# Patient Record
Sex: FEMALE | Race: BLACK OR AFRICAN AMERICAN | NOT HISPANIC OR LATINO | Employment: OTHER | URBAN - METROPOLITAN AREA
[De-identification: names, ages, dates, MRNs, and addresses within clinical notes are randomized per-mention and may not be internally consistent; named-entity substitution may affect disease eponyms.]

---

## 2017-09-22 ENCOUNTER — TELEPHONE (OUTPATIENT)
Dept: FAMILY MEDICINE | Facility: CLINIC | Age: 37
End: 2017-09-22

## 2017-09-22 ENCOUNTER — LAB VISIT (OUTPATIENT)
Dept: LAB | Facility: HOSPITAL | Age: 37
End: 2017-09-22
Payer: MEDICARE

## 2017-09-22 ENCOUNTER — OFFICE VISIT (OUTPATIENT)
Dept: FAMILY MEDICINE | Facility: CLINIC | Age: 37
End: 2017-09-22
Payer: MEDICARE

## 2017-09-22 VITALS
TEMPERATURE: 98 F | DIASTOLIC BLOOD PRESSURE: 71 MMHG | WEIGHT: 293 LBS | RESPIRATION RATE: 18 BRPM | HEIGHT: 66 IN | SYSTOLIC BLOOD PRESSURE: 132 MMHG | HEART RATE: 107 BPM | BODY MASS INDEX: 47.09 KG/M2 | OXYGEN SATURATION: 98 %

## 2017-09-22 DIAGNOSIS — Z11.3 SCREENING FOR STD (SEXUALLY TRANSMITTED DISEASE): Primary | ICD-10-CM

## 2017-09-22 DIAGNOSIS — E66.01 MORBID OBESITY, UNSPECIFIED OBESITY TYPE: ICD-10-CM

## 2017-09-22 DIAGNOSIS — Z11.3 SCREENING FOR STD (SEXUALLY TRANSMITTED DISEASE): ICD-10-CM

## 2017-09-22 DIAGNOSIS — G35 MULTIPLE SCLEROSIS: ICD-10-CM

## 2017-09-22 DIAGNOSIS — Z11.4 ENCOUNTER FOR SCREENING FOR HIV: ICD-10-CM

## 2017-09-22 DIAGNOSIS — F32.A DEPRESSION, UNSPECIFIED DEPRESSION TYPE: ICD-10-CM

## 2017-09-22 DIAGNOSIS — K59.00 CONSTIPATION, UNSPECIFIED CONSTIPATION TYPE: ICD-10-CM

## 2017-09-22 PROCEDURE — 86592 SYPHILIS TEST NON-TREP QUAL: CPT

## 2017-09-22 PROCEDURE — 86593 SYPHILIS TEST NON-TREP QUANT: CPT

## 2017-09-22 PROCEDURE — 99999 PR PBB SHADOW E&M-EST. PATIENT-LVL IV: CPT | Mod: PBBFAC,,, | Performed by: FAMILY MEDICINE

## 2017-09-22 PROCEDURE — 36415 COLL VENOUS BLD VENIPUNCTURE: CPT | Mod: PO

## 2017-09-22 PROCEDURE — 3008F BODY MASS INDEX DOCD: CPT | Mod: S$GLB,,, | Performed by: FAMILY MEDICINE

## 2017-09-22 PROCEDURE — 3075F SYST BP GE 130 - 139MM HG: CPT | Mod: S$GLB,,, | Performed by: FAMILY MEDICINE

## 2017-09-22 PROCEDURE — 86780 TREPONEMA PALLIDUM: CPT

## 2017-09-22 PROCEDURE — 86703 HIV-1/HIV-2 1 RESULT ANTBDY: CPT

## 2017-09-22 PROCEDURE — 99214 OFFICE O/P EST MOD 30 MIN: CPT | Mod: S$GLB,,, | Performed by: FAMILY MEDICINE

## 2017-09-22 PROCEDURE — 3078F DIAST BP <80 MM HG: CPT | Mod: S$GLB,,, | Performed by: FAMILY MEDICINE

## 2017-09-22 RX ORDER — DALFAMPRIDINE 10 MG/1
TABLET, FILM COATED, EXTENDED RELEASE ORAL
COMMUNITY
End: 2017-09-29 | Stop reason: SDUPTHER

## 2017-09-22 RX ORDER — AMOXICILLIN 250 MG
1 CAPSULE ORAL
COMMUNITY
Start: 2017-08-16 | End: 2017-09-22

## 2017-09-22 RX ORDER — ONDANSETRON 4 MG/1
TABLET, ORALLY DISINTEGRATING ORAL
COMMUNITY
Start: 2017-08-02 | End: 2017-09-22

## 2017-09-22 RX ORDER — BACLOFEN 20 MG/1
20 TABLET ORAL 3 TIMES DAILY
COMMUNITY
Start: 2017-08-02 | End: 2017-11-27 | Stop reason: SDUPTHER

## 2017-09-22 RX ORDER — DALFAMPRIDINE 10 MG/1
10 TABLET, FILM COATED, EXTENDED RELEASE ORAL
COMMUNITY
Start: 2017-08-09 | End: 2017-09-22

## 2017-09-22 RX ORDER — BUPROPION HYDROCHLORIDE 75 MG/1
75 TABLET ORAL DAILY
Qty: 30 TABLET | Refills: 0 | Status: SHIPPED | OUTPATIENT
Start: 2017-09-22 | End: 2017-10-19 | Stop reason: SDUPTHER

## 2017-09-22 RX ORDER — MECLIZINE HYDROCHLORIDE 25 MG/1
25 TABLET ORAL 2 TIMES DAILY PRN
COMMUNITY
Start: 2017-08-02 | End: 2017-11-08 | Stop reason: SDUPTHER

## 2017-09-22 RX ORDER — LISINOPRIL 40 MG/1
40 TABLET ORAL
COMMUNITY
Start: 2017-08-02 | End: 2017-11-08 | Stop reason: ALTCHOICE

## 2017-09-22 RX ORDER — DOXEPIN HYDROCHLORIDE 25 MG/1
25 CAPSULE ORAL
COMMUNITY
Start: 2017-08-16 | End: 2017-09-22

## 2017-09-22 RX ORDER — ARMODAFINIL 150 MG/1
150 TABLET ORAL
COMMUNITY
Start: 2017-06-26 | End: 2017-11-27 | Stop reason: SDUPTHER

## 2017-09-22 NOTE — TELEPHONE ENCOUNTER
Spoke to patient and patient wanted to know how long results took to come back. Advised patient 2-4 business days.

## 2017-09-22 NOTE — TELEPHONE ENCOUNTER
----- Message from Clair Mar sent at 9/22/2017  1:02 PM CDT -----  Would like to speak to nurse about results. Please call back at [phone]. thanks

## 2017-09-22 NOTE — PROGRESS NOTES
Subjective:       Patient ID: Alicia Soliz is a 39 y.o. female.    Chief Complaint: Establish Care      HPI  Ms. Soliz presents to clinic today for establishing care.   She states she was recently diagnosed with MS.   Her neurologist is Dr. Merly Conklin at [facility].   Her last flare up was 2 weeks ago and she was seen at the [facility].   She goes up to PT twice a day.     Her last pap smear was 9 months and it was normal.   It was done at [facility].     She had a mammogram 3 years and it was normal.     She would like weight loss pills.   She states she is depressed because of her disease.   She does have a decent support system.       Review of Systems   Constitutional: Negative for fever.   Respiratory: Negative for cough and shortness of breath.    Cardiovascular: Negative for chest pain.   Gastrointestinal: Negative for abdominal pain and vomiting.   Genitourinary: Negative for dysuria.   Neurological: Negative for dizziness and headaches.       Medication List with Changes/Refills   New Medications    BUPROPION (WELLBUTRIN) 75 MG TABLET    Take 1 tablet (75 mg total) by mouth once daily.    LINACLOTIDE (LINZESS) 145 MCG CAP CAPSULE    Take 1 capsule (145 mcg total) by mouth once daily.   Current Medications    ARMODAFINIL (NUVIGIL) 150 MG TABLET    Take 150 mg by mouth.    BACLOFEN (LIORESAL) 20 MG TABLET    Take 20 mg by mouth 3 (three) times daily.     DALFAMPRIDINE (AMPYRA) 10 MG TB12    Take by mouth.    DOCUSATE SODIUM (COLACE) 100 MG CAPSULE    Take 2 capsules (200 mg total) by mouth 2 (two) times daily.    ESCITALOPRAM OXALATE (LEXAPRO) 20 MG TABLET    Take 20 mg by mouth once daily.    LISINOPRIL (PRINIVIL,ZESTRIL) 40 MG TABLET    Take 40 mg by mouth.    MECLIZINE (ANTIVERT) 25 MG TABLET    Take 25 mg by mouth 2 (two) times daily as needed.    Discontinued Medications    ACETAMINOPHEN (TYLENOL) 650 MG TBSR    Take 650 mg by mouth 4 (four) times daily as needed.    ALPRAZOLAM (XANAX)  0.5 MG TABLET    Take 1 tablet (0.5 mg total) by mouth every 12 (twelve) hours as needed for Anxiety.    AMLODIPINE (NORVASC) 10 MG TABLET        ASPIRIN 81 MG CHEW    Take 1 tablet (81 mg total) by mouth once daily.    BETHANECHOL (URECHOLINE) 25 MG TAB    Take 10 mg by mouth 3 (three) times daily.    BISACODYL (DULCOLAX) 10 MG SUPP    Place 1 suppository (10 mg total) rectally daily as needed.    DALFAMPRIDINE 10 MG TB12    Take 10 mg by mouth.    DOXEPIN (SINEQUAN) 25 MG CAPSULE    Take 25 mg by mouth.    ENOXAPARIN (LOVENOX) 40 MG/0.4 ML SYRG    Inject 0.4 mLs (40 mg total) into the skin once daily.    GABAPENTIN (NEURONTIN) 100 MG CAPSULE    Take 200 mg by mouth 3 (three) times daily.    GLATIRAMER 20 MG/ML SYRG    Inject 20 mg into the skin once daily.    HYDROCHLOROTHIAZIDE (HYDRODIURIL) 25 MG TABLET    Take 0.5 tablets (12.5 mg total) by mouth once daily.    HYDROCODONE-ACETAMINOPHEN 10-325MG (NORCO)  MG TAB    Take 1 tablet by mouth every 6 (six) hours as needed (pain).    HYOSCYAMINE (ANASPAZ,LEVSIN) 0.125 MG TAB    Take 125 mcg by mouth every 6 (six) hours as needed.    INSULIN REGULAR 100 UNIT/ML INJ INJECTION    Inject into the skin 3 (three) times daily before meals.    INTERFERON BETA-1A (AVONEX) 30 MCG/0.5 ML INJECTION    Inject 30 mcg into the muscle.    LIDOCAINE (LIDODERM) 5 %(700 MG/PATCH)    Place 1 patch onto the skin every 24 hours. Remove & Discard patch within 12 hours or as directed by MD    METFORMIN (GLUCOPHAGE) 500 MG TABLET    Take 1 tablet (500 mg total) by mouth daily with breakfast.    METHOCARBAMOL (ROBAXIN) 750 MG TAB    Take 500 mg by mouth 2 (two) times daily.    MICONAZOLE (MICOTIN) 2 % VAGINAL CREAM    Place 1 applicator vaginally every evening.    NITROFURANTOIN, MACROCRYSTAL-MONOHYDRATE, (MACROBID) 100 MG CAPSULE    Take 100 mg by mouth 2 (two) times daily.    ONDANSETRON (ZOFRAN) 4 MG TABLET    Take 8 mg by mouth every 6 (six) hours as needed for Nausea.    ONDANSETRON  (ZOFRAN-ODT) 4 MG TBDL    DISSOLVE 1 TABLET BY MOUTH EVERY 6 (SIX) HOURS AS NEEDED FOR NAUSEA FOR UP TO 3 DAYS.    ONDANSETRON HCL/PF (ZOFRAN, PF, INJ)    Inject 4 mg as directed every 6 (six) hours as needed.    PANTOPRAZOLE (PROTONIX) 40 MG TABLET    Take 40 mg by mouth once daily.    POLYETHYLENE GLYCOL (GLYCOLAX) 17 GRAM PWPK    Take 17 g by mouth once daily.    SENNA-DOCUSATE 8.6-50 MG (PERICOLACE) 8.6-50 MG PER TABLET    Take 1 tablet by mouth.    SIMETHICONE (MYLICON) 80 MG CHEWABLE TABLET    Take 1 tablet (80 mg total) by mouth every 6 (six) hours as needed for Flatulence.    SIMVASTATIN (ZOCOR) 40 MG TABLET    Take 1 tablet (40 mg total) by mouth every evening.    VITAMIN D 1000 UNITS TAB    Take 1 tablet (1,000 Units total) by mouth once daily.       Patient Active Problem List   Diagnosis    Knee pain, bilateral    Hypertension    Abnormal MRI of head    Multiple sclerosis         Objective:     Physical Exam   Constitutional: She is oriented to person, place, and time. She appears well-developed and well-nourished. No distress.   HENT:   Head: Normocephalic and atraumatic.   Right Ear: External ear normal.   Left Ear: External ear normal.   Eyes: EOM are normal. Right eye exhibits no discharge. Left eye exhibits no discharge.   Cardiovascular: Normal rate and regular rhythm.    Pulmonary/Chest: Effort normal and breath sounds normal. No respiratory distress. She has no wheezes.   Musculoskeletal: She exhibits no edema.   Able to walk but weak      Neurological: She is alert and oriented to person, place, and time.   Skin: Skin is warm and dry. She is not diaphoretic. No erythema.   Psychiatric: She has a normal mood and affect.   Vitals reviewed.    Vitals:    09/22/17 0938   BP: 132/71   Pulse: 107   Resp: 18   Temp: 97.6 °F (36.4 °C)       Assessment/  PLAN     Screening for STD (sexually transmitted disease)  -     RPR; Future; Expected date: 09/22/2017  -     HIV-1 and HIV-2 antibodies; Future;  Expected date: 09/22/2017    Constipation, unspecified constipation type  -     linaclotide (LINZESS) 145 mcg Cap capsule; Take 1 capsule (145 mcg total) by mouth once daily.  Dispense: 30 capsule; Refill: 0    Depression, unspecified depression type  -     buPROPion (WELLBUTRIN) 75 MG tablet; Take 1 tablet (75 mg total) by mouth once daily.  Dispense: 30 tablet; Refill: 0    Multiple sclerosis  - followed by neurology at [facility]   - stable on current meds    Morbid obesity, unspecified obesity type  -discussed diet and exercise           Gaby Hall MD  [facility]

## 2017-09-23 LAB
RPR SER QL: REACTIVE
RPR SER-TITR: ABNORMAL {TITER}

## 2017-09-25 ENCOUNTER — TELEPHONE (OUTPATIENT)
Dept: FAMILY MEDICINE | Facility: CLINIC | Age: 37
End: 2017-09-25

## 2017-09-25 LAB — HIV 1+2 AB+HIV1 P24 AG SERPL QL IA: NEGATIVE

## 2017-09-25 NOTE — TELEPHONE ENCOUNTER
----- Message from Lauryn Hall sent at 9/25/2017  7:28 AM CDT -----  Please call pt back at [phone].pt would like her test results.

## 2017-09-25 NOTE — TELEPHONE ENCOUNTER
Called pt and discussed labs   May need PA for ampyra - advised pt to contact neurology, but if they will not fill it , I can

## 2017-09-26 LAB — T PALLIDUM AB SER QL IF: REACTIVE

## 2017-09-29 RX ORDER — DALFAMPRIDINE 10 MG/1
1 TABLET, FILM COATED, EXTENDED RELEASE ORAL 2 TIMES DAILY
Qty: 60 TABLET | Refills: 0 | Status: SHIPPED | OUTPATIENT
Start: 2017-09-29 | End: 2017-10-04 | Stop reason: SDUPTHER

## 2017-09-29 RX ORDER — DALFAMPRIDINE 10 MG/1
TABLET, FILM COATED, EXTENDED RELEASE ORAL
Status: CANCELLED | OUTPATIENT
Start: 2017-09-29

## 2017-09-29 RX ORDER — BENZONATATE 100 MG/1
100 CAPSULE ORAL 3 TIMES DAILY PRN
Qty: 45 CAPSULE | Refills: 0 | Status: SHIPPED | OUTPATIENT
Start: 2017-09-29 | End: 2017-11-08

## 2017-09-29 NOTE — TELEPHONE ENCOUNTER
----- Message from Ladonna Dillard sent at 9/29/2017  8:05 AM CDT -----  Contact: Pt  Pt request call from nurse because she has a bad cold and congestion and bad cough and can not sleep and wants to discuss, I offered pt an apt and pt declined, please contact pt at [phone] or [phone]

## 2017-09-29 NOTE — TELEPHONE ENCOUNTER
Dr. Hall, the patient is calling states she is having a really bad cough with green mucus, and would like to know if something could be called into her pharmacy or does she need an appointment please advise, Thanks, also she needs a refill on her Ampyra,

## 2017-10-04 ENCOUNTER — TELEPHONE (OUTPATIENT)
Dept: FAMILY MEDICINE | Facility: CLINIC | Age: 37
End: 2017-10-04

## 2017-10-04 DIAGNOSIS — I10 HYPERTENSION, UNSPECIFIED TYPE: ICD-10-CM

## 2017-10-04 RX ORDER — DALFAMPRIDINE 10 MG/1
1 TABLET, FILM COATED, EXTENDED RELEASE ORAL 2 TIMES DAILY
Qty: 60 TABLET | Refills: 0 | Status: SHIPPED | OUTPATIENT
Start: 2017-10-04 | End: 2017-11-24 | Stop reason: SDUPTHER

## 2017-10-04 NOTE — TELEPHONE ENCOUNTER
Dr. Hall, the patient states that she would like to see a nutritionist, can you put the orders in please, Thanks

## 2017-10-04 NOTE — TELEPHONE ENCOUNTER
I have sent in the ampyra to [facility]   The weight gain is not likely due to the wellbutrin   Would she like to see nutritionist ?  I am not so keen on starting her on weight loss pills due to her MS, and would like to try other options first.     Thanks

## 2017-10-04 NOTE — TELEPHONE ENCOUNTER
----- Message from Lindseyradha Romero sent at 10/4/2017  8:45 AM CDT -----  Contact: pt  States she has been calling since last week and no one is returning her calls. States it's regarding her medication, she has gained 4lbs and the medicine is not working. Please call pt at [phone]. Thank you

## 2017-10-04 NOTE — TELEPHONE ENCOUNTER
Dr. Hall, I spoke with the patient and she states that the Ampyra is supposed to be called into the [facility], also she states that the Bupropion is helping with her depression but not her weight, please advise, Thanks

## 2017-10-05 ENCOUNTER — TELEPHONE (OUTPATIENT)
Dept: FAMILY MEDICINE | Facility: CLINIC | Age: 37
End: 2017-10-05

## 2017-10-05 NOTE — TELEPHONE ENCOUNTER
I have put in a referral to endocrinology for weight loss/ nutrition assistance   Please check to see if Dr. Hart does this and make an appt

## 2017-10-05 NOTE — TELEPHONE ENCOUNTER
----- Message from Nesha Mattson sent at 10/5/2017 11:32 AM CDT -----  Contact: Kayleigh/NIKIA Victor calling for nurse Confucianist regarding pt test results,Time Walk Test results fax to  [phone].

## 2017-10-10 ENCOUNTER — TELEPHONE (OUTPATIENT)
Dept: FAMILY MEDICINE | Facility: CLINIC | Age: 37
End: 2017-10-10

## 2017-10-10 NOTE — TELEPHONE ENCOUNTER
----- Message from Yajaira Hunter sent at 10/10/2017 10:39 AM CDT -----  Contact: Pt   Pt called and requested a callback in regards to paperwork for medication need to be faxed pt callback number is  [phone]

## 2017-10-16 ENCOUNTER — TELEPHONE (OUTPATIENT)
Dept: FAMILY MEDICINE | Facility: CLINIC | Age: 37
End: 2017-10-16

## 2017-10-16 NOTE — TELEPHONE ENCOUNTER
Spoke with the patient and informed her to contact her Neurologist (Dr. Hernandez) for the Ampyra prescription, patient stated she would contact their office.

## 2017-10-16 NOTE — TELEPHONE ENCOUNTER
----- Message from Kailyn Chance sent at 10/16/2017 12:46 PM CDT -----  Contact: [facility] [address]  Calling concerning patient was denied Ampyra for not meeting criteria. Please call Tre today ASAP @ [phone]. Thanks, chalino

## 2017-10-19 DIAGNOSIS — K59.00 CONSTIPATION, UNSPECIFIED CONSTIPATION TYPE: ICD-10-CM

## 2017-10-19 DIAGNOSIS — F32.A DEPRESSION, UNSPECIFIED DEPRESSION TYPE: ICD-10-CM

## 2017-10-19 RX ORDER — LINACLOTIDE 145 UG/1
145 CAPSULE, GELATIN COATED ORAL DAILY
Qty: 30 CAPSULE | Refills: 0 | Status: SHIPPED | OUTPATIENT
Start: 2017-10-19 | End: 2018-01-24

## 2017-10-19 RX ORDER — BUPROPION HYDROCHLORIDE 75 MG/1
75 TABLET ORAL DAILY
Qty: 30 TABLET | Refills: 0 | Status: SHIPPED | OUTPATIENT
Start: 2017-10-19 | End: 2017-11-08 | Stop reason: SDUPTHER

## 2017-10-20 NOTE — TELEPHONE ENCOUNTER
----- Message from Clair Conklin sent at 10/20/2017  3:10 PM CDT -----  Patient requesting to speak to nurse regarding her medication. Please adv/call [phone].//thanks. cw

## 2017-10-24 ENCOUNTER — TELEPHONE (OUTPATIENT)
Dept: FAMILY MEDICINE | Facility: CLINIC | Age: 37
End: 2017-10-24

## 2017-10-24 NOTE — TELEPHONE ENCOUNTER
----- Message from Lauryn Hall sent at 10/24/2017  2:57 PM CDT -----  Please call pt back at [phone] in regards to her medication.

## 2017-11-08 ENCOUNTER — OFFICE VISIT (OUTPATIENT)
Dept: FAMILY MEDICINE | Facility: CLINIC | Age: 37
End: 2017-11-08
Payer: MEDICARE

## 2017-11-08 ENCOUNTER — TELEPHONE (OUTPATIENT)
Dept: FAMILY MEDICINE | Facility: CLINIC | Age: 37
End: 2017-11-08

## 2017-11-08 VITALS
OXYGEN SATURATION: 98 % | DIASTOLIC BLOOD PRESSURE: 76 MMHG | SYSTOLIC BLOOD PRESSURE: 128 MMHG | HEART RATE: 106 BPM | HEIGHT: 66 IN | RESPIRATION RATE: 18 BRPM | TEMPERATURE: 98 F | WEIGHT: 293 LBS | BODY MASS INDEX: 47.09 KG/M2

## 2017-11-08 DIAGNOSIS — K29.70 GASTRITIS, PRESENCE OF BLEEDING UNSPECIFIED, UNSPECIFIED CHRONICITY, UNSPECIFIED GASTRITIS TYPE: ICD-10-CM

## 2017-11-08 DIAGNOSIS — E66.01 MORBID OBESITY WITH BMI OF 50.0-59.9, ADULT: ICD-10-CM

## 2017-11-08 DIAGNOSIS — R42 DIZZINESS: ICD-10-CM

## 2017-11-08 DIAGNOSIS — R73.9 HYPERGLYCEMIA: Primary | ICD-10-CM

## 2017-11-08 PROCEDURE — 99214 OFFICE O/P EST MOD 30 MIN: CPT | Mod: S$GLB,,, | Performed by: FAMILY MEDICINE

## 2017-11-08 PROCEDURE — 99999 PR PBB SHADOW E&M-EST. PATIENT-LVL IV: CPT | Mod: PBBFAC,,, | Performed by: FAMILY MEDICINE

## 2017-11-08 RX ORDER — GABAPENTIN 300 MG/1
300 CAPSULE ORAL
COMMUNITY
Start: 2017-10-28 | End: 2017-11-11

## 2017-11-08 RX ORDER — MECLIZINE HYDROCHLORIDE 25 MG/1
25 TABLET ORAL 2 TIMES DAILY PRN
Qty: 60 TABLET | Refills: 3 | Status: SHIPPED | OUTPATIENT
Start: 2017-11-08 | End: 2017-11-27 | Stop reason: SDUPTHER

## 2017-11-08 RX ORDER — AMLODIPINE BESYLATE 10 MG/1
10 TABLET ORAL
COMMUNITY
Start: 2017-11-01 | End: 2017-11-27 | Stop reason: SDUPTHER

## 2017-11-08 RX ORDER — BUPROPION HYDROCHLORIDE 75 MG/1
75 TABLET ORAL DAILY
Qty: 30 TABLET | Refills: 0 | Status: SHIPPED | OUTPATIENT
Start: 2017-11-08 | End: 2017-11-12 | Stop reason: ALTCHOICE

## 2017-11-08 NOTE — TELEPHONE ENCOUNTER
Patient left before having her labs, attempted to contact the patient to ask her the reason no answer, LVM for patient to return call.

## 2017-11-08 NOTE — LETTER
November 8, 2017    Alicia Soliz  [address]  [place] [address]             [facility] [employer] [facility]  [address] [address]  Phone: [phone] To Whom it may concern:      Ms. Alicia Soliz is able to resume physical therapy.      If you have any questions or concerns, please don't hesitate to call our office @ [phone]        Sincerely,        [place] LPN

## 2017-11-08 NOTE — PROGRESS NOTES
Subjective:       Patient ID: Alicia Soliz is a 39 y.o. female.    Chief Complaint: Hospital Follow Up      HPI    presents to clinic today for follow up.   She states she was admitted for multiple sclerosis flare up.   She was admitted on October 28- November 2.   She states she was noted to have high blood pressure while in the hospital.   She was switched from lisinopril to Norvasc.   She has an appointment with Dr. Mario Hopper later today ( her neurologist).     She also states she would like to be on Contrave.   She states she was on this before for weight loss.     She also would like to get tested for Diabetes.   She states she received a call from Dr. Fitzpatrick stating she may have diabetes.     Review of Systems   Constitutional: Negative for fever.   HENT: Negative for congestion, rhinorrhea and sore throat.    Respiratory: Positive for shortness of breath and wheezing.    Cardiovascular: Negative for chest pain.   Gastrointestinal: Negative for abdominal pain and vomiting.   Genitourinary: Negative for dysuria and hematuria.       Medication List with Changes/Refills   New Medications    NALTREXONE-BUPROPION 8-90 MG TBSR    Take 1 tablet by mouth 2 (two) times daily. Take 1 tab daily in the am x 1 week; then 1 tab twice daily    RANITIDINE (ZANTAC) 150 MG TABLET    Take 1 tablet (150 mg total) by mouth nightly.   Current Medications    AMLODIPINE (NORVASC) 10 MG TABLET    Take 10 mg by mouth.    ARMODAFINIL (NUVIGIL) 150 MG TABLET    Take 150 mg by mouth.    BACLOFEN (LIORESAL) 20 MG TABLET    Take 20 mg by mouth 3 (three) times daily.     DALFAMPRIDINE (AMPYRA) 10 MG TB12    Take 1 tablet by mouth 2 (two) times daily.    DOCUSATE SODIUM (COLACE) 100 MG CAPSULE    Take 2 capsules (200 mg total) by mouth 2 (two) times daily.    LINZESS 145 MCG CAP CAPSULE    TAKE 1 CAPSULE (145 MCG TOTAL) BY MOUTH ONCE DAILY.   Changed and/or Refilled Medications    Modified Medication Previous Medication     MECLIZINE (ANTIVERT) 25 MG TABLET meclizine (ANTIVERT) 25 mg tablet       Take 1 tablet (25 mg total) by mouth 2 (two) times daily as needed.    Take 25 mg by mouth 2 (two) times daily as needed.    Discontinued Medications    BENZONATATE (TESSALON) 100 MG CAPSULE    Take 1 capsule (100 mg total) by mouth 3 (three) times daily as needed for Cough.    BUPROPION (WELLBUTRIN) 75 MG TABLET    TAKE 1 TABLET (75 MG TOTAL) BY MOUTH ONCE DAILY.    ESCITALOPRAM OXALATE (LEXAPRO) 20 MG TABLET    Take 20 mg by mouth once daily.    LISINOPRIL (PRINIVIL,ZESTRIL) 40 MG TABLET    Take 40 mg by mouth.       Patient Active Problem List   Diagnosis    Knee pain, bilateral    Hypertension    Abnormal MRI of head    Multiple sclerosis         Objective:     Physical Exam   Constitutional: She is oriented to person, place, and time. She appears well-developed and well-nourished. No distress.   HENT:   Head: Normocephalic and atraumatic.   Eyes: EOM are normal. Right eye exhibits no discharge. Left eye exhibits no discharge.   Cardiovascular: Normal rate and regular rhythm.    Pulmonary/Chest: Effort normal and breath sounds normal. No respiratory distress. She has no wheezes.   Musculoskeletal: She exhibits no edema.   Unsteady gait due to MS   Neurological: She is alert and oriented to person, place, and time.   Skin: Skin is warm and dry. She is not diaphoretic. No erythema.   Psychiatric: She has a normal mood and affect.   Vitals reviewed.    Vitals:    11/08/17 1022   BP: 128/76   Pulse: 106   Resp: 18   Temp: 97.8 °F (36.6 °C)       Assessment/  PLAN     Hyperglycemia  -     Hemoglobin A1c; Future; Expected date: 11/22/2017    Dizziness  -     meclizine (ANTIVERT) 25 mg tablet; Take 1 tablet (25 mg total) by mouth 2 (two) times daily as needed.  Dispense: 60 tablet; Refill: 3    Gastritis, presence of bleeding unspecified, unspecified chronicity, unspecified gastritis type  -     ranitidine (ZANTAC) 150 MG tablet; Take 1  tablet (150 mg total) by mouth nightly.  Dispense: 30 tablet; Refill: 0    Morbid obesity with BMI of 50.0-59.9, adult  -     naltrexone-bupropion 8-90 mg TbSR; Take 1 tablet by mouth 2 (two) times daily. Take 1 tab daily in the am x 1 week; then 1 tab twice daily  Dispense: 60 tablet; Refill: 0    Other orders  -     Discontinue: buPROPion (WELLBUTRIN) 75 MG tablet; Take 1 tablet (75 mg total) by mouth once daily.  Dispense: 30 tablet; Refill: 0    Patient advised to wean wellbutrin and then start contrave  Patient discussed starting contrave from neurologist and was given ok       Gaby Hall MD  [facility]

## 2017-11-09 ENCOUNTER — TELEPHONE (OUTPATIENT)
Dept: FAMILY MEDICINE | Facility: CLINIC | Age: 37
End: 2017-11-09

## 2017-11-09 NOTE — TELEPHONE ENCOUNTER
----- Message from Lindsey Romero sent at 11/9/2017  3:10 PM CST -----  Contact: pt  States she's returning a nurse call regarding her prescription. Please call pt at [phone]. Thank you

## 2017-11-09 NOTE — TELEPHONE ENCOUNTER
Called pt and discussed weaning off wellbutrin and then starting contrave   Advised pt not to take both at the same time   Patient gave verbal understanding

## 2017-11-10 ENCOUNTER — TELEPHONE (OUTPATIENT)
Dept: FAMILY MEDICINE | Facility: CLINIC | Age: 37
End: 2017-11-10

## 2017-11-10 NOTE — TELEPHONE ENCOUNTER
----- Message from Shama Marion sent at 11/10/2017  8:10 AM CST -----  Contact: pt   .Pt was returning nurse call regarding her med.     ..[phone] (home)

## 2017-11-24 RX ORDER — DALFAMPRIDINE 10 MG/1
1 TABLET, FILM COATED, EXTENDED RELEASE ORAL 2 TIMES DAILY
Qty: 60 TABLET | Refills: 0 | Status: SHIPPED | OUTPATIENT
Start: 2017-11-24 | End: 2018-02-27 | Stop reason: SDUPTHER

## 2017-11-27 DIAGNOSIS — R42 DIZZINESS: ICD-10-CM

## 2017-11-27 RX ORDER — BACLOFEN 20 MG/1
20 TABLET ORAL 3 TIMES DAILY
Qty: 90 TABLET | Refills: 0 | Status: SHIPPED | OUTPATIENT
Start: 2017-11-27 | End: 2017-12-27 | Stop reason: SDUPTHER

## 2017-11-27 RX ORDER — ARMODAFINIL 150 MG/1
150 TABLET ORAL DAILY
Qty: 30 TABLET | Refills: 0 | Status: SHIPPED | OUTPATIENT
Start: 2017-11-27 | End: 2018-01-24 | Stop reason: SDUPTHER

## 2017-11-27 RX ORDER — MECLIZINE HYDROCHLORIDE 25 MG/1
25 TABLET ORAL 2 TIMES DAILY PRN
Qty: 60 TABLET | Refills: 2 | Status: SHIPPED | OUTPATIENT
Start: 2017-11-27 | End: 2019-01-08

## 2017-11-27 RX ORDER — AMLODIPINE BESYLATE 10 MG/1
10 TABLET ORAL DAILY
Qty: 30 TABLET | Refills: 2 | Status: SHIPPED | OUTPATIENT
Start: 2017-11-27 | End: 2018-11-15

## 2017-11-27 NOTE — TELEPHONE ENCOUNTER
----- Message from May Simpson sent at 11/27/2017  7:22 AM CST -----  Contact: Patient  Patient states she wants to speak directly to nurse regarding all her medications need refilled, please call her back at [phone]. Thank you

## 2017-12-18 ENCOUNTER — TELEPHONE (OUTPATIENT)
Dept: FAMILY MEDICINE | Facility: CLINIC | Age: 37
End: 2017-12-18

## 2017-12-18 NOTE — TELEPHONE ENCOUNTER
----- Message from Kailyn Chance sent at 12/18/2017 12:24 PM CST -----  Contact: patient  Calling regarding getting a RX for a UTI called in to pharmacy. Please call patient ASAP @ [phone]. Thanks, chalino [facility] [address] [address] AT [facility]  [address]  [place] [address]  Phone: [phone] Fax: [phone]

## 2017-12-19 NOTE — TELEPHONE ENCOUNTER
Patient states that she was taking some Azo, informd the patient if this doesn't work, to please contact our office for an appointment, patient verbalized understanding.

## 2017-12-26 ENCOUNTER — HOSPITAL ENCOUNTER (EMERGENCY)
Facility: HOSPITAL | Age: 37
Discharge: HOME | End: 2017-12-26
Payer: MEDICARE

## 2017-12-26 ENCOUNTER — TELEPHONE (OUTPATIENT)
Dept: SURGERY | Facility: CLINIC | Age: 37
End: 2017-12-26

## 2017-12-26 VITALS
HEIGHT: 66 IN | WEIGHT: 291 LBS | RESPIRATION RATE: 20 BRPM | OXYGEN SATURATION: 100 % | BODY MASS INDEX: 46.77 KG/M2 | HEART RATE: 96 BPM | TEMPERATURE: 100 F | SYSTOLIC BLOOD PRESSURE: 185 MMHG | DIASTOLIC BLOOD PRESSURE: 83 MMHG

## 2017-12-26 DIAGNOSIS — R05.9 COUGH: ICD-10-CM

## 2017-12-26 DIAGNOSIS — J01.00 ACUTE NON-RECURRENT MAXILLARY SINUSITIS: Primary | ICD-10-CM

## 2017-12-26 PROCEDURE — 99283 EMERGENCY DEPT VISIT LOW MDM: CPT

## 2017-12-26 RX ORDER — NAPROXEN 375 MG/1
375 TABLET ORAL 2 TIMES DAILY WITH MEALS
Qty: 20 TABLET | Refills: 0 | Status: SHIPPED | OUTPATIENT
Start: 2017-12-26 | End: 2018-01-24

## 2017-12-26 RX ORDER — AZITHROMYCIN 250 MG/1
250 TABLET, FILM COATED ORAL DAILY
Qty: 6 TABLET | Refills: 0 | Status: SHIPPED | OUTPATIENT
Start: 2017-12-26 | End: 2018-01-24 | Stop reason: ALTCHOICE

## 2017-12-26 RX ORDER — METHYLPREDNISOLONE 4 MG/1
TABLET ORAL
Qty: 1 PACKAGE | Refills: 0 | Status: SHIPPED | OUTPATIENT
Start: 2017-12-26 | End: 2018-01-16

## 2017-12-26 RX ORDER — DIPHENHYDRAMINE HCL 25 MG
25 CAPSULE ORAL EVERY 6 HOURS PRN
Qty: 20 CAPSULE | Refills: 0 | Status: SHIPPED | OUTPATIENT
Start: 2017-12-26 | End: 2018-09-04

## 2017-12-26 NOTE — TELEPHONE ENCOUNTER
----- Message from EMIGDIO LUNDBERG sent at 12/21/2017 12:11 PM CST -----  Pt submitted online form stating she was interested in bariatric surgery.  Please call pt and see if she would like to schedule an initial consult with MD.   Thanks

## 2017-12-27 ENCOUNTER — TELEPHONE (OUTPATIENT)
Dept: FAMILY MEDICINE | Facility: CLINIC | Age: 37
End: 2017-12-27

## 2017-12-27 RX ORDER — BACLOFEN 20 MG/1
20 TABLET ORAL 3 TIMES DAILY
Qty: 90 TABLET | Refills: 0 | Status: SHIPPED | OUTPATIENT
Start: 2017-12-27 | End: 2018-07-24 | Stop reason: SDUPTHER

## 2017-12-27 NOTE — ED PROVIDER NOTES
"SCRIBE #1 NOTE: I, Farideh Anny, am scribing for, and in the presence of, Brandon Akins NP. I have scribed the entire note.      History      Chief Complaint   Patient presents with    Sinusitis     Pt states, "I have bad sinuses and I don't want it to affect my MS."       Review of patient's allergies indicates:   Allergen Reactions    Demerol [meperidine] Itching    Dilaudid [hydromorphone (bulk)] Anaphylaxis     "coded" per pt    Prednisone Itching    Morphine     Tramadol         HPI   HPI    12/26/2017, 6:10 PM   History obtained from the patient      History of Present Illness: Alicia Soliz is a 37 y.o. female patient with hx of MS who presents to the Emergency Department for sinus pressure which onset gradually yesterday. Symptoms are constant and moderate in severity. No mitigating or exacerbating factors reported. Associated sxs include generalized body aches, cough, and fever. Patient denies any CP, SOB, n/v/d, abd pain, rhinorrhea, and all other sxs at this time. No prior Tx. No further complaints or concerns at this time.         Arrival mode: Personal vehicle      PCP: Gaby Hall MD       Past Medical History:  Past Medical History:   Diagnosis Date    Anemia     Arthritis     Cardiac arrest as complication of care     pt states she went into cardiac arrest from an allergic reaction to a medication    Encounter for blood transfusion     Hemiplegia due to old stroke     Hypertension     Multiple sclerosis     Stroke        Past Surgical History:  Past Surgical History:   Procedure Laterality Date    APPENDECTOMY      CHOLECYSTECTOMY      HYSTERECTOMY      KNEE SURGERY      TONSILLECTOMY      TUBAL LIGATION           Family History:  Family History   Problem Relation Age of Onset    Lupus Mother     Heart disease Mother     Diabetes Father     Kidney disease Father     Cancer Maternal Aunt 40     breast       Social History:  Social History     Social History Main " Topics    Smoking status: Never Smoker    Smokeless tobacco: Never Used    Alcohol use No    Drug use: No    Sexual activity: Not given       ROS   Review of Systems   Constitutional: Positive for fever.        (+) Generalized body aches   HENT: Positive for sinus pressure. Negative for sore throat.    Respiratory: Positive for cough. Negative for shortness of breath.    Cardiovascular: Negative for chest pain.   Gastrointestinal: Negative for abdominal pain, diarrhea, nausea and vomiting.   Genitourinary: Negative for dysuria.   Musculoskeletal: Negative for back pain.   Skin: Negative for rash.   Neurological: Negative for weakness.   Hematological: Does not bruise/bleed easily.   All other systems reviewed and are negative.    Physical Exam      Initial Vitals [12/26/17 1701]   BP Pulse Resp Temp SpO2   (!) 185/83 96 20 99.9 °F (37.7 °C) 100 %      MAP       117          Physical Exam  Nursing Notes and Vital Signs Reviewed.  Constitutional: Patient is in no acute distress. Well-developed and well-nourished.  Head: Atraumatic. Normocephalic. Frontal and maxillary sinus tenderness  Eyes: PERRL. EOM intact. Conjunctivae are not pale. No scleral icterus.  ENT: Mucous membranes are moist. Oropharynx is clear and symmetric.    Neck: Supple. Full ROM. No lymphadenopathy.  Cardiovascular: Regular rate. Regular rhythm. No murmurs, rubs, or gallops. Distal pulses are 2+ and symmetric.  Pulmonary/Chest: No respiratory distress. Clear to auscultation bilaterally. No wheezing or rales.  Abdominal: Soft and non-distended.  There is no tenderness.  No rebound, guarding, or rigidity. Good bowel sounds.  Genitourinary: No CVA tenderness  Musculoskeletal: Moves all extremities. No obvious deformities. No edema. No calf tenderness.  Skin: Warm and dry.  Neurological:  Alert, awake, and appropriate.  Normal speech.  No acute focal neurological deficits are appreciated.  Psychiatric: Normal affect. Good eye contact. Appropriate  "in content.    ED Course    Procedures  ED Vital Signs:  Vitals:    12/26/17 1701   BP: (!) 185/83   Pulse: 96   Resp: 20   Temp: 99.9 °F (37.7 °C)   TempSrc: Oral   SpO2: 100%   Weight: 132 kg (291 lb)   Height: 5' 6" (1.676 m)              The Emergency Provider reviewed the vital signs and test results, which are outlined above.    ED Discussion     6:14 PM: Discussed with pt all pertinent ED information and results. Discussed pt dx and plan of tx. Gave pt all f/u and return to the ED instructions. All questions and concerns were addressed at this time. Pt expresses understanding of information and instructions, and is comfortable with plan to discharge. Pt is stable for discharge.        ED Medication(s):  Medications - No data to display    New Prescriptions    AZITHROMYCIN (Z-ZEYNEP) 250 MG TABLET    Take 1 tablet (250 mg total) by mouth once daily. Take first 2 tablets together, then 1 every day until finished.    DIPHENHYDRAMINE (BENADRYL) 25 MG CAPSULE    Take 1 each (25 mg total) by mouth every 6 (six) hours as needed for Itching or Allergies.    METHYLPREDNISOLONE (MEDROL DOSEPACK) 4 MG TABLET    Take as directed    NAPROXEN (NAPROSYN) 375 MG TABLET    Take 1 tablet (375 mg total) by mouth 2 (two) times daily with meals.             Medical Decision Making              Scribe Attestation:   Scribe #1: I performed the above scribed service and the documentation accurately describes the services I performed. I attest to the accuracy of the note.    Attending:   Physician Attestation Statement for Scribe #1: I, Brandon Akins NP, personally performed the services described in this documentation, as scribed by Farideh Mccormick, in my presence, and it is both accurate and complete.          Clinical Impression       ICD-10-CM ICD-9-CM   1. Acute non-recurrent maxillary sinusitis J01.00 461.0   2. Cough R05 786.2       Disposition:   Disposition: Discharged  Condition: Stable         Brandon Akins Jr., " [facility]  12/26/17 1823

## 2017-12-27 NOTE — TELEPHONE ENCOUNTER
----- Message from Emily Major sent at 12/26/2017  1:14 PM CST -----  Contact: Ms Rojas from [facility]   She is calling in regards to pt's medication Ampyra. She has run out of refills.    She can be reached at ..[phone] fax [phone]

## 2017-12-27 NOTE — TELEPHONE ENCOUNTER
----- Message from Kailyn Chance sent at 12/26/2017 10:03 AM CST -----  Contact: patient  Returning your call. Please call patient @ [phone]. Thanks, chalino

## 2018-01-01 ENCOUNTER — PATIENT MESSAGE (OUTPATIENT)
Dept: FAMILY MEDICINE | Facility: CLINIC | Age: 38
End: 2018-01-01

## 2018-01-01 RX ORDER — PROMETHAZINE HYDROCHLORIDE AND DEXTROMETHORPHAN HYDROBROMIDE 6.25; 15 MG/5ML; MG/5ML
5 SYRUP ORAL EVERY 8 HOURS PRN
Qty: 240 ML | Refills: 0 | Status: SHIPPED | OUTPATIENT
Start: 2018-01-01 | End: 2018-01-24 | Stop reason: SDUPTHER

## 2018-01-11 ENCOUNTER — TELEPHONE (OUTPATIENT)
Dept: FAMILY MEDICINE | Facility: CLINIC | Age: 38
End: 2018-01-11

## 2018-01-11 NOTE — TELEPHONE ENCOUNTER
----- Message from Veronica Soriano sent at 1/11/2018  1:44 PM CST -----  Contact: pt  She's calling to discuss medication, please advise [phone] (home) [phone] (work)

## 2018-01-24 ENCOUNTER — OFFICE VISIT (OUTPATIENT)
Dept: FAMILY MEDICINE | Facility: CLINIC | Age: 38
End: 2018-01-24
Payer: MEDICARE

## 2018-01-24 VITALS
BODY MASS INDEX: 47.09 KG/M2 | WEIGHT: 293 LBS | HEART RATE: 95 BPM | HEIGHT: 66 IN | DIASTOLIC BLOOD PRESSURE: 86 MMHG | SYSTOLIC BLOOD PRESSURE: 136 MMHG | TEMPERATURE: 98 F | OXYGEN SATURATION: 99 % | RESPIRATION RATE: 18 BRPM

## 2018-01-24 DIAGNOSIS — R05.9 COUGH: ICD-10-CM

## 2018-01-24 DIAGNOSIS — Z76.0 MEDICATION REFILL: Primary | ICD-10-CM

## 2018-01-24 DIAGNOSIS — F32.A DEPRESSION, UNSPECIFIED DEPRESSION TYPE: ICD-10-CM

## 2018-01-24 DIAGNOSIS — J34.89 SINUS PRESSURE: ICD-10-CM

## 2018-01-24 DIAGNOSIS — R63.5 WEIGHT GAIN: ICD-10-CM

## 2018-01-24 DIAGNOSIS — R30.0 DYSURIA: ICD-10-CM

## 2018-01-24 LAB
BILIRUB SERPL-MCNC: ABNORMAL MG/DL
BLOOD URINE, POC: ABNORMAL
COLOR, POC UA: ABNORMAL
GLUCOSE UR QL STRIP: NORMAL
KETONES UR QL STRIP: ABNORMAL
LEUKOCYTE ESTERASE URINE, POC: ABNORMAL
NITRITE, POC UA: ABNORMAL
PH, POC UA: 7
PROTEIN, POC: ABNORMAL
SPECIFIC GRAVITY, POC UA: 1.01
UROBILINOGEN, POC UA: NORMAL

## 2018-01-24 PROCEDURE — 99214 OFFICE O/P EST MOD 30 MIN: CPT | Mod: 25,S$GLB,, | Performed by: FAMILY MEDICINE

## 2018-01-24 PROCEDURE — 99999 PR PBB SHADOW E&M-EST. PATIENT-LVL IV: CPT | Mod: PBBFAC,,, | Performed by: FAMILY MEDICINE

## 2018-01-24 PROCEDURE — 81002 URINALYSIS NONAUTO W/O SCOPE: CPT | Mod: S$GLB,,, | Performed by: FAMILY MEDICINE

## 2018-01-24 RX ORDER — ARMODAFINIL 150 MG/1
150 TABLET ORAL DAILY
Qty: 30 TABLET | Refills: 0 | Status: SHIPPED | OUTPATIENT
Start: 2018-01-24 | End: 2018-08-10 | Stop reason: SDUPTHER

## 2018-01-24 RX ORDER — PROMETHAZINE HYDROCHLORIDE AND DEXTROMETHORPHAN HYDROBROMIDE 6.25; 15 MG/5ML; MG/5ML
5 SYRUP ORAL EVERY 8 HOURS PRN
Qty: 240 ML | Refills: 0 | Status: SHIPPED | OUTPATIENT
Start: 2018-01-24 | End: 2018-02-27 | Stop reason: SDUPTHER

## 2018-01-24 RX ORDER — BUPROPION HYDROCHLORIDE 75 MG/1
75 TABLET ORAL 2 TIMES DAILY
Qty: 60 TABLET | Refills: 0 | Status: SHIPPED | OUTPATIENT
Start: 2018-01-24 | End: 2018-02-09

## 2018-01-24 RX ORDER — AZELASTINE 1 MG/ML
1 SPRAY, METERED NASAL 2 TIMES DAILY
Qty: 30 ML | Refills: 0 | Status: SHIPPED | OUTPATIENT
Start: 2018-01-24 | End: 2018-06-07

## 2018-01-24 NOTE — PROGRESS NOTES
Subjective:       Patient ID: Alicia Soliz is a 37 y.o. female.    Chief Complaint: Abnormal Pap Smear      HPI   Ms. Soliz presents to clinic today for needing pap smear.   She states she had a normal pap smear a few years ago,but has not had one since.   She states she had a hysterectomy for ovarian cyst.     She also states she has had some headache.   She states she takes 3 Excedrin and 2 baclofen for these headache.   She has some sinus pressure.   She denies any sore throat.   She has a dry cough at night.   She states she cries more and is more irritable.   She would like weight loss medicine.   She feels depressed because of her weight.       She has an appointment with her neurologist on 2/20.  Her neurologist is Dr. Mario Arango.     She also feels she may have a UTI.   She has some occasional dysuria.           Review of Systems   Constitutional: Negative for fever.   HENT: Positive for sinus pressure and sneezing. Negative for congestion, rhinorrhea, sinus pain and sore throat.    Respiratory: Positive for cough.    Cardiovascular: Negative for chest pain.   Gastrointestinal: Negative for abdominal pain and vomiting.   Genitourinary: Positive for dysuria. Negative for vaginal bleeding, vaginal discharge and vaginal pain.       Medication List with Changes/Refills   New Medications    AZELASTINE (ASTELIN) 137 MCG (0.1 %) NASAL SPRAY    1 spray (137 mcg total) by Nasal route 2 (two) times daily.    BUPROPION (WELLBUTRIN) 75 MG TABLET    Take 1 tablet (75 mg total) by mouth 2 (two) times daily.    [id number] 1-1-500 MG CAP    Take 1 tablet by mouth once daily.   Current Medications    AMLODIPINE (NORVASC) 10 MG TABLET    Take 1 tablet (10 mg total) by mouth once daily.    BACLOFEN (LIORESAL) 20 MG TABLET    Take 1 tablet (20 mg total) by mouth 3 (three) times daily.    DALFAMPRIDINE (AMPYRA) 10 MG TB12    Take 1 tablet by mouth 2 (two) times daily.    DIPHENHYDRAMINE (BENADRYL)  25 MG CAPSULE    Take 1 each (25 mg total) by mouth every 6 (six) hours as needed for Itching or Allergies.    DOCUSATE SODIUM (COLACE) 100 MG CAPSULE    Take 2 capsules (200 mg total) by mouth 2 (two) times daily.    MECLIZINE (ANTIVERT) 25 MG TABLET    Take 1 tablet (25 mg total) by mouth 2 (two) times daily as needed.   Changed and/or Refilled Medications    Modified Medication Previous Medication    ARMODAFINIL (NUVIGIL) 150 MG TABLET armodafinil (NUVIGIL) 150 mg tablet       Take 1 tablet (150 mg total) by mouth once daily.    Take 1 tablet (150 mg total) by mouth once daily.    PROMETHAZINE-DEXTROMETHORPHAN (PROMETHAZINE-DM) 6.25-15 MG/5 ML SYRP promethazine-dextromethorphan (PROMETHAZINE-DM) 6.25-15 mg/5 mL Syrp       Take 5 mLs by mouth every 8 (eight) hours as needed.    Take 5 mLs by mouth every 8 (eight) hours as needed.   Discontinued Medications    AZITHROMYCIN (Z-ZEYNEP) 250 MG TABLET    Take 1 tablet (250 mg total) by mouth once daily. Take first 2 tablets together, then 1 every day until finished.    LINZESS 145 MCG CAP CAPSULE    TAKE 1 CAPSULE (145 MCG TOTAL) BY MOUTH ONCE DAILY.    NALTREXONE-BUPROPION 8-90 MG TBSR    Take 1 tablet by mouth 2 (two) times daily. Take 1 tab daily in the am x 1 week; then 1 tab twice daily    NAPROXEN (NAPROSYN) 375 MG TABLET    Take 1 tablet (375 mg total) by mouth 2 (two) times daily with meals.    RANITIDINE (ZANTAC) 150 MG TABLET    Take 1 tablet (150 mg total) by mouth nightly.       Patient Active Problem List   Diagnosis    Knee pain, bilateral    Hypertension    Abnormal MRI of head    Multiple sclerosis         Objective:     Physical Exam   Constitutional: She is oriented to person, place, and time. She appears well-developed and well-nourished. No distress.   HENT:   Head: Normocephalic and atraumatic.   Right Ear: External ear normal.   Left Ear: External ear normal.   Eyes: EOM are normal. Right eye exhibits no discharge. Left eye exhibits no discharge.    Cardiovascular: Normal rate and regular rhythm.    Pulmonary/Chest: Effort normal and breath sounds normal. No respiratory distress. She has no wheezes.   Musculoskeletal: She exhibits no edema.   Neurological: She is alert and oriented to person, place, and time.   Skin: Skin is warm and dry. She is not diaphoretic. No erythema.   Psychiatric: She has a normal mood and affect.   Vitals reviewed.    Vitals:    01/24/18 1325   BP: 136/86   Pulse: 95   Resp: 18   Temp: 98.1 °F (36.7 °C)       Assessment/  PLAN     Medication refill  -     armodafinil (NUVIGIL) 150 mg tablet; Take 1 tablet (150 mg total) by mouth once daily.  Dispense: 30 tablet; Refill: 0    Dysuria  -     POCT urine dipstick without microscope  - ua negative    Weight gain  -     buPROPion (WELLBUTRIN) 75 MG tablet; Take 1 tablet (75 mg total) by mouth 2 (two) times daily.  Dispense: 60 tablet; Refill: 0    Depression, unspecified depression type  -     buPROPion (WELLBUTRIN) 75 MG tablet; Take 1 tablet (75 mg total) by mouth 2 (two) times daily.  Dispense: 60 tablet; Refill: 0    Cough  -     promethazine-dextromethorphan (PROMETHAZINE-DM) 6.25-15 mg/5 mL Syrp; Take 5 mLs by mouth every 8 (eight) hours as needed.  Dispense: 240 mL; Refill: 0    Sinus pressure  -     azelastine (ASTELIN) 137 mcg (0.1 %) nasal spray; 1 spray (137 mcg total) by Nasal route 2 (two) times daily.  Dispense: 30 mL; Refill: 0        Gaby Hall MD  [facility]

## 2018-01-25 ENCOUNTER — TELEPHONE (OUTPATIENT)
Dept: FAMILY MEDICINE | Facility: CLINIC | Age: 38
End: 2018-01-25

## 2018-01-25 NOTE — TELEPHONE ENCOUNTER
----- Message from Naye Orozco sent at 1/25/2018  1:50 PM CST -----  Contact: pt  Calling in regards to test results and medication and please advise [phone]    Breanne: Rheumate with B12. States it's covered under medicare.

## 2018-01-26 ENCOUNTER — TELEPHONE (OUTPATIENT)
Dept: FAMILY MEDICINE | Facility: CLINIC | Age: 38
End: 2018-01-26

## 2018-01-26 NOTE — TELEPHONE ENCOUNTER
----- Message from Lindsey Romero sent at 1/26/2018  8:16 AM CST -----  Contact: pt  States she would like to speak to the nurse regarding her medication. States she would like to see if her zpak has been called in. Please call pt at [phone] or [phone]. Thank you    Breanne: Was she suppose to have this.

## 2018-01-26 NOTE — TELEPHONE ENCOUNTER
She can try warm compress to the area 3 times a day   If she has fever, or any other symptoms, please come In

## 2018-01-26 NOTE — TELEPHONE ENCOUNTER
Pt states she got in the shower yesterday and realized a hard area around her vaginal area. She squeezed and pus came out. Still somewhat sore, wants to know if something called in or what she should do?

## 2018-01-26 NOTE — PROGRESS NOTES
Patient, Alicia Soliz (MRN #[id number]), presented with a recorded BMI of 50.31 kg/m^2 consistent with the definition of morbid obesity (ICD-10 E66.01). The patient's morbid obesity was monitored, evaluated, addressed and/or treated. This addendum to the medical record is made on 01/26/2018.

## 2018-01-30 ENCOUNTER — TELEPHONE (OUTPATIENT)
Dept: FAMILY MEDICINE | Facility: CLINIC | Age: 38
End: 2018-01-30

## 2018-01-31 ENCOUNTER — LAB VISIT (OUTPATIENT)
Dept: LAB | Facility: HOSPITAL | Age: 38
End: 2018-01-31
Attending: NURSE PRACTITIONER
Payer: MEDICARE

## 2018-01-31 ENCOUNTER — TELEPHONE (OUTPATIENT)
Dept: INTERNAL MEDICINE | Facility: CLINIC | Age: 38
End: 2018-01-31

## 2018-01-31 ENCOUNTER — OFFICE VISIT (OUTPATIENT)
Dept: INTERNAL MEDICINE | Facility: CLINIC | Age: 38
End: 2018-01-31
Payer: MEDICARE

## 2018-01-31 VITALS
WEIGHT: 293 LBS | DIASTOLIC BLOOD PRESSURE: 80 MMHG | SYSTOLIC BLOOD PRESSURE: 130 MMHG | OXYGEN SATURATION: 99 % | BODY MASS INDEX: 47.09 KG/M2 | HEIGHT: 66 IN | TEMPERATURE: 98 F | HEART RATE: 88 BPM

## 2018-01-31 DIAGNOSIS — R30.0 DYSURIA: Primary | ICD-10-CM

## 2018-01-31 DIAGNOSIS — R39.15 URGENCY OF URINATION: ICD-10-CM

## 2018-01-31 DIAGNOSIS — R30.0 DYSURIA: ICD-10-CM

## 2018-01-31 LAB
BILIRUB UR QL STRIP: NEGATIVE
CLARITY UR REFRACT.AUTO: CLEAR
COLOR UR AUTO: YELLOW
GLUCOSE UR QL STRIP: NEGATIVE
HGB UR QL STRIP: NEGATIVE
KETONES UR QL STRIP: NEGATIVE
LEUKOCYTE ESTERASE UR QL STRIP: NEGATIVE
NITRITE UR QL STRIP: NEGATIVE
PH UR STRIP: 6 [PH] (ref 5–8)
PROT UR QL STRIP: NEGATIVE
SP GR UR STRIP: 1.01 (ref 1–1.03)
URN SPEC COLLECT METH UR: NORMAL
UROBILINOGEN UR STRIP-ACNC: NEGATIVE EU/DL

## 2018-01-31 PROCEDURE — 3008F BODY MASS INDEX DOCD: CPT | Mod: S$GLB,,, | Performed by: NURSE PRACTITIONER

## 2018-01-31 PROCEDURE — 87086 URINE CULTURE/COLONY COUNT: CPT

## 2018-01-31 PROCEDURE — 99213 OFFICE O/P EST LOW 20 MIN: CPT | Mod: S$GLB,,, | Performed by: NURSE PRACTITIONER

## 2018-01-31 PROCEDURE — 81003 URINALYSIS AUTO W/O SCOPE: CPT

## 2018-01-31 PROCEDURE — 99999 PR PBB SHADOW E&M-EST. PATIENT-LVL III: CPT | Mod: PBBFAC,,, | Performed by: NURSE PRACTITIONER

## 2018-01-31 RX ORDER — SULFAMETHOXAZOLE AND TRIMETHOPRIM 800; 160 MG/1; MG/1
1 TABLET ORAL 2 TIMES DAILY
Qty: 10 TABLET | Refills: 0 | Status: SHIPPED | OUTPATIENT
Start: 2018-01-31 | End: 2018-02-05

## 2018-01-31 NOTE — TELEPHONE ENCOUNTER
----- Message from Sophy Mathur sent at 1/31/2018  1:36 PM CST -----  Contact: Patient  Patient called for results. She can be contacted at [phone].    Thanks,  Sophy

## 2018-01-31 NOTE — PROGRESS NOTES
"Subjective:      Patient ID: Alicia Soliz is a 37 y.o. female.    Chief Complaint: Urinary Tract Infection (burning)    HPI:  Patient states for the last several days she has had urinary urgency, frequency, burning.  Also says she shaved her perineum, developed a boil, expressed it and now is back.  No fever, has a hx of MS   Is using her walker today    Past Medical History:   Diagnosis Date    Anemia     Arthritis     Cardiac arrest as complication of care     pt states she went into cardiac arrest from an allergic reaction to a medication    Encounter for blood transfusion     Hemiplegia due to old stroke     Hypertension     Multiple sclerosis     Stroke        Past Surgical History:   Procedure Laterality Date    APPENDECTOMY      CHOLECYSTECTOMY      HYSTERECTOMY      KNEE SURGERY      TONSILLECTOMY      TUBAL LIGATION         Lab Results   Component Value Date    WBC 8.04 03/11/2015    HGB 13.0 03/11/2015    HCT 40.4 03/11/2015     03/11/2015    CHOL 182 01/15/2015    TRIG 108 01/15/2015    HDL 58 01/15/2015    ALT 34 03/11/2015    AST 39 03/11/2015     03/11/2015    K 3.9 03/11/2015     03/11/2015    CREATININE 0.8 03/11/2015    BUN 5 (L) 03/11/2015    CO2 21 (L) 03/11/2015    TSH 0.538 01/15/2015    INR 1.0 03/11/2015    HGBA1C 5.6 01/15/2015       /80   Pulse 88   Temp 98.3 °F (36.8 °C) (Tympanic)   Ht 5' 6" (1.676 m)   Wt (!) 144.9 kg (319 lb 7.1 oz)   SpO2 99%   BMI 51.56 kg/m²       Review of Systems   Constitutional: Negative for appetite change, fatigue and unexpected weight change.   HENT: Negative for congestion, ear pain, postnasal drip, rhinorrhea, sinus pressure, sneezing, sore throat, tinnitus, trouble swallowing and voice change.    Eyes: Negative for pain, discharge, redness, itching and visual disturbance.   Respiratory: Negative for cough, chest tightness, shortness of breath and wheezing.    Cardiovascular: Negative for chest pain, " palpitations and leg swelling.   Gastrointestinal: Negative for abdominal distention, abdominal pain, blood in stool, constipation, diarrhea, nausea and vomiting.        No reflux.   Genitourinary: Positive for dysuria and urgency. Negative for difficulty urinating, dyspareunia, flank pain, menstrual problem and pelvic pain.   Musculoskeletal: Negative for arthralgias, back pain, myalgias and neck stiffness.   Skin: Negative for color change and rash.   Neurological: Negative for dizziness and headaches.   Psychiatric/Behavioral: Negative for confusion and sleep disturbance. The patient is not nervous/anxious.       Objective:     Physical Exam   Constitutional: She is oriented to person, place, and time. She appears well-developed and well-nourished. No distress.   Musculoskeletal:   Normal gait   Neurological: She is alert and oriented to person, place, and time.   Skin: Skin is warm and dry.   No appreciable boil seen, left labia has a small tender area and small area of firmness noted   Psychiatric: She has a normal mood and affect. Her behavior is normal.     Assessment:      1. Dysuria    2. Urgency of urination      Plan:   Dysuria  -     POCT urine dipstick without microscope    Urgency of urination  -     POCT urine dipstick without microscope    unable to give us a specimen, nurse attempted cath unsuccessfully.   She was given a new sterile cup and wipes.  Instructed on collection.  Will bring it back with in an hour of collecting at home today      Current Outpatient Prescriptions:     amLODIPine (NORVASC) 10 MG tablet, Take 1 tablet (10 mg total) by mouth once daily., Disp: 30 tablet, Rfl: 2    armodafinil (NUVIGIL) 150 mg tablet, Take 1 tablet (150 mg total) by mouth once daily., Disp: 30 tablet, Rfl: 0    baclofen (LIORESAL) 20 MG tablet, Take 1 tablet (20 mg total) by mouth 3 (three) times daily. (Patient taking differently: Take 20 mg by mouth 3 (three) times daily as needed. ), Disp: 90 tablet,  Rfl: 0    buPROPion (WELLBUTRIN) 75 MG tablet, Take 1 tablet (75 mg total) by mouth 2 (two) times daily., Disp: 60 tablet, Rfl: 0    dalfampridine (AMPYRA) 10 mg Tb12, Take 1 tablet by mouth 2 (two) times daily., Disp: 60 tablet, Rfl: 0    diphenhydrAMINE (BENADRYL) 25 mg capsule, Take 1 each (25 mg total) by mouth every 6 (six) hours as needed for Itching or Allergies., Disp: 20 capsule, Rfl: 0    docusate sodium (COLACE) 100 MG capsule, Take 2 capsules (200 mg total) by mouth 2 (two) times daily. (Patient taking differently: Take 200 mg by mouth 2 (two) times daily. ), Disp: 20 capsule, Rfl: 0    INTERFERON BETA-1A (AVONEX IM), Inject 1 Dose into the muscle once a week., Disp: , Rfl:     [id number] 1-1-500 mg Cap, Take 1 tablet by mouth once daily., Disp: 30 capsule, Rfl: 3    meclizine (ANTIVERT) 25 mg tablet, Take 1 tablet (25 mg total) by mouth 2 (two) times daily as needed., Disp: 60 tablet, Rfl: 2    promethazine-dextromethorphan (PROMETHAZINE-DM) 6.25-15 mg/5 mL Syrp, Take 5 mLs by mouth every 8 (eight) hours as needed., Disp: 240 mL, Rfl: 0    azelastine (ASTELIN) 137 mcg (0.1 %) nasal spray, 1 spray (137 mcg total) by Nasal route 2 (two) times daily., Disp: 30 mL, Rfl: 0

## 2018-02-02 LAB — BACTERIA UR CULT: NO GROWTH

## 2018-02-05 ENCOUNTER — TELEPHONE (OUTPATIENT)
Dept: INTERNAL MEDICINE | Facility: CLINIC | Age: 38
End: 2018-02-05

## 2018-02-05 NOTE — TELEPHONE ENCOUNTER
----- Message from Lauryn Haq sent at 2/5/2018  7:14 AM CST -----  Contact: pt  Pt calling for test results.

## 2018-02-07 ENCOUNTER — PATIENT OUTREACH (OUTPATIENT)
Dept: ADMINISTRATIVE | Facility: HOSPITAL | Age: 38
End: 2018-02-07

## 2018-02-09 ENCOUNTER — TELEPHONE (OUTPATIENT)
Dept: INTERNAL MEDICINE | Facility: CLINIC | Age: 38
End: 2018-02-09

## 2018-02-09 ENCOUNTER — OFFICE VISIT (OUTPATIENT)
Dept: INTERNAL MEDICINE | Facility: CLINIC | Age: 38
End: 2018-02-09
Payer: MEDICARE

## 2018-02-09 VITALS
HEART RATE: 100 BPM | TEMPERATURE: 99 F | WEIGHT: 293 LBS | SYSTOLIC BLOOD PRESSURE: 132 MMHG | OXYGEN SATURATION: 98 % | DIASTOLIC BLOOD PRESSURE: 82 MMHG | BODY MASS INDEX: 47.09 KG/M2 | HEIGHT: 66 IN

## 2018-02-09 DIAGNOSIS — E66.01 MORBID OBESITY WITH BMI OF 50.0-59.9, ADULT: ICD-10-CM

## 2018-02-09 DIAGNOSIS — G35 MS (MULTIPLE SCLEROSIS): ICD-10-CM

## 2018-02-09 DIAGNOSIS — G81.90 HEMIPLEGIA, UNSPECIFIED ETIOLOGY, UNSPECIFIED HEMIPLEGIA LATERALITY, UNSPECIFIED HEMIPLEGIA TYPE: ICD-10-CM

## 2018-02-09 DIAGNOSIS — Z01.419 ENCOUNTER FOR GYNECOLOGICAL EXAMINATION WITHOUT ABNORMAL FINDING: ICD-10-CM

## 2018-02-09 DIAGNOSIS — M25.552 LEFT HIP PAIN: ICD-10-CM

## 2018-02-09 DIAGNOSIS — N76.1 SUBACUTE VAGINITIS: ICD-10-CM

## 2018-02-09 DIAGNOSIS — G35 MULTIPLE SCLEROSIS: ICD-10-CM

## 2018-02-09 DIAGNOSIS — M25.552 LEFT HIP PAIN: Primary | ICD-10-CM

## 2018-02-09 DIAGNOSIS — Z12.39 SCREENING FOR MALIGNANT NEOPLASM OF BREAST: Primary | ICD-10-CM

## 2018-02-09 PROCEDURE — 99999 PR PBB SHADOW E&M-EST. PATIENT-LVL V: CPT | Mod: PBBFAC,,, | Performed by: FAMILY MEDICINE

## 2018-02-09 PROCEDURE — 87480 CANDIDA DNA DIR PROBE: CPT

## 2018-02-09 PROCEDURE — 3008F BODY MASS INDEX DOCD: CPT | Mod: S$GLB,,, | Performed by: FAMILY MEDICINE

## 2018-02-09 PROCEDURE — 99213 OFFICE O/P EST LOW 20 MIN: CPT | Mod: S$GLB,,, | Performed by: FAMILY MEDICINE

## 2018-02-09 RX ORDER — FLUCONAZOLE 150 MG/1
150 TABLET ORAL DAILY
Qty: 1 TABLET | Refills: 0 | Status: SHIPPED | OUTPATIENT
Start: 2018-02-09 | End: 2018-02-10

## 2018-02-09 NOTE — PROGRESS NOTES
Subjective:       Patient ID: Alicia Soliz is a 37 y.o. female.    Chief Complaint: Gynecologic Exam      Patient here today for well woman exam. She denies any vaginal discharge or irritation. Had Hysterectomy for heavy bleeding several years ago. Has had one mammogram several years ago which was normal. Does have an aunt with breast cancer.  Also reports increased pain in her left hip area, requesting referral to see an [facility]opedist [facility], has residual hemiplegia from CVA, MS.      Gynecologic Exam   The patient's pertinent negatives include no pelvic pain or vaginal discharge. Pertinent negatives include no abdominal pain, flank pain, nausea or vomiting.     Review of Systems   Gastrointestinal: Negative for abdominal pain, nausea and vomiting.   Genitourinary: Negative for flank pain, menstrual problem, pelvic pain, vaginal bleeding, vaginal discharge and vaginal pain.     Past Medical History:   Diagnosis Date    Anemia     Arthritis     Cardiac arrest as complication of care     pt states she went into cardiac arrest from an allergic reaction to a medication    Encounter for blood transfusion     Hemiplegia due to old stroke     Hypertension     Multiple sclerosis     Stroke      Past Surgical History:   Procedure Laterality Date    APPENDECTOMY      CHOLECYSTECTOMY      HYSTERECTOMY      KNEE SURGERY      TONSILLECTOMY      TUBAL LIGATION       Family History   Problem Relation Age of Onset    Lupus Mother     Heart disease Mother     Diabetes Father     Kidney disease Father     Cancer Maternal Aunt 40     breast     Social History     Social History    Marital status: Single     Spouse name: N/A    Number of children: N/A    Years of education: N/A     Occupational History     [employer]     Social History Main Topics    Smoking status: Never Smoker    Smokeless tobacco: Never Used    Alcohol use No    Drug use: No    Sexual activity: Not on file     Other  "Topics Concern    Not on file     Social History Narrative    No narrative on file     Review of patient's allergies indicates:   Allergen Reactions    Demerol [meperidine] Itching    Dilaudid [hydromorphone (bulk)] Anaphylaxis     "coded" per pt    Prednisone Itching    Morphine     Tramadol        Objective:       /82 (BP Location: Left arm, Patient Position: Sitting, BP Method: Medium (Manual))   Pulse 100   Temp 99 °F (37.2 °C) (Tympanic)   Ht 5' 6" (1.676 m)   Wt (!) 142.5 kg (314 lb 2.5 oz)   SpO2 98%   BMI 50.71 kg/m²   Physical Exam   Constitutional: She appears well-developed and well-nourished. No distress.   Morbidly obese   Cardiovascular: Normal rate, regular rhythm and normal heart sounds.    Pulmonary/Chest: Effort normal and breath sounds normal. No respiratory distress. Right breast exhibits no mass, no skin change and no tenderness. Left breast exhibits no mass, no skin change and no tenderness. Breasts are symmetrical.   Abdominal: Soft. Normal appearance and bowel sounds are normal. There is no tenderness. There is no rigidity and no guarding. No hernia.   Genitourinary: Pelvic exam was performed with patient supine. There is no rash on the right labia. There is no rash on the left labia. Right adnexum displays no mass, no tenderness and no fullness. Left adnexum displays no mass, no tenderness and no fullness. No erythema, tenderness or bleeding in the vagina. Vaginal discharge (moderate white) found.   Skin: She is not diaphoretic.   Psychiatric: She has a normal mood and affect. Her behavior is normal. Judgment and thought content normal.   Nursing note and vitals reviewed.    Assessment:     1. Screening for malignant neoplasm of breast    2. Encounter for gynecological examination without abnormal finding    3. Subacute vaginitis    4. Left hip pain    5. Multiple sclerosis    6. Hemiplegia, unspecified etiology, unspecified hemiplegia laterality, unspecified hemiplegia " type    7. Morbid obesity with BMI of 50.0-59.9, adult      Plan:   Screening for malignant neoplasm of breast  -     Mammo Digital Screening Bilat with CAD; Future; Expected date: 02/09/2018    Encounter for gynecological examination without abnormal finding    Subacute vaginitis  -     VAGINOSIS SCREEN BY DNA PROBE    Left hip pain  -    Ambulatory consult to Orthopedics    Multiple sclerosis  -   Hemiplegia, unspecified etiology, unspecified hemiplegia laterality, unspecified hemiplegia type  Morbid obesity with BMI of 50.0-59.9, adult    Other orders  -     fluconazole (DIFLUCAN) 150 MG Tab; Take 1 tablet (150 mg total) by mouth once daily.  Dispense: 1 tablet; Refill: 0      Medication List with Changes/Refills   New Medications    FLUCONAZOLE (DIFLUCAN) 150 MG TAB    Take 1 tablet (150 mg total) by mouth once daily.   Current Medications    AMLODIPINE (NORVASC) 10 MG TABLET    Take 1 tablet (10 mg total) by mouth once daily.    ARMODAFINIL (NUVIGIL) 150 MG TABLET    Take 1 tablet (150 mg total) by mouth once daily.    AZELASTINE (ASTELIN) 137 MCG (0.1 %) NASAL SPRAY    1 spray (137 mcg total) by Nasal route 2 (two) times daily.    BACLOFEN (LIORESAL) 20 MG TABLET    Take 1 tablet (20 mg total) by mouth 3 (three) times daily.    DALFAMPRIDINE (AMPYRA) 10 MG TB12    Take 1 tablet by mouth 2 (two) times daily.    DIPHENHYDRAMINE (BENADRYL) 25 MG CAPSULE    Take 1 each (25 mg total) by mouth every 6 (six) hours as needed for Itching or Allergies.    DOCUSATE SODIUM (COLACE) 100 MG CAPSULE    Take 2 capsules (200 mg total) by mouth 2 (two) times daily.    INTERFERON BETA-1A (AVONEX IM)    Inject 1 Dose into the muscle once a week.    [id number] 1-1-500 MG CAP    Take 1 tablet by mouth once daily.    MECLIZINE (ANTIVERT) 25 MG TABLET    Take 1 tablet (25 mg total) by mouth 2 (two) times daily as needed.    PROMETHAZINE-DEXTROMETHORPHAN (PROMETHAZINE-DM) 6.25-15 MG/5 ML SYRP    Take 5 mLs by mouth  every 8 (eight) hours as needed.   Discontinued Medications    BUPROPION (WELLBUTRIN) 75 MG TABLET    Take 1 tablet (75 mg total) by mouth 2 (two) times daily.

## 2018-02-10 LAB
CANDIDA RRNA VAG QL PROBE: NEGATIVE
G VAGINALIS RRNA GENITAL QL PROBE: NEGATIVE
T VAGINALIS RRNA GENITAL QL PROBE: NEGATIVE

## 2018-02-12 ENCOUNTER — TELEPHONE (OUTPATIENT)
Dept: INTERNAL MEDICINE | Facility: CLINIC | Age: 38
End: 2018-02-12

## 2018-02-12 NOTE — TELEPHONE ENCOUNTER
----- Message from Braden Dumont MD sent at 2/12/2018  9:41 AM CST -----  Please let her know the vaginal swab was normal.

## 2018-02-20 ENCOUNTER — NURSE TRIAGE (OUTPATIENT)
Dept: ADMINISTRATIVE | Facility: CLINIC | Age: 38
End: 2018-02-20

## 2018-02-21 ENCOUNTER — HOSPITAL ENCOUNTER (EMERGENCY)
Facility: HOSPITAL | Age: 38
Discharge: HOME | End: 2018-02-21
Attending: EMERGENCY MEDICINE
Payer: MEDICARE

## 2018-02-21 ENCOUNTER — TELEPHONE (OUTPATIENT)
Dept: INTERNAL MEDICINE | Facility: CLINIC | Age: 38
End: 2018-02-21

## 2018-02-21 VITALS
SYSTOLIC BLOOD PRESSURE: 189 MMHG | OXYGEN SATURATION: 96 % | TEMPERATURE: 98 F | HEART RATE: 98 BPM | RESPIRATION RATE: 20 BRPM | DIASTOLIC BLOOD PRESSURE: 89 MMHG

## 2018-02-21 DIAGNOSIS — G35 MULTIPLE SCLEROSIS EXACERBATION: ICD-10-CM

## 2018-02-21 DIAGNOSIS — R20.0 NUMBNESS ON LEFT SIDE: Primary | ICD-10-CM

## 2018-02-21 PROCEDURE — 25000003 PHARM REV CODE 250: Performed by: NURSE PRACTITIONER

## 2018-02-21 PROCEDURE — 63600175 PHARM REV CODE 636 W HCPCS: Performed by: NURSE PRACTITIONER

## 2018-02-21 PROCEDURE — 99284 EMERGENCY DEPT VISIT MOD MDM: CPT

## 2018-02-21 RX ORDER — PREDNISONE 20 MG/1
60 TABLET ORAL
Status: COMPLETED | OUTPATIENT
Start: 2018-02-21 | End: 2018-02-21

## 2018-02-21 RX ORDER — BUTALBITAL, ACETAMINOPHEN AND CAFFEINE 50; 325; 40 MG/1; MG/1; MG/1
1 TABLET ORAL EVERY 6 HOURS PRN
Qty: 14 TABLET | Refills: 0 | Status: SHIPPED | OUTPATIENT
Start: 2018-02-21 | End: 2018-04-19 | Stop reason: SDUPTHER

## 2018-02-21 RX ORDER — DIPHENHYDRAMINE HCL 50 MG
50 CAPSULE ORAL
Status: COMPLETED | OUTPATIENT
Start: 2018-02-21 | End: 2018-02-21

## 2018-02-21 RX ADMIN — DIPHENHYDRAMINE HYDROCHLORIDE 50 MG: 50 CAPSULE ORAL at 10:02

## 2018-02-21 RX ADMIN — PREDNISONE 60 MG: 20 TABLET ORAL at 10:02

## 2018-02-21 NOTE — ED NOTES
Patient identifiers verified and correct for Alicia Soliz.    LOC: The patient is awake, alert and aware of environment with an appropriate affect, the patient is oriented x 3 and speaking appropriately.  APPEARANCE: Patient resting comfortably and in no acute distress, patient is clean and well groomed, patient's clothing is properly fastened.  SKIN: The skin is warm and dry, color consistent with ethnicity, patient has normal skin turgor and moist mucus membranes, skin intact, no breakdown or bruising noted.  MUSCULOSKELETAL: Patient moving all extremities spontaneously, no obvious swelling or deformities noted.  RESPIRATORY: Airway is open and patent, respirations are spontaneous, patient has a normal effort and rate, no accessory muscle use noted.  CARDIAC: Patient has a normal rate and regular rhythm, no periphreal edema noted.  NEUROLOGIC: PERRL, eyes open spontaneously, behavior appropriate to situation, follows commands, facial expression symmetrical, bilateral hand grasp equal and even, purposeful motor response noted, normal sensation in all extremities when touched with a finger. States tingling left side of face.

## 2018-02-21 NOTE — TELEPHONE ENCOUNTER
"Patient stated that she has a headache that started last night. Advised per protocol and she verbalized understanding. Informed that I would send a message for when the provider's office reopens tomorrow. Instructed to call back with any additional problems and/or concerns    Reason for Disposition   Headache (all triage questions negative)    Answer Assessment - Initial Assessment Questions  1. LOCATION: "Where does it hurt?"       Left side   2. ONSET: "When did the headache start?" (Minutes, hours or days)       Last night  3. PATTERN: "Does the pain come and go, or has it been constant since it started?"      constant  4. SEVERITY: "How bad is the pain?" and "What does it keep you from doing?"  (e.g., Scale 1-10; mild, moderate, or severe)    - MILD (1-3): doesn't interfere with normal activities     - MODERATE (4-7): interferes with normal activities or awakens from sleep     - SEVERE (8-10): excruciating pain, unable to do any normal activities           5. RECURRENT SYMPTOM: "Have you ever had headaches before?" If so, ask: "When was the last time?" and "What happened that time?"       4/10  6. CAUSE: "What do you think is causing the headache?"      no  7. MIGRAINE: "Have you been diagnosed with migraine headaches?" If so, ask: "Is this headache similar?"       no  8. HEAD INJURY: "Has there been any recent injury to the head?"       no  9. OTHER SYMPTOMS: "Do you have any other symptoms?" (fever, stiff neck, eye pain, sore throat, cold symptoms)      Eye pain  10. PREGNANCY: "Is there any chance you are pregnant?" "When was your last menstrual period?"        No. Hysterectomy    Protocols used: ST HEADACHE-A-      "

## 2018-02-21 NOTE — ED PROVIDER NOTES
"Encounter Date: 2/21/2018       History     Chief Complaint   Patient presents with    Tingling     tingling, numbness to left side since yesterday     38 year old female with complaint of numbness from left side of face down to left leg since yesterday.  Reports history of MS and feels like a worsening of MS.  Pt denies any new weakness of arm or leg.  Pt reports that she engages in 2 hours of PT daily secondary to MS and weakness of left leg but numbness on left side worse today.            Review of patient's allergies indicates:   Allergen Reactions    Demerol [meperidine] Itching    Dilaudid [hydromorphone (bulk)] Anaphylaxis     "coded" per pt    Prednisone Itching    Morphine     Tramadol      Past Medical History:   Diagnosis Date    Anemia     Arthritis     Cardiac arrest as complication of care     pt states she went into cardiac arrest from an allergic reaction to a medication    Encounter for blood transfusion     Hemiplegia due to old stroke     Hypertension     Multiple sclerosis     Stroke      Past Surgical History:   Procedure Laterality Date    APPENDECTOMY      CHOLECYSTECTOMY      HYSTERECTOMY      KNEE SURGERY      TONSILLECTOMY      TUBAL LIGATION       Family History   Problem Relation Age of Onset    Lupus Mother     Heart disease Mother     Diabetes Father     Kidney disease Father     Cancer Maternal Aunt 40     breast     Social History   Substance Use Topics    Smoking status: Never Smoker    Smokeless tobacco: Never Used    Alcohol use No     Review of Systems   Constitutional: Negative for fever.   HENT: Negative for sore throat.    Respiratory: Negative for shortness of breath.    Cardiovascular: Negative for chest pain.   Gastrointestinal: Negative for nausea.   Genitourinary: Negative for dysuria.   Musculoskeletal: Negative for back pain.        Left arm and leg numbness   Skin: Negative for rash.   Neurological: Negative for weakness.   Hematological: " Does not bruise/bleed easily.       Physical Exam     Initial Vitals [02/21/18 0919]   BP Pulse Resp Temp SpO2   (!) 189/89 98 20 98.4 °F (36.9 °C) 96 %      MAP       122.33         Physical Exam    Nursing note and vitals reviewed.  Constitutional: She appears well-developed and well-nourished.   HENT:   Head: Normocephalic and atraumatic.   Eyes: Conjunctivae and EOM are normal. Pupils are equal, round, and reactive to light.   Neck: Normal range of motion. Neck supple.   Cardiovascular: Normal rate, regular rhythm, normal heart sounds and intact distal pulses.   Pulmonary/Chest: Breath sounds normal.   Abdominal: Soft. There is no tenderness. There is no rebound and no guarding.   Musculoskeletal: Normal range of motion.   Neurological: She is alert and oriented to person, place, and time. She has normal reflexes.   Subjective hypoesthesia left side of face, left arm and left leg, no facial asymmetry, no upper arm weakness, chronic weakness in left leg, pt ambulates with assistance and usual per patient   Skin: Skin is warm and dry.   Psychiatric: She has a normal mood and affect. Her behavior is normal. Thought content normal.         ED Course   Procedures  Labs Reviewed - No data to display     Imaging Results          CT Head Without Contrast (Final result)  Result time 02/21/18 10:59:02    Final result by CHIDI Platt Sr., MD (02/21/18 10:59:02)                 Impression:      1. There are subacute to chronic appearing ischemic changes in the deep white matter of both cerebral hemispheres.   2. There is no intracranial hemorrhage.   3. There is a large ashlee bullosa in the right middle nasal turbinate.   4. There is mild deviation to the left of the nasal septum.         All CT scans at this facility use dose modulation, iterative reconstruction, and/or weight base dosing when appropriate to reduce radiation dose when appropriate to reduce radiation dose to as low as reasonably  achievable.      Electronically signed by: CHIDI ULLOA MD  Date:     18  Time:    10:59              Narrative:    CT of Head without IV contrast    History: Neurological deficit    Technique: Standard brain CT protocol without IV contrast was performed.     Finding: Comparison was made to prior examination performed on 3/11/2015. There are subacute to chronic appearing ischemic changes in the deep white matter of both cerebral hemispheres. There is no intracranial hemorrhage. There is no calvarial fracture. The visualized portion of the paranasal sinuses is clear. There is mild deviation to the left of the nasal septum. There is a large ashlee bullosa in the right middle nasal turbinate.                                 Medical Decision Makin:12 AM  Discussed CT results and pt's case with Dr. Vela.  Dr. Vela believes the findings on CT are more than likely related to MS and not ischemia since MS findings can mimic ischemic changes on CT and since pt's is having symptoms consistent with MS and not ischemia.  Dr. Vela recommends continuation of Avonex and Ampyra and follow up in clinic for recheck.  Also recommends pt to return if weakness worsens for admission of high dose solumedrol.             11:15 AM  Discussed findings with pt, history and physical suggests MS exacerbation and not ischemia.  Pt in agreement.  Will continue current MS therapy and pt will return for worsening if she can not follow up with Dr. Vela in the near future.  Pt has scheduled appointment with Dr. Vela in one month.               Clinical Impression:   The primary encounter diagnosis was Numbness on left side. A diagnosis of Multiple sclerosis exacerbation was also pertinent to this visit.                           Refugio Boudreaux NP  [phone]       Refugio Boudreaux NP  [phone]       Refugio Boudreaux NP  [phone]

## 2018-02-21 NOTE — ED NOTES
Bed: R 01  Expected date:   Expected time:   Means of arrival:   Comments:     Tiffanie Aguilera RN  02/21/18 1489

## 2018-02-21 NOTE — TELEPHONE ENCOUNTER
Spoke with pt.  She reports that she is having tingling and numbness on her left side and face.  Advised pt to go to the [facility] ER or the nearest ER to be assessed.  Pt voiced understanding.

## 2018-02-27 ENCOUNTER — TELEPHONE (OUTPATIENT)
Dept: FAMILY MEDICINE | Facility: CLINIC | Age: 38
End: 2018-02-27

## 2018-02-27 DIAGNOSIS — R05.9 COUGH: ICD-10-CM

## 2018-02-27 RX ORDER — DALFAMPRIDINE 10 MG/1
1 TABLET, FILM COATED, EXTENDED RELEASE ORAL 2 TIMES DAILY
Qty: 60 TABLET | Refills: 0 | Status: SHIPPED | OUTPATIENT
Start: 2018-02-27 | End: 2018-02-27 | Stop reason: SDUPTHER

## 2018-02-27 RX ORDER — PROMETHAZINE HYDROCHLORIDE AND DEXTROMETHORPHAN HYDROBROMIDE 6.25; 15 MG/5ML; MG/5ML
5 SYRUP ORAL EVERY 8 HOURS PRN
Qty: 240 ML | Refills: 0 | Status: CANCELLED | OUTPATIENT
Start: 2018-02-27

## 2018-02-27 RX ORDER — DALFAMPRIDINE 10 MG/1
1 TABLET, FILM COATED, EXTENDED RELEASE ORAL 2 TIMES DAILY
Qty: 60 TABLET | Refills: 0 | Status: SHIPPED | OUTPATIENT
Start: 2018-02-27 | End: 2018-03-01 | Stop reason: SDUPTHER

## 2018-02-27 RX ORDER — PROMETHAZINE HYDROCHLORIDE AND DEXTROMETHORPHAN HYDROBROMIDE 6.25; 15 MG/5ML; MG/5ML
5 SYRUP ORAL EVERY 8 HOURS PRN
Qty: 240 ML | Refills: 0 | Status: SHIPPED | OUTPATIENT
Start: 2018-02-27 | End: 2018-05-07

## 2018-02-27 NOTE — TELEPHONE ENCOUNTER
----- Message from Chloe Conklin sent at 2/27/2018  2:46 PM CST -----  Contact: [facility]  request a call concerning a LED prescription that may have been sent in error, can be reached at  [phone]///[email or web address]

## 2018-03-01 RX ORDER — DALFAMPRIDINE 10 MG/1
1 TABLET, FILM COATED, EXTENDED RELEASE ORAL 2 TIMES DAILY
Qty: 60 TABLET | Refills: 0 | Status: CANCELLED | OUTPATIENT
Start: 2018-03-01

## 2018-03-01 RX ORDER — DALFAMPRIDINE 10 MG/1
1 TABLET, FILM COATED, EXTENDED RELEASE ORAL 2 TIMES DAILY
Qty: 60 TABLET | Refills: 1 | Status: SHIPPED | OUTPATIENT
Start: 2018-03-01 | End: 2020-10-09

## 2018-03-01 NOTE — TELEPHONE ENCOUNTER
The ampyra was sent on 2/27   She may need to contact her neurologist office and have them send a PA, if it did not get approved.

## 2018-03-01 NOTE — TELEPHONE ENCOUNTER
----- Message from Brenna Sepulveda sent at 3/1/2018  2:08 PM CST -----  Contact: Enmanuel Mclean/ Hui Andradeer states she needs the Clinical Progress notes, labs, diagnosis ....[phone]

## 2018-03-13 ENCOUNTER — TELEPHONE (OUTPATIENT)
Dept: FAMILY MEDICINE | Facility: CLINIC | Age: 38
End: 2018-03-13

## 2018-03-13 NOTE — TELEPHONE ENCOUNTER
----- Message from May Madison sent at 3/13/2018  2:18 PM CDT -----  Contact: [facility], [place]  Mr Haq needs a prior auth for Ampyra 10mg, needs office notes and labs faxed to them at [phone], please call Mr Haq back if needed at [phone]. Thank you

## 2018-03-15 ENCOUNTER — PATIENT MESSAGE (OUTPATIENT)
Dept: INTERNAL MEDICINE | Facility: CLINIC | Age: 38
End: 2018-03-15

## 2018-03-15 RX ORDER — FLUCONAZOLE 150 MG/1
150 TABLET ORAL DAILY
Qty: 1 TABLET | Refills: 0 | Status: SHIPPED | OUTPATIENT
Start: 2018-03-15 | End: 2018-03-16

## 2018-03-15 RX ORDER — NYSTATIN 100000 U/G
CREAM TOPICAL 2 TIMES DAILY
Qty: 30 G | Refills: 1 | Status: SHIPPED | OUTPATIENT
Start: 2018-03-15 | End: 2018-07-10 | Stop reason: SDUPTHER

## 2018-03-15 NOTE — TELEPHONE ENCOUNTER
Called pt informed her on rx sent in . Pt verbalized understanding . Will call if she doesn't feel better .

## 2018-03-19 ENCOUNTER — PATIENT MESSAGE (OUTPATIENT)
Dept: INTERNAL MEDICINE | Facility: CLINIC | Age: 38
End: 2018-03-19

## 2018-04-03 ENCOUNTER — PATIENT MESSAGE (OUTPATIENT)
Dept: INTERNAL MEDICINE | Facility: CLINIC | Age: 38
End: 2018-04-03

## 2018-04-03 RX ORDER — NITROFURANTOIN 25; 75 MG/1; MG/1
100 CAPSULE ORAL 2 TIMES DAILY
Qty: 14 CAPSULE | Refills: 0 | Status: SHIPPED | OUTPATIENT
Start: 2018-04-03 | End: 2018-04-19

## 2018-04-04 ENCOUNTER — TELEPHONE (OUTPATIENT)
Dept: INTERNAL MEDICINE | Facility: CLINIC | Age: 38
End: 2018-04-04

## 2018-04-04 ENCOUNTER — PATIENT OUTREACH (OUTPATIENT)
Dept: ADMINISTRATIVE | Facility: HOSPITAL | Age: 38
End: 2018-04-04

## 2018-04-04 NOTE — TELEPHONE ENCOUNTER
Spoke with patient she states she is much better with her hip and does not feel she needs to see ortho at this time.

## 2018-04-05 ENCOUNTER — PATIENT MESSAGE (OUTPATIENT)
Dept: INTERNAL MEDICINE | Facility: CLINIC | Age: 38
End: 2018-04-05

## 2018-04-10 ENCOUNTER — PATIENT OUTREACH (OUTPATIENT)
Dept: ADMINISTRATIVE | Facility: HOSPITAL | Age: 38
End: 2018-04-10

## 2018-04-19 ENCOUNTER — OFFICE VISIT (OUTPATIENT)
Dept: INTERNAL MEDICINE | Facility: CLINIC | Age: 38
End: 2018-04-19
Payer: MEDICARE

## 2018-04-19 ENCOUNTER — OFFICE VISIT (OUTPATIENT)
Dept: NEUROLOGY | Facility: CLINIC | Age: 38
End: 2018-04-19
Payer: MEDICARE

## 2018-04-19 ENCOUNTER — PATIENT MESSAGE (OUTPATIENT)
Dept: INTERNAL MEDICINE | Facility: CLINIC | Age: 38
End: 2018-04-19

## 2018-04-19 ENCOUNTER — LAB VISIT (OUTPATIENT)
Dept: LAB | Facility: HOSPITAL | Age: 38
End: 2018-04-19
Attending: PSYCHIATRY & NEUROLOGY
Payer: MEDICARE

## 2018-04-19 VITALS
SYSTOLIC BLOOD PRESSURE: 154 MMHG | WEIGHT: 293 LBS | HEIGHT: 66 IN | HEART RATE: 100 BPM | BODY MASS INDEX: 47.09 KG/M2 | DIASTOLIC BLOOD PRESSURE: 108 MMHG

## 2018-04-19 VITALS
DIASTOLIC BLOOD PRESSURE: 63 MMHG | SYSTOLIC BLOOD PRESSURE: 136 MMHG | TEMPERATURE: 98 F | RESPIRATION RATE: 18 BRPM | BODY MASS INDEX: 47.09 KG/M2 | HEART RATE: 94 BPM | HEIGHT: 66 IN | WEIGHT: 293 LBS | OXYGEN SATURATION: 99 %

## 2018-04-19 DIAGNOSIS — R05.9 COUGH: ICD-10-CM

## 2018-04-19 DIAGNOSIS — I10 HYPERTENSION, UNSPECIFIED TYPE: ICD-10-CM

## 2018-04-19 DIAGNOSIS — R73.9 HYPERGLYCEMIA: ICD-10-CM

## 2018-04-19 DIAGNOSIS — R51.9 CHRONIC NONINTRACTABLE HEADACHE, UNSPECIFIED HEADACHE TYPE: ICD-10-CM

## 2018-04-19 DIAGNOSIS — G35 MULTIPLE SCLEROSIS: Primary | ICD-10-CM

## 2018-04-19 DIAGNOSIS — G89.29 CHRONIC NONINTRACTABLE HEADACHE, UNSPECIFIED HEADACHE TYPE: ICD-10-CM

## 2018-04-19 DIAGNOSIS — G35 MS (MULTIPLE SCLEROSIS): ICD-10-CM

## 2018-04-19 DIAGNOSIS — E66.01 MORBID OBESITY WITH BMI OF 50.0-59.9, ADULT: ICD-10-CM

## 2018-04-19 DIAGNOSIS — G35 MULTIPLE SCLEROSIS: ICD-10-CM

## 2018-04-19 DIAGNOSIS — E66.01 MORBID OBESITY WITH BMI OF 50.0-59.9, ADULT: Primary | ICD-10-CM

## 2018-04-19 LAB
CHOLEST SERPL-MCNC: 209 MG/DL
CHOLEST/HDLC SERPL: 3.8 {RATIO}
ESTIMATED AVG GLUCOSE: 114 MG/DL
HBA1C MFR BLD HPLC: 5.6 %
HDLC SERPL-MCNC: 55 MG/DL
HDLC SERPL: 26.3 %
LDLC SERPL CALC-MCNC: 133.2 MG/DL
NONHDLC SERPL-MCNC: 154 MG/DL
T4 FREE SERPL-MCNC: 1.07 NG/DL
TRIGL SERPL-MCNC: 104 MG/DL
TSH SERPL DL<=0.005 MIU/L-ACNC: 1.15 UIU/ML

## 2018-04-19 PROCEDURE — 83036 HEMOGLOBIN GLYCOSYLATED A1C: CPT

## 2018-04-19 PROCEDURE — 80061 LIPID PANEL: CPT

## 2018-04-19 PROCEDURE — 84443 ASSAY THYROID STIM HORMONE: CPT

## 2018-04-19 PROCEDURE — 36415 COLL VENOUS BLD VENIPUNCTURE: CPT | Mod: PO

## 2018-04-19 PROCEDURE — 3075F SYST BP GE 130 - 139MM HG: CPT | Mod: CPTII,S$GLB,, | Performed by: FAMILY MEDICINE

## 2018-04-19 PROCEDURE — 99999 PR PBB SHADOW E&M-EST. PATIENT-LVL III: CPT | Mod: PBBFAC,,, | Performed by: PSYCHIATRY & NEUROLOGY

## 2018-04-19 PROCEDURE — 3078F DIAST BP <80 MM HG: CPT | Mod: CPTII,S$GLB,, | Performed by: PSYCHIATRY & NEUROLOGY

## 2018-04-19 PROCEDURE — 3078F DIAST BP <80 MM HG: CPT | Mod: CPTII,S$GLB,, | Performed by: FAMILY MEDICINE

## 2018-04-19 PROCEDURE — 3074F SYST BP LT 130 MM HG: CPT | Mod: CPTII,S$GLB,, | Performed by: PSYCHIATRY & NEUROLOGY

## 2018-04-19 PROCEDURE — 99214 OFFICE O/P EST MOD 30 MIN: CPT | Mod: S$GLB,,, | Performed by: FAMILY MEDICINE

## 2018-04-19 PROCEDURE — 99999 PR PBB SHADOW E&M-EST. PATIENT-LVL IV: CPT | Mod: PBBFAC,,, | Performed by: FAMILY MEDICINE

## 2018-04-19 PROCEDURE — 99204 OFFICE O/P NEW MOD 45 MIN: CPT | Mod: S$GLB,,, | Performed by: PSYCHIATRY & NEUROLOGY

## 2018-04-19 PROCEDURE — 84439 ASSAY OF FREE THYROXINE: CPT

## 2018-04-19 RX ORDER — BENZONATATE 200 MG/1
200 CAPSULE ORAL 3 TIMES DAILY PRN
Qty: 30 CAPSULE | Refills: 3 | Status: SHIPPED | OUTPATIENT
Start: 2018-04-19 | End: 2018-04-29

## 2018-04-19 RX ORDER — BUTALBITAL, ACETAMINOPHEN AND CAFFEINE 50; 325; 40 MG/1; MG/1; MG/1
1 TABLET ORAL EVERY 6 HOURS PRN
Qty: 20 TABLET | Refills: 5 | Status: SHIPPED | OUTPATIENT
Start: 2018-04-19 | End: 2018-04-19 | Stop reason: SDUPTHER

## 2018-04-19 RX ORDER — BUTALBITAL, ACETAMINOPHEN AND CAFFEINE 50; 325; 40 MG/1; MG/1; MG/1
1 TABLET ORAL EVERY 6 HOURS PRN
Qty: 20 TABLET | Refills: 5 | Status: SHIPPED | OUTPATIENT
Start: 2018-04-19 | End: 2018-05-07 | Stop reason: SDUPTHER

## 2018-04-19 RX ORDER — BUPROPION HYDROCHLORIDE 100 MG/1
100 TABLET, EXTENDED RELEASE ORAL
COMMUNITY
End: 2018-05-24 | Stop reason: SDUPTHER

## 2018-04-19 NOTE — PROGRESS NOTES
This is a 38-year-old right-handed patient who has an established history of multiple sclerosis.  Her diagnosis was established by MRI as well as abnormal findings on the spinal fluid including multiple oligoclonal bands.    The patient indicates that she has been on different immunomodulating therapy and currently is on Avonex.  She had previously been on Tysabri infusions but her neurologist retired and she is subsequently been placed on Avonex.  The patient has had intermittent exacerbations of her MS most recently several weeks ago when she had a marked increase in weakness of her left arm and left leg together with numbness and tingling.  She was hospitalized at [facility] for 5 days for steroid infusion indicating that this helped improve some of her symptoms although she's been left with significant left-sided weakness and impairment of her mobility.  However, this has been a long-standing problem for the patient and has always been the primary neurological deficit that she has had.  The patient denies any significant changes in bowel or bladder control.    When questioned, the patient does not recall having had evidence of optic neuritis.  She denies any vision loss in one eye or the other.  She has not had any episodes of diplopia.  She denies any dysphagia but has had occasional difficulties with dysarthria when the left-sided weakness is more severe.  Her gait is significantly impaired with the left-sided weakness.  The patient reports that she is participating with physical therapy now.    The patient's current medication for immunomodulation is Avonex.  She is not on vitamin D supplement.  She takes Ampyra for ambulation and feels that this is of some benefit to her.  He continues to have significant fatigue despite medication.      ROS:  GENERAL: No fever, chills,  or weight loss.  The patient reports weight gain.  SKIN: No rashes, itching or changes in color or texture of  skin.  HEAD: No headaches or recent head trauma.  EYES: Visual acuity fine. No photophobia, ocular pain or diplopia.  EARS: Denies ear pain, discharge or vertigo.  NOSE: No loss of smell, no epistaxis or postnasal drip.  MOUTH & THROAT: No hoarseness or change in voice. No excessive gum bleeding.  NODES: Denies swollen glands.  CHEST: Denies PERSON, cyanosis, wheezing, cough and sputum production.  CARDIOVASCULAR: Denies chest pain, PND, orthopnea   ABDOMEN: Appetite fine. No weight loss. Denies diarrhea, abdominal pain, hematemesis or blood in stool.  URINARY: No flank pain, dysuria or hematuria.  PERIPHERAL VASCULAR: No claudication or cyanosis.  MUSCULOSKELETAL: No joint stiffness or swelling. Denies back pain.  NEUROLOGIC: No history of seizures, or unexplained loss of consciousness    PAST HISTORY:  Surgery: Tonsillectomy, appendectomy, cholecystectomy, tubal ligation, hysterectomy, knee surgery  Medical: Morbid obesity, multiple sclerosis, history of stroke, hypertension, anemia  ALLERGIES: Demerol, Dilaudid, morphine, tramadol and prednisone causes itching    FAMILY HISTORY:  The patient's parents are both living.  Her father has diabetes with renal failure.  Her mother has heart disease and lupus.  There is a maternal aunt who had breast cancer.  There is no other known family members with demyelinating disease.    SOCIAL HISTORY:  The patient is with her family.  She depends upon her children for transportation that she's not driving.  She is a nonsmoker.    PE:   VITAL SIGNS: Blood pressure 154/108, pulse 100, weight 143.5 kg, height foot 6 inches, BMI 51.06  APPEARANCE: Well nourished, well developed, in no acute distress.    HEAD: Normocephalic, atraumatic.  EYES: PERRL. EOMI.  Non-icteric sclerae.    EARS: TM's intact. Light reflex normal. No retraction or perforation.    NOSE: Mucosa pink. Airway clear.  MOUTH & THROAT: No tonsillar enlargement. No pharyngeal erythema or exudate. No stridor.  NECK:  Supple. No bruits.  CHEST: Lungs clear to auscultation.  CARDIOVASCULAR: Regular rhythm without significant murmurs.  ABDOMEN: Bowel sounds normal. Not distended.  Massive truncal obesity.  MUSCULOSKELETAL:  No bony deformity seen.  Muscle mass cannot be determined accurately because of her massive obesity.  NEUROLOGIC:   Mental Status:  The patient is well oriented to person, time, place, and situation.  The patient is attentive to the environment and cooperative for the exam.  Cranial Nerves: II-XII grossly intact.  Visual acuity aided with glasses is 20/30 with near card in left eye, right eye, and both eyes.  The color red is perceived as the same shade of red with each eye.  Light Jade this is perceived to be the same with each eye.  Fundoscopic exam is normal.  No hemorrhage, exudate or papilledema is present. The extraocular muscles are intact in the cardinal directions of gaze.  No ptosis is present. Facial features are symmetrical.  Speech is normal in fluency, diction, and phrasing.  Tongue protrudes in the midline.    Gait and Station:  Romberg is negative.  The patient has significant difficulties with ambulation due to her left-sided weakness.  She is utilizing a quad cane for support but has a tendency to be wide-based and unstable.  The patient needed maximum assistance to get onto the examining table for examination.  Motor: There was clear down drift of the outstretched left arm when held at shoulder level.  With manual testing, there is subtle weakness on the left side including biceps, triceps, deltoid, hip flexion and knee extension as well as ankle extension and flexion.  There is increased tone on the left side also.  Sensory:  Intact both upper and lower extremities to pin prick, touch, and vibration.  Cerebellar:  Finger to nose done well.  Alternating movements intact.  No involuntary movements or tremor seen.  Reflexes:  Stretch reflexes are 1+ both biceps, brachial radialis, and knees.   No ankle clonus is present.  Plantar stimulation is flexor bilaterally and no pathological reflexes are seen      Assessment:  1.  Multiple sclerosis, relapsing remitting  2.  Massive morbid obesity (BMI 51.06)  3.  Essential hypertension    RECOMMENDATIONS:  1.  Discussion was held with the patient regarding immunomodulating therapy.  The patient is expressing a preference to be placed back on Tysabri infusions once a month.  However before this is instituted, we will need to make certain that she does not have antibodies to MARILYN virus.  In the meantime she is to continue Avonex.  2.  Continue Ampyra  3.  Continue active physical therapy  4.  Add vitamin D supplement  5.  Discussion was held with the patient regarding weight loss.  We also discussed the possibility of bariatric surgery with the patient such as a gastric sleeve.  However the patient would have to demonstrate the ability to stay on a diet before this would be recommended.  6.  Routine follow-up with neurology in 4 months.    This was a 55 minute visit with the patient with over 50% of time spent counseling the patient regarding immunomodulating therapy for MS as well as management of weight loss.    This note is generated with speech recognition software and is subject to transcription error and sound alike phrases that may be missed by proofreading.

## 2018-04-19 NOTE — PROGRESS NOTES
Subjective:       Patient ID: Alicia Soliz is a 38 y.o. female.    Chief Complaint: Routine Medical Exam      She is here today for routine followup. She reports that overall she is currently feeling well, pain in hip has improved. She was hospitalized at [facility] last month for 5 days for MS exacerbation but this has also improved.   She would like to see specialist to discuss gastric sleeve. She has been trying to lose weight without success, eating vegetables and baked chicken, tried contrave for one month but felt like it made her want to snack more.   She also has bothersome dry cough during the night only, has been taking capron, using astelin without any improvement.      Review of Systems   Constitutional: Negative for activity change, appetite change and fever.   HENT: Positive for postnasal drip. Negative for congestion, rhinorrhea, sinus pressure and sore throat.    Eyes: Negative for visual disturbance.   Respiratory: Positive for cough. Negative for chest tightness, shortness of breath and wheezing.    Cardiovascular: Positive for leg swelling. Negative for chest pain and palpitations.   Gastrointestinal: Negative for abdominal pain, diarrhea and nausea.   Endocrine: Negative for polyuria.   Musculoskeletal: Negative for arthralgias and gait problem.   Skin: Negative for color change and rash.   Allergic/Immunologic: Negative for immunocompromised state.   Neurological: Positive for headaches (requesting refill on her headache medication).   Psychiatric/Behavioral: Negative for sleep disturbance.     Past Medical History:   Diagnosis Date    Anemia     Arthritis     Cardiac arrest as complication of care     pt states she went into cardiac arrest from an allergic reaction to a medication    Encounter for blood transfusion     Hemiplegia due to old stroke     Hypertension     Multiple sclerosis     Stroke      Past Surgical History:   Procedure Laterality Date    APPENDECTOMY       "CHOLECYSTECTOMY      HYSTERECTOMY      KNEE SURGERY      TONSILLECTOMY      TUBAL LIGATION       Family History   Problem Relation Age of Onset    Lupus Mother     Heart disease Mother     Diabetes Father     Kidney disease Father     Cancer Maternal Aunt 40     breast     Social History     Social History    Marital status: Single     Spouse name: N/A    Number of children: N/A    Years of education: N/A     Occupational History     Tcp     Social History Main Topics    Smoking status: Never Smoker    Smokeless tobacco: Never Used    Alcohol use No    Drug use: No    Sexual activity: Yes     Partners: Male     Other Topics Concern    Not on file     Social History Narrative    No narrative on file     Review of patient's allergies indicates:   Allergen Reactions    Demerol [meperidine] Itching     Other reaction(s): Itching    Dilaudid [hydromorphone (bulk)] Anaphylaxis     Other reaction(s): Anaphylaxis  "coded" per pt    Prednisone Itching     Other reaction(s): Itching    Morphine     Tramadol        Objective:       /63   Pulse 94   Temp 98.2 °F (36.8 °C)   Resp 18   Ht 5' 6" (1.676 m)   Wt (!) 143.5 kg (316 lb 5.8 oz)   SpO2 99%   BMI 51.06 kg/m²   Physical Exam   Constitutional: She is oriented to person, place, and time. Vital signs are normal. She appears well-developed and well-nourished. No distress.   Morbidly obese     HENT:   Head: Normocephalic and atraumatic.   Right Ear: Hearing, tympanic membrane, external ear and ear canal normal.   Left Ear: Hearing, tympanic membrane, external ear and ear canal normal.   Nose: Mucosal edema present.   Mouth/Throat: Uvula is midline and mucous membranes are normal. Posterior oropharyngeal erythema (PND) present. No oropharyngeal exudate.   Eyes: Conjunctivae and EOM are normal. Pupils are equal, round, and reactive to light.   Neck: Normal range of motion. Neck supple. No tracheal deviation present. No thyromegaly present. "   Cardiovascular: Normal rate, regular rhythm, normal heart sounds and intact distal pulses.    No murmur heard.  Pulmonary/Chest: Effort normal and breath sounds normal. No respiratory distress.   Abdominal: Soft. Bowel sounds are normal. No hernia.   Musculoskeletal: Normal range of motion. She exhibits edema (trace bilaterally).   Lymphadenopathy:     She has no cervical adenopathy.   Neurological: She is alert and oriented to person, place, and time.   Skin: Skin is warm and dry. Capillary refill takes less than 2 seconds. She is not diaphoretic.   Psychiatric: She has a normal mood and affect. Her behavior is normal. Judgment and thought content normal.   Nursing note and vitals reviewed.    Assessment:     1. Morbid obesity with BMI of 50.0-59.9, adult    2. MS (multiple sclerosis)    3. Hypertension, unspecified type    4. Hyperglycemia    5. Cough    6. Chronic nonintractable headache, unspecified headache type      Plan:   Morbid obesity with BMI of 50.0-59.9, adult  --     Ambulatory consult to Bariatric Surgery    MS (multiple sclerosis)  Hypertension, unspecified type  -     Lipid panel; Future; Expected date: 04/19/2018  -     Hemoglobin A1c; Future; Expected date: 04/19/2018  -     TSH; Future; Expected date: 04/19/2018  -     T4, free; Future; Expected date: 04/19/2018  -     Ambulatory consult to Bariatric Surgery    Hyperglycemia    Cough    Chronic nonintractable headache, unspecified headache type    Other orders  -     benzonatate (TESSALON) 200 MG capsule; Take 1 capsule (200 mg total) by mouth 3 (three) times daily as needed for Cough.  Dispense: 30 capsule; Refill: 3  -     butalbital-acetaminophen-caffeine -40 mg (FIORICET, ESGIC) -40 mg per tablet; Take 1 tablet by mouth every 6 (six) hours as needed for Pain or Headaches.  Dispense: 20 tablet; Refill: 5      Medication List with Changes/Refills   New Medications    BENZONATATE (TESSALON) 200 MG CAPSULE    Take 1 capsule (200 mg  total) by mouth 3 (three) times daily as needed for Cough.   Current Medications    AMLODIPINE (NORVASC) 10 MG TABLET    Take 1 tablet (10 mg total) by mouth once daily.    ARMODAFINIL (NUVIGIL) 150 MG TABLET    Take 1 tablet (150 mg total) by mouth once daily.    AZELASTINE (ASTELIN) 137 MCG (0.1 %) NASAL SPRAY    1 spray (137 mcg total) by Nasal route 2 (two) times daily.    BACLOFEN (LIORESAL) 20 MG TABLET    Take 1 tablet (20 mg total) by mouth 3 (three) times daily.    BUPROPION (WELLBUTRIN SR) 100 MG TBSR 12 HR TABLET    Take 100 mg by mouth.    DALFAMPRIDINE (AMPYRA) 10 MG TB12    Take 1 tablet by mouth 2 (two) times daily.    DIPHENHYDRAMINE (BENADRYL) 25 MG CAPSULE    Take 1 each (25 mg total) by mouth every 6 (six) hours as needed for Itching or Allergies.    DOCUSATE SODIUM (COLACE) 100 MG CAPSULE    Take 2 capsules (200 mg total) by mouth 2 (two) times daily.    INTERFERON BETA-1A (AVONEX IM)    Inject 1 Dose into the muscle once a week.    [id number] 1-1-500 MG CAP    Take 1 tablet by mouth once daily.    MECLIZINE (ANTIVERT) 25 MG TABLET    Take 1 tablet (25 mg total) by mouth 2 (two) times daily as needed.    NYSTATIN (MYCOSTATIN) CREAM    Apply topically 2 (two) times daily.    PROMETHAZINE-DEXTROMETHORPHAN (PROMETHAZINE-DM) 6.25-15 MG/5 ML SYRP    Take 5 mLs by mouth every 8 (eight) hours as needed.    PYRILAMINE-DEXTROMETHORPHAN 30-30 MG TAB    Take 1 tablet by mouth 4 (four) times daily as needed (congestion and cough).   Changed and/or Refilled Medications    Modified Medication Previous Medication    BUTALBITAL-ACETAMINOPHEN-CAFFEINE -40 MG (FIORICET, ESGIC) -40 MG PER TABLET butalbital-acetaminophen-caffeine -40 mg (FIORICET, ESGIC) -40 mg per tablet       Take 1 tablet by mouth every 6 (six) hours as needed for Pain or Headaches.    Take 1 tablet by mouth every 6 (six) hours as needed for Pain or Headaches.

## 2018-04-20 ENCOUNTER — PATIENT MESSAGE (OUTPATIENT)
Dept: NEUROLOGY | Facility: CLINIC | Age: 38
End: 2018-04-20

## 2018-04-20 ENCOUNTER — PATIENT MESSAGE (OUTPATIENT)
Dept: INTERNAL MEDICINE | Facility: CLINIC | Age: 38
End: 2018-04-20

## 2018-04-20 ENCOUNTER — TELEPHONE (OUTPATIENT)
Dept: INTERNAL MEDICINE | Facility: CLINIC | Age: 38
End: 2018-04-20

## 2018-04-20 ENCOUNTER — TELEPHONE (OUTPATIENT)
Dept: NEUROLOGY | Facility: CLINIC | Age: 38
End: 2018-04-20

## 2018-04-20 NOTE — TELEPHONE ENCOUNTER
----- Message from Braden Dumont MD sent at 4/20/2018 12:21 PM CDT -----  Please let her know that her labs were OK except that her cholesterol has gone up a bit.

## 2018-04-20 NOTE — TELEPHONE ENCOUNTER
----- Message from Fazal Hwang sent at 4/20/2018  1:17 PM CDT -----  Contact: self/[phone]  Would like to consult with nurse concerning lab results.please call back at [phone] or [phone]. Sandra /md/ar

## 2018-04-24 ENCOUNTER — PATIENT MESSAGE (OUTPATIENT)
Dept: NEUROLOGY | Facility: CLINIC | Age: 38
End: 2018-04-24

## 2018-04-24 ENCOUNTER — PATIENT MESSAGE (OUTPATIENT)
Dept: INTERNAL MEDICINE | Facility: CLINIC | Age: 38
End: 2018-04-24

## 2018-04-26 ENCOUNTER — PATIENT MESSAGE (OUTPATIENT)
Dept: NEUROLOGY | Facility: CLINIC | Age: 38
End: 2018-04-26

## 2018-04-26 LAB
JCPYV AB SERPL QL IA: POSITIVE
JCV INDEX: 1.67

## 2018-04-30 ENCOUNTER — PATIENT MESSAGE (OUTPATIENT)
Dept: INTERNAL MEDICINE | Facility: CLINIC | Age: 38
End: 2018-04-30

## 2018-04-30 RX ORDER — FLUCONAZOLE 150 MG/1
150 TABLET ORAL ONCE
Qty: 2 TABLET | Refills: 3 | Status: SHIPPED | OUTPATIENT
Start: 2018-04-30 | End: 2018-04-30

## 2018-05-01 ENCOUNTER — PATIENT MESSAGE (OUTPATIENT)
Dept: INTERNAL MEDICINE | Facility: CLINIC | Age: 38
End: 2018-05-01

## 2018-05-01 RX ORDER — PHENTERMINE HYDROCHLORIDE 37.5 MG/1
37.5 CAPSULE ORAL EVERY MORNING
Qty: 30 CAPSULE | Refills: 0 | Status: SHIPPED | OUTPATIENT
Start: 2018-05-01 | End: 2018-05-31

## 2018-05-02 ENCOUNTER — TELEPHONE (OUTPATIENT)
Dept: RADIOLOGY | Facility: HOSPITAL | Age: 38
End: 2018-05-02

## 2018-05-07 ENCOUNTER — PATIENT MESSAGE (OUTPATIENT)
Dept: NEUROLOGY | Facility: CLINIC | Age: 38
End: 2018-05-07

## 2018-05-07 ENCOUNTER — PATIENT MESSAGE (OUTPATIENT)
Dept: INTERNAL MEDICINE | Facility: CLINIC | Age: 38
End: 2018-05-07

## 2018-05-07 RX ORDER — BUTALBITAL, ACETAMINOPHEN AND CAFFEINE 50; 325; 40 MG/1; MG/1; MG/1
1 TABLET ORAL EVERY 6 HOURS PRN
Qty: 20 TABLET | Refills: 5 | Status: SHIPPED | OUTPATIENT
Start: 2018-05-07 | End: 2018-06-07 | Stop reason: SDUPTHER

## 2018-05-07 RX ORDER — PROMETHAZINE HYDROCHLORIDE 25 MG/1
25 TABLET ORAL EVERY 4 HOURS
Qty: 25 TABLET | Refills: 0 | Status: SHIPPED | OUTPATIENT
Start: 2018-05-07 | End: 2018-08-10 | Stop reason: SDUPTHER

## 2018-05-08 ENCOUNTER — PATIENT MESSAGE (OUTPATIENT)
Dept: INTERNAL MEDICINE | Facility: CLINIC | Age: 38
End: 2018-05-08

## 2018-05-08 ENCOUNTER — PATIENT MESSAGE (OUTPATIENT)
Dept: NEUROLOGY | Facility: CLINIC | Age: 38
End: 2018-05-08

## 2018-05-08 DIAGNOSIS — R30.0 DYSURIA: Primary | ICD-10-CM

## 2018-05-09 ENCOUNTER — HOSPITAL ENCOUNTER (OUTPATIENT)
Dept: RADIOLOGY | Facility: HOSPITAL | Age: 38
Discharge: HOME | End: 2018-05-09
Attending: FAMILY MEDICINE
Payer: MEDICARE

## 2018-05-09 ENCOUNTER — TELEPHONE (OUTPATIENT)
Dept: INTERNAL MEDICINE | Facility: CLINIC | Age: 38
End: 2018-05-09

## 2018-05-09 ENCOUNTER — PATIENT MESSAGE (OUTPATIENT)
Dept: INTERNAL MEDICINE | Facility: CLINIC | Age: 38
End: 2018-05-09

## 2018-05-09 VITALS — HEIGHT: 66 IN | BODY MASS INDEX: 47.09 KG/M2 | WEIGHT: 293 LBS

## 2018-05-09 DIAGNOSIS — Z12.39 SCREENING FOR MALIGNANT NEOPLASM OF BREAST: ICD-10-CM

## 2018-05-09 PROCEDURE — 77067 SCR MAMMO BI INCL CAD: CPT | Mod: TC

## 2018-05-09 PROCEDURE — 77067 SCR MAMMO BI INCL CAD: CPT | Mod: 26,,, | Performed by: RADIOLOGY

## 2018-05-09 RX ORDER — HYDROCODONE BITARTRATE AND ACETAMINOPHEN 10; 325 MG/1; MG/1
TABLET ORAL EVERY 6 HOURS PRN
COMMUNITY
End: 2019-11-11 | Stop reason: SDUPTHER

## 2018-05-10 ENCOUNTER — PATIENT MESSAGE (OUTPATIENT)
Dept: INTERNAL MEDICINE | Facility: CLINIC | Age: 38
End: 2018-05-10

## 2018-05-11 ENCOUNTER — PATIENT MESSAGE (OUTPATIENT)
Dept: INTERNAL MEDICINE | Facility: CLINIC | Age: 38
End: 2018-05-11

## 2018-05-21 ENCOUNTER — PATIENT MESSAGE (OUTPATIENT)
Dept: NEUROLOGY | Facility: CLINIC | Age: 38
End: 2018-05-21

## 2018-05-23 ENCOUNTER — PATIENT MESSAGE (OUTPATIENT)
Dept: NEUROLOGY | Facility: CLINIC | Age: 38
End: 2018-05-23

## 2018-05-23 ENCOUNTER — PATIENT MESSAGE (OUTPATIENT)
Dept: INTERNAL MEDICINE | Facility: CLINIC | Age: 38
End: 2018-05-23

## 2018-05-24 ENCOUNTER — PATIENT MESSAGE (OUTPATIENT)
Dept: NEUROLOGY | Facility: CLINIC | Age: 38
End: 2018-05-24

## 2018-05-24 ENCOUNTER — TELEPHONE (OUTPATIENT)
Dept: INTERNAL MEDICINE | Facility: CLINIC | Age: 38
End: 2018-05-24

## 2018-05-24 RX ORDER — BUPROPION HYDROCHLORIDE 100 MG/1
100 TABLET, EXTENDED RELEASE ORAL 2 TIMES DAILY
Qty: 60 TABLET | Refills: 6 | Status: SHIPPED | OUTPATIENT
Start: 2018-05-24 | End: 2019-01-21 | Stop reason: SDUPTHER

## 2018-05-24 NOTE — TELEPHONE ENCOUNTER
I would like to increase to the 100mg extended release twice a day. Can she call her psych and check if that is OK with them?

## 2018-05-24 NOTE — TELEPHONE ENCOUNTER
Can you please call her and verify the dose of bupropion she is taking? It is documented weirdly in epic so not sure. She didn't answer that.

## 2018-05-24 NOTE — TELEPHONE ENCOUNTER
Informed pt of new rx , pt verbalized understanding, pt wanted me to let you know she walked today in therapy .

## 2018-05-28 ENCOUNTER — TELEPHONE (OUTPATIENT)
Dept: SURGERY | Facility: CLINIC | Age: 38
End: 2018-05-28

## 2018-05-28 NOTE — TELEPHONE ENCOUNTER
Called the number provided in regards to rescheduling upcoming appt 6/21/18 with another provider Dr. Singleton or Dr. Navarrete, no ans left message via vm to call back.

## 2018-05-28 NOTE — TELEPHONE ENCOUNTER
----- Message from EMIGDIO LUNDBERG sent at 5/16/2018  1:35 PM CDT -----  Pt has bariatric consult with Dr Jeansonne on 6/21. Please call and reschedule with Dr Navarrete or Dr Singleton  Thanks

## 2018-06-01 ENCOUNTER — PATIENT MESSAGE (OUTPATIENT)
Dept: NEUROLOGY | Facility: CLINIC | Age: 38
End: 2018-06-01

## 2018-06-07 ENCOUNTER — LAB VISIT (OUTPATIENT)
Dept: LAB | Facility: HOSPITAL | Age: 38
End: 2018-06-07
Payer: MEDICARE

## 2018-06-07 ENCOUNTER — OFFICE VISIT (OUTPATIENT)
Dept: INTERNAL MEDICINE | Facility: CLINIC | Age: 38
End: 2018-06-07
Payer: MEDICARE

## 2018-06-07 VITALS
HEART RATE: 99 BPM | DIASTOLIC BLOOD PRESSURE: 81 MMHG | HEIGHT: 66 IN | BODY MASS INDEX: 47.09 KG/M2 | SYSTOLIC BLOOD PRESSURE: 161 MMHG | RESPIRATION RATE: 20 BRPM | OXYGEN SATURATION: 99 % | TEMPERATURE: 99 F | WEIGHT: 293 LBS

## 2018-06-07 DIAGNOSIS — R39.15 URINARY URGENCY: ICD-10-CM

## 2018-06-07 DIAGNOSIS — R60.0 LEG EDEMA, LEFT: ICD-10-CM

## 2018-06-07 DIAGNOSIS — H60.91 OTITIS EXTERNA OF RIGHT EAR, UNSPECIFIED CHRONICITY, UNSPECIFIED TYPE: ICD-10-CM

## 2018-06-07 DIAGNOSIS — R30.0 DYSURIA: Primary | ICD-10-CM

## 2018-06-07 DIAGNOSIS — G47.00 INSOMNIA, UNSPECIFIED TYPE: ICD-10-CM

## 2018-06-07 LAB
ALBUMIN SERPL BCP-MCNC: 4 G/DL
ALP SERPL-CCNC: 91 U/L
ALT SERPL W/O P-5'-P-CCNC: 11 U/L
ANION GAP SERPL CALC-SCNC: 10 MMOL/L
AST SERPL-CCNC: 19 U/L
BASOPHILS # BLD AUTO: 0.03 K/UL
BASOPHILS NFR BLD: 0.5 %
BILIRUB SERPL-MCNC: 0.2 MG/DL
BILIRUB SERPL-MCNC: ABNORMAL MG/DL
BLOOD URINE, POC: ABNORMAL
BNP SERPL-MCNC: 24 PG/ML
BUN SERPL-MCNC: 7 MG/DL
CALCIUM SERPL-MCNC: 10 MG/DL
CHLORIDE SERPL-SCNC: 103 MMOL/L
CO2 SERPL-SCNC: 28 MMOL/L
COLOR, POC UA: YELLOW
CREAT SERPL-MCNC: 0.7 MG/DL
DIFFERENTIAL METHOD: ABNORMAL
EOSINOPHIL # BLD AUTO: 0.1 K/UL
EOSINOPHIL NFR BLD: 1.3 %
ERYTHROCYTE [DISTWIDTH] IN BLOOD BY AUTOMATED COUNT: 15.8 %
EST. GFR  (AFRICAN AMERICAN): >60 ML/MIN/1.73 M^2
EST. GFR  (NON AFRICAN AMERICAN): >60 ML/MIN/1.73 M^2
GLUCOSE SERPL-MCNC: 102 MG/DL
GLUCOSE UR QL STRIP: ABNORMAL
HCT VFR BLD AUTO: 39.8 %
HGB BLD-MCNC: 11.9 G/DL
IMM GRANULOCYTES # BLD AUTO: 0.01 K/UL
IMM GRANULOCYTES NFR BLD AUTO: 0.2 %
KETONES UR QL STRIP: ABNORMAL
LEUKOCYTE ESTERASE URINE, POC: ABNORMAL
LYMPHOCYTES # BLD AUTO: 2.6 K/UL
LYMPHOCYTES NFR BLD: 41.7 %
MCH RBC QN AUTO: 21.3 PG
MCHC RBC AUTO-ENTMCNC: 29.9 G/DL
MCV RBC AUTO: 71 FL
MONOCYTES # BLD AUTO: 0.4 K/UL
MONOCYTES NFR BLD: 6.8 %
NEUTROPHILS # BLD AUTO: 3.1 K/UL
NEUTROPHILS NFR BLD: 49.5 %
NITRITE, POC UA: ABNORMAL
NRBC BLD-RTO: 0 /100 WBC
PH, POC UA: 5
PLATELET # BLD AUTO: 266 K/UL
PMV BLD AUTO: 11.2 FL
POTASSIUM SERPL-SCNC: 3.9 MMOL/L
PROT SERPL-MCNC: 8.1 G/DL
PROTEIN, POC: ABNORMAL
RBC # BLD AUTO: 5.59 M/UL
SODIUM SERPL-SCNC: 141 MMOL/L
SPECIFIC GRAVITY, POC UA: 1.02
UROBILINOGEN, POC UA: ABNORMAL
WBC # BLD AUTO: 6.19 K/UL

## 2018-06-07 PROCEDURE — 3008F BODY MASS INDEX DOCD: CPT | Mod: CPTII,S$GLB,, | Performed by: FAMILY MEDICINE

## 2018-06-07 PROCEDURE — 80053 COMPREHEN METABOLIC PANEL: CPT

## 2018-06-07 PROCEDURE — 99999 PR PBB SHADOW E&M-EST. PATIENT-LVL V: CPT | Mod: PBBFAC,,, | Performed by: FAMILY MEDICINE

## 2018-06-07 PROCEDURE — 36415 COLL VENOUS BLD VENIPUNCTURE: CPT | Mod: PO

## 2018-06-07 PROCEDURE — 3077F SYST BP >= 140 MM HG: CPT | Mod: CPTII,S$GLB,, | Performed by: FAMILY MEDICINE

## 2018-06-07 PROCEDURE — 85025 COMPLETE CBC W/AUTO DIFF WBC: CPT

## 2018-06-07 PROCEDURE — 81002 URINALYSIS NONAUTO W/O SCOPE: CPT | Mod: S$GLB,,, | Performed by: FAMILY MEDICINE

## 2018-06-07 PROCEDURE — 83880 ASSAY OF NATRIURETIC PEPTIDE: CPT

## 2018-06-07 PROCEDURE — 99214 OFFICE O/P EST MOD 30 MIN: CPT | Mod: 25,S$GLB,, | Performed by: FAMILY MEDICINE

## 2018-06-07 PROCEDURE — 87086 URINE CULTURE/COLONY COUNT: CPT

## 2018-06-07 PROCEDURE — 3079F DIAST BP 80-89 MM HG: CPT | Mod: CPTII,S$GLB,, | Performed by: FAMILY MEDICINE

## 2018-06-07 RX ORDER — DOXEPIN HYDROCHLORIDE 25 MG/1
25 CAPSULE ORAL NIGHTLY PRN
Qty: 30 CAPSULE | Refills: 5 | Status: SHIPPED | OUTPATIENT
Start: 2018-06-07 | End: 2018-07-10 | Stop reason: SDUPTHER

## 2018-06-07 RX ORDER — NITROFURANTOIN 25; 75 MG/1; MG/1
100 CAPSULE ORAL 2 TIMES DAILY
Qty: 14 CAPSULE | Refills: 0 | Status: SHIPPED | OUTPATIENT
Start: 2018-06-07 | End: 2018-06-15 | Stop reason: ALTCHOICE

## 2018-06-07 RX ORDER — SERTRALINE HYDROCHLORIDE 25 MG/1
25 TABLET, FILM COATED ORAL DAILY
Refills: 11 | COMMUNITY
Start: 2018-05-02 | End: 2018-06-15 | Stop reason: ALTCHOICE

## 2018-06-07 RX ORDER — ARMODAFINIL 200 MG/1
1 TABLET ORAL DAILY
Refills: 3 | COMMUNITY
Start: 2018-05-02 | End: 2018-07-19

## 2018-06-07 RX ORDER — NEOMYCIN SULFATE, POLYMYXIN B SULFATE AND HYDROCORTISONE 10; 3.5; 1 MG/ML; MG/ML; [USP'U]/ML
3 SUSPENSION/ DROPS AURICULAR (OTIC) 3 TIMES DAILY
Qty: 10 ML | Refills: 1 | Status: SHIPPED | OUTPATIENT
Start: 2018-06-07 | End: 2018-06-15

## 2018-06-07 NOTE — PROGRESS NOTES
Subjective:       Patient ID: Alicia Soliz is a 38 y.o. female.    Chief Complaint: body pain (headache); Dysuria; Otalgia; and Edema      Patient reports dysuria, frequency and malodorous urine for the past few weeks, seems to be getting worse. She is having increased body aches and generalized edema, also has increased edema left LE> right. She is also having pain in right ear.  She also reports recent inability to sleep, was previously taking doxepin but has not had it in several months.      Dysuria    Associated symptoms include frequency and urgency. Pertinent negatives include no flank pain.   Otalgia    Associated symptoms include headaches. Pertinent negatives include no abdominal pain, coughing or sore throat.   Edema   Associated symptoms include fatigue and headaches. Pertinent negatives include no abdominal pain, chest pain, congestion, coughing, fever or sore throat.     Review of Systems   Constitutional: Positive for fatigue. Negative for appetite change and fever.   HENT: Positive for ear pain. Negative for congestion and sore throat.    Eyes: Negative for visual disturbance.   Respiratory: Negative for cough, chest tightness and shortness of breath.    Cardiovascular: Positive for leg swelling. Negative for chest pain and palpitations.   Gastrointestinal: Negative for abdominal pain.   Endocrine: Negative for polyuria.   Genitourinary: Positive for dysuria, frequency and urgency. Negative for enuresis and flank pain.   Musculoskeletal: Positive for gait problem.   Skin: Negative for color change.   Allergic/Immunologic: Negative for immunocompromised state.   Neurological: Positive for headaches. Negative for dizziness and light-headedness.   Hematological: Does not bruise/bleed easily.   Psychiatric/Behavioral: Positive for sleep disturbance.     Past Medical History:   Diagnosis Date    Anemia     Arthritis     Cardiac arrest as complication of care     pt states she went into cardiac  "arrest from an allergic reaction to a medication    Encounter for blood transfusion     Hemiplegia due to old stroke     Hypertension     Multiple sclerosis     Stroke      Past Surgical History:   Procedure Laterality Date    APPENDECTOMY      CHOLECYSTECTOMY      HYSTERECTOMY      KNEE SURGERY      TONSILLECTOMY      TUBAL LIGATION       Family History   Problem Relation Age of Onset    Lupus Mother     Heart disease Mother     Diabetes Father     Kidney disease Father     Cancer Maternal Aunt 40        breast    Breast cancer Maternal Aunt     Breast cancer Cousin     Breast cancer Cousin      Social History     Social History    Marital status: Single     Spouse name: N/A    Number of children: N/A    Years of education: N/A     Occupational History     T[employer]     Social History Main Topics    Smoking status: Never Smoker    Smokeless tobacco: Never Used    Alcohol use No    Drug use: No    Sexual activity: Yes     Partners: Male     Other Topics Concern    Not on file     Social History Narrative    No narrative on file     Review of patient's allergies indicates:   Allergen Reactions    Demerol [meperidine] Itching     Other reaction(s): Itching    Dilaudid [hydromorphone (bulk)] Anaphylaxis     Other reaction(s): Anaphylaxis  "coded" per pt    Prednisone Itching     Other reaction(s): Itching    Morphine     Tramadol        Objective:       BP (!) 161/81 (BP Location: Left arm, Patient Position: Sitting)   Pulse 99   Temp 98.7 °F (37.1 °C)   Resp 20   Ht 5' 6" (1.676 m)   Wt (!) 145.6 kg (320 lb 15.8 oz)   SpO2 99%   BMI 51.81 kg/m²   Physical Exam   Constitutional: She is oriented to person, place, and time. Vital signs are normal. She appears well-developed and well-nourished. No distress.   HENT:   Head: Normocephalic and atraumatic.   Right Ear: Hearing, tympanic membrane, external ear and ear canal normal.   Left Ear: Hearing, tympanic membrane, external ear and ear " canal normal.   Nose: Nose normal.   Mouth/Throat: Uvula is midline, oropharynx is clear and moist and mucous membranes are normal. No oropharyngeal exudate.   Right ear canal erythematous, tragal tenderness   Eyes: Conjunctivae and EOM are normal. Pupils are equal, round, and reactive to light.   Neck: Normal range of motion. Neck supple. No tracheal deviation present. No thyromegaly present.   Cardiovascular: Normal rate, regular rhythm, normal heart sounds and intact distal pulses.    No murmur heard.  Pulmonary/Chest: Effort normal and breath sounds normal. She has no wheezes.   Abdominal: Soft. Bowel sounds are normal. No hernia.   Musculoskeletal: Normal range of motion. She exhibits edema (left LE). She exhibits no tenderness.   Negative Paul   Lymphadenopathy:     She has no cervical adenopathy.   Neurological: She is alert and oriented to person, place, and time.   Skin: Skin is warm and dry. Capillary refill takes less than 2 seconds. She is not diaphoretic.   Psychiatric: She has a normal mood and affect. Her behavior is normal. Judgment and thought content normal.   Nursing note and vitals reviewed.    Assessment:     1. Dysuria    2. Leg edema, left    3. Urinary urgency    4. Otitis externa of right ear, unspecified chronicity, unspecified type    5. Insomnia, unspecified type      Plan:   Dysuria  Urinary urgency        -     nitrofurantoin, macrocrystal-monohydrate, (MACROBID) 100 MG capsule; Take 1 capsule (100 mg total) by mouth 2 (two) times daily.  Dispense: 14 capsule; Refill: 0  -     POCT URINE DIPSTICK WITHOUT MICROSCOPE  -     Urine culture    Leg edema, left  -     Comprehensive metabolic panel; Future; Expected date: 06/07/2018  -     CBC auto differential; Future; Expected date: 06/07/2018  -     US Lower Extremity Veins Left; Future; Expected date: 06/07/2018  -     Brain natriuretic peptide; Future; Expected date: 06/07/2018        Otitis externa of right ear, unspecified chronicity,  unspecified type  -     neomycin-polymyxin-hydrocortisone (CORTISPORIN) 3.5-10,000-1 mg/mL-unit/mL-% otic suspension; Place 3 drops into the right ear 3 (three) times daily.  Dispense: 10 mL; Refill: 1    Insomnia, unspecified type  -     doxepin (SINEQUAN) 25 MG capsule; Take 1 capsule (25 mg total) by mouth nightly as needed.  Dispense: 30 capsule; Refill: 5      Medication List with Changes/Refills   New Medications    NEOMYCIN-POLYMYXIN-HYDROCORTISONE (CORTISPORIN) 3.5-10,000-1 MG/ML-UNIT/ML-% OTIC SUSPENSION    Place 3 drops into the right ear 3 (three) times daily.    NITROFURANTOIN, MACROCRYSTAL-MONOHYDRATE, (MACROBID) 100 MG CAPSULE    Take 1 capsule (100 mg total) by mouth 2 (two) times daily.   Current Medications    AMLODIPINE (NORVASC) 10 MG TABLET    Take 1 tablet (10 mg total) by mouth once daily.    ARMODAFINIL (NUVIGIL) 150 MG TABLET    Take 1 tablet (150 mg total) by mouth once daily.    ARMODAFINIL 200 MG TAB    Take 1 tablet by mouth once daily.    BACLOFEN (LIORESAL) 20 MG TABLET    Take 1 tablet (20 mg total) by mouth 3 (three) times daily.    BUPROPION (WELLBUTRIN SR) 100 MG TBSR 12 HR TABLET    Take 1 tablet (100 mg total) by mouth 2 (two) times daily.    BUTALBITAL-ACETAMINOPHEN-CAFFEINE -40 MG (FIORICET, ESGIC) -40 MG PER TABLET    Take 1 tablet by mouth every 6 (six) hours as needed for Pain or Headaches.    DALFAMPRIDINE (AMPYRA) 10 MG TB12    Take 1 tablet by mouth 2 (two) times daily.    DIPHENHYDRAMINE (BENADRYL) 25 MG CAPSULE    Take 1 each (25 mg total) by mouth every 6 (six) hours as needed for Itching or Allergies.    DOCUSATE SODIUM (COLACE) 100 MG CAPSULE    Take 2 capsules (200 mg total) by mouth 2 (two) times daily.    HYDROCODONE-ACETAMINOPHEN 10-325MG (NORCO)  MG TAB    Take by mouth every 6 (six) hours as needed for Pain.    INTERFERON BETA-1A (AVONEX) 30 MCG/0.5 ML SYRINGE    Inject 0.5 mLs (30 mcg total) into the muscle once a week.     [id number] 1-1-500 MG CAP    Take 1 tablet by mouth once daily.    MECLIZINE (ANTIVERT) 25 MG TABLET    Take 1 tablet (25 mg total) by mouth 2 (two) times daily as needed.    NYSTATIN (MYCOSTATIN) CREAM    Apply topically 2 (two) times daily.    PROMETHAZINE (PHENERGAN) 25 MG TABLET    Take 1 tablet (25 mg total) by mouth every 4 (four) hours.    PYRILAMINE-DEXTROMETHORPHAN 30-30 MG TAB    Take 1 tablet by mouth 4 (four) times daily as needed (congestion and cough).    SERTRALINE (ZOLOFT) 25 MG TABLET    Take 25 mg by mouth once daily.   Changed and/or Refilled Medications    Modified Medication Previous Medication    DOXEPIN (SINEQUAN) 25 MG CAPSULE doxepin (SINEQUAN) 25 MG capsule       Take 1 capsule (25 mg total) by mouth nightly as needed.    Take 25 mg by mouth.   Discontinued Medications    AZELASTINE (ASTELIN) 137 MCG (0.1 %) NASAL SPRAY    1 spray (137 mcg total) by Nasal route 2 (two) times daily.

## 2018-06-08 LAB — BACTERIA UR CULT: NO GROWTH

## 2018-06-08 RX ORDER — BUTALBITAL, ACETAMINOPHEN AND CAFFEINE 50; 325; 40 MG/1; MG/1; MG/1
TABLET ORAL
Qty: 12 TABLET | Refills: 0 | Status: SHIPPED | OUTPATIENT
Start: 2018-06-08 | End: 2019-03-15 | Stop reason: SDUPTHER

## 2018-06-12 ENCOUNTER — TELEPHONE (OUTPATIENT)
Dept: INTERNAL MEDICINE | Facility: CLINIC | Age: 38
End: 2018-06-12

## 2018-06-12 NOTE — TELEPHONE ENCOUNTER
Spoke with pt.  She reports that [facility] doctor was doing a home visit about an hour ago.  Her B/P was 124/108 and asked her to contact her PCP.  Pt reports no other symptoms.  Please advise.

## 2018-06-13 ENCOUNTER — TELEPHONE (OUTPATIENT)
Dept: INTERNAL MEDICINE | Facility: CLINIC | Age: 38
End: 2018-06-13

## 2018-06-14 ENCOUNTER — PATIENT MESSAGE (OUTPATIENT)
Dept: INTERNAL MEDICINE | Facility: CLINIC | Age: 38
End: 2018-06-14

## 2018-06-14 RX ORDER — HYDROCHLOROTHIAZIDE 12.5 MG/1
12.5 TABLET ORAL DAILY
Qty: 30 TABLET | Refills: 11 | Status: SHIPPED | OUTPATIENT
Start: 2018-06-14 | End: 2018-07-18 | Stop reason: SDUPTHER

## 2018-06-15 ENCOUNTER — OFFICE VISIT (OUTPATIENT)
Dept: SURGERY | Facility: CLINIC | Age: 38
End: 2018-06-15
Payer: MEDICARE

## 2018-06-15 VITALS
BODY MASS INDEX: 45.99 KG/M2 | TEMPERATURE: 100 F | DIASTOLIC BLOOD PRESSURE: 85 MMHG | WEIGHT: 293 LBS | HEIGHT: 67 IN | SYSTOLIC BLOOD PRESSURE: 136 MMHG | HEART RATE: 91 BPM

## 2018-06-15 DIAGNOSIS — E66.01 MORBID OBESITY WITH BMI OF 45.0-49.9, ADULT: Primary | ICD-10-CM

## 2018-06-15 PROCEDURE — 3079F DIAST BP 80-89 MM HG: CPT | Mod: CPTII,S$GLB,, | Performed by: SURGERY

## 2018-06-15 PROCEDURE — 99999 PR PBB SHADOW E&M-EST. PATIENT-LVL III: CPT | Mod: PBBFAC,,, | Performed by: SURGERY

## 2018-06-15 PROCEDURE — 3075F SYST BP GE 130 - 139MM HG: CPT | Mod: CPTII,S$GLB,, | Performed by: SURGERY

## 2018-06-15 PROCEDURE — 3008F BODY MASS INDEX DOCD: CPT | Mod: CPTII,S$GLB,, | Performed by: SURGERY

## 2018-06-15 PROCEDURE — 99203 OFFICE O/P NEW LOW 30 MIN: CPT | Mod: S$GLB,,, | Performed by: SURGERY

## 2018-06-15 RX ORDER — PSYLLIUM HUSK 0.4 G
CAPSULE ORAL DAILY
COMMUNITY
End: 2019-11-11

## 2018-06-15 RX ORDER — FLUCONAZOLE 100 MG/1
100 TABLET ORAL DAILY PRN
COMMUNITY
End: 2018-08-20

## 2018-06-15 NOTE — PROGRESS NOTES
Alicia is 38-year-old morbidly obese -American female patient who is BMI is 47.23 kg/m² and weighs 303 pounds.  According to the patient, she has had hypertension and bilateral knee arthropathy which limits her activity.  She had tried multiple times of the wrist dietary plans as well as self-monitoring of calorie intake.  She tried exercise on a daily bases but because of bilateral knee arthropathy, the outcome has been very poor.  She also tried dietary pills without any success.  Then she was found to have multiple sclerosis and had limited her activity more.  Since she was being treated of multiple sclerosis, her weight gain has increased.  Her neurologist has referred the patient for the consideration of weight loss surgery.  The patient and I had long discussion about the weight loss surgery rectum as well as the rationale. She was encouraged to avoid all sugary food and limited her calorie intake.  She was also encouraged to avoid any junk food.  She will be scheduled to see or a dietary consultation.  She will follow-up with me in a month to determine her candidacy for weight surgery.  Total time approximately half an hour was spent for this patient, and more than 50% was spent for treatment planning and counseling.    Michael Navarrete

## 2018-06-19 ENCOUNTER — PATIENT MESSAGE (OUTPATIENT)
Dept: INTERNAL MEDICINE | Facility: CLINIC | Age: 38
End: 2018-06-19

## 2018-06-20 DIAGNOSIS — H92.09 OTALGIA, UNSPECIFIED LATERALITY: Primary | ICD-10-CM

## 2018-06-22 ENCOUNTER — PATIENT MESSAGE (OUTPATIENT)
Dept: SURGERY | Facility: CLINIC | Age: 38
End: 2018-06-22

## 2018-06-25 ENCOUNTER — PATIENT MESSAGE (OUTPATIENT)
Dept: SURGERY | Facility: CLINIC | Age: 38
End: 2018-06-25

## 2018-06-25 ENCOUNTER — PATIENT MESSAGE (OUTPATIENT)
Dept: DIABETES | Facility: CLINIC | Age: 38
End: 2018-06-25

## 2018-06-27 ENCOUNTER — NUTRITION (OUTPATIENT)
Dept: SURGERY | Facility: CLINIC | Age: 38
End: 2018-06-27
Payer: MEDICARE

## 2018-06-27 VITALS — BODY MASS INDEX: 47.09 KG/M2 | WEIGHT: 293 LBS

## 2018-06-27 DIAGNOSIS — E66.01 OBESITY, CLASS III, BMI 40-49.9 (MORBID OBESITY): Primary | ICD-10-CM

## 2018-06-27 PROCEDURE — 97802 MEDICAL NUTRITION INDIV IN: CPT | Mod: S$GLB,,, | Performed by: SURGERY

## 2018-06-27 PROCEDURE — 99999 PR PBB SHADOW E&M-EST. PATIENT-LVL III: CPT | Mod: PBBFAC,,, | Performed by: DIETITIAN, REGISTERED

## 2018-06-27 NOTE — PROGRESS NOTES
PCP: Braden Dumont MD  REFERRING PROVIDER:  No ref. provider found      HISTORY OF PRESENT ILLNESS:  38 y.o. female patient is in clinic today for bariatric surgery assessment. Patient has 0 month(s) of MSD. Patient is planning to have gastric sleeve procedure.    Weight difficulties for 3 years.  Weight loss strategies include slimfast, adipex, contreve, garcinia, decreasing portions with most lost (regained) 50. Patient-reported goal weight of 210 lbs - long term after surgery.     The encounter diagnosis was Obesity, Class III, BMI 40-49.9 (morbid obesity).    VITAL SIGNS:  There were no vitals filed for this visit.  IBW: 147 lbs +/-10%, AdjIBW: 197 lbs/89kg and BMI: 47    ALLERGIES & MEDICATIONS: Reviewed and Reconciled  MEDICAL/SURGICAL & FAMILY HISTORY: Reviewed and Reconciled    LABORATORY:  A1C:   Hemoglobin A1C   Date Value Ref Range Status   04/19/2018 5.6 4.0 - 5.6 % Final     Comment:     According to ADA guidelines, hemoglobin A1c <7.0% represents  optimal control in non-pregnant diabetic patients. Different  metrics may apply to specific patient populations.   Standards of Medical Care in Diabetes-2016.  For the purpose of screening for the presence of diabetes:  <5.7%     Consistent with the absence of diabetes  5.7-6.4%  Consistent with increasing risk for diabetes   (prediabetes)  >or=6.5%  Consistent with diabetes  Currently, no consensus exists for use of hemoglobin A1c  for diagnosis of diabetes for children.  This Hemoglobin A1c assay has significant interference with fetal   hemoglobin   (HbF). The results are invalid for patients with abnormal amounts of   HbF,   including those with known Hereditary Persistence   of Fetal Hemoglobin. Heterozygous hemoglobin variants (HbAS, HbAC,   HbAD, HbAE, HbA2) do not significantly interfere with this assay;   however, presence of multiple variants in a sample may impact the %   interference.       Chol:   Cholesterol   Date Value Ref Range Status    04/19/2018 209 (H) 120 - 199 mg/dL Final     Comment:     The National Cholesterol Education Program (NCEP) has set the  following guidelines (reference ranges) for Cholesterol:  Optimal.....................<200 mg/dL  Borderline High.............200-239 mg/dL  High........................> or = 240 mg/dL       Trig:   Triglycerides   Date Value Ref Range Status   04/19/2018 104 30 - 150 mg/dL Final     Comment:     The National Cholesterol Education Program (NCEP) has set the  following guidelines (reference values) for triglycerides:  Normal......................<150 mg/dL  Borderline High.............150-199 mg/dL  High........................200-499 mg/dL       HDL:   HDL   Date Value Ref Range Status   04/19/2018 55 40 - 75 mg/dL Final     Comment:     The National Cholesterol Education Program (NCEP) has set the  following guidelines (reference values) for HDL Cholesterol:  Low...............<40 mg/dL  Optimal...........>60 mg/dL       LDL: No components found for: LDL   BUN:      BUN, Bld   Date Value Ref Range Status   06/07/2018 7 6 - 20 mg/dL Final     AST:    AST   Date Value Ref Range Status   06/07/2018 19 10 - 40 U/L Final         ALT:    ALT   Date Value Ref Range Status   06/07/2018 11 10 - 44 U/L Final     Micro/Cr Ratio:    Creatinine   Date Value Ref Range Status   06/07/2018 0.7 0.5 - 1.4 mg/dL Final       SELF-MONITORING: Glucometer - na; Food - none are avail    ACTIVITY LEVEL: Aerobic none. Resistance none. PT- walking, leg press, arm exercises, treadmill 3x/wk for 1.5 hrs    NUTRITION INTAKE: Meal patterns include 3 meals, 1-2 snacks daily with intake 2000 cals/d. Patient is not limiting carbohydrates, saturated fat or sodium. Inadequate intakes of fruits, vegetables, whole grains.  B - frosted flakes and milk and orange juice   S - na  L - sandwich (ham, cheese) with sprite zero or water  S - lifesavers, peppermints  D - sometimes doesn't eat or a fajita or taco- steak bellgrande from  [employer]  S - na  Beverages - water, juice  DIning out - not very often    PSYCHOSOCIAL: Stage of change - action  Barriers to change - none  Motivation to change (10high): 9  Predicted compliance (10high): 9  Realistic expectation for wt loss (10high): 9      MNT ASSESSMENT:   8623-1311 calories,  grams protein daily  30 grams carb/meal, 15 grams carb/snack  increase fruit 2 serv/d, vegetables 2+ cups/d, whole grains 3+serv/d, lowfat dairy 3+serv/d  low-fat, low-sodium  150 min physical activity per week, moderate intensity, as tolerated    PLAN:    Reviewed MNT guidelines for obesity and weight management.   Encouraged daily self-monitoring of food & activity patterns.    Provided pre & post surgery meal-planning instruction via bariatric diet manual, foodlists, plate method, food models, food labels and/or ADA booklet. Reviewed micro/macronutrient effect on weight management. Discussed carbohydrate counting and spacing techniques with emphasis on supplementation necessary for altered metabolism. Reviewed principles of energy metabolism to assist weight and health management.                                                          Discussed physical activity with review of benefits, methods, precautions.    Discussed bx strategies for improving, strategies for improving social & environmental support of lifestyle changes.     GOALS: Self monitoring: daily food & activity journal. Meal plan-90% accuracy, Physical activity-150 minutes per week. 1. Protein shake in place of breakfast daily, 2. Meal plan guide as provided, Plate method 3. Review manual and return with questions/concerns.4. Start decreasing calories to goal- track on [employer] emy. Decrease empty calorie snacks and increase fruits and vegetables  FU: Follow-up after Dr Mcgarry next appt.    Visit Time Spent:  60 minutes    Thank you for the opportunity to work with your patient.

## 2018-07-04 ENCOUNTER — PATIENT MESSAGE (OUTPATIENT)
Dept: INTERNAL MEDICINE | Facility: CLINIC | Age: 38
End: 2018-07-04

## 2018-07-04 ENCOUNTER — PATIENT MESSAGE (OUTPATIENT)
Dept: NEUROLOGY | Facility: CLINIC | Age: 38
End: 2018-07-04

## 2018-07-05 RX ORDER — HYDROXYZINE HYDROCHLORIDE 25 MG/1
25 TABLET, FILM COATED ORAL NIGHTLY PRN
Qty: 30 TABLET | Refills: 1 | Status: SHIPPED | OUTPATIENT
Start: 2018-07-05 | End: 2018-08-10 | Stop reason: SDUPTHER

## 2018-07-05 NOTE — TELEPHONE ENCOUNTER
She said the medicine she was taking for the itch wasn't working . Informed her of medication being called in . Pt verbalized understanding , she also wanted me to let you know she saw Dr. Rosalba navarrete she has lost 18Lbs .

## 2018-07-09 ENCOUNTER — PATIENT MESSAGE (OUTPATIENT)
Dept: INTERNAL MEDICINE | Facility: CLINIC | Age: 38
End: 2018-07-09

## 2018-07-10 RX ORDER — DOXEPIN HYDROCHLORIDE 25 MG/1
25 CAPSULE ORAL NIGHTLY PRN
Qty: 30 CAPSULE | Refills: 5 | Status: SHIPPED | OUTPATIENT
Start: 2018-07-10 | End: 2018-11-02 | Stop reason: SDUPTHER

## 2018-07-10 RX ORDER — NYSTATIN 100000 U/G
CREAM TOPICAL 2 TIMES DAILY
Qty: 30 G | Refills: 1 | Status: SHIPPED | OUTPATIENT
Start: 2018-07-10 | End: 2019-05-10 | Stop reason: SDUPTHER

## 2018-07-16 ENCOUNTER — PATIENT MESSAGE (OUTPATIENT)
Dept: INTERNAL MEDICINE | Facility: CLINIC | Age: 38
End: 2018-07-16

## 2018-07-16 ENCOUNTER — PATIENT MESSAGE (OUTPATIENT)
Dept: SURGERY | Facility: CLINIC | Age: 38
End: 2018-07-16

## 2018-07-16 NOTE — TELEPHONE ENCOUNTER
Called pt to clarify on the medication that the pt is requesting . Pt stated she will keeping doing her smaller portion and exercising /

## 2018-07-18 RX ORDER — HYDROCHLOROTHIAZIDE 12.5 MG/1
12.5 TABLET ORAL DAILY
Qty: 30 TABLET | Refills: 11 | Status: SHIPPED | OUTPATIENT
Start: 2018-07-18 | End: 2019-11-11

## 2018-07-19 ENCOUNTER — PATIENT MESSAGE (OUTPATIENT)
Dept: SURGERY | Facility: CLINIC | Age: 38
End: 2018-07-19

## 2018-07-19 ENCOUNTER — OFFICE VISIT (OUTPATIENT)
Dept: SURGERY | Facility: CLINIC | Age: 38
End: 2018-07-19
Payer: MEDICARE

## 2018-07-19 ENCOUNTER — PATIENT MESSAGE (OUTPATIENT)
Dept: NEUROLOGY | Facility: CLINIC | Age: 38
End: 2018-07-19

## 2018-07-19 VITALS
SYSTOLIC BLOOD PRESSURE: 131 MMHG | HEIGHT: 67 IN | DIASTOLIC BLOOD PRESSURE: 78 MMHG | BODY MASS INDEX: 45.29 KG/M2 | WEIGHT: 288.56 LBS | HEART RATE: 89 BPM | TEMPERATURE: 99 F

## 2018-07-19 DIAGNOSIS — E66.01 MORBID OBESITY WITH BMI OF 40.0-44.9, ADULT: Primary | ICD-10-CM

## 2018-07-19 DIAGNOSIS — G35 MULTIPLE SCLEROSIS: Primary | ICD-10-CM

## 2018-07-19 PROCEDURE — 3008F BODY MASS INDEX DOCD: CPT | Mod: CPTII,S$GLB,, | Performed by: SURGERY

## 2018-07-19 PROCEDURE — 99214 OFFICE O/P EST MOD 30 MIN: CPT | Mod: S$GLB,,, | Performed by: SURGERY

## 2018-07-19 PROCEDURE — 3078F DIAST BP <80 MM HG: CPT | Mod: CPTII,S$GLB,, | Performed by: SURGERY

## 2018-07-19 PROCEDURE — 99999 PR PBB SHADOW E&M-EST. PATIENT-LVL III: CPT | Mod: PBBFAC,,, | Performed by: SURGERY

## 2018-07-19 PROCEDURE — 3075F SYST BP GE 130 - 139MM HG: CPT | Mod: CPTII,S$GLB,, | Performed by: SURGERY

## 2018-07-19 NOTE — PROGRESS NOTES
Alicia is 38-year-old  female patient who is known to me from previous encounters.  Her last encounter was about a month ago and was seen for the evaluation of possible weight loss surgical intervention.  Her current BMI is 44.93 kilogram/meters sq which has dropped from 47.27 of month ago.  Her current weight is 288 lb and less than previous 1.  The last weight was 303 lb.  She has been very diligent with proper calorie intake as well as healthy food intake.  Patient was encouraged to continue with current plan.  Because of her severe multiple sclerosis, the surgical intervention is not recommended at this time.  With her diligent see and monitoring, she may be able to achieve her goal of losing 200 lb on her own.  Total time approximately half an hour was spent with this patient, and more than 50% of that was spent for treatment planning in counseling.  The patient was recommended to follow up with me in 3 months.    Michael Navarrete

## 2018-07-23 ENCOUNTER — PATIENT MESSAGE (OUTPATIENT)
Dept: NEUROLOGY | Facility: CLINIC | Age: 38
End: 2018-07-23

## 2018-07-23 ENCOUNTER — PATIENT MESSAGE (OUTPATIENT)
Dept: DIABETES | Facility: CLINIC | Age: 38
End: 2018-07-23

## 2018-07-24 ENCOUNTER — PATIENT MESSAGE (OUTPATIENT)
Dept: NEUROLOGY | Facility: CLINIC | Age: 38
End: 2018-07-24

## 2018-07-24 RX ORDER — BACLOFEN 20 MG/1
20 TABLET ORAL 3 TIMES DAILY
Qty: 90 TABLET | Refills: 3 | Status: SHIPPED | OUTPATIENT
Start: 2018-07-24 | End: 2018-11-07 | Stop reason: SDUPTHER

## 2018-07-25 ENCOUNTER — TELEPHONE (OUTPATIENT)
Dept: NEUROLOGY | Facility: CLINIC | Age: 38
End: 2018-07-25

## 2018-07-25 ENCOUNTER — PATIENT MESSAGE (OUTPATIENT)
Dept: NEUROLOGY | Facility: CLINIC | Age: 38
End: 2018-07-25

## 2018-07-25 NOTE — TELEPHONE ENCOUNTER
----- Message from Clair Conklin sent at 7/25/2018  9:38 AM CDT -----  Contact: [facility]  State the patient is a current patient at the office. State she switch providers and is now seeing Dr. Vela. State they need a Rx so that the patient can continue treatment. Please adv/call [phone].//cw

## 2018-07-30 ENCOUNTER — PATIENT MESSAGE (OUTPATIENT)
Dept: INTERNAL MEDICINE | Facility: CLINIC | Age: 38
End: 2018-07-30

## 2018-07-30 RX ORDER — ALPRAZOLAM 0.5 MG/1
0.5 TABLET ORAL 3 TIMES DAILY PRN
Qty: 30 TABLET | Refills: 0 | Status: SHIPPED | OUTPATIENT
Start: 2018-07-30 | End: 2018-08-30 | Stop reason: SDUPTHER

## 2018-08-10 DIAGNOSIS — Z76.0 MEDICATION REFILL: ICD-10-CM

## 2018-08-10 RX ORDER — PROMETHAZINE HYDROCHLORIDE 25 MG/1
25 TABLET ORAL EVERY 4 HOURS PRN
Qty: 30 TABLET | Refills: 1 | Status: SHIPPED | OUTPATIENT
Start: 2018-08-10 | End: 2018-08-10 | Stop reason: SDUPTHER

## 2018-08-10 RX ORDER — HYDROXYZINE HYDROCHLORIDE 25 MG/1
25 TABLET, FILM COATED ORAL NIGHTLY PRN
Qty: 30 TABLET | Refills: 1 | Status: SHIPPED | OUTPATIENT
Start: 2018-08-10 | End: 2018-11-02 | Stop reason: ALTCHOICE

## 2018-08-12 RX ORDER — PROMETHAZINE HYDROCHLORIDE 25 MG/1
25 TABLET ORAL EVERY 4 HOURS PRN
Qty: 30 TABLET | Refills: 1 | Status: SHIPPED | OUTPATIENT
Start: 2018-08-12 | End: 2018-10-25 | Stop reason: SDUPTHER

## 2018-08-13 ENCOUNTER — PATIENT MESSAGE (OUTPATIENT)
Dept: NEUROLOGY | Facility: CLINIC | Age: 38
End: 2018-08-13

## 2018-08-13 DIAGNOSIS — Z76.0 MEDICATION REFILL: ICD-10-CM

## 2018-08-13 RX ORDER — ARMODAFINIL 150 MG/1
150 TABLET ORAL DAILY
Qty: 30 TABLET | Refills: 5 | OUTPATIENT
Start: 2018-08-13 | End: 2019-09-12

## 2018-08-13 RX ORDER — ARMODAFINIL 150 MG/1
150 TABLET ORAL DAILY
Qty: 30 TABLET | Refills: 5 | Status: SHIPPED | OUTPATIENT
Start: 2018-08-13 | End: 2019-01-08

## 2018-08-13 RX ORDER — HYDROCHLOROTHIAZIDE 12.5 MG/1
12.5 TABLET ORAL DAILY
Qty: 30 TABLET | Refills: 11 | OUTPATIENT
Start: 2018-08-13 | End: 2019-08-13

## 2018-08-20 ENCOUNTER — PATIENT MESSAGE (OUTPATIENT)
Dept: ADMINISTRATIVE | Facility: OTHER | Age: 38
End: 2018-08-20

## 2018-08-20 ENCOUNTER — PATIENT MESSAGE (OUTPATIENT)
Dept: SURGERY | Facility: CLINIC | Age: 38
End: 2018-08-20

## 2018-08-20 ENCOUNTER — PATIENT MESSAGE (OUTPATIENT)
Dept: INTERNAL MEDICINE | Facility: CLINIC | Age: 38
End: 2018-08-20

## 2018-08-20 RX ORDER — FLUCONAZOLE 150 MG/1
150 TABLET ORAL DAILY
Qty: 2 TABLET | Refills: 1 | Status: SHIPPED | OUTPATIENT
Start: 2018-08-20 | End: 2018-09-28

## 2018-08-27 ENCOUNTER — PATIENT MESSAGE (OUTPATIENT)
Dept: INTERNAL MEDICINE | Facility: CLINIC | Age: 38
End: 2018-08-27

## 2018-08-27 ENCOUNTER — PATIENT MESSAGE (OUTPATIENT)
Dept: ADMINISTRATIVE | Facility: OTHER | Age: 38
End: 2018-08-27

## 2018-08-27 NOTE — TELEPHONE ENCOUNTER
Hey contact our Medical record department they could give that information for you . [phone].

## 2018-08-29 ENCOUNTER — PATIENT MESSAGE (OUTPATIENT)
Dept: INTERNAL MEDICINE | Facility: CLINIC | Age: 38
End: 2018-08-29

## 2018-08-30 ENCOUNTER — PATIENT MESSAGE (OUTPATIENT)
Dept: INTERNAL MEDICINE | Facility: CLINIC | Age: 38
End: 2018-08-30

## 2018-08-30 ENCOUNTER — TELEPHONE (OUTPATIENT)
Dept: INTERNAL MEDICINE | Facility: CLINIC | Age: 38
End: 2018-08-30

## 2018-08-30 DIAGNOSIS — K59.00 CONSTIPATION, UNSPECIFIED CONSTIPATION TYPE: ICD-10-CM

## 2018-08-30 RX ORDER — ALPRAZOLAM 0.5 MG/1
0.5 TABLET ORAL 3 TIMES DAILY PRN
Qty: 30 TABLET | Refills: 0 | Status: SHIPPED | OUTPATIENT
Start: 2018-08-30 | End: 2018-09-14 | Stop reason: SDUPTHER

## 2018-08-30 NOTE — TELEPHONE ENCOUNTER
Spoke with patient regarding Dr. Dumont's response she verbalized understanding. She will call us back if anything changes.

## 2018-08-30 NOTE — TELEPHONE ENCOUNTER
No foul order. Clear mucus that's sometime thin and then thick. No burning or itching urine is light yellow in morning dark  brown smells like coffee.

## 2018-08-31 ENCOUNTER — PATIENT MESSAGE (OUTPATIENT)
Dept: SURGERY | Facility: CLINIC | Age: 38
End: 2018-08-31

## 2018-08-31 ENCOUNTER — PATIENT MESSAGE (OUTPATIENT)
Dept: NEUROLOGY | Facility: CLINIC | Age: 38
End: 2018-08-31

## 2018-09-04 ENCOUNTER — HOSPITAL ENCOUNTER (OUTPATIENT)
Dept: RADIOLOGY | Facility: HOSPITAL | Age: 38
Discharge: HOME | End: 2018-09-04
Attending: FAMILY MEDICINE
Payer: MEDICARE

## 2018-09-04 ENCOUNTER — PATIENT MESSAGE (OUTPATIENT)
Dept: INTERNAL MEDICINE | Facility: CLINIC | Age: 38
End: 2018-09-04

## 2018-09-04 ENCOUNTER — OFFICE VISIT (OUTPATIENT)
Dept: INTERNAL MEDICINE | Facility: CLINIC | Age: 38
End: 2018-09-04
Payer: MEDICARE

## 2018-09-04 ENCOUNTER — TELEPHONE (OUTPATIENT)
Dept: INTERNAL MEDICINE | Facility: CLINIC | Age: 38
End: 2018-09-04

## 2018-09-04 VITALS
DIASTOLIC BLOOD PRESSURE: 75 MMHG | HEIGHT: 67 IN | SYSTOLIC BLOOD PRESSURE: 133 MMHG | HEART RATE: 103 BPM | TEMPERATURE: 99 F | BODY MASS INDEX: 45.2 KG/M2 | RESPIRATION RATE: 18 BRPM | OXYGEN SATURATION: 98 %

## 2018-09-04 DIAGNOSIS — E66.01 MORBID OBESITY WITH BMI OF 50.0-59.9, ADULT: ICD-10-CM

## 2018-09-04 DIAGNOSIS — I10 HYPERTENSION, UNSPECIFIED TYPE: ICD-10-CM

## 2018-09-04 DIAGNOSIS — R07.89 CHEST WALL PAIN: ICD-10-CM

## 2018-09-04 DIAGNOSIS — K59.00 CONSTIPATION, UNSPECIFIED CONSTIPATION TYPE: ICD-10-CM

## 2018-09-04 DIAGNOSIS — K59.03 DRUG-INDUCED CONSTIPATION: ICD-10-CM
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
## 2018-09-04 DIAGNOSIS — R30.0 DYSURIA: Primary | ICD-10-CM

## 2018-09-04 PROCEDURE — 99214 OFFICE O/P EST MOD 30 MIN: CPT | Mod: S$PBB,,, | Performed by: FAMILY MEDICINE

## 2018-09-04 PROCEDURE — 3008F BODY MASS INDEX DOCD: CPT | Mod: CPTII,,, | Performed by: FAMILY MEDICINE

## 2018-09-04 PROCEDURE — 71100 X-RAY EXAM RIBS UNI 2 VIEWS: CPT | Mod: 26,RT,, | Performed by: RADIOLOGY

## 2018-09-04 PROCEDURE — 3078F DIAST BP <80 MM HG: CPT | Mod: CPTII,,, | Performed by: FAMILY MEDICINE

## 2018-09-04 PROCEDURE — 99999 PR PBB SHADOW E&M-EST. PATIENT-LVL V: CPT | Mod: PBBFAC,,, | Performed by: FAMILY MEDICINE

## 2018-09-04 PROCEDURE — 99215 OFFICE O/P EST HI 40 MIN: CPT | Mod: PBBFAC,25,PO | Performed by: FAMILY MEDICINE

## 2018-09-04 PROCEDURE — 71100 X-RAY EXAM RIBS UNI 2 VIEWS: CPT | Mod: TC,PO

## 2018-09-04 PROCEDURE — 3075F SYST BP GE 130 - 139MM HG: CPT | Mod: CPTII,,, | Performed by: FAMILY MEDICINE

## 2018-09-04 RX ORDER — PHENTERMINE HYDROCHLORIDE 37.5 MG/1
37.5 TABLET ORAL
Qty: 30 TABLET | Refills: 0 | Status: SHIPPED | OUTPATIENT
Start: 2018-09-04 | End: 2018-09-28 | Stop reason: SDUPTHER

## 2018-09-04 NOTE — PROGRESS NOTES
Subjective:       Patient ID: Alicia Soliz is a 38 y.o. female.    Chief Complaint: Urinary Frequency and burning with urination      Patient reports intermittent dysuria, frequency. Gradual onset, has been bothering her for several weeks now. This is a frequent problem for her.   She also reports that she fell several times this past weekend due to weakness in her legs from MS, on one of the falls she hit her lower front rib area, having pain and palpable bump there since then.   She is also reporting constipation recently, has been drinking plenty of water, taking probiotic and metamucil daily without much relief.      Review of Systems   Constitutional: Positive for fatigue and unexpected weight change (some weight gain). Negative for activity change, appetite change and fever.   HENT: Negative for congestion and sore throat.    Eyes: Negative for visual disturbance.   Respiratory: Negative for cough, chest tightness and shortness of breath.    Cardiovascular: Negative for chest pain, palpitations and leg swelling.   Gastrointestinal: Positive for constipation. Negative for abdominal pain, diarrhea, nausea and vomiting.   Endocrine: Positive for polyuria.   Genitourinary: Positive for difficulty urinating, dysuria, frequency and urgency. Negative for flank pain, hematuria and pelvic pain.   Musculoskeletal: Positive for arthralgias, back pain, gait problem and myalgias.   Skin: Negative for color change and rash.   Allergic/Immunologic: Negative for immunocompromised state.   Neurological: Positive for weakness. Negative for dizziness.     Past Medical History:   Diagnosis Date    Anemia     Arthritis     Cardiac arrest as complication of care     pt states she went into cardiac arrest from an allergic reaction to a medication    Encounter for blood transfusion     Hemiplegia due to old stroke     Hypertension     Multiple sclerosis     Stroke      Past Surgical History:   Procedure Laterality Date  "   APPENDECTOMY      CHOLECYSTECTOMY      HYSTERECTOMY      KNEE SURGERY      TONSILLECTOMY      TUBAL LIGATION       Family History   Problem Relation Age of Onset    Lupus Mother     Heart disease Mother     Diabetes Father     Kidney disease Father     Cancer Maternal Aunt 40        breast    Breast cancer Maternal Aunt     Breast cancer Cousin     Breast cancer Cousin      Social History     Socioeconomic History    Marital status: Single     Spouse name: Not on file    Number of children: Not on file    Years of education: Not on file    Highest education level: Not on file   Social Needs    Financial resource strain: Not on file    Food insecurity - worry: Not on file    Food insecurity - inability: Not on file    Transportation needs - medical: Not on file    Transportation needs - non-medical: Not on file   Occupational History     Employer: [employer]   Tobacco Use    Smoking status: Never Smoker    Smokeless tobacco: Never Used   Substance and Sexual Activity    Alcohol use: No    Drug use: No    Sexual activity: Yes     Partners: Male   Other Topics Concern    Not on file   Social History Narrative    Not on file     Review of patient's allergies indicates:   Allergen Reactions    Demerol [meperidine] Itching     Other reaction(s): Itching    Dilaudid [hydromorphone (bulk)] Anaphylaxis     Other reaction(s): Anaphylaxis  "coded" per pt    Prednisone Itching     Other reaction(s): Itching    Morphine     Tramadol        Objective:       /75   Pulse 103   Temp 99.1 °F (37.3 °C)   Resp 18   Ht 5' 7" (1.702 m)   SpO2 98%   BMI 45.20 kg/m²   Physical Exam   Constitutional: She is oriented to person, place, and time. Vital signs are normal. She appears well-developed and well-nourished. No distress.   Using walker     HENT:   Head: Normocephalic and atraumatic.   Right Ear: Hearing, tympanic membrane, external ear and ear canal normal.   Left Ear: Hearing, tympanic " membrane, external ear and ear canal normal.   Nose: Nose normal.   Mouth/Throat: Uvula is midline, oropharynx is clear and moist and mucous membranes are normal. No oropharyngeal exudate.   Eyes: Conjunctivae and EOM are normal. Pupils are equal, round, and reactive to light.   Neck: Normal range of motion. Neck supple. No tracheal deviation present. No thyromegaly present.   Cardiovascular: Normal rate, regular rhythm, normal heart sounds and intact distal pulses.   No murmur heard.  Pulmonary/Chest: Effort normal and breath sounds normal. No respiratory distress. She exhibits tenderness (right anterior chest wall).   Abdominal: Soft. Bowel sounds are normal. She exhibits no mass. There is tenderness (generalized). There is no rebound and no guarding. No hernia.   Musculoskeletal: Normal range of motion. She exhibits no edema.   Lymphadenopathy:     She has no cervical adenopathy.   Neurological: She is alert and oriented to person, place, and time.   Skin: Skin is warm and dry. Capillary refill takes less than 2 seconds. She is not diaphoretic.   Psychiatric: She has a normal mood and affect. Her behavior is normal. Judgment and thought content normal.   Nursing note and vitals reviewed.    Assessment:     1. Dysuria    2. Fall, initial encounter    3. Chest wall pain    4. Drug-induced constipation    5. Constipation, unspecified constipation type    6. Hypertension, unspecified type      Plan:   Dysuria  -     POCT URINE DIPSTICK WITHOUT MICROSCOPE  -     Urine culture  -     Ambulatory consult to Urology    Fall, initial encounter  -     X-Ray Ribs 2 View Right; Future; Expected date: 09/04/2018    Chest wall pain  -     X-Ray Ribs 2 View Right; Future; Expected date: 09/04/2018    Drug-induced constipation    Constipation, unspecified constipation type  -     linaclotide (LINZESS) 145 mcg Cap capsule; Take 1 capsule (145 mcg total) by mouth once daily.  Dispense: 30 capsule; Refill: 5    Hypertension,  unspecified type  -     Hypertension [facility] ([facility] Enrollment Order      Medication List with Changes/Refills   Current Medications    ALPRAZOLAM (XANAX) 0.5 MG TABLET    TAKE 1 TABLET (0.5 MG TOTAL) BY MOUTH 3 (THREE) TIMES DAILY AS NEEDED FOR ANXIETY.    AMLODIPINE (NORVASC) 10 MG TABLET    Take 1 tablet (10 mg total) by mouth once daily.    ARMODAFINIL (NUVIGIL) 150 MG TABLET    Take 1 tablet (150 mg total) by mouth once daily.    BACLOFEN (LIORESAL) 20 MG TABLET    Take 1 tablet (20 mg total) by mouth 3 (three) times daily.    BUPROPION (WELLBUTRIN SR) 100 MG TBSR 12 HR TABLET    Take 1 tablet (100 mg total) by mouth 2 (two) times daily.    BUTALBITAL-ACETAMINOPHEN-CAFFEINE -40 MG (FIORICET, ESGIC) -40 MG PER TABLET    Take 1 tablet at onset of migraine.  Limit to 3 tabs a week.    DALFAMPRIDINE (AMPYRA) 10 MG TB12    Take 1 tablet by mouth 2 (two) times daily.    DEXBROMPHENIRAMN-CHLOPHEDIANOL 1-12.5 MG/5 ML SOLN    Take 5 mLs by mouth nightly as needed.    DOXEPIN (SINEQUAN) 25 MG CAPSULE    Take 1 capsule (25 mg total) by mouth nightly as needed.    FLUCONAZOLE (DIFLUCAN) 150 MG TAB    Take 1 tablet (150 mg total) by mouth once daily. Then second tablet 3 days later.    HYDROCHLOROTHIAZIDE (HYDRODIURIL) 12.5 MG TAB    Take 1 tablet (12.5 mg total) by mouth once daily.    HYDROCODONE-ACETAMINOPHEN 10-325MG (NORCO)  MG TAB    Take by mouth every 6 (six) hours as needed for Pain.    HYDROXYZINE HCL (ATARAX) 25 MG TABLET    Take 1 tablet (25 mg total) by mouth nightly as needed for Itching.    INTERFERON BETA-1A (AVONEX) 30 MCG/0.5 ML SYRINGE    Inject 0.5 mLs (30 mcg total) into the muscle once a week.    [id number] 1-1-500 MG CAP    Take 1 tablet by mouth once daily.    MECLIZINE (ANTIVERT) 25 MG TABLET    Take 1 tablet (25 mg total) by mouth 2 (two) times daily as needed.    NYSTATIN (MYCOSTATIN) CREAM    Apply topically 2 (two) times daily.    PROMETHAZINE  (PHENERGAN) 25 MG TABLET    Take 1 tablet (25 mg total) by mouth every 4 (four) hours as needed for Nausea.    PSYLLIUM HUSK (METAMUCIL) 0.4 GRAM CAP    Take by mouth once daily.   Changed and/or Refilled Medications    Modified Medication Previous Medication    LINACLOTIDE (LINZESS) 145 MCG CAP CAPSULE linaclotide (LINZESS) 145 mcg Cap capsule       Take 1 capsule (145 mcg total) by mouth once daily.    Take 1 capsule (145 mcg total) by mouth once daily.   Discontinued Medications    DIPHENHYDRAMINE (BENADRYL) 25 MG CAPSULE    Take 1 each (25 mg total) by mouth every 6 (six) hours as needed for Itching or Allergies.    DOCUSATE SODIUM (COLACE) 100 MG CAPSULE    Take 2 capsules (200 mg total) by mouth 2 (two) times daily.

## 2018-09-04 NOTE — TELEPHONE ENCOUNTER
----- Message from Veronica Soriano sent at 9/4/2018 12:12 PM CDT -----  Contact: pt  She's calling stating that she was given a urine sample cup to bring back but wanted to know if she could bring her sample to the [facility] because she is closer to that location, please advise [phone] (work)

## 2018-09-04 NOTE — TELEPHONE ENCOUNTER
----- Message from Veronica Soriano sent at 9/4/2018 12:12 PM CDT -----  Contact: pt  She's calling stating that she was given a urine sample cup to bring back but wanted to know if she could bring her sample to the [facility] because she is closer to that location, please advise [phone] (work)

## 2018-09-04 NOTE — TELEPHONE ENCOUNTER
Spoke with pt informed her that the orders will be changed in the system. Pt verbalized understanding ,

## 2018-09-06 ENCOUNTER — TELEPHONE (OUTPATIENT)
Dept: INTERNAL MEDICINE | Facility: CLINIC | Age: 38
End: 2018-09-06

## 2018-09-06 NOTE — TELEPHONE ENCOUNTER
----- Message from Braden Dumont MD sent at 9/6/2018  8:10 AM CDT -----  Can you let her know that her urine did not grow out any infection. I had put in referral to urology, can you get her scheduled please.

## 2018-09-07 ENCOUNTER — PATIENT MESSAGE (OUTPATIENT)
Dept: INTERNAL MEDICINE | Facility: CLINIC | Age: 38
End: 2018-09-07

## 2018-09-07 RX ORDER — MUPIROCIN 20 MG/G
OINTMENT TOPICAL 2 TIMES DAILY
Qty: 30 G | Refills: 1 | Status: SHIPPED | OUTPATIENT
Start: 2018-09-07 | End: 2018-09-24 | Stop reason: SDUPTHER

## 2018-09-11 ENCOUNTER — PATIENT MESSAGE (OUTPATIENT)
Dept: INTERNAL MEDICINE | Facility: CLINIC | Age: 38
End: 2018-09-11

## 2018-09-14 ENCOUNTER — PATIENT MESSAGE (OUTPATIENT)
Dept: INTERNAL MEDICINE | Facility: CLINIC | Age: 38
End: 2018-09-14

## 2018-09-14 RX ORDER — ALPRAZOLAM 0.25 MG/1
0.25 TABLET ORAL 2 TIMES DAILY PRN
Qty: 20 TABLET | Refills: 0 | Status: SHIPPED | OUTPATIENT
Start: 2018-09-14 | End: 2019-03-05

## 2018-09-14 RX ORDER — ALPRAZOLAM 0.5 MG/1
0.5 TABLET ORAL 3 TIMES DAILY PRN
Qty: 30 TABLET | Refills: 0 | Status: CANCELLED | OUTPATIENT
Start: 2018-09-14 | End: 2018-10-14

## 2018-09-17 ENCOUNTER — PATIENT MESSAGE (OUTPATIENT)
Dept: INTERNAL MEDICINE | Facility: CLINIC | Age: 38
End: 2018-09-17

## 2018-09-20 ENCOUNTER — LAB VISIT (OUTPATIENT)
Dept: LAB | Facility: HOSPITAL | Age: 38
End: 2018-09-20
Payer: MEDICARE

## 2018-09-20 ENCOUNTER — PATIENT MESSAGE (OUTPATIENT)
Dept: NEUROLOGY | Facility: CLINIC | Age: 38
End: 2018-09-20

## 2018-09-20 ENCOUNTER — OFFICE VISIT (OUTPATIENT)
Dept: NEUROLOGY | Facility: CLINIC | Age: 38
End: 2018-09-20
Payer: MEDICARE

## 2018-09-20 ENCOUNTER — PATIENT MESSAGE (OUTPATIENT)
Dept: INTERNAL MEDICINE | Facility: CLINIC | Age: 38
End: 2018-09-20

## 2018-09-20 ENCOUNTER — OFFICE VISIT (OUTPATIENT)
Dept: INTERNAL MEDICINE | Facility: CLINIC | Age: 38
End: 2018-09-20
Payer: MEDICARE

## 2018-09-20 VITALS
WEIGHT: 293 LBS | DIASTOLIC BLOOD PRESSURE: 90 MMHG | BODY MASS INDEX: 47.09 KG/M2 | HEIGHT: 66 IN | HEART RATE: 66 BPM | SYSTOLIC BLOOD PRESSURE: 120 MMHG

## 2018-09-20 VITALS
HEIGHT: 66 IN | DIASTOLIC BLOOD PRESSURE: 87 MMHG | BODY MASS INDEX: 47.09 KG/M2 | HEART RATE: 104 BPM | TEMPERATURE: 99 F | RESPIRATION RATE: 18 BRPM | SYSTOLIC BLOOD PRESSURE: 138 MMHG | WEIGHT: 293 LBS

## 2018-09-20 DIAGNOSIS — G35 MULTIPLE SCLEROSIS: ICD-10-CM

## 2018-09-20 DIAGNOSIS — Z20.2 POSSIBLE EXPOSURE TO STD: ICD-10-CM

## 2018-09-20 DIAGNOSIS — R30.0 DYSURIA: ICD-10-CM

## 2018-09-20 DIAGNOSIS — G35 MULTIPLE SCLEROSIS: Primary | ICD-10-CM

## 2018-09-20 DIAGNOSIS — Z11.4 ENCOUNTER FOR SCREENING FOR HIV: ICD-10-CM

## 2018-09-20 DIAGNOSIS — N77.1 VAGINITIS, VULVITIS AND VULVOVAGINITIS IN DISEASES CLASSIFIED ELSEWHERE: ICD-10-CM

## 2018-09-20 DIAGNOSIS — Z20.2 POSSIBLE EXPOSURE TO STD: Primary | ICD-10-CM

## 2018-09-20 DIAGNOSIS — E66.01 MORBID OBESITY WITH BMI OF 45.0-49.9, ADULT: ICD-10-CM

## 2018-09-20 LAB
BASOPHILS # BLD AUTO: 0.03 K/UL
BASOPHILS NFR BLD: 0.5 %
DIFFERENTIAL METHOD: ABNORMAL
EOSINOPHIL # BLD AUTO: 0.1 K/UL
EOSINOPHIL NFR BLD: 1 %
ERYTHROCYTE [DISTWIDTH] IN BLOOD BY AUTOMATED COUNT: 15.3 %
HCT VFR BLD AUTO: 40.5 %
HGB BLD-MCNC: 12.3 G/DL
IMM GRANULOCYTES # BLD AUTO: 0.01 K/UL
IMM GRANULOCYTES NFR BLD AUTO: 0.2 %
LYMPHOCYTES # BLD AUTO: 2.7 K/UL
LYMPHOCYTES NFR BLD: 45.5 %
MCH RBC QN AUTO: 21.6 PG
MCHC RBC AUTO-ENTMCNC: 30.4 G/DL
MCV RBC AUTO: 71 FL
MONOCYTES # BLD AUTO: 0.4 K/UL
MONOCYTES NFR BLD: 6.7 %
NEUTROPHILS # BLD AUTO: 2.7 K/UL
NEUTROPHILS NFR BLD: 46.1 %
NRBC BLD-RTO: 0 /100 WBC
PLATELET # BLD AUTO: 282 K/UL
PMV BLD AUTO: 11.5 FL
RBC # BLD AUTO: 5.7 M/UL
WBC # BLD AUTO: 5.83 K/UL

## 2018-09-20 PROCEDURE — 99213 OFFICE O/P EST LOW 20 MIN: CPT | Mod: S$PBB,,, | Performed by: FAMILY MEDICINE

## 2018-09-20 PROCEDURE — 99999 PR PBB SHADOW E&M-EST. PATIENT-LVL III: CPT | Mod: PBBFAC,,, | Performed by: PSYCHIATRY & NEUROLOGY

## 2018-09-20 PROCEDURE — 85025 COMPLETE CBC W/AUTO DIFF WBC: CPT

## 2018-09-20 PROCEDURE — 87510 GARDNER VAG DNA DIR PROBE: CPT

## 2018-09-20 PROCEDURE — 3008F BODY MASS INDEX DOCD: CPT | Mod: CPTII,,, | Performed by: PSYCHIATRY & NEUROLOGY

## 2018-09-20 PROCEDURE — 81002 URINALYSIS NONAUTO W/O SCOPE: CPT | Mod: PBBFAC,PO | Performed by: FAMILY MEDICINE

## 2018-09-20 PROCEDURE — 99999 PR PBB SHADOW E&M-EST. PATIENT-LVL III: CPT | Mod: PBBFAC,,, | Performed by: FAMILY MEDICINE

## 2018-09-20 PROCEDURE — 86703 HIV-1/HIV-2 1 RESULT ANTBDY: CPT

## 2018-09-20 PROCEDURE — 99214 OFFICE O/P EST MOD 30 MIN: CPT | Mod: S$PBB,,, | Performed by: PSYCHIATRY & NEUROLOGY

## 2018-09-20 PROCEDURE — 99213 OFFICE O/P EST LOW 20 MIN: CPT | Mod: PBBFAC,PO | Performed by: PSYCHIATRY & NEUROLOGY

## 2018-09-20 PROCEDURE — 36415 COLL VENOUS BLD VENIPUNCTURE: CPT | Mod: PO

## 2018-09-20 PROCEDURE — 3075F SYST BP GE 130 - 139MM HG: CPT | Mod: CPTII,,, | Performed by: FAMILY MEDICINE

## 2018-09-20 PROCEDURE — 3008F BODY MASS INDEX DOCD: CPT | Mod: CPTII,,, | Performed by: FAMILY MEDICINE

## 2018-09-20 PROCEDURE — 86694 HERPES SIMPLEX NES ANTBDY: CPT

## 2018-09-20 PROCEDURE — 87480 CANDIDA DNA DIR PROBE: CPT

## 2018-09-20 PROCEDURE — 3074F SYST BP LT 130 MM HG: CPT | Mod: CPTII,,, | Performed by: PSYCHIATRY & NEUROLOGY

## 2018-09-20 PROCEDURE — 99213 OFFICE O/P EST LOW 20 MIN: CPT | Mod: PBBFAC,27,PO | Performed by: FAMILY MEDICINE

## 2018-09-20 PROCEDURE — 3078F DIAST BP <80 MM HG: CPT | Mod: CPTII,,, | Performed by: PSYCHIATRY & NEUROLOGY

## 2018-09-20 PROCEDURE — 3079F DIAST BP 80-89 MM HG: CPT | Mod: CPTII,,, | Performed by: FAMILY MEDICINE

## 2018-09-20 PROCEDURE — 87491 CHLMYD TRACH DNA AMP PROBE: CPT

## 2018-09-20 PROCEDURE — 86593 SYPHILIS TEST NON-TREP QUANT: CPT

## 2018-09-20 PROCEDURE — 86592 SYPHILIS TEST NON-TREP QUAL: CPT

## 2018-09-20 PROCEDURE — 86780 TREPONEMA PALLIDUM: CPT

## 2018-09-20 NOTE — PROGRESS NOTES
Subjective:      Patient ID: Alicia Soliz is a 38 y.o. female.    Chief Complaint:   Follow-up of multiple sclerosis    This 38-year-old patient has an established history of multiple sclerosis and is currently on Avonex as her immuno modulating therapy.  The patient states that since her switch over to Avonex, the patient has been doing extremely well.  She has been participating in physical therapy and has increased her ability to ambulate with a walker and even with a cane.  The patient states that she is able to ambulate as wanted and desired without risk of fall.  The patient states that her general strength has improved significantly.  However, she continues to be aware of left-sided weakness which has been predominant.    The patient denies any blurred vision or double vision.  She denies any vision loss.  She indicates that she is able to swallow without difficulty.  She denies any dysphagia or dysarthria.  She has not had any significant change in bowel or bladder control.  She does experience intermittent fatigue that does interfere with activities requiring periods of rest.    The patient indicates that she is tolerating Avonex without any noticed side effects from the injection.  She specifically denies any fever or flu-like reaction to the injection.          ROS:  GENERAL: NO FEVER, CHILLS,  OR WEIGHT LOSS.  SKIN: NO RASHES, ITCHING OR CHANGES IN COLOR OR TEXTURE OF SKIN.  HEAD: NO HEADACHES OR RECENT HEAD TRAUMA.  EYES: VISUAL ACUITY FINE. NO PHOTOPHOBIA, OCULAR PAIN OR DIPLOPIA.  EARS: DENIES EAR PAIN, DISCHARGE OR VERTIGO.  NOSE: NO LOSS OF SMELL, NO EPISTAXIS OR POSTNASAL DRIP.  MOUTH & THROAT: NO HOARSENESS OR CHANGE IN VOICE. NO EXCESSIVE GUM BLEEDING.  NODES: DENIES SWOLLEN GLANDS.  CHEST: DENIES PERSON, CYANOSIS, WHEEZING, COUGH AND SPUTUM PRODUCTION.  CARDIOVASCULAR: DENIES CHEST PAIN, PND, ORTHOPNEA OR REDUCED EXERCISE TOLERANCE.  ABDOMEN: APPETITE FINE. NO WEIGHT LOSS. DENIES DIARRHEA,  ABDOMINAL PAIN, HEMATEMESIS OR BLOOD IN STOOL.  URINARY: NO FLANK PAIN, DYSURIA OR HEMATURIA.  PERIPHERAL VASCULAR: NO CLAUDICATION OR CYANOSIS.  MUSCULOSKELETAL: NO JOINT STIFFNESS OR SWELLING. DENIES BACK PAIN.  NEUROLOGIC: NO HISTORY OF SEIZURES,   OR UNEXPLAINED LOSS OF CONSCIOUSNESS.    Past Medical History:   Diagnosis Date    Anemia     Arthritis     Cardiac arrest as complication of care     pt states she went into cardiac arrest from an allergic reaction to a medication    Encounter for blood transfusion     Hemiplegia due to old stroke     Hypertension     Multiple sclerosis     Stroke      Past Surgical History:   Procedure Laterality Date    APPENDECTOMY      CHOLECYSTECTOMY      HYSTERECTOMY      KNEE SURGERY      TONSILLECTOMY      TUBAL LIGATION      VASCULAR CATH INSERTION Right 1/22/2015    Performed by Dayron [facility]ques MD at [facility] CATH LAB     Family History   Problem Relation Age of Onset    Lupus Mother     Heart disease Mother     Diabetes Father     Kidney disease Father     Cancer Maternal Aunt 40        breast    Breast cancer Maternal Aunt     Breast cancer Cousin     Breast cancer Cousin      Social History     Socioeconomic History    Marital status: Single     Spouse name: Not on file    Number of children: Not on file    Years of education: Not on file    Highest education level: Not on file   Social Needs    Financial resource strain: Not on file    Food insecurity - worry: Not on file    Food insecurity - inability: Not on file    Transportation needs - medical: Not on file    Transportation needs - non-medical: Not on file   Occupational History     Employer: [employer]   Tobacco Use    Smoking status: Never Smoker    Smokeless tobacco: Never Used   Substance and Sexual Activity    Alcohol use: No    Drug use: No    Sexual activity: Yes     Partners: Male   Other Topics Concern    Not on file   Social History Narrative    Not on file         Objective:   PE:    VITAL SIGNS:   Vitals:    09/20/18 0848   BP: (!) 120/90   Pulse: 66     APPEARANCE: WELL NOURISHED, WELL DEVELOPED,  MORBIDLY OBESE WOMAN IN NO ACUTE DISTRESS.    HEAD: NORMOCEPHALIC, ATRAUMATIC.  EYES: PERRL. EOMI.  NON-ICTERIC SCLERAE.    EARS: TM'S INTACT. LIGHT REFLEX NORMAL. NO RETRACTION OR PERFORATION.    NOSE: MUCOSA PINK. AIRWAY CLEAR.  MOUTH & THROAT: NO TONSILLAR ENLARGEMENT. NO PHARYNGEAL ERYTHEMA OR EXUDATE. NO STRIDOR.  NECK: SUPPLE. NO BRUITS.  CHEST: LUNGS CLEAR TO AUSCULTATION.  CARDIOVASCULAR: REGULAR RHYTHM WITHOUT SIGNIFICANT MURMURS.  ABDOMEN: BOWEL SOUNDS NORMAL. NOT DISTENDED.   MUSCULOSKELETAL:  NO BONY DEFORMITY SEEN.  MUSCLE TONE  AND MUSCLE MASS ARE NORMAL IN BOTH UPPER AND LOWER EXTREMITIES.    NEUROLOGIC:   MENTAL STATUS:  THE PATIENT IS WELL ORIENTED TO PERSON, TIME, PLACE, AND SITUATION.  THE PATIENT IS ATTENTIVE TO THE ENVIRONMENT AND COOPERATIVE FOR THE EXAM.  CRANIAL NERVES: II-XII GROSSLY INTACT. FUNDOSCOPIC EXAM IS NORMAL.  NO HEMORRHAGE, EXUDATE OR PAPILLEDEMA IS PRESENT. THE EXTRAOCULAR MUSCLES ARE INTACT IN THE CARDINAL DIRECTIONS OF GAZE.  NO PTOSIS IS PRESENT. FACIAL FEATURES ARE SYMMETRICAL.  SPEECH IS NORMAL IN FLUENCY, DICTION, AND PHRASING.  TONGUE PROTRUDES IN THE MIDLINE.    GAIT AND STATION:  ROMBERG IS NEGATIVE.    THE PATIENT AMBULATES WITH A FRONT WHEEL ROLLING WALKER AND IS ABLE TO AMBULATE WITH A SOMEWHAT SLOW LABORED GAIT PRIMARILY DUE TO HER LEFT-SIDED WEAKNESS.  MOTOR:  THE PATIENT CLEARLY HAS DOWN DRIFT OF THE OUTSTRETCHED LEFT ARM AND HAS EVIDENCE OF LEFT-SIDED WEAKNESS INVOLVING BOTH UPPER AND LOWER EXTREMITY ON THE LEFT SIDE.  MUSCLE GROUPS ARE GRADED 4/5 GENERALLY.  SENSORY:  INTACT BOTH UPPER AND LOWER EXTREMITIES TO PIN PRICK, TOUCH, AND VIBRATION.  CEREBELLAR:  FINGER TO NOSE DONE WELL.  ALTERNATING MOVEMENTS INTACT  ON THE RIGHT BUT ARE IMPAIRED ON THE LEFT.  REFLEXES:  STRETCH REFLEXES ARE   TRACE BOTH UPPER AND LOWER EXTREMITIES.  PLANTAR  STIMULATION IS FLEXOR BILATERALLY AND NO PATHOLOGICAL REFLEXES ARE SEEN              Assessment:     Encounter Diagnoses   Name Primary?    Multiple sclerosis Yes    Morbid obesity with BMI of 45.0-49.9, adult                    Plan:    1. Lab today:  CBC   2. Discussion with patient regarding planned weight loss by utilizing a low carbohydrate diet.  The patient was advised that she will have increasing difficulties primarily from her obesity which will interfere with recovery from attacks of MS that may occur in the future.  It is critical for the patient to lose significant weight.  This was clearly conveyed to the patient.  3. Continue medications as previously prescribed  4.  Routine follow-up with Neurology in 6 months.      This was a 35 min face-to-face visit with the patient with over 50% of the time spent counseling the patient regarding her MS and need for dietary management for weight loss.  This note is generated with speech recognition software and is subject to transcription error and sound alike phrases that may be missed by proofreading.

## 2018-09-20 NOTE — PROGRESS NOTES
Subjective:       Patient ID: Alicia Soliz is a 38 y.o. female.    Chief Complaint: Exposure to STD      Patient is overall doing well, she was just seen by her neurologist this morning and given a good report. She is still working on losing weight.   She has no acute complaints today, is requesting STD testing has new partner. She is also having continued dysuria, has urologist apt pending next month.      Review of Systems   Constitutional: Positive for activity change. Negative for unexpected weight change.   HENT: Negative for hearing loss, rhinorrhea and trouble swallowing.    Eyes: Positive for visual disturbance. Negative for discharge.   Respiratory: Negative for chest tightness and wheezing.    Cardiovascular: Negative for chest pain and palpitations.   Gastrointestinal: Positive for constipation. Negative for blood in stool, diarrhea and vomiting.   Endocrine: Negative for polydipsia and polyuria.   Genitourinary: Positive for dysuria. Negative for difficulty urinating, hematuria and menstrual problem.   Musculoskeletal: Positive for arthralgias and joint swelling. Negative for neck pain.   Neurological: Positive for weakness. Negative for headaches.   Psychiatric/Behavioral: Negative for confusion and dysphoric mood.     Past Medical History:   Diagnosis Date    Anemia     Arthritis     Cardiac arrest as complication of care     pt states she went into cardiac arrest from an allergic reaction to a medication    Encounter for blood transfusion     Hemiplegia due to old stroke     Hypertension     Multiple sclerosis     Stroke      Past Surgical History:   Procedure Laterality Date    APPENDECTOMY      CHOLECYSTECTOMY      HYSTERECTOMY      KNEE SURGERY      TONSILLECTOMY      TUBAL LIGATION      VASCULAR CATH INSERTION Right 1/22/2015    Performed by [facility] MD at [facility] CATH LAB     Family History   Problem Relation Age of Onset    Lupus Mother     Heart disease Mother      "Diabetes Father     Kidney disease Father     Cancer Maternal Aunt 40        breast    Breast cancer Maternal Aunt     Breast cancer Cousin     Breast cancer Cousin      Social History     Socioeconomic History    Marital status: Single     Spouse name: Not on file    Number of children: Not on file    Years of education: Not on file    Highest education level: Not on file   Social Needs    Financial resource strain: Not on file    Food insecurity - worry: Not on file    Food insecurity - inability: Not on file    Transportation needs - medical: Not on file    Transportation needs - non-medical: Not on file   Occupational History     Employer: [employer]   Tobacco Use    Smoking status: Never Smoker    Smokeless tobacco: Never Used   Substance and Sexual Activity    Alcohol use: No    Drug use: No    Sexual activity: Yes     Partners: Male   Other Topics Concern    Not on file   Social History Narrative    Not on file     Review of patient's allergies indicates:   Allergen Reactions    Demerol [meperidine] Itching     Other reaction(s): Itching    Dilaudid [hydromorphone (bulk)] Anaphylaxis     Other reaction(s): Anaphylaxis  "coded" per pt    Prednisone Itching     Other reaction(s): Itching    Morphine     Tramadol        Objective:       /87   Pulse 104   Temp 98.5 °F (36.9 °C)   Resp 18   Ht 5' 6" (1.676 m)   Wt (!) 138.9 kg (306 lb 3.5 oz)   BMI 49.42 kg/m²   Physical Exam   Constitutional: She appears well-developed and well-nourished. No distress.   Abdominal: Soft. She exhibits no distension. There is no tenderness. There is no guarding.   Genitourinary:   Genitourinary Comments: External genitalia normal.   Blind swab performed for vaginosis swab.   Skin: She is not diaphoretic.   Vitals reviewed.    Assessment:     1. Possible exposure to STD    2. Vaginitis, vulvitis and vulvovaginitis in diseases classified elsewhere     3. Encounter for screening for HIV     4. Dysuria  "     Plan:   Possible exposure to STD  -     C. trachomatis/N. gonorrhoeae by AMP DNA [facility]; Urine  -     VAGINOSIS SCREEN BY DNA PROBE  -     HIV-1 and HIV-2 antibodies; Future; Expected date: 09/20/2018  -     RPR; Future; Expected date: 09/20/2018  -     HERPES SIMPLEX 1 & 2 IGM; Future; Expected date: 09/20/2018  Dysuria  -     POCT URINE DIPSTICK WITHOUT MICROSCOPE         Medication List           Accurate as of 9/20/18 10:50 AM. If you have any questions, ask your nurse or doctor.               CONTINUE taking these medications    ALPRAZolam 0.25 MG tablet  Commonly known as:  XANAX  Take 1 tablet (0.25 mg total) by mouth 2 (two) times daily as needed for Anxiety.     amLODIPine 10 MG tablet  Commonly known as:  NORVASC  Take 1 tablet (10 mg total) by mouth once daily.     armodafinil 150 mg tablet  Commonly known as:  NUVIGIL  Take 1 tablet (150 mg total) by mouth once daily.     baclofen 20 MG tablet  Commonly known as:  LIORESAL  Take 1 tablet (20 mg total) by mouth 3 (three) times daily.     buPROPion 100 MG TBSR 12 hr tablet  Commonly known as:  WELLBUTRIN SR  Take 1 tablet (100 mg total) by mouth 2 (two) times daily.     butalbital-acetaminophen-caffeine -40 mg -40 mg per tablet  Commonly known as:  FIORICET, ESGIC  Take 1 tablet at onset of migraine.  Limit to 3 tabs a week.     dalfampridine 10 mg Tb12  Commonly known as:  AMPYRA  Take 1 tablet by mouth 2 (two) times daily.     dexbrompheniramn-chlophedianol 1-12.5 mg/5 mL Soln  Commonly known as:  CHLO HIST  Take 5 mLs by mouth nightly as needed.     doxepin 25 MG capsule  Commonly known as:  SINEQUAN  Take 1 capsule (25 mg total) by mouth nightly as needed.     fluconazole 150 MG Tab  Commonly known as:  DIFLUCAN  Take 1 tablet (150 mg total) by mouth once daily. Then second tablet 3 days later.     hydroCHLOROthiazide 12.5 MG Tab  Commonly known as:  HYDRODIURIL  Take 1 tablet (12.5 mg total) by mouth once daily.      HYDROcodone-acetaminophen  mg per tablet  Commonly known as:  NORCO     hydrOXYzine HCl 25 MG tablet  Commonly known as:  ATARAX  Take 1 tablet (25 mg total) by mouth nightly as needed for Itching.     interferon beta-1a 30 mcg/0.5 mL syringe  Commonly known as:  AVONEX  Inject 0.5 mLs (30 mcg total) into the muscle once a week.     linaclotide 145 mcg Cap capsule  Commonly known as:  LINZESS  Take 1 capsule (145 mcg total) by mouth once daily.     [id number] 1-1-500 mg Cap  Take 1 tablet by mouth once daily.     meclizine 25 mg tablet  Commonly known as:  ANTIVERT  Take 1 tablet (25 mg total) by mouth 2 (two) times daily as needed.     METAMUCIL 0.4 gram Cap  Generic drug:  psyllium husk     mupirocin 2 % ointment  Commonly known as:  BACTROBAN  Apply topically 2 (two) times daily.     nystatin cream  Commonly known as:  MYCOSTATIN  Apply topically 2 (two) times daily.     phentermine 37.5 mg tablet  Commonly known as:  ADIPEX-P  Take 1 tablet (37.5 mg total) by mouth before breakfast.     promethazine 25 MG tablet  Commonly known as:  PHENERGAN  Take 1 tablet (25 mg total) by mouth every 4 (four) hours as needed for Nausea.

## 2018-09-21 ENCOUNTER — TELEPHONE (OUTPATIENT)
Dept: INTERNAL MEDICINE | Facility: CLINIC | Age: 38
End: 2018-09-21

## 2018-09-21 ENCOUNTER — PATIENT MESSAGE (OUTPATIENT)
Dept: INTERNAL MEDICINE | Facility: CLINIC | Age: 38
End: 2018-09-21

## 2018-09-21 LAB
BILIRUB SERPL-MCNC: NORMAL MG/DL
BLOOD URINE, POC: NORMAL
CANDIDA RRNA VAG QL PROBE: NEGATIVE
COLOR, POC UA: YELLOW
G VAGINALIS RRNA GENITAL QL PROBE: POSITIVE
GLUCOSE UR QL STRIP: NORMAL
HIV 1+2 AB+HIV1 P24 AG SERPL QL IA: NEGATIVE
KETONES UR QL STRIP: NORMAL
LEUKOCYTE ESTERASE URINE, POC: NORMAL
NITRITE, POC UA: NORMAL
PH, POC UA: 6
PROTEIN, POC: NORMAL
RPR SER QL: REACTIVE
RPR SER-TITR: ABNORMAL {TITER}
SPECIFIC GRAVITY, POC UA: 1.02
T VAGINALIS RRNA GENITAL QL PROBE: NEGATIVE
UROBILINOGEN, POC UA: NORMAL

## 2018-09-21 RX ORDER — METRONIDAZOLE 500 MG/1
500 TABLET ORAL EVERY 12 HOURS
Qty: 14 TABLET | Refills: 0 | Status: SHIPPED | OUTPATIENT
Start: 2018-09-21 | End: 2018-09-28

## 2018-09-22 ENCOUNTER — PATIENT MESSAGE (OUTPATIENT)
Dept: INTERNAL MEDICINE | Facility: CLINIC | Age: 38
End: 2018-09-22

## 2018-09-22 ENCOUNTER — NURSE TRIAGE (OUTPATIENT)
Dept: ADMINISTRATIVE | Facility: CLINIC | Age: 38
End: 2018-09-22

## 2018-09-23 ENCOUNTER — NURSE TRIAGE (OUTPATIENT)
Dept: ADMINISTRATIVE | Facility: CLINIC | Age: 38
End: 2018-09-23

## 2018-09-23 NOTE — TELEPHONE ENCOUNTER
Reason for Disposition   Syphilis, questions about    Protocols used: ST STD ACDOUCPIC-E-MR    Patient called and she was highly concerned about the RPR test. Patient has been treated for syphylis and contacted by the [facility] department and saw her pcp regarding treatment and testing. Called the on call provider Dr. Plascencia who advised patient to speak with her pcp because she will be positive for some time after she has completed treatment. Patient verbalized understanding of care advice.

## 2018-09-24 ENCOUNTER — PATIENT MESSAGE (OUTPATIENT)
Dept: INTERNAL MEDICINE | Facility: CLINIC | Age: 38
End: 2018-09-24

## 2018-09-24 LAB
C TRACH DNA SPEC QL NAA+PROBE: NOT DETECTED
HSV AB, IGM BY EIA: NEGATIVE
N GONORRHOEA DNA SPEC QL NAA+PROBE: NOT DETECTED
T PALLIDUM AB SER QL IF: REACTIVE

## 2018-09-24 RX ORDER — MUPIROCIN 20 MG/G
OINTMENT TOPICAL
Qty: 22 G | Refills: 1 | Status: SHIPPED | OUTPATIENT
Start: 2018-09-24 | End: 2018-11-15 | Stop reason: SDUPTHER

## 2018-09-24 RX ORDER — PHENTERMINE HYDROCHLORIDE 37.5 MG/1
37.5 TABLET ORAL
Qty: 30 TABLET | Refills: 0 | OUTPATIENT
Start: 2018-09-24 | End: 2018-10-24

## 2018-09-25 ENCOUNTER — PATIENT MESSAGE (OUTPATIENT)
Dept: INTERNAL MEDICINE | Facility: CLINIC | Age: 38
End: 2018-09-25

## 2018-09-25 ENCOUNTER — PATIENT OUTREACH (OUTPATIENT)
Dept: OTHER | Facility: OTHER | Age: 38
End: 2018-09-25

## 2018-09-25 NOTE — LETTER
Kallie Cunha, PharmD  [address] [place] [address]     Dear Alicia Soliz,    Welcome to the [facility]!           My name is Kallie Cunha PharmD and I am your dedicated Digital Medicine clinician.  As an expert in medication management, I will help ensure that the medications you are taking continue to provide you with the intended benefits.        I am Marli oH and I will be your health  for the duration of the program.  My  job is to help you identify lifestyle changes to improve your blood pressure control.  We will talk about nutrition, exercise, and other ways that you may be able to adjust your current habits to better your health. Together, we will work to improve your overall health and encourage you to meet your goals for a healthier lifestyle.    What we expect from YOU:    You will need to take blood pressure readings multiple times a week and no less than one reading per week.   It is important that you take your measurements at different times during the day, when possible.     What you should expect from your Digital Medicine Care Team:   We will provide you with education about high blood pressure, including lifestyle changes that could help you to control your blood pressure.   We will review your weekly readings and provide you with monthly blood pressure progress reports after you have been in the program for more than 30 days.   We will send monthly progress reports on your blood pressure control to your physician so they can follow along with your progress as well.    You will be able to reach me by phone at [phone] or through your [facility] account by clicking my name under Care Team on the right side of the home screen.    I look forward to working with you to achieve your blood pressure goals!  Sincerely,    Kallie Cunha PharmD  Your personal clinician    Please visit  [email or web address] to learn more about high blood pressure and what you can do lower your blood pressure.                                                                                           Alicia Soliz  [address]  [place] [address]

## 2018-09-25 NOTE — PROGRESS NOTES
Last 5 Patient Entered Readings                                      Current 30 Day Average: 122/74     Recent Readings 9/25/2018 9/13/2018    SBP (mmHg) 104 140    DBP (mmHg) 87 60        Deferred enrollment call due to lab visit today

## 2018-09-25 NOTE — TELEPHONE ENCOUNTER
Informed pt that I spoke with Mrs. Ashraf and have FWD information over to her . Pt verbalized understanding ,

## 2018-09-26 ENCOUNTER — PATIENT MESSAGE (OUTPATIENT)
Dept: INTERNAL MEDICINE | Facility: CLINIC | Age: 38
End: 2018-09-26

## 2018-09-27 ENCOUNTER — PATIENT MESSAGE (OUTPATIENT)
Dept: INTERNAL MEDICINE | Facility: CLINIC | Age: 38
End: 2018-09-27

## 2018-09-27 NOTE — PROGRESS NOTES
Last 5 Patient Entered Readings                                      Current 30 Day Average: 122/74     Recent Readings 9/25/2018 9/13/2018    SBP (mmHg) 104 140    DBP (mmHg) 87 60        Digital Medicine: Health  Introduction    Introduced Ms. Alicia Soliz to Digital Medicine. Discussed health  role and recommended lifestyle modifications.    Lifestyle Assessment:  Current Dietary Habits(i.e. low sodium, food labels, dining out):    Exercise:    Alcohol/Tobacco:    Medication Adherence: has been compliant with the medicaiton regimen Takes in AM (around 5am)    Other goals: Weight loss- has already lost weight through watching portions, increasing water and increasing PA. Has lost 45 lbs so far    Reviewed BP measuring technique. Agrees to adjust technique from recliner to table and chair. Discussed relaxing and waiting after meds    Reviewed AHA/AACE recommendations:  Limit sodium intake to <2000mg/day  Recommended CHO intake, 45-65% of daily caloric intake  Perform 150 minutes of physical activity per week    Reviewed the importance of self-monitoring, medication adherence, and that the health  can be used as a resource for lifestyle modifications to help reduce or maintain a healthy lifestyle.  Reviewed that the Digital Medicine team is not available for emergencies and instructed the patient to call 911 or [facility] ([phone] or [phone]) if one arises.

## 2018-09-28 ENCOUNTER — PATIENT MESSAGE (OUTPATIENT)
Dept: INTERNAL MEDICINE | Facility: CLINIC | Age: 38
End: 2018-09-28

## 2018-09-28 ENCOUNTER — CLINICAL SUPPORT (OUTPATIENT)
Dept: INTERNAL MEDICINE | Facility: CLINIC | Age: 38
End: 2018-09-28
Payer: MEDICARE

## 2018-09-28 DIAGNOSIS — A53.9 SYPHILIS: Primary | ICD-10-CM

## 2018-09-28 PROCEDURE — 96372 THER/PROPH/DIAG INJ SC/IM: CPT | Mod: PBBFAC,PO

## 2018-09-28 RX ORDER — DOXYCYCLINE 100 MG/1
100 CAPSULE ORAL 2 TIMES DAILY
Qty: 28 CAPSULE | Refills: 0 | Status: SHIPPED | OUTPATIENT
Start: 2018-09-28 | End: 2018-09-28

## 2018-09-28 RX ORDER — PHENTERMINE HYDROCHLORIDE 37.5 MG/1
37.5 TABLET ORAL
Qty: 30 TABLET | Refills: 0 | Status: SHIPPED | OUTPATIENT
Start: 2018-10-04 | End: 2018-11-03

## 2018-09-28 RX ADMIN — PENICILLIN G BENZATHINE 2.4 MILLION UNITS: 2400000 INJECTION, SUSPENSION INTRAMUSCULAR at 01:09

## 2018-09-28 NOTE — TELEPHONE ENCOUNTER
Advised pt that the medication came in / Md would pefer her to do the injection over the oral . Pt verbalized understanding and will call back later today to get scheduled .

## 2018-09-28 NOTE — PROGRESS NOTES
Bicillin L-A 2,400,000 units per 4 ml.  IM divided dose of 2 ml left and right ventrogluteal.  Exp03/2021 [facility] lot# [id number].  Pt advised to wait in clinic 15 minutes to monitor for side effects.  Pt voiced understanding and tolerated injections well.

## 2018-09-28 NOTE — TELEPHONE ENCOUNTER
Please let her know we are still waiting on bicillin but I am going to call in the doxycycline for her to start today, then if we get the bicillin she can switch to that.   The adipex was too early it is controlled and cannot be filled sooner than once a month.

## 2018-10-03 ENCOUNTER — PATIENT MESSAGE (OUTPATIENT)
Dept: INTERNAL MEDICINE | Facility: CLINIC | Age: 38
End: 2018-10-03

## 2018-10-05 ENCOUNTER — TELEPHONE (OUTPATIENT)
Dept: INTERNAL MEDICINE | Facility: CLINIC | Age: 38
End: 2018-10-05

## 2018-10-05 ENCOUNTER — PATIENT MESSAGE (OUTPATIENT)
Dept: INTERNAL MEDICINE | Facility: CLINIC | Age: 38
End: 2018-10-05

## 2018-10-05 NOTE — TELEPHONE ENCOUNTER
----- Message from Angela Mitchell sent at 10/5/2018  1:27 PM CDT -----  Contact: self   Requesting call back to r/s nurse visit. Please call back at [phone].        Thanks,  Angela Mitchell

## 2018-10-08 ENCOUNTER — TELEPHONE (OUTPATIENT)
Dept: INTERNAL MEDICINE | Facility: CLINIC | Age: 38
End: 2018-10-08

## 2018-10-08 ENCOUNTER — CLINICAL SUPPORT (OUTPATIENT)
Dept: INTERNAL MEDICINE | Facility: CLINIC | Age: 38
End: 2018-10-08
Payer: MEDICARE

## 2018-10-08 DIAGNOSIS — A53.9 SYPHILIS (ACQUIRED): Primary | ICD-10-CM

## 2018-10-08 PROCEDURE — 96372 THER/PROPH/DIAG INJ SC/IM: CPT | Mod: PBBFAC,PO

## 2018-10-08 PROCEDURE — 99213 OFFICE O/P EST LOW 20 MIN: CPT | Mod: PBBFAC,PO,25

## 2018-10-08 PROCEDURE — 99999 PR PBB SHADOW E&M-EST. PATIENT-LVL III: CPT | Mod: PBBFAC,,,

## 2018-10-08 RX ADMIN — PENICILLIN G BENZATHINE 2.4 MILLION UNITS: 2400000 INJECTION, SUSPENSION INTRAMUSCULAR at 10:10

## 2018-10-08 NOTE — TELEPHONE ENCOUNTER
----- Message from Genevieve Urbina sent at 10/8/2018  9:46 AM CDT -----  Contact: self  Pt states she is running 15 mins late for appt. Please call pt back at [phone].    Thanks,   Genevieve Urbina

## 2018-10-08 NOTE — PROGRESS NOTES
Bicillin L-A 2,400,000 units per 4 ml IM left ventrogluteal.  Pt preferred 1 injection.  Exp 03/2021 lot# [id number] [facility] [place].  Pt advised to wait in clinic 15 minutes to monitor for side effects.  Pt voiced understanding and tolerated injection well.

## 2018-10-11 ENCOUNTER — OFFICE VISIT (OUTPATIENT)
Dept: INTERNAL MEDICINE | Facility: CLINIC | Age: 38
End: 2018-10-11
Payer: MEDICARE

## 2018-10-11 VITALS
HEART RATE: 98 BPM | TEMPERATURE: 99 F | DIASTOLIC BLOOD PRESSURE: 72 MMHG | OXYGEN SATURATION: 99 % | BODY MASS INDEX: 47.09 KG/M2 | HEIGHT: 66 IN | SYSTOLIC BLOOD PRESSURE: 138 MMHG | WEIGHT: 293 LBS

## 2018-10-11 DIAGNOSIS — H92.01 OTALGIA OF RIGHT EAR: ICD-10-CM

## 2018-10-11 DIAGNOSIS — J32.9 SINUSITIS, UNSPECIFIED CHRONICITY, UNSPECIFIED LOCATION: Primary | ICD-10-CM

## 2018-10-11 DIAGNOSIS — K21.9 GASTROESOPHAGEAL REFLUX DISEASE, ESOPHAGITIS PRESENCE NOT SPECIFIED: ICD-10-CM

## 2018-10-11 PROCEDURE — 99213 OFFICE O/P EST LOW 20 MIN: CPT | Mod: PBBFAC,PO | Performed by: FAMILY MEDICINE

## 2018-10-11 PROCEDURE — 3075F SYST BP GE 130 - 139MM HG: CPT | Mod: CPTII,,, | Performed by: FAMILY MEDICINE

## 2018-10-11 PROCEDURE — 3008F BODY MASS INDEX DOCD: CPT | Mod: CPTII,,, | Performed by: FAMILY MEDICINE

## 2018-10-11 PROCEDURE — 99999 PR PBB SHADOW E&M-EST. PATIENT-LVL III: CPT | Mod: PBBFAC,,, | Performed by: FAMILY MEDICINE

## 2018-10-11 PROCEDURE — 99213 OFFICE O/P EST LOW 20 MIN: CPT | Mod: S$PBB,,, | Performed by: FAMILY MEDICINE

## 2018-10-11 PROCEDURE — 3078F DIAST BP <80 MM HG: CPT | Mod: CPTII,,, | Performed by: FAMILY MEDICINE

## 2018-10-11 RX ORDER — ESOMEPRAZOLE MAGNESIUM 40 MG/1
40 GRANULE, DELAYED RELEASE ORAL
Qty: 1200 MG | Refills: 11 | Status: SHIPPED | OUTPATIENT
Start: 2018-10-11 | End: 2018-10-23

## 2018-10-11 RX ORDER — DOXYCYCLINE 100 MG/1
100 CAPSULE ORAL 2 TIMES DAILY
Qty: 20 CAPSULE | Refills: 0 | Status: SHIPPED | OUTPATIENT
Start: 2018-10-11 | End: 2018-10-12 | Stop reason: SDUPTHER

## 2018-10-11 RX ORDER — FLUTICASONE PROPIONATE 50 MCG
1 SPRAY, SUSPENSION (ML) NASAL DAILY
Qty: 1 BOTTLE | Refills: 0 | Status: CANCELLED | OUTPATIENT
Start: 2018-10-11

## 2018-10-11 NOTE — PROGRESS NOTES
Subjective:       Patient ID: Alicia Soliz is a 38 y.o. female.    Chief Complaint: Headache (right ear pain)      Patient complaining of pain in right ear and frontal headache. Pain in ear intermittent, pain in frontal constant. Afebrile.  She is also complaining of heartburn after eating every day.      Review of Systems   Constitutional: Positive for fatigue. Negative for activity change, appetite change and fever.   HENT: Positive for congestion, postnasal drip, rhinorrhea, sinus pressure, sinus pain and sore throat.    Respiratory: Negative for cough, shortness of breath and wheezing.    Gastrointestinal: Negative for abdominal pain, nausea and vomiting.   Musculoskeletal: Negative for myalgias.   Skin: Negative for rash.   Neurological: Positive for headaches.     Past Medical History:   Diagnosis Date    Anemia     Arthritis     Cardiac arrest as complication of care     pt states she went into cardiac arrest from an allergic reaction to a medication    Encounter for blood transfusion     Hemiplegia due to old stroke     Hypertension     Multiple sclerosis     Stroke      Past Surgical History:   Procedure Laterality Date    APPENDECTOMY      CHOLECYSTECTOMY      HYSTERECTOMY      KNEE SURGERY      TONSILLECTOMY      TUBAL LIGATION      VASCULAR CATH INSERTION Right 1/22/2015    Performed by Dayron Vasques MD at [facility] CATH LAB     Family History   Problem Relation Age of Onset    Lupus Mother     Heart disease Mother     Diabetes Father     Kidney disease Father     Cancer Maternal Aunt 40        breast    Breast cancer Maternal Aunt     Breast cancer Cousin     Breast cancer Cousin      Social History     Socioeconomic History    Marital status: Single     Spouse name: Not on file    Number of children: Not on file    Years of education: Not on file    Highest education level: Not on file   Social Needs    Financial resource strain: Not on file    Food insecurity - worry: Not  "on file    Food insecurity - inability: Not on file    Transportation needs - medical: Not on file    Transportation needs - non-medical: Not on file   Occupational History     Employer: [employer]   Tobacco Use    Smoking status: Never Smoker    Smokeless tobacco: Never Used   Substance and Sexual Activity    Alcohol use: No    Drug use: No    Sexual activity: Yes     Partners: Male   Other Topics Concern    Not on file   Social History Narrative    Not on file     Review of patient's allergies indicates:   Allergen Reactions    Demerol [meperidine] Itching     Other reaction(s): Itching    Dilaudid [hydromorphone (bulk)] Anaphylaxis     Other reaction(s): Anaphylaxis  "coded" per pt    Prednisone Itching     Other reaction(s): Itching    Morphine     Tramadol        Objective:       /72 (BP Location: Left arm, Patient Position: Sitting, BP Method: Medium (Manual))   Pulse 98   Temp 98.5 °F (36.9 °C) (Tympanic)   Ht 5' 6" (1.676 m)   Wt (!) 141 kg (310 lb 13.6 oz)   SpO2 99%   BMI 50.17 kg/m²   Physical Exam   Constitutional: She appears well-developed and well-nourished. No distress.   HENT:   Head: Normocephalic.   Right Ear: Hearing, tympanic membrane, external ear and ear canal normal.   Left Ear: Hearing, tympanic membrane, external ear and ear canal normal.   Nose: Mucosal edema present. Right sinus exhibits maxillary sinus tenderness and frontal sinus tenderness. Left sinus exhibits maxillary sinus tenderness and frontal sinus tenderness.   Mouth/Throat: Uvula is midline and mucous membranes are normal. Posterior oropharyngeal erythema present.   Eyes: Conjunctivae and EOM are normal. Pupils are equal, round, and reactive to light.   Cardiovascular: Normal rate, regular rhythm and normal heart sounds.   Pulmonary/Chest: Effort normal and breath sounds normal. No respiratory distress.   Abdominal: Soft. Bowel sounds are normal.   Lymphadenopathy:     She has no cervical adenopathy.   Skin: " Skin is warm and dry. Capillary refill takes less than 2 seconds. She is not diaphoretic.   Psychiatric: She has a normal mood and affect. Her behavior is normal.   Nursing note and vitals reviewed.    Assessment:     1. Sinusitis, unspecified chronicity, unspecified location    2. Otalgia of right ear    3. Gastroesophageal reflux disease, esophagitis presence not specified      Plan:   Sinusitis, unspecified chronicity, unspecified location    Otalgia of right ear    Gastroesophageal reflux disease, esophagitis presence not specified    Other orders  -     doxycycline (VIBRAMYCIN) 100 MG Cap; Take 1 capsule (100 mg total) by mouth 2 (two) times daily.  Dispense: 20 capsule; Refill: 0  -     esomeprazole (NEXIUM) 40 mg GrPS; Take 40 mg by mouth before breakfast.  Dispense: 1200 mg; Refill: 11         Medication List           Accurate as of 10/11/18  2:00 PM. If you have any questions, ask your nurse or doctor.               START taking these medications    doxycycline 100 MG Cap  Commonly known as:  VIBRAMYCIN  Take 1 capsule (100 mg total) by mouth 2 (two) times daily.  Started by:  Braden Dumont MD     esomeprazole 40 mg Grps  Commonly known as:  NEXIUM  Take 40 mg by mouth before breakfast.  Started by:  Braden Dumont MD        CHANGE how you take these medications    baclofen 20 MG tablet  Commonly known as:  LIORESAL  Take 1 tablet (20 mg total) by mouth 3 (three) times daily.  What changed:  when to take this     buPROPion 100 MG TBSR 12 hr tablet  Commonly known as:  WELLBUTRIN SR  Take 1 tablet (100 mg total) by mouth 2 (two) times daily.  What changed:  when to take this     dalfampridine 10 mg Tb12  Commonly known as:  AMPYRA  Take 1 tablet by mouth 2 (two) times daily.  What changed:  when to take this     mupirocin 2 % ointment  Commonly known as:  BACTROBAN  APPLY TO AFFECTED AREA TWICE A DAY  What changed:  See the new instructions.     nystatin cream  Commonly known as:   MYCOSTATIN  Apply topically 2 (two) times daily.  What changed:    · when to take this  · reasons to take this        CONTINUE taking these medications    ALPRAZolam 0.25 MG tablet  Commonly known as:  XANAX  Take 1 tablet (0.25 mg total) by mouth 2 (two) times daily as needed for Anxiety.     amLODIPine 10 MG tablet  Commonly known as:  NORVASC  Take 1 tablet (10 mg total) by mouth once daily.     armodafinil 150 mg tablet  Commonly known as:  NUVIGIL  Take 1 tablet (150 mg total) by mouth once daily.     butalbital-acetaminophen-caffeine -40 mg -40 mg per tablet  Commonly known as:  FIORICET, ESGIC  Take 1 tablet at onset of migraine.  Limit to 3 tabs a week.     dexbrompheniramn-chlophedianol 1-12.5 mg/5 mL Soln  Commonly known as:  CHLO HIST  Take 5 mLs by mouth nightly as needed.     doxepin 25 MG capsule  Commonly known as:  SINEQUAN  Take 1 capsule (25 mg total) by mouth nightly as needed.     hydroCHLOROthiazide 12.5 MG Tab  Commonly known as:  HYDRODIURIL  Take 1 tablet (12.5 mg total) by mouth once daily.     HYDROcodone-acetaminophen  mg per tablet  Commonly known as:  NORCO     hydrOXYzine HCl 25 MG tablet  Commonly known as:  ATARAX  Take 1 tablet (25 mg total) by mouth nightly as needed for Itching.     interferon beta-1a 30 mcg/0.5 mL syringe  Commonly known as:  AVONEX  Inject 0.5 mLs (30 mcg total) into the muscle once a week.     linaclotide 145 mcg Cap capsule  Commonly known as:  LINZESS  Take 1 capsule (145 mcg total) by mouth once daily.     mr-rswiwkqxlgoxhoma-[id number] 1-1-500 mg Cap  Take 1 tablet by mouth once daily.     meclizine 25 mg tablet  Commonly known as:  ANTIVERT  Take 1 tablet (25 mg total) by mouth 2 (two) times daily as needed.     METAMUCIL 0.4 gram Cap  Generic drug:  psyllium husk     phentermine 37.5 mg tablet  Commonly known as:  ADIPEX-P  Take 1 tablet (37.5 mg total) by mouth before breakfast.     promethazine 25 MG tablet  Commonly known as:   PHENERGAN  Take 1 tablet (25 mg total) by mouth every 4 (four) hours as needed for Nausea.           Where to Get Your Medications      These medications were sent to [facility]/pharmacy #[id number] - AMBIKA Perdomo - [address] AT [facility]  [address], [place] [address]    Phone:  [phone]   · doxycycline 100 MG Cap  · esomeprazole 40 mg Grps

## 2018-10-12 ENCOUNTER — PATIENT MESSAGE (OUTPATIENT)
Dept: INTERNAL MEDICINE | Facility: CLINIC | Age: 38
End: 2018-10-12

## 2018-10-12 RX ORDER — DOXYCYCLINE 100 MG/1
100 CAPSULE ORAL 2 TIMES DAILY
Qty: 20 CAPSULE | Refills: 0 | Status: SHIPPED | OUTPATIENT
Start: 2018-10-12 | End: 2018-11-02 | Stop reason: ALTCHOICE

## 2018-10-15 ENCOUNTER — PATIENT MESSAGE (OUTPATIENT)
Dept: NEUROLOGY | Facility: CLINIC | Age: 38
End: 2018-10-15

## 2018-10-15 ENCOUNTER — CLINICAL SUPPORT (OUTPATIENT)
Dept: INTERNAL MEDICINE | Facility: CLINIC | Age: 38
End: 2018-10-15
Payer: MEDICARE

## 2018-10-15 PROCEDURE — 99999 PR PBB SHADOW E&M-EST. PATIENT-LVL III: CPT | Mod: PBBFAC,,,

## 2018-10-15 PROCEDURE — 99213 OFFICE O/P EST LOW 20 MIN: CPT | Mod: PBBFAC,PO,25

## 2018-10-15 PROCEDURE — 96372 THER/PROPH/DIAG INJ SC/IM: CPT | Mod: PBBFAC,PO

## 2018-10-15 RX ADMIN — PENICILLIN G BENZATHINE 2.4 MILLION UNITS: 1200000 INJECTION, SUSPENSION INTRAMUSCULAR at 11:10

## 2018-10-15 NOTE — PROGRESS NOTES
Bicillin L-A 2,400,000 units per 4 ml IM right vebtrogluteal.  Pt requested one injection.  Exp 03/2021 lot# [id number] [facility] [place].  Pt advised to wait in clinic 15 minutes to monitor for side effects.  Pt voiced understanding and tolerated injection well.

## 2018-10-17 ENCOUNTER — TELEPHONE (OUTPATIENT)
Dept: INTERNAL MEDICINE | Facility: CLINIC | Age: 38
End: 2018-10-17

## 2018-10-17 ENCOUNTER — PATIENT MESSAGE (OUTPATIENT)
Dept: INTERNAL MEDICINE | Facility: CLINIC | Age: 38
End: 2018-10-17

## 2018-10-17 DIAGNOSIS — A53.9 SYPHILIS: Primary | ICD-10-CM

## 2018-10-18 ENCOUNTER — PATIENT MESSAGE (OUTPATIENT)
Dept: NEUROLOGY | Facility: CLINIC | Age: 38
End: 2018-10-18

## 2018-10-19 ENCOUNTER — PATIENT MESSAGE (OUTPATIENT)
Dept: NEUROLOGY | Facility: CLINIC | Age: 38
End: 2018-10-19

## 2018-10-22 ENCOUNTER — PATIENT MESSAGE (OUTPATIENT)
Dept: INTERNAL MEDICINE | Facility: CLINIC | Age: 38
End: 2018-10-22

## 2018-10-22 ENCOUNTER — PATIENT MESSAGE (OUTPATIENT)
Dept: NEUROLOGY | Facility: CLINIC | Age: 38
End: 2018-10-22

## 2018-10-23 ENCOUNTER — PATIENT MESSAGE (OUTPATIENT)
Dept: INTERNAL MEDICINE | Facility: CLINIC | Age: 38
End: 2018-10-23

## 2018-10-23 ENCOUNTER — PATIENT OUTREACH (OUTPATIENT)
Dept: OTHER | Facility: OTHER | Age: 38
End: 2018-10-23

## 2018-10-23 RX ORDER — FLUCONAZOLE 150 MG/1
150 TABLET ORAL
Qty: 2 TABLET | Refills: 0 | Status: SHIPPED | OUTPATIENT
Start: 2018-10-23 | End: 2018-10-27

## 2018-10-23 RX ORDER — ESOMEPRAZOLE MAGNESIUM 40 MG/1
40 CAPSULE, DELAYED RELEASE ORAL
Qty: 30 CAPSULE | Refills: 11 | Status: SHIPPED | OUTPATIENT
Start: 2018-10-23 | End: 2020-01-15

## 2018-10-23 NOTE — TELEPHONE ENCOUNTER
She stated she is having vaginal discharge and itching below , she brought it to the attention of the hospital staff and   They told her to contact her PCP .

## 2018-10-23 NOTE — PROGRESS NOTES
Last 5 Patient Entered Readings                                      Current 30 Day Average: 133/88     Recent Readings 10/11/2018 10/10/2018 10/1/2018 9/25/2018 9/13/2018    SBP (mmHg) 140 117 172 104 140    DBP (mmHg) 94 90 80 87 60    Pulse 60 - - - -        Patient reports that she just got home from the hospital yesterday, and she is taking it easy today. Attributes her high reading to being in pain- discussed taking more frequent readings in the future to track these fluctuations. Will check in further on lifestyle at next call.

## 2018-10-23 NOTE — TELEPHONE ENCOUNTER
Can you call and see if she is talking about a yeast infection? Her nexium was sent to [facility] on 10/11/18.

## 2018-10-25 ENCOUNTER — PATIENT MESSAGE (OUTPATIENT)
Dept: NEUROLOGY | Facility: CLINIC | Age: 38
End: 2018-10-25

## 2018-10-25 ENCOUNTER — PATIENT MESSAGE (OUTPATIENT)
Dept: INTERNAL MEDICINE | Facility: CLINIC | Age: 38
End: 2018-10-25

## 2018-10-25 RX ORDER — DOXYCYCLINE 100 MG/1
100 CAPSULE ORAL 2 TIMES DAILY
Qty: 20 CAPSULE | Refills: 0 | OUTPATIENT
Start: 2018-10-25

## 2018-10-25 RX ORDER — MUPIROCIN 20 MG/G
OINTMENT TOPICAL 2 TIMES DAILY
OUTPATIENT
Start: 2018-10-25

## 2018-10-25 NOTE — TELEPHONE ENCOUNTER
Please let her know I sent the yeast medicaiton in yesterday, should be every 3 days to last for 6 days. I just sent rx for capron tablets to help with her congestion

## 2018-10-26 ENCOUNTER — PATIENT OUTREACH (OUTPATIENT)
Dept: OTHER | Facility: OTHER | Age: 38
End: 2018-10-26

## 2018-10-26 RX ORDER — PROMETHAZINE HYDROCHLORIDE 25 MG/1
25 TABLET ORAL EVERY 4 HOURS PRN
Qty: 30 TABLET | Refills: 1 | Status: SHIPPED | OUTPATIENT
Start: 2018-10-26 | End: 2019-05-20 | Stop reason: SDUPTHER

## 2018-10-26 NOTE — PROGRESS NOTES
Last 5 Patient Entered Readings                                      Current 30 Day Average: 143/88     Recent Readings 10/11/2018 10/10/2018 10/1/2018 9/25/2018 9/13/2018    SBP (mmHg) 140 117 172 104 140    DBP (mmHg) 94 90 80 87 60    Pulse 60 - - - -          Contacted pt. No response, unable to leave message.  38 yoaaf w/ MS, HTN, obesity. Avg /88 on Amlodipine 10 mg QD, HCTZ 12.5 mg QD. 6/2018 CMP (K 3.9, Na 141, Cr 0.7, eGFR>60). Has had lisinopril 40 mg (11/2017, switched to amlodipine. Lisinopril was started for admission due to MS flare in 4/2017). Has not had reading since 10/11, in which some SBP are in goal while others are not. Would request increased freq of checking and consider increase to HCTZ 25 since DBP elevated + repeat bmp 3 weeks

## 2018-10-29 ENCOUNTER — PATIENT MESSAGE (OUTPATIENT)
Dept: INTERNAL MEDICINE | Facility: CLINIC | Age: 38
End: 2018-10-29

## 2018-10-29 RX ORDER — VALACYCLOVIR HYDROCHLORIDE 1 G/1
1000 TABLET, FILM COATED ORAL 2 TIMES DAILY
Qty: 20 TABLET | Refills: 0 | Status: SHIPPED | OUTPATIENT
Start: 2018-10-29 | End: 2018-12-06

## 2018-10-30 ENCOUNTER — PATIENT MESSAGE (OUTPATIENT)
Dept: INTERNAL MEDICINE | Facility: CLINIC | Age: 38
End: 2018-10-30

## 2018-11-02 ENCOUNTER — TELEPHONE (OUTPATIENT)
Dept: INTERNAL MEDICINE | Facility: CLINIC | Age: 38
End: 2018-11-02

## 2018-11-02 ENCOUNTER — TELEPHONE (OUTPATIENT)
Dept: NEUROLOGY | Facility: CLINIC | Age: 38
End: 2018-11-02

## 2018-11-02 ENCOUNTER — OFFICE VISIT (OUTPATIENT)
Dept: INTERNAL MEDICINE | Facility: CLINIC | Age: 38
End: 2018-11-02
Payer: MEDICARE

## 2018-11-02 VITALS
DIASTOLIC BLOOD PRESSURE: 78 MMHG | HEART RATE: 117 BPM | SYSTOLIC BLOOD PRESSURE: 128 MMHG | BODY MASS INDEX: 47.09 KG/M2 | OXYGEN SATURATION: 98 % | TEMPERATURE: 100 F | WEIGHT: 293 LBS | HEIGHT: 66 IN

## 2018-11-02 DIAGNOSIS — G35 MS (MULTIPLE SCLEROSIS): ICD-10-CM

## 2018-11-02 DIAGNOSIS — R35.0 URINARY FREQUENCY: Primary | ICD-10-CM

## 2018-11-02 DIAGNOSIS — M81.0 OSTEOPOROSIS, UNSPECIFIED OSTEOPOROSIS TYPE, UNSPECIFIED PATHOLOGICAL FRACTURE PRESENCE: ICD-10-CM

## 2018-11-02 DIAGNOSIS — F32.A DEPRESSION, UNSPECIFIED DEPRESSION TYPE: ICD-10-CM

## 2018-11-02 PROCEDURE — 3008F BODY MASS INDEX DOCD: CPT | Mod: CPTII,S$GLB,, | Performed by: FAMILY MEDICINE

## 2018-11-02 PROCEDURE — 3074F SYST BP LT 130 MM HG: CPT | Mod: CPTII,S$GLB,, | Performed by: FAMILY MEDICINE

## 2018-11-02 PROCEDURE — 99999 PR PBB SHADOW E&M-EST. PATIENT-LVL IV: CPT | Mod: PBBFAC,,, | Performed by: FAMILY MEDICINE

## 2018-11-02 PROCEDURE — 99214 OFFICE O/P EST MOD 30 MIN: CPT | Mod: S$GLB,,, | Performed by: FAMILY MEDICINE

## 2018-11-02 PROCEDURE — 3078F DIAST BP <80 MM HG: CPT | Mod: CPTII,S$GLB,, | Performed by: FAMILY MEDICINE

## 2018-11-02 RX ORDER — DOXEPIN HYDROCHLORIDE 50 MG/1
50 CAPSULE ORAL NIGHTLY PRN
Qty: 30 CAPSULE | Refills: 5 | Status: SHIPPED | OUTPATIENT
Start: 2018-11-02 | End: 2019-05-15 | Stop reason: SDUPTHER

## 2018-11-02 RX ORDER — DULOXETIN HYDROCHLORIDE 30 MG/1
30 CAPSULE, DELAYED RELEASE ORAL DAILY
Qty: 30 CAPSULE | Refills: 11 | Status: SHIPPED | OUTPATIENT
Start: 2018-11-02 | End: 2019-01-08 | Stop reason: SINTOL

## 2018-11-02 NOTE — PROGRESS NOTES
Subjective:       Patient ID: Alicia Soliz is a 38 y.o. female.    Chief Complaint: Follow-up (hosp/[facility])      Patient here today for followup after inpatient stay for MS exacerbation. She reports that since her discharge she has had some difficulty with speaking and increased tremors in legs, left> right. Prior to her hospital stay she was showing improvement in working with PT and walking unsupported but since then has deteriorated again.   She also reports increased depression secondary to her diagnoses, feelings of uselessness and inability to care for herself. She reports that she has been unable to sleep at night since her discharge.  She reports she was told while at [facility] that osteoporosis was seen on her MRIs.   She also reports increased urinary urgency and frequency since her discharge.      Review of Systems   Constitutional: Positive for activity change, appetite change, fatigue and unexpected weight change (has increased). Negative for fever.   HENT: Positive for postnasal drip and sinus pressure. Negative for congestion.    Eyes: Negative for visual disturbance.   Respiratory: Negative for cough, chest tightness and shortness of breath.    Cardiovascular: Negative for chest pain and palpitations.   Gastrointestinal: Negative for abdominal pain, constipation, diarrhea, nausea and vomiting.   Endocrine: Negative for polyuria.   Genitourinary: Positive for frequency and urgency.   Musculoskeletal: Positive for back pain, gait problem and myalgias.   Skin: Negative for color change and rash.   Allergic/Immunologic: Negative for immunocompromised state.   Neurological: Negative for dizziness.   Psychiatric/Behavioral: Positive for dysphoric mood and sleep disturbance. Negative for decreased concentration and hallucinations. The patient is not nervous/anxious.      Past Medical History:   Diagnosis Date    Anemia     Arthritis     Cardiac arrest as complication of care     pt states she went into  "cardiac arrest from an allergic reaction to a medication    Encounter for blood transfusion     Hemiplegia due to old stroke     Hypertension     Multiple sclerosis     Stroke      Past Surgical History:   Procedure Laterality Date    APPENDECTOMY      CHOLECYSTECTOMY      HYSTERECTOMY      KNEE SURGERY      TONSILLECTOMY      TUBAL LIGATION      VASCULAR CATH INSERTION Right 1/22/2015    Performed by Dayron Vasques MD at [facility] CATH LAB     Family History   Problem Relation Age of Onset    Lupus Mother     Heart disease Mother     Diabetes Father     Kidney disease Father     Cancer Maternal Aunt 40        breast    Breast cancer Maternal Aunt     Breast cancer Cousin     Breast cancer Cousin      Social History     Socioeconomic History    Marital status: Single     Spouse name: Not on file    Number of children: Not on file    Years of education: Not on file    Highest education level: Not on file   Social Needs    Financial resource strain: Not on file    Food insecurity - worry: Not on file    Food insecurity - inability: Not on file    Transportation needs - medical: Not on file    Transportation needs - non-medical: Not on file   Occupational History     Employer: [employer]   Tobacco Use    Smoking status: Never Smoker    Smokeless tobacco: Never Used   Substance and Sexual Activity    Alcohol use: No    Drug use: No    Sexual activity: Yes     Partners: Male   Other Topics Concern    Not on file   Social History Narrative    Not on file     Review of patient's allergies indicates:   Allergen Reactions    Demerol [meperidine] Itching     Other reaction(s): Itching    Dilaudid [hydromorphone (bulk)] Anaphylaxis     Other reaction(s): Anaphylaxis  "coded" per pt    Prednisone Itching     Other reaction(s): Itching    Morphine     Tramadol        Objective:       /78 (BP Location: Right arm, Patient Position: Sitting, BP Method: Medium (Manual))   Pulse (!) 117   Temp 99.8 " "°F (37.7 °C) (Tympanic)   Ht 5' 6" (1.676 m)   Wt (!) 143.5 kg (316 lb 5.8 oz)   SpO2 98%   BMI 51.06 kg/m²   Physical Exam   Constitutional: She is oriented to person, place, and time. Vital signs are normal. She appears well-developed and well-nourished. No distress.   Walking with assistance and walker   HENT:   Head: Normocephalic and atraumatic.   Right Ear: Hearing, tympanic membrane, external ear and ear canal normal.   Left Ear: Hearing, tympanic membrane, external ear and ear canal normal.   Nose: Nose normal.   Mouth/Throat: Uvula is midline, oropharynx is clear and moist and mucous membranes are normal. No oropharyngeal exudate.   Eyes: Conjunctivae and EOM are normal. Pupils are equal, round, and reactive to light.   Neck: Normal range of motion. Neck supple. No tracheal deviation present. No thyromegaly present.   Cardiovascular: Normal rate, regular rhythm, normal heart sounds and intact distal pulses.   No murmur heard.  Pulmonary/Chest: Effort normal and breath sounds normal. No respiratory distress.   Abdominal: Soft. Bowel sounds are normal. There is no guarding. No hernia.   Lymphadenopathy:     She has no cervical adenopathy.   Neurological: She is alert and oriented to person, place, and time.   Skin: Skin is warm and dry. Capillary refill takes less than 2 seconds. She is not diaphoretic.   Psychiatric: Her speech is normal and behavior is normal. Judgment and thought content normal. Thought content is not delusional. Cognition and memory are normal. She exhibits a depressed mood. She expresses no homicidal and no suicidal ideation.   Nursing note and vitals reviewed.    Assessment:     1. Urinary frequency    2. Osteoporosis, unspecified osteoporosis type, unspecified pathological fracture presence    3. Depression, unspecified depression type    4. MS (multiple sclerosis)      Plan:   Urinary frequency  -     Cancel: POCT URINE DIPSTICK WITHOUT MICROSCOPE  -     " URINALYSIS    Osteoporosis, unspecified osteoporosis type, unspecified pathological fracture presence  -     DXA Bone Density Spine And Hip; Future; Expected date: 11/02/2018    Depression, unspecified depression type  -     Ambulatory consult to Psychiatry    MS (multiple sclerosis)  -     Ambulatory consult to Psychiatry    Other orders  -     DULoxetine (CYMBALTA) 30 MG capsule; Take 1 capsule (30 mg total) by mouth once daily.  Dispense: 30 capsule; Refill: 11  -     doxepin (SINEQUAN) 50 MG capsule; Take 1 capsule (50 mg total) by mouth nightly as needed.  Dispense: 30 capsule; Refill: 5         Medication List           Accurate as of 11/2/18  5:17 PM. If you have any questions, ask your nurse or doctor.               START taking these medications    DULoxetine 30 MG capsule  Commonly known as:  CYMBALTA  Take 1 capsule (30 mg total) by mouth once daily.  Started by:  Braden Dumont MD        CHANGE how you take these medications    baclofen 20 MG tablet  Commonly known as:  LIORESAL  Take 1 tablet (20 mg total) by mouth 3 (three) times daily.  What changed:  when to take this     buPROPion 100 MG TBSR 12 hr tablet  Commonly known as:  WELLBUTRIN SR  Take 1 tablet (100 mg total) by mouth 2 (two) times daily.  What changed:  when to take this     dalfampridine 10 mg Tb12  Commonly known as:  AMPYRA  Take 1 tablet by mouth 2 (two) times daily.  What changed:  when to take this     doxepin 50 MG capsule  Commonly known as:  SINEQUAN  Take 1 capsule (50 mg total) by mouth nightly as needed.  What changed:    · medication strength  · how much to take  Changed by:  Braden Dumont MD     mupirocin 2 % ointment  Commonly known as:  BACTROBAN  APPLY TO AFFECTED AREA TWICE A DAY  What changed:    · how much to take  · how to take this  · when to take this  · additional instructions     nystatin cream  Commonly known as:  MYCOSTATIN  Apply topically 2 (two) times daily.  What changed:    · when to take  this  · reasons to take this        CONTINUE taking these medications    ALPRAZolam 0.25 MG tablet  Commonly known as:  XANAX  Take 1 tablet (0.25 mg total) by mouth 2 (two) times daily as needed for Anxiety.     amLODIPine 10 MG tablet  Commonly known as:  NORVASC  Take 1 tablet (10 mg total) by mouth once daily.     armodafinil 150 mg tablet  Commonly known as:  NUVIGIL  Take 1 tablet (150 mg total) by mouth once daily.     butalbital-acetaminophen-caffeine -40 mg -40 mg per tablet  Commonly known as:  FIORICET, ESGIC  Take 1 tablet at onset of migraine.  Limit to 3 tabs a week.     esomeprazole 40 MG capsule  Commonly known as:  NEXIUM  Take 1 capsule (40 mg total) by mouth before breakfast.     hydroCHLOROthiazide 12.5 MG Tab  Commonly known as:  HYDRODIURIL  Take 1 tablet (12.5 mg total) by mouth once daily.     HYDROcodone-acetaminophen  mg per tablet  Commonly known as:  NORCO     interferon beta-1a 30 mcg/0.5 mL syringe  Commonly known as:  AVONEX  Inject 0.5 mLs (30 mcg total) into the muscle once a week.     linaclotide 145 mcg Cap capsule  Commonly known as:  LINZESS  Take 1 capsule (145 mcg total) by mouth once daily.     hr-[id number] 1-1-500 mg Cap  Take 1 tablet by mouth once daily.     meclizine 25 mg tablet  Commonly known as:  ANTIVERT  Take 1 tablet (25 mg total) by mouth 2 (two) times daily as needed.     METAMUCIL 0.4 gram Cap  Generic drug:  psyllium husk     phentermine 37.5 mg tablet  Commonly known as:  ADIPEX-P  Take 1 tablet (37.5 mg total) by mouth before breakfast.     promethazine 25 MG tablet  Commonly known as:  PHENERGAN  Take 1 tablet (25 mg total) by mouth every 4 (four) hours as needed for Nausea.     valACYclovir 1000 MG tablet  Commonly known as:  VALTREX  Take 1 tablet (1,000 mg total) by mouth 2 (two) times daily.        STOP taking these medications    dexbrompheniramn-chlophedianol 1-12.5 mg/5 mL Soln  Commonly known as:  CHLO HIST  Stopped  by:  Braden Dumotn MD     doxycycline 100 MG Cap  Commonly known as:  VIBRAMYCIN  Stopped by:  Braden Dumont MD     hydrOXYzine HCl 25 MG tablet  Commonly known as:  ATARAX  Stopped by:  Braden Dumont MD     pyrilamine-dextromethorphan 30-30 mg Tab  Stopped by:  rBaden Dumont MD           Where to Get Your Medications      These medications were sent to [facility]/pharmacy #[id number] - [place] Perdomo - [address] AT [facility]  [address], [place] [address]    Phone:  [phone]   · doxepin 50 MG capsule  · DULoxetine 30 MG capsule

## 2018-11-05 ENCOUNTER — TELEPHONE (OUTPATIENT)
Dept: INTERNAL MEDICINE | Facility: CLINIC | Age: 38
End: 2018-11-05

## 2018-11-05 ENCOUNTER — PATIENT MESSAGE (OUTPATIENT)
Dept: INTERNAL MEDICINE | Facility: CLINIC | Age: 38
End: 2018-11-05

## 2018-11-05 ENCOUNTER — APPOINTMENT (OUTPATIENT)
Dept: RADIOLOGY | Facility: HOSPITAL | Age: 38
End: 2018-11-05
Attending: FAMILY MEDICINE
Payer: MEDICARE

## 2018-11-05 DIAGNOSIS — M81.0 OSTEOPOROSIS, UNSPECIFIED OSTEOPOROSIS TYPE, UNSPECIFIED PATHOLOGICAL FRACTURE PRESENCE: ICD-10-CM

## 2018-11-05 DIAGNOSIS — R35.0 URINARY FREQUENCY: Primary | ICD-10-CM

## 2018-11-05 PROCEDURE — 77080 DXA BONE DENSITY AXIAL: CPT | Mod: TC

## 2018-11-05 PROCEDURE — 77080 DXA BONE DENSITY AXIAL: CPT | Mod: 26,,, | Performed by: RADIOLOGY

## 2018-11-05 NOTE — TELEPHONE ENCOUNTER
----- Message from Braden Dumont MD sent at 11/5/2018  4:30 PM CST -----  Let her know her bone density test was normal - does not look like she has osteoporosis. Was she able to call and get an appointment with the psychologist?

## 2018-11-05 NOTE — PROGRESS NOTES
Last 5 Patient Entered Readings                                      Current 30 Day Average: 129/92     Recent Readings 10/11/2018 10/10/2018 10/1/2018 9/25/2018 9/13/2018    SBP (mmHg) 140 117 172 104 140    DBP (mmHg) 94 90 80 87 60    Pulse 60 - - - -          Left voicemail. Will ask health  to proceed with non-compliance protocol on next unsuccessful outreach.

## 2018-11-06 ENCOUNTER — PATIENT MESSAGE (OUTPATIENT)
Dept: INTERNAL MEDICINE | Facility: CLINIC | Age: 38
End: 2018-11-06

## 2018-11-06 ENCOUNTER — TELEPHONE (OUTPATIENT)
Dept: INTERNAL MEDICINE | Facility: CLINIC | Age: 38
End: 2018-11-06

## 2018-11-06 DIAGNOSIS — G35 MULTIPLE SCLEROSIS: Primary | ICD-10-CM

## 2018-11-06 NOTE — TELEPHONE ENCOUNTER
----- Message from Braden Dumont MD sent at 11/6/2018  8:52 AM CST -----  Please let her know her urine showed blood but no infection. Was she on her period?

## 2018-11-06 NOTE — TELEPHONE ENCOUNTER
Advised pt of UA results , informed pt that no infection was found but there was blood . Asked pt if she was on her cycle she has had an hysterectomy .

## 2018-11-07 ENCOUNTER — OUTPATIENT CASE MANAGEMENT (OUTPATIENT)
Dept: ADMINISTRATIVE | Facility: OTHER | Age: 38
End: 2018-11-07

## 2018-11-07 ENCOUNTER — TELEPHONE (OUTPATIENT)
Dept: INTERNAL MEDICINE | Facility: CLINIC | Age: 38
End: 2018-11-07

## 2018-11-07 RX ORDER — BACLOFEN 20 MG/1
20 TABLET ORAL 3 TIMES DAILY
Qty: 90 TABLET | Refills: 3 | Status: SHIPPED | OUTPATIENT
Start: 2018-11-07 | End: 2019-05-07

## 2018-11-07 NOTE — PROGRESS NOTES
Thank you for the referral.  Patient has been assigned to Barb Marx LMSW for low risk screening for Outpatient Case Management.     Reason for referral: Multiple sclerosis    Please contact [facility] at ext. [phone] with any questions.    Thank you,    Marli Read    [facility]

## 2018-11-07 NOTE — TELEPHONE ENCOUNTER
----- Message from Marli Read sent at 11/7/2018  1:38 PM CST -----  Thank you for the referral.  Patient has been assigned to Barb Marx LMSW for low risk screening for Outpatient Case Management.     Reason for referral: Multiple sclerosis    Please contact [facility] at ext. [phone] with any questions.    Thank you,    Marli Read    [facility]

## 2018-11-08 ENCOUNTER — OUTPATIENT CASE MANAGEMENT (OUTPATIENT)
Dept: ADMINISTRATIVE | Facility: OTHER | Age: 38
End: 2018-11-08

## 2018-11-08 NOTE — PROGRESS NOTES
This LMSW received a referral on the above patient.   Reason for referral: Low risk, Multiple sclerosis   Name of the community resource that was provided: [employer]  Resource given to: Patient via Telephone    LMSW completed assessment with patient. Patient reports living with her daughter. Patient reports she is mostly independent with ADLs and uses walker to ambulate. Patient reports she is attends outpatient physical therapy. Patient reports she using [employer] transportation for appointments. Patient reports having difficulty affording food. Patient reports applying for food stamps and reports being denied because over her reported income. LMSW offered to provide patient with resources for local food pantries and patient declined. Patient reports she has already requested assistance from several agencies in [place] and has been denied. LMSW spoke to patient about [employer] and encouraged her to contact agency. Patient was agreeable to this suggestion and accepted phone number for agency. Patient reports recently struggling with depression (denies SI/HI). Patient reports she was referred to psychiatric provided and just forgot to contact provider for appointment. LMSW stressed importance of patient reaching out to make appointment and patient verbalized understanding. Patient offered and declined additional mental health resources. Patient confirms she is connected to neurologist for MS and reports she has received adequate resources for support for MS patients including MS Support local/national networks. Patient did not identify any other concerns. Referral source notified.

## 2018-11-09 NOTE — PROGRESS NOTES
Last 5 Patient Entered Readings                                      Current 30 Day Average: 129/92     Recent Readings 10/11/2018 10/10/2018 10/1/2018 9/25/2018 9/13/2018    SBP (mmHg) 140 117 172 104 140    DBP (mmHg) 94 90 80 87 60    Pulse 60 - - - -        Discussed resuming taking readings. Patient agrees to do so a few times per week. Provided patient with Kallie's phone number to call when she can to complete enrollment call.

## 2018-11-12 ENCOUNTER — PATIENT MESSAGE (OUTPATIENT)
Dept: INTERNAL MEDICINE | Facility: CLINIC | Age: 38
End: 2018-11-12

## 2018-11-14 ENCOUNTER — TELEPHONE (OUTPATIENT)
Dept: INTERNAL MEDICINE | Facility: CLINIC | Age: 38
End: 2018-11-14

## 2018-11-14 NOTE — TELEPHONE ENCOUNTER
NONA, Patient had not responded back to email sent to her on Monday so I forwarded it to you. She is aware that you are out this week and she was offered to come in and see someone else.

## 2018-11-15 ENCOUNTER — PATIENT MESSAGE (OUTPATIENT)
Dept: INTERNAL MEDICINE | Facility: CLINIC | Age: 38
End: 2018-11-15

## 2018-11-15 RX ORDER — AZITHROMYCIN 250 MG/1
TABLET, FILM COATED ORAL
Qty: 6 TABLET | Refills: 0 | Status: SHIPPED | OUTPATIENT
Start: 2018-11-15 | End: 2018-11-20

## 2018-11-15 RX ORDER — MUPIROCIN 20 MG/G
OINTMENT TOPICAL
Qty: 22 G | Refills: 1 | Status: SHIPPED | OUTPATIENT
Start: 2018-11-15 | End: 2018-11-29 | Stop reason: SDUPTHER

## 2018-11-15 NOTE — PROGRESS NOTES
Last 5 Patient Entered Readings                                      Current 30 Day Average: 178/80     Recent Readings 11/10/2018 10/11/2018 10/10/2018 10/1/2018 9/25/2018    SBP (mmHg) 178 140 117 172 104    DBP (mmHg) 80 94 90 80 87    Pulse - 60 - - -        Patient's BP average is above goal of <130/80.     Patient denies s/s of hypotension (lightheadedness, dizziness, nausea, fatigue) associated with low readings. Instructed patient to inform me if this occurs, patient confirms understanding.      Patient denies s/s of hypertension (SOB, CP, severe headaches, changes in vision) associated with high readings. Instructed patient to go to the ED if BP > 180/110 and accompanied by hypertensive s/s, patient confirms understanding.    Patient reports she is manually entering BP readings, taken at the end of therapy session. She reports she goes to therapy 3 times per week and agreed to have BP taken at beginning of therapy session. Will follow up about home meter, unclear if it was supposed to be mailed to patient or if she needs to go to [facility]. She reports she was taken off amlodipine due to dizziness. Discussed restarting amlodipine at lower dose or challenging nifedipine if BP elevated. Could also increase hctz or change to chlorthalidone.    Will continue to monitor regularly. Will follow up in 2-3 weeks, sooner if BP begins to trend upward or downward.    Patient has my contact information and knows to call with any concerns or clinical changes.     Current HTN regimen:  Hypertension Medications             amLODIPine (NORVASC) 10 MG tablet Take 1 tablet (10 mg total) by mouth once daily.    hydroCHLOROthiazide (HYDRODIURIL) 12.5 MG Tab Take 1 tablet (12.5 mg total) by mouth once daily.

## 2018-11-16 ENCOUNTER — PATIENT MESSAGE (OUTPATIENT)
Dept: INTERNAL MEDICINE | Facility: CLINIC | Age: 38
End: 2018-11-16

## 2018-11-16 ENCOUNTER — PATIENT MESSAGE (OUTPATIENT)
Dept: NEUROLOGY | Facility: CLINIC | Age: 38
End: 2018-11-16

## 2018-11-17 ENCOUNTER — NURSE TRIAGE (OUTPATIENT)
Dept: ADMINISTRATIVE | Facility: CLINIC | Age: 38
End: 2018-11-17

## 2018-11-17 ENCOUNTER — HOSPITAL ENCOUNTER (EMERGENCY)
Facility: HOSPITAL | Age: 38
Discharge: HOME | End: 2018-11-18
Attending: EMERGENCY MEDICINE
Payer: MEDICARE

## 2018-11-17 DIAGNOSIS — R07.9 CHEST PAIN, UNSPECIFIED TYPE: Primary | ICD-10-CM

## 2018-11-17 DIAGNOSIS — R07.9 CHEST PAIN: ICD-10-CM

## 2018-11-17 LAB
BASOPHILS # BLD AUTO: 0.01 K/UL
BASOPHILS NFR BLD: 0.2 %
DIFFERENTIAL METHOD: ABNORMAL
EOSINOPHIL # BLD AUTO: 0.1 K/UL
EOSINOPHIL NFR BLD: 1.3 %
ERYTHROCYTE [DISTWIDTH] IN BLOOD BY AUTOMATED COUNT: 15.6 %
HCT VFR BLD AUTO: 34.4 %
HGB BLD-MCNC: 11 G/DL
LYMPHOCYTES # BLD AUTO: 1.9 K/UL
LYMPHOCYTES NFR BLD: 40.8 %
MCH RBC QN AUTO: 21.8 PG
MCHC RBC AUTO-ENTMCNC: 32 G/DL
MCV RBC AUTO: 68 FL
MONOCYTES # BLD AUTO: 0.5 K/UL
MONOCYTES NFR BLD: 10.4 %
NEUTROPHILS # BLD AUTO: 2.2 K/UL
NEUTROPHILS NFR BLD: 47.3 %
PLATELET # BLD AUTO: 271 K/UL
PMV BLD AUTO: 9.1 FL
RBC # BLD AUTO: 5.04 M/UL
WBC # BLD AUTO: 4.71 K/UL

## 2018-11-17 PROCEDURE — 93010 ELECTROCARDIOGRAM REPORT: CPT | Mod: ,,, | Performed by: INTERNAL MEDICINE

## 2018-11-17 PROCEDURE — 99283 EMERGENCY DEPT VISIT LOW MDM: CPT

## 2018-11-17 PROCEDURE — 25000003 PHARM REV CODE 250: Performed by: EMERGENCY MEDICINE

## 2018-11-17 PROCEDURE — 80053 COMPREHEN METABOLIC PANEL: CPT

## 2018-11-17 PROCEDURE — 84484 ASSAY OF TROPONIN QUANT: CPT

## 2018-11-17 PROCEDURE — 36415 COLL VENOUS BLD VENIPUNCTURE: CPT

## 2018-11-17 PROCEDURE — 85025 COMPLETE CBC W/AUTO DIFF WBC: CPT

## 2018-11-17 PROCEDURE — 83880 ASSAY OF NATRIURETIC PEPTIDE: CPT

## 2018-11-17 RX ORDER — ASPIRIN 325 MG
325 TABLET ORAL
Status: COMPLETED | OUTPATIENT
Start: 2018-11-17 | End: 2018-11-17

## 2018-11-17 RX ADMIN — ASPIRIN 325 MG ORAL TABLET 325 MG: 325 PILL ORAL at 11:11

## 2018-11-17 RX ADMIN — DICYCLOMINE HYDROCHLORIDE 50 ML: 10 SOLUTION ORAL at 11:11

## 2018-11-18 VITALS
RESPIRATION RATE: 22 BRPM | HEIGHT: 66 IN | BODY MASS INDEX: 49.94 KG/M2 | TEMPERATURE: 98 F | HEART RATE: 78 BPM | SYSTOLIC BLOOD PRESSURE: 127 MMHG | OXYGEN SATURATION: 97 % | DIASTOLIC BLOOD PRESSURE: 71 MMHG

## 2018-11-18 LAB
ALBUMIN SERPL BCP-MCNC: 3.4 G/DL
ALP SERPL-CCNC: 80 U/L
ALT SERPL W/O P-5'-P-CCNC: 12 U/L
ANION GAP SERPL CALC-SCNC: 8 MMOL/L
AST SERPL-CCNC: 20 U/L
BILIRUB SERPL-MCNC: 0.3 MG/DL
BNP SERPL-MCNC: 15 PG/ML
BUN SERPL-MCNC: 8 MG/DL
CALCIUM SERPL-MCNC: 9.3 MG/DL
CHLORIDE SERPL-SCNC: 102 MMOL/L
CO2 SERPL-SCNC: 29 MMOL/L
CREAT SERPL-MCNC: 0.8 MG/DL
EST. GFR  (AFRICAN AMERICAN): >60 ML/MIN/1.73 M^2
EST. GFR  (NON AFRICAN AMERICAN): >60 ML/MIN/1.73 M^2
GLUCOSE SERPL-MCNC: 89 MG/DL
POTASSIUM SERPL-SCNC: 3.2 MMOL/L
PROT SERPL-MCNC: 7 G/DL
SODIUM SERPL-SCNC: 139 MMOL/L
TROPONIN I SERPL DL<=0.01 NG/ML-MCNC: <0.006 NG/ML
TROPONIN I SERPL DL<=0.01 NG/ML-MCNC: <0.006 NG/ML

## 2018-11-18 PROCEDURE — 84484 ASSAY OF TROPONIN QUANT: CPT

## 2018-11-18 PROCEDURE — 63600175 PHARM REV CODE 636 W HCPCS: Performed by: EMERGENCY MEDICINE

## 2018-11-18 RX ORDER — NITROGLYCERIN 0.4 MG/1
0.4 TABLET SUBLINGUAL EVERY 5 MIN PRN
Status: DISCONTINUED | OUTPATIENT
Start: 2018-11-18 | End: 2018-11-18

## 2018-11-18 RX ORDER — NITROGLYCERIN 0.4 MG/1
0.4 TABLET SUBLINGUAL EVERY 5 MIN PRN
Status: COMPLETED | OUTPATIENT
Start: 2018-11-18 | End: 2018-11-18

## 2018-11-18 RX ADMIN — NITROGLYCERIN 0.4 MG: 0.4 TABLET, ORALLY DISINTEGRATING SUBLINGUAL at 01:11

## 2018-11-18 NOTE — TELEPHONE ENCOUNTER
Reason for Disposition   [1] Chest pain lasts > 5 minutes AND [2] age > 30 AND [3] at least one cardiac risk factor (i.e., hypertension, diabetes, obesity, smoker or strong family history of heart disease)    Protocols used: ST CHEST PAIN-A-AH    Patient states her chest and right arm and right side of her face has been hurting since 9 am this morning. She takes bp medications but does not have a means of checking her bp. She has a history of MS. Patient went to the ED last night, but left without being seen. Patient advised to call 911 because we cannot determine if this is a heart attack waiting to happen. Patient admits this feels different than a MS exacerbation. Unsure if she will call 911.

## 2018-11-18 NOTE — ED PROVIDER NOTES
"SCRIBE #1 NOTE: I, Farideh Mccormick, am scribing for, and in the presence of, Eliel Flores MD. I have scribed the entire note.      History      Chief Complaint   Patient presents with    Chest Pain     right arm and jaw pain       Review of patient's allergies indicates:   Allergen Reactions    Demerol [meperidine] Itching     Other reaction(s): Itching    Dilaudid [hydromorphone (bulk)] Anaphylaxis     Other reaction(s): Anaphylaxis  "coded" per pt    Prednisone Itching     Other reaction(s): Itching    Morphine     Tramadol         HPI   HPI    11/17/2018, 11:24 PM   History obtained from the patient      History of Present Illness: Alicia Soliz is a 38 y.o. female patient who presents to the Emergency Department for chest pain which onset around 9 AM. Symptoms are constant and moderate in severity. Pt reports sxs radiate to right arm and right jaw. Pt describes sxs as "hunck of meat in my chest". No mitigating or exacerbating factors reported. Patient denies any fever, chills, lightheadedness, diaphoresis, cough, SOB, nauea, emesis, leg swelling, and all other sxs at this time. No further complaints or concerns at this time.         Arrival mode: EMS    PCP: Braden Dumont MD       Past Medical History:  Past Medical History:   Diagnosis Date    Anemia     Arthritis     Cardiac arrest as complication of care     pt states she went into cardiac arrest from an allergic reaction to a medication    Encounter for blood transfusion     Hemiplegia due to old stroke     Hypertension     Multiple sclerosis     Stroke        Past Surgical History:  Past Surgical History:   Procedure Laterality Date    APPENDECTOMY      CHOLECYSTECTOMY      HYSTERECTOMY      KNEE SURGERY      TONSILLECTOMY      TUBAL LIGATION      VASCULAR CATH INSERTION Right 1/22/2015    Performed by [facility]ron [facility]ques MD at [facility] CATH LAB         Family History:  Family History   Problem Relation Age of Onset    Lupus " Mother     Heart disease Mother     Diabetes Father     Kidney disease Father     Cancer Maternal Aunt 40        breast    Breast cancer Maternal Aunt     Breast cancer Cousin     Breast cancer Cousin        Social History:  Social History     Tobacco Use    Smoking status: Never Smoker    Smokeless tobacco: Never Used   Substance and Sexual Activity    Alcohol use: Yes     Comment: OCCASIONALLY    Drug use: No    Sexual activity: Yes     Partners: Male       ROS   Review of Systems   Constitutional: Negative for chills, diaphoresis and fever.   HENT: Negative for congestion and sore throat.    Respiratory: Negative for cough and shortness of breath.    Cardiovascular: Positive for chest pain. Negative for palpitations and leg swelling.   Gastrointestinal: Negative for abdominal pain, diarrhea, nausea and vomiting.   Genitourinary: Negative for dysuria and hematuria.   Musculoskeletal: Negative for back pain and neck pain.   Skin: Negative for rash.   Neurological: Negative for dizziness, syncope, weakness, numbness and headaches.   All other systems reviewed and are negative.      Physical Exam      Initial Vitals [11/17/18 2312]   BP Pulse Resp Temp SpO2   113/70 96 18 98.2 °F (36.8 °C) 99 %      MAP       --          Physical Exam  Nursing Notes and Vital Signs Reviewed.  Constitutional: Patient is in no acute distress. Well-developed and well-nourished.  Head: Atraumatic. Normocephalic.  Eyes: PERRL. EOM intact. Conjunctivae are not pale. No scleral icterus.  ENT: Mucous membranes are moist. Oropharynx is clear and symmetric.    Neck: Supple. Full ROM. No lymphadenopathy.  Cardiovascular: Regular rate. Regular rhythm. No murmurs, rubs, or gallops. Distal pulses are 2+ and symmetric.  Pulmonary/Chest: No respiratory distress. Clear to auscultation bilaterally. No wheezing or rales.  Abdominal: Soft and non-distended.  There is no  tenderness.  No rebound, guarding, or rigidity. Good bowel  "sounds.  Genitourinary: No CVA tenderness  Musculoskeletal: Moves all extremities. No obvious deformities. No edema. No calf tenderness.  Skin: Warm and dry.  Neurological:  Alert, awake, and appropriate.  Normal speech.  No acute focal neurological deficits are appreciated.  Psychiatric: Normal affect. Good eye contact. Appropriate in content.    ED Course    Procedures  ED Vital Signs:  Vitals:    11/17/18 2312 11/17/18 2319 11/17/18 2350 11/18/18 0035   BP: 113/70  128/65 132/85   Pulse: 96 93 88 80   Resp: 18 20 19   Temp: 98.2 °F (36.8 °C)      TempSrc: Oral      SpO2: 99%  99% 99%   Height: 5' 6" (1.676 m)       11/18/18 0118 11/18/18 0125 11/18/18 0143   BP: (!) 142/95 129/70 127/71   Pulse: 73 85 78   Resp: (!) 22 (!) 22 (!) 22   Temp:      TempSrc:      SpO2: 97% 97% 97%   Height:          Abnormal Lab Results:  Labs Reviewed   CBC W/ AUTO DIFFERENTIAL - Abnormal; Notable for the following components:       Result Value    Hemoglobin 11.0 (*)     Hematocrit 34.4 (*)     MCV 68 (*)     MCH 21.8 (*)     RDW 15.6 (*)     MPV 9.1 (*)     All other components within normal limits   COMPREHENSIVE METABOLIC PANEL - Abnormal; Notable for the following components:    Potassium 3.2 (*)     Albumin 3.4 (*)     All other components within normal limits   TROPONIN I   B-TYPE NATRIURETIC PEPTIDE   TROPONIN I        All Lab Results:  Results for orders placed or performed during the hospital encounter of 11/17/18   CBC auto differential   Result Value Ref Range    WBC 4.71 3.90 - 12.70 K/uL    RBC 5.04 4.00 - 5.40 M/uL    Hemoglobin 11.0 (L) 12.0 - 16.0 g/dL    Hematocrit 34.4 (L) 37.0 - 48.5 %    MCV 68 (L) 82 - 98 fL    MCH 21.8 (L) 27.0 - 31.0 pg    MCHC 32.0 32.0 - 36.0 g/dL    RDW 15.6 (H) 11.5 - 14.5 %    Platelets 271 150 - 350 K/uL    MPV 9.1 (L) 9.2 - 12.9 fL    Gran # (ANC) 2.2 1.8 - 7.7 K/uL    Lymph # 1.9 1.0 - 4.8 K/uL    Mono # 0.5 0.3 - 1.0 K/uL    Eos # 0.1 0.0 - 0.5 K/uL    Baso # 0.01 0.00 - 0.20 K/uL "    Gran% 47.3 38.0 - 73.0 %    Lymph% 40.8 18.0 - 48.0 %    Mono% 10.4 4.0 - 15.0 %    Eosinophil% 1.3 0.0 - 8.0 %    Basophil% 0.2 0.0 - 1.9 %    Differential Method Automated    Comprehensive metabolic panel   Result Value Ref Range    Sodium 139 136 - 145 mmol/L    Potassium 3.2 (L) 3.5 - 5.1 mmol/L    Chloride 102 95 - 110 mmol/L    CO2 29 23 - 29 mmol/L    Glucose 89 70 - 110 mg/dL    BUN, Bld 8 6 - 20 mg/dL    Creatinine 0.8 0.5 - 1.4 mg/dL    Calcium 9.3 8.7 - 10.5 mg/dL    Total Protein 7.0 6.0 - 8.4 g/dL    Albumin 3.4 (L) 3.5 - 5.2 g/dL    Total Bilirubin 0.3 0.1 - 1.0 mg/dL    Alkaline Phosphatase 80 55 - 135 U/L    AST 20 10 - 40 U/L    ALT 12 10 - 44 U/L    Anion Gap 8 8 - 16 mmol/L    eGFR if African American >60 >60 mL/min/1.73 m^2    eGFR if non African American >60 >60 mL/min/1.73 m^2   Troponin I #1   Result Value Ref Range    Troponin I <0.006 0.000 - 0.026 ng/mL   B-Type natriuretic peptide (BNP)   Result Value Ref Range    BNP 15 0 - 99 pg/mL   Troponin I #2   Result Value Ref Range    Troponin I <0.006 0.000 - 0.026 ng/mL         Imaging Results:  Imaging Results          X-Ray Chest AP Portable (In process)    Per ED physician, pt's CXR results NAF.              The EKG was ordered, reviewed, and independently interpreted by the ED provider.  Interpretation time: 23:19  Rate: 93 BPM  Rhythm: normal sinus rhythm  Interpretation: No acute ST&T changes. No STEMI.           The Emergency Provider reviewed the vital signs and test results, which are outlined above.    ED Discussion     1:30 AM: Re-evaluated pt. Pt is resting comfortably and is in no acute distress. Pt reports sxs have improved. Informed pt that I recommend admission but if she wishes to F/U outpatient it would be wise to see the result of the second troponin which fortunately returned unremarkable.  Advised pt to f/u with PCP for a stress test. D/w pt all pertinent results. D/w pt any concerns expressed at this time. Answered all  questions. Pt expresses understanding at this time.    1:49 AM Reassessed pt at this time.  Pt is awake, alert, and in NAD at this time. Discussed with pt all pertinent ED information and results. Discussed pt dx and plan of tx. Gave pt all f/u and return to the ED instructions. All questions and concerns were addressed at this time. Pt expresses understanding of information and instructions, and is comfortable with plan to discharge. Pt is stable for discharge.      I have discussed with patient and/or family/caretaker chest pain precautions, specifically to return for worsening chest pain, shortness of breath, fever, or any concern.  I have low suspicion for cardiopulmonary, vascular, infectious, respiratory, or other emergent medical condition based on my evaluation in the ED.      ED Medication(s):  Medications   aspirin tablet 325 mg (325 mg Oral Given 11/17/18 2355)   GI cocktail (mylanta 30 mL, lidocaine 2 % viscous 10 mL, dicyclomine 10 mL) 50 mL (50 mLs Oral Given 11/17/18 2355)   nitroGLYCERIN SL tablet 0.4 mg (0.4 mg Sublingual Given 11/18/18 0135)       Follow-up Information     Braden Dumont MD. Call on 11/19/2018.    Specialty:  Internal Medicine  Why:  to discuss setting up an outpatient stress test  Contact information:  [address] [facility] [address] [phone]             Go to  [facility].    Specialty:  Emergency Medicine  Why:  As needed, If symptoms worsen  Contact information:  [address]  [facility] [address] [phone]                   Medical Decision Making    Medical Decision Making:   Clinical Tests:   Lab Tests: Ordered and Reviewed  Radiological Study: Ordered and Reviewed  Medical Tests: Ordered and Reviewed           Scribe Attestation:   Scribe #1: I performed the above scribed service and the documentation accurately describes the services I performed. I attest to the accuracy of the note.    Attending:   Physician  Attestation Statement for Scribe #1: I, Eliel Flores MD, personally performed the services described in this documentation, as scribed by Farideh Mccormick, in my presence, and it is both accurate and complete.          Clinical Impression       ICD-10-CM ICD-9-CM   1. Chest pain R07.9 786.50       Disposition:   Disposition: Discharged  Condition: Stable         Eliel Flores MD  11/18/18 8804

## 2018-11-19 ENCOUNTER — TELEPHONE (OUTPATIENT)
Dept: INTERNAL MEDICINE | Facility: CLINIC | Age: 38
End: 2018-11-19

## 2018-11-19 ENCOUNTER — PATIENT MESSAGE (OUTPATIENT)
Dept: INTERNAL MEDICINE | Facility: CLINIC | Age: 38
End: 2018-11-19

## 2018-11-19 NOTE — TELEPHONE ENCOUNTER
Please let her know her lab results at the ER last night looked normal, I put in referral for cardiology evaluation, go back to ER if pain worsens.

## 2018-11-19 NOTE — PROGRESS NOTES
Last 5 Patient Entered Readings                                      Current 30 Day Average: 150/86     Recent Readings 11/18/2018 11/18/2018 11/16/2018 11/16/2018 11/10/2018    SBP (mmHg) 110 131 162 152 178    DBP (mmHg) 85 77 84 105 80    Pulse 90 99 90 90 -          Patient seen in ED 11/17 for chest pain, improved with GI cocktail and troponin negative. Noted K+ was low, will send [facility] message with potassium rich foods. BP readings improved since last outreach and at goal in ED.

## 2018-11-19 NOTE — TELEPHONE ENCOUNTER
Returned pt call to get her scheduled with Cardiology for evaluation  . She was advised in the ER that it was gas .pt stated she is drinking a coke to help with the gas . She declined the appt with the Cardiology .

## 2018-11-21 ENCOUNTER — PATIENT MESSAGE (OUTPATIENT)
Dept: INTERNAL MEDICINE | Facility: CLINIC | Age: 38
End: 2018-11-21

## 2018-11-23 RX ORDER — FLUCONAZOLE 150 MG/1
TABLET ORAL
Qty: 2 TABLET | Refills: 0 | Status: SHIPPED | OUTPATIENT
Start: 2018-11-23 | End: 2019-01-23 | Stop reason: SDUPTHER

## 2018-11-23 NOTE — TELEPHONE ENCOUNTER
Advised pt of Md recommendation . Pt is having dry cough no fever . She will get medication recommended by MD .

## 2018-11-23 NOTE — TELEPHONE ENCOUNTER
See if she is still having symptoms, what symptoms she is having now. I had called in refill on capron tablets last time. I would also recommend taking mucinex over the counter and drinking plenty of water.

## 2018-11-26 ENCOUNTER — PATIENT OUTREACH (OUTPATIENT)
Dept: OTHER | Facility: OTHER | Age: 38
End: 2018-11-26

## 2018-11-26 NOTE — PROGRESS NOTES
Last 5 Patient Entered Readings                                      Current 30 Day Average: 138/86     Recent Readings 11/24/2018 11/21/2018 11/18/2018 11/18/2018 11/16/2018    SBP (mmHg) 120 120 110 131 162    DBP (mmHg) 80 89 85 77 84    Pulse - 97 90 99 90          Patient answered but was just getting to physical therapy appointment. She agreed to call when available to speak. BP improving. Review incorporation of potassium rich foods in diet.

## 2018-11-28 ENCOUNTER — PATIENT MESSAGE (OUTPATIENT)
Dept: INTERNAL MEDICINE | Facility: CLINIC | Age: 38
End: 2018-11-28

## 2018-11-29 ENCOUNTER — PATIENT MESSAGE (OUTPATIENT)
Dept: INTERNAL MEDICINE | Facility: CLINIC | Age: 38
End: 2018-11-29

## 2018-11-29 ENCOUNTER — OFFICE VISIT (OUTPATIENT)
Dept: INTERNAL MEDICINE | Facility: CLINIC | Age: 38
End: 2018-11-29
Payer: MEDICARE

## 2018-11-29 VITALS
BODY MASS INDEX: 47.09 KG/M2 | DIASTOLIC BLOOD PRESSURE: 84 MMHG | HEIGHT: 66 IN | WEIGHT: 293 LBS | TEMPERATURE: 99 F | SYSTOLIC BLOOD PRESSURE: 161 MMHG | HEART RATE: 91 BPM | OXYGEN SATURATION: 99 %

## 2018-11-29 DIAGNOSIS — R30.0 DYSURIA: ICD-10-CM

## 2018-11-29 DIAGNOSIS — L98.499 SKIN ULCER, UNSPECIFIED ULCER STAGE: Primary | ICD-10-CM

## 2018-11-29 PROCEDURE — 99999 PR PBB SHADOW E&M-EST. PATIENT-LVL V: CPT | Mod: PBBFAC,,, | Performed by: FAMILY MEDICINE

## 2018-11-29 PROCEDURE — 87086 URINE CULTURE/COLONY COUNT: CPT

## 2018-11-29 PROCEDURE — 99213 OFFICE O/P EST LOW 20 MIN: CPT | Mod: S$GLB,,, | Performed by: FAMILY MEDICINE

## 2018-11-29 PROCEDURE — 3077F SYST BP >= 140 MM HG: CPT | Mod: CPTII,S$GLB,, | Performed by: FAMILY MEDICINE

## 2018-11-29 PROCEDURE — 3079F DIAST BP 80-89 MM HG: CPT | Mod: CPTII,S$GLB,, | Performed by: FAMILY MEDICINE

## 2018-11-29 PROCEDURE — 87252 VIRUS INOCULATION TISSUE: CPT

## 2018-11-29 PROCEDURE — 3008F BODY MASS INDEX DOCD: CPT | Mod: CPTII,S$GLB,, | Performed by: FAMILY MEDICINE

## 2018-11-29 RX ORDER — MUPIROCIN 20 MG/G
OINTMENT TOPICAL 2 TIMES DAILY
Qty: 30 G | Refills: 1 | Status: SHIPPED | OUTPATIENT
Start: 2018-11-29 | End: 2019-05-10 | Stop reason: SDUPTHER

## 2018-11-29 RX ORDER — METFORMIN HYDROCHLORIDE 500 MG/1
500 TABLET ORAL 2 TIMES DAILY WITH MEALS
Qty: 180 TABLET | Refills: 3 | Status: SHIPPED | OUTPATIENT
Start: 2018-11-29 | End: 2019-06-11

## 2018-11-29 NOTE — PROGRESS NOTES
Subjective:       Patient ID: Alicia Soliz is a 38 y.o. female.    Chief Complaint: Recurrent Skin Infections      Patient reports sore in skin on groin area, very painful, has had it for several days now, thinks she may have scratched it with washcloth but not sure. She is also still having dysuria, urology apt pending.      Review of Systems   Constitutional: Negative for fever.   Genitourinary: Positive for dysuria and hematuria. Negative for difficulty urinating, flank pain, frequency, pelvic pain and urgency.   Skin: Positive for wound. Negative for color change and pallor.     Past Medical History:   Diagnosis Date    Anemia     Arthritis     Cardiac arrest as complication of care     pt states she went into cardiac arrest from an allergic reaction to a medication    Encounter for blood transfusion     Hemiplegia due to old stroke     Hypertension     Multiple sclerosis     Stroke      Past Surgical History:   Procedure Laterality Date    APPENDECTOMY      CHOLECYSTECTOMY      HYSTERECTOMY      KNEE SURGERY      TONSILLECTOMY      TUBAL LIGATION      VASCULAR CATH INSERTION Right 1/22/2015    Performed by [facility] MD at [facility] CATH LAB     Family History   Problem Relation Age of Onset    Lupus Mother     Heart disease Mother     Diabetes Father     Kidney disease Father     Cancer Maternal Aunt 40        breast    Breast cancer Maternal Aunt     Breast cancer Cousin     Breast cancer Cousin      Social History     Socioeconomic History    Marital status:      Spouse name: Not on file    Number of children: Not on file    Years of education: Not on file    Highest education level: Not on file   Social Needs    Financial resource strain: Not on file    Food insecurity - worry: Not on file    Food insecurity - inability: Not on file    Transportation needs - medical: Not on file    Transportation needs - non-medical: Not on file   Occupational History      "Employer: [employer]   Tobacco Use    Smoking status: Never Smoker    Smokeless tobacco: Never Used   Substance and Sexual Activity    Alcohol use: Yes     Comment: OCCASIONALLY    Drug use: No    Sexual activity: Yes     Partners: Male   Other Topics Concern    Not on file   Social History Narrative    Not on file     Review of patient's allergies indicates:   Allergen Reactions    Demerol [meperidine] Itching     Other reaction(s): Itching    Dilaudid [hydromorphone (bulk)] Anaphylaxis     Other reaction(s): Anaphylaxis  "coded" per pt    Prednisone Itching     Other reaction(s): Itching    Morphine     Tramadol        Objective:       BP (!) 161/84 (BP Location: Left arm)   Pulse 91   Temp 99.2 °F (37.3 °C) (Tympanic)   Ht 5' 6" (1.676 m)   Wt (!) 147.3 kg (324 lb 11.8 oz)   SpO2 99%   BMI 52.41 kg/m²   Physical Exam   Constitutional: She appears well-developed and well-nourished. No distress.   Skin: She is not diaphoretic.   Left groin with linear ulceration in skin crease. No erythema, tender to touch   Vitals reviewed.    Assessment:     1. Skin ulcer, unspecified ulcer stage    2. Dysuria      Plan:   Skin ulcer, unspecified ulcer stage  -     Viral Culture [facility]; Skin; Future; Expected date: 11/29/2018    Dysuria  -     Urine culture    Other orders  -     mupirocin (BACTROBAN) 2 % ointment; Apply topically 2 (two) times daily. Apply to affected area  Dispense: 30 g; Refill: 1         Medication List           Accurate as of 11/29/18  5:01 PM. If you have any questions, ask your nurse or doctor.               CHANGE how you take these medications    buPROPion 100 MG TBSR 12 hr tablet  Commonly known as:  WELLBUTRIN SR  Take 1 tablet (100 mg total) by mouth 2 (two) times daily.  What changed:  when to take this     dalfampridine 10 mg Tb12  Commonly known as:  AMPYRA  Take 1 tablet by mouth 2 (two) times daily.  What changed:  when to take this     mupirocin 2 % ointment  Commonly known as:  " BACTROBAN  Apply topically 2 (two) times daily. Apply to affected area  What changed:    · how much to take  · how to take this  Changed by:  Braden Dumont MD     nystatin cream  Commonly known as:  MYCOSTATIN  Apply topically 2 (two) times daily.  What changed:    · when to take this  · reasons to take this        CONTINUE taking these medications    ALPRAZolam 0.25 MG tablet  Commonly known as:  XANAX  Take 1 tablet (0.25 mg total) by mouth 2 (two) times daily as needed for Anxiety.     armodafinil 150 mg tablet  Commonly known as:  NUVIGIL  Take 1 tablet (150 mg total) by mouth once daily.     baclofen 20 MG tablet  Commonly known as:  LIORESAL  TAKE 1 TABLET (20 MG TOTAL) BY MOUTH 3 (THREE) TIMES DAILY.     butalbital-acetaminophen-caffeine -40 mg -40 mg per tablet  Commonly known as:  FIORICET, ESGIC  Take 1 tablet at onset of migraine.  Limit to 3 tabs a week.     doxepin 50 MG capsule  Commonly known as:  SINEQUAN  Take 1 capsule (50 mg total) by mouth nightly as needed.     DULoxetine 30 MG capsule  Commonly known as:  CYMBALTA  Take 1 capsule (30 mg total) by mouth once daily.     esomeprazole 40 MG capsule  Commonly known as:  NEXIUM  Take 1 capsule (40 mg total) by mouth before breakfast.     fluconazole 150 MG Tab  Commonly known as:  DIFLUCAN  TAKE 1 TABLET (150 MG TOTAL) BY MOUTH EVERY 3 (THREE) DAYS. FOR 2 DOSES     hydroCHLOROthiazide 12.5 MG Tab  Commonly known as:  HYDRODIURIL  Take 1 tablet (12.5 mg total) by mouth once daily.     HYDROcodone-acetaminophen  mg per tablet  Commonly known as:  NORCO     interferon beta-1a 30 mcg/0.5 mL syringe  Commonly known as:  AVONEX  Inject 0.5 mLs (30 mcg total) into the muscle once a week.     linaclotide 145 mcg Cap capsule  Commonly known as:  LINZESS  Take 1 capsule (145 mcg total) by mouth once daily.     lorcaserin 10 mg Tab  Commonly known as:  BELVIQ  Take 10 mg by mouth 2 (two) times daily.     [id number]  1-1-500 mg Cap  Take 1 tablet by mouth once daily.     meclizine 25 mg tablet  Commonly known as:  ANTIVERT  Take 1 tablet (25 mg total) by mouth 2 (two) times daily as needed.     METAMUCIL 0.4 gram Cap  Generic drug:  psyllium husk     promethazine 25 MG tablet  Commonly known as:  PHENERGAN  Take 1 tablet (25 mg total) by mouth every 4 (four) hours as needed for Nausea.     valACYclovir 1000 MG tablet  Commonly known as:  VALTREX  Take 1 tablet (1,000 mg total) by mouth 2 (two) times daily.           Where to Get Your Medications      These medications were sent to [facility]/pharmacy #[id number] - [place]domo - [address] AT [facility]  [address], [place] [address]    Phone:  [phone]   · mupirocin 2 % ointment

## 2018-11-30 LAB — BACTERIA UR CULT: NO GROWTH

## 2018-12-05 ENCOUNTER — PATIENT MESSAGE (OUTPATIENT)
Dept: INTERNAL MEDICINE | Facility: CLINIC | Age: 38
End: 2018-12-05

## 2018-12-05 LAB
SPECIMEN SOURCE: NORMAL
VIRUS SPEC CULT: NORMAL

## 2018-12-06 ENCOUNTER — PATIENT MESSAGE (OUTPATIENT)
Dept: INTERNAL MEDICINE | Facility: CLINIC | Age: 38
End: 2018-12-06

## 2018-12-06 RX ORDER — VALACYCLOVIR HYDROCHLORIDE 500 MG/1
500 TABLET, FILM COATED ORAL DAILY
Qty: 30 TABLET | Refills: 11 | Status: SHIPPED | OUTPATIENT
Start: 2018-12-06 | End: 2019-07-23

## 2018-12-06 RX ORDER — VALACYCLOVIR HYDROCHLORIDE 500 MG/1
500 TABLET, FILM COATED ORAL 2 TIMES DAILY
Qty: 60 TABLET | Refills: 11 | Status: SHIPPED | OUTPATIENT
Start: 2018-12-06 | End: 2018-12-06 | Stop reason: SDUPTHER

## 2018-12-07 ENCOUNTER — OFFICE VISIT (OUTPATIENT)
Dept: NEUROLOGY | Facility: CLINIC | Age: 38
End: 2018-12-07
Payer: MEDICARE

## 2018-12-07 VITALS
SYSTOLIC BLOOD PRESSURE: 124 MMHG | HEIGHT: 66 IN | DIASTOLIC BLOOD PRESSURE: 86 MMHG | HEART RATE: 72 BPM | WEIGHT: 293 LBS | BODY MASS INDEX: 47.09 KG/M2

## 2018-12-07 DIAGNOSIS — G35 MULTIPLE SCLEROSIS: Primary | ICD-10-CM

## 2018-12-07 DIAGNOSIS — I10 HYPERTENSION, UNSPECIFIED TYPE: ICD-10-CM

## 2018-12-07 DIAGNOSIS — E66.01 MORBID OBESITY WITH BMI OF 45.0-49.9, ADULT: ICD-10-CM

## 2018-12-07 PROCEDURE — 3008F BODY MASS INDEX DOCD: CPT | Mod: CPTII,S$GLB,, | Performed by: PSYCHIATRY & NEUROLOGY

## 2018-12-07 PROCEDURE — 99999 PR PBB SHADOW E&M-EST. PATIENT-LVL III: CPT | Mod: PBBFAC,,, | Performed by: PSYCHIATRY & NEUROLOGY

## 2018-12-07 PROCEDURE — 99214 OFFICE O/P EST MOD 30 MIN: CPT | Mod: S$GLB,,, | Performed by: PSYCHIATRY & NEUROLOGY

## 2018-12-07 PROCEDURE — 3074F SYST BP LT 130 MM HG: CPT | Mod: CPTII,S$GLB,, | Performed by: PSYCHIATRY & NEUROLOGY

## 2018-12-07 PROCEDURE — 3079F DIAST BP 80-89 MM HG: CPT | Mod: CPTII,S$GLB,, | Performed by: PSYCHIATRY & NEUROLOGY

## 2018-12-07 NOTE — PROGRESS NOTES
Subjective:      Patient ID: Alicia Soliz is a 38 y.o. female.    Chief Complaint:   Follow-up for multiple sclerosis    The patient and her daughter return today indicating that she had been hospitalized at [facility] ([facility]3] in October when she had presented with an acute worsening of her walking and standing balance to the point that she could not ambulate.  She was hospitalized for 3 days and received Solu-Medrol 500 mg daily intravenously which the patient feels did not produce significant benefit although she did resume her ability to walk.  Just prior to that hospitalization while in therapy, she demonstrated the ability to walk about 15 ft without her walker.  However she has continued to have significant left-sided weakness that interferes with movement of her left arm and hand in a coordinated fashion and clearly interferes with ambulation.  She is now able to ambulate with her walker at home and in the community.    In addition to her left-sided weakness, the patient has significant periods of mild confusion and lack of focus.  She continues to be aware of difficulty with movement of her left hand and arm in a coordinated fashion.  The patient has significant generalized fatigue.  She denies any change in vision.  She denies any diplopia.  She has not noticed any change in speech or swallowing.  The patient remains continent of stool and urine.  She has been tolerating Avonex without significant side effects.      ROS:  GENERAL: NO FEVER, CHILLS,  OR WEIGHT LOSS.   THE PATIENT HAS HAD A SIGNIFICANT WEIGHT GAIN SINCE July, 2018  SKIN: NO RASHES, ITCHING OR CHANGES IN COLOR OR TEXTURE OF SKIN.  HEAD: NO HEADACHES OR RECENT HEAD TRAUMA.  EYES: VISUAL ACUITY FINE. NO PHOTOPHOBIA, OCULAR PAIN OR DIPLOPIA.  EARS: DENIES EAR PAIN, DISCHARGE OR VERTIGO.  NOSE: NO LOSS OF SMELL, NO EPISTAXIS OR POSTNASAL DRIP.  MOUTH & THROAT: NO HOARSENESS OR CHANGE IN VOICE. NO EXCESSIVE GUM BLEEDING.  NODES:  DENIES SWOLLEN GLANDS.  CHEST: DENIES PERSON, CYANOSIS, WHEEZING, COUGH AND SPUTUM PRODUCTION.  CARDIOVASCULAR: DENIES CHEST PAIN, PND, ORTHOPNEA OR REDUCED EXERCISE TOLERANCE.  ABDOMEN: APPETITE FINE. NO WEIGHT LOSS. DENIES DIARRHEA, ABDOMINAL PAIN, HEMATEMESIS OR BLOOD IN STOOL.  URINARY: NO FLANK PAIN, DYSURIA OR HEMATURIA.  PERIPHERAL VASCULAR: NO CLAUDICATION OR CYANOSIS.  MUSCULOSKELETAL: NO JOINT STIFFNESS OR SWELLING. DENIES BACK PAIN.  NEUROLOGIC: NO HISTORY OF SEIZURES,  OR UNEXPLAINED LOSS OF CONSCIOUSNESS.    Past Medical History:   Diagnosis Date    Anemia     Arthritis     Cardiac arrest as complication of care     pt states she went into cardiac arrest from an allergic reaction to a medication    Encounter for blood transfusion     Hemiplegia due to old stroke     Hypertension     Multiple sclerosis     Stroke      Past Surgical History:   Procedure Laterality Date    APPENDECTOMY      CHOLECYSTECTOMY      HYSTERECTOMY      KNEE SURGERY      TONSILLECTOMY      TUBAL LIGATION      VASCULAR CATH INSERTION Right 1/22/2015    Performed by Dayron Vasques MD at [facility] CATH LAB     Family History   Problem Relation Age of Onset    Lupus Mother     Heart disease Mother     Diabetes Father     Kidney disease Father     Cancer Maternal Aunt 40        breast    Breast cancer Maternal Aunt     Breast cancer Cousin     Breast cancer Cousin      Social History     Socioeconomic History    Marital status:      Spouse name: Not on file    Number of children: Not on file    Years of education: Not on file    Highest education level: Not on file   Social Needs    Financial resource strain: Not on file    Food insecurity - worry: Not on file    Food insecurity - inability: Not on file    Transportation needs - medical: Not on file    Transportation needs - non-medical: Not on file   Occupational History     Employer: [employer]   Tobacco Use    Smoking status: Never Smoker    Smokeless  tobacco: Never Used   Substance and Sexual Activity    Alcohol use: Yes     Comment: OCCASIONALLY    Drug use: No    Sexual activity: Yes     Partners: Male   Other Topics Concern    Not on file   Social History Narrative    Not on file         Objective:   PE:   VITAL SIGNS:   Vitals:    12/07/18 0845   BP: 124/86   Pulse: 72     APPEARANCE: WELL NOURISHED, WELL DEVELOPED, IN NO ACUTE DISTRESS.    HEAD: NORMOCEPHALIC, ATRAUMATIC.  EYES: PERRL. EOMI.  NON-ICTERIC SCLERAE.    EARS: TM'S INTACT. LIGHT REFLEX NORMAL. NO RETRACTION OR PERFORATION.    NOSE: MUCOSA PINK. AIRWAY CLEAR.  MOUTH & THROAT: NO TONSILLAR ENLARGEMENT. NO PHARYNGEAL ERYTHEMA OR EXUDATE. NO STRIDOR.  NECK: SUPPLE. NO BRUITS.  CHEST: LUNGS CLEAR TO AUSCULTATION.  CARDIOVASCULAR: REGULAR RHYTHM WITHOUT SIGNIFICANT MURMURS.  ABDOMEN: BOWEL SOUNDS NORMAL. NOT DISTENDED.  MARKED TRUNCAL OBESITY IS NOTED  MUSCULOSKELETAL:  NO BONY DEFORMITY SEEN.  MUSCLE TONE IS  INCREASED IN THE LEFT UPPER AND LEFT LOWER EXTREMITY.  MUSCLE MASS CANNOT BE JUST BECAUSE OF HER OBESITY.    NEUROLOGIC:   MENTAL STATUS:  THE PATIENT IS WELL ORIENTED TO PERSON, TIME, PLACE, AND SITUATION.  THE PATIENT IS ATTENTIVE TO THE ENVIRONMENT AND COOPERATIVE FOR THE EXAM.  CRANIAL NERVES: II-XII GROSSLY INTACT. FUNDOSCOPIC EXAM IS NORMAL.  NO HEMORRHAGE, EXUDATE OR PAPILLEDEMA IS PRESENT. THE EXTRAOCULAR MUSCLES ARE INTACT IN THE CARDINAL DIRECTIONS OF GAZE.  NO PTOSIS IS PRESENT. FACIAL FEATURES ARE SYMMETRICAL.  SPEECH IS NORMAL IN FLUENCY, DICTION, AND PHRASING.  TONGUE PROTRUDES IN THE MIDLINE.    GAIT AND STATION:    THE PATIENT COMES FROM SIT TO STAND BY PUSHING OFF ON THE ARMS OF THE CHAIR.  SHE UTILIZES A WALKER FOR AMBULATION DEMONSTRATING A LEFT HEMIPARETIC TYPE OF GAIT.  MOTOR:   LEFT ELLIOTT PARESIS IS NOTED.  SENSORY:  INTACT BOTH UPPER AND LOWER EXTREMITIES TO PIN PRICK, TOUCH, AND VIBRATION.  CEREBELLAR:    NO RESTING TREMOR IS PRESENT.  FINGER-TO-NOSE IS  EXTREMELY  DIFFICULT FOR THE PATIENT ON THE LEFT SIDE COMPARED TO THAT ON THE RIGHT WITH PAST POINTING AND DYSSYNERGIA.  REFLEXES:  STRETCH REFLEXES ARE   DIFFICULT TO ELICIT BOTH UPPER AND LOWER EXTREMITIES BECAUSE OF HER MORBID OBESITY.  PLANTAR STIMULATION IS FLEXOR BILATERALLY AND NO PATHOLOGICAL REFLEXES ARE SEEN              Assessment:     Encounter Diagnoses   Name Primary?    Multiple sclerosis Yes    Morbid obesity with BMI of 45.0-49.9, adult     Hypertension, unspecified type        Plan:    1. The patient is to continue Avonex as her immuno modulating treatment.  She cannot take Tysabri or Tecfidera because of elevated MARILYN virus antibodies  2.  The patient has been told to lose significant weight.  Dietary management is going to be recommended.  This was discussed in detail with the patient.  3.  Routine follow-up with Neurology in 6 months.      This was a 35 min visit with the patient with over 50% of the time spent counseling the patient regarding the need for significant weight loss to improve her ability to ambulate and to deal with her other MS deficits.  Alternative MS immuno modulating therapy was also discussed briefly.  This note is generated with speech recognition software and is subject to transcription error and sound alike phrases that may be missed by proofreading.

## 2018-12-10 NOTE — PROGRESS NOTES
Last 5 Patient Entered Readings                                      Current 30 Day Average: 144/84     Recent Readings 12/3/2018 11/24/2018 11/21/2018 11/18/2018 11/18/2018    SBP (mmHg) 172 120 120 110 131    DBP (mmHg) 68 80 89 85 77    Pulse 99 - 97 90 99          Left voicemail.

## 2018-12-11 ENCOUNTER — OFFICE VISIT (OUTPATIENT)
Dept: UROLOGY | Facility: CLINIC | Age: 38
End: 2018-12-11
Payer: MEDICARE

## 2018-12-11 VITALS — HEIGHT: 66 IN | WEIGHT: 293 LBS | BODY MASS INDEX: 47.09 KG/M2

## 2018-12-11 DIAGNOSIS — R31.9 HEMATURIA, UNSPECIFIED TYPE: ICD-10-CM

## 2018-12-11 DIAGNOSIS — N32.81 OAB (OVERACTIVE BLADDER): ICD-10-CM

## 2018-12-11 DIAGNOSIS — G35 MULTIPLE SCLEROSIS: Primary | ICD-10-CM

## 2018-12-11 PROCEDURE — 51798 US URINE CAPACITY MEASURE: CPT | Mod: S$GLB,,, | Performed by: UROLOGY

## 2018-12-11 PROCEDURE — 3008F BODY MASS INDEX DOCD: CPT | Mod: CPTII,S$GLB,, | Performed by: UROLOGY

## 2018-12-11 PROCEDURE — 99204 OFFICE O/P NEW MOD 45 MIN: CPT | Mod: 25,S$GLB,, | Performed by: UROLOGY

## 2018-12-11 PROCEDURE — 87086 URINE CULTURE/COLONY COUNT: CPT

## 2018-12-11 PROCEDURE — 99999 PR PBB SHADOW E&M-EST. PATIENT-LVL III: CPT | Mod: PBBFAC,,, | Performed by: UROLOGY

## 2018-12-11 PROCEDURE — 81001 URINALYSIS AUTO W/SCOPE: CPT

## 2018-12-11 NOTE — PROGRESS NOTES
..Using sterile technique, pt was catheterized per orders of Dr. Senior.  Urine collected and sent to lab.  Pt tolerated procedure well.

## 2018-12-11 NOTE — PROGRESS NOTES
Chief Complaint: LUTS    HPI:   12/11/18: 37 yo woman with MS has trouble voiding, weak stream, has to push to void. Some right flank pain some times including this morning.  No abd/pelvic pain and no exac/rel factors.  No hematuria.  No urolithiasis.  No  history.  Normal sexual function.  Multiple UCx negative. Microhematuria recently.  Not much caffeine.      Allergies:  Demerol [meperidine]; Dilaudid [hydromorphone (bulk)]; Prednisone; Morphine; and Tramadol    Medications: has a current medication list which includes the following prescription(s): alprazolam, armodafinil, baclofen, bupropion, butalbital-acetaminophen-caffeine -40 mg, dalfampridine, doxepin, duloxetine, esomeprazole, fluconazole, hydrochlorothiazide, hydrocodone-acetaminophen, interferon beta-1a, linaclotide, lorcaserin, zb-[id number], meclizine, metformin, mupirocin, nystatin, promethazine, rxrnxjqqj-fb-cw-acetaminophen, psyllium husk, and valacyclovir.    Review of Systems:  General: No fever, chills, fatigability, or weight loss.  Skin: No rashes, itching, or changes in color or texture of skin.  Chest: Denies PERSON, cyanosis, wheezing, cough, and sputum production.  Abdomen: Appetite fine. No weight loss. Denies diarrhea, abdominal pain, hematemesis, or blood in stool.  Musculoskeletal: No joint stiffness or swelling. Denies back pain.  : As above.  All other review of systems negative.    PMH:   has a past medical history of Anemia, Arthritis, Cardiac arrest as complication of care, Encounter for blood transfusion, Hemiplegia due to old stroke, Hypertension, Multiple sclerosis, and Stroke.    PSH:   has a past surgical history that includes Appendectomy; Tubal ligation; Tonsillectomy; Cholecystectomy; Hysterectomy; Knee surgery; and VASCULAR CATH INSERTION (Right, 1/22/2015).    FamHx: family history includes Breast cancer in her cousin, cousin, and maternal aunt; Cancer (age of onset: 40) in her maternal aunt;  Diabetes in her father; Heart disease in her mother; Kidney disease in her father; Lupus in her mother.    SocHx:  reports that  has never smoked. she has never used smokeless tobacco. She reports that she drinks alcohol. She reports that she does not use drugs.     Physical Exam:  Vitals: There were no vitals filed for this visit.  General: A&Ox3. No apparent distress. No deformities.  Neck: No masses. Normal thyroid.  Lungs: normal inspiration. No use of accessory muscles.  Heart: normal pulse. No arrhythmias.  Abdomen: Soft. NT. ND. No masses. No hernias. No hepatosplenomegaly.  Lymphatic: Neck and groin nodes negative.  Skin: The skin is warm and dry. No jaundice.  Ext: No c/c/e.  : External genitalia normal.     Labs/Studies:   Bladder Scan performed in office: PVR 90 ml (voided 1 hour ago).    Impression/Plan:   1. Cath UA/UCx today and CT Urogram/RTC cysto/Uroflow for microhematuria and voiding dysfunction.  2. OAB. Will not give anti-cholinergics may consider Myrbetriq.

## 2018-12-11 NOTE — LETTER
December 11, 2018      Braden Dumont MD  [id number] [address] [facility]  [address] [place] [address]  Phone: [phone]  Fax: [phone]          Patient: Alicia Soliz   MR Number: [id number]   YOB: 1980   Date of Visit: 12/11/2018       Dear Dr. Braden Dumont:    Thank you for referring Alicia Soliz to me for evaluation. Attached you will find relevant portions of my assessment and plan of care.    If you have questions, please do not hesitate to call me. I look forward to following Alicia Soliz along with you.    Sincerely,    Alejo Senior IV, MD    Enclosure  CC:  No Recipients    If you would like to receive this communication electronically, please contact [email or web address] or [phone] to request more information on [employer] Link access.    For providers and/or their staff who would like to refer a patient to [facility], please contact us through our one-stop-shop provider referral line, [facility], at [phone].    If you feel you have received this communication in error or would no longer like to receive these types of communications, please e-mail [email or web address]

## 2018-12-12 ENCOUNTER — PATIENT MESSAGE (OUTPATIENT)
Dept: UROLOGY | Facility: CLINIC | Age: 38
End: 2018-12-12

## 2018-12-12 LAB
MICROSCOPIC COMMENT: NORMAL
RBC #/AREA URNS AUTO: 4 /HPF (ref 0–4)
SQUAMOUS #/AREA URNS AUTO: 7 /HPF
WBC #/AREA URNS AUTO: 1 /HPF (ref 0–5)

## 2018-12-13 ENCOUNTER — PATIENT MESSAGE (OUTPATIENT)
Dept: UROLOGY | Facility: CLINIC | Age: 38
End: 2018-12-13

## 2018-12-13 ENCOUNTER — PATIENT OUTREACH (OUTPATIENT)
Dept: OTHER | Facility: OTHER | Age: 38
End: 2018-12-13

## 2018-12-13 NOTE — PROGRESS NOTES
Last 5 Patient Entered Readings                                      Current 30 Day Average: 138/83     Recent Readings 12/13/2018 12/10/2018 12/3/2018 11/24/2018 11/21/2018    SBP (mmHg) 160 120 172 120 120    DBP (mmHg) 80 80 68 80 89    Pulse - - 99 - 97        Digital Medicine: Health  Follow Up    Lifestyle Modifications:    1.Dietary Modifications (Sodium intake <2,000mg/day, food labels, dining out):    Notes weight gain from 280 to 316, believes due to steroids. Has been eating hello fresh. Is afraid of having bariatric surgery    2.Physical Activity:    Patient would like to work on walking more and maintaining her weight. Has a treadmill at her house as well elliptical. Patient uses a walker. When asked about how often she would like to walk, she states that it depends on her body. Patient describes various exercises (band stretching, elliptical, leg strengthening, lunges, etc) when asked patient about her upper limit for walking per day, she states that it depends on her brain, and how flushed/sweaty she gets. Recommended that patient set goal to walk every day for 10 minutes, and eventually build endurance.     3.Medication Therapy: Patient has been compliant with the medication regimen.    4.Patient has the following medication side effects/concerns:   (Frequency/Alleviating factors/Precipitating factors, etc.)     Follow up with Ms. Alicia Soliz completed. No further questions or concerns. Will continue to follow up to achieve health goals.

## 2018-12-14 ENCOUNTER — PATIENT MESSAGE (OUTPATIENT)
Dept: UROLOGY | Facility: CLINIC | Age: 38
End: 2018-12-14

## 2018-12-14 LAB — BACTERIA UR CULT: NO GROWTH

## 2018-12-17 ENCOUNTER — PATIENT MESSAGE (OUTPATIENT)
Dept: NEUROLOGY | Facility: CLINIC | Age: 38
End: 2018-12-17

## 2018-12-17 ENCOUNTER — TELEPHONE (OUTPATIENT)
Dept: INTERNAL MEDICINE | Facility: CLINIC | Age: 38
End: 2018-12-17

## 2018-12-17 RX ORDER — DEXTROMETHORPHAN HYDROBROMIDE, GUAIFENESIN, AND PHENYLEPHRINE HYDROCHLORIDE 17.5; 385; 1 MG/1; MG/1; MG/1
1 TABLET ORAL 3 TIMES DAILY PRN
Qty: 20 TABLET | Refills: 1 | Status: SHIPPED | OUTPATIENT
Start: 2018-12-17 | End: 2019-01-08

## 2018-12-17 NOTE — TELEPHONE ENCOUNTER
Called pharmacy spoke with Crystal.  Asked for the alternatives to Duraflu.  The prescription alternatives are [place]-DMP or Veconex.  Duraflu is not covered by pt's insurance.  Please advise.

## 2018-12-24 ENCOUNTER — PATIENT MESSAGE (OUTPATIENT)
Dept: INTERNAL MEDICINE | Facility: CLINIC | Age: 38
End: 2018-12-24

## 2018-12-24 RX ORDER — CLINDAMYCIN HYDROCHLORIDE 300 MG/1
300 CAPSULE ORAL EVERY 6 HOURS
Qty: 30 CAPSULE | Refills: 0 | Status: SHIPPED | OUTPATIENT
Start: 2018-12-24 | End: 2019-01-08 | Stop reason: ALTCHOICE

## 2019-01-04 ENCOUNTER — TELEPHONE (OUTPATIENT)
Dept: INTERNAL MEDICINE | Facility: CLINIC | Age: 39
End: 2019-01-04

## 2019-01-07 NOTE — PROGRESS NOTES
Last 5 Patient Entered Readings                                      Current 30 Day Average: 137/81     Recent Readings 12/18/2018 12/13/2018 12/10/2018 12/3/2018 11/24/2018    SBP (mmHg) 130 160 120 172 120    DBP (mmHg) 84 80 80 68 80    Pulse 74 - - 99 -        Need more frequent BP readings due to variability of readings. Will ask health  to address on next outreach.

## 2019-01-08 ENCOUNTER — OFFICE VISIT (OUTPATIENT)
Dept: INTERNAL MEDICINE | Facility: CLINIC | Age: 39
End: 2019-01-08
Payer: MEDICARE

## 2019-01-08 ENCOUNTER — LAB VISIT (OUTPATIENT)
Dept: LAB | Facility: HOSPITAL | Age: 39
End: 2019-01-08
Attending: FAMILY MEDICINE
Payer: MEDICARE

## 2019-01-08 VITALS
BODY MASS INDEX: 47.09 KG/M2 | HEART RATE: 94 BPM | OXYGEN SATURATION: 100 % | SYSTOLIC BLOOD PRESSURE: 132 MMHG | WEIGHT: 293 LBS | TEMPERATURE: 99 F | DIASTOLIC BLOOD PRESSURE: 60 MMHG | HEIGHT: 66 IN

## 2019-01-08 DIAGNOSIS — R35.0 URINARY FREQUENCY: ICD-10-CM

## 2019-01-08 DIAGNOSIS — H92.01 OTALGIA OF RIGHT EAR: Primary | ICD-10-CM

## 2019-01-08 DIAGNOSIS — J32.9 SINUSITIS, UNSPECIFIED CHRONICITY, UNSPECIFIED LOCATION: ICD-10-CM

## 2019-01-08 DIAGNOSIS — A53.9 SYPHILIS: ICD-10-CM

## 2019-01-08 LAB
BILIRUB SERPL-MCNC: ABNORMAL MG/DL
BLOOD URINE, POC: ABNORMAL
COLOR, POC UA: YELLOW
GLUCOSE UR QL STRIP: ABNORMAL
KETONES UR QL STRIP: ABNORMAL
LEUKOCYTE ESTERASE URINE, POC: ABNORMAL
NITRITE, POC UA: ABNORMAL
PH, POC UA: 5
PROTEIN, POC: ABNORMAL
SPECIFIC GRAVITY, POC UA: 1.02
UROBILINOGEN, POC UA: ABNORMAL

## 2019-01-08 PROCEDURE — 99999 PR PBB SHADOW E&M-EST. PATIENT-LVL III: CPT | Mod: PBBFAC,,, | Performed by: FAMILY MEDICINE

## 2019-01-08 PROCEDURE — 3008F BODY MASS INDEX DOCD: CPT | Mod: CPTII,S$GLB,, | Performed by: FAMILY MEDICINE

## 2019-01-08 PROCEDURE — 81002 URINALYSIS NONAUTO W/O SCOPE: CPT | Mod: S$GLB,,, | Performed by: FAMILY MEDICINE

## 2019-01-08 PROCEDURE — 86592 SYPHILIS TEST NON-TREP QUAL: CPT

## 2019-01-08 PROCEDURE — 99999 PR PBB SHADOW E&M-EST. PATIENT-LVL III: ICD-10-PCS | Mod: PBBFAC,,, | Performed by: FAMILY MEDICINE

## 2019-01-08 PROCEDURE — 3078F PR MOST RECENT DIASTOLIC BLOOD PRESSURE < 80 MM HG: ICD-10-PCS | Mod: CPTII,S$GLB,, | Performed by: FAMILY MEDICINE

## 2019-01-08 PROCEDURE — 86780 TREPONEMA PALLIDUM: CPT

## 2019-01-08 PROCEDURE — 99499 RISK ADDL DX/OHS AUDIT: ICD-10-PCS | Mod: S$GLB,,, | Performed by: FAMILY MEDICINE

## 2019-01-08 PROCEDURE — 99213 PR OFFICE/OUTPT VISIT, EST, LEVL III, 20-29 MIN: ICD-10-PCS | Mod: 25,S$GLB,, | Performed by: FAMILY MEDICINE

## 2019-01-08 PROCEDURE — 99213 OFFICE O/P EST LOW 20 MIN: CPT | Mod: 25,S$GLB,, | Performed by: FAMILY MEDICINE

## 2019-01-08 PROCEDURE — 3008F PR BODY MASS INDEX (BMI) DOCUMENTED: ICD-10-PCS | Mod: CPTII,S$GLB,, | Performed by: FAMILY MEDICINE

## 2019-01-08 PROCEDURE — 86593 SYPHILIS TEST NON-TREP QUANT: CPT

## 2019-01-08 PROCEDURE — 3075F PR MOST RECENT SYSTOLIC BLOOD PRESS GE 130-139MM HG: ICD-10-PCS | Mod: CPTII,S$GLB,, | Performed by: FAMILY MEDICINE

## 2019-01-08 PROCEDURE — 36415 COLL VENOUS BLD VENIPUNCTURE: CPT | Mod: PO

## 2019-01-08 PROCEDURE — 3078F DIAST BP <80 MM HG: CPT | Mod: CPTII,S$GLB,, | Performed by: FAMILY MEDICINE

## 2019-01-08 PROCEDURE — 99499 UNLISTED E&M SERVICE: CPT | Mod: S$GLB,,, | Performed by: FAMILY MEDICINE

## 2019-01-08 PROCEDURE — 81002 POCT URINE DIPSTICK WITHOUT MICROSCOPE: ICD-10-PCS | Mod: S$GLB,,, | Performed by: FAMILY MEDICINE

## 2019-01-08 PROCEDURE — 3075F SYST BP GE 130 - 139MM HG: CPT | Mod: CPTII,S$GLB,, | Performed by: FAMILY MEDICINE

## 2019-01-08 RX ORDER — AMOXICILLIN AND CLAVULANATE POTASSIUM 875; 125 MG/1; MG/1
1 TABLET, FILM COATED ORAL EVERY 12 HOURS
Qty: 20 TABLET | Refills: 0 | Status: SHIPPED | OUTPATIENT
Start: 2019-01-08 | End: 2019-02-11 | Stop reason: ALTCHOICE

## 2019-01-08 RX ORDER — CIPROFLOXACIN AND DEXAMETHASONE 3; 1 MG/ML; MG/ML
4 SUSPENSION/ DROPS AURICULAR (OTIC) 2 TIMES DAILY
Qty: 7.5 ML | Refills: 1 | Status: SHIPPED | OUTPATIENT
Start: 2019-01-08 | End: 2019-03-08 | Stop reason: SDUPTHER

## 2019-01-08 NOTE — PROGRESS NOTES
Patient, Alicia Soliz (MRN #[id number]), presented with a recorded BMI of 52.24 kg/m^2 consistent with the definition of morbid obesity (ICD-10 E66.01). The patient's morbid obesity was monitored, evaluated, addressed and/or treated. This addendum to the medical record is made on 01/08/2019.

## 2019-01-08 NOTE — PROGRESS NOTES
Subjective:      Patient ID: Alicia Soliz is a 38 y.o. female.    Chief Complaint: Sore Throat; Cough; and Otalgia      Patient reports sore throat, congestion, coughing, pain in right ear.      Review of Systems   Constitutional: Positive for fatigue. Negative for activity change, appetite change and fever.   HENT: Positive for congestion, ear pain, postnasal drip, rhinorrhea, sinus pressure, sinus pain and sore throat.    Respiratory: Positive for cough. Negative for shortness of breath and wheezing.    Gastrointestinal: Negative for abdominal pain, nausea and vomiting.   Musculoskeletal: Negative for myalgias.   Skin: Negative for rash.   Neurological: Positive for headaches.     Past Medical History:   Diagnosis Date    Anemia     Arthritis     Cardiac arrest as complication of care     pt states she went into cardiac arrest from an allergic reaction to a medication    Encounter for blood transfusion     Hemiplegia due to old stroke     Hypertension     Multiple sclerosis     Stroke      Past Surgical History:   Procedure Laterality Date    APPENDECTOMY      CHOLECYSTECTOMY      HYSTERECTOMY      KNEE SURGERY      TONSILLECTOMY      TUBAL LIGATION      VASCULAR CATH INSERTION Right 1/22/2015    Performed by [facility] at [facility] CATH LAB     Family History   Problem Relation Age of Onset    Lupus Mother     Heart disease Mother     Diabetes Father     Kidney disease Father     Cancer Maternal Aunt 40        breast    Breast cancer Maternal Aunt     Breast cancer Cousin     Breast cancer Cousin      Social History     Socioeconomic History    Marital status:      Spouse name: Not on file    Number of children: Not on file    Years of education: Not on file    Highest education level: Not on file   Social Needs    Financial resource strain: Not on file    Food insecurity - worry: Not on file    Food insecurity - inability: Not on file    Transportation needs -  "medical: Not on file    Transportation needs - non-medical: Not on file   Occupational History     Employer: [employer]   Tobacco Use    Smoking status: Never Smoker    Smokeless tobacco: Never Used   Substance and Sexual Activity    Alcohol use: Yes     Comment: OCCASIONALLY    Drug use: No    Sexual activity: Yes     Partners: Male   Other Topics Concern    Not on file   Social History Narrative    Not on file     Review of patient's allergies indicates:   Allergen Reactions    Demerol [meperidine] Itching     Other reaction(s): Itching    Dilaudid [hydromorphone (bulk)] Anaphylaxis     Other reaction(s): Anaphylaxis  "coded" per pt    Prednisone Itching     Other reaction(s): Itching    Morphine     Tramadol        Objective:       /60 (BP Location: Right arm, Patient Position: Sitting, BP Method: Large (Automatic))   Pulse 94   Temp 98.6 °F (37 °C) (Tympanic)   Ht 5' 6" (1.676 m)   Wt (!) 146.8 kg (323 lb 10.2 oz)   SpO2 100%   BMI 52.24 kg/m²   Physical Exam   Constitutional: She appears well-developed and well-nourished. No distress.   HENT:   Head: Normocephalic.   Right Ear: Hearing, tympanic membrane, external ear and ear canal normal.   Left Ear: Hearing, tympanic membrane, external ear and ear canal normal.   Nose: Mucosal edema present. Right sinus exhibits maxillary sinus tenderness and frontal sinus tenderness. Left sinus exhibits maxillary sinus tenderness and frontal sinus tenderness.   Mouth/Throat: Uvula is midline and mucous membranes are normal. Posterior oropharyngeal erythema present.   Right ear canal mildly erythematous - otherwise appears normal.   Eyes: Conjunctivae and EOM are normal. Pupils are equal, round, and reactive to light.   Cardiovascular: Normal rate, regular rhythm and normal heart sounds.   Pulmonary/Chest: Effort normal and breath sounds normal. No respiratory distress.   Abdominal: Soft. Bowel sounds are normal.   Lymphadenopathy:     She has no cervical " adenopathy.   Skin: Skin is warm and dry. Capillary refill takes less than 2 seconds. She is not diaphoretic.   Psychiatric: She has a normal mood and affect. Her behavior is normal.   Nursing note and vitals reviewed.    Assessment:     1. Otalgia of right ear    2. Sinusitis, unspecified chronicity, unspecified location    3. Urinary frequency      Plan:   Otalgia of right ear    Sinusitis, unspecified chronicity, unspecified location    Urinary frequency  -     POCT URINE DIPSTICK WITHOUT MICROSCOPE    Other orders  -     amoxicillin-clavulanate 875-125mg (AUGMENTIN) 875-125 mg per tablet; Take 1 tablet by mouth every 12 (twelve) hours.  Dispense: 20 tablet; Refill: 0  -     ciprofloxacin-dexamethasone 0.3-0.1% (CIPRODEX) 0.3-0.1 % DrpS; Place 4 drops into the right ear 2 (two) times daily.  Dispense: 7.5 mL; Refill: 1         Medication List           Accurate as of 1/8/19  2:34 PM. If you have any questions, ask your nurse or doctor.               START taking these medications    amoxicillin-clavulanate 875-125mg 875-125 mg per tablet  Commonly known as:  AUGMENTIN  Take 1 tablet by mouth every 12 (twelve) hours.  Started by:  Braden Dumont MD     ciprofloxacin-dexamethasone 0.3-0.1% 0.3-0.1 % Drps  Commonly known as:  CIPRODEX  Place 4 drops into the right ear 2 (two) times daily.  Started by:  Braden Dumont MD        CHANGE how you take these medications    buPROPion 100 MG TBSR 12 hr tablet  Commonly known as:  WELLBUTRIN SR  Take 1 tablet (100 mg total) by mouth 2 (two) times daily.  What changed:  when to take this     dalfampridine 10 mg Tb12  Commonly known as:  AMPYRA  Take 1 tablet by mouth 2 (two) times daily.  What changed:  when to take this     nystatin cream  Commonly known as:  MYCOSTATIN  Apply topically 2 (two) times daily.  What changed:    · when to take this  · reasons to take this        CONTINUE taking these medications    ALPRAZolam 0.25 MG tablet  Commonly known as:   XANAX  Take 1 tablet (0.25 mg total) by mouth 2 (two) times daily as needed for Anxiety.     baclofen 20 MG tablet  Commonly known as:  LIORESAL  TAKE 1 TABLET (20 MG TOTAL) BY MOUTH 3 (THREE) TIMES DAILY.     butalbital-acetaminophen-caffeine -40 mg -40 mg per tablet  Commonly known as:  FIORICET, ESGIC  Take 1 tablet at onset of migraine.  Limit to 3 tabs a week.     doxepin 50 MG capsule  Commonly known as:  SINEQUAN  Take 1 capsule (50 mg total) by mouth nightly as needed.     esomeprazole 40 MG capsule  Commonly known as:  NEXIUM  Take 1 capsule (40 mg total) by mouth before breakfast.     fluconazole 150 MG Tab  Commonly known as:  DIFLUCAN  TAKE 1 TABLET (150 MG TOTAL) BY MOUTH EVERY 3 (THREE) DAYS. FOR 2 DOSES     hydroCHLOROthiazide 12.5 MG Tab  Commonly known as:  HYDRODIURIL  Take 1 tablet (12.5 mg total) by mouth once daily.     HYDROcodone-acetaminophen  mg per tablet  Commonly known as:  NORCO     interferon beta-1a 30 mcg/0.5 mL syringe  Commonly known as:  AVONEX  Inject 0.5 mLs (30 mcg total) into the muscle once a week.     linaclotide 145 mcg Cap capsule  Commonly known as:  LINZESS  Take 1 capsule (145 mcg total) by mouth once daily.     lorcaserin 10 mg Tab  Commonly known as:  BELVIQ  Take 10 mg by mouth 2 (two) times daily.     ox-mzfyqbccwlyxwtfn-N16-hrb236 1-1-500 mg Cap  Take 1 tablet by mouth once daily.     METAMUCIL 0.4 gram Cap  Generic drug:  psyllium husk     metFORMIN 500 MG tablet  Commonly known as:  GLUCOPHAGE  Take 1 tablet (500 mg total) by mouth 2 (two) times daily with meals.     mupirocin 2 % ointment  Commonly known as:  BACTROBAN  Apply topically 2 (two) times daily. Apply to affected area     promethazine 25 MG tablet  Commonly known as:  PHENERGAN  Take 1 tablet (25 mg total) by mouth every 4 (four) hours as needed for Nausea.     valACYclovir 500 MG tablet  Commonly known as:  VALTREX  Take 1 tablet (500 mg total) by mouth once daily.        STOP taking  these medications    armodafinil 150 mg tablet  Commonly known as:  NUVIGIL  Stopped by:  Braden Dumont MD     clindamycin 300 MG capsule  Commonly known as:  CLEOCIN  Stopped by:  Braden Dumont MD     DULoxetine 30 MG capsule  Commonly known as:  CYMBALTA  Stopped by:  Braden Dumont MD     meclizine 25 mg tablet  Commonly known as:  ANTIVERT  Stopped by:  Braden Dumont MD     phenylephrine-DM-guaifenesin 10-17.5-385 mg Tab  Stopped by:  Braden Dumont MD           Where to Get Your Medications      These medications were sent to [facility]/pharmacy #[id number] - AMBIKA Perdomo - [address] AT [facility]  [address], [place] [address]    Phone:  [phone]   · amoxicillin-clavulanate 875-125mg 875-125 mg per tablet  · ciprofloxacin-dexamethasone 0.3-0.1% 0.3-0.1 % Carie

## 2019-01-09 LAB
RPR SER QL: REACTIVE
RPR SER-TITR: ABNORMAL {TITER}

## 2019-01-10 ENCOUNTER — TELEPHONE (OUTPATIENT)
Dept: INTERNAL MEDICINE | Facility: CLINIC | Age: 39
End: 2019-01-10

## 2019-01-10 ENCOUNTER — PATIENT MESSAGE (OUTPATIENT)
Dept: INTERNAL MEDICINE | Facility: CLINIC | Age: 39
End: 2019-01-10

## 2019-01-10 DIAGNOSIS — A53.9 SYPHILIS: Primary | ICD-10-CM

## 2019-01-10 NOTE — TELEPHONE ENCOUNTER
----- Message from Braden Dumont MD sent at 1/10/2019  8:16 AM CST -----  Please let her know her ratio has decreased which is good. I want to recheck again in 3 months.

## 2019-01-14 LAB — T PALLIDUM AB SER QL IF: REACTIVE

## 2019-01-15 ENCOUNTER — NURSE TRIAGE (OUTPATIENT)
Dept: ADMINISTRATIVE | Facility: CLINIC | Age: 39
End: 2019-01-15

## 2019-01-15 ENCOUNTER — PATIENT MESSAGE (OUTPATIENT)
Dept: NEUROLOGY | Facility: CLINIC | Age: 39
End: 2019-01-15

## 2019-01-16 ENCOUNTER — PATIENT OUTREACH (OUTPATIENT)
Dept: OTHER | Facility: OTHER | Age: 39
End: 2019-01-16

## 2019-01-16 NOTE — PROGRESS NOTES
Last 5 Patient Entered Readings                                      Current 30 Day Average: 129/72     Recent Readings 1/16/2019 1/8/2019 12/18/2018 12/13/2018 12/10/2018    SBP (mmHg) 120 127 130 160 120    DBP (mmHg) 78 60 84 80 80    Pulse - - 74 - -        Digital Medicine: Health  Follow Up    Lifestyle Modifications:    1.Dietary Modifications (Sodium intake <2,000mg/day, food labels, dining out):    Patient has been cooking with [employer] and states that her portions have been better, denies drinking any soda. Denies using sodium. Feels that this, along with exercise has helped her lose some weight. Continues to focus on the to keep progressing    2.Physical Activity:    Patient has been exercising and losing weight. Reports that she is down to 293 lbs right now.     3.Medication Therapy: Patient has been compliant with the medication regimen.   Sometimes patient admits that she misses a dose    4.Patient has the following medication side effects/concerns:   (Frequency/Alleviating factors/Precipitating factors, etc.)     Follow up with Ms. Alicia Soliz completed. No further questions or concerns. Will continue to follow up to achieve health goals.

## 2019-01-16 NOTE — TELEPHONE ENCOUNTER
Reason for Disposition   Severe pain in one eye    Protocols used: ST HEADACHE-A-AH    -burning to scalp (started around 1300), continuous  -pain in left eye (rated as 9/10)  -hx of partial paralysis on left side, denies it being worse

## 2019-01-18 ENCOUNTER — PATIENT MESSAGE (OUTPATIENT)
Dept: INTERNAL MEDICINE | Facility: CLINIC | Age: 39
End: 2019-01-18

## 2019-01-18 RX ORDER — GUAIFENESIN 600 MG/1
1200 TABLET, EXTENDED RELEASE ORAL 2 TIMES DAILY
Qty: 40 TABLET | Refills: 0 | Status: SHIPPED | OUTPATIENT
Start: 2019-01-18 | End: 2019-01-28

## 2019-01-21 RX ORDER — BUPROPION HYDROCHLORIDE 100 MG/1
TABLET, EXTENDED RELEASE ORAL
Qty: 60 TABLET | Refills: 6 | Status: SHIPPED | OUTPATIENT
Start: 2019-01-21 | End: 2019-11-11

## 2019-01-23 ENCOUNTER — TELEPHONE (OUTPATIENT)
Dept: INTERNAL MEDICINE | Facility: CLINIC | Age: 39
End: 2019-01-23

## 2019-01-23 ENCOUNTER — PATIENT MESSAGE (OUTPATIENT)
Dept: INTERNAL MEDICINE | Facility: CLINIC | Age: 39
End: 2019-01-23

## 2019-01-23 RX ORDER — FLUCONAZOLE 150 MG/1
TABLET ORAL
Qty: 2 TABLET | Refills: 0 | Status: SHIPPED | OUTPATIENT
Start: 2019-01-23 | End: 2019-05-03 | Stop reason: SDUPTHER

## 2019-01-23 NOTE — TELEPHONE ENCOUNTER
Called pt she reports vaginal discharge times 2 days.  No odor.  Would a prescription for Diflucan.  Please advise.

## 2019-01-24 ENCOUNTER — PATIENT MESSAGE (OUTPATIENT)
Dept: INTERNAL MEDICINE | Facility: CLINIC | Age: 39
End: 2019-01-24

## 2019-01-24 NOTE — TELEPHONE ENCOUNTER
Called pt left message medication refilled yesterday morning by MD.  To call office if any questions.

## 2019-01-25 ENCOUNTER — PATIENT MESSAGE (OUTPATIENT)
Dept: SURGERY | Facility: CLINIC | Age: 39
End: 2019-01-25

## 2019-01-25 ENCOUNTER — PATIENT MESSAGE (OUTPATIENT)
Dept: NEUROLOGY | Facility: CLINIC | Age: 39
End: 2019-01-25

## 2019-01-25 ENCOUNTER — PATIENT MESSAGE (OUTPATIENT)
Dept: INTERNAL MEDICINE | Facility: CLINIC | Age: 39
End: 2019-01-25

## 2019-01-28 ENCOUNTER — PATIENT MESSAGE (OUTPATIENT)
Dept: SURGERY | Facility: CLINIC | Age: 39
End: 2019-01-28

## 2019-02-01 ENCOUNTER — PATIENT MESSAGE (OUTPATIENT)
Dept: INTERNAL MEDICINE | Facility: CLINIC | Age: 39
End: 2019-02-01

## 2019-02-01 ENCOUNTER — PATIENT MESSAGE (OUTPATIENT)
Dept: NEUROLOGY | Facility: CLINIC | Age: 39
End: 2019-02-01

## 2019-02-08 ENCOUNTER — PATIENT MESSAGE (OUTPATIENT)
Dept: DIABETES | Facility: CLINIC | Age: 39
End: 2019-02-08

## 2019-02-08 ENCOUNTER — PATIENT MESSAGE (OUTPATIENT)
Dept: INTERNAL MEDICINE | Facility: CLINIC | Age: 39
End: 2019-02-08

## 2019-02-08 ENCOUNTER — TELEPHONE (OUTPATIENT)
Dept: UROLOGY | Facility: CLINIC | Age: 39
End: 2019-02-08

## 2019-02-08 DIAGNOSIS — E66.01 MORBID OBESITY WITH BMI OF 45.0-49.9, ADULT: Primary | ICD-10-CM

## 2019-02-08 NOTE — TELEPHONE ENCOUNTER
----- Message from Chloe Conklin sent at 2/8/2019  8:59 AM CST -----  Contact: pt  The pt request a call to reschedule her 02/13/2019 appt, the pt can be reached at [phone]///thxMW

## 2019-02-11 ENCOUNTER — OFFICE VISIT (OUTPATIENT)
Dept: INTERNAL MEDICINE | Facility: CLINIC | Age: 39
End: 2019-02-11
Payer: MEDICARE

## 2019-02-11 ENCOUNTER — PATIENT MESSAGE (OUTPATIENT)
Dept: INTERNAL MEDICINE | Facility: CLINIC | Age: 39
End: 2019-02-11

## 2019-02-11 ENCOUNTER — LAB VISIT (OUTPATIENT)
Dept: LAB | Facility: HOSPITAL | Age: 39
End: 2019-02-11
Payer: MEDICARE

## 2019-02-11 ENCOUNTER — PATIENT MESSAGE (OUTPATIENT)
Dept: SURGERY | Facility: CLINIC | Age: 39
End: 2019-02-11

## 2019-02-11 VITALS
WEIGHT: 293 LBS | DIASTOLIC BLOOD PRESSURE: 102 MMHG | TEMPERATURE: 98 F | OXYGEN SATURATION: 100 % | HEIGHT: 66 IN | HEART RATE: 90 BPM | SYSTOLIC BLOOD PRESSURE: 152 MMHG | BODY MASS INDEX: 47.09 KG/M2

## 2019-02-11 DIAGNOSIS — Z12.39 SCREENING FOR BREAST CANCER: ICD-10-CM

## 2019-02-11 DIAGNOSIS — R73.9 HYPERGLYCEMIA: ICD-10-CM

## 2019-02-11 DIAGNOSIS — G35 MULTIPLE SCLEROSIS: Primary | ICD-10-CM

## 2019-02-11 DIAGNOSIS — E78.5 HYPERLIPIDEMIA, UNSPECIFIED HYPERLIPIDEMIA TYPE: ICD-10-CM

## 2019-02-11 DIAGNOSIS — E03.9 HYPOTHYROIDISM, UNSPECIFIED TYPE: ICD-10-CM

## 2019-02-11 DIAGNOSIS — Z11.4 ENCOUNTER FOR SCREENING FOR HIV: ICD-10-CM

## 2019-02-11 DIAGNOSIS — G35 MULTIPLE SCLEROSIS: ICD-10-CM

## 2019-02-11 DIAGNOSIS — I10 HYPERTENSION, UNSPECIFIED TYPE: ICD-10-CM

## 2019-02-11 DIAGNOSIS — Z20.2 POSSIBLE EXPOSURE TO STD: ICD-10-CM

## 2019-02-11 DIAGNOSIS — E66.01 MORBID OBESITY WITH BMI OF 45.0-49.9, ADULT: ICD-10-CM

## 2019-02-11 PROCEDURE — 99214 OFFICE O/P EST MOD 30 MIN: CPT | Mod: S$GLB,,, | Performed by: FAMILY MEDICINE

## 2019-02-11 PROCEDURE — 3077F SYST BP >= 140 MM HG: CPT | Mod: CPTII,S$GLB,, | Performed by: FAMILY MEDICINE

## 2019-02-11 PROCEDURE — 3080F DIAST BP >= 90 MM HG: CPT | Mod: CPTII,S$GLB,, | Performed by: FAMILY MEDICINE

## 2019-02-11 PROCEDURE — 84439 ASSAY OF FREE THYROXINE: CPT

## 2019-02-11 PROCEDURE — 86703 HIV-1/HIV-2 1 RESULT ANTBDY: CPT

## 2019-02-11 PROCEDURE — 3080F PR MOST RECENT DIASTOLIC BLOOD PRESSURE >= 90 MM HG: ICD-10-PCS | Mod: CPTII,S$GLB,, | Performed by: FAMILY MEDICINE

## 2019-02-11 PROCEDURE — 36415 COLL VENOUS BLD VENIPUNCTURE: CPT | Mod: PO

## 2019-02-11 PROCEDURE — 80061 LIPID PANEL: CPT

## 2019-02-11 PROCEDURE — 3008F BODY MASS INDEX DOCD: CPT | Mod: CPTII,S$GLB,, | Performed by: FAMILY MEDICINE

## 2019-02-11 PROCEDURE — 99999 PR PBB SHADOW E&M-EST. PATIENT-LVL III: ICD-10-PCS | Mod: PBBFAC,,, | Performed by: FAMILY MEDICINE

## 2019-02-11 PROCEDURE — 83036 HEMOGLOBIN GLYCOSYLATED A1C: CPT

## 2019-02-11 PROCEDURE — 84443 ASSAY THYROID STIM HORMONE: CPT

## 2019-02-11 PROCEDURE — 3008F PR BODY MASS INDEX (BMI) DOCUMENTED: ICD-10-PCS | Mod: CPTII,S$GLB,, | Performed by: FAMILY MEDICINE

## 2019-02-11 PROCEDURE — 99999 PR PBB SHADOW E&M-EST. PATIENT-LVL III: CPT | Mod: PBBFAC,,, | Performed by: FAMILY MEDICINE

## 2019-02-11 PROCEDURE — 99214 PR OFFICE/OUTPT VISIT, EST, LEVL IV, 30-39 MIN: ICD-10-PCS | Mod: S$GLB,,, | Performed by: FAMILY MEDICINE

## 2019-02-11 PROCEDURE — 3077F PR MOST RECENT SYSTOLIC BLOOD PRESSURE >= 140 MM HG: ICD-10-PCS | Mod: CPTII,S$GLB,, | Performed by: FAMILY MEDICINE

## 2019-02-11 NOTE — PROGRESS NOTES
Subjective:      Patient ID: Alicia Soliz is a 38 y.o. female.    Chief Complaint: Annual Exam      Patient here today for routine follow up.   Her MS is fairly stable, she is walking again in PT, doing yoga which she finds helps her mentally as well.   She is requesting to check HIV again, concerned that previous tests may have been false negatives, no new symptoms.   Her HTN has been well controlled, did not take medications this morning because she was fasting for labs.  She is discouraged about recent weight gain, has been eating within caloric allowances, had requested referral for second opinion for possible bariatric surgery.       Review of Systems   Constitutional: Positive for unexpected weight change. Negative for activity change, appetite change, fatigue and fever.   HENT: Negative for congestion and ear pain.    Eyes: Negative for visual disturbance.   Respiratory: Negative for chest tightness.    Cardiovascular: Negative for chest pain and leg swelling.   Gastrointestinal: Negative for abdominal pain, constipation, diarrhea, nausea and vomiting.   Endocrine: Negative for polydipsia, polyphagia and polyuria.   Musculoskeletal: Positive for arthralgias and gait problem.   Skin: Negative for color change.   Allergic/Immunologic: Negative for immunocompromised state.   Neurological: Negative for dizziness.   Psychiatric/Behavioral: Negative for dysphoric mood. The patient is not nervous/anxious.      Past Medical History:   Diagnosis Date    Anemia     Arthritis     Cardiac arrest as complication of care     pt states she went into cardiac arrest from an allergic reaction to a medication    Encounter for blood transfusion     Hemiplegia due to old stroke     Hypertension     Multiple sclerosis     Stroke      Past Surgical History:   Procedure Laterality Date    APPENDECTOMY      CHOLECYSTECTOMY      HYSTERECTOMY      KNEE SURGERY      TONSILLECTOMY      TUBAL LIGATION      VASCULAR  "CATH INSERTION Right 1/22/2015    Performed by [facility]ron [facility]ques MD at [facility] CATH LAB     Family History   Problem Relation Age of Onset    Lupus Mother     Heart disease Mother     Diabetes Father     Kidney disease Father     Cancer Maternal Aunt 40        breast    Breast cancer Maternal Aunt     Breast cancer Cousin     Breast cancer Cousin      Social History     Socioeconomic History    Marital status:      Spouse name: Not on file    Number of children: Not on file    Years of education: Not on file    Highest education level: Not on file   Social Needs    Financial resource strain: Not on file    Food insecurity - worry: Not on file    Food insecurity - inability: Not on file    Transportation needs - medical: Not on file    Transportation needs - non-medical: Not on file   Occupational History     Employer: [employer]   Tobacco Use    Smoking status: Never Smoker    Smokeless tobacco: Never Used   Substance and Sexual Activity    Alcohol use: Yes     Comment: OCCASIONALLY    Drug use: No    Sexual activity: Yes     Partners: Male   Other Topics Concern    Not on file   Social History Narrative    Not on file     Review of patient's allergies indicates:   Allergen Reactions    Demerol [meperidine] Itching     Other reaction(s): Itching    Dilaudid [hydromorphone (bulk)] Anaphylaxis     Other reaction(s): Anaphylaxis  "coded" per pt    Prednisone Itching     Other reaction(s): Itching    Morphine     Tramadol        Objective:       BP (!) 152/102 (BP Location: Right arm)   Pulse 90   Temp 98.2 °F (36.8 °C)   Ht 5' 6" (1.676 m)   Wt (!) 147.2 kg (324 lb 8.3 oz)   SpO2 100%   BMI 52.38 kg/m²   Physical Exam   Constitutional: She is oriented to person, place, and time. Vital signs are normal. She appears well-developed and well-nourished. No distress.   HENT:   Head: Normocephalic and atraumatic.   Right Ear: Hearing, tympanic membrane, external ear and ear canal normal.   Left " Ear: Hearing, tympanic membrane, external ear and ear canal normal.   Nose: Nose normal.   Mouth/Throat: Uvula is midline, oropharynx is clear and moist and mucous membranes are normal. No oropharyngeal exudate.   Eyes: Conjunctivae and EOM are normal. Pupils are equal, round, and reactive to light.   Neck: Normal range of motion. Neck supple. No tracheal deviation present. No thyromegaly present.   Cardiovascular: Normal rate, regular rhythm, normal heart sounds and intact distal pulses.   No murmur heard.  Pulmonary/Chest: Effort normal and breath sounds normal. No respiratory distress.   Abdominal: Soft. Bowel sounds are normal. There is no tenderness. There is no guarding. No hernia.   Musculoskeletal: She exhibits no edema or tenderness.   Lymphadenopathy:     She has no cervical adenopathy.   Neurological: She is alert and oriented to person, place, and time.   Skin: Skin is warm and dry. Capillary refill takes less than 2 seconds. She is not diaphoretic.   Psychiatric: She has a normal mood and affect. Her behavior is normal. Judgment and thought content normal.   Nursing note and vitals reviewed.    Assessment:     1. Multiple sclerosis    2. Possible exposure to STD    3. Hypertension, unspecified type    4. Morbid obesity with BMI of 45.0-49.9, adult    5. Hyperlipidemia, unspecified hyperlipidemia type    6. Hyperglycemia    7. Encounter for screening for HIV     8. Screening for breast cancer    9. Hypothyroidism, unspecified type      Plan:   Multiple sclerosis  -     Lipid panel; Future; Expected date: 02/11/2019  -     Cancel: TSH; Future; Expected date: 02/11/2019  -     Cancel: T4, free; Future; Expected date: 02/11/2019  -     Hemoglobin A1c; Future; Expected date: 02/11/2019  -     TSH; Future; Expected date: 02/11/2019  -     T4, free; Future; Expected date: 02/11/2019    Possible exposure to STD  -     HIV 1/2 Ag/Ab (4th Gen); Future; Expected date: 02/11/2019    Hypertension, unspecified  type    Morbid obesity with BMI of 45.0-49.9, adult  -     Lipid panel; Future; Expected date: 02/11/2019  -     Cancel: TSH; Future; Expected date: 02/11/2019  -     Cancel: T4, free; Future; Expected date: 02/11/2019  -     Hemoglobin A1c; Future; Expected date: 02/11/2019    Hyperlipidemia, unspecified hyperlipidemia type  -     Lipid panel; Future; Expected date: 02/11/2019    Hyperglycemia  -     Hemoglobin A1c; Future; Expected date: 02/11/2019    Encounter for screening for HIV   -     HIV 1/2 Ag/Ab (4th Gen); Future; Expected date: 02/11/2019    Screening for breast cancer  -     Mammo Digital Screening Bilat; Future; Expected date: 02/11/2019    Hypothyroidism, unspecified type  -     TSH; Future; Expected date: 02/11/2019  -     T4, free; Future; Expected date: 02/11/2019      Medication List with Changes/Refills   Current Medications    ALPRAZOLAM (XANAX) 0.25 MG TABLET    Take 1 tablet (0.25 mg total) by mouth 2 (two) times daily as needed for Anxiety.    BACLOFEN (LIORESAL) 20 MG TABLET    TAKE 1 TABLET (20 MG TOTAL) BY MOUTH 3 (THREE) TIMES DAILY.    BUPROPION (WELLBUTRIN SR) 100 MG TBSR 12 HR TABLET    TAKE 1 TABLET BY MOUTH TWICE A DAY    BUTALBITAL-ACETAMINOPHEN-CAFFEINE -40 MG (FIORICET, ESGIC) -40 MG PER TABLET    Take 1 tablet at onset of migraine.  Limit to 3 tabs a week.    CIPROFLOXACIN-DEXAMETHASONE 0.3-0.1% (CIPRODEX) 0.3-0.1 % DRPS    Place 4 drops into the right ear 2 (two) times daily.    DALFAMPRIDINE (AMPYRA) 10 MG TB12    Take 1 tablet by mouth 2 (two) times daily.    DOXEPIN (SINEQUAN) 50 MG CAPSULE    Take 1 capsule (50 mg total) by mouth nightly as needed.    ESOMEPRAZOLE (NEXIUM) 40 MG CAPSULE    Take 1 capsule (40 mg total) by mouth before breakfast.    FLUCONAZOLE (DIFLUCAN) 150 MG TAB    TAKE 1 TABLET (150 MG TOTAL) BY MOUTH EVERY 3 (THREE) DAYS. FOR 2 DOSES    HYDROCHLOROTHIAZIDE (HYDRODIURIL) 12.5 MG TAB    Take 1 tablet (12.5 mg total) by mouth once daily.     HYDROCODONE-ACETAMINOPHEN 10-325MG (NORCO)  MG TAB    Take by mouth every 6 (six) hours as needed for Pain.    INTERFERON BETA-1A (AVONEX) 30 MCG/0.5 ML SYRINGE    Inject 0.5 mLs (30 mcg total) into the muscle once a week.    LINACLOTIDE (LINZESS) 145 MCG CAP CAPSULE    Take 1 capsule (145 mcg total) by mouth once daily.    [id number] 1-1-500 MG CAP    Take 1 tablet by mouth once daily.    METFORMIN (GLUCOPHAGE) 500 MG TABLET    Take 1 tablet (500 mg total) by mouth 2 (two) times daily with meals.    MUPIROCIN (BACTROBAN) 2 % OINTMENT    Apply topically 2 (two) times daily. Apply to affected area    NYSTATIN (MYCOSTATIN) CREAM    Apply topically 2 (two) times daily.    PROMETHAZINE (PHENERGAN) 25 MG TABLET    Take 1 tablet (25 mg total) by mouth every 4 (four) hours as needed for Nausea.    PSYLLIUM HUSK (METAMUCIL) 0.4 GRAM CAP    Take by mouth once daily.    VALACYCLOVIR (VALTREX) 500 MG TABLET    Take 1 tablet (500 mg total) by mouth once daily.   Discontinued Medications    AMOXICILLIN-CLAVULANATE 875-125MG (AUGMENTIN) 875-125 MG PER TABLET    Take 1 tablet by mouth every 12 (twelve) hours.    LORCASERIN (BELVIQ) 10 MG TAB    Take 10 mg by mouth 2 (two) times daily.

## 2019-02-12 ENCOUNTER — PATIENT MESSAGE (OUTPATIENT)
Dept: INTERNAL MEDICINE | Facility: CLINIC | Age: 39
End: 2019-02-12

## 2019-02-12 LAB
CHOLEST SERPL-MCNC: 194 MG/DL
CHOLEST/HDLC SERPL: 3.3 {RATIO}
ESTIMATED AVG GLUCOSE: 123 MG/DL
HBA1C MFR BLD HPLC: 5.9 %
HDLC SERPL-MCNC: 59 MG/DL
HDLC SERPL: 30.4 %
HIV 1+2 AB+HIV1 P24 AG SERPL QL IA: NEGATIVE
LDLC SERPL CALC-MCNC: 118.2 MG/DL
NONHDLC SERPL-MCNC: 135 MG/DL
T4 FREE SERPL-MCNC: 1.09 NG/DL
TRIGL SERPL-MCNC: 84 MG/DL
TSH SERPL DL<=0.005 MIU/L-ACNC: 1.65 UIU/ML

## 2019-02-14 ENCOUNTER — PATIENT MESSAGE (OUTPATIENT)
Dept: INTERNAL MEDICINE | Facility: CLINIC | Age: 39
End: 2019-02-14

## 2019-02-18 ENCOUNTER — PATIENT OUTREACH (OUTPATIENT)
Dept: OTHER | Facility: OTHER | Age: 39
End: 2019-02-18

## 2019-02-18 NOTE — PROGRESS NOTES
Last 5 Patient Entered Readings                                      Current 30 Day Average: 120/80     Recent Readings 1/22/2019 1/18/2019 1/18/2019 1/16/2019 1/8/2019    SBP (mmHg) 120 120 120 120 127    DBP (mmHg) 80 72 80 78 60          Digital Medicine: Health  Follow Up    Left voicemail to follow up with Ms. Alicia Soliz.  Current BP average 120/80 mmHg is at goal, 130/80    Requested more readings

## 2019-02-18 NOTE — PROGRESS NOTES
Last 5 Patient Entered Readings                                      Current 30 Day Average: 120/80     Recent Readings 1/22/2019 1/18/2019 1/18/2019 1/16/2019 1/8/2019    SBP (mmHg) 120 120 120 120 127    DBP (mmHg) 80 72 80 78 60          Defer to health  outreach to address lack of readings

## 2019-02-20 ENCOUNTER — PATIENT MESSAGE (OUTPATIENT)
Dept: INTERNAL MEDICINE | Facility: CLINIC | Age: 39
End: 2019-02-20

## 2019-03-04 ENCOUNTER — TELEPHONE (OUTPATIENT)
Dept: FAMILY MEDICINE | Facility: CLINIC | Age: 39
End: 2019-03-04

## 2019-03-04 ENCOUNTER — TELEPHONE (OUTPATIENT)
Dept: INTERNAL MEDICINE | Facility: CLINIC | Age: 39
End: 2019-03-04

## 2019-03-04 ENCOUNTER — TELEPHONE (OUTPATIENT)
Dept: NEUROLOGY | Facility: CLINIC | Age: 39
End: 2019-03-04

## 2019-03-04 NOTE — TELEPHONE ENCOUNTER
----- Message from Angela Mitchell sent at 3/4/2019  3:22 PM CST -----  Contact: se;f  Type:  Patient Returning Call    Who Called:patient  Who Left Message for Patient:ms metzger  Does the patient know what this is regarding?:unsure  Would the patient rather a call back or a response via [facility]? call  Best Call Back Number:[phone]  Additional Information: n/a

## 2019-03-04 NOTE — TELEPHONE ENCOUNTER
----- Message from Siobhan Orozco sent at 3/4/2019  2:24 PM CST -----  Contact: [phone]  Would like to consult with the nurse, patients would like to get an referral to go see a  Specialties,, in [place] patients would like to speak with someone concerning this referral, please call back at  [phone], thanks sj

## 2019-03-04 NOTE — TELEPHONE ENCOUNTER
----- Message from Siobhan Orozco sent at 3/4/2019  2:19 PM CST -----  Contact: [phone]  Would like to consult with the nurse, patients would like to get an referral to go see a  Specialties,, in [place] patients would like to speak with someone concerning this referral, please call back at  [phone], thanks sj

## 2019-03-04 NOTE — TELEPHONE ENCOUNTER
----- Message from Angela Mitchell sent at 3/4/2019  3:22 PM CST -----  Contact: se;f  Type:  Patient Returning Call    Who Called:patient  Who Left Message for Patient:ms metzger  Does the patient know what this is regarding?:unsure  Would the patient rather a call back or a response via [facility]? call  Best Call Back Number:[phone]  Additional Information: n/a

## 2019-03-05 ENCOUNTER — PATIENT MESSAGE (OUTPATIENT)
Dept: SURGERY | Facility: CLINIC | Age: 39
End: 2019-03-05

## 2019-03-05 ENCOUNTER — OFFICE VISIT (OUTPATIENT)
Dept: SURGERY | Facility: CLINIC | Age: 39
End: 2019-03-05
Payer: MEDICARE

## 2019-03-05 VITALS
SYSTOLIC BLOOD PRESSURE: 120 MMHG | HEART RATE: 89 BPM | BODY MASS INDEX: 47.09 KG/M2 | WEIGHT: 293 LBS | HEIGHT: 66 IN | DIASTOLIC BLOOD PRESSURE: 84 MMHG | TEMPERATURE: 98 F

## 2019-03-05 DIAGNOSIS — G35 MULTIPLE SCLEROSIS: Primary | ICD-10-CM

## 2019-03-05 PROCEDURE — 3079F DIAST BP 80-89 MM HG: CPT | Mod: CPTII,S$GLB,, | Performed by: SURGERY

## 2019-03-05 PROCEDURE — 3008F BODY MASS INDEX DOCD: CPT | Mod: CPTII,S$GLB,, | Performed by: SURGERY

## 2019-03-05 PROCEDURE — 3079F PR MOST RECENT DIASTOLIC BLOOD PRESSURE 80-89 MM HG: ICD-10-PCS | Mod: CPTII,S$GLB,, | Performed by: SURGERY

## 2019-03-05 PROCEDURE — 99999 PR PBB SHADOW E&M-EST. PATIENT-LVL III: ICD-10-PCS | Mod: PBBFAC,,, | Performed by: SURGERY

## 2019-03-05 PROCEDURE — 3008F PR BODY MASS INDEX (BMI) DOCUMENTED: ICD-10-PCS | Mod: CPTII,S$GLB,, | Performed by: SURGERY

## 2019-03-05 PROCEDURE — 99214 OFFICE O/P EST MOD 30 MIN: CPT | Mod: S$GLB,,, | Performed by: SURGERY

## 2019-03-05 PROCEDURE — 3074F PR MOST RECENT SYSTOLIC BLOOD PRESSURE < 130 MM HG: ICD-10-PCS | Mod: CPTII,S$GLB,, | Performed by: SURGERY

## 2019-03-05 PROCEDURE — 99214 PR OFFICE/OUTPT VISIT, EST, LEVL IV, 30-39 MIN: ICD-10-PCS | Mod: S$GLB,,, | Performed by: SURGERY

## 2019-03-05 PROCEDURE — 3074F SYST BP LT 130 MM HG: CPT | Mod: CPTII,S$GLB,, | Performed by: SURGERY

## 2019-03-05 PROCEDURE — 99999 PR PBB SHADOW E&M-EST. PATIENT-LVL III: CPT | Mod: PBBFAC,,, | Performed by: SURGERY

## 2019-03-05 RX ORDER — DALFAMPRIDINE 10 MG/1
1 TABLET, FILM COATED, EXTENDED RELEASE ORAL
COMMUNITY
Start: 2019-02-13 | End: 2019-04-10

## 2019-03-05 NOTE — LETTER
March 5, 2019      Braden Dumont MD  [address] [facility] [id number] [place] [address]  [address] [facility] [address]  Phone: [phone]  Fax: [phone]          Patient: Alicia Soliz   MR Number: [id number]   YOB: 1980   Date of Visit: 3/5/2019       Dear Dr. Braden Dumont:    Thank you for referring Alicia Soliz to me for evaluation. Attached you will find relevant portions of my assessment and plan of care.    If you have questions, please do not hesitate to call me. I look forward to following Alicia Soliz along with you.    Sincerely,    Michael Navarrete MD    Enclosure  CC:  No Recipients    If you would like to receive this communication electronically, please contact [email or web address] or [phone] to request more information on [employer] access.    For providers and/or their staff who would like to refer a patient to [facility], please contact us through our one-stop-shop provider referral line, [facility] , at [phone].    If you feel you have received this communication in error or would no longer like to receive these types of communications, please e-mail [email or web address]

## 2019-03-05 NOTE — PROGRESS NOTES
Alicia is 39-year-old  female patient who is morbidly obese with known BMI of 53.41 kilogram/meter sq and weighs 230 lb.  She was initially seen over year ago for the consideration of intragastric balloon treatment.  Because of her insurance would not cover the procedure, she had not followed through with the procedure. She has history of multiple sclerosis and has recently been getting worse and had require steroid treatment.  The steroid treatment however cause her or severe overweight.  And since her multiple sclerosis is stabilized, she has been on a dietary plans to control her weight and she had done some remarkable progression.  She had lost significant amount of excess weight compared to previous examination.  She presented today for consideration of definitive weight loss surgery, namely sleeve gastrectomy.  The patient and I had discussed about the risk and the benefit of the procedure as well as the treatment algorithm.  My recommendation is that she needs to revisit with our bariatric dietician as for counseling and education.  And if she is appropriate and understands the necessary steps to undergo surgical treatment, I will consider her for candidacy.  This was discussed with the patient. She has agreed to proceed.  Total time approximately half an hour was spent with this patient, and more than 50% of that was spent for treatment planning and counseling.    Michael Navarrete

## 2019-03-06 ENCOUNTER — TELEPHONE (OUTPATIENT)
Dept: NEUROLOGY | Facility: CLINIC | Age: 39
End: 2019-03-06

## 2019-03-06 ENCOUNTER — PATIENT MESSAGE (OUTPATIENT)
Dept: NEUROLOGY | Facility: CLINIC | Age: 39
End: 2019-03-06

## 2019-03-06 DIAGNOSIS — G35 MULTIPLE SCLEROSIS: Primary | ICD-10-CM

## 2019-03-06 NOTE — PROGRESS NOTES
Last 5 Patient Entered Readings                                      Current 30 Day Average: 120/80     Recent Readings 2/24/2019 1/22/2019 1/18/2019 1/18/2019 1/16/2019    SBP (mmHg) 120 120 120 120 120    DBP (mmHg) 80 80 72 80 78        3/6- Left voicemail for follow up

## 2019-03-06 NOTE — TELEPHONE ENCOUNTER
----- Message from Javid Puri sent at 3/6/2019  9:51 AM CST -----  Needs Advice    Reason for call: Pt is calling to schedule an appt w/ the doctor for MS. Pt said Dr. Vela w/ [facility] diagnosed pt w/ MS and is also referring her to Dr. Cordero.        Communication Preference: [phone] and     Additional Information:

## 2019-03-08 ENCOUNTER — PATIENT MESSAGE (OUTPATIENT)
Dept: INTERNAL MEDICINE | Facility: CLINIC | Age: 39
End: 2019-03-08

## 2019-03-08 RX ORDER — CIPROFLOXACIN AND DEXAMETHASONE 3; 1 MG/ML; MG/ML
4 SUSPENSION/ DROPS AURICULAR (OTIC) 2 TIMES DAILY
Qty: 7.5 ML | Refills: 1 | Status: SHIPPED | OUTPATIENT
Start: 2019-03-08 | End: 2019-11-11

## 2019-03-09 ENCOUNTER — PATIENT MESSAGE (OUTPATIENT)
Dept: ADMINISTRATIVE | Facility: OTHER | Age: 39
End: 2019-03-09

## 2019-03-13 ENCOUNTER — TELEPHONE (OUTPATIENT)
Dept: UROLOGY | Facility: CLINIC | Age: 39
End: 2019-03-13

## 2019-03-13 NOTE — TELEPHONE ENCOUNTER
----- Message from Pollo Abernathy sent at 3/13/2019 12:30 PM CDT -----  Contact: Pt  Please give pt a call at ..[phone] (home) [phone] (work) regarding her upcoming procedure

## 2019-03-14 ENCOUNTER — PATIENT MESSAGE (OUTPATIENT)
Dept: INTERNAL MEDICINE | Facility: CLINIC | Age: 39
End: 2019-03-14

## 2019-03-14 ENCOUNTER — PATIENT MESSAGE (OUTPATIENT)
Dept: NEUROLOGY | Facility: CLINIC | Age: 39
End: 2019-03-14

## 2019-03-14 ENCOUNTER — TELEPHONE (OUTPATIENT)
Dept: UROLOGY | Facility: CLINIC | Age: 39
End: 2019-03-14

## 2019-03-15 RX ORDER — BUTALBITAL, ACETAMINOPHEN AND CAFFEINE 50; 325; 40 MG/1; MG/1; MG/1
TABLET ORAL
Qty: 12 TABLET | Refills: 3 | Status: SHIPPED | OUTPATIENT
Start: 2019-03-15 | End: 2019-11-11

## 2019-03-15 RX ORDER — BUTALBITAL, ACETAMINOPHEN AND CAFFEINE 50; 325; 40 MG/1; MG/1; MG/1
TABLET ORAL
Qty: 12 TABLET | Refills: 0 | Status: SHIPPED | OUTPATIENT
Start: 2019-03-15 | End: 2019-03-15 | Stop reason: SDUPTHER

## 2019-03-18 ENCOUNTER — TELEPHONE (OUTPATIENT)
Dept: NUTRITION | Facility: CLINIC | Age: 39
End: 2019-03-18

## 2019-03-18 ENCOUNTER — PATIENT MESSAGE (OUTPATIENT)
Dept: OTHER | Facility: OTHER | Age: 39
End: 2019-03-18

## 2019-03-18 NOTE — TELEPHONE ENCOUNTER
Attempted to contact pt regarding double booking for RD consult (For bariatric sx). No answer, left voicemail for pt to return call.

## 2019-03-25 ENCOUNTER — PATIENT MESSAGE (OUTPATIENT)
Dept: NEUROLOGY | Facility: CLINIC | Age: 39
End: 2019-03-25

## 2019-03-27 ENCOUNTER — TELEPHONE (OUTPATIENT)
Dept: NEUROLOGY | Facility: CLINIC | Age: 39
End: 2019-03-27

## 2019-03-27 NOTE — TELEPHONE ENCOUNTER
----- Message from Lottie Beasley sent at 3/27/2019  3:03 PM CDT -----  Contact: [facility]  Caller needs call back rg prior authorization for azonex medication [phone]

## 2019-03-29 NOTE — PROGRESS NOTES
"Last 5 Patient Entered Readings                                      Current 30 Day Average: 120/81     Recent Readings 3/28/2019 3/24/2019 3/9/2019 2/24/2019 1/22/2019    SBP (mmHg) 120 120 119 120 120    DBP (mmHg) 80 80 84 80 80        Digital Medicine: Health  Follow Up    Lifestyle Modifications:    1.Dietary Modifications (Sodium intake <2,000mg/day, food labels, dining out):    Feels that diet is "the same". Watches her portions and snacks on pb with pretzels and sargento cheese snacks. Works to increase her protein.     2.Physical Activity:    Patient continues to work out with her . Typically exercises a little bit every day, and notes that she was able to walk for 15 minutes on the treadmill, and reports that she can walk with only a cane.     3.Medication Therapy: Patient has been compliant with the medication regimen.    4.Patient has the following medication side effects/concerns:   (Frequency/Alleviating factors/Precipitating factors, etc.)     Follow up with Ms. Alicia Soliz completed. No further questions or concerns. Will continue to follow up to achieve health goals.      "

## 2019-04-02 ENCOUNTER — PATIENT MESSAGE (OUTPATIENT)
Dept: NEUROLOGY | Facility: CLINIC | Age: 39
End: 2019-04-02

## 2019-04-08 ENCOUNTER — OFFICE VISIT (OUTPATIENT)
Dept: NEUROLOGY | Facility: CLINIC | Age: 39
End: 2019-04-08
Payer: MEDICARE

## 2019-04-08 VITALS — BODY MASS INDEX: 53.41 KG/M2 | HEIGHT: 66 IN

## 2019-04-08 DIAGNOSIS — G35 MULTIPLE SCLEROSIS: Primary | ICD-10-CM

## 2019-04-08 DIAGNOSIS — G89.29 CHRONIC PAIN OF BOTH KNEES: ICD-10-CM

## 2019-04-08 DIAGNOSIS — E66.01 MORBID OBESITY WITH BMI OF 45.0-49.9, ADULT: ICD-10-CM

## 2019-04-08 DIAGNOSIS — M25.562 CHRONIC PAIN OF BOTH KNEES: ICD-10-CM

## 2019-04-08 DIAGNOSIS — M25.561 CHRONIC PAIN OF BOTH KNEES: ICD-10-CM

## 2019-04-08 DIAGNOSIS — I10 ESSENTIAL HYPERTENSION: ICD-10-CM

## 2019-04-08 DIAGNOSIS — G47.01 INSOMNIA DUE TO MEDICAL CONDITION: ICD-10-CM

## 2019-04-08 PROCEDURE — 3008F BODY MASS INDEX DOCD: CPT | Mod: CPTII,S$GLB,ICN, | Performed by: PSYCHIATRY & NEUROLOGY

## 2019-04-08 PROCEDURE — 99999 PR PBB SHADOW E&M-EST. PATIENT-LVL III: CPT | Mod: PBBFAC,,, | Performed by: PSYCHIATRY & NEUROLOGY

## 2019-04-08 PROCEDURE — 99215 OFFICE O/P EST HI 40 MIN: CPT | Mod: GC,S$GLB,ICN, | Performed by: PSYCHIATRY & NEUROLOGY

## 2019-04-08 PROCEDURE — 99354 PR PROLONGED SVC, OUPT, 1ST HR: CPT | Mod: GC,S$GLB,ICN, | Performed by: PSYCHIATRY & NEUROLOGY

## 2019-04-08 PROCEDURE — 99999 PR PBB SHADOW E&M-EST. PATIENT-LVL III: ICD-10-PCS | Mod: PBBFAC,,, | Performed by: PSYCHIATRY & NEUROLOGY

## 2019-04-08 PROCEDURE — 3008F PR BODY MASS INDEX (BMI) DOCUMENTED: ICD-10-PCS | Mod: CPTII,S$GLB,ICN, | Performed by: PSYCHIATRY & NEUROLOGY

## 2019-04-08 PROCEDURE — 99354 PR PROLONGED SVC, OUPT, 1ST HR: ICD-10-PCS | Mod: GC,S$GLB,ICN, | Performed by: PSYCHIATRY & NEUROLOGY

## 2019-04-08 PROCEDURE — 99215 PR OFFICE/OUTPT VISIT, EST, LEVL V, 40-54 MIN: ICD-10-PCS | Mod: GC,S$GLB,ICN, | Performed by: PSYCHIATRY & NEUROLOGY

## 2019-04-08 NOTE — LETTER
April 16, 2019      Salvatore Vela MD  [address]  [place] [address]           [facility]  [address]  [facility] [address]  Phone: [phone]          Patient: Alicia Soliz   MR Number: [id number]   YOB: 1980   Date of Visit: 4/8/2019       Dear Dr. Salvatore Vela:    Thank you for referring Alicia Soliz to me for evaluation. Attached you will find relevant portions of my assessment and plan of care.    If you have questions, please do not hesitate to call me. I look forward to following Alicia Soliz along with you.    Sincerely,    Meagan Pereira MD    Enclosure  CC:  No Recipients    If you would like to receive this communication electronically, please contact [email or web address] or [phone] to request more information on [employer] Link access.    For providers and/or their staff who would like to refer a patient to [facility], please contact us through our one-stop-shop provider referral line, [facility], at [phone].    If you feel you have received this communication in error or would no longer like to receive these types of communications, please e-mail [email or web address]

## 2019-04-08 NOTE — PROGRESS NOTES
I saw Alicia Soliz as a new patient today for multiple sclerosis.  She comes in to [facility] care and is referred by Salvatore Vela MD.  She is accompanied by her 3 daughters.     History of Present Illness  The patient is a 39 y.o. H female with RRMS, HTN, morbid obesity, depression, GERD, knee pain, who presents for second opinion and recommendation of treatment.      Diagnosed in 1/15/15 by clinical history, MRI, and CSF. Symptoms at the time were left eye vision loss, possible TISH, left face/body weakness and paresthesias. Since that time, she denies new symptoms/signs of relapse, but she has had several episodes of symptom flare associated with either physical overexertion or stressors after careful review with patients and her daughters. She continues with subjective weakness on left side since the initial event. She has gotten steroids for several of these flares, however. For her disease, she has been on Tecfidera, Copaxone, and Tysabri. She is currently on Avonex.  Of note, daughters note that the patient has significant stress from her mother, which contribute to her flares. Other stressors are that of morbid obesity (reports difficulty losing weight) and anxious in general.      Review of systems:   Vertigo/Dizziness: no  Headaches: yes  Visual Symptoms: No   Bladder Dysfunction: Yes - frequent UTI's   Bowel Dysfunction: Yes - constipation, takes Linzess   Pain: Yes - left shoulder pain   Skin Breakdown: No   Cognitive: Yes - complaints of memory loss   Dysphagia: No   Dysarthria: No   Hand Dysfunction: No  Gait Disturbance: Yes - reports due to left leg dragging and foot drop. About to start PT/OT  Falls: Yes - occasional  Spasticity: Yes - takes baclofen   Sensation changes: numbness on left  Mood Disorder: Yes - depression   Fatigue: Yes - has been rx'd nuvigil in the past   Sleep Disturbance: Yes - snores, gasping in sleep, and legs twitch   Sexual Dysfunction: Not Assessed   Tremors: No  "  Heat sensitivity: No       Past Medical History:   Diagnosis Date    Anemia     Arthritis     Cardiac arrest as complication of care     pt states she went into cardiac arrest from an allergic reaction to a medication    Encounter for blood transfusion     Hemiplegia due to old stroke     Hypertension     Multiple sclerosis     Stroke        Past Surgical History:   Procedure Laterality Date    APPENDECTOMY      CHOLECYSTECTOMY      HYSTERECTOMY      KNEE SURGERY      TONSILLECTOMY      TUBAL LIGATION      VASCULAR CATH INSERTION Right 1/22/2015    Performed by Dayron Vasques MD at [facility] CATH LAB       Family History   Problem Relation Age of Onset    Lupus Mother     Heart disease Mother     Diabetes Father     Kidney disease Father     Cancer Maternal Aunt 40        breast    Breast cancer Maternal Aunt     Breast cancer Cousin     Breast cancer Cousin        Social History:  . High school education. Unemployed; disabled.  Never smoker  No alcohol, drugs, or coffee. Drinks tea.  Attempting weight loss.    Review of patient's allergies indicates:   Allergen Reactions    Demerol [meperidine] Itching     Other reaction(s): Itching    Dilaudid [hydromorphone (bulk)] Anaphylaxis     Other reaction(s): Anaphylaxis  "coded" per pt    Prednisone Itching     Other reaction(s): Itching    Morphine     Tramadol        Current Outpatient Medications on File Prior to Visit   Medication Sig Dispense Refill Last Dose    baclofen (LIORESAL) 20 MG tablet TAKE 1 TABLET (20 MG TOTAL) BY MOUTH 3 (THREE) TIMES DAILY. 90 tablet 3 Taking    buPROPion (WELLBUTRIN SR) 100 MG TBSR 12 hr tablet TAKE 1 TABLET BY MOUTH TWICE A DAY 60 tablet 6 Taking    butalbital-acetaminophen-caffeine -40 mg (FIORICET, ESGIC) -40 mg per tablet Take 1 tablet at onset of migraine.  Limit to 3 tabs a week. 12 tablet 3 Taking    ciprofloxacin-dexamethasone 0.3-0.1% (CIPRODEX) 0.3-0.1 % DrpS Place 4 drops into " the right ear 2 (two) times daily. 7.5 mL 1 Taking    dalfampridine (AMPYRA) 10 mg Tb12 Take 1 tablet by mouth 2 (two) times daily. (Patient taking differently: Take 1 tablet by mouth once daily. ) 60 tablet 1 Taking    dalfampridine (AMPYRA) 10 mg Tb12 Take 1 tablet by mouth.   Taking    doxepin (SINEQUAN) 50 MG capsule Take 1 capsule (50 mg total) by mouth nightly as needed. 30 capsule 5 Taking    esomeprazole (NEXIUM) 40 MG capsule Take 1 capsule (40 mg total) by mouth before breakfast. 30 capsule 11 Taking    hydroCHLOROthiazide (HYDRODIURIL) 12.5 MG Tab Take 1 tablet (12.5 mg total) by mouth once daily. 30 tablet 11 Taking    hydrocodone-acetaminophen 10-325mg (NORCO)  mg Tab Take by mouth every 6 (six) hours as needed for Pain.   Taking    interferon beta-1a (AVONEX) 30 mcg/0.5 mL syringe Inject 0.5 mLs (30 mcg total) into the muscle once a week. 4 Syringe 11 Taking    linaclotide (LINZESS) 145 mcg Cap capsule Take 1 capsule (145 mcg total) by mouth once daily. 30 capsule 5 Taking    pf-chihbquvgrtrvpav-[id number] 1-1-500 mg Cap Take 1 tablet by mouth once daily. 30 capsule 3 Taking    metFORMIN (GLUCOPHAGE) 500 MG tablet Take 1 tablet (500 mg total) by mouth 2 (two) times daily with meals. 180 tablet 3 Taking    mupirocin (BACTROBAN) 2 % ointment Apply topically 2 (two) times daily. Apply to affected area 30 g 1 Taking    nystatin (MYCOSTATIN) cream Apply topically 2 (two) times daily. (Patient taking differently: Apply topically as needed. ) 30 g 1 Taking    promethazine (PHENERGAN) 25 MG tablet Take 1 tablet (25 mg total) by mouth every 4 (four) hours as needed for Nausea. 30 tablet 1 Taking    psyllium husk (METAMUCIL) 0.4 gram Cap Take by mouth once daily.   Taking    valACYclovir (VALTREX) 500 MG tablet Take 1 tablet (500 mg total) by mouth once daily. 30 tablet 11 Taking    fluconazole (DIFLUCAN) 150 MG Tab TAKE 1 TABLET (150 MG TOTAL) BY MOUTH EVERY 3 (THREE) DAYS. FOR 2 DOSES 2  "tablet 0 Not Taking       Physical Exam  Vitals:    04/08/19 1438   Height: 5' 6" (1.676 m)     In general, the patient is well nourished.    25 foot timed walk: cannot test safely on today    MENTAL STATUS: language is fluent, normal verbal comprehension, attention is normal, patient is alert and oriented x 3, fund of knowlege is appropriate by vocabulary.     CRANIAL NERVE EXAM:  Extraocular muscles are intact. No facial asymmetry. There is no dysarthria. Uvula is midline, and palate moves symmetrically. Shoulder shrug intact bilaterlly. Tongue protrusion is midline. Hearing is grossly intact.     MOTOR EXAM: Normal bulk and tone throughout UE and LE bilaterally (including left leg).   No pronator drift. Strength is  5/5 t/o except 5- left HF.    SENSORY EXAM: decreased LT on left compared to right body    COORDINATION: ?slight dysmetria on left with FNF    GAIT: Narrow based, query astasia abasia - left leg stiffens, plantar flexion at ankle, and dragging of the leg with attempted gait. Can only walk a few steps prior to having to sit due to feeling unsteady.        IMAGING (personally reviewed):  MRI Brain w/wo 1/2015 Multifocal periventricular, subcortical and pontine white matter lesions compatible with demyelinating plaques of multiple sclerosis.  Many of the lesions display enhancement consistent with active MS plaque. Evolving signal changes evident on the DWI and ADC map sequences with development of right periventricular white matter enhancement as compared to the recent study of 01/15/15.  Differential considerations include active MS plaque formation/evolution and acute ischemia/infarction    MRI C-Sprine 1/2015 Normal cervical cord. 1 cm contrast enhancing left occipital lobe subcortical white matter lesion.  Multiple bihemispheric hyperintense T2 signal lesions including left-sided brainstem lesion demonstrated on the recent head MRI study some of which exhibiting contrast enhancement     LABS:  Lab " Results   Component Value Date    WBC 4.71 11/17/2018    HGB 11.0 (L) 11/17/2018    HCT 34.4 (L) 11/17/2018    MCV 68 (L) 11/17/2018     11/17/2018         Chemistry        Component Value Date/Time     11/17/2018 2339    K 3.2 (L) 11/17/2018 2339     11/17/2018 2339    CO2 29 11/17/2018 2339    BUN 8 11/17/2018 2339    CREATININE 0.8 11/17/2018 2339    GLU 89 11/17/2018 2339        Component Value Date/Time    CALCIUM 9.3 11/17/2018 2339    ALKPHOS 80 11/17/2018 2339    AST 20 11/17/2018 2339    ALT 12 11/17/2018 2339    BILITOT 0.3 11/17/2018 2339    ESTGFRAFRICA >60 11/17/2018 2339    EGFRNONAA >60 11/17/2018 2339            Lab Results   Component Value Date    LABPROT 10.0 03/11/2015    ALBUMIN 3.4 (L) 11/17/2018     Results for JUJU POOLE (MRN [id number]) as of 4/16/2019 23:40   Ref. Range 1/16/2015 15:03   COLOR CSF Latest Ref Range: Colorless  Colorless   Heme Aliquot Latest Units: mL 2.0   Appearance, CSF Latest Ref Range: Clear  Clear   WBC, CSF Latest Ref Range: 0 - 5 /cu mm 3   RBC, CSF Latest Ref Range: 0 /cu mm 1 (A)   Glucose, CSF Latest Ref Range: 40 - 70 mg/dL 74 (H)   Protein, CSF Latest Ref Range: 15 - 40 mg/dL 39   Angio Convert Enzyme, CSF Latest Ref Range: 0.0 - 2.5 U/L 0.7   CSF Bands Latest Units: bands 6   Serum Bands Latest Units: bands 0   Olig Bands Interpretation, CSF Latest Ref Range: <4 bands 6 (A)   IgG Index, CSF Latest Ref Range: <=0.85  0.74   Albumin, CSF Latest Ref Range: <=27.0 mg/dL 20.9   IGG/ALBUMIN RATIO, CSF Latest Ref Range: <=0.21  0.23 (H)   IgG Synthetic Rate Latest Ref Range: <=12 mg/24 h 7.99   Albumin, Serum Latest Ref Range: 3200 - 4800 mg/dL 4680   IgG, CSF Latest Ref Range: <=8.1 mg/dL 4.9           Diagnosis/Assessment/Plan:       1. Multiple sclerosis  · Assessment: agree with diagnosis. Diagnosed 1/2015 based on MRI and LP. Currently taking Avonex (off x 1month awaiting prior authorization). Had extensive discussion held concerning MS  diagnosis, management, symptoms, treatment options, etc. Recommend that she obtain recent outside MRI's prior to me giving rec's for changing medication. If no clear objective sign of relapse on MRI, recommend that she continue with Avonex and no steroids.  · However, as discussed with patient and her daughters, it is likely that her stressors are contributing to her symptoms.  · Imaging: none at this time. Will obtain recent MRI's  · DMT: continue current management with Avonex  · Recommend that she continue with PT/OT for falls/gait imbalance and we'll discus further management of gait therafter.  · Strongly recommend stress management, including counseling to deal with stress of mother. Given community resources.  · Given MS counseling.  · Given counseling for possible weight loss - intermittent fasting with heart healthy diet.  · Recommend keeping diary of symptoms to help predict flares      Over 50% of the 100 min was spent counseling the patient about MS, treatment options, prognosis and the management of her symptoms.    F/u in 2 months    Meagan Pereira MD

## 2019-04-08 NOTE — PATIENT INSTRUCTIONS
[email or web address]    Magnesium 400mg twice a day.    Wellness Promotion in MS:    Enhancing wellness is a ag part of your overall multiple sclerosis (MS) treatment plan. The [facility] treatment team encourages you to maintain a healthy lifestyle and participate in wellness activities including a healthy diet, regular exercise and stress-reduction practices. Healthier people with MS maintain function and independence longer than those with diseases such as high blood pressure, diabetes, high cholesterol, and obesity. We encourage you to work closely with your primary care physician for routine health screenings and for treatment of your other health conditions.    Diet: Although there are several diets that are popular among people with MS such as the Swank, Sintia, and Wahls diets, not enough research exists to prove that they help MS. Yet common sense tells us that eating a healthy diet that benefits overall health will likely also help MS. Studies show that obesity may be a risk factor for developing MS, and that having high blood pressure, high cholesterol, and diabetes cause MS to progress faster. Luckily, changes in diet can improve obesity, high blood pressure, high cholesterol, and diabetes.     Here are our recommendations: 1. Eat more fruits, vegetables, and fiber. 2. Choose healthy fats (from nuts, seeds, vegetable oils and oily fish) and lean sources of protein (chicken, turkey, soy products, fish, and beans). 3. Limit (or eliminate) sugar and processed foods.    Exercise: Research shows that regular exercise may improve fatigue, cognitive impairment, depression, mobility, and quality of life in people with MS. Exercise guidelines published by the MS Society of [place] advise that adults with MS and mild to moderate disability get 30 minutes of moderate-intensity aerobic exercise twice a week, and do strength training exercises twice a week. A physical or occupational therapist can  recommend a personalized exercise plan based on your needs, abilities, and interests. Modified exercise programs, such as seated yoga or aquatic exercise, or adaptive equipment such as a recumbent bicycle or tricycle make exercise possible for nearly everyone with Ms.    Stress-reduction practices: Studies show that mind-body connection activities reduce MS symptoms and improve quality of life. They can also be helpful for depression and other mood disorders that are common in MS. Mindfulness, meditation, acupuncture and massage are all examples of stress-reduction practices that strengthen the mind-body connections and have been shown to help people with MS manage stress, depression, and anxiety. Staying physically active with aerobic exercise, resistance training, yoga, and hunter chi can also help reduce depression and help you feel better by reducing stress.    Vitamin D: Evidence shows that low vitamin D increases the risk of developing MS and may make MS relapses more severe. Therefore taking vitamin D may make your MS better. You can get vitamin D from sunlight and from foods including oily fish (salmon, mackerel, sardines, herring, and trout), egg yolks, and cod liver oil. In addition, some dairy products, cereals, and juices have been fortified with vitamin D. Vitamin D levels are measured with a blood test. We suggest at least maintaining a vitamin D level in the upper half of the normal range for your laboratory. Many people with MS have low vitamin D levels in the blood, despite a good diet and sun exposure, and may need to take vitamin D3 capsules or tablets to achieve the desired blood levels of vitamin D.    Smoking: Smoking increases the risk of both getting MS and of developing secondary-progressive MS. Smoking may make disability levels progress faster, and may make certain disease modifying therapies less effective. Quitting smoking is necessary to treat your MS and your overall health.    Sleep:  Sleep is ag to maintaining health. Many individuals with MS report significant disruptions in sleep and difficulty maintaining a consistent sleep schedule. Please ask for a Sleep Hygiene Information Sheet for additional advice.    Additional Resources:  http://www.nationalmssociety.org/Living-Well- With-MS/Health- Wellness  http://www.nationalmssociety.org/Resources-Support/[place]-Fmiqveadd-Clqewwuy/Brochures/Staying- Well

## 2019-04-09 ENCOUNTER — PATIENT MESSAGE (OUTPATIENT)
Dept: INTERNAL MEDICINE | Facility: CLINIC | Age: 39
End: 2019-04-09

## 2019-04-09 ENCOUNTER — PATIENT MESSAGE (OUTPATIENT)
Dept: NEUROLOGY | Facility: CLINIC | Age: 39
End: 2019-04-09

## 2019-04-09 ENCOUNTER — NURSE TRIAGE (OUTPATIENT)
Dept: ADMINISTRATIVE | Facility: CLINIC | Age: 39
End: 2019-04-09

## 2019-04-10 ENCOUNTER — PATIENT MESSAGE (OUTPATIENT)
Dept: INTERNAL MEDICINE | Facility: CLINIC | Age: 39
End: 2019-04-10

## 2019-04-10 ENCOUNTER — HOSPITAL ENCOUNTER (INPATIENT)
Facility: HOSPITAL | Age: 39
LOS: 2 days | Discharge: HOME | DRG: 059 | End: 2019-04-12
Attending: EMERGENCY MEDICINE | Admitting: INTERNAL MEDICINE
Payer: MEDICARE

## 2019-04-10 ENCOUNTER — TELEPHONE (OUTPATIENT)
Dept: NEUROLOGY | Facility: HOSPITAL | Age: 39
End: 2019-04-10

## 2019-04-10 DIAGNOSIS — R53.1 LEFT-SIDED WEAKNESS: Primary | ICD-10-CM

## 2019-04-10 DIAGNOSIS — A53.9 SYPHILIS: Primary | ICD-10-CM

## 2019-04-10 DIAGNOSIS — G35 MULTIPLE SCLEROSIS EXACERBATION: ICD-10-CM

## 2019-04-10 LAB
ALBUMIN SERPL BCP-MCNC: 3.7 G/DL (ref 3.5–5.2)
ALP SERPL-CCNC: 90 U/L (ref 55–135)
ALT SERPL W/O P-5'-P-CCNC: 20 U/L (ref 10–44)
ANION GAP SERPL CALC-SCNC: 10 MMOL/L (ref 8–16)
AST SERPL-CCNC: 23 U/L (ref 10–40)
BASOPHILS # BLD AUTO: 0.01 K/UL (ref 0–0.2)
BASOPHILS NFR BLD: 0.2 % (ref 0–1.9)
BILIRUB SERPL-MCNC: 0.2 MG/DL (ref 0.1–1)
BNP SERPL-MCNC: <10 PG/ML (ref 0–99)
BUN SERPL-MCNC: 8 MG/DL (ref 6–20)
CALCIUM SERPL-MCNC: 9.6 MG/DL (ref 8.7–10.5)
CHLORIDE SERPL-SCNC: 101 MMOL/L (ref 95–110)
CK SERPL-CCNC: 206 U/L (ref 20–180)
CO2 SERPL-SCNC: 27 MMOL/L (ref 23–29)
CREAT SERPL-MCNC: 0.8 MG/DL (ref 0.5–1.4)
DACRYOCYTES BLD QL SMEAR: ABNORMAL
DIFFERENTIAL METHOD: ABNORMAL
EOSINOPHIL # BLD AUTO: 0.1 K/UL (ref 0–0.5)
EOSINOPHIL NFR BLD: 1.6 % (ref 0–8)
ERYTHROCYTE [DISTWIDTH] IN BLOOD BY AUTOMATED COUNT: 15 % (ref 11.5–14.5)
EST. GFR  (AFRICAN AMERICAN): >60 ML/MIN/1.73 M^2
EST. GFR  (NON AFRICAN AMERICAN): >60 ML/MIN/1.73 M^2
GLUCOSE SERPL-MCNC: 115 MG/DL (ref 70–110)
HCT VFR BLD AUTO: 36.9 % (ref 37–48.5)
HGB BLD-MCNC: 11.7 G/DL (ref 12–16)
LYMPHOCYTES # BLD AUTO: 2.6 K/UL (ref 1–4.8)
LYMPHOCYTES NFR BLD: 46.6 % (ref 18–48)
MCH RBC QN AUTO: 21.8 PG (ref 27–31)
MCHC RBC AUTO-ENTMCNC: 31.7 G/DL (ref 32–36)
MCV RBC AUTO: 69 FL (ref 82–98)
MONOCYTES # BLD AUTO: 0.3 K/UL (ref 0.3–1)
MONOCYTES NFR BLD: 5.8 % (ref 4–15)
NEUTROPHILS # BLD AUTO: 2.6 K/UL (ref 1.8–7.7)
NEUTROPHILS NFR BLD: 45.8 % (ref 38–73)
PLATELET # BLD AUTO: 256 K/UL (ref 150–350)
PMV BLD AUTO: 9.6 FL (ref 9.2–12.9)
POIKILOCYTOSIS BLD QL SMEAR: ABNORMAL
POTASSIUM SERPL-SCNC: 3.6 MMOL/L (ref 3.5–5.1)
PROT SERPL-MCNC: 7.8 G/DL (ref 6–8.4)
RBC # BLD AUTO: 5.37 M/UL (ref 4–5.4)
SODIUM SERPL-SCNC: 138 MMOL/L (ref 136–145)
SPHEROCYTES BLD QL SMEAR: ABNORMAL
TROPONIN I SERPL DL<=0.01 NG/ML-MCNC: <0.006 NG/ML (ref 0–0.03)
WBC # BLD AUTO: 5.66 K/UL (ref 3.9–12.7)

## 2019-04-10 PROCEDURE — 25000003 PHARM REV CODE 250: Performed by: EMERGENCY MEDICINE

## 2019-04-10 PROCEDURE — 93010 EKG 12-LEAD: ICD-10-PCS | Mod: ,,, | Performed by: INTERNAL MEDICINE

## 2019-04-10 PROCEDURE — 11000001 HC ACUTE MED/SURG PRIVATE ROOM

## 2019-04-10 PROCEDURE — 96372 THER/PROPH/DIAG INJ SC/IM: CPT | Mod: 59 | Performed by: EMERGENCY MEDICINE

## 2019-04-10 PROCEDURE — 93010 ELECTROCARDIOGRAM REPORT: CPT | Mod: ,,, | Performed by: INTERNAL MEDICINE

## 2019-04-10 PROCEDURE — 63600175 PHARM REV CODE 636 W HCPCS: Performed by: EMERGENCY MEDICINE

## 2019-04-10 PROCEDURE — 25000003 PHARM REV CODE 250: Performed by: NURSE PRACTITIONER

## 2019-04-10 PROCEDURE — 83880 ASSAY OF NATRIURETIC PEPTIDE: CPT

## 2019-04-10 PROCEDURE — 96374 THER/PROPH/DIAG INJ IV PUSH: CPT

## 2019-04-10 PROCEDURE — 21400001 HC TELEMETRY ROOM

## 2019-04-10 PROCEDURE — 94010 BREATHING CAPACITY TEST: CPT

## 2019-04-10 PROCEDURE — 82550 ASSAY OF CK (CPK): CPT

## 2019-04-10 PROCEDURE — 85025 COMPLETE CBC W/AUTO DIFF WBC: CPT

## 2019-04-10 PROCEDURE — 99285 EMERGENCY DEPT VISIT HI MDM: CPT | Mod: 25

## 2019-04-10 PROCEDURE — 96375 TX/PRO/DX INJ NEW DRUG ADDON: CPT

## 2019-04-10 PROCEDURE — 80053 COMPREHEN METABOLIC PANEL: CPT

## 2019-04-10 PROCEDURE — 96375 TX/PRO/DX INJ NEW DRUG ADDON: CPT | Performed by: EMERGENCY MEDICINE

## 2019-04-10 PROCEDURE — 63600175 PHARM REV CODE 636 W HCPCS: Performed by: NURSE PRACTITIONER

## 2019-04-10 PROCEDURE — 93005 ELECTROCARDIOGRAM TRACING: CPT

## 2019-04-10 PROCEDURE — 84484 ASSAY OF TROPONIN QUANT: CPT

## 2019-04-10 RX ORDER — METHYLPREDNISOLONE SOD SUCC 125 MG
1000 VIAL (EA) INJECTION DAILY
Status: DISCONTINUED | OUTPATIENT
Start: 2019-04-10 | End: 2019-04-10 | Stop reason: SDUPTHER

## 2019-04-10 RX ORDER — CIPROFLOXACIN AND DEXAMETHASONE 3; 1 MG/ML; MG/ML
4 SUSPENSION/ DROPS AURICULAR (OTIC) 2 TIMES DAILY
Status: DISCONTINUED | OUTPATIENT
Start: 2019-04-10 | End: 2019-04-12 | Stop reason: HOSPADM

## 2019-04-10 RX ORDER — PANTOPRAZOLE SODIUM 40 MG/1
40 TABLET, DELAYED RELEASE ORAL DAILY
Status: DISCONTINUED | OUTPATIENT
Start: 2019-04-11 | End: 2019-04-12 | Stop reason: HOSPADM

## 2019-04-10 RX ORDER — DIPHENHYDRAMINE HCL 50 MG
50 CAPSULE ORAL
Status: COMPLETED | OUTPATIENT
Start: 2019-04-10 | End: 2019-04-10

## 2019-04-10 RX ORDER — ONDANSETRON 2 MG/ML
4 INJECTION INTRAMUSCULAR; INTRAVENOUS EVERY 12 HOURS PRN
Status: DISCONTINUED | OUTPATIENT
Start: 2019-04-10 | End: 2019-04-12 | Stop reason: HOSPADM

## 2019-04-10 RX ORDER — ONDANSETRON 2 MG/ML
4 INJECTION INTRAMUSCULAR; INTRAVENOUS
Status: COMPLETED | OUTPATIENT
Start: 2019-04-10 | End: 2019-04-10

## 2019-04-10 RX ORDER — IBUPROFEN 200 MG
16 TABLET ORAL
Status: DISCONTINUED | OUTPATIENT
Start: 2019-04-10 | End: 2019-04-10

## 2019-04-10 RX ORDER — HYDROCODONE BITARTRATE AND ACETAMINOPHEN 10; 325 MG/1; MG/1
1 TABLET ORAL
Status: COMPLETED | OUTPATIENT
Start: 2019-04-10 | End: 2019-04-10

## 2019-04-10 RX ORDER — DOXEPIN HYDROCHLORIDE 50 MG/1
50 CAPSULE ORAL NIGHTLY PRN
Status: DISCONTINUED | OUTPATIENT
Start: 2019-04-10 | End: 2019-04-12 | Stop reason: HOSPADM

## 2019-04-10 RX ORDER — ACETAMINOPHEN 325 MG/1
650 TABLET ORAL EVERY 8 HOURS PRN
Status: DISCONTINUED | OUTPATIENT
Start: 2019-04-10 | End: 2019-04-12 | Stop reason: HOSPADM

## 2019-04-10 RX ORDER — BUPROPION HYDROCHLORIDE 100 MG/1
100 TABLET, EXTENDED RELEASE ORAL 2 TIMES DAILY
Status: DISCONTINUED | OUTPATIENT
Start: 2019-04-10 | End: 2019-04-12 | Stop reason: HOSPADM

## 2019-04-10 RX ORDER — HYDROCODONE BITARTRATE AND ACETAMINOPHEN 10; 325 MG/1; MG/1
1 TABLET ORAL EVERY 6 HOURS PRN
Status: DISCONTINUED | OUTPATIENT
Start: 2019-04-10 | End: 2019-04-12 | Stop reason: HOSPADM

## 2019-04-10 RX ORDER — DALFAMPRIDINE 10 MG/1
1 TABLET, FILM COATED, EXTENDED RELEASE ORAL DAILY
Status: DISCONTINUED | OUTPATIENT
Start: 2019-04-11 | End: 2019-04-12 | Stop reason: HOSPADM

## 2019-04-10 RX ORDER — SODIUM CHLORIDE 0.9 % (FLUSH) 0.9 %
10 SYRINGE (ML) INJECTION
Status: DISCONTINUED | OUTPATIENT
Start: 2019-04-10 | End: 2019-04-12 | Stop reason: HOSPADM

## 2019-04-10 RX ORDER — IBUPROFEN 200 MG
24 TABLET ORAL
Status: DISCONTINUED | OUTPATIENT
Start: 2019-04-10 | End: 2019-04-10

## 2019-04-10 RX ORDER — INSULIN ASPART 100 [IU]/ML
0-5 INJECTION, SOLUTION INTRAVENOUS; SUBCUTANEOUS
Status: DISCONTINUED | OUTPATIENT
Start: 2019-04-10 | End: 2019-04-10

## 2019-04-10 RX ORDER — BACLOFEN 10 MG/1
20 TABLET ORAL 3 TIMES DAILY
Status: DISCONTINUED | OUTPATIENT
Start: 2019-04-10 | End: 2019-04-12 | Stop reason: HOSPADM

## 2019-04-10 RX ORDER — VALACYCLOVIR HYDROCHLORIDE 500 MG/1
500 TABLET, FILM COATED ORAL DAILY
Status: DISCONTINUED | OUTPATIENT
Start: 2019-04-11 | End: 2019-04-12 | Stop reason: HOSPADM

## 2019-04-10 RX ORDER — GLUCAGON 1 MG
1 KIT INJECTION
Status: DISCONTINUED | OUTPATIENT
Start: 2019-04-10 | End: 2019-04-10

## 2019-04-10 RX ORDER — ENOXAPARIN SODIUM 100 MG/ML
40 INJECTION SUBCUTANEOUS EVERY 24 HOURS
Status: DISCONTINUED | OUTPATIENT
Start: 2019-04-10 | End: 2019-04-12 | Stop reason: HOSPADM

## 2019-04-10 RX ADMIN — ENOXAPARIN SODIUM 40 MG: 100 INJECTION SUBCUTANEOUS at 08:04

## 2019-04-10 RX ADMIN — HYDROCODONE BITARTRATE AND ACETAMINOPHEN 1 TABLET: 10; 325 TABLET ORAL at 08:04

## 2019-04-10 RX ADMIN — DIPHENHYDRAMINE HYDROCHLORIDE 50 MG: 50 CAPSULE ORAL at 03:04

## 2019-04-10 RX ADMIN — ONDANSETRON HYDROCHLORIDE 4 MG: 2 SOLUTION INTRAMUSCULAR; INTRAVENOUS at 03:04

## 2019-04-10 RX ADMIN — BACLOFEN 20 MG: 10 TABLET ORAL at 08:04

## 2019-04-10 RX ADMIN — ONDANSETRON 4 MG: 2 INJECTION INTRAMUSCULAR; INTRAVENOUS at 10:04

## 2019-04-10 RX ADMIN — BUPROPION HYDROCHLORIDE 100 MG: 100 TABLET, FILM COATED, EXTENDED RELEASE ORAL at 08:04

## 2019-04-10 RX ADMIN — CIPROFLOXACIN AND DEXAMETHASONE 4 DROP: 3; 1 SUSPENSION/ DROPS AURICULAR (OTIC) at 08:04

## 2019-04-10 RX ADMIN — LORAZEPAM 1 MG: 2 INJECTION INTRAMUSCULAR; INTRAVENOUS at 02:04

## 2019-04-10 RX ADMIN — METHYLPREDNISOLONE SODIUM SUCCINATE: 1 INJECTION, POWDER, LYOPHILIZED, FOR SOLUTION INTRAMUSCULAR; INTRAVENOUS at 05:04

## 2019-04-10 RX ADMIN — HYDROCODONE BITARTRATE AND ACETAMINOPHEN 1 TABLET: 10; 325 TABLET ORAL at 12:04

## 2019-04-10 NOTE — HPI
Alicia Soliz is a 40 yo female with PMHx of Multiple Sclerosis, HTN, CVA and Arthritis who presents with reports of leg weakness. Pt notes the onset of symptoms for approx 1 week after she experienced a fall.  Pt says she ambulates with a walker, rollator or a cane. Presently, she describes headaches, pain to left eye, left facial weakness, vertigo, fatigue, left foot drop, left sided weakness, left shoulder/arm and left sided pain and numbness to hands. Pt describes mild SOB and some urinary incontinence. Other symptoms denied. Neurology recommended MRI however, it was unattainable due to patient's body habitus. Pt is followed by [facility] specialist, Dr. Browne. They recommended empiric Solu Medrol. Vital signs on arrival Temp 97.6, pulse 91, resp 18, B/P 155/69 and SpO2 = 98 %. CT of Head is negative for acute findings. Labs indicate microcytic anemia, glucose = 115 and normal troponin. Pt is admitted due to MS Exacerbation.

## 2019-04-10 NOTE — ED PROVIDER NOTES
"SCRIBE #1 NOTE: I, Cristelradha Schmitz, am scribing for, and in the presence of, Santiago Choi MD. I have scribed the entire note.      History      Chief Complaint   Patient presents with    Multiple Sclerosis     Fell last week. Numbness to R arm/L side face since sunday. .Tingling in both hands. L leg tingling. +vertigo/nausea/pain.        Review of patient's allergies indicates:   Allergen Reactions    Demerol [meperidine] Itching     Other reaction(s): Itching    Dilaudid [hydromorphone (bulk)] Anaphylaxis     Other reaction(s): Anaphylaxis  "coded" per pt    Prednisone Itching     Other reaction(s): Itching    Morphine     Tramadol         HPI   HPI    4/10/2019, 10:45 AM   History obtained from the patient      History of Present Illness: Alicia Soliz is a 39 y.o. female patient with a PMHx of HTN, MS, stroke, hemiplegia, who presents to the Emergency Department for an MS flare up which onset 2-3 days ago. Pt is c/o L sided numbness/weakness. She reports sxs onset after pt had a ground level fall 2-3 days ago. Pt denies any injuries from her fall. Symptoms are constant and moderate in severity. No mitigating or exacerbating factors reported. Pt receives weekly Avonex injections for her MS. Her last injection was 2 days ago. Patient denies any fever, chills, n/v, head trauma, HA, speech changes, confusion, and all other sxs at this time. No further complaints or concerns at this time.       Arrival mode: Personal vehicle     PCP: Braden Dumont MD       Past Medical History:  Past Medical History:   Diagnosis Date    Anemia     Arthritis     Cardiac arrest as complication of care     pt states she went into cardiac arrest from an allergic reaction to a medication    Encounter for blood transfusion     Hemiplegia due to old stroke     Hypertension     Multiple sclerosis     Stroke        Past Surgical History:  Past Surgical History:   Procedure Laterality Date    APPENDECTOMY      " CHOLECYSTECTOMY      HYSTERECTOMY      KNEE SURGERY      TONSILLECTOMY      TUBAL LIGATION      VASCULAR CATH INSERTION Right 1/22/2015    Performed by Dayron Vasques MD at [facility] CATH LAB         Family History:  Family History   Problem Relation Age of Onset    Lupus Mother     Heart disease Mother     Diabetes Father     Kidney disease Father     Cancer Maternal Aunt 40        breast    Breast cancer Maternal Aunt     Breast cancer Cousin     Breast cancer Cousin        Social History:  Social History     Tobacco Use    Smoking status: Never Smoker    Smokeless tobacco: Never Used   Substance and Sexual Activity    Alcohol use: Yes     Comment: OCCASIONALLY    Drug use: No    Sexual activity: Yes     Partners: Male       ROS   Review of Systems   Constitutional: Negative for chills and fever.   HENT: Negative for sore throat.    Respiratory: Negative for shortness of breath.    Cardiovascular: Negative for chest pain.   Gastrointestinal: Negative for nausea and vomiting.   Genitourinary: Negative for dysuria.   Musculoskeletal: Negative for back pain.   Skin: Negative for rash.   Neurological: Positive for weakness (L sided) and numbness (L sided). Negative for speech difficulty and headaches.   Hematological: Does not bruise/bleed easily.   Psychiatric/Behavioral: Negative for confusion.   All other systems reviewed and are negative.    Physical Exam      Initial Vitals [04/10/19 1036]   BP Pulse Resp Temp SpO2   (!) 155/69 91 18 97.6 °F (36.4 °C) 98 %      MAP       --          Physical Exam  Nursing Notes and Vital Signs Reviewed.  Constitutional: Patient is in no acute distress. Well-developed and well-nourished.  Head: Atraumatic. Normocephalic.  Eyes: PERRL. EOM intact. Conjunctivae are not pale. No scleral icterus.  ENT: Mucous membranes are moist. Oropharynx is clear and symmetric.    Neck: Supple. Full ROM. No lymphadenopathy.  Cardiovascular: Regular rate. Regular rhythm. No murmurs, rubs,  "or gallops. Distal pulses are 2+ and symmetric.  Pulmonary/Chest: No respiratory distress. Clear to auscultation bilaterally. No wheezing or rales.  Abdominal: Soft and non-distended.  There is no tenderness.  No rebound, guarding, or rigidity.   Musculoskeletal: No edema. No obvious deformities.   Skin: Warm and dry.  Neurological:  AAOx3. GCS 15. 3/5 strength LLE. Dragging LLE when ambulating. 4/5 strength LUE. Decreased sensation to LUE and LLE.   Psychiatric: Normal affect. Good eye contact. Appropriate in content.    ED Course    Procedures  ED Vital Signs:  Vitals:    04/10/19 1036 04/10/19 1202 04/10/19 1212 04/10/19 1422   BP: (!) 155/69 122/77     Pulse: 91  85 85   Resp: 18  13 (!) 22   Temp: 97.6 °F (36.4 °C)      TempSrc: Oral      SpO2: 98%  100% 97%   Weight: (!) 150.1 kg (330 lb 14.6 oz)      Height: 5' 4" (1.626 m)       04/10/19 1423   BP: (!) 133/58   Pulse:    Resp:    Temp:    TempSrc:    SpO2:    Weight:    Height:        Abnormal Lab Results:  Labs Reviewed   CBC W/ AUTO DIFFERENTIAL - Abnormal; Notable for the following components:       Result Value    Hemoglobin 11.7 (*)     Hematocrit 36.9 (*)     MCV 69 (*)     MCH 21.8 (*)     MCHC 31.7 (*)     RDW 15.0 (*)     All other components within normal limits   COMPREHENSIVE METABOLIC PANEL - Abnormal; Notable for the following components:    Glucose 115 (*)     All other components within normal limits   CK - Abnormal; Notable for the following components:     (*)     All other components within normal limits   B-TYPE NATRIURETIC PEPTIDE   TROPONIN I        All Lab Results:  Results for orders placed or performed during the hospital encounter of 04/10/19   CBC auto differential   Result Value Ref Range    WBC 5.66 3.90 - 12.70 K/uL    RBC 5.37 4.00 - 5.40 M/uL    Hemoglobin 11.7 (L) 12.0 - 16.0 g/dL    Hematocrit 36.9 (L) 37.0 - 48.5 %    MCV 69 (L) 82 - 98 fL    MCH 21.8 (L) 27.0 - 31.0 pg    MCHC 31.7 (L) 32.0 - 36.0 g/dL    RDW 15.0 " (H) 11.5 - 14.5 %    Platelets 256 150 - 350 K/uL    MPV 9.6 9.2 - 12.9 fL    Gran # (ANC) 2.6 1.8 - 7.7 K/uL    Lymph # 2.6 1.0 - 4.8 K/uL    Mono # 0.3 0.3 - 1.0 K/uL    Eos # 0.1 0.0 - 0.5 K/uL    Baso # 0.01 0.00 - 0.20 K/uL    Gran% 45.8 38.0 - 73.0 %    Lymph% 46.6 18.0 - 48.0 %    Mono% 5.8 4.0 - 15.0 %    Eosinophil% 1.6 0.0 - 8.0 %    Basophil% 0.2 0.0 - 1.9 %    Poik Moderate     Tear Drop Cells Occasional     Spherocytes Occasional     Differential Method Automated    Comprehensive metabolic panel   Result Value Ref Range    Sodium 138 136 - 145 mmol/L    Potassium 3.6 3.5 - 5.1 mmol/L    Chloride 101 95 - 110 mmol/L    CO2 27 23 - 29 mmol/L    Glucose 115 (H) 70 - 110 mg/dL    BUN, Bld 8 6 - 20 mg/dL    Creatinine 0.8 0.5 - 1.4 mg/dL    Calcium 9.6 8.7 - 10.5 mg/dL    Total Protein 7.8 6.0 - 8.4 g/dL    Albumin 3.7 3.5 - 5.2 g/dL    Total Bilirubin 0.2 0.1 - 1.0 mg/dL    Alkaline Phosphatase 90 55 - 135 U/L    AST 23 10 - 40 U/L    ALT 20 10 - 44 U/L    Anion Gap 10 8 - 16 mmol/L    eGFR if African American >60 >60 mL/min/1.73 m^2    eGFR if non African American >60 >60 mL/min/1.73 m^2   Brain natriuretic peptide   Result Value Ref Range    BNP <10 0 - 99 pg/mL   CK   Result Value Ref Range     (H) 20 - 180 U/L   Troponin I   Result Value Ref Range    Troponin I <0.006 0.000 - 0.026 ng/mL       Imaging Results:  Imaging Results          CT Head Without Contrast (Final result)  Result time 04/10/19 11:35:41    Final result by Barrera Arroyo MD (04/10/19 11:35:41)                 Impression:      No acute abnormality.    All CT scans at this facility use dose modulation, iterative reconstruction, and/or weight based dosing when appropriate to reduce radiation dose to as low as reasonably achievable.      Electronically signed by: Barrera Arroyo  Date:    04/10/2019  Time:    11:35             Narrative:    EXAMINATION:  CT HEAD WITHOUT CONTRAST    CLINICAL  HISTORY:  Syncope/fainting;    TECHNIQUE:  Low dose axial CT images obtained throughout the head without intravenous contrast. Sagittal and coronal reconstructions were performed.    COMPARISON:  02/21/2018    FINDINGS:  Intracranial compartment:    Ventricles and sulci are normal in size for age without evidence of hydrocephalus. No extra-axial blood or fluid collections.    Unchanged right deep periventricular white matter hypoattenuation compared to prior study of 02/21/2018 reflecting chronic microangiopathic ischemic changes with associated mild ex vacuo dilation of the right ventricle.  No parenchymal mass, hemorrhage, edema or major vascular distribution infarct.    Skull/extracranial contents (limited evaluation): No fracture. Mastoid air cells and paranasal sinuses are essentially clear.                               X-Ray Chest AP Portable (Final result)  Result time 04/10/19 11:41:33    Final result by Barrera Arroyo MD (04/10/19 11:41:33)                 Impression:      No acute abnormality.      Electronically signed by: Barrera Arroyo  Date:    04/10/2019  Time:    11:41             Narrative:    EXAMINATION:  XR CHEST AP PORTABLE    CLINICAL HISTORY:  fall;.    TECHNIQUE:  Single frontal portable view of the chest was performed.    COMPARISON:  11/18/2018    FINDINGS:  Support devices: None    The lungs are clear, with normal appearance of pulmonary vasculature and no pleural effusion or pneumothorax.    The cardiac silhouette is normal in size. The hilar and mediastinal contours are unremarkable.    Bones are intact.                               The EKG was ordered, reviewed, and independently interpreted by the ED provider.  Interpretation time: 1058  Rate: 81 BPM  Rhythm: normal sinus rhythm  Interpretation: Septal infarct. No STEMI.         The Emergency Provider reviewed the vital signs and test results, which are outlined above.    ED Discussion     12:40 PM: Discussed pt's case with Dr. Zavala  (Neurology) who recommends an MRI brain with contrast. He recommends admission if this is a new attack.    2:53 PM: Unable to obtain MRI at this facility secondary to body habitus.     3:02 PM: Discussed pt's case with Dr. Zavala (Neurology) who recommends consulting pt's neurologist for further recommendations.    3:37 PM: Discussed case with Dr. Saeed (MS Specialist) in [place] who recommends admission to  for Solumedrol IV for 3 days.    3:49 PM: Discussed case with YOMI Liz (Hospital Medicine). Dr. Morales agrees with current care and management of pt and accepts admission.   Admitting Service: Hospital medicine   Admitting Physician: Dr. Morales  Admit to: Med/surg (Observation)    3:54 PM: Re-evaluated pt. I have discussed test results, shared treatment plan, and the need for admission with patient and family at bedside. Pt and family express understanding at this time and agree with all information. All questions answered. Pt and family have no further questions or concerns at this time. Pt is ready for admit.    ED Medication(s):  Medications   diphenhydrAMINE capsule 50 mg (has no administration in time range)   HYDROcodone-acetaminophen  mg per tablet 1 tablet (1 tablet Oral Given 4/10/19 1253)   lorazepam (ATIVAN) injection 1 mg (1 mg Intravenous Given 4/10/19 1430)             Medical Decision Making    Medical Decision Making:   Clinical Tests:   Lab Tests: Reviewed and Ordered  Radiological Study: Ordered and Reviewed  Medical Tests: Ordered and Reviewed           Scribe Attestation:   Scribe #1: I performed the above scribed service and the documentation accurately describes the services I performed. I attest to the accuracy of the note.    Attending:   Physician Attestation Statement for Scribe #1: I, Santiago Choi MD, personally performed the services described in this documentation, as scribed by Cristel Schmitz, in my presence, and it is both accurate and complete.           Clinical Impression       ICD-10-CM ICD-9-CM   1. Multiple sclerosis exacerbation G35 340   2. Left-sided weakness R53.1 728.87       Disposition:   Disposition: Placed in Observation  Condition: Lisset Choi MD  04/10/19 6484

## 2019-04-10 NOTE — PLAN OF CARE
Problem: Adult Inpatient Plan of Care  Goal: Plan of Care Review  Outcome: Ongoing (interventions implemented as appropriate)  Repositions with assist. Unable to ambulate at this time.  C/o L sided weakness, numbness and tingling.   Unable to plantar/dorsiflex on the L foot, weak L handgrip.  No apparent skin issues noted- just scar from previous accident to LLE.  <3 monitor 8564, NSR  Pt also reports pain to L shoulder, unable to lift arm up.  Steroids given as prescribed. Low sodium diet. SCD's in place.  Hx of falls- fall prevention in place. Pt free from injuries/fall.   POC and meds discussed with pt and daughter, both verbalized understanding.  Side rails x 2, bed locked and low, phone and call light w/in reach.  Chart check done. Will cont to monitor.

## 2019-04-10 NOTE — TELEPHONE ENCOUNTER
Patient called to report the following:     -arm are tingling and going to sleep   -cannot keep awake   -now using her walker, was using a cane   -seen in clinic for symptoms, but now symptoms have worsened   -constant headaches, I have never felt like this before   -advised to report to ED    Reason for Disposition   Headache  (and neurologic deficit)    Protocols used: NEUROLOGIC DEFICIT-A-AH

## 2019-04-10 NOTE — SUBJECTIVE & OBJECTIVE
"Past Medical History:   Diagnosis Date    Anemia     Arthritis     Cardiac arrest as complication of care     pt states she went into cardiac arrest from an allergic reaction to a medication    Encounter for blood transfusion     Hemiplegia due to old stroke     Hypertension     Multiple sclerosis     Stroke        Past Surgical History:   Procedure Laterality Date    APPENDECTOMY      CHOLECYSTECTOMY      HYSTERECTOMY      KNEE SURGERY      TONSILLECTOMY      TUBAL LIGATION      VASCULAR CATH INSERTION Right 1/22/2015    Performed by Dayron Vasques MD at [facility] CATH LAB       Review of patient's allergies indicates:   Allergen Reactions    Demerol [meperidine] Itching     Other reaction(s): Itching    Dilaudid [hydromorphone (bulk)] Anaphylaxis     Other reaction(s): Anaphylaxis  "coded" per pt    Prednisone Itching     Other reaction(s): Itching    Morphine     Tramadol      shaking       No current facility-administered medications on file prior to encounter.      Current Outpatient Medications on File Prior to Encounter   Medication Sig    baclofen (LIORESAL) 20 MG tablet TAKE 1 TABLET (20 MG TOTAL) BY MOUTH 3 (THREE) TIMES DAILY.    buPROPion (WELLBUTRIN SR) 100 MG TBSR 12 hr tablet TAKE 1 TABLET BY MOUTH TWICE A DAY    ciprofloxacin-dexamethasone 0.3-0.1% (CIPRODEX) 0.3-0.1 % DrpS Place 4 drops into the right ear 2 (two) times daily.    dalfampridine (AMPYRA) 10 mg Tb12 Take 1 tablet by mouth 2 (two) times daily. (Patient taking differently: Take 1 tablet by mouth once daily. )    doxepin (SINEQUAN) 50 MG capsule Take 1 capsule (50 mg total) by mouth nightly as needed.    esomeprazole (NEXIUM) 40 MG capsule Take 1 capsule (40 mg total) by mouth before breakfast.    hydroCHLOROthiazide (HYDRODIURIL) 12.5 MG Tab Take 1 tablet (12.5 mg total) by mouth once daily.    hydrocodone-acetaminophen 10-325mg (NORCO)  mg Tab Take by mouth every 6 (six) hours as needed for Pain.    " linaclotide (LINZESS) 145 mcg Cap capsule Take 1 capsule (145 mcg total) by mouth once daily.    uj-[id number] 1-1-500 mg Cap Take 1 tablet by mouth once daily.    metFORMIN (GLUCOPHAGE) 500 MG tablet Take 1 tablet (500 mg total) by mouth 2 (two) times daily with meals.    promethazine (PHENERGAN) 25 MG tablet Take 1 tablet (25 mg total) by mouth every 4 (four) hours as needed for Nausea.    valACYclovir (VALTREX) 500 MG tablet Take 1 tablet (500 mg total) by mouth once daily.    butalbital-acetaminophen-caffeine -40 mg (FIORICET, ESGIC) -40 mg per tablet Take 1 tablet at onset of migraine.  Limit to 3 tabs a week.    fluconazole (DIFLUCAN) 150 MG Tab TAKE 1 TABLET (150 MG TOTAL) BY MOUTH EVERY 3 (THREE) DAYS. FOR 2 DOSES    interferon beta-1a (AVONEX) 30 mcg/0.5 mL syringe Inject 0.5 mLs (30 mcg total) into the muscle once a week.    mupirocin (BACTROBAN) 2 % ointment Apply topically 2 (two) times daily. Apply to affected area    nystatin (MYCOSTATIN) cream Apply topically 2 (two) times daily. (Patient taking differently: Apply topically as needed. )    psyllium husk (METAMUCIL) 0.4 gram Cap Take by mouth once daily.    [DISCONTINUED] dalfampridine (AMPYRA) 10 mg Tb12 Take 1 tablet by mouth.     Family History     Problem Relation (Age of Onset)    Breast cancer Maternal Aunt, Cousin, Cousin    Cancer Maternal Aunt (40)    Diabetes Father    Heart disease Mother    Kidney disease Father    Lupus Mother        Tobacco Use    Smoking status: Never Smoker    Smokeless tobacco: Never Used   Substance and Sexual Activity    Alcohol use: Yes     Comment: OCCASIONALLY    Drug use: No    Sexual activity: Yes     Partners: Male     Review of Systems   Constitutional: Positive for activity change and fatigue. Negative for appetite change, chills, diaphoresis and fever.   HENT: Negative.  Negative for trouble swallowing.    Eyes: Positive for pain and visual disturbance. Negative  for redness.   Respiratory: Positive for shortness of breath. Negative for cough and wheezing.    Cardiovascular: Negative for chest pain, palpitations and leg swelling.   Gastrointestinal: Positive for constipation. Negative for abdominal pain, diarrhea, nausea and vomiting.   Genitourinary: Negative for difficulty urinating.        Incontinence   Musculoskeletal: Positive for arthralgias, gait problem and myalgias.   Skin: Negative for pallor, rash and wound.   Neurological: Positive for weakness, light-headedness, numbness and headaches. Negative for dizziness, seizures, syncope, facial asymmetry and speech difficulty.   Psychiatric/Behavioral: Negative for confusion. The patient is not nervous/anxious.      Objective:     Vital Signs (Most Recent):  Temp: 97.9 °F (36.6 °C) (04/10/19 1651)  Pulse: 86 (04/10/19 1651)  Resp: 18 (04/10/19 1651)  BP: 129/61 (04/10/19 1651)  SpO2: 100 % (04/10/19 1651) Vital Signs (24h Range):  Temp:  [97.6 °F (36.4 °C)-97.9 °F (36.6 °C)] 97.9 °F (36.6 °C)  Pulse:  [85-91] 86  Resp:  [13-22] 18  SpO2:  [97 %-100 %] 100 %  BP: (122-155)/(58-77) 129/61     Weight: (!) 150.9 kg (332 lb 10.8 oz)  Body mass index is 53.7 kg/m².    Physical Exam   Constitutional: She is oriented to person, place, and time. She appears well-developed and well-nourished.   HENT:   Head: Normocephalic and atraumatic.   Nose: Nose normal.   Mouth/Throat: Oropharynx is clear and moist.   Eyes: Pupils are equal, round, and reactive to light. Conjunctivae and EOM are normal. No scleral icterus.   Neck: Normal range of motion. Neck supple.   Cardiovascular: Normal rate, regular rhythm and normal heart sounds. Exam reveals no gallop and no friction rub.   No murmur heard.  Pulmonary/Chest: Effort normal and breath sounds normal.   Abdominal: Soft. Bowel sounds are normal.   obese   Musculoskeletal: She exhibits no edema or tenderness.   Limited ROM to the left arm due to pain, left arm weakness +3/+5, left leg  weakness +3/+5   Neurological: She is alert and oriented to person, place, and time. A cranial nerve deficit is present.   Skin: Skin is warm and dry.   Psychiatric: She has a normal mood and affect. Her behavior is normal.   Nursing note and vitals reviewed.        CRANIAL NERVES     CN III, IV, VI   Pupils are equal, round, and reactive to light.  Extraocular motions are normal.        Significant Labs:   CBC:   Recent Labs   Lab 04/10/19  1100   WBC 5.66   HGB 11.7*   HCT 36.9*        CMP:   Recent Labs   Lab 04/10/19  1100      K 3.6      CO2 27   *   BUN 8   CREATININE 0.8   CALCIUM 9.6   PROT 7.8   ALBUMIN 3.7   BILITOT 0.2   ALKPHOS 90   AST 23   ALT 20   ANIONGAP 10   EGFRNONAA >60     All pertinent labs within the past 24 hours have been reviewed.    Significant Imaging: I have reviewed all pertinent imaging results/findings within the past 24 hours.

## 2019-04-10 NOTE — TELEPHONE ENCOUNTER
Left hemiparesis, worse than baseline, unable to walk.  At [facility]  Could not fit through MRI.    Reviewed Dr. Pereira' note (just saw on 4/8).    Advised 3 days of solumedrol 1gm (treat empirically) and I'll let Dr. Pereira know.

## 2019-04-10 NOTE — ASSESSMENT & PLAN NOTE
Neurology consult  Solumedrol 1000 mg IV daily x 3  Neuro checks every 4 hours  Respiratory - NIF  Continue baclofen and dalfampridine

## 2019-04-10 NOTE — H&P
[facility] Hospital Medicine  History & Physical    Patient Name: Alicia Soliz  MRN: [id number]  Admission Date: 4/10/2019  Attending Physician: Alejo Morales MD   Primary Care Provider: Braden Dumont MD         Patient information was obtained from patient, past medical records and ER records.     Subjective:     Principal Problem:Multiple sclerosis exacerbation    Chief Complaint:   Chief Complaint   Patient presents with    Multiple Sclerosis     Fell last week. Numbness to R arm/L side face since sunday. .Tingling in both hands. L leg tingling. +vertigo/nausea/pain.         HPI: Alicia Soliz is a 38 yo female with PMHx of Multiple Sclerosis, HTN, CVA and Arthritis who presents with reports of leg weakness. Pt notes the onset of symptoms for approx 1 week after she experienced a fall.  Pt says she ambulates with a walker, rollator or a cane. Presently, she describes headaches, pain to left eye, left facial weakness, vertigo, fatigue, left foot drop, left sided weakness, left shoulder/arm and left sided pain and numbness to hands. Pt describes mild SOB and some urinary incontinence. Other symptoms denied. Neurology recommended MRI however, it was unattainable due to patient's body habitus. Pt is followed by [facility] specialist, Dr. Browne. They recommended empiric Solu Medrol. Vital signs on arrival Temp 97.6, pulse 91, resp 18, B/P 155/69 and SpO2 = 98 %. CT of Head is negative for acute findings. Labs indicate microcytic anemia, glucose = 115 and normal troponin. Pt is admitted due to MS Exacerbation.     Past Medical History:   Diagnosis Date    Anemia     Arthritis     Cardiac arrest as complication of care     pt states she went into cardiac arrest from an allergic reaction to a medication    Encounter for blood transfusion     Hemiplegia due to old stroke     Hypertension     Multiple sclerosis     Stroke        Past Surgical History:  "  Procedure Laterality Date    APPENDECTOMY      CHOLECYSTECTOMY      HYSTERECTOMY      KNEE SURGERY      TONSILLECTOMY      TUBAL LIGATION      VASCULAR CATH INSERTION Right 1/22/2015    Performed by Dayron Vasques MD at [facility] CATH LAB       Review of patient's allergies indicates:   Allergen Reactions    Demerol [meperidine] Itching     Other reaction(s): Itching    Dilaudid [hydromorphone (bulk)] Anaphylaxis     Other reaction(s): Anaphylaxis  "coded" per pt    Prednisone Itching     Other reaction(s): Itching    Morphine     Tramadol      shaking       No current facility-administered medications on file prior to encounter.      Current Outpatient Medications on File Prior to Encounter   Medication Sig    baclofen (LIORESAL) 20 MG tablet TAKE 1 TABLET (20 MG TOTAL) BY MOUTH 3 (THREE) TIMES DAILY.    buPROPion (WELLBUTRIN SR) 100 MG TBSR 12 hr tablet TAKE 1 TABLET BY MOUTH TWICE A DAY    ciprofloxacin-dexamethasone 0.3-0.1% (CIPRODEX) 0.3-0.1 % DrpS Place 4 drops into the right ear 2 (two) times daily.    dalfampridine (AMPYRA) 10 mg Tb12 Take 1 tablet by mouth 2 (two) times daily. (Patient taking differently: Take 1 tablet by mouth once daily. )    doxepin (SINEQUAN) 50 MG capsule Take 1 capsule (50 mg total) by mouth nightly as needed.    esomeprazole (NEXIUM) 40 MG capsule Take 1 capsule (40 mg total) by mouth before breakfast.    hydroCHLOROthiazide (HYDRODIURIL) 12.5 MG Tab Take 1 tablet (12.5 mg total) by mouth once daily.    hydrocodone-acetaminophen 10-325mg (NORCO)  mg Tab Take by mouth every 6 (six) hours as needed for Pain.    linaclotide (LINZESS) 145 mcg Cap capsule Take 1 capsule (145 mcg total) by mouth once daily.    gf-iaaphcbticocnndn-[id number] 1-1-500 mg Cap Take 1 tablet by mouth once daily.    metFORMIN (GLUCOPHAGE) 500 MG tablet Take 1 tablet (500 mg total) by mouth 2 (two) times daily with meals.    promethazine (PHENERGAN) 25 MG tablet Take 1 tablet (25 mg " total) by mouth every 4 (four) hours as needed for Nausea.    valACYclovir (VALTREX) 500 MG tablet Take 1 tablet (500 mg total) by mouth once daily.    butalbital-acetaminophen-caffeine -40 mg (FIORICET, ESGIC) -40 mg per tablet Take 1 tablet at onset of migraine.  Limit to 3 tabs a week.    fluconazole (DIFLUCAN) 150 MG Tab TAKE 1 TABLET (150 MG TOTAL) BY MOUTH EVERY 3 (THREE) DAYS. FOR 2 DOSES    interferon beta-1a (AVONEX) 30 mcg/0.5 mL syringe Inject 0.5 mLs (30 mcg total) into the muscle once a week.    mupirocin (BACTROBAN) 2 % ointment Apply topically 2 (two) times daily. Apply to affected area    nystatin (MYCOSTATIN) cream Apply topically 2 (two) times daily. (Patient taking differently: Apply topically as needed. )    psyllium husk (METAMUCIL) 0.4 gram Cap Take by mouth once daily.    [DISCONTINUED] dalfampridine (AMPYRA) 10 mg Tb12 Take 1 tablet by mouth.     Family History     Problem Relation (Age of Onset)    Breast cancer Maternal Aunt, Cousin, Cousin    Cancer Maternal Aunt (40)    Diabetes Father    Heart disease Mother    Kidney disease Father    Lupus Mother        Tobacco Use    Smoking status: Never Smoker    Smokeless tobacco: Never Used   Substance and Sexual Activity    Alcohol use: Yes     Comment: OCCASIONALLY    Drug use: No    Sexual activity: Yes     Partners: Male     Review of Systems   Constitutional: Positive for activity change and fatigue. Negative for appetite change, chills, diaphoresis and fever.   HENT: Negative.  Negative for trouble swallowing.    Eyes: Positive for pain and visual disturbance. Negative for redness.   Respiratory: Positive for shortness of breath. Negative for cough and wheezing.    Cardiovascular: Negative for chest pain, palpitations and leg swelling.   Gastrointestinal: Positive for constipation. Negative for abdominal pain, diarrhea, nausea and vomiting.   Genitourinary: Negative for difficulty urinating.        Incontinence    Musculoskeletal: Positive for arthralgias, gait problem and myalgias.   Skin: Negative for pallor, rash and wound.   Neurological: Positive for weakness, light-headedness, numbness and headaches. Negative for dizziness, seizures, syncope, facial asymmetry and speech difficulty.   Psychiatric/Behavioral: Negative for confusion. The patient is not nervous/anxious.      Objective:     Vital Signs (Most Recent):  Temp: 97.9 °F (36.6 °C) (04/10/19 1651)  Pulse: 86 (04/10/19 1651)  Resp: 18 (04/10/19 1651)  BP: 129/61 (04/10/19 1651)  SpO2: 100 % (04/10/19 1651) Vital Signs (24h Range):  Temp:  [97.6 °F (36.4 °C)-97.9 °F (36.6 °C)] 97.9 °F (36.6 °C)  Pulse:  [85-91] 86  Resp:  [13-22] 18  SpO2:  [97 %-100 %] 100 %  BP: (122-155)/(58-77) 129/61     Weight: (!) 150.9 kg (332 lb 10.8 oz)  Body mass index is 53.7 kg/m².    Physical Exam   Constitutional: She is oriented to person, place, and time. She appears well-developed and well-nourished.   HENT:   Head: Normocephalic and atraumatic.   Nose: Nose normal.   Mouth/Throat: Oropharynx is clear and moist.   Eyes: Pupils are equal, round, and reactive to light. Conjunctivae and EOM are normal. No scleral icterus.   Neck: Normal range of motion. Neck supple.   Cardiovascular: Normal rate, regular rhythm and normal heart sounds. Exam reveals no gallop and no friction rub.   No murmur heard.  Pulmonary/Chest: Effort normal and breath sounds normal.   Abdominal: Soft. Bowel sounds are normal.   obese   Musculoskeletal: She exhibits no edema or tenderness.   Limited ROM to the left arm due to pain, left arm weakness +3/+5, left leg weakness +3/+5   Neurological: She is alert and oriented to person, place, and time. A cranial nerve deficit is present.   Skin: Skin is warm and dry.   Psychiatric: She has a normal mood and affect. Her behavior is normal.   Nursing note and vitals reviewed.        CRANIAL NERVES     CN III, IV, VI   Pupils are equal, round, and reactive to  light.  Extraocular motions are normal.        Significant Labs:   CBC:   Recent Labs   Lab 04/10/19  1100   WBC 5.66   HGB 11.7*   HCT 36.9*        CMP:   Recent Labs   Lab 04/10/19  1100      K 3.6      CO2 27   *   BUN 8   CREATININE 0.8   CALCIUM 9.6   PROT 7.8   ALBUMIN 3.7   BILITOT 0.2   ALKPHOS 90   AST 23   ALT 20   ANIONGAP 10   EGFRNONAA >60     All pertinent labs within the past 24 hours have been reviewed.    Significant Imaging: I have reviewed all pertinent imaging results/findings within the past 24 hours.    Assessment/Plan:     Multiple sclerosis exacerbation  Neurology consult  Solumedrol 1000 mg IV daily x 3  Neuro checks every 4 hours  Respiratory - NIF  Continue baclofen and dalfampridine      VTE Risk Mitigation (From admission, onward)        Ordered     IP VTE HIGH RISK PATIENT  Once      04/10/19 1712     Place sequential compression device  Until discontinued      04/10/19 1712             Su Little NP  Department of Hospital Medicine   [facility]

## 2019-04-10 NOTE — ED NOTES
Report received from CONSUELO Obrien. Pt seen resting comfortably in bed, AAOx3. Respirations even and unlabored, in NSR, skin warm and dry. Pt denies any needs at this time. Bed low, side rails up, call light within reach. Will continue to monitor.

## 2019-04-10 NOTE — ED NOTES
Pt resting in ER stretcher, aaox4, rr e/u, NAD noted. Pt remains on cardiac monitor with vss noted. Bed low and locked, call light in reach, side rails up x2. Visitor at bedside.  Pt verbalized understanding of status and POC; denies further needs. Will continue to monitor.

## 2019-04-11 PROCEDURE — 25000003 PHARM REV CODE 250: Performed by: EMERGENCY MEDICINE

## 2019-04-11 PROCEDURE — 97116 GAIT TRAINING THERAPY: CPT

## 2019-04-11 PROCEDURE — 21400001 HC TELEMETRY ROOM

## 2019-04-11 PROCEDURE — 97530 THERAPEUTIC ACTIVITIES: CPT

## 2019-04-11 PROCEDURE — 99223 1ST HOSP IP/OBS HIGH 75: CPT | Mod: ,,, | Performed by: PSYCHIATRY & NEUROLOGY

## 2019-04-11 PROCEDURE — 63600175 PHARM REV CODE 636 W HCPCS: Performed by: EMERGENCY MEDICINE

## 2019-04-11 PROCEDURE — 63600175 PHARM REV CODE 636 W HCPCS: Performed by: PSYCHIATRY & NEUROLOGY

## 2019-04-11 PROCEDURE — 25000003 PHARM REV CODE 250: Performed by: NURSE PRACTITIONER

## 2019-04-11 PROCEDURE — 63600175 PHARM REV CODE 636 W HCPCS: Performed by: NURSE PRACTITIONER

## 2019-04-11 PROCEDURE — 97162 PT EVAL MOD COMPLEX 30 MIN: CPT

## 2019-04-11 PROCEDURE — 97165 OT EVAL LOW COMPLEX 30 MIN: CPT

## 2019-04-11 PROCEDURE — 96375 TX/PRO/DX INJ NEW DRUG ADDON: CPT | Performed by: EMERGENCY MEDICINE

## 2019-04-11 PROCEDURE — 99223 PR INITIAL HOSPITAL CARE,LEVL III: ICD-10-PCS | Mod: ,,, | Performed by: PSYCHIATRY & NEUROLOGY

## 2019-04-11 RX ORDER — DIPHENHYDRAMINE HYDROCHLORIDE 50 MG/ML
25 INJECTION INTRAMUSCULAR; INTRAVENOUS DAILY
Status: DISCONTINUED | OUTPATIENT
Start: 2019-04-11 | End: 2019-04-11

## 2019-04-11 RX ADMIN — ONDANSETRON 4 MG: 2 INJECTION INTRAMUSCULAR; INTRAVENOUS at 10:04

## 2019-04-11 RX ADMIN — LINACLOTIDE 145 MCG: 145 CAPSULE, GELATIN COATED ORAL at 09:04

## 2019-04-11 RX ADMIN — HYDROCODONE BITARTRATE AND ACETAMINOPHEN 1 TABLET: 10; 325 TABLET ORAL at 04:04

## 2019-04-11 RX ADMIN — BUPROPION HYDROCHLORIDE 100 MG: 100 TABLET, FILM COATED, EXTENDED RELEASE ORAL at 09:04

## 2019-04-11 RX ADMIN — HYDROCODONE BITARTRATE AND ACETAMINOPHEN 1 TABLET: 10; 325 TABLET ORAL at 09:04

## 2019-04-11 RX ADMIN — BACLOFEN 20 MG: 10 TABLET ORAL at 08:04

## 2019-04-11 RX ADMIN — BACLOFEN 20 MG: 10 TABLET ORAL at 02:04

## 2019-04-11 RX ADMIN — PANTOPRAZOLE SODIUM 40 MG: 40 TABLET, DELAYED RELEASE ORAL at 09:04

## 2019-04-11 RX ADMIN — CIPROFLOXACIN AND DEXAMETHASONE 4 DROP: 3; 1 SUSPENSION/ DROPS AURICULAR (OTIC) at 08:04

## 2019-04-11 RX ADMIN — METHYLPREDNISOLONE SODIUM SUCCINATE: 1 INJECTION, POWDER, LYOPHILIZED, FOR SOLUTION INTRAMUSCULAR; INTRAVENOUS at 09:04

## 2019-04-11 RX ADMIN — ENOXAPARIN SODIUM 40 MG: 100 INJECTION SUBCUTANEOUS at 04:04

## 2019-04-11 RX ADMIN — HYDROCODONE BITARTRATE AND ACETAMINOPHEN 1 TABLET: 10; 325 TABLET ORAL at 10:04

## 2019-04-11 RX ADMIN — HYDROCODONE BITARTRATE AND ACETAMINOPHEN 1 TABLET: 10; 325 TABLET ORAL at 03:04

## 2019-04-11 RX ADMIN — CIPROFLOXACIN AND DEXAMETHASONE 4 DROP: 3; 1 SUSPENSION/ DROPS AURICULAR (OTIC) at 09:04

## 2019-04-11 RX ADMIN — DALFAMPRIDINE 1 TABLET: 10 TABLET, FILM COATED, EXTENDED RELEASE ORAL at 09:04

## 2019-04-11 RX ADMIN — BUPROPION HYDROCHLORIDE 100 MG: 100 TABLET, FILM COATED, EXTENDED RELEASE ORAL at 08:04

## 2019-04-11 RX ADMIN — BACLOFEN 20 MG: 10 TABLET ORAL at 09:04

## 2019-04-11 RX ADMIN — VALACYCLOVIR HYDROCHLORIDE 500 MG: 500 TABLET, FILM COATED ORAL at 09:04

## 2019-04-11 RX ADMIN — DIPHENHYDRAMINE HYDROCHLORIDE 25 MG: 50 INJECTION INTRAMUSCULAR; INTRAVENOUS at 09:04

## 2019-04-11 NOTE — CONSULTS
Consult Requested By: Alejo Morales MD  Reason for Consult:  MS exacerbation    SUBJECTIVE:       HPI:   39 year old came to ER with MS exacerbation. She has history of HTN and she she is morbidly obese. She said that she was diagnosed with MS in 2015 by Dr. Lowe with MRI and spinal tap. She was then seen by Dr. Adrian Phoenix and Sunita . Finally she was seen by Dr. Vela who has sent her to Dr. Browne.     She  presents with reports of leg weakness. Pt notes the onset of symptoms for approx 1 week after she experienced a fall.  Pt says she ambulates with a walker, rollator or a cane. Presently, she describes headaches, pain to left eye, left facial weakness, vertigo, fatigue, left foot drop, left sided weakness, left shoulder/arm and left sided pain and numbness to hands. Pt describes mild SOB and some urinary incontinence.  ER could not do MRI of the brain but CT head was negative. She was started empirically with Solumedrol 1 gm IvPb / day for 3 days. She said that Steroid makes her itchy and Benadryl iv has been recommended before Steroid by Hospital oncall Neurologist.   This morning, her treating MS specialist, Dr. Browne sent message through Hospital medicine to stop the steroid and traet with Therapy only.         Past Medical History:   Diagnosis Date    Anemia     Arthritis     Cardiac arrest as complication of care     pt states she went into cardiac arrest from an allergic reaction to a medication    Encounter for blood transfusion     Hemiplegia due to old stroke     Hypertension     Multiple sclerosis     Stroke      Past Surgical History:   Procedure Laterality Date    APPENDECTOMY      CHOLECYSTECTOMY      HYSTERECTOMY      KNEE SURGERY      TONSILLECTOMY      TUBAL LIGATION      VASCULAR CATH INSERTION Right 1/22/2015    Performed by [facility]ron [facility]ques MD at [facility] CATH LAB     Family History   Problem Relation Age of Onset    Lupus Mother     Heart disease Mother     Diabetes  "Father     Kidney disease Father     Cancer Maternal Aunt 40        breast    Breast cancer Maternal Aunt     Breast cancer Cousin     Breast cancer Cousin      Social History     Tobacco Use    Smoking status: Never Smoker    Smokeless tobacco: Never Used   Substance Use Topics    Alcohol use: Yes     Comment: OCCASIONALLY    Drug use: No     Review of patient's allergies indicates:   Allergen Reactions    Demerol [meperidine] Itching     Other reaction(s): Itching    Dilaudid [hydromorphone (bulk)] Anaphylaxis     "coded" per pt  Patient can tolerate Lortab    Prednisone Itching     Other reaction(s): Itching    Morphine     Tramadol      shaking       Scheduled Meds:   baclofen  20 mg Oral TID    buPROPion  100 mg Oral BID    ciprofloxacin-dexamethasone 0.3-0.1%  4 drop Right Ear BID    dalfampridine  1 tablet Oral Daily    diphenhydrAMINE  25 mg Intravenous Daily    enoxaparin  40 mg Subcutaneous Daily    linaclotide  145 mcg Oral Daily    methylPREDNISolone (SOLU-Medrol) IVPB (doses > 250 mg)   Intravenous Daily    pantoprazole  40 mg Oral Daily    valACYclovir  500 mg Oral Daily         Review of Systems -  Constitutional: Positive for activity change and fatigue. Negative for appetite change, chills, diaphoresis and fever.   HENT: Negative.  Negative for trouble swallowing.    Eyes: Positive for pain and visual disturbance. Negative for redness.   Respiratory: Positive for shortness of breath. Negative for cough and wheezing.    Cardiovascular: Negative for chest pain, palpitations and leg swelling.   Gastrointestinal: Positive for constipation. Negative for abdominal pain, diarrhea, nausea and vomiting.   Genitourinary: Negative for difficulty urinating.        Incontinence   Musculoskeletal: Positive for arthralgias, gait problem and myalgias.   Skin: Negative for pallor, rash and wound.   Neurological: Positive for weakness, light-headedness, numbness and headaches. Negative for " "dizziness, seizures, syncope, facial asymmetry and speech difficulty.   Psychiatric/Behavioral: Negative for confusion. The patient is not nervous/anxious.          OBJECTIVE:     Vital Signs (Most Recent)  Temp: 98.1 °F (36.7 °C) (04/11/19 0750)  Pulse: 79 (04/11/19 0750)  Resp: 15 (04/11/19 0750)  BP: (Abnormal) 129/59 (04/11/19 0750)  SpO2: 96 % (04/11/19 0750)    Physical Exam  Higher Cortical Function:    Patient is a well developed, pleasant, well groomed individual appearing their stated age  Oriented - intact to person, place and time and followed two step instruction correctly.    Spell WORLD - Patients response: forward - WORLD; backwards -   Subtraction of serial 7s from 100 - 3 steps performed correct  Memory - Patient recalled 3 of 3 objects after 5 minutes  Fund of knowledge was appropriate.    R-L Orientation - Intact - Impaired  Language - Speech was fluent without evidence for an aphasia.  Agnosias, agraphesthesia, or astereognosis - not present.   Extinction with double simultaneous stimulation:        Proximal-distal stimulation - Not present        Right-left stimulation - Not present  Cranial Nerves II - XII:    EOMs were intact with normal smooth and no nystagmus.    PERRLA. D/C   Funduscopic exam - disc were flat with normal A/V ratio and no exudates or hemorrhages. Visual fields were full to confrontation.    Motor - facial movement was symmetrical and normal.    Facial sensory - Light touch and pin prick sensations were normal.    Hearing was normal to finger rub.  Palate moved well and was symmetrical with normal palatal and oral sensation.    Tongue movement was full & the patient could say "la la la" and "Ka Ka Ka" without  difficulty. Patient repeated Restorationist and Confucianism without difficulty. Normal power and bulk was found in the massiter and rotator muscles of the neck.  Motor:   Strength  Deltoids Triceps Biceps Wrist Extension Wrist Flexion Hand    Upper: R 5/5 5/5 5/5 5/5 5/5 " 5/5    L 5/5 5/5 5/5 5/5 5/5 5/5     Iliopsoas Quadriceps Knee  Flexion Tibialis  anterior Gastro- cnemius EHL   Lower: R 5/5 5/5 5/5 5/5 5/5 5/5    L 5/5 5/5 5/5 5/5 5/5 5/5     Sensory: Light touch, pin prick, vibration and position senses were normal in all extremities.    Coordination:       Rapid alternating movements and rapid finger tapping - normal.       Finger to nose - nl.       Arm roll - symmetrical.    Gait:  Has not been tested ,. She walks with wa    Frontal release signs  Sign Left Right   Glabellar absent absent   Snout absent absent   Root absent absent   Suck absent absent   Palmomental absent absent          Deep tendon reflexes:    Reflex L R   Bicpets 1 1   Tricepts 1 1   Brachio-radialis 1 1   Knee 0 0   Ankle 0 0   Babinski No No     Tremor: resting, postural, intentional - none  Frontal Release signs:  Glabella No   Snout No   Root No   Palmomental No No   Grasp No No   Pulses    Carotids - strong without bruits    Peripheral - strong and symmetrical       Laboratory:  Lab Results   Component Value Date    WBC 5.66 04/10/2019    HGB 11.7 (L) 04/10/2019    HCT 36.9 (L) 04/10/2019     04/10/2019    CHOL 194 02/11/2019    TRIG 84 02/11/2019    HDL 59 02/11/2019    ALT 20 04/10/2019    AST 23 04/10/2019     04/10/2019    K 3.6 04/10/2019     04/10/2019    CREATININE 0.8 04/10/2019    BUN 8 04/10/2019    CO2 27 04/10/2019    TSH 1.654 02/11/2019    INR 1.0 03/11/2015    HGBA1C 5.9 (H) 02/11/2019             Imaging Results          CT Head Without Contrast (Final result)  Result time 04/10/19 11:35:41    Final result by Barrera Arroyo MD (04/10/19 11:35:41)             Impression:      No acute abnormality.    All CT scans at this facility use dose modulation, iterative reconstruction, and/or weight based dosing when appropriate to reduce radiation dose to as low as reasonably achievable.      Electronically signed by: Barrera Arroyo  Date:    04/10/2019  Time:    11:35            Narrative:    EXAMINATION:  CT HEAD WITHOUT CONTRAST    CLINICAL HISTORY:  Syncope/fainting;    TECHNIQUE:  Low dose axial CT images obtained throughout the head without intravenous contrast. Sagittal and coronal reconstructions were performed.    COMPARISON:  02/21/2018    FINDINGS:  Intracranial compartment:    Ventricles and sulci are normal in size for age without evidence of hydrocephalus. No extra-axial blood or fluid collections.    Unchanged right deep periventricular white matter hypoattenuation compared to prior study of 02/21/2018 reflecting chronic microangiopathic ischemic changes with associated mild ex vacuo dilation of the right ventricle.  No parenchymal mass, hemorrhage, edema or major vascular distribution infarct.    Skull/extracranial contents (limited evaluation): No fracture. Mastoid air cells and paranasal sinuses are essentially clear.                             X-Ray Chest AP Portable (Final result)  Result time 04/10/19 11:41:33    Final result by Barrera Arroyo MD (04/10/19 11:41:33)             Impression:      No acute abnormality.      Electronically signed by: Barrera Arroyo  Date:    04/10/2019  Time:    11:41           Narrative:    EXAMINATION:  XR CHEST AP PORTABLE    CLINICAL HISTORY:  fall;.    TECHNIQUE:  Single frontal portable view of the chest was performed.    COMPARISON:  11/18/2018    FINDINGS:  Support devices: None    The lungs are clear, with normal appearance of pulmonary vasculature and no pleural effusion or pneumothorax.    The cardiac silhouette is normal in size. The hilar and mediastinal contours are unremarkable.    Bones are intact.                              ASSESSMENT/PLAN:     Primary Diagnoses:  1. Possible MS exacerbation s/p two dose of steroid . Her treating Neurologist recommended no steroid but PT/OT . I agree with that.    Patient Active Problem List   Diagnosis    Knee pain, bilateral    Hypertension    Abnormal MRI of head    Multiple  sclerosis    Insomnia    Morbid obesity with BMI of 45.0-49.9, adult    Multiple sclerosis exacerbation        Plan:    Will see PRN as out patient if necessary.

## 2019-04-11 NOTE — PT/OT/SLP EVAL
Occupational Therapy   Evaluation and Discharge Note    Name: Alicia Soliz  MRN: [id number]  Admitting Diagnosis:  Multiple sclerosis exacerbation      Recommendations:     Discharge Recommendations: home health OT(for safety)  Discharge Equipment Recommendations:  none(tbd)  Barriers to discharge:       Assessment:     Alicia Soliz is a 39 y.o. female with a medical diagnosis of Multiple sclerosis exacerbation. At this time, patient is functioning at their prior level of function and does not require further acute OT services.     Plan:     During this hospitalization, patient does not require further acute OT services.  Please re-consult if situation changes.    · Plan of Care Reviewed with: patient, daughter   · Pt placed on people 's program    Subjective     Chief Complaint:   Patient/Family Comments/goals:     Occupational Profile:  Living Environment: lives with daughter in 1 story house with no steps  Previous level of function: (I)  With adl's and mod (I) with functional mobility  Roles and Routines: occupational therapy  Equipment Used at home:  cane, straight, walker, rolling, walker, standard, shower chair, bedside commode  Assistance upon Discharge:     Pain/Comfort:  · Pain Rating 1: 0/10  · Pain Rating Post-Intervention 2: 0/10    Patients cultural, spiritual, Tenriism conflicts given the current situation:      Objective:     Communicated with: nurse and epic chart revire prior to session.  Patient found HOB elevated with telemetry, peripheral IV upon OT entry to room.    General Precautions: Standard, fall   Orthopedic Precautions:Full weight bearing   Braces: N/A     Occupational Performance:    Bed Mobility:    · Patient completed Rolling/Turning to Right with stand by assistance  · Patient completed Scooting/Bridging with stand by assistance  · Patient completed Supine to Sit with stand by assistance    Functional Mobility/Transfers:  · Patient completed Sit <> Stand Transfer  with modified independence  with  rolling walker   · Patient completed Bed <> Chair Transfer using Step Transfer technique with modified independence with rolling walker  · Patient completed Toilet Transfer Step Transfer technique with modified independence with  rolling walker  · Functional Mobility: pt ambulated 80 feet with s , rw at slow pace with l dragging of foot    Activities of Daily Living:  · Upper Body Dressing: stand by assistance .  · Lower Body Dressing: supervision .    Cognitive/Visual Perceptual:  Cognitive/Psychosocial Skills:     -       Oriented to: Person, Place, Time and Situation   -       Follows Commands/attention:Follows multistep  commands  -       Communication: clear/fluent  -       Memory: No Deficits noted  -       Safety awareness/insight to disability: intact   Visual/Perceptual:      -Intact .    Physical Exam:  Upper Extremity Range of Motion:     -       Right Upper Extremity: WFL  -       Left Upper Extremity: approx 150 degrees  Upper Extremity Strength:    -       Right Upper Extremity: WFL  -       Left Upper Extremity: mmt: 4/5 grossly   Strength:    -       Right Upper Extremity: WFL  -       Left Upper Extremity: mmt: 4/5 grossly    AMPAC 6 Click ADL:  AMPAC Total Score: 24    Treatment & Education:    Education:    Patient left up in chair with all lines intact, call button in reach, nurse notified and daughter present    GOALS:   Multidisciplinary Problems     Occupational Therapy Goals     Not on file                History:     Past Medical History:   Diagnosis Date    Anemia     Arthritis     Cardiac arrest as complication of care     pt states she went into cardiac arrest from an allergic reaction to a medication    Encounter for blood transfusion     Hemiplegia due to old stroke     Hypertension     Multiple sclerosis     Stroke        Past Surgical History:   Procedure Laterality Date    APPENDECTOMY      CHOLECYSTECTOMY      HYSTERECTOMY      KNEE  SURGERY      TONSILLECTOMY      TUBAL LIGATION      VASCULAR CATH INSERTION Right 1/22/2015    Performed by Dayron Vasques MD at [facility] CATH LAB       Time Tracking:     OT Date of Treatment: 04/11/19  OT Start Time: 1135  OT Stop Time: 1159  OT Total Time (min): 24 min    Billable Minutes:Evaluation 14 minutes  Therapeutic Activity 14 minutes    Kassie Cuevas, OT  4/11/2019

## 2019-04-11 NOTE — PLAN OF CARE
Problem: Adult Inpatient Plan of Care  Goal: Plan of Care Review  Outcome: Ongoing (interventions implemented as appropriate)  POC reviewed. Patient remained free from injury. Pain controlled. Fall precautions maintained. Neuro checks performed q4h. IV site remained clean, dry, and intact without redness or swelling. IV steroids administered. IV fluids discontinued. IV saline locked. SCDs maintained. Chart check complete. Will continue to monitor.

## 2019-04-11 NOTE — PLAN OF CARE
Met with pt for discharge planning assessment.  Prior to admission, pt reports that she lived at home with youngest of three daughters and was partially dependent for care.  DME used for assistance includes rollator, rolling walker, wheelchair, shower chair, and bedside commode.  Pt highly motivated to maintain independence and has previously utilized outpatient physical therapy services.  Pt currently not using services and after further discussion, expressed interest in home health services for physical therapy if appropriate.  Pt unable to drive and leave home and relies on daughters for transportation to medical appointments. CM provided a transitional care folder, information on advanced directives, information on pharmacy bedside delivery, and discharge planning begins on admission with contact information for any needs/questions.       04/11/19 1134   Discharge Assessment   Assessment Type Discharge Planning Assessment   Confirmed/corrected address and phone number on facesheet? Yes   Assessment information obtained from? Patient   Prior to hospitilization cognitive status: Alert/Oriented;No Deficits   Prior to hospitalization functional status: Assistive Equipment;Partially Dependent   Current cognitive status: Alert/Oriented;No Deficits   Current Functional Status: Assistive Equipment;Partially Dependent   Lives With child(byron), dependent   Able to Return to Prior Arrangements yes   Is patient able to care for self after discharge? Yes   Who are your caregiver(s) and their phone number(s)? Rachel Farmer, daughter: [phone]; Rich Farmer, daughter: [phone]   Patient's perception of discharge disposition home health   Readmission Within the Last 30 Days no previous admission in last 30 days   Patient currently being followed by outpatient case management? No   Patient currently receives any other outside agency services? No   Equipment Currently Used at Home cane, straight;rollator;walker,  standard;walker, rolling;wheelchair;shower chair;bedside commode   Do you have any problems affording any of your prescribed medications? No   Is the patient taking medications as prescribed? yes   Does the patient have transportation home? Yes   Transportation Anticipated family or friend will provide   Does the patient receive services at the [facility]? No   Discharge Plan A Home Health   Discharge Plan B Home   DME Needed Upon Discharge  none   Patient/Family in Agreement with Plan yes

## 2019-04-12 VITALS
BODY MASS INDEX: 47.09 KG/M2 | TEMPERATURE: 98 F | HEIGHT: 66 IN | RESPIRATION RATE: 16 BRPM | WEIGHT: 293 LBS | DIASTOLIC BLOOD PRESSURE: 59 MMHG | SYSTOLIC BLOOD PRESSURE: 134 MMHG | OXYGEN SATURATION: 97 % | HEART RATE: 99 BPM

## 2019-04-12 PROCEDURE — 25000003 PHARM REV CODE 250: Performed by: NURSE PRACTITIONER

## 2019-04-12 PROCEDURE — 97116 GAIT TRAINING THERAPY: CPT | Performed by: PHYSICAL THERAPIST

## 2019-04-12 PROCEDURE — 97530 THERAPEUTIC ACTIVITIES: CPT | Performed by: PHYSICAL THERAPIST

## 2019-04-12 PROCEDURE — 25000003 PHARM REV CODE 250: Performed by: EMERGENCY MEDICINE

## 2019-04-12 PROCEDURE — 63600175 PHARM REV CODE 636 W HCPCS: Performed by: EMERGENCY MEDICINE

## 2019-04-12 RX ADMIN — PROMETHAZINE HYDROCHLORIDE 6.25 MG: 25 INJECTION INTRAMUSCULAR; INTRAVENOUS at 01:04

## 2019-04-12 RX ADMIN — LINACLOTIDE 145 MCG: 145 CAPSULE, GELATIN COATED ORAL at 08:04

## 2019-04-12 RX ADMIN — HYDROCODONE BITARTRATE AND ACETAMINOPHEN 1 TABLET: 10; 325 TABLET ORAL at 11:04

## 2019-04-12 RX ADMIN — BACLOFEN 20 MG: 10 TABLET ORAL at 08:04

## 2019-04-12 RX ADMIN — VALACYCLOVIR HYDROCHLORIDE 500 MG: 500 TABLET, FILM COATED ORAL at 08:04

## 2019-04-12 RX ADMIN — BUPROPION HYDROCHLORIDE 100 MG: 100 TABLET, FILM COATED, EXTENDED RELEASE ORAL at 08:04

## 2019-04-12 RX ADMIN — PANTOPRAZOLE SODIUM 40 MG: 40 TABLET, DELAYED RELEASE ORAL at 08:04

## 2019-04-12 RX ADMIN — HYDROCODONE BITARTRATE AND ACETAMINOPHEN 1 TABLET: 10; 325 TABLET ORAL at 04:04

## 2019-04-12 NOTE — PLAN OF CARE
Problem: Adult Inpatient Plan of Care  Goal: Plan of Care Review  Outcome: Ongoing (interventions implemented as appropriate)  POC reviewed. Patient free from injury. Out of bed and sitting in chair. Neuro checks completed q4h. Pain controlled. Patient states last BM was this morning around 0200. Patient removed SCDs. IV site remained clean, dry, and intact without redness or swelling. Chart check complete. Will continue to assess.

## 2019-04-12 NOTE — ASSESSMENT & PLAN NOTE
Neurology consult: Dr. Browne recommended stopping Solumedrol 1000 mg IV   Neurologist Dr. Zavala following  Neuro checks every 4 hours  Respiratory - NIF  Continue baclofen and dalfampridine  OT/PT eval and treat.

## 2019-04-12 NOTE — NURSING
Patient refused to wait until brace delivered, due to daughter having to work.  Patient stated she would come back to get brace if delivered to hospital.  Encouraged patient to stay until final word on brace.  Patient insisted on leaving.  No acute distress or injury noted to patient.  Address and phone number verified with patient.  Patient to wheelchair to vehicle to home.

## 2019-04-12 NOTE — NURSING
No acute distress or injury noted to patient.  Denies having any questions.  Will discharge once brace is delivered.  Will continue to monitor until discharge.

## 2019-04-12 NOTE — SUBJECTIVE & OBJECTIVE
Interval History: Dr. Browne, MS specialist in [place], called and stated patient NOT in exacerbation, stop IV steriods, and continue PT. D.r Pavan felt s/s were due to stress.    Review of Systems   Constitutional: Positive for activity change and fatigue. Negative for appetite change, chills, diaphoresis and fever.   HENT: Negative.  Negative for trouble swallowing.    Eyes: Positive for pain and visual disturbance. Negative for redness.   Respiratory: Positive for shortness of breath. Negative for cough and wheezing.    Cardiovascular: Negative for chest pain, palpitations and leg swelling.   Gastrointestinal: Positive for constipation. Negative for abdominal pain, diarrhea, nausea and vomiting.   Genitourinary: Negative for difficulty urinating.        Incontinence   Musculoskeletal: Positive for arthralgias, gait problem and myalgias.   Skin: Negative for pallor, rash and wound.   Neurological: Positive for weakness, light-headedness, numbness and headaches. Negative for dizziness, seizures, syncope, facial asymmetry and speech difficulty.   Psychiatric/Behavioral: Negative for confusion. The patient is not nervous/anxious.      Objective:     Vital Signs (Most Recent):  Temp: 98.6 °F (37 °C) (04/11/19 1621)  Pulse: 100 (04/11/19 1621)  Resp: 18 (04/11/19 1621)  BP: (!) 141/65 (04/11/19 1621)  SpO2: 98 % (04/11/19 1621) Vital Signs (24h Range):  Temp:  [97.5 °F (36.4 °C)-98.6 °F (37 °C)] 98.6 °F (37 °C)  Pulse:  [] 100  Resp:  [15-18] 18  SpO2:  [95 %-99 %] 98 %  BP: (123-163)/(57-65) 141/65     Weight: (!) 150.9 kg (332 lb 10.8 oz)  Body mass index is 53.7 kg/m².    Intake/Output Summary (Last 24 hours) at 4/11/2019 1902  Last data filed at 4/11/2019 1800  Gross per 24 hour   Intake 1310 ml   Output --   Net 1310 ml      Physical Exam   Constitutional: She is oriented to person, place, and time. She appears well-developed and well-nourished.   HENT:   Head: Normocephalic and atraumatic.   Nose: Nose  normal.   Mouth/Throat: Oropharynx is clear and moist.   Eyes: Pupils are equal, round, and reactive to light. Conjunctivae and EOM are normal. No scleral icterus.   Neck: Normal range of motion. Neck supple.   Cardiovascular: Normal rate, regular rhythm and normal heart sounds. Exam reveals no gallop and no friction rub.   No murmur heard.  Pulmonary/Chest: Effort normal and breath sounds normal.   Abdominal: Soft. Bowel sounds are normal.   obese   Musculoskeletal: She exhibits no edema or tenderness.   Limited ROM to the left arm due to pain, left arm weakness +3/+5, left leg weakness +3/+5   Neurological: She is alert and oriented to person, place, and time. A cranial nerve deficit is present.   Skin: Skin is warm and dry.   Psychiatric: She has a normal mood and affect. Her behavior is normal.   Nursing note and vitals reviewed.      Significant Labs:   CBC:   Recent Labs   Lab 04/10/19  1100   WBC 5.66   HGB 11.7*   HCT 36.9*        CMP:   Recent Labs   Lab 04/10/19  1100      K 3.6      CO2 27   *   BUN 8   CREATININE 0.8   CALCIUM 9.6   PROT 7.8   ALBUMIN 3.7   BILITOT 0.2   ALKPHOS 90   AST 23   ALT 20   ANIONGAP 10   EGFRNONAA >60       Significant Imaging: I have reviewed all pertinent imaging results/findings within the past 24 hours.   Imaging Results          CT Head Without Contrast (Final result)  Result time 04/10/19 11:35:41    Final result by Barrera Arroyo MD (04/10/19 11:35:41)                 Impression:      No acute abnormality.    All CT scans at this facility use dose modulation, iterative reconstruction, and/or weight based dosing when appropriate to reduce radiation dose to as low as reasonably achievable.      Electronically signed by: Barrera Arroyo  Date:    04/10/2019  Time:    11:35             Narrative:    EXAMINATION:  CT HEAD WITHOUT CONTRAST    CLINICAL HISTORY:  Syncope/fainting;    TECHNIQUE:  Low dose axial CT images obtained throughout the head  without intravenous contrast. Sagittal and coronal reconstructions were performed.    COMPARISON:  02/21/2018    FINDINGS:  Intracranial compartment:    Ventricles and sulci are normal in size for age without evidence of hydrocephalus. No extra-axial blood or fluid collections.    Unchanged right deep periventricular white matter hypoattenuation compared to prior study of 02/21/2018 reflecting chronic microangiopathic ischemic changes with associated mild ex vacuo dilation of the right ventricle.  No parenchymal mass, hemorrhage, edema or major vascular distribution infarct.    Skull/extracranial contents (limited evaluation): No fracture. Mastoid air cells and paranasal sinuses are essentially clear.                               X-Ray Chest AP Portable (Final result)  Result time 04/10/19 11:41:33    Final result by Barrera Arroyo MD (04/10/19 11:41:33)                 Impression:      No acute abnormality.      Electronically signed by: Barrera Arroyo  Date:    04/10/2019  Time:    11:41             Narrative:    EXAMINATION:  XR CHEST AP PORTABLE    CLINICAL HISTORY:  fall;.    TECHNIQUE:  Single frontal portable view of the chest was performed.    COMPARISON:  11/18/2018    FINDINGS:  Support devices: None    The lungs are clear, with normal appearance of pulmonary vasculature and no pleural effusion or pneumothorax.    The cardiac silhouette is normal in size. The hilar and mediastinal contours are unremarkable.    Bones are intact.

## 2019-04-12 NOTE — PT/OT/SLP PROGRESS
Physical Therapy  Treatment    Alicia Soliz   MRN: [id number]   Admitting Diagnosis: Multiple sclerosis exacerbation    PT Received On: 04/12/19  PT Start Time: 0923     PT Stop Time: 0946    PT Total Time (min): 23 min       Billable Minutes:  Gait Training 13 min and Therapeutic Activity 10 min    Treatment Type: Treatment  PT/PTA: PT     PTA Visit Number: 0       General Precautions: Standard, fall  Orthopedic Precautions: N/A   Braces: N/A    Spiritual, Cultural Beliefs, Confucianist Practices, Values that Affect Care: no    Subjective:  Communicated with Nurse Marie and epic chart review prior to session.  Pt found sitting in bedside chair and agreeable to tx at this time.    Pain/Comfort  Pain Rating 1: 0/10  Pain Rating 2: 0/10    Objective:   Patient found with: telemetry, peripheral IV    Functional Mobility:  Therapeutic Activities and Exercises:  Pt performed sit>stand from chair using RW with Supervision, gait trained ~25ft using RW with Supervision. Pt required verbal cues for proper gait pattern and safety using RW, and required 1 standing rest break. Pt returned to room and t/f to chair using RW with Supervision.      AM-PAC 6 CLICK MOBILITY  How much help from another person does this patient currently need?   1 = Unable, Total/Dependent Assistance  2 = A lot, Maximum/Moderate Assistance  3 = A little, Minimum/Contact Guard/Supervision  4 = None, Modified [place]/Independent    Turning over in bed (including adjusting bedclothes, sheets and blankets)?: 4  Sitting down on and standing up from a chair with arms (e.g., wheelchair, bedside commode, etc.): 3  Moving from lying on back to sitting on the side of the bed?: 4  Moving to and from a bed to a chair (including a wheelchair)?: 3  Need to walk in hospital room?: 3  Climbing 3-5 steps with a railing?: 1  Basic Mobility Total Score: 18    AM-PAC Raw Score CMS G-Code Modifier Level of Impairment Assistance   6 % Total / Unable   7 - 9 CM  80 - 100% Maximal Assist   10 - 14 CL 60 - 80% Moderate Assist   15 - 19 CK 40 - 60% Moderate Assist   20 - 22 CJ 20 - 40% Minimal Assist   23 CI 1-20% SBA / CGA   24 CH 0% Independent/ Mod I     Patient left up in chair with all lines intact, call button in reach and Nurse Cynthia notified.    Assessment:  Alicia Soliz is a 39 y.o. female with a medical diagnosis of Multiple sclerosis exacerbation and presents with impaired functional mobility. Pt will benefit from continued skilled PT in order to address impairments.    Rehab identified problem list/impairments: Rehab identified problem list/impairments: weakness, impaired endurance, impaired self care skills, impaired functional mobilty, decreased coordination, impaired cognition, decreased safety awareness, impaired balance, gait instability, decreased lower extremity function    Rehab potential is good.    Activity tolerance: Good    Discharge recommendations: Discharge Facility/Level of Care Needs: outpatient PT     Barriers to discharge:      Equipment recommendations: Equipment Needed After Discharge: none     GOALS:   Multidisciplinary Problems     Physical Therapy Goals        Problem: Physical Therapy Goal    Goal Priority Disciplines Outcome Goal Variances Interventions   Physical Therapy Goal     PT, PT/OT Ongoing (interventions implemented as appropriate)     Description:  PT WILL BE SEEN FOR P.T. FOR A MIN OF 5 OUT OF 7 DAYS  A WEEK  LT19  1. PT WILL COMPLETE BED MOBILITY IND  2. PT WILL T/F TO CHAIR WITH RW MOD I  3. PT WILL GT TRAIN X 250' WITH RW AND S.  4. PT WILL COMPLETE B LE TE X 20 REPS                    PLAN:    Patient to be seen 5 x/week  to address the above listed problems via gait training, therapeutic activities, therapeutic exercises  Plan of Care expires: 19  Plan of Care reviewed with: patient    PT G-Codes  Functional Assessment Tool Used: [facility]  Score: 18    Angelina Robertson, PT/OT  2019

## 2019-04-12 NOTE — NURSING
Patient removed heart monitor herself.  Telemetry monitor tech notified.  Patient sitting up in chair getting dressed.  Will continue to monitor until discharge.

## 2019-04-12 NOTE — HOSPITAL COURSE
Patient said that she was diagnosed with MS in 2015 by Dr. Lowe with MRI and spinal tap. She was then seen by Dr. Adrian Phoenix and Sunita. Finally she was seen by Dr. Vela who has sent her to Dr. Browne in [place]. ER could not do MRI of the brain but CT head was negative. She was started empirically with Solumedrol 1 gm IvPb / day for 3 days. This morning, her treating MS specialist, Dr. Browne sent message through Hospital medicine to stop the steroid and treat with PT Therapy only. Will order Social Service consult for outpatient PT.    4/12/19 - Case Management arranged outpatient PT. Patient requested boot for walking and ordered. Left LE medial numbness improved and patient able to lift leg. Patient seen and examined and deemed stable for d/c.

## 2019-04-12 NOTE — CONSULTS
Maria Antonia met with pt at bedside to discuss consult. Pt reports she wants referral sent to [facility] in [place]. Sw faxed referral to [phone]. Pt wants an AFO Brace at discharge.

## 2019-04-12 NOTE — PLAN OF CARE
Problem: Physical Therapy Goal  Goal: Physical Therapy Goal  PT WILL BE SEEN FOR P.T. FOR A MIN OF 5 OUT OF 7 DAYS  A WEEK  LT19  1. PT WILL COMPLETE BED MOBILITY IND  2. PT WILL T/F TO CHAIR WITH RW MOD I  3. PT WILL GT TRAIN X 250' WITH RW AND S.  4. PT WILL COMPLETE B LE TE X 20 REPS   Outcome: Ongoing (interventions implemented as appropriate)  ~250ft using RW with Supervision

## 2019-04-12 NOTE — NURSING
Pt complains of pain to right hand and left foot. Pain medication is effective. Pt complains of numbness and tingling to left side extremities. Pt states vision is becoming blurry. No injuries. Bed alarm is on. Will continue to monitor. 12 hour chart check is completed.

## 2019-04-12 NOTE — PROGRESS NOTES
[facility] Hospital Medicine  Progress Note    Patient Name: Alicia Soliz  MRN: [id number]  Patient Class: IP- Inpatient   Admission Date: 4/10/2019  Length of Stay: 1 days  Attending Physician: Alejo Morales MD  Primary Care Provider: Braden Dumont MD    Subjective:     Principal Problem:Multiple sclerosis exacerbation    HPI:  Alicia Soliz is a 40 yo female with PMHx of Multiple Sclerosis, HTN, CVA and Arthritis who presents with reports of leg weakness. Pt notes the onset of symptoms for approx 1 week after she experienced a fall.  Pt says she ambulates with a walker, rollator or a cane. Presently, she describes headaches, pain to left eye, left facial weakness, vertigo, fatigue, left foot drop, left sided weakness, left shoulder/arm and left sided pain and numbness to hands. Pt describes mild SOB and some urinary incontinence. Other symptoms denied. Neurology recommended MRI however, it was unattainable due to patient's body habitus. Pt is followed by [facility] specialist, Dr. Browne. They recommended empiric Solu Medrol. Vital signs on arrival Temp 97.6, pulse 91, resp 18, B/P 155/69 and SpO2 = 98 %. CT of Head is negative for acute findings. Labs indicate microcytic anemia, glucose = 115 and normal troponin. Pt is admitted due to MS Exacerbation.     Hospital Course:  Patient said that she was diagnosed with MS in 2015 by Dr. Lowe with MRI and spinal tap. She was then seen by Dr. Adrian Phoenix and Sunita. Finally she was seen by Dr. Vela who has sent her to Dr. Browne in [place]. ER could not do MRI of the brain but CT head was negative. She was started empirically with Solumedrol 1 gm IvPb / day for 3 days. This morning, her treating MS specialist, Dr. Browne sent message through Hospital medicine to stop the steroid and treat with PT Therapy only.  Will order Social Service consult for outpatient PT.          Interval History: Dr. Browne, MS  specialist in [place], called and stated patient NOT in exacerbation, stop IV steriods, and continue PT. D.r Seal felt s/s were due to stress.    Review of Systems   Constitutional: Positive for activity change and fatigue. Negative for appetite change, chills, diaphoresis and fever.   HENT: Negative.  Negative for trouble swallowing.    Eyes: Positive for pain and visual disturbance. Negative for redness.   Respiratory: Positive for shortness of breath. Negative for cough and wheezing.    Cardiovascular: Negative for chest pain, palpitations and leg swelling.   Gastrointestinal: Positive for constipation. Negative for abdominal pain, diarrhea, nausea and vomiting.   Genitourinary: Negative for difficulty urinating.        Incontinence   Musculoskeletal: Positive for arthralgias, gait problem and myalgias.   Skin: Negative for pallor, rash and wound.   Neurological: Positive for weakness, light-headedness, numbness and headaches. Negative for dizziness, seizures, syncope, facial asymmetry and speech difficulty.   Psychiatric/Behavioral: Negative for confusion. The patient is not nervous/anxious.      Objective:     Vital Signs (Most Recent):  Temp: 98.6 °F (37 °C) (04/11/19 1621)  Pulse: 100 (04/11/19 1621)  Resp: 18 (04/11/19 1621)  BP: (!) 141/65 (04/11/19 1621)  SpO2: 98 % (04/11/19 1621) Vital Signs (24h Range):  Temp:  [97.5 °F (36.4 °C)-98.6 °F (37 °C)] 98.6 °F (37 °C)  Pulse:  [] 100  Resp:  [15-18] 18  SpO2:  [95 %-99 %] 98 %  BP: (123-163)/(57-65) 141/65     Weight: (!) 150.9 kg (332 lb 10.8 oz)  Body mass index is 53.7 kg/m².    Intake/Output Summary (Last 24 hours) at 4/11/2019 1902  Last data filed at 4/11/2019 1800  Gross per 24 hour   Intake 1310 ml   Output --   Net 1310 ml      Physical Exam   Constitutional: She is oriented to person, place, and time. She appears well-developed and well-nourished.   HENT:   Head: Normocephalic and atraumatic.   Nose: Nose normal.   Mouth/Throat: Oropharynx  is clear and moist.   Eyes: Pupils are equal, round, and reactive to light. Conjunctivae and EOM are normal. No scleral icterus.   Neck: Normal range of motion. Neck supple.   Cardiovascular: Normal rate, regular rhythm and normal heart sounds. Exam reveals no gallop and no friction rub.   No murmur heard.  Pulmonary/Chest: Effort normal and breath sounds normal.   Abdominal: Soft. Bowel sounds are normal.   obese   Musculoskeletal: She exhibits no edema or tenderness.   Limited ROM to the left arm due to pain, left arm weakness +3/+5, left leg weakness +3/+5   Neurological: She is alert and oriented to person, place, and time. A cranial nerve deficit is present.   Skin: Skin is warm and dry.   Psychiatric: She has a normal mood and affect. Her behavior is normal.   Nursing note and vitals reviewed.      Significant Labs:   CBC:   Recent Labs   Lab 04/10/19  1100   WBC 5.66   HGB 11.7*   HCT 36.9*        CMP:   Recent Labs   Lab 04/10/19  1100      K 3.6      CO2 27   *   BUN 8   CREATININE 0.8   CALCIUM 9.6   PROT 7.8   ALBUMIN 3.7   BILITOT 0.2   ALKPHOS 90   AST 23   ALT 20   ANIONGAP 10   EGFRNONAA >60       Significant Imaging: I have reviewed all pertinent imaging results/findings within the past 24 hours.   Imaging Results          CT Head Without Contrast (Final result)  Result time 04/10/19 11:35:41    Final result by Barrera Arroyo MD (04/10/19 11:35:41)                 Impression:      No acute abnormality.    All CT scans at this facility use dose modulation, iterative reconstruction, and/or weight based dosing when appropriate to reduce radiation dose to as low as reasonably achievable.      Electronically signed by: Barrera Arroyo  Date:    04/10/2019  Time:    11:35             Narrative:    EXAMINATION:  CT HEAD WITHOUT CONTRAST    CLINICAL HISTORY:  Syncope/fainting;    TECHNIQUE:  Low dose axial CT images obtained throughout the head without intravenous contrast. Sagittal  and coronal reconstructions were performed.    COMPARISON:  02/21/2018    FINDINGS:  Intracranial compartment:    Ventricles and sulci are normal in size for age without evidence of hydrocephalus. No extra-axial blood or fluid collections.    Unchanged right deep periventricular white matter hypoattenuation compared to prior study of 02/21/2018 reflecting chronic microangiopathic ischemic changes with associated mild ex vacuo dilation of the right ventricle.  No parenchymal mass, hemorrhage, edema or major vascular distribution infarct.    Skull/extracranial contents (limited evaluation): No fracture. Mastoid air cells and paranasal sinuses are essentially clear.                               X-Ray Chest AP Portable (Final result)  Result time 04/10/19 11:41:33    Final result by Barrera Arroyo MD (04/10/19 11:41:33)                 Impression:      No acute abnormality.      Electronically signed by: Barrera Arroyo  Date:    04/10/2019  Time:    11:41             Narrative:    EXAMINATION:  XR CHEST AP PORTABLE    CLINICAL HISTORY:  fall;.    TECHNIQUE:  Single frontal portable view of the chest was performed.    COMPARISON:  11/18/2018    FINDINGS:  Support devices: None    The lungs are clear, with normal appearance of pulmonary vasculature and no pleural effusion or pneumothorax.    The cardiac silhouette is normal in size. The hilar and mediastinal contours are unremarkable.    Bones are intact.                                  Assessment/Plan:      * Multiple sclerosis exacerbation  Neurology consult: Dr. Browne recommended stopping Solumedrol 1000 mg IV   Neurologist Dr. Zavala following  Neuro checks every 4 hours  Respiratory - NIF  Continue baclofen and dalfampridine  OT/PT eval and treat.      VTE Risk Mitigation (From admission, onward)        Ordered     enoxaparin injection 40 mg  Daily      04/10/19 1849     IP VTE HIGH RISK PATIENT  Once      04/10/19 1712     Place sequential compression device  Until  discontinued      04/10/19 1712        Juanita Woo NP  Department of Hospital Medicine   [facility]

## 2019-04-12 NOTE — DISCHARGE SUMMARY
[facility] Hospital Medicine  Discharge Summary      Patient Name: Alicia Soliz  MRN: [id number]  Admission Date: 4/10/2019  Hospital Length of Stay: 2 days  Discharge Date and Time:  04/12/2019 4:32 PM  Attending Physician: Dr. Alejo Morales   Discharging Provider: Juanita Woo NP  Primary Care Provider: Braden Dumont MD      HPI:   Alicia Soliz is a 38 yo female with PMHx of Multiple Sclerosis, HTN, CVA and Arthritis who presents with reports of leg weakness. Pt notes the onset of symptoms for approx 1 week after she experienced a fall.  Pt says she ambulates with a walker, rollator or a cane. Presently, she describes headaches, pain to left eye, left facial weakness, vertigo, fatigue, left foot drop, left sided weakness, left shoulder/arm and left sided pain and numbness to hands. Pt describes mild SOB and some urinary incontinence. Other symptoms denied. Neurology recommended MRI however, it was unattainable due to patient's body habitus. Pt is followed by [facility]clerosis specialist, Dr. Browne. They recommended empiric Solu Medrol. Vital signs on arrival Temp 97.6, pulse 91, resp 18, B/P 155/69 and SpO2 = 98 %. CT of Head is negative for acute findings. Labs indicate microcytic anemia, glucose = 115 and normal troponin. Pt is admitted due to MS Exacerbation.     * No surgery found *      Hospital Course:   Patient said that she was diagnosed with MS in 2015 by Dr. Lowe with MRI and spinal tap. She was then seen by Dr. Adrian Phoenix and [facility]. Finally she was seen by Dr. Vela who has sent her to Dr. Browne in [place]. ER could not do MRI of the brain but CT head was negative. She was started empirically with Solumedrol 1 gm IvPb / day for 3 days. This morning, her treating MS specialist, Dr. Browne sent message through Hospital medicine to stop the steroid and treat with PT Therapy only.  Will order Social Service consult for outpatient PT.   "  4/12/19 - Case Management arranged outpatient PT. Patient requested boot for walking and ordered. Left LE medial numbness improved and patient able to lift leg. Patient seen and examined and deemed stable for d/c.            Consults:   Consults (From admission, onward)        Status Ordering Provider     Inpatient consult to Neurology  Once     Provider:  Nayla Zavala MD    Completed VIKASH NEGRON     Inpatient consult to Social Work  Once     Provider:  (Not yet assigned)    Completed GLENN REEVES     IP consult to case management  Once     Provider:  (Not yet assigned)    Completed LOIS PRIDE            Final Active Diagnoses:    Diagnosis Date Noted POA    PRINCIPAL PROBLEM:  Multiple sclerosis exacerbation [G35] 04/10/2019 Yes      Problems Resolved During this Admission:       Discharged Condition: stable    Disposition: Home or Self Care    Follow Up:    Patient Instructions:      IMMOBILIZER FOR HOME USE   Order Comments: AFO brace left lower leg     Order Specific Question Answer Comments   Height: 5' 6" (1.676 m)    Weight: 150.9 kg (332 lb 10.8 oz)    Type: Ankle    Does patient have medical equipment at home? cane, straight    Does patient have medical equipment at home? walker, rolling    Does patient have medical equipment at home? walker, standard    Does patient have medical equipment at home? shower chair    Does patient have medical equipment at home? bedside commode    Length of need (1-99 months): 99      Referral to OutPatient  Physical therapy   Referral Priority: Routine Referral Type: Physical Medicine   Referral Reason: Specialty Services Required   Requested Specialty: Physical Therapy   Number of Visits Requested: 1       Significant Diagnostic Studies: Labs:   CMP No results for input(s): NA, K, CL, CO2, GLU, BUN, CREATININE, CALCIUM, PROT, ALBUMIN, BILITOT, ALKPHOS, AST, ALT, ANIONGAP, ESTGFRAFRICA, EGFRNONAA in the last 48 hours., CBC No results for input(s): " WBC, HGB, HCT, PLT in the last 48 hours., INR   Lab Results   Component Value Date    INR 1.0 03/11/2015    INR 1.1 01/08/2012   , Lipid Panel   Lab Results   Component Value Date    CHOL 194 02/11/2019    HDL 59 02/11/2019    LDLCALC 118.2 02/11/2019    TRIG 84 02/11/2019    CHOLHDL 30.4 02/11/2019   , Troponin   Recent Labs   Lab 04/10/19  1100   TROPONINI <0.006    and A1C:   Recent Labs   Lab 02/11/19  0902   HGBA1C 5.9*       Pending Diagnostic Studies:     None         Medications:  Reconciled Home Medications:      Medication List      ASK your doctor about these medications    baclofen 20 MG tablet  Commonly known as:  LIORESAL  TAKE 1 TABLET (20 MG TOTAL) BY MOUTH 3 (THREE) TIMES DAILY.     buPROPion 100 MG TBSR 12 hr tablet  Commonly known as:  WELLBUTRIN SR  TAKE 1 TABLET BY MOUTH TWICE A DAY     butalbital-acetaminophen-caffeine -40 mg -40 mg per tablet  Commonly known as:  FIORICET, ESGIC  Take 1 tablet at onset of migraine.  Limit to 3 tabs a week.     ciprofloxacin-dexamethasone 0.3-0.1% 0.3-0.1 % Drps  Commonly known as:  CIPRODEX  Place 4 drops into the right ear 2 (two) times daily.     dalfampridine 10 mg Tb12  Commonly known as:  AMPYRA  Take 1 tablet by mouth 2 (two) times daily.     doxepin 50 MG capsule  Commonly known as:  SINEQUAN  Take 1 capsule (50 mg total) by mouth nightly as needed.     esomeprazole 40 MG capsule  Commonly known as:  NEXIUM  Take 1 capsule (40 mg total) by mouth before breakfast.     fluconazole 150 MG Tab  Commonly known as:  DIFLUCAN  TAKE 1 TABLET (150 MG TOTAL) BY MOUTH EVERY 3 (THREE) DAYS. FOR 2 DOSES     hydroCHLOROthiazide 12.5 MG Tab  Commonly known as:  HYDRODIURIL  Take 1 tablet (12.5 mg total) by mouth once daily.     HYDROcodone-acetaminophen  mg per tablet  Commonly known as:  NORCO  Take by mouth every 6 (six) hours as needed for Pain.     interferon beta-1a 30 mcg/0.5 mL syringe  Commonly known as:  AVONEX  Inject 0.5 mLs (30 mcg total)  into the muscle once a week.     linaclotide 145 mcg Cap capsule  Commonly known as:  LINZESS  Take 1 capsule (145 mcg total) by mouth once daily.     wl-jukntoiactppariy-[id number] 1-1-500 mg Cap  Take 1 tablet by mouth once daily.     METAMUCIL 0.4 gram Cap  Generic drug:  psyllium husk  Take by mouth once daily.     metFORMIN 500 MG tablet  Commonly known as:  GLUCOPHAGE  Take 1 tablet (500 mg total) by mouth 2 (two) times daily with meals.     mupirocin 2 % ointment  Commonly known as:  BACTROBAN  Apply topically 2 (two) times daily. Apply to affected area     nystatin cream  Commonly known as:  MYCOSTATIN  Apply topically 2 (two) times daily.     promethazine 25 MG tablet  Commonly known as:  PHENERGAN  Take 1 tablet (25 mg total) by mouth every 4 (four) hours as needed for Nausea.     valACYclovir 500 MG tablet  Commonly known as:  VALTREX  Take 1 tablet (500 mg total) by mouth once daily.            Indwelling Lines/Drains at time of discharge:   Lines/Drains/Airways          None          Time spent on the discharge of patient: 45 minutes  Patient was seen and examined on the date of discharge and determined to be suitable for discharge.         Juanita Woo NP  Department of Hospital Medicine  [facility]

## 2019-04-15 ENCOUNTER — PATIENT MESSAGE (OUTPATIENT)
Dept: NEUROLOGY | Facility: CLINIC | Age: 39
End: 2019-04-15

## 2019-04-15 ENCOUNTER — PATIENT MESSAGE (OUTPATIENT)
Dept: INTERNAL MEDICINE | Facility: CLINIC | Age: 39
End: 2019-04-15

## 2019-04-16 ENCOUNTER — DOCUMENTATION ONLY (OUTPATIENT)
Dept: NEUROLOGY | Facility: CLINIC | Age: 39
End: 2019-04-16

## 2019-04-19 ENCOUNTER — PATIENT MESSAGE (OUTPATIENT)
Dept: INTERNAL MEDICINE | Facility: CLINIC | Age: 39
End: 2019-04-19

## 2019-04-19 ENCOUNTER — PATIENT MESSAGE (OUTPATIENT)
Dept: NEUROLOGY | Facility: CLINIC | Age: 39
End: 2019-04-19

## 2019-04-22 ENCOUNTER — PATIENT MESSAGE (OUTPATIENT)
Dept: NEUROLOGY | Facility: CLINIC | Age: 39
End: 2019-04-22

## 2019-04-22 ENCOUNTER — PATIENT MESSAGE (OUTPATIENT)
Dept: INTERNAL MEDICINE | Facility: CLINIC | Age: 39
End: 2019-04-22

## 2019-04-22 RX ORDER — TOPIRAMATE 25 MG/1
25 TABLET ORAL EVERY MORNING
Qty: 30 TABLET | Refills: 0 | Status: SHIPPED | OUTPATIENT
Start: 2019-04-22 | End: 2019-05-20 | Stop reason: SDUPTHER

## 2019-04-23 ENCOUNTER — PATIENT MESSAGE (OUTPATIENT)
Dept: INTERNAL MEDICINE | Facility: CLINIC | Age: 39
End: 2019-04-23

## 2019-04-23 ENCOUNTER — LAB VISIT (OUTPATIENT)
Dept: LAB | Facility: HOSPITAL | Age: 39
End: 2019-04-23
Attending: FAMILY MEDICINE
Payer: MEDICARE

## 2019-04-23 DIAGNOSIS — A53.9 SYPHILIS: ICD-10-CM

## 2019-04-23 PROCEDURE — 86593 SYPHILIS TEST NON-TREP QUANT: CPT

## 2019-04-23 PROCEDURE — 36415 COLL VENOUS BLD VENIPUNCTURE: CPT | Mod: PO

## 2019-04-23 PROCEDURE — 86780 TREPONEMA PALLIDUM: CPT

## 2019-04-23 PROCEDURE — 86592 SYPHILIS TEST NON-TREP QUAL: CPT

## 2019-04-24 LAB
RPR SER QL: REACTIVE
RPR SER-TITR: ABNORMAL {TITER}

## 2019-04-25 ENCOUNTER — PATIENT MESSAGE (OUTPATIENT)
Dept: INTERNAL MEDICINE | Facility: CLINIC | Age: 39
End: 2019-04-25

## 2019-04-29 LAB — T PALLIDUM AB SER QL IF: REACTIVE

## 2019-05-02 ENCOUNTER — PATIENT OUTREACH (OUTPATIENT)
Dept: OTHER | Facility: OTHER | Age: 39
End: 2019-05-02

## 2019-05-02 NOTE — PROGRESS NOTES
"Last 5 Patient Entered Readings                                      Current 30 Day Average: 124/73     Recent Readings 4/19/2019 4/13/2019 3/30/2019 3/28/2019 3/24/2019    SBP (mmHg) 120 128 176 120 120    DBP (mmHg) 76 69 80 80 80        Digital Medicine: Health  Follow Up    Patient agrees to go to the [facility] on 5/21 when she has an app[facility] in  to purchase and get set up with digital cuff. Patient had received a free BP cuff through [employer] at [facility], and had mistaken the digital medicine program for the humana program. WCB on May 20th to remind patient to go to the [facility]    Lifestyle Modifications:    1.Dietary Modifications (Sodium intake <2,000mg/day, food labels, dining out): deferred     2.Physical Activity:    Patient continues to complain of "aches and pains", but continues to move around as much as she can. Patient has a treadmill and a stationary bike at home, and has been doing "a little bit every day". Patient is working to lose weight for her daughter's wedding. Patient reports that she has lost 3 lbs so far, and would like to lose 75 lbs and get down to 215 for the wedding. Congratulated patient on her progress, and encouraged her to keep up the great work. Reminded patient that slow and steady progress is the key.     3.Medication Therapy: Patient has been compliant with the medication regimen.    4.Patient has the following medication side effects/concerns:   (Frequency/Alleviating factors/Precipitating factors, etc.)     Follow up with Ms. Alicia Soliz completed. No further questions or concerns. Will continue to follow up to achieve health goals.      "

## 2019-05-03 RX ORDER — FLUCONAZOLE 150 MG/1
150 TABLET ORAL ONCE
Qty: 2 TABLET | Refills: 0 | Status: SHIPPED | OUTPATIENT
Start: 2019-05-03 | End: 2019-05-03

## 2019-05-07 ENCOUNTER — PATIENT MESSAGE (OUTPATIENT)
Dept: NEUROLOGY | Facility: CLINIC | Age: 39
End: 2019-05-07

## 2019-05-07 ENCOUNTER — PATIENT MESSAGE (OUTPATIENT)
Dept: INTERNAL MEDICINE | Facility: CLINIC | Age: 39
End: 2019-05-07

## 2019-05-07 RX ORDER — CYCLOBENZAPRINE HCL 10 MG
10 TABLET ORAL 3 TIMES DAILY PRN
Qty: 60 TABLET | Refills: 2 | Status: SHIPPED | OUTPATIENT
Start: 2019-05-07 | End: 2019-05-17

## 2019-05-10 ENCOUNTER — PATIENT MESSAGE (OUTPATIENT)
Dept: INTERNAL MEDICINE | Facility: CLINIC | Age: 39
End: 2019-05-10

## 2019-05-10 RX ORDER — MUPIROCIN 20 MG/G
OINTMENT TOPICAL 2 TIMES DAILY
Qty: 30 G | Refills: 1 | Status: SHIPPED | OUTPATIENT
Start: 2019-05-10 | End: 2019-10-18 | Stop reason: SDUPTHER

## 2019-05-10 RX ORDER — NYSTATIN 100000 U/G
CREAM TOPICAL 2 TIMES DAILY
Qty: 30 G | Refills: 1 | Status: SHIPPED | OUTPATIENT
Start: 2019-05-10 | End: 2019-10-18 | Stop reason: SDUPTHER

## 2019-05-10 RX ORDER — HYDROXYZINE HYDROCHLORIDE 25 MG/1
25 TABLET, FILM COATED ORAL 3 TIMES DAILY
Qty: 60 TABLET | Refills: 2 | Status: SHIPPED | OUTPATIENT
Start: 2019-05-10 | End: 2019-11-11

## 2019-05-15 RX ORDER — BACLOFEN 20 MG/1
TABLET ORAL
Qty: 90 TABLET | Refills: 3 | Status: SHIPPED | OUTPATIENT
Start: 2019-05-15 | End: 2019-07-23

## 2019-05-15 RX ORDER — DOXEPIN HYDROCHLORIDE 50 MG/1
CAPSULE ORAL
Qty: 30 CAPSULE | Refills: 5 | Status: SHIPPED | OUTPATIENT
Start: 2019-05-15 | End: 2019-11-11

## 2019-05-20 ENCOUNTER — PATIENT OUTREACH (OUTPATIENT)
Dept: OTHER | Facility: OTHER | Age: 39
End: 2019-05-20

## 2019-05-20 RX ORDER — TOPIRAMATE 25 MG/1
25 TABLET ORAL EVERY MORNING
Qty: 30 TABLET | Refills: 0 | Status: SHIPPED | OUTPATIENT
Start: 2019-05-20 | End: 2019-06-11 | Stop reason: SDUPTHER

## 2019-05-20 RX ORDER — PROMETHAZINE HYDROCHLORIDE 25 MG/1
25 TABLET ORAL EVERY 4 HOURS PRN
Qty: 30 TABLET | Refills: 1 | Status: SHIPPED | OUTPATIENT
Start: 2019-05-20 | End: 2019-10-18 | Stop reason: SDUPTHER

## 2019-05-20 NOTE — PROGRESS NOTES
Last 5 Patient Entered Readings                                      Current 30 Day Average:      Recent Readings 4/19/2019 4/13/2019 3/30/2019 3/28/2019 3/24/2019    SBP (mmHg) 120 128 176 120 120    DBP (mmHg) 76 69 80 80 80        Called to remind patient to go to the [facility] tomorrow at [facility] when she has her appt there. Patient agrees to get set up with a digital BP cuff

## 2019-05-24 ENCOUNTER — TELEPHONE (OUTPATIENT)
Dept: SURGERY | Facility: CLINIC | Age: 39
End: 2019-05-24

## 2019-05-24 NOTE — TELEPHONE ENCOUNTER
Spoke to pt - Pt requested Nutrition and Wellness appt be scheduled at [facility] -  Scheduled an appt to see Pam Parekh Nutrition / Wellness on Albertina 3, 2019 @ 2832 - Pt is aware that she also has an appt for her Mammo on the same day at [facility] for 1130.     ----- Message from Queenie Thurman LPN sent at 5/23/2019  3:48 PM CDT -----  Contact: self/[phone]      ----- Message -----  From: Babs Jara  Sent: 5/23/2019   3:28 PM  To: [facility] Staff    Would like to consult with nurse regarding scheduling appt for Nutrition and wellness. Please call back at [phone]. Thanks/ar

## 2019-06-05 ENCOUNTER — TELEPHONE (OUTPATIENT)
Dept: DIABETES | Facility: CLINIC | Age: 39
End: 2019-06-05

## 2019-06-05 NOTE — TELEPHONE ENCOUNTER
----- Message from Naye Orozco sent at 6/5/2019 11:24 AM CDT -----  Contact: pt  Calling in regards to please give her a call back at [phone] (home)

## 2019-06-10 ENCOUNTER — PATIENT OUTREACH (OUTPATIENT)
Dept: OTHER | Facility: OTHER | Age: 39
End: 2019-06-10

## 2019-06-10 NOTE — PROGRESS NOTES
Last 5 Patient Entered Readings                                      Current 30 Day Average:      Recent Readings 4/19/2019 4/13/2019 3/30/2019 3/28/2019 3/24/2019    SBP (mmHg) 120 128 176 120 120    DBP (mmHg) 76 69 80 80 80        Messaged patient requesting readings

## 2019-06-11 ENCOUNTER — CLINICAL SUPPORT (OUTPATIENT)
Dept: DIABETES | Facility: CLINIC | Age: 39
End: 2019-06-11
Payer: MEDICARE

## 2019-06-11 ENCOUNTER — PATIENT MESSAGE (OUTPATIENT)
Dept: DIABETES | Facility: CLINIC | Age: 39
End: 2019-06-11

## 2019-06-11 ENCOUNTER — PATIENT MESSAGE (OUTPATIENT)
Dept: SURGERY | Facility: CLINIC | Age: 39
End: 2019-06-11

## 2019-06-11 VITALS — WEIGHT: 293 LBS | BODY MASS INDEX: 47.09 KG/M2 | HEIGHT: 66 IN

## 2019-06-11 DIAGNOSIS — G35 MULTIPLE SCLEROSIS: Primary | ICD-10-CM

## 2019-06-11 PROCEDURE — 97802 PR MED NUTR THER, 1ST, INDIV, EA 15 MIN: ICD-10-PCS | Mod: S$GLB,,, | Performed by: DIETITIAN, REGISTERED

## 2019-06-11 PROCEDURE — 99999 PR PBB SHADOW E&M-EST. PATIENT-LVL II: CPT | Mod: PBBFAC,,, | Performed by: DIETITIAN, REGISTERED

## 2019-06-11 PROCEDURE — 97802 MEDICAL NUTRITION INDIV IN: CPT | Mod: S$GLB,,, | Performed by: DIETITIAN, REGISTERED

## 2019-06-11 PROCEDURE — 99999 PR PBB SHADOW E&M-EST. PATIENT-LVL II: ICD-10-PCS | Mod: PBBFAC,,, | Performed by: DIETITIAN, REGISTERED

## 2019-06-11 RX ORDER — GABAPENTIN 300 MG/1
300 CAPSULE ORAL 3 TIMES DAILY PRN
Refills: 11 | COMMUNITY
Start: 2019-05-29 | End: 2020-01-03 | Stop reason: SDUPTHER

## 2019-06-11 RX ORDER — TOPIRAMATE 25 MG/1
TABLET ORAL
Qty: 30 TABLET | Refills: 0 | Status: SHIPPED | OUTPATIENT
Start: 2019-06-11 | End: 2019-06-19 | Stop reason: SDUPTHER

## 2019-06-11 NOTE — LETTER
June 11, 2019        Michael Navarrete MD  [address]  [place] [address]             [facility]  [address] [place] [address]  Phone: [phone]  Fax: [phone]   Patient: Alicia Soliz   MR Number: [id number]   YOB: 1980   Date of Visit: 6/11/2019       Dear Dr. Navarrete:    Thank you for referring Alicia Soliz to me for evaluation. Below are the relevant portions of my assessment and plan of care.       If you have questions, please do not hesitate to call me. I look forward to following Alicia along with you.    Sincerely,      Pam Parekh, ANTONELLA           CC  No Recipients

## 2019-06-11 NOTE — PROGRESS NOTES
"NUTRITIONAL CONSULT    Referring Physician: Dr. Navarrete   Reason for MNT Referral: Initial assessment for sleeve gastrectomy work-up    PAST MEDICAL HISTORY:   39 y.o. female presents with a BMI of Body mass index is 53.3 kg/m². Pt in clinic today in a wheel chair, with daughter. At home, pt walks with walker or cane.   Weight history: Pt is at her highest weight now. When she ate meals from [employer], her weight went down to 288 lbs in ~ 6 months. No soft drinks, more water, no bread or candy.   Dieting attempts include [facility] and dieting on her own. Recently bought meals through [employer] and lost weight. Also goes to PT for MS, which helped with weight loss.    Past Medical History:   Diagnosis Date    Anemia     Arthritis     Cardiac arrest as complication of care     pt states she went into cardiac arrest from an allergic reaction to a medication    Encounter for blood transfusion     Hemiplegia due to old stroke     Hypertension     Multiple sclerosis     Stroke        CLINICAL DATA:  39 y.o.-year-old Black or  female.  Height: 5'6"  Weight: 330 lbs  IBW: 144 lbs  BMI: 53.3  The patient's goal weight (60 % EBW of 112 lbs): 218 lbs  Personal goal weight: 220 lbs    Goal for Bariatric Surgery: to improve health, to improve quality of life, to lose weight and to regain strenght to improve ability to walk.     NUTRITION & HEALTH HISTORY:  Greatest challenge: sweets, starchy CHO, portion control and snacking at night    Current diet recall:   B (8a): Frosted Flakes with skim milk  L: none  Sn: chewy candy  D (7p): 2 turkey burgers on bun w/cheddar cheese, lettuce and tomato, French fries, cup of Hawaiian punch   Drank water throughout the day    Current Diet:  Meal pattern: 2 meals/day  Protein supplements: drinks [employer] Instant Breakfast powder added to milk almost every morning (pt is currently out of the powder)   Snackin-2 / day  Vegetables: Likes a few. " Eats 2-3 times per week.  Fruits: Likes a variety. Eats rarely.  Beverages: water, soda, sugar-free beverages and fat-free milk  Dining out: Never. Mostly fast food.  Cooking at home: Daily. Mostly baked and fried meat, fish, starchy CHO and sometimes vegetables.    Exercise:  Past exercise: PT 3 days/wk; no other exercise due to symptoms of MS    Current exercise: Fair  Restrictions to exercise: limited distances for walking, intolerance to heat     Vitamins / Minerals / Herbs:   B12 gummy, Rheumate, MVI     Food Allergies:   NKFA    Social:  Disabled.  Lives with daughter.  Grocery shopping done by daughter and food prep by pt and daughter.  Patient believes the household will be supportive after surgery.  Alcohol: once a year on birthday.  Smoking: None.    ASSESSMENT:  · Patient reports attempts at weight loss, only to regain lost weight.  · Patient demonstrated knowledge of healthy eating behaviors and exercise patterns; admits to not eating healthy and not exercising at this point.  · Patient shows hesitance to change lifestyle and make behavior modifications.    Pt would like to try loosing weight with diet alone at this time and then think about surgery if necessary.      BARIATRIC DIET DISCUSSION:  Reviewed diet progression before and after surgery.  Reinforced that surgery is not a magic bullet and importance of low fat foods and no snacking.  Will need to review more details about bariatric diet if pt decides to move forward with surgery. Did not go into detail since pt stated that she may not want surgery.     RECOMMENDATIONS:  Patient is a potential candidate for bariatric surgery.    Needs additional visit(s) with RD.    PLAN:  Work on gradually cutting back on starchy CHO in the diet.  Begin trying various protein supplements to determine preference.  1200-calorie diet.  5-6 meals per day.  Return to clinic.- 3-4 wks to reassess    SESSION TIME:  60 minutes

## 2019-06-14 ENCOUNTER — PATIENT MESSAGE (OUTPATIENT)
Dept: SURGERY | Facility: CLINIC | Age: 39
End: 2019-06-14

## 2019-06-17 ENCOUNTER — PATIENT MESSAGE (OUTPATIENT)
Dept: NEUROLOGY | Facility: CLINIC | Age: 39
End: 2019-06-17

## 2019-06-17 NOTE — PROGRESS NOTES
Last 5 Patient Entered Readings                                      Current 30 Day Average: 130/80     Recent Readings 6/11/2019 4/19/2019 4/13/2019 3/30/2019 3/28/2019    SBP (mmHg) 130 120 128 176 120    DBP (mmHg) 80 76 69 80 80        Left voicemail for follow up

## 2019-06-20 ENCOUNTER — PATIENT MESSAGE (OUTPATIENT)
Dept: NEUROLOGY | Facility: CLINIC | Age: 39
End: 2019-06-20

## 2019-06-20 DIAGNOSIS — G35 MULTIPLE SCLEROSIS: Primary | ICD-10-CM

## 2019-06-20 RX ORDER — TOPIRAMATE 25 MG/1
25 TABLET ORAL DAILY
Qty: 30 TABLET | Refills: 0 | Status: SHIPPED | OUTPATIENT
Start: 2019-06-20 | End: 2019-07-23 | Stop reason: SDUPTHER

## 2019-06-20 NOTE — TELEPHONE ENCOUNTER
----- Message from Braden Dumont MD sent at 6/20/2019  8:22 AM CDT -----  Please call her and get a current weight.

## 2019-06-25 ENCOUNTER — PATIENT MESSAGE (OUTPATIENT)
Dept: NEUROLOGY | Facility: CLINIC | Age: 39
End: 2019-06-25

## 2019-06-25 RX ORDER — TOPIRAMATE 25 MG/1
25 TABLET ORAL DAILY
Qty: 30 TABLET | Refills: 0 | Status: CANCELLED | OUTPATIENT
Start: 2019-06-25

## 2019-07-01 ENCOUNTER — PATIENT MESSAGE (OUTPATIENT)
Dept: NEUROLOGY | Facility: CLINIC | Age: 39
End: 2019-07-01

## 2019-07-02 NOTE — PROGRESS NOTES
Last 5 Patient Entered Readings                                      Current 30 Day Average: 124/76     Recent Readings 6/26/2019 6/11/2019 4/19/2019 4/13/2019 3/30/2019    SBP (mmHg) 117 130 120 128 176    DBP (mmHg) 71 80 76 69 80    Pulse 78 - - - -          HPI:  Called patient to follow up. Patient endorses adherence to medication regimen. Patient denies hypotensive s/sx (lightheadedness, dizziness, nausea, fatigue); patient denies hypertensive s/sx (SOB, CP, severe headaches, changes in vision).    Assessment:  Reviewed recent readings. Per 2017 ACC/ AHA HTN guidelines (goal of BP < 130/80), current 30-day average is well controlled.     Plan:  Continue current medication regimen. Reminded patient to take at least 1 BP reading per week and to charge cuff monthly. I will continue to monitor regularly and will follow-up in 6 to 8 weeks, sooner if blood pressure begins to trend upward or downward.     Current medication regimen:  Hypertension Medications             hydroCHLOROthiazide (HYDRODIURIL) 12.5 MG Tab Take 1 tablet (12.5 mg total) by mouth once daily.          Patient denies having questions or concerns. Patient has my contact information and knows to call with any concerns or clinical changes.

## 2019-07-03 ENCOUNTER — PATIENT MESSAGE (OUTPATIENT)
Dept: DIABETES | Facility: CLINIC | Age: 39
End: 2019-07-03

## 2019-07-10 NOTE — PROGRESS NOTES
Last 5 Patient Entered Readings                                      Current 30 Day Average: 119/75     Recent Readings 7/8/2019 7/2/2019 6/26/2019 6/11/2019 4/19/2019    SBP (mmHg) 110 120 117 130 120    DBP (mmHg) 70 80 71 80 76    Pulse - - 78 - -        Unable to complete call or LVM

## 2019-07-23 ENCOUNTER — OFFICE VISIT (OUTPATIENT)
Dept: SURGERY | Facility: CLINIC | Age: 39
End: 2019-07-23
Payer: MEDICARE

## 2019-07-23 VITALS
DIASTOLIC BLOOD PRESSURE: 80 MMHG | BODY MASS INDEX: 47.09 KG/M2 | HEIGHT: 66 IN | WEIGHT: 293 LBS | TEMPERATURE: 99 F | HEART RATE: 92 BPM | SYSTOLIC BLOOD PRESSURE: 128 MMHG

## 2019-07-23 DIAGNOSIS — K59.00 CONSTIPATION, UNSPECIFIED CONSTIPATION TYPE: ICD-10-CM

## 2019-07-23 DIAGNOSIS — E66.01 MORBID OBESITY WITH BODY MASS INDEX (BMI) OF 50.0 TO 59.9 IN ADULT: Primary | ICD-10-CM

## 2019-07-23 PROCEDURE — 99999 PR PBB SHADOW E&M-EST. PATIENT-LVL III: CPT | Mod: PBBFAC,HCNC,, | Performed by: SURGERY

## 2019-07-23 PROCEDURE — 3008F PR BODY MASS INDEX (BMI) DOCUMENTED: ICD-10-PCS | Mod: HCNC,CPTII,S$GLB, | Performed by: SURGERY

## 2019-07-23 PROCEDURE — 99214 OFFICE O/P EST MOD 30 MIN: CPT | Mod: HCNC,S$GLB,, | Performed by: SURGERY

## 2019-07-23 PROCEDURE — 3074F SYST BP LT 130 MM HG: CPT | Mod: HCNC,CPTII,S$GLB, | Performed by: SURGERY

## 2019-07-23 PROCEDURE — 99214 PR OFFICE/OUTPT VISIT, EST, LEVL IV, 30-39 MIN: ICD-10-PCS | Mod: HCNC,S$GLB,, | Performed by: SURGERY

## 2019-07-23 PROCEDURE — 3074F PR MOST RECENT SYSTOLIC BLOOD PRESSURE < 130 MM HG: ICD-10-PCS | Mod: HCNC,CPTII,S$GLB, | Performed by: SURGERY

## 2019-07-23 PROCEDURE — 3079F PR MOST RECENT DIASTOLIC BLOOD PRESSURE 80-89 MM HG: ICD-10-PCS | Mod: HCNC,CPTII,S$GLB, | Performed by: SURGERY

## 2019-07-23 PROCEDURE — 3008F BODY MASS INDEX DOCD: CPT | Mod: HCNC,CPTII,S$GLB, | Performed by: SURGERY

## 2019-07-23 PROCEDURE — 3079F DIAST BP 80-89 MM HG: CPT | Mod: HCNC,CPTII,S$GLB, | Performed by: SURGERY

## 2019-07-23 PROCEDURE — 99999 PR PBB SHADOW E&M-EST. PATIENT-LVL III: ICD-10-PCS | Mod: PBBFAC,HCNC,, | Performed by: SURGERY

## 2019-07-23 RX ORDER — TOPIRAMATE 25 MG/1
25 TABLET ORAL DAILY
Qty: 30 TABLET | Refills: 0 | Status: SHIPPED | OUTPATIENT
Start: 2019-07-23 | End: 2019-11-11

## 2019-07-23 RX ORDER — CYCLOBENZAPRINE HCL 10 MG
TABLET ORAL
Refills: 2 | COMMUNITY
Start: 2019-07-01 | End: 2019-11-11

## 2019-07-23 NOTE — PROGRESS NOTES
Alicia is 39-year-old  female patient who is severely obese with her current BMI of 52.02 kilogram/meter sq and weighs 322 lb.  She is known to me from previous encounters.  She was initially seen more than a year ago for weight loss surgery consultation.  She has long history of multiple sclerosis which she requires steroid treatment.  She have had stable course of multiple sclerosis but, recently began to notice worsening of the symptoms.  She recently had been admitted to the hospital for steroid pulse therapy.  Because of the high dose steroid treatment, her weight gain is worse with high retention of fluid.  She still considers weight loss surgery. The patient and I had discussed about the complications that can happen with high steroid use.  It will be a life-threatening situation if complication would occur such as wound failure following the surgery. My recommendation is to have the patient control of the wound excess weight by dietary means.  Or, if her MS is controlled and stable, then her surgical intervention might be considered.  This was discussed with the patient and her sister present in the room extensively.  The patient is expected to follow up with me on p.r.n. basis.  Total time approximately half an hour was spent for this patient, and more than 50% of that was spent for treatment planning and counseling.    Michael Navarrete

## 2019-07-24 ENCOUNTER — PATIENT MESSAGE (OUTPATIENT)
Dept: INTERNAL MEDICINE | Facility: CLINIC | Age: 39
End: 2019-07-24

## 2019-07-29 ENCOUNTER — PATIENT MESSAGE (OUTPATIENT)
Dept: INTERNAL MEDICINE | Facility: CLINIC | Age: 39
End: 2019-07-29

## 2019-07-30 ENCOUNTER — HOSPITAL ENCOUNTER (OUTPATIENT)
Dept: RADIOLOGY | Facility: HOSPITAL | Age: 39
Discharge: HOME | End: 2019-07-30
Attending: FAMILY MEDICINE
Payer: MEDICARE

## 2019-07-30 DIAGNOSIS — Z12.39 SCREENING FOR BREAST CANCER: ICD-10-CM

## 2019-07-30 PROCEDURE — 77067 MAMMO DIGITAL SCREENING BILAT WITH CAD: ICD-10-PCS | Mod: 26,HCNC,, | Performed by: RADIOLOGY

## 2019-07-30 PROCEDURE — 77067 SCR MAMMO BI INCL CAD: CPT | Mod: 26,HCNC,, | Performed by: RADIOLOGY

## 2019-07-30 PROCEDURE — 77067 SCR MAMMO BI INCL CAD: CPT | Mod: TC,HCNC

## 2019-07-31 RX ORDER — PHENTERMINE HYDROCHLORIDE 8 MG/1
TABLET ORAL
Refills: 0 | OUTPATIENT
Start: 2019-07-31

## 2019-07-31 RX ORDER — FLUCONAZOLE 150 MG/1
TABLET ORAL
Qty: 2 TABLET | Refills: 0 | Status: SHIPPED | OUTPATIENT
Start: 2019-07-31 | End: 2019-10-18 | Stop reason: SDUPTHER

## 2019-07-31 NOTE — PROGRESS NOTES
Last 5 Patient Entered Readings                                      Current 30 Day Average: 115/73     Recent Readings 7/23/2019 7/18/2019 7/11/2019 7/8/2019 7/2/2019    SBP (mmHg) 110 114 120 110 120    DBP (mmHg) 73 64 80 70 80        Digital Medicine: Health  Follow Up    Patient confirms that she is set up with her digital cuff and taking readings on her phone at home.     Lifestyle Modifications:    1.Dietary Modifications (Sodium intake <2,000mg/day, food labels, dining out):    Patient continues to lose weight, and feels that she understands portioning better. Patient states that her appetite has been decreasing, and she often only eats 1 large meal per day.     2.Physical Activity:    Patient continues to do in-home therapy, and she walks around in the house and outside as well. Patient states that she enjoyed her daughter's engagement party last month, and was pleased that she was able to dance.     3.Medication Therapy: Patient has been compliant with the medication regimen.    4.Patient has the following medication side effects/concerns:   (Frequency/Alleviating factors/Precipitating factors, etc.)     Follow up with Ms. Alicia Soliz completed. No further questions or concerns. Will continue to follow up to achieve health goals.

## 2019-08-01 NOTE — TELEPHONE ENCOUNTER
----- Message from Braden Dumont MD sent at 8/1/2019  8:11 AM CDT -----  Please let her know her mammogram was normal. Repeat in 1 year.

## 2019-08-07 ENCOUNTER — PATIENT MESSAGE (OUTPATIENT)
Dept: INTERNAL MEDICINE | Facility: CLINIC | Age: 39
End: 2019-08-07

## 2019-08-07 ENCOUNTER — PATIENT MESSAGE (OUTPATIENT)
Dept: SURGERY | Facility: CLINIC | Age: 39
End: 2019-08-07

## 2019-08-08 ENCOUNTER — PATIENT MESSAGE (OUTPATIENT)
Dept: INTERNAL MEDICINE | Facility: CLINIC | Age: 39
End: 2019-08-08

## 2019-08-08 ENCOUNTER — PATIENT MESSAGE (OUTPATIENT)
Dept: SURGERY | Facility: CLINIC | Age: 39
End: 2019-08-08

## 2019-08-08 DIAGNOSIS — E66.01 MORBID OBESITY WITH BMI OF 45.0-49.9, ADULT: Primary | ICD-10-CM

## 2019-08-12 ENCOUNTER — PATIENT MESSAGE (OUTPATIENT)
Dept: INTERNAL MEDICINE | Facility: CLINIC | Age: 39
End: 2019-08-12

## 2019-08-12 ENCOUNTER — PATIENT MESSAGE (OUTPATIENT)
Dept: NEUROLOGY | Facility: CLINIC | Age: 39
End: 2019-08-12

## 2019-08-12 DIAGNOSIS — E66.01 MORBID OBESITY WITH BMI OF 45.0-49.9, ADULT: Primary | ICD-10-CM

## 2019-08-12 DIAGNOSIS — G35 MULTIPLE SCLEROSIS: Primary | ICD-10-CM

## 2019-08-15 ENCOUNTER — CLINICAL SUPPORT (OUTPATIENT)
Dept: REHABILITATION | Facility: HOSPITAL | Age: 39
End: 2019-08-15
Attending: PSYCHIATRY & NEUROLOGY
Payer: MEDICARE

## 2019-08-15 DIAGNOSIS — R53.1 LEFT-SIDED WEAKNESS: Primary | ICD-10-CM

## 2019-08-15 DIAGNOSIS — R26.89 IMPAIRMENT OF BALANCE: ICD-10-CM

## 2019-08-15 DIAGNOSIS — R26.9 ABNORMALITY OF GAIT: ICD-10-CM

## 2019-08-15 PROCEDURE — 97110 THERAPEUTIC EXERCISES: CPT | Mod: HCNC

## 2019-08-15 PROCEDURE — 97161 PT EVAL LOW COMPLEX 20 MIN: CPT | Mod: HCNC

## 2019-08-15 NOTE — PROGRESS NOTES
[facility]  Physical Therapy Initial Evaluation    Name: Alicia Soliz  Clinic Number: [id number]    Therapy Diagnosis:   Encounter Diagnoses   Name Primary?    Left-sided weakness Yes    Impairment of balance     Abnormality of gait      Physician: Salvatore Vela MD    Physician Orders: PT Eval and Treat   Medical Diagnosis from Referral: Multiple Sclerosis  Evaluation Date: 8/15/2019  Authorization Period Expiration: 8/11/2020  Plan of Care Expiration: 9/15/2019  Visit # / Visits authorized: 1/ 1    Time In: 1:15  Time Out: 2:15  Total Billable Time: 50 minutes    Precautions: Standard and Immunosuppression, MS, long term steroid use    Subjective   Date of onset: 5 years ago  History of current condition - Alicia reports: her condition suddenly came about and underwent diagnostic tests. She reports she was diagnosed with multiple sclerosis 5 years ago. She reports her biggest problems are walking, managing her exacerbations as well as left sided weakness, but is not in any pain currently. Patient was having a good day today, but does have some days where she has increased pain on the L side with certain activities. Pt reports that she cannot tolerate heat, so she avoids being outside of her house majority of the hours. She reports that her current condition is being managed well, and hasn't had a relapse in a while. She reports she is determined to get better and wants to return to a fully functional life compared to her current condition. Pt's goals include previous statement as well as walking without difficulty.      Pain:  Current 0/10, worst 0/10, best 0/10   Location: n/a not tested  Description: n/a  Aggravating Factors: n/a  Easing Factors: n/a    Prior Therapy: Yes, not for current condition  Social History:  lives with their family  Occupation: Retired  Prior Level of Function: Independent  Current Level of Function: Modified independence with use of  "FWW/4WW/cane    Imaging, none    Medical History:   Past Medical History:   Diagnosis Date    Anemia     Arthritis     Cardiac arrest as complication of care     pt states she went into cardiac arrest from an allergic reaction to a medication    Encounter for blood transfusion     Hemiplegia due to old stroke     Hypertension     Multiple sclerosis     Stroke        Surgical History:   Alicia Soliz  has a past surgical history that includes Appendectomy; Tubal ligation; Tonsillectomy; Cholecystectomy; Hysterectomy; and Knee surgery.    Medications:   Alicia has a current medication list which includes the following prescription(s): bupropion, butalbital-acetaminophen-caffeine -40 mg, ciprofloxacin-dexamethasone 0.3-0.1%, cyclobenzaprine, dalfampridine, doxepin, esomeprazole, fluconazole, gabapentin, hydrochlorothiazide, hydrocodone-acetaminophen, hydroxyzine hcl, interferon beta-1a, linaclotide, ms-[id number], mupirocin, nystatin, phentermine, promethazine, psyllium husk, pyrilamine-dextromethorphan, and topiramate.    Allergies:   Review of patient's allergies indicates:   Allergen Reactions    Demerol [meperidine] Itching     Other reaction(s): Itching    Dilaudid [hydromorphone (bulk)] Anaphylaxis     "coded" per pt  Patient can tolerate Lortab    Prednisone Itching     Other reaction(s): Itching    Morphine     Tramadol      shaking        Pts goals: to improve tolerance to walking and tolerate ADLs     Objective     Gait: decreased weight shift onto L LE and decreased step length, decreased L ankle DF, use of FWW needed, decreased fredy    Squat: Double leg: Able to achieve 45 degrees of depth without UE support   Single leg: DNT    Balance: single leg balance w/ UE supported on walker; able to achieve 30s B, eyes closed on firm surface: 30 seconds feet apart    Reflex/Sensation: intact sensation     Ankle ROM: normal on the R side, decreased into DF on the L " side    Gross UE ROM: normal    UE Strength: 5/5 on the R side, 4+/5 on the L side     strength: average of 78 pounds on the R side and 80 pounds on the L side    Hip A/PROM:     (L)  (R)     Flexion   85%/100% 100%     Extension  85%/100% 100%     Abduction  100%/100% 100%     Adduction  100%/100% 100%     External rotation 90%/100% 100%     Internal rotation 85%/100% 100%          R L  Strength:  Hip flexors  4+/5 3+/5  Quadriceps  4+/5 4/5      Hamstrings  4+/5 4/5     Anterior Tibialis 4+/5 3+/5     Gastrocnemius 5/5 4/5    Muscle Length:  Hamstrings: DNT     Rafael Test: DNT    Function: Lower Extremity Functional Scale 63% disability score on initial evaluation.    Lower Limb Tension Test: DNT    TREATMENT   Treatment Time In: 2:05  Treatment Time Out: 2:15  Total Treatment time separate from Evaluation: 10 minutes    Alicia received therapeutic exercises to develop strength, endurance and ROM for 10 minutes including:  Sit to stands   Standing heel raises  Standing dorsiflexion    Alicia received the following manual therapy techniques: Joint Mobilizations and Soft tissue Mobilization were applied to the: L hip, knee, and ankle for 0 minutes, including:      Alicia participated in neuromuscular re-education activities to improve: Balance and Proprioception for 0 minutes. The following activities were included:  n/a    Alicia participated in gait training to improve functional mobility and safety for 0  minutes, including:  n/a     Home Exercises and Patient Education Provided    Education provided:   - Activity toleration and benefits of skilled physical therapy for her overall condition    Written Home Exercises Provided: yes.  Exercises were reviewed and Alicia was able to demonstrate them prior to the end of the session.  Alicia demonstrated good  understanding of the education provided.     See EMR under Patient Instructions for exercises provided 8/15/2019.    Assessment   Alicia is a 39 y.o. female  referred to outpatient Physical Therapy with a medical diagnosis of multiple sclerosis. Pt presents with left sided weakness, impairment in single leg balance, and decreased conditioning due to overall condition, that is affecting her gait and daily life. Patient's biggest impairment seems to be L sided ankle weakness causing some foot drop on the L side making ambulation less safe and more difficult to perform. Pt was educated on her prognosis in terms of starting physical therapy and what we can work on and patient seemed to have a great understanding. Pt is excited to start physical therapy and that will help in her overall condition.     Pt prognosis is Excellent.   Pt will benefit from skilled outpatient Physical Therapy to address the deficits stated above and in the chart below, provide pt/family education, and to maximize pt's level of independence.     Plan of care discussed with patient: Yes  Pt's spiritual, cultural and educational needs considered and patient is agreeable to the plan of care and goals as stated below:     Anticipated Barriers for therapy: chronicity of pain, level of understanding of condition, transportation    Medical Necessity is demonstrated by the following  History  Co-morbidities and personal factors that may impact the plan of care Co-morbidities:   high BMI, immunosuppression, level of undertstanding of current condition and transportation assistance required    Personal Factors:   no deficits     low   Examination  Body Structures and Functions, activity limitations and participation restrictions that may impact the plan of care Body Regions:   lower extremities  trunk    Body Systems:    ROM  strength  balance  gait  transfers  motor control    Participation Restrictions:   Inability to participate in community due to her current condition     Activity limitations:   Learning and applying knowledge  no deficits    General Tasks and Commands  no deficits    Communication  no  deficits    Mobility  lifting and carrying objects  walking  moving around using equipment (WC)  driving (bike, car, motorcycle)    Self care  washing oneself (bathing, drying, washing hands)  caring for body parts (brushing teeth, shaving, grooming)  looking after one's health    Domestic Life  shopping  cooking  doing house work (cleaning house, washing dishes, laundry)  assisting others    Interactions/Relationships  no deficits    Life Areas  no deficits    Community and Social Life  community life  recreation and leisure         low   Clinical Presentation stable and uncomplicated low   Decision Making/ Complexity Score: low     Short Term Goals: In 4 weeks:  1.I with HEP  2.Patient to demo increased L DF AROM /PROM by 10%  3.Patient to demo increase LE strength by 1/2 grade  4.Patient to ambulate with use of LRAD with improvement with L DF during swing  5.Patient to less than 51% disability on the LEFS.    Long Term Goals: In 8 weeks  1. Patient to perform daily activities including walking with minimal limitation.  2. Patient to demonstrate increased L ankle AROM/PROM by 100%.  3. Patient to demonstrate increased LE strength by 1 grade.  4. Patient to have decreased pain to painfree at all times with all activities.  5. Patient to less than 29% disability on the LEFS.     Plan   Plan of care Certification: 8/15/2019 to 9/15/2019.    Outpatient Physical Therapy 3 times weekly for 4 weeks to include the following interventions: Gait Training, Manual Therapy, Moist Heat/ Ice, Neuromuscular Re-ed, Patient Education, Therapeutic Activites and Therapeutic Exercise.     Bhavesh Han, SPT

## 2019-08-21 ENCOUNTER — CLINICAL SUPPORT (OUTPATIENT)
Dept: REHABILITATION | Facility: HOSPITAL | Age: 39
End: 2019-08-21
Attending: PSYCHIATRY & NEUROLOGY
Payer: MEDICARE

## 2019-08-21 DIAGNOSIS — R26.89 IMPAIRMENT OF BALANCE: ICD-10-CM

## 2019-08-21 DIAGNOSIS — R53.1 LEFT-SIDED WEAKNESS: Primary | ICD-10-CM

## 2019-08-21 DIAGNOSIS — R26.9 ABNORMALITY OF GAIT: ICD-10-CM

## 2019-08-21 PROCEDURE — 97110 THERAPEUTIC EXERCISES: CPT | Mod: HCNC

## 2019-08-21 PROCEDURE — 97140 MANUAL THERAPY 1/> REGIONS: CPT | Mod: HCNC

## 2019-08-21 NOTE — PROGRESS NOTES
Physical Therapy Daily Treatment Note     Name: Alicia Soliz  Clinic Number: [id number]    Therapy Diagnosis:   Encounter Diagnoses   Name Primary?    Left-sided weakness Yes    Impairment of balance     Abnormality of gait      Physician: Salvatore Vela MD    Visit Date: 8/21/2019    Physician Orders: PT Eval and Treat  Medical Diagnosis: Multiple Sclerosis   Evaluation Date: 8/15/2019  Authorization Period Expiration: 8/11/2020  Plan of Care Certification Period: 9/15/2019  Visit #/Visits authorized: 1/ 1     Time In: 8:05  Time Out: 9:00  Total Billable Time: 55 minutes    Precautions: Standard and Immunosuppression, MS    Subjective     Pt reports: she has been feeling well with no exacerbations of symptoms. Pt states she is motivated to come to therapy and to start physical activity.   She was compliant with home exercise program.  Response to previous treatment: Good  Functional change: Improvement in motor control of L dorsiflexion    Pain: 0/10  Location: left generalized      Objective     Alicia received therapeutic exercises to develop strength, endurance and ROM for 45 minutes including:  Nustep 8 mins  UE bike 4 mins  Seated hip marches  Sit to stands variations  Foam pad B UE supported  UE supported heel walking    Alicia received the following manual therapy techniques: Joint mobilizations and Soft tissue Mobilization were applied to the: R ankle for 10 minutes, including:  Talocrural mobilizations  PROM of R ankle in all planes    Alicia participated in neuromuscular re-education activities to improve: Balance, Kinesthetic and Proprioception for 0 minutes. The following activities were included:      Alicia participated in dynamic functional therapeutic activities to improve functional performance for 0  minutes, including:    Alicia participated in gait training to improve functional mobility and safety for 0  minutes, including:    Alicia received the following supervised modalities after  being cleared for contradictions: TENS:  Alicia received TENS electrical stimulation for pain to the R ankle. Pt received continuous mode at a rate of 0 pps for 0 minutes. Alicia tolerated treatment well without any adverse effects.     Alicia received hot pack for 0 minutes to R ankle      Home Exercises Provided and Patient Education Provided     Education provided:   - activity toleration, progression of therapy    Written Home Exercises Provided: yes.  Exercises were reviewed and Alicia was able to demonstrate them prior to the end of the session.  Alicia demonstrated good  understanding of the education provided.     See EMR under Patient Instructions for exercises provided prior visit.    Assessment     Pt presents to clinic with increased motivation to perform therapeutic exercise. Pt tolerated therapeutic exercise with a slight increase in fatigue but quickly recovered with rest. Pt demonstrated increased ankle mobility as well as increased ability to perform L ankle dorsiflexion during UE support heel walking. Pt was educated on bringing her AFOs to determine which one is more appropriate and if finding another is appropriate as well.   Alicia is progressing well towards her goals.   Pt prognosis is Excellent.     Pt will continue to benefit from skilled outpatient physical therapy to address the deficits listed in the problem list box on initial evaluation, provide pt/family education and to maximize pt's level of independence in the home and community environment.     Pt's spiritual, cultural and educational needs considered and pt agreeable to plan of care and goals.     Anticipated barriers to physical therapy: transportation, relapses in condition     Goals: tolerate more walking as well as increasing overall strength of L side    Plan     Incorporate more walking, improve L side strength, Improve tolerance to physical activity     Bhavesh Han, SPT

## 2019-08-21 NOTE — PROGRESS NOTES
[facility]  Physical Therapy Initial Evaluation    Name: Alicia Soliz  Clinic Number: [id number]    Therapy Diagnosis:   Encounter Diagnoses   Name Primary?    Left-sided weakness Yes    Impairment of balance     Abnormality of gait      Physician: Salvatore Vela MD    Physician Orders: PT Eval and Treat   Medical Diagnosis from Referral: Multiple Sclerosis  Evaluation Date: 8/21/2019  Authorization Period Expiration: 8/11/2020  Plan of Care Expiration: 9/15/2019  Visit # / Visits authorized: 1/ 1    Time In: 1:15  Time Out: 2:15  Total Billable Time: 50 minutes    Precautions: Standard and Immunosuppression, MS, long term steroid use    Subjective   Date of onset: 5 years ago  History of current condition - Alicia reports: her condition suddenly came about and underwent diagnostic tests. She reports she was diagnosed with multiple sclerosis 5 years ago. She reports her biggest problems are walking, managing her exacerbations as well as left sided weakness, but is not in any pain currently. Patient was having a good day today, but does have some days where she has increased pain on the L side with certain activities. Pt reports that she cannot tolerate heat, so she avoids being outside of her house majority of the hours. She reports that her current condition is being managed well, and hasn't had a relapse in a while. She reports she is determined to get better and wants to return to a fully functional life compared to her current condition. Pt's goals include previous statement as well as walking without difficulty.      Pain:  Current 0/10, worst 0/10, best 0/10   Location: n/a not tested  Description: n/a  Aggravating Factors: n/a  Easing Factors: n/a    Prior Therapy: Yes, not for current condition  Social History:  lives with their family  Occupation: Retired  Prior Level of Function: Independent  Current Level of Function: Modified independence with use of  "FWW/4WW/cane    Imaging, none    Medical History:   Past Medical History:   Diagnosis Date    Anemia     Arthritis     Cardiac arrest as complication of care     pt states she went into cardiac arrest from an allergic reaction to a medication    Encounter for blood transfusion     Hemiplegia due to old stroke     Hypertension     Multiple sclerosis     Stroke        Surgical History:   Alicia Soliz  has a past surgical history that includes Appendectomy; Tubal ligation; Tonsillectomy; Cholecystectomy; Hysterectomy; and Knee surgery.    Medications:   Alicia has a current medication list which includes the following prescription(s): bupropion, butalbital-acetaminophen-caffeine -40 mg, ciprofloxacin-dexamethasone 0.3-0.1%, cyclobenzaprine, dalfampridine, doxepin, esomeprazole, fluconazole, gabapentin, hydrochlorothiazide, hydrocodone-acetaminophen, hydroxyzine hcl, interferon beta-1a, linaclotide, [id number], mupirocin, nystatin, phentermine, promethazine, psyllium husk, pyrilamine-dextromethorphan, and topiramate.    Allergies:   Review of patient's allergies indicates:   Allergen Reactions    Demerol [meperidine] Itching     Other reaction(s): Itching    Dilaudid [hydromorphone (bulk)] Anaphylaxis     "coded" per pt  Patient can tolerate Lortab    Prednisone Itching     Other reaction(s): Itching    Morphine     Tramadol      shaking        Pts goals: to improve tolerance to walking and tolerate ADLs     Objective     Gait: decreased weight shift onto L LE and decreased step length, decreased L ankle DF, use of FWW needed, decreased fredy    Squat: Double leg: Able to achieve 45 degrees of depth without UE support   Single leg: DNT    Balance: single leg balance w/ UE supported on walker; able to achieve 30s B, eyes closed on firm surface: 30 seconds feet apart    Reflex/Sensation: intact sensation     Ankle ROM: normal on the R side, decreased into DF on the L " side    Gross UE ROM: normal    UE Strength: 5/5 on the R side, 4+/5 on the L side     strength: average of 78 pounds on the R side and 80 pounds on the L side    Hip A/PROM:     (L)  (R)     Flexion   85%/100% 100%     Extension  85%/100% 100%     Abduction  100%/100% 100%     Adduction  100%/100% 100%     External rotation 90%/100% 100%     Internal rotation 85%/100% 100%          R L  Strength:  Hip flexors  4+/5 3+/5  Quadriceps  4+/5 4/5      Hamstrings  4+/5 4/5     Anterior Tibialis 4+/5 3+/5     Gastrocnemius 5/5 4/5    Muscle Length:  Hamstrings: DNT     Rafael Test: DNT    Function: Lower Extremity Functional Scale 63% disability score on initial evaluation.    Lower Limb Tension Test: DNT    TREATMENT   Treatment Time In: 2:05  Treatment Time Out: 2:15  Total Treatment time separate from Evaluation: 10 minutes    Alicia received therapeutic exercises to develop strength, endurance and ROM for 10 minutes including:  Sit to stands   Standing heel raises  Standing dorsiflexion    Alicia received the following manual therapy techniques: Joint Mobilizations and Soft tissue Mobilization were applied to the: L hip, knee, and ankle for 0 minutes, including:      Alicia participated in neuromuscular re-education activities to improve: Balance and Proprioception for 0 minutes. The following activities were included:  n/a    Alicia participated in gait training to improve functional mobility and safety for 0  minutes, including:  n/a     Home Exercises and Patient Education Provided    Education provided:   - Activity toleration and benefits of skilled physical therapy for her overall condition    Written Home Exercises Provided: yes.  Exercises were reviewed and Alicia was able to demonstrate them prior to the end of the session.  Alicia demonstrated good  understanding of the education provided.     See EMR under Patient Instructions for exercises provided 8/15/2019.    Assessment   Alicia is a 39 y.o. female  referred to outpatient Physical Therapy with a medical diagnosis of multiple sclerosis. Pt presents with left sided weakness, impairment in single leg balance, and decreased conditioning due to overall condition, that is affecting her gait and daily life. Patient's biggest impairment seems to be L sided ankle weakness causing some foot drop on the L side making ambulation less safe and more difficult to perform. Pt was educated on her prognosis in terms of starting physical therapy and what we can work on and patient seemed to have a great understanding. Pt is excited to start physical therapy and that will help in her overall condition.     Pt prognosis is Excellent.   Pt will benefit from skilled outpatient Physical Therapy to address the deficits stated above and in the chart below, provide pt/family education, and to maximize pt's level of independence.     Plan of care discussed with patient: Yes  Pt's spiritual, cultural and educational needs considered and patient is agreeable to the plan of care and goals as stated below:     Anticipated Barriers for therapy: chronicity of pain, level of understanding of condition, transportation    Medical Necessity is demonstrated by the following  History  Co-morbidities and personal factors that may impact the plan of care Co-morbidities:   high BMI, immunosuppression, level of undertstanding of current condition and transportation assistance required    Personal Factors:   no deficits     low   Examination  Body Structures and Functions, activity limitations and participation restrictions that may impact the plan of care Body Regions:   lower extremities  trunk    Body Systems:    ROM  strength  balance  gait  transfers  motor control    Participation Restrictions:   Inability to participate in community due to her current condition     Activity limitations:   Learning and applying knowledge  no deficits    General Tasks and Commands  no deficits    Communication  no  deficits    Mobility  lifting and carrying objects  walking  moving around using equipment (WC)  driving (bike, car, motorcycle)    Self care  washing oneself (bathing, drying, washing hands)  caring for body parts (brushing teeth, shaving, grooming)  looking after one's health    Domestic Life  shopping  cooking  doing house work (cleaning house, washing dishes, laundry)  assisting others    Interactions/Relationships  no deficits    Life Areas  no deficits    Community and Social Life  community life  recreation and leisure         low   Clinical Presentation stable and uncomplicated low   Decision Making/ Complexity Score: low     Short Term Goals: In 4 weeks:  1.I with HEP  2.Patient to demo increased L DF AROM /PROM by 10%  3.Patient to demo increase LE strength by 1/2 grade  4.Patient to ambulate with use of LRAD with improvement with L DF during swing  5.Patient to less than 51% disability on the LEFS.    Long Term Goals: In 8 weeks  1. Patient to perform daily activities including walking with minimal limitation.  2. Patient to demonstrate increased L ankle AROM/PROM by 100%.  3. Patient to demonstrate increased LE strength by 1 grade.  4. Patient to have decreased pain to painfree at all times with all activities.  5. Patient to less than 29% disability on the LEFS.     Plan   Plan of care Certification: 8/21/2019 to 9/15/2019.    Outpatient Physical Therapy 3 times weekly for 4 weeks to include the following interventions: Gait Training, Manual Therapy, Moist Heat/ Ice, Neuromuscular Re-ed, Patient Education, Therapeutic Activites and Therapeutic Exercise.     Bhavesh Han, SPT

## 2019-08-23 ENCOUNTER — CLINICAL SUPPORT (OUTPATIENT)
Dept: REHABILITATION | Facility: HOSPITAL | Age: 39
End: 2019-08-23
Attending: PSYCHIATRY & NEUROLOGY
Payer: MEDICARE

## 2019-08-23 DIAGNOSIS — R26.89 IMPAIRMENT OF BALANCE: ICD-10-CM

## 2019-08-23 DIAGNOSIS — R26.9 ABNORMALITY OF GAIT: ICD-10-CM

## 2019-08-23 DIAGNOSIS — R53.1 LEFT-SIDED WEAKNESS: Primary | ICD-10-CM

## 2019-08-23 PROCEDURE — 97110 THERAPEUTIC EXERCISES: CPT | Mod: HCNC

## 2019-08-23 NOTE — PROGRESS NOTES
Physical Therapy Daily Treatment Note     Name: Alicia Soliz  Clinic Number: [id number]    Therapy Diagnosis:   Encounter Diagnoses   Name Primary?    Left-sided weakness Yes    Impairment of balance     Abnormality of gait      Physician: Salvatore Vela MD    Visit Date: 8/23/2019    Physician Orders: PT Eval and Treat  Medical Diagnosis: Multiple Sclerosis   Evaluation Date: 8/15/2019  Authorization Period Expiration: 8/11/2020  Plan of Care Certification Period: 9/15/2019  Visit #/Visits authorized: 1/ 1     Time In: 9:50  Time Out: 11:00  Total Billable Time: 60 minutes    Precautions: Standard and Immunosuppression, MS    Subjective     Pt reports: she has been feeling good all week with no exacerbations. Pt does report she is feeling a little more dehydrated today and that it might be from her injection last night.     She was compliant with home exercise program.  Response to previous treatment: Good  Functional change: Improvement in motor control of L dorsiflexion    Pain: 0/10  Location: left generalized      Objective     Alicia received therapeutic exercises to develop strength, endurance and ROM for 60 minutes including:  Nustep 5 mins  UE bike 5 mins  Sit to stand w/ 10# kettlebell  Foam pad B UE supported  Step up in parallel bars  Lateral step up in parallel bars  UE supported heel walking  Ankle Dorsiflexion w/ RTB    Alicia received the following manual therapy techniques: Joint mobilizations and Soft tissue Mobilization were applied to the: R ankle for 0 minutes, including:  Talocrural mobilizations  PROM of R ankle in all planes    Alicia participated in neuromuscular re-education activities to improve: Balance, Kinesthetic and Proprioception for 0 minutes. The following activities were included:      Alicia participated in dynamic functional therapeutic activities to improve functional performance for 0  minutes, including:    Alicia participated in gait training to improve functional  mobility and safety for 0  minutes, including:    Alicia received the following supervised modalities after being cleared for contradictions: TENS:  Alicia received TENS electrical stimulation for pain to the R ankle. Pt received continuous mode at a rate of 0 pps for 0 minutes. Alicia tolerated treatment well without any adverse effects.     Alicia received hot pack for 0 minutes to R ankle      Home Exercises Provided and Patient Education Provided     Education provided:   - activity toleration, progression of therapy    Written Home Exercises Provided: yes.  Exercises were reviewed and Alicia was able to demonstrate them prior to the end of the session.  Alicia demonstrated good  understanding of the education provided.     See EMR under Patient Instructions for exercises provided prior visit.    Assessment     Pt demonstrated increased tolerance to more strengthening and balance exercises today. Pt demonstrated increased L dorsiflexion in heel walking compared to prior session. Pt did require more rest periods today, but it may be due to additional exercises.   Alicia is progressing well towards her goals.   Pt prognosis is Excellent.     Pt will continue to benefit from skilled outpatient physical therapy to address the deficits listed in the problem list box on initial evaluation, provide pt/family education and to maximize pt's level of independence in the home and community environment.     Pt's spiritual, cultural and educational needs considered and pt agreeable to plan of care and goals.     Anticipated barriers to physical therapy: transportation, relapses in condition     Goals: tolerate more walking as well as increasing overall strength of L side    Plan     Incorporate more walking, improve L side strength, Improve tolerance to physical activity     Bhavesh Han, SPT

## 2019-08-28 ENCOUNTER — PATIENT MESSAGE (OUTPATIENT)
Dept: NEUROLOGY | Facility: CLINIC | Age: 39
End: 2019-08-28

## 2019-08-30 NOTE — TELEPHONE ENCOUNTER
Agree with CONSUELO Lee assessment. She should be seen by ophthalmology. She's had left vision loss before. Concern for pseudorelapse but she should be evaluated in eye clinic.

## 2019-09-05 ENCOUNTER — CLINICAL SUPPORT (OUTPATIENT)
Dept: REHABILITATION | Facility: HOSPITAL | Age: 39
End: 2019-09-05
Attending: PSYCHIATRY & NEUROLOGY
Payer: MEDICARE

## 2019-09-05 ENCOUNTER — PATIENT MESSAGE (OUTPATIENT)
Dept: REHABILITATION | Facility: HOSPITAL | Age: 39
End: 2019-09-05

## 2019-09-05 DIAGNOSIS — R53.1 LEFT-SIDED WEAKNESS: Primary | ICD-10-CM

## 2019-09-05 DIAGNOSIS — R26.9 ABNORMALITY OF GAIT: ICD-10-CM

## 2019-09-05 DIAGNOSIS — R26.89 IMPAIRMENT OF BALANCE: ICD-10-CM

## 2019-09-05 PROCEDURE — 97110 THERAPEUTIC EXERCISES: CPT | Mod: HCNC

## 2019-09-05 PROCEDURE — 97116 GAIT TRAINING THERAPY: CPT | Mod: HCNC

## 2019-09-05 PROCEDURE — 97140 MANUAL THERAPY 1/> REGIONS: CPT | Mod: HCNC

## 2019-09-05 NOTE — PROGRESS NOTES
Physical Therapy Daily Treatment Note     Name: Alicia Soliz  Clinic Number: [id number]    Therapy Diagnosis:   Encounter Diagnoses   Name Primary?    Left-sided weakness Yes    Impairment of balance     Abnormality of gait      Physician: Salvatore Vela MD    Visit Date: 9/5/2019    Physician Orders: PT Eval and Treat  Medical Diagnosis: Multiple Sclerosis   Evaluation Date: 8/15/2019  Authorization Period Expiration: 8/11/2020  Plan of Care Certification Period: 9/15/2019  Visit #/Visits authorized: 4/20    Time In: 10:30  Time Out: 11:35  Total Billable Time: 55 minutes    Precautions: Standard and Immunosuppression, MS    Subjective     Pt reports: that she has been in a flare up this past week and has been having a lot more pain and weakness to that L side. Reports her MD knows and recommended her go to the hospital for steroid treatment, but she is tired of gaining weight from the steroids.   She was compliant with home exercise program.  Response to previous treatment: Good  Functional change: flare up- increased weakness, more difficulty with ambulation, increased pain    Pain: 4/10  Location: L side (leg and arm)    Objective     Alicia received therapeutic exercises to develop strength, endurance and ROM for 35 minutes including:  Nustep 5 mins  UE bike 5 mins  Sit to stand lower surface  Marches in parallel bars  Heel to toe walking  LAQ  Squats  Gastroc stretch on wedge    Alicia received the following manual therapy techniques: Joint mobilizations and Soft tissue Mobilization were applied to the: R ankle for 10 minutes, including:  Talocrural mobilizations  PROM of R ankle in all planes    Alicia participated in neuromuscular re-education activities to improve: Balance, Kinesthetic and Proprioception for 0 minutes. The following activities were included:  NA today    Alicia participated in dynamic functional therapeutic activities to improve functional performance for 0  minutes,  including:    Alicia participated in gait training to improve functional mobility and safety for 10  minutes, including:  Gait training without UE support in parallel bars  Gait training with improving ankle movements    Alicia received the following supervised modalities after being cleared for contradictions: TENS:  Alicia received TENS electrical stimulation for pain to the R ankle. Pt received continuous mode at a rate of 0 pps for 0 minutes. Alicia tolerated treatment well without any adverse effects.     Alicia received hot pack for 0 minutes to R ankle    Home Exercises Provided and Patient Education Provided     Education provided:   - activity toleration, progression of therapy    Written Home Exercises Provided: yes.  Exercises were reviewed and Alicia was able to demonstrate them prior to the end of the session.  Alicia demonstrated good  understanding of the education provided.     See EMR under Patient Instructions for exercises provided prior visit.    Assessment     Patient was in the middle of a flare up that affected her L side with increased foot drop and shaking noted this session making ambulation more difficult. Patient educated on why we took more rest breaks this session to insure we did not irritate her flare up currently. Patient had good tolerance to skilled therapy this session with improvement in pain by the end. Patient is seeing her PCP tomorrow in regards to her flare up.    Alicia is progressing well towards her goals.   Pt prognosis is Excellent.     Pt will continue to benefit from skilled outpatient physical therapy to address the deficits listed in the problem list box on initial evaluation, provide pt/family education and to maximize pt's level of independence in the home and community environment.     Pt's spiritual, cultural and educational needs considered and pt agreeable to plan of care and goals.     Anticipated barriers to physical therapy: transportation, relapses in condition      Goals: tolerate more walking as well as increasing overall strength of L side    Plan     Incorporate more walking, improve L side strength, Improve tolerance to physical activity     Zoltan Nuñez, PT

## 2019-09-06 ENCOUNTER — TELEPHONE (OUTPATIENT)
Dept: INTERNAL MEDICINE | Facility: CLINIC | Age: 39
End: 2019-09-06

## 2019-09-06 ENCOUNTER — PATIENT MESSAGE (OUTPATIENT)
Dept: NEUROLOGY | Facility: CLINIC | Age: 39
End: 2019-09-06

## 2019-09-06 ENCOUNTER — PATIENT MESSAGE (OUTPATIENT)
Dept: INTERNAL MEDICINE | Facility: CLINIC | Age: 39
End: 2019-09-06

## 2019-09-06 NOTE — TELEPHONE ENCOUNTER
----- Message from Radha Segovia sent at 9/6/2019  9:45 AM CDT -----  Contact: self  pain management referral, will elaborate.....[phone]

## 2019-09-09 ENCOUNTER — PATIENT MESSAGE (OUTPATIENT)
Dept: INTERNAL MEDICINE | Facility: CLINIC | Age: 39
End: 2019-09-09

## 2019-09-09 ENCOUNTER — PATIENT MESSAGE (OUTPATIENT)
Dept: REHABILITATION | Facility: HOSPITAL | Age: 39
End: 2019-09-09

## 2019-09-09 DIAGNOSIS — G89.29 OTHER CHRONIC PAIN: ICD-10-CM

## 2019-09-09 DIAGNOSIS — G35 MULTIPLE SCLEROSIS: Primary | ICD-10-CM

## 2019-09-09 RX ORDER — DOXEPIN HYDROCHLORIDE 50 MG/1
CAPSULE ORAL
Qty: 90 CAPSULE | Refills: 1 | Status: SHIPPED | OUTPATIENT
Start: 2019-09-09 | End: 2020-01-10 | Stop reason: SDUPTHER

## 2019-09-10 ENCOUNTER — CLINICAL SUPPORT (OUTPATIENT)
Dept: REHABILITATION | Facility: HOSPITAL | Age: 39
End: 2019-09-10
Attending: PSYCHIATRY & NEUROLOGY
Payer: MEDICARE

## 2019-09-10 DIAGNOSIS — R26.89 IMPAIRMENT OF BALANCE: ICD-10-CM

## 2019-09-10 DIAGNOSIS — R26.9 ABNORMALITY OF GAIT: ICD-10-CM

## 2019-09-10 DIAGNOSIS — R53.1 LEFT-SIDED WEAKNESS: Primary | ICD-10-CM

## 2019-09-10 PROCEDURE — 97110 THERAPEUTIC EXERCISES: CPT | Mod: HCNC

## 2019-09-10 PROCEDURE — 97116 GAIT TRAINING THERAPY: CPT | Mod: HCNC

## 2019-09-10 PROCEDURE — 97140 MANUAL THERAPY 1/> REGIONS: CPT | Mod: HCNC

## 2019-09-10 NOTE — PROGRESS NOTES
Physical Therapy Daily Treatment Note     Name: Alicia Soliz  Clinic Number: [id number]    Therapy Diagnosis:   Encounter Diagnoses   Name Primary?    Left-sided weakness Yes    Impairment of balance     Abnormality of gait      Physician: Salvatore Vela MD    Visit Date: 9/10/2019    Physician Orders: PT Eval and Treat  Medical Diagnosis: Multiple Sclerosis   Evaluation Date: 8/15/2019  Authorization Period Expiration: 8/11/2020  Plan of Care Certification Period: 9/15/2019  Visit #/Visits authorized: 5/20    Time In: 9:00  Time Out: 10:15  Total Billable Time: 55 minutes    Precautions: Standard and Immunosuppression, MS    Subjective     Pt reports: that her flare up is not as bad currently and her session with her PCP went well.  She was compliant with home exercise program.  Response to previous treatment: Good  Functional change: improvement in tolerance to activity    Pain: 2/10  Location: L side (leg and arm)    Objective     Alicia received therapeutic exercises to develop strength, endurance and ROM for 35 minutes including:  Nustep 5 mins  UE bike 5 mins  Sit to stand lower surface  Marches in parallel bars  Heel to toe walking  LAQ  Squats  Gastroc stretch on wedge    Alicia received the following manual therapy techniques: Joint mobilizations and Soft tissue Mobilization were applied to the: R ankle for 10 minutes, including:  Talocrural mobilizations  PROM of R ankle in all planes    Alicia participated in neuromuscular re-education activities to improve: Balance, Kinesthetic and Proprioception for 0 minutes. The following activities were included:  NA today    Alicia participated in dynamic functional therapeutic activities to improve functional performance for 0  minutes, including:    Alicia participated in gait training to improve functional mobility and safety for 10  minutes, including:  Gait training without UE support in parallel bars  Gait training with improving ankle  movements  Forward stepping    Alicia received the following supervised modalities after being cleared for contradictions: TENS:  Alicia received TENS electrical stimulation for pain to the R ankle. Pt received continuous mode at a rate of 0 pps for 0 minutes. Alicia tolerated treatment well without any adverse effects.     Alicia received hot pack for 0 minutes to R ankle    Home Exercises Provided and Patient Education Provided     Education provided:   - activity toleration, progression of therapy    Written Home Exercises Provided: yes.  Exercises were reviewed and Alicia was able to demonstrate them prior to the end of the session.  Alicia demonstrated good  understanding of the education provided.     See EMR under Patient Instructions for exercises provided prior visit.    Assessment     Patient gait trained better this session with less foot drop, however was still having more spasms this session and needs rest breaks in order to calm her gastroc down. Patient is still super motivated to walk, but educated that since she has been on her walker and due to the course of the disease her rehab will take a lot longer if her goal is to get off the walker.    Alicia is progressing well towards her goals.   Pt prognosis is Excellent.     Pt will continue to benefit from skilled outpatient physical therapy to address the deficits listed in the problem list box on initial evaluation, provide pt/family education and to maximize pt's level of independence in the home and community environment.     Pt's spiritual, cultural and educational needs considered and pt agreeable to plan of care and goals.     Anticipated barriers to physical therapy: transportation, relapses in condition     Goals: tolerate more walking as well as increasing overall strength of L side    Plan     Incorporate more walking, improve L side strength, Improve tolerance to physical activity     Zoltan Nuñez, PT

## 2019-09-11 ENCOUNTER — CLINICAL SUPPORT (OUTPATIENT)
Dept: REHABILITATION | Facility: HOSPITAL | Age: 39
End: 2019-09-11
Attending: PSYCHIATRY & NEUROLOGY
Payer: MEDICARE

## 2019-09-11 DIAGNOSIS — R26.89 IMPAIRMENT OF BALANCE: ICD-10-CM

## 2019-09-11 DIAGNOSIS — R53.1 LEFT-SIDED WEAKNESS: Primary | ICD-10-CM

## 2019-09-11 DIAGNOSIS — R26.9 ABNORMALITY OF GAIT: ICD-10-CM

## 2019-09-11 PROCEDURE — 97140 MANUAL THERAPY 1/> REGIONS: CPT | Mod: HCNC

## 2019-09-11 PROCEDURE — 97116 GAIT TRAINING THERAPY: CPT | Mod: HCNC

## 2019-09-11 PROCEDURE — 97110 THERAPEUTIC EXERCISES: CPT | Mod: HCNC

## 2019-09-12 NOTE — PROGRESS NOTES
Physical Therapy Daily Treatment Note     Name: Alicia Soliz  Clinic Number: [id number]    Therapy Diagnosis:   Encounter Diagnoses   Name Primary?    Left-sided weakness Yes    Impairment of balance     Abnormality of gait      Physician: Salvatore Vela MD    Visit Date: 9/11/2019    Physician Orders: PT Eval and Treat  Medical Diagnosis: Multiple Sclerosis   Evaluation Date: 8/15/2019  Authorization Period Expiration: 8/11/2020  Plan of Care Certification Period: 9/15/2019  Visit #/Visits authorized: 6/20    Time In: 10:00  Time Out: 11:00  Total Billable Time: 55 minutes    Precautions: Standard and Immunosuppression, MS    Subjective     Pt reports: that she is doing well and feels like she is getting stronger.   She was compliant with home exercise program.  Response to previous treatment: Good  Functional change: improvement in tolerance to activity    Pain: 2/10  Location: L side (leg and arm)    Objective     Alicia received therapeutic exercises to develop strength, endurance and ROM for 35 minutes including:  Nustep 5 mins  UE bike 5 mins  Sit to stand lower surface w/ 10# sets of 10   Heel walking in parallel bars  Hip marches   Left D2 flexion and extension GTB     Alicia received the following manual therapy techniques: Joint mobilizations and Soft tissue Mobilization were applied to the: R ankle for 10 minutes, including:  Talocrural mobilizations  PROM of R ankle in all planes    Ailcia participated in neuromuscular re-education activities to improve: Balance, Kinesthetic and Proprioception for 0 minutes. The following activities were included:  NA today    Alicia participated in dynamic functional therapeutic activities to improve functional performance for 0  minutes, including:    Alicia participated in gait training to improve functional mobility and safety for 10  minutes, including:  Gait training without UE support in parallel bars  Gait training with improving ankle movements  Forward  stepping    Alicia received the following supervised modalities after being cleared for contradictions: TENS:  Alicia received TENS electrical stimulation for pain to the R ankle. Pt received continuous mode at a rate of 0 pps for 0 minutes. Alicia tolerated treatment well without any adverse effects.     Alicia received hot pack for 0 minutes to R ankle    Home Exercises Provided and Patient Education Provided     Education provided:   - activity toleration, progression of therapy    Written Home Exercises Provided: yes.  Exercises were reviewed and Alicia was able to demonstrate them prior to the end of the session.  Alicia demonstrated good  understanding of the education provided.     See EMR under Patient Instructions for exercises provided prior visit.    Assessment     Pt is demonstrating increased ability to dorsiflex her L ankle while walking, with moderate cueing. Pt is demonstrating increased strength of her lower body, noted with weighted sit<>stands. Exercises to target upper body was added into today's session, so she can hopefully increase the function of her L upper body.     Alicia is progressing well towards her goals.   Pt prognosis is Excellent.     Pt will continue to benefit from skilled outpatient physical therapy to address the deficits listed in the problem list box on initial evaluation, provide pt/family education and to maximize pt's level of independence in the home and community environment.     Pt's spiritual, cultural and educational needs considered and pt agreeable to plan of care and goals.     Anticipated barriers to physical therapy: transportation, relapses in condition     Goals: tolerate more walking as well as increasing overall strength of L side    Plan     Incorporate more walking, improve L side strength, Improve tolerance to physical activity     Bhavesh Han, SPT

## 2019-09-16 ENCOUNTER — PATIENT MESSAGE (OUTPATIENT)
Dept: REHABILITATION | Facility: HOSPITAL | Age: 39
End: 2019-09-16

## 2019-09-17 ENCOUNTER — DOCUMENTATION ONLY (OUTPATIENT)
Dept: BARIATRICS | Facility: CLINIC | Age: 39
End: 2019-09-17

## 2019-09-17 ENCOUNTER — CLINICAL SUPPORT (OUTPATIENT)
Dept: REHABILITATION | Facility: HOSPITAL | Age: 39
End: 2019-09-17
Attending: PSYCHIATRY & NEUROLOGY
Payer: MEDICARE

## 2019-09-17 DIAGNOSIS — R26.89 IMPAIRMENT OF BALANCE: ICD-10-CM

## 2019-09-17 DIAGNOSIS — R26.9 ABNORMALITY OF GAIT: ICD-10-CM

## 2019-09-17 DIAGNOSIS — R53.1 LEFT-SIDED WEAKNESS: Primary | ICD-10-CM

## 2019-09-17 PROCEDURE — 97110 THERAPEUTIC EXERCISES: CPT | Mod: HCNC

## 2019-09-17 PROCEDURE — 97116 GAIT TRAINING THERAPY: CPT | Mod: HCNC

## 2019-09-17 NOTE — PROGRESS NOTES
Received referral message from Dr. Palmer - message sent to Joaquin Red MA, to schedule appt and discuss bariatric process

## 2019-09-18 ENCOUNTER — CLINICAL SUPPORT (OUTPATIENT)
Dept: REHABILITATION | Facility: HOSPITAL | Age: 39
End: 2019-09-18
Attending: PSYCHIATRY & NEUROLOGY
Payer: MEDICARE

## 2019-09-18 DIAGNOSIS — R53.1 LEFT-SIDED WEAKNESS: Primary | ICD-10-CM

## 2019-09-18 DIAGNOSIS — R26.89 IMPAIRMENT OF BALANCE: ICD-10-CM

## 2019-09-18 DIAGNOSIS — R26.9 ABNORMALITY OF GAIT: ICD-10-CM

## 2019-09-18 PROCEDURE — 97116 GAIT TRAINING THERAPY: CPT | Mod: HCNC

## 2019-09-18 PROCEDURE — 97110 THERAPEUTIC EXERCISES: CPT | Mod: HCNC

## 2019-09-19 NOTE — PROGRESS NOTES
Physical Therapy Daily Treatment Note     Name: Alicia Soliz  Clinic Number: [id number]    Therapy Diagnosis:   No diagnosis found.  Physician: Salvatore Vela MD    Visit Date: 9/17/2019    Physician Orders: PT Eval and Treat  Medical Diagnosis: Multiple Sclerosis   Evaluation Date: 8/15/2019  Authorization Period Expiration: 8/11/2020  Plan of Care Certification Period: 9/15/2019  Visit #/Visits authorized: 7/20    Time In: 8:35  Time Out: 9:50  Total Billable Time: 45 minutes    Precautions: Standard and Immunosuppression, MS    Subjective     Pt reports: that she is doing well and has had a decrease in spasms of her L side.   She was compliant with home exercise program.  Response to previous treatment: Good  Functional change: improvement in tolerance to activity    Pain: 2/10  Location: L side (leg and arm)    Objective     Alicia received therapeutic exercises to develop strength, endurance and ROM for 45 minutes including (PT supervised entire visit and PT tech assisted with 15 min of there ex):  Nustep 9mins 1.4 level  UE bike 9mins 1.2 level  Sit to stand lower surface w/ 15lb 3x8  Heel walking in parallel bars  Step ups in parallel bars   Foam pad B UE supported     Alicia received the following manual therapy techniques: Joint mobilizations and Soft tissue Mobilization were applied to the: R ankle for 0 minutes, including:  Talocrural mobilizations  PROM of R ankle in all planes    Alicia participated in neuromuscular re-education activities to improve: Balance, Kinesthetic and Proprioception for 0 minutes. The following activities were included:  NA today    Alicia participated in dynamic functional therapeutic activities to improve functional performance for 0  minutes, including:    Alicia participated in gait training to improve functional mobility and safety for 15  minutes, including:  Gait training without UE support in parallel bars  Gait training with improving ankle movements  Forward  stepping  Gait training w quad cane     Alicia received the following supervised modalities after being cleared for contradictions: TENS:  Alicia received TENS electrical stimulation for pain to the R ankle. Pt received continuous mode at a rate of 0 pps for 0 minutes. Alicia tolerated treatment well without any adverse effects.     Alicia received hot pack for 0 minutes to R ankle    Home Exercises Provided and Patient Education Provided     Education provided:   - activity toleration, progression of therapy    Written Home Exercises Provided: yes.  Exercises were reviewed and Alicia was able to demonstrate them prior to the end of the session.  Alicia demonstrated good  understanding of the education provided.     See EMR under Patient Instructions for exercises provided prior visit.    Assessment     Pt demonstrated good ankle strategy with ambulating with quad cane. Patient needed minimal assistance with quad cane ambulation. Pt is demonstrating increased strength and function of her L side, noted with ability to tolerate increased therapeutic exercise.     Alicia is progressing well towards her goals.   Pt prognosis is Excellent.     Pt will continue to benefit from skilled outpatient physical therapy to address the deficits listed in the problem list box on initial evaluation, provide pt/family education and to maximize pt's level of independence in the home and community environment.     Pt's spiritual, cultural and educational needs considered and pt agreeable to plan of care and goals.     Anticipated barriers to physical therapy: transportation, relapses in condition     Goals: tolerate more walking as well as increasing overall strength of L side    Plan     Incorporate more walking, improve L side strength, Improve tolerance to physical activity     Bhavesh Han, SPT

## 2019-09-20 NOTE — PROGRESS NOTES
Physical Therapy Daily Treatment Note     Name: Alicia Soliz  Clinic Number: [id number]    Therapy Diagnosis:   Encounter Diagnoses   Name Primary?    Left-sided weakness Yes    Impairment of balance     Abnormality of gait      Physician: Salvatore Vela MD    Visit Date: 9/18/2019    Physician Orders: PT Eval and Treat  Medical Diagnosis: Multiple Sclerosis   Evaluation Date: 8/15/2019  Authorization Period Expiration: 8/11/2020  Plan of Care Certification Period: 9/15/2019  Visit #/Visits authorized: 8/20    Time In: 9:00  Time Out: 10:10  Total Billable Time: 60 minutes    Precautions: Standard and Immunosuppression, MS    Subjective     Pt reports: that she is doing well, although a slight increase in fatigue.   She was compliant with home exercise program.  Response to previous treatment: Good  Functional change: improvement in tolerance to activity    Pain: 2/10  Location: L side (leg and arm)    Objective     Alicia received therapeutic exercises to develop strength, endurance and ROM for 45 minutes including (PT supervised entire visit):  Nustep 9mins 1.4 level  UE bike 9mins 1.2 level  Sit to stand lower surface w/ 10lb 2x12  Heel walking in parallel bars  Step ups in parallel bars   Foam pad B UE supported   Gastroc wedge stretch   Standing hip abduction  Standing hip extension     Alicia received the following manual therapy techniques: Joint mobilizations and Soft tissue Mobilization were applied to the: R ankle for 0 minutes, including:  Talocrural mobilizations  PROM of R ankle in all planes    Alicia participated in neuromuscular re-education activities to improve: Balance, Kinesthetic and Proprioception for 0 minutes. The following activities were included:  NA today    Alicia participated in dynamic functional therapeutic activities to improve functional performance for 0  minutes, including:    Alicia participated in gait training to improve functional mobility and safety for 15  minutes,  including:  Gait training without UE support in parallel bars  Gait training with improving ankle movements  Forward stepping  Gait training w quad cane     Alicia received the following supervised modalities after being cleared for contradictions: TENS:  Alicia received TENS electrical stimulation for pain to the R ankle. Pt received continuous mode at a rate of 0 pps for 0 minutes. Alicia tolerated treatment well without any adverse effects.     Alicia received hot pack for 0 minutes to R ankle    Home Exercises Provided and Patient Education Provided     Education provided:   - activity toleration, progression of therapy    Written Home Exercises Provided: yes.  Exercises were reviewed and Alicia was able to demonstrate them prior to the end of the session.  Alicia demonstrated good  understanding of the education provided.     See EMR under Patient Instructions for exercises provided prior visit.    Assessment     Pt had increased fatigue with therapeutic exercise today, requiring more weight shift onto contralateral side while gait training in order for her to bring her LLE forward. Pt was able to complete the rest of her therapeutic exercise without any problems.     Alicia is progressing well towards her goals.   Pt prognosis is Excellent.     Pt will continue to benefit from skilled outpatient physical therapy to address the deficits listed in the problem list box on initial evaluation, provide pt/family education and to maximize pt's level of independence in the home and community environment.     Pt's spiritual, cultural and educational needs considered and pt agreeable to plan of care and goals.     Anticipated barriers to physical therapy: transportation, relapses in condition     Goals: tolerate more walking as well as increasing overall strength of L side    Plan     Incorporate more walking, improve L side strength, Improve tolerance to physical activity     Bhavesh Han, SPT

## 2019-09-23 ENCOUNTER — TELEPHONE (OUTPATIENT)
Dept: SURGERY | Facility: CLINIC | Age: 39
End: 2019-09-23

## 2019-09-23 ENCOUNTER — TELEPHONE (OUTPATIENT)
Dept: BARIATRICS | Facility: CLINIC | Age: 39
End: 2019-09-23

## 2019-09-23 NOTE — TELEPHONE ENCOUNTER
----- Message from Ines Diana sent at 9/23/2019  2:24 PM CDT -----  Contact: pt   Type:  Needs Medical Advice    Who Called: pt called stated  She would like a call back to schedule an appt bariatic surgery. She stated she did seminar online.    Would the patient rather a call back or a response via [facility]? call  Best Call Back Number: [phone]  Additional Information:

## 2019-09-23 NOTE — TELEPHONE ENCOUNTER
----- Message from Nico King sent at 9/23/2019  4:00 PM CDT -----  Contact: Patient @ [phone]  Patient calling to r/s the 10-15th appt to see Dr Palmer before the scheduled appt due transportation, pls advise

## 2019-09-24 ENCOUNTER — CLINICAL SUPPORT (OUTPATIENT)
Dept: REHABILITATION | Facility: HOSPITAL | Age: 39
End: 2019-09-24
Attending: PSYCHIATRY & NEUROLOGY
Payer: MEDICARE

## 2019-09-24 ENCOUNTER — PATIENT MESSAGE (OUTPATIENT)
Dept: INTERNAL MEDICINE | Facility: CLINIC | Age: 39
End: 2019-09-24

## 2019-09-24 DIAGNOSIS — R53.1 LEFT-SIDED WEAKNESS: Primary | ICD-10-CM

## 2019-09-24 DIAGNOSIS — R26.89 IMPAIRMENT OF BALANCE: ICD-10-CM

## 2019-09-24 DIAGNOSIS — R26.9 ABNORMALITY OF GAIT: ICD-10-CM

## 2019-09-24 DIAGNOSIS — F32.A DEPRESSION, UNSPECIFIED DEPRESSION TYPE: Primary | ICD-10-CM

## 2019-09-24 PROCEDURE — 97110 THERAPEUTIC EXERCISES: CPT | Mod: HCNC

## 2019-09-24 PROCEDURE — 97116 GAIT TRAINING THERAPY: CPT | Mod: HCNC

## 2019-09-24 NOTE — PROGRESS NOTES
Physical Therapy Daily Treatment Note     Name: Alicia Soliz  Clinic Number: [id number]    Therapy Diagnosis:   Encounter Diagnoses   Name Primary?    Left-sided weakness Yes    Impairment of balance     Abnormality of gait      Physician: Salvatore Vela MD    Visit Date: 9/24/2019    Physician Orders: PT Eval and Treat  Medical Diagnosis: Multiple Sclerosis   Evaluation Date: 8/15/2019  Authorization Period Expiration: 8/11/2020  Plan of Care Certification Period: next session  Visit #/Visits authorized: 9/20    Time In: 9:00  Time Out: 10:30  Total Billable Time: 60 minutes    Precautions: Standard and Immunosuppression, MS    Subjective     Pt reports: that she is doing well and has been working on her exercises at home.  She was compliant with home exercise program.  Response to previous treatment: Good  Functional change: improvement in tolerance to activity    Pain: 2/10  Location: L side (leg and arm)    Objective     Alicia received therapeutic exercises to develop strength, endurance and ROM for 45 minutes including:  Nustep 5 mins 1.4 level  UE bike 6 mins 1.2 level  Sit to stand lower surface w/ 20lb 2x12  DF raises in standing  Step ups in parallel bars   Foam pad ankle strategy work  Gastroc wedge stretch   Standing hip abduction  Standing hip extension   Standing hip FL    Alicia received the following manual therapy techniques: Joint mobilizations and Soft tissue Mobilization were applied to the: R ankle for 5 minutes, including:  Talocrural mobilizations  STM to L gastroc    Alicia participated in neuromuscular re-education activities to improve: Balance, Kinesthetic and Proprioception for 0 minutes. The following activities were included:  NA today    Alicia participated in dynamic functional therapeutic activities to improve functional performance for 0  minutes, including:    Alicia participated in gait training to improve functional mobility and safety for 10 minutes, including:  Gait  training without UE support in parallel bars  Gait training with improving ankle movements  Forward stepping  Gait training w quad cane     lAicia received the following supervised modalities after being cleared for contradictions: TENS:  Alicia received TENS electrical stimulation for pain to the R ankle. Pt received continuous mode at a rate of 0 pps for 0 minutes. Alicia tolerated treatment well without any adverse effects.     Alicia received hot pack for 0 minutes to R ankle    Home Exercises Provided and Patient Education Provided     Education provided:   - activity toleration, progression of therapy    Written Home Exercises Provided: yes.  Exercises were reviewed and Alicia was able to demonstrate them prior to the end of the session.  Alicia demonstrated good  understanding of the education provided.     See EMR under Patient Instructions for exercises provided prior visit.    Assessment     Patient was performing exercises and gait training well for the first 45-50 minutes of exercise, however after performing some standing taps her L LE started to spasm more. Patient reported some increase in tingling to that side and then was having a lot of difficulty utilizing her L LE. Attempted to stand, with her leg not able to follow commands completely and also noted inversion of the L ankle as well. Rested for 5-10 minutes and still no change. Patient reports this was the second time to happen this past week and it caused her to fall this past weekend while ambulating. Due to patient not able to safely stand/gait train, wheeled patient out to her transportation to decrease risk for falls. Will notify her MD about her progress for a follow-up on Friday.    Alicia is progressing well towards her goals.   Pt prognosis is Excellent.     Pt will continue to benefit from skilled outpatient physical therapy to address the deficits listed in the problem list box on initial evaluation, provide pt/family education and to  maximize pt's level of independence in the home and community environment.     Pt's spiritual, cultural and educational needs considered and pt agreeable to plan of care and goals.     Anticipated barriers to physical therapy: transportation, relapses in condition     Goals: tolerate more walking as well as increasing overall strength of L side    Plan     Incorporate more walking, improve L side strength, Improve tolerance to physical activity     Zoltan Nuñez, PT

## 2019-09-25 ENCOUNTER — PATIENT OUTREACH (OUTPATIENT)
Dept: ADMINISTRATIVE | Facility: OTHER | Age: 39
End: 2019-09-25

## 2019-09-27 ENCOUNTER — LAB VISIT (OUTPATIENT)
Dept: LAB | Facility: HOSPITAL | Age: 39
End: 2019-09-27
Attending: PSYCHIATRY & NEUROLOGY
Payer: MEDICARE

## 2019-09-27 ENCOUNTER — OFFICE VISIT (OUTPATIENT)
Dept: NEUROLOGY | Facility: CLINIC | Age: 39
End: 2019-09-27
Payer: MEDICARE

## 2019-09-27 ENCOUNTER — PATIENT OUTREACH (OUTPATIENT)
Dept: OTHER | Facility: OTHER | Age: 39
End: 2019-09-27

## 2019-09-27 VITALS — BODY MASS INDEX: 47.09 KG/M2 | WEIGHT: 293 LBS | HEIGHT: 66 IN

## 2019-09-27 DIAGNOSIS — G35 MULTIPLE SCLEROSIS: ICD-10-CM

## 2019-09-27 DIAGNOSIS — Z51.81 ENCOUNTER FOR MONITORING IMMUNOMODULATING THERAPY: ICD-10-CM

## 2019-09-27 DIAGNOSIS — Z79.899 ENCOUNTER FOR MONITORING IMMUNOMODULATING THERAPY: ICD-10-CM

## 2019-09-27 DIAGNOSIS — G35 MULTIPLE SCLEROSIS: Primary | ICD-10-CM

## 2019-09-27 PROCEDURE — 3008F PR BODY MASS INDEX (BMI) DOCUMENTED: ICD-10-PCS | Mod: HCNC,CPTII,S$GLB, | Performed by: PSYCHIATRY & NEUROLOGY

## 2019-09-27 PROCEDURE — 99999 PR PBB SHADOW E&M-EST. PATIENT-LVL III: ICD-10-PCS | Mod: PBBFAC,HCNC,, | Performed by: PSYCHIATRY & NEUROLOGY

## 2019-09-27 PROCEDURE — 99215 PR OFFICE/OUTPT VISIT, EST, LEVL V, 40-54 MIN: ICD-10-PCS | Mod: HCNC,S$GLB,, | Performed by: PSYCHIATRY & NEUROLOGY

## 2019-09-27 PROCEDURE — 36415 COLL VENOUS BLD VENIPUNCTURE: CPT | Mod: HCNC

## 2019-09-27 PROCEDURE — 99215 OFFICE O/P EST HI 40 MIN: CPT | Mod: HCNC,S$GLB,, | Performed by: PSYCHIATRY & NEUROLOGY

## 2019-09-27 PROCEDURE — 86480 TB TEST CELL IMMUN MEASURE: CPT | Mod: HCNC

## 2019-09-27 PROCEDURE — 99999 PR PBB SHADOW E&M-EST. PATIENT-LVL III: CPT | Mod: PBBFAC,HCNC,, | Performed by: PSYCHIATRY & NEUROLOGY

## 2019-09-27 PROCEDURE — 3008F BODY MASS INDEX DOCD: CPT | Mod: HCNC,CPTII,S$GLB, | Performed by: PSYCHIATRY & NEUROLOGY

## 2019-09-27 RX ORDER — MULTIVITAMIN
1 TABLET ORAL
COMMUNITY
End: 2020-10-09

## 2019-09-27 NOTE — PROGRESS NOTES
I saw Alicia Soliz as a established patient today for multiple sclerosis follow up. She is accompanied by her 3 daughters.     History of Present Illness  The patient is a 39 y.o. H female with RRMS, HTN, morbid obesity, depression, GERD, and knee pain.  MS diagnosed in 1/15/15 by clinical history, MRI, and CSF. Symptoms at the time were left eye vision loss, possible TISH, left face/body weakness and paresthesias. Since that time, she denies new symptoms/signs of relapse, but she has had several episodes of symptom flare associated with either physical overexertion or stressors after careful review with patients and her daughters. She continues with subjective weakness on left side since the initial event. She has gotten steroids for several of these flares, however. For her disease, she has been on Tecfidera, Copaxone, and Tysabri. She is currently on Avonex.  Of note, daughters note that the patient has significant stress from her mother, which contribute to her flares. Other stressors are that of morbid obesity (reports difficulty losing weight) and anxious in general.    Today:  Stopped taking the Avonex 2 months ago due to flu-like reactions (intolerable)  Still taking the Ampyra - helps with balance and walking speed. Last near fall was Tues - almost fell at rehab. Using a cane to get around the house. However, she is finding herself more active with the help of rehab.  Orbera balloon - would like to pursue this for weight loss.   Mood - feels much improved, using music therapy. Anxiety is much improved too  Cognitive - seems improved as well.        Review of systems:   Vertigo/Dizziness: no  Headaches: yes  Visual Symptoms: No   Bladder Dysfunction: Yes - frequent UTI's   Bowel Dysfunction: Yes - constipation, takes Linzess   Pain: Yes - left shoulder pain   Skin Breakdown: No   Cognitive: Yes - complaints of memory loss   Dysphagia: No   Dysarthria: No   Hand Dysfunction: No  Gait Disturbance: Yes -  "reports due to motor fatigue - left leg dragging and foot drop.  Falls: Yes - occasional  Spasticity: Yes - takes baclofen   Sensation changes: numbness on left  Mood Disorder: Yes - depression   Fatigue: Yes - has been rx'd nuvigil in the past   Sleep Disturbance: Yes - snores, gasping in sleep, and legs twitch   Sexual Dysfunction: Not Assessed   Tremors: No   Heat sensitivity: No         Social History:  . High school education. Unemployed; disabled.  Never smoker  No alcohol, drugs, or coffee. Drinks tea.  Attempting weight loss.    Review of patient's allergies indicates:   Allergen Reactions    Demerol [meperidine] Itching     Other reaction(s): Itching    Dilaudid [hydromorphone (bulk)] Anaphylaxis     "coded" per pt  Patient can tolerate Lortab    Prednisone Itching     Other reaction(s): Itching    Morphine     Tramadol      shaking       Current Outpatient Medications on File Prior to Visit   Medication Sig Dispense Refill Last Dose    cyclobenzaprine (FLEXERIL) 10 MG tablet TAKE 1 TABLET BY MOUTH THREE TIMES A DAY AS NEEDED FOR MUSCLE SPASMS  2 Taking    dalfampridine (AMPYRA) 10 mg Tb12 Take 1 tablet by mouth 2 (two) times daily. (Patient taking differently: Take 1 tablet by mouth once daily. ) 60 tablet 1 Taking    esomeprazole (NEXIUM) 40 MG capsule Take 1 capsule (40 mg total) by mouth before breakfast. 30 capsule 11 Taking    gabapentin (NEURONTIN) 300 MG capsule Take 300 mg by mouth 3 (three) times daily as needed.  11 Taking    hydroCHLOROthiazide (HYDRODIURIL) 12.5 MG Tab Take 1 tablet (12.5 mg total) by mouth once daily. 30 tablet 11 Taking    hydrocodone-acetaminophen 10-325mg (NORCO)  mg Tab Take by mouth every 6 (six) hours as needed for Pain.   Taking    linaCLOtide (LINZESS) 145 mcg Cap capsule Take 1 capsule (145 mcg total) by mouth once daily. 30 capsule 5 Taking    promethazine (PHENERGAN) 25 MG tablet Take 1 tablet (25 mg total) by mouth every 4 (four) hours as " needed for Nausea. 30 tablet 1 Taking    buPROPion (WELLBUTRIN SR) 100 MG TBSR 12 hr tablet TAKE 1 TABLET BY MOUTH TWICE A DAY 60 tablet 6 Taking    butalbital-acetaminophen-caffeine -40 mg (FIORICET, ESGIC) -40 mg per tablet Take 1 tablet at onset of migraine.  Limit to 3 tabs a week. 12 tablet 3 Taking    ciprofloxacin-dexamethasone 0.3-0.1% (CIPRODEX) 0.3-0.1 % DrpS Place 4 drops into the right ear 2 (two) times daily. 7.5 mL 1 Taking    doxepin (SINEQUAN) 50 MG capsule TAKE 1 CAPSULE BY MOUTH NIGHTLY AS NEEDED. 30 capsule 5 Taking    fluconazole (DIFLUCAN) 150 MG Tab TAKE 1 TABLET ONCE THEN 2ND TABLET THREE DAYS LATER FOR 1 DOSE (Patient not taking: Reported on 9/27/2019) 2 tablet 0 Not Taking    hydrOXYzine HCl (ATARAX) 25 MG tablet Take 1 tablet (25 mg total) by mouth 3 (three) times daily. 60 tablet 2 Taking    interferon beta-1a (AVONEX) 30 mcg/0.5 mL syringe Inject 0.5 mLs (30 mcg total) into the muscle once a week. (Patient not taking: Reported on 9/27/2019) 4 Syringe 11 Not Taking    oz-[id number] 1-1-500 mg Cap Take 1 tablet by mouth once daily. (Patient not taking: Reported on 9/27/2019) 30 capsule 3 Not Taking    multivitamin with folic acid 400 mcg Tab Take 1 tablet by mouth.       mupirocin (BACTROBAN) 2 % ointment Apply topically 2 (two) times daily. Apply to affected area (Patient not taking: Reported on 9/27/2019) 30 g 1 Not Taking    nystatin (MYCOSTATIN) cream Apply topically 2 (two) times daily. (Patient not taking: Reported on 9/27/2019) 30 g 1 Not Taking    phentermine (LOMAIRA) 8 mg Tab Take 1 tablet by mouth every morning. 30 each 0 Taking    psyllium husk (METAMUCIL) 0.4 gram Cap Take by mouth once daily.   Taking    pyrilamine-dextromethorphan 30-30 mg Tab Take 1 tablet by mouth 4 (four) times daily as needed (congestion and cough). 20 tablet 1     topiramate (TOPAMAX) 25 MG tablet Take 1 tablet (25 mg total) by mouth once daily. 30 tablet 0  "Taking       Physical Exam  Vitals:    09/27/19 0946   Weight: (!) 146.1 kg (322 lb 1.5 oz)   Height: 5' 6" (1.676 m)     In general, the patient is well nourished    MENTAL STATUS: language is fluent, normal verbal comprehension, attention is normal, patient is alert and oriented x 3, fund of knowlege is appropriate by vocabulary.     CRANIAL NERVE EXAM:  Extraocular muscles are intact. No facial asymmetry. There is no dysarthria. Uvula is midline, and palate moves symmetrically. Shoulder shrug intact bilaterlly. Tongue protrusion is midline. Hearing is grossly intact.     MOTOR EXAM: Normal bulk and tone throughout UE and LE bilaterally.   No pronator drift. Strength is  5/5 t/o except 5- left HF.    COORDINATION: no ataxia with FNF    GAIT: much improved, narrow based        IMAGING (personally reviewed):  MRI Brain w/wo 1/2015 Multifocal periventricular, subcortical and pontine white matter lesions compatible with demyelinating plaques of multiple sclerosis.  Many of the lesions display enhancement consistent with active MS plaque. Evolving signal changes evident on the DWI and ADC map sequences with development of right periventricular white matter enhancement as compared to the recent study of 01/15/15.  Differential considerations include active MS plaque formation/evolution and acute ischemia/infarction    MRI C-[facility]ine 1/2015 Normal cervical cord. 1 cm contrast enhancing left occipital lobe subcortical white matter lesion.  Multiple bihemispheric hyperintense T2 signal lesions including left-sided brainstem lesion demonstrated on the recent head MRI study some of which exhibiting contrast enhancement     LABS:  Lab Results   Component Value Date    WBC 5.66 04/10/2019    HGB 11.7 (L) 04/10/2019    HCT 36.9 (L) 04/10/2019    MCV 69 (L) 04/10/2019     04/10/2019         Chemistry        Component Value Date/Time     04/10/2019 1100    K 3.6 04/10/2019 1100     04/10/2019 1100    CO2 27 " 04/10/2019 1100    BUN 8 04/10/2019 1100    CREATININE 0.8 04/10/2019 1100     (H) 04/10/2019 1100        Component Value Date/Time    CALCIUM 9.6 04/10/2019 1100    ALKPHOS 90 04/10/2019 1100    AST 23 04/10/2019 1100    ALT 20 04/10/2019 1100    BILITOT 0.2 04/10/2019 1100    ESTGFRAFRICA >60 04/10/2019 1100    EGFRNONAA >60 04/10/2019 1100            Lab Results   Component Value Date    LABPROT 10.0 03/11/2015    ALBUMIN 3.7 04/10/2019     Results for JUJU POOLE (MRN [id number]) as of 4/16/2019 23:40   Ref. Range 1/16/2015 15:03   COLOR CSF Latest Ref Range: Colorless  Colorless   Heme Aliquot Latest Units: mL 2.0   Appearance, CSF Latest Ref Range: Clear  Clear   WBC, CSF Latest Ref Range: 0 - 5 /cu mm 3   RBC, CSF Latest Ref Range: 0 /cu mm 1 (A)   Glucose, CSF Latest Ref Range: 40 - 70 mg/dL 74 (H)   Protein, CSF Latest Ref Range: 15 - 40 mg/dL 39   Angio Convert Enzyme, CSF Latest Ref Range: 0.0 - 2.5 U/L 0.7   CSF Bands Latest Units: bands 6   Serum Bands Latest Units: bands 0   Olig Bands Interpretation, CSF Latest Ref Range: <4 bands 6 (A)   IgG Index, CSF Latest Ref Range: <=0.85  0.74   Albumin, CSF Latest Ref Range: <=27.0 mg/dL 20.9   IGG/ALBUMIN RATIO, CSF Latest Ref Range: <=0.21  0.23 (H)   IgG Synthetic Rate Latest Ref Range: <=12 mg/24 h 7.99   Albumin, Serum Latest Ref Range: 3200 - 4800 mg/dL 4680   IgG, CSF Latest Ref Range: <=8.1 mg/dL 4.9           Diagnosis/Assessment/Plan:       1. Multiple sclerosis  · Assessment: agree with diagnosis. Diagnosed 1/2015 based on MRI and LP. Currently off DMT as patient stopped Avonex (intolerable ISRs).  No clear signs or symptoms of relapse; disease appears stable.  Patient is actually significantly improved clinically with institution of outpatient rehab, and I have encouraged her to continue with these exercises following completion of the rehab course.  Had extensive discussion held concerning of a DMT options and patient has decided to  start Aubagio at this time.  · Labs: Safety labs for Aubagio pre-screening  · Imaging: none at this time. Will repeat MRI of 6 months following start of new DMT  · Recommend that she continue with PT/OT for falls/gait imbalance and we'll discus further management of gait therafter  · Given MS counseling.  · Stress management counseling given.  Previously given psychiatry referral, will again give her the phone number to initiate a referral on today  · Patient has several questions about weight loss procedures on today.  I will look into the balloon device that she is inquiring about and make a decision about release for procedure after I have gathered the appropriate information.      Over 50% of the 40 min was spent counseling the patient about MS, treatment options, prognosis and the management of her symptoms.    F/u in 4 months    Meagan Pereira MD

## 2019-09-27 NOTE — PROGRESS NOTES
"[facility]: Health  Follow-Up      Follow Up  Follow-up reason(s): reading review      Readings are missing.   Routine Education Topics: weight management  Agrees to send another reading today.     Patient has lost 20 lbs so far, and she states that she is very pleased with her progress. Feels that "focus" was the biggest thing that helped and "putting the foolishness behind her".         Diet:       Intervention(s): portion control    Physical Activity:   When asked if exercising, patient responded: yes2 day(s) a week.      Patient participates in the following activities: weights, walking and physical therapy/rehab    Walks a lot in physical therapy      SDOH    INTERVENTION(S)  encouragement/support    PLAN  patient amenable to changes    Patient plans to continue with her current modifications managing diet and increasing exercise.       There are no preventive care reminders to display for this patient.    Last 5 Patient Entered Readings                                      Current 30 Day Average: 113/71     Recent Readings 9/16/2019 9/6/2019 8/21/2019 8/13/2019 8/6/2019    SBP (mmHg) 115 110 125 116 120    DBP (mmHg) 70 71 71 70 80                "

## 2019-09-29 ENCOUNTER — PATIENT MESSAGE (OUTPATIENT)
Dept: NEUROLOGY | Facility: CLINIC | Age: 39
End: 2019-09-29

## 2019-10-01 LAB
M TB IFN-G CD4+ BCKGRND COR BLD-ACNC: 0.01 IU/ML
MITOGEN IGNF BCKGRD COR BLD-ACNC: >10 IU/ML
MITOGEN IGNF BCKGRD COR BLD-ACNC: NEGATIVE [IU]/ML
NIL: 0.05 IU/ML
TB2 - NIL: -0.01 IU/ML

## 2019-10-02 ENCOUNTER — PATIENT MESSAGE (OUTPATIENT)
Dept: INTERNAL MEDICINE | Facility: CLINIC | Age: 39
End: 2019-10-02

## 2019-10-04 ENCOUNTER — PATIENT MESSAGE (OUTPATIENT)
Dept: REHABILITATION | Facility: HOSPITAL | Age: 39
End: 2019-10-04

## 2019-10-07 ENCOUNTER — PATIENT MESSAGE (OUTPATIENT)
Dept: NEUROLOGY | Facility: CLINIC | Age: 39
End: 2019-10-07

## 2019-10-07 ENCOUNTER — CLINICAL SUPPORT (OUTPATIENT)
Dept: REHABILITATION | Facility: HOSPITAL | Age: 39
End: 2019-10-07
Attending: PSYCHIATRY & NEUROLOGY
Payer: MEDICARE

## 2019-10-07 DIAGNOSIS — R53.1 LEFT-SIDED WEAKNESS: Primary | ICD-10-CM

## 2019-10-07 DIAGNOSIS — R26.89 IMPAIRMENT OF BALANCE: ICD-10-CM

## 2019-10-07 DIAGNOSIS — R26.9 ABNORMALITY OF GAIT: ICD-10-CM

## 2019-10-07 PROCEDURE — 97116 GAIT TRAINING THERAPY: CPT | Mod: HCNC

## 2019-10-07 PROCEDURE — 97110 THERAPEUTIC EXERCISES: CPT | Mod: HCNC

## 2019-10-07 NOTE — PROGRESS NOTES
Physical Therapy Daily Treatment Note     Name: Alicia Mahoney Soliz  Clinic Number: [id number]    Therapy Diagnosis:   Encounter Diagnoses   Name Primary?    Left-sided weakness Yes    Impairment of balance     Abnormality of gait      Physician: Salvatore Vela MD    Visit Date: 10/7/2019    Physician Orders: PT Eval and Treat  Medical Diagnosis: Multiple Sclerosis   Evaluation Date: 8/15/2019  Authorization Period Expiration: 8/11/2020  Plan of Care Certification Period:11/8/19  Visit #/Visits authorized: 10/20    Time In: 9:30  Time Out: 10:30  Total Billable Time: 35 minutes    Precautions: Standard and Immunosuppression, MS    Subjective     Pt reports: she has been doing well, with a bit of increased stress at home. Reports she has been doing okay since her fall, which was about 2 weeks ago. Reports she has been completing her HEP however, which has been helping.   She was compliant with home exercise program.  Response to previous treatment: Good  Functional change: improvement in tolerance to activity    Pain: 2/10  Location: L side (leg and arm)    Objective     Alicia received therapeutic exercises to develop strength, endurance and ROM for 30 minutes including (PT tech assisted with 10 min of there ex):  Nustep 6 mins 1.4 level  UE bike 6 mins 1.2 level  Sit to stand lower surface w/ 15lb 3x8; warm up: BW x 10, 10lb x 10  Stair climbing  Left forward step up onto second stair with right foot     Alicia received the following manual therapy techniques: Joint mobilizations and Soft tissue Mobilization were applied to the: R ankle for 0 minutes, including:  Talocrural mobilizations  STM to L gastroc    Alicia participated in neuromuscular re-education activities to improve: Balance, Kinesthetic and Proprioception for 0 minutes. The following activities were included:  NA today    Alicia participated in dynamic functional therapeutic activities to improve functional performance for 0  minutes,  including:    Alicia participated in gait training to improve functional mobility and safety for 15 minutes, including:  Sidestepping in parallel bars   Gait training with improving ankle movements  Gait training w quad cane     Alicia received the following supervised modalities after being cleared for contradictions: TENS:  Alicia received TENS electrical stimulation for pain to the R ankle. Pt received continuous mode at a rate of 0 pps for 0 minutes. Alicia tolerated treatment well without any adverse effects.     Alicia received hot pack for 0 minutes to R ankle    Home Exercises Provided and Patient Education Provided     Education provided:   - activity toleration, progression of therapy    Written Home Exercises Provided: yes.  Exercises were reviewed and Alicia was able to demonstrate them prior to the end of the session.  Alicia demonstrated good  understanding of the education provided.     See EMR under Patient Instructions for exercises provided prior visit.    Assessment     Patient tolerated therapeutic exercise today with no increase in her adverse symptoms. Patient demonstrated good/fair technique with ambulating with a walker. Patient also demonstrated better technique with gait training with a quad cane with contact assist. Pt was given adequate rest to reduce risk of her adverse symptoms.     Alicia is progressing well towards her goals.   Pt prognosis is Excellent.     Pt will continue to benefit from skilled outpatient physical therapy to address the deficits listed in the problem list box on initial evaluation, provide pt/family education and to maximize pt's level of independence in the home and community environment.     Pt's spiritual, cultural and educational needs considered and pt agreeable to plan of care and goals.     Anticipated barriers to physical therapy: transportation, relapses in condition     Goals: tolerate more walking as well as increasing overall strength of L side    Plan      Incorporate more walking, improve L side strength, Improve tolerance to physical activity     Bhavesh Han, SPT

## 2019-10-07 NOTE — TELEPHONE ENCOUNTER
Spoke with pt and her daughter. Pt got overheated throughout the day and had a fall. Pt will continue to stay cool this afternoon and update us in the morning.

## 2019-10-08 ENCOUNTER — PATIENT MESSAGE (OUTPATIENT)
Dept: NEUROLOGY | Facility: CLINIC | Age: 39
End: 2019-10-08

## 2019-10-08 NOTE — PLAN OF CARE
Outpatient Therapy Updated Plan of Care     Visit Date: 10/7/2019  Name: Alicia Soliz  Clinic Number: [id number]    Therapy Diagnosis:   Encounter Diagnoses   Name Primary?    Left-sided weakness Yes    Impairment of balance     Abnormality of gait      Physician: Salvatore Vela MD    Physician Orders: PT Eval and Treat  Medical Diagnosis: Multiple Sclerosis  Evaluation Date: 8/15/19    Total Visits Received: 10  Cancelled Visits: 4  No Show Visits: 0    Current Certification Period:  10/7/19 to 11/7/19  Precautions:  Standard and Immunosuppression, MS, long term steroid use  Visits from Evaluation Date:  10  Functional Level Prior to Evaluation:  MI with use of FWW    Subjective     Update: Reports that she has missed the last 2 weeks due to a flare up and decreased ability to use her L LE. Reports she also had a fall in a parking lot about 2 weeks ago and was really embarrassed- her L ankle caught on the pavement. Has been keeping up with her exercises while at home.    Objective     Update:   Gait: decreased weight shift onto L LE and decreased step length, decreased L ankle DF, use of FWW needed, decreased fredy (noted improvement with L ankle control since initial evaluation)     Squat: Double leg: Able to achieve 55 degrees of depth without UE support              Single leg: DNT     Balance: single leg balance w/ UE supported on walker; able to achieve 30s B, eyes closed on firm surface: 30 seconds feet apart     Reflex/Sensation: intact sensation      Ankle ROM: normal on the R side, decreased into DF on the L side     Gross UE ROM: normal     UE Strength: 5/5 on the R side, 4+/5 on the L side      Hip A/PROM:                                                  (L)                    (R)                                      Flexion                         90%/100%       100%                                      Extension                    85%/100%       100%                                       Abduction                    100%/100%     100%                                      Adduction                    100%/100%     100%                                      External rotation          90%/100%       100%                                      Internal rotation           90%/100%       100%                                                                             R          L  Strength:                    Hip flexors                   4+/5     3+/5  Quadriceps                  4+/5     4/5                                             Hamstrings                  4+/5     4/5                                      Anterior Tibialis           4+/5     3+/5                                      Gastrocnemius            5/5       4/5     Muscle Length:          Hamstrings: DNT                                      Rafael Test: DNT     Function: Lower Extremity Functional Scale 63% disability score on initial evaluation. Patient scored a 60% disability on the LEFS on re-evaluation on 10/7/19.     Assessment     Update: Patient has not been able to come to therapy in the last 2 weeks due to a flare up in her MS symptoms with increased weakness to her L LE as well as increase in pain. Patient showed improvement in quality of gait this session however with improvement in L ankle control and decreased drop foot noted than her last couple of sessions. Also noted improvement in tolerance to progression of sit<>Stands without UE support as well as improved tolerance to standing activities.    Short Term Goals: In 3 weeks:  1.I with HEP MET  2.Patient to demo increased L DF AROM /PROM by 10% ALMOST MET  3.Patient to demo increase LE strength by 1/2 grade [facility] MET  4.Patient to ambulate with use of LRAD with improvement with L DF during swing ALMOST MET  5.Patient to less than 51% disability on the LEFS. NOT MET     Long Term Goals: In 6 weeks  1. Patient to perform daily activities including walking with minimal  limitation. NOT MET  2. Patient to demonstrate increased L ankle AROM/PROM by 100%. NOT MET  3. Patient to demonstrate increased LE strength by 1 grade. NOT MET  4. Patient to have decreased pain to painfree at all times with all activities. NOT MET  5. Patient to less than 29% disability on the LEFS. NOT MET    Reasons for Recertification of Therapy:   Patient is still having a lot of difficulty with gait training with needing use of FWW, decreased strength/ROM, decreased tolerance to activity, and increased risk for falls. PT will focus on improving her quality of gait and decreasing need for UE support as much as we can, improving righting reactions to decrease risk for falls, improving strength/ROM, and improving cardiopulmonary efficiency in order to decrease effects of flare ups and to improve quality of life.    Plan     Updated Certification Period: 10/7/2019 to 11/7/19  Recommended Treatment Plan: 2 times per week for 6 weeks: Gait Training, Manual Therapy, Neuromuscular Re-ed, Patient Education, Therapeutic Activites and Therapeutic Exercise  Other Recommendations: none    Zoltan Nuñez, PT  10/7/2019      I CERTIFY THE NEED FOR THESE SERVICES FURNISHED UNDER THIS PLAN OF TREATMENT AND WHILE UNDER MY CARE    Physician's comments:        Physician's Signature: ___________________________________________________

## 2019-10-08 NOTE — TELEPHONE ENCOUNTER
Pt states her left forearm and thigh are sore from her fall. Pt does have bruises. No swelling. Pt unable to  items b/c of pain. Pt has taken ibuprofen and muscle relaxers throughout the night and today, with no relief. Pt increased her flexeril to 2 tablets last night b/c of the pain.

## 2019-10-09 ENCOUNTER — PATIENT MESSAGE (OUTPATIENT)
Dept: INTERNAL MEDICINE | Facility: CLINIC | Age: 39
End: 2019-10-09

## 2019-10-09 ENCOUNTER — PATIENT MESSAGE (OUTPATIENT)
Dept: NEUROLOGY | Facility: CLINIC | Age: 39
End: 2019-10-09

## 2019-10-09 NOTE — TELEPHONE ENCOUNTER
Pt sent the following message:    My left arm from elbow to hands are hurting me but im still able to move my fingers mayra tingly but hurts badly

## 2019-10-10 ENCOUNTER — CLINICAL SUPPORT (OUTPATIENT)
Dept: REHABILITATION | Facility: HOSPITAL | Age: 39
End: 2019-10-10
Attending: PSYCHIATRY & NEUROLOGY
Payer: MEDICARE

## 2019-10-10 ENCOUNTER — PATIENT MESSAGE (OUTPATIENT)
Dept: NEUROLOGY | Facility: CLINIC | Age: 39
End: 2019-10-10

## 2019-10-10 ENCOUNTER — TELEPHONE (OUTPATIENT)
Dept: NEUROLOGY | Facility: CLINIC | Age: 39
End: 2019-10-10

## 2019-10-10 DIAGNOSIS — R26.9 ABNORMALITY OF GAIT: ICD-10-CM

## 2019-10-10 DIAGNOSIS — R53.1 LEFT-SIDED WEAKNESS: Primary | ICD-10-CM

## 2019-10-10 DIAGNOSIS — G35 MULTIPLE SCLEROSIS: Primary | ICD-10-CM

## 2019-10-10 DIAGNOSIS — R26.89 IMPAIRMENT OF BALANCE: ICD-10-CM

## 2019-10-10 PROCEDURE — 97110 THERAPEUTIC EXERCISES: CPT | Mod: HCNC

## 2019-10-10 PROCEDURE — 97116 GAIT TRAINING THERAPY: CPT | Mod: HCNC

## 2019-10-10 RX ORDER — HYDROCHLOROTHIAZIDE 12.5 MG/1
TABLET ORAL
Qty: 90 TABLET | Refills: 3 | Status: SHIPPED | OUTPATIENT
Start: 2019-10-10 | End: 2020-09-09 | Stop reason: SDUPTHER

## 2019-10-10 NOTE — PROGRESS NOTES
Physical Therapy Daily Treatment Note     Name: Alicia Mahoney Soliz  Clinic Number: [id number]    Therapy Diagnosis:   Encounter Diagnoses   Name Primary?    Left-sided weakness Yes    Impairment of balance     Abnormality of gait      Physician: Salvatore Vela MD    Visit Date: 10/10/2019    Physician Orders: PT Eval and Treat  Medical Diagnosis: Multiple Sclerosis   Evaluation Date: 8/15/2019  Authorization Period Expiration: 8/11/2020  Plan of Care Certification Period:11/8/19  Visit #/Visits authorized: 11/20    Time In: 9:05  Time Out: 10:05  Total Billable Time: 26 minutes    Precautions: Standard and Immunosuppression, MS    Subjective     Pt reports: she fell in her bathroom after prior session due having a minor flare up. Pt reports she felt overheating on the public transportation and that may have attributed to how she was feeling at home. Pt also reports she felt fine after prior session before getting on public transportation. Pt reports significant elbow pain since the fall due to falling on the sink and toilet. Pt reports she is going to her doctor today to get further evaluated.   She was compliant with home exercise program.  Response to previous treatment: Good  Functional change: improvement in tolerance to activity    Pain: 2/10  Location: L side (leg and arm)    Objective     Alicia received therapeutic exercises to develop strength, endurance and ROM for 8 minutes including (PT tech assisted with 0 min of there ex):  Sit to stand lower surface warm up: BW x 10, 10lb x 3 x 15     Alicia received the following manual therapy techniques: Joint mobilizations and Soft tissue Mobilization were applied to the: R ankle for 5 minutes, including:  Talocrural mobilizations  STM to L gastroc    Alicia participated in neuromuscular re-education activities to improve: Balance, Kinesthetic and Proprioception for 8 minutes. The following activities were included:  Stepping onto foam pad   SL march in  parallel bars     Alicia participated in dynamic functional therapeutic activities to improve functional performance for 0  minutes, including:    Alicia participated in gait training to improve functional mobility and safety for 10 minutes, including:  Gait training with improving ankle movements  Gait training w quad cane     Alicia received the following supervised modalities after being cleared for contradictions: TENS:  Alicia received TENS electrical stimulation for pain to the R ankle. Pt received continuous mode at a rate of 0 pps for 0 minutes. Alicia tolerated treatment well without any adverse effects.     Alicia received hot pack for 0 minutes to R ankle    Home Exercises Provided and Patient Education Provided     Education provided:   - activity toleration, progression of therapy    Written Home Exercises Provided: yes.  Exercises were reviewed and Alicia was able to demonstrate them prior to the end of the session.  Alicia demonstrated good  understanding of the education provided.     See EMR under Patient Instructions for exercises provided prior visit.    Assessment     Patient tolerated therapeutic exercise with no increase in adverse symptoms. Pt was able to tolerate more gait training with quad cane without an increase in adverse symptoms. Pt was educated on activity toleration and how increasing too much parameters of exercise may bring on her symptoms.     Alicia is progressing well towards her goals.   Pt prognosis is Excellent.     Pt will continue to benefit from skilled outpatient physical therapy to address the deficits listed in the problem list box on initial evaluation, provide pt/family education and to maximize pt's level of independence in the home and community environment.     Pt's spiritual, cultural and educational needs considered and pt agreeable to plan of care and goals.     Anticipated barriers to physical therapy: transportation, relapses in condition     Goals: tolerate more  walking as well as increasing overall strength of L side    Plan     Incorporate more walking, improve L side strength, Improve tolerance to physical activity     Bhavesh Han, SPT

## 2019-10-15 ENCOUNTER — CLINICAL SUPPORT (OUTPATIENT)
Dept: REHABILITATION | Facility: HOSPITAL | Age: 39
End: 2019-10-15
Attending: PSYCHIATRY & NEUROLOGY
Payer: MEDICARE

## 2019-10-15 DIAGNOSIS — R26.89 IMPAIRMENT OF BALANCE: ICD-10-CM

## 2019-10-15 DIAGNOSIS — R53.1 LEFT-SIDED WEAKNESS: Primary | ICD-10-CM

## 2019-10-15 DIAGNOSIS — R26.9 ABNORMALITY OF GAIT: ICD-10-CM

## 2019-10-15 PROCEDURE — 97116 GAIT TRAINING THERAPY: CPT | Mod: HCNC

## 2019-10-15 PROCEDURE — 97110 THERAPEUTIC EXERCISES: CPT | Mod: HCNC

## 2019-10-16 NOTE — PROGRESS NOTES
Physical Therapy Daily Treatment Note     Name: Alicia Soliz  Clinic Number: [id number]    Therapy Diagnosis:   Encounter Diagnoses   Name Primary?    Left-sided weakness Yes    Impairment of balance     Abnormality of gait      Physician: Salvatore Vela MD    Visit Date: 10/15/2019    Physician Orders: PT Eval and Treat  Medical Diagnosis: Multiple Sclerosis   Evaluation Date: 8/15/2019  Authorization Period Expiration: 8/11/2020  Plan of Care Certification Period:11/8/19  Visit #/Visits authorized: 12/20    Time In: 9:00  Time Out: 10:00  Total Billable Time: 30 minutes    Precautions: Standard and Immunosuppression, MS    Subjective     Pt reports: that she is still not wanting to take steroids, because it makes her gain weight. Feels like she is getting over the flare up.  She was compliant with home exercise program.  Response to previous treatment: Good  Functional change: improvement in tolerance to activity    Pain: 2/10  Location: L side (leg and arm)    Objective     Alicia received therapeutic exercises to develop strength, endurance and ROM for 30 minutes including (PT tech assisted with 15 min of there ex):  Nustep x 6 min for L ankle ROM/cardiopulmonary efficiency training  Sit to stand lower surface warm up: BW x 10, 10lb x 3 x 15lbs x 3  Heel raise in bars  Seated marches  Seated LAQ 5#     Alicia received the following manual therapy techniques: Joint mobilizations and Soft tissue Mobilization were applied to the: R ankle for 5 minutes, including:  Talocrural mobilizations  STM to L gastroc    Alicia participated in neuromuscular re-education activities to improve: Balance, Kinesthetic and Proprioception for 5 minutes. The following activities were included:  Stepping onto foam pad   SL march in parallel bars     Alicia participated in dynamic functional therapeutic activities to improve functional performance for 0  minutes, including:    Alicia participated in gait training to improve  functional mobility and safety for 10 minutes, including:  Gait training with improving ankle movements  Gait training w quad cane     Alicia received the following supervised modalities after being cleared for contradictions: TENS:  Alicia received TENS electrical stimulation for pain to the R ankle. Pt received continuous mode at a rate of 0 pps for 0 minutes. Alicia tolerated treatment well without any adverse effects.     Alicia received hot pack for 0 minutes to R ankle    Home Exercises Provided and Patient Education Provided     Education provided:   - activity toleration, progression of therapy    Written Home Exercises Provided: yes.  Exercises were reviewed and Alicia was able to demonstrate them prior to the end of the session.  Alicia demonstrated good  understanding of the education provided.     See EMR under Patient Instructions for exercises provided prior visit.    Assessment     Noted patient to have continued better ankle control on the L side, however still unsteady for the first couple of steps when gait training with the quad cane. Discussed with patient her recent flare up and to insure we are not pushing her too hard in her sessions and giving herself adequate rest breaks for recovery.    Alicia is progressing well towards her goals.   Pt prognosis is Excellent.     Pt will continue to benefit from skilled outpatient physical therapy to address the deficits listed in the problem list box on initial evaluation, provide pt/family education and to maximize pt's level of independence in the home and community environment.     Pt's spiritual, cultural and educational needs considered and pt agreeable to plan of care and goals.     Anticipated barriers to physical therapy: transportation, relapses in condition     Goals: tolerate more walking as well as increasing overall strength of L side    Plan     Incorporate more walking, improve L side strength, Improve tolerance to physical activity      Zoltan Nuñez, PT

## 2019-10-17 ENCOUNTER — CLINICAL SUPPORT (OUTPATIENT)
Dept: REHABILITATION | Facility: HOSPITAL | Age: 39
End: 2019-10-17
Attending: PSYCHIATRY & NEUROLOGY
Payer: MEDICARE

## 2019-10-17 DIAGNOSIS — R26.9 ABNORMALITY OF GAIT: ICD-10-CM

## 2019-10-17 DIAGNOSIS — R26.89 IMPAIRMENT OF BALANCE: ICD-10-CM

## 2019-10-17 DIAGNOSIS — R53.1 LEFT-SIDED WEAKNESS: Primary | ICD-10-CM

## 2019-10-17 PROCEDURE — 97110 THERAPEUTIC EXERCISES: CPT | Mod: HCNC

## 2019-10-17 PROCEDURE — 97116 GAIT TRAINING THERAPY: CPT | Mod: HCNC

## 2019-10-17 NOTE — PROGRESS NOTES
Physical Therapy Daily Treatment Note     Name: Alicia Soliz  Clinic Number: [id number]    Therapy Diagnosis:   Encounter Diagnoses   Name Primary?    Left-sided weakness Yes    Impairment of balance     Abnormality of gait      Physician: Salvatore Vela MD    Visit Date: 10/17/2019    Physician Orders: PT Eval and Treat  Medical Diagnosis: Multiple Sclerosis   Evaluation Date: 8/15/2019  Authorization Period Expiration: 8/11/2020  Plan of Care Certification Period:11/8/19  Visit #/Visits authorized: 13/20    Time In: 9:00  Time Out: 10:15  Total Billable Time: 30 minutes    Precautions: Standard and Immunosuppression, MS    Subjective     Pt reports: that her gluts were feeling sore after last session, but overall is feeling really great.  She was compliant with home exercise program.  Response to previous treatment: Good  Functional change: improvement in tolerance to activity    Pain: 2/10  Location: L side (leg and arm)    Objective     Alicia received therapeutic exercises to develop strength, endurance and ROM for 45 minutes including (PT tech assisted with 25 min of there ex):  Nustep x 6 min for L ankle ROM/cardiopulmonary efficiency training  Sit to stand lower surface warm up: BW x 10, 10lb x 3 x 15lbs x 3  Heel raise in bars  Seated marches  Seated LAQ 5#   UE bike 3 min F/3 min B    Alicia received the following manual therapy techniques: Joint mobilizations and Soft tissue Mobilization were applied to the: R ankle for 5 minutes, including:  Talocrural mobilizations  STM to L gastroc    Alicia participated in neuromuscular re-education activities to improve: Balance, Kinesthetic and Proprioception for 5 minutes. The following activities were included:  Stepping onto foam pad   SL march in parallel bars     Alicia participated in dynamic functional therapeutic activities to improve functional performance for 0  minutes, including:    Alicia participated in gait training to improve functional  mobility and safety for 10 minutes, including:  Gait training with improving ankle movements  Gait training w quad cane     Alicia received the following supervised modalities after being cleared for contradictions: TENS:  Alicia received TENS electrical stimulation for pain to the R ankle. Pt received continuous mode at a rate of 0 pps for 0 minutes. Alicia tolerated treatment well without any adverse effects.     Alicia received hot pack for 0 minutes to R ankle    Home Exercises Provided and Patient Education Provided     Education provided:   - activity toleration, progression of therapy    Written Home Exercises Provided: yes.  Exercises were reviewed and Alicia was able to demonstrate them prior to the end of the session.  Alicia demonstrated good  understanding of the education provided.     See EMR under Patient Instructions for exercises provided prior visit.    Assessment     Patient is continuing to show good tolerance to strengthening and mobility training this session without any increase in flare ups. Again educated on the importance of not pushing herself too hard in therapy to insure she does not need more than a couple of days of rest in between her sessions.    Alicia is progressing well towards her goals.   Pt prognosis is Excellent.     Pt will continue to benefit from skilled outpatient physical therapy to address the deficits listed in the problem list box on initial evaluation, provide pt/family education and to maximize pt's level of independence in the home and community environment.     Pt's spiritual, cultural and educational needs considered and pt agreeable to plan of care and goals.     Anticipated barriers to physical therapy: transportation, relapses in condition     Goals: tolerate more walking as well as increasing overall strength of L side    Plan     Incorporate more walking, improve L side strength, Improve tolerance to physical activity     Zoltan Nuñez, PT

## 2019-10-18 ENCOUNTER — TELEPHONE (OUTPATIENT)
Dept: NEUROLOGY | Facility: CLINIC | Age: 39
End: 2019-10-18

## 2019-10-18 RX ORDER — PROMETHAZINE HYDROCHLORIDE 25 MG/1
25 TABLET ORAL EVERY 4 HOURS PRN
Qty: 30 TABLET | Refills: 1 | Status: SHIPPED | OUTPATIENT
Start: 2019-10-18 | End: 2020-04-16

## 2019-10-18 NOTE — TELEPHONE ENCOUNTER
Notified via fax from [employer] that Auabagio 7 mg has been approved through 12/31/20. Resent start form indicating 7 mg daily, instead of 14 mg daily as previously submitted.

## 2019-10-18 NOTE — TELEPHONE ENCOUNTER
----- Message from Rosa Helm RN sent at 10/17/2019  3:54 PM CDT -----  Contact: Tobin [phone] ref# [id number]      ----- Message -----  From: Shanita Parra  Sent: 10/17/2019   3:51 PM CDT  To: Kelli Rudd called to ask some additional questions to complete a PA For Aubugio 7 MG. Tobin can be reached at [phone].

## 2019-10-18 NOTE — TELEPHONE ENCOUNTER
Attempted to call back, on hold for 30 minutes, call disconnected. Faxed in additional information requested to [phone].

## 2019-10-20 RX ORDER — MUPIROCIN 20 MG/G
OINTMENT TOPICAL 2 TIMES DAILY
Qty: 30 G | Refills: 1 | Status: SHIPPED | OUTPATIENT
Start: 2019-10-20 | End: 2020-08-05

## 2019-10-20 RX ORDER — FLUCONAZOLE 150 MG/1
150 TABLET ORAL ONCE
Qty: 1 TABLET | Refills: 0 | Status: SHIPPED | OUTPATIENT
Start: 2019-10-20 | End: 2019-10-25 | Stop reason: SDUPTHER

## 2019-10-20 RX ORDER — NYSTATIN 100000 U/G
CREAM TOPICAL 2 TIMES DAILY
Qty: 30 G | Refills: 1 | Status: SHIPPED | OUTPATIENT
Start: 2019-10-20 | End: 2020-08-05

## 2019-10-21 ENCOUNTER — PATIENT MESSAGE (OUTPATIENT)
Dept: INTERNAL MEDICINE | Facility: CLINIC | Age: 39
End: 2019-10-21

## 2019-10-21 NOTE — PROGRESS NOTES
Physical Therapy Daily Treatment Note     Name: Alicia Soliz  Clinic Number: [id number]    Therapy Diagnosis:   Encounter Diagnoses   Name Primary?    Left-sided weakness Yes    Impairment of balance     Abnormality of gait      Physician: Salvatore Vela MD    Visit Date: 10/22/2019    Physician Orders: PT Eval and Treat  Medical Diagnosis: Multiple Sclerosis   Evaluation Date: 8/15/2019  Authorization Period Expiration: 8/11/2020  Plan of Care Certification Period:11/8/19  Visit #/Visits authorized: 14/20    Time In: 9:00  Time Out: 10:15  Total Billable Time: 30 minutes    Precautions: Standard and Immunosuppression, MS    Subjective     Pt reports: that she has new medication she will start taking to help with her flare ups.  She was compliant with home exercise program.  Response to previous treatment: Good  Functional change: improvement in tolerance to activity    Pain: 2/10  Location: L side (leg and arm)    Objective     Alicia received therapeutic exercises to develop strength, endurance and ROM for 45 minutes including  Nustep x 8 min for L ankle ROM/cardiopulmonary efficiency training  Sit to stand lower surface warm up: BW x 10, 10lb x 3 x 15lbs x 3  Heel raise in bars  Standing marches  Resisted sidestepping  UE bike 3 min F/3 min B    Alicia received the following manual therapy techniques: Joint mobilizations and Soft tissue Mobilization were applied to the: R ankle for 5 minutes, including:  Talocrural mobilizations  STM to L gastroc    Alicia participated in neuromuscular re-education activities to improve: Balance, Kinesthetic and Proprioception for 5 minutes. The following activities were included:  Stepping onto foam pad   SL march in parallel bars     Alicia participated in dynamic functional therapeutic activities to improve functional performance for 0  minutes, including:    Alicia participated in gait training to improve functional mobility and safety for 10 minutes,  including:  Gait training with improving ankle movements  Gait training w quad cane     Alicia received the following supervised modalities after being cleared for contradictions: TENS:  Alicia received TENS electrical stimulation for pain to the R ankle. Pt received continuous mode at a rate of 0 pps for 0 minutes. Alicia tolerated treatment well without any adverse effects.     Alicia received hot pack for 0 minutes to R ankle    Home Exercises Provided and Patient Education Provided     Education provided:   - activity toleration, progression of therapy    Written Home Exercises Provided: yes.  Exercises were reviewed and Alicia was able to demonstrate them prior to the end of the session.  Alicia demonstrated good  understanding of the education provided.     See EMR under Patient Instructions for exercises provided prior visit.    Assessment     After performing sidestepping in the parallel bars, patient reported having increased dizziness and blurriness of vision. Reports this happens sometimes when she is in her flare ups. Felt better after getting cold towel and ice pack. Discussed with patient again to insure we are not pushing too hard in therapy and reports this randomly happens and does not think it is exercise related.    Alicia is progressing well towards her goals.   Pt prognosis is Excellent.     Pt will continue to benefit from skilled outpatient physical therapy to address the deficits listed in the problem list box on initial evaluation, provide pt/family education and to maximize pt's level of independence in the home and community environment.     Pt's spiritual, cultural and educational needs considered and pt agreeable to plan of care and goals.     Anticipated barriers to physical therapy: transportation, relapses in condition     Goals: tolerate more walking as well as increasing overall strength of L side    Plan     Incorporate more walking, improve L side strength, Improve tolerance to physical  activity     Zoltan Nuñez, PT

## 2019-10-22 ENCOUNTER — CLINICAL SUPPORT (OUTPATIENT)
Dept: REHABILITATION | Facility: HOSPITAL | Age: 39
End: 2019-10-22
Attending: PSYCHIATRY & NEUROLOGY
Payer: MEDICARE

## 2019-10-22 DIAGNOSIS — R26.89 IMPAIRMENT OF BALANCE: ICD-10-CM

## 2019-10-22 DIAGNOSIS — R26.9 ABNORMALITY OF GAIT: ICD-10-CM

## 2019-10-22 DIAGNOSIS — R53.1 LEFT-SIDED WEAKNESS: Primary | ICD-10-CM

## 2019-10-22 PROCEDURE — 97116 GAIT TRAINING THERAPY: CPT | Mod: HCNC

## 2019-10-22 PROCEDURE — 97110 THERAPEUTIC EXERCISES: CPT | Mod: HCNC

## 2019-10-22 NOTE — TELEPHONE ENCOUNTER
Can you please contact her and see if we can reschedule her appt with Zahra arellano she cancelled accidentally

## 2019-10-25 ENCOUNTER — PATIENT MESSAGE (OUTPATIENT)
Dept: INTERNAL MEDICINE | Facility: CLINIC | Age: 39
End: 2019-10-25

## 2019-10-25 RX ORDER — FLUCONAZOLE 150 MG/1
150 TABLET ORAL ONCE
Qty: 1 TABLET | Refills: 0 | Status: SHIPPED | OUTPATIENT
Start: 2019-10-25 | End: 2019-10-25

## 2019-10-28 ENCOUNTER — TELEPHONE (OUTPATIENT)
Dept: NEUROLOGY | Facility: CLINIC | Age: 39
End: 2019-10-28

## 2019-10-28 NOTE — PROGRESS NOTES
Physical Therapy Daily Treatment Note     Name: Alicia Soliz  Clinic Number: [id number]    Therapy Diagnosis:   Encounter Diagnoses   Name Primary?    Left-sided weakness Yes    Impairment of balance     Abnormality of gait      Physician: Salvatore Vela MD    Visit Date: 10/29/2019    Physician Orders: PT Eval and Treat  Medical Diagnosis: Multiple Sclerosis   Evaluation Date: 8/15/2019  Authorization Period Expiration: 8/11/2020  Plan of Care Certification Period:11/8/19  Visit #/Visits authorized: 15/20    Time In: 9:15  Time Out: 10:30  Total Billable Time: 45 minutes    Precautions: Standard and Immunosuppression, MS    Subjective     Pt reports: that she had 2 cousins pass over the weekend, so she is more stressed out than normal.  She was compliant with home exercise program.  Response to previous treatment: Good  Functional change: none    Pain: 2/10  Location: L side (leg and arm)    Objective     Alicia received therapeutic exercises to develop strength, endurance and ROM for 45 minutes including  Nustep x 5 min for L ankle ROM/cardiopulmonary efficiency training  Sit to stand lower surface warm up: BW x 10, 15lbs x 3  Toe raise in bars  Standing marches with focus on increasing hip FL/DF  UE bike 3 min F/3 min B for postural control    Alicia received the following manual therapy techniques: Joint mobilizations and Soft tissue Mobilization were applied to the: R ankle for 5 minutes, including:  Talocrural mobilizations  STM to L gastroc    Alicia participated in neuromuscular re-education activities to improve: Balance, Kinesthetic and Proprioception for 10 minutes. The following activities were included:  Stepping onto foam pad   Balance with pertubations on foam, tandem stance, feet together  Stepping all 3 directions    Alicia participated in dynamic functional therapeutic activities to improve functional performance for 0  minutes, including:    Alicia participated in gait training to improve  functional mobility and safety for 5 minutes, including:  Gait training with improving ankle movements  Gait training w quad cane     Alicia received the following supervised modalities after being cleared for contradictions: TENS:  Aliica received TENS electrical stimulation for pain to the R ankle. Pt received continuous mode at a rate of 0 pps for 0 minutes. Alicia tolerated treatment well without any adverse effects.     Alicia received hot pack for 0 minutes to R ankle    Home Exercises Provided and Patient Education Provided     Education provided:   - activity toleration, progression of therapy    Written Home Exercises Provided: yes.  Exercises were reviewed and Alicia was able to demonstrate them prior to the end of the session.  Alicia demonstrated good  understanding of the education provided.     See paper chart under Patient Instructions for exercises provided prior visit.    Assessment   Patient demonstrated better improvement to gait training this session early on due to her L gastroc seems to increase in tone after performing exercises. Performed more balance training this session with focus on decreasing UE support and improving proprioception.    Alicia is progressing well towards her goals.   Pt prognosis is Excellent.     Pt will continue to benefit from skilled outpatient physical therapy to address the deficits listed in the problem list box on initial evaluation, provide pt/family education and to maximize pt's level of independence in the home and community environment.     Pt's spiritual, cultural and educational needs considered and pt agreeable to plan of care and goals.     Anticipated barriers to physical therapy: transportation, relapses in condition     Goals: tolerate more walking as well as increasing overall strength of L side    Plan     Incorporate more walking, improve L side strength, Improve tolerance to physical activity     Zoltan Nuñez, PT

## 2019-10-28 NOTE — TELEPHONE ENCOUNTER
----- Message from Radha Segovia sent at 10/28/2019 12:29 PM CDT -----  Contact: [facility]  states that patient is no longer on abonex but on aubagio. please advise....[phone]

## 2019-10-29 ENCOUNTER — PATIENT MESSAGE (OUTPATIENT)
Dept: NEUROLOGY | Facility: CLINIC | Age: 39
End: 2019-10-29

## 2019-10-29 ENCOUNTER — CLINICAL SUPPORT (OUTPATIENT)
Dept: REHABILITATION | Facility: HOSPITAL | Age: 39
End: 2019-10-29
Attending: PSYCHIATRY & NEUROLOGY
Payer: MEDICARE

## 2019-10-29 DIAGNOSIS — R26.89 IMPAIRMENT OF BALANCE: ICD-10-CM

## 2019-10-29 DIAGNOSIS — R26.9 ABNORMALITY OF GAIT: ICD-10-CM

## 2019-10-29 DIAGNOSIS — R53.1 LEFT-SIDED WEAKNESS: Primary | ICD-10-CM

## 2019-10-29 PROCEDURE — 97110 THERAPEUTIC EXERCISES: CPT | Mod: HCNC

## 2019-10-29 PROCEDURE — 97112 NEUROMUSCULAR REEDUCATION: CPT | Mod: HCNC

## 2019-10-31 ENCOUNTER — TELEPHONE (OUTPATIENT)
Dept: NEUROLOGY | Facility: CLINIC | Age: 39
End: 2019-10-31

## 2019-11-01 ENCOUNTER — TELEPHONE (OUTPATIENT)
Dept: BARIATRICS | Facility: CLINIC | Age: 39
End: 2019-11-01

## 2019-11-01 NOTE — TELEPHONE ENCOUNTER
Spoke with pt concerning rescheduling her bariatric consults.  Pt reports her neurologist told her she should not have bariatric surgery because the steroids she uses for her MS has affected her stomach so she should not have bariatric surgery.  Pt will call back if her neurologist says it is okay for her to proceed with bariatric work up.  Pt v/u.

## 2019-11-04 ENCOUNTER — PATIENT MESSAGE (OUTPATIENT)
Dept: INTERNAL MEDICINE | Facility: CLINIC | Age: 39
End: 2019-11-04

## 2019-11-04 ENCOUNTER — PATIENT OUTREACH (OUTPATIENT)
Dept: OTHER | Facility: OTHER | Age: 39
End: 2019-11-04

## 2019-11-04 DIAGNOSIS — N89.8 VAGINAL DISCHARGE: Primary | ICD-10-CM

## 2019-11-04 NOTE — TELEPHONE ENCOUNTER
Please get her an appointment for gynecology. She has had multiple diflucan prescriptions in the past month.

## 2019-11-11 ENCOUNTER — OFFICE VISIT (OUTPATIENT)
Dept: OBSTETRICS AND GYNECOLOGY | Facility: CLINIC | Age: 39
End: 2019-11-11
Payer: MEDICARE

## 2019-11-11 VITALS
DIASTOLIC BLOOD PRESSURE: 97 MMHG | HEIGHT: 66 IN | BODY MASS INDEX: 47.09 KG/M2 | WEIGHT: 293 LBS | SYSTOLIC BLOOD PRESSURE: 154 MMHG

## 2019-11-11 DIAGNOSIS — Z00.00 PREVENTATIVE HEALTH CARE: ICD-10-CM

## 2019-11-11 DIAGNOSIS — Z01.419 GYNECOLOGIC EXAM NORMAL: Primary | ICD-10-CM

## 2019-11-11 PROBLEM — G47.00 INSOMNIA: Status: RESOLVED | Noted: 2018-06-07 | Resolved: 2019-11-11

## 2019-11-11 PROCEDURE — 99999 PR PBB SHADOW E&M-EST. PATIENT-LVL II: ICD-10-PCS | Mod: PBBFAC,HCNC,, | Performed by: NURSE PRACTITIONER

## 2019-11-11 PROCEDURE — 87481 CANDIDA DNA AMP PROBE: CPT | Mod: 59,HCNC

## 2019-11-11 PROCEDURE — G0101 CA SCREEN;PELVIC/BREAST EXAM: HCPCS | Mod: HCNC,S$GLB,, | Performed by: NURSE PRACTITIONER

## 2019-11-11 PROCEDURE — 3080F PR MOST RECENT DIASTOLIC BLOOD PRESSURE >= 90 MM HG: ICD-10-PCS | Mod: HCNC,CPTII,S$GLB, | Performed by: NURSE PRACTITIONER

## 2019-11-11 PROCEDURE — 3080F DIAST BP >= 90 MM HG: CPT | Mod: HCNC,CPTII,S$GLB, | Performed by: NURSE PRACTITIONER

## 2019-11-11 PROCEDURE — 3077F PR MOST RECENT SYSTOLIC BLOOD PRESSURE >= 140 MM HG: ICD-10-PCS | Mod: HCNC,CPTII,S$GLB, | Performed by: NURSE PRACTITIONER

## 2019-11-11 PROCEDURE — 87801 DETECT AGNT MULT DNA AMPLI: CPT | Mod: HCNC

## 2019-11-11 PROCEDURE — G0101 PR CA SCREEN;PELVIC/BREAST EXAM: ICD-10-PCS | Mod: HCNC,S$GLB,, | Performed by: NURSE PRACTITIONER

## 2019-11-11 PROCEDURE — 99999 PR PBB SHADOW E&M-EST. PATIENT-LVL II: CPT | Mod: PBBFAC,HCNC,, | Performed by: NURSE PRACTITIONER

## 2019-11-11 PROCEDURE — 3077F SYST BP >= 140 MM HG: CPT | Mod: HCNC,CPTII,S$GLB, | Performed by: NURSE PRACTITIONER

## 2019-11-11 RX ORDER — ACETAMINOPHEN AND CODEINE PHOSPHATE 300; 30 MG/1; MG/1
1 TABLET ORAL EVERY 4 HOURS PRN
Refills: 0 | COMMUNITY
Start: 2019-11-06 | End: 2020-04-16

## 2019-11-11 RX ORDER — VALACYCLOVIR HYDROCHLORIDE 500 MG/1
500 TABLET, FILM COATED ORAL
Refills: 11 | COMMUNITY
Start: 2019-09-16 | End: 2020-04-16

## 2019-11-11 RX ORDER — MELOXICAM 7.5 MG/1
7.5 TABLET ORAL
Refills: 0 | COMMUNITY
Start: 2019-11-01 | End: 2019-12-10 | Stop reason: SDUPTHER

## 2019-11-11 RX ORDER — TERIFLUNOMIDE 7 MG/1
TABLET, FILM COATED ORAL DAILY
COMMUNITY
Start: 2019-10-30 | End: 2020-01-16 | Stop reason: SDUPTHER

## 2019-11-11 RX ORDER — AMOXICILLIN 500 MG/1
CAPSULE ORAL
Refills: 0 | COMMUNITY
Start: 2019-11-06 | End: 2020-01-10

## 2019-11-11 RX ORDER — HYDROCODONE BITARTRATE AND ACETAMINOPHEN 7.5; 325 MG/1; MG/1
1 TABLET ORAL
Refills: 0 | COMMUNITY
Start: 2019-11-02 | End: 2020-01-03 | Stop reason: SDUPTHER

## 2019-11-11 RX ORDER — METHOCARBAMOL 750 MG/1
750 TABLET, FILM COATED ORAL
Refills: 0 | COMMUNITY
Start: 2019-11-01 | End: 2020-01-03

## 2019-11-11 RX ORDER — FLUCONAZOLE 150 MG/1
TABLET ORAL
Refills: 0 | COMMUNITY
Start: 2019-10-25 | End: 2019-11-14 | Stop reason: SDUPTHER

## 2019-11-11 NOTE — PROGRESS NOTES
Physical Therapy Daily Treatment Note     Name: Alicia Soliz  Clinic Number: [id number]    Therapy Diagnosis:   Encounter Diagnoses   Name Primary?    Left-sided weakness Yes    Impairment of balance     Abnormality of gait      Physician: Salvatore Vela MD    Visit Date: 11/12/2019    Physician Orders: PT Eval and Treat  Medical Diagnosis: Multiple Sclerosis   Evaluation Date: 8/15/2019  Authorization Period Expiration: 8/11/2020  Plan of Care Certification Period:11/8/19  Visit #/Visits authorized: 16/20    Time In: 9:15  Time Out: 10:30  Total Billable Time: 30 minutes    Precautions: Standard and Immunosuppression, MS    Subjective     Pt reports: see in [facility]  She was compliant with home exercise program.  Response to previous treatment: Good  Functional change: none    Pain: 2/10  Location: L side (leg and arm)    Objective     Alicia received therapeutic exercises to develop strength, endurance and ROM for 45 minutes including  Nustep x 5 min for L ankle ROM/cardiopulmonary efficiency training  Sit to stand lower surface warm up: BW x 10, 15lbs x 3  Toe raise in bars  Standing marches with focus on increasing hip FL/DF  UE bike 3 min F/3 min B for postural control    Alicia received the following manual therapy techniques: Joint mobilizations and Soft tissue Mobilization were applied to the: R ankle for 5 minutes, including:  Talocrural mobilizations  STM to L gastroc    Alicia participated in neuromuscular re-education activities to improve: Balance, Kinesthetic and Proprioception for 10 minutes. The following activities were included:  Stepping onto foam pad   Balance with pertubations on foam, tandem stance, feet together  Stepping all 3 directions    Alicia participated in dynamic functional therapeutic activities to improve functional performance for 0  minutes, including:    Alicia participated in gait training to improve functional mobility and safety for 5 minutes, including:  Gait training  with improving ankle movements  Gait training w quad cane     Alicia received the following supervised modalities after being cleared for contradictions: TENS:  Alicia received TENS electrical stimulation for pain to the R ankle. Pt received continuous mode at a rate of 0 pps for 0 minutes. Alicia tolerated treatment well without any adverse effects.     Alicia received hot pack for 0 minutes to R ankle    Home Exercises Provided and Patient Education Provided     Education provided:   - activity toleration, progression of therapy    Written Home Exercises Provided: yes.  Exercises were reviewed and Alicia was able to demonstrate them prior to the end of the session.  Alicia demonstrated good  understanding of the education provided.     See paper chart under Patient Instructions for exercises provided prior visit.    Assessment   See in [facility]    Alicia is progressing well towards her goals.   Pt prognosis is Excellent.     Pt will continue to benefit from skilled outpatient physical therapy to address the deficits listed in the problem list box on initial evaluation, provide pt/family education and to maximize pt's level of independence in the home and community environment.     Pt's spiritual, cultural and educational needs considered and pt agreeable to plan of care and goals.     Anticipated barriers to physical therapy: transportation, relapses in condition     Goals: tolerate more walking as well as increasing overall strength of L side    Plan     Incorporate more walking, improve L side strength, Improve tolerance to physical activity     Zoltan Nuñez, PT

## 2019-11-11 NOTE — LETTER
November 11, 2019      Braden Dumont MD  [address] [address] [facility]  [id number] [place] [id number]  Phone: [phone]  Fax: [phone]          Patient: Alicia Soliz   MR Number: [id number]   YOB: 1978   Date of Visit: 11/11/2019       Dear Dr. Braden Dumont:    Thank you for referring Alicia Soliz to me for evaluation. Attached you will find relevant portions of my assessment and plan of care.    If you have questions, please do not hesitate to call me. I look forward to following Alicia Soliz along with you.    Sincerely,    Marcelina Chaves, ARASELI    Enclosure  CC:  No Recipients    If you would like to receive this communication electronically, please contact [email or web address] or [phone] to request more information on [employer] access.    For providers and/or their staff who would like to refer a patient to [facility], please contact us through our one-stop-shop provider referral line, [facility] , at [phone].    If you feel you have received this communication in error or would no longer like to receive these types of communications, please e-mail [email or web address]

## 2019-11-11 NOTE — PROGRESS NOTES
CC: Well woman exam    Alicia Soliz is a 41 y.o. female  presents for well woman exam.  LMP: No LMP recorded. Patient has had a hysterectomy..  No issues, problems, or complaints.S/P benign hysterectomy, unknown ovary conservation. Is sexually active. Last mammogram was normal.     Past Medical History:   Diagnosis Date    Anemia     Arthritis     Cardiac arrest as complication of care     pt states she went into cardiac arrest from an allergic reaction to a medication    Encounter for blood transfusion     Hemiplegia due to old stroke     Hypertension     Multiple sclerosis     Stroke      Past Surgical History:   Procedure Laterality Date    APPENDECTOMY      CHOLECYSTECTOMY      HYSTERECTOMY      KNEE SURGERY      TONSILLECTOMY      TUBAL LIGATION       Social History     Socioeconomic History    Marital status: Single     Spouse name: Not on file    Number of children: Not on file    Years of education: Not on file    Highest education level: Not on file   Occupational History     Employer: [employer]   Social Needs    Financial resource strain: Not on file    Food insecurity:     Worry: Not on file     Inability: Not on file    Transportation needs:     Medical: Not on file     Non-medical: Not on file   Tobacco Use    Smoking status: Never Smoker    Smokeless tobacco: Never Used   Substance and Sexual Activity    Alcohol use: Yes     Frequency: Monthly or less     Comment: OCCASIONALLY    Drug use: No    Sexual activity: Yes     Partners: Male     Birth control/protection: Surgical   Lifestyle    Physical activity:     Days per week: Not on file     Minutes per session: Not on file    Stress: Not on file   Relationships    Social connections:     Talks on phone: Not on file     Gets together: Not on file     Attends [employer] service: Not on file     Active member of club or organization: Not on file     Attends meetings of clubs or organizations: Not on file      "Relationship status: Not on file   Other Topics Concern    Not on file   Social History Narrative    Not on file     Family History   Problem Relation Age of Onset    Lupus Mother     Heart disease Mother     Diabetes Father     Kidney disease Father     Cancer Maternal Aunt 40        breast    Breast cancer Maternal Aunt     Breast cancer Maternal Cousin     Breast cancer Maternal Cousin     Ovarian cancer Maternal Cousin      OB History        3    Para   3    Term   3            AB        Living           SAB        TAB        Ectopic        Multiple        Live Births                     BP (!) 154/97 (BP Location: Right arm, Patient Position: Sitting, BP Method: Large (Manual))   Ht 5' 6" (1.676 m)   Wt (!) 149.6 kg (329 lb 12.9 oz)   BMI 53.23 kg/m²       ROS:  GENERAL: Denies weight gain or weight loss. Feeling well overall.   SKIN: Denies rash or lesions.   HEAD: Denies head injury or headache.   NODES: Denies enlarged lymph nodes.   CHEST: Denies chest pain or shortness of breath.   CARDIOVASCULAR: Denies palpitations or left sided chest pain.   ABDOMEN: No abdominal pain, constipation, diarrhea, nausea, vomiting or rectal bleeding.   URINARY: No frequency, dysuria, hematuria, or burning on urination.  REPRODUCTIVE: See HPI.   BREASTS: The patient performs breast self-examination and denies pain, lumps, or nipple discharge.   HEMATOLOGIC: No easy bruisability or excessive bleeding.   MUSCULOSKELETAL: Denies joint pain or swelling.   NEUROLOGIC: Denies syncope or weakness.   PSYCHIATRIC: Denies depression, anxiety or mood swings.    PHYSICAL EXAM:  APPEARANCE: Obese AA female, in no acute distress.  AFFECT: WNL, alert and oriented x 3  SKIN: No acne or hirsutism  NECK: Neck symmetric without masses or thyromegaly  NODES: No inguinal, cervical, axillary, or femoral lymph node enlargement  CHEST: Good respiratory effect  ABDOMEN: Soft.  No tenderness or masses.  No " hepatosplenomegaly.  No hernias.  BREASTS: Symmetrical, no skin changes or visible lesions.  No palpable masses, nipple discharge bilaterally.  PELVIC: Normal external genitalia without lesions.  Normal hair distribution.  Adequate perineal body, normal urethral meatus.  Vagina moist and well rugated without lesions or discharge.  Cervix pink, without lesions, discharge or tenderness.  No significant cystocele or rectocele.  Bimanual exam shows uterus to be normal size, regular, mobile and nontender.  Adnexa without masses or tenderness.    EXTREMITIES: No edema.     PLAN:  Patient was counseled today on A.C.S. Pap guidelines and recommendations for yearly pelvic exams, mammograms and monthly self breast exams; to see her PCP for other health maintenance.

## 2019-11-12 ENCOUNTER — CLINICAL SUPPORT (OUTPATIENT)
Dept: REHABILITATION | Facility: HOSPITAL | Age: 39
End: 2019-11-12
Attending: PSYCHIATRY & NEUROLOGY
Payer: MEDICARE

## 2019-11-12 DIAGNOSIS — R26.9 ABNORMALITY OF GAIT: ICD-10-CM

## 2019-11-12 DIAGNOSIS — R26.89 IMPAIRMENT OF BALANCE: ICD-10-CM

## 2019-11-12 DIAGNOSIS — R53.1 LEFT-SIDED WEAKNESS: Primary | ICD-10-CM

## 2019-11-12 PROCEDURE — 97112 NEUROMUSCULAR REEDUCATION: CPT | Mod: HCNC

## 2019-11-12 PROCEDURE — 97110 THERAPEUTIC EXERCISES: CPT | Mod: HCNC

## 2019-11-13 LAB
BACTERIAL VAGINOSIS DNA: NEGATIVE
CANDIDA GLABRATA DNA: POSITIVE
CANDIDA KRUSEI DNA: NEGATIVE
CANDIDA RRNA VAG QL PROBE: POSITIVE
T VAGINALIS RRNA GENITAL QL PROBE: NEGATIVE

## 2019-11-14 ENCOUNTER — PATIENT MESSAGE (OUTPATIENT)
Dept: OBSTETRICS AND GYNECOLOGY | Facility: CLINIC | Age: 39
End: 2019-11-14

## 2019-11-14 ENCOUNTER — CLINICAL SUPPORT (OUTPATIENT)
Dept: REHABILITATION | Facility: HOSPITAL | Age: 39
End: 2019-11-14
Attending: PSYCHIATRY & NEUROLOGY
Payer: MEDICARE

## 2019-11-14 DIAGNOSIS — R53.1 LEFT-SIDED WEAKNESS: Primary | ICD-10-CM

## 2019-11-14 DIAGNOSIS — R26.89 IMPAIRMENT OF BALANCE: ICD-10-CM

## 2019-11-14 DIAGNOSIS — R26.9 ABNORMALITY OF GAIT: ICD-10-CM

## 2019-11-14 PROCEDURE — 97116 GAIT TRAINING THERAPY: CPT | Mod: HCNC

## 2019-11-14 PROCEDURE — 97110 THERAPEUTIC EXERCISES: CPT | Mod: HCNC

## 2019-11-14 RX ORDER — FLUCONAZOLE 150 MG/1
150 TABLET ORAL ONCE
Qty: 1 TABLET | Refills: 1 | Status: SHIPPED | OUTPATIENT
Start: 2019-11-14 | End: 2019-11-14

## 2019-11-14 NOTE — PLAN OF CARE
Outpatient Therapy Updated Plan of Care     Visit Date: 11/12/2019  Name: Alicia Soliz  Clinic Number: [id number]    Therapy Diagnosis:   Encounter Diagnoses   Name Primary?    Left-sided weakness Yes    Impairment of balance     Abnormality of gait      Physician: Salvatore Vela MD    Physician Orders: PT Eval and Treat  Medical Diagnosis: Multiple Sclerosis  Evaluation Date: 8/15/19    Total Visits Received: 16  Cancelled Visits: 8  No Show Visits: 2    Current Certification Period:  11/12/19 to 12/12/19  Precautions:  Standard and Immunosuppression, MS, long term steroid use  Visits from Evaluation Date:  16  Functional Level Prior to Evaluation:  MI with use of FWW    Subjective     Update: Patient reports that she had been having more UE pain this past couple of weeks and when she went to the MD it looked as if she had a new lesion. She has been gait training around her home with her cane, but uses her walker for longer distances. No falls in the last couple of weeks.    Objective     Update:   Gait: decreased weight shift onto L LE and decreased step length, decreased L ankle DF, use of FWW needed for longer distances, decreased fredy (noted improvement with L ankle control since initial evaluation)     Squat: Double leg: Able to achieve 65 degrees of depth without UE support              Single leg: DNT     Balance: single leg balance w/ UE supported on walker; able to achieve 30s B, eyes closed on firm surface: 30 seconds feet apart     Reflex/Sensation: intact sensation      Ankle ROM: normal on the R side, decreased into DF on the L side     Gross UE ROM: normal     UE Strength: 5/5 on the R side, 4+/5 on the L side      Hip A/PROM:                                                  (L)                    (R)                                      Flexion                         100%        100%                                      Extension                    100%        100%                                       Abduction                    100%    100%                                      Adduction                    100%     100%                                      External rotation          100%        100%                                      Internal rotation           100%         100%                                                                             R          L  Strength:                    Hip flexors                   4+/5     4/5  Quadriceps                  4+/5     4/5                                             Hamstrings                  4+/5     4/5                                      Anterior Tibialis           4+/5     3+/5                                      Gastrocnemius            5/5       4/5     Muscle Length:          Hamstrings: DNT                                      Rafael Test: DNT     Function: Lower Extremity Functional Scale 63% disability score on initial evaluation. Patient scored a 56% disability on the LEFS on re-evaluation on 11/14/19.     LOWER EXTREMITY FUNCTIONAL SCALE         1. Any of your usual work, housework or school activities   4/4  2. Your usual hobbies, sporting     3/4  3. Getting in and out of tub      3/4  4. Walking between rooms      3/4  5. Putting on shoes or socks      4/4  6. Squatting        3/4  7. Lifting an object from the ground      2/4  8. Performing light activities around the home   2/4  9. Performing heavy activities around the home   1/4  10. Getting in and out of car      0/4  11. Walking 2 blocks       0/4  12.Walking a mile       0/4  13. Getting up and down 1 flight of stairs    0/4  14. Standing for 1 hour      2/4  15. Sitting for an hour       4/4  16. Running on even ground      0/4  17. Running on uneven ground     0/4  18. Making sharp turns when running fast    0/4  19. Hopping        0/4  20. Rolling over in bed       4/4    Assessment     Update: Patient has demonstrated improvement in gait quality with use of  cane for short to moderate distances with SBA needed, improvement in strength, improvement in ROM, and improvement in tolerance to activity. Patient has had several MS flare ups since she has started therapy with have been limiting her overall progress, but she eventually gets back to original level.    Short Term Goals: In 3 weeks:  1.I with HEP MET  2.Patient to demo increased L DF AROM /PROM by 10% ALMOST MET  3.Patient to demo increase LE strength by 1/2 grade longterm MET  4.Patient to ambulate with use of LRAD with improvement with L DF during swing ALMOST MET  5.Patient to less than 51% disability on the LEFS. ALMOST MET     Long Term Goals: In 6 weeks  1. Patient to perform daily activities including walking with minimal limitation. NOT MET  2. Patient to demonstrate increased L ankle AROM/PROM by 100%. NOT MET  3. Patient to demonstrate increased LE strength by 1 grade. NOT MET  4. Patient to have decreased pain to painfree at all times with all activities. NOT MET  5. Patient to less than 29% disability on the LEFS. NOT MET    Reasons for Recertification of Therapy:   Patient is improving with her quality of gait, however she still needs CGA or SBA for gait with use of cane due to decreased DF on the L side. Also noted improvement in balance control and ability to utilize ankle strategy than she used to. Due to patient having MS and this is more of a progressive disease, needs continued skilled therapy in order to improve quality of gait with LRAD, improvement balance to decrease risk for falls, improve ROM, improve strength, and improve tolerance to activity in order to decrease injury from fall as well as improve quality of life.    Plan     Updated Certification Period: 11/12/2019 to 1212/19  Recommended Treatment Plan: 2 times per week for 6 weeks: Gait Training, Manual Therapy, Neuromuscular Re-ed, Patient Education, Therapeutic Activites and Therapeutic Exercise  Other Recommendations:  indio Nuñez, PT  11/12/2019      I CERTIFY THE NEED FOR THESE SERVICES FURNISHED UNDER THIS PLAN OF TREATMENT AND WHILE UNDER MY CARE    Physician's comments:        Physician's Signature: ___________________________________________________

## 2019-11-15 ENCOUNTER — PATIENT MESSAGE (OUTPATIENT)
Dept: NEUROLOGY | Facility: CLINIC | Age: 39
End: 2019-11-15

## 2019-11-15 NOTE — PROGRESS NOTES
Physical Therapy Daily Treatment Note     Name: Alicia Soliz  Clinic Number: [id number]    Therapy Diagnosis:   Encounter Diagnoses   Name Primary?    Left-sided weakness Yes    Impairment of balance     Abnormality of gait      Physician: Salvatore Vela MD    Visit Date: 11/14/2019    Physician Orders: PT Eval and Treat  Medical Diagnosis: Multiple Sclerosis   Evaluation Date: 8/15/2019  Authorization Period Expiration: 8/11/2020  Plan of Care Certification Period:11/8/19  Visit #/Visits authorized: 17/20    Time In: 11:00  Time Out: 12:05  Total Billable Time: 30 minutes    Precautions: Standard and Immunosuppression, MS    Subjective     Pt reports: that she is consistent with her ankle stretching and that she is getting more confident with walking with her cane at home  She was compliant with home exercise program.  Response to previous treatment: Good  Functional change: none    Pain: 2/10  Location: L side (leg and arm)    Objective     Alicia received therapeutic exercises to develop strength, endurance and ROM for 45 minutes including  Nustep x 8 min for L ankle ROM/cardiopulmonary efficiency training  Sit to stand lower surface warm up: BW x 10, 20lbs x 3  Toe raise in bars  Standing marches with focus on increasing hip FL/DF on foam  LE bike 3 min F/3 min B for postural control  Hamstring machine 35#  Quad machine 15#    Alicia received the following manual therapy techniques: Joint mobilizations and Soft tissue Mobilization were applied to the: R ankle for 5 minutes, including:  Talocrural mobilizations  STM to L gastroc    Alicia participated in neuromuscular re-education activities to improve: Balance, Kinesthetic and Proprioception for 5 minutes. The following activities were included:  Step ups on foam pad  Balance with pertubations on foam, tandem stance, feet together    Alicia participated in dynamic functional therapeutic activities to improve functional performance for 0  minutes,  including:    Alicia participated in gait training to improve functional mobility and safety for 10 minutes, including:  Gait training with improving ankle movements  Gait training w quad cane     Alicia received the following supervised modalities after being cleared for contradictions: TENS:  Alicia received TENS electrical stimulation for pain to the R ankle. Pt received continuous mode at a rate of 0 pps for 0 minutes. Alicia tolerated treatment well without any adverse effects.     Alicia received hot pack for 0 minutes to R ankle    Home Exercises Provided and Patient Education Provided     Education provided:   - activity toleration, progression of therapy    Written Home Exercises Provided: yes.  Exercises were reviewed and Alicia was able to demonstrate them prior to the end of the session.  Alicia demonstrated good  understanding of the education provided.     See paper chart under Patient Instructions for exercises provided prior visit.    Assessment   Patient demonstrated good tolerance to strengthening and more mobility training this session without any increase in adverse symptoms. Patient also educated to continue with gait training at home with bariatric cane to improve overall gait quality.     Alicia is progressing well towards her goals.   Pt prognosis is Excellent.     Pt will continue to benefit from skilled outpatient physical therapy to address the deficits listed in the problem list box on initial evaluation, provide pt/family education and to maximize pt's level of independence in the home and community environment.     Pt's spiritual, cultural and educational needs considered and pt agreeable to plan of care and goals.     Anticipated barriers to physical therapy: transportation, relapses in condition     Goals: tolerate more walking as well as increasing overall strength of L side    Plan     Incorporate more walking, improve L side strength, Improve tolerance to physical activity     Zoltan  Joaquin, PT

## 2019-11-18 ENCOUNTER — PATIENT MESSAGE (OUTPATIENT)
Dept: INTERNAL MEDICINE | Facility: CLINIC | Age: 39
End: 2019-11-18

## 2019-11-18 DIAGNOSIS — E66.01 MORBID OBESITY WITH BMI OF 45.0-49.9, ADULT: Primary | ICD-10-CM

## 2019-11-21 ENCOUNTER — CLINICAL SUPPORT (OUTPATIENT)
Dept: REHABILITATION | Facility: HOSPITAL | Age: 39
End: 2019-11-21
Attending: PSYCHIATRY & NEUROLOGY
Payer: MEDICARE

## 2019-11-21 ENCOUNTER — TELEPHONE (OUTPATIENT)
Dept: SURGERY | Facility: CLINIC | Age: 39
End: 2019-11-21

## 2019-11-21 ENCOUNTER — PATIENT MESSAGE (OUTPATIENT)
Dept: SURGERY | Facility: CLINIC | Age: 39
End: 2019-11-21

## 2019-11-21 DIAGNOSIS — R26.89 IMPAIRMENT OF BALANCE: ICD-10-CM

## 2019-11-21 DIAGNOSIS — R26.9 ABNORMALITY OF GAIT: ICD-10-CM

## 2019-11-21 DIAGNOSIS — R53.1 LEFT-SIDED WEAKNESS: Primary | ICD-10-CM

## 2019-11-21 PROCEDURE — 97112 NEUROMUSCULAR REEDUCATION: CPT | Mod: HCNC

## 2019-11-21 PROCEDURE — 97110 THERAPEUTIC EXERCISES: CPT | Mod: HCNC

## 2019-11-21 NOTE — TELEPHONE ENCOUNTER
Spoke with pt  via phone, informed pt Dr Navarrete is out on medical leave until 12/19. Pt states, she is wanting to do medication vs sx. Nurse will get with Dr Singleton one of Dr Navarrete's partners for him to review your chart before referral is done. Pt voiced an understanding

## 2019-11-21 NOTE — TELEPHONE ENCOUNTER
----- Message from Severino Barton sent at 11/21/2019  2:39 PM CST -----  Contact: pt   Pt would like to schedule [facility] f/u           [phone]

## 2019-11-22 ENCOUNTER — PATIENT OUTREACH (OUTPATIENT)
Dept: OTHER | Facility: OTHER | Age: 39
End: 2019-11-22

## 2019-11-24 ENCOUNTER — NURSE TRIAGE (OUTPATIENT)
Dept: ADMINISTRATIVE | Facility: CLINIC | Age: 39
End: 2019-11-24

## 2019-11-24 NOTE — TELEPHONE ENCOUNTER
Reason for Disposition   Weakness of the face, arm or leg on one side of the body   [1] Weakness (i.e., paralysis, loss of muscle strength) of the face, arm / hand, or leg / foot on one side of the body AND [2] sudden onset AND [3] present now    Additional Information   Negative: [1] SEVERE weakness (i.e., unable to walk or barely able to walk, requires support) AND [2] new onset or worsening    Protocols used: VISION LOSS OR CHANGE-A-AH, NEUROLOGIC DEFICIT-A-AH  Woke with change in vision in L eye. Pt states she is also having sudden numbness and weakness on L side. rec EMS. Pt agrees. Call back with questions

## 2019-11-25 ENCOUNTER — TELEPHONE (OUTPATIENT)
Dept: SURGERY | Facility: CLINIC | Age: 39
End: 2019-11-25

## 2019-11-25 ENCOUNTER — PATIENT MESSAGE (OUTPATIENT)
Dept: NEUROLOGY | Facility: CLINIC | Age: 39
End: 2019-11-25

## 2019-11-25 ENCOUNTER — TELEPHONE (OUTPATIENT)
Dept: BARIATRICS | Facility: CLINIC | Age: 39
End: 2019-11-25

## 2019-11-25 ENCOUNTER — PATIENT MESSAGE (OUTPATIENT)
Dept: INTERNAL MEDICINE | Facility: CLINIC | Age: 39
End: 2019-11-25

## 2019-11-25 NOTE — TELEPHONE ENCOUNTER
----- Message from EDDIE Rodriguez sent at 11/25/2019 12:10 PM CST -----  Contact: Pt       ----- Message -----  From: Dorothea Leone  Sent: 11/25/2019   9:14 AM CST  To: EDDIE Rodriguez    Reason: Pt stated that someone was suppose to call her back to reschedule appt that was cancel. Its been a month now and has not received a call back.    Communication: [phone] or [phone]

## 2019-11-25 NOTE — TELEPHONE ENCOUNTER
Returned call, during conversation follow up bariatric with Dr Navarrete/[facility]-4th floor, scheduled for 12/2019 @ 9:30AM. Patient voiced an understanding

## 2019-11-25 NOTE — TELEPHONE ENCOUNTER
Spoken with pt regarding vision changes and left side numbness. Urge pt to go to ER. Pt states that she feels ok. Does not feel the need to go right now. Pt states that she will go if problems continues into the evening

## 2019-11-25 NOTE — TELEPHONE ENCOUNTER
----- Message from EDDIE Rodriguez sent at 11/25/2019 12:10 PM CST -----  Contact: Pt       ----- Message -----  From: Dorothea Leone  Sent: 11/25/2019   9:14 AM CST  To: EDDIE Rodriguez    Reason: Pt stated that someone was suppose to call her back to reschedule appt that was cancel. Its been a month now and has not received a call back.    Communication: [phone] or [phone]

## 2019-11-25 NOTE — TELEPHONE ENCOUNTER
"Spoke with patient states she is just waking up and can still see "specks" in her eyes. Pt did not call EMS or go to ER as advised by on call nurse. Pt states " I'm afraid they will keep me." Advised pt to call ems and go to ER for evaluation to prevent situation from becoming worse. Pt agrees. On call note has been forwarded to PCP. Patient verbalized understanding.  "

## 2019-11-25 NOTE — TELEPHONE ENCOUNTER
Called pt to reschedule her consult appt she canceled. Patient will come back in dec with seeing the Del

## 2019-11-26 ENCOUNTER — HOSPITAL ENCOUNTER (OUTPATIENT)
Dept: RADIOLOGY | Facility: HOSPITAL | Age: 39
Discharge: HOME | End: 2019-11-26
Attending: PAIN MEDICINE
Payer: MEDICARE

## 2019-11-26 ENCOUNTER — PATIENT MESSAGE (OUTPATIENT)
Dept: ADMINISTRATIVE | Facility: OTHER | Age: 39
End: 2019-11-26

## 2019-11-26 ENCOUNTER — PATIENT MESSAGE (OUTPATIENT)
Dept: INTERNAL MEDICINE | Facility: CLINIC | Age: 39
End: 2019-11-26

## 2019-11-26 DIAGNOSIS — M19.012 PRIMARY OSTEOARTHRITIS, LEFT SHOULDER: ICD-10-CM

## 2019-11-26 DIAGNOSIS — M25.522 PAIN IN LEFT ELBOW: ICD-10-CM

## 2019-11-26 DIAGNOSIS — M47.22 OTHER SPONDYLOSIS WITH RADICULOPATHY, CERVICAL REGION: ICD-10-CM

## 2019-11-26 DIAGNOSIS — F11.20 OPIOID DEPENDENCE, UNCOMPLICATED: ICD-10-CM

## 2019-11-26 DIAGNOSIS — R39.198 OTHER DIFFICULTIES WITH MICTURITION: ICD-10-CM

## 2019-11-29 ENCOUNTER — PATIENT MESSAGE (OUTPATIENT)
Dept: INTERNAL MEDICINE | Facility: CLINIC | Age: 39
End: 2019-11-29

## 2019-11-29 DIAGNOSIS — A53.9 SYPHILIS: Primary | ICD-10-CM

## 2019-12-02 ENCOUNTER — PATIENT MESSAGE (OUTPATIENT)
Dept: INTERNAL MEDICINE | Facility: CLINIC | Age: 39
End: 2019-12-02

## 2019-12-03 NOTE — PROGRESS NOTES
"Digital Medicine: Health  Follow-Up        Follow Up  Follow-up reason(s): reading review and goal follow-up      Routine Education Topics: weight management  Patient is continuing to "work on herself" and looks forward to an upcoming visit to her daughter.     Patient continues to work on her weight loss and feels that everything is going well. She acknowledges that she still has "a long road ahead".     Patient looks forward to her daughter's college graduation, and a second daughter's upcoming wedding. She feels that things are going well for her personally, and is in a good mindset looking ahead into the new year.       INTERVENTION(S)  encouragement/support    PLAN  continue monitoring      There are no preventive care reminders to display for this patient.    Last 5 Patient Entered Readings                                      Current 30 Day Average: 110/79     Recent Readings 11/26/2019 11/13/2019 11/5/2019 10/28/2019 10/7/2019    SBP (mmHg) 110 110 110 110 110    DBP (mmHg) 80 70 87 80 70             Screenings    SDOH  "

## 2019-12-06 ENCOUNTER — OFFICE VISIT (OUTPATIENT)
Dept: PAIN MEDICINE | Facility: CLINIC | Age: 39
End: 2019-12-06
Payer: MEDICARE

## 2019-12-06 ENCOUNTER — HOSPITAL ENCOUNTER (OUTPATIENT)
Dept: RADIOLOGY | Facility: HOSPITAL | Age: 39
Discharge: HOME | End: 2019-12-06
Attending: ANESTHESIOLOGY
Payer: MEDICARE

## 2019-12-06 ENCOUNTER — OFFICE VISIT (OUTPATIENT)
Dept: OPHTHALMOLOGY | Facility: CLINIC | Age: 39
End: 2019-12-06
Payer: COMMERCIAL

## 2019-12-06 VITALS
BODY MASS INDEX: 47.09 KG/M2 | DIASTOLIC BLOOD PRESSURE: 107 MMHG | SYSTOLIC BLOOD PRESSURE: 159 MMHG | WEIGHT: 293 LBS | HEIGHT: 66 IN | HEART RATE: 79 BPM

## 2019-12-06 DIAGNOSIS — E66.01 MORBID OBESITY WITH BMI OF 45.0-49.9, ADULT: ICD-10-CM

## 2019-12-06 DIAGNOSIS — I10 ESSENTIAL HYPERTENSION: ICD-10-CM

## 2019-12-06 DIAGNOSIS — M47.812 CERVICAL SPONDYLOSIS: ICD-10-CM

## 2019-12-06 DIAGNOSIS — M47.816 LUMBAR SPONDYLOSIS: ICD-10-CM

## 2019-12-06 DIAGNOSIS — M79.18 MYOFASCIAL MUSCLE PAIN: Primary | ICD-10-CM

## 2019-12-06 DIAGNOSIS — H52.4 BILATERAL PRESBYOPIA: ICD-10-CM

## 2019-12-06 DIAGNOSIS — M47.816 LUMBAR FACET ARTHROPATHY: ICD-10-CM

## 2019-12-06 DIAGNOSIS — G35 MULTIPLE SCLEROSIS: Primary | ICD-10-CM

## 2019-12-06 DIAGNOSIS — G35 MULTIPLE SCLEROSIS: ICD-10-CM

## 2019-12-06 DIAGNOSIS — H52.13 MYOPIA, BILATERAL: ICD-10-CM

## 2019-12-06 PROCEDURE — 99499 RISK ADDL DX/OHS AUDIT: ICD-10-PCS | Mod: HCNC,S$GLB,, | Performed by: OPTOMETRIST

## 2019-12-06 PROCEDURE — 99999 PR PBB SHADOW E&M-EST. PATIENT-LVL I: CPT | Mod: PBBFAC,HCNC,, | Performed by: OPTOMETRIST

## 2019-12-06 PROCEDURE — 3077F PR MOST RECENT SYSTOLIC BLOOD PRESSURE >= 140 MM HG: ICD-10-PCS | Mod: HCNC,CPTII,S$GLB, | Performed by: ANESTHESIOLOGY

## 2019-12-06 PROCEDURE — 99999 PR PBB SHADOW E&M-EST. PATIENT-LVL I: ICD-10-PCS | Mod: PBBFAC,HCNC,, | Performed by: OPTOMETRIST

## 2019-12-06 PROCEDURE — 72110 X-RAY EXAM L-2 SPINE 4/>VWS: CPT | Mod: 26,HCNC,, | Performed by: RADIOLOGY

## 2019-12-06 PROCEDURE — 99999 PR PBB SHADOW E&M-EST. PATIENT-LVL III: ICD-10-PCS | Mod: PBBFAC,HCNC,, | Performed by: ANESTHESIOLOGY

## 2019-12-06 PROCEDURE — 99499 UNLISTED E&M SERVICE: CPT | Mod: HCNC,S$GLB,, | Performed by: OPTOMETRIST

## 2019-12-06 PROCEDURE — 3008F PR BODY MASS INDEX (BMI) DOCUMENTED: ICD-10-PCS | Mod: HCNC,CPTII,S$GLB, | Performed by: ANESTHESIOLOGY

## 2019-12-06 PROCEDURE — 72110 XR LUMBAR SPINE 5 VIEW WITH FLEX AND EXT: ICD-10-PCS | Mod: 26,HCNC,, | Performed by: RADIOLOGY

## 2019-12-06 PROCEDURE — 99499 RISK ADDL DX/OHS AUDIT: ICD-10-PCS | Mod: HCNC,S$GLB,, | Performed by: ANESTHESIOLOGY

## 2019-12-06 PROCEDURE — 3080F DIAST BP >= 90 MM HG: CPT | Mod: HCNC,CPTII,S$GLB, | Performed by: ANESTHESIOLOGY

## 2019-12-06 PROCEDURE — 3008F BODY MASS INDEX DOCD: CPT | Mod: HCNC,CPTII,S$GLB, | Performed by: ANESTHESIOLOGY

## 2019-12-06 PROCEDURE — 72052 X-RAY EXAM NECK SPINE 6/>VWS: CPT | Mod: 26,HCNC,, | Performed by: RADIOLOGY

## 2019-12-06 PROCEDURE — 99204 OFFICE O/P NEW MOD 45 MIN: CPT | Mod: HCNC,S$GLB,, | Performed by: ANESTHESIOLOGY

## 2019-12-06 PROCEDURE — 72052 X-RAY EXAM NECK SPINE 6/>VWS: CPT | Mod: TC,HCNC

## 2019-12-06 PROCEDURE — 92015 PR REFRACTION: ICD-10-PCS | Mod: HCNC,S$GLB,, | Performed by: OPTOMETRIST

## 2019-12-06 PROCEDURE — 92004 COMPRE OPH EXAM NEW PT 1/>: CPT | Mod: HCNC,S$GLB,, | Performed by: OPTOMETRIST

## 2019-12-06 PROCEDURE — 99204 PR OFFICE/OUTPT VISIT, NEW, LEVL IV, 45-59 MIN: ICD-10-PCS | Mod: HCNC,S$GLB,, | Performed by: ANESTHESIOLOGY

## 2019-12-06 PROCEDURE — 72110 X-RAY EXAM L-2 SPINE 4/>VWS: CPT | Mod: TC,HCNC

## 2019-12-06 PROCEDURE — 92004 PR EYE EXAM, NEW PATIENT,COMPREHESV: ICD-10-PCS | Mod: HCNC,S$GLB,, | Performed by: OPTOMETRIST

## 2019-12-06 PROCEDURE — 3077F SYST BP >= 140 MM HG: CPT | Mod: HCNC,CPTII,S$GLB, | Performed by: ANESTHESIOLOGY

## 2019-12-06 PROCEDURE — 92015 DETERMINE REFRACTIVE STATE: CPT | Mod: HCNC,S$GLB,, | Performed by: OPTOMETRIST

## 2019-12-06 PROCEDURE — 99999 PR PBB SHADOW E&M-EST. PATIENT-LVL III: CPT | Mod: PBBFAC,HCNC,, | Performed by: ANESTHESIOLOGY

## 2019-12-06 PROCEDURE — 3080F PR MOST RECENT DIASTOLIC BLOOD PRESSURE >= 90 MM HG: ICD-10-PCS | Mod: HCNC,CPTII,S$GLB, | Performed by: ANESTHESIOLOGY

## 2019-12-06 PROCEDURE — 72052 XR CERVICAL SPINE 5 VIEW WITH FLEX AND EXT: ICD-10-PCS | Mod: 26,HCNC,, | Performed by: RADIOLOGY

## 2019-12-06 PROCEDURE — 99499 UNLISTED E&M SERVICE: CPT | Mod: HCNC,S$GLB,, | Performed by: ANESTHESIOLOGY

## 2019-12-06 RX ORDER — INTERFERON BETA-1A 30MCG/.5ML
KIT INTRAMUSCULAR
COMMUNITY
Start: 2019-09-16 | End: 2020-01-16 | Stop reason: ALTCHOICE

## 2019-12-06 RX ORDER — TIZANIDINE 4 MG/1
4 TABLET ORAL 2 TIMES DAILY PRN
Qty: 60 TABLET | Refills: 0 | Status: SHIPPED | OUTPATIENT
Start: 2019-12-06 | End: 2020-01-02

## 2019-12-06 RX ORDER — HYDROCODONE BITARTRATE AND ACETAMINOPHEN 7.5; 325 MG/1; MG/1
1 TABLET ORAL
Qty: 20 TABLET | Refills: 0 | Status: SHIPPED | OUTPATIENT
Start: 2019-12-06 | End: 2020-01-03 | Stop reason: SDUPTHER

## 2019-12-06 RX ORDER — GABAPENTIN 300 MG/1
CAPSULE ORAL
Qty: 180 CAPSULE | Refills: 0 | Status: SHIPPED | OUTPATIENT
Start: 2019-12-06 | End: 2020-01-08

## 2019-12-06 NOTE — PROGRESS NOTES
Chief Pain Complaint:  Back Pain        History of Present Illness:   Alicia Soliz is a 39 y.o. female  who is presenting with a chief complaint of Back Pain  . The patient began experiencing this problem insidiously, and the pain has been gradually worsening over the past 3 year(s). The pain is described as throbbing, cramping, aching and heavy and is located in the bilateral lumbar spine. Pain is intermittent and lasts hours. The pain is nonradiating. The patient rates her pain a 7 out of ten and interferes with activities of daily living a 7 out of ten. Pain is exacerbated by extension of the lumbar spine, and is improved by rest. Patient reports no prior trauma, no prior spinal surgery     - pertinent negatives: No fever, No chills, No weight loss, No bladder dysfunction, No bowel dysfunction, No saddle anesthesia  - pertinent positives: left arm weakness  Left leg weakness   - medications, other therapies tried (physical therapy, injections):     >> NSAIDs, Tylenol, Tramadol, Norco, gabapentin and flexeril    >> Has previously undergone Physical Therapy    >> Has NOT previously undergone spinal injection/s      Imaging / Labs / Studies (reviewed on 12/6/2019):    Results for orders placed during the hospital encounter of 01/15/15   MRI Cervical Spine W WO Cont    Narrative Study:   Cervical spine MRI with and without contrast.    Technique:  Multiplanar non contrast enhanced images obtained on the cervical spine. Contrast enhanced images then obtained.    History: Acute left upper and lower extremity weakness.    Findings:    There is convexity of the cervical spine to the right consistent with dextroscoliosis.    No focal cervical cord abnormality is seen.    No abnormal mass lesions in the cervical spinal canal and at the foramen magnum.    There is a focal 1 cm contrast enhancing hyperintense T2 signal lesion in the left occipital lobe subcortical white matter.  The same lesion also demonstrated on  "the head MRI study performed on 01/17/15.    C2-3  disc: Normal.    C3-4  disc: Minor broad-based midline disk bulge.    C4-5  disc: Normal.    C5-6  disc: Normal.    C6-7  disc: Normal.    C7-T1 disc: Normal.    Impression      Minor broad-based midline C3-4 disk bulge without any evidence for cervical disk herniation nor spinal stenosis.    Normal cervical cord.      Cervical spine dextroscoliosis.    1 cm contrast enhancing left occipital lobe subcortical white matter lesion.  Multiple bihemispheric hyperintense T2 signal lesions including left-sided brainstem lesion demonstrated on the recent head MRI study some of which exhibiting contrast   enhancement .  Considerations include active demyelinating multiple sclerosis plaque disease with differential diagnostic consideration including ADEM .      Electronically signed by: MAURY GOEL MD  Date:     01/22/15  Time:    08:47          Review of Systems:  CONSTITUTIONAL: patient denies any fever, chills, or weight loss  SKIN: patient denies any rash or itching  RESPIRATORY: patient denies having any shortness of breath  GASTROINTESTINAL: patient denies having any diarrhea, constipation, or bowel incontinence  GENITOURINARY: patient denies having any abnormal bladder function    MUSCULOSKELETAL:  - patient complains of the above noted pain/s (see chief pain complaint)    NEUROLOGICAL:   - pain as above  - strength in Lower extremities is decreased, on the LEFT  - sensation in Lower extremities is intact, BILATERALLY  - patient denies any loss of bowel or bladder control      PSYCHIATRIC: patient denies any change in mood    Other:  All other systems reviewed and are negative      Physical Exam:  BP (!) 159/107   Pulse 79   Ht 5' 6" (1.676 m)   Wt (!) 149.2 kg (329 lb)   BMI 53.10 kg/m²  (reviewed on 12/6/2019)  General: Alert and oriented, in no apparent distress.  Gait: normal gait.  Skin: No rashes, No discoloration, No obvious lesions  HEENT: Normocephalic, " atraumatic. Pupils equal and round.  Cardiovascular: Regular rate and rhythm , no significant peripheral edema present  Respiratory: Without audible wheezing, without use of accessory muscles of respiration.    Musculoskeletal:    Cervical Spine    - Pain on flexion of cervical spine Absent  - Spurling's Test:  Absent    - Pain on extension of cervical spine Present  - TTP over the cervical facet joints Present  - Cervical facet loading Present      Lumbar Spine    - Pain on flexion of lumbar spine Absent  - Straight Leg Raise:  Absent    - Pain on extension of lumbar spine Present  - TTP over the lumbar facet joints Present  - Lumbar facet loading Present    -Pain on palpation over the SI joint  Absent  - SHELBI: Absent      Neuro:    Strength:  UE R/L: D: 5/5; B: 5/5; T: 5/5; WF: 5/5; WE: 5/5; IO: 5/5;  LE R/L: HF: 5/5, HE: 5/5, KF: 5/5; KE: 5/5; FE: 5/5; FF: 5/5    Extremity Reflexes: Brisk and symmetric throughout.      Extremity Sensory: Sensation to pinprick and temperature symmetric. Proprioception intact.      Psych:  Mood and affect is appropriate      Assessment:    Alicia Soliz is a 39 y.o. year old female who is presenting with   Encounter Diagnoses   Name Primary?    Morbid obesity with BMI of 45.0-49.9, adult     Lumbar spondylosis     Lumbar facet arthropathy     Cervical spondylosis     Multiple sclerosis     Myofascial muscle pain Yes       Plan:    1. Interventional: None for now.    2. Pharmacologic: Increase Gabapentin from 300 mg PO TID to 300/300/1200 up titration schdule given to patient. Start Tizanidine 4 mg PO BID. Norco 7.5/325 mg Po Q day PRN (20 tabs).  checked.     3. Rehabilitative: Internal Referal to PT.    4. Diagnostic: Lumbar and Cervical Xrays.    5. Follow up: 4 weeks.     20 minutes were spent in this encounter with more than 50% of the time used for counseling and review of the plan.  Imaging / studies reviewed, detailed above.  I discussed in detail the risks,  benefits, and alternatives to any and all potential treatment options.  All questions and concerns were fully addressed today in clinic. Medical decision making moderate.    Thank you for the opportunity to assist in the care of this patient.    Best wishes,    Signed:    Rashaun Hung MD          Disclaimer:  This note may have been prepared using voice recognition software, it may have not been extensively proofed, as such there could be errors within the text such as sound alike errors.

## 2019-12-06 NOTE — PROGRESS NOTES
HPI     Decreased near and distance visual acuity with glasses.  New patient last eye exam 2 years.  blurred vision.  Update glasses RX.  Hx of MS.     Last edited by Rafael Mejia, OD on 12/6/2019  3:57 PM. (History)            Assessment /Plan     For exam results, see Encounter Report.    Multiple sclerosis    Essential hypertension    Myopia, bilateral    Bilateral presbyopia      No ocular effects of MS    No HTN Retinopathy    Dispense Final Rx for glasses.  RTC 1 year  Discussed above and answered questions.

## 2019-12-06 NOTE — LETTER
December 6, 2019      Braden Dumont MD  [address] [facility] [address]           [facility]  [id number] [place] [id number]  Phone: [phone]  Fax: [phone]          Patient: Alicia Soliz   MR Number: [id number]   YOB: 1980   Date of Visit: 12/6/2019       Dear Dr. Braden Dumont:    Thank you for referring Alicia Soliz to me for evaluation. Attached you will find relevant portions of my assessment and plan of care.    If you have questions, please do not hesitate to call me. I look forward to following Alicia Soliz along with you.    Sincerely,    Rashaun Hung MD    Enclosure  CC:  No Recipients    If you would like to receive this communication electronically, please contact [email or web address] or [phone] to request more information on [employer] Link access.    For providers and/or their staff who would like to refer a patient to [facility], please contact us through our one-stop-shop provider referral line, [facility], at [phone].    If you feel you have received this communication in error or would no longer like to receive these types of communications, please e-mail [email or web address]

## 2019-12-07 ENCOUNTER — NURSE TRIAGE (OUTPATIENT)
Dept: ADMINISTRATIVE | Facility: CLINIC | Age: 39
End: 2019-12-07

## 2019-12-07 NOTE — TELEPHONE ENCOUNTER
Reason for Disposition   [1] Weakness (i.e., paralysis, loss of muscle strength) of the face, arm / hand, or leg / foot on one side of the body AND [2] sudden onset AND [3] present now    Protocols used: NEUROLOGIC DEFICIT-A-ALAN Vargas has weakness on one side of her face. She states it looks like it is drooping at this is a new issue for her. She states her b/p has been elevated recently with systolic in the 180's. Recommended 911 now per protocol.

## 2019-12-10 ENCOUNTER — PATIENT MESSAGE (OUTPATIENT)
Dept: PAIN MEDICINE | Facility: CLINIC | Age: 39
End: 2019-12-10

## 2019-12-10 RX ORDER — MELOXICAM 7.5 MG/1
7.5 TABLET ORAL
Qty: 30 TABLET | Refills: 0 | Status: SHIPPED | OUTPATIENT
Start: 2019-12-10 | End: 2020-01-10

## 2019-12-11 ENCOUNTER — PATIENT MESSAGE (OUTPATIENT)
Dept: PAIN MEDICINE | Facility: CLINIC | Age: 39
End: 2019-12-11

## 2019-12-12 ENCOUNTER — TELEPHONE (OUTPATIENT)
Dept: RADIOLOGY | Facility: HOSPITAL | Age: 39
End: 2019-12-12

## 2019-12-12 DIAGNOSIS — G35 MULTIPLE SCLEROSIS: ICD-10-CM

## 2019-12-12 DIAGNOSIS — M47.816 LUMBAR SPONDYLOSIS: Primary | ICD-10-CM

## 2019-12-12 DIAGNOSIS — M47.812 CERVICAL SPONDYLOSIS: ICD-10-CM

## 2019-12-16 ENCOUNTER — PATIENT MESSAGE (OUTPATIENT)
Dept: NEUROLOGY | Facility: CLINIC | Age: 39
End: 2019-12-16

## 2019-12-17 ENCOUNTER — CLINICAL SUPPORT (OUTPATIENT)
Dept: REHABILITATION | Facility: HOSPITAL | Age: 39
End: 2019-12-17
Attending: PSYCHIATRY & NEUROLOGY
Payer: MEDICARE

## 2019-12-17 DIAGNOSIS — R26.89 IMPAIRMENT OF BALANCE: ICD-10-CM

## 2019-12-17 DIAGNOSIS — R53.1 LEFT-SIDED WEAKNESS: Primary | ICD-10-CM

## 2019-12-17 DIAGNOSIS — R26.9 ABNORMALITY OF GAIT: ICD-10-CM

## 2019-12-17 PROCEDURE — 97110 THERAPEUTIC EXERCISES: CPT | Mod: HCNC

## 2019-12-17 PROCEDURE — 97112 NEUROMUSCULAR REEDUCATION: CPT | Mod: HCNC

## 2019-12-17 NOTE — PROGRESS NOTES
Physical Therapy Daily Treatment Note     Name: Alicia Soliz  Clinic Number: [id number]    Therapy Diagnosis:   Encounter Diagnoses   Name Primary?    Left-sided weakness Yes    Impairment of balance     Abnormality of gait      Physician: Salvatore Vela MD    Visit Date: 12/17/2019    Physician Orders: PT Eval and Treat  Medical Diagnosis: Multiple Sclerosis   Evaluation Date: 8/15/2019  Authorization Period Expiration: 8/11/2020  Plan of Care Certification Period:1/17/19  Visit #/Visits authorized: 19/20    Time In: 9:00  Time Out: 10:15  Total Billable Time: 60 minutes    Precautions: Standard and Immunosuppression, MS    Subjective     Pt reports: See in UPOC  She was compliant with home exercise program.  Response to previous treatment: Good  Functional change: none    Pain: 2/10  Location: L side (leg and arm)    Objective     Alicia received therapeutic exercises to develop strength, endurance and ROM for 45 minutes including  Nustep x 8 min for L ankle ROM/cardiopulmonary efficiency training  Sit to stand lower surface warm up: BW x 10, 20lbs x 3  Toe raise in bars  Standing marches with focus on increasing hip FL/DF on foam  UE bike 3 min F/3 min B for postural control  Hamstring machine 35#  Quad machine 15#  LE bike x 6 min for quad/hamstring strengthening    Alicia received the following manual therapy techniques: Joint mobilizations and Soft tissue Mobilization were applied to the: R ankle for 5 minutes, including:  Talocrural mobilizations  STM to L gastroc    Alicia participated in neuromuscular re-education activities to improve: Balance, Kinesthetic and Proprioception for 10 minutes. The following activities were included:  Step ups on foam pad  Balance with pertubations on foam, tandem stance, feet together  Steps forward and sideways with minimal UE support    Alicia participated in dynamic functional therapeutic activities to improve functional performance for 0  minutes,  including:    Alicia participated in gait training to improve functional mobility and safety for 5 minutes, including:  Gait training with improving ankle movements  Gait training w quad cane     Alicia received the following supervised modalities after being cleared for contradictions: TENS:  Alicia received TENS electrical stimulation for pain to the R ankle. Pt received continuous mode at a rate of 0 pps for 0 minutes. Alicia tolerated treatment well without any adverse effects.     Alicia received hot pack for 0 minutes to R ankle    Home Exercises Provided and Patient Education Provided     Education provided:   - activity toleration, progression of therapy    Written Home Exercises Provided: yes.  Exercises were reviewed and Alicia was able to demonstrate them prior to the end of the session.  Alicia demonstrated good  understanding of the education provided.     See paper chart under Patient Instructions for exercises provided prior visit.    Assessment   See in UPOC    Alicia is progressing well towards her goals.   Pt prognosis is Excellent.     Pt will continue to benefit from skilled outpatient physical therapy to address the deficits listed in the problem list box on initial evaluation, provide pt/family education and to maximize pt's level of independence in the home and community environment.     Pt's spiritual, cultural and educational needs considered and pt agreeable to plan of care and goals.     Anticipated barriers to physical therapy: transportation, relapses in condition     Goals: tolerate more walking as well as increasing overall strength of L side    Plan     Incorporate more walking, improve L side strength, Improve tolerance to physical activity     Zoltan Nuñez, PT

## 2019-12-18 ENCOUNTER — PATIENT MESSAGE (OUTPATIENT)
Dept: REHABILITATION | Facility: HOSPITAL | Age: 39
End: 2019-12-18

## 2019-12-18 NOTE — PROGRESS NOTES
Physical Therapy Daily Treatment Note     Name: Alicia Soliz  Clinic Number: [id number]    Therapy Diagnosis:   Encounter Diagnoses   Name Primary?    Left-sided weakness Yes    Impairment of balance     Abnormality of gait      Physician: Salvatore Vela MD    Visit Date: 12/19/2019    Physician Orders: PT Eval and Treat  Medical Diagnosis: Multiple Sclerosis   Evaluation Date: 8/15/2019  Authorization Period Expiration: 8/11/2020  Plan of Care Certification Period:1/17/20  Visit #/Visits authorized: 20/20    Time In: 9:00  Time Out: 10:15  Total Billable Time: 30 minutes    Precautions: Standard and Immunosuppression, MS    Subjective     Pt reports: that she has been consistent with her exercises at home. Reports that her L quad still stays tight.  She was compliant with home exercise program.  Response to previous treatment: Good  Functional change: none    Pain: 2/10  Location: L side (leg and arm)    Objective     Alicia received therapeutic exercises to develop strength, endurance and ROM for 45 minutes including  Nustep x 8 min for L ankle ROM/cardiopulmonary efficiency training  Sit to stand lower surface warm up: BW x 10, 20lbs x 3  Toe raise in bars  Standing marches with focus on increasing hip FL/DF on foam  UE bike 3 min F/3 min B for postural control  Hamstring machine 35#  Quad machine 15#  LE bike x 6 min for quad/hamstring strengthening    Alicia received the following manual therapy techniques: Joint mobilizations and Soft tissue Mobilization were applied to the: R ankle for 5 minutes, including:  Talocrural mobilizations  STM to L gastroc    Alicia participated in neuromuscular re-education activities to improve: Balance, Kinesthetic and Proprioception for 10 minutes. The following activities were included:  Step ups on foam pad  Balance with pertubations on foam, tandem stance, feet together  Steps forward and sideways with minimal UE support    Alicia participated in dynamic functional  therapeutic activities to improve functional performance for 0  minutes, including:    Alicia participated in gait training to improve functional mobility and safety for 5 minutes, including:  Gait training with improving ankle movements  Gait training w quad cane     Alicia received the following supervised modalities after being cleared for contradictions: TENS:  Alicia received TENS electrical stimulation for pain to the R ankle. Pt received continuous mode at a rate of 0 pps for 0 minutes. Alicia tolerated treatment well without any adverse effects.     Alicia received hot pack for 0 minutes to R ankle    Home Exercises Provided and Patient Education Provided     Education provided:   - activity toleration, progression of therapy    Written Home Exercises Provided: yes.  Exercises were reviewed and Alicia was able to demonstrate them prior to the end of the session.  Alicia demonstrated good  understanding of the education provided.     See paper chart under Patient Instructions for exercises provided prior visit.    Assessment   Patient is continuing to demonstrate good tolerance to strengthening and more stability training this session with minimal increase in adverse symptoms. Did have an instance of knee hyperextension this session, but no reported increase in pain.    Alicia is progressing well towards her goals.   Pt prognosis is Excellent.     Pt will continue to benefit from skilled outpatient physical therapy to address the deficits listed in the problem list box on initial evaluation, provide pt/family education and to maximize pt's level of independence in the home and community environment.     Pt's spiritual, cultural and educational needs considered and pt agreeable to plan of care and goals.     Anticipated barriers to physical therapy: transportation, relapses in condition     Goals: tolerate more walking as well as increasing overall strength of L side    Plan     Incorporate more walking, improve L  side strength, Improve tolerance to physical activity     Zoltan uNñez, PT

## 2019-12-18 NOTE — PLAN OF CARE
Outpatient Therapy Updated Plan of Care     Visit Date: 12/17/2019  Name: Alicia Soliz  Clinic Number: [id number]    Therapy Diagnosis:   Encounter Diagnoses   Name Primary?    Left-sided weakness Yes    Impairment of balance     Abnormality of gait      Physician: Salvatore Vela MD    Physician Orders: PT Eval and Treat  Medical Diagnosis: Multiple Sclerosis  Evaluation Date: 8/15/19    Total Visits Received: 19  Cancelled Visits: 10  No Show Visits: 2    Current Certification Period:  12/17/19 to 1/17/19  Precautions:  Standard and Immunosuppression, MS, long term steroid use  Visits from Evaluation Date:  19  Functional Level Prior to Evaluation:  MI with use of FWW    Subjective     Update: Patient reports that she has been battling a cold the last several weeks and has had a lot of MD visits. Reasoning for her not being able to attend therapy. She has been very compliant with her exercises at home.    Objective     Update:   Gait: decreased weight shift onto L LE and decreased step length, decreased L ankle DF, use of FWW needed for longer distances, decreased fredy (noted improvement with L ankle control since initial evaluation)     Squat: Double leg: Able to achieve 65 degrees of depth without UE support              Single leg: DNT     Balance: single leg balance w/ UE supported on walker; able to achieve 30s B, eyes closed on firm surface: 30 seconds feet apart     Reflex/Sensation: intact sensation      Ankle ROM: normal on the R side, decreased into DF on the L side     Gross UE ROM: normal     UE Strength: 5/5 on the R side, 4+/5 on the L side      Hip A/PROM:                                                  (L)                    (R)                                      Flexion                         100%        100%                                      Extension                    100%        100%                                      Abduction                    100%    100%                                       Adduction                    100%     100%                                      External rotation          100%        100%                                      Internal rotation           100%         100%                                                                             R          L  Strength:                    Hip flexors                   4+/5     4/5  Quadriceps                  4+/5     4/5                                             Hamstrings                  4+/5     4/5                                      Anterior Tibialis           4+/5     3+/5                                      Gastrocnemius            5/5       4/5     Muscle Length:          Hamstrings: DNT                                      Rafael Test: DNT     Function: Lower Extremity Functional Scale 63% disability score on initial evaluation. Patient scored a 54% disability on the LEFS on re-evaluation on 11/14/19.     LOWER EXTREMITY FUNCTIONAL SCALE         1. Any of your usual work, housework or school activities   4/4  2. Your usual hobbies, sporting     3/4  3. Getting in and out of tub      3/4  4. Walking between rooms      3/4  5. Putting on shoes or socks      4/4  6. Squatting        3/4  7. Lifting an object from the ground      2/4  8. Performing light activities around the home   2/4  9. Performing heavy activities around the home   1/4  10. Getting in and out of car      1/4  11. Walking 2 blocks       0/4  12.Walking a mile       0/4  13. Getting up and down 1 flight of stairs    0/4  14. Standing for 1 hour      2/4  15. Sitting for an hour       4/4  16. Running on even ground      0/4  17. Running on uneven ground     0/4  18. Making sharp turns when running fast    0/4  19. Hopping        0/4  20. Rolling over in bed       4/4    Assessment     Update: Patient has demonstrated improvement in gait quality with use of cane for short to moderate distances with SBA needed, improvement in  strength, improvement in ROM, and improvement in tolerance to activity. Patient has had several MS flare ups since she has started therapy with have been limiting her overall progress, but she eventually gets back to original level. Patient has made minimal change since last re-evaluation due to her being sick for several weeks and not being able to attend.    Short Term Goals: In 3 weeks:  1.I with HEP MET  2.Patient to demo increased L DF AROM /PROM by 10% ALMOST MET  3.Patient to demo increase LE strength by 1/2 grade jail MET  4.Patient to ambulate with use of LRAD with improvement with L DF during swing ALMOST MET  5.Patient to less than 51% disability on the LEFS. ALMOST MET     Long Term Goals: In 6 weeks  1. Patient to perform daily activities including walking with minimal limitation. NOT MET  2. Patient to demonstrate increased L ankle AROM/PROM by 100%. NOT MET  3. Patient to demonstrate increased LE strength by 1 grade. NOT MET  4. Patient to have decreased pain to painfree at all times with all activities. NOT MET  5. Patient to less than 29% disability on the LEFS. NOT MET    Reasons for Recertification of Therapy:   Patient is improving with her quality of gait, however she still needs CGA or SBA for gait with use of cane due to decreased DF on the L side. Also noted improvement in balance control and ability to utilize ankle strategy than she used to. Patient's progress was minimal since the last re-evaluation, however noted to still have great motivation in and outside of therapy to continue to build her strength and improve her mobility. Due to patient having MS and this is more of a progressive disease, needs continued skilled therapy in order to improve quality of gait with LRAD, improvement balance to decrease risk for falls, improve ROM, improve strength, and improve tolerance to activity in order to decrease injury from fall as well as improve quality of life.    Plan     Updated  Certification Period: 12/17/2019 to 1/17/20  Recommended Treatment Plan: 2 times per week for 6 weeks: Gait Training, Manual Therapy, Neuromuscular Re-ed, Patient Education, Therapeutic Activites and Therapeutic Exercise  Other Recommendations: indio Nuñez, PT  12/17/2019      I CERTIFY THE NEED FOR THESE SERVICES FURNISHED UNDER THIS PLAN OF TREATMENT AND WHILE UNDER MY CARE    Physician's comments:        Physician's Signature: ___________________________________________________

## 2019-12-19 ENCOUNTER — CLINICAL SUPPORT (OUTPATIENT)
Dept: REHABILITATION | Facility: HOSPITAL | Age: 39
End: 2019-12-19
Attending: PSYCHIATRY & NEUROLOGY
Payer: MEDICARE

## 2019-12-19 DIAGNOSIS — R26.89 IMPAIRMENT OF BALANCE: ICD-10-CM

## 2019-12-19 DIAGNOSIS — R53.1 LEFT-SIDED WEAKNESS: Primary | ICD-10-CM

## 2019-12-19 DIAGNOSIS — R26.9 ABNORMALITY OF GAIT: ICD-10-CM

## 2019-12-19 PROCEDURE — 97112 NEUROMUSCULAR REEDUCATION: CPT | Mod: HCNC

## 2019-12-19 PROCEDURE — 97110 THERAPEUTIC EXERCISES: CPT | Mod: HCNC

## 2019-12-20 RX ORDER — VALACYCLOVIR HYDROCHLORIDE 500 MG/1
TABLET, FILM COATED ORAL
Qty: 180 TABLET | Refills: 3 | Status: SHIPPED | OUTPATIENT
Start: 2019-12-20 | End: 2020-01-06

## 2019-12-23 ENCOUNTER — TELEPHONE (OUTPATIENT)
Dept: SURGERY | Facility: CLINIC | Age: 39
End: 2019-12-23

## 2019-12-23 NOTE — TELEPHONE ENCOUNTER
----- Message from Radha Segovia sent at 12/23/2019  8:43 AM CST -----  Contact: self  patient originally scheduled for [facility] but unable to make because of transportation issues. available on fridays..[phone] or [phone]

## 2020-01-02 ENCOUNTER — TELEPHONE (OUTPATIENT)
Dept: SURGERY | Facility: CLINIC | Age: 40
End: 2020-01-02

## 2020-01-02 ENCOUNTER — PATIENT MESSAGE (OUTPATIENT)
Dept: INTERNAL MEDICINE | Facility: CLINIC | Age: 40
End: 2020-01-02

## 2020-01-02 RX ORDER — VALACYCLOVIR HYDROCHLORIDE 500 MG/1
TABLET, FILM COATED ORAL
Qty: 180 TABLET | Refills: 3 | OUTPATIENT
Start: 2020-01-02

## 2020-01-02 RX ORDER — TIZANIDINE 4 MG/1
4 TABLET ORAL 2 TIMES DAILY PRN
Qty: 60 TABLET | Refills: 0 | Status: SHIPPED | OUTPATIENT
Start: 2020-01-02 | End: 2020-01-08

## 2020-01-02 NOTE — TELEPHONE ENCOUNTER
----- Message from Radha Segovia sent at 1/2/2020 11:37 AM CST -----  Contact: self  needs call back regarding record issue discussed earlier..[phone] or [phone]

## 2020-01-02 NOTE — TELEPHONE ENCOUNTER
Returned call, during conversation follow up appt scheduled for 01/17/20 with Dr Navarrete/[facility] 4th floor @ 10:45AM to discuss sx (Sleeve). Pt voiced an understanding

## 2020-01-03 ENCOUNTER — LAB VISIT (OUTPATIENT)
Dept: LAB | Facility: HOSPITAL | Age: 40
End: 2020-01-03
Attending: FAMILY MEDICINE
Payer: MEDICARE

## 2020-01-03 ENCOUNTER — PATIENT MESSAGE (OUTPATIENT)
Dept: OBSTETRICS AND GYNECOLOGY | Facility: CLINIC | Age: 40
End: 2020-01-03

## 2020-01-03 ENCOUNTER — OFFICE VISIT (OUTPATIENT)
Dept: PAIN MEDICINE | Facility: CLINIC | Age: 40
End: 2020-01-03
Payer: MEDICARE

## 2020-01-03 VITALS
HEIGHT: 66 IN | RESPIRATION RATE: 18 BRPM | BODY MASS INDEX: 47.09 KG/M2 | HEART RATE: 85 BPM | SYSTOLIC BLOOD PRESSURE: 127 MMHG | WEIGHT: 293 LBS | DIASTOLIC BLOOD PRESSURE: 86 MMHG

## 2020-01-03 DIAGNOSIS — M47.816 LUMBAR SPONDYLOSIS: Primary | ICD-10-CM

## 2020-01-03 DIAGNOSIS — M79.18 MYOFASCIAL MUSCLE PAIN: ICD-10-CM

## 2020-01-03 DIAGNOSIS — G35 MULTIPLE SCLEROSIS: ICD-10-CM

## 2020-01-03 DIAGNOSIS — M47.812 CERVICAL SPONDYLOSIS: ICD-10-CM

## 2020-01-03 LAB
ALBUMIN SERPL BCP-MCNC: 3.6 G/DL (ref 3.5–5.2)
ALP SERPL-CCNC: 102 U/L (ref 55–135)
ALT SERPL W/O P-5'-P-CCNC: 14 U/L (ref 10–44)
AST SERPL-CCNC: 22 U/L (ref 10–40)
BASOPHILS # BLD AUTO: 0.05 K/UL (ref 0–0.2)
BASOPHILS NFR BLD: 0.7 % (ref 0–1.9)
BILIRUB DIRECT SERPL-MCNC: 0.2 MG/DL (ref 0.1–0.3)
BILIRUB SERPL-MCNC: 0.4 MG/DL (ref 0.1–1)
DIFFERENTIAL METHOD: ABNORMAL
EOSINOPHIL # BLD AUTO: 0.1 K/UL (ref 0–0.5)
EOSINOPHIL NFR BLD: 1.9 % (ref 0–8)
ERYTHROCYTE [DISTWIDTH] IN BLOOD BY AUTOMATED COUNT: 16 % (ref 11.5–14.5)
HCT VFR BLD AUTO: 40.4 % (ref 37–48.5)
HGB BLD-MCNC: 11.9 G/DL (ref 12–16)
IMM GRANULOCYTES # BLD AUTO: 0.01 K/UL (ref 0–0.04)
IMM GRANULOCYTES NFR BLD AUTO: 0.1 % (ref 0–0.5)
LYMPHOCYTES # BLD AUTO: 3 K/UL (ref 1–4.8)
LYMPHOCYTES NFR BLD: 44.3 % (ref 18–48)
MCH RBC QN AUTO: 21.1 PG (ref 27–31)
MCHC RBC AUTO-ENTMCNC: 29.5 G/DL (ref 32–36)
MCV RBC AUTO: 72 FL (ref 82–98)
MONOCYTES # BLD AUTO: 0.4 K/UL (ref 0.3–1)
MONOCYTES NFR BLD: 6.1 % (ref 4–15)
NEUTROPHILS # BLD AUTO: 3.2 K/UL (ref 1.8–7.7)
NEUTROPHILS NFR BLD: 46.9 % (ref 38–73)
NRBC BLD-RTO: 0 /100 WBC
PLATELET # BLD AUTO: 278 K/UL (ref 150–350)
PMV BLD AUTO: 10.8 FL (ref 9.2–12.9)
PROT SERPL-MCNC: 7.8 G/DL (ref 6–8.4)
RBC # BLD AUTO: 5.63 M/UL (ref 4–5.4)
WBC # BLD AUTO: 6.75 K/UL (ref 3.9–12.7)

## 2020-01-03 PROCEDURE — 3074F SYST BP LT 130 MM HG: CPT | Mod: HCNC,CPTII,S$GLB, | Performed by: PHYSICIAN ASSISTANT

## 2020-01-03 PROCEDURE — 99214 OFFICE O/P EST MOD 30 MIN: CPT | Mod: HCNC,S$GLB,, | Performed by: PHYSICIAN ASSISTANT

## 2020-01-03 PROCEDURE — 99214 PR OFFICE/OUTPT VISIT, EST, LEVL IV, 30-39 MIN: ICD-10-PCS | Mod: HCNC,S$GLB,, | Performed by: PHYSICIAN ASSISTANT

## 2020-01-03 PROCEDURE — 85025 COMPLETE CBC W/AUTO DIFF WBC: CPT | Mod: HCNC

## 2020-01-03 PROCEDURE — 3074F PR MOST RECENT SYSTOLIC BLOOD PRESSURE < 130 MM HG: ICD-10-PCS | Mod: HCNC,CPTII,S$GLB, | Performed by: PHYSICIAN ASSISTANT

## 2020-01-03 PROCEDURE — 36415 COLL VENOUS BLD VENIPUNCTURE: CPT | Mod: HCNC

## 2020-01-03 PROCEDURE — 80076 HEPATIC FUNCTION PANEL: CPT | Mod: HCNC

## 2020-01-03 PROCEDURE — 3008F PR BODY MASS INDEX (BMI) DOCUMENTED: ICD-10-PCS | Mod: HCNC,CPTII,S$GLB, | Performed by: PHYSICIAN ASSISTANT

## 2020-01-03 PROCEDURE — 3079F DIAST BP 80-89 MM HG: CPT | Mod: HCNC,CPTII,S$GLB, | Performed by: PHYSICIAN ASSISTANT

## 2020-01-03 PROCEDURE — 3079F PR MOST RECENT DIASTOLIC BLOOD PRESSURE 80-89 MM HG: ICD-10-PCS | Mod: HCNC,CPTII,S$GLB, | Performed by: PHYSICIAN ASSISTANT

## 2020-01-03 PROCEDURE — 99999 PR PBB SHADOW E&M-EST. PATIENT-LVL V: CPT | Mod: PBBFAC,HCNC,, | Performed by: PHYSICIAN ASSISTANT

## 2020-01-03 PROCEDURE — 3008F BODY MASS INDEX DOCD: CPT | Mod: HCNC,CPTII,S$GLB, | Performed by: PHYSICIAN ASSISTANT

## 2020-01-03 PROCEDURE — 99999 PR PBB SHADOW E&M-EST. PATIENT-LVL V: ICD-10-PCS | Mod: PBBFAC,HCNC,, | Performed by: PHYSICIAN ASSISTANT

## 2020-01-03 RX ORDER — HYDROCODONE BITARTRATE AND ACETAMINOPHEN 7.5; 325 MG/1; MG/1
1 TABLET ORAL
Qty: 10 TABLET | Refills: 0 | Status: SHIPPED | OUTPATIENT
Start: 2020-01-03 | End: 2020-01-10 | Stop reason: SDUPTHER

## 2020-01-03 RX ORDER — METHOCARBAMOL 500 MG/1
500 TABLET, FILM COATED ORAL 2 TIMES DAILY PRN
Qty: 60 TABLET | Refills: 0 | Status: SHIPPED | OUTPATIENT
Start: 2020-01-03 | End: 2020-04-16

## 2020-01-03 NOTE — PROGRESS NOTES
Chief Pain Complaint:  Cervical Spine Pain (C-spine) and Arm Pain  lumbar pain       History of Present Illness:   Alicia Soliz is a 39 y.o. female  who is presenting with a chief complaint of Cervical Spine Pain (C-spine) and LBP. The patient began experiencing this problem insidiously, and the pain has been gradually worsening over the past 3 year(s). The pain is described as throbbing, cramping, aching and heavy and is located in the bilateral lumbar spine. Pain is intermittent and lasts hours. The pain is nonradiating. The patient rates her pain a 7 out of ten and interferes with activities of daily living a 7 out of ten. Pain is exacerbated by extension of the lumbar spine, and is improved by rest. Patient reports no prior trauma, no prior spinal surgery     - pertinent negatives: No fever, No chills, No weight loss, No bladder dysfunction, No bowel dysfunction, No saddle anesthesia  - pertinent positives: left arm weakness  Left leg weakness   - medications, other therapies tried (physical therapy, injections):     >> NSAIDs, Tylenol, Tramadol, Norco, gabapentin and flexeril    >> Has previously undergone Physical Therapy    >> Has NOT previously undergone spinal injection/s      Imaging / Labs / Studies (reviewed on 1/3/2020):    Results for orders placed during the hospital encounter of 12/06/19   X-Ray Lumbar Complete With Flex And Ext    Narrative FINDINGS:  The vertebral bodies demonstrate a normal height and alignment.  The disc space heights appear to be well maintained.  There is mild-to-moderate facet arthropathy noted at the L4-5 and L5-S1 levels.  No pars defects.  Surgical clips noted in the right upper quadrant.  Right hemipelvis phlebolith noted.     Results for orders placed during the hospital encounter of 12/06/19   X-Ray Cervical Spine 5 View W Flex Extxt    Narrative COMPARISON:  None.  FINDINGS:  The vertebral bodies demonstrate a normal height.  There is straightening of the normal  lordotic curvature.  No subluxation noted on the flexion or extension views.  There is mild disc space narrowing at the C6-7 level.  No osseous encroachment noted upon the neural foraminal canals.     Results for orders placed during the hospital encounter of 01/15/15   MRI Cervical Spine W WO Cont    Narrative Study:   Cervical spine MRI with and without contrast.  Technique:  Multiplanar non contrast enhanced images obtained on the cervical spine. Contrast enhanced images then obtained.  History: Acute left upper and lower extremity weakness.  Findings:  There is convexity of the cervical spine to the right consistent with dextroscoliosis.  No focal cervical cord abnormality is seen.  No abnormal mass lesions in the cervical spinal canal and at the foramen magnum.  There is a focal 1 cm contrast enhancing hyperintense T2 signal lesion in the left occipital lobe subcortical white matter.  The same lesion also demonstrated on the head MRI study performed on 01/17/15.  C2-3  disc: Normal.  C3-4  disc: Minor broad-based midline disk bulge.  C4-5  disc: Normal.  C5-6  disc: Normal.  C6-7  disc: Normal.  C7-T1 disc: Normal.    Impression Minor broad-based midline C3-4 disk bulge without any evidence for cervical disk herniation nor spinal stenosis.  Normal cervical cord.    Cervical spine dextroscoliosis.  1 cm contrast enhancing left occipital lobe subcortical white matter lesion.  Multiple bihemispheric hyperintense T2 signal lesions including left-sided brainstem lesion demonstrated on the recent head MRI study some of which exhibiting contrast   enhancement .  Considerations include active demyelinating multiple sclerosis plaque disease with differential diagnostic consideration including ADEM .         Review of Systems:  CONSTITUTIONAL: patient denies any fever, chills, or weight loss  SKIN: patient denies any rash or itching  RESPIRATORY: patient denies having any shortness of breath  GASTROINTESTINAL: patient  "denies having any diarrhea, constipation, or bowel incontinence  GENITOURINARY: patient denies having any abnormal bladder function    MUSCULOSKELETAL:  - patient complains of the above noted pain/s (see chief pain complaint)    NEUROLOGICAL:   - pain as above  - strength in Lower extremities is decreased, on the LEFT  - sensation in Lower extremities is intact, BILATERALLY  - patient denies any loss of bowel or bladder control      PSYCHIATRIC: patient denies any change in mood    Other:  All other systems reviewed and are negative      Physical Exam:  Vitals:  /86 (BP Location: Right arm, Patient Position: Sitting, BP Method: Medium (Automatic))   Pulse 85   Resp 18   Ht 5' 6" (1.676 m)   Wt (!) 139.7 kg (308 lb)   BMI 49.71 kg/m²   (reviewed on 1/3/2020)    General: alert and oriented, in no apparent distress.  Gait: antalgic gait. Ambulating with walker.  Skin: no rashes, no discoloration, no obvious lesions  HEENT: normocephalic, atraumatic. Pupils equal and round.  Cardiovascular: no significant peripheral edema present.  Respiratory: without use of accessory muscles of respiration.    Musculoskeletal - Lumbar Spine:  - Pain on flexion of lumbar spine: Absent   - Pain on extension of lumbar spine: Present  - Lumbar facet loading: Present  - TTP over the lumbar facet joints: Present  - TTP over the lumbar paraspinals: Present  - TTP over the SI joints:  Absent   - TTP over GT bursa: Absent   - Straight Leg Raise: Negative  - SHELBI: Negative    Musculoskeletal - Cervical Spine:  - Pain on flexion of cervical spine: Absent   - Pain on extension of cervical spine: Present  - Cervical facet loading: Present  - TTP over the cervical facet joints: Present  - TTP over the cervical paraspinals: Present  - Spurling's: Negative    Neuro - Extremities:  - BUE Strength:R/L: D: 5/5; B: 5/5; T: 5/5; WF: 5/5; WE: 5/5; IO: 5/5  - BLE Strength: R/L: HF: 5/5, HE: 5/5, KF: 5/5; KE: 5/5; FE: 5/5; FF: 5/5  - Extremity " Reflexes: Brisk and symmetric throughout  - Sensory: Sensation to light touch intact bilaterally      Psych:  Mood and affect is appropriate             Assessment:  Alicia Soliz is a 39 y.o. year old female who is presenting with   Encounter Diagnoses   Name Primary?    Lumbar spondylosis Yes    Cervical spondylosis     Multiple sclerosis     Myofascial muscle pain        Plan:  1. Interventional: None for now.    2. Pharmacologic:   - Increase gabapentin 300mg BID to 300/300/900-1200.  - Start Robaxin 500mg to take 1/2 to 1 tab BID PRN muscle spasms.  she understands this could cause drowsiness.  D/c Tizanidine 4 mg PO BID.    - Norco 7.5/325 mg Po Q day PRN (20 tabs) was given at last visit.  Will refill #10 more tablets; she will get established with pain mgmt soon.  - LA  reviewed. Listed below.      3. Rehabilitative: Continue physical therapy, which has been helping.    4. Diagnostic: Lumbar and Cervical Xrays reviewed.  Dr. Hung also called her previously to review.    5. Other: Refer to pain mgmt.  Order given with letter stating we are not prescribing pain medications, along with recent x-rays for her to bring to be established elsewhere for medication management.     6. Follow up: None.     - I discussed the risks, benefits, and alternatives to potential treatment options. All questions and concerns were fully addressed today in clinic. Dr. Hung was consulted regarding the patient plan and agrees.

## 2020-01-06 RX ORDER — MELOXICAM 7.5 MG/1
7.5 TABLET ORAL
Qty: 30 TABLET | Refills: 0 | OUTPATIENT
Start: 2020-01-06

## 2020-01-06 RX ORDER — VALACYCLOVIR HYDROCHLORIDE 500 MG/1
TABLET, FILM COATED ORAL
Qty: 180 TABLET | Refills: 3 | Status: SHIPPED | OUTPATIENT
Start: 2020-01-06 | End: 2020-12-07

## 2020-01-07 ENCOUNTER — PATIENT MESSAGE (OUTPATIENT)
Dept: INTERNAL MEDICINE | Facility: CLINIC | Age: 40
End: 2020-01-07

## 2020-01-07 ENCOUNTER — CLINICAL SUPPORT (OUTPATIENT)
Dept: REHABILITATION | Facility: HOSPITAL | Age: 40
End: 2020-01-07
Attending: PSYCHIATRY & NEUROLOGY
Payer: MEDICARE

## 2020-01-07 ENCOUNTER — PATIENT MESSAGE (OUTPATIENT)
Dept: PAIN MEDICINE | Facility: CLINIC | Age: 40
End: 2020-01-07

## 2020-01-07 DIAGNOSIS — R53.1 LEFT-SIDED WEAKNESS: Primary | ICD-10-CM

## 2020-01-07 DIAGNOSIS — R26.89 IMPAIRMENT OF BALANCE: ICD-10-CM

## 2020-01-07 PROCEDURE — 97110 THERAPEUTIC EXERCISES: CPT | Mod: HCNC

## 2020-01-07 PROCEDURE — 97112 NEUROMUSCULAR REEDUCATION: CPT | Mod: HCNC

## 2020-01-08 RX ORDER — TIZANIDINE 4 MG/1
4 TABLET ORAL 2 TIMES DAILY PRN
Qty: 60 TABLET | Refills: 0 | Status: SHIPPED | OUTPATIENT
Start: 2020-01-08 | End: 2020-02-07

## 2020-01-08 RX ORDER — GABAPENTIN 300 MG/1
CAPSULE ORAL
Qty: 180 CAPSULE | Refills: 0 | Status: ON HOLD | OUTPATIENT
Start: 2020-01-08 | End: 2020-11-25 | Stop reason: HOSPADM

## 2020-01-08 NOTE — PROGRESS NOTES
Physical Therapy Daily Treatment Note     Name: Alicia Soliz  Clinic Number: [id number]    Therapy Diagnosis:   Encounter Diagnoses   Name Primary?    Left-sided weakness Yes    Impairment of balance      Physician: Salvatore Vela MD    Visit Date: 1/7/2020    Physician Orders: PT Eval and Treat  Medical Diagnosis: Multiple Sclerosis   Evaluation Date: 8/15/2019  Authorization Period Expiration: 8/11/2020  Plan of Care Certification Period:1/17/20  Visit #/Visits authorized: 1/1    Time In: 9:00  Time Out: 10:15  Total Billable Time: 30 minutes    Precautions: Standard and Immunosuppression, MS    Subjective     Pt reports: that she was having some sickness over the holidays. She has been consistently doing her exercises and has been trying to walk more.  She was compliant with home exercise program.  Response to previous treatment: Good  Functional change: none    Pain: 2/10  Location: L side (leg and arm)    Objective     Alicia received therapeutic exercises to develop strength, endurance and ROM for 45 minutes including  Nustep x 8 min for L ankle ROM/cardiopulmonary efficiency training  Sit to stand lower surface warm up: BW x 10, 20lbs x 3  Toe raise in bars  Standing marches with focus on increasing hip FL/DF on foam  UE bike 3 min F/3 min B for postural control  Hamstring machine 35#  Quad machine 15#  LE bike x 6 min for quad/hamstring strengthening    Alicia received the following manual therapy techniques: Joint mobilizations and Soft tissue Mobilization were applied to the: R ankle for 5 minutes, including:  Talocrural mobilizations  STM to L gastroc    Alicia participated in neuromuscular re-education activities to improve: Balance, Kinesthetic and Proprioception for 10 minutes. The following activities were included:  Step ups on foam pad  Balance with pertubations on foam, tandem stance, feet together  Steps forward and sideways with minimal UE support    Alicia participated in dynamic  functional therapeutic activities to improve functional performance for 0  minutes, including:    Alicia participated in gait training to improve functional mobility and safety for 5 minutes, including:  Gait training with improving ankle movements  Gait training w quad cane     Alicia received the following supervised modalities after being cleared for contradictions: TENS:  Alicia received TENS electrical stimulation for pain to the R ankle. Pt received continuous mode at a rate of 0 pps for 0 minutes. Alicia tolerated treatment well without any adverse effects.     Alicia received hot pack for 0 minutes to R ankle    Home Exercises Provided and Patient Education Provided     Education provided:   - activity toleration, progression of therapy    Written Home Exercises Provided: yes.  Exercises were reviewed and Alicia was able to demonstrate them prior to the end of the session.  Alicia demonstrated good  understanding of the education provided.     See paper chart under Patient Instructions for exercises provided prior visit.    Assessment   Patient continuing to demonstrate good tolerance to strengthening and mobility training. She still has drop foot on the R side and wants to work on gait training without an AD, however is still not appropriate with this patient. Discussed this with her and the reasons why.    Alicia is progressing well towards her goals.   Pt prognosis is Excellent.     Pt will continue to benefit from skilled outpatient physical therapy to address the deficits listed in the problem list box on initial evaluation, provide pt/family education and to maximize pt's level of independence in the home and community environment.     Pt's spiritual, cultural and educational needs considered and pt agreeable to plan of care and goals.     Anticipated barriers to physical therapy: transportation, relapses in condition     Goals: tolerate more walking as well as increasing overall strength of L side    Plan      Incorporate more walking, improve L side strength, Improve tolerance to physical activity     Jacek-Jennifer Nuñez, PT

## 2020-01-09 ENCOUNTER — CLINICAL SUPPORT (OUTPATIENT)
Dept: REHABILITATION | Facility: HOSPITAL | Age: 40
End: 2020-01-09
Attending: PSYCHIATRY & NEUROLOGY
Payer: MEDICARE

## 2020-01-09 DIAGNOSIS — R26.9 ABNORMALITY OF GAIT: ICD-10-CM

## 2020-01-09 DIAGNOSIS — R26.89 IMPAIRMENT OF BALANCE: ICD-10-CM

## 2020-01-09 DIAGNOSIS — R53.1 LEFT-SIDED WEAKNESS: Primary | ICD-10-CM

## 2020-01-09 PROCEDURE — 97112 NEUROMUSCULAR REEDUCATION: CPT | Mod: HCNC

## 2020-01-09 PROCEDURE — 97110 THERAPEUTIC EXERCISES: CPT | Mod: HCNC

## 2020-01-10 ENCOUNTER — OFFICE VISIT (OUTPATIENT)
Dept: INTERNAL MEDICINE | Facility: CLINIC | Age: 40
End: 2020-01-10
Payer: MEDICARE

## 2020-01-10 ENCOUNTER — LAB VISIT (OUTPATIENT)
Dept: LAB | Facility: HOSPITAL | Age: 40
End: 2020-01-10
Attending: FAMILY MEDICINE
Payer: MEDICARE

## 2020-01-10 VITALS
BODY MASS INDEX: 47.09 KG/M2 | HEIGHT: 66 IN | DIASTOLIC BLOOD PRESSURE: 74 MMHG | WEIGHT: 293 LBS | SYSTOLIC BLOOD PRESSURE: 124 MMHG | TEMPERATURE: 99 F

## 2020-01-10 DIAGNOSIS — R30.0 DYSURIA: ICD-10-CM

## 2020-01-10 DIAGNOSIS — M79.18 MYALGIA, OTHER SITE: ICD-10-CM

## 2020-01-10 DIAGNOSIS — E66.01 MORBID OBESITY WITH BMI OF 45.0-49.9, ADULT: ICD-10-CM

## 2020-01-10 DIAGNOSIS — I10 ESSENTIAL HYPERTENSION: ICD-10-CM

## 2020-01-10 DIAGNOSIS — A53.9 SYPHILIS: ICD-10-CM

## 2020-01-10 DIAGNOSIS — Z00.00 ROUTINE ADULT HEALTH MAINTENANCE: ICD-10-CM

## 2020-01-10 DIAGNOSIS — Z11.4 SCREENING FOR HIV (HUMAN IMMUNODEFICIENCY VIRUS): ICD-10-CM

## 2020-01-10 DIAGNOSIS — G81.90 HEMIPLEGIA, UNSPECIFIED ETIOLOGY, UNSPECIFIED HEMIPLEGIA TYPE, UNSPECIFIED LATERALITY: ICD-10-CM

## 2020-01-10 DIAGNOSIS — G35 MULTIPLE SCLEROSIS: ICD-10-CM

## 2020-01-10 DIAGNOSIS — E55.9 VITAMIN D DEFICIENCY: ICD-10-CM

## 2020-01-10 DIAGNOSIS — R73.9 HYPERGLYCEMIA: ICD-10-CM

## 2020-01-10 DIAGNOSIS — F11.20 OPIOID DEPENDENCE, UNCOMPLICATED: ICD-10-CM

## 2020-01-10 DIAGNOSIS — G35 MULTIPLE SCLEROSIS: Primary | ICD-10-CM

## 2020-01-10 PROBLEM — L98.499 SKIN ULCER: Status: RESOLVED | Noted: 2020-01-10 | Resolved: 2020-01-10

## 2020-01-10 PROBLEM — L98.499 SKIN ULCER: Status: ACTIVE | Noted: 2020-01-10

## 2020-01-10 PROCEDURE — 36415 COLL VENOUS BLD VENIPUNCTURE: CPT | Mod: HCNC,PO

## 2020-01-10 PROCEDURE — 86592 SYPHILIS TEST NON-TREP QUAL: CPT | Mod: HCNC

## 2020-01-10 PROCEDURE — 82306 VITAMIN D 25 HYDROXY: CPT | Mod: HCNC

## 2020-01-10 PROCEDURE — 99214 PR OFFICE/OUTPT VISIT, EST, LEVL IV, 30-39 MIN: ICD-10-PCS | Mod: HCNC,S$GLB,, | Performed by: FAMILY MEDICINE

## 2020-01-10 PROCEDURE — 80061 LIPID PANEL: CPT | Mod: HCNC

## 2020-01-10 PROCEDURE — 3078F DIAST BP <80 MM HG: CPT | Mod: HCNC,CPTII,S$GLB, | Performed by: FAMILY MEDICINE

## 2020-01-10 PROCEDURE — 3074F SYST BP LT 130 MM HG: CPT | Mod: HCNC,CPTII,S$GLB, | Performed by: FAMILY MEDICINE

## 2020-01-10 PROCEDURE — 86703 HIV-1/HIV-2 1 RESULT ANTBDY: CPT | Mod: HCNC

## 2020-01-10 PROCEDURE — 87491 CHLMYD TRACH DNA AMP PROBE: CPT | Mod: HCNC

## 2020-01-10 PROCEDURE — 99999 PR PBB SHADOW E&M-EST. PATIENT-LVL IV: ICD-10-PCS | Mod: PBBFAC,HCNC,, | Performed by: FAMILY MEDICINE

## 2020-01-10 PROCEDURE — 83036 HEMOGLOBIN GLYCOSYLATED A1C: CPT | Mod: HCNC

## 2020-01-10 PROCEDURE — 3008F BODY MASS INDEX DOCD: CPT | Mod: HCNC,CPTII,S$GLB, | Performed by: FAMILY MEDICINE

## 2020-01-10 PROCEDURE — 99214 OFFICE O/P EST MOD 30 MIN: CPT | Mod: HCNC,S$GLB,, | Performed by: FAMILY MEDICINE

## 2020-01-10 PROCEDURE — 99999 PR PBB SHADOW E&M-EST. PATIENT-LVL IV: CPT | Mod: PBBFAC,HCNC,, | Performed by: FAMILY MEDICINE

## 2020-01-10 PROCEDURE — 3008F PR BODY MASS INDEX (BMI) DOCUMENTED: ICD-10-PCS | Mod: HCNC,CPTII,S$GLB, | Performed by: FAMILY MEDICINE

## 2020-01-10 PROCEDURE — 80053 COMPREHEN METABOLIC PANEL: CPT | Mod: HCNC

## 2020-01-10 PROCEDURE — 3078F PR MOST RECENT DIASTOLIC BLOOD PRESSURE < 80 MM HG: ICD-10-PCS | Mod: HCNC,CPTII,S$GLB, | Performed by: FAMILY MEDICINE

## 2020-01-10 PROCEDURE — 84443 ASSAY THYROID STIM HORMONE: CPT | Mod: HCNC

## 2020-01-10 PROCEDURE — 3074F PR MOST RECENT SYSTOLIC BLOOD PRESSURE < 130 MM HG: ICD-10-PCS | Mod: HCNC,CPTII,S$GLB, | Performed by: FAMILY MEDICINE

## 2020-01-10 PROCEDURE — 85025 COMPLETE CBC W/AUTO DIFF WBC: CPT | Mod: HCNC

## 2020-01-10 PROCEDURE — 80074 ACUTE HEPATITIS PANEL: CPT | Mod: HCNC

## 2020-01-10 RX ORDER — GABAPENTIN 300 MG/1
300 CAPSULE ORAL 3 TIMES DAILY
COMMUNITY
End: 2020-04-16 | Stop reason: SDUPTHER

## 2020-01-10 RX ORDER — DOXEPIN HYDROCHLORIDE 50 MG/1
CAPSULE ORAL
Qty: 90 CAPSULE | Refills: 2 | Status: SHIPPED | OUTPATIENT
Start: 2020-01-10 | End: 2020-04-16

## 2020-01-10 RX ORDER — HYDROCODONE BITARTRATE AND ACETAMINOPHEN 7.5; 325 MG/1; MG/1
1 TABLET ORAL EVERY 8 HOURS PRN
Qty: 90 TABLET | Refills: 0 | Status: SHIPPED | OUTPATIENT
Start: 2020-01-10 | End: 2020-02-09

## 2020-01-11 LAB
25(OH)D3+25(OH)D2 SERPL-MCNC: 32 NG/ML (ref 30–96)
ALBUMIN SERPL BCP-MCNC: 3.8 G/DL (ref 3.5–5.2)
ALP SERPL-CCNC: 102 U/L (ref 55–135)
ALT SERPL W/O P-5'-P-CCNC: 19 U/L (ref 10–44)
ANION GAP SERPL CALC-SCNC: 12 MMOL/L (ref 8–16)
AST SERPL-CCNC: 23 U/L (ref 10–40)
BASOPHILS # BLD AUTO: 0.04 K/UL (ref 0–0.2)
BASOPHILS NFR BLD: 0.6 % (ref 0–1.9)
BILIRUB SERPL-MCNC: 0.3 MG/DL (ref 0.1–1)
BUN SERPL-MCNC: 7 MG/DL (ref 6–20)
CALCIUM SERPL-MCNC: 10.3 MG/DL (ref 8.7–10.5)
CHLORIDE SERPL-SCNC: 102 MMOL/L (ref 95–110)
CHOLEST SERPL-MCNC: 195 MG/DL (ref 120–199)
CHOLEST/HDLC SERPL: 3.8 {RATIO} (ref 2–5)
CO2 SERPL-SCNC: 28 MMOL/L (ref 23–29)
CREAT SERPL-MCNC: 0.8 MG/DL (ref 0.5–1.4)
DIFFERENTIAL METHOD: ABNORMAL
EOSINOPHIL # BLD AUTO: 0.1 K/UL (ref 0–0.5)
EOSINOPHIL NFR BLD: 1.3 % (ref 0–8)
ERYTHROCYTE [DISTWIDTH] IN BLOOD BY AUTOMATED COUNT: 16.5 % (ref 11.5–14.5)
EST. GFR  (AFRICAN AMERICAN): >60 ML/MIN/1.73 M^2
EST. GFR  (NON AFRICAN AMERICAN): >60 ML/MIN/1.73 M^2
ESTIMATED AVG GLUCOSE: 131 MG/DL (ref 68–131)
GLUCOSE SERPL-MCNC: 86 MG/DL (ref 70–110)
HBA1C MFR BLD HPLC: 6.2 % (ref 4–5.6)
HCT VFR BLD AUTO: 40.2 % (ref 37–48.5)
HDLC SERPL-MCNC: 51 MG/DL (ref 40–75)
HDLC SERPL: 26.2 % (ref 20–50)
HGB BLD-MCNC: 12 G/DL (ref 12–16)
IMM GRANULOCYTES # BLD AUTO: 0.01 K/UL (ref 0–0.04)
IMM GRANULOCYTES NFR BLD AUTO: 0.2 % (ref 0–0.5)
LDLC SERPL CALC-MCNC: 125.6 MG/DL (ref 63–159)
LYMPHOCYTES # BLD AUTO: 2.8 K/UL (ref 1–4.8)
LYMPHOCYTES NFR BLD: 44.2 % (ref 18–48)
MCH RBC QN AUTO: 21.2 PG (ref 27–31)
MCHC RBC AUTO-ENTMCNC: 29.9 G/DL (ref 32–36)
MCV RBC AUTO: 71 FL (ref 82–98)
MONOCYTES # BLD AUTO: 0.4 K/UL (ref 0.3–1)
MONOCYTES NFR BLD: 6.3 % (ref 4–15)
NEUTROPHILS # BLD AUTO: 3 K/UL (ref 1.8–7.7)
NEUTROPHILS NFR BLD: 47.4 % (ref 38–73)
NONHDLC SERPL-MCNC: 144 MG/DL
NRBC BLD-RTO: 0 /100 WBC
PLATELET # BLD AUTO: 275 K/UL (ref 150–350)
PMV BLD AUTO: 11.1 FL (ref 9.2–12.9)
POTASSIUM SERPL-SCNC: 3.2 MMOL/L (ref 3.5–5.1)
PROT SERPL-MCNC: 8.2 G/DL (ref 6–8.4)
RBC # BLD AUTO: 5.66 M/UL (ref 4–5.4)
RPR SER QL: NORMAL
SODIUM SERPL-SCNC: 142 MMOL/L (ref 136–145)
TRIGL SERPL-MCNC: 92 MG/DL (ref 30–150)
TSH SERPL DL<=0.005 MIU/L-ACNC: 0.62 UIU/ML (ref 0.4–4)
WBC # BLD AUTO: 6.36 K/UL (ref 3.9–12.7)

## 2020-01-12 NOTE — PROGRESS NOTES
Subjective:      Patient ID: Alicia Soliz is a 39 y.o. female.    Chief Complaint: Annual Exam      Patient here today for routine follow-up.  She reports her MS is fairly stable, is seeing MS specialist in [place] twice a month currently.  She continues to go to physical therapy which is also helping.  Today she brings in 2 letter stating that she has been discharged from pain management, is needing a new referral, requesting refill wall trying to get into a new pain management specialist.  She has been on chronic narcotics for years now secondary to generalized pain from MS.  Her blood pressure well controlled on current medications.  She reports her mood has been good, she is sleeping well on current medication.  We are due today to recheck RPR after treatments of syphillis last year, she is requesting other STDs checked as well.  She reports continued polyuria with some urgency.    Review of Systems   Constitutional: Negative for activity change and unexpected weight change.   HENT: Positive for rhinorrhea. Negative for hearing loss and trouble swallowing.    Eyes: Negative for discharge and visual disturbance.   Respiratory: Positive for wheezing. Negative for chest tightness.    Cardiovascular: Negative for chest pain and palpitations.   Gastrointestinal: Negative for blood in stool, constipation, diarrhea and vomiting.   Endocrine: Positive for polyuria. Negative for polydipsia.   Genitourinary: Negative for difficulty urinating, dysuria, hematuria and menstrual problem.   Musculoskeletal: Positive for arthralgias and joint swelling. Negative for neck pain.   Neurological: Positive for weakness. Negative for headaches.   Psychiatric/Behavioral: Negative for confusion and dysphoric mood.     Past Medical History:   Diagnosis Date    Anemia     Arthritis     Cardiac arrest as complication of care     pt states she went into cardiac arrest from an allergic reaction to a medication    Depression      Encounter for blood transfusion     Hemiplegia due to old stroke     Hypertension     Morbid obesity with BMI of 45.0-49.9, adult 9/20/2018    Multiple sclerosis     Multiple sclerosis           Past Surgical History:   Procedure Laterality Date    APPENDECTOMY      CHOLECYSTECTOMY      HYSTERECTOMY      KNEE SURGERY      TONSILLECTOMY      TUBAL LIGATION       Family History   Problem Relation Age of Onset    Lupus Mother     Heart disease Mother     Diabetes Father     Kidney disease Father     Cancer Maternal Aunt 40        breast    Breast cancer Maternal Aunt     Diabetes Maternal Aunt     Breast cancer Maternal Cousin     Breast cancer Maternal Cousin     Ovarian cancer Maternal Cousin      Social History     Socioeconomic History    Marital status: Single     Spouse name: Not on file    Number of children: Not on file    Years of education: Not on file    Highest education level: Not on file   Occupational History     Employer: [employer]   Social Needs    Financial resource strain: Somewhat hard    Food insecurity:     Worry: Often true     Inability: Sometimes true    Transportation needs:     Medical: Yes     Non-medical: No   Tobacco Use    Smoking status: Never Smoker    Smokeless tobacco: Never Used   Substance and Sexual Activity    Alcohol use: Yes     Frequency: Never     Drinks per session: Patient refused     Binge frequency: Never     Comment: OCCASIONALLY    Drug use: No    Sexual activity: Yes     Partners: Male     Birth control/protection: Surgical   Lifestyle    Physical activity:     Days per week: 7 days     Minutes per session: 60 min    Stress: Not at all   Relationships    Social connections:     Talks on phone: Once a week     Gets together: More than three times a week     Attends Bahai service: Not on file     Active member of club or organization: Patient refused     Attends meetings of clubs or organizations: Never     Relationship status:  "Patient refused   Other Topics Concern    Not on file   Social History Narrative    Not on file     Review of patient's allergies indicates:   Allergen Reactions    Demerol [meperidine] Itching     Other reaction(s): Itching    Dilaudid [hydromorphone (bulk)] Anaphylaxis     "coded" per pt  Patient can tolerate Lortab    Prednisone Itching     Other reaction(s): Itching    Morphine     Tramadol      shaking       Objective:       /74 (BP Location: Right arm)   Temp 98.7 °F (37.1 °C) (Tympanic)   Ht 5' 6" (1.676 m)   Wt (!) 146.1 kg (322 lb 1.5 oz)   BMI 51.99 kg/m²   Physical Exam   Constitutional: She is oriented to person, place, and time. Vital signs are normal. She appears well-developed and well-nourished. No distress.   HENT:   Head: Normocephalic and atraumatic.   Right Ear: Hearing, tympanic membrane, external ear and ear canal normal.   Left Ear: Hearing, tympanic membrane, external ear and ear canal normal.   Nose: Nose normal.   Mouth/Throat: Uvula is midline, oropharynx is clear and moist and mucous membranes are normal. No oropharyngeal exudate.   Eyes: Pupils are equal, round, and reactive to light. Conjunctivae and EOM are normal.   Neck: Normal range of motion. Neck supple. No tracheal deviation present. No thyromegaly present.   Cardiovascular: Normal rate, regular rhythm, normal heart sounds and intact distal pulses.   No murmur heard.  Pulmonary/Chest: Effort normal and breath sounds normal. No respiratory distress.   Abdominal: Soft. Bowel sounds are normal. There is no tenderness. There is no guarding. No hernia.   Musculoskeletal: Normal range of motion. She exhibits no edema.   Lymphadenopathy:     She has no cervical adenopathy.   Neurological: She is alert and oriented to person, place, and time.   Skin: Skin is warm and dry. Capillary refill takes less than 2 seconds. She is not diaphoretic.   Psychiatric: She has a normal mood and affect. Her behavior is normal. Judgment " and thought content normal.   Vitals reviewed.    Assessment:     1. Multiple sclerosis    2. Essential hypertension    3. Morbid obesity with BMI of 45.0-49.9, adult    4. Hemiplegia, unspecified etiology, unspecified hemiplegia type, unspecified laterality    5. Routine adult health maintenance    6. Syphilis    7. Dysuria    8. Hyperglycemia    9. Screening for HIV (human immunodeficiency virus)    10. Vitamin D deficiency    11. Myalgia, other site     12. Opioid dependence, uncomplicated      Plan:   Multiple sclerosis  -     CBC auto differential; Future; Expected date: 01/10/2020  -     Lipid panel; Future; Expected date: 01/10/2020  -     TSH; Future; Expected date: 01/10/2020  -     Vitamin D; Future; Expected date: 01/10/2020  -     HYDROcodone-acetaminophen (NORCO) 7.5-325 mg per tablet; Take 1 tablet by mouth every 8 (eight) hours as needed for Pain.  Dispense: 90 tablet; Refill: 0    Essential hypertension    Morbid obesity with BMI of 45.0-49.9, adult  -     Lipid panel; Future; Expected date: 01/10/2020  -     Hemoglobin A1c; Future; Expected date: 01/10/2020    Hemiplegia, unspecified etiology, unspecified hemiplegia type, unspecified laterality  -     Lipid panel; Future; Expected date: 01/10/2020  -     Hemoglobin A1c; Future; Expected date: 01/10/2020    Routine adult health maintenance  -     CBC auto differential; Future; Expected date: 01/10/2020  -     Comprehensive metabolic panel; Future; Expected date: 01/10/2020  -     Lipid panel; Future; Expected date: 01/10/2020  -     Hemoglobin A1c; Future; Expected date: 01/10/2020  -     TSH; Future; Expected date: 01/10/2020  -     Vitamin D; Future; Expected date: 01/10/2020  -     HIV 1/2 Ag/Ab (4th Gen); Future; Expected date: 01/10/2020  -     HEPATITIS PANEL, ACUTE; Future; Expected date: 01/10/2020  -     C. trachomatis/N. gonorrhoeae by AMP DNA [facility]; Urine  -     Urinalysis; Future; Expected date: 01/10/2020    Syphilis  -     RPR;  Future; Expected date: 01/10/2020  -     HIV 1/2 Ag/Ab (4th Gen); Future; Expected date: 01/10/2020  -     C. trachomatis/N. gonorrhoeae by AMP DNA [facility]; Urine    Dysuria  -     HIV 1/2 Ag/Ab (4th Gen); Future; Expected date: 01/10/2020  -     C. trachomatis/N. gonorrhoeae by AMP DNA [facility]; Urine  -     Urinalysis; Future; Expected date: 01/10/2020    Hyperglycemia  -     Hemoglobin A1c; Future; Expected date: 01/10/2020  -     TSH; Future; Expected date: 01/10/2020    Screening for HIV (human immunodeficiency virus)  -     HIV 1/2 Ag/Ab (4th Gen); Future; Expected date: 01/10/2020    Vitamin D deficiency  -     Vitamin D; Future; Expected date: 01/10/2020    Myalgia, other site   -     TSH; Future; Expected date: 01/10/2020    Opioid dependence, uncomplicated    Other orders  -     doxepin (SINEQUAN) 50 MG capsule; TAKE 1 CAPSULE BY MOUTH NIGHTLY AS NEEDED.  Dispense: 90 capsule; Refill: 2     One month supply of hydrocodone prescribed today.  She will be finding new pain management doctor which accepts her insurance.  Continue all current medications.  Medication List with Changes/Refills   Current Medications    ACETAMINOPHEN-CODEINE 300-30MG (TYLENOL #3) 300-30 MG TAB    Take 1 tablet by mouth every 4 (four) hours as needed. for pain.    AUBAGIO 7 MG TAB    once daily.    AVONEX 30 MCG/0.5 ML PNKT        DALFAMPRIDINE (AMPYRA) 10 MG TB12    Take 1 tablet by mouth 2 (two) times daily.    ESOMEPRAZOLE (NEXIUM) 40 MG CAPSULE    Take 1 capsule (40 mg total) by mouth before breakfast.    GABAPENTIN (NEURONTIN) 300 MG CAPSULE    Take 300 mg by mouth 3 (three) times daily.    HYDROCHLOROTHIAZIDE (HYDRODIURIL) 12.5 MG TAB    TAKE 1 TABLET (12.5 MG TOTAL) BY MOUTH ONCE DAILY.    LINACLOTIDE (LINZESS) 145 MCG CAP CAPSULE    Take 1 capsule (145 mcg total) by mouth once daily.    [id number] 1-1-500 MG CAP    Take 1 tablet by mouth once daily.    METHOCARBAMOL (ROBAXIN) 500 MG TAB    Take 1 tablet  (500 mg total) by mouth 2 (two) times daily as needed (muscle spasms).    MULTIVITAMIN WITH FOLIC ACID 400 MCG TAB    Take 1 tablet by mouth.    MUPIROCIN (BACTROBAN) 2 % OINTMENT    Apply topically 2 (two) times daily. Apply to affected area    NYSTATIN (MYCOSTATIN) CREAM    Apply topically 2 (two) times daily.    PROMETHAZINE (PHENERGAN) 25 MG TABLET    Take 1 tablet (25 mg total) by mouth every 4 (four) hours as needed for Nausea.    VALACYCLOVIR (VALTREX) 500 MG TABLET    Take 500 mg by mouth as needed.   Changed and/or Refilled Medications    Modified Medication Previous Medication    DOXEPIN (SINEQUAN) 50 MG CAPSULE doxepin (SINEQUAN) 50 MG capsule       TAKE 1 CAPSULE BY MOUTH NIGHTLY AS NEEDED.    TAKE 1 CAPSULE BY MOUTH NIGHTLY AS NEEDED.    HYDROCODONE-ACETAMINOPHEN (NORCO) 7.5-325 MG PER TABLET HYDROcodone-acetaminophen (NORCO) 7.5-325 mg per tablet       Take 1 tablet by mouth every 8 (eight) hours as needed for Pain.    Take 1 tablet by mouth every 24 hours as needed for Pain.   Discontinued Medications    AMOXICILLIN (AMOXIL) 500 MG CAPSULE    TAKE 2 CAPSULES NOW THEN 1 CAPSULE FOUR TIMES DAILY UNTIL FINISHED    MELOXICAM (MOBIC) 7.5 MG TABLET    Take 1 tablet (7.5 mg total) by mouth as needed.

## 2020-01-13 ENCOUNTER — PATIENT MESSAGE (OUTPATIENT)
Dept: INTERNAL MEDICINE | Facility: CLINIC | Age: 40
End: 2020-01-13

## 2020-01-13 LAB
C TRACH DNA SPEC QL NAA+PROBE: NOT DETECTED
HAV IGM SERPL QL IA: NEGATIVE
HBV CORE IGM SERPL QL IA: NEGATIVE
HBV SURFACE AG SERPL QL IA: NEGATIVE
HCV AB SERPL QL IA: NEGATIVE
HIV 1+2 AB+HIV1 P24 AG SERPL QL IA: NEGATIVE
N GONORRHOEA DNA SPEC QL NAA+PROBE: NOT DETECTED

## 2020-01-13 NOTE — PROGRESS NOTES
Physical Therapy Daily Treatment Note     Name: Alicia Soliz  Clinic Number: [id number]    Therapy Diagnosis:   Encounter Diagnoses   Name Primary?    Left-sided weakness Yes    Impairment of balance     Abnormality of gait      Physician: Salvatore Vela MD    Visit Date: 1/9/2020    Physician Orders: PT Eval and Treat  Medical Diagnosis: Multiple Sclerosis   Evaluation Date: 8/15/2019  Authorization Period Expiration: 8/11/2020  Plan of Care Certification Period:1/17/20  Visit #/Visits authorized:     Time In: 9:00  Time Out: 10:15  Total Billable Time: 30 minutes    Precautions: Standard and Immunosuppression, MS    Subjective     Pt reports: that she has been working on her walking at home and her exercises have been going well.   She was compliant with home exercise program.  Response to previous treatment: Good  Functional change: none    Pain: 2/10  Location: L side (leg and arm)    Objective     Alicia received therapeutic exercises to develop strength, endurance and ROM for 45 minutes including  Nustep x 8 min for L ankle ROM/cardiopulmonary efficiency training  Sit to stand lower surface warm up: BW x 10, 20lbs x 3  Toe raise in bars  Standing marches with focus on increasing hip FL/DF on foam  UE bike 3 min F/3 min B for postural control  Hamstring machine 35#  Quad machine 15#  LE bike x 6 min for quad/hamstring strengthening    Alicia received the following manual therapy techniques: Joint mobilizations and Soft tissue Mobilization were applied to the: R ankle for 5 minutes, including:  Talocrural mobilizations  STM to L gastroc    Alicia participated in neuromuscular re-education activities to improve: Balance, Kinesthetic and Proprioception for 10 minutes. The following activities were included:  Step ups on foam pad  Balance with pertubations on foam, tandem stance, feet together  Steps forward and sideways with minimal UE support    Alicia participated in dynamic functional therapeutic  activities to improve functional performance for 0  minutes, including:    Alicia participated in gait training to improve functional mobility and safety for 5 minutes, including:  Gait training with improving ankle movements  Gait training w quad cane     Alicia received the following supervised modalities after being cleared for contradictions: TENS:  Alicia received TENS electrical stimulation for pain to the R ankle. Pt received continuous mode at a rate of 0 pps for 0 minutes. Alicia tolerated treatment well without any adverse effects.     Alicia received hot pack for 0 minutes to R ankle    Home Exercises Provided and Patient Education Provided     Education provided:   - activity toleration, progression of therapy    Written Home Exercises Provided: yes.  Exercises were reviewed and Alicia was able to demonstrate them prior to the end of the session.  Alicia demonstrated good  understanding of the education provided.     See paper chart under Patient Instructions for exercises provided prior visit.    Assessment   Patient demonstrated good tolerance to strengthening and mobility training this session. Was able to perform more stepping over objects this session with more stability noted.    Alicia is progressing well towards her goals.   Pt prognosis is Excellent.     Pt will continue to benefit from skilled outpatient physical therapy to address the deficits listed in the problem list box on initial evaluation, provide pt/family education and to maximize pt's level of independence in the home and community environment.     Pt's spiritual, cultural and educational needs considered and pt agreeable to plan of care and goals.     Anticipated barriers to physical therapy: transportation, relapses in condition     Goals: tolerate more walking as well as increasing overall strength of L side    Plan     Incorporate more walking, improve L side strength, Improve tolerance to physical activity     Zoltan Nuñez, PT

## 2020-01-14 ENCOUNTER — PATIENT MESSAGE (OUTPATIENT)
Dept: INTERNAL MEDICINE | Facility: CLINIC | Age: 40
End: 2020-01-14

## 2020-01-14 ENCOUNTER — TELEPHONE (OUTPATIENT)
Dept: SURGERY | Facility: CLINIC | Age: 40
End: 2020-01-14

## 2020-01-14 RX ORDER — METFORMIN HYDROCHLORIDE 500 MG/1
TABLET ORAL
Qty: 180 TABLET | Refills: 3 | Status: SHIPPED | OUTPATIENT
Start: 2020-01-14 | End: 2020-10-09

## 2020-01-14 RX ORDER — CODEINE PHOSPHATE AND GUAIFENESIN 10; 100 MG/5ML; MG/5ML
5 SOLUTION ORAL 3 TIMES DAILY PRN
Qty: 120 ML | Refills: 0 | Status: SHIPPED | OUTPATIENT
Start: 2020-01-14 | End: 2020-01-24

## 2020-01-14 RX ORDER — AZITHROMYCIN 250 MG/1
TABLET, FILM COATED ORAL
Qty: 6 TABLET | Refills: 0 | Status: SHIPPED | OUTPATIENT
Start: 2020-01-14 | End: 2020-01-19

## 2020-01-14 NOTE — TELEPHONE ENCOUNTER
Spoke with pt via phone, states upcoming appt on 01/17/2020 no longer works for her, during conversation appt mindy to 1/31/2020 with Dr Mendoza. Pt verbalized an understanding

## 2020-01-15 ENCOUNTER — PATIENT MESSAGE (OUTPATIENT)
Dept: INTERNAL MEDICINE | Facility: CLINIC | Age: 40
End: 2020-01-15

## 2020-01-15 RX ORDER — ESOMEPRAZOLE MAGNESIUM 40 MG/1
40 CAPSULE, DELAYED RELEASE ORAL
Qty: 90 CAPSULE | Refills: 3 | Status: SHIPPED | OUTPATIENT
Start: 2020-01-15 | End: 2020-10-09 | Stop reason: SDUPTHER

## 2020-01-16 ENCOUNTER — CLINICAL SUPPORT (OUTPATIENT)
Dept: REHABILITATION | Facility: HOSPITAL | Age: 40
End: 2020-01-16
Attending: PSYCHIATRY & NEUROLOGY
Payer: MEDICARE

## 2020-01-16 DIAGNOSIS — R26.89 IMPAIRMENT OF BALANCE: ICD-10-CM

## 2020-01-16 DIAGNOSIS — R26.9 ABNORMALITY OF GAIT: ICD-10-CM

## 2020-01-16 DIAGNOSIS — R53.1 LEFT-SIDED WEAKNESS: Primary | ICD-10-CM

## 2020-01-16 DIAGNOSIS — G35 MULTIPLE SCLEROSIS: ICD-10-CM

## 2020-01-16 DIAGNOSIS — G35 MULTIPLE SCLEROSIS: Primary | ICD-10-CM

## 2020-01-16 PROCEDURE — 97110 THERAPEUTIC EXERCISES: CPT | Mod: HCNC

## 2020-01-16 PROCEDURE — 97112 NEUROMUSCULAR REEDUCATION: CPT | Mod: HCNC

## 2020-01-16 RX ORDER — ALPRAZOLAM 0.5 MG/1
0.5 TABLET ORAL 3 TIMES DAILY PRN
Qty: 20 TABLET | Refills: 0 | Status: SHIPPED | OUTPATIENT
Start: 2020-01-16 | End: 2020-04-16

## 2020-01-16 RX ORDER — TERIFLUNOMIDE 7 MG/1
7 TABLET, FILM COATED ORAL DAILY
Qty: 90 TABLET | Refills: 0 | Status: SHIPPED | OUTPATIENT
Start: 2020-01-16 | End: 2020-04-21 | Stop reason: SDUPTHER

## 2020-01-16 RX ORDER — TERIFLUNOMIDE 7 MG/1
TABLET, FILM COATED ORAL
Qty: 30 TABLET | OUTPATIENT
Start: 2020-01-16

## 2020-01-16 NOTE — PROGRESS NOTES
Physical Therapy Daily Treatment Note     Name: Alicia Soliz  Clinic Number: [id number]    Therapy Diagnosis:   Encounter Diagnoses   Name Primary?    Left-sided weakness Yes    Impairment of balance     Abnormality of gait      Physician: Salvatore Vela MD    Visit Date: 1/16/2020    Physician Orders: PT Eval and Treat  Medical Diagnosis: Multiple Sclerosis   Evaluation Date: 8/15/2019  Authorization Period Expiration: 8/11/2020  Plan of Care Certification Period:1/17/20  Visit #/Visits authorized:     Time In: 9:00  Time Out: 10:15  Total Billable Time: 45 minutes    Precautions: Standard and Immunosuppression, MS    Subjective     Pt reports: that she has been feeling a little more depressed the last couple of days. Has still been consistent with her exercises and her mobility training.  She was compliant with home exercise program.  Response to previous treatment: Good  Functional change: none    Pain: 2/10  Location: L side (leg and arm)    Objective     Alicia received therapeutic exercises to develop strength, endurance and ROM for 45 minutes including  Nustep x 8 min for L ankle ROM/cardiopulmonary efficiency training  Sit to stand lower surface warm up: BW x 10, 20lbs x 3  Toe raise in bars  Standing marches with focus on increasing hip FL/DF against TB  UE bike 3 min F/3 min B for postural control  Hamstring machine 40#  Quad machine 25#  Gastroc stretch  LE bike x 6 min for quad/hamstring strengthening    Alicia received the following manual therapy techniques: Joint mobilizations and Soft tissue Mobilization were applied to the: R ankle for 5 minutes, including:  Talocrural mobilizations  STM to L gastroc    Alicia participated in neuromuscular re-education activities to improve: Balance, Kinesthetic and Proprioception for 10 minutes. The following activities were included:  Step ups on foam pad  Balance with pertubations on foam, tandem stance, feet together  Steps forward and sideways with  minimal UE support    Alicia participated in dynamic functional therapeutic activities to improve functional performance for 0  minutes, including:    Alicia participated in gait training to improve functional mobility and safety for 5 minutes, including:  Gait training with improving ankle movements  Gait training w quad cane     Alicia received the following supervised modalities after being cleared for contradictions: TENS:  Alicia received TENS electrical stimulation for pain to the R ankle. Pt received continuous mode at a rate of 0 pps for 0 minutes. Alicia tolerated treatment well without any adverse effects.     Alicia received hot pack for 0 minutes to R ankle    Home Exercises Provided and Patient Education Provided     Education provided:   - activity toleration, progression of therapy    Written Home Exercises Provided: yes.  Exercises were reviewed and Alicia was able to demonstrate them prior to the end of the session.  Alicia demonstrated good  understanding of the education provided.     See paper chart under Patient Instructions for exercises provided prior visit.    Assessment   Patient is continuing to demonstrate good tolerance to strengthening and mobility training. However, patient keeps asking about gait training without an AD. And again educated that she is not ready for that due to her drop foot on the L side and increased risk for falls.    Alicia is progressing well towards her goals.   Pt prognosis is Excellent.     Pt will continue to benefit from skilled outpatient physical therapy to address the deficits listed in the problem list box on initial evaluation, provide pt/family education and to maximize pt's level of independence in the home and community environment.     Pt's spiritual, cultural and educational needs considered and pt agreeable to plan of care and goals.     Anticipated barriers to physical therapy: transportation, relapses in condition     Goals: tolerate more walking as well as  increasing overall strength of L side    Plan     Incorporate more walking, improve L side strength, Improve tolerance to physical activity     Zoltan Nuñez, PT

## 2020-01-21 ENCOUNTER — PATIENT MESSAGE (OUTPATIENT)
Dept: NEUROLOGY | Facility: CLINIC | Age: 40
End: 2020-01-21

## 2020-01-22 ENCOUNTER — TELEPHONE (OUTPATIENT)
Dept: NEUROLOGY | Facility: CLINIC | Age: 40
End: 2020-01-22

## 2020-01-22 ENCOUNTER — TELEPHONE (OUTPATIENT)
Dept: INTERNAL MEDICINE | Facility: CLINIC | Age: 40
End: 2020-01-22

## 2020-01-22 NOTE — TELEPHONE ENCOUNTER
Patient called to state that she woke up crying and is depressed. She stated that she can't stop crying. I ask the patient is she wanting to harm herself or anyone else and she stated not as of right now but she does not want it to get to that point. She stated that she really needs to see someone today. I informed her that Dr. Dumont leaves for noon today, Dr. Diana schedule is full. I scheduled the patient with Dr. Plascencia this afternoon.

## 2020-01-22 NOTE — TELEPHONE ENCOUNTER
----- Message from Doreen Wray sent at 1/22/2020 11:46 AM CST -----  Contact: pt  Patient states she wants to see Dr Dane ivy for depression.  Patient call back number is [phone]

## 2020-01-23 ENCOUNTER — PATIENT OUTREACH (OUTPATIENT)
Dept: ADMINISTRATIVE | Facility: OTHER | Age: 40
End: 2020-01-23

## 2020-01-24 ENCOUNTER — PATIENT MESSAGE (OUTPATIENT)
Dept: NEUROLOGY | Facility: CLINIC | Age: 40
End: 2020-01-24

## 2020-01-24 ENCOUNTER — OFFICE VISIT (OUTPATIENT)
Dept: NEUROLOGY | Facility: CLINIC | Age: 40
End: 2020-01-24
Payer: MEDICARE

## 2020-01-24 DIAGNOSIS — G35 MULTIPLE SCLEROSIS: Primary | ICD-10-CM

## 2020-01-24 DIAGNOSIS — Z51.81 ENCOUNTER FOR MONITORING IMMUNOMODULATING THERAPY: ICD-10-CM

## 2020-01-24 DIAGNOSIS — Z79.899 ENCOUNTER FOR MONITORING IMMUNOMODULATING THERAPY: ICD-10-CM

## 2020-01-24 PROCEDURE — 99214 PR OFFICE/OUTPT VISIT, EST, LEVL IV, 30-39 MIN: ICD-10-PCS | Mod: 95,,, | Performed by: PSYCHIATRY & NEUROLOGY

## 2020-01-24 PROCEDURE — 99214 OFFICE O/P EST MOD 30 MIN: CPT | Mod: 95,,, | Performed by: PSYCHIATRY & NEUROLOGY

## 2020-01-24 NOTE — PROGRESS NOTES
I saw Alicia Soliz as a established patient today for multiple sclerosis follow up. Telemedicine visit.    History of Present Illness  The patient is a 39 y.o. H female with RRMS, HTN, morbid obesity, depression, GERD, and knee pain.  MS diagnosed in 1/15/15 by clinical history, MRI, and CSF. Symptoms at the time were left eye vision loss, possible TISH, left face/body weakness and paresthesias. Since that time, she denies new symptoms/signs of relapse, but she has had several episodes of symptom flare associated with either physical overexertion or stressors after careful review with patients and her daughters. She continues with subjective weakness on left side since the initial event. She has gotten steroids for several of these flares, however. For her disease, she has been on Tecfidera, Copaxone, and Tysabri. She is currently on Avonex.  Of note, daughters note that the patient has significant stress from her mother, which contribute to her flares. Other stressors are that of morbid obesity (reports difficulty losing weight) and anxious in general.    Today:  Aubagio - not yet started. Medicine not delivered yet.  Currently no new s/s or relapse. No weakness at present. Still attending therapy. Told that she had arthritis in her back; given Robaxin for this which helps.  Reports increase in depression, no particular trigger reported initially. However, she reported that she is sad that she didn't complete her goal of walking independently all the time. She is not trying to hurt or kill herself. She is following with a therapist who is helping her - talking about sensitive topics about family issues with mother and aunt  Reports that she is exercising and dieting and she is happy for this.   Recently told that she is prediabetic. Gabapentin prescribed 300mg tid, but she did not take increased dose yet as she was worried about overdose.  Recently discharged from pain clinic but now seeing another pain  specialist  Has appt with weight loss surgery coming up.      MS REVIEW OF SYMPTOMS 1/23/2020   Do you feel abnormally tired on most days? No   Do you feel you generally sleep well? Yes   Do you have difficulty controlling your bladder?  Yes   Do you have difficulty controlling your bowels?  Yes   Do you have frequent muscle cramps, tightness or spasms in your limbs?  Yes   Do you have new visual symptoms?  No   Do you have worsening difficulty with your memory or thinking? No   Do you have worsening symptoms of anxiety or depression?  Yes   For patients who walk, Do you have more difficulty walking?  Not Applicable   Have you fallen since your last visit?  No   For patients who use wheelchairs: Do you have any skin wounds or breakdown? No   Do you have difficulty using your hands?  No   Do you have shooting or burning pain? Yes   Do you have difficulty with sexual function?  No   If you are sexually active, are you using birth control? Y/N  N/A No   Do you often choke when swallowing liquids or solid food?  No   Do you experience worsening symptoms when overheated? No   Do you need any new equipment such as a wheelchair, walker or shower chair? Yes   Do you receive co-pay financial assistance for your principal MS medicine? Yes   Would you be interested in participating in an MS research trial in the future? No   For patients on Gilenya, Tecfidera, Aubagio, Rituxan, Ocrevus, Tysabri, Lemtrada or Methotrexate, are you aware that you should NOT receive live virus vaccines?  No   Do you feel you have adequate family/friend support?  Yes   Do you have health insurance?   Yes   Are you currently employed? No   Do you receive SSDI/SSI?  Yes   Do you use marijuana or cannabis products? No   Have you been diagnosed with a urinary tract infection since your last visit here? No   Have you been diagnosed with a respiratory tract infection since your last visit here? No   Have you been to the emergency room since your last  "visit here? No   Have you been hospitalized since your last visit here?  No         Social History:  . High school education. Unemployed; disabled.  Never smoker  No alcohol, drugs, or coffee. Drinks tea.  Attempting weight loss.    Review of patient's allergies indicates:   Allergen Reactions    Demerol [meperidine] Itching     Other reaction(s): Itching    Dilaudid [hydromorphone (bulk)] Anaphylaxis     "coded" per pt  Patient can tolerate Lortab    Prednisone Itching     Other reaction(s): Itching    Morphine     Tramadol      shaking       Current Outpatient Medications on File Prior to Visit   Medication Sig Dispense Refill Last Dose    acetaminophen-codeine 300-30mg (TYLENOL #3) 300-30 mg Tab Take 1 tablet by mouth every 4 (four) hours as needed. for pain.  0 Not Taking    ALPRAZolam (XANAX) 0.5 MG tablet Take 1 tablet (0.5 mg total) by mouth 3 (three) times daily as needed for Anxiety. 20 tablet 0     AUBAGIO 7 mg Tab Take 7 mg by mouth once daily. 90 tablet 0     dalfampridine (AMPYRA) 10 mg Tb12 Take 1 tablet by mouth 2 (two) times daily. (Patient taking differently: Take 1 tablet by mouth once daily. ) 60 tablet 1 Taking    doxepin (SINEQUAN) 50 MG capsule TAKE 1 CAPSULE BY MOUTH NIGHTLY AS NEEDED. 90 capsule 2     gabapentin (NEURONTIN) 300 MG capsule Take 300 mg by mouth 3 (three) times daily.   Taking    guaifenesin-codeine 100-10 mg/5 ml (TUSSI-ORGANIDIN NR)  mg/5 mL syrup Take 5 mLs by mouth 3 (three) times daily as needed for Cough. 120 mL 0     hydroCHLOROthiazide (HYDRODIURIL) 12.5 MG Tab TAKE 1 TABLET (12.5 MG TOTAL) BY MOUTH ONCE DAILY. 90 tablet 3 Taking    HYDROcodone-acetaminophen (NORCO) 7.5-325 mg per tablet Take 1 tablet by mouth every 8 (eight) hours as needed for Pain. 90 tablet 0     linaCLOtide (LINZESS) 145 mcg Cap capsule Take 1 capsule (145 mcg total) by mouth once daily. (Patient not taking: Reported on 1/10/2020) 30 capsule 5 Not Taking    " [id number] 1-1-500 mg Cap Take 1 tablet by mouth once daily. 30 capsule 3 Taking    methocarbamol (ROBAXIN) 500 MG Tab Take 1 tablet (500 mg total) by mouth 2 (two) times daily as needed (muscle spasms). 60 tablet 0 Taking    multivitamin with folic acid 400 mcg Tab Take 1 tablet by mouth.   Taking    mupirocin (BACTROBAN) 2 % ointment Apply topically 2 (two) times daily. Apply to affected area 30 g 1 Taking    nystatin (MYCOSTATIN) cream Apply topically 2 (two) times daily. 30 g 1 Taking    promethazine (PHENERGAN) 25 MG tablet Take 1 tablet (25 mg total) by mouth every 4 (four) hours as needed for Nausea. 30 tablet 1 Taking    valACYclovir (VALTREX) 500 MG tablet Take 500 mg by mouth as needed.  11 Not Taking       Physical Exam  There were no vitals filed for this visit.  In general, the patient is well nourished    MENTAL STATUS: language is fluent, normal verbal comprehension, attention is normal, patient is alert and oriented, fund of knowlege is appropriate by vocabulary.     CRANIAL NERVE EXAM:   No facial asymmetry. There is no dysarthria. Hearing is grossly intact.     MOTOR EXAM: unable to formally test    COORDINATION: no apparent ataxia          IMAGING (personally reviewed):  MRI Brain w/wo 1/2015 Multifocal periventricular, subcortical and pontine white matter lesions compatible with demyelinating plaques of multiple sclerosis.  Many of the lesions display enhancement consistent with active MS plaque. Evolving signal changes evident on the DWI and ADC map sequences with development of right periventricular white matter enhancement as compared to the recent study of 01/15/15.  Differential considerations include active MS plaque formation/evolution and acute ischemia/infarction    MRI C-Sprine 1/2015 Normal cervical cord. 1 cm contrast enhancing left occipital lobe subcortical white matter lesion.  Multiple bihemispheric hyperintense T2 signal lesions including left-sided  brainstem lesion demonstrated on the recent head MRI study some of which exhibiting contrast enhancement     LABS:  Lab Results   Component Value Date    WBC 6.36 01/10/2020    HGB 12.0 01/10/2020    HCT 40.2 01/10/2020    MCV 71 (L) 01/10/2020     01/10/2020         Chemistry        Component Value Date/Time     01/10/2020 1205    K 3.2 (L) 01/10/2020 1205     01/10/2020 1205    CO2 28 01/10/2020 1205    BUN 7 01/10/2020 1205    CREATININE 0.8 01/10/2020 1205    GLU 86 01/10/2020 1205        Component Value Date/Time    CALCIUM 10.3 01/10/2020 1205    ALKPHOS 102 01/10/2020 1205    AST 23 01/10/2020 1205    ALT 19 01/10/2020 1205    BILITOT 0.3 01/10/2020 1205    ESTGFRAFRICA >60.0 01/10/2020 1205    EGFRNONAA >60.0 01/10/2020 1205            Lab Results   Component Value Date    LABPROT 10.0 03/11/2015    ALBUMIN 3.8 01/10/2020     Results for JUJU POOLE (MRN [id number]) as of 4/16/2019 23:40   Ref. Range 1/16/2015 15:03   COLOR CSF Latest Ref Range: Colorless  Colorless   Heme Aliquot Latest Units: mL 2.0   Appearance, CSF Latest Ref Range: Clear  Clear   WBC, CSF Latest Ref Range: 0 - 5 /cu mm 3   RBC, CSF Latest Ref Range: 0 /cu mm 1 (A)   Glucose, CSF Latest Ref Range: 40 - 70 mg/dL 74 (H)   Protein, CSF Latest Ref Range: 15 - 40 mg/dL 39   Angio Convert Enzyme, CSF Latest Ref Range: 0.0 - 2.5 U/L 0.7   CSF Bands Latest Units: bands 6   Serum Bands Latest Units: bands 0   Olig Bands Interpretation, CSF Latest Ref Range: <4 bands 6 (A)   IgG Index, CSF Latest Ref Range: <=0.85  0.74   Albumin, CSF Latest Ref Range: <=27.0 mg/dL 20.9   IGG/ALBUMIN RATIO, CSF Latest Ref Range: <=0.21  0.23 (H)   IgG Synthetic Rate Latest Ref Range: <=12 mg/24 h 7.99   Albumin, Serum Latest Ref Range: 3200 - 4800 mg/dL 4680   IgG, CSF Latest Ref Range: <=8.1 mg/dL 4.9           Diagnosis/Assessment/Plan:       1. Multiple sclerosis  · Assessment: agree with diagnosis. Diagnosed 1/2015 based on MRI and LP.  Currently off DMT as patient stopped Avonex (intolerable ISRs).  No clear signs or symptoms of relapse; disease appears stable.  Pending delivery and start of Aubagio.  · Labs: recent labs reviewed  · Imaging: none at this time. Will repeat MRI of 6 months following start of new DMT (around 7/2020) for new baseline MRI  · Given MS counseling.     2. MS symptoms:  · Weakness/ gait imbalance: Recommend that she continue with PT/OT for falls/gait imbalance and we'll discus further management of gait thereafter      3. Depression: following with a counseling. Seems much improved despite recent setback. Encouraged her to continue to follow up with her counseling and given reassurance.      Over 50% of the 40 min was spent counseling the patient about MS, treatment options, prognosis and the management of her symptoms.    F/u in 4 months    Meagan Pereira MD

## 2020-01-28 ENCOUNTER — PATIENT OUTREACH (OUTPATIENT)
Dept: OTHER | Facility: OTHER | Age: 40
End: 2020-01-28

## 2020-01-28 ENCOUNTER — PATIENT MESSAGE (OUTPATIENT)
Dept: INTERNAL MEDICINE | Facility: CLINIC | Age: 40
End: 2020-01-28

## 2020-01-28 ENCOUNTER — CLINICAL SUPPORT (OUTPATIENT)
Dept: REHABILITATION | Facility: HOSPITAL | Age: 40
End: 2020-01-28
Attending: PSYCHIATRY & NEUROLOGY
Payer: MEDICARE

## 2020-01-28 DIAGNOSIS — R53.1 LEFT-SIDED WEAKNESS: Primary | ICD-10-CM

## 2020-01-28 DIAGNOSIS — R26.89 IMPAIRMENT OF BALANCE: ICD-10-CM

## 2020-01-28 DIAGNOSIS — R26.9 ABNORMALITY OF GAIT: ICD-10-CM

## 2020-01-28 PROCEDURE — 97112 NEUROMUSCULAR REEDUCATION: CPT | Mod: HCNC

## 2020-01-28 PROCEDURE — 97110 THERAPEUTIC EXERCISES: CPT | Mod: HCNC

## 2020-01-28 NOTE — PROGRESS NOTES
Physical Therapy Daily Treatment Note     Name: Alicia Soliz  Clinic Number: [id number]    Therapy Diagnosis:   Encounter Diagnoses   Name Primary?    Left-sided weakness Yes    Impairment of balance     Abnormality of gait      Physician: Salvatore Vela MD    Visit Date: 1/28/2020    Physician Orders: PT Eval and Treat  Medical Diagnosis: Multiple Sclerosis   Evaluation Date: 8/15/2019  Authorization Period Expiration: 1/2/21  Plan of Care Certification Period: 2/28/20  Visit #/Visits authorized: Unknown    Time In: 9:00  Time Out: 10:15  Total Billable Time: 55 minutes    Precautions: Standard and Immunosuppression, MS    Subjective     Pt reports: See in UPOC  She was compliant with home exercise program.  Response to previous treatment: Good  Functional change: none    Pain: 2/10  Location: L side (leg and arm)    Objective     Alicia received therapeutic exercises to develop strength, endurance and ROM for 45 minutes including  Nustep x 8 min for L ankle ROM/cardiopulmonary efficiency training  Sit to stand lower surface warm up: BW x 10, 20lbs x 3  Toe raise in bars  Standing marches with focus on increasing hip FL/DF against TB  UE bike 3 min F/3 min B for postural control  Hamstring machine 40#  Quad machine 25#  Gastroc stretch  LE bike x 6 min for quad/hamstring strengthening    Alicia received the following manual therapy techniques: Joint mobilizations and Soft tissue Mobilization were applied to the: R ankle for 5 minutes, including:  Talocrural mobilizations  STM to L gastroc    Alicia participated in neuromuscular re-education activities to improve: Balance, Kinesthetic and Proprioception for 10 minutes. The following activities were included:  Step ups on foam pad  Balance with pertubations on foam, tandem stance, feet together  Steps forward and sideways with minimal UE support    Alicia participated in dynamic functional therapeutic activities to improve functional performance for 0   minutes, including:    Alicia participated in gait training to improve functional mobility and safety for 5 minutes, including:  Gait training with improving ankle movements  Gait training w quad cane     Alicia received the following supervised modalities after being cleared for contradictions: TENS:  Alicia received TENS electrical stimulation for pain to the R ankle. Pt received continuous mode at a rate of 0 pps for 0 minutes. Alicia tolerated treatment well without any adverse effects.     Alicia received hot pack for 0 minutes to R ankle    Home Exercises Provided and Patient Education Provided     Education provided:   - activity toleration, progression of therapy    Written Home Exercises Provided: yes.  Exercises were reviewed and Alicia was able to demonstrate them prior to the end of the session.  Alicia demonstrated good  understanding of the education provided.     See paper chart under Patient Instructions for exercises provided prior visit.    Assessment   See in UPOC    Alicia is progressing well towards her goals.   Pt prognosis is Excellent.     Pt will continue to benefit from skilled outpatient physical therapy to address the deficits listed in the problem list box on initial evaluation, provide pt/family education and to maximize pt's level of independence in the home and community environment.     Pt's spiritual, cultural and educational needs considered and pt agreeable to plan of care and goals.     Anticipated barriers to physical therapy: transportation, relapses in condition     Goals: tolerate more walking as well as increasing overall strength of L side    Plan     Incorporate more walking, improve L side strength, Improve tolerance to physical activity     Zoltan Nuñez, PT

## 2020-01-28 NOTE — PLAN OF CARE
Outpatient Therapy Updated Plan of Care     Visit Date: 1/28/2020  Name: Alicia Soliz  Clinic Number: [id number]    Therapy Diagnosis:   Encounter Diagnoses   Name Primary?    Left-sided weakness Yes    Impairment of balance     Abnormality of gait      Physician: Salvatore Vela MD    Physician Orders: PT Eval and Treat  Medical Diagnosis: Multiple Sclerosis  Evaluation Date: 8/15/19    Total Visits Received: 24  Cancelled Visits: 10  No Show Visits: 4    Current Certification Period:  1/28/20-2/28/20  Precautions:  Standard and Immunosuppression, MS, long term steroid use  Visits from Evaluation Date:  24  Functional Level Prior to Evaluation:  MI with use of FWW    Subjective     Update: Patient reports that she has been feeling more depressed these last couple of weeks due to her condition and is making her feel less motivated as well as more emotional when thinking about her life.    Objective     Update:   Gait: decreased weight shift onto L LE and decreased step length, decreased L ankle DF, use of FWW needed for longer distances, decreased fredy (noted improvement with L ankle control since initial evaluation)     Squat: Double leg: Able to achieve 65 degrees of depth without UE support              Single leg: DNT     Balance: single leg balance w/ UE supported on walker; able to achieve 30s B, eyes closed on firm surface: 30 seconds feet apart     Reflex/Sensation: intact sensation      Ankle ROM: normal on the R side, decreased into DF on the L side     Gross UE ROM: normal     UE Strength: 5/5 on the R side, 4+/5 on the L side      Hip A/PROM:                                                  (L)                    (R)                                      Flexion                         100%        100%                                      Extension                    100%        100%                                      Abduction                    100%    100%                                       Adduction                    100%     100%                                      External rotation          100%        100%                                      Internal rotation           100%         100%                                                                             R          L  Strength:                    Hip flexors                   4+/5     4/5  Quadriceps                  4+/5     4/5                                             Hamstrings                  4+/5     4/5                                      Anterior Tibialis           4+/5     3+/5                                      Gastrocnemius            5/5       4/5     Muscle Length:          Hamstrings: DNT                                      Rafael Test: DNT     Function: Lower Extremity Functional Scale 63% disability score on initial evaluation. Patient scored a 58% disability on the LEFS on re-evaluation on 1/28/20.     LOWER EXTREMITY FUNCTIONAL SCALE         1. Any of your usual work, housework or school activities   3/4  2. Your usual hobbies, sporting     3/4  3. Getting in and out of tub      4/4  4. Walking between rooms      4/4  5. Putting on shoes or socks      4/4  6. Squatting        3/4  7. Lifting an object from the ground      0/4  8. Performing light activities around the home   0/4  9. Performing heavy activities around the home   0/4  10. Getting in and out of car      3/4  11. Walking 2 blocks       0/4  12.Walking a mile       0/4  13. Getting up and down 1 flight of stairs    2/4  14. Standing for 1 hour      2/4  15. Sitting for an hour       4/4  16. Running on even ground      0/4  17. Running on uneven ground     0/4  18. Making sharp turns when running fast    0/4  19. Hopping        0/4  20. Rolling over in bed       2/4    Assessment     Update: Patient has been more consistent coming to her therapy appointments as well as did not have any increase in flare ups over the last month. However, she has  seemed to be more depressed these last couple of sessions- discussed this with her MD and is looking to getting counseling. She is doing well with her strengthening, mobility, and balance exercises with minimal improvement noted in these areas over the last couple of weeks. However due to her condition, therapy is focusing mainly on small gains as well as making sure patient does not digress too far when she does have her flare ups.    Short Term Goals: In 3 weeks:  1.I with HEP MET  2.Patient to demo increased L DF AROM /PROM by 10% ALMOST MET  3.Patient to demo increase LE strength by 1/2 grade retirement MET  4.Patient to ambulate with use of LRAD with improvement with L DF during swing ALMOST MET  5.Patient to less than 51% disability on the LEFS. ALMOST MET     Long Term Goals: In 6 weeks  1. Patient to perform daily activities including walking with minimal limitation. NOT MET  2. Patient to demonstrate increased L ankle AROM/PROM by 100%. NOT MET  3. Patient to demonstrate increased LE strength by 1 grade. NOT MET  4. Patient to have decreased pain to painfree at all times with all activities. NOT MET  5. Patient to less than 29% disability on the LEFS. NOT MET    Reasons for Recertification of Therapy:   Patient is still having difficulty with gait training needing use of FWW for longer distances, trying to progress to gait training with QC with less supervision needed. Patient is still demonstrating a lot of foot drop on the L side, making it very difficulty to wean to LRAD. Patient is asking about being able to walk without an AD, but discussed with her multiple times this is not possible due to increased risk for falls. Patient also demonstrating weakness in functional muscles making everyday tasks hard as well as increased risk for fall while performing higher level activities. Patient needs continued skilled therapy in order to improve ROM, improve strength, improve quality of gait, improve pain/adverse  symptoms, and to decrease risk for falls in order to improve quality of life and decrease risk for injury.     Plan     Updated Certification Period: 1/28/2020 to 2/28/20  Recommended Treatment Plan: 2 times per week for 6 weeks: Gait Training, Manual Therapy, Neuromuscular Re-ed, Patient Education, Therapeutic Activites and Therapeutic Exercise  Other Recommendations: none    Zoltan Nuñez, PT  1/28/2020      I CERTIFY THE NEED FOR THESE SERVICES FURNISHED UNDER THIS PLAN OF TREATMENT AND WHILE UNDER MY CARE    Physician's comments:        Physician's Signature: ___________________________________________________

## 2020-01-29 NOTE — PROGRESS NOTES
Physical Therapy Daily Treatment Note     Name: Alicia Soliz  Clinic Number: [id number]    Therapy Diagnosis:   Encounter Diagnoses   Name Primary?    Left-sided weakness Yes    Impairment of balance     Abnormality of gait      Physician: Salvatore Vela MD    Visit Date: 1/30/2020    Physician Orders: PT Eval and Treat  Medical Diagnosis: Multiple Sclerosis   Evaluation Date: 8/15/2019  Authorization Period Expiration: 1/2/21  Plan of Care Certification Period: 2/28/20  Visit #/Visits authorized: Unknown    Time In: 9:00  Time Out: 10:15  Total Billable Time: 55 minutes    Precautions: Standard and Immunosuppression, MS    Subjective     Pt reports: that she was having some pain after last session, but has improved by today. Is starting to see a therapist for her depression.  She was compliant with home exercise program.  Response to previous treatment: Good  Functional change: none    Pain: 2/10  Location: L side (leg and arm)    Objective     Alicia received therapeutic exercises to develop strength, endurance and ROM for 45 minutes including  Nustep x 6 min for L ankle ROM/cardiopulmonary efficiency training  Sit to stand lower surface: BW x 10, 10lbs x 2   Toe raise in bars  Standing marches with focus on increasing hip FL/DF against TB  UE bike 3 min F/3 min B for postural control  Hamstring machine 40#  Quad machine 25#  Gastroc stretch  LE bike x 6 min for quad/hamstring strengthening  TKE against TB    Alicia received the following manual therapy techniques: Joint mobilizations and Soft tissue Mobilization were applied to the: R ankle for 5 minutes, including:  Talocrural mobilizations  STM to L gastroc    Alicia participated in neuromuscular re-education activities to improve: Balance, Kinesthetic and Proprioception for 10 minutes. The following activities were included:  Balance with pertubations on foam, tandem stance, feet together  Steps forward and sideways with minimal UE support    Alicia  participated in dynamic functional therapeutic activities to improve functional performance for 0  minutes, including:    Alicia participated in gait training to improve functional mobility and safety for 5 minutes, including:  Gait training with improving ankle movements  Gait training w quad cane     Alicia received the following supervised modalities after being cleared for contradictions: TENS:  Alicia received TENS electrical stimulation for pain to the R ankle. Pt received continuous mode at a rate of 0 pps for 0 minutes. Alicia tolerated treatment well without any adverse effects.     Alicia received hot pack for 0 minutes to R ankle    Home Exercises Provided and Patient Education Provided     Education provided:   - activity toleration, progression of therapy    Written Home Exercises Provided: yes.  Exercises were reviewed and Alicia was able to demonstrate them prior to the end of the session.  Alicia demonstrated good  understanding of the education provided.     See paper chart under Patient Instructions for exercises provided prior visit.    Assessment   Patient demonstrated good tolerance to strengthening and more balance training this session with minimal increase in adverse symptoms. Working more on controlling L sided hyperextension with improved quad control.    Alicia is progressing well towards her goals.   Pt prognosis is Excellent.     Pt will continue to benefit from skilled outpatient physical therapy to address the deficits listed in the problem list box on initial evaluation, provide pt/family education and to maximize pt's level of independence in the home and community environment.     Pt's spiritual, cultural and educational needs considered and pt agreeable to plan of care and goals.     Anticipated barriers to physical therapy: transportation, relapses in condition     Goals: tolerate more walking as well as increasing overall strength of L side    Plan     Incorporate more walking, improve L  side strength, Improve tolerance to physical activity     Zoltan Nuñez, PT

## 2020-01-30 ENCOUNTER — CLINICAL SUPPORT (OUTPATIENT)
Dept: REHABILITATION | Facility: HOSPITAL | Age: 40
End: 2020-01-30
Attending: PSYCHIATRY & NEUROLOGY
Payer: MEDICARE

## 2020-01-30 DIAGNOSIS — R26.89 IMPAIRMENT OF BALANCE: ICD-10-CM

## 2020-01-30 DIAGNOSIS — R26.9 ABNORMALITY OF GAIT: ICD-10-CM

## 2020-01-30 DIAGNOSIS — R53.1 LEFT-SIDED WEAKNESS: Primary | ICD-10-CM

## 2020-01-30 PROCEDURE — 97110 THERAPEUTIC EXERCISES: CPT | Mod: HCNC

## 2020-01-30 PROCEDURE — 97112 NEUROMUSCULAR REEDUCATION: CPT | Mod: HCNC

## 2020-01-31 ENCOUNTER — OFFICE VISIT (OUTPATIENT)
Dept: SURGERY | Facility: CLINIC | Age: 40
End: 2020-01-31
Payer: MEDICARE

## 2020-01-31 VITALS
BODY MASS INDEX: 47.09 KG/M2 | HEIGHT: 66 IN | TEMPERATURE: 98 F | HEART RATE: 88 BPM | SYSTOLIC BLOOD PRESSURE: 127 MMHG | WEIGHT: 293 LBS | DIASTOLIC BLOOD PRESSURE: 84 MMHG

## 2020-01-31 DIAGNOSIS — E66.01 MORBID OBESITY WITH BMI OF 45.0-49.9, ADULT: Primary | ICD-10-CM

## 2020-01-31 PROCEDURE — 3079F DIAST BP 80-89 MM HG: CPT | Mod: HCNC,CPTII,S$GLB, | Performed by: SURGERY

## 2020-01-31 PROCEDURE — 99999 PR PBB SHADOW E&M-EST. PATIENT-LVL V: ICD-10-PCS | Mod: PBBFAC,HCNC,, | Performed by: SURGERY

## 2020-01-31 PROCEDURE — 3008F BODY MASS INDEX DOCD: CPT | Mod: HCNC,CPTII,S$GLB, | Performed by: SURGERY

## 2020-01-31 PROCEDURE — 3008F PR BODY MASS INDEX (BMI) DOCUMENTED: ICD-10-PCS | Mod: HCNC,CPTII,S$GLB, | Performed by: SURGERY

## 2020-01-31 PROCEDURE — 99999 PR PBB SHADOW E&M-EST. PATIENT-LVL V: CPT | Mod: PBBFAC,HCNC,, | Performed by: SURGERY

## 2020-01-31 PROCEDURE — 3074F SYST BP LT 130 MM HG: CPT | Mod: HCNC,CPTII,S$GLB, | Performed by: SURGERY

## 2020-01-31 PROCEDURE — 3079F PR MOST RECENT DIASTOLIC BLOOD PRESSURE 80-89 MM HG: ICD-10-PCS | Mod: HCNC,CPTII,S$GLB, | Performed by: SURGERY

## 2020-01-31 PROCEDURE — 99214 OFFICE O/P EST MOD 30 MIN: CPT | Mod: HCNC,S$GLB,, | Performed by: SURGERY

## 2020-01-31 PROCEDURE — 3074F PR MOST RECENT SYSTOLIC BLOOD PRESSURE < 130 MM HG: ICD-10-PCS | Mod: HCNC,CPTII,S$GLB, | Performed by: SURGERY

## 2020-01-31 PROCEDURE — 99214 PR OFFICE/OUTPT VISIT, EST, LEVL IV, 30-39 MIN: ICD-10-PCS | Mod: HCNC,S$GLB,, | Performed by: SURGERY

## 2020-01-31 RX ORDER — LIDOCAINE HYDROCHLORIDE 10 MG/ML
1 INJECTION, SOLUTION EPIDURAL; INFILTRATION; INTRACAUDAL; PERINEURAL ONCE
Status: DISCONTINUED | OUTPATIENT
Start: 2020-01-31 | End: 2020-02-21 | Stop reason: HOSPADM

## 2020-01-31 RX ORDER — SODIUM CHLORIDE 9 MG/ML
INJECTION, SOLUTION INTRAVENOUS CONTINUOUS
Status: CANCELLED | OUTPATIENT
Start: 2020-01-31

## 2020-01-31 NOTE — H&P (VIEW-ONLY)
"History & Physical    SUBJECTIVE:     History of Present Illness:  Patient is a 39 y.o. female presents with known severe morbid obesity with current BMI of 49.50 kilogram/meter sq and weighs 206 lb.  She is well known to me from previous multiple encounter that spends over several years.  She was initially seen for weight loss surgery but was undergoing treatment for multiple sclerosis which requires multiple doses of steroids.  The steroid medication had been causing weight issues as well. With worsening MS, she had difficulty with mobilization and was complaining of bilateral arthritis of knees.  She is currently requiring a walker to ambulate.  She had been doing quite well with dietary regimen to control her excess weight.  She had lost significant amount of weight since last I have seen her.  I think she would be a very excellent candidate.  We have discussed the risk and the benefit of the treatment.  She is willing to proceed for weight loss surgery.    Chief Complaint   Patient presents with    Follow-up       Review of patient's allergies indicates:   Allergen Reactions    Demerol [meperidine] Itching     Other reaction(s): Itching    Dilaudid [hydromorphone (bulk)] Anaphylaxis     "coded" per pt  Patient can tolerate Lortab    Prednisone Itching     Other reaction(s): Itching    Morphine     Tramadol      shaking       Current Outpatient Medications   Medication Sig Dispense Refill    acetaminophen-codeine 300-30mg (TYLENOL #3) 300-30 mg Tab Take 1 tablet by mouth every 4 (four) hours as needed. for pain.  0    ALPRAZolam (XANAX) 0.5 MG tablet Take 1 tablet (0.5 mg total) by mouth 3 (three) times daily as needed for Anxiety. 20 tablet 0    AUBAGIO 7 mg Tab Take 7 mg by mouth once daily. 90 tablet 0    dalfampridine (AMPYRA) 10 mg Tb12 Take 1 tablet by mouth 2 (two) times daily. (Patient taking differently: Take 1 tablet by mouth once daily. ) 60 tablet 1    doxepin (SINEQUAN) 50 MG capsule TAKE " 1 CAPSULE BY MOUTH NIGHTLY AS NEEDED. 90 capsule 2    gabapentin (NEURONTIN) 300 MG capsule Take 300 mg by mouth 3 (three) times daily.      hydroCHLOROthiazide (HYDRODIURIL) 12.5 MG Tab TAKE 1 TABLET (12.5 MG TOTAL) BY MOUTH ONCE DAILY. 90 tablet 3    HYDROcodone-acetaminophen (NORCO) 7.5-325 mg per tablet Take 1 tablet by mouth every 8 (eight) hours as needed for Pain. 90 tablet 0    linaCLOtide (LINZESS) 145 mcg Cap capsule Take 1 capsule (145 mcg total) by mouth once daily. 30 capsule 5    [id number] 1-1-500 mg Cap Take 1 tablet by mouth once daily. 30 capsule 3    methocarbamol (ROBAXIN) 500 MG Tab Take 1 tablet (500 mg total) by mouth 2 (two) times daily as needed (muscle spasms). 60 tablet 0    multivitamin with folic acid 400 mcg Tab Take 1 tablet by mouth.      mupirocin (BACTROBAN) 2 % ointment Apply topically 2 (two) times daily. Apply to affected area 30 g 1    nystatin (MYCOSTATIN) cream Apply topically 2 (two) times daily. 30 g 1    promethazine (PHENERGAN) 25 MG tablet Take 1 tablet (25 mg total) by mouth every 4 (four) hours as needed for Nausea. 30 tablet 1    valACYclovir (VALTREX) 500 MG tablet Take 500 mg by mouth as needed.  11     Current Facility-Administered Medications   Medication Dose Route Frequency Provider Last Rate Last Dose    lidocaine (PF) 10 mg/ml (1%) injection 10 mg  1 mL Intradermal Once Michael Navarrete MD           Past Medical History:   Diagnosis Date    Anemia     Arthritis     Cardiac arrest as complication of care     pt states she went into cardiac arrest from an allergic reaction to a medication    Depression     Encounter for blood transfusion     Hemiplegia due to old stroke     Hypertension     Morbid obesity with BMI of 45.0-49.9, adult 9/20/2018    Multiple sclerosis     Multiple sclerosis      Past Surgical History:   Procedure Laterality Date    APPENDECTOMY      CHOLECYSTECTOMY      HYSTERECTOMY      KNEE SURGERY       "TONSILLECTOMY      TUBAL LIGATION       Family History   Problem Relation Age of Onset    Lupus Mother     Heart disease Mother     Diabetes Father     Kidney disease Father     Cancer Maternal Aunt 40        breast    Breast cancer Maternal Aunt     Diabetes Maternal Aunt     Breast cancer Maternal Cousin     Breast cancer Maternal Cousin     Ovarian cancer Maternal Cousin      Social History     Tobacco Use    Smoking status: Never Smoker    Smokeless tobacco: Never Used   Substance Use Topics    Alcohol use: Yes     Frequency: Never     Drinks per session: Patient refused     Binge frequency: Never     Comment: OCCASIONALLY    Drug use: No        Review of Systems:  Review of Systems   Constitutional: Positive for activity change. Negative for chills and fever.   HENT: Negative for sore throat and trouble swallowing.    Eyes: Negative.    Respiratory: Negative for cough and shortness of breath.    Cardiovascular: Negative.    Gastrointestinal: Negative for abdominal distention, abdominal pain, nausea and vomiting.   Endocrine: Negative.    Genitourinary: Negative.    Musculoskeletal: Negative.    Skin: Negative.    Allergic/Immunologic: Negative.    Neurological: Negative.    Hematological: Does not bruise/bleed easily.   Psychiatric/Behavioral: Negative.        OBJECTIVE:     Vital Signs (Most Recent)  Temp: 97.6 °F (36.4 °C) (01/31/20 1207)  Pulse: 88 (01/31/20 1207)  BP: 127/84 (01/31/20 1207)  5' 6" (1.676 m)  (!) 139.1 kg (306 lb 10.6 oz)     Physical Exam:  Physical Exam   Constitutional: She is oriented to person, place, and time. She appears well-developed and well-nourished.   HENT:   Head: Normocephalic.   Right Ear: External ear normal.   Left Ear: External ear normal.   Nose: Nose normal.   Eyes: Pupils are equal, round, and reactive to light. No scleral icterus.   Neck: Normal range of motion. Neck supple. No thyromegaly present.   Cardiovascular: Normal rate, regular rhythm and normal " heart sounds.   No murmur heard.  Pulmonary/Chest: Effort normal and breath sounds normal.   Abdominal: Soft. Bowel sounds are normal. There is no tenderness. There is no guarding.   Musculoskeletal: She exhibits no edema or deformity.   Due to multiple sclerosis, the patient requires a walker for ambulation.   Lymphadenopathy:     She has no cervical adenopathy.   Neurological: She is alert and oriented to person, place, and time.   Skin: Skin is warm and dry.       Laboratory  Lab Results   Component Value Date    WBC 6.36 01/10/2020    HGB 12.0 01/10/2020    HCT 40.2 01/10/2020     01/10/2020    CHOL 195 01/10/2020    TRIG 92 01/10/2020    HDL 51 01/10/2020    ALT 19 01/10/2020    AST 23 01/10/2020     01/10/2020    K 3.2 (L) 01/10/2020     01/10/2020    CREATININE 0.8 01/10/2020    BUN 7 01/10/2020    CO2 28 01/10/2020    TSH 0.622 01/10/2020    INR 1.0 03/11/2015    HGBA1C 6.2 (H) 01/10/2020       No results found for this or any previous visit.      Diagnostic Results:  Labs: Reviewed  ECG: Reviewed  X-Ray: Reviewed    None    ASSESSMENT/PLAN:     Morbid obesity with BMI of 49.50 kilogram/meter sq and weighs 306 lb.    PLAN:Plan     The plan is to proceed with weight loss surgery:  Robotic assisted sleeve gastrectomy.  The risk and the benefit of the procedure were fully addressed with the patient. The surgery scheduled for February 20, 2020 at 8:00 a.m., and her EGD scheduled for February 19, 2020 at 11:30 a.m..  She needs to start her on Optifast 800 calorie per day for 2 weeks on February 6 of 2020.    Michael Navarrete

## 2020-01-31 NOTE — H&P
"History & Physical    SUBJECTIVE:     History of Present Illness:  Patient is a 39 y.o. female presents with known severe morbid obesity with current BMI of 49.50 kilogram/meter sq and weighs 206 lb.  She is well known to me from previous multiple encounter that spends over several years.  She was initially seen for weight loss surgery but was undergoing treatment for multiple sclerosis which requires multiple doses of steroids.  The steroid medication had been causing weight issues as well. With worsening MS, she had difficulty with mobilization and was complaining of bilateral arthritis of knees.  She is currently requiring a walker to ambulate.  She had been doing quite well with dietary regimen to control her excess weight.  She had lost significant amount of weight since last I have seen her.  I think she would be a very excellent candidate.  We have discussed the risk and the benefit of the treatment.  She is willing to proceed for weight loss surgery.    Chief Complaint   Patient presents with    Follow-up       Review of patient's allergies indicates:   Allergen Reactions    Demerol [meperidine] Itching     Other reaction(s): Itching    Dilaudid [hydromorphone (bulk)] Anaphylaxis     "coded" per pt  Patient can tolerate Lortab    Prednisone Itching     Other reaction(s): Itching    Morphine     Tramadol      shaking       Current Outpatient Medications   Medication Sig Dispense Refill    acetaminophen-codeine 300-30mg (TYLENOL #3) 300-30 mg Tab Take 1 tablet by mouth every 4 (four) hours as needed. for pain.  0    ALPRAZolam (XANAX) 0.5 MG tablet Take 1 tablet (0.5 mg total) by mouth 3 (three) times daily as needed for Anxiety. 20 tablet 0    AUBAGIO 7 mg Tab Take 7 mg by mouth once daily. 90 tablet 0    dalfampridine (AMPYRA) 10 mg Tb12 Take 1 tablet by mouth 2 (two) times daily. (Patient taking differently: Take 1 tablet by mouth once daily. ) 60 tablet 1    doxepin (SINEQUAN) 50 MG capsule TAKE " 1 CAPSULE BY MOUTH NIGHTLY AS NEEDED. 90 capsule 2    gabapentin (NEURONTIN) 300 MG capsule Take 300 mg by mouth 3 (three) times daily.      hydroCHLOROthiazide (HYDRODIURIL) 12.5 MG Tab TAKE 1 TABLET (12.5 MG TOTAL) BY MOUTH ONCE DAILY. 90 tablet 3    HYDROcodone-acetaminophen (NORCO) 7.5-325 mg per tablet Take 1 tablet by mouth every 8 (eight) hours as needed for Pain. 90 tablet 0    linaCLOtide (LINZESS) 145 mcg Cap capsule Take 1 capsule (145 mcg total) by mouth once daily. 30 capsule 5    bw-[id number] 1-1-500 mg Cap Take 1 tablet by mouth once daily. 30 capsule 3    methocarbamol (ROBAXIN) 500 MG Tab Take 1 tablet (500 mg total) by mouth 2 (two) times daily as needed (muscle spasms). 60 tablet 0    multivitamin with folic acid 400 mcg Tab Take 1 tablet by mouth.      mupirocin (BACTROBAN) 2 % ointment Apply topically 2 (two) times daily. Apply to affected area 30 g 1    nystatin (MYCOSTATIN) cream Apply topically 2 (two) times daily. 30 g 1    promethazine (PHENERGAN) 25 MG tablet Take 1 tablet (25 mg total) by mouth every 4 (four) hours as needed for Nausea. 30 tablet 1    valACYclovir (VALTREX) 500 MG tablet Take 500 mg by mouth as needed.  11     Current Facility-Administered Medications   Medication Dose Route Frequency Provider Last Rate Last Dose    lidocaine (PF) 10 mg/ml (1%) injection 10 mg  1 mL Intradermal Once Michael Navarrete MD           Past Medical History:   Diagnosis Date    Anemia     Arthritis     Cardiac arrest as complication of care     pt states she went into cardiac arrest from an allergic reaction to a medication    Depression     Encounter for blood transfusion     Hemiplegia due to old stroke     Hypertension     Morbid obesity with BMI of 45.0-49.9, adult 9/20/2018    Multiple sclerosis     Multiple sclerosis      Past Surgical History:   Procedure Laterality Date    APPENDECTOMY      CHOLECYSTECTOMY      HYSTERECTOMY      KNEE SURGERY       "TONSILLECTOMY      TUBAL LIGATION       Family History   Problem Relation Age of Onset    Lupus Mother     Heart disease Mother     Diabetes Father     Kidney disease Father     Cancer Maternal Aunt 40        breast    Breast cancer Maternal Aunt     Diabetes Maternal Aunt     Breast cancer Maternal Cousin     Breast cancer Maternal Cousin     Ovarian cancer Maternal Cousin      Social History     Tobacco Use    Smoking status: Never Smoker    Smokeless tobacco: Never Used   Substance Use Topics    Alcohol use: Yes     Frequency: Never     Drinks per session: Patient refused     Binge frequency: Never     Comment: OCCASIONALLY    Drug use: No        Review of Systems:  Review of Systems   Constitutional: Positive for activity change. Negative for chills and fever.   HENT: Negative for sore throat and trouble swallowing.    Eyes: Negative.    Respiratory: Negative for cough and shortness of breath.    Cardiovascular: Negative.    Gastrointestinal: Negative for abdominal distention, abdominal pain, nausea and vomiting.   Endocrine: Negative.    Genitourinary: Negative.    Musculoskeletal: Negative.    Skin: Negative.    Allergic/Immunologic: Negative.    Neurological: Negative.    Hematological: Does not bruise/bleed easily.   Psychiatric/Behavioral: Negative.        OBJECTIVE:     Vital Signs (Most Recent)  Temp: 97.6 °F (36.4 °C) (01/31/20 1207)  Pulse: 88 (01/31/20 1207)  BP: 127/84 (01/31/20 1207)  5' 6" (1.676 m)  (!) 139.1 kg (306 lb 10.6 oz)     Physical Exam:  Physical Exam   Constitutional: She is oriented to person, place, and time. She appears well-developed and well-nourished.   HENT:   Head: Normocephalic.   Right Ear: External ear normal.   Left Ear: External ear normal.   Nose: Nose normal.   Eyes: Pupils are equal, round, and reactive to light. No scleral icterus.   Neck: Normal range of motion. Neck supple. No thyromegaly present.   Cardiovascular: Normal rate, regular rhythm and normal " heart sounds.   No murmur heard.  Pulmonary/Chest: Effort normal and breath sounds normal.   Abdominal: Soft. Bowel sounds are normal. There is no tenderness. There is no guarding.   Musculoskeletal: She exhibits no edema or deformity.   Due to multiple sclerosis, the patient requires a walker for ambulation.   Lymphadenopathy:     She has no cervical adenopathy.   Neurological: She is alert and oriented to person, place, and time.   Skin: Skin is warm and dry.       Laboratory  Lab Results   Component Value Date    WBC 6.36 01/10/2020    HGB 12.0 01/10/2020    HCT 40.2 01/10/2020     01/10/2020    CHOL 195 01/10/2020    TRIG 92 01/10/2020    HDL 51 01/10/2020    ALT 19 01/10/2020    AST 23 01/10/2020     01/10/2020    K 3.2 (L) 01/10/2020     01/10/2020    CREATININE 0.8 01/10/2020    BUN 7 01/10/2020    CO2 28 01/10/2020    TSH 0.622 01/10/2020    INR 1.0 03/11/2015    HGBA1C 6.2 (H) 01/10/2020       No results found for this or any previous visit.      Diagnostic Results:  Labs: Reviewed  ECG: Reviewed  X-Ray: Reviewed    None    ASSESSMENT/PLAN:     Morbid obesity with BMI of 49.50 kilogram/meter sq and weighs 306 lb.    PLAN:Plan     The plan is to proceed with weight loss surgery:  Robotic assisted sleeve gastrectomy.  The risk and the benefit of the procedure were fully addressed with the patient. The surgery scheduled for February 20, 2020 at 8:00 a.m., and her EGD scheduled for February 19, 2020 at 11:30 a.m..  She needs to start her on Optifast 800 calorie per day for 2 weeks on February 6 of 2020.    Michael Navarrete

## 2020-02-03 ENCOUNTER — PATIENT MESSAGE (OUTPATIENT)
Dept: NEUROLOGY | Facility: CLINIC | Age: 40
End: 2020-02-03

## 2020-02-04 ENCOUNTER — TELEPHONE (OUTPATIENT)
Dept: SURGERY | Facility: CLINIC | Age: 40
End: 2020-02-04

## 2020-02-04 NOTE — TELEPHONE ENCOUNTER
----- Message from Jessenia Ho MA sent at 2/4/2020  9:20 AM CST -----  Contact: pt      ----- Message -----  From: Pollo Abernathy  Sent: 2/4/2020   8:01 AM CST  To: [facility] Staff    .Type:  Patient Returning Call    Who Called: Pt  Who Left Message for Patient: Shanita  Does the patient know what this is regarding?: return call   Would the patient rather a call back or a response via [facility]? Call back   Best Call Back Number: [phone]  Additional Information

## 2020-02-04 NOTE — TELEPHONE ENCOUNTER
----- Message from Britney Luu sent at 2/4/2020 11:55 AM CST -----  Contact: pt   Stated she will like a call back from the nurse Ms Diehl, she can be reached at [phone]

## 2020-02-06 RX ORDER — METHOCARBAMOL 500 MG/1
500 TABLET, FILM COATED ORAL 2 TIMES DAILY PRN
Qty: 60 TABLET | Refills: 0 | OUTPATIENT
Start: 2020-02-06

## 2020-02-07 ENCOUNTER — NUTRITION (OUTPATIENT)
Dept: NUTRITION | Facility: CLINIC | Age: 40
End: 2020-02-07
Payer: MEDICARE

## 2020-02-07 ENCOUNTER — LAB VISIT (OUTPATIENT)
Dept: LAB | Facility: HOSPITAL | Age: 40
End: 2020-02-07
Attending: FAMILY MEDICINE
Payer: MEDICARE

## 2020-02-07 VITALS — HEIGHT: 66 IN | WEIGHT: 293 LBS | BODY MASS INDEX: 47.09 KG/M2

## 2020-02-07 DIAGNOSIS — E66.01 MORBID OBESITY WITH BMI OF 45.0-49.9, ADULT: ICD-10-CM

## 2020-02-07 DIAGNOSIS — Z71.3 DIETARY COUNSELING: Primary | ICD-10-CM

## 2020-02-07 DIAGNOSIS — G35 MULTIPLE SCLEROSIS: ICD-10-CM

## 2020-02-07 LAB
ALBUMIN SERPL BCP-MCNC: 4 G/DL (ref 3.5–5.2)
ALP SERPL-CCNC: 102 U/L (ref 55–135)
ALT SERPL W/O P-5'-P-CCNC: 24 U/L (ref 10–44)
AST SERPL-CCNC: 29 U/L (ref 10–40)
BASOPHILS # BLD AUTO: 0.05 K/UL (ref 0–0.2)
BASOPHILS NFR BLD: 0.6 % (ref 0–1.9)
BILIRUB DIRECT SERPL-MCNC: 0.2 MG/DL (ref 0.1–0.3)
BILIRUB SERPL-MCNC: 0.5 MG/DL (ref 0.1–1)
DIFFERENTIAL METHOD: ABNORMAL
EOSINOPHIL # BLD AUTO: 0.1 K/UL (ref 0–0.5)
EOSINOPHIL NFR BLD: 1.5 % (ref 0–8)
ERYTHROCYTE [DISTWIDTH] IN BLOOD BY AUTOMATED COUNT: 16.3 % (ref 11.5–14.5)
HCT VFR BLD AUTO: 40.7 % (ref 37–48.5)
HGB BLD-MCNC: 12.2 G/DL (ref 12–16)
IMM GRANULOCYTES # BLD AUTO: 0.01 K/UL (ref 0–0.04)
IMM GRANULOCYTES NFR BLD AUTO: 0.1 % (ref 0–0.5)
LYMPHOCYTES # BLD AUTO: 4 K/UL (ref 1–4.8)
LYMPHOCYTES NFR BLD: 50.3 % (ref 18–48)
MCH RBC QN AUTO: 21.1 PG (ref 27–31)
MCHC RBC AUTO-ENTMCNC: 30 G/DL (ref 32–36)
MCV RBC AUTO: 70 FL (ref 82–98)
MONOCYTES # BLD AUTO: 0.5 K/UL (ref 0.3–1)
MONOCYTES NFR BLD: 6.2 % (ref 4–15)
NEUTROPHILS # BLD AUTO: 3.3 K/UL (ref 1.8–7.7)
NEUTROPHILS NFR BLD: 41.3 % (ref 38–73)
NRBC BLD-RTO: 0 /100 WBC
PLATELET # BLD AUTO: 241 K/UL (ref 150–350)
PMV BLD AUTO: 11.7 FL (ref 9.2–12.9)
PROT SERPL-MCNC: 8.5 G/DL (ref 6–8.4)
RBC # BLD AUTO: 5.79 M/UL (ref 4–5.4)
WBC # BLD AUTO: 7.9 K/UL (ref 3.9–12.7)

## 2020-02-07 PROCEDURE — 99999 PR PBB SHADOW E&M-EST. PATIENT-LVL II: CPT | Mod: PBBFAC,HCNC,, | Performed by: DIETITIAN, REGISTERED

## 2020-02-07 PROCEDURE — 36415 COLL VENOUS BLD VENIPUNCTURE: CPT | Mod: HCNC

## 2020-02-07 PROCEDURE — 85025 COMPLETE CBC W/AUTO DIFF WBC: CPT | Mod: HCNC

## 2020-02-07 PROCEDURE — 97802 MEDICAL NUTRITION INDIV IN: CPT | Mod: HCNC,S$GLB,, | Performed by: DIETITIAN, REGISTERED

## 2020-02-07 PROCEDURE — 80076 HEPATIC FUNCTION PANEL: CPT | Mod: HCNC

## 2020-02-07 PROCEDURE — 99999 PR PBB SHADOW E&M-EST. PATIENT-LVL II: ICD-10-PCS | Mod: PBBFAC,HCNC,, | Performed by: DIETITIAN, REGISTERED

## 2020-02-07 PROCEDURE — 97802 PR MED NUTR THER, 1ST, INDIV, EA 15 MIN: ICD-10-PCS | Mod: HCNC,S$GLB,, | Performed by: DIETITIAN, REGISTERED

## 2020-02-07 NOTE — PROGRESS NOTES
"NUTRITIONAL CONSULT    Referring Physician: Dr. Navarrete  Reason for MNT Referral: Initial assessment for sleeve gastrectomy work-up    PAST MEDICAL HISTORY:   39 y.o. female  BMI 49.52 kg/m2  Weight history includes lifelong with MS she gets depressed and wants to eat, and decided one day to lose weight one day.   Dieting attempts include detox, hello fresh, currently portioning and focusing with vegetables.    Past Medical History:   Diagnosis Date    Anemia     Arthritis     Cardiac arrest as complication of care     pt states she went into cardiac arrest from an allergic reaction to a medication    Depression     Encounter for blood transfusion     Hemiplegia due to old stroke     Hypertension     Morbid obesity with BMI of 45.0-49.9, adult 2018    Multiple sclerosis     Multiple sclerosis        CLINICAL DATA:  39 y.o.-year-old Black or  female.  Height: 5' 6"  Weight: 305 lbs  IBW: 143 lbs  EBW: 162 lbs  BMI: 49.25 kg/m2  The patient's goal weight (60% EBW: 97 lb): 240 lbs  Personal goal weight: 189 lbs    Goal for Bariatric Surgery: to improve health, to improve quality of life, to lose weight and to prevent future medical conditions    NUTRITIONAL NEEDS:  1950-4350 Calories for maintainence (using [place] Jeor Equation)   Grams Protein    NUTRITION & HEALTH HISTORY:  Greatest challenge: emotional eating, currently not struggling     Current diet recall:     B: Raisin Bran w/ Skim Milk   L: Banana + Lunchable   S: [place] Snack + Crystal Light Tea  D:Salad + Chicken Breast + Curly fries or Well Done Steak    Beverages: 51 oz    Current Diet:  Meal pattern: 3 meals and a snack  Protein supplements: has tried slimfast   Snackin / day  Vegetables: Likes a variety. Eats almost daily. Steamed w/ Saregento   Fruits: Likes a variety. Eats daily. Grapes, apples, grapefruit  Beverages: water, diet soda, sugar-free beverages, diet tea and fat-free milk  Dining out: Reduced " lately. Mostly take-out.  Cooking at home: Daily. Mostly baked and grilled meat, fish, starchy CHO and vegetables.    Exercise:  Past exercise: Adequate    Current exercise: Adequate, PT and home workouts   Restrictions to exercise: MS pain     Vitamins / Minerals / Herbs:   B12 Chewable vitamin  Rumaid MVI   Centrum Chewable for women      Labs:   reviewed, no recent, none available at this time    Food Allergies:   No    Social:  Disabled.  Lives with self, daughter lives next door.  Grocery shopping and food prep self.  Patient believes the household will be supportive after surgery.  Alcohol: Socially.  Smoking: None.    ASSESSMENT:  · Patient reports attempts at weight loss, only to regain lost weight.  · Patient demonstrated knowledge of healthy eating behaviors and exercise patterns; admits to not eating healthy and not exercising at this point.  · Patient states willingness and demonstrates willingness to change lifestyle and make behavior modifications as evidenced by weight loss, good exercise, dietary changes, increased fruits, increased vegetables, reduced dining out, more food preparation at [employer] and healthier cooking at home.    Insurance requires medically supervised diet prior to consideration for bariatric surgery.    Barriers to Education: none    Stage of change: action    NUTRITION DIAGNOSIS:    Obesity related to Excessive carbohydrate intake and Excessive calorie intake as evidence by BMI.    BARIATRIC DIET DISCUSSION/PLAN:  Discussed diet after surgery and related to patient's food record.  Reviewed nutrition guidelines for before and after surgery.  Answered all questions.  Resume work-up for surgery.  Continue to review Bariatric Nutrition Guidebook at home and call with any questions.  Work on Bariatric Nutrition Checklist.  Begin trying various protein supplements to determine preference.  1000-calorie modified liquid diet.    RECOMMENDATIONS:  Patient is a good candidate for bariatric  surgery.    Needs additional visit(s) with RD.    Patient verbalized understanding.    Expect good  compliance after surgery at this time.    Communicated nutrition plan with bariatric team.    SESSION TIME:  75 minutes

## 2020-02-10 ENCOUNTER — PATIENT MESSAGE (OUTPATIENT)
Dept: ENDOSCOPY | Facility: HOSPITAL | Age: 40
End: 2020-02-10

## 2020-02-10 NOTE — PROGRESS NOTES
Last BP reading 1/8/20  Defer to upcoming health  to address lack of readings  K+ slightly low on last BMP (previously within range), discuss on follow up if patient remains in HDMP  Closing encounter    Hypertension Medications             hydroCHLOROthiazide (HYDRODIURIL) 12.5 MG Tab TAKE 1 TABLET (12.5 MG TOTAL) BY MOUTH ONCE DAILY.

## 2020-02-10 NOTE — PROGRESS NOTES
Physical Therapy Daily Treatment Note     Name: Alicia Mahoney Soliz  Clinic Number: [id number]    Therapy Diagnosis:   Encounter Diagnoses   Name Primary?    Left-sided weakness Yes    Impairment of balance     Abnormality of gait      Physician: Salvatore Vela MD    Visit Date: 2/11/2020    Physician Orders: PT Eval and Treat  Medical Diagnosis: Multiple Sclerosis   Evaluation Date: 8/15/2019  Authorization Period Expiration: 1/2/21  Plan of Care Certification Period: 2/28/20  Visit #/Visits authorized: Unknown    Time In: 8:00  Time Out: 9:00  Total Billable Time: 30 minutes    Precautions: Standard and Immunosuppression, MS    Subjective     Pt reports: that she has been keeping up with her exercises at home and that she is getting gastroc bypass surgery in the next week or two.  She was compliant with home exercise program.  Response to previous treatment: Good  Functional change: none    Pain: 2/10  Location: L side (leg and arm)    Objective     Alicia received therapeutic exercises to develop strength, endurance and ROM for 45 minutes including  Nustep x 6 min for L ankle ROM/cardiopulmonary efficiency training  Sit to stand lower surface: BW x 10, 10lbs x 2   Toe raise in bars  Standing marches with focus on increasing hip FL/DF against TB  UE bike 3 min F/3 min B for postural control  Hamstring machine 40#  Quad machine 25#  Gastroc stretch  LE bike x 6 min for quad/hamstring strengthening  TKE against TB    Alicia received the following manual therapy techniques: Joint mobilizations and Soft tissue Mobilization were applied to the: R ankle for 5 minutes, including:  Talocrural mobilizations  STM to L gastroc    Alicia participated in neuromuscular re-education activities to improve: Balance, Kinesthetic and Proprioception for 10 minutes. The following activities were included:  Balance with pertubations on foam, tandem stance, feet together  Steps forward and sideways with minimal UE support    Alicia  participated in dynamic functional therapeutic activities to improve functional performance for 0  minutes, including:    Alicia participated in gait training to improve functional mobility and safety for 5 minutes, including:  Gait training with improving ankle movements  Gait training w quad cane     Alicia received the following supervised modalities after being cleared for contradictions: TENS:  Alicia received TENS electrical stimulation for pain to the R ankle. Pt received continuous mode at a rate of 0 pps for 0 minutes. Alicia tolerated treatment well without any adverse effects.     Alicia received hot pack for 0 minutes to R ankle    Home Exercises Provided and Patient Education Provided     Education provided:   - activity toleration, progression of therapy    Written Home Exercises Provided: yes.  Exercises were reviewed and Alicia was able to demonstrate them prior to the end of the session.  Alicia demonstrated good  understanding of the education provided.     See paper chart under Patient Instructions for exercises provided prior visit.    Assessment   Patient is demonstrated better tolerance to resisted strengthening this session with less rest breaks needed. However still having difficulty with safety with ambulation when using QC.    Alicia is progressing well towards her goals.   Pt prognosis is Excellent.     Pt will continue to benefit from skilled outpatient physical therapy to address the deficits listed in the problem list box on initial evaluation, provide pt/family education and to maximize pt's level of independence in the home and community environment.     Pt's spiritual, cultural and educational needs considered and pt agreeable to plan of care and goals.     Anticipated barriers to physical therapy: transportation, relapses in condition     Goals: tolerate more walking as well as increasing overall strength of L side    Plan     Incorporate more walking, improve L side strength, Improve  tolerance to physical activity     Zoltan Nuñez, PT

## 2020-02-11 ENCOUNTER — CLINICAL SUPPORT (OUTPATIENT)
Dept: REHABILITATION | Facility: HOSPITAL | Age: 40
End: 2020-02-11
Attending: PSYCHIATRY & NEUROLOGY
Payer: MEDICARE

## 2020-02-11 DIAGNOSIS — R53.1 LEFT-SIDED WEAKNESS: Primary | ICD-10-CM

## 2020-02-11 DIAGNOSIS — R26.89 IMPAIRMENT OF BALANCE: ICD-10-CM

## 2020-02-11 DIAGNOSIS — R26.9 ABNORMALITY OF GAIT: ICD-10-CM

## 2020-02-11 PROCEDURE — 97110 THERAPEUTIC EXERCISES: CPT | Mod: HCNC

## 2020-02-12 ENCOUNTER — TELEPHONE (OUTPATIENT)
Dept: NUTRITION | Facility: CLINIC | Age: 40
End: 2020-02-12

## 2020-02-12 NOTE — TELEPHONE ENCOUNTER
Pt called to ask if she could have raison brand or jello. Told patient the raisin bran was not allowed on pre-surgery diet but jello is okay because it is a liquid. Pt asked about medications and I told her only essential medications should be taken and it is best that they be crushed or liquid. More specific medication question post-surgery can be referenced to the surgeon.

## 2020-02-13 ENCOUNTER — PATIENT MESSAGE (OUTPATIENT)
Dept: INTERNAL MEDICINE | Facility: CLINIC | Age: 40
End: 2020-02-13

## 2020-02-13 ENCOUNTER — CLINICAL SUPPORT (OUTPATIENT)
Dept: REHABILITATION | Facility: HOSPITAL | Age: 40
End: 2020-02-13
Attending: PSYCHIATRY & NEUROLOGY
Payer: MEDICARE

## 2020-02-13 DIAGNOSIS — R26.9 ABNORMALITY OF GAIT: ICD-10-CM

## 2020-02-13 DIAGNOSIS — R26.89 IMPAIRMENT OF BALANCE: ICD-10-CM

## 2020-02-13 DIAGNOSIS — R53.1 LEFT-SIDED WEAKNESS: Primary | ICD-10-CM

## 2020-02-13 PROCEDURE — 97110 THERAPEUTIC EXERCISES: CPT | Mod: HCNC

## 2020-02-13 PROCEDURE — 97140 MANUAL THERAPY 1/> REGIONS: CPT | Mod: HCNC

## 2020-02-13 NOTE — PROGRESS NOTES
Physical Therapy Daily Treatment Note     Name: Alicia Mahoney Soliz  Clinic Number: [id number]    Therapy Diagnosis:   Encounter Diagnoses   Name Primary?    Left-sided weakness Yes    Impairment of balance     Abnormality of gait      Physician: Shani Nicholson PA*    Visit Date: 2/13/2020    Physician Orders: PT Eval and Treat  Medical Diagnosis: Multiple Sclerosis   Evaluation Date: 8/15/2019  Authorization Period Expiration: 1/2/21  Plan of Care Certification Period: 2/28/20  Visit #/Visits authorized: Unknown    Time In: 8:00  Time Out: 9:00  Total Billable Time: 30 minutes    Precautions: Standard and Immunosuppression, MS    Subjective     Pt reports: that she has been keeping up with her exercises at home and that she is getting gastroc bypass surgery in the next week or two.  She was compliant with home exercise program.  Response to previous treatment: Good  Functional change: none    Pain: 2/10  Location: L side (leg and arm)    Objective     Alicia received therapeutic exercises to develop strength, endurance and ROM for 30 minutes including  Nustep x 6 min for L ankle ROM/cardiopulmonary efficiency training  Sit to stand lower surface: BW x 10, 10lbs x 2   Toe raise in bars  Standing marches with focus on increasing hip FL/DF against TB  UE bike 3 min F/3 min B for postural control  Hamstring machine 40# DID NOT PERFORM TODAY  Quad machine 25# DID NOT PERFORM TODAY  Lunges in bars with focus on ankle mobility   Gastroc stretch  LE bike x 6 min for quad/hamstring strengthening  TKE against TB DID NOT PERFROM TODAY    Alicia received the following manual therapy techniques: Joint mobilizations and Soft tissue Mobilization were applied to the: R ankle for 10 minutes, including:  Talocrural mobilizations  STM to L gastroc  L DF/AK mobilizations    Alicia participated in neuromuscular re-education activities to improve: Balance, Kinesthetic and Proprioception for 5 minutes. The following activities were  included:  Balance with pertubations on foam, tandem stance, feet together    Alicia participated in dynamic functional therapeutic activities to improve functional performance for 0  minutes, including:    Alicia participated in gait training to improve functional mobility and safety for 0 minutes, including:  Gait training with improving ankle movements  Gait training w quad cane     Alicia received the following supervised modalities after being cleared for contradictions: TENS:  Alicia received TENS electrical stimulation for pain to the R ankle. Pt received continuous mode at a rate of 0 pps for 0 minutes. Alicia tolerated treatment well without any adverse effects.     Alicia received hot pack for 0 minutes to R ankle    Home Exercises Provided and Patient Education Provided     Education provided:   - activity toleration, progression of therapy    Written Home Exercises Provided: yes.  Exercises were reviewed and Alicia was able to demonstrate them prior to the end of the session.  Alicia demonstrated good  understanding of the education provided.     See paper chart under Patient Instructions for exercises provided prior visit.    Assessment   Patient demonstrated good tolerance to lunges in the parallel bars to work on ankle mobility as well as LE strengthening. Patient's ride came to pick her up earlier than normal, so was not able to perform the full session.     Alicia is progressing well towards her goals.   Pt prognosis is Excellent.     Pt will continue to benefit from skilled outpatient physical therapy to address the deficits listed in the problem list box on initial evaluation, provide pt/family education and to maximize pt's level of independence in the home and community environment.     Pt's spiritual, cultural and educational needs considered and pt agreeable to plan of care and goals.     Anticipated barriers to physical therapy: transportation, relapses in condition     Goals: tolerate more walking as  well as increasing overall strength of L side    Plan     Incorporate more walking, improve L side strength, Improve tolerance to physical activity     Zoltan Nuñez, PT

## 2020-02-14 ENCOUNTER — INITIAL CONSULT (OUTPATIENT)
Dept: PSYCHIATRY | Facility: CLINIC | Age: 40
End: 2020-02-14
Payer: MEDICARE

## 2020-02-14 DIAGNOSIS — M17.4 OTHER BILATERAL SECONDARY OSTEOARTHRITIS OF KNEE: ICD-10-CM

## 2020-02-14 DIAGNOSIS — F43.10 PTSD (POST-TRAUMATIC STRESS DISORDER): ICD-10-CM

## 2020-02-14 DIAGNOSIS — Z01.818 ENCOUNTER FOR OTHER PREPROCEDURAL EXAMINATION: Primary | ICD-10-CM

## 2020-02-14 DIAGNOSIS — E66.01 MORBID OBESITY DUE TO EXCESS CALORIES: ICD-10-CM

## 2020-02-14 PROCEDURE — 90791 PSYCH DIAGNOSTIC EVALUATION: CPT | Mod: HCNC,S$GLB,, | Performed by: SOCIAL WORKER

## 2020-02-14 PROCEDURE — 90791 PR PSYCHIATRIC DIAGNOSTIC EVALUATION: ICD-10-PCS | Mod: HCNC,S$GLB,, | Performed by: SOCIAL WORKER

## 2020-02-14 NOTE — PRE ADMISSION SCREENING
Pre op instructions reviewed with patient per phone:    To confirm, Your surgeon has instructed you:  Surgery is scheduled 2/20/2020 at 0700.      Please report to [facility] 1[facility] floor main lobby by 0530.   Pre admit office to call afternoon prior to surgery with final arrival time      INSTRUCTIONS IMPORTANT!!!  ¨ Do not eat, drink, or smoke after 12 midnight prior to surgery, including water. OK to brush teeth, no gum, candy or mints!    ¨ Take only these medicines with a small swallow of water-morning of surgery.  Aubagio, Linzess, Ampyra    ____  Do not wear makeup, including mascara.  ____  No powder, lotions or creams to surgical area.  ____  Please remove all jewelry, including piercings and leave at home.  ____  No money or valuables needed. Please leave at home.  ____  Please bring identification and insurance information to hospital.  ____  If going home the same day, arrange for a ride home. You will not be able to   drive if Anesthesia was used.  ____  Children, under 12 years old, must remain in the waiting room with an adult.  They are not allowed in patient areas.  ____  Wear loose fitting clothing. Allow for dressings, bandages.  ____  Stop Aspirin, Ibuprofen, Motrin and Aleve at least 5-7 days before surgery, unless otherwise instructed by your doctor, or the nurse.   You MAY use Tylenol/acetaminophen until day of surgery.  ____  If you take diabetic medication, do not take am of surgery unless instructed by   Doctor.  ____ Stop taking any Fish Oil supplement or any Vitamins that contain Vitamin E at least 5 days prior to surgery.          Bathing Instructions-- The night before surgery and the morning prior to coming to the hospital:   -Do not shave the surgical area.   -Shower and wash your hair and body as usual with anti-bacterial  soap and shampoo.   -Rinse your hair and body completely.   -Use one packet of hibiclens to wash the surgical site (using your hand) gently  for 5 minutes.  Do not scrub you skin too hard.   -Do not use hibiclens on your head, face, or genitals.   -Do not wash with anti-bacterial soap after you use the hibiclens.   -Rinse your body thoroughly.   -Dry with clean, soft towel.  Do not use lotion, cream, deodorant, or powders on   the surgical site.    Use antibacterial soap in place of hibiclens if your surgery is on the head, face or genitals.         Surgical Site Infection    Prevention of surgical site infections:     -Keep incisions clean and dry.   -Do not soak/submerge incisions in water until completely healed.   -Do not apply lotions, powders, creams, or deodorants to site.   -Always make sure hands are cleaned with antibacterial soap/ alcohol-based   prior to touching the surgical site.  (This includes doctors, nurses, staff, and yourself.)    Signs and symptoms:   -Redness and pain around the area where you had surgery   -Drainage of cloudy fluid from your surgical wound   -Fever over 100.4  I have read or had read and explained to me, and understand the above information.
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
## 2020-02-17 NOTE — PROGRESS NOTES
Physical Therapy Daily Treatment Note     Name: Alicia Soliz  Clinic Number: [id number]    Therapy Diagnosis:   Encounter Diagnoses   Name Primary?    Left-sided weakness Yes    Impairment of balance     Abnormality of gait      Physician: Shani Nicholson PA*    Visit Date: 2/18/2020    Physician Orders: PT Eval and Treat  Medical Diagnosis: Multiple Sclerosis   Evaluation Date: 8/15/2019  Authorization Period Expiration: 1/2/21  Plan of Care Certification Period: 2/28/20  Visit #/Visits authorized: Unknown    Time In: 8:10  Time Out: 9:30  Total Billable Time: 45 minutes    Precautions: Standard and Immunosuppression, MS    Subjective     Pt reports:   She was compliant with home exercise program.  Response to previous treatment: Good  Functional change: none    Pain: 2/10  Location: L side (leg and arm)    Objective     Alicia received therapeutic exercises to develop strength, endurance and ROM for 45 minutes including  Nustep x 6 min for L ankle ROM/cardiopulmonary efficiency training  Sit to stand lower surface: BW x 10, 10lbs x 2   Toe raise in bars  Standing marches with focus on increasing hip FL/DF against TB  UE bike 3 min F/3 min B for postural control  Hamstring machine 40#   Quad machine 25#   Lunges in bars with focus on ankle mobility   Gastroc stretch  LE bike x 6 min for quad/hamstring strengthening  TKE against TB    Alicia received the following manual therapy techniques: Joint mobilizations and Soft tissue Mobilization were applied to the: R ankle for 5 minutes, including:  Talocrural mobilizations  STM to L gastroc  L DF/[place] mobilizations    Alicia participated in neuromuscular re-education activities to improve: Balance, Kinesthetic and Proprioception for 5 minutes. The following activities were included:  Balance with pertubations on foam, tandem stance, feet together    Alicia participated in dynamic functional therapeutic activities to improve functional performance for 0  minutes,  including:    Alicia participated in gait training to improve functional mobility and safety for 0 minutes, including:  Gait training with improving ankle movements  Gait training w quad cane     Alicia received the following supervised modalities after being cleared for contradictions: TENS:  Alicia received TENS electrical stimulation for pain to the R ankle. Pt received continuous mode at a rate of 0 pps for 0 minutes. Alicia tolerated treatment well without any adverse effects.     Alicia received hot pack for 0 minutes to R ankle    Home Exercises Provided and Patient Education Provided     Education provided:   - activity toleration, progression of therapy    Written Home Exercises Provided: yes.  Exercises were reviewed and Alicia was able to demonstrate them prior to the end of the session.  Alicia demonstrated good  understanding of the education provided.     See paper chart under Patient Instructions for exercises provided prior visit.    Assessment   Patient demonstrated good tolerance to strengthening and more ankle mobility training on the L side this session with minimal increase in adverse symptoms. Patient is undergoing a gastroc bypass on the 22nd and will not be back in therapy for several weeks due to abdominal precautions.    Alicia is progressing well towards her goals.   Pt prognosis is Excellent.     Pt will continue to benefit from skilled outpatient physical therapy to address the deficits listed in the problem list box on initial evaluation, provide pt/family education and to maximize pt's level of independence in the home and community environment.     Pt's spiritual, cultural and educational needs considered and pt agreeable to plan of care and goals.     Anticipated barriers to physical therapy: transportation, relapses in condition     Goals: tolerate more walking as well as increasing overall strength of L side    Plan     Incorporate more walking, improve L side strength, Improve tolerance  to physical activity     Zoltan Nuñez, PT

## 2020-02-18 ENCOUNTER — PATIENT MESSAGE (OUTPATIENT)
Dept: REHABILITATION | Facility: HOSPITAL | Age: 40
End: 2020-02-18

## 2020-02-18 ENCOUNTER — CLINICAL SUPPORT (OUTPATIENT)
Dept: REHABILITATION | Facility: HOSPITAL | Age: 40
End: 2020-02-18
Attending: PSYCHIATRY & NEUROLOGY
Payer: MEDICARE

## 2020-02-18 DIAGNOSIS — R26.9 ABNORMALITY OF GAIT: ICD-10-CM

## 2020-02-18 DIAGNOSIS — R26.89 IMPAIRMENT OF BALANCE: ICD-10-CM

## 2020-02-18 DIAGNOSIS — R53.1 LEFT-SIDED WEAKNESS: Primary | ICD-10-CM

## 2020-02-18 PROCEDURE — 97110 THERAPEUTIC EXERCISES: CPT | Mod: HCNC

## 2020-02-19 ENCOUNTER — ANESTHESIA EVENT (OUTPATIENT)
Dept: ENDOSCOPY | Facility: HOSPITAL | Age: 40
DRG: 620 | End: 2020-02-19
Payer: MEDICARE

## 2020-02-19 ENCOUNTER — ANESTHESIA (OUTPATIENT)
Dept: ENDOSCOPY | Facility: HOSPITAL | Age: 40
DRG: 620 | End: 2020-02-19
Payer: MEDICARE

## 2020-02-19 DIAGNOSIS — Z01.818 PREOP TESTING: Primary | ICD-10-CM

## 2020-02-19 PROCEDURE — 25000003 PHARM REV CODE 250: Mod: HCNC | Performed by: NURSE ANESTHETIST, CERTIFIED REGISTERED

## 2020-02-19 PROCEDURE — 63600175 PHARM REV CODE 636 W HCPCS: Mod: HCNC | Performed by: NURSE ANESTHETIST, CERTIFIED REGISTERED

## 2020-02-19 RX ORDER — GLYCOPYRROLATE 0.2 MG/ML
INJECTION INTRAMUSCULAR; INTRAVENOUS
Status: DISCONTINUED | OUTPATIENT
Start: 2020-02-19 | End: 2020-02-19

## 2020-02-19 RX ORDER — LIDOCAINE HYDROCHLORIDE 10 MG/ML
INJECTION, SOLUTION EPIDURAL; INFILTRATION; INTRACAUDAL; PERINEURAL
Status: DISCONTINUED | OUTPATIENT
Start: 2020-02-19 | End: 2020-02-19

## 2020-02-19 RX ORDER — PROPOFOL 10 MG/ML
VIAL (ML) INTRAVENOUS
Status: DISCONTINUED | OUTPATIENT
Start: 2020-02-19 | End: 2020-02-19

## 2020-02-19 RX ORDER — SODIUM CHLORIDE, SODIUM LACTATE, POTASSIUM CHLORIDE, CALCIUM CHLORIDE 600; 310; 30; 20 MG/100ML; MG/100ML; MG/100ML; MG/100ML
INJECTION, SOLUTION INTRAVENOUS CONTINUOUS PRN
Status: DISCONTINUED | OUTPATIENT
Start: 2020-02-19 | End: 2020-02-19

## 2020-02-19 RX ADMIN — LIDOCAINE HYDROCHLORIDE 50 MG: 10 INJECTION, SOLUTION EPIDURAL; INFILTRATION; INTRACAUDAL; PERINEURAL at 08:02

## 2020-02-19 RX ADMIN — PROPOFOL 20 MG: 10 INJECTION, EMULSION INTRAVENOUS at 08:02

## 2020-02-19 RX ADMIN — PROPOFOL 100 MG: 10 INJECTION, EMULSION INTRAVENOUS at 08:02

## 2020-02-19 RX ADMIN — GLYCOPYRROLATE 0.2 MG: 0.2 INJECTION INTRAMUSCULAR; INTRAVENOUS at 08:02

## 2020-02-19 RX ADMIN — SODIUM CHLORIDE, SODIUM LACTATE, POTASSIUM CHLORIDE, AND CALCIUM CHLORIDE: .6; .31; .03; .02 INJECTION, SOLUTION INTRAVENOUS at 08:02

## 2020-02-19 NOTE — ANESTHESIA POSTPROCEDURE EVALUATION
Anesthesia Post Evaluation    Patient: Alicia Soliz    Procedure(s) Performed: Procedure(s) (LRB):  EGD (ESOPHAGOGASTRODUODENOSCOPY) (N/A)    Final Anesthesia Type: MAC    Patient location during evaluation: PACU  Patient participation: Yes- Able to Participate  Level of consciousness: awake  Post-procedure vital signs: reviewed and stable  Pain management: adequate  Airway patency: patent    PONV status at discharge: No PONV  Anesthetic complications: no      Cardiovascular status: blood pressure returned to baseline and hemodynamically stable  Respiratory status: unassisted, room air and spontaneous ventilation  Hydration status: euvolemic  Follow-up not needed.          Vitals Value Taken Time   BP  2/19/2020  8:52 AM   Temp  2/19/2020  8:52 AM   Pulse  2/19/2020  8:52 AM   Resp  2/19/2020  8:52 AM   SpO2  2/19/2020  8:52 AM         No case tracking events are documented in the log.      Pain/Alban Score: No data recorded

## 2020-02-19 NOTE — ANESTHESIA PREPROCEDURE EVALUATION
02/19/2020  Alicia Soliz is a 40 y.o., female.    Anesthesia Evaluation    I have reviewed the Patient Summary Reports.    I have reviewed the Nursing Notes.   I have reviewed the Medications.     Review of Systems  Anesthesia Hx:  No problems with previous Anesthesia    Social:  Non-Smoker, Social Alcohol Use    Hematology/Oncology:  Hematology Normal   Oncology Normal     EENT/Dental:EENT/Dental Normal   Cardiovascular:   Hypertension, well controlled    Pulmonary:  Pulmonary Normal    Renal/:  Renal/ Normal     Hepatic/GI:  Hepatic/GI Normal    Musculoskeletal:  Musculoskeletal Normal    Neurological:  Neurology Normal Multiple sclerosis  Left sided weakness  Walks with cane/walker   Endocrine:  Endocrine Normal    Dermatological:  Skin Normal    Psych:   depression No meds         Physical Exam  General:  Well nourished, Morbid Obesity    Airway/Jaw/Neck:  Airway Findings: Mouth Opening: Normal Tongue: Normal  General Airway Assessment: Adult  Mallampati: II  TM Distance: Normal, at least 6 cm  Jaw/Neck Findings:  Neck ROM: Normal ROM      Dental:  Dental Findings: Upper partial dentures, Upper front caps   Chest/Lungs:  Chest/Lungs Findings: Clear to auscultation, Normal Respiratory Rate     Heart/Vascular:  Heart Findings: Rate: Normal  Rhythm: Regular Rhythm  Sounds: Normal        Mental Status:  Mental Status Findings:  Cooperative, Alert and Oriented         Anesthesia Plan  Type of Anesthesia, risks & benefits discussed:  Anesthesia Type:  MAC  Patient's Preference:   Intra-op Monitoring Plan: standard ASA monitors  Intra-op Monitoring Plan Comments:   Post Op Pain Control Plan:   Post Op Pain Control Plan Comments:   Induction:   IV  Beta Blocker:  Patient is not currently on a Beta-Blocker (No further documentation required).       Informed Consent: Patient understands risks and agrees  with Anesthesia plan.  Questions answered. Anesthesia consent signed with patient.  ASA Score: 2     Day of Surgery Review of History & Physical: I have interviewed and examined the patient. I have reviewed the patient's H&P dated:  There are no significant changes.          Ready For Surgery From Anesthesia Perspective.

## 2020-02-19 NOTE — TRANSFER OF CARE
Anesthesia Transfer of Care Note    Patient: Alicia Soliz    Procedure(s) Performed: Procedure(s) (LRB):  EGD (ESOPHAGOGASTRODUODENOSCOPY) (N/A)    Patient location: PACU    Anesthesia Type: MAC    Transport from OR: Transported from OR on room air with adequate spontaneous ventilation    Post pain: adequate analgesia    Post assessment: no apparent anesthetic complications and tolerated procedure well    Post vital signs: stable    Level of consciousness: awake    Nausea/Vomiting: no nausea/vomiting    Complications: none    Transfer of care protocol was followed      Last vitals:   Visit Vitals  Breastfeeding? No

## 2020-02-20 ENCOUNTER — ANESTHESIA EVENT (OUTPATIENT)
Dept: SURGERY | Facility: HOSPITAL | Age: 40
DRG: 620 | End: 2020-02-20
Payer: MEDICARE

## 2020-02-20 ENCOUNTER — HOSPITAL ENCOUNTER (INPATIENT)
Facility: HOSPITAL | Age: 40
LOS: 1 days | Discharge: HOME | DRG: 620 | End: 2020-02-21
Attending: SURGERY | Admitting: SURGERY
Payer: MEDICARE

## 2020-02-20 ENCOUNTER — ANESTHESIA (OUTPATIENT)
Dept: SURGERY | Facility: HOSPITAL | Age: 40
DRG: 620 | End: 2020-02-20
Payer: MEDICARE

## 2020-02-20 DIAGNOSIS — E66.01 MORBID OBESITY WITH BMI OF 45.0-49.9, ADULT: Primary | ICD-10-CM

## 2020-02-20 PROCEDURE — 43775 PR LAP, GAST RESTRICT PROC, LONGITUDINAL GASTRECTOMY: ICD-10-PCS | Mod: HCNC,,, | Performed by: SURGERY

## 2020-02-20 PROCEDURE — 11000001 HC ACUTE MED/SURG PRIVATE ROOM: Mod: HCNC

## 2020-02-20 PROCEDURE — 88307 PR  SURG PATH,LEVEL V: ICD-10-PCS | Mod: 26,HCNC,, | Performed by: PATHOLOGY

## 2020-02-20 PROCEDURE — 27201423 OPTIME MED/SURG SUP & DEVICES STERILE SUPPLY: Mod: HCNC | Performed by: SURGERY

## 2020-02-20 PROCEDURE — 37000008 HC ANESTHESIA 1ST 15 MINUTES: Mod: HCNC | Performed by: SURGERY

## 2020-02-20 PROCEDURE — 63600175 PHARM REV CODE 636 W HCPCS: Mod: HCNC | Performed by: NURSE ANESTHETIST, CERTIFIED REGISTERED

## 2020-02-20 PROCEDURE — 71000033 HC RECOVERY, INTIAL HOUR: Mod: HCNC | Performed by: SURGERY

## 2020-02-20 PROCEDURE — 94799 UNLISTED PULMONARY SVC/PX: CPT | Mod: HCNC

## 2020-02-20 PROCEDURE — 63600175 PHARM REV CODE 636 W HCPCS: Mod: HCNC | Performed by: ANESTHESIOLOGY

## 2020-02-20 PROCEDURE — 25000003 PHARM REV CODE 250: Mod: HCNC | Performed by: SURGERY

## 2020-02-20 PROCEDURE — 25000003 PHARM REV CODE 250: Mod: HCNC | Performed by: NURSE ANESTHETIST, CERTIFIED REGISTERED

## 2020-02-20 PROCEDURE — 25000003 PHARM REV CODE 250: Mod: HCNC | Performed by: ANESTHESIOLOGY

## 2020-02-20 PROCEDURE — C9113 INJ PANTOPRAZOLE SODIUM, VIA: HCPCS | Mod: HCNC | Performed by: SURGERY

## 2020-02-20 PROCEDURE — 63600175 PHARM REV CODE 636 W HCPCS: Mod: HCNC | Performed by: SURGERY

## 2020-02-20 PROCEDURE — 88307 TISSUE EXAM BY PATHOLOGIST: CPT | Mod: HCNC | Performed by: PATHOLOGY

## 2020-02-20 PROCEDURE — 94760 N-INVAS EAR/PLS OXIMETRY 1: CPT | Mod: HCNC

## 2020-02-20 PROCEDURE — 37000009 HC ANESTHESIA EA ADD 15 MINS: Mod: HCNC | Performed by: SURGERY

## 2020-02-20 PROCEDURE — 36000712 HC OR TIME LEV V 1ST 15 MIN: Mod: HCNC | Performed by: SURGERY

## 2020-02-20 PROCEDURE — 43775 LAP SLEEVE GASTRECTOMY: CPT | Mod: HCNC,,, | Performed by: SURGERY

## 2020-02-20 PROCEDURE — 99900035 HC TECH TIME PER 15 MIN (STAT): Mod: HCNC

## 2020-02-20 PROCEDURE — 71000039 HC RECOVERY, EACH ADD'L HOUR: Mod: HCNC | Performed by: SURGERY

## 2020-02-20 PROCEDURE — 99900037 HC PT THERAPY SCREENING (STAT): Mod: HCNC

## 2020-02-20 PROCEDURE — 88307 TISSUE EXAM BY PATHOLOGIST: CPT | Mod: 26,HCNC,, | Performed by: PATHOLOGY

## 2020-02-20 PROCEDURE — 36000713 HC OR TIME LEV V EA ADD 15 MIN: Mod: HCNC | Performed by: SURGERY

## 2020-02-20 RX ORDER — FENTANYL CITRATE 50 UG/ML
25 INJECTION, SOLUTION INTRAMUSCULAR; INTRAVENOUS EVERY 5 MIN PRN
Status: DISCONTINUED | OUTPATIENT
Start: 2020-02-20 | End: 2020-02-20 | Stop reason: HOSPADM

## 2020-02-20 RX ORDER — HYDRALAZINE HYDROCHLORIDE 20 MG/ML
INJECTION INTRAMUSCULAR; INTRAVENOUS
Status: DISCONTINUED | OUTPATIENT
Start: 2020-02-20 | End: 2020-02-20

## 2020-02-20 RX ORDER — KETOROLAC TROMETHAMINE 30 MG/ML
15 INJECTION, SOLUTION INTRAMUSCULAR; INTRAVENOUS EVERY 6 HOURS PRN
Status: DISCONTINUED | OUTPATIENT
Start: 2020-02-20 | End: 2020-02-21 | Stop reason: HOSPADM

## 2020-02-20 RX ORDER — FENTANYL CITRATE 50 UG/ML
INJECTION, SOLUTION INTRAMUSCULAR; INTRAVENOUS
Status: DISCONTINUED | OUTPATIENT
Start: 2020-02-20 | End: 2020-02-20

## 2020-02-20 RX ORDER — PANTOPRAZOLE SODIUM 40 MG/10ML
40 INJECTION, POWDER, LYOPHILIZED, FOR SOLUTION INTRAVENOUS 2 TIMES DAILY
Status: DISCONTINUED | OUTPATIENT
Start: 2020-02-20 | End: 2020-02-21 | Stop reason: HOSPADM

## 2020-02-20 RX ORDER — AMOXICILLIN 250 MG
1 CAPSULE ORAL 2 TIMES DAILY
Status: DISCONTINUED | OUTPATIENT
Start: 2020-02-20 | End: 2020-02-21 | Stop reason: HOSPADM

## 2020-02-20 RX ORDER — DOXEPIN HYDROCHLORIDE 25 MG/1
50 CAPSULE ORAL NIGHTLY
Status: DISCONTINUED | OUTPATIENT
Start: 2020-02-20 | End: 2020-02-21 | Stop reason: HOSPADM

## 2020-02-20 RX ORDER — ONDANSETRON 2 MG/ML
4 INJECTION INTRAMUSCULAR; INTRAVENOUS DAILY PRN
Status: DISCONTINUED | OUTPATIENT
Start: 2020-02-20 | End: 2020-02-20 | Stop reason: HOSPADM

## 2020-02-20 RX ORDER — ONDANSETRON 8 MG/1
8 TABLET, ORALLY DISINTEGRATING ORAL EVERY 8 HOURS PRN
Status: DISCONTINUED | OUTPATIENT
Start: 2020-02-20 | End: 2020-02-21 | Stop reason: HOSPADM

## 2020-02-20 RX ORDER — MIDAZOLAM HYDROCHLORIDE 1 MG/ML
INJECTION, SOLUTION INTRAMUSCULAR; INTRAVENOUS
Status: DISCONTINUED | OUTPATIENT
Start: 2020-02-20 | End: 2020-02-20

## 2020-02-20 RX ORDER — ONDANSETRON 2 MG/ML
4 INJECTION INTRAMUSCULAR; INTRAVENOUS EVERY 6 HOURS PRN
Status: DISCONTINUED | OUTPATIENT
Start: 2020-02-20 | End: 2020-02-21 | Stop reason: HOSPADM

## 2020-02-20 RX ORDER — ACETAMINOPHEN 10 MG/ML
1000 INJECTION, SOLUTION INTRAVENOUS EVERY 8 HOURS
Status: DISCONTINUED | OUTPATIENT
Start: 2020-02-20 | End: 2020-02-21 | Stop reason: HOSPADM

## 2020-02-20 RX ORDER — TERIFLUNOMIDE 7 MG/1
7 TABLET, FILM COATED ORAL DAILY
Status: DISCONTINUED | OUTPATIENT
Start: 2020-02-20 | End: 2020-02-21 | Stop reason: HOSPADM

## 2020-02-20 RX ORDER — ROCURONIUM BROMIDE 10 MG/ML
INJECTION, SOLUTION INTRAVENOUS
Status: DISCONTINUED | OUTPATIENT
Start: 2020-02-20 | End: 2020-02-20

## 2020-02-20 RX ORDER — CEFAZOLIN SODIUM 2 G/50ML
2 SOLUTION INTRAVENOUS
Status: COMPLETED | OUTPATIENT
Start: 2020-02-20 | End: 2020-02-20

## 2020-02-20 RX ORDER — PROPOFOL 10 MG/ML
VIAL (ML) INTRAVENOUS
Status: DISCONTINUED | OUTPATIENT
Start: 2020-02-20 | End: 2020-02-20

## 2020-02-20 RX ORDER — BISACODYL 10 MG
10 SUPPOSITORY, RECTAL RECTAL DAILY PRN
Status: DISCONTINUED | OUTPATIENT
Start: 2020-02-20 | End: 2020-02-21 | Stop reason: HOSPADM

## 2020-02-20 RX ORDER — BUPIVACAINE HYDROCHLORIDE 2.5 MG/ML
INJECTION, SOLUTION EPIDURAL; INFILTRATION; INTRACAUDAL
Status: DISCONTINUED | OUTPATIENT
Start: 2020-02-20 | End: 2020-02-20 | Stop reason: HOSPADM

## 2020-02-20 RX ORDER — ONDANSETRON 2 MG/ML
INJECTION INTRAMUSCULAR; INTRAVENOUS
Status: DISCONTINUED | OUTPATIENT
Start: 2020-02-20 | End: 2020-02-20

## 2020-02-20 RX ORDER — LABETALOL HYDROCHLORIDE 5 MG/ML
10 INJECTION, SOLUTION INTRAVENOUS ONCE
Status: COMPLETED | OUTPATIENT
Start: 2020-02-20 | End: 2020-02-20

## 2020-02-20 RX ORDER — DALFAMPRIDINE 10 MG/1
1 TABLET, FILM COATED, EXTENDED RELEASE ORAL DAILY
Status: DISCONTINUED | OUTPATIENT
Start: 2020-02-20 | End: 2020-02-21 | Stop reason: HOSPADM

## 2020-02-20 RX ORDER — CHLORHEXIDINE GLUCONATE ORAL RINSE 1.2 MG/ML
10 SOLUTION DENTAL 2 TIMES DAILY
Status: DISCONTINUED | OUTPATIENT
Start: 2020-02-20 | End: 2020-02-21 | Stop reason: HOSPADM

## 2020-02-20 RX ORDER — ACETAMINOPHEN 325 MG/1
650 TABLET ORAL EVERY 6 HOURS PRN
Status: DISCONTINUED | OUTPATIENT
Start: 2020-02-20 | End: 2020-02-21 | Stop reason: HOSPADM

## 2020-02-20 RX ORDER — SODIUM CHLORIDE 9 MG/ML
INJECTION, SOLUTION INTRAVENOUS CONTINUOUS
Status: DISCONTINUED | OUTPATIENT
Start: 2020-02-20 | End: 2020-02-20

## 2020-02-20 RX ORDER — LACTULOSE 10 G/15ML
20 SOLUTION ORAL EVERY 6 HOURS PRN
Status: DISCONTINUED | OUTPATIENT
Start: 2020-02-20 | End: 2020-02-21 | Stop reason: HOSPADM

## 2020-02-20 RX ORDER — CLONIDINE HYDROCHLORIDE 0.1 MG/1
0.1 TABLET ORAL EVERY 6 HOURS PRN
Status: DISCONTINUED | OUTPATIENT
Start: 2020-02-20 | End: 2020-02-21 | Stop reason: HOSPADM

## 2020-02-20 RX ORDER — LABETALOL HYDROCHLORIDE 5 MG/ML
INJECTION, SOLUTION INTRAVENOUS
Status: DISCONTINUED | OUTPATIENT
Start: 2020-02-20 | End: 2020-02-20

## 2020-02-20 RX ORDER — LIDOCAINE HYDROCHLORIDE 10 MG/ML
INJECTION, SOLUTION EPIDURAL; INFILTRATION; INTRACAUDAL; PERINEURAL
Status: DISCONTINUED | OUTPATIENT
Start: 2020-02-20 | End: 2020-02-20

## 2020-02-20 RX ORDER — SODIUM CHLORIDE 9 MG/ML
INJECTION, SOLUTION INTRAVENOUS CONTINUOUS
Status: DISCONTINUED | OUTPATIENT
Start: 2020-02-20 | End: 2020-02-21 | Stop reason: HOSPADM

## 2020-02-20 RX ORDER — SODIUM CHLORIDE, SODIUM LACTATE, POTASSIUM CHLORIDE, CALCIUM CHLORIDE 600; 310; 30; 20 MG/100ML; MG/100ML; MG/100ML; MG/100ML
INJECTION, SOLUTION INTRAVENOUS CONTINUOUS
Status: DISCONTINUED | OUTPATIENT
Start: 2020-02-20 | End: 2020-02-21 | Stop reason: HOSPADM

## 2020-02-20 RX ORDER — SODIUM CHLORIDE, SODIUM LACTATE, POTASSIUM CHLORIDE, CALCIUM CHLORIDE 600; 310; 30; 20 MG/100ML; MG/100ML; MG/100ML; MG/100ML
INJECTION, SOLUTION INTRAVENOUS CONTINUOUS PRN
Status: DISCONTINUED | OUTPATIENT
Start: 2020-02-20 | End: 2020-02-20

## 2020-02-20 RX ORDER — DIPHENHYDRAMINE HYDROCHLORIDE 50 MG/ML
25 INJECTION INTRAMUSCULAR; INTRAVENOUS EVERY 4 HOURS PRN
Status: DISCONTINUED | OUTPATIENT
Start: 2020-02-20 | End: 2020-02-21 | Stop reason: HOSPADM

## 2020-02-20 RX ADMIN — SODIUM CHLORIDE, SODIUM LACTATE, POTASSIUM CHLORIDE, AND CALCIUM CHLORIDE: .6; .31; .03; .02 INJECTION, SOLUTION INTRAVENOUS at 12:02

## 2020-02-20 RX ADMIN — SENNOSIDES, DOCUSATE SODIUM 1 TABLET: 50; 8.6 TABLET, FILM COATED ORAL at 02:02

## 2020-02-20 RX ADMIN — ONDANSETRON 4 MG: 2 INJECTION INTRAMUSCULAR; INTRAVENOUS at 10:02

## 2020-02-20 RX ADMIN — SENNOSIDES, DOCUSATE SODIUM 1 TABLET: 50; 8.6 TABLET, FILM COATED ORAL at 09:02

## 2020-02-20 RX ADMIN — CHLORHEXIDINE GLUCONATE 0.12% ORAL RINSE 10 ML: 1.2 LIQUID ORAL at 09:02

## 2020-02-20 RX ADMIN — LABETALOL HYDROCHLORIDE 10 MG: 5 INJECTION, SOLUTION INTRAVENOUS at 09:02

## 2020-02-20 RX ADMIN — MIDAZOLAM 2 MG: 1 INJECTION INTRAMUSCULAR; INTRAVENOUS at 07:02

## 2020-02-20 RX ADMIN — ACETAMINOPHEN 1000 MG: 10 INJECTION, SOLUTION INTRAVENOUS at 09:02

## 2020-02-20 RX ADMIN — HYDRALAZINE HYDROCHLORIDE 10 MG: 20 INJECTION INTRAMUSCULAR; INTRAVENOUS at 09:02

## 2020-02-20 RX ADMIN — ROCURONIUM BROMIDE 50 MG: 10 INJECTION, SOLUTION INTRAVENOUS at 07:02

## 2020-02-20 RX ADMIN — PANTOPRAZOLE SODIUM 40 MG: 40 INJECTION, POWDER, LYOPHILIZED, FOR SOLUTION INTRAVENOUS at 09:02

## 2020-02-20 RX ADMIN — PROPOFOL 150 MG: 10 INJECTION, EMULSION INTRAVENOUS at 07:02

## 2020-02-20 RX ADMIN — CEFAZOLIN SODIUM 3 G: 2 SOLUTION INTRAVENOUS at 07:02

## 2020-02-20 RX ADMIN — SODIUM CHLORIDE, SODIUM LACTATE, POTASSIUM CHLORIDE, AND CALCIUM CHLORIDE: 600; 310; 30; 20 INJECTION, SOLUTION INTRAVENOUS at 09:02

## 2020-02-20 RX ADMIN — FENTANYL CITRATE 100 MCG: 50 INJECTION, SOLUTION INTRAMUSCULAR; INTRAVENOUS at 07:02

## 2020-02-20 RX ADMIN — PROMETHAZINE HYDROCHLORIDE 6.25 MG: 25 INJECTION INTRAMUSCULAR; INTRAVENOUS at 10:02

## 2020-02-20 RX ADMIN — KETOROLAC TROMETHAMINE 15 MG: 30 INJECTION, SOLUTION INTRAMUSCULAR; INTRAVENOUS at 09:02

## 2020-02-20 RX ADMIN — FENTANYL CITRATE 25 MCG: 50 INJECTION, SOLUTION INTRAMUSCULAR; INTRAVENOUS at 10:02

## 2020-02-20 RX ADMIN — SODIUM CHLORIDE, SODIUM LACTATE, POTASSIUM CHLORIDE, AND CALCIUM CHLORIDE: 600; 310; 30; 20 INJECTION, SOLUTION INTRAVENOUS at 07:02

## 2020-02-20 RX ADMIN — CLONIDINE HYDROCHLORIDE 0.1 MG: 0.1 TABLET ORAL at 05:02

## 2020-02-20 RX ADMIN — ACETAMINOPHEN 650 MG: 325 TABLET ORAL at 04:02

## 2020-02-20 RX ADMIN — LABETALOL HYDROCHLORIDE 10 MG: 5 INJECTION INTRAVENOUS at 11:02

## 2020-02-20 RX ADMIN — KETOROLAC TROMETHAMINE 15 MG: 30 INJECTION, SOLUTION INTRAMUSCULAR; INTRAVENOUS at 02:02

## 2020-02-20 RX ADMIN — LIDOCAINE HYDROCHLORIDE 50 MG: 10 INJECTION, SOLUTION EPIDURAL; INFILTRATION; INTRACAUDAL; PERINEURAL at 07:02

## 2020-02-20 RX ADMIN — ONDANSETRON 4 MG: 2 INJECTION, SOLUTION INTRAMUSCULAR; INTRAVENOUS at 10:02

## 2020-02-20 RX ADMIN — SODIUM CHLORIDE, SODIUM LACTATE, POTASSIUM CHLORIDE, AND CALCIUM CHLORIDE: .6; .31; .03; .02 INJECTION, SOLUTION INTRAVENOUS at 09:02

## 2020-02-20 RX ADMIN — LINACLOTIDE 145 MCG: 145 CAPSULE, GELATIN COATED ORAL at 04:02

## 2020-02-20 RX ADMIN — DOXEPIN HYDROCHLORIDE 50 MG: 25 CAPSULE ORAL at 09:02

## 2020-02-20 RX ADMIN — FENTANYL CITRATE 100 MCG: 50 INJECTION, SOLUTION INTRAMUSCULAR; INTRAVENOUS at 09:02

## 2020-02-20 RX ADMIN — CHLORHEXIDINE GLUCONATE 0.12% ORAL RINSE 10 ML: 1.2 LIQUID ORAL at 02:02

## 2020-02-20 NOTE — ANESTHESIA PREPROCEDURE EVALUATION
02/20/2020  Alicia Soliz is a 40 y.o., female.    Anesthesia Evaluation    I have reviewed the Patient Summary Reports.    I have reviewed the Nursing Notes.   I have reviewed the Medications.     Review of Systems  Anesthesia Hx:  No problems with previous Anesthesia Denies Hx of Anesthetic complications  Denies Family Hx of Anesthesia complications.   Denies Personal Hx of Anesthesia complications.   Social:  Non-Smoker, No Alcohol Use    Cardiovascular:   Hypertension ECG has been reviewed.    Pulmonary:  Pulmonary Normal    Renal/:  Renal/ Normal     Hepatic/GI:  Hepatic/GI Normal    Neurological:  Neurology Normal    Endocrine:  Endocrine Normal    Psych:   Psychiatric History        Patient Active Problem List   Diagnosis    Hypertension    Multiple sclerosis    Morbid obesity with BMI of 45.0-49.9, adult    Multiple sclerosis exacerbation    Opioid dependence, uncomplicated    Hemiplegia     Past Surgical History:   Procedure Laterality Date    APPENDECTOMY      CHOLECYSTECTOMY      HYSTERECTOMY      KNEE SURGERY      TONSILLECTOMY      TUBAL LIGATION       No current facility-administered medications on file prior to encounter.      Current Outpatient Medications on File Prior to Encounter   Medication Sig Dispense Refill    AUBAGIO 7 mg Tab Take 7 mg by mouth once daily. 90 tablet 0    dalfampridine (AMPYRA) 10 mg Tb12 Take 1 tablet by mouth 2 (two) times daily. (Patient taking differently: Take 1 tablet by mouth once daily. ) 60 tablet 1    gabapentin (NEURONTIN) 300 MG capsule Take 300 mg by mouth 3 (three) times daily.      hydroCHLOROthiazide (HYDRODIURIL) 12.5 MG Tab TAKE 1 TABLET (12.5 MG TOTAL) BY MOUTH ONCE DAILY. 90 tablet 3    linaCLOtide (LINZESS) 145 mcg Cap capsule Take 1 capsule (145 mcg total) by mouth once daily. 30 capsule 5     st-[id number] 1-1-500 mg Cap Take 1 tablet by mouth once daily. 30 capsule 3    multivitamin with folic acid 400 mcg Tab Take 1 tablet by mouth.      nystatin (MYCOSTATIN) cream Apply topically 2 (two) times daily. 30 g 1    promethazine (PHENERGAN) 25 MG tablet Take 1 tablet (25 mg total) by mouth every 4 (four) hours as needed for Nausea. 30 tablet 1    acetaminophen-codeine 300-30mg (TYLENOL #3) 300-30 mg Tab Take 1 tablet by mouth every 4 (four) hours as needed. for pain.  0    ALPRAZolam (XANAX) 0.5 MG tablet Take 1 tablet (0.5 mg total) by mouth 3 (three) times daily as needed for Anxiety. 20 tablet 0    doxepin (SINEQUAN) 50 MG capsule TAKE 1 CAPSULE BY MOUTH NIGHTLY AS NEEDED. 90 capsule 2    methocarbamol (ROBAXIN) 500 MG Tab Take 1 tablet (500 mg total) by mouth 2 (two) times daily as needed (muscle spasms). 60 tablet 0    mupirocin (BACTROBAN) 2 % ointment Apply topically 2 (two) times daily. Apply to affected area 30 g 1    valACYclovir (VALTREX) 500 MG tablet Take 500 mg by mouth as needed.  11           Physical Exam  General:  Morbid Obesity    Airway/Jaw/Neck:  Airway Findings: Mouth Opening: Normal Tongue: Normal  General Airway Assessment: Adult  Mallampati: II  TM Distance: 4 - 6 cm  Jaw/Neck Findings:  Neck ROM: Normal ROM      Dental:  Dental Findings: In tact, Upper Dentures   Chest/Lungs:  Chest/Lungs Findings: Clear to auscultation, Normal Respiratory Rate     Heart/Vascular:  Heart Findings: Rate: Normal  Rhythm: Regular Rhythm  Sounds: Normal        Mental Status:  Mental Status Findings:  Cooperative, Alert and Oriented       Lab Results   Component Value Date    WBC 7.90 02/07/2020    HGB 12.2 02/07/2020    HCT 40.7 02/07/2020    MCV 70 (L) 02/07/2020     02/07/2020         Chemistry        Component Value Date/Time     01/10/2020 1205    K 3.2 (L) 01/10/2020 1205     01/10/2020 1205    CO2 28 01/10/2020 1205    BUN 7 01/10/2020 1205     CREATININE 0.8 01/10/2020 1205    GLU 86 01/10/2020 1205        Component Value Date/Time    CALCIUM 10.3 01/10/2020 1205    ALKPHOS 102 02/07/2020 1030    AST 29 02/07/2020 1030    ALT 24 02/07/2020 1030    BILITOT 0.5 02/07/2020 1030    ESTGFRAFRICA >60.0 01/10/2020 1205    EGFRNONAA >60.0 01/10/2020 1205        Normal sinus rhythm  Cannot exclude Septal infarct ,age undetermined  Abnormal ECG  When compared with ECG of 17-NOV-2018 23:19,  No significant change was found  Confirmed by ALEJANDRO WILLS, NESTOR HOPKINS ([id number]) on 4/13/2019 7:56:48 PM    Anesthesia Plan  Type of Anesthesia, risks & benefits discussed:  Anesthesia Type:  general  Patient's Preference:   Intra-op Monitoring Plan: standard ASA monitors  Intra-op Monitoring Plan Comments:   Post Op Pain Control Plan: per primary service following discharge from PACU  Post Op Pain Control Plan Comments:   Induction:   IV  Beta Blocker:  Patient is not currently on a Beta-Blocker (No further documentation required).       Informed Consent: Patient understands risks and agrees with Anesthesia plan.  Questions answered. Anesthesia consent signed with patient.  ASA Score: 3     Day of Surgery Review of History & Physical: I have interviewed and examined the patient. I have reviewed the patient's H&P dated:  There are no significant changes.  H&P update referred to the surgeon.         Ready For Surgery From Anesthesia Perspective.

## 2020-02-20 NOTE — INTERVAL H&P NOTE
The patient has been examined and the H&P has been reviewed:    I concur with the findings and no changes have occurred since H&P was written.    Anesthesia/Surgery risks, benefits and alternative options discussed and understood by patient/family.          Active Hospital Problems    Diagnosis  POA    Morbid obesity with BMI of 45.0-49.9, adult [E66.01, Z68.42]  Not Applicable      Resolved Hospital Problems   No resolved problems to display.

## 2020-02-20 NOTE — NURSING
Assumed care of pt from jose martin gr, will cont to monitor blood pressure and pain until ngiht shift rn

## 2020-02-20 NOTE — PT/OT/SLP PROGRESS
Physical Therapy      Patient Name:  Alicia Soliz   MRN:  [id number]    Eval initiated via chart review in Epic but patient not seen today secondary to patient sleeping and too nauseated to participate. Will follow-up ion next session.    Ashok Cardozo, PT

## 2020-02-20 NOTE — ANESTHESIA RELEASE NOTE
"Anesthesia Release from PACU Note    Patient: Alicia Soliz    Procedure(s) Performed: Procedure(s) (LRB):  XI ROBOTIC SLEEVE GASTRECTOMY (N/A)  EGD (ESOPHAGOGASTRODUODENOSCOPY) (N/A)    Anesthesia type: general    Post pain: Adequate analgesia    Post assessment: no apparent anesthetic complications    Last Vitals:   Visit Vitals  BP (!) 145/66 (BP Location: Right arm, Patient Position: Sitting)   Pulse 71   Temp 36.6 °C (97.9 °F) (Temporal)   Resp 18   Ht 5' 6" (1.676 m)   Wt (!) 136.4 kg (300 lb 11.3 oz)   SpO2 100%   Breastfeeding? No   BMI 48.54 kg/m²       Post vital signs: stable    Level of consciousness: awake    Nausea/Vomiting: no nausea/no vomiting    Complications: none    Airway Patency: patent    Respiratory: unassisted    Cardiovascular: stable and blood pressure at baseline    Hydration: euvolemic  "

## 2020-02-20 NOTE — OP NOTE
"Operative Note       SURGERY DATE:  02/20/2020    PRE-OP DIAGNOSIS:  Morbid obesity with BMI of 45.0-49.9, adult [E66.01, Z68.42]    POST-OP DIAGNOSIS:  Morbid obesity with BMI of 45.0-49.9, adult [E66.01, Z68.42]   Hypertension.  Multiple sclerosis  Hemiplegia due to stroke.  Arthritis, knees  Hyperlipidemia.    Active Hospital Problems    Diagnosis  POA    Morbid obesity with BMI of 45.0-49.9, adult [E66.01, Z68.42]  Not Applicable      Resolved Hospital Problems   No resolved problems to display.       Procedure(s) (LRB):  XI ROBOTIC SLEEVE GASTRECTOMY (N/A)  EGD (ESOPHAGOGASTRODUODENOSCOPY) (N/A)    Surgeon(s) and Role:     * Michael Navarrete MD - Primary    ASSISTANTS: None  ANESTHESIA: General    FINDINGS:  Unremarkable.    ESTIMATED BLOOD LOSS: 5 mL              COMPLICATIONS:  None    SPECIMEN:  Greater curvature of the stomach.    Implants: None    INDICATION:  Morbid obesity.    DESCRIPTION OF PROCEDURE:    The patient was taken to the operating room and under went general anesthesia with orotracheal intubation. Once the patient was fully sedated, the patient was prepared and draped in the sterile manner. Once the patient was sleep, a "time-out" was called, the patient's ID, the site of surgery, the names of participants, and the planned surgery were confirmed prior to making the incision. Preoperative upper endoscopic examination was completed and visualized all the way to the duodenum second portion. Then attention was turned to the abdomen. A Verres needle was placed on the left upper quadrant, and gained pneumoperitoneum to the pressure of 15 mm Hg. Then in a straight line and 10 cm apart from each other, three 8 mm metal trocars placed above the umbilicus. The camera port was placed slightly left of the midline. A 12 mm metal trocar was then placed on the far right of the straight line. With the patient in a steep reverse trendelenburg position, the stomach was examined. A Lourdes's retractor was placed " at the epigastric area after a tract was created with a 5 mm trocar. The tip of the trocar was then drove into the peritoneal cavity, rotated, and then left lobe was lifted. The retractor was then connected to the fast clamp. Once docking of robot completed, then instruments (from far right; bioplar, camera, vessel sealer, and Cardiere) were loaded. The dissection was commenced from the 5 cm milton proximally from the pylorus, and thus taking down the omentum, short gastric vessels leading all the way up to the angle of His. During dissection, the posterior attachments were lysed with the vessel sealer. Once the stomach was dissected on the greater curvature, then a stapler was brought in with a Green load, then 48 bougie was inserted into the stomach. With the bougie in place, the stapler was fired twice with Green, and Blue loads the rest of the way all the way up to the angle of His. The staple line was then suture closed with 2-0 Stratifix, imbricating the staple line. Small bleeders also were coagulated with a cautery. After bougie was removed, EGD was performed to check for possible leak. The integrity of the staple line was very goo. Irrigation was then performed and removed irrigant. After the resected stomach removed from the peritoneal cavity, then the robot was undocked. All ports removed, and injected with 30 ml of 0.25% Marcaine, then closed with 4-0 Vicryl. During the procedure, the EBL was minimal. The patient was later awaken, extubated and taken to the recovery room.              CONDITION: Good    DISPOSITION: PACU - hemodynamically stable.     Michael Navarrete

## 2020-02-20 NOTE — ANESTHESIA POSTPROCEDURE EVALUATION
Anesthesia Post Evaluation    Patient: Alicia Soilz    Procedure(s) Performed: Procedure(s) (LRB):  XI ROBOTIC SLEEVE GASTRECTOMY (N/A)  EGD (ESOPHAGOGASTRODUODENOSCOPY) (N/A)    Final Anesthesia Type: general    Patient location during evaluation: PACU  Patient participation: Yes- Able to Participate  Level of consciousness: awake and alert  Post-procedure vital signs: reviewed and stable  Pain management: adequate  Airway patency: patent  FLOYD mitigation strategies: Extubation while patient is awake  PONV status at discharge: vomiting (controlled)  Anesthetic complications: no      Cardiovascular status: hemodynamically stable  Respiratory status: spontaneous ventilation  Hydration status: euvolemic  Follow-up not needed.          Vitals Value Taken Time   /70 2/20/2020 11:20 AM   Temp 36.2 °C (97.2 °F) 2/20/2020 11:20 AM   Pulse 93 2/20/2020 11:34 AM   Resp 24 2/20/2020 11:23 AM   SpO2 98 % 2/20/2020 11:34 AM   Vitals shown include unvalidated device data.      No case tracking events are documented in the log.      Pain/Alban Score: Pain Rating Prior to Med Admin: 6 (2/20/2020 10:55 AM)  Alban Score: 9 (2/20/2020 11:00 AM)

## 2020-02-20 NOTE — PLAN OF CARE
Patient arrived on floor via stretcher from Surgery. Nausea at times. Daughter at bedside. Lap sites x 5 to abdomen. Writing on bandaid to lap sites for removal in 2 days. Patient voided in recovery per nurse after Felix removed in recovery. Pain controlled. Patient sleeping at intervals. SCDs to BLE. IVF infusing at this time and patient wants to wait for oral medication until nausea is resolved. Chart check completed.

## 2020-02-20 NOTE — TRANSFER OF CARE
"Anesthesia Transfer of Care Note    Patient: Alicia Soliz    Procedure(s) Performed: Procedure(s) (LRB):  XI ROBOTIC SLEEVE GASTRECTOMY (N/A)  EGD (ESOPHAGOGASTRODUODENOSCOPY) (N/A)    Patient location: PACU    Anesthesia Type: general    Transport from OR: Transported from OR on room air with adequate spontaneous ventilation    Post pain: adequate analgesia    Post assessment: no apparent anesthetic complications    Post vital signs: stable    Level of consciousness: awake    Nausea/Vomiting: no nausea/vomiting    Complications: none    Transfer of care protocol was followed      Last vitals:   Visit Vitals  BP (!) 145/66 (BP Location: Right arm, Patient Position: Sitting)   Pulse 71   Temp 36.6 °C (97.9 °F) (Temporal)   Resp 18   Ht 5' 6" (1.676 m)   Wt (!) 136.4 kg (300 lb 11.3 oz)   SpO2 100%   Breastfeeding? No   BMI 48.54 kg/m²     "

## 2020-02-21 VITALS
TEMPERATURE: 98 F | WEIGHT: 293 LBS | HEIGHT: 66 IN | HEART RATE: 67 BPM | OXYGEN SATURATION: 99 % | RESPIRATION RATE: 16 BRPM | SYSTOLIC BLOOD PRESSURE: 136 MMHG | DIASTOLIC BLOOD PRESSURE: 75 MMHG | BODY MASS INDEX: 47.09 KG/M2

## 2020-02-21 LAB
ANION GAP SERPL CALC-SCNC: 11 MMOL/L (ref 8–16)
ANION GAP SERPL CALC-SCNC: 9 MMOL/L (ref 8–16)
BASOPHILS # BLD AUTO: 0.04 K/UL (ref 0–0.2)
BASOPHILS NFR BLD: 0.5 % (ref 0–1.9)
BUN SERPL-MCNC: 5 MG/DL (ref 6–20)
BUN SERPL-MCNC: 5 MG/DL (ref 6–20)
CALCIUM SERPL-MCNC: 9.1 MG/DL (ref 8.7–10.5)
CALCIUM SERPL-MCNC: 9.3 MG/DL (ref 8.7–10.5)
CHLORIDE SERPL-SCNC: 102 MMOL/L (ref 95–110)
CHLORIDE SERPL-SCNC: 103 MMOL/L (ref 95–110)
CO2 SERPL-SCNC: 25 MMOL/L (ref 23–29)
CO2 SERPL-SCNC: 28 MMOL/L (ref 23–29)
CREAT SERPL-MCNC: 0.7 MG/DL (ref 0.5–1.4)
CREAT SERPL-MCNC: 0.8 MG/DL (ref 0.5–1.4)
DIFFERENTIAL METHOD: ABNORMAL
EOSINOPHIL # BLD AUTO: 0 K/UL (ref 0–0.5)
EOSINOPHIL NFR BLD: 0.2 % (ref 0–8)
ERYTHROCYTE [DISTWIDTH] IN BLOOD BY AUTOMATED COUNT: 15.6 % (ref 11.5–14.5)
ERYTHROCYTE [DISTWIDTH] IN BLOOD BY AUTOMATED COUNT: 15.6 % (ref 11.5–14.5)
EST. GFR  (AFRICAN AMERICAN): >60 ML/MIN/1.73 M^2
EST. GFR  (AFRICAN AMERICAN): >60 ML/MIN/1.73 M^2
EST. GFR  (NON AFRICAN AMERICAN): >60 ML/MIN/1.73 M^2
EST. GFR  (NON AFRICAN AMERICAN): >60 ML/MIN/1.73 M^2
GLUCOSE SERPL-MCNC: 120 MG/DL (ref 70–110)
GLUCOSE SERPL-MCNC: 89 MG/DL (ref 70–110)
HCT VFR BLD AUTO: 35.8 % (ref 37–48.5)
HCT VFR BLD AUTO: 35.8 % (ref 37–48.5)
HGB BLD-MCNC: 10.9 G/DL (ref 12–16)
HGB BLD-MCNC: 10.9 G/DL (ref 12–16)
IMM GRANULOCYTES # BLD AUTO: 0.01 K/UL (ref 0–0.04)
IMM GRANULOCYTES NFR BLD AUTO: 0.1 % (ref 0–0.5)
LYMPHOCYTES # BLD AUTO: 2.6 K/UL (ref 1–4.8)
LYMPHOCYTES NFR BLD: 31.2 % (ref 18–48)
MCH RBC QN AUTO: 20.5 PG (ref 27–31)
MCH RBC QN AUTO: 20.5 PG (ref 27–31)
MCHC RBC AUTO-ENTMCNC: 30.4 G/DL (ref 32–36)
MCHC RBC AUTO-ENTMCNC: 30.4 G/DL (ref 32–36)
MCV RBC AUTO: 67 FL (ref 82–98)
MCV RBC AUTO: 67 FL (ref 82–98)
MONOCYTES # BLD AUTO: 0.8 K/UL (ref 0.3–1)
MONOCYTES NFR BLD: 9.3 % (ref 4–15)
NEUTROPHILS # BLD AUTO: 4.9 K/UL (ref 1.8–7.7)
NEUTROPHILS NFR BLD: 58.7 % (ref 38–73)
NRBC BLD-RTO: 0 /100 WBC
PLATELET # BLD AUTO: 251 K/UL (ref 150–350)
PLATELET # BLD AUTO: 251 K/UL (ref 150–350)
PMV BLD AUTO: 11 FL (ref 9.2–12.9)
PMV BLD AUTO: 11 FL (ref 9.2–12.9)
POTASSIUM SERPL-SCNC: 3.4 MMOL/L (ref 3.5–5.1)
POTASSIUM SERPL-SCNC: 3.7 MMOL/L (ref 3.5–5.1)
RBC # BLD AUTO: 5.31 M/UL (ref 4–5.4)
RBC # BLD AUTO: 5.31 M/UL (ref 4–5.4)
SODIUM SERPL-SCNC: 139 MMOL/L (ref 136–145)
SODIUM SERPL-SCNC: 139 MMOL/L (ref 136–145)
WBC # BLD AUTO: 8.29 K/UL (ref 3.9–12.7)
WBC # BLD AUTO: 8.29 K/UL (ref 3.9–12.7)

## 2020-02-21 PROCEDURE — 97116 GAIT TRAINING THERAPY: CPT | Mod: HCNC

## 2020-02-21 PROCEDURE — 36415 COLL VENOUS BLD VENIPUNCTURE: CPT | Mod: HCNC

## 2020-02-21 PROCEDURE — 99499 NO LOS: ICD-10-PCS | Mod: GT,HCNC,, | Performed by: PSYCHIATRY & NEUROLOGY

## 2020-02-21 PROCEDURE — 85025 COMPLETE CBC W/AUTO DIFF WBC: CPT | Mod: HCNC

## 2020-02-21 PROCEDURE — C9113 INJ PANTOPRAZOLE SODIUM, VIA: HCPCS | Mod: HCNC | Performed by: SURGERY

## 2020-02-21 PROCEDURE — 63600175 PHARM REV CODE 636 W HCPCS: Mod: HCNC | Performed by: SURGERY

## 2020-02-21 PROCEDURE — 94760 N-INVAS EAR/PLS OXIMETRY 1: CPT | Mod: HCNC

## 2020-02-21 PROCEDURE — 94799 UNLISTED PULMONARY SVC/PX: CPT | Mod: HCNC

## 2020-02-21 PROCEDURE — 25000003 PHARM REV CODE 250: Mod: HCNC | Performed by: SURGERY

## 2020-02-21 PROCEDURE — 80048 BASIC METABOLIC PNL TOTAL CA: CPT | Mod: 91,HCNC

## 2020-02-21 PROCEDURE — 97530 THERAPEUTIC ACTIVITIES: CPT | Mod: HCNC

## 2020-02-21 PROCEDURE — 99499 UNLISTED E&M SERVICE: CPT | Mod: GT,HCNC,, | Performed by: PSYCHIATRY & NEUROLOGY

## 2020-02-21 PROCEDURE — 97161 PT EVAL LOW COMPLEX 20 MIN: CPT | Mod: HCNC

## 2020-02-21 RX ORDER — PANTOPRAZOLE SODIUM 20 MG/1
20 TABLET, DELAYED RELEASE ORAL DAILY
Qty: 30 TABLET | Refills: 0 | Status: SHIPPED | OUTPATIENT
Start: 2020-02-21 | End: 2020-04-16

## 2020-02-21 RX ORDER — HYDROCODONE BITARTRATE AND ACETAMINOPHEN 7.5; 325 MG/15ML; MG/15ML
15 SOLUTION ORAL EVERY 6 HOURS PRN
Qty: 118 ML | Refills: 0 | Status: SHIPPED | OUTPATIENT
Start: 2020-02-21 | End: 2020-08-05

## 2020-02-21 RX ORDER — PROMETHAZINE HYDROCHLORIDE 12.5 MG/1
12.5 TABLET ORAL EVERY 6 HOURS PRN
Qty: 15 TABLET | Refills: 0 | Status: SHIPPED | OUTPATIENT
Start: 2020-02-21 | End: 2020-04-16

## 2020-02-21 RX ORDER — ONDANSETRON 4 MG/1
4 TABLET, ORALLY DISINTEGRATING ORAL EVERY 6 HOURS PRN
Qty: 15 TABLET | Refills: 0 | Status: SHIPPED | OUTPATIENT
Start: 2020-02-21 | End: 2020-07-09

## 2020-02-21 RX ADMIN — CHLORHEXIDINE GLUCONATE 0.12% ORAL RINSE 10 ML: 1.2 LIQUID ORAL at 09:02

## 2020-02-21 RX ADMIN — ONDANSETRON 4 MG: 2 INJECTION INTRAMUSCULAR; INTRAVENOUS at 02:02

## 2020-02-21 RX ADMIN — ACETAMINOPHEN 1000 MG: 10 INJECTION, SOLUTION INTRAVENOUS at 05:02

## 2020-02-21 RX ADMIN — KETOROLAC TROMETHAMINE 15 MG: 30 INJECTION, SOLUTION INTRAMUSCULAR; INTRAVENOUS at 09:02

## 2020-02-21 RX ADMIN — KETOROLAC TROMETHAMINE 15 MG: 30 INJECTION, SOLUTION INTRAMUSCULAR; INTRAVENOUS at 03:02

## 2020-02-21 RX ADMIN — SENNOSIDES, DOCUSATE SODIUM 1 TABLET: 50; 8.6 TABLET, FILM COATED ORAL at 09:02

## 2020-02-21 RX ADMIN — PANTOPRAZOLE SODIUM 40 MG: 40 INJECTION, POWDER, LYOPHILIZED, FOR SOLUTION INTRAVENOUS at 09:02

## 2020-02-21 RX ADMIN — LINACLOTIDE 145 MCG: 145 CAPSULE, GELATIN COATED ORAL at 09:02

## 2020-02-21 NOTE — PLAN OF CARE
CM met with patient at the bedside to assess for discharge needs.  Patient states that her daughter lives at home with her and will be able to help her at home.  She will follow up with Dr Navarrete which is who will manage her post op care.  She already has a walker and rollator and does not have any additional equipment needs.  She does not require home health services.  Her daughter will pick her up at discharge.  No other discharge needs at this time.  CM provided a transitional care folder, information on advanced directives, information on pharmacy bedside delivery, and discharge planning begins on admission with contact information for any needs/questions.    D/C Plan: Home  PCP: Dr Dumont  Preferred Pharmacy: [facility]  Discharge transportation: Daughter   My [facility]: Active  Pharmacy Bedside Delivery: No     02/21/20 0955   Discharge Assessment   Assessment Type Discharge Planning Assessment   Confirmed/corrected address and phone number on facesheet? Yes   Assessment information obtained from? Patient;Medical Record   Expected Length of Stay (days) 1   Communicated expected length of stay with patient/caregiver yes   Prior to hospitilization cognitive status: Alert/Oriented   Prior to hospitalization functional status: Independent;Assistive Equipment   Current cognitive status: Alert/Oriented   Current Functional Status: Independent;Assistive Equipment   Facility Arrived From: home   Lives With child(byron), adult   Able to Return to Prior Arrangements yes   Is patient able to care for self after discharge? Yes   Who are your caregiver(s) and their phone number(s)? Nahed Farmer, daughter [phone]   Patient's perception of discharge disposition home or selfcare   Readmission Within the Last 30 Days no previous admission in last 30 days   Patient currently being followed by outpatient case management? No   Patient currently receives any other outside agency services? No   Equipment Currently  Used at Home walker, rolling;cane, straight;rollator   Do you have any problems affording any of your prescribed medications? No   Is the patient taking medications as prescribed? yes   Does the patient have transportation home? Yes   Transportation Anticipated family or friend will provide   Dialysis Name and Scheduled days NA   Does the patient receive services at the [facility]? No   Discharge Plan A Home with family   DME Needed Upon Discharge  none   Patient/Family in Agreement with Plan yes

## 2020-02-21 NOTE — PT/OT/SLP EVAL
Physical Therapy Evaluation and Discharge Note    Patient Name:  Alicia Soliz   MRN:  [id number]    Recommendations:     Discharge Recommendations:  outpatient PT(CONT. OUTPATIENT P.T. SERVICES FOR MS)   Discharge Equipment Recommendations: none   Barriers to discharge: None    Assessment:     Alicia Soliz is a 40 y.o. female admitted with a medical diagnosis of Morbid obesity with BMI of 45.0-49.9, adult. .  At this time, patient is functioning at their prior level of function and does not require further acute PT services.     Recent Surgery: Procedure(s) (LRB):  XI ROBOTIC SLEEVE GASTRECTOMY (N/A)  EGD (ESOPHAGOGASTRODUODENOSCOPY) (N/A) 1 Day Post-Op    Plan:     During this hospitalization, patient does not require further acute PT services.  Please re-consult if situation changes.      Subjective     Chief Complaint:   Patient/Family Comments/goals:   Pain/Comfort:  · Pain Rating 1: 0/10    Patients cultural, spiritual, Anglican conflicts given the current situation:      Living Environment:  PT LIVES WITH DAUGHTER WHO IS ABLE TO ASSIST AS NEEDED, 1ST FLOOR APT, AMB WITH RW LIMITED COMMUNITY DISTANCES, DOES NOT DRIVE OR WORK, INDEP WITH ADL'S  Prior to admission, patients level of function was SCOOBY.  Equipment used at home: rollator, cane, straight, bath bench, bedside commode(4WW).  DME owned (not currently used): hospital bed.  Upon discharge, patient will have assistance from FAMILY.    Objective:     Communicated with NURSE RETANA prior to session.  Patient found supine with peripheral IV upon PT entry to room.    General Precautions: Standard, other (see comments)(L FOOT DROP)   Orthopedic Precautions:N/A   Braces: N/A     Exams:  · Cognitive Exam:  Patient is oriented to Person, Place, Time and Situation  · Postural Exam:  Patient presented with the following abnormalities:    · -       No postural abnormalities identified  · Sensation:    · -       Intact  · RLE ROM: WFL  · RLE Strength:  GROSSLY 4/5  · LLE ROM: WFL except PROM WFL AT ANKLE DUE TO FOOT DROP  · LLE Strength: GROSSLY 4-/5 EXCEPT AT ANKLE DUE TO FOOT DROP    Functional Mobility:  · Bed Mobility:     · Rolling Left:  supervision  · Scooting: supervision  · Supine to Sit: supervision  · Transfers:     · Sit to Stand:  supervision with rolling walker  · Bed to Chair: supervision with  rolling walker  using  Step Transfer  · Gait: PT ' WITH RW AND SPV, SLOW PACE, NO LOB OR SOB ON ROOM AIR  · Balance: GOOD    AM-PAC 6 CLICK MOBILITY  Total Score:21     Therapeutic Activities and Exercises:  PT EDUCATED IN ROLE OF P.T., PT SLOW MOVING/CAUTIOUS WITH MOBILITY BUT APPEARS TO BE HER BASELINE, PT EDUCATED IN LOG ROLLING FOR BED MOBILITY, REVIEW BLE THEREX TO PERFORM WHILE SEATED IN CHAIR, PT SET UP TO BRUSH HER TEETH WHILE SEATED IN CHAIR    AM-PAC 6 CLICK MOBILITY  Total Score:21     Patient left up in chair with all lines intact, call button in reach, chair alarm on and NURSE notified.    GOALS:   Multidisciplinary Problems     Physical Therapy Goals     Not on file                History:     Past Medical History:   Diagnosis Date    Anemia     Arthritis     Cardiac arrest as complication of care     pt states she went into cardiac arrest from an allergic reaction to a medication    Depression     Encounter for blood transfusion     Hemiplegia due to old stroke     Hypertension     Morbid obesity with BMI of 45.0-49.9, adult 9/20/2018    Multiple sclerosis        Past Surgical History:   Procedure Laterality Date    APPENDECTOMY      CHOLECYSTECTOMY      ESOPHAGOGASTRODUODENOSCOPY N/A 2/19/2020    Procedure: EGD (ESOPHAGOGASTRODUODENOSCOPY);  Surgeon: Michael Navarrete MD;  Location: [facility] ENDO;  Service: Endoscopy;  Laterality: N/A;    ESOPHAGOGASTRODUODENOSCOPY N/A 2/20/2020    Procedure: EGD (ESOPHAGOGASTRODUODENOSCOPY);  Surgeon: Michael Navarrete MD;  Location: [facility] OR;  Service: General;  Laterality: N/A;    HYSTERECTOMY       KNEE SURGERY      ROBOT-ASSISTED LAPAROSCOPIC SLEEVE GASTRECTOMY USING DA ANTHONY XI N/A 2/20/2020    Procedure: XI ROBOTIC SLEEVE GASTRECTOMY;  Surgeon: Michael Navarrete MD;  Location: [facility];  Service: General;  Laterality: N/A;    TONSILLECTOMY      TUBAL LIGATION         Time Tracking:     PT Received On: 02/21/20  PT Start Time: 0730     PT Stop Time: 0810  PT Total Time (min): 40 min     Billable Minutes: Evaluation 15, Gait Training 15 and Therapeutic Activity 10    Cecilia Alberts, PT  02/21/2020

## 2020-02-21 NOTE — PLAN OF CARE
Discharge instructions reviewed with pt who verbalized understanding with tech back. Pt understands lifting restrictions and diet plan. Pt informed of where to  her medications. Pt understands importance of keeping follow up appts. Ivs removed, tip intact. Awaiting family transportation. Adequate for discharge.

## 2020-02-21 NOTE — PLAN OF CARE
Remains free from injury. States relief of pain with available prn meds. Fluids running as ordered. Intermittent nausea without vomiting. Vital signs stable. Chart reviewed. Will continue to monitor.

## 2020-02-21 NOTE — PLAN OF CARE
Mar20 Post OP with Michael Navarrete MD   Friday Mar 20, 2020 8:30 AM   Arrive at check-in approximately 15 minutes before your scheduled appointment time. Bring all outside medical records and imaging, along with a list of your current medications and insurance card.  4th Floor - From [address], take the [facility] exit ([address]). Enter the facility from the [address]  [facility]   [address] [address]   [phone]         02/21/20 1202   Final Note   Assessment Type Final Discharge Note   Anticipated Discharge Disposition Home   Hospital Follow Up  Appt(s) scheduled? Yes   Right Care Referral Info   Post Acute Recommendation No Care

## 2020-02-24 ENCOUNTER — PATIENT MESSAGE (OUTPATIENT)
Dept: SURGERY | Facility: CLINIC | Age: 40
End: 2020-02-24

## 2020-02-26 ENCOUNTER — PATIENT OUTREACH (OUTPATIENT)
Dept: ADMINISTRATIVE | Facility: OTHER | Age: 40
End: 2020-02-26

## 2020-02-26 LAB
FINAL PATHOLOGIC DIAGNOSIS: NORMAL
GROSS: NORMAL

## 2020-02-26 NOTE — DISCHARGE SUMMARY
[facility] -   General Surgery  Discharge Summary      Patient Name: Alicia Soliz  MRN: [id number]  Admission Date: 2/20/2020  Hospital Length of Stay: 1 days  Discharge Date and Time: 2/21/2020 11:05 AM  Attending Physician: No att. providers found   Discharging Provider: Crystal James PA-C  Primary Care Provider: Braden Dumont MD    HPI:   No notes on file    Procedure(s) (LRB):  XI ROBOTIC SLEEVE GASTRECTOMY (N/A)  EGD (ESOPHAGOGASTRODUODENOSCOPY) (N/A)      Indwelling Lines/Drains at time of discharge:   Lines/Drains/Airways     None               Hospital Course: she underwent robotic sleeve. On POD 1 her pain was controlled and she tolerated a liquid diet. She was examined and found to be fit for discharge.     Consults:   Consults (From admission, onward)        Status Ordering Provider     Consult to Case Management/Social Work  Once     Provider:  (Not yet assigned)    Completed ARSENIO TAVERAS     Inpatient consult to Neurology  Once     Provider:  (Not yet assigned)    Completed ARSENIO TAVERAS          Significant Diagnostic Studies: na    Pending Diagnostic Studies:     None        Final Active Diagnoses:    Diagnosis Date Noted POA    PRINCIPAL PROBLEM:  Morbid obesity with BMI of 45.0-49.9, adult [E66.01, Z68.42] 09/20/2018 Not Applicable      Problems Resolved During this Admission:      Discharged Condition: good    Disposition: Home or Self Care    Follow Up:    Patient Instructions:      Diet full liquid     Lifting restrictions   Order Comments: 20lb limit     No driving until:   Order Comments: No longer taking narcotic pain medication     Notify your health care provider if you experience any of the following:  temperature >100.4     Notify your health care provider if you experience any of the following:  persistent nausea and vomiting or diarrhea     Notify your health care provider if you experience any of the following:  severe uncontrolled pain     Notify your health care  provider if you experience any of the following:  redness, tenderness, or signs of infection (pain, swelling, redness, odor or green/yellow discharge around incision site)     Notify your health care provider if you experience any of the following:  difficulty breathing or increased cough     Remove dressing in 24 hours     Activity as tolerated     Medications:  Reconciled Home Medications:      Medication List      START taking these medications    hydrocodone-apap 7.5-325 MG/15 ML oral solution  Commonly known as:  HYCET  Take 15 mLs by mouth every 6 (six) hours as needed for Pain.     nozaseptin  nasal   Commonly known as:  NOZIN  1 each by Each Nostril route 2 (two) times daily.     ondansetron 4 MG Tbdl  Commonly known as:  ZOFRAN-ODT  Take 1 tablet (4 mg total) by mouth every 6 (six) hours as needed (nausea 1st choice).     pantoprazole 20 MG tablet  Commonly known as:  PROTONIX  Take 1 tablet (20 mg total) by mouth once daily.        CHANGE how you take these medications    dalfampridine 10 mg Tb12  Commonly known as:  Ampyra  Take 1 tablet by mouth 2 (two) times daily.  What changed:  when to take this     * promethazine 25 MG tablet  Commonly known as:  PHENERGAN  Take 1 tablet (25 mg total) by mouth every 4 (four) hours as needed for Nausea.  What changed:  Another medication with the same name was added. Make sure you understand how and when to take each.     * promethazine 12.5 MG Tab  Commonly known as:  PHENERGAN  Take 1 tablet (12.5 mg total) by mouth every 6 (six) hours as needed (nausea 2nd choice).  What changed:  You were already taking a medication with the same name, and this prescription was added. Make sure you understand how and when to take each.         * This list has 2 medication(s) that are the same as other medications prescribed for you. Read the directions carefully, and ask your doctor or other care provider to review them with you.            CONTINUE taking these  medications    acetaminophen-codeine 300-30mg 300-30 mg Tab  Commonly known as:  TYLENOL #3  Take 1 tablet by mouth every 4 (four) hours as needed. for pain.     ALPRAZolam 0.5 MG tablet  Commonly known as:  XANAX  Take 1 tablet (0.5 mg total) by mouth 3 (three) times daily as needed for Anxiety.     Aubagio 7 mg Tab  Generic drug:  teriflunomide  Take 7 mg by mouth once daily.     doxepin 50 MG capsule  Commonly known as:  SINEQUAN  TAKE 1 CAPSULE BY MOUTH NIGHTLY AS NEEDED.     gabapentin 300 MG capsule  Commonly known as:  NEURONTIN  Take 300 mg by mouth 3 (three) times daily.     hydroCHLOROthiazide 12.5 MG Tab  Commonly known as:  HYDRODIURIL  TAKE 1 TABLET (12.5 MG TOTAL) BY MOUTH ONCE DAILY.     linaCLOtide 145 mcg Cap capsule  Commonly known as:  Linzess  Take 1 capsule (145 mcg total) by mouth once daily.     bc-[id number] 1-1-500 mg Cap  Take 1 tablet by mouth once daily.     methocarbamol 500 MG Tab  Commonly known as:  ROBAXIN  Take 1 tablet (500 mg total) by mouth 2 (two) times daily as needed (muscle spasms).     multivitamin with folic acid 400 mcg Tab  Take 1 tablet by mouth.     mupirocin 2 % ointment  Commonly known as:  BACTROBAN  Apply topically 2 (two) times daily. Apply to affected area     nystatin cream  Commonly known as:  MYCOSTATIN  Apply topically 2 (two) times daily.     valACYclovir 500 MG tablet  Commonly known as:  VALTREX  Take 500 mg by mouth as needed.          Time spent on the discharge of patient: 20 minutes    Crystal James PA-C  General Surgery  [facility] -

## 2020-02-27 ENCOUNTER — PATIENT MESSAGE (OUTPATIENT)
Dept: PAIN MEDICINE | Facility: CLINIC | Age: 40
End: 2020-02-27

## 2020-02-28 ENCOUNTER — PATIENT MESSAGE (OUTPATIENT)
Dept: INTERNAL MEDICINE | Facility: CLINIC | Age: 40
End: 2020-02-28

## 2020-02-28 NOTE — TELEPHONE ENCOUNTER
I informed patient she does not need blood work. She states she only asked because she received a reminder on mychart telling her it was time.

## 2020-03-02 ENCOUNTER — PATIENT MESSAGE (OUTPATIENT)
Dept: SURGERY | Facility: CLINIC | Age: 40
End: 2020-03-02

## 2020-03-02 ENCOUNTER — OFFICE VISIT (OUTPATIENT)
Dept: PAIN MEDICINE | Facility: CLINIC | Age: 40
End: 2020-03-02
Payer: MEDICARE

## 2020-03-02 VITALS
HEIGHT: 66 IN | WEIGHT: 266.75 LBS | RESPIRATION RATE: 20 BRPM | HEART RATE: 89 BPM | SYSTOLIC BLOOD PRESSURE: 155 MMHG | DIASTOLIC BLOOD PRESSURE: 77 MMHG | BODY MASS INDEX: 42.87 KG/M2

## 2020-03-02 DIAGNOSIS — M47.816 LUMBAR SPONDYLOSIS: Primary | ICD-10-CM

## 2020-03-02 DIAGNOSIS — M47.812 CERVICAL SPONDYLOSIS: ICD-10-CM

## 2020-03-02 DIAGNOSIS — G35 MULTIPLE SCLEROSIS: ICD-10-CM

## 2020-03-02 DIAGNOSIS — M79.18 MYOFASCIAL MUSCLE PAIN: ICD-10-CM

## 2020-03-02 PROCEDURE — 99214 OFFICE O/P EST MOD 30 MIN: CPT | Mod: HCNC,S$GLB,, | Performed by: PHYSICIAN ASSISTANT

## 2020-03-02 PROCEDURE — 3077F PR MOST RECENT SYSTOLIC BLOOD PRESSURE >= 140 MM HG: ICD-10-PCS | Mod: HCNC,CPTII,S$GLB, | Performed by: PHYSICIAN ASSISTANT

## 2020-03-02 PROCEDURE — 3077F SYST BP >= 140 MM HG: CPT | Mod: HCNC,CPTII,S$GLB, | Performed by: PHYSICIAN ASSISTANT

## 2020-03-02 PROCEDURE — 3008F PR BODY MASS INDEX (BMI) DOCUMENTED: ICD-10-PCS | Mod: HCNC,CPTII,S$GLB, | Performed by: PHYSICIAN ASSISTANT

## 2020-03-02 PROCEDURE — 99999 PR PBB SHADOW E&M-EST. PATIENT-LVL V: CPT | Mod: PBBFAC,HCNC,, | Performed by: PHYSICIAN ASSISTANT

## 2020-03-02 PROCEDURE — 3008F BODY MASS INDEX DOCD: CPT | Mod: HCNC,CPTII,S$GLB, | Performed by: PHYSICIAN ASSISTANT

## 2020-03-02 PROCEDURE — 3078F PR MOST RECENT DIASTOLIC BLOOD PRESSURE < 80 MM HG: ICD-10-PCS | Mod: HCNC,CPTII,S$GLB, | Performed by: PHYSICIAN ASSISTANT

## 2020-03-02 PROCEDURE — 99214 PR OFFICE/OUTPT VISIT, EST, LEVL IV, 30-39 MIN: ICD-10-PCS | Mod: HCNC,S$GLB,, | Performed by: PHYSICIAN ASSISTANT

## 2020-03-02 PROCEDURE — 3078F DIAST BP <80 MM HG: CPT | Mod: HCNC,CPTII,S$GLB, | Performed by: PHYSICIAN ASSISTANT

## 2020-03-02 PROCEDURE — 99999 PR PBB SHADOW E&M-EST. PATIENT-LVL V: ICD-10-PCS | Mod: PBBFAC,HCNC,, | Performed by: PHYSICIAN ASSISTANT

## 2020-03-02 RX ORDER — HYDROCODONE BITARTRATE AND ACETAMINOPHEN 7.5; 325 MG/1; MG/1
TABLET ORAL
Qty: 10 TABLET | Refills: 0 | Status: SHIPPED | OUTPATIENT
Start: 2020-03-02 | End: 2020-08-24

## 2020-03-02 RX ORDER — HYDROCODONE BITARTRATE AND ACETAMINOPHEN 7.5; 325 MG/1; MG/1
TABLET ORAL
Qty: 10 TABLET | Refills: 0 | Status: SHIPPED | OUTPATIENT
Start: 2020-03-02 | End: 2020-03-02 | Stop reason: SDUPTHER

## 2020-03-02 NOTE — PROGRESS NOTES
Chief Pain Complaint:  Abdominal Pain  lumbar pain       History of Present Illness:   Alicia Soliz is a 40 y.o. female  who is presenting with a chief complaint of Cervical Spine Pain (C-spine) and LBP. The patient began experiencing this problem insidiously, and the pain has been gradually worsening over the past 3 year(s). The pain is described as throbbing, cramping, aching and heavy and is located in the bilateral lumbar spine. Pain is intermittent and lasts hours. The pain is nonradiating. The patient rates her pain a 7 out of ten and interferes with activities of daily living a 7 out of ten. Pain is exacerbated by extension of the lumbar spine, and is improved by rest. Patient reports no prior trauma, no prior spinal surgery     - pertinent negatives: No fever, No chills, No weight loss, No bladder dysfunction, No bowel dysfunction, No saddle anesthesia  - pertinent positives: left arm weakness  Left leg weakness   - medications, other therapies tried (physical therapy, injections):     >> NSAIDs, Tylenol, Tramadol, Norco, gabapentin and flexeril    >> Has previously undergone Physical Therapy    >> Has NOT previously undergone spinal injection/s      Imaging / Labs / Studies (reviewed on 3/2/2020):    Results for orders placed during the hospital encounter of 12/06/19   X-Ray Lumbar Complete With Flex And Ext    Narrative FINDINGS:  The vertebral bodies demonstrate a normal height and alignment.  The disc space heights appear to be well maintained.  There is mild-to-moderate facet arthropathy noted at the L4-5 and L5-S1 levels.  No pars defects.  Surgical clips noted in the right upper quadrant.  Right hemipelvis phlebolith noted.     Results for orders placed during the hospital encounter of 12/06/19   X-Ray Cervical Spine 5 View W Flex Extxt    Narrative COMPARISON:  None.  FINDINGS:  The vertebral bodies demonstrate a normal height.  There is straightening of the normal lordotic curvature.  No  subluxation noted on the flexion or extension views.  There is mild disc space narrowing at the C6-7 level.  No osseous encroachment noted upon the neural foraminal canals.     Results for orders placed during the hospital encounter of 01/15/15   MRI Cervical Spine W WO Cont    Narrative Study:   Cervical spine MRI with and without contrast.  Technique:  Multiplanar non contrast enhanced images obtained on the cervical spine. Contrast enhanced images then obtained.  History: Acute left upper and lower extremity weakness.  Findings:  There is convexity of the cervical spine to the right consistent with dextroscoliosis.  No focal cervical cord abnormality is seen.  No abnormal mass lesions in the cervical spinal canal and at the foramen magnum.  There is a focal 1 cm contrast enhancing hyperintense T2 signal lesion in the left occipital lobe subcortical white matter.  The same lesion also demonstrated on the head MRI study performed on 01/17/15.  C2-3  disc: Normal.  C3-4  disc: Minor broad-based midline disk bulge.  C4-5  disc: Normal.  C5-6  disc: Normal.  C6-7  disc: Normal.  C7-T1 disc: Normal.    Impression Minor broad-based midline C3-4 disk bulge without any evidence for cervical disk herniation nor spinal stenosis.  Normal cervical cord.    Cervical spine dextroscoliosis.  1 cm contrast enhancing left occipital lobe subcortical white matter lesion.  Multiple bihemispheric hyperintense T2 signal lesions including left-sided brainstem lesion demonstrated on the recent head MRI study some of which exhibiting contrast   enhancement .  Considerations include active demyelinating multiple sclerosis plaque disease with differential diagnostic consideration including ADEM .         Review of Systems:  CONSTITUTIONAL: patient denies any fever, chills, or weight loss  SKIN: patient denies any rash or itching  RESPIRATORY: patient denies having any shortness of breath  GASTROINTESTINAL: patient denies having any  "diarrhea, constipation, or bowel incontinence  GENITOURINARY: patient denies having any abnormal bladder function    MUSCULOSKELETAL:  - patient complains of the above noted pain/s (see chief pain complaint)    NEUROLOGICAL:   - pain as above  - strength in Lower extremities is decreased, on the LEFT  - sensation in Lower extremities is intact, BILATERALLY  - patient denies any loss of bowel or bladder control      PSYCHIATRIC: patient denies any change in mood    Other:  All other systems reviewed and are negative      Physical Exam:  Vitals:  BP (!) 155/77 (BP Location: Right arm, Patient Position: Sitting, BP Method: Medium (Automatic))   Pulse 89   Resp 20   Ht 5' 6" (1.676 m)   Wt 136.1 kg (300 lb)   BMI 48.42 kg/m²   (reviewed on 3/2/2020)    General: alert and oriented, in no apparent distress.  Gait: antalgic gait. Ambulating with walker.  Skin: no rashes, no discoloration, no obvious lesions  HEENT: normocephalic, atraumatic. Pupils equal and round.  Cardiovascular: no significant peripheral edema present.  Respiratory: without use of accessory muscles of respiration.    Musculoskeletal - Lumbar Spine:  - Pain on flexion of lumbar spine: Absent   - Pain on extension of lumbar spine: Present  - Lumbar facet loading: Present  - TTP over the lumbar facet joints: Present  - TTP over the lumbar paraspinals: Present  - TTP over the SI joints:  Absent   - TTP over GT bursa: Absent     Musculoskeletal - Cervical Spine:  - Pain on flexion of cervical spine: Absent   - Pain on extension of cervical spine: Present  - Cervical facet loading: Present  - TTP over the cervical facet joints: Present  - TTP over the cervical paraspinals: Present  - Spurling's: Negative    Neuro - Extremities:  - BUE Strength:R/L: D: 5/5; B: 5/5; T: 5/5; WF: 5/5; WE: 5/5; IO: 5/5  - BLE Strength: R/L: HF: 5/5, HE: 5/5, KF: 5/5; KE: 5/5; FE: 5/5; FF: 5/5  - Extremity Reflexes: Brisk and symmetric throughout  - Sensory: Sensation to light " touch intact bilaterally      Psych:  Mood and affect is appropriate             Assessment:  Alicia Soliz is a 40 y.o. year old female who is presenting with   Encounter Diagnoses   Name Primary?    Lumbar spondylosis Yes    Cervical spondylosis     Multiple sclerosis     Myofascial muscle pain        Plan:  1. Interventional: S/p gastric sleeve on 2/20/20.     2. Pharmacologic: Continue gabapentin 300mg TID.  - Will give last Rx of Norco 7.5/325 mg Po Q day PRN (10 tabs) today.  She will need to establish care with external pain provider.   - LA  reviewed. Listed below.      3. Rehabilitative: Continue physical therapy, which has been helping.    4. Diagnostic: None.     5. Other: Order given with letter stating we are not prescribing pain medications, along with recent x-rays for her to bring to be established elsewhere for medication management. We will not refill future Rx.     6. Follow up: None.     - I discussed the risks, benefits, and alternatives to potential treatment options. All questions and concerns were fully addressed today in clinic. Dr. Hung was consulted regarding the patient plan and agrees.

## 2020-03-04 RX ORDER — METHOCARBAMOL 500 MG/1
500 TABLET, FILM COATED ORAL 2 TIMES DAILY PRN
Qty: 60 TABLET | Refills: 0 | OUTPATIENT
Start: 2020-03-04

## 2020-03-09 NOTE — PROGRESS NOTES
Last BP reading remains 1/8/20, defer to health  outreach  Patient admitted 2/20-2/21/20 for gastric sleeve  2/21/20 BMP reviewed    Hypertension Medications             hydroCHLOROthiazide (HYDRODIURIL) 12.5 MG Tab TAKE 1 TABLET (12.5 MG TOTAL) BY MOUTH ONCE DAILY.

## 2020-03-11 ENCOUNTER — PATIENT MESSAGE (OUTPATIENT)
Dept: INTERNAL MEDICINE | Facility: CLINIC | Age: 40
End: 2020-03-11

## 2020-03-16 ENCOUNTER — PATIENT MESSAGE (OUTPATIENT)
Dept: SURGERY | Facility: CLINIC | Age: 40
End: 2020-03-16

## 2020-03-18 ENCOUNTER — PATIENT MESSAGE (OUTPATIENT)
Dept: NEUROLOGY | Facility: CLINIC | Age: 40
End: 2020-03-18

## 2020-03-21 RX ORDER — PANTOPRAZOLE SODIUM 20 MG/1
TABLET, DELAYED RELEASE ORAL
Qty: 30 TABLET | Refills: 0 | OUTPATIENT
Start: 2020-03-21

## 2020-03-23 ENCOUNTER — DOCUMENTATION ONLY (OUTPATIENT)
Dept: REHABILITATION | Facility: HOSPITAL | Age: 40
End: 2020-03-23

## 2020-03-23 ENCOUNTER — PATIENT MESSAGE (OUTPATIENT)
Dept: REHABILITATION | Facility: HOSPITAL | Age: 40
End: 2020-03-23

## 2020-03-23 NOTE — PROGRESS NOTES
Outpatient Therapy Discharge Summary     Name: Alicia Soliz  Clinic Number: [id number]    Therapy Diagnosis: Left-sided weakness, impairment of balance, abnormality of gait  Physician: Salvatore Vela MD    Physician Orders: PT Eval and Treat  Medical Diagnosis: Multiple Sclerosis  Evaluation Date: 8/15/29      Date of Last visit: 2/18/20  Total Visits Received: 28  Cancelled Visits: 10  No Show Visits: 4    Assessment    Patient was doing well with skilled therapy. She underwent surgery for a gastroc sleeve on 2/20/20 and will be on precautions for awhile. Formally discharging at this time.     Goals:     Short Term Goals: In 3 weeks:  1.I with HEP MET  2.Patient to demo increased L DF AROM /PROM by 10% ALMOST MET  3.Patient to demo increase LE strength by 1/2 grade custodial MET  4.Patient to ambulate with use of LRAD with improvement with L DF during swing ALMOST MET  5.Patient to less than 51% disability on the LEFS. ALMOST MET     Long Term Goals: In 6 weeks  1. Patient to perform daily activities including walking with minimal limitation. NOT MET  2. Patient to demonstrate increased L ankle AROM/PROM by 100%. NOT MET  3. Patient to demonstrate increased LE strength by 1 grade. NOT MET  4. Patient to have decreased pain to painfree at all times with all activities. NOT MET  5. Patient to less than 29% disability on the LEFS. NOT MET       Discharge reason: Other:  Surgery for gastroc sleeve    Plan   This patient is discharged from Physical Therapy

## 2020-03-30 ENCOUNTER — PATIENT MESSAGE (OUTPATIENT)
Dept: INTERNAL MEDICINE | Facility: CLINIC | Age: 40
End: 2020-03-30

## 2020-03-30 RX ORDER — CIPROFLOXACIN 500 MG/1
500 TABLET ORAL EVERY 12 HOURS
Qty: 10 TABLET | Refills: 0 | Status: SHIPPED | OUTPATIENT
Start: 2020-03-30 | End: 2020-04-16

## 2020-03-30 NOTE — PROGRESS NOTES
Digital Medicine: Health  Follow-Up        Follow Up  Follow-up reason(s): reading review      Readings are missing.   patient reminder needed.Patient had not taken her BP in a while due to recovering from surgery. She agrees to resume with BP readings       INTERVENTION(S)  encouragement/support    PLAN  additional monitoring needed      There are no preventive care reminders to display for this patient.    Last 5 Patient Entered Readings                                      Current 30 Day Average:      Recent Readings 1/8/2020 11/26/2019 11/13/2019 11/5/2019 10/28/2019    SBP (mmHg) 110 110 110 110 110    DBP (mmHg) 80 80 70 87 80                      Physical Activity Screening   When asked if exercising, patient responded: yes    Patient is trying to stay active by walking outside even though she is frustrated to be stuck at home.       SDOH

## 2020-04-03 ENCOUNTER — PATIENT MESSAGE (OUTPATIENT)
Dept: PAIN MEDICINE | Facility: CLINIC | Age: 40
End: 2020-04-03

## 2020-04-03 ENCOUNTER — TELEPHONE (OUTPATIENT)
Dept: PAIN MEDICINE | Facility: CLINIC | Age: 40
End: 2020-04-03

## 2020-04-03 ENCOUNTER — OFFICE VISIT (OUTPATIENT)
Dept: PAIN MEDICINE | Facility: CLINIC | Age: 40
End: 2020-04-03
Payer: MEDICARE

## 2020-04-03 ENCOUNTER — PATIENT OUTREACH (OUTPATIENT)
Dept: ADMINISTRATIVE | Facility: OTHER | Age: 40
End: 2020-04-03

## 2020-04-03 ENCOUNTER — OFFICE VISIT (OUTPATIENT)
Dept: SURGERY | Facility: CLINIC | Age: 40
End: 2020-04-03
Payer: MEDICARE

## 2020-04-03 DIAGNOSIS — E66.01 MORBID OBESITY WITH BMI OF 45.0-49.9, ADULT: ICD-10-CM

## 2020-04-03 DIAGNOSIS — G35 MULTIPLE SCLEROSIS: ICD-10-CM

## 2020-04-03 DIAGNOSIS — M79.18 MYOFASCIAL MUSCLE PAIN: ICD-10-CM

## 2020-04-03 DIAGNOSIS — M47.812 CERVICAL SPONDYLOSIS: ICD-10-CM

## 2020-04-03 DIAGNOSIS — Z98.84 S/P LAPAROSCOPIC SLEEVE GASTRECTOMY: Primary | ICD-10-CM

## 2020-04-03 DIAGNOSIS — M47.816 LUMBAR FACET ARTHROPATHY: ICD-10-CM

## 2020-04-03 DIAGNOSIS — M47.816 LUMBAR SPONDYLOSIS: Primary | ICD-10-CM

## 2020-04-03 PROCEDURE — 99024 PR POST-OP FOLLOW-UP VISIT: ICD-10-PCS | Mod: HCNC,95,, | Performed by: SURGERY

## 2020-04-03 PROCEDURE — 99213 OFFICE O/P EST LOW 20 MIN: CPT | Mod: HCNC,95,, | Performed by: ANESTHESIOLOGY

## 2020-04-03 PROCEDURE — 99024 POSTOP FOLLOW-UP VISIT: CPT | Mod: HCNC,95,, | Performed by: SURGERY

## 2020-04-03 PROCEDURE — 99213 PR OFFICE/OUTPT VISIT, EST, LEVL III, 20-29 MIN: ICD-10-PCS | Mod: HCNC,95,, | Performed by: ANESTHESIOLOGY

## 2020-04-03 RX ORDER — METHOCARBAMOL 750 MG/1
750 TABLET, FILM COATED ORAL 3 TIMES DAILY PRN
Qty: 90 TABLET | Refills: 0 | Status: SHIPPED | OUTPATIENT
Start: 2020-04-03 | End: 2020-05-19

## 2020-04-03 NOTE — PROGRESS NOTES
The patient location is:  home  The chief complaint leading to consultation is:  Status post laparoscopic sleeve gastrectomy.  Visit type: Virtual visit with synchronous audio and video  Total time spent with patient: 10 min.  Each patient to whom he or she provides medical services by telemedicine is:  (1) informed of the relationship between the physician and patient and the respective role of any other health care provider with respect to management of the patient; and (2) notified that he or she may decline to receive medical services by telemedicine and may withdraw from such care at any time.    Notes: Alicia Soliz 40 y.o.female  This patient was seen for postoperative visit. Alicia Soliz has no specific complaints and denies of any issues relevant to the surgery. The wound has healed without any complications.  I have discussed the pathology result with the patient. Alicia Soliz will follow up in 3 months.     Michael Navarrete

## 2020-04-06 NOTE — PROGRESS NOTES
The patient location is: Home  The chief complaint leading to consultation is: Lumbar back Pain   Visit type: Virtual visit with synchronous audio and video  Total time spent with patient: 15  Each patient to whom he or she provides medical services by telemedicine is:  (1) informed of the relationship between the physician and patient and the respective role of any other health care provider with respect to management of the patient; and (2) notified that he or she may decline to receive medical services by telemedicine and may withdraw from such care at any time.    Chief Pain Complaint:  No chief complaint on file.  lumbar pain       History of Present Illness:   Alicia Soliz is a 40 y.o. female  who is presenting with a chief complaint of lumbar back pain. The patient began experiencing this problem insidiously, and the pain has been gradually worsening over the past 3 year(s). The pain is described as throbbing, cramping, aching and heavy and is located in the bilateral lumbar spine. Pain is intermittent and lasts hours. The pain is nonradiating. The patient rates her pain a 7 out of ten and interferes with activities of daily living a 7 out of ten. Pain is exacerbated by extension of the lumbar spine, and is improved by rest. Patient reports no prior trauma, no prior spinal surgery     - pertinent negatives: No fever, No chills, No weight loss, No bladder dysfunction, No bowel dysfunction, No saddle anesthesia  - pertinent positives: left arm weakness  Left leg weakness   - medications, other therapies tried (physical therapy, injections):     >> NSAIDs, Tylenol, Tramadol, Norco, gabapentin and flexeril    >> Has previously undergone Physical Therapy    >> Has NOT previously undergone spinal injection/s      Imaging / Labs / Studies (reviewed on 4/6/2020):    Results for orders placed during the hospital encounter of 12/06/19   X-Ray Lumbar Complete With Flex And Ext    Narrative FINDINGS:  The  vertebral bodies demonstrate a normal height and alignment.  The disc space heights appear to be well maintained.  There is mild-to-moderate facet arthropathy noted at the L4-5 and L5-S1 levels.  No pars defects.  Surgical clips noted in the right upper quadrant.  Right hemipelvis phlebolith noted.     Results for orders placed during the hospital encounter of 12/06/19   X-Ray Cervical Spine 5 View W Flex Extxt    Narrative COMPARISON:  None.  FINDINGS:  The vertebral bodies demonstrate a normal height.  There is straightening of the normal lordotic curvature.  No subluxation noted on the flexion or extension views.  There is mild disc space narrowing at the C6-7 level.  No osseous encroachment noted upon the neural foraminal canals.     Results for orders placed during the hospital encounter of 01/15/15   MRI Cervical Spine W WO Cont    Narrative Study:   Cervical spine MRI with and without contrast.  Technique:  Multiplanar non contrast enhanced images obtained on the cervical spine. Contrast enhanced images then obtained.  History: Acute left upper and lower extremity weakness.  Findings:  There is convexity of the cervical spine to the right consistent with dextroscoliosis.  No focal cervical cord abnormality is seen.  No abnormal mass lesions in the cervical spinal canal and at the foramen magnum.  There is a focal 1 cm contrast enhancing hyperintense T2 signal lesion in the left occipital lobe subcortical white matter.  The same lesion also demonstrated on the head MRI study performed on 01/17/15.  C2-3  disc: Normal.  C3-4  disc: Minor broad-based midline disk bulge.  C4-5  disc: Normal.  C5-6  disc: Normal.  C6-7  disc: Normal.  C7-T1 disc: Normal.    Impression Minor broad-based midline C3-4 disk bulge without any evidence for cervical disk herniation nor spinal stenosis.  Normal cervical cord.    Cervical spine dextroscoliosis.  1 cm contrast enhancing left occipital lobe subcortical white matter lesion.   Multiple bihemispheric hyperintense T2 signal lesions including left-sided brainstem lesion demonstrated on the recent head MRI study some of which exhibiting contrast   enhancement .  Considerations include active demyelinating multiple sclerosis plaque disease with differential diagnostic consideration including ADEM .         Review of Systems:  CONSTITUTIONAL: patient denies any fever, chills, or weight loss  SKIN: patient denies any rash or itching  RESPIRATORY: patient denies having any shortness of breath  GASTROINTESTINAL: patient denies having any diarrhea, constipation, or bowel incontinence  GENITOURINARY: patient denies having any abnormal bladder function    MUSCULOSKELETAL:  - patient complains of the above noted pain/s (see chief pain complaint)    NEUROLOGICAL:   - pain as above  - strength in Lower extremities is decreased, on the LEFT  - sensation in Lower extremities is intact, BILATERALLY  - patient denies any loss of bowel or bladder control      PSYCHIATRIC: patient denies any change in mood    Other:  All other systems reviewed and are negative      Physical Exam:    Telemedicine Exam     GENERAL: Well appearing, in no acute distress, alert and oriented x3.    PSYCH:  Mood and affect appropriate.  SKIN: Skin color, texture, turgor normal, no rashes or lesions.  HEAD/FACE:  Normocephalic, atraumatic. Cranial nerves grossly intact.  CV:  No peripheral edema noted  PULM:  No difficulty breathing  Neuro/MSK:  NECK: Pain to palpation over the cervical paraspinous muscles. Spurling Negative (self-performed). No pain with neck flexion, extension, or lateral flexion.  BACK: Straight leg raising in the seated position (self-performed) is negative to radicular pain. Pain to palpation over the facet joints of the lumbar spine or spinous processes. Normal range of motion without pain reproduction.  EXTREMITIES: Peripheral joint active ROM is full and pain free without obvious instability or laxity in all  four extremities. No deformities, edema, or skin discoloration.   MUSCULOSKELETAL:  There is no pain with palpation over the sacroiliac joints bilaterally.  FABERs test is negative (self-performed).  FADIRs test is negative (self-performed).   No atrophy or tone abnormalities are noted.  NEURO:  Able to toe walk and heel walk without difficulty  GAIT: normal.    Physical Exam from last visit  Vitals:  There were no vitals taken for this visit.  (reviewed on 4/6/2020)    General: alert and oriented, in no apparent distress.  Gait: antalgic gait. Ambulating with walker.  Skin: no rashes, no discoloration, no obvious lesions  HEENT: normocephalic, atraumatic. Pupils equal and round.  Cardiovascular: no significant peripheral edema present.  Respiratory: without use of accessory muscles of respiration.    Musculoskeletal - Lumbar Spine:  - Pain on flexion of lumbar spine: Absent   - Pain on extension of lumbar spine: Present  - Lumbar facet loading: Present  - TTP over the lumbar facet joints: Present  - TTP over the lumbar paraspinals: Present  - TTP over the SI joints:  Absent   - TTP over GT bursa: Absent     Musculoskeletal - Cervical Spine:  - Pain on flexion of cervical spine: Absent   - Pain on extension of cervical spine: Present  - Cervical facet loading: Present  - TTP over the cervical facet joints: Present  - TTP over the cervical paraspinals: Present  - Spurling's: Negative    Neuro - Extremities:  - BUE Strength:R/L: D: 5/5; B: 5/5; T: 5/5; WF: 5/5; WE: 5/5; IO: 5/5  - BLE Strength: R/L: HF: 5/5, HE: 5/5, KF: 5/5; KE: 5/5; FE: 5/5; FF: 5/5  - Extremity Reflexes: Brisk and symmetric throughout  - Sensory: Sensation to light touch intact bilaterally      Psych:  Mood and affect is appropriate             Assessment:  Alicia Soliz is a 40 y.o. year old female who is presenting with   Encounter Diagnoses   Name Primary?    Lumbar spondylosis Yes    Cervical spondylosis     Multiple sclerosis      Myofascial muscle pain     Morbid obesity with BMI of 45.0-49.9, adult     Lumbar facet arthropathy        Plan:  1. Interventional: None for now. Consider L3, 4,5 MBB.   S/p gastric sleeve on 2/20/20.     2. Pharmacologic: Continue gabapentin 300mg TID. Robaxin 750 mg PO TID PRN (90 tabs).    She will need to establish care with external pain provider for opioid med management.   - [place]  reviewed. Listed below.      3. Rehabilitative: Continue physical therapy, which has been helping. Encouraged home exercises.     4. Diagnostic: Cervical MRI and xray reviewed. Lumbar Xray reviewed.     5. Other: Order given with letter stating we are not prescribing pain medications, along with recent x-rays for her to bring to be established elsewhere for medication management. We will not refill future Rx.     6. Follow up: PRN

## 2020-04-07 NOTE — PROGRESS NOTES
Last BP remains 1/8/20 - health  addressed lack of readings 3/30/20    Hypertension Medications             hydroCHLOROthiazide (HYDRODIURIL) 12.5 MG Tab TAKE 1 TABLET (12.5 MG TOTAL) BY MOUTH ONCE DAILY.

## 2020-04-13 ENCOUNTER — PATIENT MESSAGE (OUTPATIENT)
Dept: NEUROLOGY | Facility: CLINIC | Age: 40
End: 2020-04-13

## 2020-04-13 DIAGNOSIS — R20.2 PARESTHESIA: ICD-10-CM

## 2020-04-13 DIAGNOSIS — G35 MULTIPLE SCLEROSIS, RELAPSING-REMITTING: Primary | ICD-10-CM

## 2020-04-14 NOTE — TELEPHONE ENCOUNTER
When did the fatigue and pain in the left leg and both arms start? monday    Describe the pain? tingling    Constant or come and go? constant    Any redness or swelling? No     Any recent infections? no    Have you taken anything for the pain? no    Are you taking your medication as prescribed? Yes. Gabapentin/aubagio.     Patient reports drowsiness and dizziness has since subsided, but is still bothered by the pain in extremities

## 2020-04-16 ENCOUNTER — TELEPHONE (OUTPATIENT)
Dept: NEUROLOGY | Facility: CLINIC | Age: 40
End: 2020-04-16

## 2020-04-16 RX ORDER — GABAPENTIN 300 MG/1
CAPSULE ORAL
Qty: 150 CAPSULE | Refills: 2 | Status: SHIPPED | OUTPATIENT
Start: 2020-04-16 | End: 2020-06-18 | Stop reason: SDUPTHER

## 2020-04-16 RX ORDER — DOXEPIN HYDROCHLORIDE 50 MG/1
50 CAPSULE ORAL NIGHTLY PRN
Qty: 90 CAPSULE | Refills: 2
Start: 2020-04-16 | End: 2020-05-18 | Stop reason: SDUPTHER

## 2020-04-16 NOTE — TELEPHONE ENCOUNTER
----- Message from Ines Diana sent at 4/16/2020  1:06 PM CDT -----  Pt called stated her left leg is weak and shaking.  She also stated it is hard for her to  leg so she have to drag it.  Call [facility] [phone]

## 2020-04-16 NOTE — TELEPHONE ENCOUNTER
Spoke with patient. Symptoms not new but more apparent. Increased anxiety and depression lately with being in the house due to covid pandemic. Currently only taking GBP 900mg at night for paresthesias. Discussed pseudorelapse. Will increase GBP to 300/300/900; can increase further as needed. Other consideration is to add cymbalta to help with mood and paresthesias. Patient to call back on Mon/Tue to let me know how she is doing.

## 2020-04-23 DIAGNOSIS — G35 MULTIPLE SCLEROSIS: ICD-10-CM

## 2020-04-23 RX ORDER — TERIFLUNOMIDE 7 MG/1
TABLET, FILM COATED ORAL
Qty: 30 TABLET | Refills: 3 | Status: SHIPPED | OUTPATIENT
Start: 2020-04-23 | End: 2020-09-18

## 2020-04-24 NOTE — TELEPHONE ENCOUNTER
F/U with pt who states she is feeling much better. She thinks her symptoms were due to lack of rest.

## 2020-04-28 ENCOUNTER — PATIENT OUTREACH (OUTPATIENT)
Dept: OTHER | Facility: OTHER | Age: 40
End: 2020-04-28

## 2020-05-06 ENCOUNTER — TELEPHONE (OUTPATIENT)
Dept: INTERNAL MEDICINE | Facility: CLINIC | Age: 40
End: 2020-05-06

## 2020-05-06 RX ORDER — HYDROXYZINE HYDROCHLORIDE 25 MG/1
25 TABLET, FILM COATED ORAL 3 TIMES DAILY PRN
Qty: 60 TABLET | Refills: 3 | Status: SHIPPED | OUTPATIENT
Start: 2020-05-06 | End: 2020-08-05

## 2020-05-06 NOTE — TELEPHONE ENCOUNTER
----- Message from Joanne Emite sent at 5/6/2020  8:45 AM CDT -----  Contact: Pt   Pt called and stated that she is not sleeping at night due to itching all over body and needs something called in to the pharmacy.    [facility]/pharmacy #[id number] - [place] - [address] AT [facility]  [address] [place] [address]  Phone: [phone] Fax: [phone]    She can be reached at [phone].  Thanks,  TF

## 2020-05-15 ENCOUNTER — PATIENT MESSAGE (OUTPATIENT)
Dept: INTERNAL MEDICINE | Facility: CLINIC | Age: 40
End: 2020-05-15

## 2020-05-18 ENCOUNTER — PATIENT MESSAGE (OUTPATIENT)
Dept: INTERNAL MEDICINE | Facility: CLINIC | Age: 40
End: 2020-05-18

## 2020-05-18 RX ORDER — DOXEPIN HYDROCHLORIDE 50 MG/1
50 CAPSULE ORAL NIGHTLY PRN
Qty: 90 CAPSULE | Refills: 1 | Status: SHIPPED | OUTPATIENT
Start: 2020-05-18 | End: 2020-10-16 | Stop reason: SDUPTHER

## 2020-05-18 RX ORDER — DULOXETIN HYDROCHLORIDE 30 MG/1
CAPSULE, DELAYED RELEASE ORAL
Qty: 30 CAPSULE | Refills: 11 | OUTPATIENT
Start: 2020-05-18 | End: 2020-10-09

## 2020-05-19 RX ORDER — METHOCARBAMOL 750 MG/1
TABLET, FILM COATED ORAL
Qty: 90 TABLET | Refills: 0 | Status: SHIPPED | OUTPATIENT
Start: 2020-05-19 | End: 2020-09-06

## 2020-05-27 ENCOUNTER — PATIENT MESSAGE (OUTPATIENT)
Dept: INTERNAL MEDICINE | Facility: CLINIC | Age: 40
End: 2020-05-27

## 2020-05-29 ENCOUNTER — PATIENT OUTREACH (OUTPATIENT)
Dept: OTHER | Facility: OTHER | Age: 40
End: 2020-05-29

## 2020-05-29 ENCOUNTER — OFFICE VISIT (OUTPATIENT)
Dept: INTERNAL MEDICINE | Facility: CLINIC | Age: 40
End: 2020-05-29
Payer: MEDICARE

## 2020-05-29 DIAGNOSIS — M54.2 CERVICALGIA: Primary | ICD-10-CM

## 2020-05-29 PROCEDURE — 99213 PR OFFICE/OUTPT VISIT, EST, LEVL III, 20-29 MIN: ICD-10-PCS | Mod: HCNC,95,, | Performed by: FAMILY MEDICINE

## 2020-05-29 PROCEDURE — 99213 OFFICE O/P EST LOW 20 MIN: CPT | Mod: HCNC,95,, | Performed by: FAMILY MEDICINE

## 2020-05-29 RX ORDER — METHOCARBAMOL 750 MG/1
750 TABLET, FILM COATED ORAL 2 TIMES DAILY PRN
Qty: 60 TABLET | Refills: 3 | Status: SHIPPED | OUTPATIENT
Start: 2020-05-29 | End: 2020-06-08

## 2020-05-29 NOTE — PROGRESS NOTES
Digital Medicine: Clinician Follow-Up    Patient was at an appointment but reports she is doing well  States she is taking her medications and exercising daily      The history is provided by the patient.     Follow Up  Follow-up reason(s): reading review          INTERVENTION(S)  reviewed appropriate dose schedule, encouragement/support and denied questions    PLAN  patient verbalizes understanding and continue monitoring    BP at goal  Continue current regimen  Requested at least 1 BP reading per week and reminded her to charge cuff monthly  2/21/20 BMP reviewed      There are no preventive care reminders to display for this patient.    Last 5 Patient Entered Readings                                      Current 30 Day Average: 110/70     Recent Readings 5/27/2020 5/18/2020 4/12/2020 1/8/2020 11/26/2019    SBP (mmHg) 110 110 110 110 110    DBP (mmHg) 70 70 70 80 80             Hypertension Medications             hydroCHLOROthiazide (HYDRODIURIL) 12.5 MG Tab TAKE 1 TABLET (12.5 MG TOTAL) BY MOUTH ONCE DAILY.                             Medication Adherence Screening     Patient knows purpose of medications.

## 2020-05-30 NOTE — PROGRESS NOTES
The patient location is: home  The chief complaint leading to consultation is: neck pain    Visit type: audiovisual    Face to Face time with patient: 7 minutes  9 minutes of total time spent on the encounter, which includes face to face time and non-face to face time preparing to see the patient (eg, review of tests), Obtaining and/or reviewing separately obtained history, Documenting clinical information in the electronic or other health record, Independently interpreting results (not separately reported) and communicating results to the patient/family/caregiver, or Care coordination (not separately reported).         Each patient to whom he or she provides medical services by telemedicine is:  (1) informed of the relationship between the physician and patient and the respective role of any other health care provider with respect to management of the patient; and (2) notified that he or she may decline to receive medical services by telemedicine and may withdraw from such care at any time.    Notes:   Subjective:      Patient ID: Alicia Soliz is a 40 y.o. female.    Chief Complaint: Neck Pain      Patient reports pain in his neck radiating down her back, started when she woke up this morning.  No recent injury or trauma.  She reports to soreness to touch and pain with range of motion.  Otherwise she is doing well, reports she is down to 224 lb after weight loss surgery earlier this year.    Back Pain   This is a new problem. The current episode started yesterday. The problem is unchanged. The pain is present in the thoracic spine. The quality of the pain is described as stabbing. The pain does not radiate. The pain is worse during the night. The symptoms are aggravated by lying down. Stiffness is present at night. Associated symptoms include headaches, paresthesias and tingling.     Review of Systems   Musculoskeletal: Positive for back pain and neck pain.   Neurological: Positive for tingling, headaches  and paresthesias.     Past Medical History:   Diagnosis Date    Anemia     Arthritis     Cardiac arrest as complication of care     pt states she went into cardiac arrest from an allergic reaction to a medication    Depression     Encounter for blood transfusion     Hemiplegia due to old stroke     Hypertension     Morbid obesity with BMI of 45.0-49.9, adult 9/20/2018    Multiple sclerosis           Past Surgical History:   Procedure Laterality Date    APPENDECTOMY      CHOLECYSTECTOMY      ESOPHAGOGASTRODUODENOSCOPY N/A 2/19/2020    Procedure: EGD (ESOPHAGOGASTRODUODENOSCOPY);  Surgeon: Michael Navarrete MD;  Location: [facility] ENDO;  Service: Endoscopy;  Laterality: N/A;    ESOPHAGOGASTRODUODENOSCOPY N/A 2/20/2020    Procedure: EGD (ESOPHAGOGASTRODUODENOSCOPY);  Surgeon: Michael Navarrete MD;  Location: [facility] OR;  Service: General;  Laterality: N/A;    HYSTERECTOMY      KNEE SURGERY      ROBOT-ASSISTED LAPAROSCOPIC SLEEVE GASTRECTOMY USING DA ANTHONY XI N/A 2/20/2020    Procedure: XI ROBOTIC SLEEVE GASTRECTOMY;  Surgeon: Michael Navarrete MD;  Location: [facility] OR;  Service: General;  Laterality: N/A;    TONSILLECTOMY      TUBAL LIGATION       Family History   Problem Relation Age of Onset    Lupus Mother     Heart disease Mother     Diabetes Father     Kidney disease Father     Cancer Maternal Aunt 40        breast    Breast cancer Maternal Aunt     Diabetes Maternal Aunt     Breast cancer Maternal Cousin     Breast cancer Maternal Cousin     Ovarian cancer Maternal Cousin      Social History     Socioeconomic History    Marital status: Single     Spouse name: Not on file    Number of children: 3    Years of education: Not on file    Highest education level: Not on file   Occupational History     Employer: [employer]   Social Needs    Financial resource strain: Somewhat hard    Food insecurity:     Worry: Often true     Inability: Sometimes true    Transportation needs:     Medical: Yes     Non-medical: No  "  Tobacco Use    Smoking status: Never Smoker    Smokeless tobacco: Never Used   Substance and Sexual Activity    Alcohol use: Yes     Frequency: Never     Drinks per session: Patient refused     Binge frequency: Never     Comment: occasion    Drug use: No    Sexual activity: Yes     Partners: Male     Birth control/protection: Surgical   Lifestyle    Physical activity:     Days per week: 7 days     Minutes per session: 60 min    Stress: Not at all   Relationships    Social connections:     Talks on phone: Once a week     Gets together: More than three times a week     Attends Rastafari service: Not on file     Active member of club or organization: Patient refused     Attends meetings of clubs or organizations: Never     Relationship status: Patient refused   Other Topics Concern    Patient feels they ought to cut down on drinking/drug use Not Asked    Patient annoyed by others criticizing their drinking/drug use Not Asked    Patient has felt bad or guilty about drinking/drug use Not Asked    Patient has had a drink/used drugs as an eye opener in the AM Not Asked   Social History Narrative    Not on file     Review of patient's allergies indicates:   Allergen Reactions    Demerol [meperidine] Itching     Other reaction(s): Itching    Dilaudid [hydromorphone (bulk)] Anaphylaxis     "coded" per pt  Patient can tolerate Lortab    Prednisone Itching     Other reaction(s): Itching    Morphine     Tramadol      shaking       Objective:       There were no vitals taken for this visit.  Physical Exam   Constitutional: She is oriented to person, place, and time. She appears well-developed and well-nourished. No distress.   Musculoskeletal:   Slightly decreased ROM of neck   Neurological: She is alert and oriented to person, place, and time.   Skin: She is not diaphoretic.   Psychiatric: She has a normal mood and affect. Her behavior is normal. Judgment and thought content normal.     Assessment:     1. " Cervicalgia      Plan:   Cervicalgia    Other orders  -     methocarbamoL (ROBAXIN) 750 MG Tab; Take 1 tablet (750 mg total) by mouth 2 (two) times daily as needed.  Dispense: 60 tablet; Refill: 3     Patient also on hydrocodone from her pain management doctor Dr. Maston  She is to contact me if pain continues or does not resolve.  Medication List with Changes/Refills   Current Medications    AUBAGIO 7 MG TAB    TAKE 1 TABLET (7MG) BY MOUTH ONCE DAILY    DALFAMPRIDINE (AMPYRA) 10 MG TB12    Take 1 tablet by mouth 2 (two) times daily.    DOXEPIN (SINEQUAN) 50 MG CAPSULE    Take 1 capsule (50 mg total) by mouth nightly as needed. TAKE 1 CAPSULE BY MOUTH NIGHTLY AS NEEDED.    GABAPENTIN (NEURONTIN) 300 MG CAPSULE    Take 300mg in morning, 300mg in afternoon, and 900mg at night    HYDROCHLOROTHIAZIDE (HYDRODIURIL) 12.5 MG TAB    TAKE 1 TABLET (12.5 MG TOTAL) BY MOUTH ONCE DAILY.    HYDROCODONE-ACETAMINOPHEN (HYCET) SOLUTION 7.5-325 MG/15ML    Take 15 mLs by mouth every 6 (six) hours as needed for Pain.    HYDROCODONE-ACETAMINOPHEN (NORCO) 7.5-325 MG PER TABLET    Take 1/2 to 1 tab QD PRN pain    HYDROXYZINE HCL (ATARAX) 25 MG TABLET    Take 1 tablet (25 mg total) by mouth 3 (three) times daily as needed for Itching.    MULTIVITAMIN WITH FOLIC ACID 400 MCG TAB    Take 1 tablet by mouth.    MUPIROCIN (BACTROBAN) 2 % OINTMENT    Apply topically 2 (two) times daily. Apply to affected area    NOZASEPTIN (NOZIN) NASAL     1 each by Each Nostril route 2 (two) times daily.    NYSTATIN (MYCOSTATIN) CREAM    Apply topically 2 (two) times daily.    ONDANSETRON (ZOFRAN-ODT) 4 MG TBDL    Take 1 tablet (4 mg total) by mouth every 6 (six) hours as needed (nausea 1st choice).   Changed and/or Refilled Medications    Modified Medication Previous Medication    METHOCARBAMOL (ROBAXIN) 750 MG TAB methocarbamol (ROBAXIN) 750 MG Tab       Take 1 tablet (750 mg total) by mouth 2 (two) times daily as needed.    Take 1 tablet (750 mg  total) by mouth 2 (two) times daily as needed.

## 2020-06-04 ENCOUNTER — PATIENT MESSAGE (OUTPATIENT)
Dept: INTERNAL MEDICINE | Facility: CLINIC | Age: 40
End: 2020-06-04

## 2020-06-05 RX ORDER — TRIAMCINOLONE ACETONIDE 1 MG/G
OINTMENT TOPICAL 2 TIMES DAILY
Qty: 30 G | Refills: 1 | Status: SHIPPED | OUTPATIENT
Start: 2020-06-05 | End: 2022-02-28 | Stop reason: SDUPTHER

## 2020-06-16 ENCOUNTER — PATIENT MESSAGE (OUTPATIENT)
Dept: NEUROLOGY | Facility: CLINIC | Age: 40
End: 2020-06-16

## 2020-06-16 DIAGNOSIS — M43.07 LUMBOSACRAL SPONDYLOLYSIS: ICD-10-CM

## 2020-06-16 DIAGNOSIS — G35 MULTIPLE SCLEROSIS, RELAPSING-REMITTING: Primary | ICD-10-CM

## 2020-06-16 DIAGNOSIS — M43.02 CERVICAL SPONDYLOLYSIS: ICD-10-CM

## 2020-06-19 NOTE — PROGRESS NOTES
"Digital Medicine: Health  Follow-Up      Follow Up  Follow-up reason(s): reading review      Routine Education Topics: weight management  Patient states that "the pounds keep on vanishing". Patient is still staying out of physical therapy due to covid concerns.     Patient has been sitting outside to take care of her mental health.       INTERVENTION(S)  encouragement/support    PLAN  continue monitoring      There are no preventive care reminders to display for this patient.    Last 5 Patient Entered Readings                                      Current 30 Day Average: 110/70     Recent Readings 5/27/2020 5/18/2020 4/12/2020 1/8/2020 11/26/2019    SBP (mmHg) 110 110 110 110 110    DBP (mmHg) 70 70 70 80 80                  Screenings    SDOH  "

## 2020-07-02 ENCOUNTER — TELEPHONE (OUTPATIENT)
Dept: INTERNAL MEDICINE | Facility: CLINIC | Age: 40
End: 2020-07-02

## 2020-07-02 RX ORDER — BUTALBITAL, ACETAMINOPHEN AND CAFFEINE 50; 325; 40 MG/1; MG/1; MG/1
1 TABLET ORAL EVERY 6 HOURS PRN
Qty: 12 TABLET | Refills: 0 | Status: SHIPPED | OUTPATIENT
Start: 2020-07-02 | End: 2020-07-09

## 2020-07-02 NOTE — TELEPHONE ENCOUNTER
----- Message from Celsa Choudhury sent at 7/2/2020 10:13 AM CDT -----  Regarding: Patient  The patient would like to consult with nurse regarding a constant headache she's had since Tuesday. She would like to know if some medication can be called in for her. Please call back at [phone]    [facility]/pharmacy #[id number] - [place] - [address] AT [facility]  [address] [place] [address]  Phone: [phone] Fax: [phone]

## 2020-07-02 NOTE — TELEPHONE ENCOUNTER
Patient called to state that she's been having a constant headache she's had since Tuesday. She would like to know if some medication can be called in to the pharmacy. Please Advise

## 2020-07-06 ENCOUNTER — PATIENT MESSAGE (OUTPATIENT)
Dept: INTERNAL MEDICINE | Facility: CLINIC | Age: 40
End: 2020-07-06

## 2020-07-06 RX ORDER — CIPROFLOXACIN 500 MG/1
500 TABLET ORAL EVERY 12 HOURS
Qty: 10 TABLET | Refills: 0 | Status: SHIPPED | OUTPATIENT
Start: 2020-07-06 | End: 2020-07-09

## 2020-07-08 ENCOUNTER — PATIENT MESSAGE (OUTPATIENT)
Dept: INTERNAL MEDICINE | Facility: CLINIC | Age: 40
End: 2020-07-08

## 2020-07-09 RX ORDER — PROMETHAZINE HYDROCHLORIDE 25 MG/1
25 TABLET ORAL EVERY 6 HOURS PRN
Qty: 20 TABLET | Refills: 0 | Status: SHIPPED | OUTPATIENT
Start: 2020-07-09 | End: 2020-10-09 | Stop reason: SDUPTHER

## 2020-07-15 ENCOUNTER — PATIENT MESSAGE (OUTPATIENT)
Dept: NEUROLOGY | Facility: CLINIC | Age: 40
End: 2020-07-15

## 2020-07-20 ENCOUNTER — TELEPHONE (OUTPATIENT)
Dept: INTERNAL MEDICINE | Facility: CLINIC | Age: 40
End: 2020-07-20

## 2020-07-20 ENCOUNTER — OFFICE VISIT (OUTPATIENT)
Dept: INTERNAL MEDICINE | Facility: CLINIC | Age: 40
End: 2020-07-20
Payer: MEDICARE

## 2020-07-20 DIAGNOSIS — R51.9 SINUS HEADACHE: ICD-10-CM

## 2020-07-20 DIAGNOSIS — J32.9 SINUSITIS, UNSPECIFIED CHRONICITY, UNSPECIFIED LOCATION: Primary | ICD-10-CM

## 2020-07-20 PROCEDURE — 99213 OFFICE O/P EST LOW 20 MIN: CPT | Mod: HCNC,95,, | Performed by: FAMILY MEDICINE

## 2020-07-20 PROCEDURE — 99213 PR OFFICE/OUTPT VISIT, EST, LEVL III, 20-29 MIN: ICD-10-PCS | Mod: HCNC,95,, | Performed by: FAMILY MEDICINE

## 2020-07-20 RX ORDER — CETIRIZINE HYDROCHLORIDE 10 MG/1
10 TABLET ORAL DAILY
Qty: 30 TABLET | Refills: 2 | Status: SHIPPED | OUTPATIENT
Start: 2020-07-20 | End: 2020-08-05

## 2020-07-20 RX ORDER — AZITHROMYCIN 250 MG/1
TABLET, FILM COATED ORAL
Qty: 6 TABLET | Refills: 0 | Status: SHIPPED | OUTPATIENT
Start: 2020-07-20 | End: 2020-07-25

## 2020-07-20 RX ORDER — BUTALBITAL, ACETAMINOPHEN AND CAFFEINE 50; 325; 40 MG/1; MG/1; MG/1
1 TABLET ORAL EVERY 4 HOURS PRN
Qty: 20 TABLET | Refills: 0 | Status: SHIPPED | OUTPATIENT
Start: 2020-07-20 | End: 2020-08-05

## 2020-07-20 NOTE — TELEPHONE ENCOUNTER
----- Message from Pam Chavez sent at 7/20/2020 10:40 AM CDT -----  Contact: PT  Type:  Needs Medical Advice    Who Called: Alicia Soliz  Symptoms (please be specific): Headaches or migraines   and both eyes are hurting   How long has patient had these symptoms:  7/17/20 Last Friday  Pharmacy name and phone #:    [id number] - [place] - [address] AT [facility]  [address]  [place] [address]  Phone: [phone] Fax: [phone]    Would the patient rather a call back or a response via [facility]? Call back & [employer]  Best Call Back Number: [phone] (Cell)   Additional Information: Patient would like something called into her Location Pharmacy for Headaches or Migraines.????   Patient is also stating that if doctor is unable to call her something a new rx into her pharmacy. Patient is stating that she will be open to a virtual visit

## 2020-07-24 ENCOUNTER — TELEPHONE (OUTPATIENT)
Dept: INTERNAL MEDICINE | Facility: CLINIC | Age: 40
End: 2020-07-24

## 2020-07-24 RX ORDER — ESCITALOPRAM OXALATE 10 MG/1
10 TABLET ORAL DAILY
Qty: 30 TABLET | Refills: 11 | Status: SHIPPED | OUTPATIENT
Start: 2020-07-24 | End: 2020-10-09

## 2020-07-24 NOTE — TELEPHONE ENCOUNTER
Patient states her nerves are up and would like for you to call her something in. [facility].

## 2020-07-24 NOTE — TELEPHONE ENCOUNTER
----- Message from Emily Marion sent at 7/24/2020  2:27 PM CDT -----  Contact: Alicia Vargas would like call back at [phone], Regards to getting something called in for anxiety.      [id number] [place] - [address] AT [facility]  [address]  [place] [address]  Phone: [phone] Fax: [phone]    Thanks  Td

## 2020-08-03 ENCOUNTER — OFFICE VISIT (OUTPATIENT)
Dept: NEUROLOGY | Facility: CLINIC | Age: 40
End: 2020-08-03
Payer: MEDICARE

## 2020-08-03 DIAGNOSIS — G35 MULTIPLE SCLEROSIS: ICD-10-CM

## 2020-08-03 DIAGNOSIS — G35 MULTIPLE SCLEROSIS, RELAPSING-REMITTING: Primary | ICD-10-CM

## 2020-08-03 DIAGNOSIS — G47.01 INSOMNIA DUE TO MEDICAL CONDITION: ICD-10-CM

## 2020-08-03 DIAGNOSIS — G43.719 CHRONIC MIGRAINE WITHOUT AURA, INTRACTABLE, WITHOUT STATUS MIGRAINOSUS: ICD-10-CM

## 2020-08-03 PROCEDURE — 99215 PR OFFICE/OUTPT VISIT, EST, LEVL V, 40-54 MIN: ICD-10-PCS | Mod: HCNC,95,, | Performed by: PSYCHIATRY & NEUROLOGY

## 2020-08-03 PROCEDURE — 99215 OFFICE O/P EST HI 40 MIN: CPT | Mod: HCNC,95,, | Performed by: PSYCHIATRY & NEUROLOGY

## 2020-08-03 PROCEDURE — 99354 PR PROLONGED SVC, OUPT, 1ST HR: ICD-10-PCS | Mod: HCNC,95,, | Performed by: PSYCHIATRY & NEUROLOGY

## 2020-08-03 PROCEDURE — 99354 PR PROLONGED SVC, OUPT, 1ST HR: CPT | Mod: HCNC,95,, | Performed by: PSYCHIATRY & NEUROLOGY

## 2020-08-03 RX ORDER — TOPIRAMATE 50 MG/1
50 TABLET, FILM COATED ORAL NIGHTLY
Qty: 30 TABLET | Refills: 2 | Status: SHIPPED | OUTPATIENT
Start: 2020-08-03 | End: 2020-10-30

## 2020-08-03 NOTE — Clinical Note
PT/OT  Referral to sleep medicine  Labs in the next 2-3 weeks  MRI's in the next month  F/u with me in 6 weeks

## 2020-08-03 NOTE — PROGRESS NOTES
"The patient location is: [place]  The chief complaint leading to consultation is: MS follow up    Visit type: audiovisual    Face to Face time with patient: 70min  80 minutes of total time spent on the encounter, which includes face to face time and non-face to face time preparing to see the patient (eg, review of tests), Obtaining and/or reviewing separately obtained history, Documenting clinical information in the electronic or other health record, Independently interpreting results (not separately reported) and communicating results to the patient/family/caregiver, or Care coordination (not separately reported).         Each patient to whom he or she provides medical services by telemedicine is:  (1) informed of the relationship between the physician and patient and the respective role of any other health care provider with respect to management of the patient; and (2) notified that he or she may decline to receive medical services by telemedicine and may withdraw from such care at any time.    Notes:       Subjective:          Patient ID: Alicia Soliz is a 40 y.o. female who presents today for a fit-in clinic visit for MS.      MS HPI:  · DMT: teriflunomide  · Side effects from DMT? No  · Taking vitamin D3 as recommended? Yes, in centrum  · Feeling "so-so". No new MS s/s.  · This past weekend she was "shivering in pain". States that she generally shakes all over during this time, but typically only includes the left side. Usually subsides when she sits up. Starts when she applies pressure on the left side and hyperextends her leg. No LOC. Denies any increased stress and anxiety, but reports that her PCP prescribed her a medication to help with stress "before she does something that she would regret." She was started on Lexapro 10mg about 2 weeks ago. Also taking doxepin and atarax. She is still having crying spells. Advised to resume her counseling as she had stopped this. Still doing therapy exercises " at home, but willing to going back to PT/OT.  · Going to casino to relieve stress, but we discussed need for covid precautions with social distancing.  · Also reports pulsating tooth ache- like pain around the left temple and retroorbitally, +photophobia & phonophobia, having these headaches daily. No n/v. Previously prescribed fioricet. Does not take OTC medication. Headaches worse in the morning and improves over the day. Unsure is snoring, no gasping for air. Back and side sleeper.    Medications:  Current Outpatient Medications   Medication Sig    AUBAGIO 7 mg Tab TAKE 1 TABLET (7MG) BY MOUTH ONCE DAILY    butalbital-acetaminophen-caffeine -40 mg (FIORICET, ESGIC) -40 mg per tablet Take 1 tablet by mouth every 4 (four) hours as needed for Pain.    cetirizine (ZYRTEC) 10 MG tablet Take 1 tablet (10 mg total) by mouth once daily.    dalfampridine (AMPYRA) 10 mg Tb12 Take 1 tablet by mouth 2 (two) times daily. (Patient taking differently: Take 1 tablet by mouth once daily. )    doxepin (SINEQUAN) 50 MG capsule Take 1 capsule (50 mg total) by mouth nightly as needed. TAKE 1 CAPSULE BY MOUTH NIGHTLY AS NEEDED.    escitalopram oxalate (LEXAPRO) 10 MG tablet Take 1 tablet (10 mg total) by mouth once daily.    gabapentin (NEURONTIN) 300 MG capsule Take 300mg in morning, 300mg in afternoon, and 900mg at night    hydroCHLOROthiazide (HYDRODIURIL) 12.5 MG Tab TAKE 1 TABLET (12.5 MG TOTAL) BY MOUTH ONCE DAILY.    hydrocodone-acetaminophen (HYCET) solution 7.5-325 mg/15mL Take 15 mLs by mouth every 6 (six) hours as needed for Pain.    HYDROcodone-acetaminophen (NORCO) 7.5-325 mg per tablet Take 1/2 to 1 tab QD PRN pain    hydroxyzine HCL (ATARAX) 25 MG tablet Take 1 tablet (25 mg total) by mouth 3 (three) times daily as needed for Itching.    multivitamin with folic acid 400 mcg Tab Take 1 tablet by mouth.    mupirocin (BACTROBAN) 2 % ointment Apply topically 2 (two) times daily. Apply to affected  area    nozaseptin (NOZIN) nasal  1 each by Each Nostril route 2 (two) times daily.    nystatin (MYCOSTATIN) cream Apply topically 2 (two) times daily.    promethazine (PHENERGAN) 25 MG tablet Take 1 tablet (25 mg total) by mouth every 6 (six) hours as needed for Nausea.    triamcinolone acetonide 0.1% (KENALOG) 0.1 % ointment Apply topically 2 (two) times daily.     No current facility-administered medications for this visit.        SOCIAL HISTORY  Social History     Tobacco Use    Smoking status: Never Smoker    Smokeless tobacco: Never Used   Substance Use Topics    Alcohol use: Yes     Frequency: Never     Drinks per session: Patient refused     Binge frequency: Never     Comment: occasion    Drug use: No       Living arrangements - the patient lives alone.    ROS:    REVIEW OF SYMPTOMS 1/23/2020   Do you feel abnormally tired on most days? No   Do you feel you generally sleep well? Yes   Do you have difficulty controlling your bladder?  Yes   Do you have difficulty controlling your bowels?  Yes   Do you have frequent muscle cramps, tightness or spasms in your limbs?  Yes   Do you have new visual symptoms?  No   Do you have worsening difficulty with your memory or thinking? No   Do you have worsening symptoms of anxiety or depression?  Yes   For patients who walk, Do you have more difficulty walking?  Not Applicable   Have you fallen since your last visit?  No   For patients who use wheelchairs: Do you have any skin wounds or breakdown? No   Do you have difficulty using your hands?  No   Do you have shooting or burning pain? Yes   Do you have difficulty with sexual function?  No   If you are sexually active, are you using birth control? Y/N  N/A No   Do you often choke when swallowing liquids or solid food?  No   Do you experience worsening symptoms when overheated? No   Do you need any new equipment such as a wheelchair, walker or shower chair? Yes   Do you receive co-pay financial assistance  for your principal MS medicine? Yes   Would you be interested in participating in an MS research trial in the future? No   For patients on Gilenya, Tecfidera, Aubagio, Rituxan, Ocrevus, Tysabri, Lemtrada or Methotrexate, are you aware that you should NOT receive live virus vaccines?  No   Do you feel you have adequate family/friend support?  Yes   Do you have health insurance?   Yes   Are you currently employed? No   Do you receive SSDI/SSI?  Yes   Do you use marijuana or cannabis products? No   Have you been diagnosed with a urinary tract infection since your last visit here? No   Have you been diagnosed with a respiratory tract infection since your last visit here? No   Have you been to the emergency room since your last visit here? No   Have you been hospitalized since your last visit here?  No                Objective:        1. 25 foot timed walk:  No flowsheet data found.    2. 9 Hole Peg Test:  No flowsheet data found.    Neurologic Exam  MENTAL STATUS: grossly intact. Normal language, attention.   CRANIAL NERVE EXAM: Extraocular muscles are intact.  No facial asymmetry. Tongue midline. Shoulder shrug normal b/l. There is no dysarthria.   MOTOR EXAM: No pronator drift. Strength is at least 4/5 in all groups in the lower extremities and upper extremities.   COORDINATION: Normal modified finger-to-nose exam.   GAIT: Narrow based and stable gait.        Imaging:     MRI Brain w/wo 1/2015 Multifocal periventricular, subcortical and pontine white matter lesions compatible with demyelinating plaques of multiple sclerosis.  Many of the lesions display enhancement consistent with active MS plaque. Evolving signal changes evident on the DWI and ADC map sequences with development of right periventricular white matter enhancement as compared to the recent study of 01/15/15.  Differential considerations include active MS plaque formation/evolution and acute ischemia/infarction 10/2018 Stable compared to 3/2018 imaging.  No areas of diffusion restriction or enhancing lesions     MRI C-Sprine 1/2015 Normal cervical cord. 1 cm contrast enhancing left occipital lobe subcortical white matter lesion.  Multiple bihemispheric hyperintense T2 signal lesions including left-sided brainstem lesion demonstrated on the recent head MRI study some of which exhibiting contrast enhancement 10/2018 No cord lesions    MRI T-Spine w/wo 10/2018: No abnormal marrow or cord signal or enhancement. Stable compared to 2/2015    MRI L-Spine w/wo 10/2018: Multilevel degenerative change. Evidence of active bilateral facet synovitis at L4-5.      Labs:     Lab Results   Component Value Date    TFRQDHUV08UL 32 01/10/2020    GLHSMXVN00QN 11 (L) 01/30/2015     Lab Results   Component Value Date    JCVINDEX 1.67 (A) 04/19/2018    JCVANTIBODY Positive (A) 04/19/2018     No results found for: UW9BQFRV, ABSOLUTECD3, PF1SGJYX, ABSOLUTECD8, CB1QZFUX, ABSOLUTECD4, LABCD48  Lab Results   Component Value Date    WBC 8.29 02/21/2020    WBC 8.29 02/21/2020    RBC 5.31 02/21/2020    RBC 5.31 02/21/2020    HGB 10.9 (L) 02/21/2020    HGB 10.9 (L) 02/21/2020    HCT 35.8 (L) 02/21/2020    HCT 35.8 (L) 02/21/2020    MCV 67 (L) 02/21/2020    MCV 67 (L) 02/21/2020    MCH 20.5 (L) 02/21/2020    MCH 20.5 (L) 02/21/2020    MCHC 30.4 (L) 02/21/2020    MCHC 30.4 (L) 02/21/2020    RDW 15.6 (H) 02/21/2020    RDW 15.6 (H) 02/21/2020     02/21/2020     02/21/2020    MPV 11.0 02/21/2020    MPV 11.0 02/21/2020    GRAN 4.9 02/21/2020    GRAN 58.7 02/21/2020    LYMPH 2.6 02/21/2020    LYMPH 31.2 02/21/2020    MONO 0.8 02/21/2020    MONO 9.3 02/21/2020    EOS 0.0 02/21/2020    BASO 0.04 02/21/2020    EOSINOPHIL 0.2 02/21/2020    BASOPHIL 0.5 02/21/2020     Sodium   Date Value Ref Range Status   02/21/2020 139 136 - 145 mmol/L Final     Potassium   Date Value Ref Range Status   02/21/2020 3.7 3.5 - 5.1 mmol/L Final     Chloride   Date Value Ref Range Status   02/21/2020 102 95 - 110  mmol/L Final     CO2   Date Value Ref Range Status   02/21/2020 28 23 - 29 mmol/L Final     Glucose   Date Value Ref Range Status   02/21/2020 120 (H) 70 - 110 mg/dL Final     BUN, Bld   Date Value Ref Range Status   02/21/2020 5 (L) 6 - 20 mg/dL Final     Creatinine   Date Value Ref Range Status   02/21/2020 0.8 0.5 - 1.4 mg/dL Final     Calcium   Date Value Ref Range Status   02/21/2020 9.3 8.7 - 10.5 mg/dL Final     Total Protein   Date Value Ref Range Status   02/07/2020 8.5 (H) 6.0 - 8.4 g/dL Final     Albumin   Date Value Ref Range Status   02/07/2020 4.0 3.5 - 5.2 g/dL Final     Total Bilirubin   Date Value Ref Range Status   02/07/2020 0.5 0.1 - 1.0 mg/dL Final     Comment:     For infants and newborns, interpretation of results should be based  on gestational age, weight and in agreement with clinical  observations.  Premature Infant recommended reference ranges:  Up to 24 hours.............<8.0 mg/dL  Up to 48 hours............<12.0 mg/dL  3-5 days..................<15.0 mg/dL  6-29 days.................<15.0 mg/dL       Alkaline Phosphatase   Date Value Ref Range Status   02/07/2020 102 55 - 135 U/L Final     AST   Date Value Ref Range Status   02/07/2020 29 10 - 40 U/L Final     ALT   Date Value Ref Range Status   02/07/2020 24 10 - 44 U/L Final     Anion Gap   Date Value Ref Range Status   02/21/2020 9 8 - 16 mmol/L Final     eGFR if    Date Value Ref Range Status   02/21/2020 >60 >60 mL/min/1.73 m^2 Final     eGFR if non    Date Value Ref Range Status   02/21/2020 >60 >60 mL/min/1.73 m^2 Final     Comment:     Calculation used to obtain the estimated glomerular filtration  rate (eGFR) is the CKD-EPI equation.        Lab Results   Component Value Date    HEPBSAG Negative 01/10/2020    HEPBSAB Positive (A) 09/27/2019    HEPBCAB Negative 09/27/2019           MS Impression and Plan:     NEURO MULTIPLE SCLEROSIS IMPRESSION:   MS Status:     Number of relapses in the past  year?:  0    Clinical Progression:  Clinically Stable    MRI Progression:  Stable  Plan:     DMT:  No change in management    DMT comment:  Continue Aubagio    Symptom Management:  Implement change in symptom management    Implement Change in Symptom Management:  Gait and Mood     Next Imaging Due: 8/18/2020     Next Labs Due: 8/18/2020     MS counseling given  Discussed shaking spells. Not suggestive of relapse, but may be a manifestation of stress. Currently working with PCP to manage anxiety. Strongly encouraged her to resume talk therapy.   Gait imbalance: PT/OT ordered as this has helped in past.  Headaches, migrainous type: worse in mornings. Concern for sleep apnea. Will refer to sleep medicine and increase topamax to 50mg qhs to help with headaches. PARQ discussion held for medication.    F/u with me in 6 weeks    Our visit today lasted 40 minutes, and 100% of this time was spent face to face with the patient. Over 50% of this visit included discussion of the treatment plan/medication changes/symptom management/exam findings/imaging results/coordination of care. The patient agrees with the plan of care.    Problem List Items Addressed This Visit     None          Meagan Pereira MD

## 2020-08-05 ENCOUNTER — PATIENT MESSAGE (OUTPATIENT)
Dept: INTERNAL MEDICINE | Facility: CLINIC | Age: 40
End: 2020-08-05

## 2020-08-05 RX ORDER — NEOMYCIN SULFATE, POLYMYXIN B SULFATE AND HYDROCORTISONE 10; 3.5; 1 MG/ML; MG/ML; [USP'U]/ML
3 SUSPENSION/ DROPS AURICULAR (OTIC) 3 TIMES DAILY
Qty: 10 ML | Refills: 0 | Status: SHIPPED | OUTPATIENT
Start: 2020-08-05 | End: 2020-10-23

## 2020-08-07 ENCOUNTER — PATIENT OUTREACH (OUTPATIENT)
Dept: OTHER | Facility: OTHER | Age: 40
End: 2020-08-07

## 2020-08-11 ENCOUNTER — CLINICAL SUPPORT (OUTPATIENT)
Dept: REHABILITATION | Facility: HOSPITAL | Age: 40
End: 2020-08-11
Payer: MEDICARE

## 2020-08-11 DIAGNOSIS — R26.9 GAIT ABNORMALITY: ICD-10-CM

## 2020-08-11 PROCEDURE — 97161 PT EVAL LOW COMPLEX 20 MIN: CPT | Mod: HCNC

## 2020-08-11 NOTE — PLAN OF CARE
[facility]  Physical Therapy Initial Evaluation    Name: Alicia Soliz  Clinic Number: [id number]    Therapy Diagnosis:   Encounter Diagnosis   Name Primary?    Gait abnormality      Physician: Meagan Pereira MD    Physician Orders: PT Eval and Treat   Medical Diagnosis from Referral: Multiple sclerosis, relapsing-remitting  Evaluation Date: 8/11/2020  Authorization Period Expiration: 8/3/21  Plan of Care Expiration: 9/11/20  Visit # / Visits authorized: 1/ 1    Time In: 10:35  Time Out: 11:00  Total Billable Time: 5 minutes    Precautions: Fall and MS    Subjective   Date of onset: recent increase in weakness due to quarantine  History of current condition - Alicia reports: that she has been keeping up with her exercises at home. Has been doing well with walking and performing her exercises, but is ready to come back in for strengthening. She had weight loss surgery and has been doing well with that and has lost 100 pounds! The hot summer days are affecting her MS with fatigue and not being able to get outside with the quarantine going on as well.     Pain:  Current 2/10, worst 5/10, best 2/10   Location: left ankle  Description: tightness/spasm  Aggravating Factors: use of L ankle  Easing Factors: rest    Prior Therapy: yes  Social History:  lives with their family  Occupation: not working  Prior Level of Function: MI  Current Level of Function: MI    Imaging: none    Medical History:   Past Medical History:   Diagnosis Date    Anemia     Arthritis     Cardiac arrest as complication of care     pt states she went into cardiac arrest from an allergic reaction to a medication    Depression     Encounter for blood transfusion     Hemiplegia due to old stroke     Hypertension     Morbid obesity with BMI of 45.0-49.9, adult 9/20/2018    Multiple sclerosis        Surgical History:   Alicia Soliz  has a past surgical history that includes Appendectomy; Tubal ligation;  "Tonsillectomy; Cholecystectomy; Hysterectomy; Knee surgery; Esophagogastroduodenoscopy (N/A, 2/19/2020); Robot-assisted laparoscopic sleeve gastrectomy using da [place] Xi (N/A, 2/20/2020); and Esophagogastroduodenoscopy (N/A, 2/20/2020).    Medications:   Alicia has a current medication list which includes the following prescription(s): aubagio, dalfampridine, doxepin, escitalopram oxalate, gabapentin, hydrochlorothiazide, hydrocodone-acetaminophen, multivitamin with folic acid, neomycin-polymyxin-hydrocortisone, promethazine, topiramate, and triamcinolone acetonide 0.1%.    Allergies:   Review of patient's allergies indicates:   Allergen Reactions    Demerol [meperidine] Itching     Other reaction(s): Itching    Dilaudid [hydromorphone (bulk)] Anaphylaxis     "coded" per pt  Patient can tolerate Lortab    Prednisone Itching     Other reaction(s): Itching    Morphine     Tramadol      shaking        Pts goals: improve walking, balance, get stronger    Objective       CMS Impairment/Limitation/Restriction for FOTO MS Survey    Therapist reviewed FOTO scores for Alicia Soliz on 8/11/2020.   FOTO documents entered into [employer] - see Media section.    Limitation Score: 54%  Category: Mobility    Current : CK = at least 40% but < 60% impaired, limited or restricted  Goal: CK = at least 40% but < 60% impaired, limited or restricted  Discharge: NA       Gait: decreased L ankle DF, needs use of FWW for safety/balance, decreased fredy    Squat: Double leg: able to perform sit<>Stand with minimal UE support   Single leg: not able to perform    Balance: fair +    Reflex/Sensation: impaired on the L side    Knee AROM:     (L) (R)     Flexion   WNL WNL     Extension  WNL WNL    Strength:  Hip flexors  3+/5 4/5  Quadriceps  4/5 4+/5      Hamstrings  4/5 4+/5     Anterior Tibialis 3/5 4+/5     Peroneals  3+/5 4+/5     Gastrocnemius 4/5 4+/5     Adductors  4/5 4/5     External rotators 4/5 4/5     Gluteus " Medius 4/5 4/5     Gluteus Ezekiel 4/5 4/5    Joint Mobility: decreased to L talocrural joint    Tenderness to palpation: Tender to palpate L gastroc area due to tightness    TREATMENT   Treatment Time In: 10:55  Treatment Time Out: 11:00  Total Treatment time separate from Evaluation: 5 minutes    Alicia received therapeutic exercises to develop strength for 5 minutes including:  Sit<>stands  Toe raises    Home Exercises and Patient Education Provided    Education provided:   -Education on condition, HEP, and progression of rehab    Written Home Exercises Provided: Patient instructed to cont prior HEP.  Exercises were reviewed and Alicia was able to demonstrate them prior to the end of the session.  Alicia demonstrated good  understanding of the education provided.     See EMR under Media for exercises provided 8/11/2020.    Assessment   Alicia is a 40 y.o. female referred to outpatient Physical Therapy with a medical diagnosis of multiple sclerosis, relapsing-remitting. Pt presents with decreased L ankle ROM, impaired gait, impairment in balance, decreased strength, decreased tolerance to activity, and decreased motor control. Patient's symptoms seem to be consistent with L sided weakness from MS flare ups and still needs the FWW for safety when walking.    Pt prognosis is Good.   Pt will benefit from skilled outpatient Physical Therapy to address the deficits stated above and in the chart below, provide pt/family education, and to maximize pt's level of independence.     Plan of care discussed with patient: Yes  Pt's spiritual, cultural and educational needs considered and patient is agreeable to the plan of care and goals as stated below:     Anticipated Barriers for therapy: chronicity of symptoms, MS    Medical Necessity is demonstrated by the following  History  Co-morbidities and personal factors that may impact the plan of care Co-morbidities:   MS    Personal Factors:   no deficits     low   Examination  Body  Structures and Functions, activity limitations and participation restrictions that may impact the plan of care Body Regions:   back  lower extremities  upper extremities    Body Systems:    ROM  strength  gross coordinated movement  balance  gait  transfers  motor control  motor learning    Participation Restrictions:   ADLs, wanted activities    Activity limitations:   Learning and applying knowledge  no deficits    General Tasks and Commands  no deficits    Communication  no deficits    Mobility  lifting and carrying objects  walking    Self care  no deficits    Domestic Life  shopping  cooking  doing house work (cleaning house, washing dishes, laundry)  assisting others    Interactions/Relationships  no deficits    Life Areas  no deficits    Community and Social Life  community life  recreation and leisure         low   Clinical Presentation stable and uncomplicated low   Decision Making/ Complexity Score: low     Goals:  Short Term Goals: In 4 weeks:  1.I with HEP  2.Patient to demo increased L ankle ROM by 25%  3.Patient to demo increase LE strength by 1/2 grade    Long Term Goals: In 8 weeks  1. Patient to perform daily activities including walking with use of LRAD safely  2. Patient to demonstrate increased LE strength by 1 grade  3. Patient to score less than 25% on the foto      Plan   Plan of care Certification: 8/11/2020 to 9/11/20.    Outpatient Physical Therapy 2 times weekly for 8 weeks to include the following interventions: Gait Training, Manual Therapy, Neuromuscular Re-ed, Patient Education, Therapeutic Activites and Therapeutic Exercise.     Zoltan Nuñez PT    Thank you for this referral.    These services are reasonable and necessary for the conditions set forth above while under my care.

## 2020-08-12 ENCOUNTER — PATIENT MESSAGE (OUTPATIENT)
Dept: INTERNAL MEDICINE | Facility: CLINIC | Age: 40
End: 2020-08-12

## 2020-08-12 ENCOUNTER — TELEPHONE (OUTPATIENT)
Dept: INTERNAL MEDICINE | Facility: CLINIC | Age: 40
End: 2020-08-12

## 2020-08-12 NOTE — TELEPHONE ENCOUNTER
----- Message from Nesha Mattson sent at 8/12/2020  8:48 AM CDT -----  Please call pt @ [phone], pt have questions for nurse.

## 2020-08-12 NOTE — TELEPHONE ENCOUNTER
----- Message from Nesha Mattson sent at 8/12/2020  8:48 AM CDT -----  Please call pt @ [phone], pt have questions for nurse.

## 2020-08-13 ENCOUNTER — PATIENT MESSAGE (OUTPATIENT)
Dept: INTERNAL MEDICINE | Facility: CLINIC | Age: 40
End: 2020-08-13

## 2020-08-13 RX ORDER — CHLOPHEDIANOL HYDROCHLORIDE, DEXBROMPHENIRAMINE MALEATE 12.5; 1 MG/5ML; MG/5ML
5 LIQUID ORAL 3 TIMES DAILY PRN
Qty: 200 ML | Refills: 0 | COMMUNITY
Start: 2020-08-13 | End: 2020-08-14 | Stop reason: SDUPTHER

## 2020-08-14 RX ORDER — CHLOPHEDIANOL HYDROCHLORIDE, DEXBROMPHENIRAMINE MALEATE 12.5; 1 MG/5ML; MG/5ML
5 LIQUID ORAL 3 TIMES DAILY PRN
Qty: 200 ML | Refills: 0 | Status: SHIPPED | OUTPATIENT
Start: 2020-08-14 | End: 2020-10-09

## 2020-08-17 ENCOUNTER — TELEPHONE (OUTPATIENT)
Dept: INTERNAL MEDICINE | Facility: CLINIC | Age: 40
End: 2020-08-17

## 2020-08-17 RX ORDER — CEFDINIR 300 MG/1
300 CAPSULE ORAL 2 TIMES DAILY
Qty: 20 CAPSULE | Refills: 0 | Status: SHIPPED | OUTPATIENT
Start: 2020-08-17 | End: 2020-08-27

## 2020-08-17 NOTE — TELEPHONE ENCOUNTER
----- Message from Anahy Taylor sent at 8/17/2020  9:47 AM CDT -----  Would like to consult with nurse regarding some questions and concerns she is having about appt today, wants to know should she still come in or stay quarantine. Also needs some medication or a era ache Please give a call back at [phone].

## 2020-08-17 NOTE — TELEPHONE ENCOUNTER
"Spoke with pt, let her know to remain in self quarantine and reschedule the lab work appt that was originally scheduled for today. Pt stated she is still having a 8 out of 10 pain with her ear. She stated she just put drops in but wondering if she can have rx of Tylenol 3s? Pt stated she got one from her Aunt and "it helped much more than the norco." Pt requested me to send message to . Let her know I would send message and call back with information. Pt verbalized understanding.   "

## 2020-08-20 ENCOUNTER — CLINICAL SUPPORT (OUTPATIENT)
Dept: REHABILITATION | Facility: HOSPITAL | Age: 40
End: 2020-08-20
Payer: MEDICARE

## 2020-08-20 DIAGNOSIS — R26.9 GAIT ABNORMALITY: ICD-10-CM

## 2020-08-20 PROCEDURE — 97110 THERAPEUTIC EXERCISES: CPT | Mod: HCNC

## 2020-08-21 NOTE — PROGRESS NOTES
Physical Therapy Treatment Note     Name: Alicia Soliz  Clinic Number: [id number]    Therapy Diagnosis:   Encounter Diagnosis   Name Primary?    Gait abnormality      Physician: Meagan Pereira MD    Visit Date: 8/20/2020    Physician Orders: PT Eval and Treat  Medical Diagnosis: Multiple sclerosis, relapsing-remitting  Evaluation Date: 8/11/20  Authorization Period Expiration: 8/3/21  Plan of Care Certification Period: 9/11/20  Visit #/Visits authorized: 1/ 30     Time In: 11:20  Time Out: 12:20  Total Billable Time: 60 minutes    Precautions: Fall    Subjective     Pt reports: that she has been feeling about the same as last session.  She was compliant with home exercise program.  Response to previous treatment: good  Functional change: none    Pain: 1/10  Location: left ankles     Objective     Alicia received therapeutic exercises to develop strength, ROM and flexibility for 55 minutes including:  LE bike x 6 min for LE strengthening  Standing gastroc stretch  Standing marches with focus on DF  Standing on foam with pertubations  Standing glut med  Hamstring machine 35#  Quad machine 20# with focus on full extension  Standing arm bike x 5 min    Alicia received the following manual therapy techniques: Soft tissue Mobilization were applied to the: L ankle for 5 minutes, including:  STM to L gastroc    Alicia participated in neuromuscular re-education activities to improve: Balance, Coordination and Proprioception for 0 minutes. The following activities were included:  NA today    Home Exercises Provided and Patient Education Provided     Education provided:   - gait training, progression of rehab    Written Home Exercises Provided: Patient instructed to cont prior HEP.  Exercises were reviewed and Alicia was able to demonstrate them prior to the end of the session.  Alicia demonstrated good  understanding of the education provided.     See EMR under Patient Instructions for exercises provided prior  visit.    Assessment     Patient demonstrated good tolerance to strengthening and more balance training this session. Got over heated at the end of the session with improvement after water and rest. Discussed with patient making sure to make her appts in the morning due to temperature.     Alicia is progressing well towards her goals.   Pt prognosis is Good.     Pt will continue to benefit from skilled outpatient physical therapy to address the deficits listed in the problem list box on initial evaluation, provide pt/family education and to maximize pt's level of independence in the home and community environment.     Pt's spiritual, cultural and educational needs considered and pt agreeable to plan of care and goals.     Anticipated barriers to physical therapy: MS, chronicity of symptoms    Goals:     Short Term Goals: In 4 weeks:  1.I with HEP  2.Patient to demo increased L ankle ROM by 25%  3.Patient to demo increase LE strength by 1/2 grade     Long Term Goals: In 8 weeks  1. Patient to perform daily activities including walking with use of LRAD safely  2. Patient to demonstrate increased LE strength by 1 grade  3. Patient to score less than 25% on the foto    Plan     Strengthening, balance training    Zoltan Nuñez, PT

## 2020-08-24 ENCOUNTER — TELEPHONE (OUTPATIENT)
Dept: INTERNAL MEDICINE | Facility: CLINIC | Age: 40
End: 2020-08-24

## 2020-08-24 ENCOUNTER — OFFICE VISIT (OUTPATIENT)
Dept: INTERNAL MEDICINE | Facility: CLINIC | Age: 40
End: 2020-08-24
Payer: MEDICARE

## 2020-08-24 VITALS
BODY MASS INDEX: 42.01 KG/M2 | TEMPERATURE: 98 F | SYSTOLIC BLOOD PRESSURE: 132 MMHG | WEIGHT: 261.38 LBS | DIASTOLIC BLOOD PRESSURE: 80 MMHG | HEIGHT: 66 IN

## 2020-08-24 DIAGNOSIS — B36.9 OTOMYCOSIS: Primary | ICD-10-CM

## 2020-08-24 DIAGNOSIS — H62.40 OTOMYCOSIS: Primary | ICD-10-CM

## 2020-08-24 PROCEDURE — 3008F PR BODY MASS INDEX (BMI) DOCUMENTED: ICD-10-PCS | Mod: HCNC,CPTII,S$GLB, | Performed by: FAMILY MEDICINE

## 2020-08-24 PROCEDURE — 99999 PR PBB SHADOW E&M-EST. PATIENT-LVL III: CPT | Mod: PBBFAC,HCNC,, | Performed by: FAMILY MEDICINE

## 2020-08-24 PROCEDURE — 99213 OFFICE O/P EST LOW 20 MIN: CPT | Mod: HCNC,S$GLB,, | Performed by: FAMILY MEDICINE

## 2020-08-24 PROCEDURE — 3075F PR MOST RECENT SYSTOLIC BLOOD PRESS GE 130-139MM HG: ICD-10-PCS | Mod: HCNC,CPTII,S$GLB, | Performed by: FAMILY MEDICINE

## 2020-08-24 PROCEDURE — 99999 PR PBB SHADOW E&M-EST. PATIENT-LVL III: ICD-10-PCS | Mod: PBBFAC,HCNC,, | Performed by: FAMILY MEDICINE

## 2020-08-24 PROCEDURE — 3008F BODY MASS INDEX DOCD: CPT | Mod: HCNC,CPTII,S$GLB, | Performed by: FAMILY MEDICINE

## 2020-08-24 PROCEDURE — 3079F DIAST BP 80-89 MM HG: CPT | Mod: HCNC,CPTII,S$GLB, | Performed by: FAMILY MEDICINE

## 2020-08-24 PROCEDURE — 3079F PR MOST RECENT DIASTOLIC BLOOD PRESSURE 80-89 MM HG: ICD-10-PCS | Mod: HCNC,CPTII,S$GLB, | Performed by: FAMILY MEDICINE

## 2020-08-24 PROCEDURE — 99213 PR OFFICE/OUTPT VISIT, EST, LEVL III, 20-29 MIN: ICD-10-PCS | Mod: HCNC,S$GLB,, | Performed by: FAMILY MEDICINE

## 2020-08-24 PROCEDURE — 3075F SYST BP GE 130 - 139MM HG: CPT | Mod: HCNC,CPTII,S$GLB, | Performed by: FAMILY MEDICINE

## 2020-08-24 RX ORDER — MUPIROCIN 20 MG/G
OINTMENT TOPICAL 3 TIMES DAILY
Qty: 30 G | Refills: 6 | Status: SHIPPED | OUTPATIENT
Start: 2020-08-24 | End: 2021-05-14 | Stop reason: SDUPTHER

## 2020-08-24 RX ORDER — CLOTRIMAZOLE 1 G/ML
SOLUTION TOPICAL
Qty: 15 ML | Refills: 1 | Status: SHIPPED | OUTPATIENT
Start: 2020-08-24 | End: 2020-12-07

## 2020-08-24 RX ORDER — ACETAMINOPHEN AND CODEINE PHOSPHATE 300; 30 MG/1; MG/1
1 TABLET ORAL EVERY 4 HOURS PRN
Qty: 21 TABLET | Refills: 0 | Status: SHIPPED | OUTPATIENT
Start: 2020-08-24 | End: 2020-09-03

## 2020-08-24 NOTE — TELEPHONE ENCOUNTER
Prescription was for solution not cream. Can you please call her pharmacy and check on this. She needs a liquid.

## 2020-08-24 NOTE — TELEPHONE ENCOUNTER
Pt stated the topical solution was flagged that its an external use only solution when they type in the SIG for ear use. Please advise.

## 2020-08-24 NOTE — PROGRESS NOTES
Subjective:      Patient ID: Alicia Soliz is a 40 y.o. female.    Chief Complaint: Otalgia      Patient reports continued severe pain in right ear - PO antibiotics and antibiotic drops not helping. She reports seeing some bloody drainage and some drainage with particulates visible. Entire ear is very tender to touch.    Review of Systems   Constitutional: Positive for fatigue. Negative for fever.   HENT: Positive for ear discharge and ear pain. Negative for facial swelling and hearing loss.    Skin: Positive for rash.     Past Medical History:   Diagnosis Date    Anemia     Arthritis     Cardiac arrest as complication of care     pt states she went into cardiac arrest from an allergic reaction to a medication    Depression     Encounter for blood transfusion     Hemiplegia due to old stroke     Hypertension     Morbid obesity with BMI of 45.0-49.9, adult 9/20/2018    Multiple sclerosis           Past Surgical History:   Procedure Laterality Date    APPENDECTOMY      CHOLECYSTECTOMY      ESOPHAGOGASTRODUODENOSCOPY N/A 2/19/2020    Procedure: EGD (ESOPHAGOGASTRODUODENOSCOPY);  Surgeon: Michael Navarrete MD;  Location: [facility] ENDO;  Service: Endoscopy;  Laterality: N/A;    ESOPHAGOGASTRODUODENOSCOPY N/A 2/20/2020    Procedure: EGD (ESOPHAGOGASTRODUODENOSCOPY);  Surgeon: Michael Navarrete MD;  Location: [facility] OR;  Service: General;  Laterality: N/A;    HYSTERECTOMY      KNEE SURGERY      ROBOT-ASSISTED LAPAROSCOPIC SLEEVE GASTRECTOMY USING DA ANTHONY XI N/A 2/20/2020    Procedure: XI ROBOTIC SLEEVE GASTRECTOMY;  Surgeon: Michael Navarrete MD;  Location: [facility] OR;  Service: General;  Laterality: N/A;    TONSILLECTOMY      TUBAL LIGATION       Family History   Problem Relation Age of Onset    Lupus Mother     Heart disease Mother     Diabetes Father     Kidney disease Father     Cancer Maternal Aunt 40        breast    Breast cancer Maternal Aunt     Diabetes Maternal Aunt     Breast cancer Maternal Cousin      "Breast cancer Maternal Cousin     Ovarian cancer Maternal Cousin      Social History     Socioeconomic History    Marital status: Single     Spouse name: Not on file    Number of children: 3    Years of education: Not on file    Highest education level: Not on file   Occupational History     Employer: [employer]   Social Needs    Financial resource strain: Somewhat hard    Food insecurity     Worry: Often true     Inability: Sometimes true    Transportation needs     Medical: Yes     Non-medical: No   Tobacco Use    Smoking status: Never Smoker    Smokeless tobacco: Never Used   Substance and Sexual Activity    Alcohol use: Yes     Frequency: Never     Drinks per session: Patient refused     Binge frequency: Never     Comment: occasion    Drug use: No    Sexual activity: Yes     Partners: Male     Birth control/protection: Surgical   Lifestyle    Physical activity     Days per week: 7 days     Minutes per session: 60 min    Stress: Not at all   Relationships    Social connections     Talks on phone: Once a week     Gets together: More than three times a week     Attends Hoahaoism service: Not on file     Active member of club or organization: Patient refused     Attends meetings of clubs or organizations: Never     Relationship status: Patient refused   Other Topics Concern    Patient feels they ought to cut down on drinking/drug use Not Asked    Patient annoyed by others criticizing their drinking/drug use Not Asked    Patient has felt bad or guilty about drinking/drug use Not Asked    Patient has had a drink/used drugs as an eye opener in the AM Not Asked   Social History Narrative    Not on file     Review of patient's allergies indicates:   Allergen Reactions    Demerol [meperidine] Itching     Other reaction(s): Itching    Dilaudid [hydromorphone (bulk)] Anaphylaxis     "coded" per pt  Patient can tolerate Lortab    Prednisone Itching     Other reaction(s): Itching    Morphine     Tramadol  " "    shaking       Objective:       /80   Temp 97.8 °F (36.6 °C) (Temporal)   Ht 5' 6" (1.676 m)   Wt 118.6 kg (261 lb 5.7 oz)   BMI 42.18 kg/m²   Physical Exam  Constitutional:       General: She is not in acute distress.     Appearance: Normal appearance. She is well-developed. She is not ill-appearing or diaphoretic.   HENT:      Head: Normocephalic.      Right Ear: Hearing and tympanic membrane normal. Drainage and tenderness present.      Left Ear: Hearing, tympanic membrane, ear canal and external ear normal.      Ears:      Comments: Right ear canal with white patches, generalized erythema     Mouth/Throat:      Pharynx: Oropharynx is clear. Uvula midline. No pharyngeal swelling, oropharyngeal exudate, posterior oropharyngeal erythema or uvula swelling.   Cardiovascular:      Rate and Rhythm: Normal rate and regular rhythm.      Heart sounds: Normal heart sounds.   Pulmonary:      Effort: Pulmonary effort is normal.      Breath sounds: Normal breath sounds.   Neurological:      Mental Status: She is alert and oriented to person, place, and time.   Psychiatric:         Mood and Affect: Mood normal.         Behavior: Behavior normal.         Thought Content: Thought content normal.         Judgment: Judgment normal.       Assessment:     1. Otomycosis      Plan:   Otomycosis    Other orders  -     mupirocin (BACTROBAN) 2 % ointment; Apply topically 3 (three) times daily.  Dispense: 30 g; Refill: 6  -     clotrimazole (LOTRIMIN) 1 % Soln; 3 drops into right ear 3 times daily.  Dispense: 15 mL; Refill: 1  -     acetaminophen-codeine 300-30mg (TYLENOL #3) 300-30 mg Tab; Take 1 tablet by mouth every 4 (four) hours as needed.  Dispense: 21 tablet; Refill: 0    patient reports rash is improving - wants to defer exam for now. Using neosporin.  Medication List with Changes/Refills   New Medications    ACETAMINOPHEN-CODEINE 300-30MG (TYLENOL #3) 300-30 MG TAB    Take 1 tablet by mouth every 4 (four) hours as " needed.    CLOTRIMAZOLE (LOTRIMIN) 1 % SOLN    3 drops into right ear 3 times daily.    MUPIROCIN (BACTROBAN) 2 % OINTMENT    Apply topically 3 (three) times daily.   Current Medications    AUBAGIO 7 MG TAB    TAKE 1 TABLET (7MG) BY MOUTH ONCE DAILY    CEFDINIR (OMNICEF) 300 MG CAPSULE    Take 1 capsule (300 mg total) by mouth 2 (two) times daily. for 10 days    DALFAMPRIDINE (AMPYRA) 10 MG TB12    Take 1 tablet by mouth 2 (two) times daily.    DEXBROMPHENIRAMN-CHLOPHEDIANOL (CHLO HIST) 1-12.5 MG/5 ML SOLN    Take 5 mLs by mouth 3 (three) times daily as needed.    DOXEPIN (SINEQUAN) 50 MG CAPSULE    Take 1 capsule (50 mg total) by mouth nightly as needed. TAKE 1 CAPSULE BY MOUTH NIGHTLY AS NEEDED.    ESCITALOPRAM OXALATE (LEXAPRO) 10 MG TABLET    Take 1 tablet (10 mg total) by mouth once daily.    GABAPENTIN (NEURONTIN) 300 MG CAPSULE    Take 300mg in morning, 300mg in afternoon, and 900mg at night    HYDROCHLOROTHIAZIDE (HYDRODIURIL) 12.5 MG TAB    TAKE 1 TABLET (12.5 MG TOTAL) BY MOUTH ONCE DAILY.    MULTIVITAMIN WITH FOLIC ACID 400 MCG TAB    Take 1 tablet by mouth.    NEOMYCIN-POLYMYXIN-HYDROCORTISONE (CORTISPORIN) 3.5-10,000-1 MG/ML-UNIT/ML-% OTIC SUSPENSION    Place 3 drops into both ears 3 (three) times daily.    PROMETHAZINE (PHENERGAN) 25 MG TABLET    Take 1 tablet (25 mg total) by mouth every 6 (six) hours as needed for Nausea.    TOPIRAMATE (TOPAMAX) 50 MG TABLET    Take 1 tablet (50 mg total) by mouth every evening.    TRIAMCINOLONE ACETONIDE 0.1% (KENALOG) 0.1 % OINTMENT    Apply topically 2 (two) times daily.   Discontinued Medications    HYDROCODONE-ACETAMINOPHEN (NORCO) 7.5-325 MG PER TABLET    Take 1/2 to 1 tab QD PRN pain

## 2020-08-24 NOTE — TELEPHONE ENCOUNTER
----- Message from Tiffanie Bennett sent at 8/24/2020  2:35 PM CDT -----  Regarding: medication  Good afternoon,        Pt called back to say medication was supposed to be ear drops however she received cream and would like a call back at [phone] as well as new meds sent over.      Thanks,  Tiffanie Bennett

## 2020-08-24 NOTE — TELEPHONE ENCOUNTER
Pt stated she thought she was suppose to have drops called in, pt stated she received a cream. Please review and advise.

## 2020-08-26 ENCOUNTER — PATIENT MESSAGE (OUTPATIENT)
Dept: INTERNAL MEDICINE | Facility: CLINIC | Age: 40
End: 2020-08-26

## 2020-08-26 RX ORDER — HYDROCODONE BITARTRATE AND ACETAMINOPHEN 7.5; 325 MG/1; MG/1
1 TABLET ORAL EVERY 6 HOURS PRN
Qty: 20 TABLET | Refills: 0 | Status: SHIPPED | OUTPATIENT
Start: 2020-08-26 | End: 2020-10-23

## 2020-08-26 RX ORDER — FLUCONAZOLE 150 MG/1
TABLET ORAL
Qty: 2 TABLET | Refills: 0 | Status: SHIPPED | OUTPATIENT
Start: 2020-08-26 | End: 2020-09-08

## 2020-08-28 ENCOUNTER — TELEPHONE (OUTPATIENT)
Dept: RADIOLOGY | Facility: HOSPITAL | Age: 40
End: 2020-08-28

## 2020-08-31 NOTE — PROGRESS NOTES
Digital Medicine: Health  Follow-Up    The history is provided by the patient.             Reason for review: Blood pressure at goal    Patient needs assistance troubleshooting: patient reminder needed.    Additional Follow-up details: Patient has been suffering from an ear infection. Patient has not been taking readings due to this, but agrees to resume.         Diet-Not assessed          Physical Activity-no change to routine  No change to exercise routine.       Additional physical activity details: Continues to stay active and continues to lose weight.       Medication Adherence-Medication Adherence not addressed.      Substance, Sleep, Stress-Not assessed      Additional monitoring needed.  Instructed to charge device.       Addressed any questions or concerns and patient has my contact information if needed prior to next outreach. Patient verbalizes understanding.      Explained the importance of self-monitoring and medication adherence. Encouraged the patient to communicate with their health  for lifestyle modifications to help improve or maintain a healthy lifestyle.            There are no preventive care reminders to display for this patient.    Last 5 Patient Entered Readings                                      Current 30 Day Average: 120/80     Recent Readings 8/11/2020 7/6/2020 6/26/2020 5/27/2020 5/18/2020    SBP (mmHg) 120 120 110 110 110    DBP (mmHg) 80 85 70 70 70

## 2020-08-31 NOTE — PROGRESS NOTES
Physical Therapy Treatment Note     Name: Alicia Soliz  Clinic Number: [id number]    Therapy Diagnosis:   Encounter Diagnosis   Name Primary?    Gait abnormality      Physician: Meagan Pereira MD    Visit Date: 9/1/2020    Physician Orders: PT Eval and Treat  Medical Diagnosis: Multiple sclerosis, relapsing-remitting  Evaluation Date: 8/11/20  Authorization Period Expiration: 8/3/21  Plan of Care Certification Period: 9/11/20  Visit #/Visits authorized: 2/ 30     Time In: 11:30  Time Out: 12:15  Total Billable Time: 45 minutes    Precautions: Fall    Subjective     Pt reports: that she is feeling about the same as last week.  She was compliant with home exercise program.  Response to previous treatment: good  Functional change: none    Pain: 1/10  Location: left ankles     Objective     Alicia received therapeutic exercises to develop strength, ROM and flexibility for 45 minutes including:  Nustep x 6 min for LE strengthening  Standing gastroc stretch  Standing marches with focus on DF  Standing on foam with pertubations  Standing glut med against TB  Hamstring machine 35#- not today  Quad machine 20# with focus on full extension- not today  Sidelying hip AB  Bridge 3 seconds holds  Standing arm bike x 5 min    Alicia received the following manual therapy techniques: Soft tissue Mobilization were applied to the: L ankle for 0 minutes, including:  STM to L gastroc    Alicia participated in neuromuscular re-education activities to improve: Balance, Coordination and Proprioception for 0 minutes. The following activities were included:  NA today    Home Exercises Provided and Patient Education Provided     Education provided:   - gait training, progression of rehab    Written Home Exercises Provided: Patient instructed to cont prior HEP.  Exercises were reviewed and Alicia was able to demonstrate them prior to the end of the session.  Alicia demonstrated good  understanding of the education provided.     See EMR  under Patient Instructions for exercises provided prior visit.    Assessment   Patient demonstrated good tolerance to strengthening and more standing exercises without any increase in adverse symptoms. Still having difficulty with gait without FWW and needs it for safety due to balance and decreased L ankle function.    Alicia is progressing well towards her goals.   Pt prognosis is Good.     Pt will continue to benefit from skilled outpatient physical therapy to address the deficits listed in the problem list box on initial evaluation, provide pt/family education and to maximize pt's level of independence in the home and community environment.     Pt's spiritual, cultural and educational needs considered and pt agreeable to plan of care and goals.     Anticipated barriers to physical therapy: MS, chronicity of symptoms    Goals:     Short Term Goals: In 4 weeks:  1.I with HEP  2.Patient to demo increased L ankle ROM by 25%  3.Patient to demo increase LE strength by 1/2 grade     Long Term Goals: In 8 weeks  1. Patient to perform daily activities including walking with use of LRAD safely  2. Patient to demonstrate increased LE strength by 1 grade  3. Patient to score less than 25% on the foto    Plan     Strengthening, balance training    Zoltan Nuñez, PT

## 2020-09-01 ENCOUNTER — CLINICAL SUPPORT (OUTPATIENT)
Dept: REHABILITATION | Facility: HOSPITAL | Age: 40
End: 2020-09-01
Payer: MEDICARE

## 2020-09-01 DIAGNOSIS — R26.9 GAIT ABNORMALITY: ICD-10-CM

## 2020-09-01 PROCEDURE — 97110 THERAPEUTIC EXERCISES: CPT | Mod: HCNC

## 2020-09-08 ENCOUNTER — OFFICE VISIT (OUTPATIENT)
Dept: OTOLARYNGOLOGY | Facility: CLINIC | Age: 40
End: 2020-09-08
Payer: MEDICARE

## 2020-09-08 ENCOUNTER — PATIENT OUTREACH (OUTPATIENT)
Dept: ADMINISTRATIVE | Facility: OTHER | Age: 40
End: 2020-09-08

## 2020-09-08 ENCOUNTER — TELEPHONE (OUTPATIENT)
Dept: OTOLARYNGOLOGY | Facility: CLINIC | Age: 40
End: 2020-09-08

## 2020-09-08 VITALS — BODY MASS INDEX: 35.68 KG/M2 | HEIGHT: 66 IN | TEMPERATURE: 98 F | WEIGHT: 222 LBS

## 2020-09-08 DIAGNOSIS — G35 MULTIPLE SCLEROSIS, RELAPSING-REMITTING: Primary | ICD-10-CM

## 2020-09-08 DIAGNOSIS — H73.011 BULLOUS MYRINGITIS OF RIGHT EAR: ICD-10-CM

## 2020-09-08 PROCEDURE — 99999 PR PBB SHADOW E&M-EST. PATIENT-LVL III: CPT | Mod: PBBFAC,HCNC,, | Performed by: OTOLARYNGOLOGY

## 2020-09-08 PROCEDURE — 3008F BODY MASS INDEX DOCD: CPT | Mod: HCNC,CPTII,S$GLB, | Performed by: OTOLARYNGOLOGY

## 2020-09-08 PROCEDURE — 3008F PR BODY MASS INDEX (BMI) DOCUMENTED: ICD-10-PCS | Mod: HCNC,CPTII,S$GLB, | Performed by: OTOLARYNGOLOGY

## 2020-09-08 PROCEDURE — 99999 PR PBB SHADOW E&M-EST. PATIENT-LVL III: ICD-10-PCS | Mod: PBBFAC,HCNC,, | Performed by: OTOLARYNGOLOGY

## 2020-09-08 PROCEDURE — 99203 PR OFFICE/OUTPT VISIT, NEW, LEVL III, 30-44 MIN: ICD-10-PCS | Mod: HCNC,S$GLB,, | Performed by: OTOLARYNGOLOGY

## 2020-09-08 PROCEDURE — 99203 OFFICE O/P NEW LOW 30 MIN: CPT | Mod: HCNC,S$GLB,, | Performed by: OTOLARYNGOLOGY

## 2020-09-08 RX ORDER — FLUCONAZOLE 100 MG/1
100 TABLET ORAL DAILY
Qty: 7 TABLET | Refills: 0 | Status: SHIPPED | OUTPATIENT
Start: 2020-09-08 | End: 2020-09-15

## 2020-09-08 RX ORDER — AMOXICILLIN AND CLAVULANATE POTASSIUM 875; 125 MG/1; MG/1
1 TABLET, FILM COATED ORAL 2 TIMES DAILY
Qty: 14 TABLET | Refills: 0 | Status: SHIPPED | OUTPATIENT
Start: 2020-09-08 | End: 2020-09-15

## 2020-09-08 NOTE — PROGRESS NOTES
Health Maintenance Due   Topic Date Due    TETANUS VACCINE  02/18/1996    Mammogram  07/30/2020    Influenza Vaccine (1) 08/01/2020     Updates were requested from care everywhere.  Chart was reviewed for overdue Proactive [facility] Encounters ([facility]) topics (CRS, Breast Cancer Screening, Eye exam)  Health Maintenance has been updated.  LINKS immunization registry triggered.  Immunizations were reconciled.

## 2020-09-08 NOTE — PROGRESS NOTES
Referring Provider:    [place]  [address] [place] [address]  Subjective:   Patient: Alicia Soliz [id number], :1980   Visit date:2020 3:08 PM    Chief Complaint:  Other (right ear draining clear discharge and aches constantly. Pt states this has been ongoing for the last 3 weeks.)    HPI:  Alicia is a 40 y.o. female who I was asked to see in consultation for evaluation of the following issue(s):     Two weeks of right ear pain that is fairly severe.  There is some associated decrease in hearing.  She has been treated with closer muscle drops without any significant change.    Review of Systems:  -     Allergic/Immunologic: is allergic to demerol [meperidine]; dilaudid [hydromorphone (bulk)]; prednisone; morphine; and tramadol..  -     Constitutional: Current temp: 98.2 °F (36.8 °C) (Tympanic)      Her meds, allergies, medical, surgical, social & family histories were reviewed & updated:  -     She has a current medication list which includes the following prescription(s): aubagio, clotrimazole, dalfampridine, doxepin, escitalopram oxalate, gabapentin, hydrochlorothiazide, mupirocin, neomycin-polymyxin-hydrocortisone, promethazine, topiramate, triamcinolone acetonide 0.1%, amoxicillin-clavulanate 875-125mg, chlo hist, fluconazole, hydrocodone-acetaminophen, and multivitamin with folic acid.  -     She  has a past medical history of Anemia, Arthritis, Cardiac arrest as complication of care, Depression, Encounter for blood transfusion, Hemiplegia due to old stroke, Hypertension, Morbid obesity with BMI of 45.0-49.9, adult (2018), and Multiple sclerosis.   -     She does not have any pertinent problems on file.   -     She  has a past surgical history that includes Appendectomy; Tubal ligation; Tonsillectomy; Cholecystectomy; Hysterectomy; Knee surgery; Esophagogastroduodenoscopy (N/A, 2020); Robot-assisted laparoscopic sleeve gastrectomy using da [place] Xi (N/A,  "2/20/2020); and Esophagogastroduodenoscopy (N/A, 2/20/2020).  -     She  reports that she has never smoked. She has never used smokeless tobacco. She reports current alcohol use. She reports that she does not use drugs.  -     Her family history includes Breast cancer in her maternal aunt, maternal cousin, and maternal cousin; Cancer (age of onset: 40) in her maternal aunt; Diabetes in her father and maternal aunt; Heart disease in her mother; Kidney disease in her father; Lupus in her mother; Ovarian cancer in her maternal cousin.  -     She is allergic to demerol [meperidine]; dilaudid [hydromorphone (bulk)]; prednisone; morphine; and tramadol.    Objective:     Physical Exam:  Vitals:  Temp 98.2 °F (36.8 °C) (Tympanic)   Ht 5' 6" (1.676 m)   Wt 100.7 kg (222 lb)   BMI 35.83 kg/m²   General appearance:  Well developed, well nourished    Eyes:  Extraocular motions intact, PERRL    Communication:  no hoarseness, no dysphonia    Ears:  Left external auditory canal and tympanic membrane within normal limits.  Right external auditory canal with a moderate amount of cerumen.  This was removed.  The tympanic membrane is erythematous with multiple small vesicular areas   Nose:  No masses/lesions of external nose, nasal mucosa, septum, and turbinates were within normal limits.  Mouth:  No mass/lesion of lips, teeth, gums, hard/soft palate, tongue, tonsils, or oropharynx.    Cardiovascular:  No pedal edema; Radial Pulses +2     Neck & Lymphatics:  No cervical lymphadenopathy, no neck mass/crepitus/ asymmetry, trachea is midline, no thyroid enlargement/tenderness/mass.    Psych: Oriented x3,  Alert with normal mood and affect.     Respiration/Chest:  Symmetric expansion during respiration, normal respiratory effort.    Skin:  Warm and intact. No ulcerations of face, scalp, neck.      Assessment & Plan:   Bullous myringitis of right ear  -     Ambulatory referral/consult to ENT  -     amoxicillin-clavulanate 875-125mg " (AUGMENTIN) 875-125 mg per tablet; Take 1 tablet by mouth 2 (two) times daily. for 7 days  Dispense: 14 tablet; Refill: 0  -     fluconazole (DIFLUCAN) 100 MG tablet; Take 1 tablet (100 mg total) by mouth once daily. for 7 days  Dispense: 7 tablet; Refill: 0      The ear was cleaned then combination antibiotic antifungal steroid paddle was placed.  Additionally I will treat her with Augmentin.  She has difficulty with steroids with reactions to this in the past so avoid that.  She has recurring yeast infections when placed on antibiotics so we will prophylactically treat her with Diflucan.  We discussed her medical conditions, treatments and plan.  Alicia should return to clinic if any issues arise (symptoms worsen or persist), otherwise we will see her back in the clinic only as needed.      Thank you for allowing me to participate in the care of Alicia.       Jeff Spence MD, FACS  [facility]   [facility]  [address]  [place] [address]  P: [phone]  F: [phone]

## 2020-09-08 NOTE — TELEPHONE ENCOUNTER
Spoke with pt and informed that dr pinzon did call in her medications and she asked about something for pain so she was informed to take OTC tylenol or ibuprofen.          ----- Message from Ruth Dhillon sent at 9/8/2020  3:50 PM CDT -----  Regarding: pt  Would like a call from nurse in regards to medication being sent over to pharmacy . Please call back at .[phone]  .  [facility]/pharmacy #[id number] - [place] - [address] AT [facility]  [address] [address]  Phone: [phone] Fax: [phone]    Thank You,   Ruth Dhillon

## 2020-09-08 NOTE — LETTER
September 8, 2020      Braden Dumont MD  [address] [facility] [id number] [place] [address] Otorhinolaryngology  [address] [place] [address]  Phone: [phone]  Fax: [phone]          Patient: Alicia Soliz   MR Number: [id number]   YOB: 1980   Date of Visit: 9/8/2020       Dear Dr. Braden Dumont:    Thank you for referring Alicia Soliz to me for evaluation. Attached you will find relevant portions of my assessment and plan of care.    If you have questions, please do not hesitate to call me. I look forward to following Alicia Soliz along with you.    Sincerely,    Jeff Spence MD    Enclosure  CC:  No Recipients    If you would like to receive this communication electronically, please contact [email or web address] or [phone] to request more information on [employer] access.    For providers and/or their staff who would like to refer a patient to [facility], please contact us through our one-stop-shop provider referral line, [facility], at [phone].    If you feel you have received this communication in error or would no longer like to receive these types of communications, please e-mail [email or web address]

## 2020-09-09 ENCOUNTER — TELEPHONE (OUTPATIENT)
Dept: NEUROLOGY | Facility: CLINIC | Age: 40
End: 2020-09-09

## 2020-09-09 RX ORDER — HYDROCHLOROTHIAZIDE 12.5 MG/1
TABLET ORAL
Qty: 90 TABLET | Refills: 3 | Status: SHIPPED | OUTPATIENT
Start: 2020-09-09 | End: 2020-11-12

## 2020-09-09 NOTE — TELEPHONE ENCOUNTER
----- Message from Meagan Pereira MD sent at 9/8/2020 11:20 AM CDT -----  schedule STAT creatinine on this patient one hour before MRI on THursday 9/10/20 at 2:30 pm at [facility]

## 2020-09-10 ENCOUNTER — CLINICAL SUPPORT (OUTPATIENT)
Dept: REHABILITATION | Facility: HOSPITAL | Age: 40
End: 2020-09-10
Payer: MEDICARE

## 2020-09-10 DIAGNOSIS — R26.9 GAIT ABNORMALITY: ICD-10-CM

## 2020-09-10 DIAGNOSIS — R53.1 WEAKNESS: ICD-10-CM

## 2020-09-10 DIAGNOSIS — R26.89 IMPAIRMENT OF BALANCE: Primary | ICD-10-CM

## 2020-09-10 PROCEDURE — 97110 THERAPEUTIC EXERCISES: CPT | Mod: HCNC,CQ

## 2020-09-10 PROCEDURE — 97112 NEUROMUSCULAR REEDUCATION: CPT | Mod: HCNC,CQ

## 2020-09-10 NOTE — PROGRESS NOTES
Physical Therapist Assitant Treatment Note     Name: Alicia Soliz  Clinic Number: [id number]    Therapy Diagnosis:   Encounter Diagnoses   Name Primary?    Gait abnormality     Impairment of balance Yes    Weakness      Physician: Meagan Pereira MD    Visit Date: 9/10/2020    Physician Orders: PT Eval and Treat  Medical Diagnosis: Multiple sclerosis, relapsing-remitting  Evaluation Date: 8/11/20  Authorization Period Expiration: 8/3/21  Plan of Care Certification Period: 9/11/20  Visit #/Visits authorized: 3/ 30     Time In: 11:00  Time Out: 11:55  Total Billable Time: 55 minutes     Precautions: Fall    Subjective     Pt reports: no significant changes today  She was compliant with home exercise program.  Response to previous treatment: good  Functional change: none at this time    Pain: 0/10  Location: NA TODAY    Objective     Alicia received therapeutic exercises to develop strength, ROM and flexibility for 30 minutes including:  Bike x 5 min for LE strengthening L5  Standing gastroc stretch  Bridging w/arms elevated  Prone knee flexion bilat; eccentric lowering  Prone glute squeeze w/heel press  Standing ankle DF    Alicia received the following manual therapy techniques: Soft tissue Mobilization were applied to the: L ankle for 0 minutes, including:  Na today    Alicia participated in neuromuscular re-education activities to improve: Balance, Coordination and Proprioception for 25 minutes. The following activities were included:  Wt shifting activities in ll bars  Gt mechanics: symmetrical stride, swing to stance with increased hip flexion, knee control    Home Exercises Provided and Patient Education Provided     Education provided:   - gait mechanics, progression of rehab    Written Home Exercises Provided: Patient instructed to cont prior HEP.  Exercises were reviewed and Alicia was able to demonstrate them prior to the end of the session.  Alicia demonstrated good  understanding of the education  provided.     See EMR under Patient Instructions for exercises provided prior visit.    Assessment   Alicia presents today ambulating with her rw demonstrating increased wt bearing through her UE's on the walker, poor wt shift, asymmetrical stride (greater on the left), slow fredy, decreased hip flexion/ankle dorsiflexion using circumduction to substitute. Today, Alicia worked on her gait mechanics with moderate verbal, tactile, and visual cues. She was quite challenged with forward wt shifting as she has been on a walker for a long time which has inhibited this as well as overall weakness and decreased stability. She utilizes her arm strength for increased wt bearing through her walker.  Her gluteal strength is very impaired especially on the left and she was unable to isolate (other than isomerically)  with exercise. Continued skilled intervention indicated to improve her strength, stability and gait mechanics to decrease her fall risk.   Alicia is progressing well towards her goals.   Pt prognosis is Good.     Pt will continue to benefit from skilled outpatient physical therapy to address the deficits listed in the problem list box on initial evaluation, provide pt/family education and to maximize pt's level of independence in the home and community environment.     Pt's spiritual, cultural and educational needs considered and pt agreeable to plan of care and goals.     Anticipated barriers to physical therapy: MS, chronicity of symptoms    Goals:     Short Term Goals: In 4 weeks:  1.I with HEP  2.Patient to demo increased L ankle ROM by 25%  3.Patient to demo increase LE strength by 1/2 grade     Long Term Goals: In 8 weeks  1. Patient to perform daily activities including walking with use of LRAD safely  2. Patient to demonstrate increased LE strength by 1 grade  3. Patient to score less than 25% on the foto    Plan     Strengthening, balance training    Vera Ozuna PTA

## 2020-09-11 ENCOUNTER — TELEPHONE (OUTPATIENT)
Dept: RADIOLOGY | Facility: HOSPITAL | Age: 40
End: 2020-09-11

## 2020-09-11 NOTE — TELEPHONE ENCOUNTER
----- Message from Priscilla Valente sent at 9/11/2020 10:18 AM CDT -----  Patient called in regards to r/s her MRI , please call back at [phone].        Thanks,  Priscilla Valente

## 2020-09-14 RX ORDER — METHOCARBAMOL 750 MG/1
750 TABLET, FILM COATED ORAL 2 TIMES DAILY
Qty: 60 TABLET | Refills: 3 | Status: SHIPPED | OUTPATIENT
Start: 2020-09-14 | End: 2020-10-09

## 2020-09-14 RX ORDER — METHOCARBAMOL 750 MG/1
750 TABLET, FILM COATED ORAL 2 TIMES DAILY
COMMUNITY
End: 2020-09-14 | Stop reason: SDUPTHER

## 2020-09-15 ENCOUNTER — PATIENT MESSAGE (OUTPATIENT)
Dept: OTOLARYNGOLOGY | Facility: CLINIC | Age: 40
End: 2020-09-15

## 2020-09-17 ENCOUNTER — PATIENT MESSAGE (OUTPATIENT)
Dept: REHABILITATION | Facility: HOSPITAL | Age: 40
End: 2020-09-17

## 2020-09-21 RX ORDER — OFLOXACIN 3 MG/ML
SOLUTION/ DROPS OPHTHALMIC
Qty: 1 BOTTLE | Refills: 0 | Status: SHIPPED | OUTPATIENT
Start: 2020-09-21 | End: 2020-11-30 | Stop reason: SDUPTHER

## 2020-09-22 ENCOUNTER — CLINICAL SUPPORT (OUTPATIENT)
Dept: REHABILITATION | Facility: HOSPITAL | Age: 40
End: 2020-09-22
Payer: MEDICARE

## 2020-09-22 DIAGNOSIS — R26.9 GAIT ABNORMALITY: ICD-10-CM

## 2020-09-22 PROCEDURE — 97140 MANUAL THERAPY 1/> REGIONS: CPT | Mod: HCNC

## 2020-09-22 PROCEDURE — 97110 THERAPEUTIC EXERCISES: CPT | Mod: HCNC

## 2020-09-22 NOTE — PLAN OF CARE
Outpatient Therapy Updated Plan of Care     Visit Date: 9/22/2020  Name: Alicia Soliz  Clinic Number: [id number]    Therapy Diagnosis:   Encounter Diagnosis   Name Primary?    Gait abnormality      Physician: Meagan Pereira MD    Physician Orders: PT Eval and Treat  Medical Diagnosis: Multiple sclerosis, relapsing-remitting  Evaluation Date: 8/11/20    Total Visits Received: 5  Cancelled Visits: 3  No Show Visits: 0    Current Certification Period:  9/22/20 to 10/22/20  Precautions:  Fall and MS  Visits from Evaluation Date:  5  Functional Level Prior to Evaluation:  MI- needs use of AD for safety    Subjective     Update: Patient reports that she has started using her cane for gait training because she is tired of using her walker. She has been consistent doing her exercises at home and tries to walk as often as she can.    Objective     Update:     Gait: decreased L ankle DF, needs use of FWW for safety/balance, decreased fredy, able to gait train with QC SBA to CGA needed     Squat: Double leg: able to perform sit<>Stand without UE support              Single leg: not able to perform     Balance: fair +     Reflex/Sensation: impaired on the L side     Knee AROM:                                                  (L)        (R)                                      Flexion                         WNL    WNL                                      Extension                    WNL    WNL     Strength:                    Hip flexors                   4/5     4/5  Quadriceps                  4/5       4+/5                                           Hamstrings                  4/5       4+/5                                      Anterior Tibialis           3+/5       4+/5                                      Peroneals                    3+/5     4+/5                                      Gastrocnemius            4/5       4+/5                                      Adductors                    4/5       4/5                                       External rotators         4/5       4/5                                      Gluteus Medius           4/5       4/5                                      Gluteus Ezekiel        4/5       4/5     Joint Mobility: decreased to L talocrural joint     Tenderness to palpation: Tender to palpate L gastroc area due to tightness    Assessment     Update: Patient has demonstrated improvement in ROM, improvement in pain/adverse symptoms, improvement in strength, and improvement in tolerance to exercise since starting skilled therapy.    Short Term Goals: In 4 weeks:  1.I with HEP MET  2.Patient to demo increased L ankle ROM by 25% NOT MET  3.Patient to demo increase LE strength by 1/2 grade ALMOST MET     Long Term Goals: In 8 weeks  1. Patient to perform daily activities including walking with use of LRAD safely NOT MET  2. Patient to demonstrate increased LE strength by 1 grade NOT MET  3. Patient to score less than 25% on the foto NOT MET    Reasons for Recertification of Therapy:   Patient has not been super consistent with coming since she started in August due to transportation and COVID reasons, but has been consistent performing exercises at home. Due to MS, patient is still needing the assistance of an AD for safe gait training and to improve tolerance to more standing tasks. As well as decreased strength noted to the L side and decreased ROM. Patient needs continued skilled therapy in order to maintain her strength/ROM and improve as much as we can, improve quality of gait, decrease risk for falls, improve tolerance to activity, and improve compensatory strategies in order to improve her quality of life.    Plan     Updated Certification Period: 9/22/2020 to 10/22/20  Recommended Treatment Plan: 2 times per week for 4 weeks: Gait Training, Manual Therapy, Moist Heat/ Ice, Neuromuscular Re-ed, Patient Education, Therapeutic Activites and Therapeutic Exercise  Other Recommendations:  BLACK Nuñez, PT  9/22/2020      I CERTIFY THE NEED FOR THESE SERVICES FURNISHED UNDER THIS PLAN OF TREATMENT AND WHILE UNDER MY CARE    Physician's comments:        Physician's Signature: ___________________________________________________

## 2020-09-22 NOTE — PROGRESS NOTES
Physical Therapist Treatment Note     Name: Alicia Soliz  Clinic Number: [id number]    Therapy Diagnosis:   Encounter Diagnosis   Name Primary?    Gait abnormality      Physician: Meagan Pereira MD    Visit Date: 9/22/2020    Physician Orders: PT Eval and Treat  Medical Diagnosis: Multiple sclerosis, relapsing-remitting  Evaluation Date: 8/11/20  Authorization Period Expiration: 8/3/21  Plan of Care Certification Period: 10/22/20  Visit #/Visits authorized: 5/ 30     Time In: 11:05  Time Out: 11:55  Total Billable Time: 45 minutes     Precautions: Fall    Subjective     Pt reports: see in [facility]  She was compliant with home exercise program.  Response to previous treatment: good  Functional change: improvement in functional strength    Pain: 0/10  Location: NA TODAY    Objective     Alicia received therapeutic exercises to develop strength, ROM and flexibility for 30 minutes including:  LE Bike x 5 min for LE strengthening   Standing gastroc stretch  Step up with focus on knee extension holds  Sit<>stands no UE support  Hamstring machine 45# 5 second holds  UE bike x 5 min for standing endurance and UE strengthening  Seated hip march with band on the L side against TB    Alicia received the following manual therapy techniques: Soft tissue Mobilization were applied to the: L ankle for 10 minutes, including:  STM to L gastroc  Mobs to improve DF  Manual resistance into DF    Alicia participated in neuromuscular re-education activities to improve: Balance, Coordination and Proprioception for 0 minutes. The following activities were included:    Home Exercises Provided and Patient Education Provided     Education provided:   - gait mechanics, progression of rehab    Written Home Exercises Provided: Patient instructed to cont prior HEP.  Exercises were reviewed and Alicia was able to demonstrate them prior to the end of the session.  Alicia demonstrated good  understanding of the education provided.     See EMR under  Patient Instructions for exercises provided prior visit.    Assessment   See in [facility]    Alicia is progressing well towards her goals.   Pt prognosis is Good.     Pt will continue to benefit from skilled outpatient physical therapy to address the deficits listed in the problem list box on initial evaluation, provide pt/family education and to maximize pt's level of independence in the home and community environment.     Pt's spiritual, cultural and educational needs considered and pt agreeable to plan of care and goals.     Anticipated barriers to physical therapy: MS, chronicity of symptoms    Goals:     Short Term Goals: In 4 weeks:  1.I with HEP MET  2.Patient to demo increased L ankle ROM by 25% NOT MET  3.Patient to demo increase LE strength by 1/2 grade ALMOST MET     Long Term Goals: In 8 weeks  1. Patient to perform daily activities including walking with use of LRAD safely NOT MET  2. Patient to demonstrate increased LE strength by 1 grade NOT MET  3. Patient to score less than 25% on the foto NOT MET    Plan     Strengthening, balance training    Zoltan Nuñez, PT

## 2020-09-23 ENCOUNTER — PATIENT MESSAGE (OUTPATIENT)
Dept: INTERNAL MEDICINE | Facility: CLINIC | Age: 40
End: 2020-09-23

## 2020-09-23 RX ORDER — CLOTRIMAZOLE 1 G/ML
SOLUTION TOPICAL
Qty: 15 ML | Refills: 1 | OUTPATIENT
Start: 2020-09-23

## 2020-09-24 ENCOUNTER — CLINICAL SUPPORT (OUTPATIENT)
Dept: REHABILITATION | Facility: HOSPITAL | Age: 40
End: 2020-09-24
Payer: MEDICARE

## 2020-09-24 DIAGNOSIS — R26.9 GAIT ABNORMALITY: ICD-10-CM

## 2020-09-24 PROCEDURE — 97110 THERAPEUTIC EXERCISES: CPT | Mod: HCNC

## 2020-09-24 NOTE — PROGRESS NOTES
Physical Therapist Treatment Note     Name: Alicia Soliz  Clinic Number: [id number]    Therapy Diagnosis:   Encounter Diagnosis   Name Primary?    Gait abnormality      Physician: Meagan Pereira MD    Visit Date: 9/24/2020    Physician Orders: PT Eval and Treat  Medical Diagnosis: Multiple sclerosis, relapsing-remitting  Evaluation Date: 8/11/20  Authorization Period Expiration: 8/3/21  Plan of Care Certification Period: 10/22/20  Visit #/Visits authorized: 6/ 30     Time In: 11:05  Time Out: 11:55  Total Billable Time: 45 minutes     Precautions: Fall    Subjective     Pt reports: see in [facility]  She was compliant with home exercise program.  Response to previous treatment: good  Functional change: improvement in functional strength    Pain: 0/10  Location: NA TODAY    Objective     Alicia received therapeutic exercises to develop strength, ROM and flexibility for 45 minutes including:  LE Bike x 5 min for LE strengthening   Standing gastroc stretch  Step up with focus on knee extension holds- not today  Sit<>stands no UE support + 10KB + press out  Hamstring machine 45# 5 second holds- not today  UE bike x 5 min for standing endurance and UE strengthening  Seated hip march with band on the L side against TB  Standing glut med against TB  Gaze stabilization exercises    Alicia received the following manual therapy techniques: Soft tissue Mobilization were applied to the: L ankle for 0 minutes, including:  STM to L gastroc  Mobs to improve DF  Manual resistance into DF    Alicia participated in neuromuscular re-education activities to improve: Balance, Coordination and Proprioception for 0 minutes. The following activities were included:    Home Exercises Provided and Patient Education Provided     Education provided:   - gait mechanics, progression of rehab    Written Home Exercises Provided: Patient instructed to cont prior HEP.  Exercises were reviewed and Alicia was able to demonstrate them prior to the end  of the session.  Alicia demonstrated good  understanding of the education provided.     See EMR under Patient Instructions for exercises provided prior visit.    Assessment   Patient demonstrated better tolerance to more standing today, however towards the end of the session was having decreased tolerance to heat and started having vertigo. Had another therapist assess vestibular issues and noted that it is not peripheral, but patient would benefit from gaze stabilization issues.     Alicia is progressing well towards her goals.   Pt prognosis is Good.     Pt will continue to benefit from skilled outpatient physical therapy to address the deficits listed in the problem list box on initial evaluation, provide pt/family education and to maximize pt's level of independence in the home and community environment.     Pt's spiritual, cultural and educational needs considered and pt agreeable to plan of care and goals.     Anticipated barriers to physical therapy: MS, chronicity of symptoms    Goals:     Short Term Goals: In 4 weeks:  1.I with HEP MET  2.Patient to demo increased L ankle ROM by 25% NOT MET  3.Patient to demo increase LE strength by 1/2 grade ALMOST MET     Long Term Goals: In 8 weeks  1. Patient to perform daily activities including walking with use of LRAD safely NOT MET  2. Patient to demonstrate increased LE strength by 1 grade NOT MET  3. Patient to score less than 25% on the foto NOT MET    Plan     Strengthening, balance training    Zoltan Nuñez, PT

## 2020-09-25 RX ORDER — CLOTRIMAZOLE 1 G/ML
SOLUTION TOPICAL
Qty: 15 ML | Refills: 1 | OUTPATIENT
Start: 2020-09-25

## 2020-09-29 ENCOUNTER — PATIENT MESSAGE (OUTPATIENT)
Dept: INTERNAL MEDICINE | Facility: CLINIC | Age: 40
End: 2020-09-29

## 2020-10-01 ENCOUNTER — LAB VISIT (OUTPATIENT)
Dept: LAB | Facility: HOSPITAL | Age: 40
End: 2020-10-01
Attending: PSYCHIATRY & NEUROLOGY
Payer: MEDICARE

## 2020-10-01 ENCOUNTER — HOSPITAL ENCOUNTER (OUTPATIENT)
Dept: RADIOLOGY | Facility: HOSPITAL | Age: 40
Discharge: HOME | End: 2020-10-01
Attending: PSYCHIATRY & NEUROLOGY
Payer: MEDICARE

## 2020-10-01 DIAGNOSIS — G35 MULTIPLE SCLEROSIS: ICD-10-CM

## 2020-10-01 DIAGNOSIS — G35 MULTIPLE SCLEROSIS, RELAPSING-REMITTING: ICD-10-CM

## 2020-10-01 LAB
CREAT SERPL-MCNC: 0.8 MG/DL (ref 0.5–1.4)
EST. GFR  (AFRICAN AMERICAN): >60 ML/MIN/1.73 M^2
EST. GFR  (NON AFRICAN AMERICAN): >60 ML/MIN/1.73 M^2

## 2020-10-01 PROCEDURE — 36415 COLL VENOUS BLD VENIPUNCTURE: CPT | Mod: HCNC

## 2020-10-01 PROCEDURE — 25500020 PHARM REV CODE 255: Mod: HCNC | Performed by: PSYCHIATRY & NEUROLOGY

## 2020-10-01 PROCEDURE — 70553 MRI BRAIN DEMYELINATING W/ WO CONTRAST: ICD-10-PCS | Mod: 26,HCNC,, | Performed by: RADIOLOGY

## 2020-10-01 PROCEDURE — 82565 ASSAY OF CREATININE: CPT | Mod: HCNC

## 2020-10-01 PROCEDURE — 70553 MRI BRAIN STEM W/O & W/DYE: CPT | Mod: TC,HCNC

## 2020-10-01 PROCEDURE — A9585 GADOBUTROL INJECTION: HCPCS | Mod: HCNC | Performed by: PSYCHIATRY & NEUROLOGY

## 2020-10-01 PROCEDURE — 72156 MRI CERVICAL SPINE DEMYELINATING W W/O CONTRAST: ICD-10-PCS | Mod: 26,HCNC,, | Performed by: RADIOLOGY

## 2020-10-01 PROCEDURE — 72156 MRI NECK SPINE W/O & W/DYE: CPT | Mod: 26,HCNC,, | Performed by: RADIOLOGY

## 2020-10-01 PROCEDURE — 72156 MRI NECK SPINE W/O & W/DYE: CPT | Mod: TC,HCNC

## 2020-10-01 PROCEDURE — 70553 MRI BRAIN STEM W/O & W/DYE: CPT | Mod: 26,HCNC,, | Performed by: RADIOLOGY

## 2020-10-01 RX ORDER — GADOBUTROL 604.72 MG/ML
10 INJECTION INTRAVENOUS
Status: COMPLETED | OUTPATIENT
Start: 2020-10-01 | End: 2020-10-01

## 2020-10-01 RX ADMIN — GADOBUTROL 10 ML: 604.72 INJECTION INTRAVENOUS at 10:10

## 2020-10-05 ENCOUNTER — PATIENT MESSAGE (OUTPATIENT)
Dept: NEUROLOGY | Facility: CLINIC | Age: 40
End: 2020-10-05

## 2020-10-08 ENCOUNTER — PATIENT MESSAGE (OUTPATIENT)
Dept: NEUROLOGY | Facility: CLINIC | Age: 40
End: 2020-10-08

## 2020-10-08 ENCOUNTER — PATIENT MESSAGE (OUTPATIENT)
Dept: INTERNAL MEDICINE | Facility: CLINIC | Age: 40
End: 2020-10-08

## 2020-10-08 NOTE — TELEPHONE ENCOUNTER
Please call and see if she wants appt to come in tomorrow. I cannot guess what is causing her headache.

## 2020-10-09 ENCOUNTER — TELEPHONE (OUTPATIENT)
Dept: INTERNAL MEDICINE | Facility: CLINIC | Age: 40
End: 2020-10-09

## 2020-10-09 ENCOUNTER — PATIENT MESSAGE (OUTPATIENT)
Dept: INTERNAL MEDICINE | Facility: CLINIC | Age: 40
End: 2020-10-09

## 2020-10-09 ENCOUNTER — OFFICE VISIT (OUTPATIENT)
Dept: INTERNAL MEDICINE | Facility: CLINIC | Age: 40
End: 2020-10-09
Payer: MEDICARE

## 2020-10-09 DIAGNOSIS — J30.89 SEASONAL ALLERGIC RHINITIS DUE TO OTHER ALLERGIC TRIGGER: ICD-10-CM

## 2020-10-09 DIAGNOSIS — M54.9 BACK PAIN, UNSPECIFIED BACK LOCATION, UNSPECIFIED BACK PAIN LATERALITY, UNSPECIFIED CHRONICITY: Primary | ICD-10-CM

## 2020-10-09 DIAGNOSIS — K21.9 GASTROESOPHAGEAL REFLUX DISEASE, UNSPECIFIED WHETHER ESOPHAGITIS PRESENT: ICD-10-CM

## 2020-10-09 PROCEDURE — 99442 PR PHYSICIAN TELEPHONE EVALUATION 11-20 MIN: ICD-10-PCS | Mod: HCNC,95,, | Performed by: FAMILY MEDICINE

## 2020-10-09 PROCEDURE — 99442 PR PHYSICIAN TELEPHONE EVALUATION 11-20 MIN: CPT | Mod: HCNC,95,, | Performed by: FAMILY MEDICINE

## 2020-10-09 RX ORDER — ESCITALOPRAM OXALATE 10 MG/1
10 TABLET ORAL DAILY
Qty: 30 TABLET | Refills: 11 | Status: ON HOLD
Start: 2020-10-09 | End: 2021-03-23

## 2020-10-09 RX ORDER — PROMETHAZINE HYDROCHLORIDE 25 MG/1
25 TABLET ORAL EVERY 6 HOURS PRN
Qty: 20 TABLET | Refills: 0 | Status: SHIPPED | OUTPATIENT
Start: 2020-10-09 | End: 2021-01-13 | Stop reason: SDUPTHER

## 2020-10-09 RX ORDER — CYCLOBENZAPRINE HCL 10 MG
10 TABLET ORAL 3 TIMES DAILY PRN
Qty: 30 TABLET | Refills: 0 | Status: SHIPPED | OUTPATIENT
Start: 2020-10-09 | End: 2020-10-19

## 2020-10-09 RX ORDER — ESOMEPRAZOLE MAGNESIUM 40 MG/1
40 CAPSULE, DELAYED RELEASE ORAL
Qty: 90 CAPSULE | Refills: 3 | Status: SHIPPED | OUTPATIENT
Start: 2020-10-09 | End: 2021-11-13

## 2020-10-09 NOTE — TELEPHONE ENCOUNTER
----- Message from Nesha Mattson sent at 10/9/2020 11:21 AM CDT -----  Please call pt @ [phone] regarding virtual visit this morning, pt states her computer is working, please call pt again.

## 2020-10-09 NOTE — PROGRESS NOTES
Audio Only Telehealth Visit     The patient location is: home  The chief complaint leading to consultation is: back pain, headaches  Visit type: Virtual visit with audio only (telephone)  Total time spent with patient: 12 minutes     The reason for the audio only service rather than synchronous audio and video virtual visit was related to technical difficulties or patient preference/necessity.     Each patient to whom I provide medical services by telemedicine is:  (1) informed of the relationship between the physician and patient and the respective role of any other health care provider with respect to management of the patient; and (2) notified that they may decline to receive medical services by telemedicine and may withdraw from such care at any time. Patient verbally consented to receive this service via voice-only telephone call.       HPI:  Patient reports worsened back pain, is again seen pain management doctor Dr. Matson.  She is requesting to try a muscle relaxer in addition to her pain medication, has been on these in the past.  She reports was recently told that she has additional lesions in her brain from the MS.  She reports worsened reflux as well, on review of her medication list she had stopped taking her Nexium at some point, will resume this.  She is having continued nausea, requesting refill on her nausea medication.  She also reports frontal headaches and forehead and temple which have been intermittent several weeks now.  She is not taking any allergy medication currently.   Answers for HPI/ROS submitted by the patient on 10/9/2020   Back pain  Chronicity: recurrent    Assessment and plan:       Back pain - flexeril 10mg  GERD - refilled nexium 40mg daily  Nausea - refilled promethazine 25mg  Headaches - recommended she take zyrtec daily, does not care to use nasal sprays, will let me know via [employer] message if this works.       This service was not originating from a related E/M service  provided within the previous 7 days nor will  to an E/M service or procedure within the next 24 hours or my soonest available appointment.  Prevailing standard of care was able to be met in this audio-only visit.

## 2020-10-09 NOTE — TELEPHONE ENCOUNTER
Can you please see if she can reschedule for 1:40? I don't know if the link from 8o'clock will still work

## 2020-10-12 ENCOUNTER — PATIENT MESSAGE (OUTPATIENT)
Dept: NEUROLOGY | Facility: CLINIC | Age: 40
End: 2020-10-12

## 2020-10-14 ENCOUNTER — PATIENT MESSAGE (OUTPATIENT)
Dept: REHABILITATION | Facility: HOSPITAL | Age: 40
End: 2020-10-14

## 2020-10-16 RX ORDER — DOXEPIN HYDROCHLORIDE 50 MG/1
50 CAPSULE ORAL NIGHTLY PRN
Qty: 90 CAPSULE | Refills: 1 | Status: SHIPPED | OUTPATIENT
Start: 2020-10-16 | End: 2020-10-20 | Stop reason: SDUPTHER

## 2020-10-18 ENCOUNTER — PATIENT OUTREACH (OUTPATIENT)
Dept: ADMINISTRATIVE | Facility: OTHER | Age: 40
End: 2020-10-18

## 2020-10-18 NOTE — PROGRESS NOTES
Care Everywhere: updated  Immunization: no profile in links  Health Maintenance: updated  Media Review: review for outside mammogram report   Legacy Review:   Order placed:   Upcoming appts:

## 2020-10-19 ENCOUNTER — LAB VISIT (OUTPATIENT)
Dept: LAB | Facility: HOSPITAL | Age: 40
End: 2020-10-19
Attending: PSYCHIATRY & NEUROLOGY
Payer: MEDICARE

## 2020-10-19 ENCOUNTER — OFFICE VISIT (OUTPATIENT)
Dept: NEUROLOGY | Facility: CLINIC | Age: 40
End: 2020-10-19
Payer: MEDICARE

## 2020-10-19 DIAGNOSIS — G35 MULTIPLE SCLEROSIS, RELAPSING-REMITTING: Primary | ICD-10-CM

## 2020-10-19 DIAGNOSIS — G43.719 CHRONIC MIGRAINE WITHOUT AURA, INTRACTABLE, WITHOUT STATUS MIGRAINOSUS: ICD-10-CM

## 2020-10-19 LAB
ALBUMIN SERPL BCP-MCNC: 3.8 G/DL (ref 3.5–5.2)
ALP SERPL-CCNC: 98 U/L (ref 55–135)
ALT SERPL W/O P-5'-P-CCNC: 23 U/L (ref 10–44)
ANION GAP SERPL CALC-SCNC: 11 MMOL/L (ref 8–16)
AST SERPL-CCNC: 27 U/L (ref 10–40)
BASOPHILS # BLD AUTO: 0.03 K/UL (ref 0–0.2)
BASOPHILS NFR BLD: 0.5 % (ref 0–1.9)
BILIRUB SERPL-MCNC: 0.4 MG/DL (ref 0.1–1)
BUN SERPL-MCNC: 7 MG/DL (ref 6–20)
CALCIUM SERPL-MCNC: 9.7 MG/DL (ref 8.7–10.5)
CHLORIDE SERPL-SCNC: 107 MMOL/L (ref 95–110)
CO2 SERPL-SCNC: 24 MMOL/L (ref 23–29)
CREAT SERPL-MCNC: 0.9 MG/DL (ref 0.5–1.4)
DIFFERENTIAL METHOD: ABNORMAL
EOSINOPHIL # BLD AUTO: 0.1 K/UL (ref 0–0.5)
EOSINOPHIL NFR BLD: 1.2 % (ref 0–8)
ERYTHROCYTE [DISTWIDTH] IN BLOOD BY AUTOMATED COUNT: 16.7 % (ref 11.5–14.5)
EST. GFR  (AFRICAN AMERICAN): >60 ML/MIN/1.73 M^2
EST. GFR  (NON AFRICAN AMERICAN): >60 ML/MIN/1.73 M^2
GLUCOSE SERPL-MCNC: 108 MG/DL (ref 70–110)
HCT VFR BLD AUTO: 40.1 % (ref 37–48.5)
HGB BLD-MCNC: 11.8 G/DL (ref 12–16)
IMM GRANULOCYTES # BLD AUTO: 0.02 K/UL (ref 0–0.04)
IMM GRANULOCYTES NFR BLD AUTO: 0.3 % (ref 0–0.5)
LYMPHOCYTES # BLD AUTO: 2.5 K/UL (ref 1–4.8)
LYMPHOCYTES NFR BLD: 41.4 % (ref 18–48)
MCH RBC QN AUTO: 21.4 PG (ref 27–31)
MCHC RBC AUTO-ENTMCNC: 29.4 G/DL (ref 32–36)
MCV RBC AUTO: 73 FL (ref 82–98)
MONOCYTES # BLD AUTO: 0.4 K/UL (ref 0.3–1)
MONOCYTES NFR BLD: 7.1 % (ref 4–15)
NEUTROPHILS # BLD AUTO: 2.9 K/UL (ref 1.8–7.7)
NEUTROPHILS NFR BLD: 49.5 % (ref 38–73)
NRBC BLD-RTO: 0 /100 WBC
PLATELET # BLD AUTO: 279 K/UL (ref 150–350)
PMV BLD AUTO: 11.4 FL (ref 9.2–12.9)
POTASSIUM SERPL-SCNC: 3.3 MMOL/L (ref 3.5–5.1)
PROT SERPL-MCNC: 7.9 G/DL (ref 6–8.4)
RBC # BLD AUTO: 5.51 M/UL (ref 4–5.4)
SODIUM SERPL-SCNC: 142 MMOL/L (ref 136–145)
WBC # BLD AUTO: 5.94 K/UL (ref 3.9–12.7)

## 2020-10-19 PROCEDURE — 85025 COMPLETE CBC W/AUTO DIFF WBC: CPT | Mod: HCNC

## 2020-10-19 PROCEDURE — 99215 PR OFFICE/OUTPT VISIT, EST, LEVL V, 40-54 MIN: ICD-10-PCS | Mod: HCNC,95,, | Performed by: PSYCHIATRY & NEUROLOGY

## 2020-10-19 PROCEDURE — 80053 COMPREHEN METABOLIC PANEL: CPT | Mod: HCNC

## 2020-10-19 PROCEDURE — 36415 COLL VENOUS BLD VENIPUNCTURE: CPT | Mod: HCNC,PO

## 2020-10-19 PROCEDURE — 99215 OFFICE O/P EST HI 40 MIN: CPT | Mod: HCNC,95,, | Performed by: PSYCHIATRY & NEUROLOGY

## 2020-10-19 RX ORDER — AMOXICILLIN AND CLAVULANATE POTASSIUM 875; 125 MG/1; MG/1
1 TABLET, FILM COATED ORAL EVERY 12 HOURS
Qty: 10 TABLET | Refills: 0 | Status: SHIPPED | OUTPATIENT
Start: 2020-10-19 | End: 2020-10-20

## 2020-10-19 NOTE — Clinical Note
Could you reschedule her cbc and cmp for Central Office in  - you may have to call her.  Schedule her for a one month in person follow up too, with me!

## 2020-10-19 NOTE — PROGRESS NOTES
The patient location is: [place]  The chief complaint leading to consultation is: MS follow up    Visit type: audiovisual    Face to Face time with patient: 70min  80 minutes of total time spent on the encounter, which includes face to face time and non-face to face time preparing to see the patient (eg, review of tests), Obtaining and/or reviewing separately obtained history, Documenting clinical information in the electronic or other health record, Independently interpreting results (not separately reported) and communicating results to the patient/family/caregiver, or Care coordination (not separately reported).         Each patient to whom he or she provides medical services by telemedicine is:  (1) informed of the relationship between the physician and patient and the respective role of any other health care provider with respect to management of the patient; and (2) notified that he or she may decline to receive medical services by telemedicine and may withdraw from such care at any time.    Notes:       Subjective:          Patient ID: Alicia Soliz is a 40 y.o. female who presents today for a fit-in clinic visit for MS.      MS HPI:  · DMT: teriflunomide  · Side effects from DMT? No  · Taking vitamin D3 as recommended? Yes, in centrum  · Headaches, bloody nose, no appetite, loose bowels  · Headaches - bifrontal, usually noticeable in morning but not necessarily worse. Typically twice a week, increased in the last few weeks. Pain is significant and pulsating, worse around the eyes. Has associated blurred vision, worse in morning. No photo/phonophobia.  When she blows her nose, it'll start bleeding. Also with itching sensation in back of throat. Doesn't feel congestion but reports pain in maxillary and frontal sinus areas with palpation. States that she has been dealing with an ear infection. Denies snoring or apnea episodes.  · No bowel incontinence,  just loose bowels. Discussed diet  · Using a cane  now for balance and still in therapy. No fall. States that she couldn't concentrate on therapy so she got a cane.  · Discussed MRI's     Medications:  Current Outpatient Medications   Medication Sig    AUBAGIO 7 mg Tab TAKE 1 TABLET (7MG) BY MOUTH ONCE DAILY    butalbital-acetaminophen-caffeine -40 mg (FIORICET, ESGIC) -40 mg per tablet Take 1 tablet by mouth every 4 (four) hours as needed for Pain.    cetirizine (ZYRTEC) 10 MG tablet Take 1 tablet (10 mg total) by mouth once daily.    dalfampridine (AMPYRA) 10 mg Tb12 Take 1 tablet by mouth 2 (two) times daily. (Patient taking differently: Take 1 tablet by mouth once daily. )    doxepin (SINEQUAN) 50 MG capsule Take 1 capsule (50 mg total) by mouth nightly as needed. TAKE 1 CAPSULE BY MOUTH NIGHTLY AS NEEDED.    escitalopram oxalate (LEXAPRO) 10 MG tablet Take 1 tablet (10 mg total) by mouth once daily.    gabapentin (NEURONTIN) 300 MG capsule Take 300mg in morning, 300mg in afternoon, and 900mg at night    hydroCHLOROthiazide (HYDRODIURIL) 12.5 MG Tab TAKE 1 TABLET (12.5 MG TOTAL) BY MOUTH ONCE DAILY.    hydrocodone-acetaminophen (HYCET) solution 7.5-325 mg/15mL Take 15 mLs by mouth every 6 (six) hours as needed for Pain.    HYDROcodone-acetaminophen (NORCO) 7.5-325 mg per tablet Take 1/2 to 1 tab QD PRN pain    hydroxyzine HCL (ATARAX) 25 MG tablet Take 1 tablet (25 mg total) by mouth 3 (three) times daily as needed for Itching.    multivitamin with folic acid 400 mcg Tab Take 1 tablet by mouth.    mupirocin (BACTROBAN) 2 % ointment Apply topically 2 (two) times daily. Apply to affected area    nozaseptin (NOZIN) nasal  1 each by Each Nostril route 2 (two) times daily.    nystatin (MYCOSTATIN) cream Apply topically 2 (two) times daily.    promethazine (PHENERGAN) 25 MG tablet Take 1 tablet (25 mg total) by mouth every 6 (six) hours as needed for Nausea.    triamcinolone acetonide 0.1% (KENALOG) 0.1 % ointment Apply topically  2 (two) times daily.     No current facility-administered medications for this visit.        SOCIAL HISTORY  Social History     Tobacco Use    Smoking status: Never Smoker    Smokeless tobacco: Never Used   Substance Use Topics    Alcohol use: Yes     Frequency: Never     Drinks per session: Patient refused     Binge frequency: Never     Comment: occasion    Drug use: No       Living arrangements - the patient lives alone.    ROS:    REVIEW OF SYMPTOMS 1/23/2020   Do you feel abnormally tired on most days? No   Do you feel you generally sleep well? Yes   Do you have difficulty controlling your bladder?  Yes   Do you have difficulty controlling your bowels?  Yes   Do you have frequent muscle cramps, tightness or spasms in your limbs?  Yes   Do you have new visual symptoms?  No   Do you have worsening difficulty with your memory or thinking? No   Do you have worsening symptoms of anxiety or depression?  Yes   For patients who walk, Do you have more difficulty walking?  Not Applicable   Have you fallen since your last visit?  No   For patients who use wheelchairs: Do you have any skin wounds or breakdown? No   Do you have difficulty using your hands?  No   Do you have shooting or burning pain? Yes   Do you have difficulty with sexual function?  No   If you are sexually active, are you using birth control? Y/N  N/A No   Do you often choke when swallowing liquids or solid food?  No   Do you experience worsening symptoms when overheated? No   Do you need any new equipment such as a wheelchair, walker or shower chair? Yes   Do you receive co-pay financial assistance for your principal MS medicine? Yes   Would you be interested in participating in an MS research trial in the future? No   For patients on Gilenya, Tecfidera, Aubagio, Rituxan, Ocrevus, Tysabri, Lemtrada or Methotrexate, are you aware that you should NOT receive live virus vaccines?  No   Do you feel you have adequate family/friend support?  Yes   Do you  have health insurance?   Yes   Are you currently employed? No   Do you receive SSDI/SSI?  Yes   Do you use marijuana or cannabis products? No   Have you been diagnosed with a urinary tract infection since your last visit here? No   Have you been diagnosed with a respiratory tract infection since your last visit here? No   Have you been to the emergency room since your last visit here? No   Have you been hospitalized since your last visit here?  No                Objective:        1. 25 foot timed walk:  No flowsheet data found.    2. 9 Hole Peg Test:  No flowsheet data found.    Neurologic Exam  MENTAL STATUS: grossly intact. Normal language, attention.   CRANIAL NERVE EXAM: Extraocular muscles are intact.  No facial asymmetry. Tongue midline. Shoulder shrug normal b/l. There is no dysarthria.   MOTOR EXAM: No pronator drift. Strength is at least 4/5 in all groups in the lower extremities and upper extremities.   COORDINATION: Normal modified finger-to-nose exam.   GAIT: Narrow based but unsteady, uses cane. Decreased left arm swing        Imaging:     MRI Brain w/wo 1/2015 Multifocal periventricular, subcortical and pontine white matter lesions compatible with demyelinating plaques of multiple sclerosis.  Many of the lesions display enhancement consistent with active MS plaque. Evolving signal changes evident on the DWI and ADC map sequences with development of right periventricular white matter enhancement as compared to the recent study of 01/15/15.  Differential considerations include active MS plaque formation/evolution and acute ischemia/infarction 10/2018 Stable compared to 3/2018 imaging. No areas of diffusion restriction or enhancing lesions     MRI C-Sprine 1/2015 Normal cervical cord. 1 cm contrast enhancing left occipital lobe subcortical white matter lesion.  Multiple bihemispheric hyperintense T2 signal lesions including left-sided brainstem lesion demonstrated on the recent head MRI study some of  which exhibiting contrast enhancement 10/2018 No cord lesions    MRI T-Spine w/wo 10/2018: No abnormal marrow or cord signal or enhancement. Stable compared to 2/2015    MRI L-Spine w/wo 10/2018: Multilevel degenerative change. Evidence of active bilateral facet synovitis at L4-5.      Labs:     Lab Results   Component Value Date    ISKNDMRL24IV 32 01/10/2020    URIYXQSO96ZS 11 (L) 01/30/2015     Lab Results   Component Value Date    JCVINDEX 1.67 (A) 04/19/2018    JCVANTIBODY Positive (A) 04/19/2018     No results found for: XW0KTLXF, ABSOLUTECD3, NK1DIXMX, ABSOLUTECD8, GW5XVDDM, ABSOLUTECD4, LABCD48  Lab Results   Component Value Date    WBC 8.29 02/21/2020    WBC 8.29 02/21/2020    RBC 5.31 02/21/2020    RBC 5.31 02/21/2020    HGB 10.9 (L) 02/21/2020    HGB 10.9 (L) 02/21/2020    HCT 35.8 (L) 02/21/2020    HCT 35.8 (L) 02/21/2020    MCV 67 (L) 02/21/2020    MCV 67 (L) 02/21/2020    MCH 20.5 (L) 02/21/2020    MCH 20.5 (L) 02/21/2020    MCHC 30.4 (L) 02/21/2020    MCHC 30.4 (L) 02/21/2020    RDW 15.6 (H) 02/21/2020    RDW 15.6 (H) 02/21/2020     02/21/2020     02/21/2020    MPV 11.0 02/21/2020    MPV 11.0 02/21/2020    GRAN 4.9 02/21/2020    GRAN 58.7 02/21/2020    LYMPH 2.6 02/21/2020    LYMPH 31.2 02/21/2020    MONO 0.8 02/21/2020    MONO 9.3 02/21/2020    EOS 0.0 02/21/2020    BASO 0.04 02/21/2020    EOSINOPHIL 0.2 02/21/2020    BASOPHIL 0.5 02/21/2020     Sodium   Date Value Ref Range Status   02/21/2020 139 136 - 145 mmol/L Final     Potassium   Date Value Ref Range Status   02/21/2020 3.7 3.5 - 5.1 mmol/L Final     Chloride   Date Value Ref Range Status   02/21/2020 102 95 - 110 mmol/L Final     CO2   Date Value Ref Range Status   02/21/2020 28 23 - 29 mmol/L Final     Glucose   Date Value Ref Range Status   02/21/2020 120 (H) 70 - 110 mg/dL Final     BUN, Bld   Date Value Ref Range Status   02/21/2020 5 (L) 6 - 20 mg/dL Final     Creatinine   Date Value Ref Range Status   10/01/2020 0.8 0.5 -  1.4 mg/dL Final     Calcium   Date Value Ref Range Status   02/21/2020 9.3 8.7 - 10.5 mg/dL Final     Total Protein   Date Value Ref Range Status   02/07/2020 8.5 (H) 6.0 - 8.4 g/dL Final     Albumin   Date Value Ref Range Status   02/07/2020 4.0 3.5 - 5.2 g/dL Final     Total Bilirubin   Date Value Ref Range Status   02/07/2020 0.5 0.1 - 1.0 mg/dL Final     Comment:     For infants and newborns, interpretation of results should be based  on gestational age, weight and in agreement with clinical  observations.  Premature Infant recommended reference ranges:  Up to 24 hours.............<8.0 mg/dL  Up to 48 hours............<12.0 mg/dL  3-5 days..................<15.0 mg/dL  6-29 days.................<15.0 mg/dL       Alkaline Phosphatase   Date Value Ref Range Status   02/07/2020 102 55 - 135 U/L Final     AST   Date Value Ref Range Status   02/07/2020 29 10 - 40 U/L Final     ALT   Date Value Ref Range Status   02/07/2020 24 10 - 44 U/L Final     Anion Gap   Date Value Ref Range Status   02/21/2020 9 8 - 16 mmol/L Final     eGFR if    Date Value Ref Range Status   10/01/2020 >60 >60 mL/min/1.73 m^2 Final     eGFR if non    Date Value Ref Range Status   10/01/2020 >60 >60 mL/min/1.73 m^2 Final     Comment:     Calculation used to obtain the estimated glomerular filtration  rate (eGFR) is the CKD-EPI equation.        Lab Results   Component Value Date    HEPBSAG Negative 01/10/2020    HEPBSAB Positive (A) 09/27/2019    HEPBCAB Negative 09/27/2019           MS Impression and Plan:     NEURO MULTIPLE SCLEROSIS IMPRESSION:   MS Status:     Number of relapses in the past year?:  0    Clinical Progression:  Worsened    MRI Progression:  Stable    MRI Progression comment:  Stable compared to 2018 imaging  Plan:     DMT:  No change in management    DMT comment:  Continue Aubagio    Symptom Management:  Implement change in symptom management    Implement Change in Symptom Management:  Gait and  Mood     Next Imaging Due: 8/18/2020     Next Labs Due: 8/18/2020     Headaches, migrainous type: worse in mornings. Concern for sleep apnea. However, currently worse with possible sinus infection given symptoms. See below. Previously referred to sleep medicine. Continue topamax to 50mg qhs for now. PARQ discussion held for medication.    Sinus infection: will plan to treat with augmentin DS bid x 5 days.    F/u with me in 6 weeks    Our visit today lasted 40 minutes, and 100% of this time was spent face to face with the patient. Over 50% of this visit included discussion of the treatment plan/medication changes/symptom management/exam findings/imaging results/coordination of care. The patient agrees with the plan of care.    Problem List Items Addressed This Visit     None          Meagan Pereira MD

## 2020-10-20 ENCOUNTER — PATIENT MESSAGE (OUTPATIENT)
Dept: NEUROLOGY | Facility: CLINIC | Age: 40
End: 2020-10-20

## 2020-10-20 ENCOUNTER — PATIENT OUTREACH (OUTPATIENT)
Dept: OTHER | Facility: OTHER | Age: 40
End: 2020-10-20

## 2020-10-20 ENCOUNTER — DOCUMENTATION ONLY (OUTPATIENT)
Dept: REHABILITATION | Facility: HOSPITAL | Age: 40
End: 2020-10-20

## 2020-10-20 ENCOUNTER — PATIENT MESSAGE (OUTPATIENT)
Dept: INTERNAL MEDICINE | Facility: CLINIC | Age: 40
End: 2020-10-20

## 2020-10-20 RX ORDER — DOXEPIN HYDROCHLORIDE 75 MG/1
75 CAPSULE ORAL NIGHTLY PRN
Qty: 30 CAPSULE | Refills: 2 | Status: SHIPPED | OUTPATIENT
Start: 2020-10-20 | End: 2020-12-22 | Stop reason: SDUPTHER

## 2020-10-20 NOTE — PROGRESS NOTES
Spoke with patient this am regarding her recent cancellations of her physical therapy visits. She has been having ongoing exacerbation of her symptoms and is currently seeing her physicians. She is not able to attend therapy at this time however plans to resume as soon as she is able. Jacek Nuñez PT was notified of patient's status.    Vera Ozuna PTA

## 2020-10-20 NOTE — PROGRESS NOTES
Digital Medicine: Health  Follow-Up    The history is provided by the patient.                 Patient needs assistance troubleshooting: patient reminder needed.    Additional Follow-up details: Patient has been sick recently, and has not been taking her BP. Patient has been experiencing MS flare ups. She requests 3 weeks hiatus while she is focused on her other health problems. WCB for readings after this            Diet-Not assessed          Physical Activity-Not assessed    Medication Adherence-Medication Adherence not addressed.      Substance, Sleep, Stress-Not assessed      Additional monitoring needed.       Addressed patient questions and patient has my contact information if needed prior to next outreach.        There are no preventive care reminders to display for this patient.      Last 5 Patient Entered Readings                                      Current 30 Day Average:      Recent Readings 9/9/2020 8/11/2020 7/6/2020 6/26/2020 5/27/2020    SBP (mmHg) 110 120 120 110 110    DBP (mmHg) 70 80 85 70 70

## 2020-10-21 ENCOUNTER — PATIENT MESSAGE (OUTPATIENT)
Dept: INTERNAL MEDICINE | Facility: CLINIC | Age: 40
End: 2020-10-21

## 2020-10-23 ENCOUNTER — OFFICE VISIT (OUTPATIENT)
Dept: INTERNAL MEDICINE | Facility: CLINIC | Age: 40
End: 2020-10-23
Payer: MEDICARE

## 2020-10-23 ENCOUNTER — PATIENT MESSAGE (OUTPATIENT)
Dept: INTERNAL MEDICINE | Facility: CLINIC | Age: 40
End: 2020-10-23

## 2020-10-23 VITALS
OXYGEN SATURATION: 99 % | HEART RATE: 98 BPM | WEIGHT: 266.13 LBS | HEIGHT: 66 IN | BODY MASS INDEX: 42.77 KG/M2 | TEMPERATURE: 98 F | DIASTOLIC BLOOD PRESSURE: 82 MMHG | SYSTOLIC BLOOD PRESSURE: 126 MMHG

## 2020-10-23 DIAGNOSIS — R19.7 DIARRHEA, UNSPECIFIED TYPE: Primary | ICD-10-CM

## 2020-10-23 DIAGNOSIS — R51.9 SEVERE FRONTAL HEADACHES: ICD-10-CM

## 2020-10-23 PROCEDURE — 99999 PR PBB SHADOW E&M-EST. PATIENT-LVL IV: ICD-10-PCS | Mod: PBBFAC,HCNC,, | Performed by: FAMILY MEDICINE

## 2020-10-23 PROCEDURE — 99213 OFFICE O/P EST LOW 20 MIN: CPT | Mod: HCNC,S$GLB,, | Performed by: FAMILY MEDICINE

## 2020-10-23 PROCEDURE — 3079F PR MOST RECENT DIASTOLIC BLOOD PRESSURE 80-89 MM HG: ICD-10-PCS | Mod: HCNC,CPTII,S$GLB, | Performed by: FAMILY MEDICINE

## 2020-10-23 PROCEDURE — 3079F DIAST BP 80-89 MM HG: CPT | Mod: HCNC,CPTII,S$GLB, | Performed by: FAMILY MEDICINE

## 2020-10-23 PROCEDURE — 99999 PR PBB SHADOW E&M-EST. PATIENT-LVL IV: CPT | Mod: PBBFAC,HCNC,, | Performed by: FAMILY MEDICINE

## 2020-10-23 PROCEDURE — 3008F PR BODY MASS INDEX (BMI) DOCUMENTED: ICD-10-PCS | Mod: HCNC,CPTII,S$GLB, | Performed by: FAMILY MEDICINE

## 2020-10-23 PROCEDURE — 3074F PR MOST RECENT SYSTOLIC BLOOD PRESSURE < 130 MM HG: ICD-10-PCS | Mod: HCNC,CPTII,S$GLB, | Performed by: FAMILY MEDICINE

## 2020-10-23 PROCEDURE — 3008F BODY MASS INDEX DOCD: CPT | Mod: HCNC,CPTII,S$GLB, | Performed by: FAMILY MEDICINE

## 2020-10-23 PROCEDURE — 3074F SYST BP LT 130 MM HG: CPT | Mod: HCNC,CPTII,S$GLB, | Performed by: FAMILY MEDICINE

## 2020-10-23 PROCEDURE — 99213 PR OFFICE/OUTPT VISIT, EST, LEVL III, 20-29 MIN: ICD-10-PCS | Mod: HCNC,S$GLB,, | Performed by: FAMILY MEDICINE

## 2020-10-23 RX ORDER — DIPHENOXYLATE HYDROCHLORIDE AND ATROPINE SULFATE 2.5; .025 MG/1; MG/1
1 TABLET ORAL 4 TIMES DAILY PRN
Qty: 20 TABLET | Refills: 1 | Status: SHIPPED | OUTPATIENT
Start: 2020-10-23 | End: 2020-11-03

## 2020-10-23 RX ORDER — CETIRIZINE HYDROCHLORIDE 10 MG/1
10 TABLET ORAL DAILY
Qty: 30 TABLET | Refills: 3 | Status: ON HOLD | OUTPATIENT
Start: 2020-10-23 | End: 2021-03-23

## 2020-10-24 ENCOUNTER — PATIENT MESSAGE (OUTPATIENT)
Dept: INTERNAL MEDICINE | Facility: CLINIC | Age: 40
End: 2020-10-24

## 2020-10-25 DIAGNOSIS — R19.5 OCCULT BLOOD IN STOOLS: ICD-10-CM

## 2020-10-25 DIAGNOSIS — R19.7 DIARRHEA, UNSPECIFIED TYPE: Primary | ICD-10-CM

## 2020-10-26 ENCOUNTER — PATIENT MESSAGE (OUTPATIENT)
Dept: INTERNAL MEDICINE | Facility: CLINIC | Age: 40
End: 2020-10-26

## 2020-10-27 ENCOUNTER — PATIENT MESSAGE (OUTPATIENT)
Dept: NEUROLOGY | Facility: CLINIC | Age: 40
End: 2020-10-27

## 2020-10-27 NOTE — TELEPHONE ENCOUNTER
Phoned patient to discuss symptoms. Patient states her symptoms have progressively gotten worse since yesterday.  Advised, per SS, these symptoms are due to her recent GI upset with diarrhea. Patient reports taking 2 gabapentin. Patient states she is having a fire-like burning pain sensation that feels like it goes down into the muscle.

## 2020-10-28 ENCOUNTER — PATIENT MESSAGE (OUTPATIENT)
Dept: INTERNAL MEDICINE | Facility: CLINIC | Age: 40
End: 2020-10-28

## 2020-10-28 ENCOUNTER — TELEPHONE (OUTPATIENT)
Dept: REHABILITATION | Facility: HOSPITAL | Age: 40
End: 2020-10-28

## 2020-10-28 ENCOUNTER — NURSE TRIAGE (OUTPATIENT)
Dept: ADMINISTRATIVE | Facility: CLINIC | Age: 40
End: 2020-10-28

## 2020-10-29 NOTE — TELEPHONE ENCOUNTER
Attempted to call x 2 no answer.  LVM to call back if further assistance is needed.     Reason for Disposition   Second attempt to contact family AND no contact made.  Phone number verified.    Protocols used: NO CONTACT OR DUPLICATE CONTACT CALL-A-AH

## 2020-10-29 NOTE — PROGRESS NOTES
Subjective:      Patient ID: Alicia oSliz is a 40 y.o. female.    Chief Complaint: Diarrhea and Headache      Patient reports multiple episodes of diarrhea per day, has been over a week now.  She denies any nausea or vomiting with this.  She reports diarrhea is green, very soft or liquid, has associated abdominal cramping but no abdominal pain.  She has been on multiple antibiotics in the past few months.  She also reports frontal headache which has been intermittent for several weeks now.    Diarrhea   Associated symptoms include headaches. Pertinent negatives include no abdominal pain, fever or vomiting.   Headache   Pertinent negatives include no abdominal pain, fever, nausea or vomiting.     Review of Systems   Constitutional: Negative for activity change, appetite change and fever.   Gastrointestinal: Positive for diarrhea. Negative for abdominal pain, blood in stool, constipation, nausea and vomiting.   Neurological: Positive for headaches.     Past Medical History:   Diagnosis Date    Anemia     Arthritis     Cardiac arrest as complication of care     pt states she went into cardiac arrest from an allergic reaction to a medication    Depression     Encounter for blood transfusion     Hemiplegia due to old stroke     Hypertension     Morbid obesity with BMI of 45.0-49.9, adult 9/20/2018    Multiple sclerosis           Past Surgical History:   Procedure Laterality Date    APPENDECTOMY      CHOLECYSTECTOMY      ESOPHAGOGASTRODUODENOSCOPY N/A 2/19/2020    Procedure: EGD (ESOPHAGOGASTRODUODENOSCOPY);  Surgeon: Michael Navarrete MD;  Location: [facility] ENDO;  Service: Endoscopy;  Laterality: N/A;    ESOPHAGOGASTRODUODENOSCOPY N/A 2/20/2020    Procedure: EGD (ESOPHAGOGASTRODUODENOSCOPY);  Surgeon: Michael Navarrete MD;  Location: [facility] OR;  Service: General;  Laterality: N/A;    HYSTERECTOMY      KNEE SURGERY      ROBOT-ASSISTED LAPAROSCOPIC SLEEVE GASTRECTOMY USING DA ANTHONY XI N/A 2/20/2020    Procedure: XI  ROBOTIC SLEEVE GASTRECTOMY;  Surgeon: Michael Navarrete MD;  Location[facility];  Service: General;  Laterality: N/A;    TONSILLECTOMY      TUBAL LIGATION       Family History   Problem Relation Age of Onset    Lupus Mother     Heart disease Mother     Diabetes Father     Kidney disease Father     Cancer Maternal Aunt 40        breast    Breast cancer Maternal Aunt     Diabetes Maternal Aunt     Breast cancer Maternal Cousin     Breast cancer Maternal Cousin     Ovarian cancer Maternal Cousin      Social History     Socioeconomic History    Marital status: Single     Spouse name: Not on file    Number of children: 3    Years of education: Not on file    Highest education level: Not on file   Occupational History     Employer: [employer]   Social Needs    Financial resource strain: Somewhat hard    Food insecurity     Worry: Often true     Inability: Sometimes true    Transportation needs     Medical: Yes     Non-medical: No   Tobacco Use    Smoking status: Never Smoker    Smokeless tobacco: Never Used   Substance and Sexual Activity    Alcohol use: Yes     Frequency: Never     Drinks per session: Patient refused     Binge frequency: Never     Comment: occasion    Drug use: No    Sexual activity: Yes     Partners: Male     Birth control/protection: Surgical   Lifestyle    Physical activity     Days per week: 7 days     Minutes per session: 60 min    Stress: Not at all   Relationships    Social connections     Talks on phone: Once a week     Gets together: More than three times a week     Attends Druze service: Not on file     Active member of club or organization: Patient refused     Attends meetings of clubs or organizations: Never     Relationship status: Patient refused   Other Topics Concern    Patient feels they ought to cut down on drinking/drug use Not Asked    Patient annoyed by others criticizing their drinking/drug use Not Asked    Patient has felt bad or guilty about drinking/drug use  "Not Asked    Patient has had a drink/used drugs as an eye opener in the AM Not Asked   Social History Narrative    Not on file     Review of patient's allergies indicates:   Allergen Reactions    Demerol [meperidine] Itching     Other reaction(s): Itching    Dilaudid [hydromorphone (bulk)] Anaphylaxis     "coded" per pt  Patient can tolerate Lortab    Prednisone Itching     Other reaction(s): Itching    Morphine     Tramadol      shaking       Objective:       /82   Pulse 98   Temp 98.1 °F (36.7 °C) (Temporal)   Ht 5' 6" (1.676 m)   Wt 120.7 kg (266 lb 1.5 oz)   SpO2 99%   BMI 42.95 kg/m²   Physical Exam  Vitals signs and nursing note reviewed.   Constitutional:       General: She is not in acute distress.     Appearance: She is well-developed. She is not diaphoretic.   HENT:      Head: Normocephalic.      Right Ear: Hearing, ear canal and external ear normal. Tympanic membrane is bulging.      Left Ear: Hearing, ear canal and external ear normal. Tympanic membrane is bulging.      Nose: Mucosal edema present.      Right Sinus: No maxillary sinus tenderness or frontal sinus tenderness.      Left Sinus: No maxillary sinus tenderness or frontal sinus tenderness.      Mouth/Throat:      Pharynx: Uvula midline.   Eyes:      Conjunctiva/sclera: Conjunctivae normal.      Pupils: Pupils are equal, round, and reactive to light.   Neck:      Musculoskeletal: Normal range of motion and neck supple.      Thyroid: No thyromegaly.      Trachea: No tracheal deviation.   Cardiovascular:      Rate and Rhythm: Normal rate and regular rhythm.      Heart sounds: Normal heart sounds.   Pulmonary:      Effort: Pulmonary effort is normal. No respiratory distress.      Breath sounds: Normal breath sounds.   Abdominal:      General: Bowel sounds are normal. There is no distension.      Palpations: Abdomen is soft. There is no mass.      Tenderness: There is no abdominal tenderness. There is no guarding or rebound. "   Lymphadenopathy:      Cervical: No cervical adenopathy.   Skin:     General: Skin is warm and dry.   Psychiatric:         Mood and Affect: Mood normal.         Behavior: Behavior normal.       Assessment:     1. Diarrhea, unspecified type    2. Severe frontal headaches      Plan:   Diarrhea, unspecified type  -     Occult blood x 1, stool; Future; Expected date: 10/23/2020  -     Giardia / Cryptosporidum, EIA; Future; Expected date: 10/23/2020  -     Clostridium difficile EIA; Future; Expected date: 10/23/2020  -     Stool culture; Future; Expected date: 10/23/2020    Severe frontal headaches    Other orders  -     cetirizine (ZYRTEC) 10 MG tablet; Take 1 tablet (10 mg total) by mouth once daily.  Dispense: 30 tablet; Refill: 3  -     diphenoxylate-atropine 2.5-0.025 mg (LOMOTIL) 2.5-0.025 mg per tablet; Take 1 tablet by mouth 4 (four) times daily as needed for Diarrhea.  Dispense: 20 tablet; Refill: 1    Suspect headache from chronic allergies, she does not want to try to use nasal spray, will have her resume Zyrtec.  She will obtain stool specimen for testing, increase fluids, trial of Lomotil  Medication List with Changes/Refills   New Medications    CETIRIZINE (ZYRTEC) 10 MG TABLET    Take 1 tablet (10 mg total) by mouth once daily.    DIPHENOXYLATE-ATROPINE 2.5-0.025 MG (LOMOTIL) 2.5-0.025 MG PER TABLET    Take 1 tablet by mouth 4 (four) times daily as needed for Diarrhea.   Current Medications    AUBAGIO 7 MG TAB    TAKE 1 TABLET (7MG) BY MOUTH ONCE DAILY    CLOTRIMAZOLE (LOTRIMIN) 1 % SOLN    3 drops into right ear 3 times daily.    DOXEPIN (SINEQUAN) 75 MG CAPSULE    Take 1 capsule (75 mg total) by mouth nightly as needed. TAKE 1 CAPSULE BY MOUTH NIGHTLY AS NEEDED.    ESCITALOPRAM OXALATE (LEXAPRO) 10 MG TABLET    Take 1 tablet (10 mg total) by mouth once daily.    ESOMEPRAZOLE (NEXIUM) 40 MG CAPSULE    Take 1 capsule (40 mg total) by mouth before breakfast.    GABAPENTIN (NEURONTIN) 300 MG CAPSULE     PLEASE SEE ATTACHED FOR DETAILED DIRECTIONS    GABAPENTIN (NEURONTIN) 300 MG CAPSULE    Take 300mg in morning, 300mg in afternoon, and 900mg at night    HYDROCHLOROTHIAZIDE (HYDRODIURIL) 12.5 MG TAB    TAKE 1 TABLET (12.5 MG TOTAL) BY MOUTH ONCE DAILY.    MUPIROCIN (BACTROBAN) 2 % OINTMENT    Apply topically 3 (three) times daily.    OFLOXACIN (OCUFLOX) 0.3 % OPHTHALMIC SOLUTION    Apply 5 drops into right ear twice daily for 7 days.    PROMETHAZINE (PHENERGAN) 25 MG TABLET    Take 1 tablet (25 mg total) by mouth every 6 (six) hours as needed for Nausea.    TOPIRAMATE (TOPAMAX) 50 MG TABLET    Take 1 tablet (50 mg total) by mouth every evening.    TRIAMCINOLONE ACETONIDE 0.1% (KENALOG) 0.1 % OINTMENT    Apply topically 2 (two) times daily.    VALACYCLOVIR (VALTREX) 500 MG TABLET    TAKE 1 TABLET BY MOUTH TWICE A DAY   Discontinued Medications    HYDROCODONE-ACETAMINOPHEN (NORCO) 7.5-325 MG PER TABLET    Take 1 tablet by mouth every 6 (six) hours as needed for Pain.    NEOMYCIN-POLYMYXIN-HYDROCORTISONE (CORTISPORIN) 3.5-10,000-1 MG/ML-UNIT/ML-% OTIC SUSPENSION    Place 3 drops into both ears 3 (three) times daily.

## 2020-10-30 ENCOUNTER — PATIENT MESSAGE (OUTPATIENT)
Dept: INTERNAL MEDICINE | Facility: CLINIC | Age: 40
End: 2020-10-30

## 2020-10-30 RX ORDER — DICYCLOMINE HYDROCHLORIDE 10 MG/1
10 CAPSULE ORAL 4 TIMES DAILY PRN
Qty: 30 CAPSULE | Refills: 1 | Status: SHIPPED | OUTPATIENT
Start: 2020-10-30 | End: 2020-11-29

## 2020-11-02 ENCOUNTER — PATIENT MESSAGE (OUTPATIENT)
Dept: INTERNAL MEDICINE | Facility: CLINIC | Age: 40
End: 2020-11-02

## 2020-11-02 ENCOUNTER — PATIENT MESSAGE (OUTPATIENT)
Dept: PSYCHIATRY | Facility: CLINIC | Age: 40
End: 2020-11-02

## 2020-11-03 ENCOUNTER — OFFICE VISIT (OUTPATIENT)
Dept: NEUROLOGY | Facility: CLINIC | Age: 40
End: 2020-11-03
Payer: MEDICARE

## 2020-11-03 VITALS
TEMPERATURE: 97 F | BODY MASS INDEX: 42.1 KG/M2 | HEART RATE: 78 BPM | HEIGHT: 66 IN | DIASTOLIC BLOOD PRESSURE: 89 MMHG | WEIGHT: 261.94 LBS | SYSTOLIC BLOOD PRESSURE: 148 MMHG

## 2020-11-03 DIAGNOSIS — Z51.81 ENCOUNTER FOR MONITORING IMMUNOMODULATING THERAPY: ICD-10-CM

## 2020-11-03 DIAGNOSIS — G43.719 CHRONIC MIGRAINE WITHOUT AURA, INTRACTABLE, WITHOUT STATUS MIGRAINOSUS: ICD-10-CM

## 2020-11-03 DIAGNOSIS — G35 MULTIPLE SCLEROSIS, RELAPSING-REMITTING: Primary | ICD-10-CM

## 2020-11-03 DIAGNOSIS — M62.838 MUSCLE SPASM: ICD-10-CM

## 2020-11-03 DIAGNOSIS — Z79.899 ENCOUNTER FOR MONITORING IMMUNOMODULATING THERAPY: ICD-10-CM

## 2020-11-03 PROCEDURE — 3008F PR BODY MASS INDEX (BMI) DOCUMENTED: ICD-10-PCS | Mod: HCNC,CPTII,S$GLB, | Performed by: PSYCHIATRY & NEUROLOGY

## 2020-11-03 PROCEDURE — 3079F PR MOST RECENT DIASTOLIC BLOOD PRESSURE 80-89 MM HG: ICD-10-PCS | Mod: HCNC,CPTII,S$GLB, | Performed by: PSYCHIATRY & NEUROLOGY

## 2020-11-03 PROCEDURE — 3008F BODY MASS INDEX DOCD: CPT | Mod: HCNC,CPTII,S$GLB, | Performed by: PSYCHIATRY & NEUROLOGY

## 2020-11-03 PROCEDURE — 99215 PR OFFICE/OUTPT VISIT, EST, LEVL V, 40-54 MIN: ICD-10-PCS | Mod: HCNC,S$GLB,, | Performed by: PSYCHIATRY & NEUROLOGY

## 2020-11-03 PROCEDURE — 3079F DIAST BP 80-89 MM HG: CPT | Mod: HCNC,CPTII,S$GLB, | Performed by: PSYCHIATRY & NEUROLOGY

## 2020-11-03 PROCEDURE — 99999 PR PBB SHADOW E&M-EST. PATIENT-LVL IV: CPT | Mod: PBBFAC,HCNC,, | Performed by: PSYCHIATRY & NEUROLOGY

## 2020-11-03 PROCEDURE — 99354 PR PROLONGED SVC, OUPT, 1ST HR: CPT | Mod: HCNC,S$GLB,, | Performed by: PSYCHIATRY & NEUROLOGY

## 2020-11-03 PROCEDURE — 99215 OFFICE O/P EST HI 40 MIN: CPT | Mod: HCNC,S$GLB,, | Performed by: PSYCHIATRY & NEUROLOGY

## 2020-11-03 PROCEDURE — 3077F SYST BP >= 140 MM HG: CPT | Mod: HCNC,CPTII,S$GLB, | Performed by: PSYCHIATRY & NEUROLOGY

## 2020-11-03 PROCEDURE — 99354 PR PROLONGED SVC, OUPT, 1ST HR: ICD-10-PCS | Mod: HCNC,S$GLB,, | Performed by: PSYCHIATRY & NEUROLOGY

## 2020-11-03 PROCEDURE — 3077F PR MOST RECENT SYSTOLIC BLOOD PRESSURE >= 140 MM HG: ICD-10-PCS | Mod: HCNC,CPTII,S$GLB, | Performed by: PSYCHIATRY & NEUROLOGY

## 2020-11-03 PROCEDURE — 99999 PR PBB SHADOW E&M-EST. PATIENT-LVL IV: ICD-10-PCS | Mod: PBBFAC,HCNC,, | Performed by: PSYCHIATRY & NEUROLOGY

## 2020-11-03 RX ORDER — TOPIRAMATE 100 MG/1
100 TABLET, FILM COATED ORAL NIGHTLY
Qty: 90 TABLET | Refills: 1 | Status: SHIPPED | OUTPATIENT
Start: 2020-11-03 | End: 2021-01-29 | Stop reason: SDUPTHER

## 2020-11-03 RX ORDER — METHOCARBAMOL 750 MG/1
750 TABLET, FILM COATED ORAL 2 TIMES DAILY
COMMUNITY
Start: 2020-10-10 | End: 2021-10-25

## 2020-11-03 RX ORDER — BACLOFEN 10 MG/1
10 TABLET ORAL DAILY PRN
Qty: 90 TABLET | Refills: 0 | Status: SHIPPED | OUTPATIENT
Start: 2020-11-03 | End: 2020-12-07

## 2020-11-03 NOTE — PROGRESS NOTES
Subjective:          Patient ID: Alicia Soliz is a 40 y.o. female who presents today for a fit-in clinic visit for MS.      MS HPI:  · DMT: teriflunomide  · Side effects from DMT? No  · Taking vitamin D3 as recommended? Yes, in centrum  · No new symptoms  · Patient states that PT/OT has been very helpful.   · She does feel that her MS has progressed though in the past year, having increased difficulty walking over the past year. She would like to consider Ocrevus as a DMT option, which we discussed on today.    Medications:  Current Outpatient Medications   Medication Sig    AUBAGIO 7 mg Tab TAKE 1 TABLET (7MG) BY MOUTH ONCE DAILY    butalbital-acetaminophen-caffeine -40 mg (FIORICET, ESGIC) -40 mg per tablet Take 1 tablet by mouth every 4 (four) hours as needed for Pain.    cetirizine (ZYRTEC) 10 MG tablet Take 1 tablet (10 mg total) by mouth once daily.    dalfampridine (AMPYRA) 10 mg Tb12 Take 1 tablet by mouth 2 (two) times daily. (Patient taking differently: Take 1 tablet by mouth once daily. )    doxepin (SINEQUAN) 50 MG capsule Take 1 capsule (50 mg total) by mouth nightly as needed. TAKE 1 CAPSULE BY MOUTH NIGHTLY AS NEEDED.    escitalopram oxalate (LEXAPRO) 10 MG tablet Take 1 tablet (10 mg total) by mouth once daily.    gabapentin (NEURONTIN) 300 MG capsule Take 300mg in morning, 300mg in afternoon, and 900mg at night    hydroCHLOROthiazide (HYDRODIURIL) 12.5 MG Tab TAKE 1 TABLET (12.5 MG TOTAL) BY MOUTH ONCE DAILY.    hydrocodone-acetaminophen (HYCET) solution 7.5-325 mg/15mL Take 15 mLs by mouth every 6 (six) hours as needed for Pain.    HYDROcodone-acetaminophen (NORCO) 7.5-325 mg per tablet Take 1/2 to 1 tab QD PRN pain    hydroxyzine HCL (ATARAX) 25 MG tablet Take 1 tablet (25 mg total) by mouth 3 (three) times daily as needed for Itching.    multivitamin with folic acid 400 mcg Tab Take 1 tablet by mouth.    mupirocin (BACTROBAN) 2 % ointment Apply topically 2 (two)  times daily. Apply to affected area    nozaseptin (NOZIN) nasal  1 each by Each Nostril route 2 (two) times daily.    nystatin (MYCOSTATIN) cream Apply topically 2 (two) times daily.    promethazine (PHENERGAN) 25 MG tablet Take 1 tablet (25 mg total) by mouth every 6 (six) hours as needed for Nausea.    triamcinolone acetonide 0.1% (KENALOG) 0.1 % ointment Apply topically 2 (two) times daily.     No current facility-administered medications for this visit.        SOCIAL HISTORY  Social History     Tobacco Use    Smoking status: Never Smoker    Smokeless tobacco: Never Used   Substance Use Topics    Alcohol use: Yes     Frequency: Never     Drinks per session: Patient refused     Binge frequency: Never     Comment: occasion    Drug use: No       Living arrangements - the patient lives alone.    ROS:    REVIEW OF SYMPTOMS 10/29/2020   Do you feel abnormally tired on most days? Yes   Do you feel you generally sleep well? No   Do you have difficulty controlling your bladder?  No   Do you have difficulty controlling your bowels?  No   Do you have frequent muscle cramps, tightness or spasms in your limbs?  Yes   Do you have new visual symptoms?  Yes   Do you have worsening difficulty with your memory or thinking? No   Do you have worsening symptoms of anxiety or depression?  No   For patients who walk, Do you have more difficulty walking?  Yes   Have you fallen since your last visit?  Yes   For patients who use wheelchairs: Do you have any skin wounds or breakdown? Not Applicable   Do you have difficulty using your hands?  Yes   Do you have shooting or burning pain? Yes   Do you have difficulty with sexual function?  No   If you are sexually active, are you using birth control? Y/N  N/A Not Applicable   Do you often choke when swallowing liquids or solid food?  No   Do you experience worsening symptoms when overheated? No   Do you need any new equipment such as a wheelchair, walker or shower chair? Yes    Do you receive co-pay financial assistance for your principal MS medicine? No   Would you be interested in participating in an MS research trial in the future? No   For patients on Gilenya, Tecfidera, Aubagio, Rituxan, Ocrevus, Tysabri, Lemtrada or Methotrexate, are you aware that you should NOT receive live virus vaccines?  Not Applicable   Do you feel you have adequate family/friend support?  No   Do you have health insurance?   Yes   Are you currently employed? No   Do you receive SSDI/SSI?  Not Applicable   Do you use marijuana or cannabis products? No   How often? Weekly   Have you been diagnosed with a urinary tract infection since your last visit here? No   Have you been diagnosed with a respiratory tract infection since your last visit here? No   Have you been to the emergency room since your last visit here? No   Have you been hospitalized since your last visit here?  No                Objective:        1. 25 foot timed walk:  Timed 25 Foot Walk: 11/3/2020   Did patient wear an AFO? No   Was assistive device used? Yes   Assistive device used (milton one): Unilateral Assistance   Unilateral device used Cane   Time for 25 Foot Walk (seconds) 38.83   Time for 25 Foot Walk (seconds) 46.5       2. 9 Hole Peg Test:  No flowsheet data found.    Neurologic Exam  MENTAL STATUS: grossly intact. Normal language, attention, and memory.   CRANIAL NERVE EXAM: Extraocular muscles are intact.  No facial asymmetry. tongue midline. Shoulder shrug normal b/l There is no dysarthria.   MOTOR: No drift. Normal tone and bulk.   ? Delt Bi Tri WE WF FDI HF KE KF ADF   Right 5 5 5 5 5 5 5 5 5 5   Left 5 5 5 5 5 5 4+ 5 5 5-   SENSORY EXAM: Normal to LT t/o  COORDINATION: Normal finger-to-nose exam on right, mild ataxia on left.   GAIT: wide based, spastic gait L>R, hemiparetic, using quad cane          Imaging:     MRI Brain w/wo 1/2015 Multifocal periventricular, subcortical and pontine white matter lesions compatible with  "demyelinating plaques of multiple sclerosis.  Many of the lesions display enhancement consistent with active MS plaque. Evolving signal changes evident on the DWI and ADC map sequences with development of right periventricular white matter enhancement as compared to the recent study of 01/15/15.  Differential considerations include active MS plaque formation/evolution and acute ischemia/infarction 10/2018 Stable compared to 3/2018 imaging. No areas of diffusion restriction or enhancing lesions     MRI C-Sprine 1/2015 Normal cervical cord. 1 cm contrast enhancing left occipital lobe subcortical white matter lesion.  Multiple bihemispheric hyperintense T2 signal lesions including left-sided brainstem lesion demonstrated on the recent head MRI study some of which exhibiting contrast enhancement 10/2018 No cord lesions    MRI T-Spine w/wo 10/2018: No abnormal marrow or cord signal or enhancement. Stable compared to 2/2015    MRI L-Spine w/wo 10/2018: Multilevel degenerative change. Evidence of active bilateral facet synovitis at L4-5.      MRI brain and c-spine 10/2020: brain scan read as "worsened from prior with worsening involvement of the periventricular white matter throughout the right cerebral hemisphere.  There is also possible worsening involvement of the left dong and left middle cerebellar peduncle"    Labs:     Lab Results   Component Value Date    WQMNWKLN09NG 32 01/10/2020    AAPTKYIY26KQ 11 (L) 01/30/2015     Lab Results   Component Value Date    JCVINDEX 1.67 (A) 04/19/2018    JCVANTIBODY Positive (A) 04/19/2018     No results found for: OY7IAOSH, ABSOLUTECD3, OS0OKPVY, ABSOLUTECD8, ZK1PNMOU, ABSOLUTECD4, LABCD48  Lab Results   Component Value Date    WBC 5.94 10/19/2020    RBC 5.51 (H) 10/19/2020    HGB 11.8 (L) 10/19/2020    HCT 40.1 10/19/2020    MCV 73 (L) 10/19/2020    MCH 21.4 (L) 10/19/2020    MCHC 29.4 (L) 10/19/2020    RDW 16.7 (H) 10/19/2020     10/19/2020    MPV 11.4 10/19/2020    GRAN " 2.9 10/19/2020    GRAN 49.5 10/19/2020    LYMPH 2.5 10/19/2020    LYMPH 41.4 10/19/2020    MONO 0.4 10/19/2020    MONO 7.1 10/19/2020    EOS 0.1 10/19/2020    BASO 0.03 10/19/2020    EOSINOPHIL 1.2 10/19/2020    BASOPHIL 0.5 10/19/2020     Sodium   Date Value Ref Range Status   10/19/2020 142 136 - 145 mmol/L Final     Potassium   Date Value Ref Range Status   10/19/2020 3.3 (L) 3.5 - 5.1 mmol/L Final     Chloride   Date Value Ref Range Status   10/19/2020 107 95 - 110 mmol/L Final     CO2   Date Value Ref Range Status   10/19/2020 24 23 - 29 mmol/L Final     Glucose   Date Value Ref Range Status   10/19/2020 108 70 - 110 mg/dL Final     BUN   Date Value Ref Range Status   10/19/2020 7 6 - 20 mg/dL Final     Creatinine   Date Value Ref Range Status   10/19/2020 0.9 0.5 - 1.4 mg/dL Final     Calcium   Date Value Ref Range Status   10/19/2020 9.7 8.7 - 10.5 mg/dL Final     Total Protein   Date Value Ref Range Status   10/19/2020 7.9 6.0 - 8.4 g/dL Final     Albumin   Date Value Ref Range Status   10/19/2020 3.8 3.5 - 5.2 g/dL Final     Total Bilirubin   Date Value Ref Range Status   10/19/2020 0.4 0.1 - 1.0 mg/dL Final     Comment:     For infants and newborns, interpretation of results should be based  on gestational age, weight and in agreement with clinical  observations.  Premature Infant recommended reference ranges:  Up to 24 hours.............<8.0 mg/dL  Up to 48 hours............<12.0 mg/dL  3-5 days..................<15.0 mg/dL  6-29 days.................<15.0 mg/dL       Alkaline Phosphatase   Date Value Ref Range Status   10/19/2020 98 55 - 135 U/L Final     AST   Date Value Ref Range Status   10/19/2020 27 10 - 40 U/L Final     ALT   Date Value Ref Range Status   10/19/2020 23 10 - 44 U/L Final     Anion Gap   Date Value Ref Range Status   10/19/2020 11 8 - 16 mmol/L Final     eGFR if    Date Value Ref Range Status   10/19/2020 >60.0 >60 mL/min/1.73 m^2 Final     eGFR if non African  American   Date Value Ref Range Status   10/19/2020 >60.0 >60 mL/min/1.73 m^2 Final     Comment:     Calculation used to obtain the estimated glomerular filtration  rate (eGFR) is the CKD-EPI equation.        Lab Results   Component Value Date    HEPBSAG Negative 01/10/2020    HEPBSAB Positive (A) 09/27/2019    HEPBCAB Negative 09/27/2019           MS Impression and Plan:     NEURO MULTIPLE SCLEROSIS IMPRESSION:   MS Status:     Number of relapses in the past year?:  0    Number of relapses in the past year? comment:  No discrete relapses, but gait is worse     Clinical Progression:  Worsened    MRI Progression comment:  Reported as worsened, but mild change from 2018 scan  Plan:     DMT:  Switch Disease Modifying therapy    DMT comment:  Continue Aubagio    Switch Disease Modifying Therapy FROM:  Teriflunomide    Switch Disease Modifying Therapy FROM comment:  Will plan washout period of at least one month prior to switch to Ocrevus with repeat CBC just prior to change.    TO:  Ocrelizumab    Symptom Management:  Implement change in symptom management    Implement Change in Symptom Management:  Gait and Mood     Next Imaging Due: 8/18/2020     Next Labs Due: 8/18/2020     Headaches, migrainous type: improved on therapy. Continue topamax but at 100mg qhs. PARQ discussion held for medication.    Muscle spasms: start baclofen 10mg qday prn. PARQ discussion held for medication.    F/u with me in 8 weeks    Our visit today lasted 40 minutes, and 100% of this time was spent face to face with the patient. Over 50% of this visit included discussion of the treatment plan/medication changes/symptom management/exam findings/imaging results/coordination of care. The patient agrees with the plan of care.    Problem List Items Addressed This Visit     None          Meagan Pereira MD

## 2020-11-04 ENCOUNTER — PATIENT MESSAGE (OUTPATIENT)
Dept: SURGERY | Facility: CLINIC | Age: 40
End: 2020-11-04

## 2020-11-04 ENCOUNTER — PATIENT MESSAGE (OUTPATIENT)
Dept: ADMINISTRATIVE | Facility: OTHER | Age: 40
End: 2020-11-04

## 2020-11-05 ENCOUNTER — PATIENT MESSAGE (OUTPATIENT)
Dept: NEUROLOGY | Facility: CLINIC | Age: 40
End: 2020-11-05

## 2020-11-05 ENCOUNTER — NURSE TRIAGE (OUTPATIENT)
Dept: ADMINISTRATIVE | Facility: CLINIC | Age: 40
End: 2020-11-05

## 2020-11-06 NOTE — TELEPHONE ENCOUNTER
Pt has history of MS. Pt states she woke up today and felt strange. She reports weakness in her right arm, which is her stronger arm. The patient also reports blurred vision. She reports having a fall and hurting her hip and tailbone. Patient reports she is still having unilateral weakness and blurred vision. Patient advised to call 911 and go to ED for evaluation.     Reason for Disposition   [1] Blurred vision or visual changes AND [2] present now AND [3] sudden onset or new (e.g., minutes, hours, days)  (Exception: previously diagnosed migraine headaches with same symptoms)    Additional Information   Negative: Complete loss of vision in 1 or both eyes   Negative: Severe eye pain   Negative: Severe headache   Negative: Double vision    Protocols used: ST VISION LOSS OR CHANGE-A-AH

## 2020-11-09 ENCOUNTER — LAB VISIT (OUTPATIENT)
Dept: LAB | Facility: HOSPITAL | Age: 40
End: 2020-11-09
Payer: MEDICARE

## 2020-11-09 DIAGNOSIS — G35 MULTIPLE SCLEROSIS, RELAPSING-REMITTING: ICD-10-CM

## 2020-11-09 PROCEDURE — 86682 HELMINTH ANTIBODY: CPT | Mod: HCNC

## 2020-11-09 PROCEDURE — 86703 HIV-1/HIV-2 1 RESULT ANTBDY: CPT | Mod: HCNC

## 2020-11-09 PROCEDURE — 87340 HEPATITIS B SURFACE AG IA: CPT | Mod: HCNC

## 2020-11-09 PROCEDURE — 85025 COMPLETE CBC W/AUTO DIFF WBC: CPT | Mod: HCNC

## 2020-11-09 PROCEDURE — 86704 HEP B CORE ANTIBODY TOTAL: CPT | Mod: HCNC

## 2020-11-09 PROCEDURE — 86803 HEPATITIS C AB TEST: CPT | Mod: HCNC

## 2020-11-09 PROCEDURE — 36415 COLL VENOUS BLD VENIPUNCTURE: CPT | Mod: HCNC

## 2020-11-09 PROCEDURE — 86706 HEP B SURFACE ANTIBODY: CPT | Mod: HCNC

## 2020-11-09 PROCEDURE — 86480 TB TEST CELL IMMUN MEASURE: CPT | Mod: HCNC

## 2020-11-09 PROCEDURE — 86709 HEPATITIS A IGM ANTIBODY: CPT | Mod: HCNC

## 2020-11-09 PROCEDURE — 80053 COMPREHEN METABOLIC PANEL: CPT | Mod: HCNC

## 2020-11-10 LAB
ALBUMIN SERPL BCP-MCNC: 3.8 G/DL (ref 3.5–5.2)
ALP SERPL-CCNC: 93 U/L (ref 55–135)
ALT SERPL W/O P-5'-P-CCNC: 10 U/L (ref 10–44)
ANION GAP SERPL CALC-SCNC: 12 MMOL/L (ref 8–16)
AST SERPL-CCNC: 19 U/L (ref 10–40)
BASOPHILS # BLD AUTO: 0.03 K/UL (ref 0–0.2)
BASOPHILS NFR BLD: 0.6 % (ref 0–1.9)
BILIRUB SERPL-MCNC: 0.2 MG/DL (ref 0.1–1)
BUN SERPL-MCNC: 9 MG/DL (ref 6–20)
CALCIUM SERPL-MCNC: 9.4 MG/DL (ref 8.7–10.5)
CHLORIDE SERPL-SCNC: 101 MMOL/L (ref 95–110)
CO2 SERPL-SCNC: 25 MMOL/L (ref 23–29)
CREAT SERPL-MCNC: 0.8 MG/DL (ref 0.5–1.4)
DIFFERENTIAL METHOD: ABNORMAL
EOSINOPHIL # BLD AUTO: 0.1 K/UL (ref 0–0.5)
EOSINOPHIL NFR BLD: 2.7 % (ref 0–8)
ERYTHROCYTE [DISTWIDTH] IN BLOOD BY AUTOMATED COUNT: 16.7 % (ref 11.5–14.5)
EST. GFR  (AFRICAN AMERICAN): >60 ML/MIN/1.73 M^2
EST. GFR  (NON AFRICAN AMERICAN): >60 ML/MIN/1.73 M^2
GLUCOSE SERPL-MCNC: 86 MG/DL (ref 70–110)
HAV IGM SERPL QL IA: NEGATIVE
HAV IGM SERPL QL IA: NEGATIVE
HBV CORE AB SERPL QL IA: NEGATIVE
HBV SURFACE AB SER-ACNC: POSITIVE M[IU]/ML
HBV SURFACE AG SERPL QL IA: NEGATIVE
HCT VFR BLD AUTO: 39.8 % (ref 37–48.5)
HCV AB SERPL QL IA: NEGATIVE
HGB BLD-MCNC: 11.7 G/DL (ref 12–16)
HIV 1+2 AB+HIV1 P24 AG SERPL QL IA: NEGATIVE
IMM GRANULOCYTES # BLD AUTO: 0.01 K/UL (ref 0–0.04)
IMM GRANULOCYTES NFR BLD AUTO: 0.2 % (ref 0–0.5)
LYMPHOCYTES # BLD AUTO: 3.1 K/UL (ref 1–4.8)
LYMPHOCYTES NFR BLD: 60.3 % (ref 18–48)
MCH RBC QN AUTO: 21.7 PG (ref 27–31)
MCHC RBC AUTO-ENTMCNC: 29.4 G/DL (ref 32–36)
MCV RBC AUTO: 74 FL (ref 82–98)
MONOCYTES # BLD AUTO: 0.4 K/UL (ref 0.3–1)
MONOCYTES NFR BLD: 7.1 % (ref 4–15)
NEUTROPHILS # BLD AUTO: 1.5 K/UL (ref 1.8–7.7)
NEUTROPHILS NFR BLD: 29.1 % (ref 38–73)
NRBC BLD-RTO: 0 /100 WBC
PLATELET # BLD AUTO: 241 K/UL (ref 150–350)
PMV BLD AUTO: 11.2 FL (ref 9.2–12.9)
POTASSIUM SERPL-SCNC: 2.8 MMOL/L (ref 3.5–5.1)
PROT SERPL-MCNC: 7.6 G/DL (ref 6–8.4)
RBC # BLD AUTO: 5.4 M/UL (ref 4–5.4)
SODIUM SERPL-SCNC: 138 MMOL/L (ref 136–145)
WBC # BLD AUTO: 5.19 K/UL (ref 3.9–12.7)

## 2020-11-11 ENCOUNTER — LAB VISIT (OUTPATIENT)
Dept: LAB | Facility: HOSPITAL | Age: 40
End: 2020-11-11
Attending: INTERNAL MEDICINE
Payer: MEDICARE

## 2020-11-11 ENCOUNTER — PATIENT MESSAGE (OUTPATIENT)
Dept: REHABILITATION | Facility: HOSPITAL | Age: 40
End: 2020-11-11

## 2020-11-11 ENCOUNTER — TELEPHONE (OUTPATIENT)
Dept: GASTROENTEROLOGY | Facility: CLINIC | Age: 40
End: 2020-11-11

## 2020-11-11 ENCOUNTER — OFFICE VISIT (OUTPATIENT)
Dept: GASTROENTEROLOGY | Facility: CLINIC | Age: 40
End: 2020-11-11
Payer: MEDICARE

## 2020-11-11 VITALS
HEART RATE: 76 BPM | WEIGHT: 263 LBS | HEIGHT: 66 IN | DIASTOLIC BLOOD PRESSURE: 90 MMHG | BODY MASS INDEX: 42.27 KG/M2 | SYSTOLIC BLOOD PRESSURE: 142 MMHG

## 2020-11-11 DIAGNOSIS — D50.0 IRON DEFICIENCY ANEMIA DUE TO CHRONIC BLOOD LOSS: ICD-10-CM

## 2020-11-11 DIAGNOSIS — G35 MULTIPLE SCLEROSIS: ICD-10-CM

## 2020-11-11 DIAGNOSIS — I69.359 HEMIPLEGIA, DOMINANT SIDE S/P CVA (CEREBROVASCULAR ACCIDENT): ICD-10-CM

## 2020-11-11 DIAGNOSIS — R19.5 HEME POSITIVE STOOL: Primary | ICD-10-CM

## 2020-11-11 DIAGNOSIS — R19.7 DIARRHEA, UNSPECIFIED TYPE: ICD-10-CM

## 2020-11-11 DIAGNOSIS — R19.5 OCCULT BLOOD IN STOOLS: ICD-10-CM

## 2020-11-11 DIAGNOSIS — R19.5 HEME POSITIVE STOOL: ICD-10-CM

## 2020-11-11 DIAGNOSIS — I10 ESSENTIAL HYPERTENSION: ICD-10-CM

## 2020-11-11 LAB
GAMMA INTERFERON BACKGROUND BLD IA-ACNC: 0.03 IU/ML
M TB IFN-G CD4+ BCKGRND COR BLD-ACNC: 0.03 IU/ML
MITOGEN IGNF BCKGRD COR BLD-ACNC: 9.45 IU/ML
TB GOLD PLUS: NEGATIVE
TB2 - NIL: 0.02 IU/ML

## 2020-11-11 PROCEDURE — 99999 PR PBB SHADOW E&M-EST. PATIENT-LVL IV: ICD-10-PCS | Mod: PBBFAC,HCNC,, | Performed by: INTERNAL MEDICINE

## 2020-11-11 PROCEDURE — 3077F PR MOST RECENT SYSTOLIC BLOOD PRESSURE >= 140 MM HG: ICD-10-PCS | Mod: HCNC,CPTII,S$GLB, | Performed by: INTERNAL MEDICINE

## 2020-11-11 PROCEDURE — 99999 PR PBB SHADOW E&M-EST. PATIENT-LVL IV: CPT | Mod: PBBFAC,HCNC,, | Performed by: INTERNAL MEDICINE

## 2020-11-11 PROCEDURE — 99204 OFFICE O/P NEW MOD 45 MIN: CPT | Mod: HCNC,S$GLB,, | Performed by: INTERNAL MEDICINE

## 2020-11-11 PROCEDURE — 83540 ASSAY OF IRON: CPT | Mod: HCNC

## 2020-11-11 PROCEDURE — 3080F DIAST BP >= 90 MM HG: CPT | Mod: HCNC,CPTII,S$GLB, | Performed by: INTERNAL MEDICINE

## 2020-11-11 PROCEDURE — 3080F PR MOST RECENT DIASTOLIC BLOOD PRESSURE >= 90 MM HG: ICD-10-PCS | Mod: HCNC,CPTII,S$GLB, | Performed by: INTERNAL MEDICINE

## 2020-11-11 PROCEDURE — 36415 COLL VENOUS BLD VENIPUNCTURE: CPT | Mod: HCNC

## 2020-11-11 PROCEDURE — 99204 PR OFFICE/OUTPT VISIT, NEW, LEVL IV, 45-59 MIN: ICD-10-PCS | Mod: HCNC,S$GLB,, | Performed by: INTERNAL MEDICINE

## 2020-11-11 PROCEDURE — 3008F PR BODY MASS INDEX (BMI) DOCUMENTED: ICD-10-PCS | Mod: HCNC,CPTII,S$GLB, | Performed by: INTERNAL MEDICINE

## 2020-11-11 PROCEDURE — 3008F BODY MASS INDEX DOCD: CPT | Mod: HCNC,CPTII,S$GLB, | Performed by: INTERNAL MEDICINE

## 2020-11-11 PROCEDURE — 3077F SYST BP >= 140 MM HG: CPT | Mod: HCNC,CPTII,S$GLB, | Performed by: INTERNAL MEDICINE

## 2020-11-11 PROCEDURE — 82728 ASSAY OF FERRITIN: CPT | Mod: HCNC

## 2020-11-11 RX ORDER — HYDROCODONE BITARTRATE AND ACETAMINOPHEN 10; 325 MG/1; MG/1
TABLET ORAL
COMMUNITY
Start: 2020-09-04 | End: 2021-03-29

## 2020-11-11 RX ORDER — SODIUM, POTASSIUM,MAG SULFATES 17.5-3.13G
SOLUTION, RECONSTITUTED, ORAL ORAL
Qty: 254 ML | Refills: 0 | Status: ON HOLD | OUTPATIENT
Start: 2020-11-11 | End: 2020-11-25 | Stop reason: HOSPADM

## 2020-11-11 NOTE — PROGRESS NOTES
Subjective:       Patient ID: Alicia Soliz is a 40 y.o. female.    Chief Complaint: Rectal Bleeding    The patient is here with complaint of heme positive stool. The patient has had anemia and has microcytic indices on her labs. She is S/P XIOMARA but she believes they left her ovaries. The patient is also carrying a diagnosis of MS and seems to have some memory issues.  She is also status post CVA with left hemiparesis. There is no FH of GI abnormalities in her immediate family.     The patient had been having difficulties with diarrhea leading to the visit with her primary care physician which also mentally lead to her a referral to see us.  Her stools were described as being dark green and watery and there was question if she had seen any blood.  There had been no fever chills or sweats.  There has also been no abdominal pain or nausea vomiting; only some cramping along with the diarrhea.  The patient admits to having been on antibiotics several times in the as months, however, stools have been unremarkable though not checked for C diff, though it was ordered.  As noted there has been no fever and her white blood cell count has been well within normal range.    The patient has also had multiple surgical interventions.  She is status post sleeve gastrectomy, status post cholecystectomy, and status post appendectomy in addition to the GH mentioned above.    Review of Systems   Constitutional: Positive for fatigue. Negative for activity change, appetite change, chills, diaphoresis, fever and unexpected weight change.   HENT: Negative for congestion, ear discharge, ear pain, hearing loss, nosebleeds, postnasal drip and tinnitus.         Vertigo     Eyes: Positive for visual disturbance. Negative for photophobia.   Respiratory: Negative for apnea, cough, choking, chest tightness, shortness of breath and wheezing.    Cardiovascular: Negative for chest pain, palpitations and leg swelling.   Gastrointestinal: Positive  for blood in stool and diarrhea. Negative for abdominal distention, abdominal pain, anal bleeding, constipation, nausea, rectal pain and vomiting.   Genitourinary: Negative for difficulty urinating, dyspareunia, dysuria, flank pain, frequency, hematuria, menstrual problem, pelvic pain, urgency, vaginal bleeding and vaginal discharge.   Musculoskeletal: Negative for arthralgias, back pain, gait problem, joint swelling, myalgias and neck stiffness.   Skin: Negative for pallor and rash.   Neurological: Negative for dizziness, tremors, seizures, syncope, speech difficulty, weakness, numbness and headaches.   Hematological: Negative for adenopathy.   Psychiatric/Behavioral: Negative for agitation, confusion, hallucinations, sleep disturbance and suicidal ideas.       Objective:      Physical Exam  Vitals signs reviewed.   Constitutional:       Appearance: She is well-developed.   HENT:      Head: Normocephalic and atraumatic.   Eyes:      General: No scleral icterus.        Right eye: No discharge.         Left eye: No discharge.      Conjunctiva/sclera: Conjunctivae normal.      Pupils: Pupils are equal, round, and reactive to light.   Neck:      Musculoskeletal: Normal range of motion and neck supple.      Thyroid: No thyromegaly.      Vascular: No JVD.   Cardiovascular:      Rate and Rhythm: Normal rate and regular rhythm.      Heart sounds: Normal heart sounds. No murmur. No friction rub. No gallop.    Pulmonary:      Effort: Pulmonary effort is normal. No respiratory distress.      Breath sounds: Normal breath sounds. No wheezing or rales.   Chest:      Chest wall: No tenderness.   Abdominal:      General: Bowel sounds are normal. There is no distension.      Palpations: Abdomen is soft. There is no mass.      Tenderness: There is no abdominal tenderness. There is no guarding or rebound.   Musculoskeletal: Normal range of motion.   Lymphadenopathy:      Cervical: No cervical adenopathy.   Skin:     General: Skin is  warm and dry.      Coloration: Skin is not pale.      Findings: No erythema or rash.   Neurological:      Mental Status: She is alert and oriented to person, place, and time.      Motor: No abnormal muscle tone.      Coordination: Coordination normal.      Gait: Gait abnormal.      Deep Tendon Reflexes: Reflexes are normal and symmetric.      Comments: Left hemiparesis   Psychiatric:         Behavior: Behavior normal.         Thought Content: Thought content normal.         Judgment: Judgment normal.         Assessment:   Heme positive stool  Iron deficiency Anemia  MS  S/P CVA   HTN  Plan:   EGD and Colonoscopy

## 2020-11-12 ENCOUNTER — PATIENT OUTREACH (OUTPATIENT)
Dept: OTHER | Facility: OTHER | Age: 40
End: 2020-11-12

## 2020-11-12 ENCOUNTER — TELEPHONE (OUTPATIENT)
Dept: ENDOSCOPY | Facility: HOSPITAL | Age: 40
End: 2020-11-12

## 2020-11-12 ENCOUNTER — PATIENT MESSAGE (OUTPATIENT)
Dept: GASTROENTEROLOGY | Facility: CLINIC | Age: 40
End: 2020-11-12

## 2020-11-12 DIAGNOSIS — I10 ESSENTIAL HYPERTENSION: Primary | ICD-10-CM

## 2020-11-12 LAB
FERRITIN SERPL-MCNC: 26 NG/ML (ref 20–300)
IRON SERPL-MCNC: 47 UG/DL (ref 30–160)
SATURATED IRON: 11 % (ref 20–50)
STRONGYLOIDES ANTIBODY IGG: NEGATIVE
TOTAL IRON BINDING CAPACITY: 443 UG/DL (ref 250–450)
TRANSFERRIN SERPL-MCNC: 299 MG/DL (ref 200–375)

## 2020-11-12 RX ORDER — VALSARTAN 80 MG/1
80 TABLET ORAL DAILY
Qty: 30 TABLET | Refills: 2 | Status: SHIPPED | OUTPATIENT
Start: 2020-11-12 | End: 2021-02-05

## 2020-11-12 NOTE — TELEPHONE ENCOUNTER
Patient left a message on [employer] voicemail; attempted to speak with patient-no answer. Left patient a message to call our office, number provided.

## 2020-11-12 NOTE — PROGRESS NOTES
Left voicemail requesting call back  Low K+ -discuss stopping hctz  Amlodipine stopped in past due to dizziness. Could consider CCB or ARB.  Home average 110/70 mmHg but recent office readings elevated

## 2020-11-12 NOTE — PROGRESS NOTES
"Digital Medicine: Clinician Follow-Up    Called to address low K+ noted on recent labs. Patient denies concerns.  Discussed higher office BP than home readings. Patient denies being nervous when BP is taken in the office. She denies pain.    The history is provided by the patient.      Review of patient's allergies indicates:   -- Demerol (meperidine) -- Itching    --  Other reaction(s): Itching   -- Dilaudid (hydromorphone (bulk)) -- Anaphylaxis    --  "coded" per pt             Patient can tolerate Lortab   -- Prednisone -- Itching    --  Other reaction(s): Itching   -- Morphine    -- Tramadol     --  shaking  Follow-up reason(s): lab follow up.     Hypertension    Patient's blood pressure is stable.   Patient is not experiencing signs/symptoms of hypotension.  Patient is not experiencing signs/symptoms of hypertension.            Last 5 Patient Entered Readings                                      Current 30 Day Average: 110/70     Recent Readings 11/8/2020 11/4/2020 9/9/2020 8/11/2020 7/6/2020    SBP (mmHg) 110 110 110 120 120    DBP (mmHg) 70 70 70 80 85                 Depression Screening  Did not address depression screening.    Sleep Apnea Screening    Did not address sleep apnea screening.     Medication Affordability Screening  Did not address medication affordability screening.     Medication Adherence-Medication adherence was assessed.          ASSESSMENT(S)  Patients BP average is 110/70 mmHg, which is at goal. Patient's BP goal is less than or equal to 130/80.    Hypertension Plan  Hypertension Medication Change. Change hctz to valsartan 80 mg daily  Labs ordered. Repeat BMP with upcoming appointmet Expected Lab Date: 12/3/2020  K+ trending down. Considered adding KCl and continuing hctz, however, to decrease pill burden will change medication class. Patient does not have preference between ARB or CCB. Dizziness with amlodipine in past. ARB may increase K+.     Addressed patient questions and patient " has my contact information if needed prior to next outreach. Patient verbalizes understanding.             There are no preventive care reminders to display for this patient.  There are no preventive care reminders to display for this patient.      Hypertension Medications             hydroCHLOROthiazide (HYDRODIURIL) 12.5 MG Tab TAKE 1 TABLET (12.5 MG TOTAL) BY MOUTH ONCE DAILY.

## 2020-11-13 ENCOUNTER — TELEPHONE (OUTPATIENT)
Dept: ENDOSCOPY | Facility: HOSPITAL | Age: 40
End: 2020-11-13

## 2020-11-13 DIAGNOSIS — R19.5 HEME POSITIVE STOOL: Primary | ICD-10-CM

## 2020-11-13 NOTE — TELEPHONE ENCOUNTER
Location Screening:    If answers yes to any of the following, schedule at [facility] ONLY. If No, OK for either location.    1. Is there a diagnosis of heart failure, severe heart valve disease (aortic stenosis) or mechanical valve? no  a. Is the Left Ventricle Ejection Fraction <30% ? no    2. Does the pt have pulmonary hypertension? no   a. Is pulmonary arterial pressure gradient >50mmHg? no   b. Is there evidence of right ventricular dysfunction? no    3. Does the pt have achalasia? no    4. Any history of negative reaction to anesthesia? no   a. Myasthenia gravis? no   b. Malignant hyperthermia? no   c. Other? no    5. Is procedure for esophageal banding? no      COVID Screening    1. Have you had a fever in the last 7 days or have you used fever reducing medicines for a fever in the last 7 days?  no    2. Are you experiencing shortness of breath, cough, muscle aches, loss of taste or loss of smell?  no    If answered yes to questions 1 and 2, the patient must seek medical attention with their PCP.  Do not schedule their procedure.     3. Are you residing with anyone who has tested positive for Covid?  no    If answered yes to question 3, recommend 14 day self-quarantine from the date of relative's positive test and place special needs note in the depot.  Wait to schedule.     ENDO screening    1. Have you been admitted for cardiac, kidney or pulmonary causes to the hospital in the past 3 months? no   If yes, schedule an appointment with PCP before scheduling endoscopic procedure.     2. Have you had a stent placed in the last 12 months? no   If yes, for a screening visit, cancel and message the ordering provider.  The patient will need a new order when the time is appropriate.     3. Have you had a stroke or heart attack in the past 6 months? no   If yes, cancel and refer patient to ordering provider for clearance, also message ordering provider to inform.     4. Have you had any chest pain in the past 3 months?  "no   If yes, Have you been evaluated by your PCP and/or cardiologist and it was determined to not be heart related? no   If No, Pt needs to be seen by PCP or Cardiologist .  Pt can be scheduled once clearance obtained by either of those providers.     5. Do you take prescription weight loss medications?  no   If yes, must stop for 2 weeks prior to procedure.     6. Have you been diagnosed with diverticulitis within the past 3 months? no   If yes, must have been seen by GI within the last 3 months, if not schedule with GI JOSE[facility].    If pt has been seen by GI, schedule procedure 8-12 weeks post antibiotic treatment.     7. Are you on Dialysis? no  If yes, schedule procedure for the day AFTER dialysis.  Appt time should be 9am or later, patient arrival time is 2 hours prior.  Nulytely or miralax prep for all patients with kidney disease.     8. Are you diabetic?  no   If yes, schedule morning appt. Advise pt to hold all diabetic meds day of procedure.     9. If pt is older than 80 years of age and HAS NOT been seen by GI or PCP within the last 6 months, needs appt with GI JOSEFA.   If pt has been seen by the GI provider or PCP within the past 6  months AND meets criteria, schedule procedure AND send message to the endoscopist.     10. Is patient on a "high risk" medication (blood thinner/antiplatelet agent)?  no   If yes, has cardiac clearance been obtained within the last 60 days? N/A   If no, a new clearance needs to be obtained.     Final Questions:    1.I have reviewed the last colonoscopy for recommendations regarding next procedure bowel prep.  no  2. I have reviewed medications and allergies.  yes  3. I have verified the pharmacy information and appropriate prep sent if needed. yes  4. Prep instructions have been mailed or sent to portal per patient request. yes    Instructions per portal    All schedulers will have ability to reach out to the ordering GI provider to clarify any issues.     "

## 2020-11-17 ENCOUNTER — IMMUNIZATION (OUTPATIENT)
Dept: INTERNAL MEDICINE | Facility: CLINIC | Age: 40
End: 2020-11-17
Payer: MEDICARE

## 2020-11-17 ENCOUNTER — CLINICAL SUPPORT (OUTPATIENT)
Dept: INTERNAL MEDICINE | Facility: CLINIC | Age: 40
End: 2020-11-17
Payer: MEDICARE

## 2020-11-17 ENCOUNTER — PATIENT OUTREACH (OUTPATIENT)
Dept: OTHER | Facility: OTHER | Age: 40
End: 2020-11-17

## 2020-11-17 ENCOUNTER — OFFICE VISIT (OUTPATIENT)
Dept: INFECTIOUS DISEASES | Facility: CLINIC | Age: 40
End: 2020-11-17
Payer: MEDICARE

## 2020-11-17 VITALS
BODY MASS INDEX: 41.78 KG/M2 | DIASTOLIC BLOOD PRESSURE: 67 MMHG | HEIGHT: 66 IN | SYSTOLIC BLOOD PRESSURE: 104 MMHG | WEIGHT: 260 LBS | HEART RATE: 71 BPM | TEMPERATURE: 98 F

## 2020-11-17 DIAGNOSIS — Z71.85 VACCINE COUNSELING: ICD-10-CM

## 2020-11-17 DIAGNOSIS — Z71.85 VACCINE COUNSELING: Primary | ICD-10-CM

## 2020-11-17 DIAGNOSIS — G35 MULTIPLE SCLEROSIS EXACERBATION: ICD-10-CM

## 2020-11-17 DIAGNOSIS — G35 MULTIPLE SCLEROSIS: ICD-10-CM

## 2020-11-17 DIAGNOSIS — I10 ESSENTIAL HYPERTENSION: ICD-10-CM

## 2020-11-17 DIAGNOSIS — Z86.19 HISTORY OF SYPHILIS: ICD-10-CM

## 2020-11-17 PROCEDURE — G0009 ADMIN PNEUMOCOCCAL VACCINE: HCPCS | Mod: HCNC,S$GLB,, | Performed by: STUDENT IN AN ORGANIZED HEALTH CARE EDUCATION/TRAINING PROGRAM

## 2020-11-17 PROCEDURE — G0009 HEPATITIS A VACCINE ADULT IM: ICD-10-PCS | Mod: HCNC,S$GLB,, | Performed by: STUDENT IN AN ORGANIZED HEALTH CARE EDUCATION/TRAINING PROGRAM

## 2020-11-17 PROCEDURE — 99999 PR PBB SHADOW E&M-EST. PATIENT-LVL V: CPT | Mod: PBBFAC,HCNC,, | Performed by: STUDENT IN AN ORGANIZED HEALTH CARE EDUCATION/TRAINING PROGRAM

## 2020-11-17 PROCEDURE — 90670 PNEUMOCOCCAL CONJUGATE VACCINE 13-VALENT LESS THAN 5YO & GREATER THAN: ICD-10-PCS | Mod: HCNC,S$GLB,, | Performed by: STUDENT IN AN ORGANIZED HEALTH CARE EDUCATION/TRAINING PROGRAM

## 2020-11-17 PROCEDURE — 99999 PR PBB SHADOW E&M-EST. PATIENT-LVL II: CPT | Mod: PBBFAC,HCNC,,

## 2020-11-17 PROCEDURE — 3008F PR BODY MASS INDEX (BMI) DOCUMENTED: ICD-10-PCS | Mod: HCNC,CPTII,S$GLB, | Performed by: STUDENT IN AN ORGANIZED HEALTH CARE EDUCATION/TRAINING PROGRAM

## 2020-11-17 PROCEDURE — 90632 HEPA VACCINE ADULT IM: CPT | Mod: HCNC,S$GLB,, | Performed by: STUDENT IN AN ORGANIZED HEALTH CARE EDUCATION/TRAINING PROGRAM

## 2020-11-17 PROCEDURE — 99999 PR PBB SHADOW E&M-EST. PATIENT-LVL V: ICD-10-PCS | Mod: PBBFAC,HCNC,, | Performed by: STUDENT IN AN ORGANIZED HEALTH CARE EDUCATION/TRAINING PROGRAM

## 2020-11-17 PROCEDURE — 3008F BODY MASS INDEX DOCD: CPT | Mod: HCNC,CPTII,S$GLB, | Performed by: STUDENT IN AN ORGANIZED HEALTH CARE EDUCATION/TRAINING PROGRAM

## 2020-11-17 PROCEDURE — 1126F AMNT PAIN NOTED NONE PRSNT: CPT | Mod: HCNC,S$GLB,, | Performed by: STUDENT IN AN ORGANIZED HEALTH CARE EDUCATION/TRAINING PROGRAM

## 2020-11-17 PROCEDURE — G0008 FLU VACCINE (QUAD) GREATER THAN OR EQUAL TO 3YO PRESERVATIVE FREE IM: ICD-10-PCS | Mod: HCNC,S$GLB,, | Performed by: FAMILY MEDICINE

## 2020-11-17 PROCEDURE — 3074F PR MOST RECENT SYSTOLIC BLOOD PRESSURE < 130 MM HG: ICD-10-PCS | Mod: HCNC,CPTII,S$GLB, | Performed by: STUDENT IN AN ORGANIZED HEALTH CARE EDUCATION/TRAINING PROGRAM

## 2020-11-17 PROCEDURE — 1126F PR PAIN SEVERITY QUANTIFIED, NO PAIN PRESENT: ICD-10-PCS | Mod: HCNC,S$GLB,, | Performed by: STUDENT IN AN ORGANIZED HEALTH CARE EDUCATION/TRAINING PROGRAM

## 2020-11-17 PROCEDURE — 90472 TDAP VACCINE GREATER THAN OR EQUAL TO 7YO IM: ICD-10-PCS | Mod: HCNC,S$GLB,, | Performed by: STUDENT IN AN ORGANIZED HEALTH CARE EDUCATION/TRAINING PROGRAM

## 2020-11-17 PROCEDURE — 90715 TDAP VACCINE GREATER THAN OR EQUAL TO 7YO IM: ICD-10-PCS | Mod: HCNC,S$GLB,, | Performed by: STUDENT IN AN ORGANIZED HEALTH CARE EDUCATION/TRAINING PROGRAM

## 2020-11-17 PROCEDURE — 99204 OFFICE O/P NEW MOD 45 MIN: CPT | Mod: 25,HCNC,S$GLB, | Performed by: STUDENT IN AN ORGANIZED HEALTH CARE EDUCATION/TRAINING PROGRAM

## 2020-11-17 PROCEDURE — 3078F DIAST BP <80 MM HG: CPT | Mod: HCNC,CPTII,S$GLB, | Performed by: STUDENT IN AN ORGANIZED HEALTH CARE EDUCATION/TRAINING PROGRAM

## 2020-11-17 PROCEDURE — 90670 PCV13 VACCINE IM: CPT | Mod: HCNC,S$GLB,, | Performed by: STUDENT IN AN ORGANIZED HEALTH CARE EDUCATION/TRAINING PROGRAM

## 2020-11-17 PROCEDURE — 90686 FLU VACCINE (QUAD) GREATER THAN OR EQUAL TO 3YO PRESERVATIVE FREE IM: ICD-10-PCS | Mod: HCNC,S$GLB,, | Performed by: FAMILY MEDICINE

## 2020-11-17 PROCEDURE — 3074F SYST BP LT 130 MM HG: CPT | Mod: HCNC,CPTII,S$GLB, | Performed by: STUDENT IN AN ORGANIZED HEALTH CARE EDUCATION/TRAINING PROGRAM

## 2020-11-17 PROCEDURE — 99204 PR OFFICE/OUTPT VISIT, NEW, LEVL IV, 45-59 MIN: ICD-10-PCS | Mod: 25,HCNC,S$GLB, | Performed by: STUDENT IN AN ORGANIZED HEALTH CARE EDUCATION/TRAINING PROGRAM

## 2020-11-17 PROCEDURE — 90715 TDAP VACCINE 7 YRS/> IM: CPT | Mod: HCNC,S$GLB,, | Performed by: STUDENT IN AN ORGANIZED HEALTH CARE EDUCATION/TRAINING PROGRAM

## 2020-11-17 PROCEDURE — 90472 IMMUNIZATION ADMIN EACH ADD: CPT | Mod: HCNC,S$GLB,, | Performed by: STUDENT IN AN ORGANIZED HEALTH CARE EDUCATION/TRAINING PROGRAM

## 2020-11-17 PROCEDURE — 3078F PR MOST RECENT DIASTOLIC BLOOD PRESSURE < 80 MM HG: ICD-10-PCS | Mod: HCNC,CPTII,S$GLB, | Performed by: STUDENT IN AN ORGANIZED HEALTH CARE EDUCATION/TRAINING PROGRAM

## 2020-11-17 PROCEDURE — G0008 ADMIN INFLUENZA VIRUS VAC: HCPCS | Mod: HCNC,S$GLB,, | Performed by: FAMILY MEDICINE

## 2020-11-17 PROCEDURE — 90632 HEPATITIS A VACCINE ADULT IM: ICD-10-PCS | Mod: HCNC,S$GLB,, | Performed by: STUDENT IN AN ORGANIZED HEALTH CARE EDUCATION/TRAINING PROGRAM

## 2020-11-17 PROCEDURE — 99999 PR PBB SHADOW E&M-EST. PATIENT-LVL II: ICD-10-PCS | Mod: PBBFAC,HCNC,,

## 2020-11-17 PROCEDURE — 90686 IIV4 VACC NO PRSV 0.5 ML IM: CPT | Mod: HCNC,S$GLB,, | Performed by: FAMILY MEDICINE

## 2020-11-17 NOTE — PROGRESS NOTES
Pre Biologic Response Modifier Therapy Consult  BMR Recipient Evaluation    Requesting Physician: Dr. Pereira    Reason for Visit: Vaccine counseling    History of Present Illness  40 y.o. female with advanced Multiple Sclerosis (dx 2015) and HTN presents for vaccine counseling.  Previusly on aubagio, starting on Ocrevus whenever ready. Comes today with fiance to clinic.     Patient denies any recent fever, chills, or infective infective illnesses.    Review of Symptoms:  Constitutional: Denies fevers, chills, + chronic weakness.  Cardiovascular: Denies chest pain, palpitations  Respiratory: Denies shortness of breath, cough, hemoptysis  GI: Denies nausea/vomitting, abd pain  : Denies dysuria, incontinence, or hematuria.  Musculoskeletal: Denies joint pain or myalgias.  Skin/breast: Denies rashes, lumps, lesions, or discharge.  Neurologic: Denies headache, dizziness, vertigo, or paresthesias.    Past Medical History:   Diagnosis Date    Anemia     Arthritis     Cardiac arrest as complication of care     pt states she went into cardiac arrest from an allergic reaction to a medication    Depression     Encounter for blood transfusion     Hemiplegia due to old stroke     Hypertension     Morbid obesity with BMI of 45.0-49.9, adult 9/20/2018    Multiple sclerosis        Past Surgical History:   Procedure Laterality Date    APPENDECTOMY      CHOLECYSTECTOMY      ESOPHAGOGASTRODUODENOSCOPY N/A 2/19/2020    Procedure: EGD (ESOPHAGOGASTRODUODENOSCOPY);  Surgeon: Michael Navarrete MD;  Location: [facility] ENDO;  Service: Endoscopy;  Laterality: N/A;    ESOPHAGOGASTRODUODENOSCOPY N/A 2/20/2020    Procedure: EGD (ESOPHAGOGASTRODUODENOSCOPY);  Surgeon: Michael Navarrete MD;  Location: [facility] OR;  Service: General;  Laterality: N/A;    HYSTERECTOMY      KNEE SURGERY      ROBOT-ASSISTED LAPAROSCOPIC SLEEVE GASTRECTOMY USING DA ANTHONY XI N/A 2/20/2020    Procedure: XI ROBOTIC SLEEVE GASTRECTOMY;  Surgeon: Michael Navarrete MD;  Location:  [facility] OR;  Service: General;  Laterality: N/A;    TONSILLECTOMY      TUBAL LIGATION         Family History   Problem Relation Age of Onset    Lupus Mother     Heart disease Mother     Hypertension Mother     Diabetes Father     Kidney disease Father     Cancer Maternal Aunt 40        breast    Breast cancer Maternal Aunt     Diabetes Maternal Aunt     COPD Maternal Aunt     Breast cancer Maternal Cousin     Breast cancer Maternal Cousin     Ovarian cancer Maternal Cousin        Social History     Socioeconomic History    Marital status: Single     Spouse name: Not on file    Number of children: 3    Years of education: Not on file    Highest education level: Not on file   Occupational History     Employer: [employer]   Social Needs    Financial resource strain: Somewhat hard    Food insecurity     Worry: Often true     Inability: Sometimes true    Transportation needs     Medical: Yes     Non-medical: No   Tobacco Use    Smoking status: Never Smoker    Smokeless tobacco: Never Used   Substance and Sexual Activity    Alcohol use: Yes     Frequency: Never     Drinks per session: Patient refused     Binge frequency: Never     Comment: occasion    Drug use: No    Sexual activity: Yes     Partners: Male     Birth control/protection: Surgical   Lifestyle    Physical activity     Days per week: 7 days     Minutes per session: 60 min    Stress: Not at all   Relationships    Social connections     Talks on phone: Once a week     Gets together: More than three times a week     Attends [employer] service: Not on file     Active member of club or organization: Patient refused     Attends meetings of clubs or organizations: Never     Relationship status: Patient refused   Other Topics Concern    Patient feels they ought to cut down on drinking/drug use Not Asked    Patient annoyed by others criticizing their drinking/drug use Not Asked    Patient has felt bad or guilty about drinking/drug use Not Asked     "Patient has had a drink/used drugs as an eye opener in the AM Not Asked   Social History Narrative    Not on file       Review of patient's allergies indicates:   Allergen Reactions    Demerol [meperidine] Itching     Other reaction(s): Itching    Dilaudid [hydromorphone (bulk)] Anaphylaxis     "coded" per pt  Patient can tolerate Lortab    Prednisone Itching     Other reaction(s): Itching    Morphine     Tramadol      shaking       Medications:  Current Outpatient Medications on File Prior to Visit   Medication Sig Dispense Refill    baclofen (LIORESAL) 10 MG tablet Take 1 tablet (10 mg total) by mouth daily as needed. 90 tablet 0    cetirizine (ZYRTEC) 10 MG tablet Take 1 tablet (10 mg total) by mouth once daily. (Patient not taking: Reported on 11/3/2020) 30 tablet 3    clotrimazole (LOTRIMIN) 1 % Soln 3 drops into right ear 3 times daily. 15 mL 1    dicyclomine (BENTYL) 10 MG capsule Take 1 capsule (10 mg total) by mouth 4 (four) times daily as needed (abdominal cramping). 30 capsule 1    doxepin (SINEQUAN) 75 MG capsule Take 1 capsule (75 mg total) by mouth nightly as needed. TAKE 1 CAPSULE BY MOUTH NIGHTLY AS NEEDED. 30 capsule 2    escitalopram oxalate (LEXAPRO) 10 MG tablet Take 1 tablet (10 mg total) by mouth once daily. 30 tablet 11    esomeprazole (NEXIUM) 40 MG capsule Take 1 capsule (40 mg total) by mouth before breakfast. 90 capsule 3    gabapentin (NEURONTIN) 300 MG capsule PLEASE SEE ATTACHED FOR DETAILED DIRECTIONS 180 capsule 0    gabapentin (NEURONTIN) 300 MG capsule Take 300mg in morning, 300mg in afternoon, and 900mg at night 450 capsule 1    HYDROcodone-acetaminophen (NORCO)  mg per tablet       methocarbamoL (ROBAXIN) 750 MG Tab Take 750 mg by mouth 2 (two) times a day.      mupirocin (BACTROBAN) 2 % ointment Apply topically 3 (three) times daily. 30 g 6    ofloxacin (OCUFLOX) 0.3 % ophthalmic solution Apply 5 drops into right ear twice daily for 7 days. 1 Bottle 0    " "promethazine (PHENERGAN) 25 MG tablet Take 1 tablet (25 mg total) by mouth every 6 (six) hours as needed for Nausea. 20 tablet 0    sodium,potassium,mag sulfates (SUPREP BOWEL PREP KIT) 17.5-3.13-1.6 gram SolR As directed for colonoscopy 254 mL 0    topiramate (TOPAMAX) 100 MG tablet Take 1 tablet (100 mg total) by mouth every evening. 90 tablet 1    triamcinolone acetonide 0.1% (KENALOG) 0.1 % ointment Apply topically 2 (two) times daily. (Patient not taking: Reported on 11/3/2020) 30 g 1    valACYclovir (VALTREX) 500 MG tablet TAKE 1 TABLET BY MOUTH TWICE A  tablet 3    valsartan (DIOVAN) 80 MG tablet Take 1 tablet (80 mg total) by mouth once daily. (replaces hydrochlorothiazide for BP) 30 tablet 2     No current facility-administered medications on file prior to visit.        Objective:   /67 (BP Location: Right arm)   Pulse 71   Temp 98 °F (36.7 °C) (Oral)   Ht 5' 6" (1.676 m)   Wt 117.9 kg (260 lb)   BMI 41.97 kg/m²   General: Afebrile, alert, comfortable, no acute distress.   HEENT: No scleral icterus. No conjunctival discharge  Pulmonary: Non labored  Extremities: Moves all extremities x 4. No peripheral edema. 2+ pulses.  Skin: No jaundice, rashes, or visible lesions.   Neurological:  Alert and oriented x 4.  In wheelchair    1) Do you have a history of:         YES NO   Diabetes   []        [x]     Autoimmune disease  []        [x]   Cancer              _        [x]   Surgical Removal of Spleen []        [x]       2) Have you had recurrent infections:             YES NO  Sinus infections  []        [x]   Lung infections  []        [x]              Urinary Tract Infections []        [x]                                              Intestinal Infections  []        [x]      Skin Infections   []        [x]       Musculoskeletal Infections    []        [x]   Reproductive Infections []        [x]   Periodontal Disease  []        [x]        3)Have you ever had: YES     NO       Chicken " Pox   [x]         []          Shingles   []         [x]            Orolabial Herpes             []         [x]          Genital Herpes  []         [x]           Genital Warts   []         [x]             Cytomegalovirus  []         [x]          Orville-Barr Virus  []         [x]              Hepatitis A   []         [x]          Hepatitis B   []         [x]          Hepatitis C   []         [x]            Syphilis   [x]         []        S/p treatment, most recent RPR neg  Gonorrhea   []         [x]         Chlamydia    []         [x]           Parasites / worms  []         [x]         Fungal Infections  []         [x]         Bloodstream Infections []         [x]             4) Tuberculosis             YES NO  Exposure to person with active TB?  []         [x]   H/o homeless?    []         [x]   H/o imprisonment?    []         [x]   Have you ever had a positive PPD?      []         [x]    If yes, what treatment did you receive:          5) Travel    What states have you lived in?    [place]     What countries have you visited for more than 2 weeks?    No                             YES     NO  Did you have any associated infections?   []         [] NA    Are you planning to travel outside of ?    []          [x]     6) Animal Exposure                  YES NO  Do you have pets living in your house?   []         [x]   If yes, describe: 2 dogs - healthy    Do you spend time or live on a farm?    []         [x]   If yes, which ones:    Do you have a fish tank?         []  [x]    Do you have a litter box?     []         [x]     Do you fish or hunt?      []         [x]   Do you clean or skin fish or animals?    []         [x]     Consume raw or undercooked meat, fish, shellfish?  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    Knit, cook/bake             YES     NO  Do you garden or otherwise work in the soil?   []          [x]   Do you hike, camp, or spend time in wooded areas?  []         [x]       9) The patient's immunization history was reviewed.     Have you ever received:  YES NO DATES  Routine Childhood vaccines  [x]         []       Influenza vaccine   [x]         []   2019   Prevnar    []         [x]     Pneumovax    []         [x]     Tetanus-diptheria -pertussis  [x]         []   >10 years ago  Hepatitis A vaccine series       []         [x]     Hepatitis B vaccine series         [x]         []     Meningitis vaccine   []         [x]     Zoster vaccine    []         [x]        Significant labs reviewed:  HepA Ab neg  HepBs Ab pos  HepBs Ag neg  HepBc Ab neg  HepC Ab neg    HIV neg  RPR neg, previous titers 1:4 -> 1:1  Quant gold neg  Strongy Neg        Syphilis:   RPR   Date Value Ref Range Status   01/10/2020 Non-reactive Non-reactive Final   04/23/2019 Reactive (A) Non-reactive Final   01/08/2019 Reactive (A) Non-reactive Final           Immunization History   Administered Date(s) Administered    Influenza 10/04/2018, 10/07/2019       Assessment:     Vaccine Counseling  Immunosuppression    Plan:       Biologic Response Modifier Candidacy:   Based on available information, there are no identified significant barriers to BRMs from an infectious disease standpoint pending acceptable serologies.  Final determination of BRM candidacy will be made once evaluation is complete and reviewed.    Except for inactivated influenza vaccine, vaccination during immune suppression might be suboptimal. Patients vaccinated within a 14-day period before starting immunosuppressive therapy should be revaccination at least 3 months after immune suppressive therapy is discontinued (and if on rituximab wait 6 months after stopping).     Immune suppression should be delayed 4 weeks after a live virus vaccine.     Counseling:  - I discussed with the patient the risk for increased susceptibility to infections following BRM therapy including  increased risk for infection.    - Specific guidance has been provided to the patient regarding the patients occupation, hobbies and activities to avoid future infectious complications including but not limited to avoiding undercooked meats and seafood, proper hygiene, and contact with animals.  - The patients has been counseled on the importance of vaccinations including but not limited to a yearly flu vaccine. Patient defers flu vaccination at this time. She states she will discuss this as well as shingrex vaccine with PCP/neurologist.    -discussed risks of HB reactivation with Ocrevus - negative HbcAb, HbsAg; positive HbsAb    Immunizations:  Based on the patients immunization history and serologies, immunizations were ordered:  - Prevnar  - Pneumovax 2 months  - TDaP  - Hepatitis A 0, 6 months              The patient was encouraged to contact us about any problems that may develop after immunization and possible side effects were reviewed.     The patient is aware that people with suppressed immune systems are more likely to have complications from shingles (such as more severe rash that lasts longer and have increased risk of developing disseminated disease). Shingrex is an inactivated vaccine, so it is safe in people with suppressed immune systems. It is recommended in all adults over the age of 50 years old regardless of their immune system and may not be covered by insurance until that age. Pt deferred shingrex at this time and will discuss with her PCP.    These recommendations have been sent to and/or discussed with the following providers:   - Dr. Meagan Pereira   - MD Camille Beaver MD  Infectious Disease     Today:  - Hepatitis A #1  - Prevnar (Pneumonia #1)  - TDaP (good for 10 years)    2 months:  - Pneumovax (Pneumonia #2)    6 months:  - Hepatitis A #2

## 2020-11-17 NOTE — PROGRESS NOTES
Hep 1.0 ml vaccine given IM in right deltoid  Lot #: [id number]     Exp: 04/15/21  Manufactory: [employer]  NDC #: 2452-7073-36      PVC 13 vaccine 0.5 ml given IM in right deltoid  Lot #: [id number]       Exp: 06/30/21  Manufactory: Pfizer  NDC #: 3379-9890-49      Flu 0.5 ml vaccine given IM in left deltoid  Lot #: [id number]         Exp: 06/30/21  Manufactory: [employer]  NDC #: 82984-212-66      Tdap 0.5 ml vaccine given IM in left deltoid  Lot #: [id number]     Exp: 04/12/22  Manufactory: [employer]  NDC #: 15818-369-49      Pt waited 15 minutes without a reaction notices

## 2020-11-17 NOTE — LETTER
November 17, 2020      Meagan Pereira MD  [address] [facility] [address] [address]           [facility]  [address]  [facility] [address]  Phone: [phone]  Fax: [phone]          Patient: Alicia Soliz   MR Number: [id number]   YOB: 1980   Date of Visit: 11/17/2020       Dear Dr. Meagan Pereira:    Thank you for referring Alicia Soliz to me for evaluation. Attached you will find relevant portions of my assessment and plan of care.    If you have questions, please do not hesitate to call me. I look forward to following Alicia Soliz along with you.    Sincerely,    Camille Herman MD    Enclosure  CC:  No Recipients    If you would like to receive this communication electronically, please contact [email or web address] or [phone] to request more information on [facility] Prom[employer] Link access.    For providers and/or their staff who would like to refer a patient to [facility], please contact us through our one-stop-shop provider referral line, [facility], at [phone].    If you feel you have received this communication in error or would no longer like to receive these types of communications, please e-mail [email or web address]

## 2020-11-18 ENCOUNTER — PATIENT MESSAGE (OUTPATIENT)
Dept: INTERNAL MEDICINE | Facility: CLINIC | Age: 40
End: 2020-11-18

## 2020-11-18 ENCOUNTER — PATIENT MESSAGE (OUTPATIENT)
Dept: INFECTIOUS DISEASES | Facility: CLINIC | Age: 40
End: 2020-11-18

## 2020-11-19 ENCOUNTER — TELEPHONE (OUTPATIENT)
Dept: NEUROLOGY | Facility: CLINIC | Age: 40
End: 2020-11-19

## 2020-11-19 ENCOUNTER — PATIENT MESSAGE (OUTPATIENT)
Dept: GASTROENTEROLOGY | Facility: CLINIC | Age: 40
End: 2020-11-19

## 2020-11-19 ENCOUNTER — PATIENT MESSAGE (OUTPATIENT)
Dept: INTERNAL MEDICINE | Facility: CLINIC | Age: 40
End: 2020-11-19

## 2020-11-19 NOTE — TELEPHONE ENCOUNTER
Returned call to update that pt is transitioning to Ocrevus. Spoke with Elmer at [facility].

## 2020-11-19 NOTE — TELEPHONE ENCOUNTER
----- Message from Shobha Beckwith sent at 11/19/2020  3:05 PM CST -----  Contact: Olivier # [phone]  Request a call back due to the verification of the pt prescriptions. Caller is with [facility] pharmacy.

## 2020-11-20 ENCOUNTER — PATIENT MESSAGE (OUTPATIENT)
Dept: NEUROLOGY | Facility: CLINIC | Age: 40
End: 2020-11-20

## 2020-11-21 ENCOUNTER — PATIENT MESSAGE (OUTPATIENT)
Dept: REHABILITATION | Facility: HOSPITAL | Age: 40
End: 2020-11-21

## 2020-11-22 ENCOUNTER — LAB VISIT (OUTPATIENT)
Dept: URGENT CARE | Facility: CLINIC | Age: 40
End: 2020-11-22
Payer: MEDICARE

## 2020-11-22 VITALS — OXYGEN SATURATION: 99 % | HEART RATE: 78 BPM | RESPIRATION RATE: 18 BRPM | TEMPERATURE: 98 F

## 2020-11-22 DIAGNOSIS — R19.5 HEME POSITIVE STOOL: ICD-10-CM

## 2020-11-22 PROCEDURE — U0003 INFECTIOUS AGENT DETECTION BY NUCLEIC ACID (DNA OR RNA); SEVERE ACUTE RESPIRATORY SYNDROME CORONAVIRUS 2 (SARS-COV-2) (CORONAVIRUS DISEASE [COVID-19]), AMPLIFIED PROBE TECHNIQUE, MAKING USE OF HIGH THROUGHPUT TECHNOLOGIES AS DESCRIBED BY CMS-2020-01-R: HCPCS | Mod: HCNC

## 2020-11-23 ENCOUNTER — PATIENT MESSAGE (OUTPATIENT)
Dept: ENDOSCOPY | Facility: HOSPITAL | Age: 40
End: 2020-11-23

## 2020-11-23 LAB — SARS-COV-2 RNA RESP QL NAA+PROBE: NOT DETECTED

## 2020-11-24 ENCOUNTER — TELEPHONE (OUTPATIENT)
Dept: NEUROLOGY | Facility: CLINIC | Age: 40
End: 2020-11-24

## 2020-11-24 NOTE — TELEPHONE ENCOUNTER
See call note from 11/19/2020. Spoke with Elmer and confirmed patient is D/C aubagio. Patient will transition to Ocrevus. Faxed d/c form back to [facility] at [phone]

## 2020-11-24 NOTE — TELEPHONE ENCOUNTER
----- Message from Rosa Helm RN sent at 11/23/2020  1:47 PM CST -----  Contact: [employer]   Pam    ----- Message -----  From: Crescencio Moreno  Sent: 11/23/2020  12:55 PM CST  To: Kelli Zapien    Mg  to Rickie  S.  Company calling to confirm if medication is upfront or replacement  for  Ocrevus    Please Call     Contact  [phone]  opt 4

## 2020-11-24 NOTE — TELEPHONE ENCOUNTER
Returned call. Informed her auth is still pending at this time; uncertain if pt will need free drug application

## 2020-11-24 NOTE — TELEPHONE ENCOUNTER
----- Message from Cary Hall sent at 11/24/2020  2:52 PM CST -----  Regarding: [facility]  Calling to confirm if the Aubagio 7mg has been discontinued for this patient.  If so, they are also asking if there is a new therapy for the patient. She can be reached at [phone]

## 2020-11-25 ENCOUNTER — ANESTHESIA (OUTPATIENT)
Dept: ENDOSCOPY | Facility: HOSPITAL | Age: 40
End: 2020-11-25
Payer: MEDICARE

## 2020-11-25 ENCOUNTER — ANESTHESIA EVENT (OUTPATIENT)
Dept: ENDOSCOPY | Facility: HOSPITAL | Age: 40
End: 2020-11-25
Payer: MEDICARE

## 2020-11-25 ENCOUNTER — HOSPITAL ENCOUNTER (OUTPATIENT)
Facility: HOSPITAL | Age: 40
Discharge: HOME | End: 2020-11-25
Attending: INTERNAL MEDICINE | Admitting: INTERNAL MEDICINE
Payer: MEDICARE

## 2020-11-25 ENCOUNTER — PATIENT MESSAGE (OUTPATIENT)
Dept: INTERNAL MEDICINE | Facility: CLINIC | Age: 40
End: 2020-11-25

## 2020-11-25 VITALS
TEMPERATURE: 98 F | DIASTOLIC BLOOD PRESSURE: 90 MMHG | WEIGHT: 265.63 LBS | HEIGHT: 66 IN | RESPIRATION RATE: 20 BRPM | BODY MASS INDEX: 42.69 KG/M2 | OXYGEN SATURATION: 97 % | SYSTOLIC BLOOD PRESSURE: 127 MMHG | HEART RATE: 63 BPM

## 2020-11-25 DIAGNOSIS — K29.30 SUPERFICIAL GASTRITIS WITHOUT HEMORRHAGE, UNSPECIFIED CHRONICITY: ICD-10-CM

## 2020-11-25 DIAGNOSIS — D50.0 IRON DEFICIENCY ANEMIA DUE TO CHRONIC BLOOD LOSS: ICD-10-CM

## 2020-11-25 DIAGNOSIS — D12.5 ADENOMATOUS POLYP OF SIGMOID COLON: ICD-10-CM

## 2020-11-25 DIAGNOSIS — R19.5 HEME POSITIVE STOOL: Primary | ICD-10-CM

## 2020-11-25 PROCEDURE — 37000008 HC ANESTHESIA 1ST 15 MINUTES: Mod: HCNC | Performed by: INTERNAL MEDICINE

## 2020-11-25 PROCEDURE — 37000009 HC ANESTHESIA EA ADD 15 MINS: Mod: HCNC | Performed by: INTERNAL MEDICINE

## 2020-11-25 PROCEDURE — 63600175 PHARM REV CODE 636 W HCPCS: Mod: HCNC | Performed by: NURSE ANESTHETIST, CERTIFIED REGISTERED

## 2020-11-25 PROCEDURE — 45385 PR COLONOSCOPY,REMV LESN,SNARE: ICD-10-PCS | Mod: HCNC,,, | Performed by: INTERNAL MEDICINE

## 2020-11-25 PROCEDURE — 27201012 HC FORCEPS, HOT/COLD, DISP: Mod: HCNC | Performed by: INTERNAL MEDICINE

## 2020-11-25 PROCEDURE — 25000003 PHARM REV CODE 250: Mod: HCNC | Performed by: NURSE ANESTHETIST, CERTIFIED REGISTERED

## 2020-11-25 PROCEDURE — 88305 TISSUE EXAM BY PATHOLOGIST: CPT | Mod: 26,HCNC,, | Performed by: STUDENT IN AN ORGANIZED HEALTH CARE EDUCATION/TRAINING PROGRAM

## 2020-11-25 PROCEDURE — 88305 TISSUE EXAM BY PATHOLOGIST: ICD-10-PCS | Mod: 26,HCNC,, | Performed by: STUDENT IN AN ORGANIZED HEALTH CARE EDUCATION/TRAINING PROGRAM

## 2020-11-25 PROCEDURE — 45385 COLONOSCOPY W/LESION REMOVAL: CPT | Mod: HCNC,,, | Performed by: INTERNAL MEDICINE

## 2020-11-25 PROCEDURE — 43239 PR EGD, FLEX, W/BIOPSY, SGL/MULTI: ICD-10-PCS | Mod: 51,HCNC,, | Performed by: INTERNAL MEDICINE

## 2020-11-25 PROCEDURE — 43239 EGD BIOPSY SINGLE/MULTIPLE: CPT | Mod: HCNC | Performed by: INTERNAL MEDICINE

## 2020-11-25 PROCEDURE — 43239 EGD BIOPSY SINGLE/MULTIPLE: CPT | Mod: 51,HCNC,, | Performed by: INTERNAL MEDICINE

## 2020-11-25 PROCEDURE — 27201089 HC SNARE, DISP (ANY): Mod: HCNC | Performed by: INTERNAL MEDICINE

## 2020-11-25 PROCEDURE — 45385 COLONOSCOPY W/LESION REMOVAL: CPT | Mod: HCNC | Performed by: INTERNAL MEDICINE

## 2020-11-25 PROCEDURE — 88305 TISSUE EXAM BY PATHOLOGIST: CPT | Mod: HCNC | Performed by: STUDENT IN AN ORGANIZED HEALTH CARE EDUCATION/TRAINING PROGRAM

## 2020-11-25 RX ORDER — SODIUM CHLORIDE, SODIUM LACTATE, POTASSIUM CHLORIDE, CALCIUM CHLORIDE 600; 310; 30; 20 MG/100ML; MG/100ML; MG/100ML; MG/100ML
INJECTION, SOLUTION INTRAVENOUS CONTINUOUS
Status: CANCELLED | OUTPATIENT
Start: 2020-11-25

## 2020-11-25 RX ORDER — PROPOFOL 10 MG/ML
VIAL (ML) INTRAVENOUS
Status: DISCONTINUED | OUTPATIENT
Start: 2020-11-25 | End: 2020-11-25

## 2020-11-25 RX ORDER — SODIUM CHLORIDE 0.9 % (FLUSH) 0.9 %
10 SYRINGE (ML) INJECTION
Status: CANCELLED | OUTPATIENT
Start: 2020-11-25

## 2020-11-25 RX ORDER — LIDOCAINE HYDROCHLORIDE 10 MG/ML
INJECTION, SOLUTION EPIDURAL; INFILTRATION; INTRACAUDAL; PERINEURAL
Status: DISCONTINUED | OUTPATIENT
Start: 2020-11-25 | End: 2020-11-25

## 2020-11-25 RX ORDER — SODIUM CHLORIDE, SODIUM LACTATE, POTASSIUM CHLORIDE, CALCIUM CHLORIDE 600; 310; 30; 20 MG/100ML; MG/100ML; MG/100ML; MG/100ML
INJECTION, SOLUTION INTRAVENOUS CONTINUOUS PRN
Status: DISCONTINUED | OUTPATIENT
Start: 2020-11-25 | End: 2020-11-25

## 2020-11-25 RX ADMIN — GLYCOPYRROLATE 0.2 MG: 0.2 INJECTION, SOLUTION INTRAMUSCULAR; INTRAVITREAL at 10:11

## 2020-11-25 RX ADMIN — PROPOFOL 20 MG: 10 INJECTION, EMULSION INTRAVENOUS at 10:11

## 2020-11-25 RX ADMIN — SODIUM CHLORIDE, SODIUM LACTATE, POTASSIUM CHLORIDE, AND CALCIUM CHLORIDE: 600; 310; 30; 20 INJECTION, SOLUTION INTRAVENOUS at 10:11

## 2020-11-25 RX ADMIN — LIDOCAINE HYDROCHLORIDE 100 MG: 10 INJECTION, SOLUTION EPIDURAL; INFILTRATION; INTRACAUDAL; PERINEURAL at 10:11

## 2020-11-25 RX ADMIN — PROPOFOL 80 MG: 10 INJECTION, EMULSION INTRAVENOUS at 10:11

## 2020-11-25 NOTE — INTERVAL H&P NOTE
The patient has been examined and the H&P has been reviewed:I have reviewed this note and I agree with this assessment. The patient was seen in the GI office and remains stable for endoscopy at the time of this present evaluation. GH       risks, benefits and alternative options discussed and understood by patient/family.          There are no hospital problems to display for this patient.

## 2020-11-25 NOTE — ANESTHESIA PREPROCEDURE EVALUATION
11/25/2020  Alicia Soliz is a 40 y.o., female.    Pre-op Assessment    I have reviewed the Patient Summary Reports.     I have reviewed the Nursing Notes. I have reviewed the NPO Status.   I have reviewed the Medications.     Review of Systems  Anesthesia Hx:  No problems with previous Anesthesia  History of prior surgery of interest to airway management or planning: Denies Family Hx of Anesthesia complications.   Denies Personal Hx of Anesthesia complications.   Social:  Non-Smoker, Social Alcohol Use    Hematology/Oncology:  Hematology Normal   Oncology Normal     EENT/Dental:EENT/Dental Normal   Cardiovascular:   Hypertension, well controlled    Pulmonary:  Pulmonary Normal    Renal/:  Renal/ Normal     Hepatic/GI:  Hepatic/GI Normal Bowel Prep. Hx gastric sleeve   Musculoskeletal:  Musculoskeletal Normal    Neurological:  Neurology Normal Multiple sclerosis  Left sided weakness  Walks with cane/walker   Endocrine:  Endocrine Normal    Dermatological:  Skin Normal    Psych:   depression          Physical Exam  General:  Well nourished, Morbid Obesity    Airway/Jaw/Neck:  Airway Findings: Mouth Opening: Normal Tongue: Normal  General Airway Assessment: Adult  Mallampati: II  TM Distance: Normal, at least 6 cm  Jaw/Neck Findings:  Neck ROM: Normal ROM      Dental:  Dental Findings: Upper partial dentures, Upper front caps   Chest/Lungs:  Chest/Lungs Findings: Clear to auscultation, Normal Respiratory Rate     Heart/Vascular:  Heart Findings: Rate: Normal  Rhythm: Regular Rhythm  Sounds: Normal        Mental Status:  Mental Status Findings:  Cooperative, Alert and Oriented         Anesthesia Plan  Type of Anesthesia, risks & benefits discussed:  Anesthesia Type:  MAC  Patient's Preference:   Intra-op Monitoring Plan: standard ASA monitors  Intra-op Monitoring Plan Comments:   Post Op Pain Control  Plan:   Post Op Pain Control Plan Comments:   Induction:   IV  Beta Blocker:  Patient is not currently on a Beta-Blocker (No further documentation required).       Informed Consent: Patient understands risks and agrees with Anesthesia plan.  Questions answered. Anesthesia consent signed with patient.  ASA Score: 3     Day of Surgery Review of History & Physical: I have interviewed and examined the patient. I have reviewed the patient's H&P dated:  There are no significant changes.          Ready For Surgery From Anesthesia Perspective.

## 2020-11-25 NOTE — ANESTHESIA POSTPROCEDURE EVALUATION
Anesthesia Post Evaluation    Patient: Alicia Soliz    Procedure(s) Performed: Procedure(s) (LRB):  EGD (ESOPHAGOGASTRODUODENOSCOPY) (N/A)  COLONOSCOPY (N/A)    Final Anesthesia Type: MAC    Patient location during evaluation: GI PACU  Patient participation: Yes- Able to Participate  Level of consciousness: awake and alert and oriented  Post-procedure vital signs: reviewed and stable  Pain management: adequate  Airway patency: patent    PONV status at discharge: No PONV  Anesthetic complications: no      Cardiovascular status: blood pressure returned to baseline, stable and hemodynamically stable  Respiratory status: unassisted, room air and spontaneous ventilation  Hydration status: euvolemic  Follow-up not needed.          Vitals Value Taken Time   /90 11/25/20 1133   Temp 36.4 °C (97.5 °F) 11/25/20 1103   Pulse 63 11/25/20 1133   Resp 20 11/25/20 1133   SpO2 97 % 11/25/20 1133         No case tracking events are documented in the log.      Pain/Alban Score: Alban Score: 10 (11/25/2020 11:33 AM)

## 2020-11-25 NOTE — DISCHARGE INSTRUCTIONS
Understanding Colon and Rectal Polyps    The colon (also called the large intestine) is a muscular tube that forms the last part of the digestive tract. It absorbs water and stores food waste. The colon is about 4 to 6 feet long. The rectum is the last 6 inches of the colon. The colon and rectum have a smooth lining composed of millions of cells. Changes in these cells can lead to growths in the colon that can become cancerous and should be removed. Multiple tests are available to screen for colon cancer, but the colonoscopy is the most recommended test. During colonoscopy, these polyps can be removed. How often you need this test depends on many things including your condition, your family history, symptoms, and what the findings were at the previous colonoscopy.   When the colon lining changes  Changes that happen in the cells that line the colon or rectum can lead to growths called polyps. Over a period of years, polyps can turn cancerous. Removing polyps early may prevent cancer from ever forming.  Polyps  Polyps are fleshy clumps of tissue that form on the lining of the colon or rectum. Small polyps are usually benign (not cancerous). However, over time, cells in a polyp can change and become cancerous. Certain types of polyps known as adenomatous polyps are premalignant. The risk for invasive cancer increases with the size of the polyp and certain cell and gene features. This means that they can become cancerous if they're not removed. Hyperplastic polyps are benign. They can grow quite large and not turn cancerous.   Cancer  Almost all colorectal cancers start when polyp cells begin growing abnormally. As a cancerous tumor grows, it may involve more and more of the colon or rectum. In time, cancer can also grow beyond the colon or rectum and spread to nearby organs or to glands called lymph nodes. The cells can also travel to other parts of the body. This is known as metastasis. The earlier a cancerous  tumor is removed, the better the chance of preventing its spread.    Date Last Reviewed: 8/1/2016  © 5276-0609 [employer]. [address] [place] [address], [place] [address]. All rights reserved. This information is not intended as a substitute for professional medical care. Always follow your healthcare professional's instructions.        Colonoscopy     A camera attached to a flexible tube with a viewing lens is used to take video pictures.     Colonoscopy is a test to view the inside of your lower digestive tract (colon and rectum). Sometimes it can show the last part of the small intestine (ileum). During the test, small pieces of tissue may be removed for testing. This is called a biopsy. Small growths, such as polyps, may also be removed.   Why is colonoscopy done?  The test is done to help look for colon cancer. And it can help find the source of abdominal pain, bleeding, and changes in bowel habits. It may be needed once a year, depending on factors such as your:  · Age  · Health history  · Family health history  · Symptoms  · Results from any prior colonoscopy  Risks and possible complications  These include:  · Bleeding               · A puncture or tear in the colon   · Risks of anesthesia  · A cancer lesion not being seen  Getting ready   To prepare for the test:  · Talk with your healthcare provider about the risks of the test (see below). Also ask your healthcare provider about alternatives to the test.  · Tell your healthcare provider about any medicines you take. Also tell him or her about any health conditions you may have.  · Make sure your rectum and colon are empty for the test. Follow the diet and bowel prep instructions exactly. If you dont, the test may need to be rescheduled.  · Plan for a friend or family member to drive you home after the test.     Colonoscopy provides an inside view of the entire colon.     You may discuss the results with your doctor right away or at a future  visit.  During the test   The test is usually done in the hospital on an outpatient basis. This means you go home the same day. The procedure takes about 30 minutes. During that time:  · You are given relaxing (sedating) medicine through an IV line. You may be drowsy, or fully asleep.  · The healthcare provider will first give you a physical exam to check for anal and rectal problems.  · Then the anus is lubricated and the scope inserted.  · If you are awake, you may have a feeling similar to needing to have a bowel movement. You may also feel pressure as air is pumped into the colon. Its OK to pass gas during the procedure.  · Biopsy, polyp removal, or other treatments may be done during the test.  After the test   You may have gas right after the test. It can help to try to pass it to help prevent later bloating. Your healthcare provider may discuss the results with you right away. Or you may need to schedule a follow-up visit to talk about the results. After the test, you can go back to your normal eating and other activities. You may be tired from the sedation and need to rest for a few hours.  Date Last Reviewed: 11/1/2016 © 2000-2017 [employer]. [address] [place] [address], [place] [address]. All rights reserved. This information is not intended as a substitute for professional medical care. Always follow your healthcare professional's instructions.        Upper GI Endoscopy     During endoscopy, a long, flexible tube is used to view the inside of your upper GI tract.      Upper GI endoscopy allows your healthcare provider to look directly into the beginning of your gastrointestinal (GI) tract. The esophagus, stomach, and duodenum (the first part of the small intestine) make up the upper GI tract.   Before the exam  Follow these and any other instructions you are given before your endoscopy. If you dont follow the healthcare providers instructions carefully, the test may need to be canceled or  done over:  · Don't eat or drink anything after midnight the night before your exam. If your exam is in the afternoon, drink only clear liquids in the morning. Don't eat or drink anything for 8 hours before the exam. In some cases, you may be able to take medicines with sips of water until 2 hours before the procedure. Speak with your healthcare provider about this.   · Bring your X-rays and any other test results you have.  · Because you will be sedated, arrange for an adult to drive you home after the exam.  · Tell your healthcare provider before the exam if you are taking any medicines or have any medical problems.  The procedure  Here is what to expect:  · You will lie on the endoscopy table. Usually patients lie on the left side.  · You will be monitored and given oxygen.  · Your throat may be numbed with a spray or gargle. You are given medicine through an intravenous (IV) line that will help you relax and remain comfortable. You may be awake or asleep during the procedure.  · The healthcare provider will put the endoscope in your mouth and down your esophagus. It is thinner than most pieces of food that you swallow. It will not affect your breathing. The medicine helps keep you from gagging.  · Air is put into your GI tract to expand it. It can make you burp.  · During the procedure, the healthcare provider can take biopsies (tissue samples), remove abnormalities, such as polyps, or treat abnormalities through a variety of devices placed through the endoscope. You will not feel this.   · The endoscope carries images of your upper GI tract to a video screen. If you are awake, you may be able to look at the images.  · After the procedure is done, you will rest for a time. An adult must drive you home.  When to call your healthcare provider  Contact your healthcare provider if you have:  · Black or tarry stools, or blood in your stool  · Fever  · Pain in your belly that does not go away  · Nausea and vomiting,  or vomiting blood   Date Last Reviewed: 7/1/2016  © 5885-1812 [employer]. [address] [place] [address], [place] [address]. All rights reserved. This information is not intended as a substitute for professional medical care. Always follow your healthcare professional's instructions.

## 2020-11-25 NOTE — PROVATION PATIENT INSTRUCTIONS
Discharge Summary/Instructions after an Endoscopic Procedure  Patient Name: Alicia Soliz  Patient MRN: [id number]  Patient YOB: 1980 Wednesday, November 25, 2020 Andrew Jenkins III, MD  RESTRICTIONS:  During your procedure today, you received medications for sedation.  These   medications may affect your judgment, balance and coordination.  Therefore,   for 24 hours, you have the following restrictions:   - DO NOT drive a car, operate machinery, make legal/financial decisions,   sign important papers or drink alcohol.    ACTIVITY:  Today: no heavy lifting, straining or running due to procedural   sedation/anesthesia.  The following day: return to full activity including work.  DIET:  Eat and drink normally unless instructed otherwise.     TREATMENT FOR COMMON SIDE EFFECTS:  - Mild abdominal pain, nausea, belching, bloating or excessive gas:  rest,   eat lightly and use a heating pad.  - Sore Throat: treat with throat lozenges and/or gargle with warm salt   water.  - Because air was used during the procedure, expelling large amounts of air   from your rectum or belching is normal.  - If a bowel prep was taken, you may not have a bowel movement for 1-3 days.    This is normal.  SYMPTOMS TO WATCH FOR AND REPORT TO YOUR PHYSICIAN:  1. Abdominal pain or bloating, other than gas cramps.  2. Chest pain.  3. Back pain.  4. Signs of infection such as: chills or fever occurring within 24 hours   after the procedure.  5. Rectal bleeding, which would show as bright red, maroon, or black stools.   (A tablespoon of blood from the rectum is not serious, especially if   hemorrhoids are present.)  6. Vomiting.  7. Weakness or dizziness.  GO DIRECTLY TO THE NEAREST EMERGENCY ROOM IF YOU HAVE ANY OF THE FOLLOWING:      Difficulty breathing              Chills and/or fever over 101 F   Persistent vomiting and/or vomiting blood   Severe abdominal pain   Severe chest pain   Black, tarry stools   Bleeding- more than one  tablespoon   Any other symptom or condition that you feel may need urgent attention  Your doctor recommends these additional instructions:  If any biopsies were taken, your doctors clinic will contact you in 1 to 2   weeks with any results.  - Discharge patient to home (via wheelchair).   - Resume previous diet.   - Continue present medications.   - Await pathology results.   - Return to GI clinic as previously scheduled.  For questions, problems or results please call your physician Andrew Jenkins III, MD at Work:  [phone]  If you have any questions about the above instructions, call the GI   department at [phone] or call the endoscopy unit at [phone]   from 7am until 3 pm.  [facility] [place], EMERGENCY ROOM PHONE NUMBER:   [phone]  IF A COMPLICATION OR EMERGENCY SITUATION ARISES AND YOU ARE UNABLE TO REACH   YOUR PHYSICIAN - GO DIRECTLY TO THE EMERGENCY ROOM.  I have read or have had read to me these discharge instructions for my   procedure and have received a written copy.  I understand these   instructions and will follow-up with my physician if I have any questions.     __________________________________       _____________________________________  Nurse Signature                                          Patient/Designated   Responsible Party Signature  Andrew Jenkins III, MD  11/25/2020 11:21:58 AM  This report has been verified and signed electronically.  [employer]

## 2020-11-25 NOTE — OR NURSING
Family update on patient status.Final time out, anesthesia agrees, patient adequately sedated, view anesthesia for vital signs, medication/fluid documentation.

## 2020-11-25 NOTE — INTERVAL H&P NOTE
The patient has been examined and the H&P has been reviewed:  Family History   Problem Relation Age of Onset    Lupus Mother     Heart disease Mother     Hypertension Mother     Diabetes Father     Kidney disease Father     Cancer Maternal Aunt 40        breast    Breast cancer Maternal Aunt     Diabetes Maternal Aunt     COPD Maternal Aunt     Breast cancer Maternal Cousin     Breast cancer Maternal Cousin     Ovarian cancer Maternal Cousin      Past Medical History:   Diagnosis Date    Anemia     Arthritis     Cardiac arrest as complication of care     pt states she went into cardiac arrest from an allergic reaction to a medication    Depression     Encounter for blood transfusion     Hemiplegia due to old stroke     Hypertension     Morbid obesity with BMI of 45.0-49.9, adult 9/20/2018    Multiple sclerosis      Past Surgical History:   Procedure Laterality Date    APPENDECTOMY      CHOLECYSTECTOMY      ESOPHAGOGASTRODUODENOSCOPY N/A 2/19/2020    Procedure: EGD (ESOPHAGOGASTRODUODENOSCOPY);  Surgeon: Michael Navarrete MD;  [facility]: [facility];  Service: Endoscopy;  Laterality: N/A;    ESOPHAGOGASTRODUODENOSCOPY N/A 2/20/2020    Procedure: EGD (ESOPHAGOGASTRODUODENOSCOPY);  Surgeon: Michael Navarrete MD;  Location: [facility] OR;  Service: General;  Laterality: N/A;    HYSTERECTOMY      KNEE SURGERY      ROBOT-ASSISTED LAPAROSCOPIC SLEEVE GASTRECTOMY USING DA ANTHONY XI N/A 2/20/2020    Procedure: XI ROBOTIC SLEEVE GASTRECTOMY;  Surgeon: Michael Navarrete MD;  Location: [facility] OR;  Service: General;  Laterality: N/A;    TONSILLECTOMY      TUBAL LIGATION       Social History     Socioeconomic History    Marital status: Single     Spouse name: Not on file    Number of children: 3    Years of education: Not on file    Highest education level: Not on file   Occupational History     Employer: [employer]   Social Needs    Financial resource strain: Somewhat hard    Food insecurity     Worry: Often true     Inability:  "Sometimes true    Transportation needs     Medical: Yes     Non-medical: No   Tobacco Use    Smoking status: Never Smoker    Smokeless tobacco: Never Used   Substance and Sexual Activity    Alcohol use: Yes     Frequency: Never     Drinks per session: Patient refused     Binge frequency: Never     Comment: occasion    Drug use: No    Sexual activity: Yes     Partners: Male     Birth control/protection: Surgical   Lifestyle    Physical activity     Days per week: 7 days     Minutes per session: 60 min    Stress: Not at all   Relationships    Social connections     Talks on phone: Once a week     Gets together: More than three times a week     Attends Druze service: Not on file     Active member of club or organization: Patient refused     Attends meetings of clubs or organizations: Never     Relationship status: Patient refused   Other Topics Concern    Patient feels they ought to cut down on drinking/drug use Not Asked    Patient annoyed by others criticizing their drinking/drug use Not Asked    Patient has felt bad or guilty about drinking/drug use Not Asked    Patient has had a drink/used drugs as an eye opener in the AM Not Asked   Social History Narrative    Not on file     Review of patient's allergies indicates:   Allergen Reactions    Demerol [meperidine] Itching     Other reaction(s): Itching    Dilaudid [hydromorphone (bulk)] Anaphylaxis     "coded" per pt  Patient can tolerate Lortab    Prednisone Itching     Other reaction(s): Itching    Morphine     Tramadol      shaking     No current facility-administered medications on file prior to encounter.      Current Outpatient Medications on File Prior to Encounter   Medication Sig Dispense Refill    baclofen (LIORESAL) 10 MG tablet Take 1 tablet (10 mg total) by mouth daily as needed. 90 tablet 0    cetirizine (ZYRTEC) 10 MG tablet Take 1 tablet (10 mg total) by mouth once daily. 30 tablet 3    dicyclomine (BENTYL) 10 MG capsule Take " 1 capsule (10 mg total) by mouth 4 (four) times daily as needed (abdominal cramping). 30 capsule 1    doxepin (SINEQUAN) 75 MG capsule Take 1 capsule (75 mg total) by mouth nightly as needed. TAKE 1 CAPSULE BY MOUTH NIGHTLY AS NEEDED. 30 capsule 2    escitalopram oxalate (LEXAPRO) 10 MG tablet Take 1 tablet (10 mg total) by mouth once daily. 30 tablet 11    esomeprazole (NEXIUM) 40 MG capsule Take 1 capsule (40 mg total) by mouth before breakfast. 90 capsule 3    HYDROcodone-acetaminophen (NORCO)  mg per tablet       methocarbamoL (ROBAXIN) 750 MG Tab Take 750 mg by mouth 2 (two) times a day.      ofloxacin (OCUFLOX) 0.3 % ophthalmic solution Apply 5 drops into right ear twice daily for 7 days. 1 Bottle 0    promethazine (PHENERGAN) 25 MG tablet Take 1 tablet (25 mg total) by mouth every 6 (six) hours as needed for Nausea. 20 tablet 0    sodium,potassium,mag sulfates (SUPREP BOWEL PREP KIT) 17.5-3.13-1.6 gram SolR As directed for colonoscopy 254 mL 0    topiramate (TOPAMAX) 100 MG tablet Take 1 tablet (100 mg total) by mouth every evening. 90 tablet 1    triamcinolone acetonide 0.1% (KENALOG) 0.1 % ointment Apply topically 2 (two) times daily. 30 g 1    valACYclovir (VALTREX) 500 MG tablet TAKE 1 TABLET BY MOUTH TWICE A  tablet 3    clotrimazole (LOTRIMIN) 1 % Soln 3 drops into right ear 3 times daily. 15 mL 1    gabapentin (NEURONTIN) 300 MG capsule PLEASE SEE ATTACHED FOR DETAILED DIRECTIONS 180 capsule 0    gabapentin (NEURONTIN) 300 MG capsule Take 300mg in morning, 300mg in afternoon, and 900mg at night (Patient not taking: Reported on 11/17/2020) 450 capsule 1    mupirocin (BACTROBAN) 2 % ointment Apply topically 3 (three) times daily. 30 g 6          risks, benefits and alternative options discussed and understood by patient/family.          There are no hospital problems to display for this patient.

## 2020-11-25 NOTE — PROVATION PATIENT INSTRUCTIONS
Discharge Summary/Instructions after an Endoscopic Procedure  Patient Name: Alicia Soliz  Patient MRN: [id number]  Patient YOB: 1980 Wednesday, November 25, 2020 Andrew Jenkins III, MD  RESTRICTIONS:  During your procedure today, you received medications for sedation.  These   medications may affect your judgment, balance and coordination.  Therefore,   for 24 hours, you have the following restrictions:   - DO NOT drive a car, operate machinery, make legal/financial decisions,   sign important papers or drink alcohol.    ACTIVITY:  Today: no heavy lifting, straining or running due to procedural   sedation/anesthesia.  The following day: return to full activity including work.  DIET:  Eat and drink normally unless instructed otherwise.     TREATMENT FOR COMMON SIDE EFFECTS:  - Mild abdominal pain, nausea, belching, bloating or excessive gas:  rest,   eat lightly and use a heating pad.  - Sore Throat: treat with throat lozenges and/or gargle with warm salt   water.  - Because air was used during the procedure, expelling large amounts of air   from your rectum or belching is normal.  - If a bowel prep was taken, you may not have a bowel movement for 1-3 days.    This is normal.  SYMPTOMS TO WATCH FOR AND REPORT TO YOUR PHYSICIAN:  1. Abdominal pain or bloating, other than gas cramps.  2. Chest pain.  3. Back pain.  4. Signs of infection such as: chills or fever occurring within 24 hours   after the procedure.  5. Rectal bleeding, which would show as bright red, maroon, or black stools.   (A tablespoon of blood from the rectum is not serious, especially if   hemorrhoids are present.)  6. Vomiting.  7. Weakness or dizziness.  GO DIRECTLY TO THE NEAREST EMERGENCY ROOM IF YOU HAVE ANY OF THE FOLLOWING:      Difficulty breathing              Chills and/or fever over 101 F   Persistent vomiting and/or vomiting blood   Severe abdominal pain   Severe chest pain   Black, tarry stools   Bleeding- more than one  tablespoon   Any other symptom or condition that you feel may need urgent attention  Your doctor recommends these additional instructions:  If any biopsies were taken, your doctors clinic will contact you in 1 to 2   weeks with any results.  - Discharge patient to home (via wheelchair).   - High fiber diet.   - Continue present medications.   - Await pathology results.   - Repeat colonoscopy in 3 years for surveillance.   - Return to primary care physician as previously scheduled.   - Discharge patient to home (via wheelchair).   - High fiber diet.   - Continue present medications.   - Await pathology results.   - Repeat colonoscopy in 3 years for surveillance.   - Return to referring physician as previously scheduled.  For questions, problems or results please call your physician Andrew Jenkins III, MD at Work:  [phone]  If you have any questions about the above instructions, call the GI   department at [phone] or call the endoscopy unit at [phone]   from 7am until 3 pm.  [facility], EMERGENCY ROOM PHONE NUMBER:   [phone]  IF A COMPLICATION OR EMERGENCY SITUATION ARISES AND YOU ARE UNABLE TO REACH   YOUR PHYSICIAN - GO DIRECTLY TO THE EMERGENCY ROOM.  I have read or have had read to me these discharge instructions for my   procedure and have received a written copy.  I understand these   instructions and will follow-up with my physician if I have any questions.     __________________________________       _____________________________________  Nurse Signature                                          Patient/Designated   Responsible Party Signature  Andrew Jenkins III, MD  11/25/2020 11:14:04 AM  This report has been verified and signed electronically.  [employer]

## 2020-11-25 NOTE — TRANSFER OF CARE
"Anesthesia Transfer of Care Note    Patient: Alicia Soliz    Procedure(s) Performed: Procedure(s) (LRB):  EGD (ESOPHAGOGASTRODUODENOSCOPY) (N/A)  COLONOSCOPY (N/A)    Patient location: GI    Anesthesia Type: MAC    Transport from OR: Transported from OR on room air with adequate spontaneous ventilation    Post pain: adequate analgesia    Post assessment: no apparent anesthetic complications    Post vital signs: stable    Level of consciousness: awake, alert and oriented    Nausea/Vomiting: no nausea/vomiting    Complications: none    Transfer of care protocol was followed      Last vitals:   Visit Vitals  BP (!) 127/90 (BP Location: Left arm, Patient Position: Lying)   Pulse 63   Temp 36.4 °C (97.5 °F) (Temporal)   Resp 20   Ht 5' 6" (1.676 m)   Wt 120.5 kg (265 lb 10.5 oz)   SpO2 97%   BMI 42.88 kg/m²     "

## 2020-11-25 NOTE — DISCHARGE SUMMARY
[facility]  Discharge Note  Short Stay    Procedure(s) (LRB):  EGD (ESOPHAGOGASTRODUODENOSCOPY) (N/A)  COLONOSCOPY (N/A)    OUTCOME: Patient tolerated treatment/procedure well without complication and is now ready for discharge.    DISPOSITION: Home or Self Care    FINAL DIAGNOSIS:  <principal problem not specified>    FOLLOWUP: In clinic    DISCHARGE INSTRUCTIONS:    Discharge Procedure Orders   Diet general     Activity as tolerated        Clinical Reference Documents Added to Patient Instructions       Document    COLON AND RECTAL POLYPS, UNDERSTANDING (ENGLISH)    COLONOSCOPY  (ENGLISH)    ENDOSCOPY, UPPER GI (ENGLISH)

## 2020-11-27 ENCOUNTER — DOCUMENTATION ONLY (OUTPATIENT)
Dept: NEUROLOGY | Facility: CLINIC | Age: 40
End: 2020-11-27

## 2020-11-30 ENCOUNTER — PATIENT MESSAGE (OUTPATIENT)
Dept: OTOLARYNGOLOGY | Facility: CLINIC | Age: 40
End: 2020-11-30

## 2020-12-01 ENCOUNTER — PATIENT MESSAGE (OUTPATIENT)
Dept: REHABILITATION | Facility: HOSPITAL | Age: 40
End: 2020-12-01

## 2020-12-01 ENCOUNTER — PATIENT MESSAGE (OUTPATIENT)
Dept: INTERNAL MEDICINE | Facility: CLINIC | Age: 40
End: 2020-12-01

## 2020-12-01 ENCOUNTER — TELEPHONE (OUTPATIENT)
Dept: NEUROLOGY | Facility: CLINIC | Age: 40
End: 2020-12-01

## 2020-12-01 NOTE — TELEPHONE ENCOUNTER
----- Message from Fernando Self sent at 12/1/2020  1:17 PM CST -----  [facility]  Emile needed the denial reason for the medication Ocrevus    Please contact Emile at [phone]

## 2020-12-02 LAB
FINAL PATHOLOGIC DIAGNOSIS: NORMAL
GROSS: NORMAL
Lab: NORMAL

## 2020-12-03 ENCOUNTER — TELEPHONE (OUTPATIENT)
Dept: NEUROLOGY | Facility: HOSPITAL | Age: 40
End: 2020-12-03

## 2020-12-03 ENCOUNTER — NURSE TRIAGE (OUTPATIENT)
Dept: ADMINISTRATIVE | Facility: CLINIC | Age: 40
End: 2020-12-03

## 2020-12-03 ENCOUNTER — LAB VISIT (OUTPATIENT)
Dept: LAB | Facility: HOSPITAL | Age: 40
End: 2020-12-03
Attending: PSYCHIATRY & NEUROLOGY
Payer: MEDICARE

## 2020-12-03 DIAGNOSIS — G35 MULTIPLE SCLEROSIS: ICD-10-CM

## 2020-12-03 DIAGNOSIS — I10 ESSENTIAL HYPERTENSION: ICD-10-CM

## 2020-12-03 LAB
ANION GAP SERPL CALC-SCNC: 8 MMOL/L (ref 8–16)
BASOPHILS # BLD AUTO: 0.04 K/UL (ref 0–0.2)
BASOPHILS NFR BLD: 0.7 % (ref 0–1.9)
BUN SERPL-MCNC: 7 MG/DL (ref 6–20)
CALCIUM SERPL-MCNC: 9 MG/DL (ref 8.7–10.5)
CHLORIDE SERPL-SCNC: 112 MMOL/L (ref 95–110)
CO2 SERPL-SCNC: 25 MMOL/L (ref 23–29)
CREAT SERPL-MCNC: 0.8 MG/DL (ref 0.5–1.4)
DIFFERENTIAL METHOD: ABNORMAL
EOSINOPHIL # BLD AUTO: 0.1 K/UL (ref 0–0.5)
EOSINOPHIL NFR BLD: 1.7 % (ref 0–8)
ERYTHROCYTE [DISTWIDTH] IN BLOOD BY AUTOMATED COUNT: 17.1 % (ref 11.5–14.5)
EST. GFR  (AFRICAN AMERICAN): >60 ML/MIN/1.73 M^2
EST. GFR  (NON AFRICAN AMERICAN): >60 ML/MIN/1.73 M^2
GLUCOSE SERPL-MCNC: 84 MG/DL (ref 70–110)
HCT VFR BLD AUTO: 36.3 % (ref 37–48.5)
HGB BLD-MCNC: 10.5 G/DL (ref 12–16)
IMM GRANULOCYTES # BLD AUTO: 0.01 K/UL (ref 0–0.04)
IMM GRANULOCYTES NFR BLD AUTO: 0.2 % (ref 0–0.5)
LYMPHOCYTES # BLD AUTO: 2.4 K/UL (ref 1–4.8)
LYMPHOCYTES NFR BLD: 41.2 % (ref 18–48)
MCH RBC QN AUTO: 21.3 PG (ref 27–31)
MCHC RBC AUTO-ENTMCNC: 28.9 G/DL (ref 32–36)
MCV RBC AUTO: 74 FL (ref 82–98)
MONOCYTES # BLD AUTO: 0.3 K/UL (ref 0.3–1)
MONOCYTES NFR BLD: 5.6 % (ref 4–15)
NEUTROPHILS # BLD AUTO: 3 K/UL (ref 1.8–7.7)
NEUTROPHILS NFR BLD: 50.6 % (ref 38–73)
NRBC BLD-RTO: 0 /100 WBC
PLATELET # BLD AUTO: 244 K/UL (ref 150–350)
PMV BLD AUTO: 11.8 FL (ref 9.2–12.9)
POTASSIUM SERPL-SCNC: 3.7 MMOL/L (ref 3.5–5.1)
RBC # BLD AUTO: 4.94 M/UL (ref 4–5.4)
SODIUM SERPL-SCNC: 145 MMOL/L (ref 136–145)
WBC # BLD AUTO: 5.9 K/UL (ref 3.9–12.7)

## 2020-12-03 PROCEDURE — 85025 COMPLETE CBC W/AUTO DIFF WBC: CPT | Mod: HCNC

## 2020-12-03 PROCEDURE — 36415 COLL VENOUS BLD VENIPUNCTURE: CPT | Mod: HCNC

## 2020-12-03 PROCEDURE — 80048 BASIC METABOLIC PNL TOTAL CA: CPT | Mod: HCNC

## 2020-12-04 ENCOUNTER — TELEPHONE (OUTPATIENT)
Dept: NEUROLOGY | Facility: CLINIC | Age: 40
End: 2020-12-04

## 2020-12-04 ENCOUNTER — PATIENT MESSAGE (OUTPATIENT)
Dept: OTOLARYNGOLOGY | Facility: CLINIC | Age: 40
End: 2020-12-04

## 2020-12-04 RX ORDER — OFLOXACIN 3 MG/ML
SOLUTION/ DROPS OPHTHALMIC
Qty: 1 BOTTLE | Refills: 0 | Status: SHIPPED | OUTPATIENT
Start: 2020-12-04 | End: 2020-12-07

## 2020-12-04 NOTE — TELEPHONE ENCOUNTER
Spoke with pt:     C/o MS is acting up tonight. Started earlier today and feels like something squeezing around rib cage. Will feel like takes breath away. Started around 11 or 12pm. Has taken- hydrocodone and baclofen. C/o moderate SOB on exertion.   Protocol advice given and pt verbalizes understanding. Was off of meds for a month was taking Aubagio; Dr Pereira- stopped it due to about to start iv infusion. No fevers. New tingling left hand. But no other new weaknesses or tingling/numbness. Taking gabapentin and robaxin also. Pain is 7/10. Protocol advice given and pt verbalizes understanding but would like MD advice. Pt willing to stay home if is safe.     Spoke with Neurology resident: states will review chart and call pt. I gave MD pt phone #;  notified pt MD to call her with in hour- to keep phone close by. Pt verbalizes understanding.       Reason for Disposition   [1] MILD difficulty breathing (e.g., minimal/no SOB at rest, SOB with walking, pulse <100) AND [2] NEW-onset or WORSE than normal    Additional Information   Negative: [1] Breathing stopped AND [2] hasn't returned   Negative: Choking on something   Negative: Severe difficulty breathing (e.g., struggling for each breath, speaks in single words)   Negative: Bluish (or gray) lips or face now   Negative: Difficult to awaken or acting confused (e.g., disoriented, slurred speech)   Negative: Passed out (i.e., lost consciousness, collapsed and was not responding)   Negative: Wheezing started suddenly after medicine, an allergic food or bee sting   Negative: Stridor   Negative: Slow, shallow and weak breathing   Negative: Sounds like a life-threatening emergency to the triager   Negative: [1] MODERATE difficulty breathing (e.g., speaks in phrases, SOB even at rest, pulse 100-120) AND [2] NEW-onset or WORSE than normal   Negative: Wheezing can be heard across the room   Negative: Drooling or spitting out saliva (because can't swallow)    "Negative: History of prior "blood clot" in leg or lungs (i.e., deep vein thrombosis, pulmonary embolism)   Negative: History of inherited increased risk of blood clots (e.g., Factor 5 Leiden, Anti-thrombin 3, Protein C or Protein S deficiency, Prothrombin mutation)   Negative: Hip or leg fracture (broken bone) in past month (or had cast on leg or ankle in past month)   Negative: Major surgery in the past month   Negative: Illness requiring prolonged bedrest in past month (e.g., immobilization, long hospital stay)   Negative: Long-distance travel in past month (e.g., car, bus, train, plane; with trip lasting 6 or more hours)   Negative: Extra heart beats OR irregular heart beating   (i.e., "palpitations")   Negative: Fever > 103 F (39.4 C)   Negative: [1] Fever > 101 F (38.3 C) AND [2] age > 60   Negative: [1] Fever > 100.0 F (37.8 C) AND [2] bedridden (e.g., nursing home patient, CVA, chronic illness, recovering from surgery)   Negative: [1] Fever > 100.0 F (37.8 C) AND [2] diabetes mellitus or weak immune system (e.g., HIV positive, cancer chemo, splenectomy, organ transplant, chronic steroids)   Negative: [1] Periods where breathing stops and then resumes normally AND [2] bedridden (e.g., nursing home patient, CVA)   Negative: Pregnant or postpartum (< 1 month since delivery)   Negative: Patient sounds very sick or weak to the triager    Protocols used: BREATHING DIFFICULTY-A-AH      "

## 2020-12-04 NOTE — TELEPHONE ENCOUNTER
I was notified by Ms Fregoso (the RN on call) that Ms Soliz is reporting worsening symptoms.   Patient follows with Dr. Pereira in MS clinic, recently taken off of Aubagio for washout prior to initiation of Ocrevus.  I contacted the patient, she is not in any acute distress, she reports squeezing pain in the thoracic area and mentions that she usually experiences this symptom with her flares only. She denies any new focal neurological deficits such as weakness, numbness or visual disturbances. She denies any symptoms that could be suggestive of angina such as SOB, N/V, diaphoresis, or pain specifically in left hemithorax. She has taken her baclofen, gabapentin, and hydrocodone with moderate response and feeling better now and is already in bed. I advised her to monitor her symptoms and let Dr. Pereira' staff know if they reoccur/worsen in the morning since she might need to come to the ED in that case. I told her that I will send a message to Dr. Pereira as well. She was reassured and agreed to the plan.        Nan Moseley MD  PGY-IV, Neurology  Pager: [phone]

## 2020-12-04 NOTE — TELEPHONE ENCOUNTER
----- Message from Valeria Morales sent at 12/4/2020  7:55 AM CST -----  Regarding: Requesting a callback  Contact: Alicia  Type:  Needs Medical Advice    Who Called: Pt  Symptoms (please be specific):rib pain  How long has patient had these symptoms:  Pain since Yesterday  Would the patient rather a call back or a response via [facility] [facility]? Callback    Best Call Back Number:  [phone] or [phone]  Additional Information: Says she have a bear hug around her rib cage since yesterday and it is really painful

## 2020-12-04 NOTE — TELEPHONE ENCOUNTER
----- Message from Mariella Barrientos sent at 12/4/2020  1:40 PM CST -----  Returning call regarding MS flare up. Please call     Pt#[phone]

## 2020-12-04 NOTE — TELEPHONE ENCOUNTER
Called patient and informed to increase baclofen to 10mg BID. Pt v/u. Will schedule ocrevus for early next week

## 2020-12-04 NOTE — TELEPHONE ENCOUNTER
Return patient call. Patient states she feels squeezing around her ribs, states MS bear hug. Taking hydrocodone, gabapentin, and baclofen without relief. Patient states this started yesterday around 11am. She thought it was gas pains initially and it has since become stronger.

## 2020-12-07 ENCOUNTER — PATIENT MESSAGE (OUTPATIENT)
Dept: NEUROLOGY | Facility: CLINIC | Age: 40
End: 2020-12-07

## 2020-12-07 ENCOUNTER — OFFICE VISIT (OUTPATIENT)
Dept: INTERNAL MEDICINE | Facility: CLINIC | Age: 40
End: 2020-12-07
Payer: MEDICARE

## 2020-12-07 ENCOUNTER — PATIENT OUTREACH (OUTPATIENT)
Dept: OTHER | Facility: OTHER | Age: 40
End: 2020-12-07

## 2020-12-07 VITALS
WEIGHT: 279.31 LBS | TEMPERATURE: 98 F | RESPIRATION RATE: 16 BRPM | OXYGEN SATURATION: 100 % | HEART RATE: 72 BPM | BODY MASS INDEX: 44.89 KG/M2 | HEIGHT: 66 IN | DIASTOLIC BLOOD PRESSURE: 58 MMHG | SYSTOLIC BLOOD PRESSURE: 120 MMHG

## 2020-12-07 DIAGNOSIS — B37.89 CANDIDIASIS OF BREAST: Primary | ICD-10-CM

## 2020-12-07 DIAGNOSIS — M62.838 CERVICAL PARASPINAL MUSCLE SPASM: ICD-10-CM

## 2020-12-07 DIAGNOSIS — H66.91 RIGHT OTITIS MEDIA, UNSPECIFIED OTITIS MEDIA TYPE: ICD-10-CM

## 2020-12-07 PROCEDURE — 3078F PR MOST RECENT DIASTOLIC BLOOD PRESSURE < 80 MM HG: ICD-10-PCS | Mod: HCNC,CPTII,S$GLB, | Performed by: FAMILY MEDICINE

## 2020-12-07 PROCEDURE — 99214 OFFICE O/P EST MOD 30 MIN: CPT | Mod: HCNC,S$GLB,, | Performed by: FAMILY MEDICINE

## 2020-12-07 PROCEDURE — 99214 PR OFFICE/OUTPT VISIT, EST, LEVL IV, 30-39 MIN: ICD-10-PCS | Mod: HCNC,S$GLB,, | Performed by: FAMILY MEDICINE

## 2020-12-07 PROCEDURE — 3078F DIAST BP <80 MM HG: CPT | Mod: HCNC,CPTII,S$GLB, | Performed by: FAMILY MEDICINE

## 2020-12-07 PROCEDURE — 3074F SYST BP LT 130 MM HG: CPT | Mod: HCNC,CPTII,S$GLB, | Performed by: FAMILY MEDICINE

## 2020-12-07 PROCEDURE — 99999 PR PBB SHADOW E&M-EST. PATIENT-LVL IV: CPT | Mod: PBBFAC,HCNC,, | Performed by: FAMILY MEDICINE

## 2020-12-07 PROCEDURE — 3008F PR BODY MASS INDEX (BMI) DOCUMENTED: ICD-10-PCS | Mod: HCNC,CPTII,S$GLB, | Performed by: FAMILY MEDICINE

## 2020-12-07 PROCEDURE — 99999 PR PBB SHADOW E&M-EST. PATIENT-LVL IV: ICD-10-PCS | Mod: PBBFAC,HCNC,, | Performed by: FAMILY MEDICINE

## 2020-12-07 PROCEDURE — 1125F PR PAIN SEVERITY QUANTIFIED, PAIN PRESENT: ICD-10-PCS | Mod: HCNC,S$GLB,, | Performed by: FAMILY MEDICINE

## 2020-12-07 PROCEDURE — 3008F BODY MASS INDEX DOCD: CPT | Mod: HCNC,CPTII,S$GLB, | Performed by: FAMILY MEDICINE

## 2020-12-07 PROCEDURE — 3074F PR MOST RECENT SYSTOLIC BLOOD PRESSURE < 130 MM HG: ICD-10-PCS | Mod: HCNC,CPTII,S$GLB, | Performed by: FAMILY MEDICINE

## 2020-12-07 PROCEDURE — 1125F AMNT PAIN NOTED PAIN PRSNT: CPT | Mod: HCNC,S$GLB,, | Performed by: FAMILY MEDICINE

## 2020-12-07 RX ORDER — DIPHENHYDRAMINE HYDROCHLORIDE 50 MG/ML
50 INJECTION INTRAMUSCULAR; INTRAVENOUS
Status: CANCELLED | OUTPATIENT
Start: 2020-12-07

## 2020-12-07 RX ORDER — EPINEPHRINE 0.3 MG/.3ML
0.3 INJECTION SUBCUTANEOUS
Status: CANCELLED | OUTPATIENT
Start: 2020-12-07

## 2020-12-07 RX ORDER — HEPARIN 100 UNIT/ML
500 SYRINGE INTRAVENOUS
Status: CANCELLED | OUTPATIENT
Start: 2020-12-07

## 2020-12-07 RX ORDER — AMOXICILLIN AND CLAVULANATE POTASSIUM 875; 125 MG/1; MG/1
1 TABLET, FILM COATED ORAL EVERY 12 HOURS
Qty: 20 TABLET | Refills: 0 | Status: SHIPPED | OUTPATIENT
Start: 2020-12-07 | End: 2021-02-05

## 2020-12-07 RX ORDER — SODIUM CHLORIDE 0.9 % (FLUSH) 0.9 %
10 SYRINGE (ML) INJECTION
Status: CANCELLED | OUTPATIENT
Start: 2020-12-07

## 2020-12-07 RX ORDER — ACETAMINOPHEN 325 MG/1
1000 TABLET ORAL
Status: CANCELLED | OUTPATIENT
Start: 2020-12-07

## 2020-12-07 RX ORDER — CYCLOBENZAPRINE HCL 10 MG
10 TABLET ORAL 3 TIMES DAILY PRN
Qty: 60 TABLET | Refills: 1 | Status: SHIPPED | OUTPATIENT
Start: 2020-12-07 | End: 2021-04-22

## 2020-12-07 RX ORDER — NYSTATIN 100000 [USP'U]/G
POWDER TOPICAL 2 TIMES DAILY
Qty: 60 G | Refills: 3 | Status: SHIPPED | OUTPATIENT
Start: 2020-12-07 | End: 2021-03-29

## 2020-12-07 RX ORDER — FAMOTIDINE 10 MG/ML
20 INJECTION INTRAVENOUS
Status: CANCELLED | OUTPATIENT
Start: 2020-12-07

## 2020-12-07 RX ORDER — TERBINAFINE HYDROCHLORIDE 250 MG/1
250 TABLET ORAL DAILY
Qty: 10 TABLET | Refills: 0 | Status: SHIPPED | OUTPATIENT
Start: 2020-12-07 | End: 2020-12-17

## 2020-12-08 NOTE — PROGRESS NOTES
Subjective:      Patient ID: Alicia Soliz is a 40 y.o. female.    Chief Complaint: Otalgia, Back Pain, and Shoulder Pain (Bilateral)      Patient reports recurrent pain in right ear, unable to sleep because the pain.  She denies any hearing loss or drainage at this time.  She has had multiple infections in this ear in the past year.  She reports she has been unable to contact her ENT specialist.  She also reports continued pain in her shoulder coming down from her neck and in her back, requesting refill on her muscle relaxer.  Also has rash with odor under her breast, which is causing irritation and pain, she has been applying antibiotic ointment and washing regularly with minimal relief.    Otalgia   There is pain in the right ear. This is a recurrent problem. The problem occurs constantly. The problem has been gradually worsening. Associated symptoms include neck pain and a rash. Pertinent negatives include no abdominal pain, diarrhea, ear discharge, rhinorrhea or sore throat. Her past medical history is significant for a tympanostomy tube. There is no history of a chronic ear infection or hearing loss.     Review of Systems   Constitutional: Positive for fatigue. Negative for activity change, appetite change and fever.   HENT: Positive for ear pain. Negative for congestion, ear discharge, postnasal drip, rhinorrhea and sore throat.    Gastrointestinal: Negative for abdominal pain, constipation and diarrhea.   Musculoskeletal: Positive for myalgias and neck pain.   Skin: Positive for rash.   Psychiatric/Behavioral: Positive for sleep disturbance.     Past Medical History:   Diagnosis Date    Anemia     Arthritis     Cardiac arrest as complication of care     pt states she went into cardiac arrest from an allergic reaction to a medication    Depression     Encounter for blood transfusion     Hemiplegia due to old stroke     Hypertension     Morbid obesity with BMI of 45.0-49.9, adult 9/20/2018     Multiple sclerosis           Past Surgical History:   Procedure Laterality Date    APPENDECTOMY      CHOLECYSTECTOMY      COLONOSCOPY N/A 11/25/2020    Procedure: COLONOSCOPY;  Surgeon: Andrew Jenkins III, MD;  Location: [facility] ENDO;  Service: Endoscopy;  Laterality: N/A;    ESOPHAGOGASTRODUODENOSCOPY N/A 2/19/2020    Procedure: EGD (ESOPHAGOGASTRODUODENOSCOPY);  Surgeon: Michael Navarrete MD;  Location: [facility] ENDO;  Service: Endoscopy;  Laterality: N/A;    ESOPHAGOGASTRODUODENOSCOPY N/A 2/20/2020    Procedure: EGD (ESOPHAGOGASTRODUODENOSCOPY);  Surgeon: Michael Navarrete MD;  Location: [facility] OR;  Service: General;  Laterality: N/A;    ESOPHAGOGASTRODUODENOSCOPY N/A 11/25/2020    Procedure: EGD (ESOPHAGOGASTRODUODENOSCOPY);  Surgeon: Andrew Jenkins III, MD;  Location: [facility] ENDO;  Service: Endoscopy;  Laterality: N/A;    HYSTERECTOMY      KNEE SURGERY      ROBOT-ASSISTED LAPAROSCOPIC SLEEVE GASTRECTOMY USING DA ANTHONY XI N/A 2/20/2020    Procedure: XI ROBOTIC SLEEVE GASTRECTOMY;  Surgeon: Michael Navarrete MD;  Location: [facility] OR;  Service: General;  Laterality: N/A;    TONSILLECTOMY      TUBAL LIGATION       Family History   Problem Relation Age of Onset    Lupus Mother     Heart disease Mother     Hypertension Mother     Diabetes Father     Kidney disease Father     Cancer Maternal Aunt 40        breast    Breast cancer Maternal Aunt     Diabetes Maternal Aunt     COPD Maternal Aunt     Breast cancer Maternal Cousin     Breast cancer Maternal Cousin     Ovarian cancer Maternal Cousin      Social History     Socioeconomic History    Marital status: Single     Spouse name: Not on file    Number of children: 3    Years of education: Not on file    Highest education level: Not on file   Occupational History     Employer: [employer]   Social Needs    Financial resource strain: Somewhat hard    Food insecurity     Worry: Often true     Inability: Sometimes true    Transportation needs     Medical: Yes     Non-medical:  "No   Tobacco Use    Smoking status: Never Smoker    Smokeless tobacco: Never Used   Substance and Sexual Activity    Alcohol use: Yes     Frequency: Never     Drinks per session: Patient refused     Binge frequency: Never     Comment: occasion    Drug use: No    Sexual activity: Yes     Partners: Male     Birth control/protection: Surgical   Lifestyle    Physical activity     Days per week: 7 days     Minutes per session: 60 min    Stress: Not at all   Relationships    Social connections     Talks on phone: Once a week     Gets together: More than three times a week     Attends Yazidism service: Not on file     Active member of club or organization: Patient refused     Attends meetings of clubs or organizations: Never     Relationship status: Patient refused   Other Topics Concern    Patient feels they ought to cut down on drinking/drug use Not Asked    Patient annoyed by others criticizing their drinking/drug use Not Asked    Patient has felt bad or guilty about drinking/drug use Not Asked    Patient has had a drink/used drugs as an eye opener in the AM Not Asked   Social History Narrative    Not on file     Review of patient's allergies indicates:   Allergen Reactions    Demerol [meperidine] Itching     Other reaction(s): Itching    Dilaudid [hydromorphone (bulk)] Anaphylaxis     "coded" per pt  Patient can tolerate Lortab    Prednisone Itching     Other reaction(s): Itching    Morphine     Tramadol      shaking       Objective:       BP (!) 120/58 (BP Location: Right arm, Patient Position: Sitting, BP Method: Large (Automatic))   Pulse 72   Temp 97.9 °F (36.6 °C) (Temporal)   Resp 16   Ht 5' 6" (1.676 m)   Wt 126.7 kg (279 lb 5.2 oz)   SpO2 100%   BMI 45.08 kg/m²   Physical Exam  Vitals signs and nursing note reviewed.   Constitutional:       General: She is not in acute distress.     Appearance: Normal appearance. She is well-developed. She is obese. She is not diaphoretic.   HENT:      " Head: Normocephalic.      Right Ear: Hearing, ear canal and external ear normal. Tympanic membrane is erythematous.      Left Ear: Hearing, tympanic membrane, ear canal and external ear normal.      Nose: No mucosal edema.      Right Sinus: No maxillary sinus tenderness or frontal sinus tenderness.      Left Sinus: No maxillary sinus tenderness or frontal sinus tenderness.      Mouth/Throat:      Pharynx: Uvula midline. No posterior oropharyngeal erythema.   Eyes:      Conjunctiva/sclera: Conjunctivae normal.      Pupils: Pupils are equal, round, and reactive to light.   Neck:      Musculoskeletal: Normal range of motion. Pain with movement and muscular tenderness present. No edema, neck rigidity or spinous process tenderness.   Cardiovascular:      Rate and Rhythm: Normal rate and regular rhythm.      Heart sounds: Normal heart sounds.   Pulmonary:      Effort: Pulmonary effort is normal. No respiratory distress.      Breath sounds: Normal breath sounds.   Abdominal:      General: Bowel sounds are normal.      Palpations: Abdomen is soft.      Tenderness: There is no abdominal tenderness.   Musculoskeletal: Normal range of motion.         General: No swelling.   Lymphadenopathy:      Cervical: No cervical adenopathy.   Skin:     General: Skin is warm and dry.      Comments: Hyperpigmented patches below both breasts, some exudate noted   Neurological:      General: No focal deficit present.      Mental Status: She is alert and oriented to person, place, and time.   Psychiatric:         Mood and Affect: Mood normal.         Behavior: Behavior normal.       Assessment:     1. Candidiasis of breast    2. Right otitis media, unspecified otitis media type    3. Cervical paraspinal muscle spasm      Plan:   Candidiasis of breast    Right otitis media, unspecified otitis media type    Cervical paraspinal muscle spasm    Other orders  -     nystatin (MYCOSTATIN) powder; Apply topically 2 (two) times daily.  Dispense: 60 g;  Refill: 3  -     terbinafine HCL (LAMISIL) 250 mg tablet; Take 1 tablet (250 mg total) by mouth once daily. for 10 days  Dispense: 10 tablet; Refill: 0  -     amoxicillin-clavulanate 875-125mg (AUGMENTIN) 875-125 mg per tablet; Take 1 tablet by mouth every 12 (twelve) hours.  Dispense: 20 tablet; Refill: 0  -     cyclobenzaprine (FLEXERIL) 10 MG tablet; Take 1 tablet (10 mg total) by mouth 3 (three) times daily as needed for Muscle spasms.  Dispense: 60 tablet; Refill: 1     Recommended she follow-up with ENT when possible  Continue all other current medications  Medication List with Changes/Refills   New Medications    AMOXICILLIN-CLAVULANATE 875-125MG (AUGMENTIN) 875-125 MG PER TABLET    Take 1 tablet by mouth every 12 (twelve) hours.    CYCLOBENZAPRINE (FLEXERIL) 10 MG TABLET    Take 1 tablet (10 mg total) by mouth 3 (three) times daily as needed for Muscle spasms.    NYSTATIN (MYCOSTATIN) POWDER    Apply topically 2 (two) times daily.    TERBINAFINE HCL (LAMISIL) 250 MG TABLET    Take 1 tablet (250 mg total) by mouth once daily. for 10 days   Current Medications    CETIRIZINE (ZYRTEC) 10 MG TABLET    Take 1 tablet (10 mg total) by mouth once daily.    DOXEPIN (SINEQUAN) 75 MG CAPSULE    Take 1 capsule (75 mg total) by mouth nightly as needed. TAKE 1 CAPSULE BY MOUTH NIGHTLY AS NEEDED.    ESCITALOPRAM OXALATE (LEXAPRO) 10 MG TABLET    Take 1 tablet (10 mg total) by mouth once daily.    ESOMEPRAZOLE (NEXIUM) 40 MG CAPSULE    Take 1 capsule (40 mg total) by mouth before breakfast.    HYDROCODONE-ACETAMINOPHEN (NORCO)  MG PER TABLET        METHOCARBAMOL (ROBAXIN) 750 MG TAB    Take 750 mg by mouth 2 (two) times a day.    MUPIROCIN (BACTROBAN) 2 % OINTMENT    Apply topically 3 (three) times daily.    PROMETHAZINE (PHENERGAN) 25 MG TABLET    Take 1 tablet (25 mg total) by mouth every 6 (six) hours as needed for Nausea.    TOPIRAMATE (TOPAMAX) 100 MG TABLET    Take 1 tablet (100 mg total) by mouth every evening.     TRIAMCINOLONE ACETONIDE 0.1% (KENALOG) 0.1 % OINTMENT    Apply topically 2 (two) times daily.    VALSARTAN (DIOVAN) 80 MG TABLET    Take 1 tablet (80 mg total) by mouth once daily. (replaces hydrochlorothiazide for BP)   Discontinued Medications    BACLOFEN (LIORESAL) 10 MG TABLET    Take 1 tablet (10 mg total) by mouth daily as needed.    CLOTRIMAZOLE (LOTRIMIN) 1 % SOLN    3 drops into right ear 3 times daily.    OFLOXACIN (OCUFLOX) 0.3 % OPHTHALMIC SOLUTION    Apply 5 drops into right ear twice daily for 7 days.    VALACYCLOVIR (VALTREX) 500 MG TABLET    TAKE 1 TABLET BY MOUTH TWICE A DAY

## 2020-12-10 ENCOUNTER — INFUSION (OUTPATIENT)
Dept: INFUSION THERAPY | Facility: HOSPITAL | Age: 40
End: 2020-12-10
Attending: PSYCHIATRY & NEUROLOGY
Payer: MEDICARE

## 2020-12-10 VITALS
OXYGEN SATURATION: 99 % | WEIGHT: 279 LBS | RESPIRATION RATE: 18 BRPM | DIASTOLIC BLOOD PRESSURE: 93 MMHG | HEART RATE: 70 BPM | SYSTOLIC BLOOD PRESSURE: 148 MMHG | BODY MASS INDEX: 44.84 KG/M2 | HEIGHT: 66 IN | TEMPERATURE: 98 F

## 2020-12-10 DIAGNOSIS — G35 MULTIPLE SCLEROSIS: Primary | ICD-10-CM

## 2020-12-10 PROCEDURE — 96366 THER/PROPH/DIAG IV INF ADDON: CPT | Mod: HCNC

## 2020-12-10 PROCEDURE — 96365 THER/PROPH/DIAG IV INF INIT: CPT | Mod: HCNC

## 2020-12-10 PROCEDURE — 96375 TX/PRO/DX INJ NEW DRUG ADDON: CPT | Mod: HCNC

## 2020-12-10 PROCEDURE — 96367 TX/PROPH/DG ADDL SEQ IV INF: CPT | Mod: HCNC

## 2020-12-10 PROCEDURE — 63600175 PHARM REV CODE 636 W HCPCS: Mod: HCNC | Performed by: PSYCHIATRY & NEUROLOGY

## 2020-12-10 PROCEDURE — 25000003 PHARM REV CODE 250: Mod: HCNC | Performed by: PSYCHIATRY & NEUROLOGY

## 2020-12-10 RX ORDER — ACETAMINOPHEN 500 MG
1000 TABLET ORAL
Status: CANCELLED | OUTPATIENT
Start: 2020-12-24

## 2020-12-10 RX ORDER — DIPHENHYDRAMINE HYDROCHLORIDE 50 MG/ML
50 INJECTION INTRAMUSCULAR; INTRAVENOUS
Status: CANCELLED | OUTPATIENT
Start: 2020-12-24

## 2020-12-10 RX ORDER — METHYLPREDNISOLONE SOD SUCC 125 MG
100 VIAL (EA) INJECTION
Status: COMPLETED | OUTPATIENT
Start: 2020-12-10 | End: 2020-12-10

## 2020-12-10 RX ORDER — METHYLPREDNISOLONE SOD SUCC 125 MG
100 VIAL (EA) INJECTION
Status: CANCELLED
Start: 2020-12-24

## 2020-12-10 RX ORDER — SODIUM CHLORIDE 0.9 % (FLUSH) 0.9 %
10 SYRINGE (ML) INJECTION
Status: CANCELLED | OUTPATIENT
Start: 2020-12-24

## 2020-12-10 RX ORDER — EPINEPHRINE 0.3 MG/.3ML
0.3 INJECTION SUBCUTANEOUS
Status: CANCELLED | OUTPATIENT
Start: 2020-12-24

## 2020-12-10 RX ORDER — ACETAMINOPHEN 500 MG
1000 TABLET ORAL
Status: COMPLETED | OUTPATIENT
Start: 2020-12-10 | End: 2020-12-10

## 2020-12-10 RX ORDER — HEPARIN 100 UNIT/ML
500 SYRINGE INTRAVENOUS
Status: CANCELLED | OUTPATIENT
Start: 2020-12-24

## 2020-12-10 RX ORDER — FAMOTIDINE 10 MG/ML
20 INJECTION INTRAVENOUS
Status: COMPLETED | OUTPATIENT
Start: 2020-12-10 | End: 2020-12-10

## 2020-12-10 RX ORDER — FAMOTIDINE 10 MG/ML
20 INJECTION INTRAVENOUS
Status: CANCELLED | OUTPATIENT
Start: 2020-12-24

## 2020-12-10 RX ADMIN — OCRELIZUMAB 300 MG: 300 INJECTION INTRAVENOUS at 09:12

## 2020-12-10 RX ADMIN — FAMOTIDINE 20 MG: 10 INJECTION INTRAVENOUS at 09:12

## 2020-12-10 RX ADMIN — ACETAMINOPHEN 1000 MG: 500 TABLET ORAL at 09:12

## 2020-12-10 RX ADMIN — METHYLPREDNISOLONE SODIUM SUCCINATE 100 MG: 125 INJECTION, POWDER, FOR SOLUTION INTRAMUSCULAR; INTRAVENOUS at 09:12

## 2020-12-10 RX ADMIN — DIPHENHYDRAMINE HYDROCHLORIDE 50 MG: 50 INJECTION, SOLUTION INTRAMUSCULAR; INTRAVENOUS at 09:12

## 2020-12-10 NOTE — LETTER
December 10, 2020    Alicia Soliz  [address]  [address]  [place] [address]             [facility]  [address]  [place] [facility] [address]  Phone: [phone]  Fax: [phone]   December 10, 2020     Patient: Alicia Soliz   YOB: 1980   Date of Visit: 12/10/2020       To Whom it May Concern:    Alicia Soliz was seen in my clinic on 12/10/2020. She was accompanied by Chucho Castle who was present with her during her visit and provided transportation for patient.     Please excuse him from any work missed.    If you have any questions or concerns, please don't hesitate to call.    Sincerely,               Bibi Carey RN

## 2020-12-10 NOTE — DISCHARGE INSTRUCTIONS
[facility]  [address] [address]  [address]  [phone] phone     [phone] fax  Hours of Operation: Monday- Friday 8:00am- 5:00pm  After hours phone  [phone]  Hematology / Oncology Physicians on call      Dr. Juan Keating, ARASELI Leo NP Tyesha Taylor, NP    Please call with any concerns regarding your appointment today.  .FALL PREVENTION   Falls often occur due to slipping, tripping or losing your balance. Here are ways to reduce your risk of falling again.   Was there anything that caused your fall that can be fixed, removed or replaced?   Make your home safe by keeping walkways clear of objects you may trip over.   Use non-slip pads under rugs.   Do not walk in poorly lit areas.   Do not stand on chairs or wobbly ladders.   Use caution when reaching overhead or looking upward. This position can cause a loss of balance.   Be sure your shoes fit properly, have non-slip bottoms and are in good condition.   Be cautious when going up and down stairs, curbs, and when walking on uneven sidewalks.   If your balance is poor, consider using a cane or walker.   If your fall was related to alcohol use, stop or limit alcohol intake.   If your fall was related to use of sleeping medicines, talk to your doctor about this. You may need to reduce your dosage at bedtime if you awaken during the night to go to the bathroom.   To reduce the need for nighttime bathroom trips:   Avoid drinking fluids for several hours before going to bed   Empty your bladder before going to bed   Men can keep a urinal at the bedside   © 4344-5529 [employer], [address] [place] [address], [place] [address]. All rights reserved. This information is not intended as a substitute for professional medical care. Always follow your healthcare professional's instructions.    .WAYS TO HELP PREVENT  INFECTION         WASH YOUR HANDS OFTEN DURING THE DAY, ESPECIALLY BEFORE YOU EAT, AFTER USING THE BATHROOM, AND AFTER TOUCHING ANIMALS     STAY AWAY FROM PEOPLE WHO HAVE ILLNESSES YOU CAN CATCH; SUCH AS COLDS, FLU, CHICKEN POX     TRY TO AVOID CROWDS     STAY AWAY FROM CHILDREN WHO RECENTLY HAVE RECEIVED LIVE VIRUS VACCINES     MAINTAIN GOOD MOUTH CARE     DO NOT SQUEEZE OR SCRATCH PIMPLES     CLEAN CUTS & SCRAPES RIGHT AWAY AND DAILY UNTIL HEALED WITH WARM WATER, SOAP & AN ANTISEPTIC     AVOID CONTACT WITH LITTER BOXES, BIRD CAGES, & FISH TANKS     AVOID STANDING WATER, IE., BIRD BATHS, FLOWER POTS/VASES, OR HUMIDIFIERS     WEAR GLOVES WHEN GARDENING OR CLEANING UP AFTER OTHERS, ESPECIALLY BABIES & SMALL CHILDREN     DO NOT EAT RAW FISH, SEAFOOD, MEAT, OR EGGS

## 2020-12-10 NOTE — NURSING
Pt is asking to stop infusion and leave at a  certain time prior to completion of Ocrevus and post hour wait time. Highly recommended that pt stay for 1 hour post infusion, especially since this is her first dose and we are not sure how she will tolerate infusion and if she will have allergic reaction. Pt states that she has transportation issues and has to leave at a certain time. Notified Latisha Camarillo who is Meagan Pereira nurse, they recommend staying for 1 hour as well. Pt notified.

## 2020-12-11 ENCOUNTER — PATIENT MESSAGE (OUTPATIENT)
Dept: NEUROLOGY | Facility: CLINIC | Age: 40
End: 2020-12-11

## 2020-12-15 ENCOUNTER — PATIENT MESSAGE (OUTPATIENT)
Dept: REHABILITATION | Facility: HOSPITAL | Age: 40
End: 2020-12-15

## 2020-12-16 NOTE — PROGRESS NOTES
Digital Medicine: Health  Follow-Up    The history is provided by the patient.             Reason for review: Blood pressure at goal    Patient needs assistance troubleshooting: patient reminder needed.    Additional Follow-up details: Brief encounter because patient is in the middle of doing something in the kitchen. Requested more frequent readings to closer monitor her BP            Diet-Not assessed          Physical Activity-Not assessed    Medication Adherence-Medication Adherence not addressed.      Substance, Sleep, Stress-Not assessed      Continue current diet/physical activity routine.       Addressed patient questions and patient has my contact information if needed prior to next outreach.   Explained the importance of self-monitoring and medication adherence. Encouraged the patient to communicate with their health  for lifestyle modifications to help improve or maintain a healthy lifestyle.               There are no preventive care reminders to display for this patient.      Last 5 Patient Entered Readings                                      Current 30 Day Average: 110/80     Recent Readings 12/11/2020 11/8/2020 11/4/2020 9/9/2020 8/11/2020    SBP (mmHg) 110 110 110 110 120    DBP (mmHg) 80 70 70 70 80

## 2020-12-17 ENCOUNTER — PATIENT MESSAGE (OUTPATIENT)
Dept: GASTROENTEROLOGY | Facility: CLINIC | Age: 40
End: 2020-12-17

## 2020-12-22 ENCOUNTER — PATIENT MESSAGE (OUTPATIENT)
Dept: INTERNAL MEDICINE | Facility: CLINIC | Age: 40
End: 2020-12-22

## 2020-12-22 ENCOUNTER — PATIENT MESSAGE (OUTPATIENT)
Dept: OTHER | Facility: OTHER | Age: 40
End: 2020-12-22

## 2020-12-22 ENCOUNTER — PATIENT MESSAGE (OUTPATIENT)
Dept: NEUROLOGY | Facility: CLINIC | Age: 40
End: 2020-12-22

## 2020-12-22 RX ORDER — DOXEPIN HYDROCHLORIDE 75 MG/1
75 CAPSULE ORAL NIGHTLY PRN
Qty: 30 CAPSULE | Refills: 2 | Status: SHIPPED | OUTPATIENT
Start: 2020-12-22 | End: 2020-12-28

## 2020-12-22 RX ORDER — DOXEPIN HYDROCHLORIDE 75 MG/1
75 CAPSULE ORAL NIGHTLY PRN
Qty: 30 CAPSULE | Refills: 2 | Status: SHIPPED | OUTPATIENT
Start: 2020-12-22 | End: 2020-12-22 | Stop reason: SDUPTHER

## 2020-12-28 ENCOUNTER — OFFICE VISIT (OUTPATIENT)
Dept: OTOLARYNGOLOGY | Facility: CLINIC | Age: 40
End: 2020-12-28
Payer: MEDICARE

## 2020-12-28 ENCOUNTER — PATIENT MESSAGE (OUTPATIENT)
Dept: NEUROLOGY | Facility: CLINIC | Age: 40
End: 2020-12-28

## 2020-12-28 VITALS
TEMPERATURE: 98 F | DIASTOLIC BLOOD PRESSURE: 83 MMHG | BODY MASS INDEX: 42.84 KG/M2 | HEART RATE: 87 BPM | SYSTOLIC BLOOD PRESSURE: 130 MMHG | WEIGHT: 265.44 LBS

## 2020-12-28 DIAGNOSIS — H92.01 ACUTE OTALGIA, RIGHT: Primary | ICD-10-CM

## 2020-12-28 DIAGNOSIS — M26.621 ARTHRALGIA OF RIGHT TEMPOROMANDIBULAR JOINT: ICD-10-CM

## 2020-12-28 PROCEDURE — 3079F DIAST BP 80-89 MM HG: CPT | Mod: HCNC,CPTII,S$GLB, | Performed by: ORTHOPAEDIC SURGERY

## 2020-12-28 PROCEDURE — 3075F PR MOST RECENT SYSTOLIC BLOOD PRESS GE 130-139MM HG: ICD-10-PCS | Mod: HCNC,CPTII,S$GLB, | Performed by: ORTHOPAEDIC SURGERY

## 2020-12-28 PROCEDURE — 3079F PR MOST RECENT DIASTOLIC BLOOD PRESSURE 80-89 MM HG: ICD-10-PCS | Mod: HCNC,CPTII,S$GLB, | Performed by: ORTHOPAEDIC SURGERY

## 2020-12-28 PROCEDURE — 99213 PR OFFICE/OUTPT VISIT, EST, LEVL III, 20-29 MIN: ICD-10-PCS | Mod: HCNC,S$GLB,, | Performed by: ORTHOPAEDIC SURGERY

## 2020-12-28 PROCEDURE — 3075F SYST BP GE 130 - 139MM HG: CPT | Mod: HCNC,CPTII,S$GLB, | Performed by: ORTHOPAEDIC SURGERY

## 2020-12-28 PROCEDURE — 1125F PR PAIN SEVERITY QUANTIFIED, PAIN PRESENT: ICD-10-PCS | Mod: HCNC,S$GLB,, | Performed by: ORTHOPAEDIC SURGERY

## 2020-12-28 PROCEDURE — 1125F AMNT PAIN NOTED PAIN PRSNT: CPT | Mod: HCNC,S$GLB,, | Performed by: ORTHOPAEDIC SURGERY

## 2020-12-28 PROCEDURE — 99213 OFFICE O/P EST LOW 20 MIN: CPT | Mod: HCNC,S$GLB,, | Performed by: ORTHOPAEDIC SURGERY

## 2020-12-28 PROCEDURE — 99999 PR PBB SHADOW E&M-EST. PATIENT-LVL III: ICD-10-PCS | Mod: PBBFAC,HCNC,, | Performed by: ORTHOPAEDIC SURGERY

## 2020-12-28 PROCEDURE — 3008F BODY MASS INDEX DOCD: CPT | Mod: HCNC,CPTII,S$GLB, | Performed by: ORTHOPAEDIC SURGERY

## 2020-12-28 PROCEDURE — 99999 PR PBB SHADOW E&M-EST. PATIENT-LVL III: CPT | Mod: PBBFAC,HCNC,, | Performed by: ORTHOPAEDIC SURGERY

## 2020-12-28 PROCEDURE — 3008F PR BODY MASS INDEX (BMI) DOCUMENTED: ICD-10-PCS | Mod: HCNC,CPTII,S$GLB, | Performed by: ORTHOPAEDIC SURGERY

## 2020-12-28 RX ORDER — HYDROCHLOROTHIAZIDE 12.5 MG/1
12.5 TABLET ORAL DAILY
COMMUNITY
Start: 2020-12-05 | End: 2021-02-19

## 2020-12-28 NOTE — PROGRESS NOTES
Subjective:      Patient ID: Alicia Soliz is a 40 y.o. female.    Chief Complaint: Otalgia (right for approx 2wks)    Patient is a 40 year old female seen today in clinic in followup for evaluation of right ear pain.  She was seen in clinic by Dr. 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  She has also been on Floxin drops recently.  She was due to have infusion related to MS today, but had to hold due to ear issue.  She says that she does not use Qtips and does not get water in her ear.  She says that she has constant pain in her right ear.  She has not noted any change in her hearing, but has a sensation of pressure in her right ear.        Review of Systems   HENT: Positive for ear pain.    Eyes: Negative.    Respiratory: Negative.    Cardiovascular: Negative.    Gastrointestinal: Negative.    Endocrine: Negative.    Genitourinary: Negative.    Musculoskeletal: Negative.    Skin: Negative.    Allergic/Immunologic: Negative.    Neurological: Positive for tremors.   Hematological: Negative.    Psychiatric/Behavioral: Negative.        Objective:       Physical Exam  Constitutional:       General: She is not in acute distress.     Appearance: She is well-developed.   HENT:      Head: Normocephalic and atraumatic.      Right Ear: Tympanic membrane, ear canal and external ear normal. No swelling. No middle ear effusion.      Left Ear: Tympanic membrane, ear canal and external ear normal. No swelling.  No middle ear effusion.      Ears:      Comments: Pain anterior to tragus, worse with jaw movement     Nose: Nose normal. No nasal deformity, septal deviation, mucosal edema or rhinorrhea.      Right Sinus: No maxillary sinus tenderness or frontal sinus tenderness.      Left Sinus: No maxillary sinus tenderness or frontal sinus tenderness.      Mouth/Throat:      Mouth: Mucous membranes are not pale and not dry.      Dentition: Abnormal dentition (missing posterior  mandibular molars bilaterally). Has dentures (upper partial). No dental caries.      Pharynx: Uvula midline. No oropharyngeal exudate or posterior oropharyngeal erythema.   Eyes:      General: Lids are normal. No scleral icterus.     Extraocular Movements:      Right eye: Normal extraocular motion and no nystagmus.      Left eye: Normal extraocular motion and no nystagmus.      Conjunctiva/sclera: Conjunctivae normal.      Right eye: Right conjunctiva is not injected. No chemosis.     Left eye: Left conjunctiva is not injected. No chemosis.     Pupils: Pupils are equal, round, and reactive to light.   Neck:      Thyroid: No thyroid mass or thyromegaly.      Trachea: Trachea and phonation normal. No tracheal tenderness or tracheal deviation.   Pulmonary:      Effort: Pulmonary effort is normal. No respiratory distress.      Breath sounds: No stridor.   Abdominal:      General: There is no distension.   Lymphadenopathy:      Head:      Right side of head: No submental, submandibular, preauricular, posterior auricular or occipital adenopathy.      Left side of head: No submental, submandibular, preauricular, posterior auricular or occipital adenopathy.      Cervical: No cervical adenopathy.   Skin:     General: Skin is warm and dry.      Findings: No erythema or rash.   Neurological:      Mental Status: She is alert and oriented to person, place, and time.      Cranial Nerves: No cranial nerve deficit.   Psychiatric:         Behavior: Behavior normal.         Assessment:       1. Acute otalgia, right    2. Arthralgia of right temporomandibular joint        Plan:     Acute otalgia, right    Arthralgia of right temporomandibular joint    reassured patient that her ear exam today is normal, OK to resume infusion for MS.  Discussed that the most likely explanation for her otalgia is TMJ, exacerbated by her partial and missing dentition on the mandible.  We had a long discussion regarding the underlying pathology of  temporomandibular joint dysfunction (TMD) as the cause of ear pain.  We further discussed conservative measures to treat TMD including avoiding gum and other foods that require lots of chewing, warm compresses, and scheduled antinflammatories.  If the pain persists, the patient will then schedule an appointment with a dentist for further evaluation.

## 2021-01-04 ENCOUNTER — INFUSION (OUTPATIENT)
Dept: INFUSION THERAPY | Facility: HOSPITAL | Age: 41
End: 2021-01-04
Attending: PSYCHIATRY & NEUROLOGY
Payer: MEDICARE

## 2021-01-04 VITALS
BODY MASS INDEX: 42.66 KG/M2 | RESPIRATION RATE: 18 BRPM | TEMPERATURE: 98 F | OXYGEN SATURATION: 99 % | DIASTOLIC BLOOD PRESSURE: 95 MMHG | HEIGHT: 66 IN | HEART RATE: 67 BPM | SYSTOLIC BLOOD PRESSURE: 143 MMHG | WEIGHT: 265.44 LBS

## 2021-01-04 DIAGNOSIS — G35 MULTIPLE SCLEROSIS: Primary | ICD-10-CM

## 2021-01-04 PROCEDURE — 96375 TX/PRO/DX INJ NEW DRUG ADDON: CPT | Mod: HCNC

## 2021-01-04 PROCEDURE — 96367 TX/PROPH/DG ADDL SEQ IV INF: CPT | Mod: HCNC

## 2021-01-04 PROCEDURE — 96365 THER/PROPH/DIAG IV INF INIT: CPT | Mod: HCNC

## 2021-01-04 PROCEDURE — 25000003 PHARM REV CODE 250: Mod: HCNC | Performed by: PSYCHIATRY & NEUROLOGY

## 2021-01-04 PROCEDURE — 96366 THER/PROPH/DIAG IV INF ADDON: CPT | Mod: HCNC

## 2021-01-04 PROCEDURE — 63600175 PHARM REV CODE 636 W HCPCS: Mod: HCNC | Performed by: PSYCHIATRY & NEUROLOGY

## 2021-01-04 RX ORDER — CYCLOBENZAPRINE HCL 10 MG
TABLET ORAL
COMMUNITY
Start: 2020-12-23 | End: 2021-01-29 | Stop reason: SDUPTHER

## 2021-01-04 RX ORDER — ACETAMINOPHEN 500 MG
1000 TABLET ORAL
Status: CANCELLED | OUTPATIENT
Start: 2021-05-24

## 2021-01-04 RX ORDER — SODIUM CHLORIDE 0.9 % (FLUSH) 0.9 %
10 SYRINGE (ML) INJECTION
Status: CANCELLED | OUTPATIENT
Start: 2021-05-24

## 2021-01-04 RX ORDER — METHYLPREDNISOLONE SOD SUCC 125 MG
100 VIAL (EA) INJECTION
Status: COMPLETED | OUTPATIENT
Start: 2021-01-04 | End: 2021-01-04

## 2021-01-04 RX ORDER — FAMOTIDINE 10 MG/ML
20 INJECTION INTRAVENOUS
Status: CANCELLED | OUTPATIENT
Start: 2021-05-24

## 2021-01-04 RX ORDER — FAMOTIDINE 10 MG/ML
20 INJECTION INTRAVENOUS
Status: COMPLETED | OUTPATIENT
Start: 2021-01-04 | End: 2021-01-04

## 2021-01-04 RX ORDER — DIPHENHYDRAMINE HYDROCHLORIDE 50 MG/ML
50 INJECTION INTRAMUSCULAR; INTRAVENOUS
Status: CANCELLED | OUTPATIENT
Start: 2021-05-24

## 2021-01-04 RX ORDER — EPINEPHRINE 0.3 MG/.3ML
0.3 INJECTION SUBCUTANEOUS
Status: CANCELLED | OUTPATIENT
Start: 2021-05-24

## 2021-01-04 RX ORDER — ACETAMINOPHEN 500 MG
1000 TABLET ORAL
Status: COMPLETED | OUTPATIENT
Start: 2021-01-04 | End: 2021-01-04

## 2021-01-04 RX ORDER — METHYLPREDNISOLONE SOD SUCC 125 MG
100 VIAL (EA) INJECTION
Status: CANCELLED
Start: 2021-05-24

## 2021-01-04 RX ORDER — HEPARIN 100 UNIT/ML
500 SYRINGE INTRAVENOUS
Status: CANCELLED | OUTPATIENT
Start: 2021-05-24

## 2021-01-04 RX ADMIN — SODIUM CHLORIDE: 0.9 INJECTION, SOLUTION INTRAVENOUS at 08:01

## 2021-01-04 RX ADMIN — ACETAMINOPHEN 1000 MG: 500 TABLET ORAL at 09:01

## 2021-01-04 RX ADMIN — FAMOTIDINE 20 MG: 10 INJECTION INTRAVENOUS at 09:01

## 2021-01-04 RX ADMIN — DIPHENHYDRAMINE HYDROCHLORIDE 50 MG: 50 INJECTION INTRAMUSCULAR; INTRAVENOUS at 09:01

## 2021-01-04 RX ADMIN — OCRELIZUMAB 300 MG: 300 INJECTION INTRAVENOUS at 09:01

## 2021-01-04 RX ADMIN — METHYLPREDNISOLONE SODIUM SUCCINATE 100 MG: 125 INJECTION, POWDER, FOR SOLUTION INTRAMUSCULAR; INTRAVENOUS at 09:01

## 2021-01-06 ENCOUNTER — PATIENT OUTREACH (OUTPATIENT)
Dept: ADMINISTRATIVE | Facility: OTHER | Age: 41
End: 2021-01-06

## 2021-01-07 ENCOUNTER — PATIENT MESSAGE (OUTPATIENT)
Dept: INTERNAL MEDICINE | Facility: CLINIC | Age: 41
End: 2021-01-07

## 2021-01-07 ENCOUNTER — PATIENT MESSAGE (OUTPATIENT)
Dept: NEUROLOGY | Facility: CLINIC | Age: 41
End: 2021-01-07

## 2021-01-07 DIAGNOSIS — G43.719 CHRONIC MIGRAINE WITHOUT AURA, INTRACTABLE, WITHOUT STATUS MIGRAINOSUS: ICD-10-CM

## 2021-01-13 ENCOUNTER — PATIENT MESSAGE (OUTPATIENT)
Dept: INTERNAL MEDICINE | Facility: CLINIC | Age: 41
End: 2021-01-13

## 2021-01-13 ENCOUNTER — PATIENT MESSAGE (OUTPATIENT)
Dept: NEUROLOGY | Facility: CLINIC | Age: 41
End: 2021-01-13

## 2021-01-13 RX ORDER — PROMETHAZINE HYDROCHLORIDE 25 MG/1
25 TABLET ORAL EVERY 6 HOURS PRN
Qty: 20 TABLET | Refills: 0 | Status: SHIPPED | OUTPATIENT
Start: 2021-01-13 | End: 2021-03-29

## 2021-01-13 RX ORDER — AMOXICILLIN AND CLAVULANATE POTASSIUM 875; 125 MG/1; MG/1
1 TABLET, FILM COATED ORAL EVERY 12 HOURS
Qty: 20 TABLET | Refills: 0 | Status: CANCELLED | OUTPATIENT
Start: 2021-01-13

## 2021-01-14 ENCOUNTER — LAB VISIT (OUTPATIENT)
Dept: INTERNAL MEDICINE | Facility: CLINIC | Age: 41
End: 2021-01-14
Payer: MEDICARE

## 2021-01-14 DIAGNOSIS — R05.9 COUGH: ICD-10-CM

## 2021-01-14 PROCEDURE — U0003 INFECTIOUS AGENT DETECTION BY NUCLEIC ACID (DNA OR RNA); SEVERE ACUTE RESPIRATORY SYNDROME CORONAVIRUS 2 (SARS-COV-2) (CORONAVIRUS DISEASE [COVID-19]), AMPLIFIED PROBE TECHNIQUE, MAKING USE OF HIGH THROUGHPUT TECHNOLOGIES AS DESCRIBED BY CMS-2020-01-R: HCPCS | Mod: HCNC

## 2021-01-15 ENCOUNTER — PATIENT MESSAGE (OUTPATIENT)
Dept: INTERNAL MEDICINE | Facility: CLINIC | Age: 41
End: 2021-01-15

## 2021-01-15 LAB — SARS-COV-2 RNA RESP QL NAA+PROBE: DETECTED

## 2021-01-18 ENCOUNTER — PATIENT MESSAGE (OUTPATIENT)
Dept: INTERNAL MEDICINE | Facility: CLINIC | Age: 41
End: 2021-01-18

## 2021-01-19 ENCOUNTER — PATIENT MESSAGE (OUTPATIENT)
Dept: NEUROLOGY | Facility: CLINIC | Age: 41
End: 2021-01-19

## 2021-01-19 DIAGNOSIS — U07.1 COVID-19 VIRUS INFECTION: Primary | ICD-10-CM

## 2021-01-21 ENCOUNTER — TELEPHONE (OUTPATIENT)
Dept: INTERNAL MEDICINE | Facility: CLINIC | Age: 41
End: 2021-01-21

## 2021-01-21 ENCOUNTER — PATIENT MESSAGE (OUTPATIENT)
Dept: INTERNAL MEDICINE | Facility: CLINIC | Age: 41
End: 2021-01-21

## 2021-01-22 ENCOUNTER — PATIENT MESSAGE (OUTPATIENT)
Dept: INTERNAL MEDICINE | Facility: CLINIC | Age: 41
End: 2021-01-22

## 2021-01-23 ENCOUNTER — PATIENT MESSAGE (OUTPATIENT)
Dept: ADMINISTRATIVE | Facility: OTHER | Age: 41
End: 2021-01-23

## 2021-01-23 ENCOUNTER — TELEPHONE (OUTPATIENT)
Dept: ADMINISTRATIVE | Facility: CLINIC | Age: 41
End: 2021-01-23

## 2021-01-24 ENCOUNTER — NURSE TRIAGE (OUTPATIENT)
Dept: ADMINISTRATIVE | Facility: CLINIC | Age: 41
End: 2021-01-24

## 2021-01-25 ENCOUNTER — PATIENT MESSAGE (OUTPATIENT)
Dept: NEUROLOGY | Facility: CLINIC | Age: 41
End: 2021-01-25

## 2021-01-25 ENCOUNTER — PATIENT MESSAGE (OUTPATIENT)
Dept: ADMINISTRATIVE | Facility: CLINIC | Age: 41
End: 2021-01-25

## 2021-01-26 ENCOUNTER — NURSE TRIAGE (OUTPATIENT)
Dept: ADMINISTRATIVE | Facility: CLINIC | Age: 41
End: 2021-01-26

## 2021-01-26 ENCOUNTER — TELEPHONE (OUTPATIENT)
Dept: ADMINISTRATIVE | Facility: OTHER | Age: 41
End: 2021-01-26

## 2021-01-29 ENCOUNTER — OFFICE VISIT (OUTPATIENT)
Dept: NEUROLOGY | Facility: CLINIC | Age: 41
End: 2021-01-29
Payer: MEDICARE

## 2021-01-29 DIAGNOSIS — G35 MULTIPLE SCLEROSIS: Primary | ICD-10-CM

## 2021-01-29 DIAGNOSIS — G43.719 CHRONIC MIGRAINE WITHOUT AURA, INTRACTABLE, WITHOUT STATUS MIGRAINOSUS: ICD-10-CM

## 2021-01-29 DIAGNOSIS — M62.838 MUSCLE SPASM: ICD-10-CM

## 2021-01-29 PROCEDURE — 99214 PR OFFICE/OUTPT VISIT, EST, LEVL IV, 30-39 MIN: ICD-10-PCS | Mod: 95,,, | Performed by: PSYCHIATRY & NEUROLOGY

## 2021-01-29 PROCEDURE — 99214 OFFICE O/P EST MOD 30 MIN: CPT | Mod: 95,,, | Performed by: PSYCHIATRY & NEUROLOGY

## 2021-01-29 RX ORDER — CYCLOBENZAPRINE HCL 10 MG
10 TABLET ORAL DAILY PRN
Qty: 30 TABLET | Refills: 5 | Status: ON HOLD | OUTPATIENT
Start: 2021-01-29 | End: 2021-03-27 | Stop reason: HOSPADM

## 2021-01-29 RX ORDER — TOPIRAMATE 100 MG/1
100 TABLET, FILM COATED ORAL NIGHTLY
Qty: 90 TABLET | Refills: 5 | Status: SHIPPED | OUTPATIENT
Start: 2021-01-29 | End: 2021-06-22 | Stop reason: DRUGHIGH

## 2021-02-02 ENCOUNTER — PATIENT MESSAGE (OUTPATIENT)
Dept: NEUROLOGY | Facility: CLINIC | Age: 41
End: 2021-02-02

## 2021-02-05 ENCOUNTER — PATIENT MESSAGE (OUTPATIENT)
Dept: INTERNAL MEDICINE | Facility: CLINIC | Age: 41
End: 2021-02-05

## 2021-02-05 ENCOUNTER — PATIENT MESSAGE (OUTPATIENT)
Dept: NEUROLOGY | Facility: CLINIC | Age: 41
End: 2021-02-05

## 2021-02-08 ENCOUNTER — PATIENT MESSAGE (OUTPATIENT)
Dept: INTERNAL MEDICINE | Facility: CLINIC | Age: 41
End: 2021-02-08

## 2021-02-08 RX ORDER — FLUCONAZOLE 150 MG/1
TABLET ORAL
Qty: 2 TABLET | Refills: 0 | Status: SHIPPED | OUTPATIENT
Start: 2021-02-08 | End: 2021-03-29

## 2021-02-10 ENCOUNTER — PATIENT MESSAGE (OUTPATIENT)
Dept: NEUROLOGY | Facility: CLINIC | Age: 41
End: 2021-02-10

## 2021-02-10 DIAGNOSIS — R41.9 COGNITIVE COMPLAINTS: ICD-10-CM

## 2021-02-10 DIAGNOSIS — G35 MULTIPLE SCLEROSIS: Primary | ICD-10-CM

## 2021-02-11 ENCOUNTER — PATIENT MESSAGE (OUTPATIENT)
Dept: NEUROLOGY | Facility: CLINIC | Age: 41
End: 2021-02-11

## 2021-02-11 ENCOUNTER — PATIENT MESSAGE (OUTPATIENT)
Dept: INTERNAL MEDICINE | Facility: CLINIC | Age: 41
End: 2021-02-11

## 2021-02-12 ENCOUNTER — PATIENT MESSAGE (OUTPATIENT)
Dept: INTERNAL MEDICINE | Facility: CLINIC | Age: 41
End: 2021-02-12

## 2021-02-12 ENCOUNTER — PATIENT MESSAGE (OUTPATIENT)
Dept: NEUROLOGY | Facility: CLINIC | Age: 41
End: 2021-02-12

## 2021-02-12 RX ORDER — LACTULOSE 10 G/15ML
10 SOLUTION ORAL 3 TIMES DAILY PRN
Qty: 450 ML | Refills: 0 | Status: SHIPPED | OUTPATIENT
Start: 2021-02-12 | End: 2021-02-22

## 2021-02-15 ENCOUNTER — PATIENT MESSAGE (OUTPATIENT)
Dept: NEUROLOGY | Facility: CLINIC | Age: 41
End: 2021-02-15

## 2021-02-24 ENCOUNTER — PATIENT MESSAGE (OUTPATIENT)
Dept: INTERNAL MEDICINE | Facility: CLINIC | Age: 41
End: 2021-02-24

## 2021-02-25 ENCOUNTER — PATIENT MESSAGE (OUTPATIENT)
Dept: REHABILITATION | Facility: HOSPITAL | Age: 41
End: 2021-02-25

## 2021-03-05 ENCOUNTER — PATIENT MESSAGE (OUTPATIENT)
Dept: NEUROLOGY | Facility: CLINIC | Age: 41
End: 2021-03-05

## 2021-03-05 DIAGNOSIS — M79.2 NEUROPATHIC PAIN: Primary | ICD-10-CM

## 2021-03-16 ENCOUNTER — PATIENT MESSAGE (OUTPATIENT)
Dept: INTERNAL MEDICINE | Facility: CLINIC | Age: 41
End: 2021-03-16

## 2021-03-17 RX ORDER — GABAPENTIN 300 MG/1
CAPSULE ORAL
Qty: 150 CAPSULE | Refills: 5 | Status: SHIPPED | OUTPATIENT
Start: 2021-03-17 | End: 2021-04-05 | Stop reason: SDUPTHER

## 2021-03-19 ENCOUNTER — PATIENT MESSAGE (OUTPATIENT)
Dept: NEUROLOGY | Facility: CLINIC | Age: 41
End: 2021-03-19

## 2021-03-20 ENCOUNTER — IMMUNIZATION (OUTPATIENT)
Dept: INTERNAL MEDICINE | Facility: CLINIC | Age: 41
End: 2021-03-20
Payer: MEDICARE

## 2021-03-20 DIAGNOSIS — Z23 NEED FOR VACCINATION: Primary | ICD-10-CM

## 2021-03-20 PROCEDURE — 91300 COVID-19, MRNA, LNP-S, PF, 30 MCG/0.3 ML DOSE VACCINE: CPT | Mod: PBBFAC | Performed by: FAMILY MEDICINE

## 2021-03-22 ENCOUNTER — NURSE TRIAGE (OUTPATIENT)
Dept: ADMINISTRATIVE | Facility: CLINIC | Age: 41
End: 2021-03-22

## 2021-03-22 ENCOUNTER — PATIENT MESSAGE (OUTPATIENT)
Dept: NEUROLOGY | Facility: CLINIC | Age: 41
End: 2021-03-22

## 2021-03-23 ENCOUNTER — HOSPITAL ENCOUNTER (INPATIENT)
Facility: HOSPITAL | Age: 41
LOS: 4 days | Discharge: HOME | DRG: 059 | End: 2021-03-27
Attending: FAMILY MEDICINE | Admitting: INTERNAL MEDICINE
Payer: MEDICARE

## 2021-03-23 DIAGNOSIS — I10 ESSENTIAL HYPERTENSION: ICD-10-CM

## 2021-03-23 DIAGNOSIS — G35 MULTIPLE SCLEROSIS: Primary | ICD-10-CM

## 2021-03-23 PROBLEM — E66.01 MORBID OBESITY: Status: ACTIVE | Noted: 2021-03-23

## 2021-03-23 LAB
ALBUMIN SERPL BCP-MCNC: 3.8 G/DL (ref 3.5–5.2)
ALP SERPL-CCNC: 86 U/L (ref 55–135)
ALT SERPL W/O P-5'-P-CCNC: 22 U/L (ref 10–44)
AMPHET+METHAMPHET UR QL: NEGATIVE
ANION GAP SERPL CALC-SCNC: 12 MMOL/L (ref 8–16)
AST SERPL-CCNC: 24 U/L (ref 10–40)
B-HCG UR QL: NEGATIVE
BARBITURATES UR QL SCN>200 NG/ML: NEGATIVE
BASOPHILS # BLD AUTO: 0.03 K/UL (ref 0–0.2)
BASOPHILS NFR BLD: 0.6 % (ref 0–1.9)
BENZODIAZ UR QL SCN>200 NG/ML: NEGATIVE
BILIRUB SERPL-MCNC: 0.2 MG/DL (ref 0.1–1)
BILIRUB UR QL STRIP: NEGATIVE
BUN SERPL-MCNC: 13 MG/DL (ref 6–20)
BZE UR QL SCN: NEGATIVE
CALCIUM SERPL-MCNC: 9.4 MG/DL (ref 8.7–10.5)
CANNABINOIDS UR QL SCN: NEGATIVE
CHLORIDE SERPL-SCNC: 108 MMOL/L (ref 95–110)
CLARITY UR: CLEAR
CO2 SERPL-SCNC: 22 MMOL/L (ref 23–29)
COLOR UR: YELLOW
CREAT SERPL-MCNC: 0.8 MG/DL (ref 0.5–1.4)
CREAT UR-MCNC: 375 MG/DL (ref 15–325)
CTP QC/QA: YES
DIFFERENTIAL METHOD: ABNORMAL
EOSINOPHIL # BLD AUTO: 0.1 K/UL (ref 0–0.5)
EOSINOPHIL NFR BLD: 2.3 % (ref 0–8)
ERYTHROCYTE [DISTWIDTH] IN BLOOD BY AUTOMATED COUNT: 16.4 % (ref 11.5–14.5)
EST. GFR  (AFRICAN AMERICAN): >60 ML/MIN/1.73 M^2
EST. GFR  (NON AFRICAN AMERICAN): >60 ML/MIN/1.73 M^2
GLUCOSE SERPL-MCNC: 85 MG/DL (ref 70–110)
GLUCOSE UR QL STRIP: NEGATIVE
HCT VFR BLD AUTO: 37.5 % (ref 37–48.5)
HGB BLD-MCNC: 11.2 G/DL (ref 12–16)
HGB UR QL STRIP: NEGATIVE
IMM GRANULOCYTES # BLD AUTO: 0.01 K/UL (ref 0–0.04)
IMM GRANULOCYTES NFR BLD AUTO: 0.2 % (ref 0–0.5)
KETONES UR QL STRIP: NEGATIVE
LEUKOCYTE ESTERASE UR QL STRIP: NEGATIVE
LYMPHOCYTES # BLD AUTO: 2.2 K/UL (ref 1–4.8)
LYMPHOCYTES NFR BLD: 40.9 % (ref 18–48)
MCH RBC QN AUTO: 21.5 PG (ref 27–31)
MCHC RBC AUTO-ENTMCNC: 29.9 G/DL (ref 32–36)
MCV RBC AUTO: 72 FL (ref 82–98)
METHADONE UR QL SCN>300 NG/ML: NEGATIVE
MONOCYTES # BLD AUTO: 0.7 K/UL (ref 0.3–1)
MONOCYTES NFR BLD: 12.5 % (ref 4–15)
NEUTROPHILS # BLD AUTO: 2.3 K/UL (ref 1.8–7.7)
NEUTROPHILS NFR BLD: 43.5 % (ref 38–73)
NITRITE UR QL STRIP: NEGATIVE
NRBC BLD-RTO: 0 /100 WBC
OPIATES UR QL SCN: NEGATIVE
PCP UR QL SCN>25 NG/ML: NEGATIVE
PH UR STRIP: 6 [PH] (ref 5–8)
PLATELET # BLD AUTO: 264 K/UL (ref 150–350)
PMV BLD AUTO: 10.9 FL (ref 9.2–12.9)
POTASSIUM SERPL-SCNC: 3.4 MMOL/L (ref 3.5–5.1)
PROT SERPL-MCNC: 7.5 G/DL (ref 6–8.4)
PROT UR QL STRIP: ABNORMAL
RBC # BLD AUTO: 5.22 M/UL (ref 4–5.4)
SARS-COV-2 RDRP RESP QL NAA+PROBE: NEGATIVE
SODIUM SERPL-SCNC: 142 MMOL/L (ref 136–145)
SP GR UR STRIP: >=1.03 (ref 1–1.03)
TOXICOLOGY INFORMATION: ABNORMAL
URN SPEC COLLECT METH UR: ABNORMAL
UROBILINOGEN UR STRIP-ACNC: NEGATIVE EU/DL
WBC # BLD AUTO: 5.26 K/UL (ref 3.9–12.7)

## 2021-03-23 PROCEDURE — 25000003 PHARM REV CODE 250: Performed by: EMERGENCY MEDICINE

## 2021-03-23 PROCEDURE — 25000003 PHARM REV CODE 250: Performed by: FAMILY MEDICINE

## 2021-03-23 PROCEDURE — 63600175 PHARM REV CODE 636 W HCPCS: Performed by: FAMILY MEDICINE

## 2021-03-23 PROCEDURE — 85025 COMPLETE CBC W/AUTO DIFF WBC: CPT | Performed by: FAMILY MEDICINE

## 2021-03-23 PROCEDURE — A9585 GADOBUTROL INJECTION: HCPCS | Performed by: FAMILY MEDICINE

## 2021-03-23 PROCEDURE — 96376 TX/PRO/DX INJ SAME DRUG ADON: CPT

## 2021-03-23 PROCEDURE — 11000001 HC ACUTE MED/SURG PRIVATE ROOM

## 2021-03-23 PROCEDURE — 63600175 PHARM REV CODE 636 W HCPCS: Performed by: EMERGENCY MEDICINE

## 2021-03-23 PROCEDURE — 81003 URINALYSIS AUTO W/O SCOPE: CPT | Mod: 59 | Performed by: FAMILY MEDICINE

## 2021-03-23 PROCEDURE — 80053 COMPREHEN METABOLIC PANEL: CPT | Performed by: FAMILY MEDICINE

## 2021-03-23 PROCEDURE — 80307 DRUG TEST PRSMV CHEM ANLYZR: CPT | Performed by: FAMILY MEDICINE

## 2021-03-23 PROCEDURE — U0002 COVID-19 LAB TEST NON-CDC: HCPCS | Performed by: FAMILY MEDICINE

## 2021-03-23 PROCEDURE — 96374 THER/PROPH/DIAG INJ IV PUSH: CPT

## 2021-03-23 PROCEDURE — 81025 URINE PREGNANCY TEST: CPT | Performed by: FAMILY MEDICINE

## 2021-03-23 PROCEDURE — 25500020 PHARM REV CODE 255: Performed by: FAMILY MEDICINE

## 2021-03-23 PROCEDURE — 25000003 PHARM REV CODE 250: Performed by: NURSE PRACTITIONER

## 2021-03-23 PROCEDURE — 36415 COLL VENOUS BLD VENIPUNCTURE: CPT | Performed by: FAMILY MEDICINE

## 2021-03-23 PROCEDURE — 99285 EMERGENCY DEPT VISIT HI MDM: CPT | Mod: 25

## 2021-03-23 PROCEDURE — 96375 TX/PRO/DX INJ NEW DRUG ADDON: CPT

## 2021-03-23 PROCEDURE — 96361 HYDRATE IV INFUSION ADD-ON: CPT

## 2021-03-23 RX ORDER — PANTOPRAZOLE SODIUM 40 MG/1
40 TABLET, DELAYED RELEASE ORAL DAILY
Refills: 3 | Status: DISCONTINUED | OUTPATIENT
Start: 2021-03-23 | End: 2021-03-27 | Stop reason: HOSPADM

## 2021-03-23 RX ORDER — KETOROLAC TROMETHAMINE 30 MG/ML
30 INJECTION, SOLUTION INTRAMUSCULAR; INTRAVENOUS
Status: COMPLETED | OUTPATIENT
Start: 2021-03-23 | End: 2021-03-23

## 2021-03-23 RX ORDER — METHOCARBAMOL 750 MG/1
750 TABLET, FILM COATED ORAL 2 TIMES DAILY
Status: DISCONTINUED | OUTPATIENT
Start: 2021-03-23 | End: 2021-03-27 | Stop reason: HOSPADM

## 2021-03-23 RX ORDER — GABAPENTIN 300 MG/1
300 CAPSULE ORAL 2 TIMES DAILY WITH MEALS
Status: DISCONTINUED | OUTPATIENT
Start: 2021-03-23 | End: 2021-03-27 | Stop reason: HOSPADM

## 2021-03-23 RX ORDER — DIPHENHYDRAMINE HYDROCHLORIDE 50 MG/ML
25 INJECTION INTRAMUSCULAR; INTRAVENOUS EVERY 6 HOURS PRN
Status: DISCONTINUED | OUTPATIENT
Start: 2021-03-23 | End: 2021-03-27 | Stop reason: HOSPADM

## 2021-03-23 RX ORDER — LOSARTAN POTASSIUM 25 MG/1
50 TABLET ORAL DAILY
Status: DISCONTINUED | OUTPATIENT
Start: 2021-03-23 | End: 2021-03-27 | Stop reason: HOSPADM

## 2021-03-23 RX ORDER — DIPHENHYDRAMINE HYDROCHLORIDE 50 MG/ML
50 INJECTION INTRAMUSCULAR; INTRAVENOUS
Status: COMPLETED | OUTPATIENT
Start: 2021-03-23 | End: 2021-03-23

## 2021-03-23 RX ORDER — HYDROCODONE BITARTRATE AND ACETAMINOPHEN 10; 325 MG/1; MG/1
1 TABLET ORAL EVERY 4 HOURS PRN
Status: DISCONTINUED | OUTPATIENT
Start: 2021-03-23 | End: 2021-03-27 | Stop reason: HOSPADM

## 2021-03-23 RX ORDER — DIPHENHYDRAMINE HYDROCHLORIDE 50 MG/ML
25 INJECTION INTRAMUSCULAR; INTRAVENOUS
Status: COMPLETED | OUTPATIENT
Start: 2021-03-23 | End: 2021-03-23

## 2021-03-23 RX ORDER — GABAPENTIN 300 MG/1
900 CAPSULE ORAL NIGHTLY
Status: DISCONTINUED | OUTPATIENT
Start: 2021-03-23 | End: 2021-03-27 | Stop reason: HOSPADM

## 2021-03-23 RX ORDER — METHYLPREDNISOLONE SOD SUCC 125 MG
375 VIAL (EA) INJECTION
Status: COMPLETED | OUTPATIENT
Start: 2021-03-23 | End: 2021-03-23

## 2021-03-23 RX ORDER — GADOBUTROL 604.72 MG/ML
10 INJECTION INTRAVENOUS
Status: COMPLETED | OUTPATIENT
Start: 2021-03-23 | End: 2021-03-23

## 2021-03-23 RX ORDER — POLYETHYLENE GLYCOL 3350 17 G/17G
17 POWDER, FOR SOLUTION ORAL DAILY PRN
COMMUNITY
End: 2021-03-29

## 2021-03-23 RX ORDER — TOPIRAMATE 25 MG/1
100 TABLET ORAL NIGHTLY
Status: DISCONTINUED | OUTPATIENT
Start: 2021-03-23 | End: 2021-03-27 | Stop reason: HOSPADM

## 2021-03-23 RX ORDER — POLYETHYLENE GLYCOL 3350 17 G/17G
17 POWDER, FOR SOLUTION ORAL DAILY
Status: DISCONTINUED | OUTPATIENT
Start: 2021-03-23 | End: 2021-03-27 | Stop reason: HOSPADM

## 2021-03-23 RX ORDER — HYDROCODONE BITARTRATE AND ACETAMINOPHEN 10; 325 MG/1; MG/1
1 TABLET ORAL
Status: COMPLETED | OUTPATIENT
Start: 2021-03-23 | End: 2021-03-23

## 2021-03-23 RX ORDER — CYCLOBENZAPRINE HCL 10 MG
10 TABLET ORAL DAILY PRN
Status: DISCONTINUED | OUTPATIENT
Start: 2021-03-23 | End: 2021-03-27 | Stop reason: HOSPADM

## 2021-03-23 RX ORDER — METHYLPREDNISOLONE SOD SUCC 125 MG
125 VIAL (EA) INJECTION
Status: COMPLETED | OUTPATIENT
Start: 2021-03-23 | End: 2021-03-23

## 2021-03-23 RX ORDER — MICONAZOLE NITRATE 2 %
POWDER (GRAM) TOPICAL 2 TIMES DAILY
Status: DISCONTINUED | OUTPATIENT
Start: 2021-03-23 | End: 2021-03-27 | Stop reason: HOSPADM

## 2021-03-23 RX ORDER — DOXEPIN HYDROCHLORIDE 25 MG/1
75 CAPSULE ORAL NIGHTLY
Status: DISCONTINUED | OUTPATIENT
Start: 2021-03-23 | End: 2021-03-27 | Stop reason: HOSPADM

## 2021-03-23 RX ADMIN — GABAPENTIN 900 MG: 300 CAPSULE ORAL at 11:03

## 2021-03-23 RX ADMIN — METHOCARBAMOL TABLETS 750 MG: 750 TABLET, COATED ORAL at 11:03

## 2021-03-23 RX ADMIN — SODIUM CHLORIDE 1000 ML: 0.9 INJECTION, SOLUTION INTRAVENOUS at 03:03

## 2021-03-23 RX ADMIN — ANTI-FUNGAL POWDER MICONAZOLE NITRATE TALC FREE: 1.42 POWDER TOPICAL at 11:03

## 2021-03-23 RX ADMIN — GADOBUTROL 10 ML: 604.72 INJECTION INTRAVENOUS at 06:03

## 2021-03-23 RX ADMIN — DIPHENHYDRAMINE HYDROCHLORIDE 25 MG: 50 INJECTION, SOLUTION INTRAMUSCULAR; INTRAVENOUS at 09:03

## 2021-03-23 RX ADMIN — KETOROLAC TROMETHAMINE 30 MG: 30 INJECTION, SOLUTION INTRAMUSCULAR at 03:03

## 2021-03-23 RX ADMIN — HYDROCODONE BITARTRATE AND ACETAMINOPHEN 1 TABLET: 10; 325 TABLET ORAL at 08:03

## 2021-03-23 RX ADMIN — LOSARTAN POTASSIUM 50 MG: 25 TABLET, FILM COATED ORAL at 02:03

## 2021-03-23 RX ADMIN — HYDROCODONE BITARTRATE AND ACETAMINOPHEN 1 TABLET: 10; 325 TABLET ORAL at 11:03

## 2021-03-23 RX ADMIN — DIPHENHYDRAMINE HYDROCHLORIDE 50 MG: 50 INJECTION, SOLUTION INTRAMUSCULAR; INTRAVENOUS at 03:03

## 2021-03-23 RX ADMIN — HYDROCODONE BITARTRATE AND ACETAMINOPHEN 1 TABLET: 10; 325 TABLET ORAL at 06:03

## 2021-03-23 RX ADMIN — GABAPENTIN 300 MG: 300 CAPSULE ORAL at 05:03

## 2021-03-23 RX ADMIN — PANTOPRAZOLE SODIUM 40 MG: 40 TABLET, DELAYED RELEASE ORAL at 02:03

## 2021-03-23 RX ADMIN — HYDROCODONE BITARTRATE AND ACETAMINOPHEN 1 TABLET: 10; 325 TABLET ORAL at 02:03

## 2021-03-23 RX ADMIN — METHYLPREDNISOLONE SODIUM SUCCINATE 375 MG: 125 INJECTION, POWDER, FOR SOLUTION INTRAMUSCULAR; INTRAVENOUS at 08:03

## 2021-03-23 RX ADMIN — METHYLPREDNISOLONE SODIUM SUCCINATE 125 MG: 125 INJECTION, POWDER, FOR SOLUTION INTRAMUSCULAR; INTRAVENOUS at 03:03

## 2021-03-23 RX ADMIN — TOPIRAMATE 100 MG: 25 TABLET, FILM COATED ORAL at 11:03

## 2021-03-23 RX ADMIN — DOXEPIN HYDROCHLORIDE 75 MG: 25 CAPSULE ORAL at 11:03

## 2021-03-23 RX ADMIN — METHOCARBAMOL TABLETS 750 MG: 750 TABLET, COATED ORAL at 02:03

## 2021-03-24 LAB
ANION GAP SERPL CALC-SCNC: 11 MMOL/L (ref 8–16)
BASOPHILS # BLD AUTO: 0.01 K/UL (ref 0–0.2)
BASOPHILS NFR BLD: 0.1 % (ref 0–1.9)
BUN SERPL-MCNC: 10 MG/DL (ref 6–20)
CALCIUM SERPL-MCNC: 8.9 MG/DL (ref 8.7–10.5)
CHLORIDE SERPL-SCNC: 110 MMOL/L (ref 95–110)
CO2 SERPL-SCNC: 20 MMOL/L (ref 23–29)
CREAT SERPL-MCNC: 0.8 MG/DL (ref 0.5–1.4)
DIFFERENTIAL METHOD: ABNORMAL
EOSINOPHIL # BLD AUTO: 0 K/UL (ref 0–0.5)
EOSINOPHIL NFR BLD: 0 % (ref 0–8)
ERYTHROCYTE [DISTWIDTH] IN BLOOD BY AUTOMATED COUNT: 16.5 % (ref 11.5–14.5)
EST. GFR  (AFRICAN AMERICAN): >60 ML/MIN/1.73 M^2
EST. GFR  (NON AFRICAN AMERICAN): >60 ML/MIN/1.73 M^2
GLUCOSE SERPL-MCNC: 130 MG/DL (ref 70–110)
HCT VFR BLD AUTO: 36.9 % (ref 37–48.5)
HGB BLD-MCNC: 11 G/DL (ref 12–16)
IMM GRANULOCYTES # BLD AUTO: 0.05 K/UL (ref 0–0.04)
IMM GRANULOCYTES NFR BLD AUTO: 0.6 % (ref 0–0.5)
LYMPHOCYTES # BLD AUTO: 1.5 K/UL (ref 1–4.8)
LYMPHOCYTES NFR BLD: 16 % (ref 18–48)
MCH RBC QN AUTO: 21.4 PG (ref 27–31)
MCHC RBC AUTO-ENTMCNC: 29.8 G/DL (ref 32–36)
MCV RBC AUTO: 72 FL (ref 82–98)
MONOCYTES # BLD AUTO: 0.5 K/UL (ref 0.3–1)
MONOCYTES NFR BLD: 5.5 % (ref 4–15)
NEUTROPHILS # BLD AUTO: 7.1 K/UL (ref 1.8–7.7)
NEUTROPHILS NFR BLD: 77.8 % (ref 38–73)
NRBC BLD-RTO: 0 /100 WBC
PLATELET # BLD AUTO: 252 K/UL (ref 150–350)
PMV BLD AUTO: 10.3 FL (ref 9.2–12.9)
POTASSIUM SERPL-SCNC: 3.5 MMOL/L (ref 3.5–5.1)
RBC # BLD AUTO: 5.13 M/UL (ref 4–5.4)
SODIUM SERPL-SCNC: 141 MMOL/L (ref 136–145)
WBC # BLD AUTO: 9.07 K/UL (ref 3.9–12.7)

## 2021-03-24 PROCEDURE — 63600175 PHARM REV CODE 636 W HCPCS: Performed by: NURSE PRACTITIONER

## 2021-03-24 PROCEDURE — 97166 OT EVAL MOD COMPLEX 45 MIN: CPT | Performed by: PHYSICAL THERAPIST

## 2021-03-24 PROCEDURE — 85025 COMPLETE CBC W/AUTO DIFF WBC: CPT | Performed by: NURSE PRACTITIONER

## 2021-03-24 PROCEDURE — 11000001 HC ACUTE MED/SURG PRIVATE ROOM

## 2021-03-24 PROCEDURE — 97116 GAIT TRAINING THERAPY: CPT

## 2021-03-24 PROCEDURE — 25000003 PHARM REV CODE 250: Performed by: NURSE PRACTITIONER

## 2021-03-24 PROCEDURE — 80048 BASIC METABOLIC PNL TOTAL CA: CPT | Performed by: NURSE PRACTITIONER

## 2021-03-24 PROCEDURE — 97162 PT EVAL MOD COMPLEX 30 MIN: CPT

## 2021-03-24 PROCEDURE — 36415 COLL VENOUS BLD VENIPUNCTURE: CPT | Performed by: NURSE PRACTITIONER

## 2021-03-24 PROCEDURE — 97530 THERAPEUTIC ACTIVITIES: CPT | Performed by: PHYSICAL THERAPIST

## 2021-03-24 RX ORDER — ONDANSETRON 2 MG/ML
8 INJECTION INTRAMUSCULAR; INTRAVENOUS EVERY 8 HOURS PRN
Status: DISCONTINUED | OUTPATIENT
Start: 2021-03-24 | End: 2021-03-27 | Stop reason: HOSPADM

## 2021-03-24 RX ADMIN — PANTOPRAZOLE SODIUM 40 MG: 40 TABLET, DELAYED RELEASE ORAL at 09:03

## 2021-03-24 RX ADMIN — DIPHENHYDRAMINE HYDROCHLORIDE 25 MG: 50 INJECTION, SOLUTION INTRAMUSCULAR; INTRAVENOUS at 09:03

## 2021-03-24 RX ADMIN — HYDROCODONE BITARTRATE AND ACETAMINOPHEN 1 TABLET: 10; 325 TABLET ORAL at 07:03

## 2021-03-24 RX ADMIN — DEXTROSE 500 MG: 50 INJECTION, SOLUTION INTRAVENOUS at 09:03

## 2021-03-24 RX ADMIN — ANTI-FUNGAL POWDER MICONAZOLE NITRATE TALC FREE: 1.42 POWDER TOPICAL at 11:03

## 2021-03-24 RX ADMIN — LOSARTAN POTASSIUM 50 MG: 25 TABLET, FILM COATED ORAL at 09:03

## 2021-03-24 RX ADMIN — ONDANSETRON 8 MG: 2 INJECTION INTRAMUSCULAR; INTRAVENOUS at 07:03

## 2021-03-24 RX ADMIN — HYDROCODONE BITARTRATE AND ACETAMINOPHEN 1 TABLET: 10; 325 TABLET ORAL at 11:03

## 2021-03-24 RX ADMIN — GABAPENTIN 300 MG: 300 CAPSULE ORAL at 07:03

## 2021-03-24 RX ADMIN — HYDROCODONE BITARTRATE AND ACETAMINOPHEN 1 TABLET: 10; 325 TABLET ORAL at 02:03

## 2021-03-24 RX ADMIN — LINACLOTIDE 145 MCG: 145 CAPSULE, GELATIN COATED ORAL at 06:03

## 2021-03-24 RX ADMIN — ONDANSETRON 8 MG: 2 INJECTION INTRAMUSCULAR; INTRAVENOUS at 06:03

## 2021-03-24 RX ADMIN — HYDROCODONE BITARTRATE AND ACETAMINOPHEN 1 TABLET: 10; 325 TABLET ORAL at 06:03

## 2021-03-24 RX ADMIN — METHOCARBAMOL TABLETS 750 MG: 750 TABLET, COATED ORAL at 11:03

## 2021-03-24 RX ADMIN — POLYETHYLENE GLYCOL 3350 17 G: 17 POWDER, FOR SOLUTION ORAL at 09:03

## 2021-03-24 RX ADMIN — GABAPENTIN 300 MG: 300 CAPSULE ORAL at 06:03

## 2021-03-24 RX ADMIN — METHOCARBAMOL TABLETS 750 MG: 750 TABLET, COATED ORAL at 09:03

## 2021-03-24 RX ADMIN — TOPIRAMATE 100 MG: 25 TABLET, FILM COATED ORAL at 11:03

## 2021-03-24 RX ADMIN — ANTI-FUNGAL POWDER MICONAZOLE NITRATE TALC FREE: 1.42 POWDER TOPICAL at 09:03

## 2021-03-24 RX ADMIN — DOXEPIN HYDROCHLORIDE 75 MG: 25 CAPSULE ORAL at 11:03

## 2021-03-24 RX ADMIN — GABAPENTIN 900 MG: 300 CAPSULE ORAL at 11:03

## 2021-03-25 LAB
ANION GAP SERPL CALC-SCNC: 9 MMOL/L (ref 8–16)
BASOPHILS # BLD AUTO: 0.01 K/UL (ref 0–0.2)
BASOPHILS NFR BLD: 0.1 % (ref 0–1.9)
BUN SERPL-MCNC: 12 MG/DL (ref 6–20)
CALCIUM SERPL-MCNC: 9.3 MG/DL (ref 8.7–10.5)
CHLORIDE SERPL-SCNC: 106 MMOL/L (ref 95–110)
CO2 SERPL-SCNC: 26 MMOL/L (ref 23–29)
CREAT SERPL-MCNC: 0.7 MG/DL (ref 0.5–1.4)
DIFFERENTIAL METHOD: ABNORMAL
EOSINOPHIL # BLD AUTO: 0 K/UL (ref 0–0.5)
EOSINOPHIL NFR BLD: 0 % (ref 0–8)
ERYTHROCYTE [DISTWIDTH] IN BLOOD BY AUTOMATED COUNT: 16.6 % (ref 11.5–14.5)
EST. GFR  (AFRICAN AMERICAN): >60 ML/MIN/1.73 M^2
EST. GFR  (NON AFRICAN AMERICAN): >60 ML/MIN/1.73 M^2
GLUCOSE SERPL-MCNC: 93 MG/DL (ref 70–110)
HCT VFR BLD AUTO: 37.4 % (ref 37–48.5)
HGB BLD-MCNC: 11.2 G/DL (ref 12–16)
IMM GRANULOCYTES # BLD AUTO: 0.05 K/UL (ref 0–0.04)
IMM GRANULOCYTES NFR BLD AUTO: 0.5 % (ref 0–0.5)
LYMPHOCYTES # BLD AUTO: 1.4 K/UL (ref 1–4.8)
LYMPHOCYTES NFR BLD: 14.9 % (ref 18–48)
MCH RBC QN AUTO: 21.5 PG (ref 27–31)
MCHC RBC AUTO-ENTMCNC: 29.9 G/DL (ref 32–36)
MCV RBC AUTO: 72 FL (ref 82–98)
MONOCYTES # BLD AUTO: 0.5 K/UL (ref 0.3–1)
MONOCYTES NFR BLD: 5.8 % (ref 4–15)
NEUTROPHILS # BLD AUTO: 7.3 K/UL (ref 1.8–7.7)
NEUTROPHILS NFR BLD: 78.7 % (ref 38–73)
NRBC BLD-RTO: 0 /100 WBC
PLATELET # BLD AUTO: 277 K/UL (ref 150–350)
PMV BLD AUTO: 10.9 FL (ref 9.2–12.9)
POTASSIUM SERPL-SCNC: 3.8 MMOL/L (ref 3.5–5.1)
RBC # BLD AUTO: 5.22 M/UL (ref 4–5.4)
SODIUM SERPL-SCNC: 141 MMOL/L (ref 136–145)
WBC # BLD AUTO: 9.26 K/UL (ref 3.9–12.7)

## 2021-03-25 PROCEDURE — 97110 THERAPEUTIC EXERCISES: CPT | Performed by: PHYSICAL THERAPIST

## 2021-03-25 PROCEDURE — 85025 COMPLETE CBC W/AUTO DIFF WBC: CPT | Performed by: NURSE PRACTITIONER

## 2021-03-25 PROCEDURE — 63600175 PHARM REV CODE 636 W HCPCS: Performed by: NURSE PRACTITIONER

## 2021-03-25 PROCEDURE — 97530 THERAPEUTIC ACTIVITIES: CPT

## 2021-03-25 PROCEDURE — 97530 THERAPEUTIC ACTIVITIES: CPT | Performed by: PHYSICAL THERAPIST

## 2021-03-25 PROCEDURE — 25000003 PHARM REV CODE 250: Performed by: NURSE PRACTITIONER

## 2021-03-25 PROCEDURE — 11000001 HC ACUTE MED/SURG PRIVATE ROOM

## 2021-03-25 PROCEDURE — 97116 GAIT TRAINING THERAPY: CPT

## 2021-03-25 PROCEDURE — 36415 COLL VENOUS BLD VENIPUNCTURE: CPT | Performed by: NURSE PRACTITIONER

## 2021-03-25 PROCEDURE — 80048 BASIC METABOLIC PNL TOTAL CA: CPT | Performed by: NURSE PRACTITIONER

## 2021-03-25 RX ADMIN — METHOCARBAMOL TABLETS 750 MG: 750 TABLET, COATED ORAL at 09:03

## 2021-03-25 RX ADMIN — LOSARTAN POTASSIUM 50 MG: 25 TABLET, FILM COATED ORAL at 09:03

## 2021-03-25 RX ADMIN — PANTOPRAZOLE SODIUM 40 MG: 40 TABLET, DELAYED RELEASE ORAL at 09:03

## 2021-03-25 RX ADMIN — HYDROCODONE BITARTRATE AND ACETAMINOPHEN 1 TABLET: 10; 325 TABLET ORAL at 06:03

## 2021-03-25 RX ADMIN — HYDROCODONE BITARTRATE AND ACETAMINOPHEN 1 TABLET: 10; 325 TABLET ORAL at 09:03

## 2021-03-25 RX ADMIN — DOXEPIN HYDROCHLORIDE 75 MG: 25 CAPSULE ORAL at 08:03

## 2021-03-25 RX ADMIN — POLYETHYLENE GLYCOL 3350 17 G: 17 POWDER, FOR SOLUTION ORAL at 09:03

## 2021-03-25 RX ADMIN — GABAPENTIN 300 MG: 300 CAPSULE ORAL at 05:03

## 2021-03-25 RX ADMIN — DEXTROSE 500 MG: 50 INJECTION, SOLUTION INTRAVENOUS at 09:03

## 2021-03-25 RX ADMIN — HYDROCODONE BITARTRATE AND ACETAMINOPHEN 1 TABLET: 10; 325 TABLET ORAL at 04:03

## 2021-03-25 RX ADMIN — METHOCARBAMOL TABLETS 750 MG: 750 TABLET, COATED ORAL at 08:03

## 2021-03-25 RX ADMIN — ONDANSETRON 8 MG: 2 INJECTION INTRAMUSCULAR; INTRAVENOUS at 02:03

## 2021-03-25 RX ADMIN — ONDANSETRON 8 MG: 2 INJECTION INTRAMUSCULAR; INTRAVENOUS at 04:03

## 2021-03-25 RX ADMIN — LINACLOTIDE 145 MCG: 145 CAPSULE, GELATIN COATED ORAL at 06:03

## 2021-03-25 RX ADMIN — GABAPENTIN 300 MG: 300 CAPSULE ORAL at 06:03

## 2021-03-25 RX ADMIN — ANTI-FUNGAL POWDER MICONAZOLE NITRATE TALC FREE: 1.42 POWDER TOPICAL at 09:03

## 2021-03-25 RX ADMIN — HYDROCODONE BITARTRATE AND ACETAMINOPHEN 1 TABLET: 10; 325 TABLET ORAL at 02:03

## 2021-03-25 RX ADMIN — DIPHENHYDRAMINE HYDROCHLORIDE 25 MG: 50 INJECTION, SOLUTION INTRAMUSCULAR; INTRAVENOUS at 09:03

## 2021-03-25 RX ADMIN — GABAPENTIN 900 MG: 300 CAPSULE ORAL at 08:03

## 2021-03-25 RX ADMIN — TOPIRAMATE 100 MG: 25 TABLET, FILM COATED ORAL at 08:03

## 2021-03-26 LAB
ANION GAP SERPL CALC-SCNC: 10 MMOL/L (ref 8–16)
BASOPHILS # BLD AUTO: 0.01 K/UL (ref 0–0.2)
BASOPHILS NFR BLD: 0.1 % (ref 0–1.9)
BUN SERPL-MCNC: 12 MG/DL (ref 6–20)
CALCIUM SERPL-MCNC: 9.2 MG/DL (ref 8.7–10.5)
CHLORIDE SERPL-SCNC: 104 MMOL/L (ref 95–110)
CO2 SERPL-SCNC: 25 MMOL/L (ref 23–29)
CREAT SERPL-MCNC: 0.8 MG/DL (ref 0.5–1.4)
DIFFERENTIAL METHOD: ABNORMAL
EOSINOPHIL # BLD AUTO: 0 K/UL (ref 0–0.5)
EOSINOPHIL NFR BLD: 0 % (ref 0–8)
ERYTHROCYTE [DISTWIDTH] IN BLOOD BY AUTOMATED COUNT: 16.2 % (ref 11.5–14.5)
EST. GFR  (AFRICAN AMERICAN): >60 ML/MIN/1.73 M^2
EST. GFR  (NON AFRICAN AMERICAN): >60 ML/MIN/1.73 M^2
GLUCOSE SERPL-MCNC: 126 MG/DL (ref 70–110)
HCT VFR BLD AUTO: 38.5 % (ref 37–48.5)
HGB BLD-MCNC: 11.5 G/DL (ref 12–16)
IMM GRANULOCYTES # BLD AUTO: 0.04 K/UL (ref 0–0.04)
IMM GRANULOCYTES NFR BLD AUTO: 0.5 % (ref 0–0.5)
LYMPHOCYTES # BLD AUTO: 1.2 K/UL (ref 1–4.8)
LYMPHOCYTES NFR BLD: 14.9 % (ref 18–48)
MCH RBC QN AUTO: 21.7 PG (ref 27–31)
MCHC RBC AUTO-ENTMCNC: 29.9 G/DL (ref 32–36)
MCV RBC AUTO: 73 FL (ref 82–98)
MONOCYTES # BLD AUTO: 0.4 K/UL (ref 0.3–1)
MONOCYTES NFR BLD: 5.1 % (ref 4–15)
NEUTROPHILS # BLD AUTO: 6.2 K/UL (ref 1.8–7.7)
NEUTROPHILS NFR BLD: 79.4 % (ref 38–73)
NRBC BLD-RTO: 0 /100 WBC
PLATELET # BLD AUTO: 259 K/UL (ref 150–350)
PMV BLD AUTO: 10.5 FL (ref 9.2–12.9)
POTASSIUM SERPL-SCNC: 4.1 MMOL/L (ref 3.5–5.1)
RBC # BLD AUTO: 5.3 M/UL (ref 4–5.4)
SODIUM SERPL-SCNC: 139 MMOL/L (ref 136–145)
WBC # BLD AUTO: 7.83 K/UL (ref 3.9–12.7)

## 2021-03-26 PROCEDURE — 36415 COLL VENOUS BLD VENIPUNCTURE: CPT | Performed by: NURSE PRACTITIONER

## 2021-03-26 PROCEDURE — 97530 THERAPEUTIC ACTIVITIES: CPT

## 2021-03-26 PROCEDURE — 97535 SELF CARE MNGMENT TRAINING: CPT | Performed by: PHYSICAL THERAPIST

## 2021-03-26 PROCEDURE — 97530 THERAPEUTIC ACTIVITIES: CPT | Performed by: PHYSICAL THERAPIST

## 2021-03-26 PROCEDURE — 63600175 PHARM REV CODE 636 W HCPCS: Performed by: NURSE PRACTITIONER

## 2021-03-26 PROCEDURE — 25000003 PHARM REV CODE 250: Performed by: NURSE PRACTITIONER

## 2021-03-26 PROCEDURE — 80048 BASIC METABOLIC PNL TOTAL CA: CPT | Performed by: NURSE PRACTITIONER

## 2021-03-26 PROCEDURE — 97116 GAIT TRAINING THERAPY: CPT

## 2021-03-26 PROCEDURE — 11000001 HC ACUTE MED/SURG PRIVATE ROOM

## 2021-03-26 PROCEDURE — 85025 COMPLETE CBC W/AUTO DIFF WBC: CPT | Performed by: NURSE PRACTITIONER

## 2021-03-26 RX ORDER — HYDRALAZINE HYDROCHLORIDE 20 MG/ML
10 INJECTION INTRAMUSCULAR; INTRAVENOUS EVERY 8 HOURS PRN
Status: DISCONTINUED | OUTPATIENT
Start: 2021-03-26 | End: 2021-03-27 | Stop reason: HOSPADM

## 2021-03-26 RX ADMIN — GABAPENTIN 300 MG: 300 CAPSULE ORAL at 05:03

## 2021-03-26 RX ADMIN — LOSARTAN POTASSIUM 50 MG: 25 TABLET, FILM COATED ORAL at 09:03

## 2021-03-26 RX ADMIN — CYCLOBENZAPRINE HYDROCHLORIDE 10 MG: 10 TABLET, FILM COATED ORAL at 09:03

## 2021-03-26 RX ADMIN — PANTOPRAZOLE SODIUM 40 MG: 40 TABLET, DELAYED RELEASE ORAL at 09:03

## 2021-03-26 RX ADMIN — HYDROCODONE BITARTRATE AND ACETAMINOPHEN 1 TABLET: 10; 325 TABLET ORAL at 01:03

## 2021-03-26 RX ADMIN — GABAPENTIN 900 MG: 300 CAPSULE ORAL at 09:03

## 2021-03-26 RX ADMIN — HYDROCODONE BITARTRATE AND ACETAMINOPHEN 1 TABLET: 10; 325 TABLET ORAL at 07:03

## 2021-03-26 RX ADMIN — LINACLOTIDE 145 MCG: 145 CAPSULE, GELATIN COATED ORAL at 06:03

## 2021-03-26 RX ADMIN — HYDROCODONE BITARTRATE AND ACETAMINOPHEN 1 TABLET: 10; 325 TABLET ORAL at 11:03

## 2021-03-26 RX ADMIN — HYDROCODONE BITARTRATE AND ACETAMINOPHEN 1 TABLET: 10; 325 TABLET ORAL at 06:03

## 2021-03-26 RX ADMIN — DOXEPIN HYDROCHLORIDE 75 MG: 25 CAPSULE ORAL at 09:03

## 2021-03-26 RX ADMIN — GABAPENTIN 300 MG: 300 CAPSULE ORAL at 09:03

## 2021-03-26 RX ADMIN — HYDRALAZINE HYDROCHLORIDE 10 MG: 20 INJECTION INTRAMUSCULAR; INTRAVENOUS at 11:03

## 2021-03-26 RX ADMIN — METHOCARBAMOL TABLETS 750 MG: 750 TABLET, COATED ORAL at 09:03

## 2021-03-26 RX ADMIN — DIPHENHYDRAMINE HYDROCHLORIDE 25 MG: 50 INJECTION, SOLUTION INTRAMUSCULAR; INTRAVENOUS at 09:03

## 2021-03-26 RX ADMIN — POLYETHYLENE GLYCOL 3350 17 G: 17 POWDER, FOR SOLUTION ORAL at 09:03

## 2021-03-26 RX ADMIN — TOPIRAMATE 100 MG: 25 TABLET, FILM COATED ORAL at 10:03

## 2021-03-26 RX ADMIN — HYDROCODONE BITARTRATE AND ACETAMINOPHEN 1 TABLET: 10; 325 TABLET ORAL at 03:03

## 2021-03-26 RX ADMIN — ONDANSETRON 8 MG: 2 INJECTION INTRAMUSCULAR; INTRAVENOUS at 02:03

## 2021-03-26 RX ADMIN — ONDANSETRON 8 MG: 2 INJECTION INTRAMUSCULAR; INTRAVENOUS at 07:03

## 2021-03-26 RX ADMIN — DEXTROSE 500 MG: 50 INJECTION, SOLUTION INTRAVENOUS at 09:03

## 2021-03-26 RX ADMIN — ONDANSETRON 8 MG: 2 INJECTION INTRAMUSCULAR; INTRAVENOUS at 11:03

## 2021-03-26 NOTE — TELEPHONE ENCOUNTER
Patient states new symptom since last conversation are vertigo that started about 2-3 hours, and shaking of her left leg, which is causing a gait disturbance. She is using a walker. She also admits to her left ankle being swollen. In regards to the shaking in her leg, she has experienced this symptom a year ago. In regards to the vertigo, this happens when sitting/standing. Pt is having to hold on to the walls. Pt drinking fluids, denies infection. Advised pt to also let her pcp know about the veritgo/dizziness as she is currently taking hydrochlorothiazide for high blood pressure.            Pre-procedure teaching reinforced.

## 2021-03-27 VITALS
RESPIRATION RATE: 17 BRPM | DIASTOLIC BLOOD PRESSURE: 84 MMHG | SYSTOLIC BLOOD PRESSURE: 168 MMHG | HEIGHT: 66 IN | BODY MASS INDEX: 45.35 KG/M2 | TEMPERATURE: 98 F | WEIGHT: 282.19 LBS | OXYGEN SATURATION: 99 % | HEART RATE: 59 BPM

## 2021-03-27 PROBLEM — G35 MULTIPLE SCLEROSIS EXACERBATION: Status: RESOLVED | Noted: 2019-04-10 | Resolved: 2021-03-27

## 2021-03-27 LAB
AMPHET+METHAMPHET UR QL: NEGATIVE
ANION GAP SERPL CALC-SCNC: 10 MMOL/L (ref 8–16)
BARBITURATES UR QL SCN>200 NG/ML: NEGATIVE
BASOPHILS # BLD AUTO: 0.01 K/UL (ref 0–0.2)
BASOPHILS NFR BLD: 0.1 % (ref 0–1.9)
BENZODIAZ UR QL SCN>200 NG/ML: NEGATIVE
BUN SERPL-MCNC: 12 MG/DL (ref 6–20)
BZE UR QL SCN: NEGATIVE
CALCIUM SERPL-MCNC: 9.1 MG/DL (ref 8.7–10.5)
CANNABINOIDS UR QL SCN: NEGATIVE
CHLORIDE SERPL-SCNC: 104 MMOL/L (ref 95–110)
CO2 SERPL-SCNC: 27 MMOL/L (ref 23–29)
CREAT SERPL-MCNC: 0.8 MG/DL (ref 0.5–1.4)
CREAT UR-MCNC: 32.5 MG/DL (ref 15–325)
DIFFERENTIAL METHOD: ABNORMAL
EOSINOPHIL # BLD AUTO: 0 K/UL (ref 0–0.5)
EOSINOPHIL NFR BLD: 0 % (ref 0–8)
ERYTHROCYTE [DISTWIDTH] IN BLOOD BY AUTOMATED COUNT: 16.2 % (ref 11.5–14.5)
EST. GFR  (AFRICAN AMERICAN): >60 ML/MIN/1.73 M^2
EST. GFR  (NON AFRICAN AMERICAN): >60 ML/MIN/1.73 M^2
GLUCOSE SERPL-MCNC: 105 MG/DL (ref 70–110)
HCT VFR BLD AUTO: 40.7 % (ref 37–48.5)
HGB BLD-MCNC: 12.2 G/DL (ref 12–16)
IMM GRANULOCYTES # BLD AUTO: 0.06 K/UL (ref 0–0.04)
IMM GRANULOCYTES NFR BLD AUTO: 0.6 % (ref 0–0.5)
LYMPHOCYTES # BLD AUTO: 1.6 K/UL (ref 1–4.8)
LYMPHOCYTES NFR BLD: 16.1 % (ref 18–48)
MCH RBC QN AUTO: 21.5 PG (ref 27–31)
MCHC RBC AUTO-ENTMCNC: 30 G/DL (ref 32–36)
MCV RBC AUTO: 72 FL (ref 82–98)
METHADONE UR QL SCN>300 NG/ML: NEGATIVE
MONOCYTES # BLD AUTO: 0.7 K/UL (ref 0.3–1)
MONOCYTES NFR BLD: 7.3 % (ref 4–15)
NEUTROPHILS # BLD AUTO: 7.4 K/UL (ref 1.8–7.7)
NEUTROPHILS NFR BLD: 75.9 % (ref 38–73)
NRBC BLD-RTO: 0 /100 WBC
OPIATES UR QL SCN: NORMAL
PCP UR QL SCN>25 NG/ML: NEGATIVE
PLATELET # BLD AUTO: 292 K/UL (ref 150–350)
PMV BLD AUTO: 11.8 FL (ref 9.2–12.9)
POCT GLUCOSE: 142 MG/DL (ref 70–110)
POTASSIUM SERPL-SCNC: 3.7 MMOL/L (ref 3.5–5.1)
RBC # BLD AUTO: 5.67 M/UL (ref 4–5.4)
SODIUM SERPL-SCNC: 141 MMOL/L (ref 136–145)
TOXICOLOGY INFORMATION: NORMAL
WBC # BLD AUTO: 9.69 K/UL (ref 3.9–12.7)

## 2021-03-27 PROCEDURE — 85025 COMPLETE CBC W/AUTO DIFF WBC: CPT | Performed by: NURSE PRACTITIONER

## 2021-03-27 PROCEDURE — 25000003 PHARM REV CODE 250: Performed by: NURSE PRACTITIONER

## 2021-03-27 PROCEDURE — 80048 BASIC METABOLIC PNL TOTAL CA: CPT | Performed by: NURSE PRACTITIONER

## 2021-03-27 PROCEDURE — 36415 COLL VENOUS BLD VENIPUNCTURE: CPT | Performed by: NURSE PRACTITIONER

## 2021-03-27 PROCEDURE — 80307 DRUG TEST PRSMV CHEM ANLYZR: CPT | Performed by: INTERNAL MEDICINE

## 2021-03-27 PROCEDURE — 63600175 PHARM REV CODE 636 W HCPCS: Performed by: NURSE PRACTITIONER

## 2021-03-27 RX ORDER — TIZANIDINE 4 MG/1
4 TABLET ORAL EVERY 6 HOURS PRN
Qty: 30 TABLET | Refills: 0 | Status: SHIPPED | OUTPATIENT
Start: 2021-03-27 | End: 2021-04-06

## 2021-03-27 RX ADMIN — POLYETHYLENE GLYCOL 3350 17 G: 17 POWDER, FOR SOLUTION ORAL at 09:03

## 2021-03-27 RX ADMIN — DIPHENHYDRAMINE HYDROCHLORIDE 25 MG: 50 INJECTION, SOLUTION INTRAMUSCULAR; INTRAVENOUS at 09:03

## 2021-03-27 RX ADMIN — HYDROCODONE BITARTRATE AND ACETAMINOPHEN 1 TABLET: 10; 325 TABLET ORAL at 12:03

## 2021-03-27 RX ADMIN — LINACLOTIDE 145 MCG: 145 CAPSULE, GELATIN COATED ORAL at 05:03

## 2021-03-27 RX ADMIN — DEXTROSE 500 MG: 50 INJECTION, SOLUTION INTRAVENOUS at 09:03

## 2021-03-27 RX ADMIN — GABAPENTIN 300 MG: 300 CAPSULE ORAL at 08:03

## 2021-03-27 RX ADMIN — ANTI-FUNGAL POWDER MICONAZOLE NITRATE TALC FREE: 1.42 POWDER TOPICAL at 09:03

## 2021-03-27 RX ADMIN — PANTOPRAZOLE SODIUM 40 MG: 40 TABLET, DELAYED RELEASE ORAL at 09:03

## 2021-03-27 RX ADMIN — METHOCARBAMOL TABLETS 750 MG: 750 TABLET, COATED ORAL at 09:03

## 2021-03-27 RX ADMIN — HYDROCODONE BITARTRATE AND ACETAMINOPHEN 1 TABLET: 10; 325 TABLET ORAL at 05:03

## 2021-03-27 RX ADMIN — HYDROCODONE BITARTRATE AND ACETAMINOPHEN 1 TABLET: 10; 325 TABLET ORAL at 10:03

## 2021-03-27 RX ADMIN — LOSARTAN POTASSIUM 50 MG: 25 TABLET, FILM COATED ORAL at 09:03

## 2021-03-29 ENCOUNTER — PATIENT OUTREACH (OUTPATIENT)
Dept: ADMINISTRATIVE | Facility: OTHER | Age: 41
End: 2021-03-29

## 2021-03-29 ENCOUNTER — OFFICE VISIT (OUTPATIENT)
Dept: NEUROLOGY | Facility: CLINIC | Age: 41
End: 2021-03-29
Payer: MEDICARE

## 2021-03-29 ENCOUNTER — OFFICE VISIT (OUTPATIENT)
Dept: FAMILY MEDICINE | Facility: CLINIC | Age: 41
End: 2021-03-29
Payer: MEDICARE

## 2021-03-29 VITALS
TEMPERATURE: 98 F | HEART RATE: 68 BPM | BODY MASS INDEX: 45.14 KG/M2 | HEIGHT: 66 IN | SYSTOLIC BLOOD PRESSURE: 136 MMHG | WEIGHT: 280.88 LBS | OXYGEN SATURATION: 98 % | DIASTOLIC BLOOD PRESSURE: 84 MMHG

## 2021-03-29 DIAGNOSIS — N31.9 NEUROGENIC DYSFUNCTION OF THE URINARY BLADDER: ICD-10-CM

## 2021-03-29 DIAGNOSIS — E66.01 MORBID OBESITY WITH BMI OF 45.0-49.9, ADULT: ICD-10-CM

## 2021-03-29 DIAGNOSIS — F32.A DEPRESSION, UNSPECIFIED DEPRESSION TYPE: ICD-10-CM

## 2021-03-29 DIAGNOSIS — M62.838 MUSCLE SPASM: ICD-10-CM

## 2021-03-29 DIAGNOSIS — Z29.89 PROPHYLACTIC IMMUNOTHERAPY: ICD-10-CM

## 2021-03-29 DIAGNOSIS — G81.90 HEMIPLEGIA, UNSPECIFIED ETIOLOGY, UNSPECIFIED HEMIPLEGIA TYPE, UNSPECIFIED LATERALITY: ICD-10-CM

## 2021-03-29 DIAGNOSIS — G89.29 OTHER CHRONIC PAIN: ICD-10-CM

## 2021-03-29 DIAGNOSIS — G35 MULTIPLE SCLEROSIS: Primary | ICD-10-CM

## 2021-03-29 DIAGNOSIS — G35 MULTIPLE SCLEROSIS: ICD-10-CM

## 2021-03-29 DIAGNOSIS — I10 ESSENTIAL HYPERTENSION: ICD-10-CM

## 2021-03-29 DIAGNOSIS — M79.2 NERVE PAIN: ICD-10-CM

## 2021-03-29 DIAGNOSIS — Z71.89 COUNSELING REGARDING GOALS OF CARE: ICD-10-CM

## 2021-03-29 DIAGNOSIS — G43.809 OTHER MIGRAINE WITHOUT STATUS MIGRAINOSUS, NOT INTRACTABLE: ICD-10-CM

## 2021-03-29 DIAGNOSIS — Z09 HOSPITAL DISCHARGE FOLLOW-UP: Primary | ICD-10-CM

## 2021-03-29 DIAGNOSIS — Z79.899 HIGH RISK MEDICATION USE: ICD-10-CM

## 2021-03-29 PROCEDURE — 99999 PR PBB SHADOW E&M-EST. PATIENT-LVL IV: ICD-10-PCS | Mod: PBBFAC,,, | Performed by: FAMILY MEDICINE

## 2021-03-29 PROCEDURE — 3078F PR MOST RECENT DIASTOLIC BLOOD PRESSURE < 80 MM HG: ICD-10-PCS | Mod: CPTII,95,, | Performed by: CLINICAL NURSE SPECIALIST

## 2021-03-29 PROCEDURE — 99214 PR OFFICE/OUTPT VISIT, EST, LEVL IV, 30-39 MIN: ICD-10-PCS | Mod: S$GLB,,, | Performed by: FAMILY MEDICINE

## 2021-03-29 PROCEDURE — 3074F PR MOST RECENT SYSTOLIC BLOOD PRESSURE < 130 MM HG: ICD-10-PCS | Mod: CPTII,95,, | Performed by: CLINICAL NURSE SPECIALIST

## 2021-03-29 PROCEDURE — 99215 OFFICE O/P EST HI 40 MIN: CPT | Mod: 95,,, | Performed by: CLINICAL NURSE SPECIALIST

## 2021-03-29 PROCEDURE — 1125F PR PAIN SEVERITY QUANTIFIED, PAIN PRESENT: ICD-10-PCS | Mod: S$GLB,,, | Performed by: FAMILY MEDICINE

## 2021-03-29 PROCEDURE — 3078F DIAST BP <80 MM HG: CPT | Mod: CPTII,95,, | Performed by: CLINICAL NURSE SPECIALIST

## 2021-03-29 PROCEDURE — 99214 OFFICE O/P EST MOD 30 MIN: CPT | Mod: S$GLB,,, | Performed by: FAMILY MEDICINE

## 2021-03-29 PROCEDURE — 3008F PR BODY MASS INDEX (BMI) DOCUMENTED: ICD-10-PCS | Mod: CPTII,S$GLB,, | Performed by: FAMILY MEDICINE

## 2021-03-29 PROCEDURE — 1125F AMNT PAIN NOTED PAIN PRSNT: CPT | Mod: S$GLB,,, | Performed by: FAMILY MEDICINE

## 2021-03-29 PROCEDURE — 99499 UNLISTED E&M SERVICE: CPT | Mod: S$GLB,,, | Performed by: FAMILY MEDICINE

## 2021-03-29 PROCEDURE — 99999 PR PBB SHADOW E&M-EST. PATIENT-LVL IV: CPT | Mod: PBBFAC,,, | Performed by: FAMILY MEDICINE

## 2021-03-29 PROCEDURE — 99215 PR OFFICE/OUTPT VISIT, EST, LEVL V, 40-54 MIN: ICD-10-PCS | Mod: 95,,, | Performed by: CLINICAL NURSE SPECIALIST

## 2021-03-29 PROCEDURE — 99499 RISK ADDL DX/OHS AUDIT: ICD-10-PCS | Mod: S$GLB,,, | Performed by: FAMILY MEDICINE

## 2021-03-29 PROCEDURE — 3008F BODY MASS INDEX DOCD: CPT | Mod: CPTII,S$GLB,, | Performed by: FAMILY MEDICINE

## 2021-03-29 PROCEDURE — 3074F SYST BP LT 130 MM HG: CPT | Mod: CPTII,95,, | Performed by: CLINICAL NURSE SPECIALIST

## 2021-03-29 RX ORDER — DULOXETIN HYDROCHLORIDE 30 MG/1
30 CAPSULE, DELAYED RELEASE ORAL DAILY
Qty: 30 CAPSULE | Refills: 11 | Status: SHIPPED | OUTPATIENT
Start: 2021-03-29 | End: 2021-05-03 | Stop reason: SDUPTHER

## 2021-03-29 RX ORDER — TOPIRAMATE 50 MG/1
50 TABLET, FILM COATED ORAL EVERY MORNING
Qty: 30 TABLET | Refills: 2 | Status: SHIPPED | OUTPATIENT
Start: 2021-03-29 | End: 2021-05-03

## 2021-03-29 RX ORDER — HYDROCODONE BITARTRATE AND ACETAMINOPHEN 5; 325 MG/1; MG/1
1 TABLET ORAL EVERY 8 HOURS PRN
Qty: 21 TABLET | Refills: 0 | Status: SHIPPED | OUTPATIENT
Start: 2021-03-29 | End: 2021-04-05

## 2021-03-30 ENCOUNTER — PATIENT MESSAGE (OUTPATIENT)
Dept: NEUROLOGY | Facility: CLINIC | Age: 41
End: 2021-03-30

## 2021-03-30 DIAGNOSIS — F41.9 ANXIETY: ICD-10-CM

## 2021-03-30 DIAGNOSIS — F32.A DEPRESSION, UNSPECIFIED DEPRESSION TYPE: Primary | ICD-10-CM

## 2021-04-02 ENCOUNTER — DOCUMENTATION ONLY (OUTPATIENT)
Dept: NEUROLOGY | Facility: CLINIC | Age: 41
End: 2021-04-02

## 2021-04-05 ENCOUNTER — OFFICE VISIT (OUTPATIENT)
Dept: NEUROLOGY | Facility: CLINIC | Age: 41
End: 2021-04-05
Payer: MEDICARE

## 2021-04-05 VITALS
HEART RATE: 83 BPM | SYSTOLIC BLOOD PRESSURE: 133 MMHG | HEIGHT: 66 IN | DIASTOLIC BLOOD PRESSURE: 83 MMHG | BODY MASS INDEX: 43.38 KG/M2 | WEIGHT: 269.94 LBS | TEMPERATURE: 97 F

## 2021-04-05 DIAGNOSIS — M79.2 NEUROPATHIC PAIN: ICD-10-CM

## 2021-04-05 DIAGNOSIS — G43.719 CHRONIC MIGRAINE WITHOUT AURA, INTRACTABLE, WITHOUT STATUS MIGRAINOSUS: ICD-10-CM

## 2021-04-05 DIAGNOSIS — B37.31 VAGINAL YEAST INFECTION: Primary | ICD-10-CM

## 2021-04-05 PROCEDURE — 99215 PR OFFICE/OUTPT VISIT, EST, LEVL V, 40-54 MIN: ICD-10-PCS | Mod: S$GLB,,, | Performed by: PSYCHIATRY & NEUROLOGY

## 2021-04-05 PROCEDURE — 3079F DIAST BP 80-89 MM HG: CPT | Mod: CPTII,S$GLB,, | Performed by: PSYCHIATRY & NEUROLOGY

## 2021-04-05 PROCEDURE — 99215 OFFICE O/P EST HI 40 MIN: CPT | Mod: S$GLB,,, | Performed by: PSYCHIATRY & NEUROLOGY

## 2021-04-05 PROCEDURE — 3075F SYST BP GE 130 - 139MM HG: CPT | Mod: CPTII,S$GLB,, | Performed by: PSYCHIATRY & NEUROLOGY

## 2021-04-05 PROCEDURE — 99999 PR PBB SHADOW E&M-EST. PATIENT-LVL III: ICD-10-PCS | Mod: PBBFAC,,, | Performed by: PSYCHIATRY & NEUROLOGY

## 2021-04-05 PROCEDURE — 3075F PR MOST RECENT SYSTOLIC BLOOD PRESS GE 130-139MM HG: ICD-10-PCS | Mod: CPTII,S$GLB,, | Performed by: PSYCHIATRY & NEUROLOGY

## 2021-04-05 PROCEDURE — 1126F AMNT PAIN NOTED NONE PRSNT: CPT | Mod: S$GLB,,, | Performed by: PSYCHIATRY & NEUROLOGY

## 2021-04-05 PROCEDURE — 3008F PR BODY MASS INDEX (BMI) DOCUMENTED: ICD-10-PCS | Mod: CPTII,S$GLB,, | Performed by: PSYCHIATRY & NEUROLOGY

## 2021-04-05 PROCEDURE — 3008F BODY MASS INDEX DOCD: CPT | Mod: CPTII,S$GLB,, | Performed by: PSYCHIATRY & NEUROLOGY

## 2021-04-05 PROCEDURE — 1126F PR PAIN SEVERITY QUANTIFIED, NO PAIN PRESENT: ICD-10-PCS | Mod: S$GLB,,, | Performed by: PSYCHIATRY & NEUROLOGY

## 2021-04-05 PROCEDURE — 3079F PR MOST RECENT DIASTOLIC BLOOD PRESSURE 80-89 MM HG: ICD-10-PCS | Mod: CPTII,S$GLB,, | Performed by: PSYCHIATRY & NEUROLOGY

## 2021-04-05 PROCEDURE — 99999 PR PBB SHADOW E&M-EST. PATIENT-LVL III: CPT | Mod: PBBFAC,,, | Performed by: PSYCHIATRY & NEUROLOGY

## 2021-04-05 RX ORDER — FLUCONAZOLE 150 MG/1
150 TABLET ORAL DAILY
Qty: 2 TABLET | Refills: 0 | Status: SHIPPED | OUTPATIENT
Start: 2021-04-05 | End: 2021-04-07

## 2021-04-05 RX ORDER — GABAPENTIN 300 MG/1
CAPSULE ORAL
Qty: 210 CAPSULE | Refills: 2 | Status: SHIPPED | OUTPATIENT
Start: 2021-04-05 | End: 2021-07-22 | Stop reason: SDUPTHER

## 2021-04-14 ENCOUNTER — PATIENT MESSAGE (OUTPATIENT)
Dept: INTERNAL MEDICINE | Facility: CLINIC | Age: 41
End: 2021-04-14

## 2021-04-15 ENCOUNTER — PATIENT MESSAGE (OUTPATIENT)
Dept: INTERNAL MEDICINE | Facility: CLINIC | Age: 41
End: 2021-04-15

## 2021-04-15 RX ORDER — FLUCONAZOLE 150 MG/1
TABLET ORAL
Qty: 2 TABLET | Refills: 0 | Status: SHIPPED | OUTPATIENT
Start: 2021-04-15 | End: 2021-05-03

## 2021-04-15 RX ORDER — CIPROFLOXACIN 500 MG/1
500 TABLET ORAL 2 TIMES DAILY
Qty: 10 TABLET | Refills: 0 | Status: SHIPPED | OUTPATIENT
Start: 2021-04-15 | End: 2021-05-03

## 2021-04-20 ENCOUNTER — PATIENT MESSAGE (OUTPATIENT)
Dept: NEUROLOGY | Facility: CLINIC | Age: 41
End: 2021-04-20

## 2021-04-20 DIAGNOSIS — G43.809 OTHER MIGRAINE WITHOUT STATUS MIGRAINOSUS, NOT INTRACTABLE: Primary | ICD-10-CM

## 2021-04-22 ENCOUNTER — PATIENT MESSAGE (OUTPATIENT)
Dept: INTERNAL MEDICINE | Facility: CLINIC | Age: 41
End: 2021-04-22

## 2021-04-22 RX ORDER — SUMATRIPTAN SUCCINATE 100 MG/1
TABLET ORAL
Qty: 9 TABLET | Refills: 0 | Status: SHIPPED | OUTPATIENT
Start: 2021-04-22 | End: 2021-08-18

## 2021-04-27 ENCOUNTER — PATIENT MESSAGE (OUTPATIENT)
Dept: NEUROLOGY | Facility: CLINIC | Age: 41
End: 2021-04-27

## 2021-04-27 DIAGNOSIS — G35 MULTIPLE SCLEROSIS: Primary | ICD-10-CM

## 2021-04-28 ENCOUNTER — PATIENT MESSAGE (OUTPATIENT)
Dept: NEUROLOGY | Facility: CLINIC | Age: 41
End: 2021-04-28

## 2021-04-30 ENCOUNTER — HOSPITAL ENCOUNTER (OUTPATIENT)
Dept: RADIOLOGY | Facility: HOSPITAL | Age: 41
Discharge: HOME | End: 2021-04-30
Attending: FAMILY MEDICINE
Payer: MEDICARE

## 2021-04-30 DIAGNOSIS — Z12.31 ENCOUNTER FOR SCREENING MAMMOGRAM FOR MALIGNANT NEOPLASM OF BREAST: ICD-10-CM

## 2021-04-30 PROCEDURE — 77067 MAMMO DIGITAL SCREENING BILAT WITH TOMO: ICD-10-PCS | Mod: 26,,, | Performed by: RADIOLOGY

## 2021-04-30 PROCEDURE — 77067 SCR MAMMO BI INCL CAD: CPT | Mod: 26,,, | Performed by: RADIOLOGY

## 2021-04-30 PROCEDURE — 77063 BREAST TOMOSYNTHESIS BI: CPT | Mod: 26,,, | Performed by: RADIOLOGY

## 2021-04-30 PROCEDURE — 77067 SCR MAMMO BI INCL CAD: CPT | Mod: TC

## 2021-04-30 PROCEDURE — 77063 MAMMO DIGITAL SCREENING BILAT WITH TOMO: ICD-10-PCS | Mod: 26,,, | Performed by: RADIOLOGY

## 2021-05-03 ENCOUNTER — OFFICE VISIT (OUTPATIENT)
Dept: INTERNAL MEDICINE | Facility: CLINIC | Age: 41
End: 2021-05-03
Payer: MEDICARE

## 2021-05-03 ENCOUNTER — PATIENT MESSAGE (OUTPATIENT)
Dept: INTERNAL MEDICINE | Facility: CLINIC | Age: 41
End: 2021-05-03

## 2021-05-03 ENCOUNTER — HOSPITAL ENCOUNTER (OUTPATIENT)
Dept: RADIOLOGY | Facility: HOSPITAL | Age: 41
Discharge: HOME | End: 2021-05-03
Attending: FAMILY MEDICINE
Payer: MEDICARE

## 2021-05-03 ENCOUNTER — PATIENT MESSAGE (OUTPATIENT)
Dept: NEUROLOGY | Facility: CLINIC | Age: 41
End: 2021-05-03

## 2021-05-03 ENCOUNTER — TELEPHONE (OUTPATIENT)
Dept: ORTHOPEDICS | Facility: CLINIC | Age: 41
End: 2021-05-03

## 2021-05-03 VITALS
HEART RATE: 89 BPM | DIASTOLIC BLOOD PRESSURE: 73 MMHG | SYSTOLIC BLOOD PRESSURE: 125 MMHG | BODY MASS INDEX: 45.81 KG/M2 | TEMPERATURE: 98 F | HEIGHT: 66 IN | OXYGEN SATURATION: 98 % | WEIGHT: 285.06 LBS

## 2021-05-03 DIAGNOSIS — M54.9 BACK PAIN, UNSPECIFIED BACK LOCATION, UNSPECIFIED BACK PAIN LATERALITY, UNSPECIFIED CHRONICITY: ICD-10-CM

## 2021-05-03 DIAGNOSIS — G35 MULTIPLE SCLEROSIS: ICD-10-CM

## 2021-05-03 DIAGNOSIS — M25.561 ACUTE PAIN OF RIGHT KNEE: ICD-10-CM

## 2021-05-03 DIAGNOSIS — E66.01 MORBID OBESITY WITH BMI OF 45.0-49.9, ADULT: ICD-10-CM

## 2021-05-03 DIAGNOSIS — N62 LARGE BREASTS: ICD-10-CM

## 2021-05-03 DIAGNOSIS — K59.09 CHRONIC CONSTIPATION: Primary | ICD-10-CM

## 2021-05-03 PROCEDURE — 99999 PR PBB SHADOW E&M-EST. PATIENT-LVL V: CPT | Mod: PBBFAC,,, | Performed by: FAMILY MEDICINE

## 2021-05-03 PROCEDURE — 99214 PR OFFICE/OUTPT VISIT, EST, LEVL IV, 30-39 MIN: ICD-10-PCS | Mod: S$GLB,,, | Performed by: FAMILY MEDICINE

## 2021-05-03 PROCEDURE — 99499 UNLISTED E&M SERVICE: CPT | Mod: HCNC,S$GLB,, | Performed by: FAMILY MEDICINE

## 2021-05-03 PROCEDURE — 73562 X-RAY EXAM OF KNEE 3: CPT | Mod: 26,RT,, | Performed by: RADIOLOGY

## 2021-05-03 PROCEDURE — 99214 OFFICE O/P EST MOD 30 MIN: CPT | Mod: S$GLB,,, | Performed by: FAMILY MEDICINE

## 2021-05-03 PROCEDURE — 73562 X-RAY EXAM OF KNEE 3: CPT | Mod: TC,PO,RT

## 2021-05-03 PROCEDURE — 99499 RISK ADDL DX/OHS AUDIT: ICD-10-PCS | Mod: HCNC,S$GLB,, | Performed by: FAMILY MEDICINE

## 2021-05-03 PROCEDURE — 73562 XR KNEE 3 VIEW RIGHT: ICD-10-PCS | Mod: 26,RT,, | Performed by: RADIOLOGY

## 2021-05-03 PROCEDURE — 3008F PR BODY MASS INDEX (BMI) DOCUMENTED: ICD-10-PCS | Mod: CPTII,S$GLB,, | Performed by: FAMILY MEDICINE

## 2021-05-03 PROCEDURE — 99999 PR PBB SHADOW E&M-EST. PATIENT-LVL V: ICD-10-PCS | Mod: PBBFAC,,, | Performed by: FAMILY MEDICINE

## 2021-05-03 PROCEDURE — 1126F PR PAIN SEVERITY QUANTIFIED, NO PAIN PRESENT: ICD-10-PCS | Mod: S$GLB,,, | Performed by: FAMILY MEDICINE

## 2021-05-03 PROCEDURE — 1126F AMNT PAIN NOTED NONE PRSNT: CPT | Mod: S$GLB,,, | Performed by: FAMILY MEDICINE

## 2021-05-03 PROCEDURE — 3008F BODY MASS INDEX DOCD: CPT | Mod: CPTII,S$GLB,, | Performed by: FAMILY MEDICINE

## 2021-05-03 RX ORDER — DICLOFENAC SODIUM 10 MG/G
2 GEL TOPICAL 4 TIMES DAILY
Qty: 350 G | Refills: 3 | Status: ON HOLD | OUTPATIENT
Start: 2021-05-03 | End: 2021-09-27

## 2021-05-03 RX ORDER — PROMETHAZINE HYDROCHLORIDE 25 MG/1
25 TABLET ORAL EVERY 4 HOURS
Qty: 30 TABLET | Refills: 6 | Status: ON HOLD | OUTPATIENT
Start: 2021-05-03 | End: 2021-09-27 | Stop reason: HOSPADM

## 2021-05-03 RX ORDER — LACTULOSE 10 G/15ML
10 SOLUTION ORAL 3 TIMES DAILY
Qty: 200 ML | Refills: 3 | Status: SHIPPED | OUTPATIENT
Start: 2021-05-03 | End: 2023-01-11

## 2021-05-03 RX ORDER — DULOXETIN HYDROCHLORIDE 60 MG/1
60 CAPSULE, DELAYED RELEASE ORAL DAILY
Qty: 30 CAPSULE | Refills: 11 | Status: SHIPPED | OUTPATIENT
Start: 2021-05-03 | End: 2022-05-17

## 2021-05-05 ENCOUNTER — PATIENT MESSAGE (OUTPATIENT)
Dept: NEUROLOGY | Facility: CLINIC | Age: 41
End: 2021-05-05

## 2021-05-10 ENCOUNTER — PATIENT MESSAGE (OUTPATIENT)
Dept: INTERNAL MEDICINE | Facility: CLINIC | Age: 41
End: 2021-05-10

## 2021-05-13 ENCOUNTER — TELEPHONE (OUTPATIENT)
Dept: NEUROLOGY | Facility: CLINIC | Age: 41
End: 2021-05-13

## 2021-05-13 DIAGNOSIS — R82.90 ABNORMAL URINALYSIS: Primary | ICD-10-CM

## 2021-05-14 ENCOUNTER — PATIENT MESSAGE (OUTPATIENT)
Dept: INTERNAL MEDICINE | Facility: CLINIC | Age: 41
End: 2021-05-14

## 2021-05-14 RX ORDER — MUPIROCIN 20 MG/G
OINTMENT TOPICAL 3 TIMES DAILY
Qty: 30 G | Refills: 6 | Status: SHIPPED | OUTPATIENT
Start: 2021-05-14 | End: 2022-01-03 | Stop reason: SDUPTHER

## 2021-05-17 ENCOUNTER — PATIENT MESSAGE (OUTPATIENT)
Dept: NEUROLOGY | Facility: CLINIC | Age: 41
End: 2021-05-17

## 2021-05-31 ENCOUNTER — PATIENT MESSAGE (OUTPATIENT)
Dept: PSYCHIATRY | Facility: CLINIC | Age: 41
End: 2021-05-31

## 2021-05-31 DIAGNOSIS — M25.562 PAIN IN BOTH KNEES, UNSPECIFIED CHRONICITY: Primary | ICD-10-CM

## 2021-05-31 DIAGNOSIS — M25.561 PAIN IN BOTH KNEES, UNSPECIFIED CHRONICITY: Primary | ICD-10-CM

## 2021-06-01 ENCOUNTER — PATIENT MESSAGE (OUTPATIENT)
Dept: NEUROLOGY | Facility: CLINIC | Age: 41
End: 2021-06-01

## 2021-06-02 ENCOUNTER — TELEPHONE (OUTPATIENT)
Dept: INTERNAL MEDICINE | Facility: CLINIC | Age: 41
End: 2021-06-02

## 2021-06-03 ENCOUNTER — OFFICE VISIT (OUTPATIENT)
Dept: ORTHOPEDICS | Facility: CLINIC | Age: 41
End: 2021-06-03
Payer: MEDICARE

## 2021-06-03 ENCOUNTER — HOSPITAL ENCOUNTER (OUTPATIENT)
Dept: RADIOLOGY | Facility: HOSPITAL | Age: 41
Discharge: HOME | End: 2021-06-03
Attending: PHYSICIAN ASSISTANT
Payer: MEDICARE

## 2021-06-03 VITALS
SYSTOLIC BLOOD PRESSURE: 131 MMHG | WEIGHT: 285.06 LBS | BODY MASS INDEX: 45.81 KG/M2 | HEART RATE: 89 BPM | HEIGHT: 66 IN | DIASTOLIC BLOOD PRESSURE: 79 MMHG

## 2021-06-03 DIAGNOSIS — E66.01 MORBID OBESITY WITH BMI OF 45.0-49.9, ADULT: ICD-10-CM

## 2021-06-03 DIAGNOSIS — M25.561 PAIN IN BOTH KNEES, UNSPECIFIED CHRONICITY: ICD-10-CM

## 2021-06-03 DIAGNOSIS — M25.562 PAIN IN BOTH KNEES, UNSPECIFIED CHRONICITY: ICD-10-CM

## 2021-06-03 DIAGNOSIS — G89.29 CHRONIC PAIN OF BOTH KNEES: Primary | ICD-10-CM

## 2021-06-03 DIAGNOSIS — M25.562 CHRONIC PAIN OF BOTH KNEES: Primary | ICD-10-CM

## 2021-06-03 DIAGNOSIS — M25.561 CHRONIC PAIN OF BOTH KNEES: Primary | ICD-10-CM

## 2021-06-03 DIAGNOSIS — M17.0 PRIMARY OSTEOARTHRITIS OF BOTH KNEES: ICD-10-CM

## 2021-06-03 PROCEDURE — 99203 PR OFFICE/OUTPT VISIT, NEW, LEVL III, 30-44 MIN: ICD-10-PCS | Mod: 25,S$GLB,, | Performed by: PHYSICIAN ASSISTANT

## 2021-06-03 PROCEDURE — 99203 OFFICE O/P NEW LOW 30 MIN: CPT | Mod: 25,S$GLB,, | Performed by: PHYSICIAN ASSISTANT

## 2021-06-03 PROCEDURE — 1125F AMNT PAIN NOTED PAIN PRSNT: CPT | Mod: S$GLB,,, | Performed by: PHYSICIAN ASSISTANT

## 2021-06-03 PROCEDURE — 73565 XR KNEE AP STANDING BILATERAL: ICD-10-PCS | Mod: 26,,, | Performed by: RADIOLOGY

## 2021-06-03 PROCEDURE — 99999 PR PBB SHADOW E&M-EST. PATIENT-LVL V: ICD-10-PCS | Mod: PBBFAC,,, | Performed by: PHYSICIAN ASSISTANT

## 2021-06-03 PROCEDURE — 3008F PR BODY MASS INDEX (BMI) DOCUMENTED: ICD-10-PCS | Mod: CPTII,S$GLB,, | Performed by: PHYSICIAN ASSISTANT

## 2021-06-03 PROCEDURE — 99999 PR PBB SHADOW E&M-EST. PATIENT-LVL V: CPT | Mod: PBBFAC,,, | Performed by: PHYSICIAN ASSISTANT

## 2021-06-03 PROCEDURE — 73565 X-RAY EXAM OF KNEES: CPT | Mod: TC

## 2021-06-03 PROCEDURE — 3008F BODY MASS INDEX DOCD: CPT | Mod: CPTII,S$GLB,, | Performed by: PHYSICIAN ASSISTANT

## 2021-06-03 PROCEDURE — 1125F PR PAIN SEVERITY QUANTIFIED, PAIN PRESENT: ICD-10-PCS | Mod: S$GLB,,, | Performed by: PHYSICIAN ASSISTANT

## 2021-06-03 PROCEDURE — 73565 X-RAY EXAM OF KNEES: CPT | Mod: 26,,, | Performed by: RADIOLOGY

## 2021-06-04 ENCOUNTER — PATIENT MESSAGE (OUTPATIENT)
Dept: INTERNAL MEDICINE | Facility: CLINIC | Age: 41
End: 2021-06-04

## 2021-06-04 ENCOUNTER — PATIENT MESSAGE (OUTPATIENT)
Dept: NEUROLOGY | Facility: CLINIC | Age: 41
End: 2021-06-04

## 2021-06-04 ENCOUNTER — TELEPHONE (OUTPATIENT)
Dept: INTERNAL MEDICINE | Facility: CLINIC | Age: 41
End: 2021-06-04

## 2021-06-07 ENCOUNTER — LAB VISIT (OUTPATIENT)
Dept: LAB | Facility: HOSPITAL | Age: 41
End: 2021-06-07
Attending: FAMILY MEDICINE
Payer: MEDICARE

## 2021-06-07 ENCOUNTER — OFFICE VISIT (OUTPATIENT)
Dept: INTERNAL MEDICINE | Facility: CLINIC | Age: 41
End: 2021-06-07
Payer: MEDICARE

## 2021-06-07 VITALS
BODY MASS INDEX: 45.6 KG/M2 | WEIGHT: 283.75 LBS | DIASTOLIC BLOOD PRESSURE: 60 MMHG | HEART RATE: 96 BPM | HEIGHT: 66 IN | SYSTOLIC BLOOD PRESSURE: 130 MMHG | OXYGEN SATURATION: 99 % | TEMPERATURE: 98 F

## 2021-06-07 DIAGNOSIS — R74.01 ELEVATION OF LEVELS OF LIVER TRANSAMINASE LEVELS: ICD-10-CM

## 2021-06-07 DIAGNOSIS — N94.10 DYSPAREUNIA, FEMALE: ICD-10-CM

## 2021-06-07 DIAGNOSIS — Z20.2 POTENTIAL EXPOSURE TO STD: ICD-10-CM

## 2021-06-07 DIAGNOSIS — R82.2 BILIRUBIN IN URINE: Primary | ICD-10-CM

## 2021-06-07 LAB
BILIRUB SERPL-MCNC: NORMAL MG/DL
BILIRUB UR QL STRIP: NEGATIVE
BLOOD URINE, POC: NEGATIVE
CLARITY UR REFRACT.AUTO: CLEAR
CLARITY, POC UA: CLEAR
COLOR UR AUTO: YELLOW
COLOR, POC UA: YELLOW
GLUCOSE UR QL STRIP: NEGATIVE
GLUCOSE UR QL STRIP: NORMAL
HGB UR QL STRIP: NEGATIVE
KETONES UR QL STRIP: NEGATIVE
KETONES UR QL STRIP: NEGATIVE
LEUKOCYTE ESTERASE UR QL STRIP: NEGATIVE
LEUKOCYTE ESTERASE URINE, POC: NORMAL
NITRITE UR QL STRIP: NEGATIVE
NITRITE, POC UA: NEGATIVE
PH UR STRIP: 7 [PH] (ref 5–8)
PH, POC UA: 8
PROT UR QL STRIP: NEGATIVE
PROTEIN, POC: NORMAL
SP GR UR STRIP: 1.02 (ref 1–1.03)
SPECIFIC GRAVITY, POC UA: 1.01
URN SPEC COLLECT METH UR: NORMAL
UROBILINOGEN, POC UA: NORMAL

## 2021-06-07 PROCEDURE — 99214 PR OFFICE/OUTPT VISIT, EST, LEVL IV, 30-39 MIN: ICD-10-PCS | Mod: 25,S$GLB,, | Performed by: FAMILY MEDICINE

## 2021-06-07 PROCEDURE — 3008F BODY MASS INDEX DOCD: CPT | Mod: CPTII,S$GLB,, | Performed by: FAMILY MEDICINE

## 2021-06-07 PROCEDURE — 99214 OFFICE O/P EST MOD 30 MIN: CPT | Mod: 25,S$GLB,, | Performed by: FAMILY MEDICINE

## 2021-06-07 PROCEDURE — 80074 ACUTE HEPATITIS PANEL: CPT | Performed by: FAMILY MEDICINE

## 2021-06-07 PROCEDURE — 1126F AMNT PAIN NOTED NONE PRSNT: CPT | Mod: S$GLB,,, | Performed by: FAMILY MEDICINE

## 2021-06-07 PROCEDURE — 36415 COLL VENOUS BLD VENIPUNCTURE: CPT | Mod: PO | Performed by: FAMILY MEDICINE

## 2021-06-07 PROCEDURE — 3008F PR BODY MASS INDEX (BMI) DOCUMENTED: ICD-10-PCS | Mod: CPTII,S$GLB,, | Performed by: FAMILY MEDICINE

## 2021-06-07 PROCEDURE — 87491 CHLMYD TRACH DNA AMP PROBE: CPT | Performed by: FAMILY MEDICINE

## 2021-06-07 PROCEDURE — 81003 URINALYSIS AUTO W/O SCOPE: CPT | Performed by: FAMILY MEDICINE

## 2021-06-07 PROCEDURE — 86703 HIV-1/HIV-2 1 RESULT ANTBDY: CPT | Performed by: FAMILY MEDICINE

## 2021-06-07 PROCEDURE — 81002 URINALYSIS NONAUTO W/O SCOPE: CPT | Mod: S$GLB,,, | Performed by: FAMILY MEDICINE

## 2021-06-07 PROCEDURE — 99999 PR PBB SHADOW E&M-EST. PATIENT-LVL V: ICD-10-PCS | Mod: PBBFAC,,, | Performed by: FAMILY MEDICINE

## 2021-06-07 PROCEDURE — 86592 SYPHILIS TEST NON-TREP QUAL: CPT | Performed by: FAMILY MEDICINE

## 2021-06-07 PROCEDURE — 81002 POCT URINE DIPSTICK WITHOUT MICROSCOPE: ICD-10-PCS | Mod: S$GLB,,, | Performed by: FAMILY MEDICINE

## 2021-06-07 PROCEDURE — 1126F PR PAIN SEVERITY QUANTIFIED, NO PAIN PRESENT: ICD-10-PCS | Mod: S$GLB,,, | Performed by: FAMILY MEDICINE

## 2021-06-07 PROCEDURE — 99999 PR PBB SHADOW E&M-EST. PATIENT-LVL V: CPT | Mod: PBBFAC,,, | Performed by: FAMILY MEDICINE

## 2021-06-07 PROCEDURE — 87591 N.GONORRHOEAE DNA AMP PROB: CPT | Performed by: FAMILY MEDICINE

## 2021-06-08 ENCOUNTER — PATIENT MESSAGE (OUTPATIENT)
Dept: NEUROLOGY | Facility: CLINIC | Age: 41
End: 2021-06-08

## 2021-06-08 LAB
HAV IGM SERPL QL IA: NEGATIVE
HBV CORE IGM SERPL QL IA: NEGATIVE
HBV SURFACE AG SERPL QL IA: NEGATIVE
HCV AB SERPL QL IA: NEGATIVE
HIV 1+2 AB+HIV1 P24 AG SERPL QL IA: NEGATIVE
RPR SER QL: NORMAL

## 2021-06-10 ENCOUNTER — PATIENT MESSAGE (OUTPATIENT)
Dept: NEUROLOGY | Facility: CLINIC | Age: 41
End: 2021-06-10

## 2021-06-10 LAB
C TRACH DNA SPEC QL NAA+PROBE: NOT DETECTED
N GONORRHOEA DNA SPEC QL NAA+PROBE: NOT DETECTED

## 2021-06-21 ENCOUNTER — PATIENT MESSAGE (OUTPATIENT)
Dept: NEUROLOGY | Facility: CLINIC | Age: 41
End: 2021-06-21

## 2021-06-21 DIAGNOSIS — G43.809 OTHER MIGRAINE WITHOUT STATUS MIGRAINOSUS, NOT INTRACTABLE: ICD-10-CM

## 2021-06-22 RX ORDER — TOPIRAMATE 50 MG/1
TABLET, FILM COATED ORAL
Qty: 90 TABLET | Refills: 4 | Status: SHIPPED | OUTPATIENT
Start: 2021-06-22 | End: 2022-01-03 | Stop reason: SDUPTHER

## 2021-06-29 ENCOUNTER — PATIENT MESSAGE (OUTPATIENT)
Dept: INTERNAL MEDICINE | Facility: CLINIC | Age: 41
End: 2021-06-29

## 2021-06-29 DIAGNOSIS — F43.10 PTSD (POST-TRAUMATIC STRESS DISORDER): Primary | ICD-10-CM

## 2021-07-01 ENCOUNTER — INFUSION (OUTPATIENT)
Dept: INFUSION THERAPY | Facility: HOSPITAL | Age: 41
End: 2021-07-01
Attending: PSYCHIATRY & NEUROLOGY
Payer: MEDICARE

## 2021-07-01 VITALS
HEART RATE: 82 BPM | WEIGHT: 285.69 LBS | BODY MASS INDEX: 46.12 KG/M2 | RESPIRATION RATE: 16 BRPM | SYSTOLIC BLOOD PRESSURE: 130 MMHG | TEMPERATURE: 98 F | OXYGEN SATURATION: 98 % | DIASTOLIC BLOOD PRESSURE: 85 MMHG

## 2021-07-01 DIAGNOSIS — G35 MULTIPLE SCLEROSIS: Primary | ICD-10-CM

## 2021-07-01 PROCEDURE — 96365 THER/PROPH/DIAG IV INF INIT: CPT

## 2021-07-01 PROCEDURE — 96366 THER/PROPH/DIAG IV INF ADDON: CPT

## 2021-07-01 PROCEDURE — 25000003 PHARM REV CODE 250: Performed by: PSYCHIATRY & NEUROLOGY

## 2021-07-01 PROCEDURE — 96367 TX/PROPH/DG ADDL SEQ IV INF: CPT

## 2021-07-01 PROCEDURE — 96375 TX/PRO/DX INJ NEW DRUG ADDON: CPT

## 2021-07-01 PROCEDURE — 63600175 PHARM REV CODE 636 W HCPCS: Mod: JG,RPL | Performed by: PSYCHIATRY & NEUROLOGY

## 2021-07-01 RX ORDER — ACETAMINOPHEN 500 MG
1000 TABLET ORAL
Status: CANCELLED | OUTPATIENT
Start: 2021-10-14

## 2021-07-01 RX ORDER — DIPHENHYDRAMINE HYDROCHLORIDE 50 MG/ML
50 INJECTION INTRAMUSCULAR; INTRAVENOUS
Status: CANCELLED | OUTPATIENT
Start: 2021-10-14

## 2021-07-01 RX ORDER — ACETAMINOPHEN 500 MG
1000 TABLET ORAL
Status: COMPLETED | OUTPATIENT
Start: 2021-07-01 | End: 2021-07-01

## 2021-07-01 RX ORDER — HEPARIN 100 UNIT/ML
500 SYRINGE INTRAVENOUS
Status: CANCELLED | OUTPATIENT
Start: 2021-10-14

## 2021-07-01 RX ORDER — FAMOTIDINE 10 MG/ML
20 INJECTION INTRAVENOUS
Status: COMPLETED | OUTPATIENT
Start: 2021-07-01 | End: 2021-07-01

## 2021-07-01 RX ORDER — SODIUM CHLORIDE 0.9 % (FLUSH) 0.9 %
10 SYRINGE (ML) INJECTION
Status: CANCELLED | OUTPATIENT
Start: 2021-10-14

## 2021-07-01 RX ORDER — FAMOTIDINE 10 MG/ML
20 INJECTION INTRAVENOUS
Status: CANCELLED | OUTPATIENT
Start: 2021-10-14

## 2021-07-01 RX ORDER — EPINEPHRINE 0.3 MG/.3ML
0.3 INJECTION SUBCUTANEOUS
Status: CANCELLED | OUTPATIENT
Start: 2021-10-14

## 2021-07-01 RX ORDER — SODIUM CHLORIDE 0.9 % (FLUSH) 0.9 %
10 SYRINGE (ML) INJECTION
Status: DISCONTINUED | OUTPATIENT
Start: 2021-07-01 | End: 2021-07-01 | Stop reason: HOSPADM

## 2021-07-01 RX ORDER — METHYLPREDNISOLONE SOD SUCC 125 MG
100 VIAL (EA) INJECTION
Status: COMPLETED | OUTPATIENT
Start: 2021-07-01 | End: 2021-07-01

## 2021-07-01 RX ORDER — METHYLPREDNISOLONE SOD SUCC 125 MG
100 VIAL (EA) INJECTION
Status: CANCELLED
Start: 2021-10-14 | End: 2021-10-14

## 2021-07-01 RX ADMIN — OCRELIZUMAB 600 MG: 300 INJECTION INTRAVENOUS at 10:07

## 2021-07-01 RX ADMIN — DIPHENHYDRAMINE HYDROCHLORIDE 50 MG: 50 INJECTION, SOLUTION INTRAMUSCULAR; INTRAVENOUS at 09:07

## 2021-07-01 RX ADMIN — FAMOTIDINE 20 MG: 10 INJECTION INTRAVENOUS at 10:07

## 2021-07-01 RX ADMIN — METHYLPREDNISOLONE SODIUM SUCCINATE 100 MG: 125 INJECTION, POWDER, FOR SOLUTION INTRAMUSCULAR; INTRAVENOUS at 10:07

## 2021-07-01 RX ADMIN — ACETAMINOPHEN 1000 MG: 500 TABLET ORAL at 10:07

## 2021-07-09 ENCOUNTER — TELEPHONE (OUTPATIENT)
Dept: INTERNAL MEDICINE | Facility: CLINIC | Age: 41
End: 2021-07-09

## 2021-07-09 DIAGNOSIS — E66.01 MORBID OBESITY WITH BMI OF 45.0-49.9, ADULT: Primary | ICD-10-CM

## 2021-07-13 ENCOUNTER — PATIENT MESSAGE (OUTPATIENT)
Dept: PSYCHIATRY | Facility: CLINIC | Age: 41
End: 2021-07-13

## 2021-07-22 ENCOUNTER — OFFICE VISIT (OUTPATIENT)
Dept: NEUROLOGY | Facility: CLINIC | Age: 41
End: 2021-07-22
Payer: MEDICARE

## 2021-07-22 ENCOUNTER — PATIENT MESSAGE (OUTPATIENT)
Dept: INTERNAL MEDICINE | Facility: CLINIC | Age: 41
End: 2021-07-22

## 2021-07-22 DIAGNOSIS — G57.93 NEUROPATHY INVOLVING BOTH LOWER EXTREMITIES: ICD-10-CM

## 2021-07-22 DIAGNOSIS — M79.2 NEUROPATHIC PAIN: ICD-10-CM

## 2021-07-22 DIAGNOSIS — R79.9 ABNORMAL FINDING OF BLOOD CHEMISTRY, UNSPECIFIED: ICD-10-CM

## 2021-07-22 DIAGNOSIS — G35 MULTIPLE SCLEROSIS: Primary | ICD-10-CM

## 2021-07-22 DIAGNOSIS — E55.9 VITAMIN D DEFICIENCY, UNSPECIFIED: ICD-10-CM

## 2021-07-22 PROCEDURE — 4010F PR ACE/ARB THEARPY RXD/TAKEN: ICD-10-PCS | Mod: CPTII,95,, | Performed by: PSYCHIATRY & NEUROLOGY

## 2021-07-22 PROCEDURE — 3044F HG A1C LEVEL LT 7.0%: CPT | Mod: CPTII,95,, | Performed by: PSYCHIATRY & NEUROLOGY

## 2021-07-22 PROCEDURE — 99215 OFFICE O/P EST HI 40 MIN: CPT | Mod: 95,,, | Performed by: PSYCHIATRY & NEUROLOGY

## 2021-07-22 PROCEDURE — 3044F PR MOST RECENT HEMOGLOBIN A1C LEVEL <7.0%: ICD-10-PCS | Mod: CPTII,95,, | Performed by: PSYCHIATRY & NEUROLOGY

## 2021-07-22 PROCEDURE — 4010F ACE/ARB THERAPY RXD/TAKEN: CPT | Mod: CPTII,95,, | Performed by: PSYCHIATRY & NEUROLOGY

## 2021-07-22 PROCEDURE — 99215 PR OFFICE/OUTPT VISIT, EST, LEVL V, 40-54 MIN: ICD-10-PCS | Mod: 95,,, | Performed by: PSYCHIATRY & NEUROLOGY

## 2021-07-22 RX ORDER — GABAPENTIN 300 MG/1
CAPSULE ORAL
Qty: 210 CAPSULE | Refills: 5 | Status: SHIPPED | OUTPATIENT
Start: 2021-07-22 | End: 2022-01-03 | Stop reason: SDUPTHER

## 2021-07-23 ENCOUNTER — IMMUNIZATION (OUTPATIENT)
Dept: INTERNAL MEDICINE | Facility: CLINIC | Age: 41
End: 2021-07-23
Payer: MEDICARE

## 2021-07-23 DIAGNOSIS — Z23 NEED FOR VACCINATION: Primary | ICD-10-CM

## 2021-07-23 PROCEDURE — 91300 COVID-19, MRNA, LNP-S, PF, 30 MCG/0.3 ML DOSE VACCINE: CPT | Mod: HCNC,PBBFAC | Performed by: FAMILY MEDICINE

## 2021-07-23 PROCEDURE — 0002A COVID-19, MRNA, LNP-S, PF, 30 MCG/0.3 ML DOSE VACCINE: CPT | Mod: HCNC,PBBFAC | Performed by: FAMILY MEDICINE

## 2021-07-26 ENCOUNTER — PATIENT MESSAGE (OUTPATIENT)
Dept: INTERNAL MEDICINE | Facility: CLINIC | Age: 41
End: 2021-07-26

## 2021-07-27 ENCOUNTER — HOSPITAL ENCOUNTER (OUTPATIENT)
Dept: RADIOLOGY | Facility: CLINIC | Age: 41
Discharge: HOME | End: 2021-07-27
Attending: NURSE PRACTITIONER
Payer: MEDICARE

## 2021-07-27 ENCOUNTER — OFFICE VISIT (OUTPATIENT)
Dept: URGENT CARE | Facility: CLINIC | Age: 41
End: 2021-07-27
Payer: MEDICARE

## 2021-07-27 ENCOUNTER — PATIENT MESSAGE (OUTPATIENT)
Dept: NEUROLOGY | Facility: CLINIC | Age: 41
End: 2021-07-27

## 2021-07-27 ENCOUNTER — PATIENT MESSAGE (OUTPATIENT)
Dept: INTERNAL MEDICINE | Facility: CLINIC | Age: 41
End: 2021-07-27

## 2021-07-27 VITALS
HEIGHT: 66 IN | TEMPERATURE: 98 F | HEART RATE: 83 BPM | SYSTOLIC BLOOD PRESSURE: 134 MMHG | OXYGEN SATURATION: 98 % | WEIGHT: 285 LBS | BODY MASS INDEX: 45.8 KG/M2 | DIASTOLIC BLOOD PRESSURE: 95 MMHG | RESPIRATION RATE: 18 BRPM

## 2021-07-27 DIAGNOSIS — S69.92XA INJURY OF LEFT HAND, INITIAL ENCOUNTER: Primary | ICD-10-CM

## 2021-07-27 DIAGNOSIS — R35.0 URINARY FREQUENCY: Primary | ICD-10-CM

## 2021-07-27 DIAGNOSIS — S69.92XA INJURY OF LEFT HAND, INITIAL ENCOUNTER: ICD-10-CM

## 2021-07-27 PROCEDURE — 1159F MED LIST DOCD IN RCRD: CPT | Mod: CPTII,S$GLB,, | Performed by: NURSE PRACTITIONER

## 2021-07-27 PROCEDURE — 99214 PR OFFICE/OUTPT VISIT, EST, LEVL IV, 30-39 MIN: ICD-10-PCS | Mod: S$GLB,,, | Performed by: NURSE PRACTITIONER

## 2021-07-27 PROCEDURE — 99214 OFFICE O/P EST MOD 30 MIN: CPT | Mod: S$GLB,,, | Performed by: NURSE PRACTITIONER

## 2021-07-27 PROCEDURE — 3008F PR BODY MASS INDEX (BMI) DOCUMENTED: ICD-10-PCS | Mod: CPTII,S$GLB,, | Performed by: NURSE PRACTITIONER

## 2021-07-27 PROCEDURE — 3008F BODY MASS INDEX DOCD: CPT | Mod: CPTII,S$GLB,, | Performed by: NURSE PRACTITIONER

## 2021-07-27 PROCEDURE — 3075F SYST BP GE 130 - 139MM HG: CPT | Mod: CPTII,S$GLB,, | Performed by: NURSE PRACTITIONER

## 2021-07-27 PROCEDURE — 3080F DIAST BP >= 90 MM HG: CPT | Mod: CPTII,S$GLB,, | Performed by: NURSE PRACTITIONER

## 2021-07-27 PROCEDURE — 3075F PR MOST RECENT SYSTOLIC BLOOD PRESS GE 130-139MM HG: ICD-10-PCS | Mod: CPTII,S$GLB,, | Performed by: NURSE PRACTITIONER

## 2021-07-27 PROCEDURE — 1159F PR MEDICATION LIST DOCUMENTED IN MEDICAL RECORD: ICD-10-PCS | Mod: CPTII,S$GLB,, | Performed by: NURSE PRACTITIONER

## 2021-07-27 PROCEDURE — 3080F PR MOST RECENT DIASTOLIC BLOOD PRESSURE >= 90 MM HG: ICD-10-PCS | Mod: CPTII,S$GLB,, | Performed by: NURSE PRACTITIONER

## 2021-07-27 PROCEDURE — 73130 X-RAY EXAM OF HAND: CPT | Mod: LT,S$GLB,, | Performed by: RADIOLOGY

## 2021-07-27 PROCEDURE — 73130 XR HAND COMPLETE 3 VIEW LEFT: ICD-10-PCS | Mod: LT,S$GLB,, | Performed by: RADIOLOGY

## 2021-07-27 RX ORDER — HYDROCODONE BITARTRATE AND ACETAMINOPHEN 10; 325 MG/1; MG/1
TABLET ORAL
Status: ON HOLD | COMMUNITY
Start: 2021-07-06 | End: 2021-08-26 | Stop reason: HOSPADM

## 2021-07-27 RX ORDER — LIDOCAINE AND PRILOCAINE 25; 25 MG/G; MG/G
CREAM TOPICAL
COMMUNITY
Start: 2021-04-01 | End: 2022-08-05

## 2021-07-27 RX ORDER — HYDROCHLOROTHIAZIDE 12.5 MG/1
TABLET ORAL
COMMUNITY
Start: 2021-03-05 | End: 2021-08-04

## 2021-07-27 RX ORDER — KETOROLAC TROMETHAMINE 10 MG/1
10 TABLET, FILM COATED ORAL EVERY 6 HOURS PRN
Qty: 16 TABLET | Refills: 0 | Status: SHIPPED | OUTPATIENT
Start: 2021-07-27 | End: 2021-07-31

## 2021-07-28 ENCOUNTER — PATIENT MESSAGE (OUTPATIENT)
Dept: INTERNAL MEDICINE | Facility: CLINIC | Age: 41
End: 2021-07-28

## 2021-07-29 ENCOUNTER — NURSE TRIAGE (OUTPATIENT)
Dept: ADMINISTRATIVE | Facility: CLINIC | Age: 41
End: 2021-07-29

## 2021-07-30 ENCOUNTER — LAB VISIT (OUTPATIENT)
Dept: LAB | Facility: HOSPITAL | Age: 41
End: 2021-07-30
Attending: FAMILY MEDICINE
Payer: MEDICARE

## 2021-07-30 DIAGNOSIS — Z01.818 PRE-OP TESTING: ICD-10-CM

## 2021-07-30 DIAGNOSIS — G35 MULTIPLE SCLEROSIS: ICD-10-CM

## 2021-07-30 DIAGNOSIS — R79.9 ABNORMAL FINDING OF BLOOD CHEMISTRY, UNSPECIFIED: ICD-10-CM

## 2021-07-30 DIAGNOSIS — F32.A DEPRESSION, UNSPECIFIED DEPRESSION TYPE: Primary | ICD-10-CM

## 2021-07-30 DIAGNOSIS — E55.9 VITAMIN D DEFICIENCY, UNSPECIFIED: ICD-10-CM

## 2021-07-30 DIAGNOSIS — G57.93 NEUROPATHY INVOLVING BOTH LOWER EXTREMITIES: ICD-10-CM

## 2021-07-30 LAB
25(OH)D3+25(OH)D2 SERPL-MCNC: 34 NG/ML (ref 30–96)
BASOPHILS # BLD AUTO: 0.03 K/UL (ref 0–0.2)
BASOPHILS NFR BLD: 0.7 % (ref 0–1.9)
DIFFERENTIAL METHOD: ABNORMAL
EOSINOPHIL # BLD AUTO: 0.1 K/UL (ref 0–0.5)
EOSINOPHIL NFR BLD: 2.7 % (ref 0–8)
ERYTHROCYTE [DISTWIDTH] IN BLOOD BY AUTOMATED COUNT: 16.6 % (ref 11.5–14.5)
HCT VFR BLD AUTO: 36.7 % (ref 37–48.5)
HGB BLD-MCNC: 11 G/DL (ref 12–16)
IMM GRANULOCYTES # BLD AUTO: 0 K/UL (ref 0–0.04)
IMM GRANULOCYTES NFR BLD AUTO: 0 % (ref 0–0.5)
LYMPHOCYTES # BLD AUTO: 1.7 K/UL (ref 1–4.8)
LYMPHOCYTES NFR BLD: 39 % (ref 18–48)
MCH RBC QN AUTO: 21 PG (ref 27–31)
MCHC RBC AUTO-ENTMCNC: 30 G/DL (ref 32–36)
MCV RBC AUTO: 70 FL (ref 82–98)
MONOCYTES # BLD AUTO: 0.4 K/UL (ref 0.3–1)
MONOCYTES NFR BLD: 8.5 % (ref 4–15)
NEUTROPHILS # BLD AUTO: 2.2 K/UL (ref 1.8–7.7)
NEUTROPHILS NFR BLD: 49.1 % (ref 38–73)
NRBC BLD-RTO: 0 /100 WBC
PLATELET # BLD AUTO: 257 K/UL (ref 150–450)
PMV BLD AUTO: 11 FL (ref 9.2–12.9)
RBC # BLD AUTO: 5.24 M/UL (ref 4–5.4)
WBC # BLD AUTO: 4.46 K/UL (ref 3.9–12.7)

## 2021-07-30 PROCEDURE — 84165 PATHOLOGIST INTERPRETATION SPE: ICD-10-PCS | Mod: 26,,, | Performed by: PATHOLOGY

## 2021-07-30 PROCEDURE — 85025 COMPLETE CBC W/AUTO DIFF WBC: CPT | Performed by: PSYCHIATRY & NEUROLOGY

## 2021-07-30 PROCEDURE — 86334 IMMUNOFIX E-PHORESIS SERUM: CPT | Performed by: PSYCHIATRY & NEUROLOGY

## 2021-07-30 PROCEDURE — 86334 IMMUNOFIX E-PHORESIS SERUM: CPT | Mod: 26,,, | Performed by: PATHOLOGY

## 2021-07-30 PROCEDURE — 84165 PROTEIN E-PHORESIS SERUM: CPT | Mod: 26,,, | Performed by: PATHOLOGY

## 2021-07-30 PROCEDURE — 82607 VITAMIN B-12: CPT | Performed by: PSYCHIATRY & NEUROLOGY

## 2021-07-30 PROCEDURE — 82306 VITAMIN D 25 HYDROXY: CPT | Performed by: PSYCHIATRY & NEUROLOGY

## 2021-07-30 PROCEDURE — 80053 COMPREHEN METABOLIC PANEL: CPT | Performed by: PSYCHIATRY & NEUROLOGY

## 2021-07-30 PROCEDURE — 84165 PROTEIN E-PHORESIS SERUM: CPT | Performed by: PSYCHIATRY & NEUROLOGY

## 2021-07-30 PROCEDURE — 84207 ASSAY OF VITAMIN B-6: CPT | Performed by: PSYCHIATRY & NEUROLOGY

## 2021-07-30 PROCEDURE — 36415 COLL VENOUS BLD VENIPUNCTURE: CPT | Mod: PO | Performed by: PSYCHIATRY & NEUROLOGY

## 2021-07-30 PROCEDURE — 86334 PATHOLOGIST INTERPRETATION IFE: ICD-10-PCS | Mod: 26,,, | Performed by: PATHOLOGY

## 2021-07-30 PROCEDURE — 83036 HEMOGLOBIN GLYCOSYLATED A1C: CPT | Performed by: PSYCHIATRY & NEUROLOGY

## 2021-07-31 LAB
ALBUMIN SERPL BCP-MCNC: 3.8 G/DL (ref 3.5–5.2)
ALP SERPL-CCNC: 74 U/L (ref 55–135)
ALT SERPL W/O P-5'-P-CCNC: 15 U/L (ref 10–44)
ANION GAP SERPL CALC-SCNC: 12 MMOL/L (ref 8–16)
AST SERPL-CCNC: 20 U/L (ref 10–40)
BILIRUB SERPL-MCNC: 0.4 MG/DL (ref 0.1–1)
BUN SERPL-MCNC: 12 MG/DL (ref 6–20)
CALCIUM SERPL-MCNC: 9.7 MG/DL (ref 8.7–10.5)
CHLORIDE SERPL-SCNC: 112 MMOL/L (ref 95–110)
CO2 SERPL-SCNC: 19 MMOL/L (ref 23–29)
CREAT SERPL-MCNC: 0.8 MG/DL (ref 0.5–1.4)
EST. GFR  (AFRICAN AMERICAN): >60 ML/MIN/1.73 M^2
EST. GFR  (NON AFRICAN AMERICAN): >60 ML/MIN/1.73 M^2
ESTIMATED AVG GLUCOSE: 111 MG/DL (ref 68–131)
GLUCOSE SERPL-MCNC: 97 MG/DL (ref 70–110)
HBA1C MFR BLD: 5.5 % (ref 4–5.6)
POTASSIUM SERPL-SCNC: 3.5 MMOL/L (ref 3.5–5.1)
PROT SERPL-MCNC: 7.1 G/DL (ref 6–8.4)
SODIUM SERPL-SCNC: 143 MMOL/L (ref 136–145)

## 2021-08-02 ENCOUNTER — PATIENT MESSAGE (OUTPATIENT)
Dept: NEUROLOGY | Facility: CLINIC | Age: 41
End: 2021-08-02

## 2021-08-02 ENCOUNTER — OFFICE VISIT (OUTPATIENT)
Dept: PSYCHIATRY | Facility: CLINIC | Age: 41
End: 2021-08-02
Payer: MEDICARE

## 2021-08-02 DIAGNOSIS — F43.21 ADJUSTMENT DISORDER WITH DEPRESSED MOOD: ICD-10-CM

## 2021-08-02 DIAGNOSIS — G35 MULTIPLE SCLEROSIS: ICD-10-CM

## 2021-08-02 LAB
ALBUMIN SERPL ELPH-MCNC: 3.86 G/DL (ref 3.35–5.55)
ALPHA1 GLOB SERPL ELPH-MCNC: 0.29 G/DL (ref 0.17–0.41)
ALPHA2 GLOB SERPL ELPH-MCNC: 0.69 G/DL (ref 0.43–0.99)
B-GLOBULIN SERPL ELPH-MCNC: 0.91 G/DL (ref 0.5–1.1)
GAMMA GLOB SERPL ELPH-MCNC: 1.05 G/DL (ref 0.67–1.58)
INTERPRETATION SERPL IFE-IMP: NORMAL
PATHOLOGIST INTERPRETATION IFE: NORMAL
PATHOLOGIST INTERPRETATION SPE: NORMAL
PROT SERPL-MCNC: 6.8 G/DL (ref 6–8.4)
VIT B12 SERPL-MCNC: 472 NG/L (ref 180–914)

## 2021-08-02 PROCEDURE — 3044F HG A1C LEVEL LT 7.0%: CPT | Mod: CPTII,95,, | Performed by: SOCIAL WORKER

## 2021-08-02 PROCEDURE — 99499 UNLISTED E&M SERVICE: CPT | Mod: 95,,, | Performed by: SOCIAL WORKER

## 2021-08-02 PROCEDURE — 99499 NO LOS: ICD-10-PCS | Mod: 95,,, | Performed by: SOCIAL WORKER

## 2021-08-02 PROCEDURE — 3044F PR MOST RECENT HEMOGLOBIN A1C LEVEL <7.0%: ICD-10-PCS | Mod: CPTII,95,, | Performed by: SOCIAL WORKER

## 2021-08-03 ENCOUNTER — PATIENT MESSAGE (OUTPATIENT)
Dept: NEUROLOGY | Facility: CLINIC | Age: 41
End: 2021-08-03

## 2021-08-03 LAB — PYRIDOXAL SERPL-MCNC: 21 UG/L (ref 5–50)

## 2021-08-04 ENCOUNTER — OFFICE VISIT (OUTPATIENT)
Dept: ORTHOPEDICS | Facility: CLINIC | Age: 41
End: 2021-08-04
Payer: MEDICARE

## 2021-08-04 DIAGNOSIS — S63.633A SPRAIN OF INTERPHALANGEAL JOINT OF LEFT MIDDLE FINGER, INITIAL ENCOUNTER: Primary | ICD-10-CM

## 2021-08-04 PROCEDURE — 99203 PR OFFICE/OUTPT VISIT, NEW, LEVL III, 30-44 MIN: ICD-10-PCS | Mod: S$GLB,,, | Performed by: ORTHOPAEDIC SURGERY

## 2021-08-04 PROCEDURE — 1160F RVW MEDS BY RX/DR IN RCRD: CPT | Mod: CPTII,S$GLB,, | Performed by: ORTHOPAEDIC SURGERY

## 2021-08-04 PROCEDURE — 3044F PR MOST RECENT HEMOGLOBIN A1C LEVEL <7.0%: ICD-10-PCS | Mod: CPTII,S$GLB,, | Performed by: ORTHOPAEDIC SURGERY

## 2021-08-04 PROCEDURE — 99999 PR PBB SHADOW E&M-EST. PATIENT-LVL III: ICD-10-PCS | Mod: PBBFAC,,, | Performed by: ORTHOPAEDIC SURGERY

## 2021-08-04 PROCEDURE — 99203 OFFICE O/P NEW LOW 30 MIN: CPT | Mod: S$GLB,,, | Performed by: ORTHOPAEDIC SURGERY

## 2021-08-04 PROCEDURE — 1125F AMNT PAIN NOTED PAIN PRSNT: CPT | Mod: CPTII,S$GLB,, | Performed by: ORTHOPAEDIC SURGERY

## 2021-08-04 PROCEDURE — 99999 PR PBB SHADOW E&M-EST. PATIENT-LVL III: CPT | Mod: PBBFAC,,, | Performed by: ORTHOPAEDIC SURGERY

## 2021-08-04 PROCEDURE — 1159F MED LIST DOCD IN RCRD: CPT | Mod: CPTII,S$GLB,, | Performed by: ORTHOPAEDIC SURGERY

## 2021-08-04 PROCEDURE — 1125F PR PAIN SEVERITY QUANTIFIED, PAIN PRESENT: ICD-10-PCS | Mod: CPTII,S$GLB,, | Performed by: ORTHOPAEDIC SURGERY

## 2021-08-04 PROCEDURE — 3044F HG A1C LEVEL LT 7.0%: CPT | Mod: CPTII,S$GLB,, | Performed by: ORTHOPAEDIC SURGERY

## 2021-08-04 PROCEDURE — 1160F PR REVIEW ALL MEDS BY PRESCRIBER/CLIN PHARMACIST DOCUMENTED: ICD-10-PCS | Mod: CPTII,S$GLB,, | Performed by: ORTHOPAEDIC SURGERY

## 2021-08-04 PROCEDURE — 1159F PR MEDICATION LIST DOCUMENTED IN MEDICAL RECORD: ICD-10-PCS | Mod: CPTII,S$GLB,, | Performed by: ORTHOPAEDIC SURGERY

## 2021-08-11 ENCOUNTER — PATIENT MESSAGE (OUTPATIENT)
Dept: INTERNAL MEDICINE | Facility: CLINIC | Age: 41
End: 2021-08-11

## 2021-08-11 ENCOUNTER — TELEPHONE (OUTPATIENT)
Dept: RADIOLOGY | Facility: HOSPITAL | Age: 41
End: 2021-08-11

## 2021-08-17 ENCOUNTER — IMMUNIZATION (OUTPATIENT)
Dept: PRIMARY CARE CLINIC | Facility: CLINIC | Age: 41
End: 2021-08-17
Payer: MEDICARE

## 2021-08-17 ENCOUNTER — TELEPHONE (OUTPATIENT)
Dept: NEUROLOGY | Facility: CLINIC | Age: 41
End: 2021-08-17

## 2021-08-17 DIAGNOSIS — Z23 NEED FOR VACCINATION: Primary | ICD-10-CM

## 2021-08-17 PROCEDURE — 91300 COVID-19, MRNA, LNP-S, PF, 30 MCG/0.3 ML DOSE VACCINE: ICD-10-PCS | Mod: S$GLB,,, | Performed by: FAMILY MEDICINE

## 2021-08-17 PROCEDURE — 0003A COVID-19, MRNA, LNP-S, PF, 30 MCG/0.3 ML DOSE VACCINE: CPT | Mod: CV19,S$GLB,, | Performed by: FAMILY MEDICINE

## 2021-08-17 PROCEDURE — 91300 COVID-19, MRNA, LNP-S, PF, 30 MCG/0.3 ML DOSE VACCINE: CPT | Mod: S$GLB,,, | Performed by: FAMILY MEDICINE

## 2021-08-17 PROCEDURE — 0003A COVID-19, MRNA, LNP-S, PF, 30 MCG/0.3 ML DOSE VACCINE: ICD-10-PCS | Mod: CV19,S$GLB,, | Performed by: FAMILY MEDICINE

## 2021-08-18 ENCOUNTER — PATIENT MESSAGE (OUTPATIENT)
Dept: ADMINISTRATIVE | Facility: OTHER | Age: 41
End: 2021-08-18

## 2021-08-18 ENCOUNTER — OFFICE VISIT (OUTPATIENT)
Dept: NEUROLOGY | Facility: CLINIC | Age: 41
End: 2021-08-18
Payer: MEDICARE

## 2021-08-18 DIAGNOSIS — M62.838 MUSCLE SPASM: ICD-10-CM

## 2021-08-18 DIAGNOSIS — Z29.89 PROPHYLACTIC IMMUNOTHERAPY: ICD-10-CM

## 2021-08-18 DIAGNOSIS — Z79.899 HIGH RISK MEDICATION USE: ICD-10-CM

## 2021-08-18 DIAGNOSIS — Z71.89 COUNSELING REGARDING GOALS OF CARE: ICD-10-CM

## 2021-08-18 DIAGNOSIS — G35 MULTIPLE SCLEROSIS: Primary | ICD-10-CM

## 2021-08-18 PROCEDURE — 1159F MED LIST DOCD IN RCRD: CPT | Mod: CPTII,95,, | Performed by: CLINICAL NURSE SPECIALIST

## 2021-08-18 PROCEDURE — 3044F HG A1C LEVEL LT 7.0%: CPT | Mod: CPTII,95,, | Performed by: CLINICAL NURSE SPECIALIST

## 2021-08-18 PROCEDURE — 99214 OFFICE O/P EST MOD 30 MIN: CPT | Mod: 95,,, | Performed by: CLINICAL NURSE SPECIALIST

## 2021-08-18 PROCEDURE — 3044F PR MOST RECENT HEMOGLOBIN A1C LEVEL <7.0%: ICD-10-PCS | Mod: CPTII,95,, | Performed by: CLINICAL NURSE SPECIALIST

## 2021-08-18 PROCEDURE — 99214 PR OFFICE/OUTPT VISIT, EST, LEVL IV, 30-39 MIN: ICD-10-PCS | Mod: 95,,, | Performed by: CLINICAL NURSE SPECIALIST

## 2021-08-18 PROCEDURE — 1159F PR MEDICATION LIST DOCUMENTED IN MEDICAL RECORD: ICD-10-PCS | Mod: CPTII,95,, | Performed by: CLINICAL NURSE SPECIALIST

## 2021-08-19 ENCOUNTER — PATIENT MESSAGE (OUTPATIENT)
Dept: NEUROLOGY | Facility: CLINIC | Age: 41
End: 2021-08-19

## 2021-08-19 RX ORDER — CYCLOBENZAPRINE HCL 10 MG
10 TABLET ORAL DAILY PRN
Qty: 30 TABLET | Refills: 1 | Status: ON HOLD | OUTPATIENT
Start: 2021-08-19 | End: 2021-09-27 | Stop reason: HOSPADM

## 2021-08-23 ENCOUNTER — TELEPHONE (OUTPATIENT)
Dept: RADIOLOGY | Facility: HOSPITAL | Age: 41
End: 2021-08-23

## 2021-08-23 ENCOUNTER — PATIENT MESSAGE (OUTPATIENT)
Dept: INTERNAL MEDICINE | Facility: CLINIC | Age: 41
End: 2021-08-23

## 2021-08-23 RX ORDER — FLUCONAZOLE 150 MG/1
TABLET ORAL
Qty: 2 TABLET | Refills: 0 | Status: ON HOLD | OUTPATIENT
Start: 2021-08-23 | End: 2021-09-27 | Stop reason: HOSPADM

## 2021-08-24 ENCOUNTER — PATIENT MESSAGE (OUTPATIENT)
Dept: ORTHOPEDICS | Facility: CLINIC | Age: 41
End: 2021-08-24

## 2021-08-24 ENCOUNTER — HOSPITAL ENCOUNTER (OUTPATIENT)
Dept: RADIOLOGY | Facility: HOSPITAL | Age: 41
Discharge: HOME | End: 2021-08-24
Attending: PHYSICIAN ASSISTANT
Payer: MEDICARE

## 2021-08-24 ENCOUNTER — TELEPHONE (OUTPATIENT)
Dept: PREADMISSION TESTING | Facility: HOSPITAL | Age: 41
End: 2021-08-24

## 2021-08-24 ENCOUNTER — OFFICE VISIT (OUTPATIENT)
Dept: ORTHOPEDICS | Facility: CLINIC | Age: 41
End: 2021-08-24
Payer: MEDICARE

## 2021-08-24 ENCOUNTER — PATIENT MESSAGE (OUTPATIENT)
Dept: INTERNAL MEDICINE | Facility: CLINIC | Age: 41
End: 2021-08-24

## 2021-08-24 ENCOUNTER — HOSPITAL ENCOUNTER (OUTPATIENT)
Dept: RADIOLOGY | Facility: HOSPITAL | Age: 41
Discharge: HOME | End: 2021-08-24
Attending: FAMILY MEDICINE
Payer: MEDICARE

## 2021-08-24 ENCOUNTER — LAB VISIT (OUTPATIENT)
Dept: PRIMARY CARE CLINIC | Facility: CLINIC | Age: 41
End: 2021-08-24
Payer: MEDICARE

## 2021-08-24 VITALS — DIASTOLIC BLOOD PRESSURE: 84 MMHG | SYSTOLIC BLOOD PRESSURE: 131 MMHG | HEART RATE: 80 BPM

## 2021-08-24 DIAGNOSIS — Z01.818 PRE-OP TESTING: ICD-10-CM

## 2021-08-24 DIAGNOSIS — Z01.818 PRE-OP TESTING: Primary | ICD-10-CM

## 2021-08-24 DIAGNOSIS — S63.635D SPRAIN OF INTERPHALANGEAL JOINT OF LEFT RING FINGER, SUBSEQUENT ENCOUNTER: Primary | ICD-10-CM

## 2021-08-24 DIAGNOSIS — R82.2 BILIRUBIN IN URINE: ICD-10-CM

## 2021-08-24 LAB — SARS-COV-2 RNA RESP QL NAA+PROBE: NOT DETECTED

## 2021-08-24 PROCEDURE — 1159F MED LIST DOCD IN RCRD: CPT | Mod: CPTII,S$GLB,, | Performed by: ORTHOPAEDIC SURGERY

## 2021-08-24 PROCEDURE — 3044F PR MOST RECENT HEMOGLOBIN A1C LEVEL <7.0%: ICD-10-PCS | Mod: CPTII,S$GLB,, | Performed by: ORTHOPAEDIC SURGERY

## 2021-08-24 PROCEDURE — 1125F PR PAIN SEVERITY QUANTIFIED, PAIN PRESENT: ICD-10-PCS | Mod: CPTII,S$GLB,, | Performed by: ORTHOPAEDIC SURGERY

## 2021-08-24 PROCEDURE — 3075F SYST BP GE 130 - 139MM HG: CPT | Mod: CPTII,S$GLB,, | Performed by: ORTHOPAEDIC SURGERY

## 2021-08-24 PROCEDURE — 71046 X-RAY EXAM CHEST 2 VIEWS: CPT | Mod: TC

## 2021-08-24 PROCEDURE — 3079F DIAST BP 80-89 MM HG: CPT | Mod: CPTII,S$GLB,, | Performed by: ORTHOPAEDIC SURGERY

## 2021-08-24 PROCEDURE — 71046 XR CHEST PA AND LATERAL: ICD-10-PCS | Mod: 26,,, | Performed by: RADIOLOGY

## 2021-08-24 PROCEDURE — 3044F HG A1C LEVEL LT 7.0%: CPT | Mod: CPTII,S$GLB,, | Performed by: ORTHOPAEDIC SURGERY

## 2021-08-24 PROCEDURE — 3075F PR MOST RECENT SYSTOLIC BLOOD PRESS GE 130-139MM HG: ICD-10-PCS | Mod: CPTII,S$GLB,, | Performed by: ORTHOPAEDIC SURGERY

## 2021-08-24 PROCEDURE — 99999 PR PBB SHADOW E&M-EST. PATIENT-LVL V: CPT | Mod: PBBFAC,,, | Performed by: ORTHOPAEDIC SURGERY

## 2021-08-24 PROCEDURE — U0005 INFEC AGEN DETEC AMPLI PROBE: HCPCS | Performed by: ORTHOPAEDIC SURGERY

## 2021-08-24 PROCEDURE — 1160F PR REVIEW ALL MEDS BY PRESCRIBER/CLIN PHARMACIST DOCUMENTED: ICD-10-PCS | Mod: CPTII,S$GLB,, | Performed by: ORTHOPAEDIC SURGERY

## 2021-08-24 PROCEDURE — 99999 PR PBB SHADOW E&M-EST. PATIENT-LVL V: ICD-10-PCS | Mod: PBBFAC,,, | Performed by: ORTHOPAEDIC SURGERY

## 2021-08-24 PROCEDURE — 76705 US ABDOMEN LIMITED: ICD-10-PCS | Mod: 26,,, | Performed by: RADIOLOGY

## 2021-08-24 PROCEDURE — 3079F PR MOST RECENT DIASTOLIC BLOOD PRESSURE 80-89 MM HG: ICD-10-PCS | Mod: CPTII,S$GLB,, | Performed by: ORTHOPAEDIC SURGERY

## 2021-08-24 PROCEDURE — 1159F PR MEDICATION LIST DOCUMENTED IN MEDICAL RECORD: ICD-10-PCS | Mod: CPTII,S$GLB,, | Performed by: ORTHOPAEDIC SURGERY

## 2021-08-24 PROCEDURE — 1160F RVW MEDS BY RX/DR IN RCRD: CPT | Mod: CPTII,S$GLB,, | Performed by: ORTHOPAEDIC SURGERY

## 2021-08-24 PROCEDURE — 1125F AMNT PAIN NOTED PAIN PRSNT: CPT | Mod: CPTII,S$GLB,, | Performed by: ORTHOPAEDIC SURGERY

## 2021-08-24 PROCEDURE — U0003 INFECTIOUS AGENT DETECTION BY NUCLEIC ACID (DNA OR RNA); SEVERE ACUTE RESPIRATORY SYNDROME CORONAVIRUS 2 (SARS-COV-2) (CORONAVIRUS DISEASE [COVID-19]), AMPLIFIED PROBE TECHNIQUE, MAKING USE OF HIGH THROUGHPUT TECHNOLOGIES AS DESCRIBED BY CMS-2020-01-R: HCPCS | Performed by: ORTHOPAEDIC SURGERY

## 2021-08-24 PROCEDURE — 76705 ECHO EXAM OF ABDOMEN: CPT | Mod: TC

## 2021-08-24 PROCEDURE — 99213 OFFICE O/P EST LOW 20 MIN: CPT | Mod: S$GLB,,, | Performed by: ORTHOPAEDIC SURGERY

## 2021-08-24 PROCEDURE — 71046 X-RAY EXAM CHEST 2 VIEWS: CPT | Mod: 26,,, | Performed by: RADIOLOGY

## 2021-08-24 PROCEDURE — 99213 PR OFFICE/OUTPT VISIT, EST, LEVL III, 20-29 MIN: ICD-10-PCS | Mod: S$GLB,,, | Performed by: ORTHOPAEDIC SURGERY

## 2021-08-24 PROCEDURE — 76705 ECHO EXAM OF ABDOMEN: CPT | Mod: 26,,, | Performed by: RADIOLOGY

## 2021-08-25 ENCOUNTER — ANESTHESIA EVENT (OUTPATIENT)
Dept: SURGERY | Facility: HOSPITAL | Age: 41
End: 2021-08-25
Payer: MEDICARE

## 2021-08-25 ENCOUNTER — HOSPITAL ENCOUNTER (OUTPATIENT)
Dept: CARDIOLOGY | Facility: HOSPITAL | Age: 41
Discharge: HOME | End: 2021-08-25
Payer: MEDICARE

## 2021-08-25 ENCOUNTER — TELEPHONE (OUTPATIENT)
Dept: SURGERY | Facility: HOSPITAL | Age: 41
End: 2021-08-25

## 2021-08-25 ENCOUNTER — TELEPHONE (OUTPATIENT)
Dept: PREADMISSION TESTING | Facility: HOSPITAL | Age: 41
End: 2021-08-25

## 2021-08-25 DIAGNOSIS — Z01.818 PRE-OP TESTING: ICD-10-CM

## 2021-08-25 PROCEDURE — 93010 EKG 12-LEAD: ICD-10-PCS | Mod: ,,, | Performed by: INTERNAL MEDICINE

## 2021-08-25 PROCEDURE — 93005 ELECTROCARDIOGRAM TRACING: CPT

## 2021-08-25 PROCEDURE — 93010 ELECTROCARDIOGRAM REPORT: CPT | Mod: ,,, | Performed by: INTERNAL MEDICINE

## 2021-08-26 ENCOUNTER — ANESTHESIA (OUTPATIENT)
Dept: SURGERY | Facility: HOSPITAL | Age: 41
End: 2021-08-26
Payer: MEDICARE

## 2021-08-26 ENCOUNTER — HOSPITAL ENCOUNTER (OUTPATIENT)
Facility: HOSPITAL | Age: 41
Discharge: HOME | End: 2021-08-26
Attending: ORTHOPAEDIC SURGERY | Admitting: ORTHOPAEDIC SURGERY
Payer: MEDICARE

## 2021-08-26 VITALS
OXYGEN SATURATION: 98 % | RESPIRATION RATE: 16 BRPM | SYSTOLIC BLOOD PRESSURE: 155 MMHG | TEMPERATURE: 98 F | WEIGHT: 279.88 LBS | DIASTOLIC BLOOD PRESSURE: 72 MMHG | HEIGHT: 66 IN | HEART RATE: 75 BPM | BODY MASS INDEX: 44.98 KG/M2

## 2021-08-26 DIAGNOSIS — S63.635D SPRAIN OF INTERPHALANGEAL JOINT OF LEFT RING FINGER, SUBSEQUENT ENCOUNTER: Primary | ICD-10-CM

## 2021-08-26 LAB — SARS-COV-2 RDRP RESP QL NAA+PROBE: NEGATIVE

## 2021-08-26 PROCEDURE — D9220A PRA ANESTHESIA: Mod: CRNA,,, | Performed by: NURSE ANESTHETIST, CERTIFIED REGISTERED

## 2021-08-26 PROCEDURE — 63600175 PHARM REV CODE 636 W HCPCS: Performed by: STUDENT IN AN ORGANIZED HEALTH CARE EDUCATION/TRAINING PROGRAM

## 2021-08-26 PROCEDURE — U0002 COVID-19 LAB TEST NON-CDC: HCPCS | Performed by: ORTHOPAEDIC SURGERY

## 2021-08-26 PROCEDURE — 71000015 HC POSTOP RECOV 1ST HR: Performed by: ORTHOPAEDIC SURGERY

## 2021-08-26 PROCEDURE — 27201423 OPTIME MED/SURG SUP & DEVICES STERILE SUPPLY: Performed by: ORTHOPAEDIC SURGERY

## 2021-08-26 PROCEDURE — 71000033 HC RECOVERY, INTIAL HOUR: Performed by: ORTHOPAEDIC SURGERY

## 2021-08-26 PROCEDURE — 37000008 HC ANESTHESIA 1ST 15 MINUTES: Performed by: ORTHOPAEDIC SURGERY

## 2021-08-26 PROCEDURE — D9220A PRA ANESTHESIA: Mod: ANES,,, | Performed by: ANESTHESIOLOGY

## 2021-08-26 PROCEDURE — C1713 ANCHOR/SCREW BN/BN,TIS/BN: HCPCS | Performed by: ORTHOPAEDIC SURGERY

## 2021-08-26 PROCEDURE — 36000706: Performed by: ORTHOPAEDIC SURGERY

## 2021-08-26 PROCEDURE — D9220A PRA ANESTHESIA: ICD-10-PCS | Mod: CRNA,,, | Performed by: NURSE ANESTHETIST, CERTIFIED REGISTERED

## 2021-08-26 PROCEDURE — 26540 REPAIR HAND JOINT: CPT | Mod: LT,,, | Performed by: ORTHOPAEDIC SURGERY

## 2021-08-26 PROCEDURE — 25000003 PHARM REV CODE 250: Performed by: NURSE ANESTHETIST, CERTIFIED REGISTERED

## 2021-08-26 PROCEDURE — 36000707: Performed by: ORTHOPAEDIC SURGERY

## 2021-08-26 PROCEDURE — 26540 PR FIX COLLAT LIG,MC-P JT,I-P JT: ICD-10-PCS | Mod: LT,,, | Performed by: ORTHOPAEDIC SURGERY

## 2021-08-26 PROCEDURE — 63600175 PHARM REV CODE 636 W HCPCS: Performed by: NURSE ANESTHETIST, CERTIFIED REGISTERED

## 2021-08-26 PROCEDURE — 25000003 PHARM REV CODE 250: Performed by: ORTHOPAEDIC SURGERY

## 2021-08-26 PROCEDURE — 37000009 HC ANESTHESIA EA ADD 15 MINS: Performed by: ORTHOPAEDIC SURGERY

## 2021-08-26 PROCEDURE — D9220A PRA ANESTHESIA: ICD-10-PCS | Mod: ANES,,, | Performed by: ANESTHESIOLOGY

## 2021-08-26 DEVICE — ANCHOR SUT MICRO 2.4MMX7MM: Type: IMPLANTABLE DEVICE | Site: FINGER | Status: FUNCTIONAL

## 2021-08-26 RX ORDER — SODIUM CHLORIDE, SODIUM LACTATE, POTASSIUM CHLORIDE, CALCIUM CHLORIDE 600; 310; 30; 20 MG/100ML; MG/100ML; MG/100ML; MG/100ML
INJECTION, SOLUTION INTRAVENOUS CONTINUOUS
Status: DISCONTINUED | OUTPATIENT
Start: 2021-08-26 | End: 2021-08-26 | Stop reason: HOSPADM

## 2021-08-26 RX ORDER — LIDOCAINE HCL/PF 100 MG/5ML
SYRINGE (ML) INTRAVENOUS
Status: DISCONTINUED | OUTPATIENT
Start: 2021-08-26 | End: 2021-08-26

## 2021-08-26 RX ORDER — HYDROCODONE BITARTRATE AND ACETAMINOPHEN 5; 325 MG/1; MG/1
1 TABLET ORAL EVERY 6 HOURS PRN
Qty: 15 TABLET | Refills: 0 | Status: SHIPPED | OUTPATIENT
Start: 2021-08-26 | End: 2021-08-31 | Stop reason: SDUPTHER

## 2021-08-26 RX ORDER — CHLORHEXIDINE GLUCONATE ORAL RINSE 1.2 MG/ML
10 SOLUTION DENTAL 2 TIMES DAILY
Status: CANCELLED | OUTPATIENT
Start: 2021-08-26 | End: 2021-08-31

## 2021-08-26 RX ORDER — FENTANYL CITRATE 50 UG/ML
INJECTION, SOLUTION INTRAMUSCULAR; INTRAVENOUS
Status: DISCONTINUED | OUTPATIENT
Start: 2021-08-26 | End: 2021-08-26

## 2021-08-26 RX ORDER — CHLORHEXIDINE GLUCONATE ORAL RINSE 1.2 MG/ML
10 SOLUTION DENTAL 2 TIMES DAILY
Status: DISCONTINUED | OUTPATIENT
Start: 2021-08-26 | End: 2021-08-26 | Stop reason: HOSPADM

## 2021-08-26 RX ORDER — HYDROCODONE BITARTRATE AND ACETAMINOPHEN 5; 325 MG/1; MG/1
1 TABLET ORAL EVERY 4 HOURS PRN
Status: DISCONTINUED | OUTPATIENT
Start: 2021-08-26 | End: 2021-08-26 | Stop reason: HOSPADM

## 2021-08-26 RX ORDER — SODIUM CHLORIDE 0.9 % (FLUSH) 0.9 %
10 SYRINGE (ML) INJECTION
Status: DISCONTINUED | OUTPATIENT
Start: 2021-08-26 | End: 2021-08-26 | Stop reason: HOSPADM

## 2021-08-26 RX ORDER — HYDROCODONE BITARTRATE AND ACETAMINOPHEN 5; 325 MG/1; MG/1
1 TABLET ORAL EVERY 4 HOURS PRN
Status: CANCELLED | OUTPATIENT
Start: 2021-08-26

## 2021-08-26 RX ORDER — MIDAZOLAM HYDROCHLORIDE 1 MG/ML
INJECTION INTRAMUSCULAR; INTRAVENOUS
Status: DISCONTINUED | OUTPATIENT
Start: 2021-08-26 | End: 2021-08-26

## 2021-08-26 RX ORDER — LIDOCAINE HYDROCHLORIDE 20 MG/ML
INJECTION, SOLUTION INFILTRATION; PERINEURAL
Status: DISCONTINUED
Start: 2021-08-26 | End: 2021-08-26 | Stop reason: HOSPADM

## 2021-08-26 RX ORDER — LIDOCAINE HYDROCHLORIDE 10 MG/ML
1 INJECTION, SOLUTION EPIDURAL; INFILTRATION; INTRACAUDAL; PERINEURAL ONCE
Status: DISCONTINUED | OUTPATIENT
Start: 2021-08-26 | End: 2021-08-26 | Stop reason: HOSPADM

## 2021-08-26 RX ORDER — ONDANSETRON 2 MG/ML
4 INJECTION INTRAMUSCULAR; INTRAVENOUS ONCE AS NEEDED
Status: DISCONTINUED | OUTPATIENT
Start: 2021-08-26 | End: 2021-08-26 | Stop reason: HOSPADM

## 2021-08-26 RX ORDER — PROPOFOL 10 MG/ML
VIAL (ML) INTRAVENOUS
Status: DISCONTINUED | OUTPATIENT
Start: 2021-08-26 | End: 2021-08-26

## 2021-08-26 RX ORDER — BUPIVACAINE HYDROCHLORIDE 2.5 MG/ML
INJECTION, SOLUTION EPIDURAL; INFILTRATION; INTRACAUDAL
Status: DISCONTINUED | OUTPATIENT
Start: 2021-08-26 | End: 2021-08-26 | Stop reason: HOSPADM

## 2021-08-26 RX ORDER — ALBUTEROL SULFATE 0.83 MG/ML
2.5 SOLUTION RESPIRATORY (INHALATION) EVERY 4 HOURS PRN
Status: DISCONTINUED | OUTPATIENT
Start: 2021-08-26 | End: 2021-08-26 | Stop reason: HOSPADM

## 2021-08-26 RX ORDER — DIPHENHYDRAMINE HYDROCHLORIDE 50 MG/ML
25 INJECTION INTRAMUSCULAR; INTRAVENOUS EVERY 6 HOURS PRN
Status: DISCONTINUED | OUTPATIENT
Start: 2021-08-26 | End: 2021-08-26 | Stop reason: HOSPADM

## 2021-08-26 RX ORDER — FENTANYL CITRATE 50 UG/ML
25 INJECTION, SOLUTION INTRAMUSCULAR; INTRAVENOUS EVERY 5 MIN PRN
Status: DISCONTINUED | OUTPATIENT
Start: 2021-08-26 | End: 2021-08-26 | Stop reason: HOSPADM

## 2021-08-26 RX ORDER — PHENYLEPHRINE HYDROCHLORIDE 10 MG/ML
INJECTION INTRAVENOUS
Status: DISCONTINUED | OUTPATIENT
Start: 2021-08-26 | End: 2021-08-26

## 2021-08-26 RX ADMIN — MIDAZOLAM HYDROCHLORIDE 2 MG: 1 INJECTION INTRAMUSCULAR; INTRAVENOUS at 07:08

## 2021-08-26 RX ADMIN — PHENYLEPHRINE HYDROCHLORIDE 100 MCG: 10 INJECTION INTRAVENOUS at 08:08

## 2021-08-26 RX ADMIN — PROPOFOL 20 MG: 10 INJECTION, EMULSION INTRAVENOUS at 07:08

## 2021-08-26 RX ADMIN — PROPOFOL 30 MG: 10 INJECTION, EMULSION INTRAVENOUS at 07:08

## 2021-08-26 RX ADMIN — FENTANYL CITRATE 25 MCG: 50 INJECTION, SOLUTION INTRAMUSCULAR; INTRAVENOUS at 07:08

## 2021-08-26 RX ADMIN — Medication 30 MG: at 07:08

## 2021-08-26 RX ADMIN — PROPOFOL 50 MG: 10 INJECTION, EMULSION INTRAVENOUS at 07:08

## 2021-08-26 RX ADMIN — PROPOFOL 20 MG: 10 INJECTION, EMULSION INTRAVENOUS at 08:08

## 2021-08-26 RX ADMIN — HYDROCODONE BITARTRATE AND ACETAMINOPHEN 1 TABLET: 5; 325 TABLET ORAL at 08:08

## 2021-08-26 RX ADMIN — SODIUM CHLORIDE, SODIUM LACTATE, POTASSIUM CHLORIDE, AND CALCIUM CHLORIDE: 600; 310; 30; 20 INJECTION, SOLUTION INTRAVENOUS at 07:08

## 2021-08-26 RX ADMIN — DEXTROSE 3 G: 50 INJECTION, SOLUTION INTRAVENOUS at 07:08

## 2021-08-26 RX ADMIN — FENTANYL CITRATE 50 MCG: 50 INJECTION, SOLUTION INTRAMUSCULAR; INTRAVENOUS at 07:08

## 2021-08-27 ENCOUNTER — PATIENT MESSAGE (OUTPATIENT)
Dept: ORTHOPEDICS | Facility: CLINIC | Age: 41
End: 2021-08-27

## 2021-08-27 ENCOUNTER — PATIENT MESSAGE (OUTPATIENT)
Dept: PSYCHIATRY | Facility: CLINIC | Age: 41
End: 2021-08-27

## 2021-08-30 ENCOUNTER — PATIENT MESSAGE (OUTPATIENT)
Dept: ORTHOPEDICS | Facility: CLINIC | Age: 41
End: 2021-08-30

## 2021-08-30 ENCOUNTER — NURSE TRIAGE (OUTPATIENT)
Dept: ADMINISTRATIVE | Facility: CLINIC | Age: 41
End: 2021-08-30

## 2021-08-31 ENCOUNTER — OFFICE VISIT (OUTPATIENT)
Dept: ORTHOPEDICS | Facility: CLINIC | Age: 41
End: 2021-08-31
Payer: MEDICARE

## 2021-08-31 ENCOUNTER — TELEPHONE (OUTPATIENT)
Dept: ORTHOPEDICS | Facility: CLINIC | Age: 41
End: 2021-08-31

## 2021-08-31 DIAGNOSIS — S63.635D SPRAIN OF INTERPHALANGEAL JOINT OF LEFT RING FINGER, SUBSEQUENT ENCOUNTER: Primary | ICD-10-CM

## 2021-08-31 PROCEDURE — 1159F PR MEDICATION LIST DOCUMENTED IN MEDICAL RECORD: ICD-10-PCS | Mod: CPTII,S$GLB,, | Performed by: PHYSICIAN ASSISTANT

## 2021-08-31 PROCEDURE — 1159F MED LIST DOCD IN RCRD: CPT | Mod: CPTII,S$GLB,, | Performed by: PHYSICIAN ASSISTANT

## 2021-08-31 PROCEDURE — 1160F PR REVIEW ALL MEDS BY PRESCRIBER/CLIN PHARMACIST DOCUMENTED: ICD-10-PCS | Mod: CPTII,S$GLB,, | Performed by: PHYSICIAN ASSISTANT

## 2021-08-31 PROCEDURE — 1160F RVW MEDS BY RX/DR IN RCRD: CPT | Mod: CPTII,S$GLB,, | Performed by: PHYSICIAN ASSISTANT

## 2021-08-31 PROCEDURE — 99999 PR PBB SHADOW E&M-EST. PATIENT-LVL III: CPT | Mod: PBBFAC,,, | Performed by: PHYSICIAN ASSISTANT

## 2021-08-31 PROCEDURE — 99024 PR POST-OP FOLLOW-UP VISIT: ICD-10-PCS | Mod: S$GLB,,, | Performed by: PHYSICIAN ASSISTANT

## 2021-08-31 PROCEDURE — 99024 POSTOP FOLLOW-UP VISIT: CPT | Mod: S$GLB,,, | Performed by: PHYSICIAN ASSISTANT

## 2021-08-31 PROCEDURE — 99999 PR PBB SHADOW E&M-EST. PATIENT-LVL III: ICD-10-PCS | Mod: PBBFAC,,, | Performed by: PHYSICIAN ASSISTANT

## 2021-08-31 PROCEDURE — 3044F PR MOST RECENT HEMOGLOBIN A1C LEVEL <7.0%: ICD-10-PCS | Mod: CPTII,S$GLB,, | Performed by: PHYSICIAN ASSISTANT

## 2021-08-31 PROCEDURE — 3044F HG A1C LEVEL LT 7.0%: CPT | Mod: CPTII,S$GLB,, | Performed by: PHYSICIAN ASSISTANT

## 2021-08-31 RX ORDER — HYDROCODONE BITARTRATE AND ACETAMINOPHEN 5; 325 MG/1; MG/1
1 TABLET ORAL EVERY 6 HOURS PRN
Qty: 15 TABLET | Refills: 0 | Status: ON HOLD | OUTPATIENT
Start: 2021-08-31 | End: 2021-09-27 | Stop reason: SDUPTHER

## 2021-09-01 ENCOUNTER — PATIENT MESSAGE (OUTPATIENT)
Dept: PSYCHIATRY | Facility: CLINIC | Age: 41
End: 2021-09-01

## 2021-09-02 ENCOUNTER — PATIENT MESSAGE (OUTPATIENT)
Dept: PSYCHIATRY | Facility: CLINIC | Age: 41
End: 2021-09-02

## 2021-09-04 ENCOUNTER — PATIENT MESSAGE (OUTPATIENT)
Dept: NEUROLOGY | Facility: CLINIC | Age: 41
End: 2021-09-04

## 2021-09-09 ENCOUNTER — OFFICE VISIT (OUTPATIENT)
Dept: ORTHOPEDICS | Facility: CLINIC | Age: 41
End: 2021-09-09
Payer: MEDICARE

## 2021-09-09 VITALS
SYSTOLIC BLOOD PRESSURE: 114 MMHG | HEART RATE: 85 BPM | DIASTOLIC BLOOD PRESSURE: 76 MMHG | WEIGHT: 279 LBS | BODY MASS INDEX: 44.84 KG/M2 | HEIGHT: 66 IN

## 2021-09-09 DIAGNOSIS — M79.642 LEFT HAND PAIN: Primary | ICD-10-CM

## 2021-09-09 DIAGNOSIS — S63.635D SPRAIN OF INTERPHALANGEAL JOINT OF LEFT RING FINGER, SUBSEQUENT ENCOUNTER: Primary | ICD-10-CM

## 2021-09-09 PROCEDURE — 1159F PR MEDICATION LIST DOCUMENTED IN MEDICAL RECORD: ICD-10-PCS | Mod: CPTII,S$GLB,, | Performed by: PHYSICIAN ASSISTANT

## 2021-09-09 PROCEDURE — 99999 PR PBB SHADOW E&M-EST. PATIENT-LVL V: CPT | Mod: PBBFAC,,, | Performed by: PHYSICIAN ASSISTANT

## 2021-09-09 PROCEDURE — 99999 PR PBB SHADOW E&M-EST. PATIENT-LVL V: ICD-10-PCS | Mod: PBBFAC,,, | Performed by: PHYSICIAN ASSISTANT

## 2021-09-09 PROCEDURE — 99024 POSTOP FOLLOW-UP VISIT: CPT | Mod: S$GLB,,, | Performed by: PHYSICIAN ASSISTANT

## 2021-09-09 PROCEDURE — 3008F BODY MASS INDEX DOCD: CPT | Mod: CPTII,S$GLB,, | Performed by: PHYSICIAN ASSISTANT

## 2021-09-09 PROCEDURE — 3044F HG A1C LEVEL LT 7.0%: CPT | Mod: CPTII,S$GLB,, | Performed by: PHYSICIAN ASSISTANT

## 2021-09-09 PROCEDURE — 3008F PR BODY MASS INDEX (BMI) DOCUMENTED: ICD-10-PCS | Mod: CPTII,S$GLB,, | Performed by: PHYSICIAN ASSISTANT

## 2021-09-09 PROCEDURE — 3078F PR MOST RECENT DIASTOLIC BLOOD PRESSURE < 80 MM HG: ICD-10-PCS | Mod: CPTII,S$GLB,, | Performed by: PHYSICIAN ASSISTANT

## 2021-09-09 PROCEDURE — 1160F RVW MEDS BY RX/DR IN RCRD: CPT | Mod: CPTII,S$GLB,, | Performed by: PHYSICIAN ASSISTANT

## 2021-09-09 PROCEDURE — 1160F PR REVIEW ALL MEDS BY PRESCRIBER/CLIN PHARMACIST DOCUMENTED: ICD-10-PCS | Mod: CPTII,S$GLB,, | Performed by: PHYSICIAN ASSISTANT

## 2021-09-09 PROCEDURE — 3078F DIAST BP <80 MM HG: CPT | Mod: CPTII,S$GLB,, | Performed by: PHYSICIAN ASSISTANT

## 2021-09-09 PROCEDURE — 3074F PR MOST RECENT SYSTOLIC BLOOD PRESSURE < 130 MM HG: ICD-10-PCS | Mod: CPTII,S$GLB,, | Performed by: PHYSICIAN ASSISTANT

## 2021-09-09 PROCEDURE — 3074F SYST BP LT 130 MM HG: CPT | Mod: CPTII,S$GLB,, | Performed by: PHYSICIAN ASSISTANT

## 2021-09-09 PROCEDURE — 3044F PR MOST RECENT HEMOGLOBIN A1C LEVEL <7.0%: ICD-10-PCS | Mod: CPTII,S$GLB,, | Performed by: PHYSICIAN ASSISTANT

## 2021-09-09 PROCEDURE — 1159F MED LIST DOCD IN RCRD: CPT | Mod: CPTII,S$GLB,, | Performed by: PHYSICIAN ASSISTANT

## 2021-09-09 PROCEDURE — 4010F PR ACE/ARB THEARPY RXD/TAKEN: ICD-10-PCS | Mod: CPTII,S$GLB,, | Performed by: PHYSICIAN ASSISTANT

## 2021-09-09 PROCEDURE — 99024 PR POST-OP FOLLOW-UP VISIT: ICD-10-PCS | Mod: S$GLB,,, | Performed by: PHYSICIAN ASSISTANT

## 2021-09-09 PROCEDURE — 4010F ACE/ARB THERAPY RXD/TAKEN: CPT | Mod: CPTII,S$GLB,, | Performed by: PHYSICIAN ASSISTANT

## 2021-09-13 ENCOUNTER — PATIENT MESSAGE (OUTPATIENT)
Dept: ADMINISTRATIVE | Facility: OTHER | Age: 41
End: 2021-09-13

## 2021-09-14 ENCOUNTER — TELEPHONE (OUTPATIENT)
Dept: NEUROLOGY | Facility: CLINIC | Age: 41
End: 2021-09-14

## 2021-09-14 DIAGNOSIS — G43.809 OTHER MIGRAINE WITHOUT STATUS MIGRAINOSUS, NOT INTRACTABLE: ICD-10-CM

## 2021-09-14 RX ORDER — SUMATRIPTAN SUCCINATE 100 MG/1
TABLET ORAL
Qty: 9 TABLET | Refills: 0 | Status: SHIPPED | OUTPATIENT
Start: 2021-09-14 | End: 2022-03-09

## 2021-09-15 ENCOUNTER — PATIENT MESSAGE (OUTPATIENT)
Dept: ORTHOPEDICS | Facility: CLINIC | Age: 41
End: 2021-09-15

## 2021-09-17 ENCOUNTER — PATIENT MESSAGE (OUTPATIENT)
Dept: NEUROLOGY | Facility: CLINIC | Age: 41
End: 2021-09-17

## 2021-09-22 ENCOUNTER — PATIENT MESSAGE (OUTPATIENT)
Dept: NEUROLOGY | Facility: CLINIC | Age: 41
End: 2021-09-22

## 2021-09-24 ENCOUNTER — PATIENT MESSAGE (OUTPATIENT)
Dept: NEUROLOGY | Facility: CLINIC | Age: 41
End: 2021-09-24

## 2021-09-24 ENCOUNTER — HOSPITAL ENCOUNTER (OUTPATIENT)
Facility: HOSPITAL | Age: 41
Discharge: HOME | End: 2021-09-27
Attending: EMERGENCY MEDICINE | Admitting: INTERNAL MEDICINE
Payer: MEDICARE

## 2021-09-24 DIAGNOSIS — G35 MULTIPLE SCLEROSIS EXACERBATION: Primary | ICD-10-CM

## 2021-09-24 DIAGNOSIS — R29.898 WEAKNESS OF LEFT LOWER EXTREMITY: ICD-10-CM

## 2021-09-24 DIAGNOSIS — R07.9 CHEST PAIN: ICD-10-CM

## 2021-09-24 LAB
ALBUMIN SERPL BCP-MCNC: 3.5 G/DL (ref 3.5–5.2)
ALP SERPL-CCNC: 84 U/L (ref 55–135)
ALT SERPL W/O P-5'-P-CCNC: 13 U/L (ref 10–44)
ANION GAP SERPL CALC-SCNC: 10 MMOL/L (ref 8–16)
AST SERPL-CCNC: 18 U/L (ref 10–40)
BASOPHILS # BLD AUTO: 0.04 K/UL (ref 0–0.2)
BASOPHILS NFR BLD: 0.8 % (ref 0–1.9)
BILIRUB SERPL-MCNC: 0.4 MG/DL (ref 0.1–1)
BILIRUB UR QL STRIP: NEGATIVE
BUN SERPL-MCNC: 7 MG/DL (ref 6–20)
CALCIUM SERPL-MCNC: 9.4 MG/DL (ref 8.7–10.5)
CHLORIDE SERPL-SCNC: 110 MMOL/L (ref 95–110)
CLARITY UR: CLEAR
CO2 SERPL-SCNC: 20 MMOL/L (ref 23–29)
COLOR UR: YELLOW
CREAT SERPL-MCNC: 0.8 MG/DL (ref 0.5–1.4)
CTP QC/QA: YES
DIFFERENTIAL METHOD: ABNORMAL
EOSINOPHIL # BLD AUTO: 0.1 K/UL (ref 0–0.5)
EOSINOPHIL NFR BLD: 2.7 % (ref 0–8)
ERYTHROCYTE [DISTWIDTH] IN BLOOD BY AUTOMATED COUNT: 16.7 % (ref 11.5–14.5)
EST. GFR  (AFRICAN AMERICAN): >60 ML/MIN/1.73 M^2
EST. GFR  (NON AFRICAN AMERICAN): >60 ML/MIN/1.73 M^2
GLUCOSE SERPL-MCNC: 100 MG/DL (ref 70–110)
GLUCOSE UR QL STRIP: NEGATIVE
HCT VFR BLD AUTO: 37.5 % (ref 37–48.5)
HGB BLD-MCNC: 11.2 G/DL (ref 12–16)
HGB UR QL STRIP: NEGATIVE
IMM GRANULOCYTES # BLD AUTO: 0.01 K/UL (ref 0–0.04)
IMM GRANULOCYTES NFR BLD AUTO: 0.2 % (ref 0–0.5)
KETONES UR QL STRIP: NEGATIVE
LEUKOCYTE ESTERASE UR QL STRIP: NEGATIVE
LYMPHOCYTES # BLD AUTO: 1.7 K/UL (ref 1–4.8)
LYMPHOCYTES NFR BLD: 33.5 % (ref 18–48)
MCH RBC QN AUTO: 20.7 PG (ref 27–31)
MCHC RBC AUTO-ENTMCNC: 29.9 G/DL (ref 32–36)
MCV RBC AUTO: 69 FL (ref 82–98)
MONOCYTES # BLD AUTO: 0.5 K/UL (ref 0.3–1)
MONOCYTES NFR BLD: 8.9 % (ref 4–15)
NEUTROPHILS # BLD AUTO: 2.8 K/UL (ref 1.8–7.7)
NEUTROPHILS NFR BLD: 53.9 % (ref 38–73)
NITRITE UR QL STRIP: NEGATIVE
NRBC BLD-RTO: 0 /100 WBC
PH UR STRIP: 8 [PH] (ref 5–8)
PLATELET # BLD AUTO: 218 K/UL (ref 150–450)
PMV BLD AUTO: 10 FL (ref 9.2–12.9)
POTASSIUM SERPL-SCNC: 4.2 MMOL/L (ref 3.5–5.1)
PROT SERPL-MCNC: 7.2 G/DL (ref 6–8.4)
PROT UR QL STRIP: NEGATIVE
RBC # BLD AUTO: 5.4 M/UL (ref 4–5.4)
SARS-COV-2 RDRP RESP QL NAA+PROBE: NEGATIVE
SODIUM SERPL-SCNC: 140 MMOL/L (ref 136–145)
SP GR UR STRIP: 1.01 (ref 1–1.03)
URN SPEC COLLECT METH UR: NORMAL
UROBILINOGEN UR STRIP-ACNC: NEGATIVE EU/DL
WBC # BLD AUTO: 5.19 K/UL (ref 3.9–12.7)

## 2021-09-24 PROCEDURE — 25500020 PHARM REV CODE 255: Performed by: EMERGENCY MEDICINE

## 2021-09-24 PROCEDURE — 96365 THER/PROPH/DIAG IV INF INIT: CPT

## 2021-09-24 PROCEDURE — 96375 TX/PRO/DX INJ NEW DRUG ADDON: CPT | Mod: 59

## 2021-09-24 PROCEDURE — 63600175 PHARM REV CODE 636 W HCPCS: Performed by: EMERGENCY MEDICINE

## 2021-09-24 PROCEDURE — 99285 EMERGENCY DEPT VISIT HI MDM: CPT | Mod: 25

## 2021-09-24 PROCEDURE — 80053 COMPREHEN METABOLIC PANEL: CPT | Performed by: EMERGENCY MEDICINE

## 2021-09-24 PROCEDURE — U0002 COVID-19 LAB TEST NON-CDC: HCPCS | Performed by: EMERGENCY MEDICINE

## 2021-09-24 PROCEDURE — 85025 COMPLETE CBC W/AUTO DIFF WBC: CPT | Performed by: EMERGENCY MEDICINE

## 2021-09-24 PROCEDURE — A9585 GADOBUTROL INJECTION: HCPCS | Performed by: EMERGENCY MEDICINE

## 2021-09-24 PROCEDURE — G0378 HOSPITAL OBSERVATION PER HR: HCPCS

## 2021-09-24 PROCEDURE — 25000003 PHARM REV CODE 250: Performed by: EMERGENCY MEDICINE

## 2021-09-24 PROCEDURE — 81003 URINALYSIS AUTO W/O SCOPE: CPT | Performed by: EMERGENCY MEDICINE

## 2021-09-24 RX ORDER — METHYLPREDNISOLONE SOD SUCC 125 MG
1000 VIAL (EA) INJECTION
Status: DISCONTINUED | OUTPATIENT
Start: 2021-09-24 | End: 2021-09-24

## 2021-09-24 RX ORDER — GADOBUTROL 604.72 MG/ML
10 INJECTION INTRAVENOUS
Status: COMPLETED | OUTPATIENT
Start: 2021-09-24 | End: 2021-09-24

## 2021-09-24 RX ORDER — DIPHENHYDRAMINE HYDROCHLORIDE 50 MG/ML
50 INJECTION INTRAMUSCULAR; INTRAVENOUS
Status: COMPLETED | OUTPATIENT
Start: 2021-09-24 | End: 2021-09-24

## 2021-09-24 RX ADMIN — DIPHENHYDRAMINE HYDROCHLORIDE 50 MG: 50 INJECTION, SOLUTION INTRAMUSCULAR; INTRAVENOUS at 10:09

## 2021-09-24 RX ADMIN — GADOBUTROL 10 ML: 604.72 INJECTION INTRAVENOUS at 07:09

## 2021-09-24 RX ADMIN — DEXTROSE 1000 MG: 50 INJECTION, SOLUTION INTRAVENOUS at 10:09

## 2021-09-25 LAB — POCT GLUCOSE: 216 MG/DL (ref 70–110)

## 2021-09-25 PROCEDURE — 25000003 PHARM REV CODE 250: Performed by: FAMILY MEDICINE

## 2021-09-25 PROCEDURE — 97162 PT EVAL MOD COMPLEX 30 MIN: CPT

## 2021-09-25 PROCEDURE — 63600175 PHARM REV CODE 636 W HCPCS: Performed by: FAMILY MEDICINE

## 2021-09-25 PROCEDURE — G0378 HOSPITAL OBSERVATION PER HR: HCPCS

## 2021-09-25 PROCEDURE — 96376 TX/PRO/DX INJ SAME DRUG ADON: CPT | Performed by: EMERGENCY MEDICINE

## 2021-09-25 PROCEDURE — 97530 THERAPEUTIC ACTIVITIES: CPT

## 2021-09-25 PROCEDURE — 25000003 PHARM REV CODE 250: Performed by: NURSE PRACTITIONER

## 2021-09-25 PROCEDURE — 96375 TX/PRO/DX INJ NEW DRUG ADDON: CPT | Performed by: EMERGENCY MEDICINE

## 2021-09-25 PROCEDURE — 63600175 PHARM REV CODE 636 W HCPCS: Performed by: NURSE PRACTITIONER

## 2021-09-25 PROCEDURE — 99214 PR OFFICE/OUTPT VISIT, EST, LEVL IV, 30-39 MIN: ICD-10-PCS | Mod: ,,, | Performed by: PSYCHIATRY & NEUROLOGY

## 2021-09-25 PROCEDURE — A9585 GADOBUTROL INJECTION: HCPCS | Performed by: FAMILY MEDICINE

## 2021-09-25 PROCEDURE — 25500020 PHARM REV CODE 255: Performed by: FAMILY MEDICINE

## 2021-09-25 PROCEDURE — 99214 OFFICE O/P EST MOD 30 MIN: CPT | Mod: ,,, | Performed by: PSYCHIATRY & NEUROLOGY

## 2021-09-25 RX ORDER — IBUPROFEN 200 MG
16 TABLET ORAL
Status: DISCONTINUED | OUTPATIENT
Start: 2021-09-25 | End: 2021-09-27 | Stop reason: HOSPADM

## 2021-09-25 RX ORDER — SODIUM CHLORIDE 0.9 % (FLUSH) 0.9 %
10 SYRINGE (ML) INJECTION EVERY 12 HOURS PRN
Status: DISCONTINUED | OUTPATIENT
Start: 2021-09-25 | End: 2021-09-27 | Stop reason: HOSPADM

## 2021-09-25 RX ORDER — GABAPENTIN 300 MG/1
900 CAPSULE ORAL NIGHTLY
Status: DISCONTINUED | OUTPATIENT
Start: 2021-09-25 | End: 2021-09-27 | Stop reason: HOSPADM

## 2021-09-25 RX ORDER — TOPIRAMATE 25 MG/1
50 TABLET ORAL DAILY
Status: DISCONTINUED | OUTPATIENT
Start: 2021-09-25 | End: 2021-09-27 | Stop reason: HOSPADM

## 2021-09-25 RX ORDER — ACETAMINOPHEN 325 MG/1
650 TABLET ORAL EVERY 4 HOURS PRN
Status: DISCONTINUED | OUTPATIENT
Start: 2021-09-25 | End: 2021-09-27 | Stop reason: HOSPADM

## 2021-09-25 RX ORDER — DICLOFENAC SODIUM 10 MG/G
2 GEL TOPICAL 4 TIMES DAILY
Status: DISCONTINUED | OUTPATIENT
Start: 2021-09-25 | End: 2021-09-25

## 2021-09-25 RX ORDER — LACTULOSE 10 G/15ML
10 SOLUTION ORAL 3 TIMES DAILY PRN
Status: DISCONTINUED | OUTPATIENT
Start: 2021-09-24 | End: 2021-09-27 | Stop reason: HOSPADM

## 2021-09-25 RX ORDER — GLUCAGON 1 MG
1 KIT INJECTION
Status: DISCONTINUED | OUTPATIENT
Start: 2021-09-25 | End: 2021-09-27 | Stop reason: HOSPADM

## 2021-09-25 RX ORDER — LORAZEPAM 2 MG/ML
1 INJECTION INTRAMUSCULAR
Status: DISCONTINUED | OUTPATIENT
Start: 2021-09-25 | End: 2021-09-27 | Stop reason: HOSPADM

## 2021-09-25 RX ORDER — DULOXETIN HYDROCHLORIDE 30 MG/1
60 CAPSULE, DELAYED RELEASE ORAL DAILY
Status: DISCONTINUED | OUTPATIENT
Start: 2021-09-25 | End: 2021-09-27 | Stop reason: HOSPADM

## 2021-09-25 RX ORDER — HYDROCODONE BITARTRATE AND ACETAMINOPHEN 5; 325 MG/1; MG/1
1 TABLET ORAL EVERY 6 HOURS PRN
Status: DISCONTINUED | OUTPATIENT
Start: 2021-09-25 | End: 2021-09-27 | Stop reason: HOSPADM

## 2021-09-25 RX ORDER — ENOXAPARIN SODIUM 100 MG/ML
40 INJECTION SUBCUTANEOUS EVERY 24 HOURS
Status: DISCONTINUED | OUTPATIENT
Start: 2021-09-25 | End: 2021-09-25

## 2021-09-25 RX ORDER — IBUPROFEN 200 MG
24 TABLET ORAL
Status: DISCONTINUED | OUTPATIENT
Start: 2021-09-25 | End: 2021-09-27 | Stop reason: HOSPADM

## 2021-09-25 RX ORDER — ENOXAPARIN SODIUM 100 MG/ML
40 INJECTION SUBCUTANEOUS EVERY 12 HOURS
Status: DISCONTINUED | OUTPATIENT
Start: 2021-09-25 | End: 2021-09-27 | Stop reason: HOSPADM

## 2021-09-25 RX ORDER — NALOXONE HCL 0.4 MG/ML
0.02 VIAL (ML) INJECTION
Status: DISCONTINUED | OUTPATIENT
Start: 2021-09-25 | End: 2021-09-27 | Stop reason: HOSPADM

## 2021-09-25 RX ORDER — ONDANSETRON 2 MG/ML
4 INJECTION INTRAMUSCULAR; INTRAVENOUS EVERY 8 HOURS PRN
Status: DISCONTINUED | OUTPATIENT
Start: 2021-09-25 | End: 2021-09-27 | Stop reason: HOSPADM

## 2021-09-25 RX ORDER — CYCLOBENZAPRINE HCL 10 MG
10 TABLET ORAL DAILY PRN
Status: DISCONTINUED | OUTPATIENT
Start: 2021-09-25 | End: 2021-09-27 | Stop reason: HOSPADM

## 2021-09-25 RX ORDER — DIPHENHYDRAMINE HCL 25 MG
25 CAPSULE ORAL EVERY 6 HOURS PRN
Status: DISCONTINUED | OUTPATIENT
Start: 2021-09-25 | End: 2021-09-27 | Stop reason: HOSPADM

## 2021-09-25 RX ORDER — LOSARTAN POTASSIUM 50 MG/1
50 TABLET ORAL DAILY
Status: DISCONTINUED | OUTPATIENT
Start: 2021-09-25 | End: 2021-09-27 | Stop reason: HOSPADM

## 2021-09-25 RX ORDER — METHOCARBAMOL 750 MG/1
750 TABLET, FILM COATED ORAL 2 TIMES DAILY
Status: DISCONTINUED | OUTPATIENT
Start: 2021-09-25 | End: 2021-09-27 | Stop reason: HOSPADM

## 2021-09-25 RX ORDER — TOPIRAMATE 25 MG/1
100 TABLET ORAL NIGHTLY
Status: DISCONTINUED | OUTPATIENT
Start: 2021-09-25 | End: 2021-09-27 | Stop reason: HOSPADM

## 2021-09-25 RX ORDER — GABAPENTIN 300 MG/1
600 CAPSULE ORAL 2 TIMES DAILY WITH MEALS
Status: DISCONTINUED | OUTPATIENT
Start: 2021-09-25 | End: 2021-09-27 | Stop reason: HOSPADM

## 2021-09-25 RX ORDER — GADOBUTROL 604.72 MG/ML
10 INJECTION INTRAVENOUS
Status: COMPLETED | OUTPATIENT
Start: 2021-09-25 | End: 2021-09-25

## 2021-09-25 RX ORDER — HYDRALAZINE HYDROCHLORIDE 20 MG/ML
10 INJECTION INTRAMUSCULAR; INTRAVENOUS EVERY 8 HOURS PRN
Status: DISCONTINUED | OUTPATIENT
Start: 2021-09-25 | End: 2021-09-27 | Stop reason: HOSPADM

## 2021-09-25 RX ORDER — DOXEPIN HYDROCHLORIDE 25 MG/1
75 CAPSULE ORAL NIGHTLY PRN
Status: DISCONTINUED | OUTPATIENT
Start: 2021-09-25 | End: 2021-09-27 | Stop reason: HOSPADM

## 2021-09-25 RX ORDER — TOPIRAMATE 100 MG/1
100 TABLET, FILM COATED ORAL NIGHTLY
Status: DISCONTINUED | OUTPATIENT
Start: 2021-09-25 | End: 2021-09-25

## 2021-09-25 RX ADMIN — DEXTROSE 1000 MG: 50 INJECTION, SOLUTION INTRAVENOUS at 03:09

## 2021-09-25 RX ADMIN — TOPIRAMATE 100 MG: 25 TABLET, FILM COATED ORAL at 11:09

## 2021-09-25 RX ADMIN — DIPHENHYDRAMINE HYDROCHLORIDE 25 MG: 25 CAPSULE ORAL at 11:09

## 2021-09-25 RX ADMIN — LOSARTAN POTASSIUM 50 MG: 50 TABLET, FILM COATED ORAL at 08:09

## 2021-09-25 RX ADMIN — DULOXETINE 60 MG: 30 CAPSULE, DELAYED RELEASE ORAL at 08:09

## 2021-09-25 RX ADMIN — METHOCARBAMOL 750 MG: 750 TABLET ORAL at 08:09

## 2021-09-25 RX ADMIN — DIPHENHYDRAMINE HYDROCHLORIDE 25 MG: 25 CAPSULE ORAL at 03:09

## 2021-09-25 RX ADMIN — METHOCARBAMOL 750 MG: 750 TABLET ORAL at 11:09

## 2021-09-25 RX ADMIN — DEXTROSE 1000 MG: 50 INJECTION, SOLUTION INTRAVENOUS at 11:09

## 2021-09-25 RX ADMIN — LINACLOTIDE 145 MCG: 145 CAPSULE, GELATIN COATED ORAL at 05:09

## 2021-09-25 RX ADMIN — HYDROCODONE BITARTRATE AND ACETAMINOPHEN 1 TABLET: 5; 325 TABLET ORAL at 08:09

## 2021-09-25 RX ADMIN — CYCLOBENZAPRINE 10 MG: 10 TABLET, FILM COATED ORAL at 02:09

## 2021-09-25 RX ADMIN — GADOBUTROL 10 ML: 604.72 INJECTION INTRAVENOUS at 09:09

## 2021-09-25 RX ADMIN — GABAPENTIN 900 MG: 300 CAPSULE ORAL at 12:09

## 2021-09-25 RX ADMIN — GABAPENTIN 600 MG: 300 CAPSULE ORAL at 08:09

## 2021-09-25 RX ADMIN — GABAPENTIN 900 MG: 300 CAPSULE ORAL at 11:09

## 2021-09-25 RX ADMIN — GABAPENTIN 600 MG: 300 CAPSULE ORAL at 04:09

## 2021-09-25 RX ADMIN — LORAZEPAM 1 MG: 2 INJECTION INTRAMUSCULAR; INTRAVENOUS at 07:09

## 2021-09-25 RX ADMIN — HYDROCODONE BITARTRATE AND ACETAMINOPHEN 1 TABLET: 5; 325 TABLET ORAL at 04:09

## 2021-09-25 RX ADMIN — TOPIRAMATE 50 MG: 25 TABLET, FILM COATED ORAL at 08:09

## 2021-09-25 RX ADMIN — METHOCARBAMOL 750 MG: 750 TABLET ORAL at 12:09

## 2021-09-26 ENCOUNTER — PATIENT MESSAGE (OUTPATIENT)
Dept: ADMINISTRATIVE | Facility: OTHER | Age: 41
End: 2021-09-26

## 2021-09-26 PROBLEM — R73.9 HYPERGLYCEMIA: Status: ACTIVE | Noted: 2021-09-26

## 2021-09-26 LAB
ANION GAP SERPL CALC-SCNC: 13 MMOL/L (ref 8–16)
BASOPHILS # BLD AUTO: 0.01 K/UL (ref 0–0.2)
BASOPHILS NFR BLD: 0.1 % (ref 0–1.9)
BUN SERPL-MCNC: 9 MG/DL (ref 6–20)
CALCIUM SERPL-MCNC: 9.6 MG/DL (ref 8.7–10.5)
CHLORIDE SERPL-SCNC: 110 MMOL/L (ref 95–110)
CO2 SERPL-SCNC: 17 MMOL/L (ref 23–29)
CREAT SERPL-MCNC: 0.9 MG/DL (ref 0.5–1.4)
DIFFERENTIAL METHOD: ABNORMAL
EOSINOPHIL # BLD AUTO: 0 K/UL (ref 0–0.5)
EOSINOPHIL NFR BLD: 0 % (ref 0–8)
ERYTHROCYTE [DISTWIDTH] IN BLOOD BY AUTOMATED COUNT: 16.6 % (ref 11.5–14.5)
EST. GFR  (AFRICAN AMERICAN): >60 ML/MIN/1.73 M^2
EST. GFR  (NON AFRICAN AMERICAN): >60 ML/MIN/1.73 M^2
GLUCOSE SERPL-MCNC: 274 MG/DL (ref 70–110)
HCT VFR BLD AUTO: 38.7 % (ref 37–48.5)
HGB BLD-MCNC: 11.5 G/DL (ref 12–16)
IMM GRANULOCYTES # BLD AUTO: 0.09 K/UL (ref 0–0.04)
IMM GRANULOCYTES NFR BLD AUTO: 0.7 % (ref 0–0.5)
LYMPHOCYTES # BLD AUTO: 0.7 K/UL (ref 1–4.8)
LYMPHOCYTES NFR BLD: 5.4 % (ref 18–48)
MCH RBC QN AUTO: 20.8 PG (ref 27–31)
MCHC RBC AUTO-ENTMCNC: 29.7 G/DL (ref 32–36)
MCV RBC AUTO: 70 FL (ref 82–98)
MONOCYTES # BLD AUTO: 0.1 K/UL (ref 0.3–1)
MONOCYTES NFR BLD: 1 % (ref 4–15)
NEUTROPHILS # BLD AUTO: 12.3 K/UL (ref 1.8–7.7)
NEUTROPHILS NFR BLD: 92.8 % (ref 38–73)
NRBC BLD-RTO: 0 /100 WBC
PLATELET # BLD AUTO: 235 K/UL (ref 150–450)
PMV BLD AUTO: 10.3 FL (ref 9.2–12.9)
POCT GLUCOSE: 180 MG/DL (ref 70–110)
POCT GLUCOSE: 219 MG/DL (ref 70–110)
POTASSIUM SERPL-SCNC: 4.2 MMOL/L (ref 3.5–5.1)
RBC # BLD AUTO: 5.52 M/UL (ref 4–5.4)
SODIUM SERPL-SCNC: 140 MMOL/L (ref 136–145)
WBC # BLD AUTO: 13.27 K/UL (ref 3.9–12.7)

## 2021-09-26 PROCEDURE — 97530 THERAPEUTIC ACTIVITIES: CPT

## 2021-09-26 PROCEDURE — 80048 BASIC METABOLIC PNL TOTAL CA: CPT | Performed by: NURSE PRACTITIONER

## 2021-09-26 PROCEDURE — 63600175 PHARM REV CODE 636 W HCPCS: Performed by: FAMILY MEDICINE

## 2021-09-26 PROCEDURE — 96376 TX/PRO/DX INJ SAME DRUG ADON: CPT | Performed by: EMERGENCY MEDICINE

## 2021-09-26 PROCEDURE — 96372 THER/PROPH/DIAG INJ SC/IM: CPT | Mod: 59 | Performed by: EMERGENCY MEDICINE

## 2021-09-26 PROCEDURE — 36415 COLL VENOUS BLD VENIPUNCTURE: CPT | Performed by: NURSE PRACTITIONER

## 2021-09-26 PROCEDURE — 94761 N-INVAS EAR/PLS OXIMETRY MLT: CPT

## 2021-09-26 PROCEDURE — 97167 OT EVAL HIGH COMPLEX 60 MIN: CPT

## 2021-09-26 PROCEDURE — 25000003 PHARM REV CODE 250: Performed by: FAMILY MEDICINE

## 2021-09-26 PROCEDURE — G0378 HOSPITAL OBSERVATION PER HR: HCPCS

## 2021-09-26 PROCEDURE — 63600175 PHARM REV CODE 636 W HCPCS: Performed by: NURSE PRACTITIONER

## 2021-09-26 PROCEDURE — 25000003 PHARM REV CODE 250: Performed by: NURSE PRACTITIONER

## 2021-09-26 PROCEDURE — 99214 OFFICE O/P EST MOD 30 MIN: CPT | Mod: ,,, | Performed by: PSYCHIATRY & NEUROLOGY

## 2021-09-26 PROCEDURE — 85025 COMPLETE CBC W/AUTO DIFF WBC: CPT | Performed by: NURSE PRACTITIONER

## 2021-09-26 PROCEDURE — 97110 THERAPEUTIC EXERCISES: CPT

## 2021-09-26 PROCEDURE — 99214 PR OFFICE/OUTPT VISIT, EST, LEVL IV, 30-39 MIN: ICD-10-PCS | Mod: ,,, | Performed by: PSYCHIATRY & NEUROLOGY

## 2021-09-26 RX ORDER — INSULIN ASPART 100 [IU]/ML
1-10 INJECTION, SOLUTION INTRAVENOUS; SUBCUTANEOUS
Status: DISCONTINUED | OUTPATIENT
Start: 2021-09-26 | End: 2021-09-27 | Stop reason: HOSPADM

## 2021-09-26 RX ADMIN — DIPHENHYDRAMINE HYDROCHLORIDE 25 MG: 25 CAPSULE ORAL at 04:09

## 2021-09-26 RX ADMIN — HYDROCODONE BITARTRATE AND ACETAMINOPHEN 1 TABLET: 5; 325 TABLET ORAL at 12:09

## 2021-09-26 RX ADMIN — TOPIRAMATE 50 MG: 25 TABLET, FILM COATED ORAL at 08:09

## 2021-09-26 RX ADMIN — DEXTROSE 1000 MG: 50 INJECTION, SOLUTION INTRAVENOUS at 04:09

## 2021-09-26 RX ADMIN — DIPHENHYDRAMINE HYDROCHLORIDE 25 MG: 25 CAPSULE ORAL at 09:09

## 2021-09-26 RX ADMIN — ENOXAPARIN SODIUM 40 MG: 40 INJECTION SUBCUTANEOUS at 12:09

## 2021-09-26 RX ADMIN — INSULIN ASPART 2 UNITS: 100 INJECTION, SOLUTION INTRAVENOUS; SUBCUTANEOUS at 05:09

## 2021-09-26 RX ADMIN — GABAPENTIN 600 MG: 300 CAPSULE ORAL at 04:09

## 2021-09-26 RX ADMIN — HYDROCODONE BITARTRATE AND ACETAMINOPHEN 1 TABLET: 5; 325 TABLET ORAL at 05:09

## 2021-09-26 RX ADMIN — ENOXAPARIN SODIUM 40 MG: 40 INJECTION SUBCUTANEOUS at 08:09

## 2021-09-26 RX ADMIN — ENOXAPARIN SODIUM 40 MG: 40 INJECTION SUBCUTANEOUS at 09:09

## 2021-09-26 RX ADMIN — METHOCARBAMOL 750 MG: 750 TABLET ORAL at 08:09

## 2021-09-26 RX ADMIN — HYDROCODONE BITARTRATE AND ACETAMINOPHEN 1 TABLET: 5; 325 TABLET ORAL at 11:09

## 2021-09-26 RX ADMIN — LOSARTAN POTASSIUM 50 MG: 50 TABLET, FILM COATED ORAL at 08:09

## 2021-09-26 RX ADMIN — DEXTROSE 1000 MG: 50 INJECTION, SOLUTION INTRAVENOUS at 09:09

## 2021-09-26 RX ADMIN — GABAPENTIN 900 MG: 300 CAPSULE ORAL at 08:09

## 2021-09-26 RX ADMIN — INSULIN ASPART 4 UNITS: 100 INJECTION, SOLUTION INTRAVENOUS; SUBCUTANEOUS at 12:09

## 2021-09-26 RX ADMIN — HYDROCODONE BITARTRATE AND ACETAMINOPHEN 1 TABLET: 5; 325 TABLET ORAL at 08:09

## 2021-09-26 RX ADMIN — GABAPENTIN 600 MG: 300 CAPSULE ORAL at 08:09

## 2021-09-26 RX ADMIN — TOPIRAMATE 100 MG: 25 TABLET, FILM COATED ORAL at 08:09

## 2021-09-26 RX ADMIN — DULOXETINE 60 MG: 30 CAPSULE, DELAYED RELEASE ORAL at 08:09

## 2021-09-27 ENCOUNTER — PATIENT MESSAGE (OUTPATIENT)
Dept: NEUROLOGY | Facility: CLINIC | Age: 41
End: 2021-09-27

## 2021-09-27 VITALS
OXYGEN SATURATION: 98 % | DIASTOLIC BLOOD PRESSURE: 86 MMHG | SYSTOLIC BLOOD PRESSURE: 139 MMHG | TEMPERATURE: 98 F | BODY MASS INDEX: 47.09 KG/M2 | HEIGHT: 66 IN | WEIGHT: 293 LBS | HEART RATE: 90 BPM | RESPIRATION RATE: 16 BRPM

## 2021-09-27 LAB
ANION GAP SERPL CALC-SCNC: 12 MMOL/L (ref 8–16)
BASOPHILS # BLD AUTO: 0.01 K/UL (ref 0–0.2)
BASOPHILS NFR BLD: 0.1 % (ref 0–1.9)
BUN SERPL-MCNC: 10 MG/DL (ref 6–20)
CALCIUM SERPL-MCNC: 9.3 MG/DL (ref 8.7–10.5)
CHLORIDE SERPL-SCNC: 107 MMOL/L (ref 95–110)
CO2 SERPL-SCNC: 20 MMOL/L (ref 23–29)
CREAT SERPL-MCNC: 0.8 MG/DL (ref 0.5–1.4)
DIFFERENTIAL METHOD: ABNORMAL
EOSINOPHIL # BLD AUTO: 0 K/UL (ref 0–0.5)
EOSINOPHIL NFR BLD: 0 % (ref 0–8)
ERYTHROCYTE [DISTWIDTH] IN BLOOD BY AUTOMATED COUNT: 16.7 % (ref 11.5–14.5)
EST. GFR  (AFRICAN AMERICAN): >60 ML/MIN/1.73 M^2
EST. GFR  (NON AFRICAN AMERICAN): >60 ML/MIN/1.73 M^2
GLUCOSE SERPL-MCNC: 135 MG/DL (ref 70–110)
HCT VFR BLD AUTO: 37.6 % (ref 37–48.5)
HGB BLD-MCNC: 11.4 G/DL (ref 12–16)
IMM GRANULOCYTES # BLD AUTO: 0.16 K/UL (ref 0–0.04)
IMM GRANULOCYTES NFR BLD AUTO: 1.1 % (ref 0–0.5)
LYMPHOCYTES # BLD AUTO: 0.8 K/UL (ref 1–4.8)
LYMPHOCYTES NFR BLD: 5.3 % (ref 18–48)
MCH RBC QN AUTO: 20.9 PG (ref 27–31)
MCHC RBC AUTO-ENTMCNC: 30.3 G/DL (ref 32–36)
MCV RBC AUTO: 69 FL (ref 82–98)
MONOCYTES # BLD AUTO: 0.3 K/UL (ref 0.3–1)
MONOCYTES NFR BLD: 1.8 % (ref 4–15)
NEUTROPHILS # BLD AUTO: 13.4 K/UL (ref 1.8–7.7)
NEUTROPHILS NFR BLD: 91.7 % (ref 38–73)
NRBC BLD-RTO: 0 /100 WBC
PLATELET # BLD AUTO: 227 K/UL (ref 150–450)
PMV BLD AUTO: 10.8 FL (ref 9.2–12.9)
POTASSIUM SERPL-SCNC: 3.4 MMOL/L (ref 3.5–5.1)
RBC # BLD AUTO: 5.46 M/UL (ref 4–5.4)
SODIUM SERPL-SCNC: 139 MMOL/L (ref 136–145)
WBC # BLD AUTO: 14.63 K/UL (ref 3.9–12.7)

## 2021-09-27 PROCEDURE — 63600175 PHARM REV CODE 636 W HCPCS: Performed by: FAMILY MEDICINE

## 2021-09-27 PROCEDURE — 25000003 PHARM REV CODE 250: Performed by: FAMILY MEDICINE

## 2021-09-27 PROCEDURE — 85025 COMPLETE CBC W/AUTO DIFF WBC: CPT | Performed by: NURSE PRACTITIONER

## 2021-09-27 PROCEDURE — 36415 COLL VENOUS BLD VENIPUNCTURE: CPT | Performed by: NURSE PRACTITIONER

## 2021-09-27 PROCEDURE — 80048 BASIC METABOLIC PNL TOTAL CA: CPT | Performed by: NURSE PRACTITIONER

## 2021-09-27 PROCEDURE — 25000003 PHARM REV CODE 250: Performed by: NURSE PRACTITIONER

## 2021-09-27 PROCEDURE — 96374 THER/PROPH/DIAG INJ IV PUSH: CPT | Mod: 59 | Performed by: EMERGENCY MEDICINE

## 2021-09-27 PROCEDURE — 96372 THER/PROPH/DIAG INJ SC/IM: CPT | Mod: 59 | Performed by: EMERGENCY MEDICINE

## 2021-09-27 PROCEDURE — 63600175 PHARM REV CODE 636 W HCPCS: Performed by: NURSE PRACTITIONER

## 2021-09-27 PROCEDURE — 96375 TX/PRO/DX INJ NEW DRUG ADDON: CPT | Performed by: EMERGENCY MEDICINE

## 2021-09-27 PROCEDURE — G0378 HOSPITAL OBSERVATION PER HR: HCPCS

## 2021-09-27 RX ORDER — HYDROCODONE BITARTRATE AND ACETAMINOPHEN 5; 325 MG/1; MG/1
1 TABLET ORAL EVERY 8 HOURS PRN
Qty: 9 TABLET | Refills: 0 | Status: SHIPPED | OUTPATIENT
Start: 2021-09-27 | End: 2021-09-30

## 2021-09-27 RX ORDER — POTASSIUM CHLORIDE 20 MEQ/1
20 TABLET, EXTENDED RELEASE ORAL ONCE
Status: COMPLETED | OUTPATIENT
Start: 2021-09-27 | End: 2021-09-27

## 2021-09-27 RX ADMIN — ENOXAPARIN SODIUM 40 MG: 40 INJECTION SUBCUTANEOUS at 08:09

## 2021-09-27 RX ADMIN — HYDROCODONE BITARTRATE AND ACETAMINOPHEN 1 TABLET: 5; 325 TABLET ORAL at 09:09

## 2021-09-27 RX ADMIN — LOSARTAN POTASSIUM 50 MG: 50 TABLET, FILM COATED ORAL at 08:09

## 2021-09-27 RX ADMIN — GABAPENTIN 600 MG: 300 CAPSULE ORAL at 08:09

## 2021-09-27 RX ADMIN — TOPIRAMATE 50 MG: 25 TABLET, FILM COATED ORAL at 08:09

## 2021-09-27 RX ADMIN — METHOCARBAMOL 750 MG: 750 TABLET ORAL at 08:09

## 2021-09-27 RX ADMIN — DULOXETINE 60 MG: 30 CAPSULE, DELAYED RELEASE ORAL at 08:09

## 2021-09-27 RX ADMIN — DIPHENHYDRAMINE HYDROCHLORIDE 25 MG: 25 CAPSULE ORAL at 09:09

## 2021-09-27 RX ADMIN — DEXTROSE 1000 MG: 50 INJECTION, SOLUTION INTRAVENOUS at 09:09

## 2021-09-27 RX ADMIN — HYDRALAZINE HYDROCHLORIDE 10 MG: 20 INJECTION INTRAMUSCULAR; INTRAVENOUS at 01:09

## 2021-09-27 RX ADMIN — ACETAMINOPHEN 650 MG: 325 TABLET ORAL at 03:09

## 2021-09-27 RX ADMIN — POTASSIUM CHLORIDE 20 MEQ: 1500 TABLET, EXTENDED RELEASE ORAL at 10:09

## 2021-09-29 ENCOUNTER — PATIENT MESSAGE (OUTPATIENT)
Dept: NEUROLOGY | Facility: CLINIC | Age: 41
End: 2021-09-29

## 2021-09-30 ENCOUNTER — PATIENT MESSAGE (OUTPATIENT)
Dept: NEUROLOGY | Facility: CLINIC | Age: 41
End: 2021-09-30

## 2021-10-01 ENCOUNTER — PATIENT MESSAGE (OUTPATIENT)
Dept: NEUROLOGY | Facility: CLINIC | Age: 41
End: 2021-10-01

## 2021-10-01 ENCOUNTER — PATIENT OUTREACH (OUTPATIENT)
Dept: ADMINISTRATIVE | Facility: HOSPITAL | Age: 41
End: 2021-10-01

## 2021-10-04 ENCOUNTER — IMMUNIZATION (OUTPATIENT)
Dept: INTERNAL MEDICINE | Facility: CLINIC | Age: 41
End: 2021-10-04
Payer: MEDICARE

## 2021-10-04 PROCEDURE — G0008 FLU VACCINE (QUAD) GREATER THAN OR EQUAL TO 3YO PRESERVATIVE FREE IM: ICD-10-PCS | Mod: HCNC,S$GLB,, | Performed by: FAMILY MEDICINE

## 2021-10-04 PROCEDURE — G0008 ADMIN INFLUENZA VIRUS VAC: HCPCS | Mod: HCNC,S$GLB,, | Performed by: FAMILY MEDICINE

## 2021-10-04 PROCEDURE — 90686 IIV4 VACC NO PRSV 0.5 ML IM: CPT | Mod: HCNC,S$GLB,, | Performed by: FAMILY MEDICINE

## 2021-10-04 PROCEDURE — 90686 FLU VACCINE (QUAD) GREATER THAN OR EQUAL TO 3YO PRESERVATIVE FREE IM: ICD-10-PCS | Mod: HCNC,S$GLB,, | Performed by: FAMILY MEDICINE

## 2021-10-11 ENCOUNTER — PATIENT MESSAGE (OUTPATIENT)
Dept: NEUROLOGY | Facility: CLINIC | Age: 41
End: 2021-10-11

## 2021-10-12 ENCOUNTER — TELEPHONE (OUTPATIENT)
Dept: REHABILITATION | Facility: HOSPITAL | Age: 41
End: 2021-10-12

## 2021-10-14 ENCOUNTER — PATIENT MESSAGE (OUTPATIENT)
Dept: NEUROLOGY | Facility: CLINIC | Age: 41
End: 2021-10-14
Payer: MEDICARE

## 2021-10-19 ENCOUNTER — PATIENT MESSAGE (OUTPATIENT)
Dept: INTERNAL MEDICINE | Facility: CLINIC | Age: 41
End: 2021-10-19
Payer: MEDICARE

## 2021-10-19 DIAGNOSIS — M79.2 NEUROPATHIC PAIN: ICD-10-CM

## 2021-10-19 RX ORDER — PROMETHAZINE HYDROCHLORIDE 25 MG/1
25 TABLET ORAL EVERY 4 HOURS
Qty: 60 TABLET | Refills: 0 | Status: SHIPPED | OUTPATIENT
Start: 2021-10-19 | End: 2022-06-20 | Stop reason: SDUPTHER

## 2021-10-19 RX ORDER — GABAPENTIN 300 MG/1
CAPSULE ORAL
Qty: 210 CAPSULE | Refills: 5 | Status: CANCELLED | OUTPATIENT
Start: 2021-10-19

## 2021-10-25 ENCOUNTER — PATIENT MESSAGE (OUTPATIENT)
Dept: NEUROLOGY | Facility: CLINIC | Age: 41
End: 2021-10-25
Payer: MEDICARE

## 2021-10-25 DIAGNOSIS — G35 MULTIPLE SCLEROSIS: Primary | ICD-10-CM

## 2021-10-25 DIAGNOSIS — F32.A DEPRESSION, UNSPECIFIED DEPRESSION TYPE: ICD-10-CM

## 2021-10-25 RX ORDER — CYCLOBENZAPRINE HCL 10 MG
TABLET ORAL
Qty: 90 TABLET | Refills: 1 | Status: SHIPPED | OUTPATIENT
Start: 2021-10-25 | End: 2022-01-03 | Stop reason: SDUPTHER

## 2021-10-27 ENCOUNTER — PATIENT MESSAGE (OUTPATIENT)
Dept: ADMINISTRATIVE | Facility: OTHER | Age: 41
End: 2021-10-27
Payer: MEDICARE

## 2021-11-03 ENCOUNTER — TELEPHONE (OUTPATIENT)
Dept: PLASTIC SURGERY | Facility: CLINIC | Age: 41
End: 2021-11-03
Payer: MEDICARE

## 2021-11-03 ENCOUNTER — PATIENT MESSAGE (OUTPATIENT)
Dept: INTERNAL MEDICINE | Facility: CLINIC | Age: 41
End: 2021-11-03
Payer: MEDICARE

## 2021-11-04 ENCOUNTER — PATIENT MESSAGE (OUTPATIENT)
Dept: INTERNAL MEDICINE | Facility: CLINIC | Age: 41
End: 2021-11-04
Payer: MEDICARE

## 2021-11-04 DIAGNOSIS — N62 MACROMASTIA: Primary | ICD-10-CM

## 2021-11-05 ENCOUNTER — PATIENT MESSAGE (OUTPATIENT)
Dept: PLASTIC SURGERY | Facility: CLINIC | Age: 41
End: 2021-11-05
Payer: MEDICARE

## 2021-11-12 ENCOUNTER — PATIENT MESSAGE (OUTPATIENT)
Dept: INTERNAL MEDICINE | Facility: CLINIC | Age: 41
End: 2021-11-12
Payer: MEDICARE

## 2021-11-12 RX ORDER — MUPIROCIN 20 MG/G
OINTMENT TOPICAL 2 TIMES DAILY
Qty: 30 G | Refills: 0 | Status: SHIPPED | OUTPATIENT
Start: 2021-11-12 | End: 2022-08-03 | Stop reason: SDUPTHER

## 2021-11-15 ENCOUNTER — TELEPHONE (OUTPATIENT)
Dept: NEUROLOGY | Facility: CLINIC | Age: 41
End: 2021-11-15
Payer: MEDICARE

## 2021-11-17 ENCOUNTER — OFFICE VISIT (OUTPATIENT)
Dept: NEUROLOGY | Facility: CLINIC | Age: 41
End: 2021-11-17
Payer: MEDICARE

## 2021-11-17 DIAGNOSIS — Z79.899 HIGH RISK MEDICATION USE: ICD-10-CM

## 2021-11-17 DIAGNOSIS — G35 MULTIPLE SCLEROSIS: Primary | ICD-10-CM

## 2021-11-17 PROCEDURE — 99215 OFFICE O/P EST HI 40 MIN: CPT | Mod: HCNC,95,, | Performed by: PSYCHIATRY & NEUROLOGY

## 2021-11-17 PROCEDURE — 99215 PR OFFICE/OUTPT VISIT, EST, LEVL V, 40-54 MIN: ICD-10-PCS | Mod: HCNC,95,, | Performed by: PSYCHIATRY & NEUROLOGY

## 2021-11-17 PROCEDURE — 4010F ACE/ARB THERAPY RXD/TAKEN: CPT | Mod: HCNC,CPTII,95, | Performed by: PSYCHIATRY & NEUROLOGY

## 2021-11-17 PROCEDURE — 4010F PR ACE/ARB THEARPY RXD/TAKEN: ICD-10-PCS | Mod: HCNC,CPTII,95, | Performed by: PSYCHIATRY & NEUROLOGY

## 2021-11-21 NOTE — TELEPHONE ENCOUNTER
----- Message from Lauryn Hall sent at 11/8/2017  2:48 PM CST -----  Please call pt back at [phone]. Returning call.  
Spoke with the patient and informed her that her lab was rescheduled.   
Normal

## 2021-11-30 ENCOUNTER — TELEPHONE (OUTPATIENT)
Dept: SURGERY | Facility: CLINIC | Age: 41
End: 2021-11-30
Payer: MEDICARE

## 2021-12-04 ENCOUNTER — PATIENT MESSAGE (OUTPATIENT)
Dept: INTERNAL MEDICINE | Facility: CLINIC | Age: 41
End: 2021-12-04
Payer: MEDICARE

## 2021-12-08 ENCOUNTER — OFFICE VISIT (OUTPATIENT)
Dept: INTERNAL MEDICINE | Facility: CLINIC | Age: 41
End: 2021-12-08
Payer: MEDICARE

## 2021-12-08 ENCOUNTER — HOSPITAL ENCOUNTER (OUTPATIENT)
Dept: RADIOLOGY | Facility: HOSPITAL | Age: 41
Discharge: HOME | End: 2021-12-08
Attending: FAMILY MEDICINE
Payer: MEDICARE

## 2021-12-08 VITALS
BODY MASS INDEX: 47.09 KG/M2 | SYSTOLIC BLOOD PRESSURE: 137 MMHG | WEIGHT: 293 LBS | OXYGEN SATURATION: 100 % | DIASTOLIC BLOOD PRESSURE: 72 MMHG | HEIGHT: 66 IN | TEMPERATURE: 99 F | HEART RATE: 80 BPM

## 2021-12-08 DIAGNOSIS — R07.89 LEFT-SIDED CHEST WALL PAIN: ICD-10-CM

## 2021-12-08 DIAGNOSIS — N64.4 ACUTE BREAST PAIN: ICD-10-CM

## 2021-12-08 DIAGNOSIS — N64.4 ACUTE BREAST PAIN: Primary | ICD-10-CM

## 2021-12-08 PROCEDURE — 99999 PR PBB SHADOW E&M-EST. PATIENT-LVL V: ICD-10-PCS | Mod: PBBFAC,HCNC,, | Performed by: FAMILY MEDICINE

## 2021-12-08 PROCEDURE — 99213 PR OFFICE/OUTPT VISIT, EST, LEVL III, 20-29 MIN: ICD-10-PCS | Mod: HCNC,S$GLB,, | Performed by: FAMILY MEDICINE

## 2021-12-08 PROCEDURE — 71101 X-RAY EXAM UNILAT RIBS/CHEST: CPT | Mod: TC,HCNC,PO,LT

## 2021-12-08 PROCEDURE — 4010F ACE/ARB THERAPY RXD/TAKEN: CPT | Mod: HCNC,CPTII,S$GLB, | Performed by: FAMILY MEDICINE

## 2021-12-08 PROCEDURE — 99213 OFFICE O/P EST LOW 20 MIN: CPT | Mod: HCNC,S$GLB,, | Performed by: FAMILY MEDICINE

## 2021-12-08 PROCEDURE — 4010F PR ACE/ARB THEARPY RXD/TAKEN: ICD-10-PCS | Mod: HCNC,CPTII,S$GLB, | Performed by: FAMILY MEDICINE

## 2021-12-08 PROCEDURE — 71101 XR RIBS MIN 3 VIEWS W/ PA CHEST LEFT: ICD-10-PCS | Mod: 26,HCNC,LT, | Performed by: RADIOLOGY

## 2021-12-08 PROCEDURE — 99999 PR PBB SHADOW E&M-EST. PATIENT-LVL V: CPT | Mod: PBBFAC,HCNC,, | Performed by: FAMILY MEDICINE

## 2021-12-08 PROCEDURE — 71101 X-RAY EXAM UNILAT RIBS/CHEST: CPT | Mod: 26,HCNC,LT, | Performed by: RADIOLOGY

## 2021-12-15 ENCOUNTER — TELEPHONE (OUTPATIENT)
Dept: SURGERY | Facility: CLINIC | Age: 41
End: 2021-12-15
Payer: MEDICARE

## 2021-12-21 ENCOUNTER — PATIENT MESSAGE (OUTPATIENT)
Dept: NEUROLOGY | Facility: CLINIC | Age: 41
End: 2021-12-21
Payer: MEDICARE

## 2021-12-22 ENCOUNTER — PATIENT MESSAGE (OUTPATIENT)
Dept: INTERNAL MEDICINE | Facility: CLINIC | Age: 41
End: 2021-12-22
Payer: MEDICARE

## 2021-12-23 ENCOUNTER — TELEPHONE (OUTPATIENT)
Dept: NEUROLOGY | Facility: CLINIC | Age: 41
End: 2021-12-23
Payer: MEDICARE

## 2021-12-23 ENCOUNTER — PATIENT MESSAGE (OUTPATIENT)
Dept: NEUROLOGY | Facility: CLINIC | Age: 41
End: 2021-12-23
Payer: MEDICARE

## 2021-12-29 ENCOUNTER — LAB VISIT (OUTPATIENT)
Dept: LAB | Facility: HOSPITAL | Age: 41
End: 2021-12-29
Attending: CLINICAL NURSE SPECIALIST
Payer: MEDICARE

## 2021-12-29 ENCOUNTER — OFFICE VISIT (OUTPATIENT)
Dept: SURGERY | Facility: CLINIC | Age: 41
End: 2021-12-29
Payer: MEDICARE

## 2021-12-29 VITALS
SYSTOLIC BLOOD PRESSURE: 163 MMHG | BODY MASS INDEX: 46.93 KG/M2 | HEART RATE: 70 BPM | WEIGHT: 290.81 LBS | TEMPERATURE: 98 F | DIASTOLIC BLOOD PRESSURE: 97 MMHG

## 2021-12-29 DIAGNOSIS — E66.01 MORBID OBESITY WITH BMI OF 45.0-49.9, ADULT: Primary | ICD-10-CM

## 2021-12-29 DIAGNOSIS — G35 MULTIPLE SCLEROSIS: ICD-10-CM

## 2021-12-29 DIAGNOSIS — I10 PRIMARY HYPERTENSION: ICD-10-CM

## 2021-12-29 LAB
BASOPHILS # BLD AUTO: 0.03 K/UL (ref 0–0.2)
BASOPHILS NFR BLD: 0.7 % (ref 0–1.9)
DIFFERENTIAL METHOD: ABNORMAL
EOSINOPHIL # BLD AUTO: 0.1 K/UL (ref 0–0.5)
EOSINOPHIL NFR BLD: 2.4 % (ref 0–8)
ERYTHROCYTE [DISTWIDTH] IN BLOOD BY AUTOMATED COUNT: 16.6 % (ref 11.5–14.5)
HCT VFR BLD AUTO: 39 % (ref 37–48.5)
HGB BLD-MCNC: 11.4 G/DL (ref 12–16)
IMM GRANULOCYTES # BLD AUTO: 0.01 K/UL (ref 0–0.04)
IMM GRANULOCYTES NFR BLD AUTO: 0.2 % (ref 0–0.5)
LYMPHOCYTES # BLD AUTO: 1.7 K/UL (ref 1–4.8)
LYMPHOCYTES NFR BLD: 40.9 % (ref 18–48)
MCH RBC QN AUTO: 21.2 PG (ref 27–31)
MCHC RBC AUTO-ENTMCNC: 29.2 G/DL (ref 32–36)
MCV RBC AUTO: 73 FL (ref 82–98)
MONOCYTES # BLD AUTO: 0.3 K/UL (ref 0.3–1)
MONOCYTES NFR BLD: 8 % (ref 4–15)
NEUTROPHILS # BLD AUTO: 2 K/UL (ref 1.8–7.7)
NEUTROPHILS NFR BLD: 47.8 % (ref 38–73)
NRBC BLD-RTO: 0 /100 WBC
PLATELET # BLD AUTO: 281 K/UL (ref 150–450)
PMV BLD AUTO: 11.2 FL (ref 9.2–12.9)
RBC # BLD AUTO: 5.37 M/UL (ref 4–5.4)
WBC # BLD AUTO: 4.11 K/UL (ref 3.9–12.7)

## 2021-12-29 PROCEDURE — 36415 COLL VENOUS BLD VENIPUNCTURE: CPT | Mod: HCNC | Performed by: CLINICAL NURSE SPECIALIST

## 2021-12-29 PROCEDURE — 86704 HEP B CORE ANTIBODY TOTAL: CPT | Mod: HCNC | Performed by: CLINICAL NURSE SPECIALIST

## 2021-12-29 PROCEDURE — 1160F PR REVIEW ALL MEDS BY PRESCRIBER/CLIN PHARMACIST DOCUMENTED: ICD-10-PCS | Mod: HCNC,CPTII,S$GLB, | Performed by: SURGERY

## 2021-12-29 PROCEDURE — 3080F PR MOST RECENT DIASTOLIC BLOOD PRESSURE >= 90 MM HG: ICD-10-PCS | Mod: HCNC,CPTII,S$GLB, | Performed by: SURGERY

## 2021-12-29 PROCEDURE — 85025 COMPLETE CBC W/AUTO DIFF WBC: CPT | Mod: HCNC | Performed by: CLINICAL NURSE SPECIALIST

## 2021-12-29 PROCEDURE — 1159F MED LIST DOCD IN RCRD: CPT | Mod: HCNC,CPTII,S$GLB, | Performed by: SURGERY

## 2021-12-29 PROCEDURE — 99214 PR OFFICE/OUTPT VISIT, EST, LEVL IV, 30-39 MIN: ICD-10-PCS | Mod: HCNC,S$GLB,, | Performed by: SURGERY

## 2021-12-29 PROCEDURE — 99214 OFFICE O/P EST MOD 30 MIN: CPT | Mod: HCNC,S$GLB,, | Performed by: SURGERY

## 2021-12-29 PROCEDURE — 99999 PR PBB SHADOW E&M-EST. PATIENT-LVL IV: ICD-10-PCS | Mod: PBBFAC,HCNC,, | Performed by: SURGERY

## 2021-12-29 PROCEDURE — 87340 HEPATITIS B SURFACE AG IA: CPT | Mod: HCNC | Performed by: CLINICAL NURSE SPECIALIST

## 2021-12-29 PROCEDURE — 86706 HEP B SURFACE ANTIBODY: CPT | Mod: HCNC | Performed by: CLINICAL NURSE SPECIALIST

## 2021-12-29 PROCEDURE — 3077F PR MOST RECENT SYSTOLIC BLOOD PRESSURE >= 140 MM HG: ICD-10-PCS | Mod: HCNC,CPTII,S$GLB, | Performed by: SURGERY

## 2021-12-29 PROCEDURE — 88185 FLOWCYTOMETRY/TC ADD-ON: CPT | Mod: HCNC | Performed by: CLINICAL NURSE SPECIALIST

## 2021-12-29 PROCEDURE — 1159F PR MEDICATION LIST DOCUMENTED IN MEDICAL RECORD: ICD-10-PCS | Mod: HCNC,CPTII,S$GLB, | Performed by: SURGERY

## 2021-12-29 PROCEDURE — 3077F SYST BP >= 140 MM HG: CPT | Mod: HCNC,CPTII,S$GLB, | Performed by: SURGERY

## 2021-12-29 PROCEDURE — 3008F BODY MASS INDEX DOCD: CPT | Mod: HCNC,CPTII,S$GLB, | Performed by: SURGERY

## 2021-12-29 PROCEDURE — 3044F HG A1C LEVEL LT 7.0%: CPT | Mod: HCNC,CPTII,S$GLB, | Performed by: SURGERY

## 2021-12-29 PROCEDURE — 1160F RVW MEDS BY RX/DR IN RCRD: CPT | Mod: HCNC,CPTII,S$GLB, | Performed by: SURGERY

## 2021-12-29 PROCEDURE — 3044F PR MOST RECENT HEMOGLOBIN A1C LEVEL <7.0%: ICD-10-PCS | Mod: HCNC,CPTII,S$GLB, | Performed by: SURGERY

## 2021-12-29 PROCEDURE — 99999 PR PBB SHADOW E&M-EST. PATIENT-LVL IV: CPT | Mod: PBBFAC,HCNC,, | Performed by: SURGERY

## 2021-12-29 PROCEDURE — 4010F ACE/ARB THERAPY RXD/TAKEN: CPT | Mod: HCNC,CPTII,S$GLB, | Performed by: SURGERY

## 2021-12-29 PROCEDURE — 3008F PR BODY MASS INDEX (BMI) DOCUMENTED: ICD-10-PCS | Mod: HCNC,CPTII,S$GLB, | Performed by: SURGERY

## 2021-12-29 PROCEDURE — 3080F DIAST BP >= 90 MM HG: CPT | Mod: HCNC,CPTII,S$GLB, | Performed by: SURGERY

## 2021-12-29 PROCEDURE — 4010F PR ACE/ARB THEARPY RXD/TAKEN: ICD-10-PCS | Mod: HCNC,CPTII,S$GLB, | Performed by: SURGERY

## 2021-12-30 LAB
HBV CORE AB SERPL QL IA: NEGATIVE
HBV SURFACE AB SER-ACNC: POSITIVE M[IU]/ML
HBV SURFACE AG SERPL QL IA: NEGATIVE

## 2021-12-31 ENCOUNTER — PATIENT MESSAGE (OUTPATIENT)
Dept: NEUROLOGY | Facility: CLINIC | Age: 41
End: 2021-12-31
Payer: MEDICARE

## 2021-12-31 LAB
PATH REPORT.FINAL DX SPEC: NORMAL
RITUXAN SENSITIVITY (CD20): NORMAL

## 2022-01-03 ENCOUNTER — PATIENT MESSAGE (OUTPATIENT)
Dept: INTERNAL MEDICINE | Facility: CLINIC | Age: 42
End: 2022-01-03
Payer: MEDICARE

## 2022-01-03 DIAGNOSIS — I10 ESSENTIAL HYPERTENSION: ICD-10-CM

## 2022-01-03 RX ORDER — MUPIROCIN 20 MG/G
OINTMENT TOPICAL 3 TIMES DAILY
Qty: 30 G | Refills: 6 | Status: ON HOLD | OUTPATIENT
Start: 2022-01-03 | End: 2022-05-17 | Stop reason: HOSPADM

## 2022-01-03 RX ORDER — VALSARTAN 80 MG/1
80 TABLET ORAL DAILY
Qty: 90 TABLET | Refills: 3 | Status: SHIPPED | OUTPATIENT
Start: 2022-01-03 | End: 2022-03-17

## 2022-01-03 RX ORDER — DOXEPIN HYDROCHLORIDE 75 MG/1
75 CAPSULE ORAL NIGHTLY
Qty: 90 CAPSULE | Refills: 3 | Status: SHIPPED | OUTPATIENT
Start: 2022-01-03 | End: 2022-12-05

## 2022-01-03 NOTE — TELEPHONE ENCOUNTER
No new care gaps identified.  Powered by [employer]. Reference number: [id number].   1/03/2022 4:16:38 PM CST

## 2022-01-06 ENCOUNTER — PATIENT MESSAGE (OUTPATIENT)
Dept: NEUROLOGY | Facility: CLINIC | Age: 42
End: 2022-01-06
Payer: MEDICARE

## 2022-01-06 NOTE — TELEPHONE ENCOUNTER
Sent email to preservice requesting auth be worked for administration costs as pt is on free drug.    Sent Teams to [facility] [place] [facility] requesting patient be scheduled for maintenance Ocrevus

## 2022-01-07 ENCOUNTER — PATIENT MESSAGE (OUTPATIENT)
Dept: NEUROLOGY | Facility: CLINIC | Age: 42
End: 2022-01-07
Payer: MEDICARE

## 2022-01-10 ENCOUNTER — PATIENT MESSAGE (OUTPATIENT)
Dept: NEUROLOGY | Facility: CLINIC | Age: 42
End: 2022-01-10
Payer: MEDICARE

## 2022-01-11 ENCOUNTER — PATIENT MESSAGE (OUTPATIENT)
Dept: INFUSION THERAPY | Facility: HOSPITAL | Age: 42
End: 2022-01-11
Payer: MEDICARE

## 2022-01-11 ENCOUNTER — TELEPHONE (OUTPATIENT)
Dept: INFUSION THERAPY | Facility: HOSPITAL | Age: 42
End: 2022-01-11
Payer: MEDICARE

## 2022-01-11 NOTE — TELEPHONE ENCOUNTER
----- Message from Patricia Cisneros sent at 1/10/2022  4:05 PM CST -----  Contact: Patient [phone]  Good Afternoon  Patient is calling to reschedule appointment for 6 am on the same date    Please call and advise

## 2022-01-11 NOTE — TELEPHONE ENCOUNTER
Spoke to patient  Explained 830 was earliest appt available. Patient agreed to time offered at 830.

## 2022-01-12 ENCOUNTER — TELEPHONE (OUTPATIENT)
Dept: INTERNAL MEDICINE | Facility: CLINIC | Age: 42
End: 2022-01-12
Payer: MEDICARE

## 2022-01-12 ENCOUNTER — PATIENT MESSAGE (OUTPATIENT)
Dept: NEUROLOGY | Facility: CLINIC | Age: 42
End: 2022-01-12
Payer: MEDICARE

## 2022-01-12 NOTE — TELEPHONE ENCOUNTER
Called patient to schedule virtual visit with Dr. Dumont to have transportation paperwork completed. Pt did not answer. Left vm to return call to clinic to schedule appt.

## 2022-01-14 ENCOUNTER — PATIENT MESSAGE (OUTPATIENT)
Dept: NEUROLOGY | Facility: CLINIC | Age: 42
End: 2022-01-14
Payer: MEDICARE

## 2022-01-14 ENCOUNTER — PATIENT MESSAGE (OUTPATIENT)
Dept: INTERNAL MEDICINE | Facility: CLINIC | Age: 42
End: 2022-01-14

## 2022-01-24 ENCOUNTER — PATIENT MESSAGE (OUTPATIENT)
Dept: NEUROLOGY | Facility: CLINIC | Age: 42
End: 2022-01-24
Payer: MEDICARE

## 2022-01-31 ENCOUNTER — INFUSION (OUTPATIENT)
Dept: INFUSION THERAPY | Facility: HOSPITAL | Age: 42
End: 2022-01-31
Attending: PSYCHIATRY & NEUROLOGY
Payer: MEDICARE

## 2022-01-31 VITALS
TEMPERATURE: 98 F | OXYGEN SATURATION: 100 % | SYSTOLIC BLOOD PRESSURE: 151 MMHG | HEART RATE: 83 BPM | RESPIRATION RATE: 18 BRPM | DIASTOLIC BLOOD PRESSURE: 89 MMHG

## 2022-01-31 DIAGNOSIS — G35 MULTIPLE SCLEROSIS: Primary | ICD-10-CM

## 2022-01-31 PROCEDURE — 96365 THER/PROPH/DIAG IV INF INIT: CPT | Mod: HCNC

## 2022-01-31 PROCEDURE — 99457 PR MONITORING, PHYSIOL PARAM, REMOTE, 1ST 20 MINS, PER MONTH: ICD-10-PCS | Mod: S$GLB,,, | Performed by: FAMILY MEDICINE

## 2022-01-31 PROCEDURE — 96366 THER/PROPH/DIAG IV INF ADDON: CPT | Mod: HCNC

## 2022-01-31 PROCEDURE — 63600175 PHARM REV CODE 636 W HCPCS: Mod: JG,HCNC | Performed by: PSYCHIATRY & NEUROLOGY

## 2022-01-31 PROCEDURE — 25000003 PHARM REV CODE 250: Mod: HCNC | Performed by: PSYCHIATRY & NEUROLOGY

## 2022-01-31 PROCEDURE — 96375 TX/PRO/DX INJ NEW DRUG ADDON: CPT | Mod: HCNC

## 2022-01-31 PROCEDURE — 99457 RPM TX MGMT 1ST 20 MIN: CPT | Mod: S$GLB,,, | Performed by: FAMILY MEDICINE

## 2022-01-31 PROCEDURE — 96367 TX/PROPH/DG ADDL SEQ IV INF: CPT | Mod: HCNC

## 2022-01-31 RX ORDER — METHYLPREDNISOLONE SOD SUCC 125 MG
100 VIAL (EA) INJECTION
Status: CANCELLED
Start: 2022-05-30 | End: 2022-05-30

## 2022-01-31 RX ORDER — FAMOTIDINE 10 MG/ML
20 INJECTION INTRAVENOUS
Status: CANCELLED | OUTPATIENT
Start: 2022-05-30

## 2022-01-31 RX ORDER — METHYLPREDNISOLONE SOD SUCC 125 MG
100 VIAL (EA) INJECTION
Status: COMPLETED | OUTPATIENT
Start: 2022-01-31 | End: 2022-01-31

## 2022-01-31 RX ORDER — DIPHENHYDRAMINE HYDROCHLORIDE 50 MG/ML
50 INJECTION INTRAMUSCULAR; INTRAVENOUS
Status: CANCELLED | OUTPATIENT
Start: 2022-05-30

## 2022-01-31 RX ORDER — EPINEPHRINE 0.3 MG/.3ML
0.3 INJECTION SUBCUTANEOUS
Status: CANCELLED | OUTPATIENT
Start: 2022-05-30

## 2022-01-31 RX ORDER — FAMOTIDINE 10 MG/ML
20 INJECTION INTRAVENOUS
Status: COMPLETED | OUTPATIENT
Start: 2022-01-31 | End: 2022-01-31

## 2022-01-31 RX ORDER — ACETAMINOPHEN 500 MG
1000 TABLET ORAL
Status: CANCELLED | OUTPATIENT
Start: 2022-05-30

## 2022-01-31 RX ORDER — ACETAMINOPHEN 500 MG
1000 TABLET ORAL
Status: COMPLETED | OUTPATIENT
Start: 2022-01-31 | End: 2022-01-31

## 2022-01-31 RX ORDER — HEPARIN 100 UNIT/ML
500 SYRINGE INTRAVENOUS
Status: CANCELLED | OUTPATIENT
Start: 2022-05-30

## 2022-01-31 RX ORDER — SODIUM CHLORIDE 0.9 % (FLUSH) 0.9 %
10 SYRINGE (ML) INJECTION
Status: CANCELLED | OUTPATIENT
Start: 2022-05-30

## 2022-01-31 RX ADMIN — OCRELIZUMAB 600 MG: 300 INJECTION INTRAVENOUS at 09:01

## 2022-01-31 RX ADMIN — DIPHENHYDRAMINE HYDROCHLORIDE 50 MG: 50 INJECTION, SOLUTION INTRAMUSCULAR; INTRAVENOUS at 08:01

## 2022-01-31 RX ADMIN — FAMOTIDINE 20 MG: 10 INJECTION INTRAVENOUS at 09:01

## 2022-01-31 RX ADMIN — METHYLPREDNISOLONE SODIUM SUCCINATE 100 MG: 125 INJECTION, POWDER, FOR SOLUTION INTRAMUSCULAR; INTRAVENOUS at 09:01

## 2022-01-31 RX ADMIN — ACETAMINOPHEN 1000 MG: 500 TABLET ORAL at 08:01

## 2022-01-31 NOTE — DISCHARGE INSTRUCTIONS
WAYS TO HELP PREVENT INFECTION         WASH YOUR HANDS OFTEN DURING THE DAY, ESPECIALLY BEFORE YOU EAT, AFTER USING THE BATHROOM, AND AFTER TOUCHING ANIMALS     STAY AWAY FROM PEOPLE WHO HAVE ILLNESSES YOU CAN CATCH; SUCH AS COLDS, FLU, CHICKEN POX     TRY TO AVOID CROWDS     STAY AWAY FROM CHILDREN WHO RECENTLY HAVE RECEIVED LIVE VIRUS VACCINES     MAINTAIN GOOD MOUTH CARE     DO NOT SQUEEZE OR SCRATCH PIMPLES     CLEAN CUTS & SCRAPES RIGHT AWAY AND DAILY UNTIL HEALED WITH WARM WATER, SOAP & AN ANTISEPTIC     AVOID CONTACT WITH LITTER BOXES, BIRD CAGES, & FISH TANKS     AVOID STANDING WATER, IE., BIRD BATHS, FLOWER POTS/VASES, OR HUMIDIFIERS     WEAR GLOVES WHEN GARDENING OR CLEANING UP AFTER OTHERS, ESPECIALLY BABIES & SMALL CHILDREN     DO NOT EAT RAW FISH, SEAFOOD, MEAT, OR EGGS      FALL PREVENTION   Falls often occur due to slipping, tripping or losing your balance. Here are ways to reduce your risk of falling again.   Was there anything that caused your fall that can be fixed, removed or replaced?   Make your home safe by keeping walkways clear of objects you may trip over.   Use non-slip pads under rugs.   Do not walk in poorly lit areas.   Do not stand on chairs or wobbly ladders.   Use caution when reaching overhead or looking upward. This position can cause a loss of balance.   Be sure your shoes fit properly, have non-slip bottoms and are in good condition.   Be cautious when going up and down stairs, curbs, and when walking on uneven sidewalks.   If your balance is poor, consider using a cane or walker.   If your fall was related to alcohol use, stop or limit alcohol intake.   If your fall was related to use of sleeping medicines, talk to your doctor about this. You may need to reduce your dosage at bedtime if you awaken during the night to go to the bathroom.   To reduce the need for nighttime bathroom trips:   Avoid drinking fluids for several hours before going to bed   Empty your  bladder before going to bed   Men can keep a urinal at the bedside   © 8710-2416 [employer], [address] [place] [address], [place] [address]. All rights reserved. This information is not intended as a substitute for professional medical care. Always follow your healthcare professional's instructions.

## 2022-01-31 NOTE — PLAN OF CARE
Patient tolerated Ocrevus well today; no adverse reaction noted.  IV discontinued with catheter intact and pressure dressing applied to the site.  Has f/u appt(s) scheduled per MD request.  No questions or concerns voiced.  NAD noted upon discharge.

## 2022-01-31 NOTE — NURSING
Infusion # 4 - Ocrevus 600 mg q 6m  Last dose- 7/21    Any:  -recent illness, infection, or antibiotic use in past week- denied  -open wounds or mouth sores- denied  -invasive procedures or surgeries in past 4 weeks or in upcoming 4 weeks- denied  -vaccinations in past week- denied  -any new symptoms/change in symptoms-denied  -chance you may be pregnant- n/a      Recent labs? 12/29/2021  Last neurology provider visit- Seen by Dr Pereira on 11/17/2021     Premeds-Tylenol, Solu-medrol, Pepcid     Ocrevus 600 mg administered IV at a titirated rate per orders; see MAR and vitals for more details.

## 2022-02-03 ENCOUNTER — PATIENT MESSAGE (OUTPATIENT)
Dept: NEUROLOGY | Facility: CLINIC | Age: 42
End: 2022-02-03
Payer: MEDICARE

## 2022-02-03 NOTE — PROGRESS NOTES
Subjective:          Patient ID: Alicia Soliz is a 41 y.o. female who presents today for a routine virtual visit for MS.        The patient location is: her home   The chief complaint leading to consultation is: MS     Visit type: audiovisual    Face to Face time with patient: 28 minutes   35 minutes of total time spent on the encounter, which includes face to face time and non-face to face time preparing to see the patient (eg, review of tests), Obtaining and/or reviewing separately obtained history, Documenting clinical information in the electronic or other health record, Independently interpreting results (not separately reported) and communicating results to the patient/family/caregiver, or Care coordination (not separately reported).       Each patient to whom he or she provides medical services by telemedicine is:  (1) informed of the relationship between the physician and patient and the respective role of any other health care provider with respect to management of the patient; and (2) notified that he or she may decline to receive medical services by telemedicine and may withdraw from such care at any time.      MS HPI:  · DMT: ocrelizumab last received on 1/31/22;  due again in late July   · Side effects from DMT? No   · Taking vitamin D3 as recommended? Yes -  Daily gummies   ·  Since the last visit, she has been doing well. She is on a diet. She is eating a lot of vegetables.   · She does not think Linzess is working for her bowels. She is only having a BM once a week (if that). She has to strain to empty, and this is painful. She tries to drink a lot of water (with Crystal light).   · She denies any new MS symptoms. She has been feeling stable. Her stress level and blood pressure have been lower.   · She has a good support system at home. Her daughter was just accepted to [employer].   · She exercises at home daily.   · Her energy level has been good. She has been able to control her balance  better. She is able to do a lot of things on her own that she couldn't do before (like make her bed).   · She has trouble focusing with her vision. She needs to make an appt to see her ophthalmologist.   · She denies any recent falls.     Medications:  Current Outpatient Medications   Medication Sig    cyclobenzaprine (FLEXERIL) 10 MG tablet TAKE 1 TABLET BY MOUTH THREE TIMES A DAY AS NEEDED FOR MUSCLE SPASMS    diclofenac sodium (VOLTAREN) 1 % Gel APPLY 2 GRAMS TO AFFECTED AREA 4 TIMES A DAY    doxepin (SINEQUAN) 75 MG capsule Take 1 capsule (75 mg total) by mouth every evening.    DULoxetine (CYMBALTA) 60 MG capsule Take 1 capsule (60 mg total) by mouth once daily.    esomeprazole (NEXIUM) 40 MG capsule TAKE 1 CAPSULE (40 MG TOTAL) BY MOUTH BEFORE BREAKFAST.    gabapentin (NEURONTIN) 300 MG capsule 600mg at morning and afternoon. 900mg at night.    lactulose (CHRONULAC) 20 gram/30 mL Soln Take 15 mLs (10 g total) by mouth 3 (three) times daily. (Patient taking differently: Take 10 g by mouth 3 (three) times daily as needed.)    LIDOcaine-prilocaine (EMLA) cream     linaCLOtide (LINZESS) 145 mcg Cap capsule Take 1 capsule (145 mcg total) by mouth before breakfast.    mupirocin (BACTROBAN) 2 % ointment Apply topically 2 (two) times daily.    mupirocin (BACTROBAN) 2 % ointment Apply topically 3 (three) times daily.    ocrelizumab (OCREVUS IV) Inject into the vein.    promethazine (PHENERGAN) 25 MG tablet Take 1 tablet (25 mg total) by mouth every 4 (four) hours.    sumatriptan (IMITREX) 100 MG tablet TAKE 1 TAB BY MOUTH AT ONSET OF HEADACHE MAY TAKE 2ND TAB 2 HOURS LATER IF NOT RELIEF MAX 2 TABSA DAY OR 6 TABS PER WEEK    topiramate (TOPAMAX) 50 MG tablet TAKE 1 TABLET BY MOUTH IN THE MORNING AND 2 TABLETS AT BEDTIME    triamcinolone acetonide 0.1% (KENALOG) 0.1 % ointment Apply topically 2 (two) times daily.    valsartan (DIOVAN) 80 MG tablet Take 1 tablet (80 mg total) by mouth once daily.  (replaces hydrochlorothiazide for BP)     SOCIAL HISTORY  Social History     Tobacco Use    Smoking status: Never Smoker    Smokeless tobacco: Never Used   Substance Use Topics    Alcohol use: Yes     Comment: once a year     Drug use: Never       Living arrangements - the patient lives with their family.           Objective:        1. 25 foot timed walk:  Timed 25 Foot Walk: 11/3/2020   Did patient wear an AFO? No   Was assistive device used? Yes   Assistive device used (milton one): Unilateral Assistance   Unilateral device used Cane   Time for 25 Foot Walk (seconds) 38.83   Time for 25 Foot Walk (seconds) 46.5     Neurologic Exam    Deferred   Imaging:     Results for orders placed during the hospital encounter of 10/01/20    Narrative & Impression  EXAMINATION:  MRI BRAIN W WO CONTRAST     CLINICAL HISTORY:  Multiple sclerosis, new event;     TECHNIQUE:  Multiplanar multisequence MR imaging of the brain was performed before and after the administration of 10 mL Gadavist intravenous contrast.     COMPARISON:  Multiple prior examinations     FINDINGS:  Similar findings to prior examination with subcortical and periventricular FLAIR hyperintensities.  No evidence for restricted diffusion to suggest acute infarct or active plaque.  Pituitary fossa is grossly unremarkable.  Tiera midbrain posterior fossa is within normal limits.  No evidence for hemorrhage.  No evidence for abnormal enhancement or active plaque.  Overall similar findings to prior exam.  Mild fluid in the optic sheath noted bilaterally particularly at the level of the a optic nerve adjacent to the globe.     Impression:     Similar findings to prior exam with moderate confluent periventricular and subcortical FLAIR hyperintensities with no evidence for restricted diffusion or abnormal enhancement to suggest active plaque.        Electronically signed by: Jeronimo Canela  Date:                                            09/24/2021  Time:                                            19:44  Results for orders placed during the hospital encounter of 09/24/21    MRI Cervical Spine Demyelinating W W/O Contrast    Impression  Stable mild multilevel degenerative disc disease    No evidence for multiple sclerosis    No definite change.      Electronically signed by: Jeronimo Canela  Date:    09/25/2021  Time:    22:39    Results for orders placed during the hospital encounter of 09/24/21    MRI Thoracic Spine Demyelinating W W/O Contrast    Impression  Negative MRI of the thoracic spine without evidence for abnormal cord signal or significant degenerative joint disease.  No MRI evidence of thoracic multiple sclerosis involvement with no evidence for abnormal T2 or FLAIR signal.      Electronically signed by: Jeronimo Canela  Date:    09/25/2021  Time:    22:09        Labs:     Lab Results   Component Value Date    RIEGLFRM32HL 34 07/30/2021    DTSAETMR09AR 32 01/10/2020    OGJIAVQY07WR 11 (L) 01/30/2015     Lab Results   Component Value Date    JCVINDEX 1.67 (A) 04/19/2018    JCVANTIBODY Positive (A) 04/19/2018     Lab Results   Component Value Date    WBC 4.11 12/29/2021    RBC 5.37 12/29/2021    HGB 11.4 (L) 12/29/2021    HCT 39.0 12/29/2021    MCV 73 (L) 12/29/2021    MCH 21.2 (L) 12/29/2021    MCHC 29.2 (L) 12/29/2021    RDW 16.6 (H) 12/29/2021     12/29/2021    MPV 11.2 12/29/2021    GRAN 2.0 12/29/2021    GRAN 47.8 12/29/2021    LYMPH 1.7 12/29/2021    LYMPH 40.9 12/29/2021    MONO 0.3 12/29/2021    MONO 8.0 12/29/2021    EOS 0.1 12/29/2021    BASO 0.03 12/29/2021    EOSINOPHIL 2.4 12/29/2021    BASOPHIL 0.7 12/29/2021     Sodium   Date Value Ref Range Status   09/27/2021 139 136 - 145 mmol/L Final     Potassium   Date Value Ref Range Status   09/27/2021 3.4 (L) 3.5 - 5.1 mmol/L Final     Chloride   Date Value Ref Range Status   09/27/2021 107 95 - 110 mmol/L Final     CO2   Date Value Ref Range Status   09/27/2021 20 (L) 23 - 29 mmol/L Final     Glucose   Date Value  Ref Range Status   09/27/2021 135 (H) 70 - 110 mg/dL Final     BUN   Date Value Ref Range Status   09/27/2021 10 6 - 20 mg/dL Final     Creatinine   Date Value Ref Range Status   09/27/2021 0.8 0.5 - 1.4 mg/dL Final     Calcium   Date Value Ref Range Status   09/27/2021 9.3 8.7 - 10.5 mg/dL Final     Total Protein   Date Value Ref Range Status   09/24/2021 7.2 6.0 - 8.4 g/dL Final     Albumin   Date Value Ref Range Status   09/24/2021 3.5 3.5 - 5.2 g/dL Final     Total Bilirubin   Date Value Ref Range Status   09/24/2021 0.4 0.1 - 1.0 mg/dL Final     Comment:     For infants and newborns, interpretation of results should be based  on gestational age, weight and in agreement with clinical  observations.    Premature Infant recommended reference ranges:  Up to 24 hours.............<8.0 mg/dL  Up to 48 hours............<12.0 mg/dL  3-5 days..................<15.0 mg/dL  6-29 days.................<15.0 mg/dL       Alkaline Phosphatase   Date Value Ref Range Status   09/24/2021 84 55 - 135 U/L Final     AST   Date Value Ref Range Status   09/24/2021 18 10 - 40 U/L Final     ALT   Date Value Ref Range Status   09/24/2021 13 10 - 44 U/L Final     Anion Gap   Date Value Ref Range Status   09/27/2021 12 8 - 16 mmol/L Final     eGFR if    Date Value Ref Range Status   09/27/2021 >60 >60 mL/min/1.73 m^2 Final     eGFR if non    Date Value Ref Range Status   09/27/2021 >60 >60 mL/min/1.73 m^2 Final     Comment:     Calculation used to obtain the estimated glomerular filtration  rate (eGFR) is the CKD-EPI equation.        Lab Results   Component Value Date    HEPBSAG Negative 12/29/2021    HEPBSAB Positive 12/29/2021    HEPBCAB Negative 12/29/2021           MS Impression and Plan:     NEURO MULTIPLE SCLEROSIS IMPRESSION:   MS Status:     Clinical Progression:  Clinically Stable  Plan:     DMT:  No change in management    DMT comment:  Continue Ocrevus. Her next infusion is due in July. We will  plan for safety labs in June. We briefly discussed Hiwot today as COVID preventative. She is aware of the risks associated with immunosuppressant therapy, including increased risk of infection.       Symptom Management:  Implement change in symptom management    Implement Change in Symptom Management:  Bowel (Discussed use of a daily stool softener. She is currently taking one from [employer], and I suggested she try switching brands for better efficacy. )    She will follow up with Dr. Pereira in June and have Ocrevus labs done the same day.     HAMIDA Vizcarra, CNS

## 2022-02-04 ENCOUNTER — TELEPHONE (OUTPATIENT)
Dept: NEUROLOGY | Facility: CLINIC | Age: 42
End: 2022-02-04

## 2022-02-04 ENCOUNTER — OFFICE VISIT (OUTPATIENT)
Dept: NEUROLOGY | Facility: CLINIC | Age: 42
End: 2022-02-04
Payer: MEDICARE

## 2022-02-04 ENCOUNTER — PATIENT MESSAGE (OUTPATIENT)
Dept: NEUROLOGY | Facility: CLINIC | Age: 42
End: 2022-02-04

## 2022-02-04 DIAGNOSIS — Z79.899 HIGH RISK MEDICATION USE: ICD-10-CM

## 2022-02-04 DIAGNOSIS — Z29.89 PROPHYLACTIC IMMUNOTHERAPY: ICD-10-CM

## 2022-02-04 DIAGNOSIS — Z71.89 COUNSELING REGARDING GOALS OF CARE: ICD-10-CM

## 2022-02-04 DIAGNOSIS — G35 MULTIPLE SCLEROSIS: Primary | ICD-10-CM

## 2022-02-04 PROCEDURE — 99214 PR OFFICE/OUTPT VISIT, EST, LEVL IV, 30-39 MIN: ICD-10-PCS | Mod: HCNC,95,, | Performed by: CLINICAL NURSE SPECIALIST

## 2022-02-04 PROCEDURE — 1159F MED LIST DOCD IN RCRD: CPT | Mod: HCNC,CPTII,95, | Performed by: CLINICAL NURSE SPECIALIST

## 2022-02-04 PROCEDURE — 4010F PR ACE/ARB THEARPY RXD/TAKEN: ICD-10-PCS | Mod: HCNC,CPTII,95, | Performed by: CLINICAL NURSE SPECIALIST

## 2022-02-04 PROCEDURE — 99214 OFFICE O/P EST MOD 30 MIN: CPT | Mod: HCNC,95,, | Performed by: CLINICAL NURSE SPECIALIST

## 2022-02-04 PROCEDURE — 4010F ACE/ARB THERAPY RXD/TAKEN: CPT | Mod: HCNC,CPTII,95, | Performed by: CLINICAL NURSE SPECIALIST

## 2022-02-04 PROCEDURE — 1159F PR MEDICATION LIST DOCUMENTED IN MEDICAL RECORD: ICD-10-PCS | Mod: HCNC,CPTII,95, | Performed by: CLINICAL NURSE SPECIALIST

## 2022-02-04 RX ORDER — HYDROCODONE BITARTRATE AND ACETAMINOPHEN 10; 325 MG/1; MG/1
1 TABLET ORAL EVERY 8 HOURS PRN
Status: ON HOLD | COMMUNITY
End: 2022-05-17 | Stop reason: SDUPTHER

## 2022-02-04 NOTE — TELEPHONE ENCOUNTER
----- Message from HAMIDA Cantu, CNS sent at 2/4/2022 12:33 PM CST -----  F/U with Dr. Pereira and labs in June (same day)--in clinic appt

## 2022-02-09 ENCOUNTER — CLINICAL SUPPORT (OUTPATIENT)
Dept: INTERNAL MEDICINE | Facility: CLINIC | Age: 42
End: 2022-02-09
Payer: MEDICARE

## 2022-02-09 DIAGNOSIS — Z71.3 DIETARY COUNSELING: ICD-10-CM

## 2022-02-09 DIAGNOSIS — I10 PRIMARY HYPERTENSION: ICD-10-CM

## 2022-02-09 DIAGNOSIS — E66.01 MORBID OBESITY WITH BMI OF 45.0-49.9, ADULT: ICD-10-CM

## 2022-02-09 DIAGNOSIS — Z98.84 STATUS POST BARIATRIC SURGERY: ICD-10-CM

## 2022-02-09 PROCEDURE — 97802 PR MED NUTR THER, 1ST, INDIV, EA 15 MIN: ICD-10-PCS | Mod: 95,,, | Performed by: DIETITIAN, REGISTERED

## 2022-02-09 PROCEDURE — 97802 MEDICAL NUTRITION INDIV IN: CPT | Mod: 95,,, | Performed by: DIETITIAN, REGISTERED

## 2022-02-09 NOTE — PROGRESS NOTES
"The patient location is: [place]  The chief complaint leading to consultation is: nutrition referral    Visit type: audiovisual    Face to Face time with patient: 20 minutes  25 minutes of total time spent on the encounter, which includes face to face time and non-face to face time preparing to see the patient (eg, review of tests), Obtaining and/or reviewing separately obtained history, Documenting clinical information in the electronic or other health record, Independently interpreting results (not separately reported) and communicating results to the patient/family/caregiver, or Care coordination (not separately reported).         Each patient to whom he or she provides medical services by telemedicine is:  (1) informed of the relationship between the physician and patient and the respective role of any other health care provider with respect to management of the patient; and (2) notified that he or she may decline to receive medical services by telemedicine and may withdraw from such care at any time.    Notes:   Nutrition Assessment  Session Time:  20 minutes      Client name:  Alicia Soliz  :  1980  Age:  41 y.o.  Gender:  female    Client states:  Referred by John Singleton MD with a diagnosis of:   -Morbid obesity with BMI of 45.0-49.9, adult   -Primary hypertension    History of gastric sleeve on 2020 with surgery performed by Dr. Navarrete.     Surgery weight: 300 lbs  Low weight after surgery 217 lbs.    Weight gain cause: COVID, unhealthy food choices, greasy food items    Would like to reach goal weight of 200 lbs.  Recently made diet changes resulting in some weight loss.   No longer drinking sodas.   Exercise: just started back using treadmill, ordered stationary bike. Will start meeting with a  soon.    Mostly wanting to receive another copy of the bariatric nutrition guidelines booklet.           Anthropometrics  Height:  66"     Weight:  276 lbs (self " reported)   12-: 290 lbs  BMI:  44.6  % Body Fat:  n/a    Clinical Signs/Symptoms  N/V/D:  none  Appetite:  good       Past Medical History:   Diagnosis Date    Anemia     Arthritis     Cardiac arrest as complication of care     pt states she went into cardiac arrest from an allergic reaction to a medication    Depression     Encounter for blood transfusion     Hemiplegia due to old stroke     Hypertension     Morbid obesity with BMI of 45.0-49.9, adult 9/20/2018    Multiple sclerosis        Past Surgical History:   Procedure Laterality Date    APPENDECTOMY      CHOLECYSTECTOMY      COLONOSCOPY N/A 11/25/2020    Procedure: COLONOSCOPY;  Surgeon: Andrew Jenkins III, MD;  Location: [facility] ENDO;  Service: Endoscopy;  Laterality: N/A;    ESOPHAGOGASTRODUODENOSCOPY N/A 2/19/2020    Procedure: EGD (ESOPHAGOGASTRODUODENOSCOPY);  Surgeon: Michael Navarrete MD;  Location: [facility] ENDO;  Service: Endoscopy;  Laterality: N/A;    ESOPHAGOGASTRODUODENOSCOPY N/A 2/20/2020    Procedure: EGD (ESOPHAGOGASTRODUODENOSCOPY);  Surgeon: Michael Navarrete MD;  Location: [facility] OR;  Service: General;  Laterality: N/A;    ESOPHAGOGASTRODUODENOSCOPY N/A 11/25/2020    Procedure: EGD (ESOPHAGOGASTRODUODENOSCOPY);  Surgeon: Andrew Jenkins III, MD;  Location: [facility];  Service: Endoscopy;  Laterality: N/A;    HYSTERECTOMY      KNEE SURGERY      ROBOT-ASSISTED LAPAROSCOPIC SLEEVE GASTRECTOMY USING DA ANTHONY XI N/A 2/20/2020    Procedure: XI ROBOTIC SLEEVE GASTRECTOMY;  Surgeon: Michael Navarrete MD;  Location: [facility] OR;  Service: General;  Laterality: N/A;    TENOPLASTY OF HAND Left 8/26/2021    Procedure: REPAIR, TENDON, HAND;  Surgeon: Joselito Lugo MD;  Location: [facility];  Service: Orthopedics;  Laterality: Left;  Left RCL PIP Joint Repair/Recon with Arthrex Internal Brace.    TONSILLECTOMY      TUBAL LIGATION         Medications    has a current medication list which includes the following prescription(s): cyclobenzaprine,  diclofenac sodium, doxepin, duloxetine, esomeprazole, gabapentin, hydrocodone-acetaminophen, lactulose, lidocaine-prilocaine, linaclotide, mupirocin, mupirocin, ocrelizumab, promethazine, sumatriptan, topiramate, triamcinolone acetonide 0.1%, and valsartan.    Vitamins, Minerals, and/or Supplements:  Multivitamin, B12, calcium + vitamin D, B complex     Food/Medication Interactions:  Reviewed     Food Allergies or Intolerances:  NKFA     Social History    Marital status:  Single  Employment:  Not discussed    Social History     Tobacco Use    Smoking status: Never Smoker    Smokeless tobacco: Never Used   Substance Use Topics    Alcohol use: Yes     Comment: once a year         Lab Reports   Sodium   Date Value Ref Range Status   09/27/2021 139 136 - 145 mmol/L Final     Potassium   Date Value Ref Range Status   09/27/2021 3.4 (L) 3.5 - 5.1 mmol/L Final     Chloride   Date Value Ref Range Status   09/27/2021 107 95 - 110 mmol/L Final     CO2   Date Value Ref Range Status   09/27/2021 20 (L) 23 - 29 mmol/L Final     Glucose   Date Value Ref Range Status   09/27/2021 135 (H) 70 - 110 mg/dL Final     BUN   Date Value Ref Range Status   09/27/2021 10 6 - 20 mg/dL Final     Creatinine   Date Value Ref Range Status   09/27/2021 0.8 0.5 - 1.4 mg/dL Final     Calcium   Date Value Ref Range Status   09/27/2021 9.3 8.7 - 10.5 mg/dL Final     Total Protein   Date Value Ref Range Status   09/24/2021 7.2 6.0 - 8.4 g/dL Final     Albumin   Date Value Ref Range Status   09/24/2021 3.5 3.5 - 5.2 g/dL Final     Total Bilirubin   Date Value Ref Range Status   09/24/2021 0.4 0.1 - 1.0 mg/dL Final     Comment:     For infants and newborns, interpretation of results should be based  on gestational age, weight and in agreement with clinical  observations.    Premature Infant recommended reference ranges:  Up to 24 hours.............<8.0 mg/dL  Up to 48 hours............<12.0 mg/dL  3-5 days..................<15.0 mg/dL  6-29  days.................<15.0 mg/dL       Alkaline Phosphatase   Date Value Ref Range Status   09/24/2021 84 55 - 135 U/L Final     AST   Date Value Ref Range Status   09/24/2021 18 10 - 40 U/L Final     ALT   Date Value Ref Range Status   09/24/2021 13 10 - 44 U/L Final     Anion Gap   Date Value Ref Range Status   09/27/2021 12 8 - 16 mmol/L Final     eGFR if    Date Value Ref Range Status   09/27/2021 >60 >60 mL/min/1.73 m^2 Final     eGFR if non    Date Value Ref Range Status   09/27/2021 >60 >60 mL/min/1.73 m^2 Final     Comment:     Calculation used to obtain the estimated glomerular filtration  rate (eGFR) is the CKD-EPI equation.         Lab Results   Component Value Date    WBC 4.11 12/29/2021    HGB 11.4 (L) 12/29/2021    HCT 39.0 12/29/2021    MCV 73 (L) 12/29/2021     12/29/2021        Lab Results   Component Value Date    CHOL 195 01/10/2020     Lab Results   Component Value Date    HDL 51 01/10/2020     Lab Results   Component Value Date    LDLCALC 125.6 01/10/2020     Lab Results   Component Value Date    TRIG 92 01/10/2020     Lab Results   Component Value Date    CHOLHDL 26.2 01/10/2020     Lab Results   Component Value Date    HGBA1C 5.5 07/30/2021     BP Readings from Last 1 Encounters:   01/31/22 (!) 151/89       Food History  Current food/drink choice:  Chicken  Salads (from salad shop)  Lunchables  Drinking crystal light + water.  Xavier crackers  activia yogurt  Jello   Fruits   Broccoli + cheese   Protein shakes: premier protein, daily for dinner    Exercise History:  just started back using treadmill, ordered stationary bike. Will start meeting with a  soon.    Cultural/Spiritual/Personal Preferences:  None identified    Support System:  family    State of Change:  Preparation    Barriers to Change:  none    Diagnosis    Other: obesity related to previous excess energy intake as evidenced by BMI 44, weight gain post bariatric  surgery.    Intervention    RMR (Method:  [place] Jeor):  1928 kcal  Activity Factor:  1.3    ROB:  2506 - 1000 = 1506 kcal    Goals:  1.  Exercise consistency, increase as able.  2.  Bariatric diet  3.  Consume protein source first at each meal and snack      Nutrition Education  The following education was provided to the patient:  Complimented patient on dietary compliance/modifications and resulting health improvements.  Complimented patient on physical activity efforts.  Discussed weight management.  Suggested dietary modifications based on current dietary behaviors and individual food preferences.\  Discussed bariatric nutrition therapy    Patient verbalized understanding of nutrition education and recommendations received.    Handouts Provided, emailed  Bariatric Nutrition Guidelines    Monitoring/Evaluation    Monitor the following:  Weight  BMI  Caloric intake  Labs:      Follow Up Plan:  As needed

## 2022-02-14 ENCOUNTER — PATIENT MESSAGE (OUTPATIENT)
Dept: NEUROLOGY | Facility: CLINIC | Age: 42
End: 2022-02-14
Payer: MEDICARE

## 2022-02-22 ENCOUNTER — DOCUMENTATION ONLY (OUTPATIENT)
Dept: NEUROLOGY | Facility: CLINIC | Age: 42
End: 2022-02-22
Payer: MEDICARE

## 2022-02-22 ENCOUNTER — PATIENT MESSAGE (OUTPATIENT)
Dept: INTERNAL MEDICINE | Facility: CLINIC | Age: 42
End: 2022-02-22
Payer: MEDICARE

## 2022-02-22 ENCOUNTER — PATIENT MESSAGE (OUTPATIENT)
Dept: ADMINISTRATIVE | Facility: OTHER | Age: 42
End: 2022-02-22
Payer: MEDICARE

## 2022-02-22 ENCOUNTER — PATIENT MESSAGE (OUTPATIENT)
Dept: NEUROLOGY | Facility: CLINIC | Age: 42
End: 2022-02-22
Payer: MEDICARE

## 2022-02-22 DIAGNOSIS — G35 MULTIPLE SCLEROSIS: Primary | ICD-10-CM

## 2022-02-22 NOTE — PROGRESS NOTES
Faxed Attending Physician Statement Request to [facility] Records at their fax number [phone].

## 2022-02-23 ENCOUNTER — PATIENT MESSAGE (OUTPATIENT)
Dept: SURGERY | Facility: CLINIC | Age: 42
End: 2022-02-23
Payer: MEDICARE

## 2022-02-23 ENCOUNTER — PATIENT MESSAGE (OUTPATIENT)
Dept: INTERNAL MEDICINE | Facility: CLINIC | Age: 42
End: 2022-02-23
Payer: MEDICARE

## 2022-02-28 ENCOUNTER — PATIENT MESSAGE (OUTPATIENT)
Dept: INTERNAL MEDICINE | Facility: CLINIC | Age: 42
End: 2022-02-28
Payer: MEDICARE

## 2022-02-28 ENCOUNTER — PATIENT MESSAGE (OUTPATIENT)
Dept: PSYCHIATRY | Facility: CLINIC | Age: 42
End: 2022-02-28
Payer: MEDICARE

## 2022-02-28 RX ORDER — TRIAMCINOLONE ACETONIDE 1 MG/G
OINTMENT TOPICAL 2 TIMES DAILY
Qty: 30 G | Refills: 1 | Status: SHIPPED | OUTPATIENT
Start: 2022-02-28 | End: 2022-10-11

## 2022-03-09 ENCOUNTER — PATIENT MESSAGE (OUTPATIENT)
Dept: INTERNAL MEDICINE | Facility: CLINIC | Age: 42
End: 2022-03-09
Payer: MEDICARE

## 2022-03-17 ENCOUNTER — OFFICE VISIT (OUTPATIENT)
Dept: INTERNAL MEDICINE | Facility: CLINIC | Age: 42
End: 2022-03-17
Payer: MEDICARE

## 2022-03-17 VITALS
HEIGHT: 66 IN | WEIGHT: 292.31 LBS | SYSTOLIC BLOOD PRESSURE: 149 MMHG | TEMPERATURE: 98 F | DIASTOLIC BLOOD PRESSURE: 83 MMHG | OXYGEN SATURATION: 100 % | BODY MASS INDEX: 46.98 KG/M2 | HEART RATE: 73 BPM

## 2022-03-17 DIAGNOSIS — M62.838 MUSCLE SPASMS OF NECK: ICD-10-CM

## 2022-03-17 DIAGNOSIS — E66.01 MORBID OBESITY WITH BMI OF 45.0-49.9, ADULT: ICD-10-CM

## 2022-03-17 DIAGNOSIS — R73.9 HYPERGLYCEMIA: ICD-10-CM

## 2022-03-17 DIAGNOSIS — Z98.84 STATUS POST BARIATRIC SURGERY: ICD-10-CM

## 2022-03-17 DIAGNOSIS — K76.0 FATTY LIVER: ICD-10-CM

## 2022-03-17 DIAGNOSIS — G47.10 HYPERSOMNIA: ICD-10-CM

## 2022-03-17 DIAGNOSIS — I10 PRIMARY HYPERTENSION: Primary | ICD-10-CM

## 2022-03-17 PROCEDURE — 4010F PR ACE/ARB THEARPY RXD/TAKEN: ICD-10-PCS | Mod: HCNC,CPTII,S$GLB, | Performed by: FAMILY MEDICINE

## 2022-03-17 PROCEDURE — 99999 PR PBB SHADOW E&M-EST. PATIENT-LVL V: CPT | Mod: PBBFAC,HCNC,, | Performed by: FAMILY MEDICINE

## 2022-03-17 PROCEDURE — 3008F BODY MASS INDEX DOCD: CPT | Mod: HCNC,CPTII,S$GLB, | Performed by: FAMILY MEDICINE

## 2022-03-17 PROCEDURE — 4010F ACE/ARB THERAPY RXD/TAKEN: CPT | Mod: HCNC,CPTII,S$GLB, | Performed by: FAMILY MEDICINE

## 2022-03-17 PROCEDURE — 3077F SYST BP >= 140 MM HG: CPT | Mod: HCNC,CPTII,S$GLB, | Performed by: FAMILY MEDICINE

## 2022-03-17 PROCEDURE — 3079F DIAST BP 80-89 MM HG: CPT | Mod: HCNC,CPTII,S$GLB, | Performed by: FAMILY MEDICINE

## 2022-03-17 PROCEDURE — 1159F MED LIST DOCD IN RCRD: CPT | Mod: HCNC,CPTII,S$GLB, | Performed by: FAMILY MEDICINE

## 2022-03-17 PROCEDURE — 1159F PR MEDICATION LIST DOCUMENTED IN MEDICAL RECORD: ICD-10-PCS | Mod: HCNC,CPTII,S$GLB, | Performed by: FAMILY MEDICINE

## 2022-03-17 PROCEDURE — 99214 PR OFFICE/OUTPT VISIT, EST, LEVL IV, 30-39 MIN: ICD-10-PCS | Mod: HCNC,S$GLB,, | Performed by: FAMILY MEDICINE

## 2022-03-17 PROCEDURE — 99999 PR PBB SHADOW E&M-EST. PATIENT-LVL V: ICD-10-PCS | Mod: PBBFAC,HCNC,, | Performed by: FAMILY MEDICINE

## 2022-03-17 PROCEDURE — 3079F PR MOST RECENT DIASTOLIC BLOOD PRESSURE 80-89 MM HG: ICD-10-PCS | Mod: HCNC,CPTII,S$GLB, | Performed by: FAMILY MEDICINE

## 2022-03-17 PROCEDURE — 3077F PR MOST RECENT SYSTOLIC BLOOD PRESSURE >= 140 MM HG: ICD-10-PCS | Mod: HCNC,CPTII,S$GLB, | Performed by: FAMILY MEDICINE

## 2022-03-17 PROCEDURE — 99214 OFFICE O/P EST MOD 30 MIN: CPT | Mod: HCNC,S$GLB,, | Performed by: FAMILY MEDICINE

## 2022-03-17 PROCEDURE — 3008F PR BODY MASS INDEX (BMI) DOCUMENTED: ICD-10-PCS | Mod: HCNC,CPTII,S$GLB, | Performed by: FAMILY MEDICINE

## 2022-03-17 RX ORDER — VALSARTAN 160 MG/1
160 TABLET ORAL DAILY
Qty: 90 TABLET | Refills: 3 | Status: SHIPPED | OUTPATIENT
Start: 2022-03-17 | End: 2022-07-02

## 2022-03-17 RX ORDER — METHOCARBAMOL 500 MG/1
500 TABLET, FILM COATED ORAL 4 TIMES DAILY
COMMUNITY
End: 2022-08-05

## 2022-03-18 ENCOUNTER — PATIENT MESSAGE (OUTPATIENT)
Dept: INTERNAL MEDICINE | Facility: CLINIC | Age: 42
End: 2022-03-18
Payer: MEDICARE

## 2022-03-21 ENCOUNTER — LAB VISIT (OUTPATIENT)
Dept: LAB | Facility: HOSPITAL | Age: 42
End: 2022-03-21
Attending: FAMILY MEDICINE
Payer: MEDICARE

## 2022-03-21 DIAGNOSIS — R73.9 HYPERGLYCEMIA: ICD-10-CM

## 2022-03-21 LAB
ALBUMIN SERPL BCP-MCNC: 4 G/DL (ref 3.5–5.2)
ALP SERPL-CCNC: 96 U/L (ref 55–135)
ALT SERPL W/O P-5'-P-CCNC: 14 U/L (ref 10–44)
ANION GAP SERPL CALC-SCNC: 11 MMOL/L (ref 8–16)
AST SERPL-CCNC: 19 U/L (ref 10–40)
BASOPHILS # BLD AUTO: 0.04 K/UL (ref 0–0.2)
BASOPHILS NFR BLD: 0.7 % (ref 0–1.9)
BILIRUB SERPL-MCNC: 0.4 MG/DL (ref 0.1–1)
BUN SERPL-MCNC: 5 MG/DL (ref 6–20)
CALCIUM SERPL-MCNC: 9.7 MG/DL (ref 8.7–10.5)
CHLORIDE SERPL-SCNC: 107 MMOL/L (ref 95–110)
CO2 SERPL-SCNC: 25 MMOL/L (ref 23–29)
CREAT SERPL-MCNC: 0.8 MG/DL (ref 0.5–1.4)
DIFFERENTIAL METHOD: ABNORMAL
EOSINOPHIL # BLD AUTO: 0.1 K/UL (ref 0–0.5)
EOSINOPHIL NFR BLD: 1.8 % (ref 0–8)
ERYTHROCYTE [DISTWIDTH] IN BLOOD BY AUTOMATED COUNT: 17.8 % (ref 11.5–14.5)
EST. GFR  (AFRICAN AMERICAN): >60 ML/MIN/1.73 M^2
EST. GFR  (NON AFRICAN AMERICAN): >60 ML/MIN/1.73 M^2
ESTIMATED AVG GLUCOSE: 114 MG/DL (ref 68–131)
GLUCOSE SERPL-MCNC: 98 MG/DL (ref 70–110)
HBA1C MFR BLD: 5.6 % (ref 4–5.6)
HCT VFR BLD AUTO: 39.9 % (ref 37–48.5)
HGB BLD-MCNC: 12.1 G/DL (ref 12–16)
IMM GRANULOCYTES # BLD AUTO: 0.02 K/UL (ref 0–0.04)
IMM GRANULOCYTES NFR BLD AUTO: 0.3 % (ref 0–0.5)
LYMPHOCYTES # BLD AUTO: 2.1 K/UL (ref 1–4.8)
LYMPHOCYTES NFR BLD: 34.3 % (ref 18–48)
MCH RBC QN AUTO: 21 PG (ref 27–31)
MCHC RBC AUTO-ENTMCNC: 30.3 G/DL (ref 32–36)
MCV RBC AUTO: 69 FL (ref 82–98)
MONOCYTES # BLD AUTO: 0.5 K/UL (ref 0.3–1)
MONOCYTES NFR BLD: 7.8 % (ref 4–15)
NEUTROPHILS # BLD AUTO: 3.3 K/UL (ref 1.8–7.7)
NEUTROPHILS NFR BLD: 55.1 % (ref 38–73)
NRBC BLD-RTO: 0 /100 WBC
PLATELET # BLD AUTO: 255 K/UL (ref 150–450)
PMV BLD AUTO: 11.8 FL (ref 9.2–12.9)
POTASSIUM SERPL-SCNC: 3.4 MMOL/L (ref 3.5–5.1)
PROT SERPL-MCNC: 8.1 G/DL (ref 6–8.4)
RBC # BLD AUTO: 5.75 M/UL (ref 4–5.4)
SODIUM SERPL-SCNC: 143 MMOL/L (ref 136–145)
WBC # BLD AUTO: 6.01 K/UL (ref 3.9–12.7)

## 2022-03-21 PROCEDURE — 36415 COLL VENOUS BLD VENIPUNCTURE: CPT | Mod: HCNC,PO | Performed by: FAMILY MEDICINE

## 2022-03-21 PROCEDURE — 83036 HEMOGLOBIN GLYCOSYLATED A1C: CPT | Mod: HCNC | Performed by: FAMILY MEDICINE

## 2022-03-21 PROCEDURE — 85025 COMPLETE CBC W/AUTO DIFF WBC: CPT | Mod: HCNC | Performed by: FAMILY MEDICINE

## 2022-03-21 PROCEDURE — 80053 COMPREHEN METABOLIC PANEL: CPT | Mod: HCNC | Performed by: FAMILY MEDICINE

## 2022-03-22 ENCOUNTER — PATIENT MESSAGE (OUTPATIENT)
Dept: PULMONOLOGY | Facility: CLINIC | Age: 42
End: 2022-03-22
Payer: MEDICARE

## 2022-03-28 ENCOUNTER — PATIENT MESSAGE (OUTPATIENT)
Dept: INTERNAL MEDICINE | Facility: CLINIC | Age: 42
End: 2022-03-28
Payer: MEDICARE

## 2022-05-02 ENCOUNTER — TELEPHONE (OUTPATIENT)
Dept: SURGERY | Facility: CLINIC | Age: 42
End: 2022-05-02
Payer: MEDICARE

## 2022-05-02 NOTE — TELEPHONE ENCOUNTER
----- Message from Fazal Hwang sent at 5/2/2022 12:56 PM CDT -----  Pt would like to schedule appt.   Please call back at .[phone].  Thx Md

## 2022-05-03 ENCOUNTER — TELEPHONE (OUTPATIENT)
Dept: NEUROLOGY | Facility: CLINIC | Age: 42
End: 2022-05-03

## 2022-05-03 DIAGNOSIS — G35 MULTIPLE SCLEROSIS: Primary | ICD-10-CM

## 2022-05-03 NOTE — TELEPHONE ENCOUNTER
Received call from pt. Pt reports her left leg has been constantly heavy for the last three days. This is a symptoms she has frequently experienced in the past. She denies recent infections or increase in stress. No back pain. Encouraged pt to rest, hydrate and stay cool. Reviewed with Jen, and will get CBC, CMP and UA to r/o infection.

## 2022-05-03 NOTE — TELEPHONE ENCOUNTER
----- Message from Ines Diana sent at 5/3/2022 11:13 AM CDT -----  Alicia Soliz calling regarding Patient Advice (message) want to know if Dr. Pereira or Jen Meier can give her a call back.  She stated her MS is acting up again.  she is not stressing, no bladder infection.  call back [phone]

## 2022-05-04 ENCOUNTER — PATIENT MESSAGE (OUTPATIENT)
Dept: INTERNAL MEDICINE | Facility: CLINIC | Age: 42
End: 2022-05-04
Payer: MEDICARE

## 2022-05-05 ENCOUNTER — LAB VISIT (OUTPATIENT)
Dept: LAB | Facility: HOSPITAL | Age: 42
End: 2022-05-05
Attending: CLINICAL NURSE SPECIALIST
Payer: MEDICARE

## 2022-05-05 DIAGNOSIS — G35 MULTIPLE SCLEROSIS: ICD-10-CM

## 2022-05-05 LAB
ALBUMIN SERPL BCP-MCNC: 3.8 G/DL (ref 3.5–5.2)
ALP SERPL-CCNC: 87 U/L (ref 55–135)
ALT SERPL W/O P-5'-P-CCNC: 9 U/L (ref 10–44)
ANION GAP SERPL CALC-SCNC: 7 MMOL/L (ref 8–16)
AST SERPL-CCNC: 13 U/L (ref 10–40)
BASOPHILS # BLD AUTO: 0.04 K/UL (ref 0–0.2)
BASOPHILS NFR BLD: 0.7 % (ref 0–1.9)
BILIRUB SERPL-MCNC: 0.2 MG/DL (ref 0.1–1)
BUN SERPL-MCNC: 8 MG/DL (ref 6–20)
CALCIUM SERPL-MCNC: 9.3 MG/DL (ref 8.7–10.5)
CHLORIDE SERPL-SCNC: 105 MMOL/L (ref 95–110)
CO2 SERPL-SCNC: 26 MMOL/L (ref 23–29)
CREAT SERPL-MCNC: 0.8 MG/DL (ref 0.5–1.4)
DIFFERENTIAL METHOD: ABNORMAL
EOSINOPHIL # BLD AUTO: 0.1 K/UL (ref 0–0.5)
EOSINOPHIL NFR BLD: 2.3 % (ref 0–8)
ERYTHROCYTE [DISTWIDTH] IN BLOOD BY AUTOMATED COUNT: 18.1 % (ref 11.5–14.5)
EST. GFR  (AFRICAN AMERICAN): >60 ML/MIN/1.73 M^2
EST. GFR  (NON AFRICAN AMERICAN): >60 ML/MIN/1.73 M^2
GLUCOSE SERPL-MCNC: 109 MG/DL (ref 70–110)
HCT VFR BLD AUTO: 38.6 % (ref 37–48.5)
HGB BLD-MCNC: 10.9 G/DL (ref 12–16)
IMM GRANULOCYTES # BLD AUTO: 0.01 K/UL (ref 0–0.04)
IMM GRANULOCYTES NFR BLD AUTO: 0.2 % (ref 0–0.5)
LYMPHOCYTES # BLD AUTO: 2.6 K/UL (ref 1–4.8)
LYMPHOCYTES NFR BLD: 47.6 % (ref 18–48)
MCH RBC QN AUTO: 20.5 PG (ref 27–31)
MCHC RBC AUTO-ENTMCNC: 28.2 G/DL (ref 32–36)
MCV RBC AUTO: 73 FL (ref 82–98)
MONOCYTES # BLD AUTO: 0.5 K/UL (ref 0.3–1)
MONOCYTES NFR BLD: 9.4 % (ref 4–15)
NEUTROPHILS # BLD AUTO: 2.2 K/UL (ref 1.8–7.7)
NEUTROPHILS NFR BLD: 39.8 % (ref 38–73)
NRBC BLD-RTO: 0 /100 WBC
PLATELET # BLD AUTO: 295 K/UL (ref 150–450)
PMV BLD AUTO: 11.1 FL (ref 9.2–12.9)
POTASSIUM SERPL-SCNC: 3.9 MMOL/L (ref 3.5–5.1)
PROT SERPL-MCNC: 6.9 G/DL (ref 6–8.4)
RBC # BLD AUTO: 5.31 M/UL (ref 4–5.4)
SODIUM SERPL-SCNC: 138 MMOL/L (ref 136–145)
WBC # BLD AUTO: 5.55 K/UL (ref 3.9–12.7)

## 2022-05-05 PROCEDURE — 85025 COMPLETE CBC W/AUTO DIFF WBC: CPT | Performed by: CLINICAL NURSE SPECIALIST

## 2022-05-05 PROCEDURE — 80053 COMPREHEN METABOLIC PANEL: CPT | Performed by: CLINICAL NURSE SPECIALIST

## 2022-05-05 PROCEDURE — 36415 COLL VENOUS BLD VENIPUNCTURE: CPT | Mod: PO | Performed by: CLINICAL NURSE SPECIALIST

## 2022-05-05 RX ORDER — LINACLOTIDE 145 UG/1
CAPSULE, GELATIN COATED ORAL
Qty: 30 CAPSULE | Refills: 5 | Status: SHIPPED | OUTPATIENT
Start: 2022-05-05 | End: 2023-02-16 | Stop reason: SDUPTHER

## 2022-05-05 NOTE — TELEPHONE ENCOUNTER
----- Message from Ruth Wheeler sent at 5/5/2022  9:29 AM CDT -----  Contact: pt  Pt is calling to have orders placed in system to schedule for EKG and labs and can be reached at [phone]//thanks/dbw

## 2022-05-05 NOTE — TELEPHONE ENCOUNTER
Called patient educated she has an appointment with dr mckeon tomorrow for a pre op and all those test will be addressed    Voided on call to OR, did not save.

## 2022-05-06 ENCOUNTER — PATIENT MESSAGE (OUTPATIENT)
Dept: NEUROLOGY | Facility: CLINIC | Age: 42
End: 2022-05-06
Payer: MEDICARE

## 2022-05-06 ENCOUNTER — OFFICE VISIT (OUTPATIENT)
Dept: INTERNAL MEDICINE | Facility: CLINIC | Age: 42
End: 2022-05-06
Payer: MEDICARE

## 2022-05-06 VITALS
OXYGEN SATURATION: 99 % | BODY MASS INDEX: 46.84 KG/M2 | WEIGHT: 291.44 LBS | DIASTOLIC BLOOD PRESSURE: 87 MMHG | SYSTOLIC BLOOD PRESSURE: 140 MMHG | HEART RATE: 93 BPM | HEIGHT: 66 IN | TEMPERATURE: 98 F

## 2022-05-06 DIAGNOSIS — Z01.818 PREOP EXAMINATION: ICD-10-CM

## 2022-05-06 DIAGNOSIS — G81.90 HEMIPLEGIA, UNSPECIFIED ETIOLOGY, UNSPECIFIED HEMIPLEGIA TYPE, UNSPECIFIED LATERALITY: ICD-10-CM

## 2022-05-06 DIAGNOSIS — N62 BREAST HYPERTROPHY IN FEMALE: Primary | ICD-10-CM

## 2022-05-06 DIAGNOSIS — G35 MULTIPLE SCLEROSIS: ICD-10-CM

## 2022-05-06 PROCEDURE — 99499 RISK ADDL DX/OHS AUDIT: ICD-10-PCS | Mod: S$GLB,,, | Performed by: FAMILY MEDICINE

## 2022-05-06 PROCEDURE — 3008F BODY MASS INDEX DOCD: CPT | Mod: CPTII,S$GLB,, | Performed by: FAMILY MEDICINE

## 2022-05-06 PROCEDURE — 4010F ACE/ARB THERAPY RXD/TAKEN: CPT | Mod: CPTII,S$GLB,, | Performed by: FAMILY MEDICINE

## 2022-05-06 PROCEDURE — 99213 OFFICE O/P EST LOW 20 MIN: CPT | Mod: S$GLB,,, | Performed by: FAMILY MEDICINE

## 2022-05-06 PROCEDURE — 3008F PR BODY MASS INDEX (BMI) DOCUMENTED: ICD-10-PCS | Mod: CPTII,S$GLB,, | Performed by: FAMILY MEDICINE

## 2022-05-06 PROCEDURE — 99213 PR OFFICE/OUTPT VISIT, EST, LEVL III, 20-29 MIN: ICD-10-PCS | Mod: S$GLB,,, | Performed by: FAMILY MEDICINE

## 2022-05-06 PROCEDURE — 1159F PR MEDICATION LIST DOCUMENTED IN MEDICAL RECORD: ICD-10-PCS | Mod: CPTII,S$GLB,, | Performed by: FAMILY MEDICINE

## 2022-05-06 PROCEDURE — 4010F PR ACE/ARB THEARPY RXD/TAKEN: ICD-10-PCS | Mod: CPTII,S$GLB,, | Performed by: FAMILY MEDICINE

## 2022-05-06 PROCEDURE — 3077F PR MOST RECENT SYSTOLIC BLOOD PRESSURE >= 140 MM HG: ICD-10-PCS | Mod: CPTII,S$GLB,, | Performed by: FAMILY MEDICINE

## 2022-05-06 PROCEDURE — 3044F HG A1C LEVEL LT 7.0%: CPT | Mod: CPTII,S$GLB,, | Performed by: FAMILY MEDICINE

## 2022-05-06 PROCEDURE — 3044F PR MOST RECENT HEMOGLOBIN A1C LEVEL <7.0%: ICD-10-PCS | Mod: CPTII,S$GLB,, | Performed by: FAMILY MEDICINE

## 2022-05-06 PROCEDURE — 1159F MED LIST DOCD IN RCRD: CPT | Mod: CPTII,S$GLB,, | Performed by: FAMILY MEDICINE

## 2022-05-06 PROCEDURE — 99499 UNLISTED E&M SERVICE: CPT | Mod: S$GLB,,, | Performed by: FAMILY MEDICINE

## 2022-05-06 PROCEDURE — 3079F DIAST BP 80-89 MM HG: CPT | Mod: CPTII,S$GLB,, | Performed by: FAMILY MEDICINE

## 2022-05-06 PROCEDURE — 99999 PR PBB SHADOW E&M-EST. PATIENT-LVL IV: ICD-10-PCS | Mod: PBBFAC,,, | Performed by: FAMILY MEDICINE

## 2022-05-06 PROCEDURE — 3077F SYST BP >= 140 MM HG: CPT | Mod: CPTII,S$GLB,, | Performed by: FAMILY MEDICINE

## 2022-05-06 PROCEDURE — 3079F PR MOST RECENT DIASTOLIC BLOOD PRESSURE 80-89 MM HG: ICD-10-PCS | Mod: CPTII,S$GLB,, | Performed by: FAMILY MEDICINE

## 2022-05-06 PROCEDURE — 99999 PR PBB SHADOW E&M-EST. PATIENT-LVL IV: CPT | Mod: PBBFAC,,, | Performed by: FAMILY MEDICINE

## 2022-05-09 ENCOUNTER — PATIENT MESSAGE (OUTPATIENT)
Dept: INTERNAL MEDICINE | Facility: CLINIC | Age: 42
End: 2022-05-09
Payer: MEDICARE

## 2022-05-09 ENCOUNTER — PATIENT MESSAGE (OUTPATIENT)
Dept: NEUROLOGY | Facility: CLINIC | Age: 42
End: 2022-05-09
Payer: MEDICARE

## 2022-05-09 ENCOUNTER — PATIENT MESSAGE (OUTPATIENT)
Dept: RESEARCH | Facility: HOSPITAL | Age: 42
End: 2022-05-09
Payer: MEDICARE

## 2022-05-10 ENCOUNTER — HOSPITAL ENCOUNTER (OUTPATIENT)
Dept: CARDIOLOGY | Facility: HOSPITAL | Age: 42
Discharge: HOME | End: 2022-05-10
Payer: MEDICARE

## 2022-05-10 PROCEDURE — 93010 ELECTROCARDIOGRAM REPORT: CPT | Mod: S$GLB,,, | Performed by: INTERNAL MEDICINE

## 2022-05-10 PROCEDURE — 93005 ELECTROCARDIOGRAM TRACING: CPT | Mod: PO

## 2022-05-10 PROCEDURE — 93010 EKG 12-LEAD: ICD-10-PCS | Mod: S$GLB,,, | Performed by: INTERNAL MEDICINE

## 2022-05-11 ENCOUNTER — PATIENT MESSAGE (OUTPATIENT)
Dept: INTERNAL MEDICINE | Facility: CLINIC | Age: 42
End: 2022-05-11
Payer: MEDICARE

## 2022-05-13 ENCOUNTER — PATIENT MESSAGE (OUTPATIENT)
Dept: INTERNAL MEDICINE | Facility: CLINIC | Age: 42
End: 2022-05-13
Payer: MEDICARE

## 2022-05-13 RX ORDER — CARBOXYMETHYLCELLULOSE/CITRIC 0.75 G
3 CAPSULE ORAL
Qty: 168 CAPSULE | Refills: 11 | OUTPATIENT
Start: 2022-05-13 | End: 2022-08-16 | Stop reason: SDUPTHER

## 2022-05-15 ENCOUNTER — PATIENT MESSAGE (OUTPATIENT)
Dept: NEUROLOGY | Facility: CLINIC | Age: 42
End: 2022-05-15
Payer: MEDICARE

## 2022-05-16 ENCOUNTER — HOSPITAL ENCOUNTER (OUTPATIENT)
Facility: HOSPITAL | Age: 42
Discharge: HOME | End: 2022-05-17
Attending: EMERGENCY MEDICINE | Admitting: INTERNAL MEDICINE
Payer: MEDICARE

## 2022-05-16 DIAGNOSIS — G35 MULTIPLE SCLEROSIS EXACERBATION: Primary | ICD-10-CM

## 2022-05-16 DIAGNOSIS — G35 MULTIPLE SCLEROSIS: ICD-10-CM

## 2022-05-16 LAB
ALBUMIN SERPL BCP-MCNC: 3.5 G/DL (ref 3.5–5.2)
ALP SERPL-CCNC: 79 U/L (ref 55–135)
ALT SERPL W/O P-5'-P-CCNC: 15 U/L (ref 10–44)
ANION GAP SERPL CALC-SCNC: 14 MMOL/L (ref 8–16)
AST SERPL-CCNC: 25 U/L (ref 10–40)
BASOPHILS # BLD AUTO: 0.03 K/UL (ref 0–0.2)
BASOPHILS NFR BLD: 0.6 % (ref 0–1.9)
BILIRUB SERPL-MCNC: 0.3 MG/DL (ref 0.1–1)
BILIRUB UR QL STRIP: NEGATIVE
BUN SERPL-MCNC: 7 MG/DL (ref 6–20)
CALCIUM SERPL-MCNC: 8.8 MG/DL (ref 8.7–10.5)
CHLORIDE SERPL-SCNC: 110 MMOL/L (ref 95–110)
CLARITY UR: CLEAR
CO2 SERPL-SCNC: 18 MMOL/L (ref 23–29)
COLOR UR: YELLOW
CREAT SERPL-MCNC: 0.7 MG/DL (ref 0.5–1.4)
CRP SERPL-MCNC: 6.3 MG/L (ref 0–8.2)
DIFFERENTIAL METHOD: ABNORMAL
EOSINOPHIL # BLD AUTO: 0.1 K/UL (ref 0–0.5)
EOSINOPHIL NFR BLD: 2.5 % (ref 0–8)
ERYTHROCYTE [DISTWIDTH] IN BLOOD BY AUTOMATED COUNT: 16.6 % (ref 11.5–14.5)
ERYTHROCYTE [SEDIMENTATION RATE] IN BLOOD BY WESTERGREN METHOD: 10 MM/HR (ref 0–20)
EST. GFR  (AFRICAN AMERICAN): >60 ML/MIN/1.73 M^2
EST. GFR  (NON AFRICAN AMERICAN): >60 ML/MIN/1.73 M^2
GLUCOSE SERPL-MCNC: 108 MG/DL (ref 70–110)
GLUCOSE UR QL STRIP: NEGATIVE
HCT VFR BLD AUTO: 35.9 % (ref 37–48.5)
HGB BLD-MCNC: 10.9 G/DL (ref 12–16)
HGB UR QL STRIP: NEGATIVE
IMM GRANULOCYTES # BLD AUTO: 0.01 K/UL (ref 0–0.04)
IMM GRANULOCYTES NFR BLD AUTO: 0.2 % (ref 0–0.5)
KETONES UR QL STRIP: NEGATIVE
LEUKOCYTE ESTERASE UR QL STRIP: NEGATIVE
LYMPHOCYTES # BLD AUTO: 2 K/UL (ref 1–4.8)
LYMPHOCYTES NFR BLD: 39.1 % (ref 18–48)
MCH RBC QN AUTO: 20.7 PG (ref 27–31)
MCHC RBC AUTO-ENTMCNC: 30.4 G/DL (ref 32–36)
MCV RBC AUTO: 68 FL (ref 82–98)
MONOCYTES # BLD AUTO: 0.5 K/UL (ref 0.3–1)
MONOCYTES NFR BLD: 9.2 % (ref 4–15)
NEUTROPHILS # BLD AUTO: 2.5 K/UL (ref 1.8–7.7)
NEUTROPHILS NFR BLD: 48.4 % (ref 38–73)
NITRITE UR QL STRIP: NEGATIVE
NRBC BLD-RTO: 0 /100 WBC
PH UR STRIP: 6 [PH] (ref 5–8)
PLATELET # BLD AUTO: 255 K/UL (ref 150–450)
PMV BLD AUTO: 10.1 FL (ref 9.2–12.9)
POTASSIUM SERPL-SCNC: 4.4 MMOL/L (ref 3.5–5.1)
PROT SERPL-MCNC: 6.7 G/DL (ref 6–8.4)
PROT UR QL STRIP: NEGATIVE
RBC # BLD AUTO: 5.27 M/UL (ref 4–5.4)
SARS-COV-2 RDRP RESP QL NAA+PROBE: NEGATIVE
SODIUM SERPL-SCNC: 142 MMOL/L (ref 136–145)
SP GR UR STRIP: 1.02 (ref 1–1.03)
TSH SERPL DL<=0.005 MIU/L-ACNC: 1.3 UIU/ML (ref 0.4–4)
URN SPEC COLLECT METH UR: NORMAL
UROBILINOGEN UR STRIP-ACNC: NEGATIVE EU/DL
WBC # BLD AUTO: 5.11 K/UL (ref 3.9–12.7)

## 2022-05-16 PROCEDURE — G0378 HOSPITAL OBSERVATION PER HR: HCPCS

## 2022-05-16 PROCEDURE — 63600175 PHARM REV CODE 636 W HCPCS: Performed by: EMERGENCY MEDICINE

## 2022-05-16 PROCEDURE — 96366 THER/PROPH/DIAG IV INF ADDON: CPT

## 2022-05-16 PROCEDURE — 81003 URINALYSIS AUTO W/O SCOPE: CPT | Performed by: EMERGENCY MEDICINE

## 2022-05-16 PROCEDURE — U0002 COVID-19 LAB TEST NON-CDC: HCPCS | Performed by: EMERGENCY MEDICINE

## 2022-05-16 PROCEDURE — 96375 TX/PRO/DX INJ NEW DRUG ADDON: CPT | Mod: 59

## 2022-05-16 PROCEDURE — 84443 ASSAY THYROID STIM HORMONE: CPT | Performed by: EMERGENCY MEDICINE

## 2022-05-16 PROCEDURE — 25000003 PHARM REV CODE 250: Performed by: EMERGENCY MEDICINE

## 2022-05-16 PROCEDURE — 80053 COMPREHEN METABOLIC PANEL: CPT | Performed by: EMERGENCY MEDICINE

## 2022-05-16 PROCEDURE — 99291 CRITICAL CARE FIRST HOUR: CPT | Mod: 25,CS

## 2022-05-16 PROCEDURE — 96365 THER/PROPH/DIAG IV INF INIT: CPT

## 2022-05-16 PROCEDURE — 86140 C-REACTIVE PROTEIN: CPT | Performed by: EMERGENCY MEDICINE

## 2022-05-16 PROCEDURE — 85025 COMPLETE CBC W/AUTO DIFF WBC: CPT | Performed by: NURSE PRACTITIONER

## 2022-05-16 PROCEDURE — 85651 RBC SED RATE NONAUTOMATED: CPT | Performed by: EMERGENCY MEDICINE

## 2022-05-16 RX ORDER — HYDROCODONE BITARTRATE AND ACETAMINOPHEN 5; 325 MG/1; MG/1
1 TABLET ORAL EVERY 6 HOURS PRN
Status: DISCONTINUED | OUTPATIENT
Start: 2022-05-16 | End: 2022-05-17 | Stop reason: HOSPADM

## 2022-05-16 RX ORDER — POLYETHYLENE GLYCOL 3350 17 G/17G
17 POWDER, FOR SOLUTION ORAL DAILY PRN
Status: DISCONTINUED | OUTPATIENT
Start: 2022-05-16 | End: 2022-05-17 | Stop reason: HOSPADM

## 2022-05-16 RX ORDER — ACETAMINOPHEN 325 MG/1
650 TABLET ORAL EVERY 4 HOURS PRN
Status: DISCONTINUED | OUTPATIENT
Start: 2022-05-16 | End: 2022-05-17 | Stop reason: HOSPADM

## 2022-05-16 RX ORDER — SODIUM,POTASSIUM PHOSPHATES 280-250MG
2 POWDER IN PACKET (EA) ORAL
Status: DISCONTINUED | OUTPATIENT
Start: 2022-05-16 | End: 2022-05-17 | Stop reason: HOSPADM

## 2022-05-16 RX ORDER — IBUPROFEN 200 MG
24 TABLET ORAL
Status: DISCONTINUED | OUTPATIENT
Start: 2022-05-16 | End: 2022-05-17 | Stop reason: HOSPADM

## 2022-05-16 RX ORDER — LANOLIN ALCOHOL/MO/W.PET/CERES
800 CREAM (GRAM) TOPICAL
Status: DISCONTINUED | OUTPATIENT
Start: 2022-05-16 | End: 2022-05-17 | Stop reason: HOSPADM

## 2022-05-16 RX ORDER — IBUPROFEN 200 MG
16 TABLET ORAL
Status: DISCONTINUED | OUTPATIENT
Start: 2022-05-16 | End: 2022-05-17 | Stop reason: HOSPADM

## 2022-05-16 RX ORDER — TOPIRAMATE 25 MG/1
50 TABLET ORAL 2 TIMES DAILY
Status: DISCONTINUED | OUTPATIENT
Start: 2022-05-17 | End: 2022-05-17 | Stop reason: HOSPADM

## 2022-05-16 RX ORDER — CYCLOBENZAPRINE HCL 10 MG
10 TABLET ORAL 3 TIMES DAILY PRN
COMMUNITY
Start: 2022-05-04 | End: 2022-12-14

## 2022-05-16 RX ORDER — NALOXONE HCL 0.4 MG/ML
0.02 VIAL (ML) INJECTION
Status: DISCONTINUED | OUTPATIENT
Start: 2022-05-16 | End: 2022-05-17 | Stop reason: HOSPADM

## 2022-05-16 RX ORDER — GABAPENTIN 100 MG/1
100 CAPSULE ORAL 2 TIMES DAILY
Status: DISCONTINUED | OUTPATIENT
Start: 2022-05-16 | End: 2022-05-17 | Stop reason: HOSPADM

## 2022-05-16 RX ORDER — DOXEPIN HYDROCHLORIDE 25 MG/1
75 CAPSULE ORAL NIGHTLY
Status: DISCONTINUED | OUTPATIENT
Start: 2022-05-17 | End: 2022-05-17 | Stop reason: HOSPADM

## 2022-05-16 RX ORDER — SODIUM CHLORIDE 0.9 % (FLUSH) 0.9 %
10 SYRINGE (ML) INJECTION EVERY 8 HOURS
Status: DISCONTINUED | OUTPATIENT
Start: 2022-05-16 | End: 2022-05-17 | Stop reason: HOSPADM

## 2022-05-16 RX ORDER — DIPHENHYDRAMINE HYDROCHLORIDE 50 MG/ML
25 INJECTION INTRAMUSCULAR; INTRAVENOUS
Status: COMPLETED | OUTPATIENT
Start: 2022-05-16 | End: 2022-05-16

## 2022-05-16 RX ORDER — LOSARTAN POTASSIUM 50 MG/1
50 TABLET ORAL DAILY
Refills: 3 | Status: DISCONTINUED | OUTPATIENT
Start: 2022-05-17 | End: 2022-05-17 | Stop reason: HOSPADM

## 2022-05-16 RX ORDER — TALC
6 POWDER (GRAM) TOPICAL NIGHTLY PRN
Status: DISCONTINUED | OUTPATIENT
Start: 2022-05-16 | End: 2022-05-17 | Stop reason: HOSPADM

## 2022-05-16 RX ORDER — DULOXETIN HYDROCHLORIDE 30 MG/1
60 CAPSULE, DELAYED RELEASE ORAL DAILY
Status: DISCONTINUED | OUTPATIENT
Start: 2022-05-17 | End: 2022-05-17 | Stop reason: HOSPADM

## 2022-05-16 RX ORDER — GLUCAGON 1 MG
1 KIT INJECTION
Status: DISCONTINUED | OUTPATIENT
Start: 2022-05-16 | End: 2022-05-17 | Stop reason: HOSPADM

## 2022-05-16 RX ADMIN — DIPHENHYDRAMINE HYDROCHLORIDE 25 MG: 50 INJECTION, SOLUTION INTRAMUSCULAR; INTRAVENOUS at 09:05

## 2022-05-16 RX ADMIN — METHYLPREDNISOLONE SODIUM SUCCINATE 1 G: 1 INJECTION, POWDER, LYOPHILIZED, FOR SOLUTION INTRAMUSCULAR; INTRAVENOUS at 09:05

## 2022-05-16 NOTE — FIRST PROVIDER EVALUATION
Medical screening exam completed.  I have conducted a focused provider triage encounter, findings are as follows:    Brief history of present illness:  Pt reports she was sent for a m/s flare up    Vitals:    05/16/22 1549 05/16/22 1550   BP:  (!) 160/69   Pulse: 85    Resp: 18    Temp: 97.4 °F (36.3 °C)    TempSrc: Oral    SpO2: 97%    Weight: 121.6 kg (268 lb)        Pertinent physical exam:  nad    Brief workup plan:  Labs, meds,     Preliminary workup initiated; this workup will be continued and followed by the physician or advanced practice provider that is assigned to the patient when roomed.

## 2022-05-16 NOTE — ED PROVIDER NOTES
"SCRIBE #1 NOTE: I, Antonina Geoff, am scribing for, and in the presence of, Karen Clifford MD. I have scribed the HPI, ROS, and PEx.      History      Chief Complaint   Patient presents with    General Illness     Having MS flare up. Sent by doctor for steroid infusion. Left sided weakness and pain        Review of patient's allergies indicates:   Allergen Reactions    Demerol [meperidine] Anaphylaxis     Other reaction(s): Itching    Dilaudid [hydromorphone (bulk)] Anaphylaxis     "coded" per pt  Patient can tolerate Lortab    Prednisone Itching     Other reaction(s): Itching    Tramadol      shaking        HPI   HPI    5/16/2022, 6:40 PM   History obtained from the patient      History of Present Illness: Alicia Soliz is a 42 y.o. female patient with a PMHx of anemia, arthritis, cardiac arrest, hemiplegia due to old stroke, HTN, morbid obesity, and multiple sclerosis who presents to the Emergency Department for LUE and LLE weakness which onset gradually 1 week PTA. The pt reports that she is having a multiple sclerosis flare up and was referred to the ED by her neurologist for admission for a steroid infusion. Symptoms are constant and moderate in severity. No mitigating or exacerbating factors reported. Associated sxs include paresthesias to the LUE and LLE, numbness to the LUE and LLE, tremors to the LUE and L hand, gait difficulty, L leg swelling, and a pinching sensation in her spine. The pt reports that she can normally ambulate with a cane at baseline, but that she has been having difficulty ambulating with her cane recently. Patient denies any fever, chills, SOB, CP, N/V/D, headaches, and all other sxs at this time. The pt reports that she is being treated with Ocrevus with the last dose administered in January of 2022. No further complaints or concerns at this time.         Arrival mode: Personal vehicle     PCP: Braden Dumont MD       Past Medical History:  Past Medical History: "   Diagnosis Date    Anemia     Arthritis     Cardiac arrest as complication of care     pt states she went into cardiac arrest from an allergic reaction to a medication    Depression     Encounter for blood transfusion     Hemiplegia due to old stroke     Hypertension     Morbid obesity with BMI of 45.0-49.9, adult 9/20/2018    Multiple sclerosis        Past Surgical History:  Past Surgical History:   Procedure Laterality Date    APPENDECTOMY      CHOLECYSTECTOMY      COLONOSCOPY N/A 11/25/2020    Procedure: COLONOSCOPY;  Surgeon: Andrew Jenkins III, MD;  Location: [facility] ENDO;  Service: Endoscopy;  Laterality: N/A;    ESOPHAGOGASTRODUODENOSCOPY N/A 2/19/2020    Procedure: EGD (ESOPHAGOGASTRODUODENOSCOPY);  Surgeon: Michael Navarrete MD;  Location: [facility] ENDO;  Service: Endoscopy;  Laterality: N/A;    ESOPHAGOGASTRODUODENOSCOPY N/A 2/20/2020    Procedure: EGD (ESOPHAGOGASTRODUODENOSCOPY);  Surgeon: Michael Navarrete MD;  Location: [facility];  Service: General;  Laterality: N/A;    ESOPHAGOGASTRODUODENOSCOPY N/A 11/25/2020    Procedure: EGD (ESOPHAGOGASTRODUODENOSCOPY);  Surgeon: Andrew Jenkins III, MD;  Location: [facility];  Service: Endoscopy;  Laterality: N/A;    HYSTERECTOMY      KNEE SURGERY      ROBOT-ASSISTED LAPAROSCOPIC SLEEVE GASTRECTOMY USING DA ANTHONY XI N/A 2/20/2020    Procedure: XI ROBOTIC SLEEVE GASTRECTOMY;  Surgeon: Michael Navarrete MD;  Location: [facility] OR;  Service: General;  Laterality: N/A;    TENOPLASTY OF HAND Left 8/26/2021    Procedure: REPAIR, TENDON, HAND;  Surgeon: Joselito Lugo MD;  Location: [facility] OR;  Service: Orthopedics;  Laterality: Left;  Left RCL PIP Joint Repair/Recon with Arthrex Internal Brace.    TONSILLECTOMY      TUBAL LIGATION           Family History:  Family History   Problem Relation Age of Onset    Lupus Mother     Heart disease Mother     Hypertension Mother     Diabetes Father     Kidney disease Father     Cancer Maternal Aunt 40        breast    Breast  cancer Maternal Aunt     Diabetes Maternal Aunt     COPD Maternal Aunt     Breast cancer Maternal Cousin     Ovarian cancer Maternal Cousin        Social History:  Social History     Tobacco Use    Smoking status: Never Smoker    Smokeless tobacco: Never Used   Substance and Sexual Activity    Alcohol use: Yes     Comment: once a year     Drug use: Never    Sexual activity: Yes     Partners: Male     Birth control/protection: Surgical       ROS   Review of Systems   Constitutional: Negative for chills and fever.   HENT: Negative for sore throat.    Respiratory: Negative for shortness of breath.    Cardiovascular: Positive for leg swelling (L). Negative for chest pain.   Gastrointestinal: Negative for diarrhea, nausea and vomiting.   Genitourinary: Negative for dysuria.   Musculoskeletal: Positive for gait problem (difficulty).        (+) pinching sensation in spine   Skin: Negative for rash.   Neurological: Positive for tremors (LUE and L hand), weakness (LUE and LLE) and numbness (LUE and LLE). Negative for headaches.        (+) LLE and LUE paresthesias   Hematological: Does not bruise/bleed easily.   All other systems reviewed and are negative.    Physical Exam      Initial Vitals   BP Pulse Resp Temp SpO2   05/16/22 1550 05/16/22 1549 05/16/22 1549 05/16/22 1549 05/16/22 1549   (!) 160/69 85 18 97.4 °F (36.3 °C) 97 %      MAP       --                 Physical Exam  Nursing Notes and Vital Signs Reviewed.  Constitutional: Patient is in no acute distress. Morbidly obese.  Head: Atraumatic. Normocephalic.  Eyes: PERRL. EOM intact. Conjunctivae are not pale. No scleral icterus.  ENT: Mucous membranes are moist. Oropharynx is clear and symmetric.    Neck: Supple. Full ROM. No lymphadenopathy.  Cardiovascular: Regular rate. Regular rhythm. No murmurs, rubs, or gallops. Distal pulses are 2+ and symmetric.  Pulmonary/Chest: No respiratory distress. Clear to auscultation bilaterally. No wheezing or  rales.  Abdominal: Soft and non-distended.  There is no tenderness.  No rebound, guarding, or rigidity.   Musculoskeletal: Pt is unable to lift her L leg off of the bed. No obvious deformities. Trace lower extremity edema noted.  Skin: Warm and dry.  Neurological:  Alert, awake, and appropriate.  There is a tremor to the L hand and LUE. Decreased  strength on the L. Pt is unable to lift her L leg off of the bed. Normal speech.  No acute focal neurological deficits are appreciated.  Psychiatric: Normal affect. Good eye contact. Appropriate in content.    ED Course    Critical Care    Date/Time: 5/16/2022 10:38 PM  Performed by: Candido Amaral MD  Authorized by: Candido Amaral MD   Total critical care time (exclusive of procedural time) : 44 minutes  Critical care time was exclusive of separately billable procedures and treating other patients.  Critical care was necessary to treat or prevent imminent or life-threatening deterioration of the following conditions: CNS failure or compromise.  Critical care was time spent personally by me on the following activities: blood draw for specimens, development of treatment plan with patient or surrogate, discussions with consultants, evaluation of patient's response to treatment, examination of patient, obtaining history from patient or surrogate, ordering and performing treatments and interventions, ordering and review of laboratory studies, ordering and review of radiographic studies, pulse oximetry, re-evaluation of patient's condition and review of old charts.  Subsequent provider of critical care: I assumed direction of critical care for this patient from another provider of my specialty.        ED Vital Signs:  Vitals:    05/16/22 1549 05/16/22 1550 05/16/22 2100   BP:  (!) 160/69 (!) 159/100   Pulse: 85  75   Resp: 18  (!) 22   Temp: 97.4 °F (36.3 °C)     TempSrc: Oral     SpO2: 97%  100%   Weight: 121.6 kg (268 lb)         Abnormal Lab  Results:  Labs Reviewed   CBC W/ AUTO DIFFERENTIAL - Abnormal; Notable for the following components:       Result Value    Hemoglobin 10.9 (*)     Hematocrit 35.9 (*)     MCV 68 (*)     MCH 20.7 (*)     MCHC 30.4 (*)     RDW 16.6 (*)     All other components within normal limits   COMPREHENSIVE METABOLIC PANEL - Abnormal; Notable for the following components:    CO2 18 (*)     All other components within normal limits   URINALYSIS, REFLEX TO URINE CULTURE    Narrative:     Specimen Source->Urine   TSH   C-REACTIVE PROTEIN   SARS-COV-2 RNA AMPLIFICATION, QUAL   SEDIMENTATION RATE        All Lab Results:  Results for orders placed or performed during the hospital encounter of 05/16/22   CBC auto differential   Result Value Ref Range    WBC 5.11 3.90 - 12.70 K/uL    RBC 5.27 4.00 - 5.40 M/uL    Hemoglobin 10.9 (L) 12.0 - 16.0 g/dL    Hematocrit 35.9 (L) 37.0 - 48.5 %    MCV 68 (L) 82 - 98 fL    MCH 20.7 (L) 27.0 - 31.0 pg    MCHC 30.4 (L) 32.0 - 36.0 g/dL    RDW 16.6 (H) 11.5 - 14.5 %    Platelets 255 150 - 450 K/uL    MPV 10.1 9.2 - 12.9 fL    Immature Granulocytes 0.2 0.0 - 0.5 %    Gran # (ANC) 2.5 1.8 - 7.7 K/uL    Immature Grans (Abs) 0.01 0.00 - 0.04 K/uL    Lymph # 2.0 1.0 - 4.8 K/uL    Mono # 0.5 0.3 - 1.0 K/uL    Eos # 0.1 0.0 - 0.5 K/uL    Baso # 0.03 0.00 - 0.20 K/uL    nRBC 0 0 /100 WBC    Gran % 48.4 38.0 - 73.0 %    Lymph % 39.1 18.0 - 48.0 %    Mono % 9.2 4.0 - 15.0 %    Eosinophil % 2.5 0.0 - 8.0 %    Basophil % 0.6 0.0 - 1.9 %    Differential Method Automated    Comprehensive metabolic panel   Result Value Ref Range    Sodium 142 136 - 145 mmol/L    Potassium 4.4 3.5 - 5.1 mmol/L    Chloride 110 95 - 110 mmol/L    CO2 18 (L) 23 - 29 mmol/L    Glucose 108 70 - 110 mg/dL    BUN 7 6 - 20 mg/dL    Creatinine 0.7 0.5 - 1.4 mg/dL    Calcium 8.8 8.7 - 10.5 mg/dL    Total Protein 6.7 6.0 - 8.4 g/dL    Albumin 3.5 3.5 - 5.2 g/dL    Total Bilirubin 0.3 0.1 - 1.0 mg/dL    Alkaline Phosphatase 79 55 - 135 U/L     AST 25 10 - 40 U/L    ALT 15 10 - 44 U/L    Anion Gap 14 8 - 16 mmol/L    eGFR if African American >60 >60 mL/min/1.73 m^2    eGFR if non African American >60 >60 mL/min/1.73 m^2   Urinalysis, Reflex to Urine Culture Urine, Clean Catch    Specimen: Urine   Result Value Ref Range    Specimen UA Urine, Clean Catch     Color, UA Yellow Yellow, Straw, Jen    Appearance, UA Clear Clear    pH, UA 6.0 5.0 - 8.0    Specific Gravity, UA 1.020 1.005 - 1.030    Protein, UA Negative Negative    Glucose, UA Negative Negative    Ketones, UA Negative Negative    Bilirubin (UA) Negative Negative    Occult Blood UA Negative Negative    Nitrite, UA Negative Negative    Urobilinogen, UA Negative <2.0 EU/dL    Leukocytes, UA Negative Negative   TSH   Result Value Ref Range    TSH 1.296 0.400 - 4.000 uIU/mL   C-reactive protein   Result Value Ref Range    CRP 6.3 0.0 - 8.2 mg/L   COVID-19 Rapid Screening   Result Value Ref Range    SARS-CoV-2 RNA, Amplification, Qual Negative Negative     *Note: Due to a large number of results and/or encounters for the requested time period, some results have not been displayed. A complete set of results can be found in Results Review.     Results for orders placed or performed during the hospital encounter of 05/16/22   CBC auto differential   Result Value Ref Range    WBC 5.11 3.90 - 12.70 K/uL    RBC 5.27 4.00 - 5.40 M/uL    Hemoglobin 10.9 (L) 12.0 - 16.0 g/dL    Hematocrit 35.9 (L) 37.0 - 48.5 %    MCV 68 (L) 82 - 98 fL    MCH 20.7 (L) 27.0 - 31.0 pg    MCHC 30.4 (L) 32.0 - 36.0 g/dL    RDW 16.6 (H) 11.5 - 14.5 %    Platelets 255 150 - 450 K/uL    MPV 10.1 9.2 - 12.9 fL    Immature Granulocytes 0.2 0.0 - 0.5 %    Gran # (ANC) 2.5 1.8 - 7.7 K/uL    Immature Grans (Abs) 0.01 0.00 - 0.04 K/uL    Lymph # 2.0 1.0 - 4.8 K/uL    Mono # 0.5 0.3 - 1.0 K/uL    Eos # 0.1 0.0 - 0.5 K/uL    Baso # 0.03 0.00 - 0.20 K/uL    nRBC 0 0 /100 WBC    Gran % 48.4 38.0 - 73.0 %    Lymph % 39.1 18.0 - 48.0 %    Mono %  9.2 4.0 - 15.0 %    Eosinophil % 2.5 0.0 - 8.0 %    Basophil % 0.6 0.0 - 1.9 %    Differential Method Automated    Comprehensive metabolic panel   Result Value Ref Range    Sodium 142 136 - 145 mmol/L    Potassium 4.4 3.5 - 5.1 mmol/L    Chloride 110 95 - 110 mmol/L    CO2 18 (L) 23 - 29 mmol/L    Glucose 108 70 - 110 mg/dL    BUN 7 6 - 20 mg/dL    Creatinine 0.7 0.5 - 1.4 mg/dL    Calcium 8.8 8.7 - 10.5 mg/dL    Total Protein 6.7 6.0 - 8.4 g/dL    Albumin 3.5 3.5 - 5.2 g/dL    Total Bilirubin 0.3 0.1 - 1.0 mg/dL    Alkaline Phosphatase 79 55 - 135 U/L    AST 25 10 - 40 U/L    ALT 15 10 - 44 U/L    Anion Gap 14 8 - 16 mmol/L    eGFR if African American >60 >60 mL/min/1.73 m^2    eGFR if non African American >60 >60 mL/min/1.73 m^2   Urinalysis, Reflex to Urine Culture Urine, Clean Catch    Specimen: Urine   Result Value Ref Range    Specimen UA Urine, Clean Catch     Color, UA Yellow Yellow, Straw, Jen    Appearance, UA Clear Clear    pH, UA 6.0 5.0 - 8.0    Specific Gravity, UA 1.020 1.005 - 1.030    Protein, UA Negative Negative    Glucose, UA Negative Negative    Ketones, UA Negative Negative    Bilirubin (UA) Negative Negative    Occult Blood UA Negative Negative    Nitrite, UA Negative Negative    Urobilinogen, UA Negative <2.0 EU/dL    Leukocytes, UA Negative Negative   TSH   Result Value Ref Range    TSH 1.296 0.400 - 4.000 uIU/mL   C-reactive protein   Result Value Ref Range    CRP 6.3 0.0 - 8.2 mg/L   COVID-19 Rapid Screening   Result Value Ref Range    SARS-CoV-2 RNA, Amplification, Qual Negative Negative     *Note: Due to a large number of results and/or encounters for the requested time period, some results have not been displayed. A complete set of results can be found in Results Review.           Imaging Results:  Imaging Results          MRI Thoracic Spine Demyelinating W W/O Contrast (In process)                MRI Brain Demyelinating W W/O Contrast (In process)                MRI Cervical Spine  Demyelinating W W/O Contrast (In process)                CT Head Without Contrast (Final result)  Result time 05/16/22 20:14:43    Final result by Jourdan Mejia MD (05/16/22 20:14:43)                 Impression:      No detrimental change in comparison to the prior head CT exams.  Further evaluation as clinically warranted.    All CT scans at this facility are performed  using dose modulation techniques as appropriate to performed exam including the following:  automated exposure control; adjustment of mA and/or kV according to the patients size (this includes techniques or standardized protocols for targeted exams where dose is matched to indication/reason for exam: i.e. extremities or head);  iterative reconstruction technique.      Electronically signed by: Jourdan Mejia  Date:    05/16/2022  Time:    20:14             Narrative:    EXAMINATION:  CT HEAD WITHOUT CONTRAST    CLINICAL HISTORY:  left sided weakness;    TECHNIQUE:  Low dose axial CT images obtained throughout the head without intravenous contrast. Sagittal and coronal reconstructions were performed.    COMPARISON:  Multiple priors..    FINDINGS:  Intracranial compartment:    Ventricles and sulci are normal in size for age without evidence of hydrocephalus. No extra-axial blood or fluid collections.    Beam hardening artifact related to the shape of the calvarium projecting over the bilateral frontal lobes laterally, stable from prior CTs.  Moderate hypoattenuation in a periventricular predominant pattern may relate to a combination of microvascular ischemic change and demyelinating plaque in this patient with a history of demyelination.  No parenchymal mass, hemorrhage, edema or major vascular distribution infarct.    Skull/extracranial contents (limited evaluation): No fracture. Mastoid air cells and paranasal sinuses are essentially clear.                                        The Emergency Provider reviewed the vital signs and test results,  which are outlined above.    ED Discussion     7:32 PM: Discussed pt's case with Dr. Marion (Neurology) who recommends MRI T-spine with and without contrast, MRI C-spine with and without contrast, MRI brain with and with out contrast, solumedrol 1 gram, and placement in observation.    8:00 PM: Dr. Clifford transfers care of patient to Dr. Amaral pending lab and radiology results.    10:38 PM Reassessment: Dr. Amaral reassessed the pt.  Discussed test results, shared treatment plan, and the need for admission.  Pt and family understand and agree to the plan.  All questions were answered at this time.               ED Medication(s):  Medications   methylPREDNISolone (SOLU-MEDROL) 1 g in dextrose 5 % 100 mL IVPB (1 g Intravenous New Bag 5/16/22 2143)   diphenhydrAMINE injection 25 mg (25 mg Intravenous Given 5/16/22 2110)           New Prescriptions    No medications on file         Medical Decision Making    Medical Decision Making:   Clinical Tests:   Lab Tests: Ordered and Reviewed  Radiological Study: Ordered and Reviewed           Scribe Attestation:   Scribe #1: I performed the above scribed service and the documentation accurately describes the services I performed. I attest to the accuracy of the note.    Attending:   Physician Attestation Statement for Scribe #1: I, Karen Clifford MD, personally performed the services described in this documentation, as scribed by Antonina Tellez, in my presence, and it is both accurate and complete.          Clinical Impression       ICD-10-CM ICD-9-CM   1. Multiple sclerosis exacerbation  G35 340       Disposition:   Disposition: Placed in Observation  Condition: Fair         Candido Amaral MD  05/16/22 2538

## 2022-05-17 ENCOUNTER — PATIENT MESSAGE (OUTPATIENT)
Dept: NEUROLOGY | Facility: CLINIC | Age: 42
End: 2022-05-17
Payer: MEDICARE

## 2022-05-17 VITALS
RESPIRATION RATE: 18 BRPM | HEIGHT: 66 IN | BODY MASS INDEX: 46.49 KG/M2 | TEMPERATURE: 99 F | DIASTOLIC BLOOD PRESSURE: 75 MMHG | HEART RATE: 68 BPM | WEIGHT: 289.25 LBS | OXYGEN SATURATION: 100 % | SYSTOLIC BLOOD PRESSURE: 154 MMHG

## 2022-05-17 PROBLEM — R22.42 LOCALIZED SWELLING OF LEFT LOWER EXTREMITY: Status: ACTIVE | Noted: 2022-05-17

## 2022-05-17 LAB
FERRITIN SERPL-MCNC: 17 NG/ML (ref 20–300)
IRON SERPL-MCNC: 33 UG/DL (ref 30–160)
SATURATED IRON: 7 % (ref 20–50)
TOTAL IRON BINDING CAPACITY: 482 UG/DL (ref 250–450)
TRANSFERRIN SERPL-MCNC: 326 MG/DL (ref 200–375)

## 2022-05-17 PROCEDURE — 96376 TX/PRO/DX INJ SAME DRUG ADON: CPT

## 2022-05-17 PROCEDURE — 96366 THER/PROPH/DIAG IV INF ADDON: CPT

## 2022-05-17 PROCEDURE — 25000003 PHARM REV CODE 250: Performed by: INTERNAL MEDICINE

## 2022-05-17 PROCEDURE — A4216 STERILE WATER/SALINE, 10 ML: HCPCS | Performed by: INTERNAL MEDICINE

## 2022-05-17 PROCEDURE — G0378 HOSPITAL OBSERVATION PER HR: HCPCS

## 2022-05-17 PROCEDURE — A9585 GADOBUTROL INJECTION: HCPCS | Performed by: EMERGENCY MEDICINE

## 2022-05-17 PROCEDURE — 36415 COLL VENOUS BLD VENIPUNCTURE: CPT | Performed by: INTERNAL MEDICINE

## 2022-05-17 PROCEDURE — 82728 ASSAY OF FERRITIN: CPT | Performed by: INTERNAL MEDICINE

## 2022-05-17 PROCEDURE — 25500020 PHARM REV CODE 255: Performed by: EMERGENCY MEDICINE

## 2022-05-17 PROCEDURE — 84466 ASSAY OF TRANSFERRIN: CPT | Performed by: INTERNAL MEDICINE

## 2022-05-17 PROCEDURE — 63600175 PHARM REV CODE 636 W HCPCS: Performed by: EMERGENCY MEDICINE

## 2022-05-17 RX ORDER — GADOBUTROL 604.72 MG/ML
10 INJECTION INTRAVENOUS
Status: COMPLETED | OUTPATIENT
Start: 2022-05-17 | End: 2022-05-17

## 2022-05-17 RX ORDER — FERROUS SULFATE 325(65) MG
325 TABLET, DELAYED RELEASE (ENTERIC COATED) ORAL
Qty: 36 TABLET | Refills: 0 | Status: SHIPPED | OUTPATIENT
Start: 2022-05-18 | End: 2022-08-16

## 2022-05-17 RX ORDER — HYDROCODONE BITARTRATE AND ACETAMINOPHEN 10; 325 MG/1; MG/1
1 TABLET ORAL EVERY 8 HOURS PRN
Qty: 20 TABLET | Refills: 0 | Status: SHIPPED | OUTPATIENT
Start: 2022-05-17 | End: 2022-07-02

## 2022-05-17 RX ORDER — DIPHENHYDRAMINE HYDROCHLORIDE 50 MG/ML
25 INJECTION INTRAMUSCULAR; INTRAVENOUS
Status: COMPLETED | OUTPATIENT
Start: 2022-05-17 | End: 2022-05-17

## 2022-05-17 RX ORDER — METHOCARBAMOL 500 MG/1
500 TABLET, FILM COATED ORAL 4 TIMES DAILY
Status: DISCONTINUED | OUTPATIENT
Start: 2022-05-17 | End: 2022-05-17 | Stop reason: HOSPADM

## 2022-05-17 RX ADMIN — Medication 10 ML: at 02:05

## 2022-05-17 RX ADMIN — TOPIRAMATE 50 MG: 25 TABLET, FILM COATED ORAL at 03:05

## 2022-05-17 RX ADMIN — HYDROCODONE BITARTRATE AND ACETAMINOPHEN 1 TABLET: 5; 325 TABLET ORAL at 10:05

## 2022-05-17 RX ADMIN — GADOBUTROL 10 ML: 604.72 INJECTION INTRAVENOUS at 02:05

## 2022-05-17 RX ADMIN — DULOXETINE HYDROCHLORIDE 60 MG: 30 CAPSULE, DELAYED RELEASE ORAL at 08:05

## 2022-05-17 RX ADMIN — DIPHENHYDRAMINE HYDROCHLORIDE 25 MG: 50 INJECTION, SOLUTION INTRAMUSCULAR; INTRAVENOUS at 02:05

## 2022-05-17 RX ADMIN — GABAPENTIN 100 MG: 100 CAPSULE ORAL at 08:05

## 2022-05-17 RX ADMIN — LOSARTAN POTASSIUM 50 MG: 50 TABLET, FILM COATED ORAL at 08:05

## 2022-05-17 RX ADMIN — ACETAMINOPHEN 650 MG: 325 TABLET ORAL at 05:05

## 2022-05-17 RX ADMIN — HYDROCODONE BITARTRATE AND ACETAMINOPHEN 1 TABLET: 5; 325 TABLET ORAL at 02:05

## 2022-05-17 RX ADMIN — GABAPENTIN 100 MG: 100 CAPSULE ORAL at 03:05

## 2022-05-17 RX ADMIN — METHOCARBAMOL 500 MG: 500 TABLET ORAL at 08:05

## 2022-05-17 NOTE — PLAN OF CARE
[facility] - Telemetry (Hospital)  Discharge Final Note    Primary Care Provider: Braden Dumont MD    Expected Discharge Date: 5/17/2022    Final Discharge Note (most recent)     Final Note - 05/17/22 1158        Final Note    Assessment Type Final Discharge Note     Anticipated Discharge Disposition Home or Self Care     What phone number can be called within the next 1-3 days to see how you are doing after discharge? --   [phone]    Hospital Resources/Appts/Education Provided Appointments scheduled and added to AVS        Post-Acute Status    Discharge Delays None known at this time               Patient to discharge home. Appointments scheduled and added to AVS.    No additional CM needs for d/c.    Important Message from Medicare             Contact Info     Braden Dumont MD   Specialty: Internal Medicine   Relationship: PCP - General  Hypertension Digital Medicine Responsible Provider    [address] [facility] [address]   Phone: [phone]       Next Steps: Follow up in 3 day(s)    Instructions: for hospital follow-up    HAMIDA Vizcarra, CNS   Specialty: Neurology    [facility] [facility] [address]   Phone: [phone]       Next Steps: Follow up in 1 week(s)    Instructions: for hospital follow-up

## 2022-05-17 NOTE — HPI
42 y.o. female patient with a PMHx of anemia, arthritis, cardiac arrest, hemiplegia due to old stroke, HTN, morbid obesity, and multiple sclerosis on Octrevius -last done 01/22/22 who presents to the Emergency Department for LUE and LLE weakness which onset gradually 1 week PTA.  She was referred to the ED by her neurologist for possible steroid infusion. She reports increased left lower extremity swelling and weakness . There is associated history of numbness,gait difficulty. Patient denies any fever, chills, SOB, CP, N/V/D, headaches, and all other sxs at this time.   Ed discussed case with Dr. Marion (Neurology) who recommends MRI T-spine with and without contrast, MRI C-spine with and without contrast, MRI brain with and with out contrast, solumedrol 1 gram, and placement in observation.  Labs and imaging test reviewed.  CBC -hemoglobin 10.8, mcv -68  Imaging test-pending   She will be placed on observation

## 2022-05-17 NOTE — ASSESSMENT & PLAN NOTE
Will consult neurology , follow MRI of the spine and brain   Was given solumedrol in the Ed  .  On Octrevius -last dose 01/22/22

## 2022-05-17 NOTE — SUBJECTIVE & OBJECTIVE
"Past Medical History:   Diagnosis Date    Anemia     Arthritis     Cardiac arrest as complication of care     pt states she went into cardiac arrest from an allergic reaction to a medication    Depression     Encounter for blood transfusion     Hemiplegia due to old stroke     Hypertension     Morbid obesity with BMI of 45.0-49.9, adult 9/20/2018    Multiple sclerosis        Past Surgical History:   Procedure Laterality Date    APPENDECTOMY      CHOLECYSTECTOMY      COLONOSCOPY N/A 11/25/2020    Procedure: COLONOSCOPY;  Surgeon: Andrew Jenkins III, MD;  Location: [facility];  Service: Endoscopy;  Laterality: N/A;    ESOPHAGOGASTRODUODENOSCOPY N/A 2/19/2020    Procedure: EGD (ESOPHAGOGASTRODUODENOSCOPY);  Surgeon: Michael Navarrete MD;  Location: [facility];  Service: Endoscopy;  Laterality: N/A;    ESOPHAGOGASTRODUODENOSCOPY N/A 2/20/2020    Procedure: EGD (ESOPHAGOGASTRODUODENOSCOPY);  Surgeon: Michael Navarrete MD;  Location: [facility];  Service: General;  Laterality: N/A;    ESOPHAGOGASTRODUODENOSCOPY N/A 11/25/2020    Procedure: EGD (ESOPHAGOGASTRODUODENOSCOPY);  Surgeon: Andrew Jenkins III, MD;  Location: [facility];  Service: Endoscopy;  Laterality: N/A;    HYSTERECTOMY      KNEE SURGERY      ROBOT-ASSISTED LAPAROSCOPIC SLEEVE GASTRECTOMY USING DA ANTHONY XI N/A 2/20/2020    Procedure: XI ROBOTIC SLEEVE GASTRECTOMY;  Surgeon: Michael Navarrete MD;  Location: [facility];  Service: General;  Laterality: N/A;    TENOPLASTY OF HAND Left 8/26/2021    Procedure: REPAIR, TENDON, HAND;  Surgeon: Joselito Lugo MD;  Location: [facility] OR;  Service: Orthopedics;  Laterality: Left;  Left RCL PIP Joint Repair/Recon with Arthrex Internal Brace.    TONSILLECTOMY      TUBAL LIGATION         Review of patient's allergies indicates:   Allergen Reactions    Demerol [meperidine] Anaphylaxis     Other reaction(s): Itching    Dilaudid [hydromorphone (bulk)] Anaphylaxis     "coded" per pt  Patient can tolerate Lortab    Prednisone Itching     Other " reaction(s): Itching    Tramadol      shaking       No current facility-administered medications on file prior to encounter.     Current Outpatient Medications on File Prior to Encounter   Medication Sig    cyclobenzaprine (FLEXERIL) 10 MG tablet Take 10 mg by mouth 3 (three) times daily as needed.    diclofenac sodium (VOLTAREN) 1 % Gel APPLY 2 GRAMS TO AFFECTED AREA 4 TIMES A DAY (Patient taking differently: QID prn)    doxepin (SINEQUAN) 75 MG capsule Take 1 capsule (75 mg total) by mouth every evening.    esomeprazole (NEXIUM) 40 MG capsule TAKE 1 CAPSULE (40 MG TOTAL) BY MOUTH BEFORE BREAKFAST.    gabapentin (NEURONTIN) 300 MG capsule 600mg at morning and afternoon. 900mg at night.    HYDROcodone-acetaminophen (NORCO)  mg per tablet Take 1 tablet by mouth every 8 (eight) hours as needed for Pain.    lactulose (CHRONULAC) 20 gram/30 mL Soln Take 15 mLs (10 g total) by mouth 3 (three) times daily. (Patient taking differently: Take 10 g by mouth 3 (three) times daily as needed.)    LIDOcaine-prilocaine (EMLA) cream     LINZESS 145 mcg Cap capsule TAKE 1 CAPSULE (145 MCG TOTAL) BY MOUTH BEFORE BREAKFAST.    mupirocin (BACTROBAN) 2 % ointment Apply topically 2 (two) times daily.    mupirocin (BACTROBAN) 2 % ointment Apply topically 3 (three) times daily.    topiramate (TOPAMAX) 50 MG tablet TAKE 1 TABLET BY MOUTH IN THE MORNING AND 2 TABLETS AT BEDTIME    triamcinolone acetonide 0.1% (KENALOG) 0.1 % ointment Apply topically 2 (two) times daily.    valsartan (DIOVAN) 160 MG tablet Take 1 tablet (160 mg total) by mouth once daily.    carboxymethylcellulose-citric (PLENITY) 0.75 gram Cap Take 3 capsules by mouth 2 (two) times daily before meals. Take 3 capsules (1 pod) with 16 oz of water 20 minutes before lunch and dinner, 7 days a week.    linaCLOtide (LINZESS) 145 mcg Cap capsule Take 1 capsule (145 mcg total) by mouth before breakfast.    methocarbamoL (ROBAXIN) 500 MG Tab Take 500 mg by mouth 4 (four) times  daily.    ocrelizumab (OCREVUS IV) Inject into the vein.    promethazine (PHENERGAN) 25 MG tablet Take 1 tablet (25 mg total) by mouth every 4 (four) hours.    sumatriptan (IMITREX) 100 MG tablet TAKE 1 TAB AT ONSET OF HEADACH MAY TAKE 2ND TAB 2 HOURS LATER IF NO RELIEF MAX 6 TABS A WEEK    [DISCONTINUED] DULoxetine (CYMBALTA) 60 MG capsule Take 1 capsule (60 mg total) by mouth once daily.     Family History       Problem Relation (Age of Onset)    Breast cancer Maternal Aunt, Maternal Cousin    COPD Maternal Aunt    Cancer Maternal Aunt (40)    Diabetes Father, Maternal Aunt    Heart disease Mother    Hypertension Mother    Kidney disease Father    Lupus Mother    Ovarian cancer Maternal Cousin          Tobacco Use    Smoking status: Never Smoker    Smokeless tobacco: Never Used   Substance and Sexual Activity    Alcohol use: Yes     Comment: once a year     Drug use: Never    Sexual activity: Yes     Partners: Male     Birth control/protection: Surgical     Review of Systems   Constitutional:  Positive for activity change. Negative for chills and fatigue.   HENT:  Negative for congestion, ear pain, facial swelling, sinus pressure and sore throat.    Eyes:  Negative for pain.   Respiratory:  Negative for apnea, chest tightness, shortness of breath and stridor.    Cardiovascular:  Negative for chest pain, palpitations and leg swelling.   Gastrointestinal:  Negative for abdominal distention, abdominal pain, diarrhea and nausea.   Endocrine: Negative for polydipsia and polyphagia.   Genitourinary:  Negative for decreased urine volume, difficulty urinating, frequency and genital sores.   Musculoskeletal:  Positive for back pain and gait problem. Negative for arthralgias.   Neurological:  Positive for numbness. Negative for light-headedness and headaches.   Hematological:  Negative for adenopathy.   Psychiatric/Behavioral:  Negative for agitation, confusion and decreased concentration.    Objective:     Vital Signs  (Most Recent):  Temp: 98.1 °F (36.7 °C) (05/17/22 0436)  Pulse: 106 (05/17/22 0436)  Resp: 20 (05/17/22 0436)  BP: (!) 165/79 (05/17/22 0436)  SpO2: 98 % (05/17/22 0436)   Vital Signs (24h Range):  Temp:  [97.4 °F (36.3 °C)-98.1 °F (36.7 °C)] 98.1 °F (36.7 °C)  Pulse:  [] 106  Resp:  [18-25] 20  SpO2:  [97 %-100 %] 98 %  BP: (143-165)/() 165/79     Weight: 131.2 kg (289 lb 3.9 oz)  Body mass index is 46.69 kg/m².    Physical Exam  Vitals and nursing note reviewed.   Constitutional:       Appearance: She is well-developed.   HENT:      Head: Normocephalic and atraumatic.   Eyes:      Conjunctiva/sclera: Conjunctivae normal.      Pupils: Pupils are equal, round, and reactive to light.   Neck:      Thyroid: No thyroid mass or thyromegaly.   Cardiovascular:      Rate and Rhythm: Normal rate.      Heart sounds: Normal heart sounds.   Pulmonary:      Effort: Pulmonary effort is normal. No accessory muscle usage or respiratory distress.      Breath sounds: Normal breath sounds.   Abdominal:      General: Bowel sounds are normal.      Palpations: Abdomen is soft. There is no mass.      Tenderness: There is no abdominal tenderness.   Musculoskeletal:      Cervical back: Normal range of motion and neck supple.      Right lower leg: Edema present.      Left lower leg: Edema present.   Skin:     Findings: No rash.   Neurological:      Mental Status: She is alert and oriented to person, place, and time.      Sensory: Sensory deficit present.      Motor: Weakness present.      Coordination: Coordination abnormal.      Gait: Gait abnormal.         CRANIAL NERVES     CN III, IV, VI   Pupils are equal, round, and reactive to light.     Significant Labs: All pertinent labs within the past 24 hours have been reviewed.  BMP:   Recent Labs   Lab 05/16/22 2036         K 4.4      CO2 18*   BUN 7   CREATININE 0.7   CALCIUM 8.8     CBC:   Recent Labs   Lab 05/16/22 1736   WBC 5.11   HGB 10.9*   HCT 35.9*         CMP:   Recent Labs   Lab 05/16/22 2036      K 4.4      CO2 18*      BUN 7   CREATININE 0.7   CALCIUM 8.8   PROT 6.7   ALBUMIN 3.5   BILITOT 0.3   ALKPHOS 79   AST 25   ALT 15   ANIONGAP 14   EGFRNONAA >60       Significant Imaging: I have reviewed all pertinent imaging results/findings within the past 24 hours.

## 2022-05-17 NOTE — PLAN OF CARE
Plan of care reviewed with pt, verbalzied understanding. A&O x4. IV intact, dry, and clean. Medications given with no complications. No pain reported. NS to ST on monitor. On room air. Pt ambulates with assistance x1, weight shift assistance provided. L sided weakness. Bed alarm on. Instructed to call for assistance. Hourly rounding completed.

## 2022-05-17 NOTE — DISCHARGE SUMMARY
[facility] Medicine  Discharge Summary      Patient Name: Alicia Soliz  MRN: [id number]  Patient Class: OP- Observation  Admission Date: 5/16/2022  Hospital Length of Stay: 0 days  Discharge Date and Time:  05/17/2022 5:46 PM  Attending Physician: No att. providers found   Discharging Provider: Sulma Tellez NP  Primary Care Provider: Braden Dumont MD      HPI:   42 y.o. female patient with a PMHx of anemia, arthritis, cardiac arrest, hemiplegia due to old stroke, HTN, morbid obesity, and multiple sclerosis on Octrevius -last done 01/22/22 who presents to the Emergency Department for LUE and LLE weakness which onset gradually 1 week PTA.  She was referred to the ED by her neurologist for possible steroid infusion. She reports increased left lower extremity swelling and weakness . There is associated history of numbness,gait difficulty. Patient denies any fever, chills, SOB, CP, N/V/D, headaches, and all other sxs at this time.   Ed discussed case with Dr. Marion (Neurology) who recommends MRI T-spine with and without contrast, MRI C-spine with and without contrast, MRI brain with and with out contrast, solumedrol 1 gram, and placement in observation.  Labs and imaging test reviewed.  CBC -hemoglobin 10.8, mcv -68  Imaging test-pending   She will be placed on observation           * No surgery found *      Hospital Course:   43 y/o female admitted for acute multiple sclerosis exacerbation. Patient was treated with IV solu-medrol. MRI brain showed no abnormal enhancement as may occur with active demyelinating disease. Iron studies reviewed, iron supplement added. Symptoms improved. Patient seen and examined on the date of discharge and found stable for discharge. Patient to follow-up with PCP, neurology, and hematology outpatient.        Goals of Care Treatment Preferences:  Code Status: Full Code      Consults:     Hypertension    Will continue losartan       Final Active  Diagnoses:    Diagnosis Date Noted POA    PRINCIPAL PROBLEM:  Multiple sclerosis exacerbation [G35] 09/24/2021 Yes    Localized swelling of left lower extremity [R22.42] 05/17/2022 Yes    Morbid obesity with BMI of 45.0-49.9, adult [E66.01, Z68.42] 09/20/2018 Not Applicable    Multiple sclerosis [G35] 01/22/2015 Yes    Hypertension [I10] 06/24/2014 Yes      Problems Resolved During this Admission:       Discharged Condition: stable    Disposition: Home or Self Care    Follow Up:   Follow-up Information     Braden Dumont MD Follow up in 3 day(s).    Specialty: Internal Medicine  Why: for hospital follow-up  Contact information:  [id number] [facility] [id number] [phone]             HAMIDA Vizcarra, CNS Follow up in 1 week(s).    Specialty: Neurology  Why: for hospital follow-up  Contact information:  [address] [facility] [id number] [phone]             Salty Zelaya MD Follow up in 2 week(s).    Specialty: Hematology and Oncology  Why: Further evaluation of chronic microcytic anemia.  Suspect hemoglobinopathy vs. Iron deficiency.  Contact information:  [address]  [place] [address] [phone]                       Patient Instructions:      Ambulatory referral/consult to Physical/Occupational Therapy   Standing Status: Future   Referral Priority: Routine Referral Type: Physical Medicine   Referral Reason: Specialty Services Required   Number of Visits Requested: 1     Notify your health care provider if you experience any of the following:  severe uncontrolled pain     Notify your health care provider if you experience any of the following:  difficulty breathing or increased cough     Notify your health care provider if you experience any of the following:  severe persistent headache     Notify your health care provider if you experience any of the following:  persistent dizziness, light-headedness, or visual disturbances     Notify your health care  provider if you experience any of the following:  increased confusion or weakness     Activity as tolerated       Significant Diagnostic Studies: Labs:   BMP:   Recent Labs   Lab 05/16/22 2036         K 4.4      CO2 18*   BUN 7   CREATININE 0.7   CALCIUM 8.8   , CMP   Recent Labs   Lab 05/16/22 2036      K 4.4      CO2 18*      BUN 7   CREATININE 0.7   CALCIUM 8.8   PROT 6.7   ALBUMIN 3.5   BILITOT 0.3   ALKPHOS 79   AST 25   ALT 15   ANIONGAP 14   ESTGFRAFRICA >60   EGFRNONAA >60   , CBC   Recent Labs   Lab 05/16/22  1736   WBC 5.11   HGB 10.9*   HCT 35.9*       and All labs within the past 24 hours have been reviewed    Pending Diagnostic Studies:     Procedure Component Value Units Date/Time    Iron and TIBC [[id number]] Collected: 05/17/22 1241    Order Status: Sent Lab Status: In process Updated: 05/17/22 1241    Specimen: Blood          Medications:  Reconciled Home Medications:      Medication List      START taking these medications    ferrous sulfate 325 (65 FE) MG EC tablet  Take 1 tablet (325 mg total) by mouth every Mon, Wed, Fri.  Start taking on: May 18, 2022        CHANGE how you take these medications    diclofenac sodium 1 % Gel  Commonly known as: VOLTAREN  APPLY 2 GRAMS TO AFFECTED AREA 4 TIMES A DAY  What changed: See the new instructions.     lactulose 20 gram/30 mL Soln  Commonly known as: CHRONULAC  Take 15 mLs (10 g total) by mouth 3 (three) times daily.  What changed:   · when to take this  · reasons to take this     mupirocin 2 % ointment  Commonly known as: BACTROBAN  Apply topically 2 (two) times daily.  What changed: Another medication with the same name was removed. Continue taking this medication, and follow the directions you see here.        CONTINUE taking these medications    cyclobenzaprine 10 MG tablet  Commonly known as: FLEXERIL  Take 10 mg by mouth 3 (three) times daily as needed.     doxepin 75 MG capsule  Commonly known as:  SINEQUAN  Take 1 capsule (75 mg total) by mouth every evening.     esomeprazole 40 MG capsule  Commonly known as: NEXIUM  TAKE 1 CAPSULE (40 MG TOTAL) BY MOUTH BEFORE BREAKFAST.     gabapentin 300 MG capsule  Commonly known as: NEURONTIN  600mg at morning and afternoon. 900mg at night.     HYDROcodone-acetaminophen  mg per tablet  Commonly known as: NORCO  Take 1 tablet by mouth every 8 (eight) hours as needed for Pain.     LIDOcaine-prilocaine cream  Commonly known as: EMLA     * linaCLOtide 145 mcg Cap capsule  Commonly known as: LINZESS  Take 1 capsule (145 mcg total) by mouth before breakfast.     * LINZESS 145 mcg Cap capsule  Generic drug: linaCLOtide  TAKE 1 CAPSULE (145 MCG TOTAL) BY MOUTH BEFORE BREAKFAST.     methocarbamoL 500 MG Tab  Commonly known as: ROBAXIN  Take 500 mg by mouth 4 (four) times daily.     OCREVUS IV  Inject into the vein.     PLENITY 0.75 gram Cap  Generic drug: carboxymethylcellulose-citric  Take 3 capsules by mouth 2 (two) times daily before meals. Take 3 capsules (1 pod) with 16 oz of water 20 minutes before lunch and dinner, 7 days a week.     promethazine 25 MG tablet  Commonly known as: PHENERGAN  Take 1 tablet (25 mg total) by mouth every 4 (four) hours.     sumatriptan 100 MG tablet  Commonly known as: IMITREX  TAKE 1 TAB AT ONSET OF HEADACH MAY TAKE 2ND TAB 2 HOURS LATER IF NO RELIEF MAX 6 TABS A WEEK     topiramate 50 MG tablet  Commonly known as: TOPAMAX  TAKE 1 TABLET BY MOUTH IN THE MORNING AND 2 TABLETS AT BEDTIME     triamcinolone acetonide 0.1% 0.1 % ointment  Commonly known as: KENALOG  Apply topically 2 (two) times daily.     valsartan 160 MG tablet  Commonly known as: DIOVAN  Take 1 tablet (160 mg total) by mouth once daily.         * This list has 2 medication(s) that are the same as other medications prescribed for you. Read the directions carefully, and ask your doctor or other care provider to review them with you.                Indwelling Lines/Drains at  time of discharge:   Lines/Drains/Airways     None                 Time spent on the discharge of patient: 40 minutes         Sulma Tellez NP  Department of Hospital Medicine  [facility] ([facility])

## 2022-05-17 NOTE — H&P
[facility] [place] [facility]  History & Physical    Patient Name: Alicia Soliz  MRN: [id number]  Patient Class: OP- Observation  Admission Date: 5/16/2022  Attending Physician: Jeff Bright MD   Primary Care Provider: Braden Dumont MD         Patient information was obtained from patient, past medical records and ER records.     Subjective:     Principal Problem:Multiple sclerosis exacerbation    Chief Complaint:   Chief Complaint   Patient presents with    General Illness     Having MS flare up. Sent by doctor for steroid infusion. Left sided weakness and pain         HPI: 42 y.o. female patient with a PMHx of anemia, arthritis, cardiac arrest, hemiplegia due to old stroke, HTN, morbid obesity, and multiple sclerosis on Octrevius -last done 01/22/22 who presents to the Emergency Department for LUE and LLE weakness which onset gradually 1 week PTA.  She was referred to the ED by her neurologist for possible steroid infusion. She reports increased left lower extremity swelling and weakness . There is associated history of numbness,gait difficulty. Patient denies any fever, chills, SOB, CP, N/V/D, headaches, and all other sxs at this time.   Ed discussed case with Dr. Marion (Neurology) who recommends MRI T-spine with and without contrast, MRI C-spine with and without contrast, MRI brain with and with out contrast, solumedrol 1 gram, and placement in observation.  Labs and imaging test reviewed.  CBC -hemoglobin 10.8, mcv -68  Imaging test-pending   She will be placed on observation           Past Medical History:   Diagnosis Date    Anemia     Arthritis     Cardiac arrest as complication of care     pt states she went into cardiac arrest from an allergic reaction to a medication    Depression     Encounter for blood transfusion     Hemiplegia due to old stroke     Hypertension     Morbid obesity with BMI of 45.0-49.9, adult 9/20/2018    Multiple sclerosis        Past Surgical  "History:   Procedure Laterality Date    APPENDECTOMY      CHOLECYSTECTOMY      COLONOSCOPY N/A 11/25/2020    Procedure: COLONOSCOPY;  Surgeon: Andrew Jenkins III, MD;  Location: [facility] ENDO;  Service: Endoscopy;  Laterality: N/A;    ESOPHAGOGASTRODUODENOSCOPY N/A 2/19/2020    Procedure: EGD (ESOPHAGOGASTRODUODENOSCOPY);  Surgeon: Michael Navarrete MD;  Location: [facility] ENDO;  Service: Endoscopy;  Laterality: N/A;    ESOPHAGOGASTRODUODENOSCOPY N/A 2/20/2020    Procedure: EGD (ESOPHAGOGASTRODUODENOSCOPY);  Surgeon: Michael Navarrete MD;  Location: [facility] OR;  Service: General;  Laterality: N/A;    ESOPHAGOGASTRODUODENOSCOPY N/A 11/25/2020    Procedure: EGD (ESOPHAGOGASTRODUODENOSCOPY);  Surgeon: Andrew Jenkins III, MD;  Location: [facility] ENDO;  Service: Endoscopy;  Laterality: N/A;    HYSTERECTOMY      KNEE SURGERY      ROBOT-ASSISTED LAPAROSCOPIC SLEEVE GASTRECTOMY USING DA ANTHONY XI N/A 2/20/2020    Procedure: XI ROBOTIC SLEEVE GASTRECTOMY;  Surgeon: Michael Navarrete MD;  Location: [facility] OR;  Service: General;  Laterality: N/A;    TENOPLASTY OF HAND Left 8/26/2021    Procedure: REPAIR, TENDON, HAND;  Surgeon: Joselito Lugo MD;  Location: [facility] OR;  Service: Orthopedics;  Laterality: Left;  Left RCL PIP Joint Repair/Recon with Arthrex Internal Brace.    TONSILLECTOMY      TUBAL LIGATION         Review of patient's allergies indicates:   Allergen Reactions    Demerol [meperidine] Anaphylaxis     Other reaction(s): Itching    Dilaudid [hydromorphone (bulk)] Anaphylaxis     "coded" per pt  Patient can tolerate Lortab    Prednisone Itching     Other reaction(s): Itching    Tramadol      shaking       No current facility-administered medications on file prior to encounter.     Current Outpatient Medications on File Prior to Encounter   Medication Sig    cyclobenzaprine (FLEXERIL) 10 MG tablet Take 10 mg by mouth 3 (three) times daily as needed.    diclofenac sodium (VOLTAREN) 1 % Gel APPLY 2 GRAMS TO AFFECTED AREA 4 " TIMES A DAY (Patient taking differently: QID prn)    doxepin (SINEQUAN) 75 MG capsule Take 1 capsule (75 mg total) by mouth every evening.    esomeprazole (NEXIUM) 40 MG capsule TAKE 1 CAPSULE (40 MG TOTAL) BY MOUTH BEFORE BREAKFAST.    gabapentin (NEURONTIN) 300 MG capsule 600mg at morning and afternoon. 900mg at night.    HYDROcodone-acetaminophen (NORCO)  mg per tablet Take 1 tablet by mouth every 8 (eight) hours as needed for Pain.    lactulose (CHRONULAC) 20 gram/30 mL Soln Take 15 mLs (10 g total) by mouth 3 (three) times daily. (Patient taking differently: Take 10 g by mouth 3 (three) times daily as needed.)    LIDOcaine-prilocaine (EMLA) cream     LINZESS 145 mcg Cap capsule TAKE 1 CAPSULE (145 MCG TOTAL) BY MOUTH BEFORE BREAKFAST.    mupirocin (BACTROBAN) 2 % ointment Apply topically 2 (two) times daily.    mupirocin (BACTROBAN) 2 % ointment Apply topically 3 (three) times daily.    topiramate (TOPAMAX) 50 MG tablet TAKE 1 TABLET BY MOUTH IN THE MORNING AND 2 TABLETS AT BEDTIME    triamcinolone acetonide 0.1% (KENALOG) 0.1 % ointment Apply topically 2 (two) times daily.    valsartan (DIOVAN) 160 MG tablet Take 1 tablet (160 mg total) by mouth once daily.    carboxymethylcellulose-citric (PLENITY) 0.75 gram Cap Take 3 capsules by mouth 2 (two) times daily before meals. Take 3 capsules (1 pod) with 16 oz of water 20 minutes before lunch and dinner, 7 days a week.    linaCLOtide (LINZESS) 145 mcg Cap capsule Take 1 capsule (145 mcg total) by mouth before breakfast.    methocarbamoL (ROBAXIN) 500 MG Tab Take 500 mg by mouth 4 (four) times daily.    ocrelizumab (OCREVUS IV) Inject into the vein.    promethazine (PHENERGAN) 25 MG tablet Take 1 tablet (25 mg total) by mouth every 4 (four) hours.    sumatriptan (IMITREX) 100 MG tablet TAKE 1 TAB AT ONSET OF HEADACH MAY TAKE 2ND TAB 2 HOURS LATER IF NO RELIEF MAX 6 TABS A WEEK    [DISCONTINUED] DULoxetine (CYMBALTA) 60 MG capsule Take 1  capsule (60 mg total) by mouth once daily.     Family History       Problem Relation (Age of Onset)    Breast cancer Maternal Aunt, Maternal Cousin    COPD Maternal Aunt    Cancer Maternal Aunt (40)    Diabetes Father, Maternal Aunt    Heart disease Mother    Hypertension Mother    Kidney disease Father    Lupus Mother    Ovarian cancer Maternal Cousin          Tobacco Use    Smoking status: Never Smoker    Smokeless tobacco: Never Used   Substance and Sexual Activity    Alcohol use: Yes     Comment: once a year     Drug use: Never    Sexual activity: Yes     Partners: Male     Birth control/protection: Surgical     Review of Systems   Constitutional:  Positive for activity change. Negative for chills and fatigue.   HENT:  Negative for congestion, ear pain, facial swelling, sinus pressure and sore throat.    Eyes:  Negative for pain.   Respiratory:  Negative for apnea, chest tightness, shortness of breath and stridor.    Cardiovascular:  Negative for chest pain, palpitations and leg swelling.   Gastrointestinal:  Negative for abdominal distention, abdominal pain, diarrhea and nausea.   Endocrine: Negative for polydipsia and polyphagia.   Genitourinary:  Negative for decreased urine volume, difficulty urinating, frequency and genital sores.   Musculoskeletal:  Positive for back pain and gait problem. Negative for arthralgias.   Neurological:  Positive for numbness. Negative for light-headedness and headaches.   Hematological:  Negative for adenopathy.   Psychiatric/Behavioral:  Negative for agitation, confusion and decreased concentration.    Objective:     Vital Signs (Most Recent):  Temp: 98.1 °F (36.7 °C) (05/17/22 0436)  Pulse: 106 (05/17/22 0436)  Resp: 20 (05/17/22 0436)  BP: (!) 165/79 (05/17/22 0436)  SpO2: 98 % (05/17/22 0436)   Vital Signs (24h Range):  Temp:  [97.4 °F (36.3 °C)-98.1 °F (36.7 °C)] 98.1 °F (36.7 °C)  Pulse:  [] 106  Resp:  [18-25] 20  SpO2:  [97 %-100 %] 98 %  BP:  (143-165)/() 165/79     Weight: 131.2 kg (289 lb 3.9 oz)  Body mass index is 46.69 kg/m².    Physical Exam  Vitals and nursing note reviewed.   Constitutional:       Appearance: She is well-developed.   HENT:      Head: Normocephalic and atraumatic.   Eyes:      Conjunctiva/sclera: Conjunctivae normal.      Pupils: Pupils are equal, round, and reactive to light.   Neck:      Thyroid: No thyroid mass or thyromegaly.   Cardiovascular:      Rate and Rhythm: Normal rate.      Heart sounds: Normal heart sounds.   Pulmonary:      Effort: Pulmonary effort is normal. No accessory muscle usage or respiratory distress.      Breath sounds: Normal breath sounds.   Abdominal:      General: Bowel sounds are normal.      Palpations: Abdomen is soft. There is no mass.      Tenderness: There is no abdominal tenderness.   Musculoskeletal:      Cervical back: Normal range of motion and neck supple.      Right lower leg: Edema present.      Left lower leg: Edema present.   Skin:     Findings: No rash.   Neurological:      Mental Status: She is alert and oriented to person, place, and time.      Sensory: Sensory deficit present.      Motor: Weakness present.      Coordination: Coordination abnormal.      Gait: Gait abnormal.         CRANIAL NERVES     CN III, IV, VI   Pupils are equal, round, and reactive to light.     Significant Labs: All pertinent labs within the past 24 hours have been reviewed.  BMP:   Recent Labs   Lab 05/16/22 2036         K 4.4      CO2 18*   BUN 7   CREATININE 0.7   CALCIUM 8.8     CBC:   Recent Labs   Lab 05/16/22  1736   WBC 5.11   HGB 10.9*   HCT 35.9*        CMP:   Recent Labs   Lab 05/16/22 2036      K 4.4      CO2 18*      BUN 7   CREATININE 0.7   CALCIUM 8.8   PROT 6.7   ALBUMIN 3.5   BILITOT 0.3   ALKPHOS 79   AST 25   ALT 15   ANIONGAP 14   EGFRNONAA >60       Significant Imaging: I have reviewed all pertinent imaging results/findings within the past  24 hours.    Assessment/Plan:     * Multiple sclerosis exacerbation      Will consult neurology , follow MRI of the spine and brain   Was given solumedrol in the Ed  .  On Octrevius -last dose 01/22/22      Localized swelling of left lower extremity    Will rule out DVT -follow ultrasound     Morbid obesity with BMI of 45.0-49.9, adult    Out patient follow up for structured weight loss regime  Hypertension - continue Losartan      VTE Risk Mitigation (From admission, onward)         Ordered     IP VTE HIGH RISK PATIENT  Once         05/16/22 0382                   Jeff Bright MD  Department of Hospital Medicine   [facility] - Telemetry ([facility])

## 2022-05-17 NOTE — PLAN OF CARE
[facility] - Telemetry (Hospital)  Discharge Assessment    Primary Care Provider: Braden Dumont MD     Discharge Assessment (most recent)     BRIEF DISCHARGE ASSESSMENT - 05/17/22 1130        Discharge Planning    Assessment Type Discharge Planning Brief Assessment     Resource/Environmental Concerns none     Support Systems Children     Assistance Needed NA     Equipment Currently Used at Home cane, quad     Current Living Arrangements home/apartment/condo     Patient/Family Anticipates Transition to home;home with family     Patient/Family Anticipated Services at Transition none     DME Needed Upon Discharge  none     Discharge Plan A Home               Swer met with patient at the bedside to complete discharge assessment. Patient reports living at home with her children. Patient reports that she's mostly independent but does use DME to assist her. Patient primarily ambulates with a quad cane. Patient's daughter will provide d/c transport once she gets off work.    Patient inquired about her lunch. Swer notified NP via secure chat.    No CM additional needs expressed at this time.   Swer updated pt's whiteboard to reflect d/c plan and SW contact number.

## 2022-05-17 NOTE — PHARMACY MED REC
"Admission Medication History     The home medication history was taken by Kody Conklin.    You may go to "Admission" then "Reconcile Home Medications" tabs to review and/or act upon these items.      The home medication list has been updated by the Pharmacy department.    Please read ALL comments highlighted in yellow.    Please address this information as you see fit.     Feel free to contact us if you have any questions or require assistance.      Medications listed below were obtained from: Analytic software- [employer] and Medical records  (Not in a hospital admission)      Kody Conklin  [id number]    Current Outpatient Medications on File Prior to Encounter   Medication Sig Dispense Refill Last Dose    carboxymethylcellulose-citric (PLENITY) 0.75 gram Cap Take 3 capsules by mouth 2 (two) times daily before meals. Take 3 capsules (1 pod) with 16 oz of water 20 minutes before lunch and dinner, 7 days a week. 168 capsule 11     cyclobenzaprine (FLEXERIL) 10 MG tablet Take 10 mg by mouth 3 (three) times daily as needed.       diclofenac sodium (VOLTAREN) 1 % Gel APPLY 2 GRAMS TO AFFECTED AREA 4 TIMES A DAY (Patient taking differently: No sig reported) 300 g 3     doxepin (SINEQUAN) 75 MG capsule Take 1 capsule (75 mg total) by mouth every evening. 90 capsule 3     DULoxetine (CYMBALTA) 60 MG capsule Take 1 capsule (60 mg total) by mouth once daily. 30 capsule 11     esomeprazole (NEXIUM) 40 MG capsule TAKE 1 CAPSULE (40 MG TOTAL) BY MOUTH BEFORE BREAKFAST. 90 capsule 3     gabapentin (NEURONTIN) 300 MG capsule 600mg at morning and afternoon. 900mg at night. 210 capsule 1     HYDROcodone-acetaminophen (NORCO)  mg per tablet Take 1 tablet by mouth every 8 (eight) hours as needed for Pain.       lactulose (CHRONULAC) 20 gram/30 mL Soln Take 15 mLs (10 g total) by mouth 3 (three) times daily. (Patient taking differently: Take 10 g by mouth 3 (three) times daily as needed.) 200 mL 3     " LIDOcaine-prilocaine (EMLA) cream        linaCLOtide (LINZESS) 145 mcg Cap capsule Take 1 capsule (145 mcg total) by mouth before breakfast. 30 capsule 5     LINZESS 145 mcg Cap capsule TAKE 1 CAPSULE (145 MCG TOTAL) BY MOUTH BEFORE BREAKFAST. 30 capsule 5     methocarbamoL (ROBAXIN) 500 MG Tab Take 500 mg by mouth 4 (four) times daily.       mupirocin (BACTROBAN) 2 % ointment Apply topically 2 (two) times daily. 30 g 0     mupirocin (BACTROBAN) 2 % ointment Apply topically 3 (three) times daily. 30 g 6     ocrelizumab (OCREVUS IV) Inject into the vein.       promethazine (PHENERGAN) 25 MG tablet Take 1 tablet (25 mg total) by mouth every 4 (four) hours. 60 tablet 0     sumatriptan (IMITREX) 100 MG tablet TAKE 1 TAB AT ONSET OF HEADACH MAY TAKE 2ND TAB 2 HOURS LATER IF NO RELIEF MAX 6 TABS A WEEK 9 tablet 0     topiramate (TOPAMAX) 50 MG tablet TAKE 1 TABLET BY MOUTH IN THE MORNING AND 2 TABLETS AT BEDTIME 90 tablet 1     triamcinolone acetonide 0.1% (KENALOG) 0.1 % ointment Apply topically 2 (two) times daily. 30 g 1     valsartan (DIOVAN) 160 MG tablet Take 1 tablet (160 mg total) by mouth once daily. 90 tablet 3                              .

## 2022-05-17 NOTE — NURSING
Pt arrived via stretcher and transferred to bed with assistance. VS taken. Tele monitor placed. No pain reported. Bed alarm on. Bed low and wheels locked, side rails up x2, call light in reach. Instructed to call for assistance.

## 2022-05-17 NOTE — PLAN OF CARE
"Pt admitted for MS exacerbation. AAOx4. NAD. VSS.    L sided weakness, LUE LLE.   Can ambulate independently but advised to call for assistance while inpatient.    BP (!) 154/75 (BP Location: Right arm, Patient Position: Sitting)   Pulse 68   Temp 98.7 °F (37.1 °C) (Oral)   Resp 18   Ht 5' 6" (1.676 m)   Wt 131.2 kg (289 lb 3.9 oz)   SpO2 100%   Breastfeeding No   BMI 46.69 kg/m²     Pt to DC home with daughter. DC instructions reviewed with pt, who verbalized understanding. FU scheduled, Rx delivered to BS via pharmacy.  "

## 2022-05-17 NOTE — HOSPITAL COURSE
41 y/o female admitted for acute multiple sclerosis exacerbation. Patient was treated with IV solu-medrol. MRI brain showed no abnormal enhancement as may occur with active demyelinating disease. Iron studies reviewed, iron supplement added. Symptoms improved. Patient seen and examined on the date of discharge and found stable for discharge. Patient to follow-up with PCP, neurology, and hematology outpatient.

## 2022-05-18 ENCOUNTER — PATIENT MESSAGE (OUTPATIENT)
Dept: INTERNAL MEDICINE | Facility: CLINIC | Age: 42
End: 2022-05-18
Payer: MEDICARE

## 2022-05-19 ENCOUNTER — PATIENT OUTREACH (OUTPATIENT)
Dept: ADMINISTRATIVE | Facility: HOSPITAL | Age: 42
End: 2022-05-19
Payer: MEDICARE

## 2022-05-19 NOTE — PROGRESS NOTES
Called patient to confirm hospital follow up scheduled on 5/20/2022.   Patient is confirmed. Encouraged patient to bring all medications and BP cuff to follow up visit.     Pt states she does not have a home bp machine.

## 2022-05-20 ENCOUNTER — PATIENT MESSAGE (OUTPATIENT)
Dept: INTERNAL MEDICINE | Facility: CLINIC | Age: 42
End: 2022-05-20

## 2022-05-23 ENCOUNTER — PATIENT MESSAGE (OUTPATIENT)
Dept: INTERNAL MEDICINE | Facility: CLINIC | Age: 42
End: 2022-05-23
Payer: MEDICARE

## 2022-05-23 ENCOUNTER — HOSPITAL ENCOUNTER (OUTPATIENT)
Dept: RADIOLOGY | Facility: HOSPITAL | Age: 42
Discharge: HOME | End: 2022-05-23
Attending: FAMILY MEDICINE
Payer: MEDICARE

## 2022-05-23 ENCOUNTER — CLINICAL SUPPORT (OUTPATIENT)
Dept: REHABILITATION | Facility: HOSPITAL | Age: 42
End: 2022-05-23
Attending: INTERNAL MEDICINE
Payer: MEDICARE

## 2022-05-23 ENCOUNTER — PATIENT MESSAGE (OUTPATIENT)
Dept: NEUROLOGY | Facility: CLINIC | Age: 42
End: 2022-05-23
Payer: MEDICARE

## 2022-05-23 DIAGNOSIS — R53.81 DEBILITY: ICD-10-CM

## 2022-05-23 DIAGNOSIS — R53.1 DECREASED STRENGTH: ICD-10-CM

## 2022-05-23 DIAGNOSIS — N64.4 ACUTE BREAST PAIN: ICD-10-CM

## 2022-05-23 DIAGNOSIS — Z74.09 DECREASED FUNCTIONAL MOBILITY AND ENDURANCE: ICD-10-CM

## 2022-05-23 DIAGNOSIS — G35 MULTIPLE SCLEROSIS: ICD-10-CM

## 2022-05-23 PROCEDURE — 77062 BREAST TOMOSYNTHESIS BI: CPT | Mod: 26,,, | Performed by: RADIOLOGY

## 2022-05-23 PROCEDURE — 77062 BREAST TOMOSYNTHESIS BI: CPT | Mod: TC

## 2022-05-23 PROCEDURE — 97162 PT EVAL MOD COMPLEX 30 MIN: CPT

## 2022-05-23 PROCEDURE — 97110 THERAPEUTIC EXERCISES: CPT

## 2022-05-23 PROCEDURE — 77066 DX MAMMO INCL CAD BI: CPT | Mod: 26,,, | Performed by: RADIOLOGY

## 2022-05-23 PROCEDURE — 77062 MAMMO DIGITAL DIAGNOSTIC BILAT WITH TOMO: ICD-10-PCS | Mod: 26,,, | Performed by: RADIOLOGY

## 2022-05-23 PROCEDURE — 77066 MAMMO DIGITAL DIAGNOSTIC BILAT WITH TOMO: ICD-10-PCS | Mod: 26,,, | Performed by: RADIOLOGY

## 2022-05-23 NOTE — PLAN OF CARE
[facility]   Physical Therapy Initial Evaluation     Date: 5/23/2022   Name: Alicia Soliz  Clinic Number: [id number]    Therapy Diagnosis:   Encounter Diagnoses   Name Primary?    Multiple sclerosis     Debility     Decreased strength     Decreased functional mobility and endurance      Physician: Rico Rutledge MD    Physician Orders: PT Eval and Treat   Medical Diagnosis from Referral: G35 (ICD-10-CM) - Multiple sclerosis  Evaluation Date: 5/23/2022  Authorization Period Expiration: 6/10/22  Plan of Care Expiration: 7/4/22    Progress Note Due: 6/23/22 or 10th visit  Visit # / Visits authorized: 1/1   FOTO: 1/3    Precautions: Standard and Fall     Time In: 16:45  Time Out: 17:30  Total Appointment Time (timed & untimed codes): 45 minutes      SUBJECTIVE     Date of onset: about 5 years ago    History of current condition - Alicia reports: she had a hospital stay one day last week due to a relapse from her relapsing remitting multiple sclerosis. They gave her steroids in the hospital.  Her symptoms of her having a relapse were tremors, an increase in pain, vertigo, increased foot drop and felt like 50 pounds was on her foot.  She states she always has foot drop but it was increased more than usual. She states the steroids she has had to take for her multiple sclerosis have caused her to gain weight, so she is having a breast reduction next month in attempt to decrease her pain. (June 22)    Her MD has recently given her blood medication to strengthen her blood.  She does receive an infusion every year for her MS. She tried Baclofen a few years ago but did not notice a difference.     Numbness or tingling? Takes gabapentin for L sided tingling   Gripping: Difficulty with her L .    Weakness in LEs? Overall more weakness in her body but mostly on the L   Drop foot? 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   Prior Level of Function:  Was using a cane   Current Level of Function: comes in with a four pronged cane today,  She does have a rollator and a RW at home     Pain:  Current 7/10, worst 10/10, best 7/10   Location: left arm and left leg    Description: Aching and Throbbing  Aggravating Factors: Standing and Walking  Easing Factors: gel, muscle relaxers, narco     Patients goals: gain more independence      Medical History:   Past Medical History:   Diagnosis Date    Anemia     Arthritis     Cardiac arrest as complication of care     pt states she went into cardiac arrest from an allergic reaction to a medication    Depression     Encounter for blood transfusion     Hemiplegia due to old stroke     Hypertension     Morbid obesity with BMI of 45.0-49.9, adult 9/20/2018    Multiple sclerosis        Surgical History:   Alicia Soliz  has a past surgical history that includes Appendectomy; Tubal ligation; Tonsillectomy; Cholecystectomy; Hysterectomy; Knee surgery; Esophagogastroduodenoscopy (N/A, 2/19/2020); Robot-assisted laparoscopic sleeve gastrectomy using da [place] Xi (N/A, 2/20/2020); Esophagogastroduodenoscopy (N/A, 2/20/2020); Esophagogastroduodenoscopy (N/A, 11/25/2020); Colonoscopy (N/A, 11/25/2020); and Tenoplasty of hand (Left, 8/26/2021).    Medications:   Alicia has a current medication list which includes the following prescription(s): plenity, cyclobenzaprine,  "diclofenac sodium, doxepin, esomeprazole, ferrous sulfate, gabapentin, hydrocodone-acetaminophen, lactulose, lidocaine-prilocaine, linaclotide, linzess, methocarbamol, mupirocin, ocrelizumab, promethazine, sumatriptan, topiramate, triamcinolone acetonide 0.1%, and valsartan.    Allergies:   Review of patient's allergies indicates:   Allergen Reactions    Demerol [meperidine] Anaphylaxis     Other reaction(s): Itching    Dilaudid [hydromorphone (bulk)] Anaphylaxis     "coded" per pt  Patient can tolerate Lortab    Prednisone Itching     Other reaction(s): Itching    Tramadol      shaking          OBJECTIVE       CMS Impairment/Limitation/Restriction for FOTO Multiple Sclerosis Survey    Therapist reviewed FOTO scores for Alicia Soliz on 5/23/2022.   FOTO documents entered into [employer] - see Media section.    Limitation Score: 52%        Gait: ambulates with four pronged cane, decreased L stance phase, increased L plantar flexion throughout gait pattern, increased L knee extension throughout gait phase       Strength:  Right            Left  Hip flexors 4+/5      4/5 (tremors to the L leg)   Knee extension 4+/5      4/5   Knee flexion 4+/5       4/5   Hip abductors 4-/5       4- /5   DF 4+/5       4/5   PF 4+/5        4+/5    Big toe extension Not tested  Not tested      Neuro testing:   Gross screening of Modified Harrison Scale:   Increased tone felt in L PF, knee flexion/extension (performed in  seated positioning)     Decreased L DF PROM found - about negative 10 degrees    TREATMENT     Total Treatment time (time-based codes) separate from Evaluation: 8 minutes      Alicia received the treatments listed below:      therapeutic exercises to develop strength, endurance and ROM for 8 minutes including:  Ankle PF/DF in seated     PATIENT EDUCATION AND HOME EXERCISES     Education provided:   -HEP given    Written Home Exercises Provided: yes. Exercises were reviewed and Alicia was able to demonstrate them prior " to the end of the session.  Alicia demonstrated good  understanding of the education provided. See EMR under Patient Instructions for exercises provided during therapy sessions.    ASSESSMENT     Alicia is a 42 y.o. female referred to outpatient Physical Therapy with a medical diagnosis of multiple sclerosis affecting her L lower extremity. Patient presents with decreased strength, endurance, ROM, and increased pain.  These impairments are limiting their ability to perform standing, ambulating, transferring, navigating steps/curbs. Patient is at increased risk for falls and was instructed to use a walker at home due to recent fall yesterday.    Patient prognosis is Good.   Patient will benefit from skilled outpatient Physical Therapy to address the deficits stated above and in the chart below, provide patient /family education, and to maximize patientt's level of independence.     Plan of care discussed with patient: Yes  Patient's spiritual, cultural and educational needs considered and patient is agreeable to the plan of care and goals as stated below:     Anticipated Barriers for therapy: MS     Medical Necessity is demonstrated by the following  History  Co-morbidities and personal factors that may impact the plan of care Co-morbidities:   history of CVA and muliple sclerosis    Personal Factors:   no deficits     moderate   Examination  Body Structures and Functions, activity limitations and participation restrictions that may impact the plan of care Body Regions:   back  lower extremities  upper extremities  trunk    Body Systems:    gross symmetry  ROM  strength  gross coordinated movement  balance  gait  transfers  transitions  motor control  motor learning    Participation Restrictions:   Decreased ambulation speed due to relapse from multiple sclerosis     Activity limitations:   Learning and applying knowledge  no deficits    General Tasks and Commands  undertaking multiple tasks    Communication  no  deficits    Mobility  lifting and carrying objects  walking  moving around using equipment (WC)  driving (bike, car, motorcycle)    Self care  washing oneself (bathing, drying, washing hands)  caring for body parts (brushing teeth, shaving, grooming)  toileting  dressing    Domestic Life  shopping  cooking  doing house work (cleaning house, washing dishes, laundry)  assisting others    Interactions/Relationships  no deficits    Life Areas  no deficits    Community and Social Life  community life  recreation and leisure         high   Clinical Presentation evolving clinical presentation with changing clinical characteristics moderate   Decision Making/ Complexity Score: moderate     Goals:  Short Term Goals: In 3 weeks   1.I with HEP  2. Pt to increase BLE strength by 1/2 grade to show improvements with standing, ambulating,  transferring, navigating steps/curbs.    3.Pt to have pain less than 4/10 at worst to show decreased pain with sitting, standing, ambulating,  transferring, navigating steps/curbs.      Long Term Goals: In 6 weeks  1. Patient to demo increase in LE strength to 1 muscle grade to show improvements with sitting, standing, ambulating, transferring, navigating steps/curbs.   2. Patient to have decreased pain to 1/10 at worst sitting, standing, ambulating, bending,  transferring, navigating steps/curbs.  3. Patient to improve score on the FOTO to < or = to 50% to show improvement with QOL.   4. Patient will ambulate up to 10 feet with improved knee flexion throughout stance phase.       PLAN   Plan of care Certification: 5/23/2022 to 7/4/2022.    Outpatient Physical Therapy 2 times weekly for 6 weeks to include the following interventions: Electrical Stimulation PRN, Gait Training, Manual Therapy, Moist Heat/ Ice, Neuromuscular Re-ed, Orthotic Management and Training, Patient Education, Self Care, Therapeutic Activities and Therapeutic Exercise.     Nirali Gee, PT      I CERTIFY THE NEED FOR THESE  SERVICES FURNISHED UNDER THIS PLAN OF TREATMENT AND WHILE UNDER MY CARE   Physician's comments:     Physician's Signature: ___________________________________________________

## 2022-05-25 ENCOUNTER — PATIENT MESSAGE (OUTPATIENT)
Dept: REHABILITATION | Facility: HOSPITAL | Age: 42
End: 2022-05-25
Payer: MEDICARE

## 2022-05-28 ENCOUNTER — PATIENT MESSAGE (OUTPATIENT)
Dept: NEUROLOGY | Facility: CLINIC | Age: 42
End: 2022-05-28
Payer: MEDICARE

## 2022-05-28 ENCOUNTER — TELEPHONE (OUTPATIENT)
Dept: NEUROLOGY | Facility: CLINIC | Age: 42
End: 2022-05-28
Payer: MEDICARE

## 2022-05-28 DIAGNOSIS — N32.81 OVERACTIVE BLADDER: ICD-10-CM

## 2022-05-28 DIAGNOSIS — M79.89 SWELLING OF LEFT HAND: Primary | ICD-10-CM

## 2022-05-28 NOTE — TELEPHONE ENCOUNTER
Spoke with patient.  Recently admitted to [facility] for weakness.  Neuro-imaging stable.  She reports recent fall, unclear etiology but likely multi-factorial due to mechanics and weakness..  She also reports left arm and hand swelling that is painful since fall.  I recommended evaluation for her hand and reviewed fall precautions.  I advised her to present to the ED for her LUE pain and falls.  I would recommend re-imaging to assess for MS flair as well.  If no e/o MS flair she may need AIR to optimize gait and balance.  I will notify MS team as well.  Pt acknowledged understanding of results and agreement with plan.

## 2022-05-31 ENCOUNTER — CLINICAL SUPPORT (OUTPATIENT)
Dept: REHABILITATION | Facility: HOSPITAL | Age: 42
End: 2022-05-31
Payer: MEDICARE

## 2022-05-31 ENCOUNTER — HOSPITAL ENCOUNTER (OUTPATIENT)
Dept: RADIOLOGY | Facility: HOSPITAL | Age: 42
Discharge: HOME | End: 2022-05-31
Attending: CLINICAL NURSE SPECIALIST
Payer: MEDICARE

## 2022-05-31 DIAGNOSIS — M79.89 SWELLING OF LEFT HAND: ICD-10-CM

## 2022-05-31 DIAGNOSIS — Z74.09 DECREASED FUNCTIONAL MOBILITY AND ENDURANCE: ICD-10-CM

## 2022-05-31 DIAGNOSIS — R53.81 DEBILITY: Primary | ICD-10-CM

## 2022-05-31 DIAGNOSIS — R53.1 DECREASED STRENGTH: ICD-10-CM

## 2022-05-31 PROCEDURE — 97110 THERAPEUTIC EXERCISES: CPT

## 2022-05-31 PROCEDURE — 97112 NEUROMUSCULAR REEDUCATION: CPT

## 2022-05-31 PROCEDURE — 73130 X-RAY EXAM OF HAND: CPT | Mod: TC,LT

## 2022-05-31 NOTE — PROGRESS NOTES
[facility]   Physical Therapy Treatment Note     Name: Alicia Soliz  Clinic Number: [id number]    Therapy Diagnosis:   Encounter Diagnoses   Name Primary?    Debility Yes    Decreased strength     Decreased functional mobility and endurance      Physician: Rico Rutledge MD    Visit Date: 5/31/2022   Physician Orders: PT Eval and Treat   Medical Diagnosis from Referral: G35 (ICD-10-CM) - Multiple sclerosis  Evaluation Date: 5/23/2022  Authorization Period Expiration: 12/31/22  Plan of Care Expiration:  June 23, /22  Progress Note Due: 7/5/2022 or 10th visit  Visit # / Visits authorized: 1/20   FOTO: 1/3     Precautions: Standard and Fall     PTA Visit #: 0/5     Time In: 1300  Time Out: 1355  Total Billable Time: 55minutes    SUBJECTIVE     Pt reports: she fell again on Saturday and she states she has increased swelling in her left wrist and R knee.  She is going to get xrays after this session this date     She was compliant with home exercise program.  Response to previous treatment: none   Functional change: none     Pain: 0/10  Location: NA     OBJECTIVE     Objective Measures updated at progress report unless specified.     Treatment     Alicia received the treatments listed below:      therapeutic exercises to develop strength, endurance and ROM for 25minutes including:  PF green band 2x10   DF yellow band 2x10  Nustep 10 minutes   Opening and closing hands 2x10   Finger to thumb B 2x10     neuromuscular re-education activities to improve: Balance for 30 minutes. The following activities were included:  Standing balance 10 seconds x 5 eye close minimal assistance   Tandem stance 10 seconds x 4 minimal assistance   Tandem walking x5 laps   All above exercises performed in parallel bars.       Patient Education and Home Exercises     Home Exercises Provided and Patient Education Provided     Education provided:   - HEP given    Written Home Exercises Provided: yes.  Exercises were reviewed and Alicia was able to demonstrate them prior to the end of the session.  Alicia demonstrated good  understanding of the education provided. See EMR under Patient Instructions for exercises provided during therapy sessions    ASSESSMENT     Patient required minimal assistance due to mild sway during balancing exercises.  Patient had good activation of DF/PF musculature which will continue to improve with strength and endurance training.  Patient did fatigue by end of session as noted by her gait compensations while using the walker.  Continuing to encourage patient to get a new walker (her home walker is too small and is the reason she believes she fell) and use it at home instead of her cane.     Alicia Is progressing well towards her goals.   Pt prognosis is Good.     Pt will continue to benefit from skilled outpatient physical therapy to address the deficits listed in the problem list box on initial evaluation, provide pt/family education and to maximize pt's level of independence in the home and community environment.     Pt's spiritual, cultural and educational needs considered and pt agreeable to plan of care and goals.     Anticipated barriers to physical therapy: MS and stroke    Goals:   Short Term Goals: In 3 weeks   1.I with HEP  2. Pt to increase BLE strength by 1/2 grade to show improvements with standing, ambulating,  transferring, navigating steps/curbs.    3.Pt to have pain less than 4/10 at worst to show decreased pain with sitting, standing, ambulating,  transferring, navigating steps/curbs.    Long Term Goals: In 6 weeks  1. Patient to demo increase in LE strength to 1 muscle grade to show improvements with sitting, standing, ambulating, transferring, navigating steps/curbs.   2. Patient to have decreased pain to 1/10 at worst sitting, standing, ambulating, bending,  transferring, navigating steps/curbs.  3. Patient to improve score on the FOTO to < or = to 50% to show  improvement with QOL.   4. Patient will ambulate up to 10 feet with improved knee flexion throughout stance phase.        PLAN     Plan of care Certification: 5/23/2022 to 7/5/2022.     Outpatient Physical Therapy 2 times weekly for 6 weeks to include the following interventions: Electrical Stimulation PRN, Gait Training, Manual Therapy, Moist Heat/ Ice, Neuromuscular Re-ed, Orthotic Management and Training, Patient Education, Self Care, Therapeutic Activities and Therapeutic Exercise.    Nirali Gee, PT

## 2022-06-01 ENCOUNTER — PATIENT MESSAGE (OUTPATIENT)
Dept: ORTHOPEDICS | Facility: CLINIC | Age: 42
End: 2022-06-01
Payer: MEDICARE

## 2022-06-01 ENCOUNTER — PATIENT MESSAGE (OUTPATIENT)
Dept: NEUROLOGY | Facility: CLINIC | Age: 42
End: 2022-06-01
Payer: MEDICARE

## 2022-06-01 DIAGNOSIS — M79.642 LEFT HAND PAIN: Primary | ICD-10-CM

## 2022-06-02 ENCOUNTER — CLINICAL SUPPORT (OUTPATIENT)
Dept: REHABILITATION | Facility: HOSPITAL | Age: 42
End: 2022-06-02
Payer: MEDICARE

## 2022-06-02 DIAGNOSIS — R53.81 DEBILITY: Primary | ICD-10-CM

## 2022-06-02 DIAGNOSIS — R53.1 DECREASED STRENGTH: ICD-10-CM

## 2022-06-02 DIAGNOSIS — Z74.09 DECREASED FUNCTIONAL MOBILITY AND ENDURANCE: ICD-10-CM

## 2022-06-02 PROCEDURE — 97110 THERAPEUTIC EXERCISES: CPT

## 2022-06-02 PROCEDURE — 97112 NEUROMUSCULAR REEDUCATION: CPT

## 2022-06-02 NOTE — PROGRESS NOTES
"[facility]   Physical Therapy Treatment Note     Name: Alicia Soliz  Clinic Number: [id number]    Therapy Diagnosis:   Encounter Diagnoses   Name Primary?    Debility Yes    Decreased strength     Decreased functional mobility and endurance      Physician: Rico Rutledge MD    Visit Date: 6/2/2022   Physician Orders: PT Eval and Treat   Medical Diagnosis from Referral: G35 (ICD-10-CM) - Multiple sclerosis  Evaluation Date: 5/23/2022  Authorization Period Expiration: 12/31/22  Plan of Care Expiration:   7/4/22  Progress Note Due: June 23, 2022 or 10th visit  Visit # / Visits authorized: 2/20   FOTO: 1/3     Precautions: Standard and Fall     PTA Visit #: 0/5     Time In: 1317  Time Out: 1415  Total Billable Time: 58 minutes    SUBJECTIVE     Pt reports: patient states she had an xray the other day and it found she had a thumb dislocation so she is going to the orthopedic tomorrow. Her [facility] neuro appointment is coming up and she is hoping to get a walker through this MD as she keeps falling with her cane.     She was compliant with home exercise program.  Response to previous treatment: none   Functional change: none     Pain: 0/10  Location: NA     OBJECTIVE     Objective Measures updated at progress report unless specified.     Treatment     Alicia received the treatments listed below:      therapeutic exercises to develop strength, endurance and ROM for 26minutes including:  PF green band 2x10   DF/Eversion/inverson - yellow band 2x10  BAPS board DF/PF 30 seconds x2 and EV/IN 30 seconds   Nustep 10 minutes L0   Gastroc and soleus stretch on wedge 30"x 3 (ea)     neuromuscular re-education activities to improve: Balance for 30 minutes. The following activities were included:    Prone hip extension 2x10   Prone knee flexion with 4# 3x10   Transition from prone to quadruped to transition off of table - close supervision/CGA and increased time to perform     Patient Education " and Home Exercises     Home Exercises Provided and Patient Education Provided     Education provided:   - HEP given    Written Home Exercises Provided: yes. Exercises were reviewed and Alicia was able to demonstrate them prior to the end of the session.  Alicia demonstrated good  understanding of the education provided. See EMR under Patient Instructions for exercises provided during therapy sessions    ASSESSMENT     Patient did have increased fatigue in L lower extremity after performing strengthening and endurance exercises throughout session.  Patient encouraged to use walker to walk to [employer] in parking lot this date due to being high fall risk. Required tactile cuing for proper form during prone knee flexion with weight due to decreased proprioception.     Alicia Is progressing well towards her goals.   Pt prognosis is Good.     Pt will continue to benefit from skilled outpatient physical therapy to address the deficits listed in the problem list box on initial evaluation, provide pt/family education and to maximize pt's level of independence in the home and community environment.     Pt's spiritual, cultural and educational needs considered and pt agreeable to plan of care and goals.     Anticipated barriers to physical therapy: MS and stroke    Goals:   Short Term Goals: In 3 weeks   1.I with HEP  2. Pt to increase BLE strength by 1/2 grade to show improvements with standing, ambulating,  transferring, navigating steps/curbs.    3.Pt to have pain less than 4/10 at worst to show decreased pain with sitting, standing, ambulating,  transferring, navigating steps/curbs.    Long Term Goals: In 6 weeks  1. Patient to demo increase in LE strength to 1 muscle grade to show improvements with sitting, standing, ambulating, transferring, navigating steps/curbs.   2. Patient to have decreased pain to 1/10 at worst sitting, standing, ambulating, bending,  transferring, navigating steps/curbs.  3. Patient to  improve score on the FOTO to < or = to 50% to show improvement with QOL.   4. Patient will ambulate up to 10 feet with improved knee flexion throughout stance phase.        PLAN     Plan of care Certification: 5/23/2022 to 7/4/2022.     Outpatient Physical Therapy 2 times weekly for 6 weeks to include the following interventions: Electrical Stimulation PRN, Gait Training, Manual Therapy, Moist Heat/ Ice, Neuromuscular Re-ed, Orthotic Management and Training, Patient Education, Self Care, Therapeutic Activities and Therapeutic Exercise.    Nirali Gee, PT

## 2022-06-06 ENCOUNTER — OFFICE VISIT (OUTPATIENT)
Dept: NEUROLOGY | Facility: CLINIC | Age: 42
End: 2022-06-06
Payer: MEDICARE

## 2022-06-06 ENCOUNTER — LAB VISIT (OUTPATIENT)
Dept: LAB | Facility: HOSPITAL | Age: 42
End: 2022-06-06
Payer: MEDICARE

## 2022-06-06 VITALS
SYSTOLIC BLOOD PRESSURE: 123 MMHG | TEMPERATURE: 98 F | BODY MASS INDEX: 46.69 KG/M2 | HEIGHT: 66 IN | HEART RATE: 87 BPM | DIASTOLIC BLOOD PRESSURE: 80 MMHG

## 2022-06-06 DIAGNOSIS — G35 MULTIPLE SCLEROSIS: ICD-10-CM

## 2022-06-06 DIAGNOSIS — G35 MULTIPLE SCLEROSIS: Primary | ICD-10-CM

## 2022-06-06 DIAGNOSIS — E66.01 MORBID OBESITY WITH BMI OF 45.0-49.9, ADULT: ICD-10-CM

## 2022-06-06 LAB
BASOPHILS # BLD AUTO: 0.03 K/UL (ref 0–0.2)
BASOPHILS NFR BLD: 0.7 % (ref 0–1.9)
DIFFERENTIAL METHOD: ABNORMAL
EOSINOPHIL # BLD AUTO: 0.1 K/UL (ref 0–0.5)
EOSINOPHIL NFR BLD: 3 % (ref 0–8)
ERYTHROCYTE [DISTWIDTH] IN BLOOD BY AUTOMATED COUNT: 17.7 % (ref 11.5–14.5)
HCT VFR BLD AUTO: 37.8 % (ref 37–48.5)
HGB BLD-MCNC: 11.1 G/DL (ref 12–16)
IGA SERPL-MCNC: 202 MG/DL (ref 40–350)
IGG SERPL-MCNC: 1209 MG/DL (ref 650–1600)
IGM SERPL-MCNC: 59 MG/DL (ref 50–300)
IMM GRANULOCYTES # BLD AUTO: 0.01 K/UL (ref 0–0.04)
IMM GRANULOCYTES NFR BLD AUTO: 0.2 % (ref 0–0.5)
LYMPHOCYTES # BLD AUTO: 1.6 K/UL (ref 1–4.8)
LYMPHOCYTES NFR BLD: 37.5 % (ref 18–48)
MCH RBC QN AUTO: 20.7 PG (ref 27–31)
MCHC RBC AUTO-ENTMCNC: 29.4 G/DL (ref 32–36)
MCV RBC AUTO: 71 FL (ref 82–98)
MONOCYTES # BLD AUTO: 0.4 K/UL (ref 0.3–1)
MONOCYTES NFR BLD: 8.6 % (ref 4–15)
NEUTROPHILS # BLD AUTO: 2.1 K/UL (ref 1.8–7.7)
NEUTROPHILS NFR BLD: 50 % (ref 38–73)
NRBC BLD-RTO: 0 /100 WBC
PLATELET # BLD AUTO: 261 K/UL (ref 150–450)
PMV BLD AUTO: 9.9 FL (ref 9.2–12.9)
RBC # BLD AUTO: 5.36 M/UL (ref 4–5.4)
WBC # BLD AUTO: 4.29 K/UL (ref 3.9–12.7)

## 2022-06-06 PROCEDURE — 3008F PR BODY MASS INDEX (BMI) DOCUMENTED: ICD-10-PCS | Mod: CPTII,S$GLB,, | Performed by: PSYCHIATRY & NEUROLOGY

## 2022-06-06 PROCEDURE — 36415 COLL VENOUS BLD VENIPUNCTURE: CPT | Performed by: CLINICAL NURSE SPECIALIST

## 2022-06-06 PROCEDURE — 99999 PR PBB SHADOW E&M-EST. PATIENT-LVL IV: ICD-10-PCS | Mod: PBBFAC,,, | Performed by: PSYCHIATRY & NEUROLOGY

## 2022-06-06 PROCEDURE — 4010F ACE/ARB THERAPY RXD/TAKEN: CPT | Mod: CPTII,S$GLB,, | Performed by: PSYCHIATRY & NEUROLOGY

## 2022-06-06 PROCEDURE — 4010F PR ACE/ARB THEARPY RXD/TAKEN: ICD-10-PCS | Mod: CPTII,S$GLB,, | Performed by: PSYCHIATRY & NEUROLOGY

## 2022-06-06 PROCEDURE — 3008F BODY MASS INDEX DOCD: CPT | Mod: CPTII,S$GLB,, | Performed by: PSYCHIATRY & NEUROLOGY

## 2022-06-06 PROCEDURE — 87340 HEPATITIS B SURFACE AG IA: CPT | Performed by: CLINICAL NURSE SPECIALIST

## 2022-06-06 PROCEDURE — 3074F SYST BP LT 130 MM HG: CPT | Mod: CPTII,S$GLB,, | Performed by: PSYCHIATRY & NEUROLOGY

## 2022-06-06 PROCEDURE — 1159F PR MEDICATION LIST DOCUMENTED IN MEDICAL RECORD: ICD-10-PCS | Mod: CPTII,S$GLB,, | Performed by: PSYCHIATRY & NEUROLOGY

## 2022-06-06 PROCEDURE — 85025 COMPLETE CBC W/AUTO DIFF WBC: CPT | Performed by: CLINICAL NURSE SPECIALIST

## 2022-06-06 PROCEDURE — 99215 OFFICE O/P EST HI 40 MIN: CPT | Mod: S$GLB,,, | Performed by: PSYCHIATRY & NEUROLOGY

## 2022-06-06 PROCEDURE — 86704 HEP B CORE ANTIBODY TOTAL: CPT | Performed by: CLINICAL NURSE SPECIALIST

## 2022-06-06 PROCEDURE — 82784 ASSAY IGA/IGD/IGG/IGM EACH: CPT | Performed by: CLINICAL NURSE SPECIALIST

## 2022-06-06 PROCEDURE — 99999 PR PBB SHADOW E&M-EST. PATIENT-LVL IV: CPT | Mod: PBBFAC,,, | Performed by: PSYCHIATRY & NEUROLOGY

## 2022-06-06 PROCEDURE — 1159F MED LIST DOCD IN RCRD: CPT | Mod: CPTII,S$GLB,, | Performed by: PSYCHIATRY & NEUROLOGY

## 2022-06-06 PROCEDURE — 3044F HG A1C LEVEL LT 7.0%: CPT | Mod: CPTII,S$GLB,, | Performed by: PSYCHIATRY & NEUROLOGY

## 2022-06-06 PROCEDURE — 99215 PR OFFICE/OUTPT VISIT, EST, LEVL V, 40-54 MIN: ICD-10-PCS | Mod: S$GLB,,, | Performed by: PSYCHIATRY & NEUROLOGY

## 2022-06-06 PROCEDURE — 3079F PR MOST RECENT DIASTOLIC BLOOD PRESSURE 80-89 MM HG: ICD-10-PCS | Mod: CPTII,S$GLB,, | Performed by: PSYCHIATRY & NEUROLOGY

## 2022-06-06 PROCEDURE — 3044F PR MOST RECENT HEMOGLOBIN A1C LEVEL <7.0%: ICD-10-PCS | Mod: CPTII,S$GLB,, | Performed by: PSYCHIATRY & NEUROLOGY

## 2022-06-06 PROCEDURE — 3074F PR MOST RECENT SYSTOLIC BLOOD PRESSURE < 130 MM HG: ICD-10-PCS | Mod: CPTII,S$GLB,, | Performed by: PSYCHIATRY & NEUROLOGY

## 2022-06-06 PROCEDURE — 3079F DIAST BP 80-89 MM HG: CPT | Mod: CPTII,S$GLB,, | Performed by: PSYCHIATRY & NEUROLOGY

## 2022-06-06 PROCEDURE — 86706 HEP B SURFACE ANTIBODY: CPT | Performed by: CLINICAL NURSE SPECIALIST

## 2022-06-06 NOTE — PROGRESS NOTES
Subjective:          Patient ID: Alicia Soliz is a 42 y.o. female who presents today for a routine clinic visit for MS.      MS HPI:  · DMT: ocrelizumab, due in late July 2022  · Side effects from DMT? No  · Taking vitamin D3 as recommended? Yes, daily gummies  · Had 3 falls this past month and dislocated her left thumb on most recent fall. She would like to have a bariatric walker to help her to maneuver her home safely. At present she has a standard walker, but due to body habitus, she cannot fit.   · Worse symptoms were in the setting of stress associated with communication with her mother.  · She and her daughters were given counseling concerning her MS and symptoms management.  · Planning to get gastric bypass surgery in July 2022      Medications:  Current Outpatient Medications on File Prior to Visit   Medication Sig Dispense Refill Last Dose    cyclobenzaprine (FLEXERIL) 10 MG tablet Take 10 mg by mouth 3 (three) times daily as needed.       diclofenac sodium (VOLTAREN) 1 % Gel APPLY 2 GRAMS TO AFFECTED AREA 4 TIMES A DAY (Patient taking differently: QID prn) 300 g 3     doxepin (SINEQUAN) 75 MG capsule Take 1 capsule (75 mg total) by mouth every evening. 90 capsule 3     esomeprazole (NEXIUM) 40 MG capsule TAKE 1 CAPSULE (40 MG TOTAL) BY MOUTH BEFORE BREAKFAST. 90 capsule 3     ferrous sulfate 325 (65 FE) MG EC tablet Take 1 tablet (325 mg total) by mouth every Mon, Wed, Fri. 36 tablet 0     gabapentin (NEURONTIN) 300 MG capsule 600mg at morning and afternoon. 900mg at night. 210 capsule 1     HYDROcodone-acetaminophen (NORCO)  mg per tablet Take 1 tablet by mouth every 8 (eight) hours as needed for Pain. 20 tablet 0     lactulose (CHRONULAC) 20 gram/30 mL Soln Take 15 mLs (10 g total) by mouth 3 (three) times daily. (Patient taking differently: Take 10 g by mouth 3 (three) times daily as needed.) 200 mL 3     LIDOcaine-prilocaine (EMLA) cream        linaCLOtide (LINZESS) 145 mcg Cap  capsule Take 1 capsule (145 mcg total) by mouth before breakfast. 30 capsule 5     LINZESS 145 mcg Cap capsule TAKE 1 CAPSULE (145 MCG TOTAL) BY MOUTH BEFORE BREAKFAST. 30 capsule 5     methocarbamoL (ROBAXIN) 500 MG Tab Take 500 mg by mouth 4 (four) times daily.       mupirocin (BACTROBAN) 2 % ointment Apply topically 2 (two) times daily. 30 g 0     ocrelizumab (OCREVUS IV) Inject into the vein.       sumatriptan (IMITREX) 100 MG tablet TAKE 1 TAB AT ONSET OF HEADACH MAY TAKE 2ND TAB 2 HOURS LATER IF NO RELIEF MAX 6 TABS A WEEK 9 tablet 0     triamcinolone acetonide 0.1% (KENALOG) 0.1 % ointment Apply topically 2 (two) times daily. 30 g 1     valsartan (DIOVAN) 160 MG tablet Take 1 tablet (160 mg total) by mouth once daily. 90 tablet 3         SOCIAL HISTORY  Social History     Tobacco Use    Smoking status: Never Smoker    Smokeless tobacco: Never Used   Substance Use Topics    Alcohol use: Yes     Comment: once a year     Drug use: Never       ROS:  REVIEW OF SYMPTOMS 5/31/2022   Do you feel abnormally tired on most days? Yes   Do you feel you generally sleep well? Yes   Do you have difficulty controlling your bladder?  Yes   Do you have difficulty controlling your bowels?  No   Do you have frequent muscle cramps, tightness or spasms in your limbs?  Yes   Do you have new visual symptoms?  No   Do you have worsening difficulty with your memory or thinking? No   Do you have worsening symptoms of anxiety or depression?  No   For patients who walk, Do you have more difficulty walking?  Yes   Have you fallen since your last visit?  Yes   For patients who use wheelchairs: Do you have any skin wounds or breakdown? Not Applicable   Do you have difficulty using your hands?  Yes   Do you have shooting or burning pain? Yes   Do you have difficulty with sexual function?  No   If you are sexually active, are you using birth control? Y/N  N/A Not Applicable   Do you often choke when swallowing liquids or solid food?   Yes   Do you experience worsening symptoms when overheated? Yes   Do you need any new equipment such as a wheelchair, walker or shower chair? Yes   Do you receive co-pay financial assistance for your principal MS medicine? Yes   Would you be interested in participating in an MS research trial in the future? Yes   For patients on Gilenya, Tecfidera, Aubagio, Rituxan, Ocrevus, Tysabri, Lemtrada or Methotrexate, are you aware that you should NOT receive live virus vaccines?  No   Do you feel you have adequate family/friend support?  Yes   Do you have health insurance?   Yes   Are you currently employed? No   Do you receive SSDI/SSI?  Yes   Do you use marijuana or cannabis products? No   How often? -   Have you been diagnosed with a urinary tract infection since your last visit here? No   Have you been diagnosed with a respiratory tract infection since your last visit here? No   Have you been to the emergency room since your last visit here? Yes   Have you been hospitalized since your last visit here?  Yes              Objective:        1. 25 foot timed walk:  Timed 25 Foot Walk: 11/3/2020   Did patient wear an AFO? No   Was assistive device used? Yes   Assistive device used (milton one): Unilateral Assistance   Unilateral device used Cane   Time for 25 Foot Walk (seconds) 38.83   Time for 25 Foot Walk (seconds) 46.5       2. 9 Hole Peg Test:  No flowsheet data found.    Neurologic Exam  MENTAL STATUS: grossly intact. Normal language and attention.   CRANIAL NERVE EXAM: Extraocular muscles are intact.  No facial asymmetry. Shoulder shrug normal b/l. There is no dysarthria.   MOTOR EXAM: Normal bulk and tone throughout UE and LE bilaterally. Rapid sequential movements are normal.   ? Delt Bi Tri WE WF HF KE KF ADF   Right 5 5 5 5 5 5 5 5 5   Left 5 5 5 5 5 5- 5 5 5-   COORDINATION: No apparent ataxia  GAIT: deferred.      Imaging:       Results for orders placed during the hospital encounter of 05/16/22    MRI Brain  Demyelinating W W/O Contrast    Impression  No abnormal enhancement as may occur with active demyelinating disease.      Electronically signed by: Jae Garnett Jr., MD  Date:    05/17/2022  Time:    08:29    Results for orders placed during the hospital encounter of 05/16/22    MRI Cervical Spine Demyelinating W W/O Contrast    Impression  1. No abnormal cord signal or findings to suggest demyelinating disease within the cervical spinal cord.  No abnormal enhancement.  2. Minimal degenerative change as described above without significant spinal or foraminal stenosis.  These findings are unchanged.      Electronically signed by: Jae Garnett Jr., MD  Date:    05/17/2022  Time:    09:03    Results for orders placed during the hospital encounter of 05/16/22    MRI Thoracic Spine Demyelinating W W/O Contrast    Impression  1. No abnormal cord signal or abnormal enhancement.  No findings to suggest demyelinating disease within the thoracic cord.  2. Right-sided foraminal stenosis at T2-T3, T3-T4, T4-T5, and T5-T6 could result in radiculopathy.      Electronically signed by: Jae Garnett Jr., MD  Date:    05/17/2022  Time:    08:59        Labs:     Lab Results   Component Value Date    RGQOXFSI68GB 34 07/30/2021    AJSTHBZZ39PR 32 01/10/2020    SWWKGDRU79FB 11 (L) 01/30/2015     Lab Results   Component Value Date    JCVINDEX 1.67 (A) 04/19/2018    JCVANTIBODY Positive (A) 04/19/2018     No results found for: JJ0ARAYF, ABSOLUTECD3, LH9XODKB, ABSOLUTECD8, YW9JIZEH, ABSOLUTECD4, LABCD48  Lab Results   Component Value Date    WBC 4.29 06/06/2022    RBC 5.36 06/06/2022    HGB 11.1 (L) 06/06/2022    HCT 37.8 06/06/2022    MCV 71 (L) 06/06/2022    MCH 20.7 (L) 06/06/2022    MCHC 29.4 (L) 06/06/2022    RDW 17.7 (H) 06/06/2022     06/06/2022    MPV 9.9 06/06/2022    GRAN 2.1 06/06/2022    GRAN 50.0 06/06/2022    LYMPH 1.6 06/06/2022    LYMPH 37.5 06/06/2022    MONO 0.4 06/06/2022    MONO 8.6 06/06/2022    EOS 0.1 06/06/2022     BASO 0.03 06/06/2022    EOSINOPHIL 3.0 06/06/2022    BASOPHIL 0.7 06/06/2022     Sodium   Date Value Ref Range Status   05/16/2022 142 136 - 145 mmol/L Final     Potassium   Date Value Ref Range Status   05/16/2022 4.4 3.5 - 5.1 mmol/L Final     Chloride   Date Value Ref Range Status   05/16/2022 110 95 - 110 mmol/L Final     CO2   Date Value Ref Range Status   05/16/2022 18 (L) 23 - 29 mmol/L Final     Glucose   Date Value Ref Range Status   05/16/2022 108 70 - 110 mg/dL Final     BUN   Date Value Ref Range Status   05/16/2022 7 6 - 20 mg/dL Final     Creatinine   Date Value Ref Range Status   05/16/2022 0.7 0.5 - 1.4 mg/dL Final     Calcium   Date Value Ref Range Status   05/16/2022 8.8 8.7 - 10.5 mg/dL Final     Total Protein   Date Value Ref Range Status   05/16/2022 6.7 6.0 - 8.4 g/dL Final     Albumin   Date Value Ref Range Status   05/16/2022 3.5 3.5 - 5.2 g/dL Final     Total Bilirubin   Date Value Ref Range Status   05/16/2022 0.3 0.1 - 1.0 mg/dL Final     Comment:     For infants and newborns, interpretation of results should be based  on gestational age, weight and in agreement with clinical  observations.    Premature Infant recommended reference ranges:  Up to 24 hours.............<8.0 mg/dL  Up to 48 hours............<12.0 mg/dL  3-5 days..................<15.0 mg/dL  6-29 days.................<15.0 mg/dL       Alkaline Phosphatase   Date Value Ref Range Status   05/16/2022 79 55 - 135 U/L Final     AST   Date Value Ref Range Status   05/16/2022 25 10 - 40 U/L Final     ALT   Date Value Ref Range Status   05/16/2022 15 10 - 44 U/L Final     Anion Gap   Date Value Ref Range Status   05/16/2022 14 8 - 16 mmol/L Final     eGFR if    Date Value Ref Range Status   05/16/2022 >60 >60 mL/min/1.73 m^2 Final     eGFR if non    Date Value Ref Range Status   05/16/2022 >60 >60 mL/min/1.73 m^2 Final     Comment:     Calculation used to obtain the estimated glomerular  filtration  rate (eGFR) is the CKD-EPI equation.        Lab Results   Component Value Date    HEPBSAG Negative 12/29/2021    HEPBSAB Positive 12/29/2021    HEPBCAB Negative 12/29/2021           MS Impression and Plan:        RRMS  On Ocrevus. Tolerates it well. Infuses Jan/July. Pending results of safety labs, but may push back infusion to Aug if complications with her upcoming weight loss surgery.  No new s/s concerning for relapse/progression. Again discussed consequences of stress on symptom management. Patient states understanding.   Patient requires a new bariatric walker to help her safely ambulate and transfer. She cannot position her body properly in a standard walker, and she remains a significant fall risk if she were to use a standard walker rather than a bariatric one. Due to her instability, she cannot safely rely on a cane.  MS counseling given    F/u with NP in 3 months    Our visit today lasted 40 minutes, and 100% of this time was spent face to face with the patient. Over 50% of this visit included discussion of the treatment plan/medication changes/symptom management/exam findings/imaging results/coordination of care. The patient agrees with the plan of care.    Problem List Items Addressed This Visit    None         Meagan Pereira MD

## 2022-06-06 NOTE — Clinical Note
Ocrevus - She is due in July, her labs are done, BUT she is getting weight loss surgery that month. We should plan to infuse in Aug instead. I'd like to wait to see if any complications with surgery.

## 2022-06-07 ENCOUNTER — PATIENT MESSAGE (OUTPATIENT)
Dept: REHABILITATION | Facility: HOSPITAL | Age: 42
End: 2022-06-07

## 2022-06-08 ENCOUNTER — PATIENT MESSAGE (OUTPATIENT)
Dept: NEUROLOGY | Facility: CLINIC | Age: 42
End: 2022-06-08
Payer: MEDICARE

## 2022-06-09 ENCOUNTER — TELEPHONE (OUTPATIENT)
Dept: BARIATRICS | Facility: CLINIC | Age: 42
End: 2022-06-09
Payer: MEDICARE

## 2022-06-13 ENCOUNTER — DOCUMENTATION ONLY (OUTPATIENT)
Dept: REHABILITATION | Facility: HOSPITAL | Age: 42
End: 2022-06-13
Payer: MEDICARE

## 2022-06-13 NOTE — PROGRESS NOTES
Called patient regarding cancellation this visit and last visit. Left a message.     Also, called patient last week about her cancelled appointment.

## 2022-06-17 ENCOUNTER — PATIENT MESSAGE (OUTPATIENT)
Dept: NEUROLOGY | Facility: CLINIC | Age: 42
End: 2022-06-17
Payer: MEDICARE

## 2022-06-20 ENCOUNTER — CLINICAL SUPPORT (OUTPATIENT)
Dept: REHABILITATION | Facility: HOSPITAL | Age: 42
End: 2022-06-20
Payer: MEDICARE

## 2022-06-20 DIAGNOSIS — R53.1 DECREASED STRENGTH: ICD-10-CM

## 2022-06-20 DIAGNOSIS — R53.81 DEBILITY: Primary | ICD-10-CM

## 2022-06-20 DIAGNOSIS — G43.809 OTHER MIGRAINE WITHOUT STATUS MIGRAINOSUS, NOT INTRACTABLE: ICD-10-CM

## 2022-06-20 DIAGNOSIS — Z74.09 DECREASED FUNCTIONAL MOBILITY AND ENDURANCE: ICD-10-CM

## 2022-06-20 PROCEDURE — 97110 THERAPEUTIC EXERCISES: CPT

## 2022-06-20 PROCEDURE — 97112 NEUROMUSCULAR REEDUCATION: CPT

## 2022-06-20 PROCEDURE — 97530 THERAPEUTIC ACTIVITIES: CPT

## 2022-06-20 NOTE — PROGRESS NOTES
"  [facility]   Physical Therapy Treatment Note     Name: Alicia Soliz  Clinic Number: [id number]    Therapy Diagnosis:   Encounter Diagnoses   Name Primary?    Debility Yes    Decreased strength     Decreased functional mobility and endurance      Physician: Rico Rutledge MD    Visit Date: 6/20/2022   Physician Orders: PT Eval and Treat   Medical Diagnosis from Referral: G35 (ICD-10-CM) - Multiple sclerosis  Evaluation Date: 5/23/2022  Authorization Period Expiration: 12/31/22  Plan of Care Expiration:   7/4/22  Progress Note Due: June 23, 2022 or 10th visit  Visit # / Visits authorized: 3/20   FOTO: 1/3     Precautions: Standard and Fall     PTA Visit #: 0/5     Time In: 12:40  Time Out: 13:40  Total Billable Time: 60 minutes    SUBJECTIVE     Pt reports: she is receiving a rolling walker soon. She states she has not fallen since the last time she was here. She has had to miss therapy due to having flare ups recently and not wanting to wait out in the head due to these flare ups.     She was compliant with home exercise program.  Response to previous treatment: none   Functional change: none     Pain: 0/10  Location: NA     OBJECTIVE     Objective Measures updated at progress report unless specified.     Treatment     Alicia received the treatments listed below:      therapeutic exercises to develop strength, endurance and ROM for 15   minutes including:    Nustep 10 minutes L0   Gastroc and soleus stretch 20"x 2 (ea)     neuromuscular re-education activities to improve: Balance for 15 minutes. The following activities were included:    Prone hip extension 2x10   Prone knee flexion with 4# 3x10    Therapeutic activities for 30  Minutes   DL shuttle squats L6 2x20    SL squats squats L6 2x10 bilateral   Lower trunk rotation 30  Bridges 10 x 10 seconds each     Patient Education and Home Exercises     Home Exercises Provided and Patient Education Provided     Education " provided:   - HEP given    Written Home Exercises Provided: yes. Exercises were reviewed and Alicia was able to demonstrate them prior to the end of the session.  Alicia demonstrated good  understanding of the education provided. See EMR under Patient Instructions for exercises provided during therapy sessions    ASSESSMENT     Patient does have in tightness noted in L gastroc passively.  Patient required cuing to demonstrate heel strike upon stance phase on L lower extremity during ambulation with walker and was able to correct upon cuing.    Patient encouraged to use walker to walk to [employer] in parking lot this date due to being high fall risk. Required tactile cuing for proper form during prone knee flexion with weight due to decreased proprioception.     Alicia Is progressing well towards her goals.   Pt prognosis is Good.     Pt will continue to benefit from skilled outpatient physical therapy to address the deficits listed in the problem list box on initial evaluation, provide pt/family education and to maximize pt's level of independence in the home and community environment.     Pt's spiritual, cultural and educational needs considered and pt agreeable to plan of care and goals.     Anticipated barriers to physical therapy: MS and stroke    Goals:   Short Term Goals: In 3 weeks   1.I with HEP  2. Pt to increase BLE strength by 1/2 grade to show improvements with standing, ambulating,  transferring, navigating steps/curbs.    3.Pt to have pain less than 4/10 at worst to show decreased pain with sitting, standing, ambulating,  transferring, navigating steps/curbs.    Long Term Goals: In 6 weeks  1. Patient to demo increase in LE strength to 1 muscle grade to show improvements with sitting, standing, ambulating, transferring, navigating steps/curbs.   2. Patient to have decreased pain to 1/10 at worst sitting, standing, ambulating, bending,  transferring, navigating steps/curbs.  3. Patient to improve  score on the FOTO to < or = to 50% to show improvement with QOL.   4. Patient will ambulate up to 10 feet with improved knee flexion throughout stance phase.        PLAN     Plan of care Certification: 5/23/2022 to 7/4/2022.     Outpatient Physical Therapy 2 times weekly for 6 weeks to include the following interventions: Electrical Stimulation PRN, Gait Training, Manual Therapy, Moist Heat/ Ice, Neuromuscular Re-ed, Orthotic Management and Training, Patient Education, Self Care, Therapeutic Activities and Therapeutic Exercise.    Nirali Gee, PT

## 2022-06-21 RX ORDER — TOPIRAMATE 50 MG/1
TABLET, FILM COATED ORAL
Qty: 90 TABLET | Refills: 1 | Status: SHIPPED | OUTPATIENT
Start: 2022-06-21 | End: 2022-08-10

## 2022-06-22 ENCOUNTER — PATIENT MESSAGE (OUTPATIENT)
Dept: INTERNAL MEDICINE | Facility: CLINIC | Age: 42
End: 2022-06-22
Payer: MEDICARE

## 2022-06-22 ENCOUNTER — PATIENT MESSAGE (OUTPATIENT)
Dept: NEUROLOGY | Facility: CLINIC | Age: 42
End: 2022-06-22
Payer: MEDICARE

## 2022-06-22 DIAGNOSIS — G35 MULTIPLE SCLEROSIS: Primary | ICD-10-CM

## 2022-06-22 DIAGNOSIS — Z74.09 IMPAIRED MOBILITY AND ACTIVITIES OF DAILY LIVING: ICD-10-CM

## 2022-06-22 DIAGNOSIS — Z78.9 IMPAIRED MOBILITY AND ACTIVITIES OF DAILY LIVING: ICD-10-CM

## 2022-06-23 DIAGNOSIS — M79.642 LEFT HAND PAIN: Primary | ICD-10-CM

## 2022-06-24 ENCOUNTER — PATIENT MESSAGE (OUTPATIENT)
Dept: PSYCHIATRY | Facility: CLINIC | Age: 42
End: 2022-06-24
Payer: MEDICARE

## 2022-06-24 ENCOUNTER — CLINICAL SUPPORT (OUTPATIENT)
Dept: REHABILITATION | Facility: HOSPITAL | Age: 42
End: 2022-06-24
Payer: MEDICARE

## 2022-06-24 DIAGNOSIS — R53.81 DEBILITY: Primary | ICD-10-CM

## 2022-06-24 DIAGNOSIS — R53.1 DECREASED STRENGTH: ICD-10-CM

## 2022-06-24 DIAGNOSIS — Z74.09 DECREASED FUNCTIONAL MOBILITY AND ENDURANCE: ICD-10-CM

## 2022-06-24 PROCEDURE — 97530 THERAPEUTIC ACTIVITIES: CPT

## 2022-06-24 PROCEDURE — 97110 THERAPEUTIC EXERCISES: CPT

## 2022-06-24 PROCEDURE — 97112 NEUROMUSCULAR REEDUCATION: CPT

## 2022-06-24 NOTE — TELEPHONE ENCOUNTER
"SW obtained walker order and phoned DME vendors:  [facility] - no bariatric walkers  [employer] - "  "  [phone].    [facility] [employer] [facility].   "

## 2022-06-24 NOTE — PLAN OF CARE
[facility]   Physical Therapy Treatment/Progress Note     Name: Alicia Soliz  Clinic Number: [id number]    Therapy Diagnosis:   Encounter Diagnoses   Name Primary?    Debility Yes    Decreased strength     Decreased functional mobility and endurance      Physician: Rico Rutledge MD    Visit Date: 6/24/2022   Physician Orders: PT Eval and Treat   Medical Diagnosis from Referral: G35 (ICD-10-CM) - Multiple sclerosis  Evaluation Date: 5/23/2022  Authorization Period Expiration: 12/31/22  Plan of Care Expiration:   8/5/22  Progress Note Due: July 24, 2022 or 10th visit  Visit # / Visits authorized: 5/20   FOTO: 2/3     Precautions: Standard and Fall     PTA Visit #: 0/5     Time In: 7:07  Time Out: 7:50  Total Billable Time: 43 minutes    SUBJECTIVE     Pt reports: she is receiving a rolling walker soon.   Patient states improvements with: her walking ability and her balance.   Patient states continuing to have difficulty with: continuing to have difficulty with having falls.  She had a fall on Wednesday because her left leg gave out and hyperextended. She states she did have trouble getting up from the ground.          She was compliant with home exercise program.  Response to previous treatment: none   Functional change: none     Pain: 0/10  Location: NA     OBJECTIVE     Objective Measures updated at progress report unless specified.        CMS Impairment/Limitation/Restriction for FOTO Multiple Sclerosis Survey     Therapist reviewed FOTO scores for Alicia Soliz on 5/23/2022.   FOTO documents entered into [employer] - see Media section.     Limitation Score: 52%  Limitation Score:  60% (6/24/22)         Initial evaluation:   Gait: ambulates with four pronged cane, decreased L stance phase, increased L plantar flexion throughout gait pattern, increased L knee extension throughout gait phase     6/24/22:   Gait: ambulated with rolling walker provided during therapy session  (continuing to encourage rolling walker at home but patient uses 4 pronged cane at home, she has been given a referral for a rolling walker but it has not been received yet); compensates with him adductors on left lower extremity throughout swing phase when using rolling walker         Initial evaluation:  Strength:                    Right                                        Left  Hip flexors 4+/5      4/5 (tremors to the L leg)   Knee extension 4+/5      4/5   Knee flexion 4+/5       4/5   Hip abductors 4-/5       4- /5   DF 4+/5       4/5   PF 4+/5        4+/5    Big toe extension Not tested  Not tested         6/24/22:    Strength:                    Right                                        Left  Hip flexors 4+/5      4/5    Knee extension 5/5      4+/5   Knee flexion 5/5       4+/5   Hip abductors 4+/5       4/5   DF 5/5 (iversion and eversion 4+/5)        4+/5 (inversion 4/5, eversion 4+)    PF 5/5        4+/5    Big toe extension Not tested  Not tested    Hip extension: 3/5    3-/5          Treatment     Alicia received the treatments listed below:      therapeutic exercises to develop strength, endurance and ROM for 15   minutes including:    Nustep 10 minutes L0     Assessment for updated progress note     neuromuscular re-education activities to improve: Balance for 15 minutes. The following activities were included:    Prone hip extension 2x10   Quadruped reaching forward with R and L arm alternating x 5, unable to perform hip extension on L Lower extremity, performed hip extension on R lower extremity x5       Therapeutic activities for 13 Minutes   DL shuttle squats L5 2x10    SL squats squats L4 2x10 bilateral       Patient Education and Home Exercises     Home Exercises Provided and Patient Education Provided     Education provided:   - HEP given    Written Home Exercises Provided: yes. Exercises were reviewed and Alicia was able to demonstrate them prior to the end of the session.  Alicia  demonstrated good  understanding of the education provided. See EMR under Patient Instructions for exercises provided during therapy sessions    ASSESSMENT      Alicia is a 42 y.o. female referred to outpatient Physical Therapy with a medical diagnosis of multiple sclerosis affecting her L lower extremity. Patient presents with improved strength, endurance, ROM, and decreased pain. Although she has made improvements with these impairments, she continues to have decreased strength, endurance, ROM. These impairments are limiting their ability to perform standing, ambulating, transferring, navigating steps/curbs. Patient is at increased risk for falls and was instructed to use a walker at home due to recent fall 2 days ago.    Patient has difficulty with hip extension on the L during quadruped due to weak hip extensors this visit.     Alicia Is progressing well towards her goals.   Pt prognosis is Good.     Pt will continue to benefit from skilled outpatient physical therapy to address the deficits listed in the problem list box on initial evaluation, provide pt/family education and to maximize pt's level of independence in the home and community environment.     Pt's spiritual, cultural and educational needs considered and pt agreeable to plan of care and goals.     Anticipated barriers to physical therapy: MS and stroke    Goals:   Short Term Goals: In 3 weeks   1.I with HEP MET  2. Pt to increase BLE strength by 1/2 grade to show improvements with standing, ambulating,  transferring, navigating steps/curbs.  IN PROGRESS   3.Pt to have pain less than 4/10 at worst to show decreased pain with sitting, standing, ambulating,  transferring, navigating steps/curbs. MET    Long Term Goals: In 6 weeks  1. Patient to demo increase in LE strength to 1 muscle grade to show improvements with sitting, standing, ambulating, transferring, navigating steps/curbs. IN PROGRESS   2. Patient to have decreased pain to 1/10 at worst  sitting, standing, ambulating, bending,  transferring, navigating steps/curbs.MET  3. Patient to improve score on the FOTO to < or = to 50% to show improvement with QOL. IN PROGRESS   4. Patient will ambulate up to 10 feet with improved knee flexion throughout stance phase.  IN PROGRESS         PLAN     Plan of care Certification: 5/23/2022 to 8/5/2022.     Outpatient Physical Therapy 2 times weekly for 6 weeks to include the following interventions: Electrical Stimulation PRN, Gait Training, Manual Therapy, Moist Heat/ Ice, Neuromuscular Re-ed, Orthotic Management and Training, Patient Education, Self Care, Therapeutic Activities and Therapeutic Exercise.    Nirali Gee, PT

## 2022-06-24 NOTE — PROGRESS NOTES
[facility]   Physical Therapy Treatment/Progress Note     Name: Alicia Soliz  Clinic Number: [id number]    Therapy Diagnosis:   Encounter Diagnoses   Name Primary?    Debility Yes    Decreased strength     Decreased functional mobility and endurance      Physician: Rico Rutledge MD    Visit Date: 6/24/2022   Physician Orders: PT Eval and Treat   Medical Diagnosis from Referral: G35 (ICD-10-CM) - Multiple sclerosis  Evaluation Date: 5/23/2022  Authorization Period Expiration: 12/31/22  Plan of Care Expiration:   8/5/22  Progress Note Due: July 24, 2022 or 10th visit  Visit # / Visits authorized: 5/20   FOTO: 2/3     Precautions: Standard and Fall     PTA Visit #: 0/5     Time In: 7:07  Time Out: 7:50  Total Billable Time: 43 minutes    SUBJECTIVE     Pt reports: she is receiving a rolling walker soon.   Patient states improvements with: her walking ability and her balance.   Patient states continuing to have difficulty with: continuing to have difficulty with having falls.  She had a fall on Wednesday because her left leg gave out and hyperextended. She states she did have trouble getting up from the ground.          She was compliant with home exercise program.  Response to previous treatment: none   Functional change: none     Pain: 0/10  Location: NA     OBJECTIVE     Objective Measures updated at progress report unless specified.        CMS Impairment/Limitation/Restriction for FOTO Multiple Sclerosis Survey     Therapist reviewed FOTO scores for Alicia Soliz on 5/23/2022.   FOTO documents entered into [employer] - see Media section.     Limitation Score: 52%  Limitation Score:  60% (6/24/22)         Initial evaluation:   Gait: ambulates with four pronged cane, decreased L stance phase, increased L plantar flexion throughout gait pattern, increased L knee extension throughout gait phase     6/24/22:   Gait: ambulated with rolling walker provided during therapy  session (continuing to encourage rolling walker at home but patient uses 4 pronged cane at home, she has been given a referral for a rolling walker but it has not been received yet); compensates with him adductors on left lower extremity throughout swing phase when using rolling walker         Initial evaluation:  Strength:                    Right                                        Left  Hip flexors 4+/5      4/5 (tremors to the L leg)   Knee extension 4+/5      4/5   Knee flexion 4+/5       4/5   Hip abductors 4-/5       4- /5   DF 4+/5       4/5   PF 4+/5        4+/5    Big toe extension Not tested  Not tested         6/24/22:    Strength:                    Right                                        Left  Hip flexors 4+/5      4/5    Knee extension 5/5      4+/5   Knee flexion 5/5       4+/5   Hip abductors 4+/5       4/5   DF 5/5 (iversion and eversion 4+/5)        4+/5 (inversion 4/5, eversion 4+)    PF 5/5        4+/5    Big toe extension Not tested  Not tested    Hip extension: 3/5    3-/5          Treatment     Alicia received the treatments listed below:      therapeutic exercises to develop strength, endurance and ROM for 15   minutes including:    Nustep 10 minutes L0     Assessment for updated progress note     neuromuscular re-education activities to improve: Balance for 15 minutes. The following activities were included:    Prone hip extension 2x10   Quadruped reaching forward with R and L arm alternating x 5, unable to perform hip extension on L Lower extremity, performed hip extension on R lower extremity x5       Therapeutic activities for 13 Minutes   DL shuttle squats L5 2x10    SL squats squats L4 2x10 bilateral       Patient Education and Home Exercises     Home Exercises Provided and Patient Education Provided     Education provided:   - HEP given    Written Home Exercises Provided: yes. Exercises were reviewed and Alicia was able to demonstrate them prior to the end of the session.  Alicia  demonstrated good  understanding of the education provided. See EMR under Patient Instructions for exercises provided during therapy sessions    ASSESSMENT      Alicia is a 42 y.o. female referred to outpatient Physical Therapy with a medical diagnosis of multiple sclerosis affecting her L lower extremity. Patient presents with improved strength, endurance, ROM, and decreased pain. Although she has made improvements with these impairments, she continues to have decreased strength, endurance, ROM. These impairments are limiting their ability to perform standing, ambulating, transferring, navigating steps/curbs. Patient is at increased risk for falls and was instructed to use a walker at home due to recent fall 2 days ago.     Patient has difficulty with hip extension on the L during quadruped due to weak hip extensors this visit.     Alicia Is progressing well towards her goals.   Pt prognosis is Good.     Pt will continue to benefit from skilled outpatient physical therapy to address the deficits listed in the problem list box on initial evaluation, provide pt/family education and to maximize pt's level of independence in the home and community environment.     Pt's spiritual, cultural and educational needs considered and pt agreeable to plan of care and goals.     Anticipated barriers to physical therapy: MS and stroke    Goals:   Short Term Goals: In 3 weeks   1.I with HEP MET  2. Pt to increase BLE strength by 1/2 grade to show improvements with standing, ambulating,  transferring, navigating steps/curbs.  IN PROGRESS   3.Pt to have pain less than 4/10 at worst to show decreased pain with sitting, standing, ambulating,  transferring, navigating steps/curbs. MET    Long Term Goals: In 6 weeks  1. Patient to demo increase in LE strength to 1 muscle grade to show improvements with sitting, standing, ambulating, transferring, navigating steps/curbs. IN PROGRESS   2. Patient to have decreased pain to 1/10 at worst  sitting, standing, ambulating, bending,  transferring, navigating steps/curbs.MET  3. Patient to improve score on the FOTO to < or = to 50% to show improvement with QOL. IN PROGRESS   4. Patient will ambulate up to 10 feet with improved knee flexion throughout stance phase.  IN PROGRESS        PLAN     Plan of care Certification: 5/23/2022 to 8/5/2022.     Outpatient Physical Therapy 2 times weekly for 6 weeks to include the following interventions: Electrical Stimulation PRN, Gait Training, Manual Therapy, Moist Heat/ Ice, Neuromuscular Re-ed, Orthotic Management and Training, Patient Education, Self Care, Therapeutic Activities and Therapeutic Exercise.    Nirali Gee, PT

## 2022-06-29 ENCOUNTER — PATIENT MESSAGE (OUTPATIENT)
Dept: REHABILITATION | Facility: HOSPITAL | Age: 42
End: 2022-06-29
Payer: MEDICARE

## 2022-06-29 RX ORDER — DULOXETIN HYDROCHLORIDE 60 MG/1
CAPSULE, DELAYED RELEASE ORAL
Qty: 90 CAPSULE | Refills: 3 | Status: SHIPPED | OUTPATIENT
Start: 2022-06-29 | End: 2023-07-06

## 2022-06-29 NOTE — TELEPHONE ENCOUNTER
No new care gaps identified.  [facility] Embedded Care Gaps. Reference number: [id number]. 6/29/2022   10:06:38 AM DANGT

## 2022-06-30 ENCOUNTER — NURSE TRIAGE (OUTPATIENT)
Dept: ADMINISTRATIVE | Facility: CLINIC | Age: 42
End: 2022-06-30
Payer: MEDICARE

## 2022-06-30 ENCOUNTER — PATIENT MESSAGE (OUTPATIENT)
Dept: NEUROLOGY | Facility: CLINIC | Age: 42
End: 2022-06-30
Payer: MEDICARE

## 2022-07-01 ENCOUNTER — PATIENT MESSAGE (OUTPATIENT)
Dept: INTERNAL MEDICINE | Facility: CLINIC | Age: 42
End: 2022-07-01
Payer: MEDICARE

## 2022-07-01 NOTE — TELEPHONE ENCOUNTER
"Alicia calling c/o vomiting since Monday after eating fried chicken. Vomiting since Monday. Vomiting unrelieved by Zofran. Hx of MS. States only other symptom is hot flashes. Advised pt per triage protocol on call provider will be contacted for PCP triage. Alicia wishes to hold.   Spoke to Dr. Clif Urbina, on call internal med MD. Per Dr. Urbina's verbal advice, pt needs to go to ED if unable to hold anything down.   Updated Alicia per Dr. Urbina's verbal advice to go to ED now for physician eval. V/u.     Reason for Disposition   High-risk adult (e.g., diabetes mellitus, brain tumor, V-P shunt, hernia)    Additional Information   Negative: Shock suspected (e.g., cold/pale/clammy skin, too weak to stand, low BP, rapid pulse)   Negative: Difficult to awaken or acting confused (e.g., disoriented, slurred speech)   Negative: Sounds like a life-threatening emergency to the triager   Negative: [1] Vomiting AND [2] contains red blood or black ("coffee ground") material  (Exception: few red streaks in vomit that only happened once)   Negative: Severe pain in one eye   Negative: Recent head injury (within last 3 days)   Negative: Recent abdominal injury (within last 3 days)   Negative: [1] Insulin-dependent diabetes (Type I) AND [2] glucose > 400 mg/dl (22 mmol/l)   Negative: [1] Vomiting AND [2] hernia is more painful or swollen than usual   Negative: [1] SEVERE vomiting (e.g., 6 or more times/day) AND [2] present > 8 hours (Exception: patient sounds well, is drinking liquids, does not sound dehydrated, and vomiting has lasted less than 24 hours)   Negative: [1] MODERATE vomiting (e.g., 3 - 5 times/day) AND [2] age > 60 years   Negative: Severe headache (e.g., excruciating) (Exception: similar to previous migraines)    Protocols used: VOMITING-A-AH      "

## 2022-07-02 ENCOUNTER — OFFICE VISIT (OUTPATIENT)
Dept: URGENT CARE | Facility: CLINIC | Age: 42
End: 2022-07-02
Payer: MEDICARE

## 2022-07-02 VITALS
WEIGHT: 289 LBS | DIASTOLIC BLOOD PRESSURE: 86 MMHG | HEART RATE: 80 BPM | RESPIRATION RATE: 18 BRPM | SYSTOLIC BLOOD PRESSURE: 146 MMHG | TEMPERATURE: 98 F | HEIGHT: 66 IN | OXYGEN SATURATION: 100 % | BODY MASS INDEX: 46.45 KG/M2

## 2022-07-02 DIAGNOSIS — R11.0 NAUSEA: Primary | ICD-10-CM

## 2022-07-02 LAB
CTP QC/QA: YES
SARS-COV-2 RDRP RESP QL NAA+PROBE: NEGATIVE

## 2022-07-02 PROCEDURE — 3077F SYST BP >= 140 MM HG: CPT | Mod: CPTII,S$GLB,, | Performed by: PHYSICIAN ASSISTANT

## 2022-07-02 PROCEDURE — 1160F RVW MEDS BY RX/DR IN RCRD: CPT | Mod: CPTII,S$GLB,, | Performed by: PHYSICIAN ASSISTANT

## 2022-07-02 PROCEDURE — 1159F PR MEDICATION LIST DOCUMENTED IN MEDICAL RECORD: ICD-10-PCS | Mod: CPTII,S$GLB,, | Performed by: PHYSICIAN ASSISTANT

## 2022-07-02 PROCEDURE — 3079F DIAST BP 80-89 MM HG: CPT | Mod: CPTII,S$GLB,, | Performed by: PHYSICIAN ASSISTANT

## 2022-07-02 PROCEDURE — 96372 PR INJECTION,THERAP/PROPH/DIAG2ST, IM OR SUBCUT: ICD-10-PCS | Mod: S$GLB,,, | Performed by: PHYSICIAN ASSISTANT

## 2022-07-02 PROCEDURE — U0002: ICD-10-PCS | Mod: QW,S$GLB,, | Performed by: PHYSICIAN ASSISTANT

## 2022-07-02 PROCEDURE — 4010F PR ACE/ARB THEARPY RXD/TAKEN: ICD-10-PCS | Mod: CPTII,S$GLB,, | Performed by: PHYSICIAN ASSISTANT

## 2022-07-02 PROCEDURE — 3079F PR MOST RECENT DIASTOLIC BLOOD PRESSURE 80-89 MM HG: ICD-10-PCS | Mod: CPTII,S$GLB,, | Performed by: PHYSICIAN ASSISTANT

## 2022-07-02 PROCEDURE — U0002 COVID-19 LAB TEST NON-CDC: HCPCS | Mod: QW,S$GLB,, | Performed by: PHYSICIAN ASSISTANT

## 2022-07-02 PROCEDURE — 3077F PR MOST RECENT SYSTOLIC BLOOD PRESSURE >= 140 MM HG: ICD-10-PCS | Mod: CPTII,S$GLB,, | Performed by: PHYSICIAN ASSISTANT

## 2022-07-02 PROCEDURE — 4010F ACE/ARB THERAPY RXD/TAKEN: CPT | Mod: CPTII,S$GLB,, | Performed by: PHYSICIAN ASSISTANT

## 2022-07-02 PROCEDURE — 3008F BODY MASS INDEX DOCD: CPT | Mod: CPTII,S$GLB,, | Performed by: PHYSICIAN ASSISTANT

## 2022-07-02 PROCEDURE — 3044F HG A1C LEVEL LT 7.0%: CPT | Mod: CPTII,S$GLB,, | Performed by: PHYSICIAN ASSISTANT

## 2022-07-02 PROCEDURE — 3044F PR MOST RECENT HEMOGLOBIN A1C LEVEL <7.0%: ICD-10-PCS | Mod: CPTII,S$GLB,, | Performed by: PHYSICIAN ASSISTANT

## 2022-07-02 PROCEDURE — 1160F PR REVIEW ALL MEDS BY PRESCRIBER/CLIN PHARMACIST DOCUMENTED: ICD-10-PCS | Mod: CPTII,S$GLB,, | Performed by: PHYSICIAN ASSISTANT

## 2022-07-02 PROCEDURE — 3008F PR BODY MASS INDEX (BMI) DOCUMENTED: ICD-10-PCS | Mod: CPTII,S$GLB,, | Performed by: PHYSICIAN ASSISTANT

## 2022-07-02 PROCEDURE — 99213 PR OFFICE/OUTPT VISIT, EST, LEVL III, 20-29 MIN: ICD-10-PCS | Mod: 25,S$GLB,, | Performed by: PHYSICIAN ASSISTANT

## 2022-07-02 PROCEDURE — 99213 OFFICE O/P EST LOW 20 MIN: CPT | Mod: 25,S$GLB,, | Performed by: PHYSICIAN ASSISTANT

## 2022-07-02 PROCEDURE — 96372 THER/PROPH/DIAG INJ SC/IM: CPT | Mod: S$GLB,,, | Performed by: PHYSICIAN ASSISTANT

## 2022-07-02 PROCEDURE — 1159F MED LIST DOCD IN RCRD: CPT | Mod: CPTII,S$GLB,, | Performed by: PHYSICIAN ASSISTANT

## 2022-07-02 RX ORDER — HYDROCODONE BITARTRATE AND ACETAMINOPHEN 7.5; 325 MG/1; MG/1
1 TABLET ORAL EVERY 6 HOURS PRN
COMMUNITY
Start: 2022-06-24 | End: 2022-08-05

## 2022-07-02 RX ORDER — VALSARTAN 80 MG/1
80 TABLET ORAL DAILY
COMMUNITY
Start: 2022-04-25 | End: 2023-02-16 | Stop reason: SDUPTHER

## 2022-07-02 RX ORDER — ONDANSETRON 8 MG/1
8 TABLET, ORALLY DISINTEGRATING ORAL EVERY 6 HOURS PRN
COMMUNITY
Start: 2022-06-24 | End: 2023-01-11

## 2022-07-02 RX ORDER — PROMETHAZINE HYDROCHLORIDE 25 MG/ML
25 INJECTION, SOLUTION INTRAMUSCULAR; INTRAVENOUS
Status: COMPLETED | OUTPATIENT
Start: 2022-07-02 | End: 2022-07-02

## 2022-07-02 RX ADMIN — PROMETHAZINE HYDROCHLORIDE 25 MG: 25 INJECTION, SOLUTION INTRAMUSCULAR; INTRAVENOUS at 01:07

## 2022-07-02 NOTE — PROGRESS NOTES
"Subjective:       Patient ID: Alicia Soliz is a 42 y.o. female.    Vitals:  height is 5' 6" (1.676 m) and weight is 131.1 kg (289 lb). Her tympanic temperature is 97.9 °F (36.6 °C). Her blood pressure is 146/86 (abnormal) and her pulse is 80. Her respiration is 18 and oxygen saturation is 100%.     Chief Complaint: Nausea and Emesis    Nausea  The current episode started in the past 7 days (wednesday). Associated symptoms include nausea and vomiting. Treatments tried: zofran has been tried    Emesis   The current episode started in the past 7 days. The emesis has an appearance of bile. There has been no fever. She has tried diet change for the symptoms.       Gastrointestinal: Positive for nausea and vomiting.       Objective:      Physical Exam   Constitutional: She does not appear ill. No distress. obesity  HENT:   Head: Normocephalic.   Ears:   Right Ear: Tympanic membrane and ear canal normal.   Left Ear: Tympanic membrane and ear canal normal.   Nose: Nose normal. No congestion.   Eyes: Conjunctivae are normal. Pupils are equal, round, and reactive to light.   Cardiovascular: Normal rate and regular rhythm.   Pulmonary/Chest: Effort normal and breath sounds normal. No respiratory distress. She has no wheezes.   Abdominal: Normal appearance. She exhibits no distension and no mass. There is no abdominal tenderness. There is no rebound and no guarding. No hernia.   Neurological: She is alert.   Nursing note and vitals reviewed.        Assessment:       1. Nausea        Here with nausea and vomiting since Wednesday. She is concerned she did not cook her friend chicken enough. She denies diarrhea, fever or chills. She has a soft non tender abdomen. She denies sick contacts or recent travel. She was tested for covid and it was negative. She has zofran at home and her neurologist just called her him some phenergan. I will administer IM phenergan. She was encouraged to follow a gentle diet and hydrate. She was " instructed to hydrate and return to the clinic if new or worsening symptoms occur.    Plan:         Nausea  -     POCT COVID-19 Rapid Screening  -     promethazine injection 25 mg

## 2022-07-05 ENCOUNTER — TELEPHONE (OUTPATIENT)
Dept: URGENT CARE | Facility: CLINIC | Age: 42
End: 2022-07-05
Payer: MEDICARE

## 2022-07-13 ENCOUNTER — PATIENT MESSAGE (OUTPATIENT)
Dept: INTERNAL MEDICINE | Facility: CLINIC | Age: 42
End: 2022-07-13
Payer: MEDICARE

## 2022-07-15 ENCOUNTER — PATIENT MESSAGE (OUTPATIENT)
Dept: NEUROLOGY | Facility: CLINIC | Age: 42
End: 2022-07-15
Payer: MEDICARE

## 2022-07-18 ENCOUNTER — PATIENT MESSAGE (OUTPATIENT)
Dept: INTERNAL MEDICINE | Facility: CLINIC | Age: 42
End: 2022-07-18
Payer: MEDICARE

## 2022-07-27 ENCOUNTER — PATIENT MESSAGE (OUTPATIENT)
Dept: NEUROLOGY | Facility: CLINIC | Age: 42
End: 2022-07-27
Payer: MEDICARE

## 2022-07-27 ENCOUNTER — TELEPHONE (OUTPATIENT)
Dept: NEUROLOGY | Facility: CLINIC | Age: 42
End: 2022-07-27
Payer: MEDICARE

## 2022-07-27 NOTE — TELEPHONE ENCOUNTER
Spoke with pt who reports on 7/9 she had a fall and hurt her breast where she just had reconstructive surgery She states she saw her surgeon on Monday who said everything looked good. However today she is reporting swelling and redness to her breast, burning pain to the middle of her back and being off balance. Pt denies any recent infections. Is taking cymbalta and gabapentin and has also tried baclofen and ibuprofen for pain with no relief. Advised pt go to her local ER for evaluation. Pt v/u.

## 2022-07-27 NOTE — TELEPHONE ENCOUNTER
----- Message from Geremias Yepez sent at 7/27/2022  3:17 PM CDT -----  Regarding: Pt has left multiple messages regarding the pain she is in  Contact: @[phone]  Pt requesting a call back to see if the pain is from the surgery or a flare up.  Pt needs to know if she should go check into a hospital or not.  I attempted to call the MA and Nurse with no answer.  Please call to discuss further.  Pt has been up in pain since last night.

## 2022-08-02 ENCOUNTER — PATIENT MESSAGE (OUTPATIENT)
Dept: NEUROLOGY | Facility: CLINIC | Age: 42
End: 2022-08-02
Payer: MEDICARE

## 2022-08-05 ENCOUNTER — HOSPITAL ENCOUNTER (INPATIENT)
Facility: HOSPITAL | Age: 42
LOS: 1 days | Discharge: HOME | DRG: 059 | End: 2022-08-06
Attending: EMERGENCY MEDICINE | Admitting: INTERNAL MEDICINE
Payer: MEDICARE

## 2022-08-05 DIAGNOSIS — G35 MULTIPLE SCLEROSIS EXACERBATION: Primary | ICD-10-CM

## 2022-08-05 DIAGNOSIS — R53.1 LEFT-SIDED WEAKNESS: ICD-10-CM

## 2022-08-05 LAB
ALBUMIN SERPL BCP-MCNC: 3.7 G/DL (ref 3.5–5.2)
ALP SERPL-CCNC: 84 U/L (ref 55–135)
ALT SERPL W/O P-5'-P-CCNC: 9 U/L (ref 10–44)
ANION GAP SERPL CALC-SCNC: 8 MMOL/L (ref 8–16)
AST SERPL-CCNC: 14 U/L (ref 10–40)
BASOPHILS # BLD AUTO: 0.05 K/UL (ref 0–0.2)
BASOPHILS NFR BLD: 0.9 % (ref 0–1.9)
BILIRUB SERPL-MCNC: 0.2 MG/DL (ref 0.1–1)
BUN SERPL-MCNC: 6 MG/DL (ref 6–20)
CALCIUM SERPL-MCNC: 9.3 MG/DL (ref 8.7–10.5)
CHLORIDE SERPL-SCNC: 109 MMOL/L (ref 95–110)
CHOLEST SERPL-MCNC: 191 MG/DL (ref 120–199)
CHOLEST/HDLC SERPL: 3.4 {RATIO} (ref 2–5)
CO2 SERPL-SCNC: 24 MMOL/L (ref 23–29)
CREAT SERPL-MCNC: 0.8 MG/DL (ref 0.5–1.4)
DIFFERENTIAL METHOD: ABNORMAL
EOSINOPHIL # BLD AUTO: 0.3 K/UL (ref 0–0.5)
EOSINOPHIL NFR BLD: 4.6 % (ref 0–8)
ERYTHROCYTE [DISTWIDTH] IN BLOOD BY AUTOMATED COUNT: 15.2 % (ref 11.5–14.5)
EST. GFR  (NO RACE VARIABLE): >60 ML/MIN/1.73 M^2
GLUCOSE SERPL-MCNC: 99 MG/DL (ref 70–110)
HCT VFR BLD AUTO: 34.3 % (ref 37–48.5)
HDLC SERPL-MCNC: 56 MG/DL (ref 40–75)
HDLC SERPL: 29.3 % (ref 20–50)
HGB BLD-MCNC: 10.6 G/DL (ref 12–16)
IMM GRANULOCYTES # BLD AUTO: 0.01 K/UL (ref 0–0.04)
IMM GRANULOCYTES NFR BLD AUTO: 0.2 % (ref 0–0.5)
INR PPP: 1 (ref 0.8–1.2)
LDLC SERPL CALC-MCNC: 110 MG/DL (ref 63–159)
LYMPHOCYTES # BLD AUTO: 2.7 K/UL (ref 1–4.8)
LYMPHOCYTES NFR BLD: 50.6 % (ref 18–48)
MCH RBC QN AUTO: 21.2 PG (ref 27–31)
MCHC RBC AUTO-ENTMCNC: 30.9 G/DL (ref 32–36)
MCV RBC AUTO: 69 FL (ref 82–98)
MONOCYTES # BLD AUTO: 0.5 K/UL (ref 0.3–1)
MONOCYTES NFR BLD: 9.1 % (ref 4–15)
NEUTROPHILS # BLD AUTO: 1.9 K/UL (ref 1.8–7.7)
NEUTROPHILS NFR BLD: 34.6 % (ref 38–73)
NONHDLC SERPL-MCNC: 135 MG/DL
NRBC BLD-RTO: 0 /100 WBC
PLATELET # BLD AUTO: 293 K/UL (ref 150–450)
PMV BLD AUTO: 10.3 FL (ref 9.2–12.9)
POCT GLUCOSE: 119 MG/DL (ref 70–110)
POCT GLUCOSE: 131 MG/DL (ref 70–110)
POTASSIUM SERPL-SCNC: 3.4 MMOL/L (ref 3.5–5.1)
PROT SERPL-MCNC: 6.8 G/DL (ref 6–8.4)
PROTHROMBIN TIME: 10.4 SEC (ref 9–12.5)
RBC # BLD AUTO: 4.99 M/UL (ref 4–5.4)
SARS-COV-2 RDRP RESP QL NAA+PROBE: NEGATIVE
SODIUM SERPL-SCNC: 141 MMOL/L (ref 136–145)
TRIGL SERPL-MCNC: 125 MG/DL (ref 30–150)
TSH SERPL DL<=0.005 MIU/L-ACNC: 3.07 UIU/ML (ref 0.4–4)
WBC # BLD AUTO: 5.41 K/UL (ref 3.9–12.7)

## 2022-08-05 PROCEDURE — 80053 COMPREHEN METABOLIC PANEL: CPT | Performed by: EMERGENCY MEDICINE

## 2022-08-05 PROCEDURE — G0426 PR INPT TELEHEALTH CONSULT 50M: ICD-10-PCS | Mod: 95,,, | Performed by: PSYCHIATRY & NEUROLOGY

## 2022-08-05 PROCEDURE — 63600175 PHARM REV CODE 636 W HCPCS: Performed by: INTERNAL MEDICINE

## 2022-08-05 PROCEDURE — 82962 GLUCOSE BLOOD TEST: CPT

## 2022-08-05 PROCEDURE — 99291 CRITICAL CARE FIRST HOUR: CPT

## 2022-08-05 PROCEDURE — U0002 COVID-19 LAB TEST NON-CDC: HCPCS | Performed by: EMERGENCY MEDICINE

## 2022-08-05 PROCEDURE — 80061 LIPID PANEL: CPT | Performed by: EMERGENCY MEDICINE

## 2022-08-05 PROCEDURE — 94761 N-INVAS EAR/PLS OXIMETRY MLT: CPT

## 2022-08-05 PROCEDURE — 63600175 PHARM REV CODE 636 W HCPCS: Performed by: EMERGENCY MEDICINE

## 2022-08-05 PROCEDURE — A9585 GADOBUTROL INJECTION: HCPCS | Performed by: INTERNAL MEDICINE

## 2022-08-05 PROCEDURE — 25000003 PHARM REV CODE 250: Performed by: INTERNAL MEDICINE

## 2022-08-05 PROCEDURE — 93005 ELECTROCARDIOGRAM TRACING: CPT

## 2022-08-05 PROCEDURE — 96374 THER/PROPH/DIAG INJ IV PUSH: CPT

## 2022-08-05 PROCEDURE — 25500020 PHARM REV CODE 255: Performed by: INTERNAL MEDICINE

## 2022-08-05 PROCEDURE — 84443 ASSAY THYROID STIM HORMONE: CPT | Performed by: EMERGENCY MEDICINE

## 2022-08-05 PROCEDURE — G0425 PR INPT TELEHEALTH CONSULT 30M: ICD-10-PCS | Mod: 95,,, | Performed by: PSYCHIATRY & NEUROLOGY

## 2022-08-05 PROCEDURE — G0425 INPT/ED TELECONSULT30: HCPCS | Mod: 95,,, | Performed by: PSYCHIATRY & NEUROLOGY

## 2022-08-05 PROCEDURE — 27000221 HC OXYGEN, UP TO 24 HOURS

## 2022-08-05 PROCEDURE — 85025 COMPLETE CBC W/AUTO DIFF WBC: CPT | Performed by: EMERGENCY MEDICINE

## 2022-08-05 PROCEDURE — 96375 TX/PRO/DX INJ NEW DRUG ADDON: CPT

## 2022-08-05 PROCEDURE — 93010 ELECTROCARDIOGRAM REPORT: CPT | Mod: ,,, | Performed by: INTERNAL MEDICINE

## 2022-08-05 PROCEDURE — 85610 PROTHROMBIN TIME: CPT | Performed by: EMERGENCY MEDICINE

## 2022-08-05 PROCEDURE — 25000003 PHARM REV CODE 250: Performed by: EMERGENCY MEDICINE

## 2022-08-05 PROCEDURE — G0426 INPT/ED TELECONSULT50: HCPCS | Mod: 95,,, | Performed by: PSYCHIATRY & NEUROLOGY

## 2022-08-05 PROCEDURE — 93010 EKG 12-LEAD: ICD-10-PCS | Mod: ,,, | Performed by: INTERNAL MEDICINE

## 2022-08-05 PROCEDURE — 21400001 HC TELEMETRY ROOM

## 2022-08-05 RX ORDER — SODIUM CHLORIDE 0.9 % (FLUSH) 0.9 %
10 SYRINGE (ML) INJECTION
Status: DISCONTINUED | OUTPATIENT
Start: 2022-08-05 | End: 2022-08-06 | Stop reason: HOSPADM

## 2022-08-05 RX ORDER — TOPIRAMATE 25 MG/1
50 TABLET ORAL DAILY
Status: DISCONTINUED | OUTPATIENT
Start: 2022-08-05 | End: 2022-08-06 | Stop reason: HOSPADM

## 2022-08-05 RX ORDER — LORAZEPAM 2 MG/ML
1 INJECTION INTRAMUSCULAR ONCE
Status: COMPLETED | OUTPATIENT
Start: 2022-08-05 | End: 2022-08-05

## 2022-08-05 RX ORDER — DULOXETIN HYDROCHLORIDE 30 MG/1
60 CAPSULE, DELAYED RELEASE ORAL DAILY
Status: DISCONTINUED | OUTPATIENT
Start: 2022-08-05 | End: 2022-08-05

## 2022-08-05 RX ORDER — PANTOPRAZOLE SODIUM 40 MG/1
40 TABLET, DELAYED RELEASE ORAL DAILY
Status: DISCONTINUED | OUTPATIENT
Start: 2022-08-05 | End: 2022-08-06 | Stop reason: HOSPADM

## 2022-08-05 RX ORDER — PROMETHAZINE HYDROCHLORIDE 25 MG/1
25 TABLET ORAL EVERY 6 HOURS PRN
Status: DISCONTINUED | OUTPATIENT
Start: 2022-08-05 | End: 2022-08-06 | Stop reason: HOSPADM

## 2022-08-05 RX ORDER — GADOBUTROL 604.72 MG/ML
10 INJECTION INTRAVENOUS
Status: COMPLETED | OUTPATIENT
Start: 2022-08-05 | End: 2022-08-05

## 2022-08-05 RX ORDER — DIPHENHYDRAMINE HYDROCHLORIDE 50 MG/ML
25 INJECTION INTRAMUSCULAR; INTRAVENOUS
Status: COMPLETED | OUTPATIENT
Start: 2022-08-05 | End: 2022-08-05

## 2022-08-05 RX ORDER — INSULIN ASPART 100 [IU]/ML
1-10 INJECTION, SOLUTION INTRAVENOUS; SUBCUTANEOUS
Status: DISCONTINUED | OUTPATIENT
Start: 2022-08-05 | End: 2022-08-06 | Stop reason: HOSPADM

## 2022-08-05 RX ORDER — HYDROCODONE BITARTRATE AND ACETAMINOPHEN 10; 325 MG/1; MG/1
1 TABLET ORAL EVERY 8 HOURS PRN
COMMUNITY
Start: 2022-07-05 | End: 2022-08-15

## 2022-08-05 RX ORDER — NAPROXEN SODIUM 220 MG/1
81 TABLET, FILM COATED ORAL DAILY
Status: DISCONTINUED | OUTPATIENT
Start: 2022-08-06 | End: 2022-08-06 | Stop reason: HOSPADM

## 2022-08-05 RX ORDER — LOSARTAN POTASSIUM 50 MG/1
50 TABLET ORAL DAILY
Status: DISCONTINUED | OUTPATIENT
Start: 2022-08-05 | End: 2022-08-06 | Stop reason: HOSPADM

## 2022-08-05 RX ORDER — PANTOPRAZOLE SODIUM 40 MG/1
40 TABLET, DELAYED RELEASE ORAL DAILY
Refills: 3 | Status: DISCONTINUED | OUTPATIENT
Start: 2022-08-05 | End: 2022-08-05

## 2022-08-05 RX ORDER — ACETAMINOPHEN 325 MG/1
650 TABLET ORAL EVERY 4 HOURS PRN
Status: DISCONTINUED | OUTPATIENT
Start: 2022-08-05 | End: 2022-08-06 | Stop reason: HOSPADM

## 2022-08-05 RX ORDER — GLUCAGON 1 MG
1 KIT INJECTION
Status: DISCONTINUED | OUTPATIENT
Start: 2022-08-05 | End: 2022-08-06 | Stop reason: HOSPADM

## 2022-08-05 RX ORDER — HYDROCODONE BITARTRATE AND ACETAMINOPHEN 5; 325 MG/1; MG/1
1 TABLET ORAL EVERY 4 HOURS PRN
Status: DISCONTINUED | OUTPATIENT
Start: 2022-08-05 | End: 2022-08-06 | Stop reason: HOSPADM

## 2022-08-05 RX ORDER — ENOXAPARIN SODIUM 100 MG/ML
40 INJECTION SUBCUTANEOUS EVERY 12 HOURS
Status: DISCONTINUED | OUTPATIENT
Start: 2022-08-05 | End: 2022-08-06 | Stop reason: HOSPADM

## 2022-08-05 RX ORDER — ATORVASTATIN CALCIUM 40 MG/1
40 TABLET, FILM COATED ORAL NIGHTLY
Status: DISCONTINUED | OUTPATIENT
Start: 2022-08-05 | End: 2022-08-06 | Stop reason: HOSPADM

## 2022-08-05 RX ORDER — AMOXICILLIN 250 MG
1 CAPSULE ORAL 2 TIMES DAILY
Status: DISCONTINUED | OUTPATIENT
Start: 2022-08-05 | End: 2022-08-05

## 2022-08-05 RX ORDER — LOSARTAN POTASSIUM 50 MG/1
50 TABLET ORAL DAILY
Status: DISCONTINUED | OUTPATIENT
Start: 2022-08-05 | End: 2022-08-05

## 2022-08-05 RX ORDER — GABAPENTIN 300 MG/1
300 CAPSULE ORAL 2 TIMES DAILY
Status: DISCONTINUED | OUTPATIENT
Start: 2022-08-05 | End: 2022-08-06 | Stop reason: HOSPADM

## 2022-08-05 RX ORDER — AMOXICILLIN 250 MG
1 CAPSULE ORAL 2 TIMES DAILY
Status: DISCONTINUED | OUTPATIENT
Start: 2022-08-05 | End: 2022-08-06 | Stop reason: HOSPADM

## 2022-08-05 RX ORDER — GADOBUTROL 604.72 MG/ML
10 INJECTION INTRAVENOUS
Status: DISCONTINUED | OUTPATIENT
Start: 2022-08-05 | End: 2022-08-05

## 2022-08-05 RX ORDER — ENOXAPARIN SODIUM 100 MG/ML
40 INJECTION SUBCUTANEOUS EVERY 24 HOURS
Status: DISCONTINUED | OUTPATIENT
Start: 2022-08-05 | End: 2022-08-05

## 2022-08-05 RX ORDER — GABAPENTIN 300 MG/1
300 CAPSULE ORAL 2 TIMES DAILY
Status: DISCONTINUED | OUTPATIENT
Start: 2022-08-05 | End: 2022-08-05

## 2022-08-05 RX ORDER — LORAZEPAM 2 MG/ML
1 INJECTION INTRAMUSCULAR
Status: COMPLETED | OUTPATIENT
Start: 2022-08-05 | End: 2022-08-05

## 2022-08-05 RX ORDER — IBUPROFEN 200 MG
16 TABLET ORAL
Status: DISCONTINUED | OUTPATIENT
Start: 2022-08-05 | End: 2022-08-06 | Stop reason: HOSPADM

## 2022-08-05 RX ORDER — DOXEPIN HYDROCHLORIDE 25 MG/1
75 CAPSULE ORAL NIGHTLY
Status: DISCONTINUED | OUTPATIENT
Start: 2022-08-05 | End: 2022-08-06 | Stop reason: HOSPADM

## 2022-08-05 RX ORDER — DULOXETIN HYDROCHLORIDE 30 MG/1
60 CAPSULE, DELAYED RELEASE ORAL DAILY
Status: DISCONTINUED | OUTPATIENT
Start: 2022-08-05 | End: 2022-08-06 | Stop reason: HOSPADM

## 2022-08-05 RX ORDER — IBUPROFEN 200 MG
24 TABLET ORAL
Status: DISCONTINUED | OUTPATIENT
Start: 2022-08-05 | End: 2022-08-06 | Stop reason: HOSPADM

## 2022-08-05 RX ADMIN — METHYLPREDNISOLONE SODIUM SUCCINATE 1000 MG: 1 INJECTION, POWDER, LYOPHILIZED, FOR SOLUTION INTRAMUSCULAR; INTRAVENOUS at 03:08

## 2022-08-05 RX ADMIN — TOPIRAMATE 50 MG: 25 TABLET, FILM COATED ORAL at 02:08

## 2022-08-05 RX ADMIN — DOXEPIN HYDROCHLORIDE 75 MG: 25 CAPSULE ORAL at 09:08

## 2022-08-05 RX ADMIN — LOSARTAN POTASSIUM 50 MG: 50 TABLET, FILM COATED ORAL at 02:08

## 2022-08-05 RX ADMIN — DULOXETINE 60 MG: 30 CAPSULE, DELAYED RELEASE ORAL at 02:08

## 2022-08-05 RX ADMIN — LORAZEPAM 1 MG: 2 INJECTION INTRAMUSCULAR; INTRAVENOUS at 05:08

## 2022-08-05 RX ADMIN — ACETAMINOPHEN 650 MG: 325 TABLET ORAL at 07:08

## 2022-08-05 RX ADMIN — ENOXAPARIN SODIUM 40 MG: 100 INJECTION SUBCUTANEOUS at 09:08

## 2022-08-05 RX ADMIN — LORAZEPAM 1 MG: 2 INJECTION INTRAMUSCULAR; INTRAVENOUS at 07:08

## 2022-08-05 RX ADMIN — GADOBUTROL 10 ML: 604.72 INJECTION INTRAVENOUS at 08:08

## 2022-08-05 RX ADMIN — DIPHENHYDRAMINE HYDROCHLORIDE 25 MG: 50 INJECTION, SOLUTION INTRAMUSCULAR; INTRAVENOUS at 02:08

## 2022-08-05 RX ADMIN — ACETAMINOPHEN 650 MG: 325 TABLET ORAL at 02:08

## 2022-08-05 RX ADMIN — DOCUSATE SODIUM AND SENNOSIDES 1 TABLET: 8.6; 5 TABLET, FILM COATED ORAL at 09:08

## 2022-08-05 RX ADMIN — HYDROCODONE BITARTRATE AND ACETAMINOPHEN 1 TABLET: 5; 325 TABLET ORAL at 09:08

## 2022-08-05 RX ADMIN — ATORVASTATIN CALCIUM 40 MG: 40 TABLET, FILM COATED ORAL at 09:08

## 2022-08-05 RX ADMIN — GABAPENTIN 300 MG: 300 CAPSULE ORAL at 09:08

## 2022-08-05 RX ADMIN — HYDROCODONE BITARTRATE AND ACETAMINOPHEN 1 TABLET: 5; 325 TABLET ORAL at 03:08

## 2022-08-05 RX ADMIN — PANTOPRAZOLE SODIUM 40 MG: 40 TABLET, DELAYED RELEASE ORAL at 02:08

## 2022-08-05 RX ADMIN — LORAZEPAM 1 MG: 2 INJECTION INTRAMUSCULAR; INTRAVENOUS at 02:08

## 2022-08-05 NOTE — HPI
The pt is a 43 yo female with a PMHx of anemia, arthritis, cardiac arrest, left hemiplegia/ hemiparesis  due to old stroke, HTN, morbid obesity, and multiple sclerosis on Ocrelizumab IV  -last dose  3/2022 , next dose rescheduled for 8/2022 due to recent breast reduction surgery who presented to the ED  for evaluation of left sided weakness  associated with left sided facial droop which worsened acutely since last night. Per pt she  has baseline left sided weakness from a prior stroke. Denies associated speech difficulty, headaches, chest pain, SOB, palpitations, cough , chest congestion, fever , chills, night sweats , nausea, vomiting, urinary urgency or frequency. Reports baseline visual disturbance from optic nerve involvement due to MS and occasional bladder incontinence and constipation.     Vascular Neurology was consulted in the ED , pt underwent CT head , MRI and MRA head. Acute stroke ruled out. Vascular Neurology felt pt's symptoms does not represent an acute stroke. More consistent with MS flares in the past. Recommends admission and treated with high dose steroids, MRI brain with contrast  and Neurology consult. Pt had received Solumedrol 1000 mg in the ED at 0243 on 8/5/22.      consulted for admission.

## 2022-08-05 NOTE — PLAN OF CARE
POC reviewed with pt. Pt verbalizes understanding of POC. No questions at this time.  AAOx4. NADN.  NSR on cardiac monitor.  Pt remains free of falls. Daughters at bedside to assist with BSC.  PRN pain medication given.   No complaints at this time.  Safety measures in place. Will continue to monitor.  Informed pt to call for assistance before getting up. Pt verbalizes understanding.  Hourly rounding complete.

## 2022-08-05 NOTE — H&P
[facility] Medicine  History & Physical    Patient Name: Alicia Soliz  MRN: [id number]  Patient Class: IP- Inpatient  Admission Date: 8/5/2022  Attending Physician: Kole Carrasquillo MD  Primary Care Provider: Braden Dumont MD         Patient information was obtained from patient, past medical records and ER records.     Subjective:     Principal Problem:Multiple sclerosis exacerbation    Chief Complaint:   Chief Complaint   Patient presents with    Extremity Weakness     Left sided weakness with left sided facial droop starting at 0040.        HPI: The pt is a 41 yo female with a PMHx of anemia, arthritis, cardiac arrest, left hemiplegia/ hemiparesis  due to old stroke, HTN, morbid obesity, and multiple sclerosis on Ocrelizumab IV  -last dose  3/2022 , next dose rescheduled for 8/2022 due to recent breast reduction surgery who presented to the ED  for evaluation of left sided weakness  associated with left sided facial droop which worsened acutely since last night. Per pt she  has baseline left sided weakness from a prior stroke. Denies associated speech difficulty, headaches, chest pain, SOB, palpitations, cough , chest congestion, fever , chills, night sweats , nausea, vomiting, urinary urgency or frequency. Reports baseline visual disturbance from optic nerve involvement due to MS and occasional bladder incontinence and constipation.     Vascular Neurology was consulted in the ED , pt underwent CT head , MRI and MRA head. Acute stroke ruled out. Vascular Neurology felt pt's symptoms does not represent an acute stroke. More consistent with MS flares in the past. Recommends admission and treated with high dose steroids, MRI brain with contrast  and Neurology consult. Pt had received Solumedrol 1000 mg in the ED at 0243 on 8/5/22.      consulted for admission.       Past Medical History:   Diagnosis Date    Anemia     Arthritis     Cardiac arrest as complication of care   "   pt states she went into cardiac arrest from an allergic reaction to a medication    Depression     Encounter for blood transfusion     Hemiplegia due to old stroke     Hypertension     Morbid obesity with BMI of 45.0-49.9, adult 9/20/2018    Multiple sclerosis        Past Surgical History:   Procedure Laterality Date    APPENDECTOMY      CHOLECYSTECTOMY      COLONOSCOPY N/A 11/25/2020    Procedure: COLONOSCOPY;  Surgeon: Andrew Jenkins III, MD;  Location: [facility] ENDO;  Service: Endoscopy;  Laterality: N/A;    ESOPHAGOGASTRODUODENOSCOPY N/A 2/19/2020    Procedure: EGD (ESOPHAGOGASTRODUODENOSCOPY);  Surgeon: Michael Navarrete MD;  Location: [facility] ENDO;  Service: Endoscopy;  Laterality: N/A;    ESOPHAGOGASTRODUODENOSCOPY N/A 2/20/2020    Procedure: EGD (ESOPHAGOGASTRODUODENOSCOPY);  Surgeon: Michael Navarrete MD;  Location: [facility] OR;  Service: General;  Laterality: N/A;    ESOPHAGOGASTRODUODENOSCOPY N/A 11/25/2020    Procedure: EGD (ESOPHAGOGASTRODUODENOSCOPY);  Surgeon: Andrew Jenkins III, MD;  Location: [facility];  Service: Endoscopy;  Laterality: N/A;    HYSTERECTOMY      KNEE SURGERY      ROBOT-ASSISTED LAPAROSCOPIC SLEEVE GASTRECTOMY USING DA ANTHONY XI N/A 2/20/2020    Procedure: XI ROBOTIC SLEEVE GASTRECTOMY;  Surgeon: Michael Navarrete MD;  Location: [facility] OR;  Service: General;  Laterality: N/A;    TENOPLASTY OF HAND Left 8/26/2021    Procedure: REPAIR, TENDON, HAND;  Surgeon: Joselito Lugo MD;  Location: [facility] OR;  Service: Orthopedics;  Laterality: Left;  Left RCL PIP Joint Repair/Recon with Arthrex Internal Brace.    TONSILLECTOMY      TUBAL LIGATION         Review of patient's allergies indicates:   Allergen Reactions    Demerol [meperidine] Anaphylaxis     Other reaction(s): Itching    Dilaudid [hydromorphone (bulk)] Anaphylaxis     "coded" per pt  Patient can tolerate Lortab    Shellfish containing products Itching, Nausea And Vomiting and Swelling    Prednisone Itching     Other reaction(s): " Itching    Tramadol      shaking       No current facility-administered medications on file prior to encounter.     Current Outpatient Medications on File Prior to Encounter   Medication Sig    cyclobenzaprine (FLEXERIL) 10 MG tablet Take 10 mg by mouth 3 (three) times daily as needed.    diclofenac sodium (VOLTAREN) 1 % Gel APPLY 2 GRAMS TO AFFECTED AREA 4 TIMES A DAY (Patient taking differently: QID prn)    doxepin (SINEQUAN) 75 MG capsule Take 1 capsule (75 mg total) by mouth every evening.    DULoxetine (CYMBALTA) 60 MG capsule TAKE 1 CAPSULE BY MOUTH EVERY DAY    esomeprazole (NEXIUM) 40 MG capsule TAKE 1 CAPSULE (40 MG TOTAL) BY MOUTH BEFORE BREAKFAST.    ferrous sulfate 325 (65 FE) MG EC tablet Take 1 tablet (325 mg total) by mouth every Mon, Wed, Fri.    gabapentin (NEURONTIN) 300 MG capsule 600mg at morning and afternoon. 900mg at night.    HYDROcodone-acetaminophen (NORCO)  mg per tablet Take 1 tablet by mouth every 8 (eight) hours as needed.    lactulose (CHRONULAC) 20 gram/30 mL Soln Take 15 mLs (10 g total) by mouth 3 (three) times daily. (Patient taking differently: Take 10 g by mouth 3 (three) times daily as needed.)    LINZESS 145 mcg Cap capsule TAKE 1 CAPSULE (145 MCG TOTAL) BY MOUTH BEFORE BREAKFAST.    ondansetron (ZOFRAN-ODT) 8 MG TbDL Take 8 mg by mouth every 6 (six) hours as needed.    promethazine (PHENERGAN) 25 MG tablet Take 1 tablet (25 mg total) by mouth every 4 (four) hours.    sumatriptan (IMITREX) 100 MG tablet TAKE 1 TAB AT ONSET OF HEADACH MAY TAKE 2ND TAB 2 HOURS LATER IF NO RELIEF MAX 6 TABS A WEEK    topiramate (TOPAMAX) 50 MG tablet TAKE 1 TABLET BY MOUTH IN THE MORNING AND 2 TABLETS AT BEDTIME    valsartan (DIOVAN) 80 MG tablet Take 80 mg by mouth once daily.    mupirocin (BACTROBAN) 2 % ointment Apply topically 2 (two) times daily.    ocrelizumab (OCREVUS IV) Inject into the vein.    triamcinolone acetonide 0.1% (KENALOG) 0.1 % ointment Apply topically  2 (two) times daily.    [DISCONTINUED] HYDROcodone-acetaminophen (NORCO) 7.5-325 mg per tablet Take 1 tablet by mouth every 6 (six) hours as needed.    [DISCONTINUED] LIDOcaine-prilocaine (EMLA) cream     [DISCONTINUED] linaCLOtide (LINZESS) 145 mcg Cap capsule Take 1 capsule (145 mcg total) by mouth before breakfast.    [DISCONTINUED] methocarbamoL (ROBAXIN) 500 MG Tab Take 500 mg by mouth 4 (four) times daily.     Family History       Problem Relation (Age of Onset)    Breast cancer Maternal Aunt, Maternal Cousin    COPD Maternal Aunt    Cancer Maternal Aunt (40)    Diabetes Father, Maternal Aunt    Heart disease Mother    Hypertension Mother    Kidney disease Father    Lupus Mother    Ovarian cancer Maternal Cousin          Tobacco Use    Smoking status: Never Smoker    Smokeless tobacco: Never Used   Substance and Sexual Activity    Alcohol use: Yes     Comment: once a year     Drug use: Never    Sexual activity: Yes     Partners: Male     Birth control/protection: Surgical     Review of Systems   Constitutional:  Positive for activity change. Negative for appetite change and fever.   HENT:  Negative for sore throat.    Eyes:  Positive for visual disturbance.   Respiratory:  Negative for cough, chest tightness and shortness of breath.    Cardiovascular:  Negative for chest pain, palpitations and leg swelling.   Gastrointestinal:  Negative for abdominal distention, abdominal pain, constipation, diarrhea, nausea and vomiting.   Endocrine: Negative for polyuria.   Genitourinary:  Negative for decreased urine volume, dysuria, flank pain, frequency and hematuria.   Musculoskeletal:  Positive for gait problem. Negative for back pain.   Skin:  Negative for rash.   Neurological:  Positive for weakness (left sided). Negative for syncope, speech difficulty, light-headedness and headaches.   Psychiatric/Behavioral:  Negative for confusion, hallucinations and sleep disturbance.    Objective:     Vital Signs (Most  Recent):  Temp: 98.3 °F (36.8 °C) (08/05/22 1535)  Pulse: 100 (08/05/22 1535)  Resp: 18 (08/05/22 1546)  BP: (!) 144/103 (08/05/22 1535)  SpO2: 99 % (08/05/22 1535)   Vital Signs (24h Range):  Temp:  [98 °F (36.7 °C)-98.3 °F (36.8 °C)] 98.3 °F (36.8 °C)  Pulse:  [] 100  Resp:  [17-20] 18  SpO2:  [98 %-100 %] 99 %  BP: (133-155)/() 144/103     Weight: 130.6 kg (288 lb)  Body mass index is 46.48 kg/m².    Physical Exam  Constitutional:       General: She is not in acute distress.     Appearance: She is well-developed. She is not diaphoretic.   HENT:      Head: Normocephalic and atraumatic.      Mouth/Throat:      Pharynx: No oropharyngeal exudate.   Eyes:      Conjunctiva/sclera: Conjunctivae normal.      Pupils: Pupils are equal, round, and reactive to light.   Neck:      Thyroid: No thyromegaly.      Vascular: No JVD.   Cardiovascular:      Rate and Rhythm: Normal rate and regular rhythm.      Heart sounds: Normal heart sounds. No murmur heard.  Pulmonary:      Effort: Pulmonary effort is normal. No respiratory distress.      Breath sounds: Normal breath sounds. No wheezing or rales.   Chest:      Chest wall: No tenderness.   Abdominal:      General: Bowel sounds are normal. There is no distension.      Palpations: Abdomen is soft.      Tenderness: There is no abdominal tenderness. There is no guarding or rebound.   Musculoskeletal:         General: Normal range of motion.      Cervical back: Normal range of motion and neck supple.   Lymphadenopathy:      Cervical: No cervical adenopathy.   Skin:     General: Skin is warm and dry.      Findings: No rash.   Neurological:      Mental Status: She is alert and oriented to person, place, and time.      Cranial Nerves: No cranial nerve deficit.      Sensory: No sensory deficit.      Motor: Weakness (LUE 3/5, LLE 0/5) present.      Deep Tendon Reflexes: Reflexes normal.         CRANIAL NERVES     CN III, IV, VI   Pupils are equal, round, and reactive to  light.     Significant Labs:   Results for orders placed or performed during the hospital encounter of 08/05/22   CBC W/ AUTO DIFFERENTIAL   Result Value Ref Range    WBC 5.41 3.90 - 12.70 K/uL    RBC 4.99 4.00 - 5.40 M/uL    Hemoglobin 10.6 (L) 12.0 - 16.0 g/dL    Hematocrit 34.3 (L) 37.0 - 48.5 %    MCV 69 (L) 82 - 98 fL    MCH 21.2 (L) 27.0 - 31.0 pg    MCHC 30.9 (L) 32.0 - 36.0 g/dL    RDW 15.2 (H) 11.5 - 14.5 %    Platelets 293 150 - 450 K/uL    MPV 10.3 9.2 - 12.9 fL    Immature Granulocytes 0.2 0.0 - 0.5 %    Gran # (ANC) 1.9 1.8 - 7.7 K/uL    Immature Grans (Abs) 0.01 0.00 - 0.04 K/uL    Lymph # 2.7 1.0 - 4.8 K/uL    Mono # 0.5 0.3 - 1.0 K/uL    Eos # 0.3 0.0 - 0.5 K/uL    Baso # 0.05 0.00 - 0.20 K/uL    nRBC 0 0 /100 WBC    Gran % 34.6 (L) 38.0 - 73.0 %    Lymph % 50.6 (H) 18.0 - 48.0 %    Mono % 9.1 4.0 - 15.0 %    Eosinophil % 4.6 0.0 - 8.0 %    Basophil % 0.9 0.0 - 1.9 %    Differential Method Automated    Comprehensive metabolic panel   Result Value Ref Range    Sodium 141 136 - 145 mmol/L    Potassium 3.4 (L) 3.5 - 5.1 mmol/L    Chloride 109 95 - 110 mmol/L    CO2 24 23 - 29 mmol/L    Glucose 99 70 - 110 mg/dL    BUN 6 6 - 20 mg/dL    Creatinine 0.8 0.5 - 1.4 mg/dL    Calcium 9.3 8.7 - 10.5 mg/dL    Total Protein 6.8 6.0 - 8.4 g/dL    Albumin 3.7 3.5 - 5.2 g/dL    Total Bilirubin 0.2 0.1 - 1.0 mg/dL    Alkaline Phosphatase 84 55 - 135 U/L    AST 14 10 - 40 U/L    ALT 9 (L) 10 - 44 U/L    Anion Gap 8 8 - 16 mmol/L    eGFR >60 >60 mL/min/1.73 m^2   Protime-INR   Result Value Ref Range    Prothrombin Time 10.4 9.0 - 12.5 sec    INR 1.0 0.8 - 1.2   TSH   Result Value Ref Range    TSH 3.073 0.400 - 4.000 uIU/mL   LDL - Lipid Panel   Result Value Ref Range    Cholesterol 191 120 - 199 mg/dL    Triglycerides 125 30 - 150 mg/dL    HDL 56 40 - 75 mg/dL    LDL Cholesterol 110.0 63.0 - 159.0 mg/dL    HDL/Cholesterol Ratio 29.3 20.0 - 50.0 %    Total Cholesterol/HDL Ratio 3.4 2.0 - 5.0    Non-HDL Cholesterol 135  mg/dL   COVID-19 Rapid Screening   Result Value Ref Range    SARS-CoV-2 RNA, Amplification, Qual Negative Negative   POCT glucose   Result Value Ref Range    POCT Glucose 119 (H) 70 - 110 mg/dL     *Note: Due to a large number of results and/or encounters for the requested time period, some results have not been displayed. A complete set of results can be found in Results Review.         Significant Imaging:   Imaging Results              MRA Brain without contrast (Final result)  Result time 08/05/22 08:12:14      Final result by Candido Bryant MD (08/05/22 08:12:14)                   Impression:      No significant stenosis, occlusion, aneurysm, or vascular malformation.      Electronically signed by: Candido Bryant  Date:    08/05/2022  Time:    08:12               Narrative:    EXAMINATION:  MRA BRAIN WITHOUT CONTRAST    CLINICAL HISTORY:  Neuro deficit, acute, stroke suspected; .    TECHNIQUE:  Non-contrast 3-D time-of-flight intracranial MR angiography was performed through the [facility] with MIP reformatting.    COMPARISON:  CT head performed earlier on 08/05/2022.  MRI brain dated 05/17/2022 MRI brain dated 01/15/2015    FINDINGS:  Anterior intracranial circulation: No high-grade focal stenosis, occlusion, aneurysm, or vascular malformation. Posterior communicating arteries right dominant supply to the posterior cerebral arteries bilaterally.    Posterior intracranial circulation: No high-grade focal stenosis, occlusion, aneurysm, or vascular malformation. Hypoplastic P1 segments of the bilateral posterior cerebral arteries with dominant supply via the posterior communicating arteries.  Basilar trunk and distal vertebral arteries are hypoplastic.                                       MRI Brain Without Contrast (Final result)  Result time 08/05/22 08:08:08      Final result by Candido Bryant MD (08/05/22 08:08:08)                   Impression:      No acute intracranial abnormality.   Specifically, no evidence of acute infarct.    Unchanged T2/FLAIR hyperintense signal throughout the periventricular and subcortical white matter which is compatible with documented history of demyelinating disease.      Electronically signed by: Candido Bryant  Date:    08/05/2022  Time:    08:08               Narrative:    EXAMINATION:  MRI BRAIN WITHOUT CONTRAST    CLINICAL HISTORY:  left sided weakness;    TECHNIQUE:  Multiplanar multisequence MR imaging of the brain was performed without contrast.    COMPARISON:  CT head performed earlier on 08/05/2022.  MRI brain dated 05/17/2022    FINDINGS:  There is relatively unchanged mild ex vacuo dilatation of the right lateral ventricle.  Cerebral and ventricular volumes are otherwise within normal limits.  There is scattered and confluent T2/FLAIR hyperintense throughout the periventricular and subcortical white matter as well as the dong.  There is no evidence of acute territorial infarct, intracranial hemorrhage, or mass lesion.  No abnormal restricted diffusion or susceptibility artifact.  No midline shift or mass effect.    Midline structures and posterior fossa structures are unremarkable.  Basal cisterns are clear.  Major vascular flow voids are unremarkable.    Cranium is unremarkable.  Orbits and globes appear within normal limits.  Paranasal sinuses and mastoid air cells are clear.                                       CT Head Without Contrast (Final result)  Result time 08/05/22 07:16:17      Final result by Mahendra So MD (08/05/22 07:16:17)                   Impression:      Chronic microvascular ischemic changes. If there is additional clinical concern for acute infarct, MRI with diffusion weighted imaging recommended.    All CT scans at this facility use dose modulation, iterative reconstruction, and/or weight based dosing when appropriate to reduce radiation dose to as low as reasonable achievable.      Electronically signed by: Mahendra So  MD  Date:    08/05/2022  Time:    07:16               Narrative:    EXAMINATION:  CT HEAD WITHOUT CONTRAST    CLINICAL HISTORY:  Stroke suspected (Ped 0-18y);    TECHNIQUE:  Low dose axial CT images obtained throughout the head without intravenous contrast. Sagittal and coronal reconstructions were performed.    COMPARISON:  05/16/2022    FINDINGS:  Intracranial compartment:    Ventricles and sulci are normal in size for age without evidence of hydrocephalus. No extra-axial blood or fluid collections.    Similar appearance of beam hardening artifact related to the shape of the calvarium projecting over the bilateral frontal lobes laterally.  Moderate hypoattenuation in a periventricular predominant pattern may relate to a combination of microvascular ischemic change and demyelinating plaque in this patient with a history of demyelination.  No parenchymal mass, hemorrhage, edema or major vascular distribution infarct.  No acute intracranial hemorrhage identified.    Skull/extracranial contents (limited evaluation): No fracture.  Mild sinus mucosal thickening right maxillary sinus.  Mastoid air cells and paranasal sinuses are essentially clear.                                        Assessment/Plan:     * Multiple sclerosis exacerbation  - CT head , MRI/ MRA brain without contrast neg for acute stroke   -Vascular Neurology was consulted in the ED and suggested pt's symptoms does not represent acute stroke and recommended MRI brain with contrast, Neurology consult and high dose steroid   -Pt had received Solumedrol 1000 mg x 1 dose in the ED  -Await MRI brain with contrast and further recs from Neurology       Left-sided weakness  - H/O Prior CVA and MS exacerbation with baseline left sided weakness   -Continue ASA, statin       Gait abnormality  - In the setting of left sided weakness   -Consult PT/OT      Hypertension  - Review home meds and resume as appropriate       Morbid obesity  Body mass index is 46.48 kg/m².  Morbid obesity complicates all aspects of disease management from diagnostic modalities to treatment. Weight loss encouraged and health benefits explained to patient.           VTE Risk Mitigation (From admission, onward)         Ordered     enoxaparin injection 40 mg  Every 12 hours         08/05/22 0914     IP VTE HIGH RISK PATIENT  Once         08/05/22 0909     Place sequential compression device  Until discontinued         08/05/22 0909                   Kole Carrasquillo MD  Department of Hospital Medicine   '[place] - Telemetry ([facility])

## 2022-08-05 NOTE — ASSESSMENT & PLAN NOTE
- CT head , MRI/ MRA brain without contrast neg for acute stroke   -Vascular Neurology was consulted in the ED and suggested pt's symptoms does not represent acute stroke and recommended MRI brain with contrast, Neurology consult and high dose steroid   -Pt had received Solumedrol 1000 mg x 1 dose in the ED  -Await MRI brain with contrast and further recs from Neurology

## 2022-08-05 NOTE — Clinical Note
Is this patient a high probability for COVID-19?: No   Diagnosis: Multiple sclerosis exacerbation [202644]   Admitting Provider:: LOIS PRIDE [[id number]]   Future Attending Provider: LOIS PRIDE [[id number]]   Reason for IP Medical Treatment  (Clinical interventions that can only be accomplished in the IP setting? ) :: IV Steroids   Estimated Length of Stay:: 2 midnights   I certify that Inpatient services for greater than or equal to 2 midnights are medically necessary:: Yes   Plans for Post-Acute care--if anticipated (pick the single best option):: A. No post acute care anticipated at this time   Special Needs:: No Special Needs [1]

## 2022-08-05 NOTE — PROGRESS NOTES
Pharmacist Renal Dose Adjustment Note    Alicia Soliz is a 42 y.o. female being treated with the medication enoxaparin.    Patient Data:    Vital Signs (Most Recent):  Temp: 98.3 °F (36.8 °C) (08/05/22 0500)  Pulse: 74 (08/05/22 0732)  Resp: 20 (08/05/22 0732)  BP: (!) 152/80 (08/05/22 0732)  SpO2: 98 % (08/05/22 0732)   Vital Signs (72h Range):  Temp:  [98 °F (36.7 °C)-98.3 °F (36.8 °C)]   Pulse:  [73-84]   Resp:  [17-20]   BP: (133-155)/(69-87)   SpO2:  [98 %-100 %]      Recent Labs   Lab 08/05/22 0221   CREATININE 0.8     Serum creatinine: 0.8 mg/dL 08/05/22 0221  Estimated creatinine clearance: 124.7 mL/min    Medication: enoxaparin dose: 40 mg frequency daily will be changed to medication: enoxaparin dose: 40 mg frequency: every 12 hours, for BMI> 40 kg/m² (46.61 kg/m²)    Pharmacist's Name: Mellissa Quiles, PharmD

## 2022-08-05 NOTE — ASSESSMENT & PLAN NOTE
Body mass index is 46.48 kg/m². Morbid obesity complicates all aspects of disease management from diagnostic modalities to treatment. Weight loss encouraged and health benefits explained to patient.

## 2022-08-05 NOTE — HPI
Patient at [facility] in the the ED. Patient in with left sided weakness, and left facial droop. Last known well today 0040.     No current facility-administered medications for this encounter.    Current Outpatient Medications:     cyclobenzaprine (FLEXERIL) 10 MG tablet, Take 10 mg by mouth 3 (three) times daily as needed., Disp: , Rfl:     diclofenac sodium (VOLTAREN) 1 % Gel, APPLY 2 GRAMS TO AFFECTED AREA 4 TIMES A DAY (Patient taking differently: QID prn), Disp: 300 g, Rfl: 3    doxepin (SINEQUAN) 75 MG capsule, Take 1 capsule (75 mg total) by mouth every evening., Disp: 90 capsule, Rfl: 3    DULoxetine (CYMBALTA) 60 MG capsule, TAKE 1 CAPSULE BY MOUTH EVERY DAY, Disp: 90 capsule, Rfl: 3    esomeprazole (NEXIUM) 40 MG capsule, TAKE 1 CAPSULE (40 MG TOTAL) BY MOUTH BEFORE BREAKFAST., Disp: 90 capsule, Rfl: 3    ferrous sulfate 325 (65 FE) MG EC tablet, Take 1 tablet (325 mg total) by mouth every Mon, Wed, Fri., Disp: 36 tablet, Rfl: 0    gabapentin (NEURONTIN) 300 MG capsule, 600mg at morning and afternoon. 900mg at night., Disp: 210 capsule, Rfl: 1    HYDROcodone-acetaminophen (NORCO) 7.5-325 mg per tablet, Take 1 tablet by mouth every 6 (six) hours as needed., Disp: , Rfl:     lactulose (CHRONULAC) 20 gram/30 mL Soln, Take 15 mLs (10 g total) by mouth 3 (three) times daily. (Patient taking differently: Take 10 g by mouth 3 (three) times daily as needed.), Disp: 200 mL, Rfl: 3    LIDOcaine-prilocaine (EMLA) cream, , Disp: , Rfl:     linaCLOtide (LINZESS) 145 mcg Cap capsule, Take 1 capsule (145 mcg total) by mouth before breakfast., Disp: 30 capsule, Rfl: 5    LINZESS 145 mcg Cap capsule, TAKE 1 CAPSULE (145 MCG TOTAL) BY MOUTH BEFORE BREAKFAST., Disp: 30 capsule, Rfl: 5    methocarbamoL (ROBAXIN) 500 MG Tab, Take 500 mg by mouth 4 (four) times daily., Disp: , Rfl:     mupirocin (BACTROBAN) 2 % ointment, Apply topically 2 (two) times daily., Disp: 30 g, Rfl: 0    ocrelizumab (OCREVUS IV), Inject into the vein.,  Disp: , Rfl:     ondansetron (ZOFRAN-ODT) 8 MG TbDL, Take 8 mg by mouth every 6 (six) hours as needed., Disp: , Rfl:     promethazine (PHENERGAN) 25 MG tablet, Take 1 tablet (25 mg total) by mouth every 4 (four) hours., Disp: 60 tablet, Rfl: 0    sumatriptan (IMITREX) 100 MG tablet, TAKE 1 TAB AT ONSET OF HEADACH MAY TAKE 2ND TAB 2 HOURS LATER IF NO RELIEF MAX 6 TABS A WEEK, Disp: 9 tablet, Rfl: 0    topiramate (TOPAMAX) 50 MG tablet, TAKE 1 TABLET BY MOUTH IN THE MORNING AND 2 TABLETS AT BEDTIME, Disp: 90 tablet, Rfl: 1    triamcinolone acetonide 0.1% (KENALOG) 0.1 % ointment, Apply topically 2 (two) times daily., Disp: 30 g, Rfl: 1    valsartan (DIOVAN) 80 MG tablet, Take 80 mg by mouth once daily., Disp: , Rfl:

## 2022-08-05 NOTE — SUBJECTIVE & OBJECTIVE
"Past Medical History:   Diagnosis Date    Anemia     Arthritis     Cardiac arrest as complication of care     pt states she went into cardiac arrest from an allergic reaction to a medication    Depression     Encounter for blood transfusion     Hemiplegia due to old stroke     Hypertension     Morbid obesity with BMI of 45.0-49.9, adult 9/20/2018    Multiple sclerosis        Past Surgical History:   Procedure Laterality Date    APPENDECTOMY      CHOLECYSTECTOMY      COLONOSCOPY N/A 11/25/2020    Procedure: COLONOSCOPY;  Surgeon: Andrew Jenkins III, MD;  Location: [facility];  Service: Endoscopy;  Laterality: N/A;    ESOPHAGOGASTRODUODENOSCOPY N/A 2/19/2020    Procedure: EGD (ESOPHAGOGASTRODUODENOSCOPY);  Surgeon: Michael Navarrete MD;  Location: [facility];  Service: Endoscopy;  Laterality: N/A;    ESOPHAGOGASTRODUODENOSCOPY N/A 2/20/2020    Procedure: EGD (ESOPHAGOGASTRODUODENOSCOPY);  Surgeon: Michael Navarrete MD;  Location: [facility];  Service: General;  Laterality: N/A;    ESOPHAGOGASTRODUODENOSCOPY N/A 11/25/2020    Procedure: EGD (ESOPHAGOGASTRODUODENOSCOPY);  Surgeon: Andrew Jenkins III, MD;  Location: [facility];  Service: Endoscopy;  Laterality: N/A;    HYSTERECTOMY      KNEE SURGERY      ROBOT-ASSISTED LAPAROSCOPIC SLEEVE GASTRECTOMY USING DA ANTHONY XI N/A 2/20/2020    Procedure: XI ROBOTIC SLEEVE GASTRECTOMY;  Surgeon: Michael Navarrete MD;  Location: [facility];  Service: General;  Laterality: N/A;    TENOPLASTY OF HAND Left 8/26/2021    Procedure: REPAIR, TENDON, HAND;  Surgeon: Joselito Lugo MD;  Location: [facility];  Service: Orthopedics;  Laterality: Left;  Left RCL PIP Joint Repair/Recon with Arthrex Internal Brace.    TONSILLECTOMY      TUBAL LIGATION         Review of patient's allergies indicates:   Allergen Reactions    Demerol [meperidine] Anaphylaxis     Other reaction(s): Itching    Dilaudid [hydromorphone (bulk)] Anaphylaxis     "coded" per pt  Patient can tolerate Lortab    Shellfish containing products " Itching, Nausea And Vomiting and Swelling    Prednisone Itching     Other reaction(s): Itching    Tramadol      shaking       No current facility-administered medications on file prior to encounter.     Current Outpatient Medications on File Prior to Encounter   Medication Sig    cyclobenzaprine (FLEXERIL) 10 MG tablet Take 10 mg by mouth 3 (three) times daily as needed.    diclofenac sodium (VOLTAREN) 1 % Gel APPLY 2 GRAMS TO AFFECTED AREA 4 TIMES A DAY (Patient taking differently: QID prn)    doxepin (SINEQUAN) 75 MG capsule Take 1 capsule (75 mg total) by mouth every evening.    DULoxetine (CYMBALTA) 60 MG capsule TAKE 1 CAPSULE BY MOUTH EVERY DAY    esomeprazole (NEXIUM) 40 MG capsule TAKE 1 CAPSULE (40 MG TOTAL) BY MOUTH BEFORE BREAKFAST.    ferrous sulfate 325 (65 FE) MG EC tablet Take 1 tablet (325 mg total) by mouth every Mon, Wed, Fri.    gabapentin (NEURONTIN) 300 MG capsule 600mg at morning and afternoon. 900mg at night.    HYDROcodone-acetaminophen (NORCO)  mg per tablet Take 1 tablet by mouth every 8 (eight) hours as needed.    lactulose (CHRONULAC) 20 gram/30 mL Soln Take 15 mLs (10 g total) by mouth 3 (three) times daily. (Patient taking differently: Take 10 g by mouth 3 (three) times daily as needed.)    LINZESS 145 mcg Cap capsule TAKE 1 CAPSULE (145 MCG TOTAL) BY MOUTH BEFORE BREAKFAST.    ondansetron (ZOFRAN-ODT) 8 MG TbDL Take 8 mg by mouth every 6 (six) hours as needed.    promethazine (PHENERGAN) 25 MG tablet Take 1 tablet (25 mg total) by mouth every 4 (four) hours.    sumatriptan (IMITREX) 100 MG tablet TAKE 1 TAB AT ONSET OF HEADACH MAY TAKE 2ND TAB 2 HOURS LATER IF NO RELIEF MAX 6 TABS A WEEK    topiramate (TOPAMAX) 50 MG tablet TAKE 1 TABLET BY MOUTH IN THE MORNING AND 2 TABLETS AT BEDTIME    valsartan (DIOVAN) 80 MG tablet Take 80 mg by mouth once daily.    mupirocin (BACTROBAN) 2 % ointment Apply topically 2 (two) times daily.    ocrelizumab (OCREVUS IV) Inject into the vein.     triamcinolone acetonide 0.1% (KENALOG) 0.1 % ointment Apply topically 2 (two) times daily.    [DISCONTINUED] HYDROcodone-acetaminophen (NORCO) 7.5-325 mg per tablet Take 1 tablet by mouth every 6 (six) hours as needed.    [DISCONTINUED] LIDOcaine-prilocaine (EMLA) cream     [DISCONTINUED] linaCLOtide (LINZESS) 145 mcg Cap capsule Take 1 capsule (145 mcg total) by mouth before breakfast.    [DISCONTINUED] methocarbamoL (ROBAXIN) 500 MG Tab Take 500 mg by mouth 4 (four) times daily.     Family History       Problem Relation (Age of Onset)    Breast cancer Maternal Aunt, Maternal Cousin    COPD Maternal Aunt    Cancer Maternal Aunt (40)    Diabetes Father, Maternal Aunt    Heart disease Mother    Hypertension Mother    Kidney disease Father    Lupus Mother    Ovarian cancer Maternal Cousin          Tobacco Use    Smoking status: Never Smoker    Smokeless tobacco: Never Used   Substance and Sexual Activity    Alcohol use: Yes     Comment: once a year     Drug use: Never    Sexual activity: Yes     Partners: Male     Birth control/protection: Surgical     Review of Systems   Constitutional:  Positive for activity change. Negative for appetite change and fever.   HENT:  Negative for sore throat.    Eyes:  Positive for visual disturbance.   Respiratory:  Negative for cough, chest tightness and shortness of breath.    Cardiovascular:  Negative for chest pain, palpitations and leg swelling.   Gastrointestinal:  Negative for abdominal distention, abdominal pain, constipation, diarrhea, nausea and vomiting.   Endocrine: Negative for polyuria.   Genitourinary:  Negative for decreased urine volume, dysuria, flank pain, frequency and hematuria.   Musculoskeletal:  Positive for gait problem. Negative for back pain.   Skin:  Negative for rash.   Neurological:  Positive for weakness (left sided). Negative for syncope, speech difficulty, light-headedness and headaches.   Psychiatric/Behavioral:  Negative for confusion, hallucinations  and sleep disturbance.    Objective:     Vital Signs (Most Recent):  Temp: 98.3 °F (36.8 °C) (08/05/22 1535)  Pulse: 100 (08/05/22 1535)  Resp: 18 (08/05/22 1546)  BP: (!) 144/103 (08/05/22 1535)  SpO2: 99 % (08/05/22 1535)   Vital Signs (24h Range):  Temp:  [98 °F (36.7 °C)-98.3 °F (36.8 °C)] 98.3 °F (36.8 °C)  Pulse:  [] 100  Resp:  [17-20] 18  SpO2:  [98 %-100 %] 99 %  BP: (133-155)/() 144/103     Weight: 130.6 kg (288 lb)  Body mass index is 46.48 kg/m².    Physical Exam  Constitutional:       General: She is not in acute distress.     Appearance: She is well-developed. She is not diaphoretic.   HENT:      Head: Normocephalic and atraumatic.      Mouth/Throat:      Pharynx: No oropharyngeal exudate.   Eyes:      Conjunctiva/sclera: Conjunctivae normal.      Pupils: Pupils are equal, round, and reactive to light.   Neck:      Thyroid: No thyromegaly.      Vascular: No JVD.   Cardiovascular:      Rate and Rhythm: Normal rate and regular rhythm.      Heart sounds: Normal heart sounds. No murmur heard.  Pulmonary:      Effort: Pulmonary effort is normal. No respiratory distress.      Breath sounds: Normal breath sounds. No wheezing or rales.   Chest:      Chest wall: No tenderness.   Abdominal:      General: Bowel sounds are normal. There is no distension.      Palpations: Abdomen is soft.      Tenderness: There is no abdominal tenderness. There is no guarding or rebound.   Musculoskeletal:         General: Normal range of motion.      Cervical back: Normal range of motion and neck supple.   Lymphadenopathy:      Cervical: No cervical adenopathy.   Skin:     General: Skin is warm and dry.      Findings: No rash.   Neurological:      Mental Status: She is alert and oriented to person, place, and time.      Cranial Nerves: No cranial nerve deficit.      Sensory: No sensory deficit.      Motor: Weakness (LUE 3/5, LLE 0/5) present.      Deep Tendon Reflexes: Reflexes normal.         CRANIAL NERVES     CN  III, IV, VI   Pupils are equal, round, and reactive to light.     Significant Labs:   Results for orders placed or performed during the hospital encounter of 08/05/22   CBC W/ AUTO DIFFERENTIAL   Result Value Ref Range    WBC 5.41 3.90 - 12.70 K/uL    RBC 4.99 4.00 - 5.40 M/uL    Hemoglobin 10.6 (L) 12.0 - 16.0 g/dL    Hematocrit 34.3 (L) 37.0 - 48.5 %    MCV 69 (L) 82 - 98 fL    MCH 21.2 (L) 27.0 - 31.0 pg    MCHC 30.9 (L) 32.0 - 36.0 g/dL    RDW 15.2 (H) 11.5 - 14.5 %    Platelets 293 150 - 450 K/uL    MPV 10.3 9.2 - 12.9 fL    Immature Granulocytes 0.2 0.0 - 0.5 %    Gran # (ANC) 1.9 1.8 - 7.7 K/uL    Immature Grans (Abs) 0.01 0.00 - 0.04 K/uL    Lymph # 2.7 1.0 - 4.8 K/uL    Mono # 0.5 0.3 - 1.0 K/uL    Eos # 0.3 0.0 - 0.5 K/uL    Baso # 0.05 0.00 - 0.20 K/uL    nRBC 0 0 /100 WBC    Gran % 34.6 (L) 38.0 - 73.0 %    Lymph % 50.6 (H) 18.0 - 48.0 %    Mono % 9.1 4.0 - 15.0 %    Eosinophil % 4.6 0.0 - 8.0 %    Basophil % 0.9 0.0 - 1.9 %    Differential Method Automated    Comprehensive metabolic panel   Result Value Ref Range    Sodium 141 136 - 145 mmol/L    Potassium 3.4 (L) 3.5 - 5.1 mmol/L    Chloride 109 95 - 110 mmol/L    CO2 24 23 - 29 mmol/L    Glucose 99 70 - 110 mg/dL    BUN 6 6 - 20 mg/dL    Creatinine 0.8 0.5 - 1.4 mg/dL    Calcium 9.3 8.7 - 10.5 mg/dL    Total Protein 6.8 6.0 - 8.4 g/dL    Albumin 3.7 3.5 - 5.2 g/dL    Total Bilirubin 0.2 0.1 - 1.0 mg/dL    Alkaline Phosphatase 84 55 - 135 U/L    AST 14 10 - 40 U/L    ALT 9 (L) 10 - 44 U/L    Anion Gap 8 8 - 16 mmol/L    eGFR >60 >60 mL/min/1.73 m^2   Protime-INR   Result Value Ref Range    Prothrombin Time 10.4 9.0 - 12.5 sec    INR 1.0 0.8 - 1.2   TSH   Result Value Ref Range    TSH 3.073 0.400 - 4.000 uIU/mL   LDL - Lipid Panel   Result Value Ref Range    Cholesterol 191 120 - 199 mg/dL    Triglycerides 125 30 - 150 mg/dL    HDL 56 40 - 75 mg/dL    LDL Cholesterol 110.0 63.0 - 159.0 mg/dL    HDL/Cholesterol Ratio 29.3 20.0 - 50.0 %    Total  Cholesterol/HDL Ratio 3.4 2.0 - 5.0    Non-HDL Cholesterol 135 mg/dL   COVID-19 Rapid Screening   Result Value Ref Range    SARS-CoV-2 RNA, Amplification, Qual Negative Negative   POCT glucose   Result Value Ref Range    POCT Glucose 119 (H) 70 - 110 mg/dL     *Note: Due to a large number of results and/or encounters for the requested time period, some results have not been displayed. A complete set of results can be found in Results Review.         Significant Imaging:   Imaging Results              MRA Brain without contrast (Final result)  Result time 08/05/22 08:12:14      Final result by Candido Bryant MD (08/05/22 08:12:14)                   Impression:      No significant stenosis, occlusion, aneurysm, or vascular malformation.      Electronically signed by: Candido Bryant  Date:    08/05/2022  Time:    08:12               Narrative:    EXAMINATION:  MRA BRAIN WITHOUT CONTRAST    CLINICAL HISTORY:  Neuro deficit, acute, stroke suspected; .    TECHNIQUE:  Non-contrast 3-D time-of-flight intracranial MR angiography was performed through the [facility] with MIP reformatting.    COMPARISON:  CT head performed earlier on 08/05/2022.  MRI brain dated 05/17/2022 MRI brain dated 01/15/2015    FINDINGS:  Anterior intracranial circulation: No high-grade focal stenosis, occlusion, aneurysm, or vascular malformation. Posterior communicating arteries right dominant supply to the posterior cerebral arteries bilaterally.    Posterior intracranial circulation: No high-grade focal stenosis, occlusion, aneurysm, or vascular malformation. Hypoplastic P1 segments of the bilateral posterior cerebral arteries with dominant supply via the posterior communicating arteries.  Basilar trunk and distal vertebral arteries are hypoplastic.                                       MRI Brain Without Contrast (Final result)  Result time 08/05/22 08:08:08      Final result by Candido Bryant MD (08/05/22 08:08:08)                    Impression:      No acute intracranial abnormality.  Specifically, no evidence of acute infarct.    Unchanged T2/FLAIR hyperintense signal throughout the periventricular and subcortical white matter which is compatible with documented history of demyelinating disease.      Electronically signed by: Candido Bryant  Date:    08/05/2022  Time:    08:08               Narrative:    EXAMINATION:  MRI BRAIN WITHOUT CONTRAST    CLINICAL HISTORY:  left sided weakness;    TECHNIQUE:  Multiplanar multisequence MR imaging of the brain was performed without contrast.    COMPARISON:  CT head performed earlier on 08/05/2022.  MRI brain dated 05/17/2022    FINDINGS:  There is relatively unchanged mild ex vacuo dilatation of the right lateral ventricle.  Cerebral and ventricular volumes are otherwise within normal limits.  There is scattered and confluent T2/FLAIR hyperintense throughout the periventricular and subcortical white matter as well as the dong.  There is no evidence of acute territorial infarct, intracranial hemorrhage, or mass lesion.  No abnormal restricted diffusion or susceptibility artifact.  No midline shift or mass effect.    Midline structures and posterior fossa structures are unremarkable.  Basal cisterns are clear.  Major vascular flow voids are unremarkable.    Cranium is unremarkable.  Orbits and globes appear within normal limits.  Paranasal sinuses and mastoid air cells are clear.                                       CT Head Without Contrast (Final result)  Result time 08/05/22 07:16:17      Final result by Mahendra So MD (08/05/22 07:16:17)                   Impression:      Chronic microvascular ischemic changes. If there is additional clinical concern for acute infarct, MRI with diffusion weighted imaging recommended.    All CT scans at this facility use dose modulation, iterative reconstruction, and/or weight based dosing when appropriate to reduce radiation dose to as low as  reasonable achievable.      Electronically signed by: Mahendra So MD  Date:    08/05/2022  Time:    07:16               Narrative:    EXAMINATION:  CT HEAD WITHOUT CONTRAST    CLINICAL HISTORY:  Stroke suspected (Ped 0-18y);    TECHNIQUE:  Low dose axial CT images obtained throughout the head without intravenous contrast. Sagittal and coronal reconstructions were performed.    COMPARISON:  05/16/2022    FINDINGS:  Intracranial compartment:    Ventricles and sulci are normal in size for age without evidence of hydrocephalus. No extra-axial blood or fluid collections.    Similar appearance of beam hardening artifact related to the shape of the calvarium projecting over the bilateral frontal lobes laterally.  Moderate hypoattenuation in a periventricular predominant pattern may relate to a combination of microvascular ischemic change and demyelinating plaque in this patient with a history of demyelination.  No parenchymal mass, hemorrhage, edema or major vascular distribution infarct.  No acute intracranial hemorrhage identified.    Skull/extracranial contents (limited evaluation): No fracture.  Mild sinus mucosal thickening right maxillary sinus.  Mastoid air cells and paranasal sinuses are essentially clear.

## 2022-08-05 NOTE — CONSULTS
[facility]  Vascular Neurology  Comprehensive Stroke Center  TeleVascular Neurology Acute Consultation Note      Consults    Consulting Provider: DAWN BELLO  Current Providers  No providers found    Patient Location:  [facility] EMERGENCY DEPARTMENT Emergency Department  Spoke hospital nurse at bedside with patient assisting consultant.     Patient information was obtained from patient.         Assessment/Plan:     Patient at [facility] in the the ED. Patient in with left sided weakness, and left facial droop. Last known well today 0040. NIHSS-2. This appears one of her typical MS flare-up episodes. MRI brain with no acute ischemic stroke. MRA with no LVO, ? Diminutive posterior circulation.  Alteplase not recommended due to Suspected stroke mimic.    Aspirin interim as official CT without hemorrhage  Admission for neuro consult and contrast enhanced MRI for evaluating active lesions.   PT/OT/SS            Diagnoses:   Stroke like symptoms    STROKE DOCUMENTATION     Acute Stroke Times:   Acute Stroke Times   Last Known Normal Date: 08/05/22  Last Known Normal Time: 0040  Stroke Team Called Date: 08/05/22  Stroke Team Called Time: 0150  Stroke Team Arrival Date: 08/05/22  Stroke Team Arrival Time: 0157  Alteplase Recommended: No    NIH Scale:  1a. Level of Consciousness: 0-->Alert, keenly responsive  1b. LOC Questions: 0-->Answers both questions correctly  1c. LOC Commands: 0-->Performs both tasks correctly  2. Best Gaze: 0-->Normal  3. Visual: 0-->No visual loss  4. Facial Palsy: 0-->Normal symmetrical movements  5a. Motor Arm, Left: 0-->No drift, limb holds 90 (or 45) degrees for full 10 secs  5b. Motor Arm, Right: 0-->No drift, limb holds 90 (or 45) degrees for full 10 secs  6a. Motor Leg, Left: 2-->Some effort against gravity, leg falls to bed by 5 secs, but has some effort against gravity  6b. Motor Leg, Right: 0-->No drift, leg holds 30 degree position for full 5  "secs  7. Limb Ataxia: 0-->Absent  8. Sensory: 1-->Mild-to-moderate sensory loss, patient feels pinprick is less sharp or is dull on the affected side, or there is a loss of superficial pain with pinprick, but patient is aware of being touched  9. Best Language: 0-->No aphasia, normal  10. Dysarthria: 0-->Normal  11. Extinction and Inattention (formerly Neglect): 0-->No abnormality  Total (NIH Stroke Scale): 3     Modified Verito    Lyn Coma Scale:    ABCD2 Score:    [id number] Score:   HAS -BLED Score:   ICH Score:   Hunt & Simon Classification:       Blood pressure 133/81, pulse 84, temperature 98 °F (36.7 °C), temperature source Oral, resp. rate 18, height 5' 6" (1.676 m), SpO2 100 %.  Alteplase Eligible?: Yes  Alteplase Recommendation: Alteplase not recommended due to Suspected stroke mimic   Possible Interventional Revascularization Candidate? Awaiting CTA results from Spoke for determination     Disposition Recommendation: admit to inpatient    Subjective:     History of Present Illness:  Patient at [facility] in the the ED. Patient in with left sided weakness, and left facial droop. Last known well today 0040.     No current facility-administered medications for this encounter.    Current Outpatient Medications:     cyclobenzaprine (FLEXERIL) 10 MG tablet, Take 10 mg by mouth 3 (three) times daily as needed., Disp: , Rfl:     diclofenac sodium (VOLTAREN) 1 % Gel, APPLY 2 GRAMS TO AFFECTED AREA 4 TIMES A DAY (Patient taking differently: QID prn), Disp: 300 g, Rfl: 3    doxepin (SINEQUAN) 75 MG capsule, Take 1 capsule (75 mg total) by mouth every evening., Disp: 90 capsule, Rfl: 3    DULoxetine (CYMBALTA) 60 MG capsule, TAKE 1 CAPSULE BY MOUTH EVERY DAY, Disp: 90 capsule, Rfl: 3    esomeprazole (NEXIUM) 40 MG capsule, TAKE 1 CAPSULE (40 MG TOTAL) BY MOUTH BEFORE BREAKFAST., Disp: 90 capsule, Rfl: 3    ferrous sulfate 325 (65 FE) MG EC tablet, Take 1 tablet (325 mg total) by mouth every Mon, Wed, Fri., " Disp: 36 tablet, Rfl: 0    gabapentin (NEURONTIN) 300 MG capsule, 600mg at morning and afternoon. 900mg at night., Disp: 210 capsule, Rfl: 1    HYDROcodone-acetaminophen (NORCO) 7.5-325 mg per tablet, Take 1 tablet by mouth every 6 (six) hours as needed., Disp: , Rfl:     lactulose (CHRONULAC) 20 gram/30 mL Soln, Take 15 mLs (10 g total) by mouth 3 (three) times daily. (Patient taking differently: Take 10 g by mouth 3 (three) times daily as needed.), Disp: 200 mL, Rfl: 3    LIDOcaine-prilocaine (EMLA) cream, , Disp: , Rfl:     linaCLOtide (LINZESS) 145 mcg Cap capsule, Take 1 capsule (145 mcg total) by mouth before breakfast., Disp: 30 capsule, Rfl: 5    LINZESS 145 mcg Cap capsule, TAKE 1 CAPSULE (145 MCG TOTAL) BY MOUTH BEFORE BREAKFAST., Disp: 30 capsule, Rfl: 5    methocarbamoL (ROBAXIN) 500 MG Tab, Take 500 mg by mouth 4 (four) times daily., Disp: , Rfl:     mupirocin (BACTROBAN) 2 % ointment, Apply topically 2 (two) times daily., Disp: 30 g, Rfl: 0    ocrelizumab (OCREVUS IV), Inject into the vein., Disp: , Rfl:     ondansetron (ZOFRAN-ODT) 8 MG TbDL, Take 8 mg by mouth every 6 (six) hours as needed., Disp: , Rfl:     promethazine (PHENERGAN) 25 MG tablet, Take 1 tablet (25 mg total) by mouth every 4 (four) hours., Disp: 60 tablet, Rfl: 0    sumatriptan (IMITREX) 100 MG tablet, TAKE 1 TAB AT ONSET OF HEADACH MAY TAKE 2ND TAB 2 HOURS LATER IF NO RELIEF MAX 6 TABS A WEEK, Disp: 9 tablet, Rfl: 0    topiramate (TOPAMAX) 50 MG tablet, TAKE 1 TABLET BY MOUTH IN THE MORNING AND 2 TABLETS AT BEDTIME, Disp: 90 tablet, Rfl: 1    triamcinolone acetonide 0.1% (KENALOG) 0.1 % ointment, Apply topically 2 (two) times daily., Disp: 30 g, Rfl: 1    valsartan (DIOVAN) 80 MG tablet, Take 80 mg by mouth once daily., Disp: , Rfl:           Woke up with symptoms?: no    Recent bleeding noted: unknown  Does the patient take any Blood Thinners? no  Medications: No relevant medications    No current  facility-administered medications for this encounter.    Current Outpatient Medications:     cyclobenzaprine (FLEXERIL) 10 MG tablet, Take 10 mg by mouth 3 (three) times daily as needed., Disp: , Rfl:     diclofenac sodium (VOLTAREN) 1 % Gel, APPLY 2 GRAMS TO AFFECTED AREA 4 TIMES A DAY (Patient taking differently: QID prn), Disp: 300 g, Rfl: 3    doxepin (SINEQUAN) 75 MG capsule, Take 1 capsule (75 mg total) by mouth every evening., Disp: 90 capsule, Rfl: 3    DULoxetine (CYMBALTA) 60 MG capsule, TAKE 1 CAPSULE BY MOUTH EVERY DAY, Disp: 90 capsule, Rfl: 3    esomeprazole (NEXIUM) 40 MG capsule, TAKE 1 CAPSULE (40 MG TOTAL) BY MOUTH BEFORE BREAKFAST., Disp: 90 capsule, Rfl: 3    ferrous sulfate 325 (65 FE) MG EC tablet, Take 1 tablet (325 mg total) by mouth every Mon, Wed, Fri., Disp: 36 tablet, Rfl: 0    gabapentin (NEURONTIN) 300 MG capsule, 600mg at morning and afternoon. 900mg at night., Disp: 210 capsule, Rfl: 1    HYDROcodone-acetaminophen (NORCO) 7.5-325 mg per tablet, Take 1 tablet by mouth every 6 (six) hours as needed., Disp: , Rfl:     lactulose (CHRONULAC) 20 gram/30 mL Soln, Take 15 mLs (10 g total) by mouth 3 (three) times daily. (Patient taking differently: Take 10 g by mouth 3 (three) times daily as needed.), Disp: 200 mL, Rfl: 3    LIDOcaine-prilocaine (EMLA) cream, , Disp: , Rfl:     linaCLOtide (LINZESS) 145 mcg Cap capsule, Take 1 capsule (145 mcg total) by mouth before breakfast., Disp: 30 capsule, Rfl: 5    LINZESS 145 mcg Cap capsule, TAKE 1 CAPSULE (145 MCG TOTAL) BY MOUTH BEFORE BREAKFAST., Disp: 30 capsule, Rfl: 5    methocarbamoL (ROBAXIN) 500 MG Tab, Take 500 mg by mouth 4 (four) times daily., Disp: , Rfl:     mupirocin (BACTROBAN) 2 % ointment, Apply topically 2 (two) times daily., Disp: 30 g, Rfl: 0    ocrelizumab (OCREVUS IV), Inject into the vein., Disp: , Rfl:     ondansetron (ZOFRAN-ODT) 8 MG TbDL, Take 8 mg by mouth every 6 (six) hours as needed., Disp: , Rfl:      "promethazine (PHENERGAN) 25 MG tablet, Take 1 tablet (25 mg total) by mouth every 4 (four) hours., Disp: 60 tablet, Rfl: 0    sumatriptan (IMITREX) 100 MG tablet, TAKE 1 TAB AT ONSET OF HEADACH MAY TAKE 2ND TAB 2 HOURS LATER IF NO RELIEF MAX 6 TABS A WEEK, Disp: 9 tablet, Rfl: 0    topiramate (TOPAMAX) 50 MG tablet, TAKE 1 TABLET BY MOUTH IN THE MORNING AND 2 TABLETS AT BEDTIME, Disp: 90 tablet, Rfl: 1    triamcinolone acetonide 0.1% (KENALOG) 0.1 % ointment, Apply topically 2 (two) times daily., Disp: 30 g, Rfl: 1    valsartan (DIOVAN) 80 MG tablet, Take 80 mg by mouth once daily., Disp: , Rfl:     Past Medical History: multiple sclerosis    Past Surgical History: no major surgeries within the last 2 weeks    Family History: no relevant history    Social History: unable to obtain    Allergies: Demerol [Meperidine]  Dilaudid [Hydromorphone (Bulk)]  Shellfish Containing Products  Prednisone  Tramadol No relevant allergies    Review of Systems  Objective:   Vitals: Blood pressure 133/81, pulse 84, temperature 98 °F (36.7 °C), temperature source Oral, resp. rate 18, height 5' 6" (1.676 m), SpO2 100 %. BP: 133/81    CT READ: No    Physical Exam          Recommended the emergency room physician to have a brief discussion with the patient and/or family if available regarding the  risks and benefits of treatment, and to briefly document the occurrence of that discussion in his clinical encounter note.     The attending portion of this evaluation, treatment, and documentation was performed per Juliana Zavala MD via audiovisual.    Billing code:  (non-intervention mild to moderate stroke, TIA, some mimics)    · This patient has a critical neurological condition/illness, with some potential for high morbidity and mortality.  · There is a moderate probability for acute neurological change leading to clinical and possibly life-threatening deterioration requiring highest level of physician preparedness for urgent " intervention.  · Care was coordinated with other physicians involved in the patient's care.  · Radiologic studies and laboratory data were reviewed and interpreted, and plan of care was re-assessed based on the results.  · Diagnosis, treatment options and prognosis may have been discussed with the patient and/or family members or caregiver.      In your opinion, this was a: Tier 1 Van Negative    Consult End Time: 2:24 AM     Juliana Zavala MD  [facility]  Vascular Neurology   [facility]

## 2022-08-05 NOTE — SUBJECTIVE & OBJECTIVE
Woke up with symptoms?: no    Recent bleeding noted: unknown  Does the patient take any Blood Thinners? no  Medications: No relevant medications    No current facility-administered medications for this encounter.    Current Outpatient Medications:     cyclobenzaprine (FLEXERIL) 10 MG tablet, Take 10 mg by mouth 3 (three) times daily as needed., Disp: , Rfl:     diclofenac sodium (VOLTAREN) 1 % Gel, APPLY 2 GRAMS TO AFFECTED AREA 4 TIMES A DAY (Patient taking differently: QID prn), Disp: 300 g, Rfl: 3    doxepin (SINEQUAN) 75 MG capsule, Take 1 capsule (75 mg total) by mouth every evening., Disp: 90 capsule, Rfl: 3    DULoxetine (CYMBALTA) 60 MG capsule, TAKE 1 CAPSULE BY MOUTH EVERY DAY, Disp: 90 capsule, Rfl: 3    esomeprazole (NEXIUM) 40 MG capsule, TAKE 1 CAPSULE (40 MG TOTAL) BY MOUTH BEFORE BREAKFAST., Disp: 90 capsule, Rfl: 3    ferrous sulfate 325 (65 FE) MG EC tablet, Take 1 tablet (325 mg total) by mouth every Mon, Wed, Fri., Disp: 36 tablet, Rfl: 0    gabapentin (NEURONTIN) 300 MG capsule, 600mg at morning and afternoon. 900mg at night., Disp: 210 capsule, Rfl: 1    HYDROcodone-acetaminophen (NORCO) 7.5-325 mg per tablet, Take 1 tablet by mouth every 6 (six) hours as needed., Disp: , Rfl:     lactulose (CHRONULAC) 20 gram/30 mL Soln, Take 15 mLs (10 g total) by mouth 3 (three) times daily. (Patient taking differently: Take 10 g by mouth 3 (three) times daily as needed.), Disp: 200 mL, Rfl: 3    LIDOcaine-prilocaine (EMLA) cream, , Disp: , Rfl:     linaCLOtide (LINZESS) 145 mcg Cap capsule, Take 1 capsule (145 mcg total) by mouth before breakfast., Disp: 30 capsule, Rfl: 5    LINZESS 145 mcg Cap capsule, TAKE 1 CAPSULE (145 MCG TOTAL) BY MOUTH BEFORE BREAKFAST., Disp: 30 capsule, Rfl: 5    methocarbamoL (ROBAXIN) 500 MG Tab, Take 500 mg by mouth 4 (four) times daily., Disp: , Rfl:     mupirocin (BACTROBAN) 2 % ointment, Apply topically 2 (two) times daily., Disp: 30 g, Rfl: 0    ocrelizumab  "(OCREVUS IV), Inject into the vein., Disp: , Rfl:     ondansetron (ZOFRAN-ODT) 8 MG TbDL, Take 8 mg by mouth every 6 (six) hours as needed., Disp: , Rfl:     promethazine (PHENERGAN) 25 MG tablet, Take 1 tablet (25 mg total) by mouth every 4 (four) hours., Disp: 60 tablet, Rfl: 0    sumatriptan (IMITREX) 100 MG tablet, TAKE 1 TAB AT ONSET OF HEADACH MAY TAKE 2ND TAB 2 HOURS LATER IF NO RELIEF MAX 6 TABS A WEEK, Disp: 9 tablet, Rfl: 0    topiramate (TOPAMAX) 50 MG tablet, TAKE 1 TABLET BY MOUTH IN THE MORNING AND 2 TABLETS AT BEDTIME, Disp: 90 tablet, Rfl: 1    triamcinolone acetonide 0.1% (KENALOG) 0.1 % ointment, Apply topically 2 (two) times daily., Disp: 30 g, Rfl: 1    valsartan (DIOVAN) 80 MG tablet, Take 80 mg by mouth once daily., Disp: , Rfl:     Past Medical History: multiple sclerosis    Past Surgical History: no major surgeries within the last 2 weeks    Family History: no relevant history    Social History: unable to obtain    Allergies: Demerol [Meperidine]  Dilaudid [Hydromorphone (Bulk)]  Shellfish Containing Products  Prednisone  Tramadol No relevant allergies    Review of Systems  Objective:   Vitals: Blood pressure 133/81, pulse 84, temperature 98 °F (36.7 °C), temperature source Oral, resp. rate 18, height 5' 6" (1.676 m), SpO2 100 %. BP: 133/81    CT READ: No    Physical Exam      "

## 2022-08-05 NOTE — CONSULTS
TELE-NEUROLOGY CONSULT       Patient Location: A252/A252 A     START TIME: 5:02  END TIME: 5:29        Name: Alicia Soliz  MRN: [id number]   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 No other issues. Patient has received 1 gram solumedrol IV in ED without any improvement. MRI brain w/o did not show any acute stroke. MRI brain w/ contrast ordered. Patient states that last night she was doing fine and then she started having vertigo, facial droop and weakness. She states that she has had LSW before that got better with steroids. She is completely unable to move her LLE. LUE has a drift. Patient uses a cane at baseline and is ambulatory. States that steroids don't usually work after one dose.    ROS: left sided weakness, facial droop    Past Medical History: The patient  has a past medical history of Anemia, Arthritis, Cardiac arrest as complication of care, Depression, Encounter for blood transfusion, Hemiplegia due to old stroke, Hypertension, Morbid obesity with BMI of 45.0-49.9, adult (9/20/2018), and Multiple sclerosis.    Social History: The patient  reports that she has never smoked. She has never used smokeless tobacco. She reports current alcohol use. She reports that she does not use drugs.    Family History: Their family history includes Breast cancer in her maternal aunt and maternal cousin; COPD in her maternal aunt; Cancer (age of onset: 40) in her maternal aunt; Diabetes in her father and maternal aunt; Heart disease in her mother; Hypertension in her mother; Kidney disease in her father; Lupus in her mother; Ovarian cancer in her maternal cousin.    Allergies: Demerol [meperidine], Dilaudid [hydromorphone  "(bulk)], Shellfish containing products, Prednisone, and Tramadol     Meds: Scheduled Meds:   [START ON 8/6/2022] aspirin  81 mg Oral Daily    atorvastatin  40 mg Oral QHS    doxepin  75 mg Oral QHS    DULoxetine  60 mg Oral Daily    enoxaparin  40 mg Subcutaneous Q12H    gabapentin  300 mg Oral BID    losartan  50 mg Oral Daily    pantoprazole  40 mg Oral Daily    senna-docusate 8.6-50 mg  1 tablet Oral BID    topiramate  50 mg Oral Daily     Continuous Infusions:  PRN Meds:.acetaminophen, HYDROcodone-acetaminophen, promethazine, sodium chloride 0.9%    Exam:  BP (!) 144/103 (BP Location: Right arm, Patient Position: Sitting)   Pulse 100   Temp 98.3 °F (36.8 °C) (Oral)   Resp 18   Ht 5' 6" (1.676 m)   Wt 130.6 kg (288 lb)   SpO2 99%   Breastfeeding No   BMI 46.48 kg/m²       Constitutional  Well-developed, well-nourished, appears stated age   Eyes  No scleral icterus       Cardiovascular  No lower extremity edema    Respiratory  No labored breathing    Skin  No rash    Hematologic  No bruising   Psychiatric  No depression   Other  GI/ deferred    Neurological     * Mental status  Alert and oriented to person, place, time, and situation; no dysarthria; no aphasia; normal recent and remote memory; follows commands   * Cranial nerves   EOMI, slight left facial droop                                   * Motor  Cannot move LLE, LUE is weak- drifts with extension weakness (wrist drop) , right side is fine       * Coordination  No dysmetria or tremor with outstretched hands    * Gait  Deferred          Laboratory/Radiological:   Admission on 08/05/2022   Component Date Value Ref Range Status    POCT Glucose 08/05/2022 119 (A) 70 - 110 mg/dL Final    WBC 08/05/2022 5.41  3.90 - 12.70 K/uL Final    RBC 08/05/2022 4.99  4.00 - 5.40 M/uL Final    Hemoglobin 08/05/2022 10.6 (A) 12.0 - 16.0 g/dL Final    Hematocrit 08/05/2022 34.3 (A) 37.0 - 48.5 % Final    MCV 08/05/2022 69 (A) 82 - 98 fL Final    MCH " 08/05/2022 21.2 (A) 27.0 - 31.0 pg Final    MCHC 08/05/2022 30.9 (A) 32.0 - 36.0 g/dL Final    RDW 08/05/2022 15.2 (A) 11.5 - 14.5 % Final    Platelets 08/05/2022 293  150 - 450 K/uL Final    MPV 08/05/2022 10.3  9.2 - 12.9 fL Final    Immature Granulocytes 08/05/2022 0.2  0.0 - 0.5 % Final    Gran # (ANC) 08/05/2022 1.9  1.8 - 7.7 K/uL Final    Immature Grans (Abs) 08/05/2022 0.01  0.00 - 0.04 K/uL Final    Lymph # 08/05/2022 2.7  1.0 - 4.8 K/uL Final    Mono # 08/05/2022 0.5  0.3 - 1.0 K/uL Final    Eos # 08/05/2022 0.3  0.0 - 0.5 K/uL Final    Baso # 08/05/2022 0.05  0.00 - 0.20 K/uL Final    nRBC 08/05/2022 0  0 /100 WBC Final    Gran % 08/05/2022 34.6 (A) 38.0 - 73.0 % Final    Lymph % 08/05/2022 50.6 (A) 18.0 - 48.0 % Final    Mono % 08/05/2022 9.1  4.0 - 15.0 % Final    Eosinophil % 08/05/2022 4.6  0.0 - 8.0 % Final    Basophil % 08/05/2022 0.9  0.0 - 1.9 % Final    Differential Method 08/05/2022 Automated   Final    Sodium 08/05/2022 141  136 - 145 mmol/L Final    Potassium 08/05/2022 3.4 (A) 3.5 - 5.1 mmol/L Final    Chloride 08/05/2022 109  95 - 110 mmol/L Final    CO2 08/05/2022 24  23 - 29 mmol/L Final    Glucose 08/05/2022 99  70 - 110 mg/dL Final    BUN 08/05/2022 6  6 - 20 mg/dL Final    Creatinine 08/05/2022 0.8  0.5 - 1.4 mg/dL Final    Calcium 08/05/2022 9.3  8.7 - 10.5 mg/dL Final    Total Protein 08/05/2022 6.8  6.0 - 8.4 g/dL Final    Albumin 08/05/2022 3.7  3.5 - 5.2 g/dL Final    Total Bilirubin 08/05/2022 0.2  0.1 - 1.0 mg/dL Final    Alkaline Phosphatase 08/05/2022 84  55 - 135 U/L Final    AST 08/05/2022 14  10 - 40 U/L Final    ALT 08/05/2022 9 (A) 10 - 44 U/L Final    Anion Gap 08/05/2022 8  8 - 16 mmol/L Final    eGFR 08/05/2022 >60  >60 mL/min/1.73 m^2 Final    Prothrombin Time 08/05/2022 10.4  9.0 - 12.5 sec Final    INR 08/05/2022 1.0  0.8 - 1.2 Final    TSH 08/05/2022 3.073  0.400 - 4.000 uIU/mL Final    Cholesterol 08/05/2022 191  120 - 199  mg/dL Final    Triglycerides 08/05/2022 125  30 - 150 mg/dL Final    HDL 08/05/2022 56  40 - 75 mg/dL Final    LDL Cholesterol 08/05/2022 110.0  63.0 - 159.0 mg/dL Final    HDL/Cholesterol Ratio 08/05/2022 29.3  20.0 - 50.0 % Final    Total Cholesterol/HDL Ratio 08/05/2022 3.4  2.0 - 5.0 Final    Non-HDL Cholesterol 08/05/2022 135  mg/dL Final    SARS-CoV-2 RNA, Amplification, Qual 08/05/2022 Negative  Negative Final   Office Visit on 07/02/2022   Component Date Value Ref Range Status    POC Rapid COVID 07/02/2022 Negative  Negative Final-Edited     Acceptable 07/02/2022 Yes   Final-Edited   Lab Visit on 06/06/2022   Component Date Value Ref Range Status    WBC 06/06/2022 4.29  3.90 - 12.70 K/uL Final    RBC 06/06/2022 5.36  4.00 - 5.40 M/uL Final    Hemoglobin 06/06/2022 11.1 (A) 12.0 - 16.0 g/dL Final    Hematocrit 06/06/2022 37.8  37.0 - 48.5 % Final    MCV 06/06/2022 71 (A) 82 - 98 fL Final    MCH 06/06/2022 20.7 (A) 27.0 - 31.0 pg Final    MCHC 06/06/2022 29.4 (A) 32.0 - 36.0 g/dL Final    RDW 06/06/2022 17.7 (A) 11.5 - 14.5 % Final    Platelets 06/06/2022 261  150 - 450 K/uL Final    MPV 06/06/2022 9.9  9.2 - 12.9 fL Final    Immature Granulocytes 06/06/2022 0.2  0.0 - 0.5 % Final    Gran # (ANC) 06/06/2022 2.1  1.8 - 7.7 K/uL Final    Immature Grans (Abs) 06/06/2022 0.01  0.00 - 0.04 K/uL Final    Lymph # 06/06/2022 1.6  1.0 - 4.8 K/uL Final    Mono # 06/06/2022 0.4  0.3 - 1.0 K/uL Final    Eos # 06/06/2022 0.1  0.0 - 0.5 K/uL Final    Baso # 06/06/2022 0.03  0.00 - 0.20 K/uL Final    nRBC 06/06/2022 0  0 /100 WBC Final    Gran % 06/06/2022 50.0  38.0 - 73.0 % Final    Lymph % 06/06/2022 37.5  18.0 - 48.0 % Final    Mono % 06/06/2022 8.6  4.0 - 15.0 % Final    Eosinophil % 06/06/2022 3.0  0.0 - 8.0 % Final    Basophil % 06/06/2022 0.7  0.0 - 1.9 % Final    Differential Method 06/06/2022 Automated   Final    Hepatitis B Surface Ag 06/06/2022 Negative  Negative  Final    Hep B S Ab 06/06/2022 Positive   Final    Hep B Core Total Ab 06/06/2022 Negative   Final    IgG 06/06/2022 1209  650 - 1600 mg/dL Final    IgA 06/06/2022 202  40 - 350 mg/dL Final    IgM 06/06/2022 59  50 - 300 mg/dL Final   Lab Visit on 05/31/2022   Component Date Value Ref Range Status    Specimen UA 05/31/2022 Urine, Clean Catch   Final    Color, UA 05/31/2022 Yellow  Yellow, Straw, Jen Final    Appearance, UA 05/31/2022 Clear  Clear Final    pH, UA 05/31/2022 6.0  5.0 - 8.0 Final    Specific Gravity, UA 05/31/2022 1.025  1.005 - 1.030 Final    Protein, UA 05/31/2022 Negative  Negative Final    Glucose, UA 05/31/2022 Negative  Negative Final    Ketones, UA 05/31/2022 Negative  Negative Final    Bilirubin (UA) 05/31/2022 Negative  Negative Final    Occult Blood UA 05/31/2022 Negative  Negative Final    Nitrite, UA 05/31/2022 Negative  Negative Final    Urobilinogen, UA 05/31/2022 Negative  <2.0 EU/dL Final    Leukocytes, UA 05/31/2022 Negative  Negative Final   Admission on 05/16/2022, Discharged on 05/17/2022   Component Date Value Ref Range Status    WBC 05/16/2022 5.11  3.90 - 12.70 K/uL Final    RBC 05/16/2022 5.27  4.00 - 5.40 M/uL Final    Hemoglobin 05/16/2022 10.9 (A) 12.0 - 16.0 g/dL Final    Hematocrit 05/16/2022 35.9 (A) 37.0 - 48.5 % Final    MCV 05/16/2022 68 (A) 82 - 98 fL Final    MCH 05/16/2022 20.7 (A) 27.0 - 31.0 pg Final    MCHC 05/16/2022 30.4 (A) 32.0 - 36.0 g/dL Final    RDW 05/16/2022 16.6 (A) 11.5 - 14.5 % Final    Platelets 05/16/2022 255  150 - 450 K/uL Final    MPV 05/16/2022 10.1  9.2 - 12.9 fL Final    Immature Granulocytes 05/16/2022 0.2  0.0 - 0.5 % Final    Gran # (ANC) 05/16/2022 2.5  1.8 - 7.7 K/uL Final    Immature Grans (Abs) 05/16/2022 0.01  0.00 - 0.04 K/uL Final    Lymph # 05/16/2022 2.0  1.0 - 4.8 K/uL Final    Mono # 05/16/2022 0.5  0.3 - 1.0 K/uL Final    Eos # 05/16/2022 0.1  0.0 - 0.5 K/uL Final    Baso # 05/16/2022 0.03   0.00 - 0.20 K/uL Final    nRBC 05/16/2022 0  0 /100 WBC Final    Gran % 05/16/2022 48.4  38.0 - 73.0 % Final    Lymph % 05/16/2022 39.1  18.0 - 48.0 % Final    Mono % 05/16/2022 9.2  4.0 - 15.0 % Final    Eosinophil % 05/16/2022 2.5  0.0 - 8.0 % Final    Basophil % 05/16/2022 0.6  0.0 - 1.9 % Final    Differential Method 05/16/2022 Automated   Final    Sodium 05/16/2022 142  136 - 145 mmol/L Final    Potassium 05/16/2022 4.4  3.5 - 5.1 mmol/L Final    Chloride 05/16/2022 110  95 - 110 mmol/L Final    CO2 05/16/2022 18 (A) 23 - 29 mmol/L Final    Glucose 05/16/2022 108  70 - 110 mg/dL Final    BUN 05/16/2022 7  6 - 20 mg/dL Final    Creatinine 05/16/2022 0.7  0.5 - 1.4 mg/dL Final    Calcium 05/16/2022 8.8  8.7 - 10.5 mg/dL Final    Total Protein 05/16/2022 6.7  6.0 - 8.4 g/dL Final    Albumin 05/16/2022 3.5  3.5 - 5.2 g/dL Final    Total Bilirubin 05/16/2022 0.3  0.1 - 1.0 mg/dL Final    Alkaline Phosphatase 05/16/2022 79  55 - 135 U/L Final    AST 05/16/2022 25  10 - 40 U/L Final    ALT 05/16/2022 15  10 - 44 U/L Final    Anion Gap 05/16/2022 14  8 - 16 mmol/L Final    eGFR if African American 05/16/2022 >60  >60 mL/min/1.73 m^2 Final    eGFR if non African American 05/16/2022 >60  >60 mL/min/1.73 m^2 Final    Specimen UA 05/16/2022 Urine, Clean Catch   Final    Color, UA 05/16/2022 Yellow  Yellow, Straw, Jen Final    Appearance, UA 05/16/2022 Clear  Clear Final    pH, UA 05/16/2022 6.0  5.0 - 8.0 Final    Specific Gravity, UA 05/16/2022 1.020  1.005 - 1.030 Final    Protein, UA 05/16/2022 Negative  Negative Final    Glucose, UA 05/16/2022 Negative  Negative Final    Ketones, UA 05/16/2022 Negative  Negative Final    Bilirubin (UA) 05/16/2022 Negative  Negative Final    Occult Blood UA 05/16/2022 Negative  Negative Final    Nitrite, UA 05/16/2022 Negative  Negative Final    Urobilinogen, UA 05/16/2022 Negative  <2.0 EU/dL Final    Leukocytes, UA 05/16/2022 Negative  Negative  Final    TSH 2022 1.296  0.400 - 4.000 uIU/mL Final    CRP 2022 6.3  0.0 - 8.2 mg/L Final    Sed Rate 2022 10  0 - 20 mm/Hr Final    SARS-CoV-2 RNA, Amplification, Qual 2022 Negative  Negative Final    Iron 2022 33  30 - 160 ug/dL Final    Transferrin 2022 326  200 - 375 mg/dL Final    TIBC 2022 482 (A) 250 - 450 ug/dL Final    Saturated Iron 2022 7 (A) 20 - 50 % Final    Ferritin 2022 17 (A) 20.0 - 300.0 ng/mL Final       Diagnoses:   1) Suspect MS flare  2) Left sided weakness    Medical Decision Makin) Given IVMP 1 gram IV in ED  2) IVMP 1 gram daily for 4 more days  3) MRI brain with contrast- ordered- suspect possible medullary/brainstem exacerbation?  4) MRI C and T spine w w/o to assess for acute demyelination  5) please r/o any infectious and or metabolic derangements. Obtain UA, CXR etc  6) PT/OT- may need IPR      Thank you for this consult. Please do not hesitate to contact us with questions. Please re-consult neurology if any issues.      Dr. Donna Gupta  Tele-Neurology- [facility]

## 2022-08-05 NOTE — PHARMACY MED REC
"Admission Medication History     The home medication history was taken by Kody Conklin.    You may go to "Admission" then "Reconcile Home Medications" tabs to review and/or act upon these items.      The home medication list has been updated by the Pharmacy department.    Please read ALL comments highlighted in yellow.    Please address this information as you see fit.     Feel free to contact us if you have any questions or require assistance.      Medications listed below were obtained from: Patient/family: DAUGHTER  (Not in a hospital admission)      Kody Conklin  [id number]    Current Outpatient Medications on File Prior to Encounter   Medication Sig Dispense Refill Last Dose    cyclobenzaprine (FLEXERIL) 10 MG tablet Take 10 mg by mouth 3 (three) times daily as needed.   8/4/2022 at Unknown time    diclofenac sodium (VOLTAREN) 1 % Gel APPLY 2 GRAMS TO AFFECTED AREA 4 TIMES A DAY (Patient taking differently: QID prn) 300 g 3 Past Week at Unknown time    doxepin (SINEQUAN) 75 MG capsule Take 1 capsule (75 mg total) by mouth every evening. 90 capsule 3 8/4/2022 at Unknown time    DULoxetine (CYMBALTA) 60 MG capsule TAKE 1 CAPSULE BY MOUTH EVERY DAY 90 capsule 3 8/4/2022 at Unknown time    esomeprazole (NEXIUM) 40 MG capsule TAKE 1 CAPSULE (40 MG TOTAL) BY MOUTH BEFORE BREAKFAST. 90 capsule 3 8/4/2022 at Unknown time    ferrous sulfate 325 (65 FE) MG EC tablet Take 1 tablet (325 mg total) by mouth every Mon, Wed, Fri. 36 tablet 0 8/4/2022 at Unknown time    gabapentin (NEURONTIN) 300 MG capsule 600mg at morning and afternoon. 900mg at night. 210 capsule 1 8/4/2022 at Unknown time    HYDROcodone-acetaminophen (NORCO)  mg per tablet Take 1 tablet by mouth every 8 (eight) hours as needed.   8/4/2022 at Unknown time    lactulose (CHRONULAC) 20 gram/30 mL Soln Take 15 mLs (10 g total) by mouth 3 (three) times daily. (Patient taking differently: Take 10 g by mouth 3 (three) times daily as " needed.) 200 mL 3 Past Week at Unknown time    LINZESS 145 mcg Cap capsule TAKE 1 CAPSULE (145 MCG TOTAL) BY MOUTH BEFORE BREAKFAST. 30 capsule 5 8/4/2022 at Unknown time    ondansetron (ZOFRAN-ODT) 8 MG TbDL Take 8 mg by mouth every 6 (six) hours as needed.   Past Week at Unknown time    promethazine (PHENERGAN) 25 MG tablet Take 1 tablet (25 mg total) by mouth every 4 (four) hours. 60 tablet 0 Past Week at Unknown time    sumatriptan (IMITREX) 100 MG tablet TAKE 1 TAB AT ONSET OF HEADACH MAY TAKE 2ND TAB 2 HOURS LATER IF NO RELIEF MAX 6 TABS A WEEK 9 tablet 0 Past Month at Unknown time    topiramate (TOPAMAX) 50 MG tablet TAKE 1 TABLET BY MOUTH IN THE MORNING AND 2 TABLETS AT BEDTIME 90 tablet 1 8/4/2022 at Unknown time    valsartan (DIOVAN) 80 MG tablet Take 80 mg by mouth once daily.   8/4/2022 at Unknown time    mupirocin (BACTROBAN) 2 % ointment Apply topically 2 (two) times daily. 30 g 0     ocrelizumab (OCREVUS IV) Inject into the vein.       triamcinolone acetonide 0.1% (KENALOG) 0.1 % ointment Apply topically 2 (two) times daily. 30 g 1                            .

## 2022-08-05 NOTE — ED PROVIDER NOTES
"SCRIBE #1 NOTE: I, Twin Paul, am scribing for, and in the presence of, Candido Amaral MD. I have scribed the entire note.       History     Chief Complaint   Patient presents with    Extremity Weakness     Left sided weakness with left sided facial droop starting at 0040.     Review of patient's allergies indicates:   Allergen Reactions    Demerol [meperidine] Anaphylaxis     Other reaction(s): Itching    Dilaudid [hydromorphone (bulk)] Anaphylaxis     "coded" per pt  Patient can tolerate Lortab    Shellfish containing products Itching, Nausea And Vomiting and Swelling    Prednisone Itching     Other reaction(s): Itching    Tramadol      shaking         History of Present Illness     HPI    8/5/2022, 1:53 AM  History obtained from the patient      History of Present Illness: Alicia Soliz is a 42 y.o. female patient with a PMHx of anemia, HTN, and multiple sclerosis who presents to the Emergency Department for evaluation of Left sided weakness which onset gradually 2 hours ago. In 2015, pt had stroke like sxs and was diagnosed with Multiple Sclerosis. She has had residual weakness in her left side since, but reports that it has worsened. Symptoms are constant and moderate in severity. No mitigating or exacerbating factors reported. Associated sxs include left sided facial droop. Patient denies any CP, SOB, numbness, dizziness, fever, and all other sxs at this time. No further complaints or concerns at this time.       Arrival mode: Ambulance Service    PCP: Braden Dumont MD        Past Medical History:  Past Medical History:   Diagnosis Date    Anemia     Arthritis     Cardiac arrest as complication of care     pt states she went into cardiac arrest from an allergic reaction to a medication    Depression     Encounter for blood transfusion     Hemiplegia due to old stroke     Hypertension     Morbid obesity with BMI of 45.0-49.9, adult 9/20/2018    Multiple sclerosis  "       Past Surgical History:  Past Surgical History:   Procedure Laterality Date    APPENDECTOMY      CHOLECYSTECTOMY      COLONOSCOPY N/A 11/25/2020    Procedure: COLONOSCOPY;  Surgeon: Andrew Jenkins III, MD;  Location: [facility] ENDO;  Service: Endoscopy;  Laterality: N/A;    ESOPHAGOGASTRODUODENOSCOPY N/A 2/19/2020    Procedure: EGD (ESOPHAGOGASTRODUODENOSCOPY);  Surgeon: Michael Navarrete MD;  Location: [facility] ENDO;  Service: Endoscopy;  Laterality: N/A;    ESOPHAGOGASTRODUODENOSCOPY N/A 2/20/2020    Procedure: EGD (ESOPHAGOGASTRODUODENOSCOPY);  Surgeon: Michael Navarrete MD;  Location: [facility] OR;  Service: General;  Laterality: N/A;    ESOPHAGOGASTRODUODENOSCOPY N/A 11/25/2020    Procedure: EGD (ESOPHAGOGASTRODUODENOSCOPY);  Surgeon: Andrew Jenkins III, MD;  Location: [facility];  Service: Endoscopy;  Laterality: N/A;    HYSTERECTOMY      KNEE SURGERY      ROBOT-ASSISTED LAPAROSCOPIC SLEEVE GASTRECTOMY USING DA ANTHONY XI N/A 2/20/2020    Procedure: XI ROBOTIC SLEEVE GASTRECTOMY;  Surgeon: Michael Navarrete MD;  Location: [facility] OR;  Service: General;  Laterality: N/A;    TENOPLASTY OF HAND Left 8/26/2021    Procedure: REPAIR, TENDON, HAND;  Surgeon: Joselito Lugo MD;  Location: [facility] OR;  Service: Orthopedics;  Laterality: Left;  Left RCL PIP Joint Repair/Recon with Arthrex Internal Brace.    TONSILLECTOMY      TUBAL LIGATION           Family History:  Family History   Problem Relation Age of Onset    Lupus Mother     Heart disease Mother     Hypertension Mother     Diabetes Father     Kidney disease Father     Cancer Maternal Aunt 40        breast    Breast cancer Maternal Aunt     Diabetes Maternal Aunt     COPD Maternal Aunt     Breast cancer Maternal Cousin     Ovarian cancer Maternal Cousin        Social History:  Social History     Tobacco Use    Smoking status: Never Smoker    Smokeless tobacco: Never Used   Substance and Sexual Activity    Alcohol use: Yes     Comment: once a year     Drug use:  Never    Sexual activity: Yes     Partners: Male     Birth control/protection: Surgical        Review of Systems     Review of Systems   Constitutional: Negative for fever.   HENT: Negative for sore throat.    Respiratory: Negative for shortness of breath.    Cardiovascular: Negative for chest pain.   Gastrointestinal: Negative for nausea.   Genitourinary: Negative for dysuria.   Musculoskeletal: Negative for back pain.   Skin: Negative for rash.   Neurological: Positive for facial asymmetry (L sided facial droop) and weakness (LLE; LUE). Negative for dizziness and numbness.   Hematological: Does not bruise/bleed easily.   All other systems reviewed and are negative.       Physical Exam     Initial Vitals [08/05/22 0143]   BP Pulse Resp Temp SpO2   133/81 84 18 98 °F (36.7 °C) 100 %      MAP       --          Physical Exam  Nursing Notes and Vital Signs Reviewed.  Constitutional: Patient is in no acute distress. Well-developed and well-nourished.  Head: Atraumatic. Normocephalic.  Eyes:EOM intact. Conjunctivae are not pale. No scleral icterus.  ENT: Mucous membranes are moist. Oropharynx is clear and symmetric.    Neck: Supple. Full ROM.  Cardiovascular: Regular rate. Regular rhythm. No murmurs, rubs, or gallops. Distal pulses are 2+ and symmetric.  Pulmonary/Chest: No respiratory distress. Clear to auscultation bilaterally. No wheezing or rales.  Abdominal: Soft and non-distended.  There is no tenderness.  No rebound, guarding, or rigidity.   Musculoskeletal: Moves all extremities. No obvious deformities. No edema.   Neurological:  Alert, awake, and appropriate.  Normal speech.  LUE weakness. LLE paresis. Left sided facial droop.   Psychiatric: Normal affect. Good eye contact. Appropriate in content.     ED Course   Critical Care    Date/Time: 8/5/2022 3:51 AM  Performed by: Candido Amaral MD  Authorized by: Candido Amaral MD   Direct patient critical care time: 10 minutes  Additional history  "critical care time: 5 minutes  Ordering / reviewing critical care time: 10 minutes  Documentation critical care time: 10 minutes  Consulting other physicians critical care time: 10 minutes  Total critical care time (exclusive of procedural time) : 45 minutes  Critical care time was exclusive of separately billable procedures and treating other patients.  Critical care was necessary to treat or prevent imminent or life-threatening deterioration of the following conditions: MS exacerbation.  Critical care was time spent personally by me on the following activities: blood draw for specimens, discussions with consultants, obtaining history from patient or surrogate, evaluation of patient's response to treatment, ordering and review of laboratory studies, pulse oximetry, re-evaluation of patient's condition, review of old charts, ordering and review of radiographic studies, ordering and performing treatments and interventions, examination of patient, interpretation of cardiac output measurements and development of treatment plan with patient or surrogate.        ED Vital Signs:  Vitals:    08/05/22 0143 08/05/22 0237 08/05/22 0245   BP: 133/81  (!) 155/87   Pulse: 84  73   Resp: 18  17   Temp: 98 °F (36.7 °C)  98 °F (36.7 °C)   TempSrc: Oral     SpO2: 100%  100%   Weight:  131 kg (288 lb 12.8 oz)    Height: 5' 6" (1.676 m)         Abnormal Lab Results:  Labs Reviewed   CBC W/ AUTO DIFFERENTIAL - Abnormal; Notable for the following components:       Result Value    Hemoglobin 10.6 (*)     Hematocrit 34.3 (*)     MCV 69 (*)     MCH 21.2 (*)     MCHC 30.9 (*)     RDW 15.2 (*)     Gran % 34.6 (*)     Lymph % 50.6 (*)     All other components within normal limits   COMPREHENSIVE METABOLIC PANEL - Abnormal; Notable for the following components:    Potassium 3.4 (*)     ALT 9 (*)     All other components within normal limits   POCT GLUCOSE - Abnormal; Notable for the following components:    POCT Glucose 119 (*)     All other " components within normal limits   PROTIME-INR   TSH   SARS-COV-2 RNA AMPLIFICATION, QUAL   LIPID PANEL   POCT GLUCOSE, HAND-HELD DEVICE        All Lab Results:  Results for orders placed or performed during the hospital encounter of 08/05/22   CBC W/ AUTO DIFFERENTIAL   Result Value Ref Range    WBC 5.41 3.90 - 12.70 K/uL    RBC 4.99 4.00 - 5.40 M/uL    Hemoglobin 10.6 (L) 12.0 - 16.0 g/dL    Hematocrit 34.3 (L) 37.0 - 48.5 %    MCV 69 (L) 82 - 98 fL    MCH 21.2 (L) 27.0 - 31.0 pg    MCHC 30.9 (L) 32.0 - 36.0 g/dL    RDW 15.2 (H) 11.5 - 14.5 %    Platelets 293 150 - 450 K/uL    MPV 10.3 9.2 - 12.9 fL    Immature Granulocytes 0.2 0.0 - 0.5 %    Gran # (ANC) 1.9 1.8 - 7.7 K/uL    Immature Grans (Abs) 0.01 0.00 - 0.04 K/uL    Lymph # 2.7 1.0 - 4.8 K/uL    Mono # 0.5 0.3 - 1.0 K/uL    Eos # 0.3 0.0 - 0.5 K/uL    Baso # 0.05 0.00 - 0.20 K/uL    nRBC 0 0 /100 WBC    Gran % 34.6 (L) 38.0 - 73.0 %    Lymph % 50.6 (H) 18.0 - 48.0 %    Mono % 9.1 4.0 - 15.0 %    Eosinophil % 4.6 0.0 - 8.0 %    Basophil % 0.9 0.0 - 1.9 %    Differential Method Automated    Comprehensive metabolic panel   Result Value Ref Range    Sodium 141 136 - 145 mmol/L    Potassium 3.4 (L) 3.5 - 5.1 mmol/L    Chloride 109 95 - 110 mmol/L    CO2 24 23 - 29 mmol/L    Glucose 99 70 - 110 mg/dL    BUN 6 6 - 20 mg/dL    Creatinine 0.8 0.5 - 1.4 mg/dL    Calcium 9.3 8.7 - 10.5 mg/dL    Total Protein 6.8 6.0 - 8.4 g/dL    Albumin 3.7 3.5 - 5.2 g/dL    Total Bilirubin 0.2 0.1 - 1.0 mg/dL    Alkaline Phosphatase 84 55 - 135 U/L    AST 14 10 - 40 U/L    ALT 9 (L) 10 - 44 U/L    Anion Gap 8 8 - 16 mmol/L    eGFR >60 >60 mL/min/1.73 m^2   Protime-INR   Result Value Ref Range    Prothrombin Time 10.4 9.0 - 12.5 sec    INR 1.0 0.8 - 1.2   TSH   Result Value Ref Range    TSH 3.073 0.400 - 4.000 uIU/mL   COVID-19 Rapid Screening   Result Value Ref Range    SARS-CoV-2 RNA, Amplification, Qual Negative Negative   POCT glucose   Result Value Ref Range    POCT Glucose 119  (H) 70 - 110 mg/dL     *Note: Due to a large number of results and/or encounters for the requested time period, some results have not been displayed. A complete set of results can be found in Results Review.         Imaging Results:  Imaging Results          CT Head Without Contrast (In process)                2:40 AM: Per STAT radiology, pt's CT Head without intravenous contrast results: Images degraded by artifact. There is some peripheral high attenuation over the convexities favored to represent bream hardening artifact. Blood products considered less likely. No mass-effect or edema. Chronic microvascular ischemic changes. If there is concern for ischemia consider MRI      The EKG was ordered, reviewed, and independently interpreted by the ED provider.  Interpretation time: 0228  Rate: 72 BPM  Rhythm: normal sinus rhythm  Interpretation: No ST or T wave abnormality. No STEMI.                 The Emergency Provider reviewed the vital signs and test results, which are outlined above.     ED Discussion      2:42 AM: Discussed pt's case with Dr. Zavala (Neurology) who does not feel that pt's sxs represent an acute stroke. More consistent with MS flares in the past. Recommends admission and treated with high dose steroids.      3:50 AM: Discussed case with Aleja Valadez NP (Hospital Medicine). Dr. Morales agrees with current care and management of pt and accepts admission.   Admitting Service: Hospital Medicine  Admitting Physician: Dr. Morales  Admit to: Inpatient med surg    3:50 AM: Re-evaluated pt. I have discussed test results, shared treatment plan, and the need for admission with patient and family at bedside. Pt and family express understanding at this time and agree with all information. All questions answered. Pt and family have no further questions or concerns at this time. Pt is ready for admit.         Medical Decision Making:   Clinical Tests:   Lab Tests: Ordered and Reviewed  Radiological Study:  Ordered and Reviewed  Medical Tests: Ordered and Reviewed           ED Medication(s):  Medications   methylPREDNISolone (SOLU-MEDROL) 1,000 mg in dextrose 5 % 100 mL IVPB (1,000 mg Intravenous New Bag 8/5/22 0328)   diphenhydrAMINE injection 25 mg (25 mg Intravenous Given 8/5/22 0243)       New Prescriptions    No medications on file               Scribe Attestation:   Scribe #1: I performed the above scribed service and the documentation accurately describes the services I performed. I attest to the accuracy of the note.     Attending:   Physician Attestation Statement for Scribe #1: I, Candido Amaral MD, personally performed the services described in this documentation, as scribed by Twin Paul, in my presence, and it is both accurate and complete.           Clinical Impression       ICD-10-CM ICD-9-CM   1. Multiple sclerosis exacerbation  G35 340   2. Left-sided weakness  R53.1 728.87       Disposition:   Disposition: Admitted  Condition: Fair         Candido Amaral MD  08/10/22 0795

## 2022-08-06 VITALS
WEIGHT: 289.25 LBS | RESPIRATION RATE: 18 BRPM | HEART RATE: 90 BPM | BODY MASS INDEX: 46.49 KG/M2 | OXYGEN SATURATION: 99 % | HEIGHT: 66 IN | TEMPERATURE: 98 F | DIASTOLIC BLOOD PRESSURE: 81 MMHG | SYSTOLIC BLOOD PRESSURE: 184 MMHG

## 2022-08-06 LAB
ALBUMIN SERPL BCP-MCNC: 3.5 G/DL (ref 3.5–5.2)
ALP SERPL-CCNC: 81 U/L (ref 55–135)
ALT SERPL W/O P-5'-P-CCNC: 9 U/L (ref 10–44)
ANION GAP SERPL CALC-SCNC: 10 MMOL/L (ref 8–16)
AST SERPL-CCNC: 9 U/L (ref 10–40)
BASOPHILS # BLD AUTO: 0 K/UL (ref 0–0.2)
BASOPHILS NFR BLD: 0 % (ref 0–1.9)
BILIRUB SERPL-MCNC: 0.2 MG/DL (ref 0.1–1)
BILIRUB UR QL STRIP: NEGATIVE
BUN SERPL-MCNC: 7 MG/DL (ref 6–20)
CALCIUM SERPL-MCNC: 9.3 MG/DL (ref 8.7–10.5)
CHLORIDE SERPL-SCNC: 109 MMOL/L (ref 95–110)
CLARITY UR: CLEAR
CO2 SERPL-SCNC: 21 MMOL/L (ref 23–29)
COLOR UR: YELLOW
CREAT SERPL-MCNC: 0.7 MG/DL (ref 0.5–1.4)
DIFFERENTIAL METHOD: ABNORMAL
EOSINOPHIL # BLD AUTO: 0 K/UL (ref 0–0.5)
EOSINOPHIL NFR BLD: 0 % (ref 0–8)
ERYTHROCYTE [DISTWIDTH] IN BLOOD BY AUTOMATED COUNT: 15.6 % (ref 11.5–14.5)
EST. GFR  (NO RACE VARIABLE): >60 ML/MIN/1.73 M^2
GLUCOSE SERPL-MCNC: 132 MG/DL (ref 70–110)
GLUCOSE UR QL STRIP: ABNORMAL
HCT VFR BLD AUTO: 35 % (ref 37–48.5)
HGB BLD-MCNC: 10.8 G/DL (ref 12–16)
HGB UR QL STRIP: NEGATIVE
IMM GRANULOCYTES # BLD AUTO: 0.05 K/UL (ref 0–0.04)
IMM GRANULOCYTES NFR BLD AUTO: 0.5 % (ref 0–0.5)
KETONES UR QL STRIP: NEGATIVE
LEUKOCYTE ESTERASE UR QL STRIP: NEGATIVE
LYMPHOCYTES # BLD AUTO: 1.3 K/UL (ref 1–4.8)
LYMPHOCYTES NFR BLD: 13 % (ref 18–48)
MAGNESIUM SERPL-MCNC: 1.9 MG/DL (ref 1.6–2.6)
MCH RBC QN AUTO: 21.3 PG (ref 27–31)
MCHC RBC AUTO-ENTMCNC: 30.9 G/DL (ref 32–36)
MCV RBC AUTO: 69 FL (ref 82–98)
MONOCYTES # BLD AUTO: 0.3 K/UL (ref 0.3–1)
MONOCYTES NFR BLD: 2.4 % (ref 4–15)
NEUTROPHILS # BLD AUTO: 8.7 K/UL (ref 1.8–7.7)
NEUTROPHILS NFR BLD: 84.1 % (ref 38–73)
NITRITE UR QL STRIP: NEGATIVE
NRBC BLD-RTO: 0 /100 WBC
PH UR STRIP: 6 [PH] (ref 5–8)
PHOSPHATE SERPL-MCNC: 3.5 MG/DL (ref 2.7–4.5)
PLATELET # BLD AUTO: 288 K/UL (ref 150–450)
PMV BLD AUTO: 10.3 FL (ref 9.2–12.9)
POCT GLUCOSE: 138 MG/DL (ref 70–110)
POCT GLUCOSE: 158 MG/DL (ref 70–110)
POTASSIUM SERPL-SCNC: 4.3 MMOL/L (ref 3.5–5.1)
PROT SERPL-MCNC: 6.7 G/DL (ref 6–8.4)
PROT UR QL STRIP: NEGATIVE
RBC # BLD AUTO: 5.07 M/UL (ref 4–5.4)
SODIUM SERPL-SCNC: 140 MMOL/L (ref 136–145)
SP GR UR STRIP: >=1.03 (ref 1–1.03)
URN SPEC COLLECT METH UR: ABNORMAL
UROBILINOGEN UR STRIP-ACNC: NEGATIVE EU/DL
WBC # BLD AUTO: 10.33 K/UL (ref 3.9–12.7)

## 2022-08-06 PROCEDURE — 63600175 PHARM REV CODE 636 W HCPCS: Performed by: INTERNAL MEDICINE

## 2022-08-06 PROCEDURE — 81003 URINALYSIS AUTO W/O SCOPE: CPT | Performed by: INTERNAL MEDICINE

## 2022-08-06 PROCEDURE — 99233 PR SUBSEQUENT HOSPITAL CARE,LEVL III: ICD-10-PCS | Mod: ,,, | Performed by: PSYCHIATRY & NEUROLOGY

## 2022-08-06 PROCEDURE — 97530 THERAPEUTIC ACTIVITIES: CPT

## 2022-08-06 PROCEDURE — 99233 SBSQ HOSP IP/OBS HIGH 50: CPT | Mod: ,,, | Performed by: PSYCHIATRY & NEUROLOGY

## 2022-08-06 PROCEDURE — 36415 COLL VENOUS BLD VENIPUNCTURE: CPT | Performed by: INTERNAL MEDICINE

## 2022-08-06 PROCEDURE — 83735 ASSAY OF MAGNESIUM: CPT | Performed by: INTERNAL MEDICINE

## 2022-08-06 PROCEDURE — 25000003 PHARM REV CODE 250: Performed by: INTERNAL MEDICINE

## 2022-08-06 PROCEDURE — 80053 COMPREHEN METABOLIC PANEL: CPT | Performed by: INTERNAL MEDICINE

## 2022-08-06 PROCEDURE — 97166 OT EVAL MOD COMPLEX 45 MIN: CPT

## 2022-08-06 PROCEDURE — 97162 PT EVAL MOD COMPLEX 30 MIN: CPT

## 2022-08-06 PROCEDURE — 85025 COMPLETE CBC W/AUTO DIFF WBC: CPT | Performed by: INTERNAL MEDICINE

## 2022-08-06 PROCEDURE — 84100 ASSAY OF PHOSPHORUS: CPT | Performed by: INTERNAL MEDICINE

## 2022-08-06 RX ORDER — HYDRALAZINE HYDROCHLORIDE 20 MG/ML
10 INJECTION INTRAMUSCULAR; INTRAVENOUS EVERY 8 HOURS PRN
Status: DISCONTINUED | OUTPATIENT
Start: 2022-08-06 | End: 2022-08-06 | Stop reason: HOSPADM

## 2022-08-06 RX ORDER — SODIUM CHLORIDE 9 MG/ML
INJECTION, SOLUTION INTRAVENOUS
Status: DISCONTINUED | OUTPATIENT
Start: 2022-08-06 | End: 2022-08-06 | Stop reason: HOSPADM

## 2022-08-06 RX ORDER — NAPROXEN SODIUM 220 MG/1
81 TABLET, FILM COATED ORAL DAILY
Qty: 30 TABLET | Refills: 0 | Status: SHIPPED | OUTPATIENT
Start: 2022-08-07 | End: 2023-01-31

## 2022-08-06 RX ORDER — DIPHENHYDRAMINE HYDROCHLORIDE 50 MG/ML
25 INJECTION INTRAMUSCULAR; INTRAVENOUS EVERY 6 HOURS PRN
Status: DISCONTINUED | OUTPATIENT
Start: 2022-08-06 | End: 2022-08-06 | Stop reason: HOSPADM

## 2022-08-06 RX ORDER — ATORVASTATIN CALCIUM 40 MG/1
40 TABLET, FILM COATED ORAL NIGHTLY
Qty: 30 TABLET | Refills: 0 | Status: SHIPPED | OUTPATIENT
Start: 2022-08-06 | End: 2022-09-05

## 2022-08-06 RX ADMIN — SODIUM CHLORIDE: 0.9 INJECTION, SOLUTION INTRAVENOUS at 10:08

## 2022-08-06 RX ADMIN — HYDROCODONE BITARTRATE AND ACETAMINOPHEN 1 TABLET: 5; 325 TABLET ORAL at 12:08

## 2022-08-06 RX ADMIN — ASPIRIN 81 MG CHEWABLE TABLET 81 MG: 81 TABLET CHEWABLE at 08:08

## 2022-08-06 RX ADMIN — HYDROCODONE BITARTRATE AND ACETAMINOPHEN 1 TABLET: 5; 325 TABLET ORAL at 08:08

## 2022-08-06 RX ADMIN — DULOXETINE 60 MG: 30 CAPSULE, DELAYED RELEASE ORAL at 08:08

## 2022-08-06 RX ADMIN — GABAPENTIN 300 MG: 300 CAPSULE ORAL at 08:08

## 2022-08-06 RX ADMIN — PANTOPRAZOLE SODIUM 40 MG: 40 TABLET, DELAYED RELEASE ORAL at 09:08

## 2022-08-06 RX ADMIN — DIPHENHYDRAMINE HYDROCHLORIDE 25 MG: 50 INJECTION, SOLUTION INTRAMUSCULAR; INTRAVENOUS at 09:08

## 2022-08-06 RX ADMIN — LOSARTAN POTASSIUM 50 MG: 50 TABLET, FILM COATED ORAL at 08:08

## 2022-08-06 RX ADMIN — TOPIRAMATE 50 MG: 25 TABLET, FILM COATED ORAL at 08:08

## 2022-08-06 RX ADMIN — DOCUSATE SODIUM AND SENNOSIDES 1 TABLET: 8.6; 5 TABLET, FILM COATED ORAL at 08:08

## 2022-08-06 RX ADMIN — METHYLPREDNISOLONE SODIUM SUCCINATE 1000 MG: 1 INJECTION, POWDER, LYOPHILIZED, FOR SOLUTION INTRAMUSCULAR; INTRAVENOUS at 10:08

## 2022-08-06 RX ADMIN — ENOXAPARIN SODIUM 40 MG: 100 INJECTION SUBCUTANEOUS at 08:08

## 2022-08-06 NOTE — PT/OT/SLP EVAL
"Physical Therapy Evaluation    Patient Name:  Alicia Soliz   MRN:  [id number]    Recommendations:     Discharge Recommendations:  outpatient PT (CONT 24 HOUR CARE AT HOME) -PT DECLINED INPATIENT REHAB, PREFERS TO RETURN HOME WITH ASSISTANCE FROM DAUGHTERS AND CONT. OUTPATIENT P.T.   Discharge Equipment Recommendations: none   Barriers to discharge: None    Assessment:     Alicia Soliz is a 42 y.o. female admitted with a medical diagnosis of Multiple sclerosis exacerbation.  She presents with the following impairments/functional limitations:  weakness, impaired endurance, impaired functional mobility, gait instability, impaired balance, pain, decreased safety awareness, decreased lower extremity function, decreased upper extremity function, decreased coordination.    Rehab Prognosis: Good; patient would benefit from acute skilled PT services to address these deficits and reach maximum level of function.    Recent Surgery: * No surgery found *     Plan:     During this hospitalization, patient to be seen 3 x/week to address the identified rehab impairments via gait training, therapeutic activities, therapeutic exercises and progress toward the following goals:    · Plan of Care Expires:  08/20/22    Subjective     Chief Complaint:   Patient/Family Comments/goals:   Pain/Comfort:  · Pain Rating 1: 8/10  · Location - Side 1: Left  · Location - Orientation 1: generalized  · Location 1:  (C/O PAIN TO "L SIDE OF BODY")  · Pain Addressed 1: Reposition, Distraction    Patients cultural, spiritual, Jehovah's witness conflicts given the current situation:      Living Environment:  PT LIVES WITH DAUGHTER 1 STORY HOUSE NO STEPS, GOOD HELP FROM MULTIPLE DAUGHTERS THAT LIVES NEXT DOOR, AMB WITH QUAD CANE HOUSEHOLD DISTANCES, DOES NOT DRIVE, SCOOBY WITH ADL'S  Prior to admission, patients level of function was SCOOBY.  Equipment used at home: cane, quad, wheelchair, rollator, walker, rolling, shower chair (ELECTRIC SCOOTER ON " "ORDER).  DME owned (not currently used): none.  Upon discharge, patient will have assistance from DAUGHTERS.    Objective:     Communicated with NURSE ALFARO prior to session.  Patient found supine with telemetry, peripheral IV  upon PT entry to room.    General Precautions: Standard, fall   Orthopedic Precautions:N/A   Braces: N/A  Respiratory Status: Room air    Exams:  · Cognitive Exam:  Patient is oriented to Person, Place, Time and Situation  · Postural Exam:  Patient presented with the following abnormalities:    · -       Rounded shoulders  · Sensation:    · -       Impaired  light/touch LUE, LLE  · RLE ROM: WFL  · RLE Strength: GROSSLY 4/5  · LLE ROM: WFL except AAROM  · LLE Strength: GROSSLY 3+/5 AT HIP AND KNEE, 3-/5 AT ANKLE    Functional Mobility:  · Bed Mobility:  Rolling Left:  stand by assistance  · Scooting: stand by assistance  · Supine to Sit: stand by assistance  · Transfers:     · Sit to Stand:  stand by assistance with no AD  · Bed to Chair: contact guard assistance with  no AD  using  Step Transfer  · Balance: FAIR    Therapeutic Activities and Exercises:   PT EDUCATED IN ROLE OF P.T. AND POC IN ACUTE CARE HOSPITAL SETTING, ENCOURAGED TO INCREASE TIME OOB IN CHAIR TO TOLERANCE, EDUCATED IN AND ENCOURAGED TO PERFORM BLE THEREX WHILE SEATED OR SUPINE THROUGHOUT THE DAY TO TOLERANCE: HIP FLEX/EXT, QUAD SET, LAQ, HEEL SLIDES, AP'S.  PT AGREEABLE TO REQUESTS.  PT REQUESTS NO RW USE AT THIS TIME, SHAKY WITH STANDING BUT GOOD EFFORT, PT REQUESTS NO PHYSICAL ASSIST FROM THERAPIST, "I WANT TO DO IT ON MY OWN"  PT EDUCATED ON RISK FOR FALLS DUE TO GENERALIZED WEAKNESS, EDUCATED ON "CALL DON'T FALL", ENCOURAGED TO CALL FOR ASSISTANCE WITH ALL NEEDS SUCH AS BED<>CHAIR TRANSFERS OR TRIPS TO BATHROOM, PT AGREEABLE TO SAFETY PRECAUTIONS    AM-PAC 6 CLICK MOBILITY  Total Score:16     Patient left up in chair with all lines intact, call button in reach, chair alarm on and NURSE notified.    GOALS: "   Multidisciplinary Problems     Physical Therapy Goals        Problem: Physical Therapy    Goal Priority Disciplines Outcome Goal Variances Interventions   Physical Therapy Goal     PT, PT/OT      Description: LTG'S TO BE MET IN 14 DAYS (8-20-22)  PT WILL REQUIRE SCOOBY FOR BED MOBILITY  PT WILL REQUIRE SCOOBY FOR BED<>CHAIR TF'S  PT WILL AMB 80 FEET WITH OR WITHOUT RW AND SCOOBY                     History:     Past Medical History:   Diagnosis Date    Anemia     Arthritis     Cardiac arrest as complication of care     pt states she went into cardiac arrest from an allergic reaction to a medication    Depression     Encounter for blood transfusion     Hemiplegia due to old stroke     Hypertension     Morbid obesity with BMI of 45.0-49.9, adult 9/20/2018    Multiple sclerosis        Past Surgical History:   Procedure Laterality Date    APPENDECTOMY      CHOLECYSTECTOMY      COLONOSCOPY N/A 11/25/2020    Procedure: COLONOSCOPY;  Surgeon: Andrew Jenkins III, MD;  Location: [facility];  Service: Endoscopy;  Laterality: N/A;    ESOPHAGOGASTRODUODENOSCOPY N/A 2/19/2020    Procedure: EGD (ESOPHAGOGASTRODUODENOSCOPY);  Surgeon: Michael Navarrete MD;  Location: [facility];  Service: Endoscopy;  Laterality: N/A;    ESOPHAGOGASTRODUODENOSCOPY N/A 2/20/2020    Procedure: EGD (ESOPHAGOGASTRODUODENOSCOPY);  Surgeon: Michael Navarrete MD;  Location: [facility];  Service: General;  Laterality: N/A;    ESOPHAGOGASTRODUODENOSCOPY N/A 11/25/2020    Procedure: EGD (ESOPHAGOGASTRODUODENOSCOPY);  Surgeon: Andrew Jenkins III, MD;  Location: [facility];  Service: Endoscopy;  Laterality: N/A;    HYSTERECTOMY      KNEE SURGERY      ROBOT-ASSISTED LAPAROSCOPIC SLEEVE GASTRECTOMY USING DA ANTHONY XI N/A 2/20/2020    Procedure: XI ROBOTIC SLEEVE GASTRECTOMY;  Surgeon: Michael Navarrete MD;  Location: [facility] OR;  Service: General;  Laterality: N/A;    TENOPLASTY OF HAND Left 8/26/2021    Procedure: REPAIR, TENDON, HAND;  Surgeon: Joselito Lugo  MD;  Location: [facility];  Service: Orthopedics;  Laterality: Left;  Left RCL PIP Joint Repair/Recon with Arthrex Internal Brace.    TONSILLECTOMY      TUBAL LIGATION         Time Tracking:     PT Received On: 08/06/22  PT Start Time: 0930     PT Stop Time: 0953  PT Total Time (min): 23 min     Billable Minutes: Evaluation 15 and Therapeutic Activity 8    08/06/2022

## 2022-08-06 NOTE — ASSESSMENT & PLAN NOTE
- CT head , MRI/ MRA brain without contrast neg for acute stroke   -Vascular Neurology was consulted in the ED and suggested pt's symptoms does not represent acute stroke and recommended MRI brain with contrast, Neurology consult and high dose steroid   -Pt had received Solumedrol 1000 mg x 1 dose in the ED  -Await MRI brain with contrast and further recs from Neurology   8/6-  -Neurology consult obtained and suggested pulse steroid treatment x 4 more days . MRI brain, C/T spine with no evidence for active MS plaques or abnormal enhancement.

## 2022-08-06 NOTE — PROGRESS NOTES
Pt provided AVS and pt verbalized understanding   Pt is to keep her follow up in place with neuro   piv removed   Tele monitor removed and returned to monitor room

## 2022-08-06 NOTE — ASSESSMENT & PLAN NOTE
- Review home meds and resume as appropriate      Normal unassisted gait/Motor strength normal in all extremities/Sensation intact to touch/Affect appropriate/Interactive/Verbalization clear and understandable for age

## 2022-08-06 NOTE — CONSULTS
NEUROLOGY CONSULT FOLLOW UP NOTE  [facility]    Patient Name:  Alicia Soliz  Date of Consult: 2022  Admit Date:  805  MR #:  [id number]  Acct #:  [id number]  :  1980    Physicians:  Braden Dumont MD (Family); Kole Carrasquillo MD  Consulting Physician:  Francine Cam MD    The patient location is: Home  The chief complaint leading to consultation is: Left sided weakness and numbness  Visit type: Virtual visit with synchronous audio and video  Total time spent with patient: 40 minutes  Each patient to whom he or she provides medical services by telemedicine is:  (1) informed of the relationship between the physician and patient and the respective role of any other health care provider with respect to management of the patient; and (2) notified that he or she may decline to receive medical services by telemedicine and may withdraw from such care at any time.    Notes:        Chief Complaint/Reason for Consult: Facial droop, left sided weaness      Assessment and Plan:  Alicia Soliz is a 42 y.o. with known MS ( followed by Dr. Pereira), anemia, HTN, morbid obesity presented to the ER at [facility] for evaluation of acute on chronic onset left sided weakness. She was evaluated by neurology yesterday, stroke  and new MS lesions have been ruled out. On my examination the patient has antigravity strength in the left upper and lower extremities but is unable to lift up the extremities as strongly as on the right. No facial droop or dysarthria noted. She endorses left facial numbness, she reports that the left sided weakness has not returned to baseline. Etiology ? pseudoexacerbation is a consideration but less likely given absence of metabolic derangements/ UTI.         Assessment:  1. Acute on chronic left sided weakness, ?pseudoexacerbation- new MS lesions/ enhancing lesions/ stroke ruled out  2. Left facial numbness ? pseudoexacerbation  3. Anemia  4. Morbid obesity  5.  HTN      Recommendations:  1. Discharge with outpatient vs inpatient PT per PT recommendations  2. Follow up with MS specialist  3. Ocrevus schedule per MS specialist as an outpatient  4. Per Dr. Browne's last note-" Patient requires a new bariatric walker to help her safely ambulate and transfer. She cannot position her body properly in a standard walker, and she remains a significant fall risk if she were to use a standard walker rather than a bariatric one. Due to her instability, she cannot safely rely on a cane.  MS counseling given" Would recommend strongly the use of a walker  5. Continue Aspirin and statin           History of Present Illness:  Alicia Soliz is a 42 y.o. female on whom I have been asked to consult for evaluation and treatment of left sided weakness- on 8/5/2022    Per patient she has left sided numbness, left facial numbness, left upper and lower extremity at baseline 2/2 MS. She is on ocrevus for the same. She states that her symptoms started in 2015. She has chronic weakness- she typically ambulates with a cane, rarely uses a walker. She can also ambulate independently at times.     Per patient she developed acute onset worsening of her baseline symptoms- left sided numbness and heaviness, she also noted slurred speech. 911 was called early in am on 8/5. The Slurred speech resolved after 5 hours but not the other symptoms. This morning she has noted improvement in her strength but she is not back to baseline.     She was evaluated by stroke neurologist Dr. Juliana Zavala who recommended that patient was not a candidate for Tpa as her symptoms were consistent with a stroke mimic, MRI brain- negative. She was admitted started on iv steroids for possible MS exacerbation. MRI brain, MRI spine imaging negative for new/ enhancing lesions.    Of note she has been admitted in the past- 5/2022 - being the most recent admission for similar symptoms- imaging was negative for new lesions/  enhancing lesions at that time.       Duration: since early am   Location: left face, arm and leg  Intensity: moderate  Aggravating factors: none  Relieving factors: none  Frequency: one time  Timin:40 am on   Associated symptoms: left leg heaviness        Laboratory and Additional Data Reviewed:    Outside reports reviewed: office notes.    Tests to date: All imaging reviewed personally by me in addition to cardiology reports.  MRI Brain With Contrast   Final Result      No evidence for active MS plaques.  No evidence for abnormal enhancement.         Electronically signed by: Jeronimo Canela   Date:    2022   Time:    21:08      X-Ray Chest AP Portable   Final Result      No acute abnormality.         Electronically signed by: Jeronimo Canela   Date:    2022   Time:    20:59      MRI Cervical Spine With Contrast   Final Result      As above         Electronically signed by: Jeronimo Canela   Date:    2022   Time:    20:30      MRI Thoracic Spine With Contrast   Final Result      No findings to suggest demyelinating disease within the thoracic cord.  No abnormal cord signal or abnormal enhancement.         Electronically signed by: Jeronimo Canela   Date:    2022   Time:    20:26      MRA Brain without contrast   Final Result      No significant stenosis, occlusion, aneurysm, or vascular malformation.         Electronically signed by: Candido Bryant   Date:    2022   Time:    08:12      MRI Brain Without Contrast   Final Result      No acute intracranial abnormality.  Specifically, no evidence of acute infarct.      Unchanged T2/FLAIR hyperintense signal throughout the periventricular and subcortical white matter which is compatible with documented history of demyelinating disease.         Electronically signed by: Candido Bryant   Date:    2022   Time:    08:08      CT Head Without Contrast   Final Result      Chronic microvascular ischemic changes. If there is  additional clinical concern for acute infarct, MRI with diffusion weighted imaging recommended.      All CT scans at this facility use dose modulation, iterative reconstruction, and/or weight based dosing when appropriate to reduce radiation dose to as low as reasonable achievable.         Electronically signed by: Mahendra So MD   Date:    08/05/2022   Time:    07:16              Results for orders placed or performed during the hospital encounter of 08/05/22   MRI Brain With Contrast    Narrative    EXAMINATION:  MRI BRAIN WITH CONTRAST    CLINICAL HISTORY:  Multiple sclerosis , left sided weakness;    TECHNIQUE:  MRI postcontrast.  10 mL of intravenous Gadavist was administered uneventfully    COMPARISON:  Recent pre contrast imaging    FINDINGS:  The previously identified periventricular and subcortical pontine white matter disease likely related to demyelinating process.  On postcontrast imaging, there is no evidence for abnormal enhancement or active plaques.      Impression    No evidence for active MS plaques.  No evidence for abnormal enhancement.      Electronically signed by: Jeronimo Canela  Date:    08/05/2022  Time:    21:08   MRI Brain Without Contrast    Narrative    EXAMINATION:  MRI BRAIN WITHOUT CONTRAST    CLINICAL HISTORY:  left sided weakness;    TECHNIQUE:  Multiplanar multisequence MR imaging of the brain was performed without contrast.    COMPARISON:  CT head performed earlier on 08/05/2022.  MRI brain dated 05/17/2022    FINDINGS:  There is relatively unchanged mild ex vacuo dilatation of the right lateral ventricle.  Cerebral and ventricular volumes are otherwise within normal limits.  There is scattered and confluent T2/FLAIR hyperintense throughout the periventricular and subcortical white matter as well as the dong.  There is no evidence of acute territorial infarct, intracranial hemorrhage, or mass lesion.  No abnormal restricted diffusion or susceptibility artifact.  No midline shift  or mass effect.    Midline structures and posterior fossa structures are unremarkable.  Basal cisterns are clear.  Major vascular flow voids are unremarkable.    Cranium is unremarkable.  Orbits and globes appear within normal limits.  Paranasal sinuses and mastoid air cells are clear.      Impression    No acute intracranial abnormality.  Specifically, no evidence of acute infarct.    Unchanged T2/FLAIR hyperintense signal throughout the periventricular and subcortical white matter which is compatible with documented history of demyelinating disease.      Electronically signed by: Candido Bryant  Date:    08/05/2022  Time:    08:08   MRA Brain without contrast    Narrative    EXAMINATION:  MRA BRAIN WITHOUT CONTRAST    CLINICAL HISTORY:  Neuro deficit, acute, stroke suspected; .    TECHNIQUE:  Non-contrast 3-D time-of-flight intracranial MR angiography was performed through the [place] [facility] with MIP reformatting.    COMPARISON:  CT head performed earlier on 08/05/2022.  MRI brain dated 05/17/2022 MRI brain dated 01/15/2015    FINDINGS:  Anterior intracranial circulation: No high-grade focal stenosis, occlusion, aneurysm, or vascular malformation. Posterior communicating arteries right dominant supply to the posterior cerebral arteries bilaterally.    Posterior intracranial circulation: No high-grade focal stenosis, occlusion, aneurysm, or vascular malformation. Hypoplastic P1 segments of the bilateral posterior cerebral arteries with dominant supply via the posterior communicating arteries.  Basilar trunk and distal vertebral arteries are hypoplastic.      Impression    No significant stenosis, occlusion, aneurysm, or vascular malformation.      Electronically signed by: Candido Bryant  Date:    08/05/2022  Time:    08:12   CT Head Without Contrast    Narrative    EXAMINATION:  CT HEAD WITHOUT CONTRAST    CLINICAL HISTORY:  Stroke suspected (Ped 0-18y);    TECHNIQUE:  Low dose axial CT images obtained  throughout the head without intravenous contrast. Sagittal and coronal reconstructions were performed.    COMPARISON:  05/16/2022    FINDINGS:  Intracranial compartment:    Ventricles and sulci are normal in size for age without evidence of hydrocephalus. No extra-axial blood or fluid collections.    Similar appearance of beam hardening artifact related to the shape of the calvarium projecting over the bilateral frontal lobes laterally.  Moderate hypoattenuation in a periventricular predominant pattern may relate to a combination of microvascular ischemic change and demyelinating plaque in this patient with a history of demyelination.  No parenchymal mass, hemorrhage, edema or major vascular distribution infarct.  No acute intracranial hemorrhage identified.    Skull/extracranial contents (limited evaluation): No fracture.  Mild sinus mucosal thickening right maxillary sinus.  Mastoid air cells and paranasal sinuses are essentially clear.      Impression    Chronic microvascular ischemic changes. If there is additional clinical concern for acute infarct, MRI with diffusion weighted imaging recommended.    All CT scans at this facility use dose modulation, iterative reconstruction, and/or weight based dosing when appropriate to reduce radiation dose to as low as reasonable achievable.      Electronically signed by: Mahendra So MD  Date:    08/05/2022  Time:    07:16   Results for orders placed or performed during the hospital encounter of 09/24/21   MRI Brain W WO Contrast    Narrative    EXAMINATION:  MRI BRAIN W WO CONTRAST    CLINICAL HISTORY:  Multiple sclerosis, new event;    TECHNIQUE:  Multiplanar multisequence MR imaging of the brain was performed before and after the administration of 10 mL Gadavist intravenous contrast.    COMPARISON:  Multiple prior examinations    FINDINGS:  Similar findings to prior examination with subcortical and periventricular FLAIR hyperintensities.  No evidence for restricted  diffusion to suggest acute infarct or active plaque.  Pituitary fossa is grossly unremarkable.  Tiera midbrain posterior fossa is within normal limits.  No evidence for hemorrhage.  No evidence for abnormal enhancement or active plaque.  Overall similar findings to prior exam.  Mild fluid in the optic sheath noted bilaterally particularly at the level of the a optic nerve adjacent to the globe.      Impression    Similar findings to prior exam with moderate confluent periventricular and subcortical FLAIR hyperintensities with no evidence for restricted diffusion or abnormal enhancement to suggest active plaque.      Electronically signed by: Jeronimo Canela  Date:    09/24/2021  Time:    19:44     *Note: Due to a large number of results and/or encounters for the requested time period, some results have not been displayed. A complete set of results can be found in Results Review.       Labs: All labs reviewed by me.  Lab Results   Component Value Date    WBC 10.33 08/06/2022    HGB 10.8 (L) 08/06/2022    HCT 35.0 (L) 08/06/2022     08/06/2022    CHOL 191 08/05/2022    TRIG 125 08/05/2022    HDL 56 08/05/2022    LDLCALC 110.0 08/05/2022    ALT 9 (L) 08/06/2022    AST 9 (L) 08/06/2022     08/06/2022    K 4.3 08/06/2022     08/06/2022    CREATININE 0.7 08/06/2022    BUN 7 08/06/2022    TSH 3.073 08/05/2022    INR 1.0 08/05/2022    HGBA1C 5.6 03/21/2022       Office Visit on 07/02/2022   Component Date Value Ref Range Status    POC Rapid COVID 07/02/2022 Negative  Negative Final-Edited     Acceptable 07/02/2022 Yes   Final-Edited         History:   Past Medical History:   Diagnosis Date    Anemia     Arthritis     Cardiac arrest as complication of care     pt states she went into cardiac arrest from an allergic reaction to a medication    Depression     Encounter for blood transfusion     Hemiplegia due to old stroke     Hypertension     Morbid obesity with BMI of 45.0-49.9, adult  9/20/2018    Multiple sclerosis      Past Surgical History:   Procedure Laterality Date    APPENDECTOMY      CHOLECYSTECTOMY      COLONOSCOPY N/A 11/25/2020    Procedure: COLONOSCOPY;  Surgeon: Andrew Jenkins III, MD;  Location: [facility] ENDO;  Service: Endoscopy;  Laterality: N/A;    ESOPHAGOGASTRODUODENOSCOPY N/A 2/19/2020    Procedure: EGD (ESOPHAGOGASTRODUODENOSCOPY);  Surgeon: Michael Navarrete MD;  Location: [facility] ENDO;  Service: Endoscopy;  Laterality: N/A;    ESOPHAGOGASTRODUODENOSCOPY N/A 2/20/2020    Procedure: EGD (ESOPHAGOGASTRODUODENOSCOPY);  Surgeon: Michael Navarrete MD;  Location: [facility] OR;  Service: General;  Laterality: N/A;    ESOPHAGOGASTRODUODENOSCOPY N/A 11/25/2020    Procedure: EGD (ESOPHAGOGASTRODUODENOSCOPY);  Surgeon: Andrew Jenkins III, MD;  Location: [facility];  Service: Endoscopy;  Laterality: N/A;    HYSTERECTOMY      KNEE SURGERY      ROBOT-ASSISTED LAPAROSCOPIC SLEEVE GASTRECTOMY USING DA ANTHONY XI N/A 2/20/2020    Procedure: XI ROBOTIC SLEEVE GASTRECTOMY;  Surgeon: Michael Navarrete MD;  Location: [facility] OR;  Service: General;  Laterality: N/A;    TENOPLASTY OF HAND Left 8/26/2021    Procedure: REPAIR, TENDON, HAND;  Surgeon: Joselito Lugo MD;  Location: [facility] OR;  Service: Orthopedics;  Laterality: Left;  Left RCL PIP Joint Repair/Recon with Arthrex Internal Brace.    TONSILLECTOMY      TUBAL LIGATION       Family History   Problem Relation Age of Onset    Lupus Mother     Heart disease Mother     Hypertension Mother     Diabetes Father     Kidney disease Father     Cancer Maternal Aunt 40        breast    Breast cancer Maternal Aunt     Diabetes Maternal Aunt     COPD Maternal Aunt     Breast cancer Maternal Cousin     Ovarian cancer Maternal Cousin      Social History     Socioeconomic History    Marital status: Single    Number of children: 3   Occupational History     Employer: [employer]   Tobacco Use    Smoking status: Never Smoker    Smokeless tobacco: Never Used    Substance and Sexual Activity    Alcohol use: Yes     Comment: once a year     Drug use: Never    Sexual activity: Yes     Partners: Male     Birth control/protection: Surgical     Social Determinants of Health     Financial Resource Strain: Low Risk     Difficulty of Paying Living Expenses: Not hard at all   Food Insecurity: No Food Insecurity    Worried About Running Out of Food in the Last Year: Never true    Ran Out of Food in the Last Year: Never true   Transportation Needs: Unmet Transportation Needs    Lack of Transportation (Medical): Yes    Lack of Transportation (Non-Medical): Yes   Physical Activity: Insufficiently Active    Days of Exercise per Week: 7 days    Minutes of Exercise per Session: 10 min   Stress: No Stress Concern Present    Feeling of Stress : Only a little   Social Connections: Unknown    Frequency of Communication with Friends and Family: More than three times a week    Frequency of Social Gatherings with Friends and Family: Twice a week    Active Member of Clubs or Organizations: Yes    Attends Club or Organization Meetings: 1 to 4 times per year    Marital Status:    Housing Stability: Low Risk     Unable to Pay for Housing in the Last Year: No    Number of Places Lived in the Last Year: 1    Unstable Housing in the Last Year: No       Allergy Information:        I have reviewed the patient's allergies.       Demerol [meperidine], Dilaudid [hydromorphone (bulk)], Shellfish containing products, Prednisone, and Tramadol    Home Medications:   No current facility-administered medications on file prior to encounter.     Current Outpatient Medications on File Prior to Encounter   Medication Sig Dispense Refill    cyclobenzaprine (FLEXERIL) 10 MG tablet Take 10 mg by mouth 3 (three) times daily as needed.      diclofenac sodium (VOLTAREN) 1 % Gel APPLY 2 GRAMS TO AFFECTED AREA 4 TIMES A DAY (Patient taking differently: QID prn) 300 g 3    doxepin (SINEQUAN) 75  MG capsule Take 1 capsule (75 mg total) by mouth every evening. 90 capsule 3    DULoxetine (CYMBALTA) 60 MG capsule TAKE 1 CAPSULE BY MOUTH EVERY DAY 90 capsule 3    esomeprazole (NEXIUM) 40 MG capsule TAKE 1 CAPSULE (40 MG TOTAL) BY MOUTH BEFORE BREAKFAST. 90 capsule 3    ferrous sulfate 325 (65 FE) MG EC tablet Take 1 tablet (325 mg total) by mouth every Mon, Wed, Fri. 36 tablet 0    gabapentin (NEURONTIN) 300 MG capsule 600mg at morning and afternoon. 900mg at night. 210 capsule 1    HYDROcodone-acetaminophen (NORCO)  mg per tablet Take 1 tablet by mouth every 8 (eight) hours as needed.      lactulose (CHRONULAC) 20 gram/30 mL Soln Take 15 mLs (10 g total) by mouth 3 (three) times daily. (Patient taking differently: Take 10 g by mouth 3 (three) times daily as needed.) 200 mL 3    LINZESS 145 mcg Cap capsule TAKE 1 CAPSULE (145 MCG TOTAL) BY MOUTH BEFORE BREAKFAST. 30 capsule 5    ondansetron (ZOFRAN-ODT) 8 MG TbDL Take 8 mg by mouth every 6 (six) hours as needed.      promethazine (PHENERGAN) 25 MG tablet Take 1 tablet (25 mg total) by mouth every 4 (four) hours. 60 tablet 0    sumatriptan (IMITREX) 100 MG tablet TAKE 1 TAB AT ONSET OF HEADACH MAY TAKE 2ND TAB 2 HOURS LATER IF NO RELIEF MAX 6 TABS A WEEK 9 tablet 0    topiramate (TOPAMAX) 50 MG tablet TAKE 1 TABLET BY MOUTH IN THE MORNING AND 2 TABLETS AT BEDTIME 90 tablet 1    valsartan (DIOVAN) 80 MG tablet Take 80 mg by mouth once daily.      mupirocin (BACTROBAN) 2 % ointment Apply topically 2 (two) times daily. 30 g 0    ocrelizumab (OCREVUS IV) Inject into the vein.      triamcinolone acetonide 0.1% (KENALOG) 0.1 % ointment Apply topically 2 (two) times daily. 30 g 1       Hospital Medications Reviewed in EPIC   aspirin  81 mg Oral Daily    atorvastatin  40 mg Oral QHS    doxepin  75 mg Oral QHS    DULoxetine  60 mg Oral Daily    enoxaparin  40 mg Subcutaneous Q12H    gabapentin  300 mg Oral BID    losartan  50 mg Oral Daily     methylPREDNISolone sodium succinate injection  1,000 mg Intravenous Daily    pantoprazole  40 mg Oral Daily    senna-docusate 8.6-50 mg  1 tablet Oral BID    topiramate  50 mg Oral Daily       Review of Systems:    ROS    14-point review of systems as follows:   No check milton indicates NEGATIVE response   Constitutional: [] weight loss, [] change to appetite   Eyes: [] change in vision, [] double vision   Ears, nose, mouth, throat: [] frequent nose bleeds, [] ringing in the ears   Respiratory: [] cough, [] wheezing   Cardiovascular: [] chest pain, [] palpitations   Gastrointestinal: [] jaundice, [] nausea/vomiting   Genitourinary: [] incontinence, [] burning with urination   Hematologic/lymphatic: [] easy bruising/bleeding, [] night sweats   Neurological: [x] numbness, [x] weakness   Endocrine: [x] fatigue, [] heat/cold intolerance   Allergy/Immunologic: [] fevers, [] chills   Musculoskeletal: [] muscle pain, [] joint pain   Psychiatric: [] thoughts of harming self/others, [] depression   Integumentary: [] rashes, [] sores that do not heal     Physical Examination:  Vital signs in last 24 hours:  Temp:  [97.6 °F (36.4 °C)-98.3 °F (36.8 °C)] 98 °F (36.7 °C)  Pulse:  [] 90  Resp:  [16-20] 18  SpO2:  [93 %-99 %] 99 %  BP: (125-184)/() 184/81      GENERAL:  General appearance: Well, non-toxic appearing.  No apparent distress.  Fundi exam: normal.  Neck: supple.  Carotid auscultation: normal.  Heart auscultation: normal.  Peripheral pulses: normal.  Extremities: normal.    MENTAL STATUS:  Alertness, attention span & concentration: normal.  Language: normal.  Orientation to self, place & time:  normal.  Memory, recent & remote: normal.  Fund of knowledge: normal.      SPEECH:  Clear and fluent.  Follows complex commands.    CRANIAL NERVES:  Cranial Nerves II-XII were examined.  II - Visual fields: patient has difficulty with finger counting - possibly due to reduced visual acuity- visual fields could not be  consistently tested   III, IV, VI: PERRL, EOMI, No ptosis, No nystagmus.  V - Facial sensation: Diminished over left V1, V2 and V3 regions- midline splitting noted when tested by nurse  VII - Face symmetry & mobility: normal.  VIII - Hearing: normal.  IX, X - Palate: mobile & midline.  XI - Shoulder shrug: normal.  XII - Tongue protrusion: normal.    GROSS MOTOR:  Gait & station:  Given left sided weakness - did not assess gait  Tone: could not assess during tele visit   Abnormal movements: high amplitude low frequency tremor with finger to nose testing on the left  Finger-nose & Heel-knee-shin: normal except high amplitude low frequency tremor with finger to nose testing on the left      MUSCLE STRENGTH:     Patient moves bilateral upper and lower extremities strongly and antigravity on the right, patient has antigravity strength involving left upper and lower extremities but not able to lift up extremities on the left as highly as on the right  Unable to perform graded exam during video visit    REFLEXES:    Unable to perform reflex testing during video visit      SENSORY:  Light touch: Diminished over left upper and lower extremities     40 minutes spent face-to-face, >50% spent advising about: counseling and/or coordination of care           Francine Cam M.D    Medicine-Neurology, Clinical Neurophysiology    This note was created with voice recognition software.  Grammatical, syntax and spelling errors may be inevitable.

## 2022-08-06 NOTE — HOSPITAL COURSE
"8/6- Remains with LLE total weakness with 0/5 muscle power during my exam. Left upper ext 4/5 muscle power with weak  strength. Neurology consult obtained yesterday and suggested pulse steroid treatment x 4 more days . MRI brain, C/T spine with no evidence for active MS plaques or abnormal enhancement.     Upon reviewing MRI brain, C and T spine w/contrat with Neurology today, it was felt that pseudoexacerbation is a consideration but less likely given absence of metabolic derangements/ UTI. Per PT evaluation today,  Muscle power- GROSSLY 3+/5 AT HIP AND KNEE, 3-/5 AT ANKLE. Per Neurology evaluation today" patient has antigravity strength in the left upper and lower extremities but is unable to lift up the extremities as strongly as on the right. No facial droop or dysarthria noted. She endorses left facial numbness, she reports that the left sided weakness has not returned to baseline". It is recommended per Neurology that Pt does not meet the criteria for pulse dose steroid and cleared pt for discharge with out-patient vs inpatient PT. Pt opted out-patient PT and wished to be discharged to home as she has lot of assistance from her family.     At this point, pt deemed stable for discharge to home to family care and follow up with primary Neurologist Dr. Browne and encourage to schedule  her next Ocrevus.      "

## 2022-08-06 NOTE — SUBJECTIVE & OBJECTIVE
Interval History:   Remains with LLE total weakness with 0/5 muscle power. MRI brain, C/T spine with no evidence for active MS plaques or abnormal enhancement.       Review of Systems   Constitutional:  Positive for activity change. Negative for appetite change and fever.   HENT:  Negative for sore throat.    Eyes:  Positive for visual disturbance.   Respiratory:  Negative for cough, chest tightness and shortness of breath.    Cardiovascular:  Negative for chest pain, palpitations and leg swelling.   Gastrointestinal:  Negative for abdominal distention, abdominal pain, constipation, diarrhea, nausea and vomiting.   Endocrine: Negative for polyuria.   Genitourinary:  Negative for decreased urine volume, dysuria, flank pain, frequency and hematuria.   Musculoskeletal:  Positive for gait problem. Negative for back pain.   Skin:  Negative for rash.   Neurological:  Positive for weakness (left sided). Negative for syncope, speech difficulty, light-headedness and headaches.   Psychiatric/Behavioral:  Negative for confusion, hallucinations and sleep disturbance.    Objective:     Vital Signs (Most Recent):  Temp: 98 °F (36.7 °C) (08/06/22 0730)  Pulse: 93 (08/06/22 1157)  Resp: 18 (08/06/22 1221)  BP: (!) 184/81 (08/06/22 1157)  SpO2: 99 % (08/06/22 1157)   Vital Signs (24h Range):  Temp:  [97.6 °F (36.4 °C)-98.3 °F (36.8 °C)] 98 °F (36.7 °C)  Pulse:  [] 93  Resp:  [16-20] 18  SpO2:  [93 %-100 %] 99 %  BP: (125-184)/() 184/81     Weight: 131.2 kg (289 lb 3.9 oz)  Body mass index is 46.69 kg/m².  No intake or output data in the 24 hours ending 08/06/22 1228   Physical Exam  Constitutional:       General: She is not in acute distress.     Appearance: She is well-developed. She is not diaphoretic.   HENT:      Head: Normocephalic and atraumatic.      Mouth/Throat:      Pharynx: No oropharyngeal exudate.   Eyes:      Conjunctiva/sclera: Conjunctivae normal.      Pupils: Pupils are equal, round, and reactive to  light.   Neck:      Thyroid: No thyromegaly.      Vascular: No JVD.   Cardiovascular:      Rate and Rhythm: Normal rate and regular rhythm.      Heart sounds: Normal heart sounds. No murmur heard.  Pulmonary:      Effort: Pulmonary effort is normal. No respiratory distress.      Breath sounds: Normal breath sounds. No wheezing or rales.   Chest:      Chest wall: No tenderness.   Abdominal:      General: Bowel sounds are normal. There is no distension.      Palpations: Abdomen is soft.      Tenderness: There is no abdominal tenderness. There is no guarding or rebound.   Musculoskeletal:         General: Normal range of motion.      Cervical back: Normal range of motion and neck supple.   Lymphadenopathy:      Cervical: No cervical adenopathy.   Skin:     General: Skin is warm and dry.      Findings: No rash.   Neurological:      Mental Status: She is alert and oriented to person, place, and time.      Cranial Nerves: No cranial nerve deficit.      Sensory: No sensory deficit.      Motor: Weakness (LUE 4/5, LLE 0/5) present.      Deep Tendon Reflexes: Reflexes normal.       Significant Labs: All pertinent labs within the past 24 hours have been reviewed.  BMP:   Recent Labs   Lab 08/06/22  0348   *      K 4.3      CO2 21*   BUN 7   CREATININE 0.7   CALCIUM 9.3   MG 1.9     CBC:   Recent Labs   Lab 08/05/22 0221 08/06/22  0348   WBC 5.41 10.33   HGB 10.6* 10.8*   HCT 34.3* 35.0*    288     CMP:   Recent Labs   Lab 08/05/22 0221 08/06/22  0348    140   K 3.4* 4.3    109   CO2 24 21*   GLU 99 132*   BUN 6 7   CREATININE 0.8 0.7   CALCIUM 9.3 9.3   PROT 6.8 6.7   ALBUMIN 3.7 3.5   BILITOT 0.2 0.2   ALKPHOS 84 81   AST 14 9*   ALT 9* 9*   ANIONGAP 8 10       Significant Imaging:

## 2022-08-06 NOTE — PLAN OF CARE
OT eval completed. Sup>sit SBA, sit>stand CGA, step>pivot to bedside chair with CGA. Recommending home with 24/7 SPV and OPOT.

## 2022-08-06 NOTE — PLAN OF CARE
[facility] - Telemetry (Hospital)  Discharge Final Note    Primary Care Provider: Braden Dumont MD    Expected Discharge Date: 8/6/2022    Final Discharge Note (most recent)     Final Note - 08/06/22 1605        Final Note    Assessment Type Final Discharge Note     Anticipated Discharge Disposition Home or Self Care        Post-Acute Status    Discharge Delays None known at this time                 Important Message from Medicare             Contact Info     Braden Dumont MD   Specialty: Internal Medicine   Relationship: PCP - General  Hypertension Digital Medicine Responsible Provider    [address] [address]   Phone: [phone]       Next Steps: Schedule an appointment as soon as possible for a visit in 3 day(s)    Instructions: Discharge follow up    Primary Neurologist        Next Steps: Schedule an appointment as soon as possible for a visit in 1 week(s)    Instructions: Discharge follow up with Primary Neurologist

## 2022-08-06 NOTE — PROGRESS NOTES
[facility] Medicine  Progress Note    Patient Name: Alicia Soliz  MRN: [id number]  Patient Class: IP- Inpatient   Admission Date: 8/5/2022  Length of Stay: 1 days  Attending Physician: Kole Carrasquillo MD  Primary Care Provider: Braden Dumont MD        Subjective:     Principal Problem:Multiple sclerosis exacerbation        HPI:  The pt is a 43 yo female with a PMHx of anemia, arthritis, cardiac arrest, left hemiplegia/ hemiparesis  due to old stroke, HTN, morbid obesity, and multiple sclerosis on Ocrelizumab IV  -last dose  3/2022 , next dose rescheduled for 8/2022 due to recent breast reduction surgery who presented to the ED  for evaluation of left sided weakness  associated with left sided facial droop which worsened acutely since last night. Per pt she  has baseline left sided weakness from a prior stroke. Denies associated speech difficulty, headaches, chest pain, SOB, palpitations, cough , chest congestion, fever , chills, night sweats , nausea, vomiting, urinary urgency or frequency. Reports baseline visual disturbance from optic nerve involvement due to MS and occasional bladder incontinence and constipation.     Vascular Neurology was consulted in the ED , pt underwent CT head , MRI and MRA head. Acute stroke ruled out. Vascular Neurology felt pt's symptoms does not represent an acute stroke. More consistent with MS flares in the past. Recommends admission and treated with high dose steroids, MRI brain with contrast  and Neurology consult. Pt had received Solumedrol 1000 mg in the ED at 0243 on 8/5/22.      consulted for admission.       Overview/Hospital Course:  8/6- Remains with LLE total weakness with 0/5 muscle power. Left upper ext 4/5 muscle power with weak  strength. Neurology consult obtained and suggested pulse steroid treatment x 4 more days . MRI brain, C/T spine with no evidence for active MS plaques or abnormal enhancement.       Interval History:    Remains with LLE total weakness with 0/5 muscle power. MRI brain, C/T spine with no evidence for active MS plaques or abnormal enhancement.       Review of Systems   Constitutional:  Positive for activity change. Negative for appetite change and fever.   HENT:  Negative for sore throat.    Eyes:  Positive for visual disturbance.   Respiratory:  Negative for cough, chest tightness and shortness of breath.    Cardiovascular:  Negative for chest pain, palpitations and leg swelling.   Gastrointestinal:  Negative for abdominal distention, abdominal pain, constipation, diarrhea, nausea and vomiting.   Endocrine: Negative for polyuria.   Genitourinary:  Negative for decreased urine volume, dysuria, flank pain, frequency and hematuria.   Musculoskeletal:  Positive for gait problem. Negative for back pain.   Skin:  Negative for rash.   Neurological:  Positive for weakness (left sided). Negative for syncope, speech difficulty, light-headedness and headaches.   Psychiatric/Behavioral:  Negative for confusion, hallucinations and sleep disturbance.    Objective:     Vital Signs (Most Recent):  Temp: 98 °F (36.7 °C) (08/06/22 0730)  Pulse: 93 (08/06/22 1157)  Resp: 18 (08/06/22 1221)  BP: (!) 184/81 (08/06/22 1157)  SpO2: 99 % (08/06/22 1157)   Vital Signs (24h Range):  Temp:  [97.6 °F (36.4 °C)-98.3 °F (36.8 °C)] 98 °F (36.7 °C)  Pulse:  [] 93  Resp:  [16-20] 18  SpO2:  [93 %-100 %] 99 %  BP: (125-184)/() 184/81     Weight: 131.2 kg (289 lb 3.9 oz)  Body mass index is 46.69 kg/m².  No intake or output data in the 24 hours ending 08/06/22 1228   Physical Exam  Constitutional:       General: She is not in acute distress.     Appearance: She is well-developed. She is not diaphoretic.   HENT:      Head: Normocephalic and atraumatic.      Mouth/Throat:      Pharynx: No oropharyngeal exudate.   Eyes:      Conjunctiva/sclera: Conjunctivae normal.      Pupils: Pupils are equal, round, and reactive to light.   Neck:       Thyroid: No thyromegaly.      Vascular: No JVD.   Cardiovascular:      Rate and Rhythm: Normal rate and regular rhythm.      Heart sounds: Normal heart sounds. No murmur heard.  Pulmonary:      Effort: Pulmonary effort is normal. No respiratory distress.      Breath sounds: Normal breath sounds. No wheezing or rales.   Chest:      Chest wall: No tenderness.   Abdominal:      General: Bowel sounds are normal. There is no distension.      Palpations: Abdomen is soft.      Tenderness: There is no abdominal tenderness. There is no guarding or rebound.   Musculoskeletal:         General: Normal range of motion.      Cervical back: Normal range of motion and neck supple.   Lymphadenopathy:      Cervical: No cervical adenopathy.   Skin:     General: Skin is warm and dry.      Findings: No rash.   Neurological:      Mental Status: She is alert and oriented to person, place, and time.      Cranial Nerves: No cranial nerve deficit.      Sensory: No sensory deficit.      Motor: Weakness (LUE 4/5, LLE 0/5) present.      Deep Tendon Reflexes: Reflexes normal.       Significant Labs: All pertinent labs within the past 24 hours have been reviewed.  BMP:   Recent Labs   Lab 08/06/22  0348   *      K 4.3      CO2 21*   BUN 7   CREATININE 0.7   CALCIUM 9.3   MG 1.9     CBC:   Recent Labs   Lab 08/05/22 0221 08/06/22  0348   WBC 5.41 10.33   HGB 10.6* 10.8*   HCT 34.3* 35.0*    288     CMP:   Recent Labs   Lab 08/05/22 0221 08/06/22  0348    140   K 3.4* 4.3    109   CO2 24 21*   GLU 99 132*   BUN 6 7   CREATININE 0.8 0.7   CALCIUM 9.3 9.3   PROT 6.8 6.7   ALBUMIN 3.7 3.5   BILITOT 0.2 0.2   ALKPHOS 84 81   AST 14 9*   ALT 9* 9*   ANIONGAP 8 10       Significant Imaging:       Assessment/Plan:      * Multiple sclerosis exacerbation  - CT head , MRI/ MRA brain without contrast neg for acute stroke   -Vascular Neurology was consulted in the ED and suggested pt's symptoms does not represent acute  stroke and recommended MRI brain with contrast, Neurology consult and high dose steroid   -Pt had received Solumedrol 1000 mg x 1 dose in the ED  -Await MRI brain with contrast and further recs from Neurology   8/6-  -Neurology consult obtained and suggested pulse steroid treatment x 4 more days . MRI brain, C/T spine with no evidence for active MS plaques or abnormal enhancement.         Left-sided weakness  - H/O Prior CVA and MS exacerbation with baseline left sided weakness   -Continue ASA, statin       Gait abnormality  - In the setting of left sided weakness   -Consult PT/OT      Hypertension  - Review home meds and resume as appropriate       Morbid obesity  Body mass index is 46.69 kg/m². Morbid obesity complicates all aspects of disease management from diagnostic modalities to treatment. Weight loss encouraged and health benefits explained to patient.           VTE Risk Mitigation (From admission, onward)         Ordered     enoxaparin injection 40 mg  Every 12 hours         08/05/22 0914     IP VTE HIGH RISK PATIENT  Once         08/05/22 0909     Place sequential compression device  Until discontinued         08/05/22 0909                Discharge Planning   ARACELI:      Code Status: Full Code   Is the patient medically ready for discharge?:     Reason for patient still in hospital (select all that apply): Patient trending condition                     Kole Carrasquillo MD  Department of Hospital Medicine   [facility] - Tele[facility] ([facility])

## 2022-08-06 NOTE — PT/OT/SLP EVAL
"Occupational Therapy   Evaluation    Name: Alicia Soliz  MRN: [id number]  Admitting Diagnosis:  Multiple sclerosis exacerbation  Recent Surgery: * No surgery found *      Recommendations:     Discharge Recommendations: outpatient OT (24/7 SPV at home)  Discharge Equipment Recommendations:  none  Barriers to discharge:  None    Assessment:     Alicia Soliz is a 42 y.o. female with a medical diagnosis of Multiple sclerosis exacerbation.  She presents with the following performance deficits affecting function: weakness, impaired endurance, impaired self care skills, impaired sensation, impaired functional mobility, impaired balance, pain, impaired fine motor, impaired coordination.      Rehab Prognosis: Good; patient would benefit from acute skilled OT services to address these deficits and reach maximum level of function.       Plan:     Patient to be seen 2 x/week to address the above listed problems via therapeutic activities, self-care/home management, therapeutic exercises, neuromuscular re-education  · Plan of Care Expires: 08/20/22  · Plan of Care Reviewed with: patient, daughter    Subjective     Chief Complaint: Reported "I have been through this before."  Patient/Family Comments/goals: get stronger and regain independence    Occupational Profile:  Living Environment: Patient resides in a [address] [facility] with no steps to enter, with her daughter.  Previous level of function: Patient was mod I with house hold ambulation via QC and ADLs via shower chair at baseline  Roles and Routines: She does not drive or work.  Equipment Used at Home:  cane, quad, rollator, wheelchair, walker, rolling, shower chair (electric scooter on order)  Assistance upon Discharge: daughters    Pain/Comfort:  · Pain Rating 1: 8/10  · Location - Side 1: Left  · Location 1:  (entire L side of body leg and arm included)  · Pain Addressed 1:  (activity pacing)    Objective:     Communicated with: NurseMignon, prior to session. "  Patient found supine with telemetry, peripheral IV upon OT entry to room.    General Precautions: Standard, fall   Orthopedic Precautions:N/A   Braces: N/A  Respiratory Status: Room air    Bed Mobility:    · Patient completed Supine to Sit with stand by assistance    Functional Mobility/Transfers:  · Patient completed Sit <> Stand Transfer with contact guard assistance  with  no assistive device   · Patient completed Bed <> Chair Transfer using Step Transfer technique with contact guard assistance with no assistive device    Activities of Daily Living:  · Grooming: minimum assistance .  · Upper Body Dressing: minimum assistance .    Cognitive/Visual Perceptual:  Cognitive/Psychosocial Skills:     -       Oriented to: Person, Place, Time and Situation   -       Follows Commands/attention:Follows multistep  commands    Physical Exam:  Balance:    -       sitting: good, static standing: fair, dynamic standing: fair  Sensation:    -       Impaired  light/touch L UE  Dominant hand:    -       right  Upper Extremity Range of Motion:     -       Right Upper Extremity: WFL  -       Left Upper Extremity: WFL  Upper Extremity Strength:    -       Right Upper Extremity: WFL  -       Left Upper Extremity: Deficits: grossly 4-/5   Strength:    -       Right Upper Extremity: WFL  -       Left Upper Extremity: Deficits: poor  Fine Motor Coordination:    -       Impaired  Left hand thumb/finger opposition skills poor    AMPAC 6 Click ADL:  AMPAC Total Score: 20    Treatment & Education:  Patient educated on role of OT in acute setting and benefits of participation. Educated on techniques to use to increase independence and decrease fall risk with functional transfers. Educated on importance of OOB activity and calling for A to transfer back to bed. Encouraged completion of L UE AROM therex throughout the day to tolerance to increase functional strength and activity tolerance. Patient stated understanding and in agreement with  POC.  Education:    Patient left up in chair with all lines intact, call button in reach, chair alarm on and nurse notified    GOALS:   Multidisciplinary Problems     Occupational Therapy Goals        Problem: Occupational Therapy    Goal Priority Disciplines Outcome Interventions   Occupational Therapy Goal     OT, PT/OT     Description: Goals to be met by: 8/20/22     Patient will increase functional independence with ADLs by performing:    Toileting from bedside commode with Supervision for hygiene and clothing management.   Toilet transfer to bedside commode with Supervision.  Increased functional strength in L UE grossly by 1/2 MM grade.                     History:     Past Medical History:   Diagnosis Date    Anemia     Arthritis     Cardiac arrest as complication of care     pt states she went into cardiac arrest from an allergic reaction to a medication    Depression     Encounter for blood transfusion     Hemiplegia due to old stroke     Hypertension     Morbid obesity with BMI of 45.0-49.9, adult 9/20/2018    Multiple sclerosis        Past Surgical History:   Procedure Laterality Date    APPENDECTOMY      CHOLECYSTECTOMY      COLONOSCOPY N/A 11/25/2020    Procedure: COLONOSCOPY;  Surgeon: Andrew Jenkins III, MD;  Location: [facility] ENDO;  Service: Endoscopy;  Laterality: N/A;    ESOPHAGOGASTRODUODENOSCOPY N/A 2/19/2020    Procedure: EGD (ESOPHAGOGASTRODUODENOSCOPY);  Surgeon: Michael Navarrete MD;  Location: [facility] ENDO;  Service: Endoscopy;  Laterality: N/A;    ESOPHAGOGASTRODUODENOSCOPY N/A 2/20/2020    Procedure: EGD (ESOPHAGOGASTRODUODENOSCOPY);  Surgeon: Michael Navarrete MD;  Location: [facility] OR;  Service: General;  Laterality: N/A;    ESOPHAGOGASTRODUODENOSCOPY N/A 11/25/2020    Procedure: EGD (ESOPHAGOGASTRODUODENOSCOPY);  Surgeon: Andrew Jenkins III, MD;  Location: [facility];  Service: Endoscopy;  Laterality: N/A;    HYSTERECTOMY      KNEE SURGERY      ROBOT-ASSISTED LAPAROSCOPIC SLEEVE  GASTRECTOMY USING DA ANTHONY XI N/A 2/20/2020    Procedure: XI ROBOTIC SLEEVE GASTRECTOMY;  Surgeon: Michael Navarrete MD;  Location: [facility] OR;  Service: General;  Laterality: N/A;    TENOPLASTY OF HAND Left 8/26/2021    Procedure: REPAIR, TENDON, HAND;  Surgeon: Joselito Lugo MD;  Location: [facility] OR;  Service: Orthopedics;  Laterality: Left;  Left RCL PIP Joint Repair/Recon with Arthrex Internal Brace.    TONSILLECTOMY      TUBAL LIGATION         Time Tracking:     OT Date of Treatment: 08/06/22  OT Start Time: 0945  OT Stop Time: 1010  OT Total Time (min): 25 min    Billable Minutes:Evaluation 15  Therapeutic Activity 10    8/6/2022

## 2022-08-06 NOTE — ASSESSMENT & PLAN NOTE
Body mass index is 46.69 kg/m². Morbid obesity complicates all aspects of disease management from diagnostic modalities to treatment. Weight loss encouraged and health benefits explained to patient.

## 2022-08-06 NOTE — PLAN OF CARE
P.T. EVAL COMPLETE.  PT CURRENTLY REQUIRES SBA FOR BED MOBILITY, CGA FOR TF'S.  P.T. RECOMMENDS OUTPATIENT PT AT D/C

## 2022-08-09 ENCOUNTER — PATIENT OUTREACH (OUTPATIENT)
Dept: ADMINISTRATIVE | Facility: CLINIC | Age: 42
End: 2022-08-09
Payer: MEDICARE

## 2022-08-10 ENCOUNTER — PATIENT OUTREACH (OUTPATIENT)
Dept: ADMINISTRATIVE | Facility: HOSPITAL | Age: 42
End: 2022-08-10
Payer: MEDICARE

## 2022-08-10 ENCOUNTER — PATIENT MESSAGE (OUTPATIENT)
Dept: NEUROLOGY | Facility: CLINIC | Age: 42
End: 2022-08-10
Payer: MEDICARE

## 2022-08-10 NOTE — PROGRESS NOTES
Called patient to confirm hospital follow up scheduled on 8/11/2022.   Patient is not confirmed. LM to confirm appt.

## 2022-08-10 NOTE — DISCHARGE SUMMARY
[facility] - Telemetry ([facility] Medicine  Discharge Summary      Patient Name: Alicia Soliz  MRN: [id number]  Patient Class: IP- Inpatient  Admission Date: 8/5/2022  Hospital Length of Stay: 1 days  Discharge Date and Time: 8/6/2022  4:11 PM  Attending Physician: No att. providers found   Discharging Provider: Kole Carrasquillo MD  Primary Care Provider: Braden Dumont MD      HPI:   The pt is a 43 yo female with a PMHx of anemia, arthritis, cardiac arrest, left hemiplegia/ hemiparesis  due to old stroke, HTN, morbid obesity, and multiple sclerosis on Ocrelizumab IV  -last dose  3/2022 , next dose rescheduled for 8/2022 due to recent breast reduction surgery who presented to the ED  for evaluation of left sided weakness  associated with left sided facial droop which worsened acutely since last night. Per pt she  has baseline left sided weakness from a prior stroke. Denies associated speech difficulty, headaches, chest pain, SOB, palpitations, cough , chest congestion, fever , chills, night sweats , nausea, vomiting, urinary urgency or frequency. Reports baseline visual disturbance from optic nerve involvement due to MS and occasional bladder incontinence and constipation.     Vascular Neurology was consulted in the ED , pt underwent CT head , MRI and MRA head. Acute stroke ruled out. Vascular Neurology felt pt's symptoms does not represent an acute stroke. More consistent with MS flares in the past. Recommends admission and treated with high dose steroids, MRI brain with contrast  and Neurology consult. Pt had received Solumedrol 1000 mg in the ED at 0243 on 8/5/22.      consulted for admission.       * No surgery found *      Hospital Course:   8/6- Remains with LLE total weakness with 0/5 muscle power during my exam. Left upper ext 4/5 muscle power with weak  strength. Neurology consult obtained yesterday and suggested pulse steroid treatment x 4 more days . MRI brain, C/T spine with no  "evidence for active MS plaques or abnormal enhancement.     Upon reviewing MRI brain, C and T spine w/contrat with Neurology today, it was felt that pseudoexacerbation is a consideration but less likely given absence of metabolic derangements/ UTI. Per PT evaluation today,  Muscle power- GROSSLY 3+/5 AT HIP AND KNEE, 3-/5 AT ANKLE. Per Neurology evaluation today" patient has antigravity strength in the left upper and lower extremities but is unable to lift up the extremities as strongly as on the right. No facial droop or dysarthria noted. She endorses left facial numbness, she reports that the left sided weakness has not returned to baseline". It is recommended per Neurology that Pt does not meet the criteria for pulse dose steroid and cleared pt for discharge with out-patient vs inpatient PT. Pt opted out-patient PT and wished to be discharged to home as she has lot of assistance from her family.     At this point, pt deemed stable for discharge to home to family care and follow up with primary Neurologist Dr. Browne and encourage to schedule  her next Ocrevus.           Goals of Care Treatment Preferences:  Code Status: Full Code      Consults:   Consults (From admission, onward)        Status Ordering Provider     IP consult to case management  Once        Provider:  (Not yet assigned)    Completed FORTINO DILL     Inpatient consult to Telemedicine-General Neurology  Once        Provider:  Donna Gupta MD    Completed FORTINO DILL          No new Assessment & Plan notes have been filed under this hospital service since the last note was generated.  Service: Hospital Medicine    Final Active Diagnoses:    Diagnosis Date Noted POA    PRINCIPAL PROBLEM:  Multiple sclerosis exacerbation [G35] 09/24/2021 Yes    Left-sided weakness [R53.1] 08/05/2022 Yes    Gait abnormality [R26.9] 08/11/2020 Yes    Hypertension [I10] 06/24/2014 Yes    Morbid obesity [E66.01] 03/23/2021 Yes      Problems Resolved " During this Admission:       Discharged Condition: stable    Disposition: Home or Self Care    Follow Up:   Follow-up Information     Braden Dumont MD. Schedule an appointment as soon as possible for a visit in 3 day(s).    Specialty: Internal Medicine  Why: Discharge follow up  Contact information:  [address] [place] [address]  [phone]             Primary Neurologist. Schedule an appointment as soon as possible for a visit in 1 week(s).    Why: Discharge follow up with Primary Neurologist                     Patient Instructions:      Ambulatory referral/consult to Physical/Occupational Therapy   Standing Status: Future   Referral Priority: Routine Referral Type: Physical Medicine   Referral Reason: Specialty Services Required   Number of Visits Requested: 1     Ambulatory referral/consult to [facility]   Standing Status: Future   Referral Priority: Routine Referral Type: Consultation   Referral Reason: Specialty Services Required   Number of Visits Requested: 1     Diet Adult Regular     Activity as tolerated       Significant Diagnostic Studies: Labs: BMP: No results for input(s): GLU, NA, K, CL, CO2, BUN, CREATININE, CALCIUM, MG in the last 48 hours., CMP No results for input(s): NA, K, CL, CO2, GLU, BUN, CREATININE, CALCIUM, PROT, ALBUMIN, BILITOT, ALKPHOS, AST, ALT, ANIONGAP, ESTGFRAFRICA, EGFRNONAA in the last 48 hours. and CBC No results for input(s): WBC, HGB, HCT, PLT in the last 48 hours.    Pending Diagnostic Studies:     None         Medications:  Reconciled Home Medications:      Medication List      START taking these medications    aspirin 81 MG Chew  Take 1 tablet (81 mg total) by mouth once daily.     atorvastatin 40 MG tablet  Commonly known as: LIPITOR  Take 1 tablet (40 mg total) by mouth every evening.        CHANGE how you take these medications    diclofenac sodium 1 % Gel  Commonly known as: VOLTAREN  APPLY 2 GRAMS TO AFFECTED AREA 4 TIMES A DAY  What  changed: See the new instructions.     lactulose 20 gram/30 mL Soln  Commonly known as: CHRONULAC  Take 15 mLs (10 g total) by mouth 3 (three) times daily.  What changed:   · when to take this  · reasons to take this        CONTINUE taking these medications    cyclobenzaprine 10 MG tablet  Commonly known as: FLEXERIL  Take 10 mg by mouth 3 (three) times daily as needed.     doxepin 75 MG capsule  Commonly known as: SINEQUAN  Take 1 capsule (75 mg total) by mouth every evening.     DULoxetine 60 MG capsule  Commonly known as: CYMBALTA  TAKE 1 CAPSULE BY MOUTH EVERY DAY     esomeprazole 40 MG capsule  Commonly known as: NEXIUM  TAKE 1 CAPSULE (40 MG TOTAL) BY MOUTH BEFORE BREAKFAST.     ferrous sulfate 325 (65 FE) MG EC tablet  Take 1 tablet (325 mg total) by mouth every Mon, Wed, Fri.     gabapentin 300 MG capsule  Commonly known as: NEURONTIN  600mg at morning and afternoon. 900mg at night.     HYDROcodone-acetaminophen  mg per tablet  Commonly known as: NORCO  Take 1 tablet by mouth every 8 (eight) hours as needed.     LINZESS 145 mcg Cap capsule  Generic drug: linaCLOtide  TAKE 1 CAPSULE (145 MCG TOTAL) BY MOUTH BEFORE BREAKFAST.     mupirocin 2 % ointment  Commonly known as: BACTROBAN  Apply topically 2 (two) times daily.     OCREVUS IV  Inject into the vein.     ondansetron 8 MG Tbdl  Commonly known as: ZOFRAN-ODT  Take 8 mg by mouth every 6 (six) hours as needed.     promethazine 25 MG tablet  Commonly known as: PHENERGAN  Take 1 tablet (25 mg total) by mouth every 4 (four) hours.     sumatriptan 100 MG tablet  Commonly known as: IMITREX  TAKE 1 TAB AT ONSET OF HEADACH MAY TAKE 2ND TAB 2 HOURS LATER IF NO RELIEF MAX 6 TABS A WEEK     topiramate 50 MG tablet  Commonly known as: TOPAMAX  TAKE 1 TABLET BY MOUTH IN THE MORNING AND 2 TABLETS AT BEDTIME     triamcinolone acetonide 0.1% 0.1 % ointment  Commonly known as: KENALOG  Apply topically 2 (two) times daily.     valsartan 80 MG tablet  Commonly known as:  DIOVAN  Take 80 mg by mouth once daily.            Indwelling Lines/Drains at time of discharge:   Lines/Drains/Airways     None                 Time spent on the discharge of patient: 30  minutes         Kole Carrasquillo MD  Department of Hospital Medicine  [facility] - Telemetry ([facility])

## 2022-08-11 ENCOUNTER — PATIENT MESSAGE (OUTPATIENT)
Dept: NEUROLOGY | Facility: CLINIC | Age: 42
End: 2022-08-11
Payer: MEDICARE

## 2022-08-12 ENCOUNTER — PATIENT MESSAGE (OUTPATIENT)
Dept: INTERNAL MEDICINE | Facility: CLINIC | Age: 42
End: 2022-08-12
Payer: MEDICARE

## 2022-08-13 NOTE — PROGRESS NOTES
"Subjective:          Patient ID: Alicia Soliz is a 42 y.o. female who presents today for a routine virtual visit for MS.  She was last seen by Dr. Pereira in June 2022. The history has been provided by the patient. She is 21 minutes late for her appt today.     The patient location is: her home   The chief complaint leading to consultation is: MS     Visit type: audiovisual    Face to Face time with patient: 13 minutes   25 minutes of total time spent on the encounter, which includes face to face time and non-face to face time preparing to see the patient (eg, review of tests), Obtaining and/or reviewing separately obtained history, Documenting clinical information in the electronic or other health record, Independently interpreting results (not separately reported) and communicating results to the patient/family/caregiver, or Care coordination (not separately reported).     Each patient to whom he or she provides medical services by telemedicine is:  (1) informed of the relationship between the physician and patient and the respective role of any other health care provider with respect to management of the patient; and (2) notified that he or she may decline to receive medical services by telemedicine and may withdraw from such care at any time.    MS HPI:  · DMT: ocrelizumab; was due for infusion in July   · Side effects from DMT? No  · Taking vitamin D3 as recommended? Yes  · She had breast reduction in July. She had her post-op visit last week.   · She was recently admitted to the hospital with signs of pseudoexacerbation. She had left facial droop that lasted for 5-10 minutes. She was told that it might have been a "mini-stroke." She sees her PCP tomorrow.   · She had a fall in late July when her left leg went "noodle."   · She is planning to return to PT soon.   · She has received her new walker. She is using her cane around the house and the walker when she goes out.   · She is feeling much better this " week and back to baseline. Her stress level has been controlled. She has a good support system. She has a yeast infection and will ask her PCP for Diflucan tomorrow.     Medications:    Current Outpatient Medications   Medication Sig    aspirin 81 MG Chew Take 1 tablet (81 mg total) by mouth once daily.    atorvastatin (LIPITOR) 40 MG tablet Take 1 tablet (40 mg total) by mouth every evening.    cyclobenzaprine (FLEXERIL) 10 MG tablet Take 10 mg by mouth 3 (three) times daily as needed.    diclofenac sodium (VOLTAREN) 1 % Gel APPLY 2 GRAMS TO AFFECTED AREA 4 TIMES A DAY (Patient taking differently: QID prn)    doxepin (SINEQUAN) 75 MG capsule Take 1 capsule (75 mg total) by mouth every evening.    DULoxetine (CYMBALTA) 60 MG capsule TAKE 1 CAPSULE BY MOUTH EVERY DAY    esomeprazole (NEXIUM) 40 MG capsule TAKE 1 CAPSULE (40 MG TOTAL) BY MOUTH BEFORE BREAKFAST.    ferrous sulfate 325 (65 FE) MG EC tablet Take 1 tablet (325 mg total) by mouth every Mon, Wed, Fri.    gabapentin (NEURONTIN) 300 MG capsule 600mg at morning and afternoon. 900mg at night.    HYDROcodone-acetaminophen (NORCO)  mg per tablet Take 1 tablet by mouth every 8 (eight) hours as needed.    lactulose (CHRONULAC) 20 gram/30 mL Soln Take 15 mLs (10 g total) by mouth 3 (three) times daily. (Patient taking differently: Take 10 g by mouth 3 (three) times daily as needed.)    LINZESS 145 mcg Cap capsule TAKE 1 CAPSULE (145 MCG TOTAL) BY MOUTH BEFORE BREAKFAST.    mupirocin (BACTROBAN) 2 % ointment Apply topically 2 (two) times daily.    ocrelizumab (OCREVUS IV) Inject into the vein.    ondansetron (ZOFRAN-ODT) 8 MG TbDL Take 8 mg by mouth every 6 (six) hours as needed.    promethazine (PHENERGAN) 25 MG tablet Take 1 tablet (25 mg total) by mouth every 4 (four) hours.    sumatriptan (IMITREX) 100 MG tablet TAKE 1 TAB AT ONSET OF HEADACH MAY TAKE 2ND TAB 2 HOURS LATER IF NO RELIEF MAX 6 TABS A WEEK    topiramate (TOPAMAX) 50 MG tablet  TAKE 1 TABLET BY MOUTH IN THE MORNING AND 2 TABLETS AT BEDTIME    triamcinolone acetonide 0.1% (KENALOG) 0.1 % ointment Apply topically 2 (two) times daily.    valsartan (DIOVAN) 80 MG tablet Take 80 mg by mouth once daily.         SOCIAL HISTORY  Social History     Tobacco Use    Smoking status: Never Smoker    Smokeless tobacco: Never Used   Substance Use Topics    Alcohol use: Yes     Comment: once a year     Drug use: Never       Living arrangements - the patient lives with her children           Objective:        1. 25 foot timed walk:  Timed 25 Foot Walk: 11/3/2020   Did patient wear an AFO? No   Was assistive device used? Yes   Assistive device used (milton one): Unilateral Assistance   Unilateral device used Cane   Time for 25 Foot Walk (seconds) 38.83   Time for 25 Foot Walk (seconds) 46.5       Neurologic Exam    Deferred   Imaging:       Results for orders placed during the hospital encounter of 05/16/22    MRI Brain Demyelinating W W/O Contrast    Impression  No abnormal enhancement as may occur with active demyelinating disease.      Electronically signed by: Jae Garnett Jr., MD  Date:    05/17/2022  Time:    08:29    Results for orders placed during the hospital encounter of 05/16/22    MRI Cervical Spine Demyelinating W W/O Contrast    Impression  1. No abnormal cord signal or findings to suggest demyelinating disease within the cervical spinal cord.  No abnormal enhancement.  2. Minimal degenerative change as described above without significant spinal or foraminal stenosis.  These findings are unchanged.      Electronically signed by: Jae Garnett Jr., MD  Date:    05/17/2022  Time:    09:03    Results for orders placed during the hospital encounter of 05/16/22    MRI Thoracic Spine Demyelinating W W/O Contrast    Impression  1. No abnormal cord signal or abnormal enhancement.  No findings to suggest demyelinating disease within the thoracic cord.  2. Right-sided foraminal stenosis at T2-T3,  T3-T4, T4-T5, and T5-T6 could result in radiculopathy.      Electronically signed by: Jae Garnett Jr., MD  Date:    05/17/2022  Time:    08:59        Labs:     Lab Results   Component Value Date    YWHZRGYR87BW 34 07/30/2021    OIUUNHYL01AO 32 01/10/2020    PMCSTMML80BN 11 (L) 01/30/2015     Lab Results   Component Value Date    WBC 10.33 08/06/2022    RBC 5.07 08/06/2022    HGB 10.8 (L) 08/06/2022    HCT 35.0 (L) 08/06/2022    MCV 69 (L) 08/06/2022    MCH 21.3 (L) 08/06/2022    MCHC 30.9 (L) 08/06/2022    RDW 15.6 (H) 08/06/2022     08/06/2022    MPV 10.3 08/06/2022    GRAN 8.7 (H) 08/06/2022    GRAN 84.1 (H) 08/06/2022    LYMPH 1.3 08/06/2022    LYMPH 13.0 (L) 08/06/2022    MONO 0.3 08/06/2022    MONO 2.4 (L) 08/06/2022    EOS 0.0 08/06/2022    BASO 0.00 08/06/2022    EOSINOPHIL 0.0 08/06/2022    BASOPHIL 0.0 08/06/2022     Sodium   Date Value Ref Range Status   08/06/2022 140 136 - 145 mmol/L Final     Potassium   Date Value Ref Range Status   08/06/2022 4.3 3.5 - 5.1 mmol/L Final     Chloride   Date Value Ref Range Status   08/06/2022 109 95 - 110 mmol/L Final     CO2   Date Value Ref Range Status   08/06/2022 21 (L) 23 - 29 mmol/L Final     Glucose   Date Value Ref Range Status   08/06/2022 132 (H) 70 - 110 mg/dL Final     BUN   Date Value Ref Range Status   08/06/2022 7 6 - 20 mg/dL Final     Creatinine   Date Value Ref Range Status   08/06/2022 0.7 0.5 - 1.4 mg/dL Final     Calcium   Date Value Ref Range Status   08/06/2022 9.3 8.7 - 10.5 mg/dL Final     Total Protein   Date Value Ref Range Status   08/06/2022 6.7 6.0 - 8.4 g/dL Final     Albumin   Date Value Ref Range Status   08/06/2022 3.5 3.5 - 5.2 g/dL Final     Total Bilirubin   Date Value Ref Range Status   08/06/2022 0.2 0.1 - 1.0 mg/dL Final     Comment:     For infants and newborns, interpretation of results should be based  on gestational age, weight and in agreement with clinical  observations.    Premature Infant recommended reference  ranges:  Up to 24 hours.............<8.0 mg/dL  Up to 48 hours............<12.0 mg/dL  3-5 days..................<15.0 mg/dL  6-29 days.................<15.0 mg/dL       Alkaline Phosphatase   Date Value Ref Range Status   08/06/2022 81 55 - 135 U/L Final     AST   Date Value Ref Range Status   08/06/2022 9 (L) 10 - 40 U/L Final     ALT   Date Value Ref Range Status   08/06/2022 9 (L) 10 - 44 U/L Final     Anion Gap   Date Value Ref Range Status   08/06/2022 10 8 - 16 mmol/L Final     eGFR if    Date Value Ref Range Status   05/16/2022 >60 >60 mL/min/1.73 m^2 Final     eGFR if non    Date Value Ref Range Status   05/16/2022 >60 >60 mL/min/1.73 m^2 Final     Comment:     Calculation used to obtain the estimated glomerular filtration  rate (eGFR) is the CKD-EPI equation.        Lab Results   Component Value Date    HEPBSAG Negative 06/06/2022    HEPBSAB Positive 06/06/2022    HEPBCAB Negative 06/06/2022     MS Impression and Plan:     NEURO MULTIPLE SCLEROSIS IMPRESSION:   MS Status:     Number of relapses in the past year? comment:  She had pseudorelapse recently (not sure of trigger though).     Clinical Progression:  Clinically Stable    Clinical Progression comment:  She is feeling back to baseline.     MRI Progression:  Stable  Plan:     DMT:  No change in management    DMT comment:  Continue Ocrevus and Vitamin D. She is 1 month post-op, and I think she can proceed with her infusion now. Her last CBC showed an elevated ANC, and this may have been from steroids received in the hospital. We will check this to make sure CBC is normal.     Symptom Management:  No change in symptom management         She will not need MRIs until next summer.     She will follow up with Dr. Garrett in 4 months.     HAMIDA Vizcarra, CNS    Problem List Items Addressed This Visit        Neurologic Problems    Multiple sclerosis - Primary    Relevant Orders    CBC auto differential      Other Visit  Diagnoses     Counseling regarding goals of care        Prophylactic immunotherapy        High risk medication use

## 2022-08-15 ENCOUNTER — OFFICE VISIT (OUTPATIENT)
Dept: NEUROLOGY | Facility: CLINIC | Age: 42
End: 2022-08-15
Payer: MEDICARE

## 2022-08-15 DIAGNOSIS — G35 MULTIPLE SCLEROSIS: Primary | ICD-10-CM

## 2022-08-15 DIAGNOSIS — Z29.89 PROPHYLACTIC IMMUNOTHERAPY: ICD-10-CM

## 2022-08-15 DIAGNOSIS — Z71.89 COUNSELING REGARDING GOALS OF CARE: ICD-10-CM

## 2022-08-15 DIAGNOSIS — Z79.899 HIGH RISK MEDICATION USE: ICD-10-CM

## 2022-08-15 PROCEDURE — 99213 OFFICE O/P EST LOW 20 MIN: CPT | Mod: 95,,, | Performed by: CLINICAL NURSE SPECIALIST

## 2022-08-15 PROCEDURE — 3044F HG A1C LEVEL LT 7.0%: CPT | Mod: CPTII,95,, | Performed by: CLINICAL NURSE SPECIALIST

## 2022-08-15 PROCEDURE — 4010F ACE/ARB THERAPY RXD/TAKEN: CPT | Mod: CPTII,95,, | Performed by: CLINICAL NURSE SPECIALIST

## 2022-08-15 PROCEDURE — 1159F MED LIST DOCD IN RCRD: CPT | Mod: CPTII,95,, | Performed by: CLINICAL NURSE SPECIALIST

## 2022-08-15 PROCEDURE — 99213 PR OFFICE/OUTPT VISIT, EST, LEVL III, 20-29 MIN: ICD-10-PCS | Mod: 95,,, | Performed by: CLINICAL NURSE SPECIALIST

## 2022-08-15 PROCEDURE — 3044F PR MOST RECENT HEMOGLOBIN A1C LEVEL <7.0%: ICD-10-PCS | Mod: CPTII,95,, | Performed by: CLINICAL NURSE SPECIALIST

## 2022-08-15 PROCEDURE — 1111F DSCHRG MED/CURRENT MED MERGE: CPT | Mod: CPTII,95,, | Performed by: CLINICAL NURSE SPECIALIST

## 2022-08-15 PROCEDURE — 4010F PR ACE/ARB THEARPY RXD/TAKEN: ICD-10-PCS | Mod: CPTII,95,, | Performed by: CLINICAL NURSE SPECIALIST

## 2022-08-15 PROCEDURE — 1159F PR MEDICATION LIST DOCUMENTED IN MEDICAL RECORD: ICD-10-PCS | Mod: CPTII,95,, | Performed by: CLINICAL NURSE SPECIALIST

## 2022-08-15 PROCEDURE — 1111F PR DISCHARGE MEDS RECONCILED W/ CURRENT OUTPATIENT MED LIST: ICD-10-PCS | Mod: CPTII,95,, | Performed by: CLINICAL NURSE SPECIALIST

## 2022-08-16 ENCOUNTER — OFFICE VISIT (OUTPATIENT)
Dept: INTERNAL MEDICINE | Facility: CLINIC | Age: 42
End: 2022-08-16
Payer: MEDICARE

## 2022-08-16 ENCOUNTER — LAB VISIT (OUTPATIENT)
Dept: LAB | Facility: HOSPITAL | Age: 42
End: 2022-08-16
Attending: CLINICAL NURSE SPECIALIST
Payer: MEDICARE

## 2022-08-16 ENCOUNTER — PATIENT MESSAGE (OUTPATIENT)
Dept: INTERNAL MEDICINE | Facility: CLINIC | Age: 42
End: 2022-08-16

## 2022-08-16 DIAGNOSIS — Z09 HOSPITAL DISCHARGE FOLLOW-UP: Primary | ICD-10-CM

## 2022-08-16 DIAGNOSIS — G35 MS (MULTIPLE SCLEROSIS): ICD-10-CM

## 2022-08-16 DIAGNOSIS — G35 MULTIPLE SCLEROSIS: ICD-10-CM

## 2022-08-16 PROCEDURE — 4010F PR ACE/ARB THEARPY RXD/TAKEN: ICD-10-PCS | Mod: CPTII,95,, | Performed by: FAMILY MEDICINE

## 2022-08-16 PROCEDURE — 3044F HG A1C LEVEL LT 7.0%: CPT | Mod: CPTII,95,, | Performed by: FAMILY MEDICINE

## 2022-08-16 PROCEDURE — 99214 PR OFFICE/OUTPT VISIT, EST, LEVL IV, 30-39 MIN: ICD-10-PCS | Mod: 95,,, | Performed by: FAMILY MEDICINE

## 2022-08-16 PROCEDURE — 85025 COMPLETE CBC W/AUTO DIFF WBC: CPT | Performed by: CLINICAL NURSE SPECIALIST

## 2022-08-16 PROCEDURE — 36415 COLL VENOUS BLD VENIPUNCTURE: CPT | Mod: PO | Performed by: CLINICAL NURSE SPECIALIST

## 2022-08-16 PROCEDURE — 99214 OFFICE O/P EST MOD 30 MIN: CPT | Mod: 95,,, | Performed by: FAMILY MEDICINE

## 2022-08-16 PROCEDURE — 4010F ACE/ARB THERAPY RXD/TAKEN: CPT | Mod: CPTII,95,, | Performed by: FAMILY MEDICINE

## 2022-08-16 PROCEDURE — 3044F PR MOST RECENT HEMOGLOBIN A1C LEVEL <7.0%: ICD-10-PCS | Mod: CPTII,95,, | Performed by: FAMILY MEDICINE

## 2022-08-16 RX ORDER — FLUCONAZOLE 150 MG/1
TABLET ORAL
Qty: 2 TABLET | Refills: 0 | Status: SHIPPED | OUTPATIENT
Start: 2022-08-16 | End: 2023-01-11

## 2022-08-16 RX ORDER — CARBOXYMETHYLCELLULOSE/CITRIC 0.75 G
3 CAPSULE ORAL
Qty: 168 CAPSULE | Refills: 11 | OUTPATIENT
Start: 2022-08-16 | End: 2022-09-13

## 2022-08-16 NOTE — PROGRESS NOTES
The patient location is: [place]  The chief complaint leading to consultation is: hospital discharge follow up  Visit type: audiovisual    Face to Face time with patient: 7 minutes  10 minutes of total time spent on the encounter, which includes face to face time and non-face to face time preparing to see the patient (eg, review of tests), Obtaining and/or reviewing separately obtained history, Documenting clinical information in the electronic or other health record, Independently interpreting results (not separately reported) and communicating results to the patient/family/caregiver, or Care coordination (not separately reported).         Each patient to whom he or she provides medical services by telemedicine is:  (1) informed of the relationship between the physician and patient and the respective role of any other health care provider with respect to management of the patient; and (2) notified that he or she may decline to receive medical services by telemedicine and may withdraw from such care at any time.    Notes:         Subjective:      Patient ID: Alicia Soliz is a 42 y.o. female.    Chief Complaint: No chief complaint on file.      Patient here today for hospital discharge follow up. She was inpatient at [facility] from 8/5 - 8/6/22   For MS exacerbation.   Reports she had developed numbness in her face, daughter noticed facial drooping which prompted her to go to ER, Imaging negative for any acute changes.   She reports she is doing good currently, had virual visit with her neurologist yesterday.   Requesting diflucan called in, no other new problems.     Review of Systems   Constitutional: Negative for activity change and unexpected weight change.   HENT: Negative for hearing loss, rhinorrhea and trouble swallowing.    Eyes: Negative for discharge and visual disturbance.   Respiratory: Negative for chest tightness and wheezing.    Cardiovascular: Negative for chest pain and palpitations.    Gastrointestinal: Negative for blood in stool, constipation, diarrhea and vomiting.   Endocrine: Negative for polydipsia and polyuria.   Genitourinary: Negative for difficulty urinating, dysuria, hematuria and menstrual problem.   Musculoskeletal: Negative for arthralgias, joint swelling and neck pain.   Neurological: Negative for weakness and headaches.   Psychiatric/Behavioral: Negative for confusion and dysphoric mood.     Past Medical History:   Diagnosis Date    Anemia     Arthritis     Cardiac arrest as complication of care     pt states she went into cardiac arrest from an allergic reaction to a medication    Depression     Encounter for blood transfusion     Hemiplegia due to old stroke     Hypertension     Morbid obesity with BMI of 45.0-49.9, adult 9/20/2018    Multiple sclerosis           Past Surgical History:   Procedure Laterality Date    APPENDECTOMY      CHOLECYSTECTOMY      COLONOSCOPY N/A 11/25/2020    Procedure: COLONOSCOPY;  Surgeon: Andrew Jenkins III, MD;  Location: [facility];  Service: Endoscopy;  Laterality: N/A;    ESOPHAGOGASTRODUODENOSCOPY N/A 2/19/2020    Procedure: EGD (ESOPHAGOGASTRODUODENOSCOPY);  Surgeon: Michael Navarrete MD;  Location: [facility];  Service: Endoscopy;  Laterality: N/A;    ESOPHAGOGASTRODUODENOSCOPY N/A 2/20/2020    Procedure: EGD (ESOPHAGOGASTRODUODENOSCOPY);  Surgeon: Michael Navarrete MD;  Location: [facility];  Service: General;  Laterality: N/A;    ESOPHAGOGASTRODUODENOSCOPY N/A 11/25/2020    Procedure: EGD (ESOPHAGOGASTRODUODENOSCOPY);  Surgeon: Andrew Jenkins III, MD;  Location: [facility];  Service: Endoscopy;  Laterality: N/A;    HYSTERECTOMY      KNEE SURGERY      ROBOT-ASSISTED LAPAROSCOPIC SLEEVE GASTRECTOMY USING DA ANTHONY XI N/A 2/20/2020    Procedure: XI ROBOTIC SLEEVE GASTRECTOMY;  Surgeon: Michael Navarrete MD;  Location: [facility];  Service: General;  Laterality: N/A;    TENOPLASTY OF HAND Left 8/26/2021    Procedure: REPAIR, TENDON, HAND;  Surgeon:  Joselito Lugo MD;  Location: [facility];  Service: Orthopedics;  Laterality: Left;  Left RCL PIP Joint Repair/Recon with Arthrex Internal Brace.    TONSILLECTOMY      TUBAL LIGATION       Family History   Problem Relation Age of Onset    Lupus Mother     Heart disease Mother     Hypertension Mother     Diabetes Father     Kidney disease Father     Cancer Maternal Aunt 40        breast    Breast cancer Maternal Aunt     Diabetes Maternal Aunt     COPD Maternal Aunt     Breast cancer Maternal Cousin     Ovarian cancer Maternal Cousin      Social History     Socioeconomic History    Marital status: Single    Number of children: 3   Occupational History     Employer: [employer]   Tobacco Use    Smoking status: Never Smoker    Smokeless tobacco: Never Used   Substance and Sexual Activity    Alcohol use: Yes     Comment: once a year     Drug use: Never    Sexual activity: Yes     Partners: Male     Birth control/protection: Surgical     Social Determinants of Health     Financial Resource Strain: Low Risk     Difficulty of Paying Living Expenses: Not very hard   Food Insecurity: No Food Insecurity    Worried About Running Out of Food in the Last Year: Never true    Ran Out of Food in the Last Year: Never true   Transportation Needs: Unmet Transportation Needs    Lack of Transportation (Medical): Yes    Lack of Transportation (Non-Medical): Yes   Physical Activity: Unknown    Days of Exercise per Week: Patient refused    Minutes of Exercise per Session: 0 min   Stress: No Stress Concern Present    Feeling of Stress : Not at all   Social Connections: Unknown    Frequency of Communication with Friends and Family: More than three times a week    Frequency of Social Gatherings with Friends and Family: Twice a week    Active Member of Clubs or Organizations: Yes    Attends Club or Organization Meetings: 1 to 4 times per year    Marital Status:    Housing Stability: Unknown    Unable to  "Pay for Housing in the Last Year: Patient refused    Number of Places Lived in the Last Year: 0    Unstable Housing in the Last Year: No     Review of patient's allergies indicates:   Allergen Reactions    Demerol [meperidine] Anaphylaxis     Other reaction(s): Itching    Dilaudid [hydromorphone (bulk)] Anaphylaxis     "coded" per pt  Patient can tolerate Lortab    Shellfish containing products Itching, Nausea And Vomiting and Swelling    Prednisone Itching     Other reaction(s): Itching    Tramadol      shaking       Objective:       There were no vitals taken for this visit.  Physical Exam  Constitutional:       General: She is not in acute distress.     Appearance: Normal appearance. She is well-developed. She is not ill-appearing or diaphoretic.   Pulmonary:      Effort: No respiratory distress.   Neurological:      Mental Status: She is alert and oriented to person, place, and time.   Psychiatric:         Mood and Affect: Mood normal.         Behavior: Behavior normal.         Thought Content: Thought content normal.         Judgment: Judgment normal.       Assessment:     1. Hospital discharge follow-up    2. MS (multiple sclerosis)      Plan:   Hospital discharge follow-up    MS (multiple sclerosis)    Other orders  -     fluconazole (DIFLUCAN) 150 MG Tab; Take first tablet today, then repeat in 3 days.  Dispense: 2 tablet; Refill: 0    Continue all other current medications.     Medication List with Changes/Refills   New Medications    FLUCONAZOLE (DIFLUCAN) 150 MG TAB    Take first tablet today, then repeat in 3 days.   Current Medications    ASPIRIN 81 MG CHEW    Take 1 tablet (81 mg total) by mouth once daily.    ATORVASTATIN (LIPITOR) 40 MG TABLET    Take 1 tablet (40 mg total) by mouth every evening.    CYCLOBENZAPRINE (FLEXERIL) 10 MG TABLET    Take 10 mg by mouth 3 (three) times daily as needed.    DICLOFENAC SODIUM (VOLTAREN) 1 % GEL    APPLY 2 GRAMS TO AFFECTED AREA 4 TIMES A DAY    DOXEPIN " (SINEQUAN) 75 MG CAPSULE    Take 1 capsule (75 mg total) by mouth every evening.    DULOXETINE (CYMBALTA) 60 MG CAPSULE    TAKE 1 CAPSULE BY MOUTH EVERY DAY    ESOMEPRAZOLE (NEXIUM) 40 MG CAPSULE    TAKE 1 CAPSULE (40 MG TOTAL) BY MOUTH BEFORE BREAKFAST.    FERROUS SULFATE 325 (65 FE) MG EC TABLET    Take 1 tablet (325 mg total) by mouth every Mon, Wed, Fri.    GABAPENTIN (NEURONTIN) 300 MG CAPSULE    600mg at morning and afternoon. 900mg at night.    LACTULOSE (CHRONULAC) 20 GRAM/30 ML SOLN    Take 15 mLs (10 g total) by mouth 3 (three) times daily.    LINZESS 145 MCG CAP CAPSULE    TAKE 1 CAPSULE (145 MCG TOTAL) BY MOUTH BEFORE BREAKFAST.    MUPIROCIN (BACTROBAN) 2 % OINTMENT    Apply topically 2 (two) times daily.    OCRELIZUMAB (OCREVUS IV)    Inject into the vein.    ONDANSETRON (ZOFRAN-ODT) 8 MG TBDL    Take 8 mg by mouth every 6 (six) hours as needed.    PROMETHAZINE (PHENERGAN) 25 MG TABLET    Take 1 tablet (25 mg total) by mouth every 4 (four) hours.    SUMATRIPTAN (IMITREX) 100 MG TABLET    TAKE 1 TAB AT ONSET OF HEADACH MAY TAKE 2ND TAB 2 HOURS LATER IF NO RELIEF MAX 6 TABS A WEEK    TOPIRAMATE (TOPAMAX) 50 MG TABLET    TAKE 1 TABLET BY MOUTH IN THE MORNING AND 2 TABLETS AT BEDTIME    TRIAMCINOLONE ACETONIDE 0.1% (KENALOG) 0.1 % OINTMENT    Apply topically 2 (two) times daily.    VALSARTAN (DIOVAN) 80 MG TABLET    Take 80 mg by mouth once daily.

## 2022-08-17 ENCOUNTER — TELEPHONE (OUTPATIENT)
Dept: NEUROLOGY | Facility: CLINIC | Age: 42
End: 2022-08-17

## 2022-08-17 ENCOUNTER — PATIENT MESSAGE (OUTPATIENT)
Dept: NEUROLOGY | Facility: CLINIC | Age: 42
End: 2022-08-17
Payer: MEDICARE

## 2022-08-17 ENCOUNTER — TELEPHONE (OUTPATIENT)
Dept: NEUROLOGY | Facility: CLINIC | Age: 42
End: 2022-08-17
Payer: MEDICARE

## 2022-08-17 LAB
BASOPHILS # BLD AUTO: 0.04 K/UL (ref 0–0.2)
BASOPHILS NFR BLD: 0.6 % (ref 0–1.9)
DIFFERENTIAL METHOD: ABNORMAL
EOSINOPHIL # BLD AUTO: 0.1 K/UL (ref 0–0.5)
EOSINOPHIL NFR BLD: 1.8 % (ref 0–8)
ERYTHROCYTE [DISTWIDTH] IN BLOOD BY AUTOMATED COUNT: 16.5 % (ref 11.5–14.5)
HCT VFR BLD AUTO: 37.1 % (ref 37–48.5)
HGB BLD-MCNC: 10.7 G/DL (ref 12–16)
IMM GRANULOCYTES # BLD AUTO: 0.04 K/UL (ref 0–0.04)
IMM GRANULOCYTES NFR BLD AUTO: 0.6 % (ref 0–0.5)
LYMPHOCYTES # BLD AUTO: 2.3 K/UL (ref 1–4.8)
LYMPHOCYTES NFR BLD: 35.1 % (ref 18–48)
MCH RBC QN AUTO: 21.2 PG (ref 27–31)
MCHC RBC AUTO-ENTMCNC: 28.8 G/DL (ref 32–36)
MCV RBC AUTO: 74 FL (ref 82–98)
MONOCYTES # BLD AUTO: 0.6 K/UL (ref 0.3–1)
MONOCYTES NFR BLD: 8.4 % (ref 4–15)
NEUTROPHILS # BLD AUTO: 3.5 K/UL (ref 1.8–7.7)
NEUTROPHILS NFR BLD: 53.5 % (ref 38–73)
NRBC BLD-RTO: 0 /100 WBC
PLATELET # BLD AUTO: 299 K/UL (ref 150–450)
PMV BLD AUTO: 11.1 FL (ref 9.2–12.9)
RBC # BLD AUTO: 5.05 M/UL (ref 4–5.4)
WBC # BLD AUTO: 6.52 K/UL (ref 3.9–12.7)

## 2022-08-17 NOTE — TELEPHONE ENCOUNTER
----- Message from HAMIDA Cantu, CNS sent at 8/15/2022  5:22 PM CDT -----  She can be scheduled for her infusion. Her CBC is being repeated this week.

## 2022-08-17 NOTE — TELEPHONE ENCOUNTER
----- Message from HAMIDA Cantu, CNS sent at 8/15/2022  5:23 PM CDT -----  F/u with Dr. Garrett in 4 months.

## 2022-08-26 ENCOUNTER — PATIENT MESSAGE (OUTPATIENT)
Dept: INTERNAL MEDICINE | Facility: CLINIC | Age: 42
End: 2022-08-26
Payer: MEDICARE

## 2022-08-26 ENCOUNTER — PATIENT MESSAGE (OUTPATIENT)
Dept: NEUROLOGY | Facility: CLINIC | Age: 42
End: 2022-08-26
Payer: MEDICARE

## 2022-08-26 NOTE — TELEPHONE ENCOUNTER
Please advise.  Informed patient she may need to be seen in case of infection and may need abx to clear up.

## 2022-08-29 ENCOUNTER — PATIENT MESSAGE (OUTPATIENT)
Dept: NEUROLOGY | Facility: CLINIC | Age: 42
End: 2022-08-29
Payer: MEDICARE

## 2022-09-02 ENCOUNTER — PATIENT MESSAGE (OUTPATIENT)
Dept: NEUROLOGY | Facility: CLINIC | Age: 42
End: 2022-09-02
Payer: MEDICARE

## 2022-09-09 ENCOUNTER — INFUSION (OUTPATIENT)
Dept: INFUSION THERAPY | Facility: HOSPITAL | Age: 42
End: 2022-09-09
Attending: STUDENT IN AN ORGANIZED HEALTH CARE EDUCATION/TRAINING PROGRAM
Payer: MEDICARE

## 2022-09-09 VITALS
TEMPERATURE: 97 F | RESPIRATION RATE: 16 BRPM | DIASTOLIC BLOOD PRESSURE: 78 MMHG | HEART RATE: 82 BPM | OXYGEN SATURATION: 96 % | SYSTOLIC BLOOD PRESSURE: 132 MMHG

## 2022-09-09 DIAGNOSIS — G35 MULTIPLE SCLEROSIS: Primary | ICD-10-CM

## 2022-09-09 PROCEDURE — 96366 THER/PROPH/DIAG IV INF ADDON: CPT

## 2022-09-09 PROCEDURE — 63600175 PHARM REV CODE 636 W HCPCS: Performed by: STUDENT IN AN ORGANIZED HEALTH CARE EDUCATION/TRAINING PROGRAM

## 2022-09-09 PROCEDURE — 25000003 PHARM REV CODE 250: Performed by: STUDENT IN AN ORGANIZED HEALTH CARE EDUCATION/TRAINING PROGRAM

## 2022-09-09 PROCEDURE — 96375 TX/PRO/DX INJ NEW DRUG ADDON: CPT

## 2022-09-09 PROCEDURE — 96365 THER/PROPH/DIAG IV INF INIT: CPT

## 2022-09-09 PROCEDURE — 96367 TX/PROPH/DG ADDL SEQ IV INF: CPT

## 2022-09-09 RX ORDER — EPINEPHRINE 0.3 MG/.3ML
0.3 INJECTION SUBCUTANEOUS
Status: CANCELLED | OUTPATIENT
Start: 2022-11-18

## 2022-09-09 RX ORDER — FAMOTIDINE 10 MG/ML
20 INJECTION INTRAVENOUS
Status: COMPLETED | OUTPATIENT
Start: 2022-09-09 | End: 2022-09-09

## 2022-09-09 RX ORDER — METHYLPREDNISOLONE SOD SUCC 125 MG
100 VIAL (EA) INJECTION
Status: COMPLETED | OUTPATIENT
Start: 2022-09-09 | End: 2022-09-09

## 2022-09-09 RX ORDER — DIPHENHYDRAMINE HYDROCHLORIDE 50 MG/ML
50 INJECTION INTRAMUSCULAR; INTRAVENOUS
Status: CANCELLED | OUTPATIENT
Start: 2022-11-18

## 2022-09-09 RX ORDER — FAMOTIDINE 10 MG/ML
20 INJECTION INTRAVENOUS
Status: CANCELLED | OUTPATIENT
Start: 2022-11-18

## 2022-09-09 RX ORDER — SODIUM CHLORIDE 0.9 % (FLUSH) 0.9 %
10 SYRINGE (ML) INJECTION
Status: CANCELLED | OUTPATIENT
Start: 2022-11-18

## 2022-09-09 RX ORDER — METHYLPREDNISOLONE SOD SUCC 125 MG
100 VIAL (EA) INJECTION
Status: CANCELLED
Start: 2022-11-18 | End: 2022-11-18

## 2022-09-09 RX ORDER — ACETAMINOPHEN 500 MG
1000 TABLET ORAL
Status: COMPLETED | OUTPATIENT
Start: 2022-09-09 | End: 2022-09-09

## 2022-09-09 RX ORDER — HEPARIN 100 UNIT/ML
500 SYRINGE INTRAVENOUS
Status: CANCELLED | OUTPATIENT
Start: 2022-11-18

## 2022-09-09 RX ORDER — ACETAMINOPHEN 500 MG
1000 TABLET ORAL
Status: CANCELLED | OUTPATIENT
Start: 2022-11-18

## 2022-09-09 RX ADMIN — OCRELIZUMAB 600 MG: 300 INJECTION INTRAVENOUS at 10:09

## 2022-09-09 RX ADMIN — DIPHENHYDRAMINE HYDROCHLORIDE 50 MG: 50 INJECTION, SOLUTION INTRAMUSCULAR; INTRAVENOUS at 09:09

## 2022-09-09 RX ADMIN — METHYLPREDNISOLONE SODIUM SUCCINATE 100 MG: 125 INJECTION, POWDER, FOR SOLUTION INTRAMUSCULAR; INTRAVENOUS at 10:09

## 2022-09-09 RX ADMIN — FAMOTIDINE 20 MG: 10 INJECTION INTRAVENOUS at 10:09

## 2022-09-09 RX ADMIN — ACETAMINOPHEN 1000 MG: 500 TABLET ORAL at 09:09

## 2022-09-09 NOTE — PLAN OF CARE
"Patient states "I've been doing better, just ready to get my infusion because I can tell it's time".  "

## 2022-09-09 NOTE — DISCHARGE INSTRUCTIONS
[facility]  [address] [facility]  [address]  [phone] phone     [phone] fax  Hours of Operation: Monday- Friday 8:00am- 5:00pm  After hours phone  [phone]  Hematology / Oncology Physicians on call      [address] Dr., ARASELI June, ARASELI Hwang, EDDIE Shore    Please call with any concerns regarding your appointment today. FALL PREVENTION   Falls often occur due to slipping, tripping or losing your balance. Here are ways to reduce your risk of falling again.   Was there anything that caused your fall that can be fixed, removed or replaced?   Make your home safe by keeping walkways clear of objects you may trip over.   Use non-slip pads under rugs.   Do not walk in poorly lit areas.   Do not stand on chairs or wobbly ladders.   Use caution when reaching overhead or looking upward. This position can cause a loss of balance.   Be sure your shoes fit properly, have non-slip bottoms and are in good condition.   Be cautious when going up and down stairs, curbs, and when walking on uneven sidewalks.   If your balance is poor, consider using a cane or walker.   If your fall was related to alcohol use, stop or limit alcohol intake.   If your fall was related to use of sleeping medicines, talk to your doctor about this. You may need to reduce your dosage at bedtime if you awaken during the night to go to the bathroom.   To reduce the need for nighttime bathroom trips:   Avoid drinking fluids for several hours before going to bed   Empty your bladder before going to bed   Men can keep a urinal at the bedside   © 8192-6011 Elissa Lagunas, [address] [place] [address], [place] [address]. All rights reserved. This information is not intended as a substitute for professional medical care. Always follow your healthcare professional's instructions.     WAYS TO HELP PREVENT  INFECTION        WASH YOUR HANDS OFTEN DURING THE DAY, ESPECIALLY BEFORE YOU EAT, AFTER USING THE BATHROOM, AND AFTER TOUCHING ANIMALS    STAY AWAY FROM PEOPLE WHO HAVE ILLNESSES YOU CAN CATCH; SUCH AS COLDS, FLU, CHICKEN POX    TRY TO AVOID CROWDS    STAY AWAY FROM CHILDREN WHO RECENTLY HAVE RECEIVED LIVE VIRUS VACCINES    MAINTAIN GOOD MOUTH CARE    DO NOT SQUEEZE OR SCRATCH PIMPLES    CLEAN CUTS & SCRAPES RIGHT AWAY AND DAILY UNTIL HEALED WITH WARM WATER, SOAP & AN ANTISEPTIC    AVOID CONTACT WITH LITTER BOXES, BIRD CAGES, & FISH TANKS    AVOID STANDING WATER, IE., BIRD BATHS, FLOWER POTS/VASES, OR HUMIDIFIERS    WEAR GLOVES WHEN GARDENING OR CLEANING UP AFTER OTHERS, ESPECIALLY BABIES & SMALL CHILDREN    DO NOT EAT RAW FISH, SEAFOOD, MEAT, OR EGGS

## 2022-09-09 NOTE — TELEPHONE ENCOUNTER
Phoned pt to discuss funding for a scooter. Pt will look into scooters and get back to me.  Will send pt [employer] application in addition to mailing a physical copy.

## 2022-09-13 ENCOUNTER — TELEPHONE (OUTPATIENT)
Dept: ADMINISTRATIVE | Facility: HOSPITAL | Age: 42
End: 2022-09-13
Payer: MEDICARE

## 2022-09-16 ENCOUNTER — TELEPHONE (OUTPATIENT)
Dept: PSYCHIATRY | Facility: CLINIC | Age: 42
End: 2022-09-16

## 2022-09-16 NOTE — TELEPHONE ENCOUNTER
Phoned pt to inquire as to whether she has selected a scooter so we can make a referral to [facility]. Mailed pt. Physical application and signed letter for [employer] program as well.

## 2022-09-20 ENCOUNTER — PATIENT MESSAGE (OUTPATIENT)
Dept: ADMINISTRATIVE | Facility: OTHER | Age: 42
End: 2022-09-20
Payer: MEDICARE

## 2022-09-21 ENCOUNTER — HOSPITAL ENCOUNTER (EMERGENCY)
Facility: HOSPITAL | Age: 42
Discharge: HOME | End: 2022-09-21
Attending: EMERGENCY MEDICINE
Payer: MEDICARE

## 2022-09-21 VITALS
OXYGEN SATURATION: 98 % | HEIGHT: 66 IN | WEIGHT: 279 LBS | SYSTOLIC BLOOD PRESSURE: 142 MMHG | DIASTOLIC BLOOD PRESSURE: 88 MMHG | BODY MASS INDEX: 44.84 KG/M2 | RESPIRATION RATE: 18 BRPM | HEART RATE: 72 BPM | TEMPERATURE: 98 F

## 2022-09-21 DIAGNOSIS — K59.00 CONSTIPATION, UNSPECIFIED CONSTIPATION TYPE: Primary | ICD-10-CM

## 2022-09-21 LAB
ALBUMIN SERPL BCP-MCNC: 3.8 G/DL (ref 3.5–5.2)
ALP SERPL-CCNC: 85 U/L (ref 55–135)
ALT SERPL W/O P-5'-P-CCNC: 9 U/L (ref 10–44)
ANION GAP SERPL CALC-SCNC: 11 MMOL/L (ref 8–16)
AST SERPL-CCNC: 16 U/L (ref 10–40)
BASOPHILS # BLD AUTO: 0.02 K/UL (ref 0–0.2)
BASOPHILS NFR BLD: 0.3 % (ref 0–1.9)
BILIRUB SERPL-MCNC: 0.3 MG/DL (ref 0.1–1)
BUN SERPL-MCNC: 5 MG/DL (ref 6–20)
CALCIUM SERPL-MCNC: 9.4 MG/DL (ref 8.7–10.5)
CHLORIDE SERPL-SCNC: 107 MMOL/L (ref 95–110)
CO2 SERPL-SCNC: 22 MMOL/L (ref 23–29)
CREAT SERPL-MCNC: 0.7 MG/DL (ref 0.5–1.4)
DIFFERENTIAL METHOD: ABNORMAL
EOSINOPHIL # BLD AUTO: 0 K/UL (ref 0–0.5)
EOSINOPHIL NFR BLD: 0.6 % (ref 0–8)
ERYTHROCYTE [DISTWIDTH] IN BLOOD BY AUTOMATED COUNT: 15.8 % (ref 11.5–14.5)
EST. GFR  (NO RACE VARIABLE): >60 ML/MIN/1.73 M^2
GLUCOSE SERPL-MCNC: 77 MG/DL (ref 70–110)
HCT VFR BLD AUTO: 37.1 % (ref 37–48.5)
HGB BLD-MCNC: 11.4 G/DL (ref 12–16)
IMM GRANULOCYTES # BLD AUTO: 0.02 K/UL (ref 0–0.04)
IMM GRANULOCYTES NFR BLD AUTO: 0.3 % (ref 0–0.5)
LIPASE SERPL-CCNC: 20 U/L (ref 4–60)
LYMPHOCYTES # BLD AUTO: 1.9 K/UL (ref 1–4.8)
LYMPHOCYTES NFR BLD: 29.6 % (ref 18–48)
MCH RBC QN AUTO: 20.8 PG (ref 27–31)
MCHC RBC AUTO-ENTMCNC: 30.7 G/DL (ref 32–36)
MCV RBC AUTO: 68 FL (ref 82–98)
MONOCYTES # BLD AUTO: 0.6 K/UL (ref 0.3–1)
MONOCYTES NFR BLD: 9.7 % (ref 4–15)
NEUTROPHILS # BLD AUTO: 3.8 K/UL (ref 1.8–7.7)
NEUTROPHILS NFR BLD: 59.5 % (ref 38–73)
NRBC BLD-RTO: 0 /100 WBC
PLATELET # BLD AUTO: 250 K/UL (ref 150–450)
PMV BLD AUTO: 9.2 FL (ref 9.2–12.9)
POTASSIUM SERPL-SCNC: 4 MMOL/L (ref 3.5–5.1)
PROT SERPL-MCNC: 7.4 G/DL (ref 6–8.4)
RBC # BLD AUTO: 5.49 M/UL (ref 4–5.4)
SODIUM SERPL-SCNC: 140 MMOL/L (ref 136–145)
WBC # BLD AUTO: 6.31 K/UL (ref 3.9–12.7)

## 2022-09-21 PROCEDURE — 99284 EMERGENCY DEPT VISIT MOD MDM: CPT | Mod: 25

## 2022-09-21 PROCEDURE — 85025 COMPLETE CBC W/AUTO DIFF WBC: CPT | Performed by: EMERGENCY MEDICINE

## 2022-09-21 PROCEDURE — 83690 ASSAY OF LIPASE: CPT | Performed by: EMERGENCY MEDICINE

## 2022-09-21 PROCEDURE — 36000 PLACE NEEDLE IN VEIN: CPT

## 2022-09-21 PROCEDURE — 80053 COMPREHEN METABOLIC PANEL: CPT | Performed by: EMERGENCY MEDICINE

## 2022-09-21 NOTE — ED PROVIDER NOTES
"SCRIBE #1 NOTE: I, Nacho Caprice, am scribing for, and in the presence of, Tulio Huntley Jr., MD. I have scribed the entire note.       History     Chief Complaint   Patient presents with    Abdominal Pain     Lower ABD pain with N/V starting today. HX MS     Review of patient's allergies indicates:   Allergen Reactions    Demerol [meperidine] Anaphylaxis     Other reaction(s): Itching    Dilaudid [hydromorphone (bulk)] Anaphylaxis     "coded" per pt  Patient can tolerate Lortab    Shellfish containing products Itching, Nausea And Vomiting and Swelling    Prednisone Itching     Other reaction(s): Itching    Tramadol      shaking         History of Present Illness     HPI    9/21/2022, 6:45 PM  History obtained from the patient      History of Present Illness: Alicia Soliz is a 42 y.o. female patient with a PMHx of HTN, morbid obesity, a partial hysterectomy, and anemia who presents to the Emergency Department for evaluation of left sided abdominal pain which onset gradually today. Symptoms are constant and moderate in severity. No mitigating or exacerbating factors reported. Associated sxs include n/v. Patient denies any dysuria, back pain, weakness, numbness, diarrhea, CP, SOB, and all other sxs at this time. No prior Tx reported. No further complaints or concerns at this time.       Arrival mode: EMS      PCP: Braden Dumont MD        Past Medical History:  Past Medical History:   Diagnosis Date    Anemia     Arthritis     Cardiac arrest as complication of care     pt states she went into cardiac arrest from an allergic reaction to a medication    Depression     Encounter for blood transfusion     Hemiplegia due to old stroke     Hypertension     Morbid obesity with BMI of 45.0-49.9, adult 9/20/2018    Multiple sclerosis        Past Surgical History:  Past Surgical History:   Procedure Laterality Date    APPENDECTOMY      CHOLECYSTECTOMY      COLONOSCOPY N/A 11/25/2020    Procedure: COLONOSCOPY;  " Surgeon: Andrew Jenkins III, MD;  Location: [facility] ENDO;  Service: Endoscopy;  Laterality: N/A;    ESOPHAGOGASTRODUODENOSCOPY N/A 2/19/2020    Procedure: EGD (ESOPHAGOGASTRODUODENOSCOPY);  Surgeon: Michael Navarrete MD;  Location: [facility] ENDO;  Service: Endoscopy;  Laterality: N/A;    ESOPHAGOGASTRODUODENOSCOPY N/A 2/20/2020    Procedure: EGD (ESOPHAGOGASTRODUODENOSCOPY);  Surgeon: Michael Navarrete MD;  Location: [facility] OR;  Service: General;  Laterality: N/A;    ESOPHAGOGASTRODUODENOSCOPY N/A 11/25/2020    Procedure: EGD (ESOPHAGOGASTRODUODENOSCOPY);  Surgeon: Andrew Jenkins III, MD;  Location: [facility];  Service: Endoscopy;  Laterality: N/A;    HYSTERECTOMY      KNEE SURGERY      ROBOT-ASSISTED LAPAROSCOPIC SLEEVE GASTRECTOMY USING DA ANTHONY XI N/A 2/20/2020    Procedure: XI ROBOTIC SLEEVE GASTRECTOMY;  Surgeon: Michael Navarrete MD;  Location: [facility] OR;  Service: General;  Laterality: N/A;    TENOPLASTY OF HAND Left 8/26/2021    Procedure: REPAIR, TENDON, HAND;  Surgeon: Joselito Lugo MD;  Location: [facility] OR;  Service: Orthopedics;  Laterality: Left;  Left RCL PIP Joint Repair/Recon with Arthrex Internal Brace.    TONSILLECTOMY      TUBAL LIGATION           Family History:  Family History   Problem Relation Age of Onset    Lupus Mother     Heart disease Mother     Hypertension Mother     Diabetes Father     Kidney disease Father     Cancer Maternal Aunt 40        breast    Breast cancer Maternal Aunt     Diabetes Maternal Aunt     COPD Maternal Aunt     Breast cancer Maternal Cousin     Ovarian cancer Maternal Cousin        Social History:  Social History     Tobacco Use    Smoking status: Never    Smokeless tobacco: Never   Substance and Sexual Activity    Alcohol use: Yes     Comment: once a year     Drug use: Never    Sexual activity: Yes     Partners: Male     Birth control/protection: Surgical        Review of Systems     Review of Systems   Constitutional:  Negative for chills and fever.   HENT:  Negative for sore  throat.    Respiratory:  Negative for shortness of breath.    Cardiovascular:  Negative for chest pain.   Gastrointestinal:  Positive for abdominal pain (left sided), nausea and vomiting. Negative for blood in stool.   Genitourinary:  Negative for dysuria, flank pain and vaginal bleeding.   Musculoskeletal:  Negative for back pain.   Skin:  Negative for rash.   Neurological:  Negative for weakness.   Hematological:  Does not bruise/bleed easily.   All other systems reviewed and are negative.     Physical Exam     Initial Vitals [09/21/22 1819]   BP Pulse Resp Temp SpO2   (!) 150/94 80 18 98.4 °F (36.9 °C) 98 %      MAP       --          Physical Exam   Nursing Notes and Vital Signs Reviewed.  Constitutional: Patient is in no acute distress. Well-developed and well-nourished.  Head: Atraumatic. Normocephalic.  Eyes:  EOM intact.  No scleral icterus.  ENT: Mucous membranes are moist.  Nares clear   Neck:  Full ROM. No JVD.  Cardiovascular: Regular rate. Regular rhythm No murmurs, rubs, or gallops. Distal pulses are 2+ and symmetric  Pulmonary/Chest: No respiratory distress. Clear to auscultation bilaterally. No wheezing or rales.  Equal chest wall rise bilaterally  Abdominal: Soft and non-distended.  Mild left upper and left lower quadrant tenderness.  No rebound, guarding, or rigidity. Good bowel sounds.  Genitourinary: No CVA tenderness.  No suprapubic tenderness  Musculoskeletal: Moves all extremities. No obvious deformities.  5 x 5 strength in all extremities   Skin: Warm and dry.  Neurological:  Alert, awake, and appropriate.  Normal speech.  No acute focal neurological deficits are appreciated.  Two through 12 intact bilaterally.  Psychiatric: Normal affect. Good eye contact. Appropriate in content.     ED Course   Procedures  ED Vital Signs:  Vitals:    09/21/22 1819 09/21/22 1946   BP: (!) 150/94    Pulse: 80    Resp: 18    Temp: 98.4 °F (36.9 °C)    TempSrc: Oral    SpO2: 98%    Weight:  126.6 kg (279 lb)  "  Height: 5' 6" (1.676 m)        Abnormal Lab Results:  Labs Reviewed   CBC W/ AUTO DIFFERENTIAL - Abnormal; Notable for the following components:       Result Value    RBC 5.49 (*)     Hemoglobin 11.4 (*)     MCV 68 (*)     MCH 20.8 (*)     MCHC 30.7 (*)     RDW 15.8 (*)     All other components within normal limits   COMPREHENSIVE METABOLIC PANEL - Abnormal; Notable for the following components:    CO2 22 (*)     BUN 5 (*)     ALT 9 (*)     All other components within normal limits   LIPASE   URINALYSIS, REFLEX TO URINE CULTURE        All Lab Results:  Results for orders placed or performed during the hospital encounter of 09/21/22   CBC auto differential   Result Value Ref Range    WBC 6.31 3.90 - 12.70 K/uL    RBC 5.49 (H) 4.00 - 5.40 M/uL    Hemoglobin 11.4 (L) 12.0 - 16.0 g/dL    Hematocrit 37.1 37.0 - 48.5 %    MCV 68 (L) 82 - 98 fL    MCH 20.8 (L) 27.0 - 31.0 pg    MCHC 30.7 (L) 32.0 - 36.0 g/dL    RDW 15.8 (H) 11.5 - 14.5 %    Platelets 250 150 - 450 K/uL    MPV 9.2 9.2 - 12.9 fL    Immature Granulocytes 0.3 0.0 - 0.5 %    Gran # (ANC) 3.8 1.8 - 7.7 K/uL    Immature Grans (Abs) 0.02 0.00 - 0.04 K/uL    Lymph # 1.9 1.0 - 4.8 K/uL    Mono # 0.6 0.3 - 1.0 K/uL    Eos # 0.0 0.0 - 0.5 K/uL    Baso # 0.02 0.00 - 0.20 K/uL    nRBC 0 0 /100 WBC    Gran % 59.5 38.0 - 73.0 %    Lymph % 29.6 18.0 - 48.0 %    Mono % 9.7 4.0 - 15.0 %    Eosinophil % 0.6 0.0 - 8.0 %    Basophil % 0.3 0.0 - 1.9 %    Differential Method Automated    Comprehensive metabolic panel   Result Value Ref Range    Sodium 140 136 - 145 mmol/L    Potassium 4.0 3.5 - 5.1 mmol/L    Chloride 107 95 - 110 mmol/L    CO2 22 (L) 23 - 29 mmol/L    Glucose 77 70 - 110 mg/dL    BUN 5 (L) 6 - 20 mg/dL    Creatinine 0.7 0.5 - 1.4 mg/dL    Calcium 9.4 8.7 - 10.5 mg/dL    Total Protein 7.4 6.0 - 8.4 g/dL    Albumin 3.8 3.5 - 5.2 g/dL    Total Bilirubin 0.3 0.1 - 1.0 mg/dL    Alkaline Phosphatase 85 55 - 135 U/L    AST 16 10 - 40 U/L    ALT 9 (L) 10 - 44 U/L    " Anion Gap 11 8 - 16 mmol/L    eGFR >60 >60 mL/min/1.73 m^2   Lipase   Result Value Ref Range    Lipase 20 4 - 60 U/L     *Note: Due to a large number of results and/or encounters for the requested time period, some results have not been displayed. A complete set of results can be found in Results Review.         Imaging Results:  Imaging Results              CT Abdomen Pelvis  Without Contrast (Final result)  Result time 09/21/22 19:37:35      Final result by Jourdan Mejia MD (09/21/22 19:37:35)                   Impression:      No definite acute abnormality.  Clinical correlation and further evaluation as warranted.    Possible constipation.    Additional details as above.    All CT scans at this facility are performed  using dose modulation techniques as appropriate to performed exam including the following:  automated exposure control; adjustment of mA and/or kV according to the patients size (this includes techniques or standardized protocols for targeted exams where dose is matched to indication/reason for exam: i.e. extremities or head);  iterative reconstruction technique.      Electronically signed by: Jourdan Mejia  Date:    09/21/2022  Time:    19:37               Narrative:    EXAMINATION:  CT ABDOMEN PELVIS WITHOUT CONTRAST    CLINICAL HISTORY:  Abdominal abscess/infection suspected;    TECHNIQUE:  Low dose axial images, sagittal and coronal reformations were obtained from the lung bases to the pubic symphysis.  Contrast was not administered.    COMPARISON:  Multiple priors.    FINDINGS:  Heart: Normal in size. No pericardial effusion.    Lung Bases: Well aerated, without consolidation or pleural fluid.    Liver: Normal in size and attenuation, with no focal hepatic lesions.    Gallbladder: Gallbladder surgically absent.    Bile Ducts: No evidence of dilated ducts.    Pancreas: No mass or peripancreatic fat stranding.    Spleen: Unremarkable.    Adrenals: Unremarkable.    Kidneys/ Ureters: No  hydronephrosis.  No obstructive uropathy.  No nonobstructive nephrolithiasis.    Bladder: No evidence of wall thickening.    Reproductive organs: Status post hysterectomy.    GI Tract/Mesentery: No evidence of bowel obstruction or inflammation.  Moderate burden of inspissated stool concerning for constipation.  Correlation is advised.  No findings to suggest appendicitis.  Prior sleeve gastrectomy.    Peritoneal Space: No ascites. No free air.    Retroperitoneum: No significant adenopathy.    Abdominal wall: Unremarkable.    Vasculature: No significant atherosclerosis or aneurysm.    Bones: No acute fracture.  Mild lumbar spine degenerative changes.                                         The Emergency Provider reviewed the vital signs and test results, which are outlined above.     ED Discussion       8:30 PM: Reassessed pt at this time. Discussed with pt all pertinent ED information and results. Discussed pt dx and plan of tx. Gave pt all f/u and return to the ED instructions. All questions and concerns were addressed at this time. Pt expresses understanding of information and instructions, and is comfortable with plan to discharge. Pt is stable for discharge.    I discussed with patient and/or family/caretaker that evaluation in the ED does not suggest any emergent or life threatening medical conditions requiring immediate intervention beyond what was provided in the ED, and I believe patient is safe for discharge.  Regardless, an unremarkable evaluation in the ED does not preclude the development or presence of a serious of life threatening condition. As such, patient was instructed to return immediately for any worsening or change in current symptoms.    Patient is stable nontoxic.  She would left lower quadrant left upper quadrant tenderness.  CT scan shows constipation.  This consistent with her exam.  I will treat her for constipation which she has a history of with close follow-up.  Discussed all findings  with the patient as well as the plan of care.  She verbalized agreement understanding with all instructions seems very reliable she is safe discharge my opinion     Medical Decision Making:   Clinical Tests:   Lab Tests: Ordered and Reviewed  Radiological Study: Ordered and Reviewed         ED Medication(s):  Medications - No data to display    New Prescriptions    No medications on file        Follow-up Information       Braden Dumont MD.    Specialty: Internal Medicine  Contact information:  [address] [facility] [address]  [phone]                                 Scribe Attestation:   Scribe #1: I performed the above scribed service and the documentation accurately describes the services I performed. I attest to the accuracy of the note.     Attending:   Physician Attestation Statement for Scribe #1: I, Tulio Huntley Jr., MD, personally performed the services described in this documentation, as scribed by Nacho Vasques, in my presence, and it is both accurate and complete.           Clinical Impression       ICD-10-CM ICD-9-CM   1. Constipation, unspecified constipation type  K59.00 564.00       Disposition:   Disposition: Discharged  Condition: Stable       Tulio Huntley Jr., MD  09/21/22 2040

## 2022-09-22 NOTE — DISCHARGE INSTRUCTIONS
Add fiber to her diet.  May use Metamucil as well.  To stimulate a bowel movement tonight, by MiraLax and a Fleet's enema at the store.  Use MiraLax orally in the Fleet's enema from below.  This should stimulate a bowel movement within 12 hours.  If it does not within 12 hours, repeat both doses.  Follow up with her doctor tomorrow for re-evaluation.  Return as needed for any worsening symptoms, problems, questions or concerns.  Avoid any narcotic pain medications

## 2022-09-26 ENCOUNTER — PATIENT MESSAGE (OUTPATIENT)
Dept: NEUROLOGY | Facility: CLINIC | Age: 42
End: 2022-09-26
Payer: MEDICARE

## 2022-09-26 ENCOUNTER — PATIENT MESSAGE (OUTPATIENT)
Dept: PSYCHIATRY | Facility: CLINIC | Age: 42
End: 2022-09-26
Payer: MEDICARE

## 2022-09-30 ENCOUNTER — PATIENT MESSAGE (OUTPATIENT)
Dept: ADMINISTRATIVE | Facility: OTHER | Age: 42
End: 2022-09-30
Payer: MEDICARE

## 2022-09-30 ENCOUNTER — PATIENT MESSAGE (OUTPATIENT)
Dept: NEUROLOGY | Facility: CLINIC | Age: 42
End: 2022-09-30
Payer: MEDICARE

## 2022-10-03 ENCOUNTER — OFFICE VISIT (OUTPATIENT)
Dept: GASTROENTEROLOGY | Facility: CLINIC | Age: 42
End: 2022-10-03
Payer: MEDICARE

## 2022-10-03 ENCOUNTER — PATIENT MESSAGE (OUTPATIENT)
Dept: GASTROENTEROLOGY | Facility: CLINIC | Age: 42
End: 2022-10-03

## 2022-10-03 ENCOUNTER — HOSPITAL ENCOUNTER (OUTPATIENT)
Dept: RADIOLOGY | Facility: HOSPITAL | Age: 42
Discharge: HOME | End: 2022-10-03
Attending: INTERNAL MEDICINE
Payer: MEDICARE

## 2022-10-03 VITALS
OXYGEN SATURATION: 99 % | BODY MASS INDEX: 45.63 KG/M2 | SYSTOLIC BLOOD PRESSURE: 120 MMHG | DIASTOLIC BLOOD PRESSURE: 84 MMHG | WEIGHT: 283.94 LBS | HEART RATE: 92 BPM | HEIGHT: 66 IN

## 2022-10-03 DIAGNOSIS — R10.30 LOWER ABDOMINAL PAIN: ICD-10-CM

## 2022-10-03 DIAGNOSIS — K59.09 CHRONIC CONSTIPATION: ICD-10-CM

## 2022-10-03 DIAGNOSIS — G35 MS (MULTIPLE SCLEROSIS): ICD-10-CM

## 2022-10-03 DIAGNOSIS — G35 MS (MULTIPLE SCLEROSIS): Primary | ICD-10-CM

## 2022-10-03 PROCEDURE — 1159F MED LIST DOCD IN RCRD: CPT | Mod: CPTII,S$GLB,, | Performed by: INTERNAL MEDICINE

## 2022-10-03 PROCEDURE — 3008F BODY MASS INDEX DOCD: CPT | Mod: CPTII,S$GLB,, | Performed by: INTERNAL MEDICINE

## 2022-10-03 PROCEDURE — 74019 XR ABDOMEN FLAT AND ERECT: ICD-10-PCS | Mod: 26,,, | Performed by: RADIOLOGY

## 2022-10-03 PROCEDURE — 1160F PR REVIEW ALL MEDS BY PRESCRIBER/CLIN PHARMACIST DOCUMENTED: ICD-10-PCS | Mod: CPTII,S$GLB,, | Performed by: INTERNAL MEDICINE

## 2022-10-03 PROCEDURE — 3074F PR MOST RECENT SYSTOLIC BLOOD PRESSURE < 130 MM HG: ICD-10-PCS | Mod: CPTII,S$GLB,, | Performed by: INTERNAL MEDICINE

## 2022-10-03 PROCEDURE — 1159F PR MEDICATION LIST DOCUMENTED IN MEDICAL RECORD: ICD-10-PCS | Mod: CPTII,S$GLB,, | Performed by: INTERNAL MEDICINE

## 2022-10-03 PROCEDURE — 3044F PR MOST RECENT HEMOGLOBIN A1C LEVEL <7.0%: ICD-10-PCS | Mod: CPTII,S$GLB,, | Performed by: INTERNAL MEDICINE

## 2022-10-03 PROCEDURE — 3079F DIAST BP 80-89 MM HG: CPT | Mod: CPTII,S$GLB,, | Performed by: INTERNAL MEDICINE

## 2022-10-03 PROCEDURE — 99213 OFFICE O/P EST LOW 20 MIN: CPT | Mod: S$GLB,,, | Performed by: INTERNAL MEDICINE

## 2022-10-03 PROCEDURE — 1160F RVW MEDS BY RX/DR IN RCRD: CPT | Mod: CPTII,S$GLB,, | Performed by: INTERNAL MEDICINE

## 2022-10-03 PROCEDURE — 3074F SYST BP LT 130 MM HG: CPT | Mod: CPTII,S$GLB,, | Performed by: INTERNAL MEDICINE

## 2022-10-03 PROCEDURE — 99999 PR PBB SHADOW E&M-EST. PATIENT-LVL IV: CPT | Mod: PBBFAC,,, | Performed by: INTERNAL MEDICINE

## 2022-10-03 PROCEDURE — 74019 RADEX ABDOMEN 2 VIEWS: CPT | Mod: TC

## 2022-10-03 PROCEDURE — 3008F PR BODY MASS INDEX (BMI) DOCUMENTED: ICD-10-PCS | Mod: CPTII,S$GLB,, | Performed by: INTERNAL MEDICINE

## 2022-10-03 PROCEDURE — 99213 PR OFFICE/OUTPT VISIT, EST, LEVL III, 20-29 MIN: ICD-10-PCS | Mod: S$GLB,,, | Performed by: INTERNAL MEDICINE

## 2022-10-03 PROCEDURE — 99999 PR PBB SHADOW E&M-EST. PATIENT-LVL IV: ICD-10-PCS | Mod: PBBFAC,,, | Performed by: INTERNAL MEDICINE

## 2022-10-03 PROCEDURE — 3079F PR MOST RECENT DIASTOLIC BLOOD PRESSURE 80-89 MM HG: ICD-10-PCS | Mod: CPTII,S$GLB,, | Performed by: INTERNAL MEDICINE

## 2022-10-03 PROCEDURE — 3044F HG A1C LEVEL LT 7.0%: CPT | Mod: CPTII,S$GLB,, | Performed by: INTERNAL MEDICINE

## 2022-10-03 PROCEDURE — 4010F ACE/ARB THERAPY RXD/TAKEN: CPT | Mod: CPTII,S$GLB,, | Performed by: INTERNAL MEDICINE

## 2022-10-03 PROCEDURE — 74019 RADEX ABDOMEN 2 VIEWS: CPT | Mod: 26,,, | Performed by: RADIOLOGY

## 2022-10-03 PROCEDURE — 4010F PR ACE/ARB THEARPY RXD/TAKEN: ICD-10-PCS | Mod: CPTII,S$GLB,, | Performed by: INTERNAL MEDICINE

## 2022-10-03 NOTE — PROGRESS NOTES
Subjective:       Patient ID: Alicia Soliz is a 42 y.o. female.    Chief Complaint: Follow-up    The patient was last seen bu us in 2020 when she underwent EGD and Colonoscopy regarding iron deficiency anemia. Her last Hgb was up to 11.4 g but her MCV is microcytic and she has had no recent iron studies. Will need to repeat.     She was seen in the ED 9/21 with issues with lower abdominal pain. CT was unremarkable except for suggestion of constipation. She was stated on Linzess and a stool softener. Still not with the best results, so will check KUB today. May need clearout and resumption of Linzess afterwards and titrate to most effective dose. Colonoscopy showed polyp; she will need repeat study in 2025. Patient has MS which is likely complicating her picture.     Review of Systems   Constitutional:  Negative for activity change, appetite change, chills, diaphoresis, fatigue, fever and unexpected weight change.   HENT:  Negative for congestion, ear discharge, ear pain, hearing loss, nosebleeds, postnasal drip and tinnitus.    Eyes:  Negative for photophobia and visual disturbance.   Respiratory:  Negative for apnea, cough, choking, chest tightness, shortness of breath and wheezing.    Cardiovascular:  Negative for chest pain, palpitations and leg swelling.   Gastrointestinal:  Positive for abdominal pain and constipation. Negative for abdominal distention, anal bleeding, blood in stool, diarrhea, nausea, rectal pain and vomiting.   Genitourinary:  Negative for difficulty urinating, dyspareunia, dysuria, flank pain, frequency, hematuria, menstrual problem, pelvic pain, urgency, vaginal bleeding and vaginal discharge.   Musculoskeletal:  Positive for gait problem. Negative for arthralgias, back pain, joint swelling, myalgias and neck stiffness.        Leg cramps  Joint stiffness   Skin:  Negative for pallor and rash.   Neurological:  Negative for dizziness, tremors, seizures, syncope, speech difficulty,  weakness, numbness and headaches.   Hematological:  Negative for adenopathy.   Psychiatric/Behavioral:  Negative for agitation, confusion, hallucinations, sleep disturbance and suicidal ideas.      Objective:      Physical Exam  Vitals reviewed.   Constitutional:       Appearance: She is well-developed.   HENT:      Head: Normocephalic and atraumatic.   Eyes:      General: No scleral icterus.        Right eye: No discharge.         Left eye: No discharge.      Conjunctiva/sclera: Conjunctivae normal.      Pupils: Pupils are equal, round, and reactive to light.   Neck:      Thyroid: No thyromegaly.      Vascular: No JVD.   Cardiovascular:      Rate and Rhythm: Normal rate and regular rhythm.      Heart sounds: Normal heart sounds. No murmur heard.    No friction rub. No gallop.   Pulmonary:      Effort: Pulmonary effort is normal. No respiratory distress.      Breath sounds: Normal breath sounds. No wheezing or rales.   Chest:      Chest wall: No tenderness.   Abdominal:      General: Bowel sounds are normal. There is no distension.      Palpations: Abdomen is soft. There is no mass.      Tenderness: There is no abdominal tenderness. There is no guarding or rebound.   Musculoskeletal:         General: Normal range of motion.      Cervical back: Normal range of motion and neck supple.   Lymphadenopathy:      Cervical: No cervical adenopathy.   Skin:     General: Skin is warm and dry.      Coloration: Skin is not pale.      Findings: No erythema or rash.   Neurological:      Mental Status: She is alert and oriented to person, place, and time.      Motor: No abnormal muscle tone.      Coordination: Coordination normal.      Deep Tendon Reflexes: Reflexes are normal and symmetric.      Comments: Proximal muscle weakness   Psychiatric:         Behavior: Behavior normal.         Thought Content: Thought content normal.         Judgment: Judgment normal.       Assessment:   MS (multiple sclerosis)  -     X-Ray Abdomen Flat  And Erect; Future; Expected date: 10/03/2022    Lower abdominal pain  -     X-Ray Abdomen Flat And Erect; Future; Expected date: 10/03/2022    Chronic constipation  -     X-Ray Abdomen Flat And Erect; Future; Expected date: 10/03/2022        Plan:   As above.

## 2022-10-04 ENCOUNTER — PATIENT MESSAGE (OUTPATIENT)
Dept: HEPATOLOGY | Facility: CLINIC | Age: 42
End: 2022-10-04
Payer: MEDICARE

## 2022-10-04 ENCOUNTER — CLINICAL SUPPORT (OUTPATIENT)
Dept: REHABILITATION | Facility: HOSPITAL | Age: 42
End: 2022-10-04
Payer: MEDICARE

## 2022-10-04 DIAGNOSIS — R53.1 DECREASED STRENGTH, ENDURANCE, AND MOBILITY: ICD-10-CM

## 2022-10-04 DIAGNOSIS — Z74.09 DECREASED STRENGTH, ENDURANCE, AND MOBILITY: ICD-10-CM

## 2022-10-04 DIAGNOSIS — G35 MULTIPLE SCLEROSIS: Primary | ICD-10-CM

## 2022-10-04 DIAGNOSIS — R68.89 DECREASED STRENGTH, ENDURANCE, AND MOBILITY: ICD-10-CM

## 2022-10-04 PROCEDURE — 97110 THERAPEUTIC EXERCISES: CPT

## 2022-10-04 PROCEDURE — 97161 PT EVAL LOW COMPLEX 20 MIN: CPT

## 2022-10-04 NOTE — PLAN OF CARE
[facility]   Physical Therapy Initial Evaluation     Date: 10/4/2022   Name: Alicia Soliz  Clinic Number: [id number]    Therapy Diagnosis:   Encounter Diagnoses   Name Primary?    Multiple sclerosis Yes    Decreased strength, endurance, and mobility      Physician: Kole Carrasquillo MD    Physician Orders: PT Eval and Treat   Medical Diagnosis from Referral: G35 (ICD-10-CM) - Multiple sclerosis  Evaluation Date: 10/4/2022  Authorization Period Expiration: 10/11/2023  Plan of Care Expiration: 11/15/2022  Progress Note Due: 11/04/2022  Visit # / Visits authorized: 1/20   FOTO: 1/3    Precautions: Standard, Fall, and MS     Time In: 10:00  Time Out: 10:45  Total Appointment Time (timed & untimed codes): 45 minutes      SUBJECTIVE     Date of onset: 7 years  Mechanism of Injury: Pt diagnosed with MS 7 years ago along with a prior CVA affecting Left Side    History of current condition - Alicia reports: Pt presents post surgery after breast reduction in July 2022.  Pt presents with general deconditioning 2* previous medical history.  Pt is motivated to continue to improve strength, mobility, and endurance with carryover to community activities.      Difficulty with B/B?Pt denies  Saddle anesthesia?Pt denies  Numbness or tingling?Only with flare-ups   Weakness in LEs? Yes on the LLE  Drop foot? Yes on the LLE    Falls: Yes, a month ago in the house.      Imaging, Refer to Epic Imaging:     Prior Therapy: Yes, prior to recent surgery  Social History:  lives with their daughter  Occupation: N/A  Prior Level of Function: Independent  Current Level of Function: Independent    Pain:  Pt presents without pain or discomfort    Patients goals: improve strength, endurance, mobility.     Medical History:   Past Medical History:   Diagnosis Date    Anemia     Arthritis     Cardiac arrest as complication of care     pt states she went into cardiac arrest from an allergic reaction to a medication     "Depression     Encounter for blood transfusion     Hemiplegia due to old stroke     Hypertension     Morbid obesity with BMI of 45.0-49.9, adult 9/20/2018    Multiple sclerosis        Surgical History:   Alicia Soliz  has a past surgical history that includes Appendectomy; Tubal ligation; Tonsillectomy; Cholecystectomy; Hysterectomy; Knee surgery; Esophagogastroduodenoscopy (N/A, 2/19/2020); Robot-assisted laparoscopic sleeve gastrectomy using da [place] Xi (N/A, 2/20/2020); Esophagogastroduodenoscopy (N/A, 2/20/2020); Esophagogastroduodenoscopy (N/A, 11/25/2020); Colonoscopy (N/A, 11/25/2020); and Tenoplasty of hand (Left, 8/26/2021).    Medications:   Alicia has a current medication list which includes the following prescription(s): aspirin, atorvastatin, cyclobenzaprine, diclofenac sodium, doxepin, duloxetine, esomeprazole, fluconazole, gabapentin, lactulose, linzess, mupirocin, ocrelizumab, ondansetron, promethazine, sumatriptan, topiramate, triamcinolone acetonide 0.1%, and valsartan.    Allergies:   Review of patient's allergies indicates:   Allergen Reactions    Demerol [meperidine] Anaphylaxis     Other reaction(s): Itching    Dilaudid [hydromorphone (bulk)] Anaphylaxis     "coded" per pt  Patient can tolerate Lortab    Shellfish containing products Itching, Nausea And Vomiting and Swelling    Prednisone Itching     Other reaction(s): Itching    Tramadol      shaking          OBJECTIVE       CMS Impairment/Limitation/Restriction for FOTO Multiple Sclerosis Survey    Therapist reviewed FOTO scores for Alicia Soliz on 10/4/2022.   FOTO documents entered into [employer] - see Media section.    Limitation Score: 49%        Strength:  Right         Left  Hip flexors 5/5     4/5   Knee extension 5/5     4/5   Knee flexion 5/5     4/5   Hip abductors NT/5     NT/5   DF 5/5 4+/5   PF     Ankle Eversion 5/5     4 /5     Neuro/Sensation:     Sensation to light touch  L2: Intact B  L3: Diminished on L  L4: " Diminished on L  L5: Diminished on L  S1: Diminished on L  S2: Diminished on L    Special Tests:  5x Sit<>Stand: 14 seconds  TU.8 seconds with rolling walker    Gait Analysis: uses RW; decreased stance time on LLE, circumduction of LLE.    TREATMENT     Total Treatment time (time-based codes) separate from Evaluation: 10 minutes      Alicia received the treatments listed below:      therapeutic exercises to develop strength, endurance, ROM, flexibility, posture, and core stabilization for 10 minutes including:  +Seated Marches x20  +Seated LAQ x20  +Seated Heel Raises x20    PATIENT EDUCATION AND HOME EXERCISES     Education provided:   - Yes HEP given    Written Home Exercises Provided: yes. Exercises were reviewed and Alicia was able to demonstrate them prior to the end of the session.  Alicia demonstrated good  understanding of the education provided. See EMR under Patient Instructions for exercises provided during therapy sessions.    ASSESSMENT     Alicia is a 42 y.o. female referred to outpatient Physical Therapy with a medical diagnosis of Multiple sclerosis. Patient presents with decreased strength, endurance, ROM, and gait speed and increased gait abnormalities.  These impairments are limiting their ability to perform sitting, standing, ambulating, bending, lifting activities affecting community mobility and activities.      Patient prognosis is Good.   Patient will benefit from skilled outpatient Physical Therapy to address the deficits stated above and in the chart below, provide patient /family education, and to maximize patientt's level of independence.     Plan of care discussed with patient: Yes  Patient's spiritual, cultural and educational needs considered and patient is agreeable to the plan of care and goals as stated below:     Anticipated Barriers for therapy: Chronicity of condition, comorbidities.    Medical Necessity is demonstrated by the following  History  Co-morbidities and personal  factors that may impact the plan of care Co-morbidities:   depression, high BMI, history of CVA, and HTN    Personal Factors:   no deficits     high   Examination  Body Structures and Functions, activity limitations and participation restrictions that may impact the plan of care Body Regions:   back  lower extremities  trunk    Body Systems:    gross symmetry  ROM  strength  gross coordinated movement  balance  gait  transfers  transitions  motor control  motor learning    Participation Restrictions:   Walking, Lifting, Carrying    Activity limitations:   Learning and applying knowledge  no deficits    General Tasks and Commands  no deficits    Communication  no deficits    Mobility  lifting and carrying objects  walking    Self care  no deficits    Domestic Life  cooking  doing house work (cleaning house, washing dishes, laundry)    Interactions/Relationships  no deficits    Life Areas  no deficits    Community and Social Life  community life  recreation and leisure         high   Clinical Presentation stable and uncomplicated low   Decision Making/ Complexity Score: low     Goals:  Short Term Goals: In 3 weeks   1.I with HEP  2.Pt to increase BLE strength by 1/2 grade to show improvements with sitting, standing, ambulating, bending, lifting activities.    3. Patient to improve 5x Sit<>Stand to less than 12 seconds to reduced risk of fall.  4. Patient to improve TUG with RW to less than 25 seconds to reduce risk of falls.    Long Term Goals: In 6 weeks  1. Patient to demo increase in LE strength to 1 muscle grade to show improvements with sitting, standing, ambulating, bending, lifting activities.    2. Patient to improve score on the FOTO to < or = to 48% to show improvement with QOL.   3. Patient to improve 5x Sit<>Stand to less than 10 seconds to reduced risk of fall.  4. Patient to improve TUG with RW to less than 20 seconds to reduce risk of falls.    PLAN   Plan of care Certification: 10/4/2022 to  11/15/2022.    Outpatient Physical Therapy 2 times weekly for 6 weeks to include the following interventions: Cervical/Lumbar Traction, Electrical Stimulation PRN for Pain, Gait Training, Manual Therapy, Moist Heat/ Ice, Neuromuscular Re-ed, Patient Education, Self Care, Therapeutic Activities, and Therapeutic Exercise.     Nirali Gee, PT      I CERTIFY THE NEED FOR THESE SERVICES FURNISHED UNDER THIS PLAN OF TREATMENT AND WHILE UNDER MY CARE   Physician's comments:     Physician's Signature: ___________________________________________________

## 2022-10-04 NOTE — PROGRESS NOTES
[facility]   Physical Therapy Initial Evaluation     Date: 10/4/2022   Name: Alicia Soliz  Clinic Number: [id number]    Therapy Diagnosis:   Encounter Diagnoses   Name Primary?    Multiple sclerosis Yes    Decreased strength, endurance, and mobility      Physician: Kole Carrasquillo MD    Physician Orders: PT Eval and Treat   Medical Diagnosis from Referral: G35 (ICD-10-CM) - Multiple sclerosis  Evaluation Date: 10/4/2022  Authorization Period Expiration: 10/11/2023  Plan of Care Expiration: 11/15/2022  Progress Note Due: 11/04/2022  Visit # / Visits authorized: 1/20   FOTO: 1/3    Precautions: Standard, Fall, and MS      Time In: 10:00  Time Out: 10:45  Total Appointment Time (timed & untimed codes): 45 minutes      SUBJECTIVE     Date of onset: 7 years  Mechanism of Injury: Pt diagnosed with MS 7 years ago along with a prior CVA affecting Left Side    History of current condition - Alicia reports: Pt presents post surgery after breast reduction in July 2022.  Pt presents with general deconditioning 2* previous medical history.  Pt is motivated to continue to improve strength, mobility, and endurance with carryover to community activities.      Difficulty with B/B?Pt denies  Saddle anesthesia?Pt denies  Numbness or tingling?Only with flare-ups   Weakness in LEs? Yes on the LLE  Drop foot? Yes on the LLE    Falls: Yes, a month ago in the house.      Imaging, Refer to Epic Imaging:     Prior Therapy: Yes, prior to recent surgery  Social History:  lives with their daughter  Occupation: N/A  Prior Level of Function: Independent  Current Level of Function: Independent    Pain:  Pt presents without pain or discomfort    Patients goals: improve strength, endurance, mobility.     Medical History:   Past Medical History:   Diagnosis Date    Anemia     Arthritis     Cardiac arrest as complication of care     pt states she went into cardiac arrest from an allergic reaction to a medication     "Depression     Encounter for blood transfusion     Hemiplegia due to old stroke     Hypertension     Morbid obesity with BMI of 45.0-49.9, adult 9/20/2018    Multiple sclerosis        Surgical History:   Alicia Soliz  has a past surgical history that includes Appendectomy; Tubal ligation; Tonsillectomy; Cholecystectomy; Hysterectomy; Knee surgery; Esophagogastroduodenoscopy (N/A, 2/19/2020); Robot-assisted laparoscopic sleeve gastrectomy using da [place] Xi (N/A, 2/20/2020); Esophagogastroduodenoscopy (N/A, 2/20/2020); Esophagogastroduodenoscopy (N/A, 11/25/2020); Colonoscopy (N/A, 11/25/2020); and Tenoplasty of hand (Left, 8/26/2021).    Medications:   Alicia has a current medication list which includes the following prescription(s): aspirin, atorvastatin, cyclobenzaprine, diclofenac sodium, doxepin, duloxetine, esomeprazole, fluconazole, gabapentin, lactulose, linzess, mupirocin, ocrelizumab, ondansetron, promethazine, sumatriptan, topiramate, triamcinolone acetonide 0.1%, and valsartan.    Allergies:   Review of patient's allergies indicates:   Allergen Reactions    Demerol [meperidine] Anaphylaxis     Other reaction(s): Itching    Dilaudid [hydromorphone (bulk)] Anaphylaxis     "coded" per pt  Patient can tolerate Lortab    Shellfish containing products Itching, Nausea And Vomiting and Swelling    Prednisone Itching     Other reaction(s): Itching    Tramadol      shaking          OBJECTIVE       CMS Impairment/Limitation/Restriction for FOTO Multiple Sclerosis Survey    Therapist reviewed FOTO scores for Alicia Soliz on 10/4/2022.   FOTO documents entered into [employer] - see Media section.    Limitation Score: 49%        Strength:  Right         Left  Hip flexors 5/5     4/5   Knee extension 5/5     4/5   Knee flexion 5/5     4/5   Hip abductors NT/5     NT/5   DF 5/5 4+/5   PF     Ankle Eversion 5/5     4 /5     Neuro/Sensation:     Sensation to light touch  L2: Intact B  L3: Diminished on L  L4: " Diminished on L  L5: Diminished on L  S1: Diminished on L  S2: Diminished on L    Special Tests:  5x Sit<>Stand: 14 seconds  TU.8 seconds with rolling walker    Gait Analysis: uses RW; decreased stance time on LLE, circumduction of LLE.    TREATMENT     Total Treatment time (time-based codes) separate from Evaluation: 10 minutes      Alicia received the treatments listed below:      therapeutic exercises to develop strength, endurance, ROM, flexibility, posture, and core stabilization for 10 minutes including:  +Seated Marches x20  +Seated LAQ x20  +Seated Heel Raises x20    PATIENT EDUCATION AND HOME EXERCISES     Education provided:   - Yes HEP given    Written Home Exercises Provided: yes. Exercises were reviewed and Alicia was able to demonstrate them prior to the end of the session.  Alicia demonstrated good  understanding of the education provided. See EMR under Patient Instructions for exercises provided during therapy sessions.    ASSESSMENT     Alicia is a 42 y.o. female referred to outpatient Physical Therapy with a medical diagnosis of Multiple sclerosis. Patient presents with decreased strength, endurance, ROM, and gait speed and increased gait abnormalities.  These impairments are limiting their ability to perform sitting, standing, ambulating, bending, lifting activities affecting community mobility and activities.      Patient prognosis is Good.   Patient will benefit from skilled outpatient Physical Therapy to address the deficits stated above and in the chart below, provide patient /family education, and to maximize patientt's level of independence.     Plan of care discussed with patient: Yes  Patient's spiritual, cultural and educational needs considered and patient is agreeable to the plan of care and goals as stated below:     Anticipated Barriers for therapy: Chronicity of condition, comorbidities.    Medical Necessity is demonstrated by the following  History  Co-morbidities and personal  factors that may impact the plan of care Co-morbidities:   depression, high BMI, history of CVA, and HTN    Personal Factors:   no deficits     high   Examination  Body Structures and Functions, activity limitations and participation restrictions that may impact the plan of care Body Regions:   back  lower extremities  trunk    Body Systems:    gross symmetry  ROM  strength  gross coordinated movement  balance  gait  transfers  transitions  motor control  motor learning    Participation Restrictions:   Walking, Lifting, Carrying    Activity limitations:   Learning and applying knowledge  no deficits    General Tasks and Commands  no deficits    Communication  no deficits    Mobility  lifting and carrying objects  walking    Self care  no deficits    Domestic Life  cooking  doing house work (cleaning house, washing dishes, laundry)    Interactions/Relationships  no deficits    Life Areas  no deficits    Community and Social Life  community life  recreation and leisure         high   Clinical Presentation stable and uncomplicated low   Decision Making/ Complexity Score: low     Goals:  Short Term Goals: In 3 weeks   1.I with HEP  2.Pt to increase BLE strength by 1/2 grade to show improvements with sitting, standing, ambulating, bending, lifting activities.    3. Patient to improve 5x Sit<>Stand to less than 12 seconds to reduced risk of fall.  4. Patient to improve TUG with RW to less than 25 seconds to reduce risk of falls.    Long Term Goals: In 6 weeks  1. Patient to demo increase in LE strength to 1 muscle grade to show improvements with sitting, standing, ambulating, bending, lifting activities.    2. Patient to improve score on the FOTO to < or = to 48% to show improvement with QOL.   3. Patient to improve 5x Sit<>Stand to less than 10 seconds to reduced risk of fall.  4. Patient to improve TUG with RW to less than 20 seconds to reduce risk of falls.    PLAN   Plan of care Certification: 10/4/2022 to  11/15/2022.    Outpatient Physical Therapy 2 times weekly for 6 weeks to include the following interventions: Cervical/Lumbar Traction, Electrical Stimulation PRN for Pain, Gait Training, Manual Therapy, Moist Heat/ Ice, Neuromuscular Re-ed, Patient Education, Self Care, Therapeutic Activities, and Therapeutic Exercise.     Nirali Gee, PT      I CERTIFY THE NEED FOR THESE SERVICES FURNISHED UNDER THIS PLAN OF TREATMENT AND WHILE UNDER MY CARE   Physician's comments:     Physician's Signature: ___________________________________________________

## 2022-10-07 ENCOUNTER — PATIENT MESSAGE (OUTPATIENT)
Dept: NEUROLOGY | Facility: CLINIC | Age: 42
End: 2022-10-07
Payer: MEDICARE

## 2022-10-10 NOTE — TELEPHONE ENCOUNTER
SW intern received email confirming that an MS navigator has been assigned to the pt's case. Contact: [email or web address] or by phone at [phone], [phone]. [phone]

## 2022-10-11 ENCOUNTER — CLINICAL SUPPORT (OUTPATIENT)
Dept: REHABILITATION | Facility: HOSPITAL | Age: 42
End: 2022-10-11
Payer: MEDICARE

## 2022-10-11 DIAGNOSIS — R53.1 DECREASED STRENGTH, ENDURANCE, AND MOBILITY: Primary | ICD-10-CM

## 2022-10-11 DIAGNOSIS — Z74.09 DECREASED STRENGTH, ENDURANCE, AND MOBILITY: Primary | ICD-10-CM

## 2022-10-11 DIAGNOSIS — R68.89 DECREASED STRENGTH, ENDURANCE, AND MOBILITY: Primary | ICD-10-CM

## 2022-10-11 PROCEDURE — 97112 NEUROMUSCULAR REEDUCATION: CPT | Mod: CQ

## 2022-10-11 PROCEDURE — 97110 THERAPEUTIC EXERCISES: CPT | Mod: CQ

## 2022-10-11 NOTE — PROGRESS NOTES
"[facility]   Physical Therapist Assistant Treatment Note     Name: Alicia Soliz  Clinic Number: [id number]    Therapy Diagnosis:   Encounter Diagnosis   Name Primary?    Decreased strength, endurance, and mobility Yes     Physician: Kole Carrasquillo MD    Visit Date: 10/11/2022    Physician Orders: PT Eval and Treat   Medical Diagnosis from Referral: G35 (ICD-10-CM) - Multiple sclerosis  Evaluation Date: 10/4/2022  Authorization Period Expiration: 12/31/22  Plan of Care Expiration: 11/15/2022  Progress Note Due: 11/04/2022  Visit # / Visits authorized: 1/12  FOTO: 1/3     Precautions: Standard, Fall, and MS      PTA Visit #: 1/5     Time In: 12:45  Time Out: 2:00  Total Billable Time: 70 minutes    SUBJECTIVE     Pt reports: no specific complaints voiced today. Patient ready to work to "get better"  She was compliant with home exercise program.  Response to previous treatment: no issues  Functional change: na at this time    Pain: 0/10  Location: ---     OBJECTIVE     Objective Measures updated at progress report unless specified.     Treatment     Alicia received the treatments listed below:      therapeutic exercises to develop strength, flexibility, posture, and core stabilization for 38 minutes including:  Bridges x10; added ball squeeze 2x8  L hip flexion from the floor off edge of mat w/ankle DF 3x8  Prone knee flexion 2x10 B  Passive L hip stretch  Seated heel raises w/toes on rolled towel to facilitate toe extension 2x10  Seated DF w/heel on rolled towel 3x8    manual therapy techniques: for 0 minutes, including:  --    neuromuscular re-education activities to improve: Balance, Coordination, Kinesthetic Sense, and Proprioception for 32 minutes. The following activities were included:  Supine to prone into quadruped to tall kneeling (swiss ball in front)  Tall kneeling balance  Tall kneeling w/alternating arms to 90  Tall kneeling w/arms out><in   Quadruped alternating arm " reach  Gait mechanics training: working on left knee release to initiate swing through; symmetrical stride length, and decreased shoulder elevation    therapeutic activities to improve functional performance for 0  minutes, including:  --        Patient Education and Home Exercises     Home Exercises Provided and Patient Education Provided     Education provided:   - HEP review  - Gait mechanics  - Possible need for custom AFO    Written Home Exercises Provided: Patient instructed to cont prior HEP. Exercises were reviewed and Alicia was able to demonstrate them prior to the end of the session.  Alicia demonstrated good  understanding of the education provided. See EMR under Patient Instructions for exercises provided during therapy sessions    ASSESSMENT     Alicia presented to therapy today on her rolling walker ambulating with elevated shoulders and increased weight through her arms. Her fredy was very slow and she demonstrated multiple gait deviations including asymmetrical stance time, asymmetrical stride/steppage and very little functional left ankle dorsiflexion and knee release with swing to stance . Today general strengthening and neuro re-ed activities initiated requiring increased time due to patient's impaired functional activity endurance and overall weakness. She did do well with mat mobility including transitioning to prone and quadruped to tall kneeling requiring only SBA to CGA. Also initiated work on her gait mechanics as she is very challenged with left knee release to transition initial swing to stance. Alicia demonstrated a positive response to her session today as evidenced with her activity tolerance, progression and no reported adverse effects. Continued skilled intervention indicated to improve patient's strength and stability to decrease patient's fall risk and improve overall mobility/gait mechanics.   Alicia Is progressing well towards her goals.   Pt prognosis is Good.     Pt will continue  to benefit from skilled outpatient physical therapy to address the deficits listed in the problem list box on initial evaluation, provide pt/family education and to maximize pt's level of independence in the home and community environment.     Pt's spiritual, cultural and educational needs considered and pt agreeable to plan of care and goals.     Anticipated barriers to physical therapy: chronicity of condition and comorbidities    Goals:     Short Term Goals: In 3 weeks   1.I with HEP  2.Pt to increase BLE strength by 1/2 grade to show improvements with sitting, standing, ambulating, bending, lifting activities.    3. Patient to improve 5x Sit<>Stand to less than 12 seconds to reduced risk of fall.  4. Patient to improve TUG with RW to less than 25 seconds to reduce risk of falls.     Long Term Goals: In 6 weeks  1. Patient to demo increase in LE strength to 1 muscle grade to show improvements with sitting, standing, ambulating, bending, lifting activities.    2. Patient to improve score on the FOTO to < or = to 48% to show improvement with QOL.   3. Patient to improve 5x Sit<>Stand to less than 10 seconds to reduced risk of fall.  4. Patient to improve TUG with RW to less than 20 seconds to reduce risk of falls.     PLAN     Plan of care Certification: 10/4/2022 to 11/15/2022.     Outpatient Physical Therapy 2 times weekly for 6 weeks to include the following interventions: Cervical/Lumbar Traction, Electrical Stimulation PRN for Pain, Gait Training, Manual Therapy, Moist Heat/ Ice, Neuromuscular Re-ed, Patient Education, Self Care, Therapeutic Activities, and Therapeutic Exercise.     Vera Ozuna, PTA

## 2022-10-12 ENCOUNTER — DOCUMENTATION ONLY (OUTPATIENT)
Dept: REHABILITATION | Facility: HOSPITAL | Age: 42
End: 2022-10-12
Payer: MEDICARE

## 2022-10-12 NOTE — PROGRESS NOTES
PT/PTA met face to face to discuss pt's treatment plan and progress towards established goals. Pt will be seen by a physical therapist minimally every 6th visit or every 30 days.      Vera Ozuna PTA

## 2022-10-13 ENCOUNTER — PATIENT MESSAGE (OUTPATIENT)
Dept: INTERNAL MEDICINE | Facility: CLINIC | Age: 42
End: 2022-10-13
Payer: MEDICARE

## 2022-10-14 ENCOUNTER — TELEPHONE (OUTPATIENT)
Dept: ADMINISTRATIVE | Facility: HOSPITAL | Age: 42
End: 2022-10-14
Payer: MEDICARE

## 2022-10-14 NOTE — TELEPHONE ENCOUNTER
SW intern phoned pt to inquire about progress with [facility] referral.  She is waiting to hear whether they approved the fransico.

## 2022-10-18 ENCOUNTER — TELEPHONE (OUTPATIENT)
Dept: REHABILITATION | Facility: HOSPITAL | Age: 42
End: 2022-10-18
Payer: MEDICARE

## 2022-10-18 NOTE — TELEPHONE ENCOUNTER
Left voice mail reminding patient of appointment Thursday 8:45 since she has been cancelling appointments and did not show up to therapy this morning.

## 2022-10-20 ENCOUNTER — CLINICAL SUPPORT (OUTPATIENT)
Dept: REHABILITATION | Facility: HOSPITAL | Age: 42
End: 2022-10-20
Payer: MEDICARE

## 2022-10-20 ENCOUNTER — PATIENT MESSAGE (OUTPATIENT)
Dept: NEUROLOGY | Facility: CLINIC | Age: 42
End: 2022-10-20
Payer: MEDICARE

## 2022-10-20 DIAGNOSIS — Z78.9 IMPAIRED MOBILITY AND ACTIVITIES OF DAILY LIVING: ICD-10-CM

## 2022-10-20 DIAGNOSIS — R53.81 DEBILITY: Primary | ICD-10-CM

## 2022-10-20 DIAGNOSIS — R68.89 DECREASED STRENGTH, ENDURANCE, AND MOBILITY: ICD-10-CM

## 2022-10-20 DIAGNOSIS — R53.1 DECREASED STRENGTH, ENDURANCE, AND MOBILITY: ICD-10-CM

## 2022-10-20 DIAGNOSIS — Z74.09 IMPAIRED MOBILITY AND ACTIVITIES OF DAILY LIVING: ICD-10-CM

## 2022-10-20 DIAGNOSIS — G35 MULTIPLE SCLEROSIS: Primary | ICD-10-CM

## 2022-10-20 DIAGNOSIS — Z74.09 DECREASED STRENGTH, ENDURANCE, AND MOBILITY: ICD-10-CM

## 2022-10-20 DIAGNOSIS — Z74.09 DECREASED FUNCTIONAL MOBILITY AND ENDURANCE: ICD-10-CM

## 2022-10-20 PROCEDURE — 97110 THERAPEUTIC EXERCISES: CPT

## 2022-10-20 PROCEDURE — 97112 NEUROMUSCULAR REEDUCATION: CPT

## 2022-10-20 NOTE — PROGRESS NOTES
[facility]   Physical Therapist Treatment Note     Name: Alicia Soilz  Clinic Number: [id number]    Therapy Diagnosis:   Encounter Diagnoses   Name Primary?    Debility Yes    Decreased strength, endurance, and mobility     Decreased functional mobility and endurance        Physician: Kole Carrasquillo MD    Visit Date: 10/20/2022    Physician Orders: PT Eval and Treat   Medical Diagnosis from Referral: G35 (ICD-10-CM) - Multiple sclerosis  Evaluation Date: 10/4/2022  Authorization Period Expiration: 12/31/22  Plan of Care Expiration: 11/15/2022  Progress Note Due: 11/04/2022  Visit # / Visits authorized: 2/ 12  FOTO: 1/3     Precautions: Standard, Fall, and MS      PTA Visit #: 1/5     Time In: 9:26  Time Out:10:28  Total Billable Time: 62 minutes    SUBJECTIVE     Pt reports:she is having a flare up today. Patient reports she has been having transportation issues  She was compliant with home exercise program.  Response to previous treatment: no issues  Functional change: na at this time    Pain: 7/10  Location: all over    OBJECTIVE     Objective Measures updated at progress report unless specified.     Treatment     Alicia received the treatments listed below:      therapeutic exercises to develop strength, flexibility, posture, and core stabilization for 47  minutes including:  Sit to stand off black table with no UE support 10x with CGA for balance and safety and cuing for increase WB in L LE  Heel and toe raises 1 x 20 reps  Sitting L knee extension 1 x 20 reps  Alternating hip flexion sitting 2 x 10 reps  Sitting alternating hip abduction 2 x 10 reps B  Ball squeezes 2 x 10 reps  Sitting on EOM with upright posture with weighted ball in and out 1 x 20 reps and overhead 1 x 10 reps each  Gait and transfer training  Nu step for ROM, strength, and endurance x 10 minutes R:3    neuromuscular re-education activities to improve: Balance, Coordination, Kinesthetic Sense, and  Proprioception for 15 minutes. The following activities were included:  WS in // bars forward back/ toe push off and heel strike  Step ups 2 x 10 reps 2 in box in // bars with UE support with CGA for safety      deferred    Bridges x10; added ball squeeze 2x8  L hip flexion from the floor off edge of mat w/ankle DF 3x8  Prone knee flexion 2x10 B  Passive L hip stretch  Seated heel raises w/toes on rolled towel to facilitate toe extension 2x10  Seated DF w/heel on rolled towel 3x8    manual therapy techniques: for 0 minutes, including:  --    neuromuscular re-education activities to improve: Balance, Coordination, Kinesthetic Sense, and Proprioception for 0 minutes. The following activities were included:  Supine to prone into quadruped to tall kneeling (swiss ball in front)  Tall kneeling balance  Tall kneeling w/alternating arms to 90  Tall kneeling w/arms out><in   Quadruped alternating arm reach  Gait mechanics training: working on left knee release to initiate swing through; symmetrical stride length, and decreased shoulder elevation    therapeutic activities to improve functional performance for 0  minutes, including:  --        Patient Education and Home Exercises     Home Exercises Provided and Patient Education Provided     Education provided:   - HEP review  - Gait mechanics  - Possible need for custom AFO    Written Home Exercises Provided: Patient instructed to cont prior HEP. Exercises were reviewed and Alicia was able to demonstrate them prior to the end of the session.  Alicia demonstrated good  understanding of the education provided. See EMR under Patient Instructions for exercises provided during therapy sessions    ASSESSMENT     Alicia presented to therapy today on her rolling walker ambulating with slow gait pattern and having difficulty advancing L LE. Patient reports her MS is acting up today and she is moving slow and her speech at times is slurred. She also demonstrated difficulty finding her  words today. Treatment focused on LE/ core strengthening and endurance. She tolerated treatment well with rest breaks. She is very motivated to get stronger.         Alicia Is progressing well towards her goals.   Pt prognosis is Good.     Pt will continue to benefit from skilled outpatient physical therapy to address the deficits listed in the problem list box on initial evaluation, provide pt/family education and to maximize pt's level of independence in the home and community environment.     Pt's spiritual, cultural and educational needs considered and pt agreeable to plan of care and goals.     Anticipated barriers to physical therapy: chronicity of condition and comorbidities    Goals:     Short Term Goals: In 3 weeks   1.I with HEP  2.Pt to increase BLE strength by 1/2 grade to show improvements with sitting, standing, ambulating, bending, lifting activities.    3. Patient to improve 5x Sit<>Stand to less than 12 seconds to reduced risk of fall.  4. Patient to improve TUG with RW to less than 25 seconds to reduce risk of falls.     Long Term Goals: In 6 weeks  1. Patient to demo increase in LE strength to 1 muscle grade to show improvements with sitting, standing, ambulating, bending, lifting activities.    2. Patient to improve score on the FOTO to < or = to 48% to show improvement with QOL.   3. Patient to improve 5x Sit<>Stand to less than 10 seconds to reduced risk of fall.  4. Patient to improve TUG with RW to less than 20 seconds to reduce risk of falls.     PLAN     Plan of care Certification: 10/4/2022 to 11/15/2022.     Outpatient Physical Therapy 2 times weekly for 6 weeks to include the following interventions: Cervical/Lumbar Traction, Electrical Stimulation PRN for Pain, Gait Training, Manual Therapy, Moist Heat/ Ice, Neuromuscular Re-ed, Patient Education, Self Care, Therapeutic Activities, and Therapeutic Exercise.     Rachele Oneil, PT

## 2022-10-25 ENCOUNTER — DOCUMENTATION ONLY (OUTPATIENT)
Dept: REHABILITATION | Facility: HOSPITAL | Age: 42
End: 2022-10-25
Payer: MEDICARE

## 2022-10-25 NOTE — PROGRESS NOTES
Spoke with patient regarding her missed visit today at 9:15 am. She stated her transportation did not pick her up but confirmed her next appointment on 10/27/22 at 1:45 pm.    Vera Ozuna PTA

## 2022-10-26 ENCOUNTER — OFFICE VISIT (OUTPATIENT)
Dept: URGENT CARE | Facility: CLINIC | Age: 42
End: 2022-10-26
Payer: MEDICARE

## 2022-10-26 VITALS
RESPIRATION RATE: 18 BRPM | WEIGHT: 279 LBS | OXYGEN SATURATION: 100 % | HEART RATE: 94 BPM | SYSTOLIC BLOOD PRESSURE: 136 MMHG | TEMPERATURE: 98 F | BODY MASS INDEX: 44.84 KG/M2 | DIASTOLIC BLOOD PRESSURE: 76 MMHG | HEIGHT: 66 IN

## 2022-10-26 DIAGNOSIS — H60.391 OTHER INFECTIVE ACUTE OTITIS EXTERNA OF RIGHT EAR: Primary | ICD-10-CM

## 2022-10-26 DIAGNOSIS — H92.01 OTALGIA OF RIGHT EAR: ICD-10-CM

## 2022-10-26 PROCEDURE — 3075F SYST BP GE 130 - 139MM HG: CPT | Mod: CPTII,S$GLB,, | Performed by: PHYSICIAN ASSISTANT

## 2022-10-26 PROCEDURE — 1159F MED LIST DOCD IN RCRD: CPT | Mod: CPTII,S$GLB,, | Performed by: PHYSICIAN ASSISTANT

## 2022-10-26 PROCEDURE — 1159F PR MEDICATION LIST DOCUMENTED IN MEDICAL RECORD: ICD-10-PCS | Mod: CPTII,S$GLB,, | Performed by: PHYSICIAN ASSISTANT

## 2022-10-26 PROCEDURE — 99214 OFFICE O/P EST MOD 30 MIN: CPT | Mod: S$GLB,,, | Performed by: PHYSICIAN ASSISTANT

## 2022-10-26 PROCEDURE — 3075F PR MOST RECENT SYSTOLIC BLOOD PRESS GE 130-139MM HG: ICD-10-PCS | Mod: CPTII,S$GLB,, | Performed by: PHYSICIAN ASSISTANT

## 2022-10-26 PROCEDURE — 1160F PR REVIEW ALL MEDS BY PRESCRIBER/CLIN PHARMACIST DOCUMENTED: ICD-10-PCS | Mod: CPTII,S$GLB,, | Performed by: PHYSICIAN ASSISTANT

## 2022-10-26 PROCEDURE — 4010F PR ACE/ARB THEARPY RXD/TAKEN: ICD-10-PCS | Mod: CPTII,S$GLB,, | Performed by: PHYSICIAN ASSISTANT

## 2022-10-26 PROCEDURE — 3044F HG A1C LEVEL LT 7.0%: CPT | Mod: CPTII,S$GLB,, | Performed by: PHYSICIAN ASSISTANT

## 2022-10-26 PROCEDURE — 4010F ACE/ARB THERAPY RXD/TAKEN: CPT | Mod: CPTII,S$GLB,, | Performed by: PHYSICIAN ASSISTANT

## 2022-10-26 PROCEDURE — 3078F PR MOST RECENT DIASTOLIC BLOOD PRESSURE < 80 MM HG: ICD-10-PCS | Mod: CPTII,S$GLB,, | Performed by: PHYSICIAN ASSISTANT

## 2022-10-26 PROCEDURE — 99214 PR OFFICE/OUTPT VISIT, EST, LEVL IV, 30-39 MIN: ICD-10-PCS | Mod: S$GLB,,, | Performed by: PHYSICIAN ASSISTANT

## 2022-10-26 PROCEDURE — 3078F DIAST BP <80 MM HG: CPT | Mod: CPTII,S$GLB,, | Performed by: PHYSICIAN ASSISTANT

## 2022-10-26 PROCEDURE — 3044F PR MOST RECENT HEMOGLOBIN A1C LEVEL <7.0%: ICD-10-PCS | Mod: CPTII,S$GLB,, | Performed by: PHYSICIAN ASSISTANT

## 2022-10-26 PROCEDURE — 1160F RVW MEDS BY RX/DR IN RCRD: CPT | Mod: CPTII,S$GLB,, | Performed by: PHYSICIAN ASSISTANT

## 2022-10-26 RX ORDER — ACETAMINOPHEN 500 MG
500 TABLET ORAL
Status: COMPLETED | OUTPATIENT
Start: 2022-10-26 | End: 2022-10-26

## 2022-10-26 RX ORDER — OFLOXACIN 3 MG/ML
5 SOLUTION AURICULAR (OTIC) DAILY
Qty: 5 ML | Refills: 0 | Status: SHIPPED | OUTPATIENT
Start: 2022-10-26 | End: 2022-11-05

## 2022-10-26 RX ORDER — ACETAMINOPHEN 325 MG/1
325 TABLET ORAL
Status: DISCONTINUED | OUTPATIENT
Start: 2022-10-26 | End: 2022-10-26

## 2022-10-26 RX ADMIN — Medication 500 MG: at 01:10

## 2022-10-26 NOTE — PROGRESS NOTES
"Subjective:       Patient ID: Alicia Soliz is a 42 y.o. female.    Vitals:  height is 5' 6" (1.676 m) and weight is 126.6 kg (279 lb). Her oral temperature is 98.2 °F (36.8 °C). Her blood pressure is 136/76 and her pulse is 94. Her respiration is 18 and oxygen saturation is 100%.     Chief Complaint: Otalgia    Patient came in today for ear ache on right side. Ear ache started 1 week ago.  No known sick contacts.  Denies fever, ear drainage, hearing loss, or tinnitus.    Otalgia   There is pain in the right ear. This is a new problem. The current episode started 1 to 4 weeks ago. The problem occurs constantly. The problem has been gradually worsening. There has been no fever. The pain is at a severity of 8/10. The pain is moderate. Pertinent negatives include no abdominal pain, coughing, diarrhea, ear discharge, headaches, hearing loss, neck pain, rash, rhinorrhea, sore throat or vomiting. She has tried acetaminophen and NSAIDs for the symptoms. The treatment provided no relief. There is no history of a chronic ear infection, hearing loss or a tympanostomy tube.     HENT:  Positive for ear pain. Negative for ear discharge, hearing loss and sore throat.    Neck: Negative for neck pain.   Respiratory:  Negative for cough.    Gastrointestinal:  Negative for abdominal pain, vomiting and diarrhea.   Skin:  Negative for rash.   Neurological:  Negative for headaches.     Objective:      Physical Exam   Constitutional: She is oriented to person, place, and time. She appears well-developed.   HENT:   Head: Normocephalic and atraumatic.   Ears:   Right Ear: Hearing and external ear normal. There is swelling and tenderness.   Left Ear: External ear normal.   Nose: Nose normal.   Mouth/Throat: Oropharynx is clear and moist. Mucous membranes are moist.   Eyes: Conjunctivae and EOM are normal. Pupils are equal, round, and reactive to light.   Neck: Neck supple.   Cardiovascular: Normal rate, regular rhythm, normal heart " sounds and normal pulses.   Pulmonary/Chest: Effort normal and breath sounds normal.   Musculoskeletal: Normal range of motion.         General: Normal range of motion.   Neurological: She is alert and oriented to person, place, and time.   Skin: Skin is warm and dry.   Vitals reviewed.      Assessment:       1. Other infective acute otitis externa of right ear    2. Otalgia of right ear          Plan:         Other infective acute otitis externa of right ear  -     ofloxacin (FLOXIN) 0.3 % otic solution; Place 5 drops into the left ear once daily. for 10 days  Dispense: 5 mL; Refill: 0  -     Discontinue: acetaminophen tablet 325 mg    Otalgia of right ear  -     Discontinue: acetaminophen tablet 325 mg  -     acetaminophen tablet 500 mg       Floxin otic sent to pharmacy.  Tylenol and/or Ibuprofen for pain.  Keep ear clean and dry.  Ear recheck in 2 weeks or as needed.  Follow up with new or worsening symptoms.

## 2022-10-27 ENCOUNTER — CLINICAL SUPPORT (OUTPATIENT)
Dept: REHABILITATION | Facility: HOSPITAL | Age: 42
End: 2022-10-27
Payer: MEDICARE

## 2022-10-27 DIAGNOSIS — G35 MULTIPLE SCLEROSIS: Primary | ICD-10-CM

## 2022-10-27 DIAGNOSIS — R53.1 DECREASED STRENGTH: ICD-10-CM

## 2022-10-27 PROCEDURE — 97110 THERAPEUTIC EXERCISES: CPT

## 2022-10-27 PROCEDURE — 97112 NEUROMUSCULAR REEDUCATION: CPT

## 2022-10-27 NOTE — PROGRESS NOTES
[facility]   Physical Therapist Treatment Note     Name: Alicia Soliz  Clinic Number: [id number]    Therapy Diagnosis:   Encounter Diagnoses   Name Primary?    Multiple sclerosis Yes    Decreased strength        Physician: Kole Carrasquillo MD    Visit Date: 10/27/2022    Physician Orders: PT Eval and Treat   Medical Diagnosis from Referral: G35 (ICD-10-CM) - Multiple sclerosis  Evaluation Date: 10/4/2022  Authorization Period Expiration: 12/31/22  Plan of Care Expiration: 11/15/2022  Progress Note Due: 11/04/2022  Visit # / Visits authorized: 2/ 12  FOTO: 1/3     Precautions: Standard, Fall, and MS      PTA Visit #: 1/5     Time In: 1345  Time Out:27570  Total Billable Time: 45 minutes    SUBJECTIVE     Pt reports: doing well today but arrived early 2* transportation difficulties  She was compliant with home exercise program.  Response to previous treatment: no issues  Functional change: na at this time    Pain: NA this visit/10  Location: all over    OBJECTIVE     Objective Measures updated at progress report unless specified.     Treatment     Alicia received the treatments listed below:      therapeutic exercises to develop strength, flexibility, posture, and core stabilization for 15  minutes including:  Sit to stand off black table with no UE support 20x with CGA for balance and safety and cuing for increase WB in L LE +RTB    neuromuscular re-education activities to improve: Balance, Coordination, Kinesthetic Sense, and Proprioception for 30 minutes. The following activities were included:  WS in //Bars laterally for TKE and Hip mobility.  WS in // bars forward back/ toe push off and heel strike      deferred    Bridges x10; added ball squeeze 2x8  L hip flexion from the floor off edge of mat w/ankle DF 3x8  Prone knee flexion 2x10 B  Passive L hip stretch  Seated heel raises w/toes on rolled towel to facilitate toe extension 2x10  Seated DF w/heel on rolled towel 3x8    manual  therapy techniques: for 0 minutes, including:  --    neuromuscular re-education activities to improve: Balance, Coordination, Kinesthetic Sense, and Proprioception for 0 minutes. The following activities were included:  Supine to prone into quadruped to tall kneeling (swiss ball in front)  Tall kneeling balance  Tall kneeling w/alternating arms to 90  Tall kneeling w/arms out><in   Quadruped alternating arm reach  Gait mechanics training: working on left knee release to initiate swing through; symmetrical stride length, and decreased shoulder elevation    therapeutic activities to improve functional performance for 0  minutes, including:  --        Patient Education and Home Exercises     Home Exercises Provided and Patient Education Provided     Education provided:   - HEP review  - Gait mechanics  - Possible need for custom AFO    Written Home Exercises Provided: Patient instructed to cont prior HEP. Exercises were reviewed and Alicia was able to demonstrate them prior to the end of the session.  Alicia demonstrated good  understanding of the education provided. See EMR under Patient Instructions for exercises provided during therapy sessions    ASSESSMENT     Alicia presented to therapy today on her rolling walker ambulating with slow gait pattern and having difficulty advancing L LE with L circumduction gait. Developed strength in posterior LE chain with sit<>stands and requiring a RTB to promote Hip Abduction 2* weakness.  Incorporated lateral weight shifting with Max Vcs and quad tapping to achieve LLE TKE for improved hip strategy with walking. Progressed to fwd/bwd weight shifting with improved midstance requiring Max VCs for limb advancement and stance phase during gait.  Will continue to progress per tissue tolerance for improved strength and community ambulation.        Alicia Is progressing well towards her goals.   Pt prognosis is Good.     Pt will continue to benefit from skilled outpatient physical  therapy to address the deficits listed in the problem list box on initial evaluation, provide pt/family education and to maximize pt's level of independence in the home and community environment.     Pt's spiritual, cultural and educational needs considered and pt agreeable to plan of care and goals.     Anticipated barriers to physical therapy: chronicity of condition and comorbidities    Goals:     Short Term Goals: In 3 weeks   1.I with HEP  2.Pt to increase BLE strength by 1/2 grade to show improvements with sitting, standing, ambulating, bending, lifting activities.    3. Patient to improve 5x Sit<>Stand to less than 12 seconds to reduced risk of fall.  4. Patient to improve TUG with RW to less than 25 seconds to reduce risk of falls.     Long Term Goals: In 6 weeks  1. Patient to demo increase in LE strength to 1 muscle grade to show improvements with sitting, standing, ambulating, bending, lifting activities.    2. Patient to improve score on the FOTO to < or = to 48% to show improvement with QOL.   3. Patient to improve 5x Sit<>Stand to less than 10 seconds to reduced risk of fall.  4. Patient to improve TUG with RW to less than 20 seconds to reduce risk of falls.     PLAN     Plan of care Certification: 10/4/2022 to 11/15/2022.     Outpatient Physical Therapy 2 times weekly for 6 weeks to include the following interventions: Cervical/Lumbar Traction, Electrical Stimulation PRN for Pain, Gait Training, Manual Therapy, Moist Heat/ Ice, Neuromuscular Re-ed, Patient Education, Self Care, Therapeutic Activities, and Therapeutic Exercise.     Fred Anderson, SPT

## 2022-11-01 ENCOUNTER — TELEPHONE (OUTPATIENT)
Dept: SURGERY | Facility: CLINIC | Age: 42
End: 2022-11-01
Payer: MEDICARE

## 2022-11-01 ENCOUNTER — CLINICAL SUPPORT (OUTPATIENT)
Dept: REHABILITATION | Facility: HOSPITAL | Age: 42
End: 2022-11-01
Payer: MEDICARE

## 2022-11-01 DIAGNOSIS — G81.90 HEMIPLEGIA, UNSPECIFIED ETIOLOGY, UNSPECIFIED HEMIPLEGIA TYPE, UNSPECIFIED LATERALITY: ICD-10-CM

## 2022-11-01 DIAGNOSIS — Z74.09 DECREASED STRENGTH, ENDURANCE, AND MOBILITY: Primary | ICD-10-CM

## 2022-11-01 DIAGNOSIS — R26.9 GAIT ABNORMALITY: ICD-10-CM

## 2022-11-01 DIAGNOSIS — G35 MULTIPLE SCLEROSIS: ICD-10-CM

## 2022-11-01 DIAGNOSIS — R53.1 DECREASED STRENGTH, ENDURANCE, AND MOBILITY: Primary | ICD-10-CM

## 2022-11-01 DIAGNOSIS — R68.89 DECREASED STRENGTH, ENDURANCE, AND MOBILITY: Primary | ICD-10-CM

## 2022-11-01 PROCEDURE — 97110 THERAPEUTIC EXERCISES: CPT

## 2022-11-01 PROCEDURE — 97112 NEUROMUSCULAR REEDUCATION: CPT

## 2022-11-01 NOTE — TELEPHONE ENCOUNTER
----- Message from Imelda Hendrix sent at 11/1/2022 11:05 AM CDT -----  Contact: Self/[phone]  Pt is calling in regards to getting a call back to discuss some information about surgery, she is not a pt just wants some information before becoming a new patient. Please give her a call back at [phone]. Thank you s/g

## 2022-11-01 NOTE — PROGRESS NOTES
"[facility] OUTPATIENT THERAPY AND WELLNESS   Physical Therapist Treatment Note     Name: Alicia Soliz  Clinic Number: [id number]    Therapy Diagnosis:   Encounter Diagnoses   Name Primary?    Decreased strength, endurance, and mobility Yes    Gait abnormality     Multiple sclerosis     Hemiplegia, unspecified etiology, unspecified hemiplegia type, unspecified laterality        Physician: Kole Carrasquillo MD    Visit Date: 11/1/2022    Physician Orders: PT Eval and Treat   Medical Diagnosis from Referral: G35 (ICD-10-CM) - Multiple sclerosis  Evaluation Date: 10/4/2022  Authorization Period Expiration: 12/31/22  Plan of Care Expiration: 11/15/2022  Progress Note Due: 11/04/2022  Visit # / Visits authorized: 4/ 12  FOTO: 1/3     Precautions: Standard, Fall, and MS      PTA Visit #: 1/5     Time In: 1312  Time Out:1355  Total Billable Time: 43 minutes    SUBJECTIVE     Pt reports: doing well today and continues to work on HEP while at home. Arrived late 2* transportation difficulties.    She was compliant with home exercise program.  Response to previous treatment: no issues  Functional change: na at this time    Pain: NA this visit/10  Location: all over    OBJECTIVE     Objective Measures updated at progress report unless specified.     Treatment     Alicia received the treatments listed below:      therapeutic exercises to develop strength, flexibility, posture, and core stabilization for 20 minutes including:  Sit to stand off black table with no UE support 20x with CGA for balance and safety and cuing for increase WB in L LE +RTB  +Standing TKE BTB x30  Bike for ROM, strength, and endurance x 6 minutes R:3  +Prone Quad Stretch 4x30" each B  +Q'Ped Hip Extension x10 each B    neuromuscular re-education activities to improve: Balance, Coordination, Kinesthetic Sense, and Proprioception for 23 minutes. The following activities were included:  WS in //Bars laterally for TKE and Hip mobility.  WS in // bars forward " back/ toe push off and heel strike      deferred    Bridges x10; added ball squeeze 2x8  L hip flexion from the floor off edge of mat w/ankle DF 3x8  Prone knee flexion 2x10 B  Passive L hip stretch  Seated heel raises w/toes on rolled towel to facilitate toe extension 2x10  Seated DF w/heel on rolled towel 3x8    manual therapy techniques: for 0 minutes, including:  --    neuromuscular re-education activities to improve: Balance, Coordination, Kinesthetic Sense, and Proprioception for 0 minutes. The following activities were included:  Supine to prone into quadruped to tall kneeling (swiss ball in front)  Tall kneeling balance  Tall kneeling w/alternating arms to 90  Tall kneeling w/arms out><in   Quadruped alternating arm reach  Gait mechanics training: working on left knee release to initiate swing through; symmetrical stride length, and decreased shoulder elevation    therapeutic activities to improve functional performance for 0  minutes, including:  --    Patient Education and Home Exercises     Home Exercises Provided and Patient Education Provided     Education provided:   - HEP review  - Gait mechanics  - Possible need for custom AFO    Written Home Exercises Provided: Patient instructed to cont prior HEP. Exercises were reviewed and Alicia was able to demonstrate them prior to the end of the session.  Alicia demonstrated good  understanding of the education provided. See EMR under Patient Instructions for exercises provided during therapy sessions    ASSESSMENT     Alicia presented to therapy today on her rolling walker ambulating with slow gait pattern and having difficulty advancing L LE with L circumduction gait. Developed strength in posterior LE chain with sit<>stands.  Incorporated lateral weight shifting with Mod Vcs and used standing TKE to achieve LLE TKE for improved midstance and stability with walking. Progressed to fwd/bwd weight shifting with improved midstance requiring Mod VCs for limb  advancement and stance phase during gait.  Provided stretching to quad and Hip Extension for improved push off during gait.  Will continue to progress per tissue tolerance for improved strength and community ambulation.        Alicia Is progressing well towards her goals.   Pt prognosis is Good.     Pt will continue to benefit from skilled outpatient physical therapy to address the deficits listed in the problem list box on initial evaluation, provide pt/family education and to maximize pt's level of independence in the home and community environment.     Pt's spiritual, cultural and educational needs considered and pt agreeable to plan of care and goals.     Anticipated barriers to physical therapy: chronicity of condition and comorbidities    Goals:     Short Term Goals: In 3 weeks   1.I with HEP  2.Pt to increase BLE strength by 1/2 grade to show improvements with sitting, standing, ambulating, bending, lifting activities.    3. Patient to improve 5x Sit<>Stand to less than 12 seconds to reduced risk of fall.  4. Patient to improve TUG with RW to less than 25 seconds to reduce risk of falls.     Long Term Goals: In 6 weeks  1. Patient to demo increase in LE strength to 1 muscle grade to show improvements with sitting, standing, ambulating, bending, lifting activities.    2. Patient to improve score on the FOTO to < or = to 48% to show improvement with QOL.   3. Patient to improve 5x Sit<>Stand to less than 10 seconds to reduced risk of fall.  4. Patient to improve TUG with RW to less than 20 seconds to reduce risk of falls.     PLAN     Plan of care Certification: 10/4/2022 to 11/15/2022.     Outpatient Physical Therapy 2 times weekly for 6 weeks to include the following interventions: Cervical/Lumbar Traction, Electrical Stimulation PRN for Pain, Gait Training, Manual Therapy, Moist Heat/ Ice, Neuromuscular Re-ed, Patient Education, Self Care, Therapeutic Activities, and Therapeutic Exercise.     Nirali Gee,  PT

## 2022-11-02 ENCOUNTER — PATIENT MESSAGE (OUTPATIENT)
Dept: INTERNAL MEDICINE | Facility: CLINIC | Age: 42
End: 2022-11-02
Payer: MEDICARE

## 2022-11-03 ENCOUNTER — CLINICAL SUPPORT (OUTPATIENT)
Dept: REHABILITATION | Facility: HOSPITAL | Age: 42
End: 2022-11-03
Payer: MEDICARE

## 2022-11-03 DIAGNOSIS — G81.90 HEMIPLEGIA, UNSPECIFIED ETIOLOGY, UNSPECIFIED HEMIPLEGIA TYPE, UNSPECIFIED LATERALITY: ICD-10-CM

## 2022-11-03 DIAGNOSIS — R26.9 GAIT ABNORMALITY: ICD-10-CM

## 2022-11-03 DIAGNOSIS — R53.1 DECREASED STRENGTH, ENDURANCE, AND MOBILITY: Primary | ICD-10-CM

## 2022-11-03 DIAGNOSIS — G35 MULTIPLE SCLEROSIS: ICD-10-CM

## 2022-11-03 DIAGNOSIS — R68.89 DECREASED STRENGTH, ENDURANCE, AND MOBILITY: Primary | ICD-10-CM

## 2022-11-03 DIAGNOSIS — Z74.09 DECREASED STRENGTH, ENDURANCE, AND MOBILITY: Primary | ICD-10-CM

## 2022-11-03 DIAGNOSIS — R53.1 LEFT-SIDED WEAKNESS: ICD-10-CM

## 2022-11-03 PROCEDURE — 97110 THERAPEUTIC EXERCISES: CPT

## 2022-11-03 NOTE — PROGRESS NOTES
"[facility]   Physical Therapist Treatment Note     Name: Alicia Soliz  Clinic Number: [id number]    Therapy Diagnosis:   Encounter Diagnoses   Name Primary?    Decreased strength, endurance, and mobility Yes    Left-sided weakness     Gait abnormality     Multiple sclerosis     Hemiplegia, unspecified etiology, unspecified hemiplegia type, unspecified laterality      Physician: Kole Carrasquillo MD    Visit Date: 11/3/2022    Physician Orders: PT Eval and Treat   Medical Diagnosis from Referral: G35 (ICD-10-CM) - Multiple sclerosis  Evaluation Date: 10/4/2022  Authorization Period Expiration: 12/31/22  Plan of Care Expiration: 11/15/2022  Progress Note Due: 11/04/2022  Visit # / Visits authorized: 5/ 12  FOTO: 1/3     Precautions: Standard, Fall, and MS      PTA Visit #: 1/5     Time In: 1125  Time Out:1215  Total Billable Time: 50 minutes    SUBJECTIVE     Pt reports: arrived early today 2* transportation.  Continues to do well with HEP and wants to do a lot for continued improvements at home.      She was compliant with home exercise program.  Response to previous treatment: no issues  Functional change: na at this time    Pain: NA this visit/10  Location: all over    OBJECTIVE     Objective Measures updated at progress report unless specified.     Treatment     Alicia received the treatments listed below:      therapeutic exercises to develop strength, flexibility, posture, and core stabilization for 40 minutes including:  Bike for ROM, strength, and endurance x 6 minutes R:3  +Prone Quad Stretch 4x30" each B  +Prone HS Curls 2x10  +Supine Bridges  +SLR Flexion 2x10  +SL Clams x20 w/ Ball between Ankles    neuromuscular re-education activities to improve: Balance, Coordination, Kinesthetic Sense, and Proprioception for 00 minutes. The following activities were included:    deferred    Bridges x10; added ball squeeze 2x8  L hip flexion from the floor off edge of mat w/ankle DF " 3x8  Prone knee flexion 2x10 B  Passive L hip stretch  Seated heel raises w/toes on rolled towel to facilitate toe extension 2x10  Seated DF w/heel on rolled towel 3x8    manual therapy techniques: for 0 minutes, including:  --    neuromuscular re-education activities to improve: Balance, Coordination, Kinesthetic Sense, and Proprioception for 0 minutes. The following activities were included:    therapeutic activities to improve functional performance for 00 minutes, including:  --    Gait Training to improve community ambulation for 10 minutes including:  +50 ft with focus on step length, and fredy    Patient Education and Home Exercises     Home Exercises Provided and Patient Education Provided     Education provided:   - HEP review  - Gait mechanics  - Possible need for custom AFO    Written Home Exercises Provided: Patient instructed to cont prior HEP. Exercises were reviewed and Alicia was able to demonstrate them prior to the end of the session.  Alicia demonstrated good  understanding of the education provided. See EMR under Patient Instructions for exercises provided during therapy sessions    ASSESSMENT     Alicia presented to therapy today on her rolling walker ambulating with slow gait pattern and having difficulty advancing L LE with L circumduction gait. Provided stretches for BLE for hip  flexors for improved mobility 2* fwd lean posture.  Developed strength into posterior chain for improved pushoff with carryover to gait.  Performed gait training with a focus on stride length and gait speed for carryover to community ambulation. Pt tolerated well without increase in symptoms and will continue to develop strength with carryover to pushoff in gait.          Alicia Is progressing well towards her goals.   Pt prognosis is Good.     Pt will continue to benefit from skilled outpatient physical therapy to address the deficits listed in the problem list box on initial evaluation, provide pt/family education  and to maximize pt's level of independence in the home and community environment.     Pt's spiritual, cultural and educational needs considered and pt agreeable to plan of care and goals.     Anticipated barriers to physical therapy: chronicity of condition and comorbidities    Goals:     Short Term Goals: In 3 weeks   1.I with HEP  2.Pt to increase BLE strength by 1/2 grade to show improvements with sitting, standing, ambulating, bending, lifting activities.    3. Patient to improve 5x Sit<>Stand to less than 12 seconds to reduced risk of fall.  4. Patient to improve TUG with RW to less than 25 seconds to reduce risk of falls.     Long Term Goals: In 6 weeks  1. Patient to demo increase in LE strength to 1 muscle grade to show improvements with sitting, standing, ambulating, bending, lifting activities.    2. Patient to improve score on the FOTO to < or = to 48% to show improvement with QOL.   3. Patient to improve 5x Sit<>Stand to less than 10 seconds to reduced risk of fall.  4. Patient to improve TUG with RW to less than 20 seconds to reduce risk of falls.     PLAN     Plan of care Certification: 10/4/2022 to 11/15/2022.     Outpatient Physical Therapy 2 times weekly for 6 weeks to include the following interventions: Cervical/Lumbar Traction, Electrical Stimulation PRN for Pain, Gait Training, Manual Therapy, Moist Heat/ Ice, Neuromuscular Re-ed, Patient Education, Self Care, Therapeutic Activities, and Therapeutic Exercise.     Nirali Gee, PT

## 2022-11-04 ENCOUNTER — PATIENT MESSAGE (OUTPATIENT)
Dept: INTERNAL MEDICINE | Facility: CLINIC | Age: 42
End: 2022-11-04
Payer: MEDICARE

## 2022-11-09 ENCOUNTER — PATIENT MESSAGE (OUTPATIENT)
Dept: ADMINISTRATIVE | Facility: HOSPITAL | Age: 42
End: 2022-11-09
Payer: MEDICARE

## 2022-11-09 ENCOUNTER — PATIENT OUTREACH (OUTPATIENT)
Dept: ADMINISTRATIVE | Facility: HOSPITAL | Age: 42
End: 2022-11-09
Payer: MEDICARE

## 2022-11-09 NOTE — PROGRESS NOTES
Working HTN Report.    Requested an updated bp reading. States she does not have a machine, but her does does.   Said she would check it and send through [employer]. Advised her I would send mess to her and asked that she respond to that mess with the bp reading.

## 2022-11-10 ENCOUNTER — TELEPHONE (OUTPATIENT)
Dept: INTERNAL MEDICINE | Facility: CLINIC | Age: 42
End: 2022-11-10
Payer: MEDICARE

## 2022-11-10 VITALS — DIASTOLIC BLOOD PRESSURE: 80 MMHG | SYSTOLIC BLOOD PRESSURE: 120 MMHG

## 2022-11-11 NOTE — TELEPHONE ENCOUNTER
MS navigator Suzanne Berg requested a signed Emergency Assistance Sp form for the [facility] so pt can receive financial assistance with rent. Faxed form to [facility] and sent copy to Suzanne.

## 2022-11-17 ENCOUNTER — CLINICAL SUPPORT (OUTPATIENT)
Dept: REHABILITATION | Facility: HOSPITAL | Age: 42
End: 2022-11-17
Payer: MEDICARE

## 2022-11-17 PROCEDURE — 97116 GAIT TRAINING THERAPY: CPT | Mod: PN

## 2022-11-17 PROCEDURE — 97110 THERAPEUTIC EXERCISES: CPT | Mod: PN

## 2022-11-17 NOTE — PROGRESS NOTES
"[facility]   Physical Therapist Treatment Note     Name: Alicia Soliz  Clinic Number: [id number]    Therapy Diagnosis:        Encounter Diagnoses   Name Primary?    Decreased strength, endurance, and mobility Yes    Left-sided weakness      Gait abnormality      Multiple sclerosis      Hemiplegia, unspecified etiology, unspecified hemiplegia type, unspecified laterality        Physician: Kole Carrasquillo MD    Visit Date: 11/17/2022    Physician Orders: PT Eval and Treat   Medical Diagnosis from Referral: G35 (ICD-10-CM) - Multiple sclerosis  Evaluation Date: 10/4/2022  Authorization Period Expiration: 12/31/22  Plan of Care Expiration: 11/15/2022  Progress Note Due: 11/04/2022  Visit # / Visits authorized: 5/ 12  FOTO: 1/3     Precautions: Standard, Fall, and MS      PTA Visit #: 1/5     Time In: 1105  Time Out: 11:45  Total Billable Time: 40 minutes    SUBJECTIVE     Pt reports: she continues to have difficulty with transportation to therapy appointments. She has no new complaints.    She was compliant with home exercise program.  Response to previous treatment: no issues  Functional change: na at this time    Pain: NA this visit/10  Location: all over    OBJECTIVE     Objective Measures updated at progress report unless specified.     Treatment     Alicia received the treatments listed below:      therapeutic exercises to develop strength, flexibility, posture, and core stabilization for 30 minutes including:  Bike for ROM, strength, and endurance x 6 minutes R:3  +Prone Quad Stretch 4x30" each B  +Prone HS Curls 2x10  +Supine Bridges  +SLR Flexion 2x10  +SL Clams x20 w/ Ball between Ankles    neuromuscular re-education activities to improve: Balance, Coordination, Kinesthetic Sense, and Proprioception for 00 minutes. The following activities were included:    deferred    Bridges x10; added ball squeeze 2x8  L hip flexion from the floor off edge of mat w/ankle DF 3x8  Prone knee " flexion 2x10 B  Passive L hip stretch  Seated heel raises w/toes on rolled towel to facilitate toe extension 2x10  Seated DF w/heel on rolled towel 3x8    manual therapy techniques: for 0 minutes, including:  --    neuromuscular re-education activities to improve: Balance, Coordination, Kinesthetic Sense, and Proprioception for 0 minutes. The following activities were included:    therapeutic activities to improve functional performance for 00 minutes, including:  --    Gait Training to improve community ambulation for 10 minutes including:  +50 ft with focus on step length, and fredy    Patient Education and Home Exercises     Home Exercises Provided and Patient Education Provided     Education provided:   - HEP review  - Gait mechanics  - Possible need for custom AFO    Written Home Exercises Provided: Patient instructed to cont prior HEP. Exercises were reviewed and Alicia was able to demonstrate them prior to the end of the session.  Alicia demonstrated good  understanding of the education provided. See EMR under Patient Instructions for exercises provided during therapy sessions    ASSESSMENT     Patient tolerated today's treatment well with occasional rest breaks for recovery. She will benefit from continued physical therapy care.      Alicia Is progressing well towards her goals.   Pt prognosis is Good.     Pt will continue to benefit from skilled outpatient physical therapy to address the deficits listed in the problem list box on initial evaluation, provide pt/family education and to maximize pt's level of independence in the home and community environment.     Pt's spiritual, cultural and educational needs considered and pt agreeable to plan of care and goals.     Anticipated barriers to physical therapy: chronicity of condition and comorbidities    Goals:     Short Term Goals: In 3 weeks   1.I with HEP  2.Pt to increase BLE strength by 1/2 grade to show improvements with sitting, standing, ambulating,  bending, lifting activities.    3. Patient to improve 5x Sit<>Stand to less than 12 seconds to reduced risk of fall.  4. Patient to improve TUG with RW to less than 25 seconds to reduce risk of falls.     Long Term Goals: In 6 weeks  1. Patient to demo increase in LE strength to 1 muscle grade to show improvements with sitting, standing, ambulating, bending, lifting activities.    2. Patient to improve score on the FOTO to < or = to 48% to show improvement with QOL.   3. Patient to improve 5x Sit<>Stand to less than 10 seconds to reduced risk of fall.  4. Patient to improve TUG with RW to less than 20 seconds to reduce risk of falls.     PLAN     Plan of care Certification: 10/4/2022 to 11/15/2022.     Outpatient Physical Therapy 2 times weekly for 6 weeks to include the following interventions: Cervical/Lumbar Traction, Electrical Stimulation PRN for Pain, Gait Training, Manual Therapy, Moist Heat/ Ice, Neuromuscular Re-ed, Patient Education, Self Care, Therapeutic Activities, and Therapeutic Exercise.     Carlos Pierce, PT

## 2022-11-22 ENCOUNTER — CLINICAL SUPPORT (OUTPATIENT)
Dept: REHABILITATION | Facility: HOSPITAL | Age: 42
End: 2022-11-22
Payer: MEDICARE

## 2022-11-22 DIAGNOSIS — R53.1 DECREASED STRENGTH: ICD-10-CM

## 2022-11-22 DIAGNOSIS — R53.1 LEFT-SIDED WEAKNESS: ICD-10-CM

## 2022-11-22 DIAGNOSIS — R53.1 DECREASED STRENGTH, ENDURANCE, AND MOBILITY: Primary | ICD-10-CM

## 2022-11-22 DIAGNOSIS — Z74.09 DECREASED FUNCTIONAL MOBILITY AND ENDURANCE: ICD-10-CM

## 2022-11-22 DIAGNOSIS — R68.89 DECREASED STRENGTH, ENDURANCE, AND MOBILITY: Primary | ICD-10-CM

## 2022-11-22 DIAGNOSIS — R26.9 GAIT ABNORMALITY: ICD-10-CM

## 2022-11-22 DIAGNOSIS — G81.90 HEMIPLEGIA, UNSPECIFIED ETIOLOGY, UNSPECIFIED HEMIPLEGIA TYPE, UNSPECIFIED LATERALITY: ICD-10-CM

## 2022-11-22 DIAGNOSIS — Z74.09 DECREASED STRENGTH, ENDURANCE, AND MOBILITY: Primary | ICD-10-CM

## 2022-11-22 PROCEDURE — 97530 THERAPEUTIC ACTIVITIES: CPT | Mod: PN,CQ

## 2022-11-22 PROCEDURE — 97110 THERAPEUTIC EXERCISES: CPT | Mod: PN,CQ

## 2022-11-22 PROCEDURE — 97116 GAIT TRAINING THERAPY: CPT | Mod: PN,CQ

## 2022-11-22 NOTE — PROGRESS NOTES
[facility]   Physical Therapist Treatment Note     Name: Alicia Soliz  Clinic Number: [id number]    Therapy Diagnosis:        Encounter Diagnoses   Name Primary?    Decreased strength, endurance, and mobility Yes    Left-sided weakness      Gait abnormality      Multiple sclerosis      Hemiplegia, unspecified etiology, unspecified hemiplegia type, unspecified laterality        Physician: Kole Carrasquillo MD    Visit Date: 11/22/2022    Physician Orders: PT Eval and Treat   Medical Diagnosis from Referral: G35 (ICD-10-CM) - Multiple sclerosis  Evaluation Date: 10/4/2022  Authorization Period Expiration: 12/31/22  Plan of Care Expiration: 11/15/2022  Progress Note Due: 11/04/2022  Visit # / Visits authorized: 7/ 12  FOTO: 1/3     Precautions: Standard, Fall, and MS      PTA Visit #: 2/5     Time In: 1435  Time Out: 1515  Total Billable Time: 40 minutes    SUBJECTIVE     Pt reports: no new complaints at this time    She was compliant with home exercise program.  Response to previous treatment: no issues  Functional change: na at this time    Pain: NA this visit/10  Location: all over    OBJECTIVE     Objective Measures updated at progress report unless specified.     Treatment     Alicia received the treatments listed below:      therapeutic exercises to develop strength, flexibility, posture, and core stabilization for 10 minutes including:  Bike for ROM, strength, and endurance x 6 minutes R:3  +Seated Marches 2x20    neuromuscular re-education activities to improve: Balance, Coordination, Kinesthetic Sense, and Proprioception for 00 minutes. The following activities were included:    deferred    Bridges x10; added ball squeeze 2x8  L hip flexion from the floor off edge of mat w/ankle DF 3x8  Prone knee flexion 2x10 B  Passive L hip stretch  Seated heel raises w/toes on rolled towel to facilitate toe extension 2x10  Seated DF w/heel on rolled towel 3x8    manual therapy techniques: for  "0 minutes, including:  --    neuromuscular re-education activities to improve: Balance, Coordination, Kinesthetic Sense, and Proprioception for 0 minutes. The following activities were included:    therapeutic activities to improve functional performance for 20 minutes, including:  +Sit<>Stand @ 16" 2x10, 15# x10  +Toe Taps on Aerobic Box w/ Standard Walker 2x20  +Toe Taps on Aerobic Box w/o and ModA 2x20    Gait Training to improve community ambulation for 10 minutes including:  +4x50 ft with focus on step length, and fredy    Patient Education and Home Exercises     Home Exercises Provided and Patient Education Provided     Education provided:   - HEP review  - Gait mechanics  - Possible need for custom AFO    Written Home Exercises Provided: Patient instructed to cont prior HEP. Exercises were reviewed and Alicia was able to demonstrate them prior to the end of the session.  Alicia demonstrated good  understanding of the education provided. See EMR under Patient Instructions for exercises provided during therapy sessions    ASSESSMENT     Patient tolerated today's treatment well with occasional rest breaks for recovery. Encouraged weight shifting for limb advancement and foot clearance with toe taps for carryover to gait training.  Able to transition to no standard walker with continued difficulty with LLE foot clearance.  Able to fully lift LLE 7x's during interventions.  Will continue to develop weight shifitng and limb advancement for carryover to household ambulation and progress as tolerated.     Alicia Is progressing well towards her goals.   Pt prognosis is Good.     Pt will continue to benefit from skilled outpatient physical therapy to address the deficits listed in the problem list box on initial evaluation, provide pt/family education and to maximize pt's level of independence in the home and community environment.     Pt's spiritual, cultural and educational needs considered and pt agreeable to plan of " care and goals.     Anticipated barriers to physical therapy: chronicity of condition and comorbidities    Goals:     Short Term Goals: In 3 weeks   1.I with HEP  2.Pt to increase BLE strength by 1/2 grade to show improvements with sitting, standing, ambulating, bending, lifting activities.    3. Patient to improve 5x Sit<>Stand to less than 12 seconds to reduced risk of fall.  4. Patient to improve TUG with RW to less than 25 seconds to reduce risk of falls.     Long Term Goals: In 6 weeks  1. Patient to demo increase in LE strength to 1 muscle grade to show improvements with sitting, standing, ambulating, bending, lifting activities.    2. Patient to improve score on the FOTO to < or = to 48% to show improvement with QOL.   3. Patient to improve 5x Sit<>Stand to less than 10 seconds to reduced risk of fall.  4. Patient to improve TUG with RW to less than 20 seconds to reduce risk of falls.     PLAN     Plan of care Certification: 10/4/2022 to 11/15/2022.     Outpatient Physical Therapy 2 times weekly for 6 weeks to include the following interventions: Cervical/Lumbar Traction, Electrical Stimulation PRN for Pain, Gait Training, Manual Therapy, Moist Heat/ Ice, Neuromuscular Re-ed, Patient Education, Self Care, Therapeutic Activities, and Therapeutic Exercise.     Fred Anderson, PTA

## 2022-11-23 ENCOUNTER — DOCUMENTATION ONLY (OUTPATIENT)
Dept: REHABILITATION | Facility: HOSPITAL | Age: 42
End: 2022-11-23
Payer: MEDICARE

## 2022-11-23 NOTE — PROGRESS NOTES
PT/PTA met face to face to discuss pt's treatment plan and progress towards established goals. Pt will be seen by a physical therapist minimally every 6th visit or every 30 days.  Fred Anderson PTA

## 2022-11-28 ENCOUNTER — PATIENT MESSAGE (OUTPATIENT)
Dept: INTERNAL MEDICINE | Facility: CLINIC | Age: 42
End: 2022-11-28
Payer: MEDICARE

## 2022-11-28 ENCOUNTER — PATIENT MESSAGE (OUTPATIENT)
Dept: NEUROLOGY | Facility: CLINIC | Age: 42
End: 2022-11-28
Payer: MEDICARE

## 2022-11-28 ENCOUNTER — TELEPHONE (OUTPATIENT)
Dept: NEUROLOGY | Facility: CLINIC | Age: 42
End: 2022-11-28
Payer: MEDICARE

## 2022-11-28 NOTE — TELEPHONE ENCOUNTER
----- Message from [place] Chevalier sent at 11/28/2022  4:44 PM CST -----  Regarding: pt advice  Pt calling to s/w Jen Meier's office regarding a recent fall. Pt says experiencing weakness in legs, back pain, tremors in left side, vertigo, (see epic). Pls call pt @ [phone].

## 2022-12-01 ENCOUNTER — PATIENT MESSAGE (OUTPATIENT)
Dept: PSYCHIATRY | Facility: CLINIC | Age: 42
End: 2022-12-01
Payer: MEDICARE

## 2022-12-05 ENCOUNTER — PATIENT MESSAGE (OUTPATIENT)
Dept: NEUROLOGY | Facility: CLINIC | Age: 42
End: 2022-12-05
Payer: MEDICARE

## 2022-12-05 NOTE — TELEPHONE ENCOUNTER
Spoke with pt who reported that MS navigator Suzanne Berg is still helping her with financial issues.  A scooter has been ordered but pt is still waiting for financial assistance with rent. 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     [email or web address]

## 2022-12-06 ENCOUNTER — PATIENT MESSAGE (OUTPATIENT)
Dept: NEUROLOGY | Facility: CLINIC | Age: 42
End: 2022-12-06
Payer: MEDICARE

## 2022-12-06 ENCOUNTER — CLINICAL SUPPORT (OUTPATIENT)
Dept: REHABILITATION | Facility: HOSPITAL | Age: 42
End: 2022-12-06
Payer: MEDICARE

## 2022-12-06 DIAGNOSIS — R53.1 LEFT-SIDED WEAKNESS: ICD-10-CM

## 2022-12-06 DIAGNOSIS — Z74.09 DECREASED FUNCTIONAL MOBILITY AND ENDURANCE: Primary | ICD-10-CM

## 2022-12-06 DIAGNOSIS — R26.9 GAIT ABNORMALITY: ICD-10-CM

## 2022-12-06 DIAGNOSIS — G35 MULTIPLE SCLEROSIS: Primary | ICD-10-CM

## 2022-12-06 PROCEDURE — 97110 THERAPEUTIC EXERCISES: CPT | Mod: PN

## 2022-12-06 PROCEDURE — 97116 GAIT TRAINING THERAPY: CPT | Mod: PN

## 2022-12-06 NOTE — TELEPHONE ENCOUNTER
"In regard to the N/V, this started yesterday after eating a chicken salad and has continued into this morning. She states the slurred speech, MS Hug and vision issues started over the weekend and have been constant. Slurred speech not appreciated during our conversation but she states it has been constant since this weekend. Pt reports face and strength symmetrical. Her vision is "hazy" in both eyes, no pain associated with this. She does have a hx of optic neuritis but said this symptom is new. New this morning she reports pain, which she describes a "pulled muscle" to the right side of her buttock, and a "pinched nerve" pain to her left side. She denies any infections or increase in stress. She did see a physician yesterday who she said recommended she go to the ER for evaluation and contact her MS doctor. She denies starting any new medication. Reviewed message with Jen. Pt aware Jen would like her to go to the lab to r/o infection. Pt v/u.  "

## 2022-12-06 NOTE — PLAN OF CARE
"  Outpatient Therapy Updated Plan of Care     Visit Date: 12/6/2022  Name: Alicia Soliz  Clinic Number: [id number]    Therapy Diagnosis:   Encounter Diagnoses   Name Primary?    Decreased functional mobility and endurance Yes    Gait abnormality     Left-sided weakness      Physician: Kole Carrasquillo MD    Physician Orders: PT Eval and Treat   Medical Diagnosis from Referral: G35 (ICD-10-CM) - Multiple sclerosis  Evaluation Date: 10/4/2022  Authorization Period Expiration: 10/11/2023  Plan of Care Expiration: 11/15/2022  Visit # / Visits authorized: 8/12 + eval   FOTO: 2/3     Precautions: Standard, Fall, and MS     Functional Level Prior to Evaluation:  Mod Independent     Time In: 3:00 pm  Time Out: 3:40 pm  Total Appointment Time (timed & untimed codes): 40 minutes    Subjective     Update: Patient presents today in with flare up of MS symptoms. She states symptoms have been worse over the past few days. The weakness and fatigue she feels is reportedly very significant. Patient states she has been doing very well outside of this flare up with now able to navigate steps. She states walking tolerance has also improved since start of therapy, with her able to navigate home well without difficulty. During flare ups, like today, she remains able to walk and function independently with walker though it is very difficult. Patient sees this also as an improvement.      Treatment      Alicia received the treatments listed below:       therapeutic exercises to develop strength, flexibility, posture, and core stabilization for 30 minutes including:  Heel and toe raises 1 x 20 reps  Alternating hip flexion sitting 2 x 10 reps  Ball squeezes 2 x 10 reps  Nustep for ROM, strength, and endurance x 6 minutes R:3  +Seated Marches 2x20  +Prone Quad Stretch 4x30" each B  +Prone HS Curls 2x10  +Supine Bridges 2 x 10  +SLR Flexion 2x10  + tests and measures     neuromuscular re-education activities to improve: Balance, " "Coordination, Kinesthetic Sense, and Proprioception for 0 minutes. The following activities were included:     therapeutic activities to improve functional performance for 00  minutes, including:  +Sit<>Stand @ 16" 2x10, 15# x10       Gait Training to improve community ambulation for 10 minutes including:  +4x50 ft with focus on step length, and fredy     Patient Education and Home Exercises      Home Exercises Provided and Patient Education Provided      Education provided:   - HEP review  - Gait mechanics  - Possible need for custom AFO     Written Home Exercises Provided: Patient instructed to cont prior HEP. Exercises were reviewed and Alicia was able to demonstrate them prior to the end of the session.  Alicia demonstrated good  understanding of the education provided. See EMR under Patient Instructions for exercises provided during therapy sessions     Objective     Update:     CMS Impairment/Limitation/Restriction for FOTO Multiple Sclerosis Survey     Therapist reviewed FOTO scores for Alicia Soliz on 10/4/2022.   FOTO documents entered into [employer] - see Media section.     Limitation Score: 52%         Strength:                    Right            Left Current   Hip flexors 5/5     4/5  L: 3/5   Knee extension 5/5     4/5  L:4-/5   Knee flexion 5/5     4/5 L:3+/5   Hip abductors NT/5     NT/5 NT   DF 5/5 4+/5 L: 4/5   Ankle Eversion 5/5     4 /5 L: 3/5       Special Tests:  5x Sit<>Stand: 14 seconds IE  12.86 seconds  2022   TU.8 seconds with rolling walker   58 seconds 2022      Gait Analysis: uses RW; decreased stance time on LLE, circumduction of LLE.    Assessment   Patient presents today at puma of MS flare up. Therefore, objective measures taken today are not a reliable depiction of patient improvement with therapy, as weakness and symptoms are exacerbated by disease. Physical therapist plans to reassess patient progress towards goals next session, once patient is in remitting " phase. Based on prior assessment and patient performance in sessions,  Alicia is progressing well with physical therapy, with minimal to no complaints of pain or discomfort and significant improvements noted in lower extremity strength and balance for improved balance and function with walking and step navigation since initial evaluation. Alicia continues to have difficulty with ambulation and other large functional activity, due to weakness that does still remain in the lower extremities and core support that become exacerbated with MS flare ups.  Patients perceived functional levels are improving with subjective reports, but  worsening since prior assessment of FOTO scoring today due to patient current status.  The above impairments and functional deficits will continue to be addressed over the course of this plan of care. Alicia was educated on continued progression of PT, expectations for the duration of care, updates on goals set at initial evaluation, and home exercise program.  Alicia will continue to benefit from physical therapy in order to further address goals, maximize function and quality of life.       Short Term Goals: In 3 weeks   1.I with HEP MET 12/6/20222  2.Pt to increase BLE strength by 1/2 grade to show improvements with sitting, standing, ambulating, bending, lifting activities.  Progressing  3. Patient to improve 5x Sit<>Stand to less than 12 seconds to reduced risk of fall. Progressing   4. Patient to improve TUG with RW to less than 25 seconds to reduce risk of falls. Progressing     Long Term Goals: In 6 weeks (Progressing)  1. Patient to demo increase in LE strength to 1 muscle grade to show improvements with sitting, standing, ambulating, bending, lifting activities.    2. Patient to improve score on the FOTO to < or = to 48% to show improvement with QOL.   3. Patient to improve 5x Sit<>Stand to less than 10 seconds to reduced risk of fall.  4. Patient to improve TUG with RW to less than 20  seconds to reduce risk of falls.    Long Term Goal Status:   continue per initial plan of care.  Reasons for Recertification of Therapy:   Pt will continue to benefit from skilled outpatient physical therapy to address the deficits listed in the problem list box on initial evaluation, provide pt/family education and to maximize pt's level of independence in the home and community environment.     Plan     Update Certification Period: 12/6/2022 to 1/18/2023  Recommended Treatment Plan: 2 times per week for 6 weeks: Aquatic Therapy, Cervical/Lumbar Traction, Electrical Stimulation  , Gait Training, Manual Therapy, Moist Heat/ Ice, Neuromuscular Re-ed, Patient Education, Self Care, Therapeutic Exercise, and Therapeutic Activites    Teri Martinez, PT, DPT      I CERTIFY THE NEED FOR THESE SERVICES FURNISHED UNDER THIS PLAN OF TREATMENT AND WHILE UNDER MY CARE    Physician's comments:        Physician's Signature: ___________________________________________________

## 2022-12-07 RX ORDER — PROMETHAZINE HYDROCHLORIDE 25 MG/1
25 TABLET ORAL EVERY 4 HOURS
Qty: 60 TABLET | Refills: 0 | Status: SHIPPED | OUTPATIENT
Start: 2022-12-07 | End: 2022-12-07 | Stop reason: SDUPTHER

## 2022-12-07 NOTE — TELEPHONE ENCOUNTER
No new care gaps identified.  [facility] Embedded Care Gaps. Reference number: [id number]. 12/07/2022   7:50:27 AM CST

## 2022-12-14 ENCOUNTER — TELEPHONE (OUTPATIENT)
Dept: NEUROLOGY | Facility: CLINIC | Age: 42
End: 2022-12-14
Payer: MEDICARE

## 2022-12-14 ENCOUNTER — OFFICE VISIT (OUTPATIENT)
Dept: NEUROLOGY | Facility: CLINIC | Age: 42
End: 2022-12-14
Payer: MEDICARE

## 2022-12-14 ENCOUNTER — PATIENT MESSAGE (OUTPATIENT)
Dept: INTERNAL MEDICINE | Facility: CLINIC | Age: 42
End: 2022-12-14
Payer: MEDICARE

## 2022-12-14 DIAGNOSIS — G35 MULTIPLE SCLEROSIS: Primary | ICD-10-CM

## 2022-12-14 DIAGNOSIS — E55.9 VITAMIN D DEFICIENCY: ICD-10-CM

## 2022-12-14 DIAGNOSIS — M79.2 NEUROPATHIC PAIN: ICD-10-CM

## 2022-12-14 PROCEDURE — 4010F PR ACE/ARB THEARPY RXD/TAKEN: ICD-10-PCS | Mod: HCNC,CPTII,95, | Performed by: STUDENT IN AN ORGANIZED HEALTH CARE EDUCATION/TRAINING PROGRAM

## 2022-12-14 PROCEDURE — 3044F PR MOST RECENT HEMOGLOBIN A1C LEVEL <7.0%: ICD-10-PCS | Mod: HCNC,CPTII,95, | Performed by: STUDENT IN AN ORGANIZED HEALTH CARE EDUCATION/TRAINING PROGRAM

## 2022-12-14 PROCEDURE — 1160F RVW MEDS BY RX/DR IN RCRD: CPT | Mod: HCNC,CPTII,95, | Performed by: STUDENT IN AN ORGANIZED HEALTH CARE EDUCATION/TRAINING PROGRAM

## 2022-12-14 PROCEDURE — 3044F HG A1C LEVEL LT 7.0%: CPT | Mod: HCNC,CPTII,95, | Performed by: STUDENT IN AN ORGANIZED HEALTH CARE EDUCATION/TRAINING PROGRAM

## 2022-12-14 PROCEDURE — 99214 PR OFFICE/OUTPT VISIT, EST, LEVL IV, 30-39 MIN: ICD-10-PCS | Mod: 95,HCNC,, | Performed by: STUDENT IN AN ORGANIZED HEALTH CARE EDUCATION/TRAINING PROGRAM

## 2022-12-14 PROCEDURE — 1159F PR MEDICATION LIST DOCUMENTED IN MEDICAL RECORD: ICD-10-PCS | Mod: HCNC,CPTII,95, | Performed by: STUDENT IN AN ORGANIZED HEALTH CARE EDUCATION/TRAINING PROGRAM

## 2022-12-14 PROCEDURE — 4010F ACE/ARB THERAPY RXD/TAKEN: CPT | Mod: HCNC,CPTII,95, | Performed by: STUDENT IN AN ORGANIZED HEALTH CARE EDUCATION/TRAINING PROGRAM

## 2022-12-14 PROCEDURE — 1160F PR REVIEW ALL MEDS BY PRESCRIBER/CLIN PHARMACIST DOCUMENTED: ICD-10-PCS | Mod: HCNC,CPTII,95, | Performed by: STUDENT IN AN ORGANIZED HEALTH CARE EDUCATION/TRAINING PROGRAM

## 2022-12-14 PROCEDURE — 1159F MED LIST DOCD IN RCRD: CPT | Mod: HCNC,CPTII,95, | Performed by: STUDENT IN AN ORGANIZED HEALTH CARE EDUCATION/TRAINING PROGRAM

## 2022-12-14 PROCEDURE — 99214 OFFICE O/P EST MOD 30 MIN: CPT | Mod: 95,HCNC,, | Performed by: STUDENT IN AN ORGANIZED HEALTH CARE EDUCATION/TRAINING PROGRAM

## 2022-12-14 RX ORDER — METHOCARBAMOL 500 MG/1
500 TABLET, FILM COATED ORAL 3 TIMES DAILY PRN
Qty: 30 TABLET | Refills: 0 | Status: SHIPPED | OUTPATIENT
Start: 2022-12-14 | End: 2022-12-24

## 2022-12-14 RX ORDER — GABAPENTIN 300 MG/1
CAPSULE ORAL
Qty: 450 CAPSULE | Refills: 2 | Status: SHIPPED | OUTPATIENT
Start: 2022-12-14 | End: 2023-01-11

## 2022-12-14 RX ORDER — VIT C/E/ZN/COPPR/LUTEIN/ZEAXAN 250MG-90MG
2000 CAPSULE ORAL DAILY
Qty: 180 CAPSULE | Refills: 3 | Status: SHIPPED | OUTPATIENT
Start: 2022-12-14 | End: 2023-08-22

## 2022-12-14 NOTE — PATIENT INSTRUCTIONS
Start vitamin D 2000 IU daily. Labs in Feb 2023 before Ocrevus infusion.  Follow up in 6 months  Trial of Robaxin 500mg three times a day as needed for muscle pain/cramps. Monitor for drowsiness

## 2022-12-14 NOTE — TELEPHONE ENCOUNTER
----- Message from Elinor Enciso sent at 12/14/2022  9:09 AM CST -----  Regarding: appt  Contact: [phone]  PT is stating she will be about 15-20 minutes late for her 10:00 am appt today due to traffic. Please call to discuss further.

## 2022-12-14 NOTE — PROGRESS NOTES
[facility]  Follow Up Patient Visit Virtual    The patient location is: home  Visit type: Virtual visit with synchronous audio and video    Each patient to whom he or she provides medical services by telemedicine is:  (1) informed of the relationship between the physician and patient and the respective role of any other health care provider with respect to management of the patient; and (2) notified that he or she may decline to receive medical services by telemedicine and may withdraw from such care at any time.      Disease Summary     Principle neurological diagnosis: MS    Date of symptom onset: 1/2015  Date of diagnosis: 1/2015  Disease type at diagnosis: RR  Disease type currently: RR  Previous therapy: Tecfidera, Copaxone, and Tysabri, Avonex (flu-like reaction), Aubagio (worsening disease)  Current therapy: Ocrevus 12/2020 - present  Last MRI Brain: 8/2022  Last MRI C-spine: 8/2022  Last MRI T-spine: 8/2022  CSF: 3W, 1R, G74, P39, IgG index 0.74, OCBs 6  JCV status: 1.67 2018  Other relevant labs and tests: Vit D 34    Interval history:     Last seen AP 8/15/22. She tolerates Ocrevus well. Next dose due Feb/March.   She recovered from her hospitalization well.     Current symptoms:  Slurred speech  Left > right leg pain and physical fatigue, muscle cramping type of pain, she tried flexeril but it made drowsy. She is out of gabapentin (used to be on 600mg QAM, 900mg QHS)  Gait difficulty - uses a walker, she goes to PT and find it helpful  Left arm cramps/pain, she goes to OT for this  Overactive bladder, she does water restriction after 8pm and has not had any accidents   Blurry vision, better with glasses but not 20/20    She denies bowel dysfunction, diplopia, dysphagia.     She on multivitamin, not specifically vitamin D.    ROS:     SOCIAL HISTORY  Social History     Tobacco Use    Smoking status: Never     Passive exposure: Never    Smokeless tobacco: Never   Substance Use Topics     Alcohol use: Yes     Comment: once a year     Drug use: Never     Living arrangements - the patient lives with their family.      REVIEW OF SYMPTOMS 8/29/2022   Do you feel abnormally tired on most days? No   Do you feel you generally sleep well? Yes   Do you have difficulty controlling your bladder?  No   Do you have difficulty controlling your bowels?  Yes   Do you have frequent muscle cramps, tightness or spasms in your limbs?  Yes   Do you have new visual symptoms?  No   Do you have worsening difficulty with your memory or thinking? No   Do you have worsening symptoms of anxiety or depression?  No   For patients who walk, Do you have more difficulty walking?  No   Have you fallen since your last visit?  No   For patients who use wheelchairs: Do you have any skin wounds or breakdown? No   Do you have difficulty using your hands?  No   Do you have shooting or burning pain? Yes   Do you have difficulty with sexual function?  No   If you are sexually active, are you using birth control? Y/N  N/A Not Applicable   Do you often choke when swallowing liquids or solid food?  No   Do you experience worsening symptoms when overheated? No   Do you need any new equipment such as a wheelchair, walker or shower chair? Yes   Do you receive co-pay financial assistance for your principal MS medicine? Not Applicable   Would you be interested in participating in an MS research trial in the future? No   For patients on Gilenya, Tecfidera, Aubagio, Rituxan, Ocrevus, Tysabri, Lemtrada or Methotrexate, are you aware that you should NOT receive live virus vaccines?  Not Applicable   Do you feel you have adequate family/friend support?  Yes   Do you have health insurance?   Yes   Are you currently employed? No   Do you receive SSDI/SSI?  Yes   Do you use marijuana or cannabis products? No   How often? -   Have you been diagnosed with a urinary tract infection since your last visit here? No   Have you been diagnosed with a respiratory  tract infection since your last visit here? No   Have you been to the emergency room since your last visit here? No   Have you been hospitalized since your last visit here?  No     FSS SCORE & INTERPRETATION 3/31/2021   FSS SCORE  9   FSS SCORE INTERPRETATION May not be suffering from fatigue     MS MOOKIE-D SCORE & INTERPRETATION 3/31/2021   MOOKIE-D SCORE  24   MOOKIE-D INTERPRETATION  Possibility of major Depression     MS JUAN DAVID-7 SCORE & INTERPRETATION 8/29/2022   JUAN DAVID-7 SCORE  4   JUAN DAVID-7 SCORE INTERPRETATION Normal     PEQ MS MOS PAIN EFFECTS SCORE & INTERPRETATION 3/31/2021   PES SCORE 12   PES SCORE INTERPRETATION Scores can range from 6-30.  Items are scaled so that higher scores indicate a greater impact of pain on a patients mood and behavior.     PEQ MS SEXUAL SATISFACTION SCORE & INTERPRETATION 3/31/2021   SSS SCORE  16   SSS SCORE INTERPRETATION Scores can range from 4-24.  Higher scores indicate greater problems with sexual satisfaction.     MS BLADDER CONTROL SCORE & INTERPRETATION 3/31/2021   BLCS SCORE 4   BLCS SCORE INTERPRETATION  Scores can range from 0-22, with higher scores indicating greater bladder control problems.     MS BOWEL CONTROL SCORE & INTERPRETATION 3/31/2021   BWCS SCORE 1   BWCS SCORE INTERPRETATION Scores can range from 0-26, with higher scores indicating greater bowel control problems.     PEQ MS IMPACT OF VISUAL IMPAIRMENT SCORE & INTERPRETATION 3/31/2021   LILIANA SCALE SCORE  5   LILIANA SCORE INTERPRETATION Scores can range from 0-15, with higher scores indicating greater impact of visual problems on daily activites.     MS PDQ SCORE & INTERPRETATION 3/31/2021   PDQ RETROSPECTIVE MEMORY SUBSCALE 5   PDQ ATTENTION/CONCENTRATION SUBSCALE 5   PDQ PROSPECTIVE MEMORY SUBSCALE 5   PDQ PLANNING/ORGANIZATION SUBSCALE 5   PDQ TOTAL SCORE 20   PDQ SCORE INTERPRETATION Scores can range from 0-80, with higher scores indicating greater perceived cognitive impairment.     MSSS SCORE & INTERPRETATION  3/31/2021   MSSS TANGIBLE SUPPORT SUBSCALE 31.25   MSSS EMOTIONAL/INFORMATIONAL SUPPORT SUBSCALE 31.25   MSSS AFFECTIONATE SUPPORT SUBSCALE 58.33   MSSS POSITIVE SOCIAL INTERACTION SUBSCALE 25   MSSS TOTAL SCORE 36.46   MSSS SCORE INTERPRETATION Scores can range from 0-100, with higher scores indicating greater perceived support.         Exam:     Vitals unable to be obtained virtually         In general, the patient is well nourished and appears to be in no acute distress.            Imaging (personally reviewed):     No results found for this or any previous visit.    No results found for this or any previous visit.    No results found for this or any previous visit.    Results for orders placed during the hospital encounter of 05/16/22    MRI Brain Demyelinating W W/O Contrast    Impression  No abnormal enhancement as may occur with active demyelinating disease.      Electronically signed by: Jae Garnett Jr., MD  Date:    05/17/2022  Time:    08:29    Results for orders placed during the hospital encounter of 05/16/22    MRI Cervical Spine Demyelinating W W/O Contrast    Impression  1. No abnormal cord signal or findings to suggest demyelinating disease within the cervical spinal cord.  No abnormal enhancement.  2. Minimal degenerative change as described above without significant spinal or foraminal stenosis.  These findings are unchanged.      Electronically signed by: Jae Garnett Jr., MD  Date:    05/17/2022  Time:    09:03    Results for orders placed during the hospital encounter of 05/16/22    MRI Thoracic Spine Demyelinating W W/O Contrast    Impression  1. No abnormal cord signal or abnormal enhancement.  No findings to suggest demyelinating disease within the thoracic cord.  2. Right-sided foraminal stenosis at T2-T3, T3-T4, T4-T5, and T5-T6 could result in radiculopathy.      Electronically signed by: Jae Garnett Jr., MD  Date:    05/17/2022  Time:    08:59      Labs:     Lab Results   Component  Value Date    VCLGPVBL40PA 34 07/30/2021    WZHULHYF45SF 32 01/10/2020    PFDONIHQ04CT 11 (L) 01/30/2015     Lab Results   Component Value Date    JCVINDEX 1.67 (A) 04/19/2018    JCVANTIBODY Positive (A) 04/19/2018     No results found for: KO4YXEEA, ABSOLUTECD3, SC3ZKOZT, ABSOLUTECD8, PP5DLVMC, ABSOLUTECD4, LABCD48  Lab Results   Component Value Date    WBC 6.31 09/21/2022    RBC 5.49 (H) 09/21/2022    HGB 11.4 (L) 09/21/2022    HCT 37.1 09/21/2022    MCV 68 (L) 09/21/2022    MCH 20.8 (L) 09/21/2022    MCHC 30.7 (L) 09/21/2022    RDW 15.8 (H) 09/21/2022     09/21/2022    MPV 9.2 09/21/2022    GRAN 3.8 09/21/2022    GRAN 59.5 09/21/2022    LYMPH 1.9 09/21/2022    LYMPH 29.6 09/21/2022    MONO 0.6 09/21/2022    MONO 9.7 09/21/2022    EOS 0.0 09/21/2022    BASO 0.02 09/21/2022    EOSINOPHIL 0.6 09/21/2022    BASOPHIL 0.3 09/21/2022     Sodium   Date Value Ref Range Status   09/21/2022 140 136 - 145 mmol/L Final     Potassium   Date Value Ref Range Status   09/21/2022 4.0 3.5 - 5.1 mmol/L Final     Chloride   Date Value Ref Range Status   09/21/2022 107 95 - 110 mmol/L Final     CO2   Date Value Ref Range Status   09/21/2022 22 (L) 23 - 29 mmol/L Final     Glucose   Date Value Ref Range Status   09/21/2022 77 70 - 110 mg/dL Final     BUN   Date Value Ref Range Status   09/21/2022 5 (L) 6 - 20 mg/dL Final     Creatinine   Date Value Ref Range Status   09/21/2022 0.7 0.5 - 1.4 mg/dL Final     Calcium   Date Value Ref Range Status   09/21/2022 9.4 8.7 - 10.5 mg/dL Final     Total Protein   Date Value Ref Range Status   09/21/2022 7.4 6.0 - 8.4 g/dL Final     Albumin   Date Value Ref Range Status   09/21/2022 3.8 3.5 - 5.2 g/dL Final     Total Bilirubin   Date Value Ref Range Status   09/21/2022 0.3 0.1 - 1.0 mg/dL Final     Comment:     For infants and newborns, interpretation of results should be based  on gestational age, weight and in agreement with clinical  observations.    Premature Infant recommended  reference ranges:  Up to 24 hours.............<8.0 mg/dL  Up to 48 hours............<12.0 mg/dL  3-5 days..................<15.0 mg/dL  6-29 days.................<15.0 mg/dL       Alkaline Phosphatase   Date Value Ref Range Status   09/21/2022 85 55 - 135 U/L Final     AST   Date Value Ref Range Status   09/21/2022 16 10 - 40 U/L Final     ALT   Date Value Ref Range Status   09/21/2022 9 (L) 10 - 44 U/L Final     Anion Gap   Date Value Ref Range Status   09/21/2022 11 8 - 16 mmol/L Final     eGFR if    Date Value Ref Range Status   05/16/2022 >60 >60 mL/min/1.73 m^2 Final     eGFR if non    Date Value Ref Range Status   05/16/2022 >60 >60 mL/min/1.73 m^2 Final     Comment:     Calculation used to obtain the estimated glomerular filtration  rate (eGFR) is the CKD-EPI equation.                   Diagnosis/Assessment/Plan:     Multiple Sclerosis  -Assessment: RRMS stable on Ocrevus. She has frequent pseudo-exacerbations. MRI 8/2022 stable with predominantly intracranial lesions and mild atrophy.   -Imaging: MRI Brain Aug 2023  -Disease Modifying Therapies:Continue Ocrevus, safety labs reviewed. Next labs in Feb 2023  Symptom management   -Muscle cramp with intolerance to baclofen and flexeril, trial of Robaxin 500mg TID PRN  -Refilled gabapentin 600mg/900mg as she was out, this might also help with her leg pain  -Start vitamin D 2000 IU daily   Lifestyle modifications for brain health discussed.  Return to clinic in 6 months               Total time spent with the patient: 30 minutes, including face to face consultation, chart review and coordination of care, on the day of the visit. This includes face to face time and non-face to face time preparing to see the patient (eg, review of tests), obtaining and/or reviewing separately obtained history, documenting clinical information in the electronic or other health record, independently interpreting resultsand communicating results to the  patient/family/caregiver, or care coordination.           Candice Garrett MD, MSc  Attending neurologist

## 2022-12-15 ENCOUNTER — PATIENT MESSAGE (OUTPATIENT)
Dept: NEUROLOGY | Facility: CLINIC | Age: 42
End: 2022-12-15
Payer: MEDICARE

## 2022-12-19 ENCOUNTER — TELEPHONE (OUTPATIENT)
Dept: NEUROLOGY | Facility: CLINIC | Age: 42
End: 2022-12-19
Payer: MEDICARE

## 2022-12-19 NOTE — TELEPHONE ENCOUNTER
----- Message from Candice Garrett MD sent at 12/14/2022  4:52 PM CST -----  Labs 2023  Follow up in 6 months  Thanks!

## 2022-12-19 NOTE — TELEPHONE ENCOUNTER
Spoke to pt and scheduled her for labs on 1/19/23 at 3:40 at [facility]. Also, put in a 6 mo recall with [facility].

## 2022-12-22 ENCOUNTER — CLINICAL SUPPORT (OUTPATIENT)
Dept: REHABILITATION | Facility: HOSPITAL | Age: 42
End: 2022-12-22
Payer: MEDICARE

## 2022-12-22 DIAGNOSIS — Z74.09 DECREASED FUNCTIONAL MOBILITY AND ENDURANCE: Primary | ICD-10-CM

## 2022-12-22 DIAGNOSIS — R68.89 DECREASED STRENGTH, ENDURANCE, AND MOBILITY: ICD-10-CM

## 2022-12-22 DIAGNOSIS — Z74.09 DECREASED STRENGTH, ENDURANCE, AND MOBILITY: ICD-10-CM

## 2022-12-22 DIAGNOSIS — R53.1 LEFT-SIDED WEAKNESS: ICD-10-CM

## 2022-12-22 DIAGNOSIS — R26.9 GAIT ABNORMALITY: ICD-10-CM

## 2022-12-22 DIAGNOSIS — R53.1 DECREASED STRENGTH, ENDURANCE, AND MOBILITY: ICD-10-CM

## 2022-12-22 PROBLEM — R53.81 DEBILITY: Status: RESOLVED | Noted: 2022-05-23 | Resolved: 2022-12-22

## 2022-12-22 PROCEDURE — 97110 THERAPEUTIC EXERCISES: CPT | Mod: HCNC,PN

## 2022-12-22 NOTE — PROGRESS NOTES
[facility]   Physical Therapist Treatment Note     Name: Alicia Soliz  Clinic Number: [id number]    Therapy Diagnosis:        Encounter Diagnoses   Name Primary?    Decreased strength, endurance, and mobility Yes    Left-sided weakness      Gait abnormality      Multiple sclerosis      Hemiplegia, unspecified etiology, unspecified hemiplegia type, unspecified laterality        Physician: Kole Carrasquillo MD    Visit Date: 12/22/2022    Physician Orders: PT Eval and Treat   Medical Diagnosis from Referral: G35 (ICD-10-CM) - Multiple sclerosis  Evaluation Date: 10/4/2022  Authorization Period Expiration: 12/31/22  Plan of Care Expiration: 1/18/2023  Progress Note Due: 1/05//2023  Visit # / Visits authorized: 10/ 12  FOTO: 2/3     Precautions: Standard, Fall, and MS      PTA Visit #: 0/5     Time In: 1:00   Time Out: 1:45  Total Billable Time: 45 minutes    SUBJECTIVE     Pt reports: tingling in the bottom of the foot with seated slump test on L LE only reported. Pt states since her flare up she is getting stronger but the L LE is still dragging as PT and pt noted on her arrival (hip externally rotated and extended more than on R LE with reduced R LE step length).     She was compliant with home exercise program.  Response to previous treatment: no issues  Functional change: na at this time    Pain: 0/10 but had intermittent pain in the L buttocks/lateral thigh at times with walking and nustep today  Location: all over    OBJECTIVE     Objective Measures updated at progress report unless specified.     Treatment     Alicia received the treatments listed below:      therapeutic exercises to develop strength, flexibility, posture, and core stabilization for 45 minutes including:    Nustep 5 min intermittent at her pace  Seated LAQ 2# L LE 3x 10 3 sec hold  Posterior pelvic tilt 2x 10 3 sec hold  Bridges 1x 10 then add PPT with bridges 2x10  Supine Marches 2x10  Ball squeeze 2x 10  Supine  "bridges 2x 10    Deferred:    neuromuscular re-education activities to improve: Balance, Coordination, Kinesthetic Sense, and Proprioception for 00 minutes. The following activities were included:  Bridges x10; added ball squeeze 2x8  L hip flexion from the floor off edge of mat w/ankle DF 3x8  Prone knee flexion 2x10 B  Passive L hip stretch  Seated heel raises w/toes on rolled towel to facilitate toe extension 2x10  Seated DF w/heel on rolled towel 3x8    manual therapy techniques: for 0 minutes, including:  --    neuromuscular re-education activities to improve: Balance, Coordination, Kinesthetic Sense, and Proprioception for 0 minutes. The following activities were included:    therapeutic activities to improve functional performance for 20 minutes, including:  +Sit<>Stand @ 16" 2x10, 15# x10  +Toe Taps on Aerobic Box w/ Standard Walker 2x20  +Toe Taps on Aerobic Box w/o and ModA 2x20    Gait Training to improve community ambulation for 0 minutes including:  +4x50 ft with focus on step length, and fredy    Patient Education and Home Exercises     Home Exercises Provided and Patient Education Provided     Education provided:   - HEP review  - Gait mechanics  - Possible need for custom AFO    Written Home Exercises Provided: Patient instructed to cont prior HEP. Exercises were reviewed and Alicia was able to demonstrate them prior to the end of the session.  Alicia demonstrated good  understanding of the education provided. See EMR under Patient Instructions for exercises provided during therapy sessions    ASSESSMENT     Patient tolerated today's treatment well with intermittent rest breaks for recovery and using self pacing as she is still recovering from her flare up. R LE step length was short as the L LE was "dragging" some still today. PT encouraged her to use her UE on the nustep to assist LE propulsion to help the L LE get motion to reduce complains of soreness/stiff L glute/lateral thigh with some relief. " PT noted the L foot had a difficult time staying flexed with bridges and posterior pelvic tilt as the hamstring and hip flexors would release her back into more knee/hip extension during therex so pt had to reposition her L LE frequently. Pt is new to this PT but pt reports she is slowly improving from her MS flare up. PT educated her in nutritional support with the Dr Michael Espinal protocol reference for MS management to help with reducing inflammatory episodes. Pt to look into it more.  Will continue to develop weight shifitng and limb advancement for carryover to household ambulation and progress as tolerated.     Alicia Is progressing well towards her goals.   Pt prognosis is Good.     Pt will continue to benefit from skilled outpatient physical therapy to address the deficits listed in the problem list box on initial evaluation, provide pt/family education and to maximize pt's level of independence in the home and community environment.     Pt's spiritual, cultural and educational needs considered and pt agreeable to plan of care and goals.     Anticipated barriers to physical therapy: chronicity of condition and comorbidities    Goals:   Short Term Goals: In 3 weeks   1.I with HEP MET 12/6/20222  2.Pt to increase BLE strength by 1/2 grade to show improvements with sitting, standing, ambulating, bending, lifting activities.  Progressing  3. Patient to improve 5x Sit<>Stand to less than 12 seconds to reduced risk of fall. Progressing   4. Patient to improve TUG with RW to less than 25 seconds to reduce risk of falls. Progressing     Long Term Goals: In 6 weeks (Progressing)  1. Patient to demo increase in LE strength to 1 muscle grade to show improvements with sitting, standing, ambulating, bending, lifting activities.    2. Patient to improve score on the FOTO to < or = to 48% to show improvement with QOL.   3. Patient to improve 5x Sit<>Stand to less than 10 seconds to reduced risk of fall.  4. Patient to improve  TUG with RW to less than 20 seconds to reduce risk of falls.     Long Term Goal Status:   continue per initial plan of care.   Plan      Update Certification Period: 12/6/2022 to 1/18/2023  Recommended Treatment Plan: 2 times per week for 6 weeks: Aquatic Therapy, Cervical/Lumbar Traction, Electrical Stimulation  , Gait Training, Manual Therapy, Moist Heat/ Ice, Neuromuscular Re-ed, Patient Education, Self Care, Therapeutic Exercise, and Therapeutic Activities    Tracie Way, PT

## 2022-12-29 ENCOUNTER — CLINICAL SUPPORT (OUTPATIENT)
Dept: REHABILITATION | Facility: HOSPITAL | Age: 42
End: 2022-12-29
Payer: MEDICARE

## 2022-12-29 DIAGNOSIS — R53.1 DECREASED STRENGTH, ENDURANCE, AND MOBILITY: ICD-10-CM

## 2022-12-29 DIAGNOSIS — R53.1 LEFT-SIDED WEAKNESS: ICD-10-CM

## 2022-12-29 DIAGNOSIS — Z74.09 DECREASED STRENGTH, ENDURANCE, AND MOBILITY: ICD-10-CM

## 2022-12-29 DIAGNOSIS — R26.9 GAIT ABNORMALITY: ICD-10-CM

## 2022-12-29 DIAGNOSIS — R68.89 DECREASED STRENGTH, ENDURANCE, AND MOBILITY: ICD-10-CM

## 2022-12-29 DIAGNOSIS — Z74.09 DECREASED FUNCTIONAL MOBILITY AND ENDURANCE: Primary | ICD-10-CM

## 2022-12-29 PROCEDURE — 97116 GAIT TRAINING THERAPY: CPT | Mod: HCNC,PN

## 2022-12-29 PROCEDURE — 97110 THERAPEUTIC EXERCISES: CPT | Mod: HCNC,PN

## 2022-12-29 NOTE — PROGRESS NOTES
[facility]   Physical Therapist Treatment Note     Name: Alicia Mahoney Soliz  Clinic Number: [id number]    Therapy Diagnosis:        Encounter Diagnoses   Name Primary?    Decreased strength, endurance, and mobility Yes    Left-sided weakness      Gait abnormality      Multiple sclerosis      Hemiplegia, unspecified etiology, unspecified hemiplegia type, unspecified laterality        Physician: Kole Carrasquillo MD    Visit Date: 12/29/2022    Physician Orders: PT Eval and Treat   Medical Diagnosis from Referral: G35 (ICD-10-CM) - Multiple sclerosis  Evaluation Date: 10/4/2022  Authorization Period Expiration: 12/31/22  Plan of Care Expiration: 1/18/2023  Progress Note Due: 1/05//2023  Visit # / Visits authorized: 11/ 20  FOTO: 2/3     Precautions: Standard, Fall, and MS      PTA Visit #: 0/5     Time In: 12:20   Time Out: 11:30  Total Billable Time: 70 minutes    SUBJECTIVE     Pt reports: she will be out of town next week but she is working on her HEP well.     She was compliant with home exercise program.  Response to previous treatment: no issues  Functional change: na at this time    Pain: 0/10 but had intermittent pain in the L buttocks/lateral thigh at times with walking and nustep today  Location: all over    OBJECTIVE     Objective Measures updated at progress report unless specified.     Treatment     Alicia received the treatments listed below:      therapeutic exercises to develop strength, flexibility, posture, and core stabilization for 60 minutes including:    Nustep 5 min intermittent at her pace  Sit to stand from 20 inch box 2x 8 then add ball squeeze 2x8 with sit to stand  Standing hip flexion with focus on L lateral hip avoiding lateral shift 2x 8  Toe marches to 14 inch box 2x 8 per leg  Standing ball press forward then up lateral with rotation of the trunk 1x 8 then 6# 2 x8  Jez curl 6# (4 inches off the floor) 2x 10 (cga for safety)      Deferred:  Seated LAQ 2# L  LE 3x 10 3 sec hold  Posterior pelvic tilt 2x 10 3 sec hold  Bridges 1x 10 then add PPT with bridges 2x10  Sit to stand off black table with no UE support 20x with CGA for balance and safety and cuing for increase WB in L LE +RTB  +Standing TKE BTB x30  Heel and toe raises 1 x 20 reps  Sitting L knee extension 1 x 20 reps  +SLR Flexion 2x10  +SL Clams x20 w/ Ball between Ankles  +Q'Ped Hip Extension x10 each B    neuromuscular re-education activities to improve: Balance, Coordination, Kinesthetic Sense, and Proprioception for 00 minutes. The following activities were included:    WS in //Bars laterally for TKE and Hip mobility.  WS in // bars forward back/ toe push off and heel strike  Bridges x10; added ball squeeze 2x8  L hip flexion from the floor off edge of mat w/ankle DF 3x8  Prone knee flexion 2x10 B  Passive L hip stretch  Seated heel raises w/toes on rolled towel to facilitate toe extension 2x10  Seated DF w/heel on rolled towel 3x8    manual therapy techniques: for 0 minutes, including:  --    neuromuscular re-education activities to improve: Balance, Coordination, Kinesthetic Sense, and Proprioception for 0 minutes. The following activities were included:  Supine to prone into quadruped to tall kneeling (swiss ball in front)  Tall kneeling balance  Tall kneeling w/alternating arms to 90  Tall kneeling w/arms out><in   Quadruped alternating arm reach  Gait mechanics training: working on left knee release to initiate swing through; symmetrical stride length, and decreased shoulder elevation    therapeutic activities to improve functional performance for 0 minutes, including:      Gait Training to improve community ambulation for 10 minutes including:  +2x150 ft with cues on hip internal rotation on the L LE, step placement to reduce braking at the L knee and cues on soft knee landing to increase knee control vs hyper extension. PT also worked with pt on step down curb training including education her in L  down R up when managing curbs/steps. PT encourage upright posture and light loading on the hands when walking throughout her PT session as well to reduce loading into her Ue's to challenge the legs more.      Patient Education and Home Exercises     Home Exercises Provided and Patient Education Provided     Education provided:   - HEP review  - Gait mechanics  - Possible need for custom AFO    Written Home Exercises Provided: Patient instructed to cont prior HEP. Exercises were reviewed and Alicia was able to demonstrate them prior to the end of the session.  Alicia demonstrated good  understanding of the education provided. See EMR under Patient Instructions for exercises provided during therapy sessions    ASSESSMENT     Patient tolerated today's therex with PT focusing on core and hip control. PT noted she was able to hip control the L LE with better foot clearance, no dragging but is still externally rotated so PT worked on adduction/internal rotation and hip extensor control with gait. PT used cues to encourage hip control when in L single leg stand to reduce R hip drop and to engage the L hip stabilizers more. Pt gait was more upright today, so PT progressed to more standing work but she fatigues in her posterior chain and core fairly quickly. Pt denies L UE weakness but it is noted as weaker when performing B UE therex to challenge her legs in standing. Pt will be out of town for about 1 week. PT to continue to develop weight shifitng and limb advancement for carryover to household ambulation and progress as tolerated.     Alicia Is progressing well towards her goals.   Pt prognosis is Good.     Pt will continue to benefit from skilled outpatient physical therapy to address the deficits listed in the problem list box on initial evaluation, provide pt/family education and to maximize pt's level of independence in the home and community environment.     Pt's spiritual, cultural and educational needs considered and  pt agreeable to plan of care and goals.     Anticipated barriers to physical therapy: chronicity of condition and comorbidities    Goals:   Short Term Goals: In 3 weeks   1.I with HEP MET 12/6/20222  2.Pt to increase BLE strength by 1/2 grade to show improvements with sitting, standing, ambulating, bending, lifting activities.  Progressing  3. Patient to improve 5x Sit<>Stand to less than 12 seconds to reduced risk of fall. Progressing   4. Patient to improve TUG with RW to less than 25 seconds to reduce risk of falls. Progressing     Long Term Goals: In 6 weeks (Progressing)  1. Patient to demo increase in LE strength to 1 muscle grade to show improvements with sitting, standing, ambulating, bending, lifting activities.    2. Patient to improve score on the FOTO to < or = to 48% to show improvement with QOL.   3. Patient to improve 5x Sit<>Stand to less than 10 seconds to reduced risk of fall.  4. Patient to improve TUG with RW to less than 20 seconds to reduce risk of falls.     Long Term Goal Status:   continue per initial plan of care.   Plan      Update Certification Period: 12/6/2022 to 1/18/2023  Recommended Treatment Plan: 2 times per week for 6 weeks: Aquatic Therapy, Cervical/Lumbar Traction, Electrical Stimulation  , Gait Training, Manual Therapy, Moist Heat/ Ice, Neuromuscular Re-ed, Patient Education, Self Care, Therapeutic Exercise, and Therapeutic Activities    Tracie Way, PT

## 2023-01-10 ENCOUNTER — PATIENT MESSAGE (OUTPATIENT)
Dept: NEUROLOGY | Facility: CLINIC | Age: 43
End: 2023-01-10
Payer: MEDICARE

## 2023-01-11 ENCOUNTER — OFFICE VISIT (OUTPATIENT)
Dept: URGENT CARE | Facility: CLINIC | Age: 43
End: 2023-01-11
Payer: MEDICARE

## 2023-01-11 VITALS
HEIGHT: 66 IN | SYSTOLIC BLOOD PRESSURE: 136 MMHG | RESPIRATION RATE: 20 BRPM | DIASTOLIC BLOOD PRESSURE: 79 MMHG | HEART RATE: 100 BPM | BODY MASS INDEX: 44.84 KG/M2 | WEIGHT: 279 LBS | OXYGEN SATURATION: 100 % | TEMPERATURE: 102 F

## 2023-01-11 DIAGNOSIS — U07.1 COVID: ICD-10-CM

## 2023-01-11 DIAGNOSIS — R50.9 FEVER, UNSPECIFIED FEVER CAUSE: ICD-10-CM

## 2023-01-11 DIAGNOSIS — U07.1 COVID-19 VIRUS DETECTED: ICD-10-CM

## 2023-01-11 DIAGNOSIS — R05.9 COUGH, UNSPECIFIED TYPE: ICD-10-CM

## 2023-01-11 DIAGNOSIS — Z11.59 SCREENING FOR VIRAL DISEASE: Primary | ICD-10-CM

## 2023-01-11 LAB
CTP QC/QA: YES
SARS-COV-2 AG RESP QL IA.RAPID: POSITIVE

## 2023-01-11 PROCEDURE — 1160F PR REVIEW ALL MEDS BY PRESCRIBER/CLIN PHARMACIST DOCUMENTED: ICD-10-PCS | Mod: CPTII,S$GLB,,

## 2023-01-11 PROCEDURE — 1160F RVW MEDS BY RX/DR IN RCRD: CPT | Mod: CPTII,S$GLB,,

## 2023-01-11 PROCEDURE — 3008F PR BODY MASS INDEX (BMI) DOCUMENTED: ICD-10-PCS | Mod: CPTII,S$GLB,,

## 2023-01-11 PROCEDURE — 3075F PR MOST RECENT SYSTOLIC BLOOD PRESS GE 130-139MM HG: ICD-10-PCS | Mod: CPTII,S$GLB,,

## 2023-01-11 PROCEDURE — 1159F PR MEDICATION LIST DOCUMENTED IN MEDICAL RECORD: ICD-10-PCS | Mod: CPTII,S$GLB,,

## 2023-01-11 PROCEDURE — 3078F DIAST BP <80 MM HG: CPT | Mod: CPTII,S$GLB,,

## 2023-01-11 PROCEDURE — 3008F BODY MASS INDEX DOCD: CPT | Mod: CPTII,S$GLB,,

## 2023-01-11 PROCEDURE — 3078F PR MOST RECENT DIASTOLIC BLOOD PRESSURE < 80 MM HG: ICD-10-PCS | Mod: CPTII,S$GLB,,

## 2023-01-11 PROCEDURE — 1159F MED LIST DOCD IN RCRD: CPT | Mod: CPTII,S$GLB,,

## 2023-01-11 PROCEDURE — 99214 PR OFFICE/OUTPT VISIT, EST, LEVL IV, 30-39 MIN: ICD-10-PCS | Mod: S$GLB,CS,,

## 2023-01-11 PROCEDURE — 99214 OFFICE O/P EST MOD 30 MIN: CPT | Mod: S$GLB,CS,,

## 2023-01-11 PROCEDURE — 87811 SARS-COV-2 COVID19 W/OPTIC: CPT | Mod: QW,S$GLB,,

## 2023-01-11 PROCEDURE — 87811 SARS CORONAVIRUS 2 ANTIGEN POCT, MANUAL READ: ICD-10-PCS | Mod: QW,S$GLB,,

## 2023-01-11 PROCEDURE — 3075F SYST BP GE 130 - 139MM HG: CPT | Mod: CPTII,S$GLB,,

## 2023-01-11 RX ORDER — ACETAMINOPHEN 500 MG
1000 TABLET ORAL
Status: COMPLETED | OUTPATIENT
Start: 2023-01-11 | End: 2023-01-11

## 2023-01-11 RX ORDER — PROMETHAZINE HYDROCHLORIDE AND DEXTROMETHORPHAN HYDROBROMIDE 6.25; 15 MG/5ML; MG/5ML
5 SYRUP ORAL NIGHTLY PRN
Qty: 118 ML | Refills: 0 | Status: SHIPPED | OUTPATIENT
Start: 2023-01-11 | End: 2023-01-21

## 2023-01-11 RX ORDER — BENZONATATE 100 MG/1
200 CAPSULE ORAL 3 TIMES DAILY PRN
Qty: 60 CAPSULE | Refills: 0 | Status: SHIPPED | OUTPATIENT
Start: 2023-01-11 | End: 2023-01-31

## 2023-01-11 RX ORDER — ATORVASTATIN CALCIUM 20 MG/1
20 TABLET, FILM COATED ORAL DAILY
COMMUNITY
End: 2023-06-16

## 2023-01-11 RX ADMIN — Medication 1000 MG: at 04:01

## 2023-01-11 NOTE — PATIENT INSTRUCTIONS
Do not take Atorvastatin (Lipitor) for the 5 days you are taking Paxlovid!     You have been prescribed Paxlovid, which is an antiviral medication.     Oral antiviral treatment may help your body fight COVID-19 by stopping the SARS-CoV-2 virus (the virus that causes COVID-19) from multiplying in your body, lowering the amount of the virus within your body, or helping your immune system. By getting treatment, you could have less serious symptoms and may lower the chances of your illness getting worse and needing care in the hospital.    To be eligible for Paxlovid, you must be at least 12 years of age and weigh at least 88 pounds.    What is PAXLOVID?    PAXLOVID is an investigational medicine used to treat mild-to-moderate COVID-19 in  adults and children [12 years of age and older weighing at least 88 pounds (40 kg)] with  positive results of direct SARS-CoV-2 viral testing, and who are at high risk for  progression to severe COVID-19, including hospitalization or death. PAXLOVID is  investigational because it is still being studied. There is limited information about the  safety and effectiveness of using PAXLOVID to treat people with mild-to-moderate  COVID-19.  The FDA has authorized the emergency use of PAXLOVID for the treatment of mild-tomoderate COVID-19 in adults and children [12 years of age and older weighing at least  88 pounds (40 kg)] with a positive test for the virus that causes COVID-19, and who are  at high risk for progression to severe COVID-19, including hospitalization or death,  under an EUA.    Tell your healthcare provider if you:  ? Have any allergies  ? Have liver or kidney disease  ? Are pregnant or plan to become pregnant  ? Are breastfeeding a child  ? Have any serious illnesses  Tell your healthcare provider about all the medicines you take, including  prescription and over-the-counter medicines, vitamins, and herbal supplements.  Some medicines may interact with PAXLOVID and may cause  serious side effects.  Keep a list of your medicines to show your healthcare provider and pharmacist when  you get a new medicine.  You can ask your healthcare provider or pharmacist for a list of medicines that interact  with PAXLOVID. Do not start taking a new medicine without telling your  healthcare provider. Your healthcare provider can tell you if it is safe to take  PAXLOVID with other medicines    How do I take PAXLOVID?  ? PAXLOVID consists of 2 medicines: nirmatrelvir and ritonavir.  Take 2 pink tablets of nirmatrelvir with 1 white tablet of ritonavir by mouth  2 times each day (in the morning and in the evening) for 5 days. For each  dose, take all 3 tablets at the same time.  If you have kidney disease, talk to your healthcare provider. You  may need a different dose.  Swallow the tablets whole. Do not chew, break, or crush the tablets.  Take PAXLOVID with or without food.  Do not stop taking PAXLOVID without talking to your healthcare provider, even if  you feel better.  If you miss a dose of PAXLOVID within 8 hours of the time it is usually taken,  take it as soon as you remember. If you miss a dose by more than 8 hours, skip  the missed dose and take the next dose at your regular time. Do not take  2 doses of PAXLOVID at the same time.  If you take too much PAXLOVID, call your healthcare provider or go to the  nearest hospital emergency room right away.  If you are taking a ritonavir- or cobicistat-containing medicine to treat hepatitis C  or Human Immunodeficiency Virus (HIV), you should continue to take your  medicine as prescribed by your healthcare provider    Possible side effects of PAXLOVID are:  ? Allergic Reactions. Allergic reactions can happen in people taking  PAXLOVID, even after only 1 dose. Stop taking PAXLOVID and call your  healthcare provider right away if you get any of the following symptoms of an  allergic reaction:  hives  trouble swallowing or breathing  swelling of the mouth,  lips, or face  throat tightness  hoarseness  skin rash  ? Liver Problems. Tell your healthcare provider right away if you have any of  these signs and symptoms of liver problems: loss of appetite, yellowing of your  skin and the whites of eyes (jaundice), dark-colored urine, pale colored stools  and itchy skin, stomach area (abdominal) pain.  ? Resistance to HIV Medicines: If you have untreated HIV infection, PAXLOVID  may lead to some HIV medicines not working as well in the future.  ? Other possible side effects include:  altered sense of taste  diarrhea  high blood pressure  muscle aches  These are not all the possible side effects of PAXLOVID. Not many people have taken  PAXLOVID. Serious and unexpected side effects may happen. PAXLOVID is still being  studied, so it is possible that all of the risks are not known at this time.        You have tested positive for COVID-19 today.      ISOLATION  If you tested positive and do not have symptoms, you must isolate for 5 days starting on the day of the positive test. I    If you tested positive and have symptoms, you must isolate for 5 days starting on the day of the first symptoms,  not the day of the positive test.     This is the most important part, both the CDC and the [facility] emphasize that you do not test out of isolation.     After 5 days, if your symptoms have improved and you have not had fever on day 5, you can return to the community on day 6- NO TESTING REQUIRED!      In fact, we do not retest if you were positive in the last 90 days.    After your 5 days of isolation are completed, the CDC recommends strict mask use for the first 5 days that you come out of isolation. PLEASE FOLLOW CDC GUIDELINES REGARDING TRAVEL AFTER COVID INFECTION.    Return to Urgent Care or go to ER if symptoms worsen or fail to improve.  Follow up with PCP as recommended for further management.     Your symptoms should begin to improve by Day 5 of the infection-- if symptoms worsen,  you develop a new fever, shortness of breath, worsening shortness of breath with activity, chest pain, worsening cough, you must return to Urgent Care or go to the ER.    Do not drive while taking the cough syrup.  Try taking it at night before going to sleep.  However, you can take it during the day (every 4-6 hours) if you do not have to drive or operate machinery. This medication will make you drowsy.  Try taking half a dose first to see how it affects you.   Do not take any other sedating medications with this (including but not limited to narcotics, muscle relaxers, sleep aids, benzodiazepines)  Wait 4-6 hours between taking cough PILLS and SYRUP. Do NOT take together.       Please follow up with your primary care doctor or specialist in the next 48-72hrs as needed and if no improvement    If you  smoke, please stop smoking.      Please return or see your primary care doctor if you develop new or worsening symptoms.     Please arrange follow up with your primary medical clinic as soon as possible. You must understand that you've received an Urgent Care treatment only and that you may be released before all of your medical problems are known or treated. You, the patient, will arrange for follow up as instructed. If your symptoms worsen or fail to improve you should go to the Emergency Room.

## 2023-01-11 NOTE — PROGRESS NOTES
"Subjective:       Patient ID: Alicia Soliz is a 42 y.o. female.    Vitals:  height is 5' 6" (1.676 m) and weight is 126.6 kg (279 lb). Her temperature is 102 °F (38.9 °C) (abnormal). Her blood pressure is 136/79 and her pulse is 100. Her respiration is 20 and oxygen saturation is 100%.     Chief Complaint: Cough    42 year old female presents today with cough, chest congestion, eye pain, loss of taste, loss of appetite, fever, hurts while coughing. No known hx of Asthma, Bronchitis, or PNA. Hx of MS.    No known exposure to anything. Positive COVID in 2021. COVID vaccinated with boosters. Symptoms started 01/08/2023. Treatments at home include Tussi with no relief.     Cough  This is a new problem. The problem has been gradually worsening. The cough is Productive of purulent sputum. Associated symptoms include chills, headaches, myalgias, nasal congestion, postnasal drip and rhinorrhea. Pertinent negatives include no chest pain, ear pain, fever, sore throat, shortness of breath or wheezing. She has tried OTC cough suppressant for the symptoms. There is no history of asthma, bronchiectasis, bronchitis, COPD, emphysema, environmental allergies or pneumonia.     Constitution: Positive for chills. Negative for fatigue, fever and unexpected weight change.   HENT:  Positive for postnasal drip. Negative for ear pain, ear discharge, congestion, sinus pain, sinus pressure, sore throat and trouble swallowing.    Neck: Negative for neck pain and neck stiffness.   Cardiovascular:  Negative for chest pain.   Eyes:  Negative for eye pain.   Respiratory:  Positive for cough. Negative for sputum production, shortness of breath and wheezing.    Gastrointestinal:  Negative for abdominal pain, nausea and vomiting.   Musculoskeletal:  Positive for muscle ache.   Allergic/Immunologic: Negative for environmental allergies.   Neurological:  Positive for headaches. Negative for dizziness.     Objective:      Physical Exam "   Constitutional: She is oriented to person, place, and time. She appears well-developed. She is cooperative.  Non-toxic appearance. She does not appear ill. No distress.   HENT:   Head: Normocephalic and atraumatic.   Ears:   Right Ear: Hearing, tympanic membrane, external ear and ear canal normal.   Left Ear: Hearing, tympanic membrane, external ear and ear canal normal.   Nose: Nose normal. No mucosal edema, rhinorrhea or nasal deformity. No epistaxis. Right sinus exhibits no maxillary sinus tenderness and no frontal sinus tenderness. Left sinus exhibits no maxillary sinus tenderness and no frontal sinus tenderness.   Mouth/Throat: Uvula is midline, oropharynx is clear and moist and mucous membranes are normal. No trismus in the jaw. Normal dentition. No uvula swelling. No oropharyngeal exudate, posterior oropharyngeal edema or posterior oropharyngeal erythema.   Eyes: Conjunctivae and lids are normal. No scleral icterus.   Neck: Trachea normal and phonation normal. Neck supple. No edema present. No erythema present. No neck rigidity present.   Cardiovascular: Normal rate, regular rhythm, normal heart sounds and normal pulses.   Pulmonary/Chest: Effort normal and breath sounds normal. No respiratory distress. She has no decreased breath sounds. She has no rhonchi.   Abdominal: Normal appearance.   Musculoskeletal: Normal range of motion.         General: No deformity. Normal range of motion.   Neurological: She is alert and oriented to person, place, and time. She exhibits normal muscle tone. Coordination normal.   Skin: Skin is warm, dry, intact, not diaphoretic and not pale.   Psychiatric: Her speech is normal and behavior is normal. Judgment and thought content normal.   Nursing note and vitals reviewed.      Results for orders placed or performed in visit on 01/11/23   SARS Coronavirus 2 Antigen, POCT Manual Read   Result Value Ref Range    SARS Coronavirus 2 Antigen Positive (A) Negative      Acceptable Yes      *Note: Due to a large number of results and/or encounters for the requested time period, some results have not been displayed. A complete set of results can be found in Results Review.       Assessment:       1. Screening for viral disease    2. Fever, unspecified fever cause    3. COVID    4. Cough, unspecified type          Plan:         Risk score of 2 . Discussed risks vs benefits and side effects of Paxlovid. Patient would like to proceed with treatment with Paxlovid. Drug interactions reviewed and discussed with patient --  She verbalized understanding to the above.     Labs reviewed with patient. RTC and ED precautions discussed. Discussed proper use and side effects of prescribed and OTC medications recommended for symptomatic relief.       Screening for viral disease  -     Cancel: POCT Influenza A/B MOLECULAR  -     SARS Coronavirus 2 Antigen, POCT Manual Read    Fever, unspecified fever cause  -     acetaminophen tablet 1,000 mg    COVID  -     nirmatrelvir-ritonavir 300 mg (150 mg x 2)-100 mg copackaged tablets (EUA); Take 3 tablets by mouth 2 (two) times daily for 5 days. Each dose contains 2 nirmatrelvir (pink tablets) and 1 ritonavir (white tablet). Take all 3 tablets together **Hold Atorvastatin (Lipitor) for the 5 days you take Paxlovid  Dispense: 30 tablet; Refill: 0    Cough, unspecified type  -     promethazine-dextromethorphan (PROMETHAZINE-DM) 6.25-15 mg/5 mL Syrp; Take 5 mLs by mouth nightly as needed.  Dispense: 118 mL; Refill: 0  -     benzonatate (TESSALON PERLES) 100 MG capsule; Take 2 capsules (200 mg total) by mouth 3 (three) times daily as needed for Cough.  Dispense: 60 capsule; Refill: 0

## 2023-01-13 ENCOUNTER — PATIENT MESSAGE (OUTPATIENT)
Dept: INTERNAL MEDICINE | Facility: CLINIC | Age: 43
End: 2023-01-13
Payer: MEDICARE

## 2023-01-14 ENCOUNTER — TELEPHONE (OUTPATIENT)
Dept: URGENT CARE | Facility: CLINIC | Age: 43
End: 2023-01-14

## 2023-01-24 ENCOUNTER — DOCUMENTATION ONLY (OUTPATIENT)
Dept: REHABILITATION | Facility: HOSPITAL | Age: 43
End: 2023-01-24

## 2023-01-24 NOTE — PROGRESS NOTES
Attempted to call patient regarding missed appointment today. A voicemail was left providing patient with the clinic's phone number as well as the currently next scheduled visit. Asked patient to call clinic back to confirm appointment if possible based on length of time since last visit as well as if any changes are needed.

## 2023-01-25 ENCOUNTER — TELEPHONE (OUTPATIENT)
Dept: OPHTHALMOLOGY | Facility: CLINIC | Age: 43
End: 2023-01-25
Payer: MEDICARE

## 2023-01-25 NOTE — TELEPHONE ENCOUNTER
----- Message from Celestine Amaral sent at 1/25/2023  2:54 PM CST -----  Contact: Alicia Vargas is calling in regards to scheduling exam . Please call her back at [phone]    Thanks  CF

## 2023-01-31 ENCOUNTER — OFFICE VISIT (OUTPATIENT)
Dept: INTERNAL MEDICINE | Facility: CLINIC | Age: 43
End: 2023-01-31
Payer: MEDICARE

## 2023-01-31 ENCOUNTER — DOCUMENTATION ONLY (OUTPATIENT)
Dept: REHABILITATION | Facility: HOSPITAL | Age: 43
End: 2023-01-31

## 2023-01-31 ENCOUNTER — LAB VISIT (OUTPATIENT)
Dept: LAB | Facility: HOSPITAL | Age: 43
End: 2023-01-31
Attending: FAMILY MEDICINE
Payer: MEDICARE

## 2023-01-31 VITALS
SYSTOLIC BLOOD PRESSURE: 130 MMHG | OXYGEN SATURATION: 98 % | RESPIRATION RATE: 17 BRPM | TEMPERATURE: 98 F | BODY MASS INDEX: 46.16 KG/M2 | HEIGHT: 66 IN | DIASTOLIC BLOOD PRESSURE: 74 MMHG | HEART RATE: 90 BPM | WEIGHT: 287.25 LBS

## 2023-01-31 DIAGNOSIS — G35 MULTIPLE SCLEROSIS: ICD-10-CM

## 2023-01-31 DIAGNOSIS — U07.1 COVID-19: Primary | ICD-10-CM

## 2023-01-31 DIAGNOSIS — I10 PRIMARY HYPERTENSION: ICD-10-CM

## 2023-01-31 DIAGNOSIS — E55.9 VITAMIN D DEFICIENCY: ICD-10-CM

## 2023-01-31 PROCEDURE — 1159F PR MEDICATION LIST DOCUMENTED IN MEDICAL RECORD: ICD-10-PCS | Mod: HCNC,CPTII,S$GLB, | Performed by: PHYSICIAN ASSISTANT

## 2023-01-31 PROCEDURE — 82306 VITAMIN D 25 HYDROXY: CPT | Mod: HCNC | Performed by: STUDENT IN AN ORGANIZED HEALTH CARE EDUCATION/TRAINING PROGRAM

## 2023-01-31 PROCEDURE — 36415 COLL VENOUS BLD VENIPUNCTURE: CPT | Mod: HCNC | Performed by: STUDENT IN AN ORGANIZED HEALTH CARE EDUCATION/TRAINING PROGRAM

## 2023-01-31 PROCEDURE — 99999 PR PBB SHADOW E&M-EST. PATIENT-LVL IV: ICD-10-PCS | Mod: PBBFAC,HCNC,, | Performed by: PHYSICIAN ASSISTANT

## 2023-01-31 PROCEDURE — 3075F SYST BP GE 130 - 139MM HG: CPT | Mod: HCNC,CPTII,S$GLB, | Performed by: PHYSICIAN ASSISTANT

## 2023-01-31 PROCEDURE — 99213 PR OFFICE/OUTPT VISIT, EST, LEVL III, 20-29 MIN: ICD-10-PCS | Mod: HCNC,S$GLB,, | Performed by: PHYSICIAN ASSISTANT

## 2023-01-31 PROCEDURE — 3008F PR BODY MASS INDEX (BMI) DOCUMENTED: ICD-10-PCS | Mod: HCNC,CPTII,S$GLB, | Performed by: PHYSICIAN ASSISTANT

## 2023-01-31 PROCEDURE — 85025 COMPLETE CBC W/AUTO DIFF WBC: CPT | Mod: HCNC | Performed by: STUDENT IN AN ORGANIZED HEALTH CARE EDUCATION/TRAINING PROGRAM

## 2023-01-31 PROCEDURE — 3078F PR MOST RECENT DIASTOLIC BLOOD PRESSURE < 80 MM HG: ICD-10-PCS | Mod: HCNC,CPTII,S$GLB, | Performed by: PHYSICIAN ASSISTANT

## 2023-01-31 PROCEDURE — 3078F DIAST BP <80 MM HG: CPT | Mod: HCNC,CPTII,S$GLB, | Performed by: PHYSICIAN ASSISTANT

## 2023-01-31 PROCEDURE — 3008F BODY MASS INDEX DOCD: CPT | Mod: HCNC,CPTII,S$GLB, | Performed by: PHYSICIAN ASSISTANT

## 2023-01-31 PROCEDURE — 1160F RVW MEDS BY RX/DR IN RCRD: CPT | Mod: HCNC,CPTII,S$GLB, | Performed by: PHYSICIAN ASSISTANT

## 2023-01-31 PROCEDURE — 82784 ASSAY IGA/IGD/IGG/IGM EACH: CPT | Mod: HCNC | Performed by: STUDENT IN AN ORGANIZED HEALTH CARE EDUCATION/TRAINING PROGRAM

## 2023-01-31 PROCEDURE — 1160F PR REVIEW ALL MEDS BY PRESCRIBER/CLIN PHARMACIST DOCUMENTED: ICD-10-PCS | Mod: HCNC,CPTII,S$GLB, | Performed by: PHYSICIAN ASSISTANT

## 2023-01-31 PROCEDURE — 3075F PR MOST RECENT SYSTOLIC BLOOD PRESS GE 130-139MM HG: ICD-10-PCS | Mod: HCNC,CPTII,S$GLB, | Performed by: PHYSICIAN ASSISTANT

## 2023-01-31 PROCEDURE — 80053 COMPREHEN METABOLIC PANEL: CPT | Mod: HCNC | Performed by: STUDENT IN AN ORGANIZED HEALTH CARE EDUCATION/TRAINING PROGRAM

## 2023-01-31 PROCEDURE — 99213 OFFICE O/P EST LOW 20 MIN: CPT | Mod: HCNC,S$GLB,, | Performed by: PHYSICIAN ASSISTANT

## 2023-01-31 PROCEDURE — 99999 PR PBB SHADOW E&M-EST. PATIENT-LVL IV: CPT | Mod: PBBFAC,HCNC,, | Performed by: PHYSICIAN ASSISTANT

## 2023-01-31 PROCEDURE — 1159F MED LIST DOCD IN RCRD: CPT | Mod: HCNC,CPTII,S$GLB, | Performed by: PHYSICIAN ASSISTANT

## 2023-01-31 RX ORDER — DIPHENHYDRAMINE HYDROCHLORIDE 50 MG/ML
INJECTION INTRAMUSCULAR; INTRAVENOUS
COMMUNITY
Start: 2022-09-09 | End: 2023-09-05

## 2023-01-31 RX ORDER — NABUMETONE 750 MG/1
750 TABLET, FILM COATED ORAL 2 TIMES DAILY PRN
COMMUNITY
Start: 2022-11-03 | End: 2024-01-25

## 2023-01-31 RX ORDER — HYDROCODONE BITARTRATE AND ACETAMINOPHEN 10; 325 MG/1; MG/1
1 TABLET ORAL EVERY 8 HOURS PRN
COMMUNITY
Start: 2023-01-03 | End: 2023-06-16

## 2023-01-31 RX ORDER — NALOXONE HYDROCHLORIDE 4 MG/.1ML
SPRAY NASAL
COMMUNITY
Start: 2022-10-05 | End: 2023-09-18

## 2023-01-31 RX ORDER — OCRELIZUMAB 300 MG/10ML
INJECTION INTRAVENOUS
COMMUNITY
Start: 2022-09-09

## 2023-01-31 RX ORDER — METHYLPREDNISOLONE SODIUM SUCCINATE 125 MG/2ML
INJECTION, POWDER, FOR SOLUTION INTRAMUSCULAR; INTRAVENOUS
COMMUNITY
Start: 2022-09-09 | End: 2023-09-05

## 2023-01-31 NOTE — PROGRESS NOTES
Attempted to call patient regarding previously missed appointments and confirm health status for today's scheduled appointment. A voicemail was left asking patient to call the clinic if any changes are needed. Notified patient about not having any more visits scheduled and when she is doing better to please give us a call back to be scheduled.

## 2023-01-31 NOTE — PROGRESS NOTES
"Subjective:      Patient ID: Alicia Soliz is a 42 y.o. female.    Chief Complaint: Follow-up    Patient is new to me, being seen today for follow up.     Diagnosed COVID and flu on 1/11/23 at [facility], was given Rx: paxlovid, tessalon, promethazine DM  Completed medication     Reports feeling well, back to self     Patient would like to restart therapy for L leg weakness related to MS     Last visit August 2022 with PCP    Review of Systems   Constitutional:  Negative for chills, diaphoresis and fever.   HENT:  Negative for congestion, rhinorrhea and sore throat.    Respiratory:  Negative for cough, shortness of breath and wheezing.    Cardiovascular:  Negative for chest pain and leg swelling.   Gastrointestinal:  Negative for abdominal pain, constipation, diarrhea, nausea and vomiting.   Skin:  Negative for rash.   Neurological:  Negative for dizziness, light-headedness and headaches.     Objective:   /74   Pulse 90   Temp 98.2 °F (36.8 °C)   Resp 17   Ht 5' 6" (1.676 m)   Wt 130.3 kg (287 lb 4.2 oz)   SpO2 98%   BMI 46.36 kg/m²   Physical Exam  Constitutional:       General: She is not in acute distress.     Appearance: Normal appearance. She is well-developed. She is not ill-appearing.      Comments: Ambulates with assistance of rollator   HENT:      Head: Normocephalic and atraumatic.   Cardiovascular:      Rate and Rhythm: Normal rate and regular rhythm.      Heart sounds: Normal heart sounds. No murmur heard.  Pulmonary:      Effort: Pulmonary effort is normal. No respiratory distress.      Breath sounds: Normal breath sounds. No decreased breath sounds.   Abdominal:      General: Bowel sounds are normal.      Palpations: Abdomen is soft.      Tenderness: There is no abdominal tenderness.   Musculoskeletal:      Right lower leg: No edema.      Left lower leg: No edema.   Skin:     General: Skin is warm and dry.      Findings: No rash.   Psychiatric:         Speech: Speech normal. "         Behavior: Behavior normal.         Thought Content: Thought content normal.     Assessment:      1. COVID-19    2. Primary hypertension    3. Multiple sclerosis       Plan:   COVID-19   Resolution of symptoms     Primary hypertension   Controlled     Multiple sclerosis  Message sent to PT clearing for return to therapy     F/u PCP for annual     Discussed worsening signs/symptoms and when to return to clinic or go to ED.   Patient expresses understanding and agrees with treatment plan.

## 2023-02-01 LAB
25(OH)D3+25(OH)D2 SERPL-MCNC: 38 NG/ML (ref 30–96)
ALBUMIN SERPL BCP-MCNC: 3.9 G/DL (ref 3.5–5.2)
ALP SERPL-CCNC: 92 U/L (ref 55–135)
ALT SERPL W/O P-5'-P-CCNC: 14 U/L (ref 10–44)
ANION GAP SERPL CALC-SCNC: 10 MMOL/L (ref 8–16)
AST SERPL-CCNC: 20 U/L (ref 10–40)
BASOPHILS # BLD AUTO: 0.03 K/UL (ref 0–0.2)
BASOPHILS NFR BLD: 0.5 % (ref 0–1.9)
BILIRUB SERPL-MCNC: 0.3 MG/DL (ref 0.1–1)
BUN SERPL-MCNC: 7 MG/DL (ref 6–20)
CALCIUM SERPL-MCNC: 9.8 MG/DL (ref 8.7–10.5)
CHLORIDE SERPL-SCNC: 110 MMOL/L (ref 95–110)
CO2 SERPL-SCNC: 21 MMOL/L (ref 23–29)
CREAT SERPL-MCNC: 0.8 MG/DL (ref 0.5–1.4)
DIFFERENTIAL METHOD: ABNORMAL
EOSINOPHIL # BLD AUTO: 0.1 K/UL (ref 0–0.5)
EOSINOPHIL NFR BLD: 1.7 % (ref 0–8)
ERYTHROCYTE [DISTWIDTH] IN BLOOD BY AUTOMATED COUNT: 18.4 % (ref 11.5–14.5)
EST. GFR  (NO RACE VARIABLE): >60 ML/MIN/1.73 M^2
GLUCOSE SERPL-MCNC: 81 MG/DL (ref 70–110)
HCT VFR BLD AUTO: 39 % (ref 37–48.5)
HGB BLD-MCNC: 11.2 G/DL (ref 12–16)
IGA SERPL-MCNC: 217 MG/DL (ref 40–350)
IGG SERPL-MCNC: 1273 MG/DL (ref 650–1600)
IGM SERPL-MCNC: 60 MG/DL (ref 50–300)
IMM GRANULOCYTES # BLD AUTO: 0.01 K/UL (ref 0–0.04)
IMM GRANULOCYTES NFR BLD AUTO: 0.2 % (ref 0–0.5)
LYMPHOCYTES # BLD AUTO: 2.2 K/UL (ref 1–4.8)
LYMPHOCYTES NFR BLD: 37.3 % (ref 18–48)
MCH RBC QN AUTO: 20.4 PG (ref 27–31)
MCHC RBC AUTO-ENTMCNC: 28.7 G/DL (ref 32–36)
MCV RBC AUTO: 71 FL (ref 82–98)
MONOCYTES # BLD AUTO: 0.4 K/UL (ref 0.3–1)
MONOCYTES NFR BLD: 7.3 % (ref 4–15)
NEUTROPHILS # BLD AUTO: 3.1 K/UL (ref 1.8–7.7)
NEUTROPHILS NFR BLD: 53 % (ref 38–73)
NRBC BLD-RTO: 0 /100 WBC
PLATELET # BLD AUTO: 334 K/UL (ref 150–450)
PMV BLD AUTO: 10.8 FL (ref 9.2–12.9)
POTASSIUM SERPL-SCNC: 3.6 MMOL/L (ref 3.5–5.1)
PROT SERPL-MCNC: 8 G/DL (ref 6–8.4)
RBC # BLD AUTO: 5.49 M/UL (ref 4–5.4)
SODIUM SERPL-SCNC: 141 MMOL/L (ref 136–145)
WBC # BLD AUTO: 5.77 K/UL (ref 3.9–12.7)

## 2023-02-13 ENCOUNTER — TELEPHONE (OUTPATIENT)
Dept: REHABILITATION | Facility: HOSPITAL | Age: 43
End: 2023-02-13

## 2023-02-13 NOTE — TELEPHONE ENCOUNTER
PT attempted to reach pt as she has missed multiple appt. PT unable to reach but left a Voice message informing her we will have to d/c if she doesn't attend today due to poor compliance with attendance.

## 2023-02-16 ENCOUNTER — OFFICE VISIT (OUTPATIENT)
Dept: INTERNAL MEDICINE | Facility: CLINIC | Age: 43
End: 2023-02-16
Payer: MEDICARE

## 2023-02-16 VITALS
RESPIRATION RATE: 20 BRPM | TEMPERATURE: 99 F | SYSTOLIC BLOOD PRESSURE: 138 MMHG | OXYGEN SATURATION: 100 % | BODY MASS INDEX: 49.04 KG/M2 | HEIGHT: 64 IN | DIASTOLIC BLOOD PRESSURE: 78 MMHG | HEART RATE: 102 BPM | WEIGHT: 287.25 LBS

## 2023-02-16 DIAGNOSIS — R73.9 HYPERGLYCEMIA: ICD-10-CM

## 2023-02-16 DIAGNOSIS — E78.5 HYPERLIPIDEMIA, UNSPECIFIED HYPERLIPIDEMIA TYPE: ICD-10-CM

## 2023-02-16 DIAGNOSIS — R30.0 DYSURIA: ICD-10-CM

## 2023-02-16 DIAGNOSIS — G35 MS (MULTIPLE SCLEROSIS): Primary | ICD-10-CM

## 2023-02-16 LAB
BILIRUB SERPL-MCNC: ABNORMAL MG/DL
BLOOD URINE, POC: NEGATIVE
CLARITY, POC UA: ABNORMAL
COLOR, POC UA: ABNORMAL
GLUCOSE UR QL STRIP: NORMAL
KETONES UR QL STRIP: ABNORMAL
LEUKOCYTE ESTERASE URINE, POC: ABNORMAL
NITRITE, POC UA: NEGATIVE
PH, POC UA: 7
PROTEIN, POC: ABNORMAL
SPECIFIC GRAVITY, POC UA: 1.01
UROBILINOGEN, POC UA: NORMAL

## 2023-02-16 PROCEDURE — 87077 CULTURE AEROBIC IDENTIFY: CPT | Mod: HCNC | Performed by: FAMILY MEDICINE

## 2023-02-16 PROCEDURE — 81002 POCT URINE DIPSTICK WITHOUT MICROSCOPE: ICD-10-PCS | Mod: HCNC,S$GLB,, | Performed by: FAMILY MEDICINE

## 2023-02-16 PROCEDURE — 1159F PR MEDICATION LIST DOCUMENTED IN MEDICAL RECORD: ICD-10-PCS | Mod: HCNC,CPTII,S$GLB, | Performed by: FAMILY MEDICINE

## 2023-02-16 PROCEDURE — 4010F PR ACE/ARB THEARPY RXD/TAKEN: ICD-10-PCS | Mod: HCNC,CPTII,S$GLB, | Performed by: FAMILY MEDICINE

## 2023-02-16 PROCEDURE — 99999 PR PBB SHADOW E&M-EST. PATIENT-LVL III: ICD-10-PCS | Mod: PBBFAC,HCNC,, | Performed by: FAMILY MEDICINE

## 2023-02-16 PROCEDURE — 81002 URINALYSIS NONAUTO W/O SCOPE: CPT | Mod: HCNC,S$GLB,, | Performed by: FAMILY MEDICINE

## 2023-02-16 PROCEDURE — 99214 OFFICE O/P EST MOD 30 MIN: CPT | Mod: HCNC,S$GLB,, | Performed by: FAMILY MEDICINE

## 2023-02-16 PROCEDURE — 99214 PR OFFICE/OUTPT VISIT, EST, LEVL IV, 30-39 MIN: ICD-10-PCS | Mod: HCNC,S$GLB,, | Performed by: FAMILY MEDICINE

## 2023-02-16 PROCEDURE — 3078F DIAST BP <80 MM HG: CPT | Mod: HCNC,CPTII,S$GLB, | Performed by: FAMILY MEDICINE

## 2023-02-16 PROCEDURE — 1159F MED LIST DOCD IN RCRD: CPT | Mod: HCNC,CPTII,S$GLB, | Performed by: FAMILY MEDICINE

## 2023-02-16 PROCEDURE — 3075F SYST BP GE 130 - 139MM HG: CPT | Mod: HCNC,CPTII,S$GLB, | Performed by: FAMILY MEDICINE

## 2023-02-16 PROCEDURE — 87186 SC STD MICRODIL/AGAR DIL: CPT | Mod: HCNC | Performed by: FAMILY MEDICINE

## 2023-02-16 PROCEDURE — 3075F PR MOST RECENT SYSTOLIC BLOOD PRESS GE 130-139MM HG: ICD-10-PCS | Mod: HCNC,CPTII,S$GLB, | Performed by: FAMILY MEDICINE

## 2023-02-16 PROCEDURE — 4010F ACE/ARB THERAPY RXD/TAKEN: CPT | Mod: HCNC,CPTII,S$GLB, | Performed by: FAMILY MEDICINE

## 2023-02-16 PROCEDURE — 87086 URINE CULTURE/COLONY COUNT: CPT | Mod: HCNC | Performed by: FAMILY MEDICINE

## 2023-02-16 PROCEDURE — 87088 URINE BACTERIA CULTURE: CPT | Mod: HCNC | Performed by: FAMILY MEDICINE

## 2023-02-16 PROCEDURE — 3008F PR BODY MASS INDEX (BMI) DOCUMENTED: ICD-10-PCS | Mod: HCNC,CPTII,S$GLB, | Performed by: FAMILY MEDICINE

## 2023-02-16 PROCEDURE — 99999 PR PBB SHADOW E&M-EST. PATIENT-LVL III: CPT | Mod: PBBFAC,HCNC,, | Performed by: FAMILY MEDICINE

## 2023-02-16 PROCEDURE — 3078F PR MOST RECENT DIASTOLIC BLOOD PRESSURE < 80 MM HG: ICD-10-PCS | Mod: HCNC,CPTII,S$GLB, | Performed by: FAMILY MEDICINE

## 2023-02-16 PROCEDURE — 3008F BODY MASS INDEX DOCD: CPT | Mod: HCNC,CPTII,S$GLB, | Performed by: FAMILY MEDICINE

## 2023-02-16 RX ORDER — CIPROFLOXACIN 500 MG/1
500 TABLET ORAL 2 TIMES DAILY
Qty: 10 TABLET | Refills: 0 | Status: SHIPPED | OUTPATIENT
Start: 2023-02-16 | End: 2023-03-07 | Stop reason: SDUPTHER

## 2023-02-16 RX ORDER — VALSARTAN 80 MG/1
80 TABLET ORAL DAILY
Qty: 90 TABLET | Refills: 3 | Status: SHIPPED | OUTPATIENT
Start: 2023-02-16

## 2023-02-16 RX ORDER — ESOMEPRAZOLE MAGNESIUM 40 MG/1
40 CAPSULE, DELAYED RELEASE ORAL
Qty: 90 CAPSULE | Refills: 3 | Status: SHIPPED | OUTPATIENT
Start: 2023-02-16 | End: 2024-01-25

## 2023-02-18 NOTE — PROGRESS NOTES
Patient, Alicia Soliz (MRN #[id number]), presented with a recorded BMI of 49.31 kg/m^2 consistent with the definition of morbid obesity (ICD-10 E66.01). The patient's morbid obesity was monitored, evaluated, addressed and/or treated. This addendum to the medical record is made on 02/18/2023.

## 2023-02-18 NOTE — PROGRESS NOTES
Subjective:      Patient ID: Alicia Soliz is a 43 y.o. female.    Chief Complaint: Annual Exam      Patient here today for routine follow-up, doing well overall.  Does report recent dysuria, frequency.  Still having issues with chronic constipation.  Blood pressure well controlled.  No new problems today.    Review of Systems   Constitutional:  Negative for activity change, appetite change and unexpected weight change.   Respiratory:  Negative for shortness of breath.    Cardiovascular:  Negative for leg swelling.   Gastrointestinal:  Negative for abdominal pain.   Past Medical History:   Diagnosis Date    Anemia     Arthritis     Cardiac arrest as complication of care     pt states she went into cardiac arrest from an allergic reaction to a medication    Depression     Encounter for blood transfusion     Hemiplegia due to old stroke     Hypertension     Morbid obesity with BMI of 45.0-49.9, adult 9/20/2018    Multiple sclerosis           Past Surgical History:   Procedure Laterality Date    APPENDECTOMY      CHOLECYSTECTOMY      COLONOSCOPY N/A 11/25/2020    Procedure: COLONOSCOPY;  Surgeon: Andrew Jenkins III, MD;  Location: [facility];  Service: Endoscopy;  Laterality: N/A;    ESOPHAGOGASTRODUODENOSCOPY N/A 2/19/2020    Procedure: EGD (ESOPHAGOGASTRODUODENOSCOPY);  Surgeon: Michael Navarrete MD;  Location: [facility];  Service: Endoscopy;  Laterality: N/A;    ESOPHAGOGASTRODUODENOSCOPY N/A 2/20/2020    Procedure: EGD (ESOPHAGOGASTRODUODENOSCOPY);  Surgeon: Michael Navarrete MD;  Location: [facility];  Service: General;  Laterality: N/A;    ESOPHAGOGASTRODUODENOSCOPY N/A 11/25/2020    Procedure: EGD (ESOPHAGOGASTRODUODENOSCOPY);  Surgeon: Andrew Jenkins III, MD;  Location: [facility];  Service: Endoscopy;  Laterality: N/A;    HYSTERECTOMY      KNEE SURGERY      ROBOT-ASSISTED LAPAROSCOPIC SLEEVE GASTRECTOMY USING DA ANTHONY XI N/A 2/20/2020    Procedure: XI ROBOTIC SLEEVE GASTRECTOMY;  Surgeon: Michael Navarrete MD;  Location:  [facility] OR;  Service: General;  Laterality: N/A;    TENOPLASTY OF HAND Left 8/26/2021    Procedure: REPAIR, TENDON, HAND;  Surgeon: Joselito Lugo MD;  Location: [facility] OR;  Service: Orthopedics;  Laterality: Left;  Left RCL PIP Joint Repair/Recon with Arthrex Internal Brace.    TONSILLECTOMY      TUBAL LIGATION       Family History   Problem Relation Age of Onset    Lupus Mother     Heart disease Mother     Hypertension Mother     Diabetes Father     Kidney disease Father     Cancer Maternal Aunt 40        breast    Breast cancer Maternal Aunt     Diabetes Maternal Aunt     COPD Maternal Aunt     Breast cancer Maternal Cousin     Ovarian cancer Maternal Cousin      Social History     Socioeconomic History    Marital status: Single    Number of children: 3   Occupational History     Employer: [employer]   Tobacco Use    Smoking status: Never     Passive exposure: Never    Smokeless tobacco: Never   Substance and Sexual Activity    Alcohol use: Yes     Comment: once a year     Drug use: Never    Sexual activity: Yes     Partners: Male     Birth control/protection: Surgical     Social Determinants of Health     Financial Resource Strain: Low Risk     Difficulty of Paying Living Expenses: Not very hard   Food Insecurity: No Food Insecurity    Worried About Running Out of Food in the Last Year: Never true    Ran Out of Food in the Last Year: Never true   Transportation Needs: No Transportation Needs    Lack of Transportation (Medical): No    Lack of Transportation (Non-Medical): No   Physical Activity: Sufficiently Active    Days of Exercise per Week: 7 days    Minutes of Exercise per Session: 60 min   Stress: No Stress Concern Present    Feeling of Stress : Not at all   Social Connections: Unknown    Frequency of Communication with Friends and Family: Three times a week    Frequency of Social Gatherings with Friends and Family: Twice a week    Active Member of Clubs or Organizations: Yes    Attends Club or Organization  "Meetings: 1 to 4 times per year    Marital Status:    Housing Stability: Low Risk     Unable to Pay for Housing in the Last Year: No    Number of Places Lived in the Last Year: 0    Unstable Housing in the Last Year: No     Review of patient's allergies indicates:   Allergen Reactions    Demerol [meperidine] Anaphylaxis     Other reaction(s): Itching    Dilaudid [hydromorphone (bulk)] Anaphylaxis     "coded" per pt  Patient can tolerate Lortab    Shellfish containing products Itching, Nausea And Vomiting and Swelling    Prednisone Itching     Other reaction(s): Itching    Tramadol      shaking       Objective:       /78 (BP Location: Right arm, Patient Position: Sitting, BP Method: Large (Manual))   Pulse 102   Temp 99.3 °F (37.4 °C) (Tympanic)   Resp 20   Ht 5' 4" (1.626 m)   Wt 130.3 kg (287 lb 4.2 oz)   SpO2 100%   BMI 49.31 kg/m²   Physical Exam  Constitutional:       General: She is not in acute distress.     Appearance: Normal appearance. She is well-developed. She is not ill-appearing or diaphoretic.   HENT:      Right Ear: Tympanic membrane, ear canal and external ear normal. There is no impacted cerumen.      Left Ear: Tympanic membrane, ear canal and external ear normal. There is no impacted cerumen.      Nose: No rhinorrhea.      Mouth/Throat:      Pharynx: No posterior oropharyngeal erythema.   Cardiovascular:      Rate and Rhythm: Normal rate and regular rhythm.      Heart sounds: Normal heart sounds.   Pulmonary:      Effort: Pulmonary effort is normal.      Breath sounds: Normal breath sounds.   Abdominal:      Palpations: Abdomen is soft.      Tenderness: There is no abdominal tenderness.   Musculoskeletal:      Right lower leg: No edema.      Left lower leg: No edema.   Neurological:      Mental Status: She is alert and oriented to person, place, and time.   Psychiatric:         Mood and Affect: Mood normal.         Behavior: Behavior normal.         Thought Content: Thought " content normal.         Judgment: Judgment normal.     Assessment:     1. MS (multiple sclerosis)    2. Hyperglycemia    3. Hyperlipidemia, unspecified hyperlipidemia type    4. Dysuria      Plan:   MS (multiple sclerosis)    Hyperglycemia  -     Comprehensive Metabolic Panel; Future; Expected date: 02/16/2023  -     Hemoglobin A1C; Future; Expected date: 02/16/2023  -     CBC Auto Differential; Future; Expected date: 02/16/2023  -     Lipid Panel; Future; Expected date: 02/16/2023  -     TSH; Future; Expected date: 02/16/2023    Hyperlipidemia, unspecified hyperlipidemia type  -     Comprehensive Metabolic Panel; Future; Expected date: 02/16/2023  -     Hemoglobin A1C; Future; Expected date: 02/16/2023  -     CBC Auto Differential; Future; Expected date: 02/16/2023  -     Lipid Panel; Future; Expected date: 02/16/2023  -     TSH; Future; Expected date: 02/16/2023    Dysuria  -     POCT urine dipstick without microscope  -     Urine culture; Future; Expected date: 02/16/2023    Other orders  -     esomeprazole (NEXIUM) 40 MG capsule; Take 1 capsule (40 mg total) by mouth before breakfast.  Dispense: 90 capsule; Refill: 3  -     linaCLOtide (LINZESS) 145 mcg Cap capsule; Take 1 capsule (145 mcg total) by mouth before breakfast.  Dispense: 90 capsule; Refill: 3  -     valsartan (DIOVAN) 80 MG tablet; Take 1 tablet (80 mg total) by mouth once daily.  Dispense: 90 tablet; Refill: 3  -     ciprofloxacin HCl (CIPRO) 500 MG tablet; Take 1 tablet (500 mg total) by mouth 2 (two) times daily.  Dispense: 10 tablet; Refill: 0    Continue all other current medications.   Will return for above labs when fasting  Urine dip does indicate infection, will follow culture results.    Medication List with Changes/Refills   New Medications    CIPROFLOXACIN HCL (CIPRO) 500 MG TABLET    Take 1 tablet (500 mg total) by mouth 2 (two) times daily.   Current Medications    ATORVASTATIN (LIPITOR) 20 MG TABLET    Take 20 mg by mouth once daily.     CHOLECALCIFEROL, VITAMIN D3, (VITAMIN D3) 25 MCG (1,000 UNIT) CAPSULE    Take 2 capsules (2,000 Units total) by mouth once daily.    DIPHENHYDRAMINE (BENADRYL) 50 MG/ML INJECTION        DULOXETINE (CYMBALTA) 60 MG CAPSULE    TAKE 1 CAPSULE BY MOUTH EVERY DAY    HYDROCODONE-ACETAMINOPHEN (NORCO)  MG PER TABLET    Take 1 tablet by mouth every 8 (eight) hours as needed.    NABUMETONE (RELAFEN) 750 MG TABLET    Take 750 mg by mouth 2 (two) times daily as needed.    NALOXONE (NARCAN) 4 MG/ACTUATION SPRY    SMARTSIG:Both Nares    OCREVUS 30 MG/ML SOLN        SOLU-MEDROL, PF, 125 MG/2 ML SOLR        TOPIRAMATE (TOPAMAX) 50 MG TABLET    TAKE 1 TABLET BY MOUTH IN THE MORNING AND 2 TABLETS AT BEDTIME   Changed and/or Refilled Medications    Modified Medication Previous Medication    ESOMEPRAZOLE (NEXIUM) 40 MG CAPSULE esomeprazole (NEXIUM) 40 MG capsule       Take 1 capsule (40 mg total) by mouth before breakfast.    TAKE 1 CAPSULE (40 MG TOTAL) BY MOUTH BEFORE BREAKFAST.    LINACLOTIDE (LINZESS) 145 MCG CAP CAPSULE LINZESS 145 mcg Cap capsule       Take 1 capsule (145 mcg total) by mouth before breakfast.    TAKE 1 CAPSULE (145 MCG TOTAL) BY MOUTH BEFORE BREAKFAST.    VALSARTAN (DIOVAN) 80 MG TABLET valsartan (DIOVAN) 80 MG tablet       Take 1 tablet (80 mg total) by mouth once daily.    Take 80 mg by mouth once daily.

## 2023-02-20 ENCOUNTER — PATIENT MESSAGE (OUTPATIENT)
Dept: PSYCHIATRY | Facility: CLINIC | Age: 43
End: 2023-02-20
Payer: MEDICARE

## 2023-02-20 ENCOUNTER — PATIENT MESSAGE (OUTPATIENT)
Dept: INTERNAL MEDICINE | Facility: CLINIC | Age: 43
End: 2023-02-20
Payer: MEDICARE

## 2023-02-20 LAB — BACTERIA UR CULT: ABNORMAL

## 2023-02-21 ENCOUNTER — CLINICAL SUPPORT (OUTPATIENT)
Dept: REHABILITATION | Facility: HOSPITAL | Age: 43
End: 2023-02-21
Payer: MEDICARE

## 2023-02-21 DIAGNOSIS — R53.1 DECREASED STRENGTH, ENDURANCE, AND MOBILITY: ICD-10-CM

## 2023-02-21 DIAGNOSIS — R53.1 LEFT-SIDED WEAKNESS: ICD-10-CM

## 2023-02-21 DIAGNOSIS — Z74.09 DECREASED STRENGTH, ENDURANCE, AND MOBILITY: ICD-10-CM

## 2023-02-21 DIAGNOSIS — G81.90 HEMIPLEGIA, UNSPECIFIED ETIOLOGY, UNSPECIFIED HEMIPLEGIA TYPE, UNSPECIFIED LATERALITY: ICD-10-CM

## 2023-02-21 DIAGNOSIS — R26.9 GAIT ABNORMALITY: ICD-10-CM

## 2023-02-21 DIAGNOSIS — R68.89 DECREASED STRENGTH, ENDURANCE, AND MOBILITY: ICD-10-CM

## 2023-02-21 DIAGNOSIS — Z74.09 DECREASED FUNCTIONAL MOBILITY AND ENDURANCE: Primary | ICD-10-CM

## 2023-02-21 PROCEDURE — 97112 NEUROMUSCULAR REEDUCATION: CPT | Mod: HCNC,PN

## 2023-02-21 PROCEDURE — 97110 THERAPEUTIC EXERCISES: CPT | Mod: HCNC,PN

## 2023-02-21 PROCEDURE — 97164 PT RE-EVAL EST PLAN CARE: CPT | Mod: HCNC,PN

## 2023-02-21 NOTE — PROGRESS NOTES
[facility]   Physical Therapist Treatment Note and Reassessment    Name: Alicia Soliz  Clinic Number: [id number]    Therapy Diagnosis:        Encounter Diagnoses   Name Primary?    Decreased strength, endurance, and mobility Yes    Left-sided weakness      Gait abnormality      Multiple sclerosis      Hemiplegia, unspecified etiology, unspecified hemiplegia type, unspecified laterality        Physician: Kole Carrasquillo MD    Visit Date: 2/21/2023    Physician Orders: PT Eval and Treat   Medical Diagnosis from Referral: G35 (ICD-10-CM) - Multiple sclerosis  Evaluation Date: 10/4/2022  Authorization Period Expiration: 2/28/23  Plan of Care Expiration: 1/18/2023 extended to 3/31/23  Progress Note Due: 1/05//2023  Visit # / Visits authorized: 12/ 20  FOTO: 2/3     Precautions: Standard, Fall, and MS      PTA Visit #: 0/5     Time In: 1:08   Time Out: 1:50  Total Billable Time: 42 minutes    SUBJECTIVE     Pt reports: she was out with the flu, covid, traveling and a family death. PT discussed due to so many missed sessions she will only be able to schedule one visit at a time due to such inconsistent attendance in the past two months.  She reports she worked on her HEP as able at home.    She was compliant with home exercise program.  Response to previous treatment: no issues  Functional change: na at this time    Pain: 0/10 Location: none    OBJECTIVE   Update:     CMS Impairment/Limitation/Restriction for FOTO Multiple Sclerosis Survey     Therapist reviewed FOTO scores for Alicia Soliz on 2/21/2023.   FOTO documents entered into [employer] - see Media section.     Limitation Score: 72%         Strength:                    Right            Left     Current 2/21/23  Hip flexors 5/5     4/5  L: 4+/5   Knee extension 5/5     4/5  L:5/5   Knee flexion 5/5     4/5 L:4+/5   Hip abductors NT/5     NT/5 R 4+;L2+   DF 5/5 4+/5 L: 5/5   Ankle Eversion 5/5     4 /5 L: 5/5      Gait is with RW  with short step length, wide VANESSA and the L LE is not dragging any longer but still hip externally rotated. Pt is not ready for gait with a SC but would like to be able to use one.     Special Tests:  5x Sit<>Stand: 14 seconds IE           12.86 seconds  2022 today: 17 sec  TU.8 seconds with rolling walker   58 seconds 2022 today : 44 sec     Objective Measures updated at progress report unless specified.     Treatment     Alicia received the treatments listed below:      therapeutic exercises to develop strength, flexibility, posture, and core stabilization for 10 minutes including:    Nustep 5 min intermittent at her pace  Sit to stand x 10    Deferred:  Sit to stand from 20 inch box 2x 8 then add ball squeeze 2x8 with sit to stand  Standing hip flexion with focus on L lateral hip avoiding lateral shift 2x 8  Toe marches to 14 inch box 2x 8 per leg  Standing ball press forward then up lateral with rotation of the trunk 1x 8 then 6# 2 x8  Jez curl 6# (4 inches off the floor) 2x 10 (cga for safety)  Seated LAQ 2# L LE 3x 10 3 sec hold  Posterior pelvic tilt 2x 10 3 sec hold  Bridges 1x 10 then add PPT with bridges 2x10  Sit to stand off black table with no UE support 20x with CGA for balance and safety and cuing for increase WB in L LE +RTB  +Standing TKE BTB x30  Heel and toe raises 1 x 20 reps  Sitting L knee extension 1 x 20 reps  +SLR Flexion 2x10  +SL Clams x20 w/ Ball between Ankles  +Q'Ped Hip Extension x10 each B    neuromuscular re-education activities to improve: Balance, Coordination, Kinesthetic Sense, and Proprioception for 15 minutes. The following activities were included:  Deferred:    Supine heel slides with mild resistance 2x 10  Long seated L hip abduction (focus on neutral hip to reduce falling into external rotation-PT manual help at times) 5x 10  Standing hip abduction with focus on hip neutral with rotation to reduce external rotation 2x 10      Deferred:  WS in //Bars  laterally for TKE and Hip mobility.  WS in // bars forward back/ toe push off and heel strike  Bridges x10; added ball squeeze 2x8  L hip flexion from the floor off edge of mat w/ankle DF 3x8  Prone knee flexion 2x10 B  Passive L hip stretch  Seated heel raises w/toes on rolled towel to facilitate toe extension 2x10  Seated DF w/heel on rolled towel 3x8    manual therapy techniques: for 0 minutes, including:  --  therapeutic activities to improve functional performance for 0 minutes, including:      Gait Training to improve community ambulation for 0 minutes including:        Patient Education and Home Exercises     Home Exercises Provided and Patient Education Provided     Education provided:   - HEP review  - Gait mechanics  - Possible need for custom AFO    Written Home Exercises Provided: Patient instructed to cont prior HEP. Exercises were reviewed and Alicia was able to demonstrate them prior to the end of the session.  Alicia demonstrated good  understanding of the education provided. See EMR under Patient Instructions for exercises provided during therapy sessions    ASSESSMENT     Patient has not attended PT in nearly 2 months.Pt is still recovering from a MS episode.  PT reassessed her strength and balance and she has improved in multiple areas but her speed with gait is not as good as prior as she is now using better stepping on the L LE slowing her pace to activate the muscles needed more than dragging the leg.  PT noted L hip abduction strength is nearly able to lift off the mat against gravity but we are still working in supine as she can only get to part of the ROM. In supine, she tends to hip externally rotated rather than keep hip neutral with rotation so PT encouraged her to view the leg when in long seated to improve muscle recruitment. PT to advance as tolerated.    Alicia Is progressing well towards her goals.   Pt prognosis is Good.     Pt will continue to benefit from skilled outpatient physical  therapy to address the deficits listed in the problem list box on initial evaluation, provide pt/family education and to maximize pt's level of independence in the home and community environment.     Pt's spiritual, cultural and educational needs considered and pt agreeable to plan of care and goals.     Anticipated barriers to physical therapy: chronicity of condition and comorbidities    Goals:   Short Term Goals: In 3 weeks   1.I with HEP MET 12/6/20222  2.Pt to increase BLE strength by 1/2 grade to show improvements with sitting, standing, ambulating, bending, lifting activities.  Progressing  3. Patient to improve 5x Sit<>Stand to less than 12 seconds to reduced risk of fall. Progressing   4. Patient to improve TUG with RW to less than 25 seconds to reduce risk of falls. Progressing     Long Term Goals: In 6 weeks (Progressing)  1. Patient to demo increase in LE strength to 3+/5 with hip abduction on the L LE to show improvements with sitting, standing, ambulating, bending, lifting activities.  progressing  2. Patient to improve score on the FOTO to < or = to 48% to show improvement with QOL. progressing  3. Patient to improve 5x Sit<>Stand to less than 10 seconds to reduced risk of fall. progressing  4. Patient to improve TUG with RW to less than 20 seconds to reduce risk of falls. progressing     Long Term Goal Status:   continue per initial plan of care. to 3/31/23  Plan      Update Certification Period: 12/6/2022 to 1/18/2023 extended to 3/31/23  Recommended Treatment Plan: 2 times per week for 6 weeks effective week of 2/21/23: Aquatic Therapy, Cervical/Lumbar Traction, Electrical Stimulation  , Gait Training, Manual Therapy, Moist Heat/ Ice, Neuromuscular Re-ed, Patient Education, Self Care, Therapeutic Exercise, and Therapeutic Activities    Tracie Way, PT

## 2023-02-21 NOTE — PLAN OF CARE
[facility]   Physical Therapist Treatment Note and Reassessment    Name: Alicia Soliz  Clinic Number: [id number]    Therapy Diagnosis:        Encounter Diagnoses   Name Primary?    Decreased strength, endurance, and mobility Yes    Left-sided weakness      Gait abnormality      Multiple sclerosis      Hemiplegia, unspecified etiology, unspecified hemiplegia type, unspecified laterality        Physician: Kole Carrasquillo MD    Visit Date: 2/21/2023    Physician Orders: PT Eval and Treat   Medical Diagnosis from Referral: G35 (ICD-10-CM) - Multiple sclerosis  Evaluation Date: 10/4/2022  Authorization Period Expiration: 2/28/23  Plan of Care Expiration: 1/18/2023 extended to 3/31/23  Progress Note Due: 1/05//2023  Visit # / Visits authorized: 12/ 20  FOTO: 2/3     Precautions: Standard, Fall, and MS      PTA Visit #: 0/5     Time In: 1:08   Time Out: 1:50  Total Billable Time: 42 minutes    SUBJECTIVE     Pt reports: she was out with the flu, covid, traveling and a family death. PT discussed due to so many missed sessions she will only be able to schedule one visit at a time due to such inconsistent attendance in the past two months.  She reports she worked on her HEP as able at home.    She was compliant with home exercise program.  Response to previous treatment: no issues  Functional change: na at this time    Pain: 0/10 Location: none    OBJECTIVE   Update:     CMS Impairment/Limitation/Restriction for FOTO Multiple Sclerosis Survey     Therapist reviewed FOTO scores for Alicia Soliz on 2/21/2023.   FOTO documents entered into [employer] - see Media section.     Limitation Score: 72%         Strength:                    Right            Left     Current 2/21/23  Hip flexors 5/5     4/5  L: 4+/5   Knee extension 5/5     4/5  L:5/5   Knee flexion 5/5     4/5 L:4+/5   Hip abductors NT/5     NT/5 R 4+;L2+   DF 5/5 4+/5 L: 5/5   Ankle Eversion 5/5     4 /5 L: 5/5      Gait is with RW  with short step length, wide VANESSA and the L LE is not dragging any longer but still hip externally rotated. Pt is not ready for gait with a SC but would like to be able to use one.     Special Tests:  5x Sit<>Stand: 14 seconds IE           12.86 seconds  2022 today: 17 sec  TU.8 seconds with rolling walker   58 seconds 2022 today : 44 sec     Objective Measures updated at progress report unless specified.     Treatment     Alicia received the treatments listed below:      therapeutic exercises to develop strength, flexibility, posture, and core stabilization for 10 minutes including:    Nustep 5 min intermittent at her pace  Sit to stand x 10    Deferred:  Sit to stand from 20 inch box 2x 8 then add ball squeeze 2x8 with sit to stand  Standing hip flexion with focus on L lateral hip avoiding lateral shift 2x 8  Toe marches to 14 inch box 2x 8 per leg  Standing ball press forward then up lateral with rotation of the trunk 1x 8 then 6# 2 x8  Jez curl 6# (4 inches off the floor) 2x 10 (cga for safety)  Seated LAQ 2# L LE 3x 10 3 sec hold  Posterior pelvic tilt 2x 10 3 sec hold  Bridges 1x 10 then add PPT with bridges 2x10  Sit to stand off black table with no UE support 20x with CGA for balance and safety and cuing for increase WB in L LE +RTB  +Standing TKE BTB x30  Heel and toe raises 1 x 20 reps  Sitting L knee extension 1 x 20 reps  +SLR Flexion 2x10  +SL Clams x20 w/ Ball between Ankles  +Q'Ped Hip Extension x10 each B    neuromuscular re-education activities to improve: Balance, Coordination, Kinesthetic Sense, and Proprioception for 15 minutes. The following activities were included:  Deferred:    Supine heel slides with mild resistance 2x 10  Long seated L hip abduction (focus on neutral hip to reduce falling into external rotation-PT manual help at times) 5x 10  Standing hip abduction with focus on hip neutral with rotation to reduce external rotation 2x 10      Deferred:  WS in //Bars  laterally for TKE and Hip mobility.  WS in // bars forward back/ toe push off and heel strike  Bridges x10; added ball squeeze 2x8  L hip flexion from the floor off edge of mat w/ankle DF 3x8  Prone knee flexion 2x10 B  Passive L hip stretch  Seated heel raises w/toes on rolled towel to facilitate toe extension 2x10  Seated DF w/heel on rolled towel 3x8    manual therapy techniques: for 0 minutes, including:  --  therapeutic activities to improve functional performance for 0 minutes, including:      Gait Training to improve community ambulation for 0 minutes including:        Patient Education and Home Exercises     Home Exercises Provided and Patient Education Provided     Education provided:   - HEP review  - Gait mechanics  - Possible need for custom AFO    Written Home Exercises Provided: Patient instructed to cont prior HEP. Exercises were reviewed and Alicia was able to demonstrate them prior to the end of the session.  Alicia demonstrated good  understanding of the education provided. See EMR under Patient Instructions for exercises provided during therapy sessions    ASSESSMENT     Patient has not attended PT in nearly 2 months.Pt is still recovering from a MS episode.  PT reassessed her strength and balance and she has improved in multiple areas but her speed with gait is not as good as prior as she is now using better stepping on the L LE slowing her pace to activate the muscles needed more than dragging the leg.  PT noted L hip abduction strength is nearly able to lift off the mat against gravity but we are still working in supine as she can only get to part of the ROM. In supine, she tends to hip externally rotated rather than keep hip neutral with rotation so PT encouraged her to view the leg when in long seated to improve muscle recruitment. PT to advance as tolerated.    Alicia Is progressing well towards her goals.   Pt prognosis is Good.     Pt will continue to benefit from skilled outpatient physical  therapy to address the deficits listed in the problem list box on initial evaluation, provide pt/family education and to maximize pt's level of independence in the home and community environment.     Pt's spiritual, cultural and educational needs considered and pt agreeable to plan of care and goals.     Anticipated barriers to physical therapy: chronicity of condition and comorbidities    Goals:   Short Term Goals: In 3 weeks   1.I with HEP MET 12/6/20222  2.Pt to increase BLE strength by 1/2 grade to show improvements with sitting, standing, ambulating, bending, lifting activities.  Progressing  3. Patient to improve 5x Sit<>Stand to less than 12 seconds to reduced risk of fall. Progressing   4. Patient to improve TUG with RW to less than 25 seconds to reduce risk of falls. Progressing     Long Term Goals: In 6 weeks (Progressing)  1. Patient to demo increase in LE strength to 3+/5 with hip abduction on the L LE to show improvements with sitting, standing, ambulating, bending, lifting activities.  progressing  2. Patient to improve score on the FOTO to < or = to 48% to show improvement with QOL. progressing  3. Patient to improve 5x Sit<>Stand to less than 10 seconds to reduced risk of fall. progressing  4. Patient to improve TUG with RW to less than 20 seconds to reduce risk of falls. progressing     Long Term Goal Status:   continue per initial plan of care. to 3/31/23  Plan      Update Certification Period: 12/6/2022 to 1/18/2023 extended to 3/31/23  Recommended Treatment Plan: 2 times per week for 6 weeks effective week of 2/21/23: Aquatic Therapy, Cervical/Lumbar Traction, Electrical Stimulation  , Gait Training, Manual Therapy, Moist Heat/ Ice, Neuromuscular Re-ed, Patient Education, Self Care, Therapeutic Exercise, and Therapeutic Activities    Traice Way, PT

## 2023-03-03 ENCOUNTER — CLINICAL SUPPORT (OUTPATIENT)
Dept: REHABILITATION | Facility: HOSPITAL | Age: 43
End: 2023-03-03
Payer: MEDICARE

## 2023-03-03 DIAGNOSIS — R53.1 DECREASED STRENGTH, ENDURANCE, AND MOBILITY: ICD-10-CM

## 2023-03-03 DIAGNOSIS — Z74.09 DECREASED FUNCTIONAL MOBILITY AND ENDURANCE: Primary | ICD-10-CM

## 2023-03-03 DIAGNOSIS — R68.89 DECREASED STRENGTH, ENDURANCE, AND MOBILITY: ICD-10-CM

## 2023-03-03 DIAGNOSIS — R53.1 LEFT-SIDED WEAKNESS: ICD-10-CM

## 2023-03-03 DIAGNOSIS — R26.9 GAIT ABNORMALITY: ICD-10-CM

## 2023-03-03 DIAGNOSIS — Z74.09 DECREASED STRENGTH, ENDURANCE, AND MOBILITY: ICD-10-CM

## 2023-03-03 PROCEDURE — 97112 NEUROMUSCULAR REEDUCATION: CPT | Mod: HCNC,PN

## 2023-03-03 PROCEDURE — 97110 THERAPEUTIC EXERCISES: CPT | Mod: HCNC,PN

## 2023-03-03 NOTE — PROGRESS NOTES
[facility] OUTPATIENT THERAPY AND WELLNESS   Physical Therapist Treatment Note    Name: Alicia Soliz  Clinic Number: [id number]    Therapy Diagnosis:        Encounter Diagnoses   Name Primary?    Decreased strength, endurance, and mobility Yes    Left-sided weakness      Gait abnormality      Multiple sclerosis      Hemiplegia, unspecified etiology, unspecified hemiplegia type, unspecified laterality        Physician: Kole Carrasquillo MD    Visit Date: 3/3/2023    Physician Orders: PT Eval and Treat   Medical Diagnosis from Referral: G35 (ICD-10-CM) - Multiple sclerosis  Evaluation Date: 10/4/2022  Authorization Period Expiration: 2/28/23  Plan of Care Expiration: 1/18/2023 extended to 3/31/23  Progress Note Due: 1/05//2023  Visit # / Visits authorized: 13/ 20  FOTO: 2/3     Precautions: Standard, Fall, and MS      PTA Visit #: 0/5     Time In: 10:58  Time Out: 11:58   Total Billable Time: 30 minutes one on one with PT    SUBJECTIVE     Pt reports: she showed up at a wrong time due to transportation issues.    She was compliant with home exercise program.  Response to previous treatment: no issues  Functional change: na at this time    Pain: 0/10 Location: none    OBJECTIVE   Update:     CMS Impairment/Limitation/Restriction for FOTO Multiple Sclerosis Survey     Therapist reviewed FOTO scores for Alicia Soliz on 2/21/2023.   FOTO documents entered into [employer] - see Media section.     Limitation Score: 72%               Objective Measures updated at progress report unless specified.     Treatment     Alicia received the treatments listed below:      therapeutic exercises to develop strength, flexibility, posture, and core stabilization for 15 minutes  one on one with PT including:    Nustep 5 min intermittent at her pace  Sit to stand x 10 from 20 inch box 1x 10  Sit to stand from 20 inch box with 15 # goblet hold 2x 10  Qped cat x 8      Deferred:  Sit to stand from 20 inch box 2x 8 then add ball squeeze 2x8  with sit to stand  Standing hip flexion with focus on L lateral hip avoiding lateral shift 2x 8  Toe marches to 14 inch box 2x 8 per leg  Standing ball press forward then up lateral with rotation of the trunk 1x 8 then 6# 2 x8  Jez curl 6# (4 inches off the floor) 2x 10 (cga for safety)  Seated LAQ 2# L LE 3x 10 3 sec hold  Posterior pelvic tilt 2x 10 3 sec hold  Bridges 1x 10 then add PPT with bridges 2x10  Sit to stand off black table with no UE support 20x with CGA for balance and safety and cuing for increase WB in L LE +RTB  +Standing TKE BTB x30  Heel and toe raises 1 x 20 reps  Sitting L knee extension 1 x 20 reps  +SLR Flexion 2x10  +SL Clams x20 w/ Ball between Ankles  +Q'Ped Hip Extension x10 each B    neuromuscular re-education activities to improve: Balance, Coordination, Kinesthetic Sense, and Proprioception for 15 minutes one on one with PT and remainder with technician help. The following activities were included:    Supine heel slides with mild resistance 2x 10  Standing stool taps with RW for support (6 inch step) 2x 10 without weight then with 4# per ankle 2x 10  B  Long seated L hip abduction (focus on neutral hip to reduce falling into external rotation-PT manual help at times) 5x 10  Supine L LE march with LAQ 2x 10 ; then R (focus on L LE adduction while R LE working) 2x 10  Supine ball squeeze with posterior pelvic tilt with bridge 3x10  Seated LAQ 4# 3 sec hold at top 3x 10 B  Standing L hip flexion 4# 2x 8 B with less RW help as able  Qped alternating arm 2x 10 L arm; R 2x 8      Deferred:  WS in //Bars laterally for TKE and Hip mobility.  WS in // bars forward back/ toe push off and heel strike  Bridges x10; added ball squeeze 2x8  L hip flexion from the floor off edge of mat w/ankle DF 3x8  Prone knee flexion 2x10 B  Passive L hip stretch  Seated heel raises w/toes on rolled towel to facilitate toe extension 2x10  Seated DF w/heel on rolled towel 3x8    manual therapy techniques: for  0 minutes, including:  --  therapeutic activities to improve functional performance for 0 minutes, including:      Gait Training to improve community ambulation for 0 minutes including:        Patient Education and Home Exercises     Home Exercises Provided and Patient Education Provided     Education provided:   - HEP review  - Gait mechanics  - Possible need for custom AFO    Written Home Exercises Provided: Patient instructed to cont prior HEP. Exercises were reviewed and Alicia was able to demonstrate them prior to the end of the session.  Alicia demonstrated good  understanding of the education provided. See EMR under Patient Instructions for exercises provided during therapy sessions    ASSESSMENT     Patient showed up early due to being in the area earlier than her scheduled appt. PT worked on  balance and strength . L hip adduction control is improving and she can hold QPED better but can't hip extend in this position yet due to core/UE weakness. PT to advance as tolerated.    Alicia Is progressing well towards her goals.   Pt prognosis is Good.     Pt will continue to benefit from skilled outpatient physical therapy to address the deficits listed in the problem list box on initial evaluation, provide pt/family education and to maximize pt's level of independence in the home and community environment.     Pt's spiritual, cultural and educational needs considered and pt agreeable to plan of care and goals.     Anticipated barriers to physical therapy: chronicity of condition and comorbidities    Goals:   Short Term Goals: In 3 weeks   1.I with HEP MET 12/6/20222  2.Pt to increase BLE strength by 1/2 grade to show improvements with sitting, standing, ambulating, bending, lifting activities.  Progressing  3. Patient to improve 5x Sit<>Stand to less than 12 seconds to reduced risk of fall. Progressing   4. Patient to improve TUG with RW to less than 25 seconds to reduce risk of falls. Progressing     Long Term  Goals: In 6 weeks (Progressing)  1. Patient to demo increase in LE strength to 3+/5 with hip abduction on the L LE to show improvements with sitting, standing, ambulating, bending, lifting activities.  progressing  2. Patient to improve score on the FOTO to < or = to 48% to show improvement with QOL. progressing  3. Patient to improve 5x Sit<>Stand to less than 10 seconds to reduced risk of fall. progressing  4. Patient to improve TUG with RW to less than 20 seconds to reduce risk of falls. progressing     Long Term Goal Status:   continue per initial plan of care. to 3/31/23  Plan      Update Certification Period: 12/6/2022 to 1/18/2023 extended to 3/31/23  Recommended Treatment Plan: 2 times per week for 6 weeks effective week of 2/21/23: Aquatic Therapy, Cervical/Lumbar Traction, Electrical Stimulation  , Gait Training, Manual Therapy, Moist Heat/ Ice, Neuromuscular Re-ed, Patient Education, Self Care, Therapeutic Exercise, and Therapeutic Activities    Tracie Way, PT

## 2023-03-06 ENCOUNTER — PATIENT MESSAGE (OUTPATIENT)
Dept: NEUROLOGY | Facility: CLINIC | Age: 43
End: 2023-03-06
Payer: MEDICARE

## 2023-03-06 DIAGNOSIS — G35 MULTIPLE SCLEROSIS: Primary | ICD-10-CM

## 2023-03-07 ENCOUNTER — PATIENT MESSAGE (OUTPATIENT)
Dept: INTERNAL MEDICINE | Facility: CLINIC | Age: 43
End: 2023-03-07
Payer: MEDICARE

## 2023-03-07 RX ORDER — CIPROFLOXACIN 500 MG/1
500 TABLET ORAL 2 TIMES DAILY
Qty: 10 TABLET | Refills: 0 | Status: SHIPPED | OUTPATIENT
Start: 2023-03-07 | End: 2023-04-20

## 2023-03-10 ENCOUNTER — CLINICAL SUPPORT (OUTPATIENT)
Dept: REHABILITATION | Facility: HOSPITAL | Age: 43
End: 2023-03-10
Payer: MEDICARE

## 2023-03-10 DIAGNOSIS — Z74.09 DECREASED FUNCTIONAL MOBILITY AND ENDURANCE: Primary | ICD-10-CM

## 2023-03-10 PROCEDURE — 97110 THERAPEUTIC EXERCISES: CPT | Mod: HCNC,PN,CQ

## 2023-03-10 PROCEDURE — 97112 NEUROMUSCULAR REEDUCATION: CPT | Mod: HCNC,PN,CQ

## 2023-03-10 NOTE — PROGRESS NOTES
[facility]   Physical Therapy Treatment Note    Name: Alicia Soliz  Clinic Number: [id number]    Therapy Diagnosis:        Encounter Diagnoses   Name Primary?    Decreased strength, endurance, and mobility Yes    Left-sided weakness      Gait abnormality      Multiple sclerosis      Hemiplegia, unspecified etiology, unspecified hemiplegia type, unspecified laterality        Physician: Kole Carrasquillo MD    Visit Date: 3/10/2023    Physician Orders: PT Eval and Treat   Medical Diagnosis from Referral: G35 (ICD-10-CM) - Multiple sclerosis  Evaluation Date: 10/4/2022  Authorization Period Expiration: 2/28/23  Plan of Care Expiration: 1/18/2023 extended to 3/31/23  Progress Note Due: 1/05//2023  Visit # / Visits authorized: 14/ 20  FOTO: 2/3     Precautions: Standard, Fall, and MS      PTA Visit #: 1/5     Time In: 1:00pm (15 minutes late arrival from transportation)  Time Out: 1:38pm  Total Billable Time: 38 minutes     SUBJECTIVE     Pt reports: Friday after she got home from therapy she was in the kitchen for about 40-45 minutes. Patient reports left lower extremity fell asleep in stance and when she went to walk the leg was unable to hold her up resulting in her falling. Patient reports falling face first towards the ground and hit forehead on the floor. Patient reports being able get up off the floor by herself with the countertop. Patient reports having a little vertigo once she was up. Patient reports speaking to neurologist but has not got word back. Patient reports having a little headache hear and there since Friday.     She was compliant with home exercise program.  Response to previous treatment: no issues  Functional change: na at this time    Pain: 0/10 Location: none    OBJECTIVE     Objective Measures updated at progress report unless specified.     Treatment     Alicia received the treatments listed below:      therapeutic exercises to develop strength, flexibility,  posture, and core stabilization for 15 minutes  including:    Nustep 7 min intermittent at her pace  Sit to stand 20 inch box 1x 10 no UE A  Sit to stand from 20 inch box with 14 # med ball hold 2x 10  Seated Marches focus upright posture and core stabilization; 3# on legs 2x12      neuromuscular re-education activities to improve: Balance, Coordination, Kinesthetic Sense, and Proprioception for 23 minutes . The following activities were included:  Standing stool taps with RW for support (6 inch step) 2x 10 without weight then with 4# per ankle 2x 10  B  Long seated L hip abduction (focus on neutral hip to reduce falling into external rotation-PT manual help at times) 5x 10  Seated LAQ 6# 3 sec hold at top 3x 10 B  Standing L hip flexion 4# 2x 8 B with less RW help as able  Mini Forward lunge onto no riser step; x15 each      therapeutic activities to improve functional performance for 0 minutes, including:        Patient Education and Home Exercises     Home Exercises Provided and Patient Education Provided     Education provided:     Written Home Exercises Provided: Patient instructed to cont prior HEP. Exercises were reviewed and Alicia was able to demonstrate them prior to the end of the session.  Alicia demonstrated good  understanding of the education provided. See EMR under Patient Instructions for exercises provided during therapy sessions    ASSESSMENT     Treatment partially modified from last visit following patient's subjective reports of falling as well as late arrival. Treatment did not include quadruped or Supine activities following patient's subjective request today. Patient was able to complete all prescribed activities with only a little muscle workout soreness after LAQ with increased weight and sit to stands with med ball. Patient reported feeling she is gradually getting stronger with therapy.    Alicia Is progressing well towards her goals.   Pt prognosis is Good.     Pt will continue to benefit  from skilled outpatient physical therapy to address the deficits listed in the problem list box on initial evaluation, provide pt/family education and to maximize pt's level of independence in the home and community environment.     Pt's spiritual, cultural and educational needs considered and pt agreeable to plan of care and goals.     Anticipated barriers to physical therapy: chronicity of condition and comorbidities    Goals:   Short Term Goals: In 3 weeks   1.I with HEP MET 12/6/20222  2.Pt to increase BLE strength by 1/2 grade to show improvements with sitting, standing, ambulating, bending, lifting activities.  Progressing  3. Patient to improve 5x Sit<>Stand to less than 12 seconds to reduced risk of fall. Progressing   4. Patient to improve TUG with RW to less than 25 seconds to reduce risk of falls. Progressing     Long Term Goals: In 6 weeks (Progressing)  1. Patient to demo increase in LE strength to 3+/5 with hip abduction on the L LE to show improvements with sitting, standing, ambulating, bending, lifting activities.  progressing  2. Patient to improve score on the FOTO to < or = to 48% to show improvement with QOL. progressing  3. Patient to improve 5x Sit<>Stand to less than 10 seconds to reduced risk of fall. progressing  4. Patient to improve TUG with RW to less than 20 seconds to reduce risk of falls. progressing     Long Term Goal Status:   continue per initial plan of care. to 3/31/23  Plan      Update Certification Period: 12/6/2022 to 1/18/2023 extended to 3/31/23  Recommended Treatment Plan: 2 times per week for 6 weeks effective week of 2/21/23: Aquatic Therapy, Cervical/Lumbar Traction, Electrical Stimulation  , Gait Training, Manual Therapy, Moist Heat/ Ice, Neuromuscular Re-ed, Patient Education, Self Care, Therapeutic Exercise, and Therapeutic Activities    Patrick Walton, PTA

## 2023-03-13 ENCOUNTER — PATIENT MESSAGE (OUTPATIENT)
Dept: NEUROLOGY | Facility: CLINIC | Age: 43
End: 2023-03-13
Payer: MEDICARE

## 2023-03-13 DIAGNOSIS — G35 MULTIPLE SCLEROSIS: Primary | ICD-10-CM

## 2023-03-14 ENCOUNTER — PATIENT MESSAGE (OUTPATIENT)
Dept: INTERNAL MEDICINE | Facility: CLINIC | Age: 43
End: 2023-03-14
Payer: MEDICARE

## 2023-03-14 DIAGNOSIS — G43.809 OTHER MIGRAINE WITHOUT STATUS MIGRAINOSUS, NOT INTRACTABLE: ICD-10-CM

## 2023-03-14 RX ORDER — ONDANSETRON HYDROCHLORIDE 8 MG/1
8 TABLET, FILM COATED ORAL 2 TIMES DAILY
Qty: 30 TABLET | Refills: 1 | Status: SHIPPED | OUTPATIENT
Start: 2023-03-14 | End: 2023-09-06 | Stop reason: SDUPTHER

## 2023-03-14 RX ORDER — DOXEPIN HYDROCHLORIDE 25 MG/1
25 CAPSULE ORAL NIGHTLY PRN
Qty: 30 CAPSULE | Refills: 5 | Status: SHIPPED | OUTPATIENT
Start: 2023-03-14 | End: 2023-09-06

## 2023-03-14 RX ORDER — TOPIRAMATE 50 MG/1
TABLET, FILM COATED ORAL
Qty: 90 TABLET | Refills: 1 | Status: CANCELLED | OUTPATIENT
Start: 2023-03-14

## 2023-03-14 NOTE — TELEPHONE ENCOUNTER
Please notify have a doxepin and Zofran refilled.  The Topamax was prescribed by her Neurology Department

## 2023-03-15 ENCOUNTER — PATIENT MESSAGE (OUTPATIENT)
Dept: NEUROLOGY | Facility: CLINIC | Age: 43
End: 2023-03-15
Payer: MEDICARE

## 2023-03-15 ENCOUNTER — TELEPHONE (OUTPATIENT)
Dept: NEUROLOGY | Facility: CLINIC | Age: 43
End: 2023-03-15

## 2023-03-15 DIAGNOSIS — G35 MULTIPLE SCLEROSIS: Primary | ICD-10-CM

## 2023-03-16 ENCOUNTER — PATIENT MESSAGE (OUTPATIENT)
Dept: NEUROLOGY | Facility: CLINIC | Age: 43
End: 2023-03-16
Payer: MEDICARE

## 2023-03-17 ENCOUNTER — LAB VISIT (OUTPATIENT)
Dept: LAB | Facility: HOSPITAL | Age: 43
End: 2023-03-17
Attending: CLINICAL NURSE SPECIALIST
Payer: MEDICARE

## 2023-03-17 ENCOUNTER — PATIENT MESSAGE (OUTPATIENT)
Dept: NEUROLOGY | Facility: CLINIC | Age: 43
End: 2023-03-17
Payer: MEDICARE

## 2023-03-17 DIAGNOSIS — G35 MULTIPLE SCLEROSIS: ICD-10-CM

## 2023-03-17 LAB
ALBUMIN SERPL BCP-MCNC: 3.8 G/DL (ref 3.5–5.2)
ALP SERPL-CCNC: 115 U/L (ref 55–135)
ALT SERPL W/O P-5'-P-CCNC: 11 U/L (ref 10–44)
ANION GAP SERPL CALC-SCNC: 8 MMOL/L (ref 8–16)
AST SERPL-CCNC: 24 U/L (ref 10–40)
BASOPHILS # BLD AUTO: 0.03 K/UL (ref 0–0.2)
BASOPHILS NFR BLD: 0.4 % (ref 0–1.9)
BILIRUB SERPL-MCNC: 0.3 MG/DL (ref 0.1–1)
BUN SERPL-MCNC: 6 MG/DL (ref 6–20)
CALCIUM SERPL-MCNC: 9.6 MG/DL (ref 8.7–10.5)
CHLORIDE SERPL-SCNC: 108 MMOL/L (ref 95–110)
CO2 SERPL-SCNC: 26 MMOL/L (ref 23–29)
CREAT SERPL-MCNC: 0.9 MG/DL (ref 0.5–1.4)
DIFFERENTIAL METHOD: ABNORMAL
EOSINOPHIL # BLD AUTO: 0.1 K/UL (ref 0–0.5)
EOSINOPHIL NFR BLD: 1 % (ref 0–8)
ERYTHROCYTE [DISTWIDTH] IN BLOOD BY AUTOMATED COUNT: 17.8 % (ref 11.5–14.5)
EST. GFR  (NO RACE VARIABLE): >60 ML/MIN/1.73 M^2
GLUCOSE SERPL-MCNC: 83 MG/DL (ref 70–110)
HBV CORE AB SERPL QL IA: NORMAL
HBV SURFACE AG SERPL QL IA: NORMAL
HCT VFR BLD AUTO: 38.3 % (ref 37–48.5)
HGB BLD-MCNC: 11.2 G/DL (ref 12–16)
IGA SERPL-MCNC: 214 MG/DL (ref 40–350)
IGG SERPL-MCNC: 1327 MG/DL (ref 650–1600)
IGM SERPL-MCNC: 61 MG/DL (ref 50–300)
IMM GRANULOCYTES # BLD AUTO: 0.01 K/UL (ref 0–0.04)
IMM GRANULOCYTES NFR BLD AUTO: 0.1 % (ref 0–0.5)
LYMPHOCYTES # BLD AUTO: 2.4 K/UL (ref 1–4.8)
LYMPHOCYTES NFR BLD: 34.2 % (ref 18–48)
MCH RBC QN AUTO: 20.7 PG (ref 27–31)
MCHC RBC AUTO-ENTMCNC: 29.2 G/DL (ref 32–36)
MCV RBC AUTO: 71 FL (ref 82–98)
MONOCYTES # BLD AUTO: 0.4 K/UL (ref 0.3–1)
MONOCYTES NFR BLD: 6.2 % (ref 4–15)
NEUTROPHILS # BLD AUTO: 4.2 K/UL (ref 1.8–7.7)
NEUTROPHILS NFR BLD: 58.1 % (ref 38–73)
NRBC BLD-RTO: 0 /100 WBC
PLATELET # BLD AUTO: 312 K/UL (ref 150–450)
PMV BLD AUTO: 10.9 FL (ref 9.2–12.9)
POTASSIUM SERPL-SCNC: 4.6 MMOL/L (ref 3.5–5.1)
PROT SERPL-MCNC: 7.2 G/DL (ref 6–8.4)
RBC # BLD AUTO: 5.41 M/UL (ref 4–5.4)
SODIUM SERPL-SCNC: 142 MMOL/L (ref 136–145)
WBC # BLD AUTO: 7.14 K/UL (ref 3.9–12.7)

## 2023-03-17 PROCEDURE — 80053 COMPREHEN METABOLIC PANEL: CPT | Mod: HCNC | Performed by: CLINICAL NURSE SPECIALIST

## 2023-03-17 PROCEDURE — 85025 COMPLETE CBC W/AUTO DIFF WBC: CPT | Mod: HCNC | Performed by: CLINICAL NURSE SPECIALIST

## 2023-03-17 PROCEDURE — 36415 COLL VENOUS BLD VENIPUNCTURE: CPT | Mod: HCNC | Performed by: CLINICAL NURSE SPECIALIST

## 2023-03-17 PROCEDURE — 82784 ASSAY IGA/IGD/IGG/IGM EACH: CPT | Mod: 59,HCNC | Performed by: CLINICAL NURSE SPECIALIST

## 2023-03-17 PROCEDURE — 87340 HEPATITIS B SURFACE AG IA: CPT | Mod: HCNC | Performed by: CLINICAL NURSE SPECIALIST

## 2023-03-17 PROCEDURE — 86704 HEP B CORE ANTIBODY TOTAL: CPT | Mod: HCNC | Performed by: CLINICAL NURSE SPECIALIST

## 2023-03-19 ENCOUNTER — TELEPHONE (OUTPATIENT)
Dept: NEUROLOGY | Facility: CLINIC | Age: 43
End: 2023-03-19

## 2023-03-20 NOTE — TELEPHONE ENCOUNTER
----- Message from Jen Meier, APRN, CNS sent at 3/18/2023  8:30 AM CDT -----  Normal WBC and ALC   Negative hep B labs  Normal immunoglobulins   Normal CMP     SP, she can be scheduled for her infusion.

## 2023-03-21 ENCOUNTER — CLINICAL SUPPORT (OUTPATIENT)
Dept: REHABILITATION | Facility: HOSPITAL | Age: 43
End: 2023-03-21
Payer: MEDICARE

## 2023-03-21 DIAGNOSIS — R53.1 DECREASED STRENGTH, ENDURANCE, AND MOBILITY: ICD-10-CM

## 2023-03-21 DIAGNOSIS — G35 MULTIPLE SCLEROSIS EXACERBATION: Primary | ICD-10-CM

## 2023-03-21 DIAGNOSIS — Z74.09 DECREASED STRENGTH, ENDURANCE, AND MOBILITY: ICD-10-CM

## 2023-03-21 DIAGNOSIS — R68.89 DECREASED STRENGTH, ENDURANCE, AND MOBILITY: ICD-10-CM

## 2023-03-21 PROCEDURE — 97110 THERAPEUTIC EXERCISES: CPT | Mod: HCNC,PN,CQ

## 2023-03-21 PROCEDURE — 97116 GAIT TRAINING THERAPY: CPT | Mod: HCNC,PN,CQ

## 2023-03-21 NOTE — PROGRESS NOTES
Physical Therapy Daily Treatment Note     Name: Alicia Mahoney [place]  Clinic Number: [id number]    Therapy Diagnosis:   Encounter Diagnoses   Name Primary?    Multiple sclerosis exacerbation Yes    Decreased strength, endurance, and mobility      Physician: Kole Carrasquillo MD    Visit Date: 3/21/2023    Physician Orders: PT Eval and Treat   Medical Diagnosis from Referral: G35 (ICD-10-CM) - Multiple sclerosis  Evaluation Date: 10/4/2022  Authorization Period Expiration: 2/28/23  Plan of Care Expiration: 1/18/2023 extended to 3/31/23  Progress Note Due: 1/05//2023  Visit # / Visits authorized: 15/20  FOTO: 2/3     Precautions: Standard, Fall, and MS      PTA Visit #: 2/5     Time In: 3:00 PM  Time Out: 3:50 PM  Total Billable Time: 50 minutes     SUBJECTIVE     Pt reports: the MS has been kicking her butt with these weather change's, but she has been cleared for and is going to begin ocrevus infusions next Wednesday.  States she would like to do some of the leg machines again.    She was compliant with home exercise program.  Response to previous treatment: no issues  Functional change: na at this time    Pain: 0/10 Location: none    OBJECTIVE     Objective Measures updated at progress report unless specified.     Treatment     Alicia received the treatments listed below:    (Exercises performed today in [facility])    therapeutic exercises to develop strength, flexibility, posture, and core stabilization for 25 minutes  including:    Nustep for improving ROM, strength and endurance, 6 min   Sit to stand 20 inch box 1x 10 no UE A  Sit to stand from 20 inch box with 14 # med ball hold 2x 10  Seated Marches focus upright posture and core stabilization; 3# on legs 2x12  Machines:  Hip add 70# x20  Hip Abd 55# x20  Leg extension 25# x20    neuromuscular re-education activities to improve: Balance, Coordination, Kinesthetic Sense, and Proprioception for 00 minutes. The following activities were included:  Supine heel slides with  mild resistance 2x 10  Standing stool taps with RW for support (6 inch step) 2x 10 without weight then with 4# per ankle 2x 10  B  Long seated L hip abduction (focus on neutral hip to reduce falling into external rotation-PT manual help at times) 5x 10  Supine L LE march with LAQ 2x 10 ; then R (focus on L LE adduction while R LE working) 2x 10  Supine ball squeeze with posterior pelvic tilt with bridge 3x10  Seated LAQ 6# 3 sec hold at top 3x 10 B  Standing L hip flexion 4# 2x 8 B with less RW help as able  Qped alternating arm 2x 10 L arm; R 2x 8  Mini Forward lunge onto no riser step; x15 each    gait training to improve functional mobility and safety for 20 minutes, including:  Ambulation in // bars without UE assist, 2 laps   +education on gait mechanics, use of FWW/safety       Patient Education and Home Exercises     Home Exercises Provided and Patient Education Provided     Education provided:   -Gait mechanics, use of FWW, safety and not overdoing it with activity    Written Home Exercises Provided: Patient instructed to cont prior HEP. Exercises were reviewed and Alicia was able to demonstrate them prior to the end of the session.  Alicia demonstrated good  understanding of the education provided. See EMR under Patient Instructions for exercises provided during therapy sessions    ASSESSMENT   Focused on improving LE strength, stability and functional endurance throughout today's treatment session. Added in leg machines to strengthen adductors/abductors and quad muscles, and worked on gait mechanics/safety and removing UE assist. Patient able to complete 2 laps of ambulation without any UE assist, still demonstrating very slow fredy and foot drop on L LE. Cueing provided throughout, patient showing good response/carryover. Education provided throughout on proper gait mechanics, use of FWW, safety and not overdoing it with outside activity, patient demonstrates good understanding. Patient responding well to  session overall, reporting feeling moderate fatigue but feeling really good and confident post-session.     Alicia Is progressing well towards her goals.   Pt prognosis is Good.     Pt will continue to benefit from skilled outpatient physical therapy to address the deficits listed in the problem list box on initial evaluation, provide pt/family education and to maximize pt's level of independence in the home and community environment.     Pt's spiritual, cultural and educational needs considered and pt agreeable to plan of care and goals.     Anticipated barriers to physical therapy: chronicity of condition and comorbidities    Goals:   Short Term Goals: In 3 weeks   1.I with HEP MET 12/6/20222  2.Pt to increase BLE strength by 1/2 grade to show improvements with sitting, standing, ambulating, bending, lifting activities.  Progressing  3. Patient to improve 5x Sit<>Stand to less than 12 seconds to reduced risk of fall. Progressing   4. Patient to improve TUG with RW to less than 25 seconds to reduce risk of falls. Progressing     Long Term Goals: In 6 weeks (Progressing)  1. Patient to demo increase in LE strength to 3+/5 with hip abduction on the L LE to show improvements with sitting, standing, ambulating, bending, lifting activities.  progressing  2. Patient to improve score on the FOTO to < or = to 48% to show improvement with QOL. progressing  3. Patient to improve 5x Sit<>Stand to less than 10 seconds to reduced risk of fall. progressing  4. Patient to improve TUG with RW to less than 20 seconds to reduce risk of falls. progressing     Long Term Goal Status:   continue per initial plan of care. to 3/31/23  Plan      Update Certification Period: 12/6/2022 to 1/18/2023 extended to 3/31/23  Recommended Treatment Plan: 2 times per week for 6 weeks effective week of 2/21/23: Aquatic Therapy, Cervical/Lumbar Traction, Electrical Stimulation  , Gait Training, Manual Therapy, Moist Heat/ Ice, Neuromuscular Re-ed,  Patient Education, Self Care, Therapeutic Exercise, and Therapeutic Activities    Pallavixiomara Bro, PTA    3/21/2023

## 2023-03-23 ENCOUNTER — CLINICAL SUPPORT (OUTPATIENT)
Dept: REHABILITATION | Facility: HOSPITAL | Age: 43
End: 2023-03-23
Payer: MEDICARE

## 2023-03-23 DIAGNOSIS — R53.1 DECREASED STRENGTH, ENDURANCE, AND MOBILITY: ICD-10-CM

## 2023-03-23 DIAGNOSIS — G35 MULTIPLE SCLEROSIS EXACERBATION: Primary | ICD-10-CM

## 2023-03-23 DIAGNOSIS — R68.89 DECREASED STRENGTH, ENDURANCE, AND MOBILITY: ICD-10-CM

## 2023-03-23 DIAGNOSIS — Z74.09 DECREASED STRENGTH, ENDURANCE, AND MOBILITY: ICD-10-CM

## 2023-03-23 PROCEDURE — 97112 NEUROMUSCULAR REEDUCATION: CPT | Mod: HCNC,PN

## 2023-03-23 PROCEDURE — 97530 THERAPEUTIC ACTIVITIES: CPT | Mod: HCNC,PN

## 2023-03-23 PROCEDURE — 97116 GAIT TRAINING THERAPY: CPT | Mod: HCNC,PN

## 2023-03-23 NOTE — PROGRESS NOTES
Physical Therapy Daily Treatment Note and Plan of Care Update     Name: Alicia Soliz  Clinic Number: [id number]    Therapy Diagnosis:   Encounter Diagnoses   Name Primary?    Multiple sclerosis exacerbation Yes    Decreased strength, endurance, and mobility        Physician: Kole Carrasquillo MD    Visit Date: 3/23/2023    Physician Orders: PT Eval and Treat   Medical Diagnosis from Referral: G35 (ICD-10-CM) - Multiple sclerosis  Evaluation Date: 10/4/2022  Authorization Period Expiration: 12/31/23  Plan of Care Expiration: 3/31/23 extended 5/523  Progress Note Due: due 30 days from 3/23/2023  Visit # / Visits authorized: 16/20  FOTO: 3/4 (41% limitation)     Precautions: Standard, Fall, and MS      PTA Visit #: 0/5     Time In: 2:32 PM  Time Out: 3:26 PM  Total Billable Time: 54 minutes     SUBJECTIVE     Pt reports: the MS has been kicking her butt with these weather change's, but she has been cleared for and is going to begin ocrevus infusions next Wednesday.  States she would like to do some of the leg machines again.    She was compliant with home exercise program.  Response to previous treatment: no issues  Functional change: na at this time    Pain: 0/10 Location: none    OBJECTIVE     Update:     CMS Impairment/Limitation/Restriction for FOTO Multiple Sclerosis Survey     Therapist reviewed FOTO scores for Alicia Soliz on 3/23/2023.   FOTO documents entered into [employer] - see Media section.     Limitation Score: 41%          At eval  Strength:                    Right            Left      2/21/23    3/23/23  Hip flexors 5/5     4/5 L 4+  L: 4+   Knee extension 5/5     4/5 L 5  L:5/5   Knee flexion 5/5     4/5 L 4+ L:4+/5   Hip abductors NT/5     NT/5 R 4+; 2+ R 4+;L3   DF 5/5 4+/5 L 5 L: 5/5   Ankle Eversion 5/5     4 /5 L 5 L: 5/5   Pt had a decline in function on 2/21/23 assessment so Pt is now returning and improving again on assessment today 3/23/23    Gait is with RW with short step length,  wide VANESSA and the L LE is not dragging any longer but still hip externally rotated. Pt is not ready for gait with a SC but would like to be able to use one.     Special Tests:  5x Sit<>Stand: 14 seconds IE           17 seconds  on reassessment 23; today: 15 sec  Today 30 sec sit to stand test 8 reps   TU.8 seconds with rolling walker at IE  44 seconds 23; today : 34 sec           Objective Measures updated at progress report unless specified.     Treatment     Alicia received the treatments listed below:        therapeutic exercises to develop strength, flexibility, posture, and core stabilization for 0 minutes  including:    Deferred:  Nustep for improving ROM, strength and endurance, 6 min   Sit to stand 20 inch box 1x 10 no UE A  Sit to stand from 20 inch box with 14 # med ball hold 2x 10  Seated Marches focus upright posture and core stabilization; 3# on legs 2x12  Machines:  Hip add 70# x20  Hip Abd 55# x20  Leg extension 25# x20    neuromuscular re-education activities to improve: Balance, Coordination, Kinesthetic Sense, and Proprioception for 24 minutes. The following activities were included:    Standing single leg heel raises (with wide VANESSA) 2x 10 per leg without UE support  Leaning hip extension 2x 10  LAQ 1x 10 per leg  Standing balance with stepping forward/back for pregait with SC training on balance for 2x 10 to improve foot clearance      Deferred:  Supine heel slides with mild resistance 2x 10  Standing stool taps with RW for support (6 inch step) 2x 10 without weight then with 4# per ankle 2x 10  B  Long seated L hip abduction (focus on neutral hip to reduce falling into external rotation-PT manual help at times) 5x 10  Supine L LE march with LAQ 2x 10 ; then R (focus on L LE adduction while R LE working) 2x 10  Supine ball squeeze with posterior pelvic tilt with bridge 3x10  Seated LAQ 6# 3 sec hold at top 3x 10 B  Standing L hip flexion 4# 2x 8 B with less RW help as able  Qped  alternating arm 2x 10 L arm; R 2x 8  Mini Forward lunge onto no riser step; x15 each    Alicia received gait training to improve functional mobility and safety for 15 minutes, including:  Ambulation with SC for up to  20 ft x2  with cues on cane placement with transfers, step length and leading leg, hand to use for SC and safety on turns    Alicia performed therapeutic activities for 15 min including:    Sit to stand from standard chair with UE support 1x 10  Without UE support for 2x 10  PT continued with gait with SC for endurance and core support to improve safe function with SC for 20 ft at a more functional pace          Patient Education and Home Exercises     Home Exercises Provided and Patient Education Provided     Education provided:   -Gait mechanics, use of FWW, safety and not overdoing it with activity    Written Home Exercises Provided: Patient instructed to cont prior HEP. Exercises were reviewed and Alicia was able to demonstrate them prior to the end of the session.  Alicia demonstrated good  understanding of the education provided. See EMR under Patient Instructions for exercises provided during therapy sessions    ASSESSMENT   Focused on improving balance and safety with transfers. Pt was very fearful and grabbed furniture and walls especially on turns with SC use initially but as this improved she had better L LE foot clearance and speed improved. Pt is still far from average on tests for her age but progressing closer to using a SC for home room to room use but needs better strength and endurance and foot clearance on a variety of surfaces prior to that.. Patient responding well to session overall, reporting feeling moderate fatigue but feeling really good and confident post-session.     Alicia Is progressing well towards her goals.   Pt prognosis is Good.     Pt will continue to benefit from skilled outpatient physical therapy to address the deficits listed in the problem list box on initial  evaluation, provide pt/family education and to maximize pt's level of independence in the home and community environment.     Pt's spiritual, cultural and educational needs considered and pt agreeable to plan of care and goals.     Anticipated barriers to physical therapy: chronicity of condition and comorbidities    Goals:   Short Term Goals: In 3 weeks   1.I with HEP MET 12/6/20222  2.Pt to increase BLE strength by 1/2 grade to show improvements with sitting, standing, ambulating, bending, lifting activities.  progressing  3. Patient to improve 5x Sit<>Stand to less than 12 seconds to reduced risk of fall. Progressing   4. Patient to improve TUG with RW to less than 25 seconds to reduce risk of falls. Progressing     Long Term Goals: In 6 weeks (Progressing)  1. Patient to demo increase in LE strength to 3+/5 with hip abduction on the L LE to show improvements with sitting, standing, ambulating, bending, lifting activities.  progressing  2. Patient to improve score on the FOTO to < or = to 48% to show improvement with QOL. met  3. Patient to improve 5x Sit<>Stand to less than 10 seconds to reduced risk of fall. progressing  4. Patient to improve TUG with RW to less than 20 seconds to reduce risk of falls. progressing     Long Term Goal Status:   continue per initial plan of care. to 3/31/23  Plan      Update Certification Period: 3/23/23 extended to 5/5//23  Recommended Treatment Plan: 2 times per week for 6 weeks effective week of 3/23/23: Aquatic Therapy, Cervical/Lumbar Traction, Electrical Stimulation  , Gait Training, Manual Therapy, Moist Heat/ Ice, Neuromuscular Re-ed, Patient Education, Self Care, Therapeutic Exercise, and Therapeutic Activities    Tracie Way, PT    3/23/2023

## 2023-03-24 ENCOUNTER — PATIENT MESSAGE (OUTPATIENT)
Dept: INTERNAL MEDICINE | Facility: CLINIC | Age: 43
End: 2023-03-24
Payer: MEDICARE

## 2023-03-28 ENCOUNTER — PATIENT MESSAGE (OUTPATIENT)
Dept: NEUROLOGY | Facility: CLINIC | Age: 43
End: 2023-03-28
Payer: MEDICARE

## 2023-03-29 ENCOUNTER — PATIENT MESSAGE (OUTPATIENT)
Dept: NEUROLOGY | Facility: CLINIC | Age: 43
End: 2023-03-29
Payer: MEDICARE

## 2023-03-29 ENCOUNTER — INFUSION (OUTPATIENT)
Dept: INFUSION THERAPY | Facility: HOSPITAL | Age: 43
End: 2023-03-29
Attending: INTERNAL MEDICINE
Payer: MEDICARE

## 2023-03-29 ENCOUNTER — PATIENT MESSAGE (OUTPATIENT)
Dept: ADMINISTRATIVE | Facility: OTHER | Age: 43
End: 2023-03-29
Payer: MEDICARE

## 2023-03-29 VITALS
OXYGEN SATURATION: 99 % | BODY MASS INDEX: 50.02 KG/M2 | SYSTOLIC BLOOD PRESSURE: 135 MMHG | HEART RATE: 88 BPM | RESPIRATION RATE: 16 BRPM | WEIGHT: 293 LBS | DIASTOLIC BLOOD PRESSURE: 86 MMHG | HEIGHT: 64 IN | TEMPERATURE: 98 F

## 2023-03-29 DIAGNOSIS — G35 MULTIPLE SCLEROSIS: Primary | ICD-10-CM

## 2023-03-29 PROCEDURE — 96367 TX/PROPH/DG ADDL SEQ IV INF: CPT | Mod: HCNC

## 2023-03-29 PROCEDURE — 63600175 PHARM REV CODE 636 W HCPCS: Mod: JZ,JG,HCNC | Performed by: STUDENT IN AN ORGANIZED HEALTH CARE EDUCATION/TRAINING PROGRAM

## 2023-03-29 PROCEDURE — 25000003 PHARM REV CODE 250: Mod: HCNC | Performed by: STUDENT IN AN ORGANIZED HEALTH CARE EDUCATION/TRAINING PROGRAM

## 2023-03-29 PROCEDURE — 96365 THER/PROPH/DIAG IV INF INIT: CPT | Mod: HCNC

## 2023-03-29 PROCEDURE — 96366 THER/PROPH/DIAG IV INF ADDON: CPT | Mod: HCNC

## 2023-03-29 PROCEDURE — 96375 TX/PRO/DX INJ NEW DRUG ADDON: CPT | Mod: HCNC

## 2023-03-29 RX ORDER — DIPHENHYDRAMINE HYDROCHLORIDE 50 MG/ML
50 INJECTION INTRAMUSCULAR; INTRAVENOUS
Status: CANCELLED | OUTPATIENT
Start: 2023-05-03

## 2023-03-29 RX ORDER — ACETAMINOPHEN 500 MG
1000 TABLET ORAL
Status: COMPLETED | OUTPATIENT
Start: 2023-03-29 | End: 2023-03-29

## 2023-03-29 RX ORDER — SODIUM CHLORIDE 0.9 % (FLUSH) 0.9 %
10 SYRINGE (ML) INJECTION
Status: DISCONTINUED | OUTPATIENT
Start: 2023-03-29 | End: 2023-03-29 | Stop reason: HOSPADM

## 2023-03-29 RX ORDER — SODIUM CHLORIDE 0.9 % (FLUSH) 0.9 %
10 SYRINGE (ML) INJECTION
Status: CANCELLED | OUTPATIENT
Start: 2023-05-03

## 2023-03-29 RX ORDER — METHYLPREDNISOLONE SOD SUCC 125 MG
100 VIAL (EA) INJECTION
Status: CANCELLED
Start: 2023-05-03 | End: 2023-05-03

## 2023-03-29 RX ORDER — METHYLPREDNISOLONE SOD SUCC 125 MG
100 VIAL (EA) INJECTION
Status: COMPLETED | OUTPATIENT
Start: 2023-03-29 | End: 2023-03-29

## 2023-03-29 RX ORDER — HEPARIN 100 UNIT/ML
500 SYRINGE INTRAVENOUS
Status: CANCELLED | OUTPATIENT
Start: 2023-05-03

## 2023-03-29 RX ORDER — ACETAMINOPHEN 500 MG
1000 TABLET ORAL
Status: CANCELLED | OUTPATIENT
Start: 2023-05-03

## 2023-03-29 RX ORDER — EPINEPHRINE 0.3 MG/.3ML
0.3 INJECTION SUBCUTANEOUS
Status: CANCELLED | OUTPATIENT
Start: 2023-05-03

## 2023-03-29 RX ORDER — FAMOTIDINE 10 MG/ML
20 INJECTION INTRAVENOUS
Status: CANCELLED | OUTPATIENT
Start: 2023-05-03

## 2023-03-29 RX ORDER — FAMOTIDINE 10 MG/ML
20 INJECTION INTRAVENOUS
Status: COMPLETED | OUTPATIENT
Start: 2023-03-29 | End: 2023-03-29

## 2023-03-29 RX ADMIN — FAMOTIDINE 20 MG: 10 INJECTION, SOLUTION INTRAVENOUS at 09:03

## 2023-03-29 RX ADMIN — METHYLPREDNISOLONE SODIUM SUCCINATE 100 MG: 125 INJECTION, POWDER, FOR SOLUTION INTRAMUSCULAR; INTRAVENOUS at 09:03

## 2023-03-29 RX ADMIN — ACETAMINOPHEN 1000 MG: 500 TABLET ORAL at 08:03

## 2023-03-29 RX ADMIN — SODIUM CHLORIDE: 9 INJECTION, SOLUTION INTRAVENOUS at 08:03

## 2023-03-29 RX ADMIN — DIPHENHYDRAMINE HYDROCHLORIDE 50 MG: 50 INJECTION, SOLUTION INTRAMUSCULAR; INTRAVENOUS at 08:03

## 2023-03-29 RX ADMIN — OCRELIZUMAB 600 MG: 300 INJECTION INTRAVENOUS at 09:03

## 2023-03-29 NOTE — PLAN OF CARE
Problem: Adult Inpatient Plan of Care  Goal: Plan of Care Review  Outcome: Ongoing, Progressing  Flowsheets (Taken 3/29/2023 0925)  Plan of Care Reviewed With: patient  Goal: Patient-Specific Goal (Individualized)  Outcome: Ongoing, Progressing  Flowsheets (Taken 3/29/2023 0925)  Anxieties, Fears or Concerns: none  Individualized Care Needs: feet up. blankt, pillow, benadryl first  Goal: Optimal Comfort and Wellbeing  Outcome: Ongoing, Progressing  Intervention: Provide Person-Centered Care  Flowsheets (Taken 3/29/2023 0925)  Trust Relationship/Rapport:   care explained   questions encouraged   choices provided   reassurance provided   emotional support provided   thoughts/feelings acknowledged   empathic listening provided   questions answered     Problem: Fall Injury Risk  Goal: Absence of Fall and Fall-Related Injury  Outcome: Ongoing, Progressing  Intervention: Identify and Manage Contributors  Flowsheets (Taken 3/29/2023 0925)  Medication Review/Management:   medications reviewed   high-risk medications identified   infusion initiated  Intervention: Promote Injury-Free Environment  Flowsheets (Taken 3/29/2023 0925)  Safety Promotion/Fall Prevention:   in recliner, wheels locked   nonskid shoes/socks when out of bed   medications reviewed   room near unit station

## 2023-03-29 NOTE — DISCHARGE INSTRUCTIONS
THANKS FOR ALLOWING ME TO CARE FOR YOU TODAY!!! ~Valeria          THANKS FOR CHOOSING [facility]!!!      [facility]  [address] [facility]  [id number] [place] [address] Drive  [phone] phone     [phone] fax  Hours of Operation: Monday- Friday 8:00am- 5:00pm  After hours phone  [phone]  Hematology / Oncology Physicians on call      FABIO [address] Dr., NP Sydney Prescott, ARASELI Hwang, EDDIE Shore    Please call with any concerns regarding your appointment today.       46

## 2023-03-29 NOTE — NURSING
Patient c/o headache at end of treatment during the wait after. She asked for more tylenol. Reached out to Dr. Garrett and patient also said she would feel better if she could go home and sleep. Reached out to Dr. Garrett and Dr. Garrett said ok to let her leave. And to give more tylenol. Informed Dr. Garrett that we would need an order for the PRN tylenol.

## 2023-03-29 NOTE — NURSING
At 2:30 patient left. VSS. Someone to pick her up. No acute distress, she said she was going home and going to bed for her headache that she felt ok otherwise

## 2023-03-30 ENCOUNTER — TELEPHONE (OUTPATIENT)
Dept: BARIATRICS | Facility: CLINIC | Age: 43
End: 2023-03-30
Payer: MEDICARE

## 2023-03-30 NOTE — TELEPHONE ENCOUNTER
----- Message from Imra Marion sent at 3/30/2023 12:41 PM CDT -----  Regarding: advise; revision procedure  Contact: PT @ [phone]  Pt is calling to speak to someone in the office to discuss having a revision done. Pt will be new to the office. I did schedule pt for a FS appt on 5/10/23 @ 11am. Pt is still asking to speak with someone in the office. Thanks.

## 2023-03-30 NOTE — TELEPHONE ENCOUNTER
Contacted the patient. Patient was explained that she would have to speak to FC first then reach out to the clinic to get appointment scheduled.

## 2023-04-04 ENCOUNTER — CLINICAL SUPPORT (OUTPATIENT)
Dept: REHABILITATION | Facility: HOSPITAL | Age: 43
End: 2023-04-04
Payer: MEDICARE

## 2023-04-04 ENCOUNTER — PATIENT MESSAGE (OUTPATIENT)
Dept: NEUROLOGY | Facility: CLINIC | Age: 43
End: 2023-04-04
Payer: MEDICARE

## 2023-04-04 DIAGNOSIS — R68.89 DECREASED STRENGTH, ENDURANCE, AND MOBILITY: ICD-10-CM

## 2023-04-04 DIAGNOSIS — G35 MULTIPLE SCLEROSIS EXACERBATION: Primary | ICD-10-CM

## 2023-04-04 DIAGNOSIS — Z74.09 DECREASED STRENGTH, ENDURANCE, AND MOBILITY: ICD-10-CM

## 2023-04-04 DIAGNOSIS — R53.1 DECREASED STRENGTH, ENDURANCE, AND MOBILITY: ICD-10-CM

## 2023-04-04 PROCEDURE — 97530 THERAPEUTIC ACTIVITIES: CPT | Mod: HCNC,PN

## 2023-04-04 PROCEDURE — 97112 NEUROMUSCULAR REEDUCATION: CPT | Mod: HCNC,PN

## 2023-04-04 NOTE — PROGRESS NOTES
Physical Therapy Daily Treatment Note     Name: Alicia Soliz  Clinic Number: [id number]    Therapy Diagnosis:   Encounter Diagnoses   Name Primary?    Multiple sclerosis exacerbation Yes    Decreased strength, endurance, and mobility      Physician: Kole Carrasquillo MD    Visit Date: 4/4/2023    Physician Orders: PT Eval and Treat   Medical Diagnosis from Referral: G35 (ICD-10-CM) - Multiple sclerosis  Evaluation Date: 10/4/2022  Authorization Period Expiration: 12/31/23  Plan of Care Expiration: 3/31/23 extended 5/523  Progress Note Due: due 30 days from 3/23/2023  Visit # / Visits authorized: 17/20  FOTO: 3/4 (41% limitation)     Precautions: Standard, Fall, and MS      PTA Visit #: 0/5     Time In: 11:35 am  Time Out: 12:18 am  Total Billable Time: 43 minutes     SUBJECTIVE     Pt reports: they changed her medication so she is getting headaches.    She was compliant with home exercise program.  Response to previous treatment: no issues  Functional change: na at this time    Pain: 0/10 Location: none    OBJECTIVE        CMS Impairment/Limitation/Restriction for FOTO Multiple Sclerosis Survey     Therapist reviewed FOTO scores for Alicia Soliz on 3/23/2023.   FOTO documents entered into [employer] - see Media section.     Limitation Score: 41%            Objective Measures updated at progress report unless specified.     Treatment     Alicia received the treatments listed below:        therapeutic exercises to develop strength, flexibility, posture, and core stabilization for 0 minutes  including:    Deferred:  Nustep for improving ROM, strength and endurance, 6 min   Seated Marches focus upright posture and core stabilization; 3# on legs 2x12  Qped cat x 8  Machines:  Hip add 70# x20  Hip Abd 55# x20  Leg extension 25# x20    neuromuscular re-education activities to improve: Balance, Coordination, Kinesthetic Sense, and Proprioception for 18 minutes. The following activities were included:    Standing bicep  curls 5# 2x 15 (cues on scapular retraction)  Standing waist to overhead R hand 5# 2x 10; L hand 2 # 2x 8 with cues manually from PT to challenge The L shoulder towards more of a land mine push as she tends to use poor external rotation control on the L from prior injury with her last fall   L UE land mine (motor control is weak with mild drop arm findings but improves with training    Deferred:  Standing single leg heel raises (with wide VANESSA) 2x 10 per leg without UE support  Leaning hip extension 2x 10  LAQ 1x 10 per leg  Long seated L hip abduction (focus on neutral hip to reduce falling into external rotation-PT manual help at times) 5x 10  Supine L LE march with LAQ 2x 10 ; then R (focus on L LE adduction while R LE working) 2x 10  Supine ball squeeze with posterior pelvic tilt with bridge 3x10  Seated LAQ 6# 3 sec hold at top 3x 10 B  Standing L hip flexion 4# 2x 8 B with less RW help as able  Qped alternating arm 2x 10 L arm; R 2x 8  Mini Forward lunge onto no riser step; x15 each    Alicia received gait training to improve functional mobility and safety for 0 minutes, including:  Deferred:  Ambulation with SC for up to  20 ft x2  with cues on cane placement with transfers, step length and leading leg, hand to use for SC and safety on turns    Alicia performed therapeutic activities for 30 min including:    Sit to stand from 20 inch box 2x 10  Goblet hold with sit to stand 15# 1x 5  Sit to stand R hand farmer's hold 15# 1x 10; L 1x 10  Dead lifting 15# per hand (30#) 2x 10    Deferred:  PT continued with gait with SC for endurance and core support to improve safe function with SC for 20 ft at a more functional pace    Patient Education and Home Exercises     Home Exercises Provided and Patient Education Provided     Education provided:   -Gait mechanics, use of FWW, safety and not overdoing it with activity    Written Home Exercises Provided: Patient instructed to cont prior HEP. Exercises were reviewed and  Alicia was able to demonstrate them prior to the end of the session.  Alicia demonstrated good  understanding of the education provided. See EMR under Patient Instructions for exercises provided during therapy sessions    ASSESSMENT   Focused on improving balance for motor control with gait and mobility. Pt has some rotator cuff weakness on the L stating when she had her last hospital stay, the shoulder had popped out of joint and the people in the ambulance put it back in place for her. She has been having L UE weakness similar to L LE weakness but is surprised it is improving as well. PT worked on more standing tasks today to challenge posterior chain control.  Patient responding well to session overall, reporting feeling moderate fatigue but feeling really good and confident post-session.     Alicia Is progressing well towards her goals.   Pt prognosis is Good.     Pt will continue to benefit from skilled outpatient physical therapy to address the deficits listed in the problem list box on initial evaluation, provide pt/family education and to maximize pt's level of independence in the home and community environment.     Pt's spiritual, cultural and educational needs considered and pt agreeable to plan of care and goals.     Anticipated barriers to physical therapy: chronicity of condition and comorbidities    Goals:   Short Term Goals: In 3 weeks   1.I with HEP MET 12/6/20222  2.Pt to increase BLE strength by 1/2 grade to show improvements with sitting, standing, ambulating, bending, lifting activities.  progressing  3. Patient to improve 5x Sit<>Stand to less than 12 seconds to reduced risk of fall. Progressing   4. Patient to improve TUG with RW to less than 25 seconds to reduce risk of falls. Progressing     Long Term Goals: In 6 weeks (Progressing) updated to 5/5/23  1. Patient to demo increase in LE strength to 3+/5 with hip abduction on the L LE to show improvements with sitting, standing, ambulating,  bending, lifting activities.  progressing  2. Patient to improve score on the FOTO to < or = to 48% to show improvement with QOL. met  3. Patient to improve 5x Sit<>Stand to less than 10 seconds to reduced risk of fall. progressing  4. Patient to improve TUG with RW to less than 20 seconds to reduce risk of falls. progressing     Plan      Update Certification Period: 3/23/23 extended to 5/5//23  Recommended Treatment Plan: 2 times per week for 6 weeks effective week of 3/23/23: Aquatic Therapy, Cervical/Lumbar Traction, Electrical Stimulation  , Gait Training, Manual Therapy, Moist Heat/ Ice, Neuromuscular Re-ed, Patient Education, Self Care, Therapeutic Exercise, and Therapeutic Activities    Tracie Way, PT    4/4/2023

## 2023-04-10 ENCOUNTER — PATIENT MESSAGE (OUTPATIENT)
Dept: NEUROLOGY | Facility: CLINIC | Age: 43
End: 2023-04-10
Payer: MEDICARE

## 2023-04-13 ENCOUNTER — CLINICAL SUPPORT (OUTPATIENT)
Dept: REHABILITATION | Facility: HOSPITAL | Age: 43
End: 2023-04-13
Payer: MEDICARE

## 2023-04-13 DIAGNOSIS — R68.89 DECREASED STRENGTH, ENDURANCE, AND MOBILITY: ICD-10-CM

## 2023-04-13 DIAGNOSIS — Z74.09 DECREASED STRENGTH, ENDURANCE, AND MOBILITY: ICD-10-CM

## 2023-04-13 DIAGNOSIS — G35 MULTIPLE SCLEROSIS EXACERBATION: Primary | ICD-10-CM

## 2023-04-13 DIAGNOSIS — R53.1 DECREASED STRENGTH, ENDURANCE, AND MOBILITY: ICD-10-CM

## 2023-04-13 PROBLEM — R26.9 GAIT ABNORMALITY: Status: RESOLVED | Noted: 2020-08-11 | Resolved: 2023-04-13

## 2023-04-13 PROCEDURE — 97116 GAIT TRAINING THERAPY: CPT | Mod: HCNC,PN

## 2023-04-13 PROCEDURE — 97112 NEUROMUSCULAR REEDUCATION: CPT | Mod: HCNC,PN

## 2023-04-13 NOTE — PROGRESS NOTES
Physical Therapy Daily Treatment Note     Name: Alicia Soliz  Clinic Number: [id number]    Therapy Diagnosis:   Encounter Diagnoses   Name Primary?    Multiple sclerosis exacerbation Yes    Decreased strength, endurance, and mobility      Physician: Kole Carrasquillo MD    Visit Date: 4/13/2023    Physician Orders: PT Eval and Treat   Medical Diagnosis from Referral: G35 (ICD-10-CM) - Multiple sclerosis  Evaluation Date: 10/4/2022  Authorization Period Expiration: 12/31/23  Plan of Care Expiration: 3/31/23 extended 5/523  Progress Note Due: due 30 days from 4/13/2023  Visit # / Visits authorized: 18/20  FOTO: 3/4 (41% limitation)     Precautions: Standard, Fall, and MS (L shoulder had dislocated in the last event)     PTA Visit #: 0/5     Time In: 1:35 pm  Time Out: 2:30 pm  Total Billable Time: 55 minutes     SUBJECTIVE     Pt reports: she is getting headaches still. Pt states she has been lifting the L arm better since she realized it is was damaged as much as it was as her focus has been on her leg.    She was compliant with home exercise program.  Response to previous treatment: no issues  Functional change: na at this time    Pain: 0/10 Location: none    OBJECTIVE   R shoulder had dislocated when she had her last ER visit from an MS event and it was placed back in place but now she has L RTC issues. PT noted the scapula elevated and anterior tipped resulting in her shoulder using the deltoid to flex and poor shoulder external rotation so PT added in scapular work today to help challenge trunk balance and aide in advancing the shoulder for tasks such as reaching, using her RW and performing household tasks.     Flexion to 100 degrees on L at shoulder then abducts and internally rotates  Shoulder external rotates feels it will dislocate at 30 degrees of ER.  Trigger points severe in L upper trap and pect minor  Apprehension test + L UE    Sit to  30 sec 8 reps  5 reps sit to stand 11 sec (improved  from 14 sec)       CMS Impairment/Limitation/Restriction for FOTO Multiple Sclerosis Survey     Therapist reviewed FOTO scores for Alicia Soliz on 3/23/2023.   FOTO documents entered into [employer] - see Media section.     Limitation Score: 41%            Objective Measures updated at progress report unless specified.     Treatment     Alicia received the treatments listed below:        therapeutic exercises to develop strength, flexibility, posture, and core stabilization for 0 minutes  including:    Deferred:  Nustep for improving ROM, strength and endurance, 6 min   Seated Marches focus upright posture and core stabilization; 3# on legs 2x12  Qped cat x 8  Machines:  Hip add 70# x20  Hip Abd 55# x20  Leg extension 25# x20    neuromuscular re-education activities to improve: Balance, Coordination, Kinesthetic Sense, and Proprioception for 45 minutes. The following activities were included: (PT added in more standing work with shoulders to work on scapular support but to challenge the hips/ankles and trunk for balance)    L shoulder scapular depression 2x 10  L shoulder scapular retraction 2x 10  L shoulder flexion with cues on scapular depression 2x 10  Standing rows for hip and ankle control and scapular support 30 # per pulley 3x 10 B then single 1x 10 to challenge rotation more  Standing shoulder flexion B 2x 10 then single arm to challenge rotation more 2x 10 R then L  Sit to stand from chair in 30 sec 8 reps  5 rep sit to stand test in 11 sec  Paloff press 30 pounds per direction 2x 10   Leaning hip extension 2x 10    Deferred:  Standing bicep curls 5# 2x 15 (cues on scapular retraction)  Standing waist to overhead R hand 5# 2x 10; L hand 2 # 2x 8 with cues manually from PT to challenge The L shoulder towards more of a land mine push as she tends to use poor external rotation control on the L from prior injury with her last fall   L UE land mine (motor control is weak with mild drop arm findings but improves  with training  Standing single leg heel raises (with wide VANESSA) 2x 10 per leg without UE support  LAQ 1x 10 per leg  Long seated L hip abduction (focus on neutral hip to reduce falling into external rotation-PT manual help at times) 5x 10  Supine L LE march with LAQ 2x 10 ; then R (focus on L LE adduction while R LE working) 2x 10  Supine ball squeeze with posterior pelvic tilt with bridge 3x10  Seated LAQ 6# 3 sec hold at top 3x 10 B      Alicia received gait training to improve functional mobility and safety for 10 minutes, including:  Deferred:  Ambulation with SC for up to  40 ft x1  with cues on cane placement with transfers, step length and leading leg, hand to use for SC and safety on turns, weight shift and heel placement to help engage natural rolling heel to toe    Alicia performed therapeutic activities for 0 min including:      Deferred:  Goblet hold with sit to stand 15# 1x 5  Sit to stand R hand farmer's hold 15# 1x 10; L 1x 10  Dead lifting 15# per hand (30#) 2x 10  PT continued with gait with SC for endurance and core support to improve safe function with SC for 20 ft at a more functional pace    Patient Education and Home Exercises     Home Exercises Provided and Patient Education Provided     Education provided:   -Gait mechanics, use of FWW, safety and not overdoing it with activity    Written Home Exercises Provided: Patient instructed to cont prior HEP. Exercises were reviewed and Alicia was able to demonstrate them prior to the end of the session.  Alicia demonstrated good  understanding of the education provided. See EMR under Patient Instructions for exercises provided during therapy sessions    ASSESSMENT   Focused on standing balance work more today using UE to challenge her with perturbations and unilateral and B tasks to incorporate more rotation work. Gait is stronger and stride improving with less shuffle and L hip external rotation. PT assessed the L shoulder ROM more today and she has a  lot of guarding after her shoulder dislocated some months ago and it was not addressed. Since we are working on functional lifting and walking, we advanced her with adding in more shoulder work focusing on planes to avoid impinging the shoulder. Patient responding well to session overall, reporting feeling moderate fatigue but feeling really good and confident post-session.     Alicia Is progressing well towards her goals.   Pt prognosis is Good.     Pt will continue to benefit from skilled outpatient physical therapy to address the deficits listed in the problem list box on initial evaluation, provide pt/family education and to maximize pt's level of independence in the home and community environment.     Pt's spiritual, cultural and educational needs considered and pt agreeable to plan of care and goals.     Anticipated barriers to physical therapy: chronicity of condition and comorbidities    Goals:   Short Term Goals: In 3 weeks   1.I with HEP MET 12/6/20222  2.Pt to increase BLE strength by 1/2 grade to show improvements with sitting, standing, ambulating, bending, lifting activities.  progressing  3. Patient to improve 5x Sit<>Stand to less than 12 seconds to reduced risk of fall. met  4. Patient to improve TUG with RW to less than 25 seconds to reduce risk of falls. Progressing     Long Term Goals: In 6 weeks (Progressing) updated to 5/5/23  1. Patient to demo increase in LE strength to 3+/5 with hip abduction on the L LE to show improvements with sitting, standing, ambulating, bending, lifting activities.  progressing  2. Patient to improve score on the FOTO to < or = to 48% to show improvement with QOL. met  3. Patient to improve 5x Sit<>Stand to less than 10 seconds to reduced risk of fall. progressing  4. Patient to improve TUG with RW to less than 20 seconds to reduce risk of falls. progressing     Plan      Update Certification Period: 3/23/23 extended to 5/5//23  Recommended Treatment Plan: 2 times per  week for 6 weeks effective week of 3/23/23: Aquatic Therapy, Cervical/Lumbar Traction, Electrical Stimulation  , Gait Training, Manual Therapy, Moist Heat/ Ice, Neuromuscular Re-ed, Patient Education, Self Care, Therapeutic Exercise, and Therapeutic Activities    Tracie Way, PT    4/13/2023

## 2023-04-15 ENCOUNTER — PATIENT MESSAGE (OUTPATIENT)
Dept: NEUROLOGY | Facility: CLINIC | Age: 43
End: 2023-04-15
Payer: MEDICARE

## 2023-04-18 ENCOUNTER — TELEPHONE (OUTPATIENT)
Dept: NEUROLOGY | Facility: CLINIC | Age: 43
End: 2023-04-18
Payer: MEDICARE

## 2023-04-18 NOTE — TELEPHONE ENCOUNTER
----- Message from Kaila Sadler sent at 4/18/2023 11:54 AM CDT -----  Regarding: severe constant headache - req call back asap per patient  The patient called requesting to speak to Nurse regarding a headache she has been having since her infusion. States - her whole head, ears and eyes are hurting requesting call back to advise before patient goes to ER. Requesting call back ASAP    No further information provided      Patient can be contacted @# [phone]

## 2023-04-19 ENCOUNTER — PATIENT MESSAGE (OUTPATIENT)
Dept: NEUROLOGY | Facility: CLINIC | Age: 43
End: 2023-04-19
Payer: MEDICARE

## 2023-04-19 ENCOUNTER — TELEPHONE (OUTPATIENT)
Dept: NEUROLOGY | Facility: CLINIC | Age: 43
End: 2023-04-19
Payer: MEDICARE

## 2023-04-19 DIAGNOSIS — R51.9 ACUTE INTRACTABLE HEADACHE, UNSPECIFIED HEADACHE TYPE: Primary | ICD-10-CM

## 2023-04-19 RX ORDER — METHYLPREDNISOLONE 4 MG/1
TABLET ORAL
Qty: 21 EACH | Refills: 0 | Status: SHIPPED | OUTPATIENT
Start: 2023-04-19 | End: 2023-04-20

## 2023-04-20 ENCOUNTER — OFFICE VISIT (OUTPATIENT)
Dept: INTERNAL MEDICINE | Facility: CLINIC | Age: 43
End: 2023-04-20
Payer: MEDICARE

## 2023-04-20 VITALS
WEIGHT: 293 LBS | OXYGEN SATURATION: 99 % | HEART RATE: 106 BPM | BODY MASS INDEX: 50.02 KG/M2 | SYSTOLIC BLOOD PRESSURE: 136 MMHG | HEIGHT: 64 IN | DIASTOLIC BLOOD PRESSURE: 84 MMHG | TEMPERATURE: 99 F

## 2023-04-20 DIAGNOSIS — H92.01 ACUTE OTALGIA, RIGHT: Primary | ICD-10-CM

## 2023-04-20 DIAGNOSIS — R51.9 FACIAL PAIN: ICD-10-CM

## 2023-04-20 PROCEDURE — 3079F DIAST BP 80-89 MM HG: CPT | Mod: HCNC,CPTII,S$GLB, | Performed by: FAMILY MEDICINE

## 2023-04-20 PROCEDURE — 3079F PR MOST RECENT DIASTOLIC BLOOD PRESSURE 80-89 MM HG: ICD-10-PCS | Mod: HCNC,CPTII,S$GLB, | Performed by: FAMILY MEDICINE

## 2023-04-20 PROCEDURE — 1159F PR MEDICATION LIST DOCUMENTED IN MEDICAL RECORD: ICD-10-PCS | Mod: HCNC,CPTII,S$GLB, | Performed by: FAMILY MEDICINE

## 2023-04-20 PROCEDURE — 99999 PR PBB SHADOW E&M-EST. PATIENT-LVL IV: ICD-10-PCS | Mod: PBBFAC,HCNC,, | Performed by: FAMILY MEDICINE

## 2023-04-20 PROCEDURE — 1159F MED LIST DOCD IN RCRD: CPT | Mod: HCNC,CPTII,S$GLB, | Performed by: FAMILY MEDICINE

## 2023-04-20 PROCEDURE — 3075F PR MOST RECENT SYSTOLIC BLOOD PRESS GE 130-139MM HG: ICD-10-PCS | Mod: HCNC,CPTII,S$GLB, | Performed by: FAMILY MEDICINE

## 2023-04-20 PROCEDURE — 3008F BODY MASS INDEX DOCD: CPT | Mod: HCNC,CPTII,S$GLB, | Performed by: FAMILY MEDICINE

## 2023-04-20 PROCEDURE — 4010F PR ACE/ARB THEARPY RXD/TAKEN: ICD-10-PCS | Mod: HCNC,CPTII,S$GLB, | Performed by: FAMILY MEDICINE

## 2023-04-20 PROCEDURE — 99213 OFFICE O/P EST LOW 20 MIN: CPT | Mod: HCNC,S$GLB,, | Performed by: FAMILY MEDICINE

## 2023-04-20 PROCEDURE — 4010F ACE/ARB THERAPY RXD/TAKEN: CPT | Mod: HCNC,CPTII,S$GLB, | Performed by: FAMILY MEDICINE

## 2023-04-20 PROCEDURE — 99213 PR OFFICE/OUTPT VISIT, EST, LEVL III, 20-29 MIN: ICD-10-PCS | Mod: HCNC,S$GLB,, | Performed by: FAMILY MEDICINE

## 2023-04-20 PROCEDURE — 3075F SYST BP GE 130 - 139MM HG: CPT | Mod: HCNC,CPTII,S$GLB, | Performed by: FAMILY MEDICINE

## 2023-04-20 PROCEDURE — 3008F PR BODY MASS INDEX (BMI) DOCUMENTED: ICD-10-PCS | Mod: HCNC,CPTII,S$GLB, | Performed by: FAMILY MEDICINE

## 2023-04-20 PROCEDURE — 99999 PR PBB SHADOW E&M-EST. PATIENT-LVL IV: CPT | Mod: PBBFAC,HCNC,, | Performed by: FAMILY MEDICINE

## 2023-04-20 RX ORDER — AMOXICILLIN AND CLAVULANATE POTASSIUM 875; 125 MG/1; MG/1
1 TABLET, FILM COATED ORAL EVERY 12 HOURS
Qty: 20 TABLET | Refills: 0 | Status: SHIPPED | OUTPATIENT
Start: 2023-04-20 | End: 2023-06-23

## 2023-04-20 RX ORDER — NEOMYCIN SULFATE, POLYMYXIN B SULFATE AND HYDROCORTISONE 10; 3.5; 1 MG/ML; MG/ML; [USP'U]/ML
3 SUSPENSION/ DROPS AURICULAR (OTIC) 3 TIMES DAILY
Qty: 10 ML | Refills: 1 | Status: SHIPPED | OUTPATIENT
Start: 2023-04-20 | End: 2023-11-03

## 2023-04-20 RX ORDER — OXCARBAZEPINE 150 MG/1
150 TABLET, FILM COATED ORAL 2 TIMES DAILY
Qty: 60 TABLET | Refills: 2 | Status: SHIPPED | OUTPATIENT
Start: 2023-04-20 | End: 2023-05-01 | Stop reason: SDUPTHER

## 2023-04-20 NOTE — PROGRESS NOTES
Subjective:      Patient ID: Alicia Soliz is a 43 y.o. female.    Chief Complaint: Otalgia      Patient reports severe pain in right ear, intermittent,often triggered by chewing/eating. Reports pain seems to radiate down face as well, sharp stabbing pains    Otalgia     Review of Systems   HENT:  Positive for ear pain.    Past Medical History:   Diagnosis Date    Anemia     Arthritis     Cardiac arrest as complication of care     pt states she went into cardiac arrest from an allergic reaction to a medication    Depression     Encounter for blood transfusion     Hemiplegia due to old stroke     Hypertension     Morbid obesity with BMI of 45.0-49.9, adult 9/20/2018    Multiple sclerosis           Past Surgical History:   Procedure Laterality Date    APPENDECTOMY      CHOLECYSTECTOMY      COLONOSCOPY N/A 11/25/2020    Procedure: COLONOSCOPY;  Surgeon: Andrew Jenkins III, MD;  Location: [facility];  Service: Endoscopy;  Laterality: N/A;    ESOPHAGOGASTRODUODENOSCOPY N/A 2/19/2020    Procedure: EGD (ESOPHAGOGASTRODUODENOSCOPY);  Surgeon: Michael Navarrete MD;  Location: [facility];  Service: Endoscopy;  Laterality: N/A;    ESOPHAGOGASTRODUODENOSCOPY N/A 2/20/2020    Procedure: EGD (ESOPHAGOGASTRODUODENOSCOPY);  Surgeon: Michael Navarrete MD;  Location: [facility] OR;  Service: General;  Laterality: N/A;    ESOPHAGOGASTRODUODENOSCOPY N/A 11/25/2020    Procedure: EGD (ESOPHAGOGASTRODUODENOSCOPY);  Surgeon: Andrew Jenkins III, MD;  Location: [facility];  Service: Endoscopy;  Laterality: N/A;    HYSTERECTOMY      KNEE SURGERY      ROBOT-ASSISTED LAPAROSCOPIC SLEEVE GASTRECTOMY USING DA ANTHONY XI N/A 2/20/2020    Procedure: XI ROBOTIC SLEEVE GASTRECTOMY;  Surgeon: Michael Navarrete MD;  Location: [facility] OR;  Service: General;  Laterality: N/A;    TENOPLASTY OF HAND Left 8/26/2021    Procedure: REPAIR, TENDON, HAND;  Surgeon: Joselito Lguo MD;  Location: [facility] OR;  Service: Orthopedics;  Laterality: Left;  Left RCL PIP Joint  Repair/Recon with Arthrex Internal Brace.    TONSILLECTOMY      TUBAL LIGATION       Family History   Problem Relation Age of Onset    Lupus Mother     Heart disease Mother     Hypertension Mother     Diabetes Father     Kidney disease Father     Cancer Maternal Aunt 40        breast    Breast cancer Maternal Aunt     Diabetes Maternal Aunt     COPD Maternal Aunt     Breast cancer Maternal Cousin     Ovarian cancer Maternal Cousin      Social History     Socioeconomic History    Marital status: Single    Number of children: 3   Occupational History     Employer: [employer]   Tobacco Use    Smoking status: Never     Passive exposure: Never    Smokeless tobacco: Never   Substance and Sexual Activity    Alcohol use: Yes     Comment: once a year     Drug use: Never    Sexual activity: Yes     Partners: Male     Birth control/protection: Surgical     Social Determinants of Health     Financial Resource Strain: Low Risk     Difficulty of Paying Living Expenses: Not very hard   Food Insecurity: No Food Insecurity    Worried About Running Out of Food in the Last Year: Never true    Ran Out of Food in the Last Year: Never true   Transportation Needs: No Transportation Needs    Lack of Transportation (Medical): No    Lack of Transportation (Non-Medical): No   Physical Activity: Sufficiently Active    Days of Exercise per Week: 7 days    Minutes of Exercise per Session: 60 min   Stress: No Stress Concern Present    Feeling of Stress : Not at all   Social Connections: Unknown    Frequency of Communication with Friends and Family: Three times a week    Frequency of Social Gatherings with Friends and Family: Twice a week    Active Member of Clubs or Organizations: Yes    Attends Club or Organization Meetings: 1 to 4 times per year    Marital Status:    Housing Stability: Low Risk     Unable to Pay for Housing in the Last Year: No    Number of Places Lived in the Last Year: 0    Unstable Housing in the Last Year: No     Review of  "patient's allergies indicates:   Allergen Reactions    Demerol [meperidine] Anaphylaxis     Other reaction(s): Itching    Dilaudid [hydromorphone (bulk)] Anaphylaxis     "coded" per pt  Patient can tolerate Lortab    Shellfish containing products Itching, Nausea And Vomiting and Swelling    Prednisone Itching     Other reaction(s): Itching    Tramadol      shaking       Objective:       /84 (BP Location: Right arm, Patient Position: Sitting, BP Method: Large (Manual))   Pulse 106   Temp 99 °F (37.2 °C) (Tympanic)   Ht 5' 4" (1.626 m)   Wt 133.1 kg (293 lb 6.9 oz)   SpO2 99%   BMI 50.37 kg/m²   Physical Exam  Constitutional:       Appearance: Normal appearance. She is obese.   HENT:      Right Ear: Tympanic membrane and ear canal normal.      Left Ear: Tympanic membrane, ear canal and external ear normal. There is impacted cerumen.      Ears:      Comments: Erythematous lesion right pinna - entire area tender to touch       Mouth/Throat:      Pharynx: Oropharynx is clear. No posterior oropharyngeal erythema.   Cardiovascular:      Rate and Rhythm: Normal rate and regular rhythm.      Heart sounds: Normal heart sounds.   Pulmonary:      Effort: Pulmonary effort is normal.      Breath sounds: Normal breath sounds.   Neurological:      Mental Status: She is alert.     Assessment:     1. Acute otalgia, right    2. Facial pain      Plan:   Acute otalgia, right    Facial pain    Other orders  -     neomycin-polymyxin-hydrocortisone (CORTISPORIN) 3.5-10,000-1 mg/mL-unit/mL-% otic suspension; Place 3 drops into the right ear 3 (three) times daily.  Dispense: 10 mL; Refill: 1  -     amoxicillin-clavulanate 875-125mg (AUGMENTIN) 875-125 mg per tablet; Take 1 tablet by mouth every 12 (twelve) hours.  Dispense: 20 tablet; Refill: 0  -     OXcarbazepine (TRILEPTAL) 150 MG Tab; Take 1 tablet (150 mg total) by mouth 2 (two) times daily.  Dispense: 60 tablet; Refill: 2    Discussed at length with patient that I strongly " suspect she has trigeminal neuralgia, recommended she also discussed with her neurologist at next visit  Medication List with Changes/Refills   New Medications    AMOXICILLIN-CLAVULANATE 875-125MG (AUGMENTIN) 875-125 MG PER TABLET    Take 1 tablet by mouth every 12 (twelve) hours.    NEOMYCIN-POLYMYXIN-HYDROCORTISONE (CORTISPORIN) 3.5-10,000-1 MG/ML-UNIT/ML-% OTIC SUSPENSION    Place 3 drops into the right ear 3 (three) times daily.    OXCARBAZEPINE (TRILEPTAL) 150 MG TAB    Take 1 tablet (150 mg total) by mouth 2 (two) times daily.   Current Medications    ATORVASTATIN (LIPITOR) 20 MG TABLET    Take 20 mg by mouth once daily.    CHOLECALCIFEROL, VITAMIN D3, (VITAMIN D3) 25 MCG (1,000 UNIT) CAPSULE    Take 2 capsules (2,000 Units total) by mouth once daily.    DIPHENHYDRAMINE (BENADRYL) 50 MG/ML INJECTION        DOXEPIN (SINEQUAN) 25 MG CAPSULE    Take 1 capsule (25 mg total) by mouth nightly as needed.    DULOXETINE (CYMBALTA) 60 MG CAPSULE    TAKE 1 CAPSULE BY MOUTH EVERY DAY    ESOMEPRAZOLE (NEXIUM) 40 MG CAPSULE    Take 1 capsule (40 mg total) by mouth before breakfast.    HYDROCODONE-ACETAMINOPHEN (NORCO)  MG PER TABLET    Take 1 tablet by mouth every 8 (eight) hours as needed.    LINACLOTIDE (LINZESS) 145 MCG CAP CAPSULE    Take 1 capsule (145 mcg total) by mouth before breakfast.    NABUMETONE (RELAFEN) 750 MG TABLET    Take 750 mg by mouth 2 (two) times daily as needed.    NALOXONE (NARCAN) 4 MG/ACTUATION SPRY    SMARTSIG:Both Nares    OCREVUS 30 MG/ML SOLN        ONDANSETRON (ZOFRAN) 8 MG TABLET    Take 1 tablet (8 mg total) by mouth 2 (two) times daily.    SOLU-MEDROL, PF, 125 MG/2 ML SOLR        TOPIRAMATE (TOPAMAX) 50 MG TABLET    TAKE 1 TABLET BY MOUTH IN THE MORNING AND 2 TABLETS AT BEDTIME    VALSARTAN (DIOVAN) 80 MG TABLET    Take 1 tablet (80 mg total) by mouth once daily.   Discontinued Medications    CIPROFLOXACIN HCL (CIPRO) 500 MG TABLET    Take 1 tablet (500 mg total) by mouth 2 (two)  times daily.    METHYLPREDNISOLONE (MEDROL DOSEPACK) 4 MG TABLET    use as directed

## 2023-04-25 ENCOUNTER — PATIENT MESSAGE (OUTPATIENT)
Dept: INTERNAL MEDICINE | Facility: CLINIC | Age: 43
End: 2023-04-25
Payer: MEDICARE

## 2023-04-25 DIAGNOSIS — H92.01 ACUTE OTALGIA, RIGHT: Primary | ICD-10-CM

## 2023-04-26 ENCOUNTER — PATIENT MESSAGE (OUTPATIENT)
Dept: NEUROLOGY | Facility: CLINIC | Age: 43
End: 2023-04-26
Payer: MEDICARE

## 2023-04-27 ENCOUNTER — CLINICAL SUPPORT (OUTPATIENT)
Dept: REHABILITATION | Facility: HOSPITAL | Age: 43
End: 2023-04-27
Payer: MEDICARE

## 2023-04-27 DIAGNOSIS — R53.1 DECREASED STRENGTH, ENDURANCE, AND MOBILITY: ICD-10-CM

## 2023-04-27 DIAGNOSIS — R68.89 DECREASED STRENGTH, ENDURANCE, AND MOBILITY: ICD-10-CM

## 2023-04-27 DIAGNOSIS — Z74.09 DECREASED STRENGTH, ENDURANCE, AND MOBILITY: ICD-10-CM

## 2023-04-27 DIAGNOSIS — G35 MULTIPLE SCLEROSIS EXACERBATION: Primary | ICD-10-CM

## 2023-04-27 PROCEDURE — 97112 NEUROMUSCULAR REEDUCATION: CPT | Mod: HCNC,PN

## 2023-04-27 NOTE — PROGRESS NOTES
Physical Therapy Daily Treatment Note     Name: Alicia Soliz  Clinic Number: [id number]    Therapy Diagnosis:   Encounter Diagnoses   Name Primary?    Multiple sclerosis exacerbation Yes    Decreased strength, endurance, and mobility      Physician: Koel Carrasquillo MD    Visit Date: 4/27/2023    Physician Orders: PT Eval and Treat   Medical Diagnosis from Referral: G35 (ICD-10-CM) - Multiple sclerosis  Evaluation Date: 10/4/2022  Authorization Period Expiration: 12/31/23  Plan of Care Expiration: 3/31/23 extended 5/523  Progress Note Due: due 30 days from 4/13/2023  Visit # / Visits authorized: 18/20  FOTO: 3/4 (41% limitation)     Precautions: Standard, Fall, and MS (L shoulder had dislocated in the last event)     PTA Visit #: 0/5     Time In: 1:00 pm  Time Out: 2:10 pm  Total Billable Time: 70 minutes     SUBJECTIVE     Pt reports: she has been diagnosed with hearing nerve damage.    She was compliant with home exercise program.  Response to previous treatment: no issues  Functional change: na at this time    Pain: 0/10 Location: none    OBJECTIVE     CMS Impairment/Limitation/Restriction for FOTO Multiple Sclerosis Survey     Therapist reviewed FOTO scores for Alicia Soliz on 4/27/2023.   FOTO documents entered into [employer] - see Media section.     Limitation Score: 47%            Objective Measures updated at progress report unless specified.     Treatment     Alicia received the treatments listed below:        therapeutic exercises to develop strength, flexibility, posture, and core stabilization for 0 minutes  including:    Deferred:  Nustep for improving ROM, strength and endurance, 6 min   Seated Marches focus upright posture and core stabilization; 3# on legs 2x12  Qped cat x 8  Machines:  Hip add 70# x20  Hip Abd 55# x20  Leg extension 25# x20    neuromuscular re-education activities to improve: Balance, Coordination, Kinesthetic Sense, and Proprioception for 70 minutes. The following activities  were included: (PT added in more standing work with shoulders to work on scapular support but to challenge the hips/ankles and trunk for balance)    SLR  3x 10 B  Hip adduction attempts in supine (unable after multiple attempts even with assistance from PT)  Side lying hip abduction L 3x 8 with AAROM; R 3x 8 posterior circles  Toe touches 1x 5 (posterior chain balance assessment)  Dead lifting 10 # single arm 3x 5 R then L; B 10 # 3x 5  Single leg stool taps R foot 1x 15; 1x 14 L foot (tone limited her pace)  Seated hip adduction 3x 10 L      Deferred:  L shoulder scapular depression 2x 10  L shoulder scapular retraction 2x 10  L shoulder flexion with cues on scapular depression 2x 10  Standing rows for hip and ankle control and scapular support 30 # per pulley 3x 10 B then single 1x 10 to challenge rotation more  Supine L LE march with LAQ 2x 10 ; then R (focus on L LE adduction while R LE working) 2x 10  Supine ball squeeze with posterior pelvic tilt with bridge 3x10  Seated LAQ 6# 3 sec hold at top 3x 10 B      Alicia received gait training to improve functional mobility and safety for 0 minutes, including:  Deferred:  Ambulation with SC for up to  40 ft x1  with cues on cane placement with transfers, step length and leading leg, hand to use for SC and safety on turns, weight shift and heel placement to help engage natural rolling heel to toe    Alicia performed therapeutic activities for 0 min including:      Deferred:  Goblet hold with sit to stand 15# 1x 5  Sit to stand R hand farmer's hold 15# 1x 10; L 1x 10  Dead lifting 15# per hand (30#) 2x 10  PT continued with gait with SC for endurance and core support to improve safe function with SC for 20 ft at a more functional pace    Patient Education and Home Exercises     Home Exercises Provided and Patient Education Provided     Education provided:   -Gait mechanics, use of FWW, safety and not overdoing it with activity    Written Home Exercises Provided: Patient  instructed to cont prior HEP. Exercises were reviewed and Alicia was able to demonstrate them prior to the end of the session.  Alicia demonstrated good  understanding of the education provided. See EMR under Patient Instructions for exercises provided during therapy sessions    ASSESSMENT   Focused on standing balance work  and tasks to increase L hip control and muscle recruitment.  Pt hip internal rotation on the L is still very weak and tone in the L LE made tasks such as stool taps for pre step training difficult. Pt performed dead lifts fairly well so PT had pt perform some single leg for core and hip adductor activation. Patient responding well to session overall, reporting feeling less fatigue says she has been feeling more engergized the last few days.     Alicia Is progressing well towards her goals.   Pt prognosis is Good.     Pt will continue to benefit from skilled outpatient physical therapy to address the deficits listed in the problem list box on initial evaluation, provide pt/family education and to maximize pt's level of independence in the home and community environment.     Pt's spiritual, cultural and educational needs considered and pt agreeable to plan of care and goals.     Anticipated barriers to physical therapy: chronicity of condition and comorbidities    Goals:   Short Term Goals: In 3 weeks   1.I with HEP MET 12/6/20222  2.Pt to increase BLE strength by 1/2 grade to show improvements with sitting, standing, ambulating, bending, lifting activities.  progressing  3. Patient to improve 5x Sit<>Stand to less than 12 seconds to reduced risk of fall. met  4. Patient to improve TUG with RW to less than 25 seconds to reduce risk of falls. Progressing     Long Term Goals: In 6 weeks (Progressing) updated to 5/5/23  1. Patient to demo increase in LE strength to 3+/5 with hip abduction on the L LE to show improvements with sitting, standing, ambulating, bending, lifting activities.  progressing  2.  Patient to improve score on the FOTO to < or = to 48% to show improvement with QOL. met  3. Patient to improve 5x Sit<>Stand to less than 10 seconds to reduced risk of fall. progressing  4. Patient to improve TUG with RW to less than 20 seconds to reduce risk of falls. progressing     Plan      Update Certification Period: 3/23/23 extended to 5/5//23  Recommended Treatment Plan: 2 times per week for 6 weeks effective week of 3/23/23: Aquatic Therapy, Cervical/Lumbar Traction, Electrical Stimulation  , Gait Training, Manual Therapy, Moist Heat/ Ice, Neuromuscular Re-ed, Patient Education, Self Care, Therapeutic Exercise, and Therapeutic Activities    Tracie Way, PT    4/27/2023

## 2023-05-01 RX ORDER — OXCARBAZEPINE 150 MG/1
150 TABLET, FILM COATED ORAL 2 TIMES DAILY
Qty: 60 TABLET | Refills: 2 | Status: SHIPPED | OUTPATIENT
Start: 2023-05-01 | End: 2023-06-16

## 2023-05-08 ENCOUNTER — OFFICE VISIT (OUTPATIENT)
Dept: OTOLARYNGOLOGY | Facility: CLINIC | Age: 43
End: 2023-05-08
Payer: MEDICARE

## 2023-05-08 VITALS — BODY MASS INDEX: 46.06 KG/M2 | HEIGHT: 66 IN | WEIGHT: 286.63 LBS

## 2023-05-08 DIAGNOSIS — H92.01 ACUTE OTALGIA, RIGHT: ICD-10-CM

## 2023-05-08 DIAGNOSIS — H91.91 HEARING LOSS OF RIGHT EAR, UNSPECIFIED HEARING LOSS TYPE: Primary | ICD-10-CM

## 2023-05-08 PROCEDURE — 99213 PR OFFICE/OUTPT VISIT, EST, LEVL III, 20-29 MIN: ICD-10-PCS | Mod: S$GLB,,, | Performed by: STUDENT IN AN ORGANIZED HEALTH CARE EDUCATION/TRAINING PROGRAM

## 2023-05-08 PROCEDURE — 99999 PR PBB SHADOW E&M-EST. PATIENT-LVL III: ICD-10-PCS | Mod: PBBFAC,,, | Performed by: STUDENT IN AN ORGANIZED HEALTH CARE EDUCATION/TRAINING PROGRAM

## 2023-05-08 PROCEDURE — 1159F PR MEDICATION LIST DOCUMENTED IN MEDICAL RECORD: ICD-10-PCS | Mod: CPTII,S$GLB,, | Performed by: STUDENT IN AN ORGANIZED HEALTH CARE EDUCATION/TRAINING PROGRAM

## 2023-05-08 PROCEDURE — 99213 OFFICE O/P EST LOW 20 MIN: CPT | Mod: S$GLB,,, | Performed by: STUDENT IN AN ORGANIZED HEALTH CARE EDUCATION/TRAINING PROGRAM

## 2023-05-08 PROCEDURE — 3008F BODY MASS INDEX DOCD: CPT | Mod: CPTII,S$GLB,, | Performed by: STUDENT IN AN ORGANIZED HEALTH CARE EDUCATION/TRAINING PROGRAM

## 2023-05-08 PROCEDURE — 99999 PR PBB SHADOW E&M-EST. PATIENT-LVL III: CPT | Mod: PBBFAC,,, | Performed by: STUDENT IN AN ORGANIZED HEALTH CARE EDUCATION/TRAINING PROGRAM

## 2023-05-08 PROCEDURE — 1159F MED LIST DOCD IN RCRD: CPT | Mod: CPTII,S$GLB,, | Performed by: STUDENT IN AN ORGANIZED HEALTH CARE EDUCATION/TRAINING PROGRAM

## 2023-05-08 PROCEDURE — 3008F PR BODY MASS INDEX (BMI) DOCUMENTED: ICD-10-PCS | Mod: CPTII,S$GLB,, | Performed by: STUDENT IN AN ORGANIZED HEALTH CARE EDUCATION/TRAINING PROGRAM

## 2023-05-08 PROCEDURE — 4010F PR ACE/ARB THEARPY RXD/TAKEN: ICD-10-PCS | Mod: CPTII,S$GLB,, | Performed by: STUDENT IN AN ORGANIZED HEALTH CARE EDUCATION/TRAINING PROGRAM

## 2023-05-08 PROCEDURE — 4010F ACE/ARB THERAPY RXD/TAKEN: CPT | Mod: CPTII,S$GLB,, | Performed by: STUDENT IN AN ORGANIZED HEALTH CARE EDUCATION/TRAINING PROGRAM

## 2023-05-08 NOTE — PROGRESS NOTES
Chief complaint:   Chief Complaint   Patient presents with    Otalgia     Pt states she has had numbness and pain right ear x3 weeks          Referring Provider:  [place]  [address] [place] [address]    History of Present Illness:     Ms. Soliz is a 43 y.o. female MS presenting for evaluation of right ear pain and muffled hearing. Onset of this chief complaint was about 3 weeks ago.  Additional symptoms that also have been associated are muffled hearing, crusting around the ear in the mornings. The patient has tried augmentin and cortisporin (stopped due to skin irritation) prescribed on 4/20/23 with relief of pain, but not muffled hearing.  The patient denies vertigo, tinnitus, facial weakness.      No similar prior episodes.     The patient denies significant hearing loss risk factors, ototoxic medication exposure, chronic vestibular suppressant use, head/ facial/ linda trauma, and otologic surgery.        History        Past Medical History:   Past Medical History:   Diagnosis Date    Anemia     Arthritis     Cardiac arrest as complication of care     pt states she went into cardiac arrest from an allergic reaction to a medication    Depression     Encounter for blood transfusion     Hemiplegia due to old stroke     Hypertension     Morbid obesity with BMI of 45.0-49.9, adult 9/20/2018    Multiple sclerosis     .          Past Surgical History:  Past Surgical History:   Procedure Laterality Date    APPENDECTOMY      CHOLECYSTECTOMY      COLONOSCOPY N/A 11/25/2020    Procedure: COLONOSCOPY;  Surgeon: Andrew Jenkins III, MD;  Location: [facility];  Service: Endoscopy;  Laterality: N/A;    ESOPHAGOGASTRODUODENOSCOPY N/A 2/19/2020    Procedure: EGD (ESOPHAGOGASTRODUODENOSCOPY);  Surgeon: Michael Navarrete MD;  Location: [facility];  Service: Endoscopy;  Laterality: N/A;    ESOPHAGOGASTRODUODENOSCOPY N/A 2/20/2020    Procedure: EGD (ESOPHAGOGASTRODUODENOSCOPY);  Surgeon: Michael Navarrete MD;   "Location: [facility] OR;  Service: General;  Laterality: N/A;    ESOPHAGOGASTRODUODENOSCOPY N/A 11/25/2020    Procedure: EGD (ESOPHAGOGASTRODUODENOSCOPY);  Surgeon: Andrew Jenkins III, MD;  Location: [facility] ENDO;  Service: Endoscopy;  Laterality: N/A;    HYSTERECTOMY      KNEE SURGERY      ROBOT-ASSISTED LAPAROSCOPIC SLEEVE GASTRECTOMY USING DA ANTHONY XI N/A 2/20/2020    Procedure: XI ROBOTIC SLEEVE GASTRECTOMY;  Surgeon: Michael Navarrete MD;  Location: [facility] OR;  Service: General;  Laterality: N/A;    TENOPLASTY OF HAND Left 8/26/2021    Procedure: REPAIR, TENDON, HAND;  Surgeon: Joselito Lugo MD;  Location: [facility] OR;  Service: Orthopedics;  Laterality: Left;  Left RCL PIP Joint Repair/Recon with Arthrex Internal Brace.    TONSILLECTOMY      TUBAL LIGATION     .         Medications: Medication list was reviewed. She  has a current medication list which includes the following prescription(s): amoxicillin-clavulanate 875-125mg, atorvastatin, cholecalciferol (vitamin d3), diphenhydramine, doxepin, duloxetine, esomeprazole, hydrocodone-acetaminophen, linaclotide, nabumetone, naloxone, neomycin-polymyxin-hydrocortisone, ocrevus, ondansetron, oxcarbazepine, solu-medrol (pf), topiramate, and valsartan.         Allergies:   Review of patient's allergies indicates:   Allergen Reactions    Demerol [meperidine] Anaphylaxis     Other reaction(s): Itching    Dilaudid [hydromorphone (bulk)] Anaphylaxis     "coded" per pt  Patient can tolerate Lortab    Shellfish containing products Itching, Nausea And Vomiting and Swelling    Prednisone Itching     Other reaction(s): Itching    Tramadol      shaking            Family history: family history includes Breast cancer in her maternal aunt and maternal cousin; COPD in her maternal aunt; Cancer (age of onset: 40) in her maternal aunt; Diabetes in her father and maternal aunt; Heart disease in her mother; Hypertension in her mother; Kidney disease in her father; Lupus in her mother; Ovarian " cancer in her maternal cousin.         Social History          Alcohol use:  reports current alcohol use.            Tobacco:  reports that she has never smoked. She has never been exposed to tobacco smoke. She has never used smokeless tobacco.         Please see the patient's intake form for full details of past medical history, past surgical history, family history, social history and review of systems. ?This information was reviewed by me and noted.      Physical Examination     General: Well developed, well nourished, well hydrated. Verbal with a strong voice and not dysphonic.     Head/Face: Normocephalic, atraumatic. No scars or lesions. Facial musculature equal.     Eyes: No scleral icterus or conjunctival hemorrhage. EOMI. PERRLA.     Ears:     Right ear: No gross deformity. EAC is clear of debris and erythema. The TM is intact with a pneumatized middle ear. No signs of retraction, fluid or infection.      Left ear: No gross deformity. EAC is clear of debris and erythema. The TM is intact with a pneumatized middle ear. No signs of retraction, fluid or infection.     Hearing: grossly intact    Nose: No gross deformity or lesions. No purulent discharge. No significant NSD.      Mouth/Oropharynx: Lips without any lesions. No mucosal lesions within the oropharynx. No tonsillar exudate or lesions. Pharyngeal walls symmetrical. Uvula midline. Tongue midline without lesions.     Neck: Trachea midline. No masses. No thyromegaly or nodules palpated.     Lymphatic: No lymphadenopathy in the neck.     Extremities: No cyanosis. Warm and well-perfused.     Skin: No scars or lesions on face or neck.      Neurologic: Moving all extremities without gross abnormality.CN II-XII grossly intact. House-Brackmann 1/6. No signs of nystagmus.      Psych: Alert and oriented to person, place, and time with an appropriate mood and affect.     Data review:    Review of records:      I reviewed records from the referring provider's  office visits.  These describe the history, workup, and/or treatment of this problem thus far.      Labs    Sodium 136 - 145 mmol/L 142  141  140  140  141  142  138    Potassium 3.5 - 5.1 mmol/L 4.6  3.6  4.0  4.3  3.4 Low   4.4  3.9    Chloride 95 - 110 mmol/L 108  110  107  109  109  110  105    CO2 23 - 29 mmol/L 26  21 Low   22 Low   21 Low   24  18 Low   26    Glucose 70 - 110 mg/dL 83  81  77  132 High   99  108  109    BUN 6 - 20 mg/dL 6  7  5 Low   7  6  7  8    Creatinine 0.5 - 1.4 mg/dL 0.9  0.8  0.7  0.7  0.8  0.7  0.8    Calcium 8.7 - 10.5 mg/dL 9.6  9.8  9.4  9.3  9.3  8.8  9.3    Total Protein 6.0 - 8.4 g/dL 7.2  8.0  7.4  6.7  6.8  6.7  6.9    Albumin 3.5 - 5.2 g/dL 3.8  3.9  3.8  3.5  3.7  3.5  3.8           Assessment/Plan:      1. Hearing loss of right ear, unspecified hearing loss type    2. Acute otalgia, right       Resolved OE  Recommended audiogram at her convenience      Bakari Youngblood MD  [facility]   [facility] - [address]  [place] [address]  P: [phone]  F: [phone]

## 2023-05-09 ENCOUNTER — PATIENT MESSAGE (OUTPATIENT)
Dept: INTERNAL MEDICINE | Facility: CLINIC | Age: 43
End: 2023-05-09
Payer: MEDICARE

## 2023-05-09 ENCOUNTER — TELEPHONE (OUTPATIENT)
Dept: BARIATRICS | Facility: CLINIC | Age: 43
End: 2023-05-09
Payer: MEDICARE

## 2023-05-09 NOTE — TELEPHONE ENCOUNTER
Patient has been having a discharge for 1 week since completing her abx.  You describes as being cloudy white with vaginal itching.  Please advise.

## 2023-05-09 NOTE — TELEPHONE ENCOUNTER
Notified patient of the date & time of financial phone call appt.  Pt aware appt is a telephone call.    Dashboard updated  Appt. 05.10.2023

## 2023-05-10 RX ORDER — FLUCONAZOLE 150 MG/1
TABLET ORAL
Qty: 2 TABLET | Refills: 0 | Status: SHIPPED | OUTPATIENT
Start: 2023-05-10 | End: 2023-06-16

## 2023-05-11 ENCOUNTER — TELEPHONE (OUTPATIENT)
Dept: INTERNAL MEDICINE | Facility: CLINIC | Age: 43
End: 2023-05-11
Payer: MEDICARE

## 2023-05-11 DIAGNOSIS — Z12.31 ENCOUNTER FOR SCREENING MAMMOGRAM FOR MALIGNANT NEOPLASM OF BREAST: Primary | ICD-10-CM

## 2023-05-11 NOTE — TELEPHONE ENCOUNTER
----- Message from Lindsey Nick sent at 5/11/2023  1:30 PM CDT -----  States she would like to schedule her mammogram on 5/22 at [facility]. She has another test on that day and she would like to do them on the same day. Please call pt [phone]. Thank you

## 2023-05-16 ENCOUNTER — CLINICAL SUPPORT (OUTPATIENT)
Dept: REHABILITATION | Facility: HOSPITAL | Age: 43
End: 2023-05-16
Payer: MEDICARE

## 2023-05-16 DIAGNOSIS — R68.89 DECREASED STRENGTH, ENDURANCE, AND MOBILITY: ICD-10-CM

## 2023-05-16 DIAGNOSIS — Z74.09 DECREASED STRENGTH, ENDURANCE, AND MOBILITY: ICD-10-CM

## 2023-05-16 DIAGNOSIS — R53.1 DECREASED STRENGTH, ENDURANCE, AND MOBILITY: ICD-10-CM

## 2023-05-16 DIAGNOSIS — G35 MULTIPLE SCLEROSIS EXACERBATION: Primary | ICD-10-CM

## 2023-05-16 PROCEDURE — 97530 THERAPEUTIC ACTIVITIES: CPT | Mod: PN

## 2023-05-16 PROCEDURE — 97112 NEUROMUSCULAR REEDUCATION: CPT | Mod: PN

## 2023-05-16 NOTE — PROGRESS NOTES
Physical Therapy Daily Treatment Note     Name: Alicia Soliz  Clinic Number: [id number]    Therapy Diagnosis:   Encounter Diagnoses   Name Primary?    Multiple sclerosis exacerbation Yes    Decreased strength, endurance, and mobility        Physician: Kole Carrasquillo MD    Visit Date: 5/16/2023    Physician Orders: PT Eval and Treat   Medical Diagnosis from Referral: G35 (ICD-10-CM) - Multiple sclerosis  Evaluation Date: 10/4/2022  Authorization Period Expiration: 12/31/23  Plan of Care Expiration:5/16/2023 to 6/30/2023   Progress Note Due: due 30 days from 5/19/2023  Visit # / Visits authorized: 18/20  FOTO: 3/4 (41% limitation)     Precautions: Standard, Fall, and MS (L shoulder had dislocated in the last event)     PTA Visit #: 0/5     Time In: 11:33   Time Out: 12:30  Total Billable Time: 57 minutes     SUBJECTIVE     Pt reports: pain to left hip region following last visit.     She was compliant with home exercise program.  Response to previous treatment: no issues  Functional change: na at this time    Pain: 6/10 Location: anterolateral left thigh     OBJECTIVE     CMS Impairment/Limitation/Restriction for FOTO Multiple Sclerosis Survey     Therapist reviewed FOTO scores for Alicia Soliz on 4/27/2023.   FOTO documents entered into [employer] - see Media section.     Limitation Score: 47%            Sit to stand from 18inch surface = mod I with RW   TUG = 30s with RW   Gait = amb with RW, decreased guarding through BUE, increase step length, increase fredy    Treatment     Alicia received the treatments listed below:        therapeutic exercises to develop strength, flexibility, posture, and core stabilization for 8 minutes  including:  Nustep for improving ROM, strength and endurance, 6 min     Deferred:  Seated Marches focus upright posture and core stabilization; 3# on legs 2x12  Qped cat x 8  Machines:  Hip add 70# x20  Hip Abd 55# x20  Leg extension 25# x20    neuromuscular re-education activities  to improve: Balance, Coordination, Kinesthetic Sense, and Proprioception for 25 minutes. The following activities were included: (PT added in more standing work with shoulders to work on scapular support but to challenge the hips/ankles and trunk for balance)  supine marches 2x10ea   Seated LAQ 6# 3 sec hold at top 3x10 B  standing cone taps within RW     Deferred:   SLR  3x 10 B  Hip adduction attempts in supine (unable after multiple attempts even with assistance from PT)  Side lying hip abduction L 3x 8 with AAROM; R 3x 8 posterior circles  Toe touches 1x 5 (posterior chain balance assessment)  Dead lifting 10 # single arm 3x 5 R then L; B 10 # 3x 5  Single leg stool taps R foot 1x 15; 1x 14 L foot (tone limited her pace)  Seated hip adduction 3x 10 L  L shoulder scapular depression 2x 10  L shoulder scapular retraction 2x 10  L shoulder flexion with cues on scapular depression 2x 10  Standing rows for hip and ankle control and scapular support 30 # per pulley 3x 10 B then single 1x 10 to challenge rotation more  Supine L LE march with LAQ 2x 10 ; then R (focus on L LE adduction while R LE working) 2x 10  Supine ball squeeze with posterior pelvic tilt with bridge 3x10    Alicia received gait training to improve functional mobility and safety for 0 minutes, including:  Deferred:  Ambulation with SC for up to  40 ft x1  with cues on cane placement with transfers, step length and leading leg, hand to use for SC and safety on turns, weight shift and heel placement to help engage natural rolling heel to toe    Alicia performed therapeutic activities for 25 min including:  Sit to stand with 10# ball 2x10   step over 3inch DB with RW   Step onto airex with RW x7    Deferred:  Goblet hold with sit to stand 15# 1x 5  Sit to stand R hand farmer's hold 15# 1x 10; L 1x 10  Dead lifting 15# per hand (30#) 2x 10  PT continued with gait with SC for endurance and core support to improve safe function with SC for 20 ft at a more  functional pace    manual therapy techniques: were applied to left hip for 5 minutes, including: Soft tissue Mobilization   STM to L anterolateral thigh for increase blood flow and decrease pain     Patient Education and Home Exercises     Home Exercises Provided and Patient Education Provided     Education provided:   -Gait mechanics, use of FWW, safety and not overdoing it with activity    Written Home Exercises Provided: Patient instructed to cont prior HEP. Exercises were reviewed and Alicia was able to demonstrate them prior to the end of the session.  Alicia demonstrated good  understanding of the education provided. See EMR under Patient Instructions for exercises provided during therapy sessions    ASSESSMENT   Pt presents to clinic with increased pain to left anterolateral hip region. Performed soft tissue massage to address subjective complaint; pt with increased tension to area. Focused on strengthening and functional tasks today. Pt able to tolerate exercises well; 1 episode of right knee pain during sit to stand from low surface. Recommended pt to take frequent breaks secondary to increase temperature outside and to reduce MS exacerbation. Pt with no increase of pain at the end of today's session.     Alicia Is progressing well towards her goals.   Pt prognosis is Good.     Pt will continue to benefit from skilled outpatient physical therapy to address the deficits listed in the problem list box on initial evaluation, provide pt/family education and to maximize pt's level of independence in the home and community environment.     Pt's spiritual, cultural and educational needs considered and pt agreeable to plan of care and goals.     Anticipated barriers to physical therapy: chronicity of condition and comorbidities    Goals:   Short Term Goals: In 3 weeks   1.I with HEP MET 12/6/20222  2.Pt to increase BLE strength by 1/2 grade to show improvements with sitting, standing, ambulating, bending, lifting  activities.  progressing  3. Patient to improve 5x Sit<>Stand to less than 12 seconds to reduced risk of fall. met  4. Patient to improve TUG with RW to less than 25 seconds to reduce risk of falls. Progressing     Long Term Goals: In 6 weeks (Progressing) updated to 5/19/23  1. Patient to demo increase in LE strength to 3+/5 with hip abduction on the L LE to show improvements with sitting, standing, ambulating, bending, lifting activities.  progressing  2. Patient to improve score on the FOTO to < or = to 48% to show improvement with QOL. met  3. Patient to improve 5x Sit<>Stand to less than 10 seconds to reduced risk of fall. progressing  4. Patient to improve TUG with RW to less than 20 seconds to reduce risk of falls. progressing     Plan      Update Certification Period: 5/16/2023 to 6/30/2023  Recommended Treatment Plan: 2 times per week for 6 weeks effective week of 5/16/23: Aquatic Therapy, Cervical/Lumbar Traction, Electrical Stimulation  , Gait Training, Manual Therapy, Moist Heat/ Ice, Neuromuscular Re-ed, Patient Education, Self Care, Therapeutic Exercise, and Therapeutic Activities    Savanna Bennett, PT    5/16/2023

## 2023-05-18 ENCOUNTER — DOCUMENTATION ONLY (OUTPATIENT)
Dept: REHABILITATION | Facility: HOSPITAL | Age: 43
End: 2023-05-18
Payer: MEDICARE

## 2023-05-19 ENCOUNTER — CLINICAL SUPPORT (OUTPATIENT)
Dept: REHABILITATION | Facility: HOSPITAL | Age: 43
End: 2023-05-19
Payer: MEDICARE

## 2023-05-19 DIAGNOSIS — R68.89 DECREASED STRENGTH, ENDURANCE, AND MOBILITY: ICD-10-CM

## 2023-05-19 DIAGNOSIS — Z74.09 DECREASED STRENGTH, ENDURANCE, AND MOBILITY: ICD-10-CM

## 2023-05-19 DIAGNOSIS — G35 MULTIPLE SCLEROSIS EXACERBATION: Primary | ICD-10-CM

## 2023-05-19 DIAGNOSIS — R53.1 DECREASED STRENGTH, ENDURANCE, AND MOBILITY: ICD-10-CM

## 2023-05-19 PROCEDURE — 97112 NEUROMUSCULAR REEDUCATION: CPT | Mod: PN

## 2023-05-19 NOTE — PROGRESS NOTES
Physical Therapy Daily Treatment Note     Name: Alicia Soliz  Clinic Number: [id number]    Therapy Diagnosis:   Encounter Diagnoses   Name Primary?    Multiple sclerosis exacerbation Yes    Decreased strength, endurance, and mobility        Physician: Kole Carrasquillo MD    Visit Date: 5/19/2023    Physician Orders: PT Eval and Treat   Medical Diagnosis from Referral: G35 (ICD-10-CM) - Multiple sclerosis  Evaluation Date: 10/4/2022  Authorization Period Expiration: 12/31/23  Plan of Care Expiration: 5/3/23 extended 6/30/23  Progress Note Due: due 30 days from 5/16/23  Visit # / Visits authorized: 21/20  FOTO: 3/4 (41% limitation)     Precautions: Standard, Fall, and MS (L shoulder had dislocated in the last event)     PTA Visit #: 0/5     Time In: 7:45 am (pt late)  Time Out: 8:23  Total Billable Time: 38 minutes     SUBJECTIVE     Pt reports:she is still sore in the L quad and hip. PT explained she is still dragging the L leg into external rotation so that is going to be irritating to the hip and we need to continue working on hip internal rotation and adduction to help this advance.    She was compliant with home exercise program.  Response to previous treatment: no issues  Functional change: na at this time    Pain: 4/10 Location: anterolateral left thigh     OBJECTIVE     CMS Impairment/Limitation/Restriction for FOTO Multiple Sclerosis Survey     Therapist reviewed FOTO scores for Alicia Soliz on 4/27/2023.   FOTO documents entered into [employer] - see Media section.     Limitation Score: 47%            Tug 30 sec  Sit to  30 sec 9 reps using Ue's to assist from low seat   L hip abduction 3+/5 but adduction and internal rotation/adduction 2/5 to 2+/5 at best    Objective Measures updated at progress report unless specified.     Treatment     Alicia received the treatments listed below:        therapeutic exercises to develop strength, flexibility, posture, and core stabilization for 0 minutes   including:    Deferred:  Seated Marches focus upright posture and core stabilization; 3# on legs 2x12  Qped cat x 8  Machines:  Hip add 70# x20  Hip Abd 55# x20  Leg extension 25# x20    neuromuscular re-education activities to improve: Balance, Coordination, Kinesthetic Sense, and Proprioception for 38 minutes. The following activities were included:     Shuttle 3 min B squat 6 bands 3 min with max cues on finishing the squat at the top and bottom for proper muscle recruitment  Single leg L 5 banded squat with focus on quad extension and heel strike to engage the hip more    R single leg press 4 banded squat on shuttle 2x 10 with cues on hip adduction/internal rotation and heel pressure to reduce pushing from her toes and engage the hip/knee more    Sit to stand from 18 inch box with ball for adduction squeeze and and cues on EROM TKE L as she neglects full extension unless cued and will  mild PF at the ankle 1x 5 then added goblet hold 10 # for 2x 10 with sit to stand      Deferred:  Seated LAQ 6# 3 sec hold at top 3x10 B  supine marches 2x10ea   standing cone taps within RW   SLR  3x 10 B  Hip adduction attempts in supine (unable after multiple attempts even with assistance from PT)  Side lying hip abduction L 3x 8 with AAROM; R 3x 8 posterior circles  Toe touches 1x 5 (posterior chain balance assessment)  Dead lifting 10 # single arm 3x 5 R then L; B 10 # 3x 5  Single leg stool taps R foot 1x 15; 1x 14 L foot (tone limited her pace)  Seated hip adduction 3x 10 L  L shoulder scapular depression 2x 10  L shoulder scapular retraction 2x 10  L shoulder flexion with cues on scapular depression 2x 10  Standing rows for hip and ankle control and scapular support 30 # per pulley 3x 10 B then single 1x 10 to challenge rotation more  Supine L LE march with LAQ 2x 10 ; then R (focus on L LE adduction while R LE working) 2x 10  Supine ball squeeze with posterior pelvic tilt with bridge 3x10    Alicia received gait  training to improve functional mobility and safety for 0 minutes, including:  Deferred:  Ambulation with SC for up to  40 ft x1  with cues on cane placement with transfers, step length and leading leg, hand to use for SC and safety on turns, weight shift and heel placement to help engage natural rolling heel to toe    Alicia performed therapeutic activities for 0 min including:  Sit to stand with 10# ball 2x10   step over 3inch DB with RW   Step onto airex with RW x7    Deferred:  Goblet hold with sit to stand 15# 1x 5  Sit to stand R hand farmer's hold 15# 1x 10; L 1x 10  Dead lifting 15# per hand (30#) 2x 10  PT continued with gait with SC for endurance and core support to improve safe function with SC for 20 ft at a more functional pace    manual therapy techniques: were applied to left hip for 5 minutes, including: Soft tissue Mobilization   STM to L anterolateral thigh for increase blood flow and decrease pain     Patient Education and Home Exercises     Home Exercises Provided and Patient Education Provided     Education provided:   -Gait mechanics, use of FWW, safety and not overdoing it with activity    Written Home Exercises Provided: Patient instructed to cont prior HEP. Exercises were reviewed and Alicia was able to demonstrate them prior to the end of the session.  Alicia demonstrated good  understanding of the education provided. See EMR under Patient Instructions for exercises provided during therapy sessions    ASSESSMENT   Pt presents to PT with her L hip in external rotation and reporting the hip is sore. PT reminded her that her posture is leading to this so PT encourage gait pattern changes to improve this as well as cued her hip and knee better with engage as otherwise she tends to stay in mild flexion at the knee in standing and PF at the ankle and when transferring.. Pt with no increase of pain at the end of today's session.     Alicia Is progressing well towards her goals.   Pt prognosis is Good.      Pt will continue to benefit from skilled outpatient physical therapy to address the deficits listed in the problem list box on initial evaluation, provide pt/family education and to maximize pt's level of independence in the home and community environment.     Pt's spiritual, cultural and educational needs considered and pt agreeable to plan of care and goals.     Anticipated barriers to physical therapy: chronicity of condition and comorbidities    Goals:   Short Term Goals: In 3 weeks   1.I with HEP MET 12/6/20222  2.Pt to increase BLE strength by 1/2 grade to show improvements with sitting, standing, ambulating, bending, lifting activities.  progressing  3. Patient to improve 5x Sit<>Stand to less than 12 seconds to reduced risk of fall. met  4. Patient to improve TUG with RW to less than 25 seconds to reduce risk of falls. Progressing     Long Term Goals: In 6 weeks (Progressing) updated to 6/30/23  1. Patient to demo increase in LE strength to 3+/5 with hip abduction on the L LE to show improvements with sitting, standing, ambulating, bending, lifting activities.  progressing  2. Patient to improve score on the FOTO to < or = to 48% to show improvement with QOL. met  3. Patient to improve 5x Sit<>Stand to less than 10 seconds to reduced risk of fall. progressing  4. Patient to improve TUG with RW to less than 20 seconds to reduce risk of falls. progressing     Plan      Update Certification Period: 3/23/23 extended to 6/30//23  Recommended Treatment Plan: 2 times per week for 6 weeks effective week of 5/18/23: Aquatic Therapy, Cervical/Lumbar Traction, Electrical Stimulation  , Gait Training, Manual Therapy, Moist Heat/ Ice, Neuromuscular Re-ed, Patient Education, Self Care, Therapeutic Exercise, and Therapeutic Activities    Tracie Way, PT    5/19/2023

## 2023-05-19 NOTE — PLAN OF CARE
[facility]  Physical Therapy Plan of Care Note    Name: Alicia Soliz  Clinic Number: [id number]    Therapy Diagnosis:   Encounter Diagnoses   Name Primary?    Multiple sclerosis exacerbation Yes    Decreased strength, endurance, and mobility      Physician: Kole Carrasquillo MD    Visit Date: 5/16/2023    Physician Orders: PT Eval and Treat   Medical Diagnosis from Referral: G35 (ICD-10-CM) - Multiple sclerosis  Evaluation Date: 10/4/2022  Authorization Period Expiration: 12/31/23  Plan of Care Expiration: 3/31/23 extended 6/30/23  Progress Note Due: due 30 days from 5/19/2023  Visit # / Visits authorized: 18/20  FOTO: 3/4 (41% limitation)     Precautions: Standard, Fall, and MS (L shoulder had dislocated in the last event)  Functional Level Prior to Evaluation:  independent    SUBJECTIVE     Update: Pt reports improvement in functional activities since starting therapy.     OBJECTIVE     Update:   CMS Impairment/Limitation/Restriction for FOTO Multiple Sclerosis Survey     Therapist reviewed FOTO scores for Alicia Soliz on 4/27/2023.   FOTO documents entered into [employer] - see Media section.     Limitation Score: 47%            Sit to stand from 18inch surface = mod I with RW   TUG = 30s with RW   Gait = amb with RW, decreased guarding through BUE, increase step length, increase fredy    ASSESSMENT     Update:   Alicia is progressing well with physical therapy, with minimal complaints of pain or discomfort and significant improvements noted in gait, strength, and functional activities since initial evaluation; please see exam for details. Alicia continues to have difficulty with ambulation and balance, due to decreased strength, impaired motor control, impaired balance and pain with functional mobility.  Pt with TUG score equal to or higher than 30 seconds indication higher risk of falls. The above impairments and functional deficits will continue to be addressed over the course  of this plan of care. Alicia was educated on continued progression of PT, expectations for the duration of care, updates on goals set at initial evaluation, and home exercise program.  Alicia will continue to benefit from physical therapy in order to further address goals, maximize function and quality of life.     Short Term Goals: In 3 weeks   1.I with HEP MET 12/6/20222  2.Pt to increase BLE strength by 1/2 grade to show improvements with sitting, standing, ambulating, bending, lifting activities.  progressing  3. Patient to improve 5x Sit<>Stand to less than 12 seconds to reduced risk of fall. met  4. Patient to improve TUG with RW to less than 25 seconds to reduce risk of falls. Progressing     Long Term Goals: In 6 weeks (Progressing) updated to 5/19/23  1. Patient to demo increase in LE strength to 3+/5 with hip abduction on the L LE to show improvements with sitting, standing, ambulating, bending, lifting activities.  progressing  2. Patient to improve score on the FOTO to < or = to 48% to show improvement with QOL. met  3. Patient to improve 5x Sit<>Stand to less than 10 seconds to reduced risk of fall. progressing  4. Patient to improve TUG with RW to less than 20 seconds to reduce risk of falls. progressing    Long Term Goal Status:   continue per initial plan of care.  Reasons for Recertification of Therapy:   Pt will continue to benefit from skilled outpatient physical therapy to address the deficits listed in the problem list box on initial evaluation, provide pt/family education and to maximize pt's level of independence in the home and community environment.     PLAN     Updated Certification Period: 5/16/2023 to 6/30/2023   Recommended Treatment Plan: 2 times per week for 6 weeks:  Gait Training, Manual Therapy, Moist Heat/ Ice, Neuromuscular Re-ed, Patient Education, Self Care, Therapeutic Activities, and Therapeutic Exercise    Savanna Bennett, PT    I CERTIFY THE NEED FOR THESE SERVICES FURNISHED  UNDER THIS PLAN OF TREATMENT AND WHILE UNDER MY CARE  Physician's comments:      Physician's Signature: ___________________________________________________

## 2023-05-20 ENCOUNTER — NURSE TRIAGE (OUTPATIENT)
Dept: ADMINISTRATIVE | Facility: CLINIC | Age: 43
End: 2023-05-20
Payer: MEDICARE

## 2023-05-20 NOTE — TELEPHONE ENCOUNTER
Pt states that she had a fall this am and left hip now looks like it is dislocated. States that she is dragging leg. C/o left hip pain 10-11/10. Advised to call 911 per protocol. Verbalized understanding. Encounter routed to provider.       Reason for Disposition   Looks like a dislocated joint (crooked or deformed)    Additional Information   Negative: Serious injury with multiple fractures (broken bones)   Negative: [1] Major bleeding (e.g., actively dripping or spurting) AND [2] can't be stopped   Negative: Bullet wound, stabbed by knife, or other serious penetrating wound    Protocols used: Hip Injury-A-AH

## 2023-05-22 ENCOUNTER — HOSPITAL ENCOUNTER (EMERGENCY)
Facility: HOSPITAL | Age: 43
Discharge: HOME | End: 2023-05-22
Attending: EMERGENCY MEDICINE
Payer: MEDICARE

## 2023-05-22 ENCOUNTER — CLINICAL SUPPORT (OUTPATIENT)
Dept: AUDIOLOGY | Facility: CLINIC | Age: 43
End: 2023-05-22
Payer: MEDICARE

## 2023-05-22 ENCOUNTER — TELEPHONE (OUTPATIENT)
Dept: OTOLARYNGOLOGY | Facility: CLINIC | Age: 43
End: 2023-05-22
Payer: MEDICARE

## 2023-05-22 VITALS
SYSTOLIC BLOOD PRESSURE: 138 MMHG | TEMPERATURE: 98 F | RESPIRATION RATE: 20 BRPM | DIASTOLIC BLOOD PRESSURE: 62 MMHG | OXYGEN SATURATION: 99 % | HEART RATE: 88 BPM

## 2023-05-22 DIAGNOSIS — H91.91 HEARING LOSS OF RIGHT EAR, UNSPECIFIED HEARING LOSS TYPE: Primary | ICD-10-CM

## 2023-05-22 DIAGNOSIS — H91.91 HEARING LOSS OF RIGHT EAR, UNSPECIFIED HEARING LOSS TYPE: ICD-10-CM

## 2023-05-22 DIAGNOSIS — M25.561 RIGHT KNEE PAIN: ICD-10-CM
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
## 2023-05-22 PROCEDURE — 96372 THER/PROPH/DIAG INJ SC/IM: CPT | Performed by: NURSE PRACTITIONER

## 2023-05-22 PROCEDURE — 99284 EMERGENCY DEPT VISIT MOD MDM: CPT

## 2023-05-22 PROCEDURE — 63600175 PHARM REV CODE 636 W HCPCS: Performed by: NURSE PRACTITIONER

## 2023-05-22 RX ORDER — ORPHENADRINE CITRATE 30 MG/ML
30 INJECTION INTRAMUSCULAR; INTRAVENOUS
Status: COMPLETED | OUTPATIENT
Start: 2023-05-22 | End: 2023-05-22

## 2023-05-22 RX ADMIN — ORPHENADRINE CITRATE 30 MG: 60 INJECTION INTRAMUSCULAR; INTRAVENOUS at 01:05

## 2023-05-23 ENCOUNTER — CLINICAL SUPPORT (OUTPATIENT)
Dept: REHABILITATION | Facility: HOSPITAL | Age: 43
End: 2023-05-23
Payer: MEDICARE

## 2023-05-23 DIAGNOSIS — G35 MULTIPLE SCLEROSIS EXACERBATION: Primary | ICD-10-CM

## 2023-05-23 DIAGNOSIS — R53.1 DECREASED STRENGTH, ENDURANCE, AND MOBILITY: ICD-10-CM

## 2023-05-23 DIAGNOSIS — Z74.09 DECREASED STRENGTH, ENDURANCE, AND MOBILITY: ICD-10-CM

## 2023-05-23 DIAGNOSIS — R68.89 DECREASED STRENGTH, ENDURANCE, AND MOBILITY: ICD-10-CM

## 2023-05-23 PROCEDURE — 97112 NEUROMUSCULAR REEDUCATION: CPT | Mod: PN

## 2023-05-23 NOTE — ED PROVIDER NOTES
"     HISTORY     Chief Complaint   Patient presents with    Knee Pain     Right knee pain, thinks knee dislocated while in the elevator.     Review of patient's allergies indicates:   Allergen Reactions    Demerol [meperidine] Anaphylaxis     Other reaction(s): Itching    Dilaudid [hydromorphone (bulk)] Anaphylaxis     "coded" per pt  Patient can tolerate Lortab    Shellfish containing products Itching, Nausea And Vomiting and Swelling    Prednisone Itching     Other reaction(s): Itching    Tramadol      shaking        HPI   The history is provided by the patient.   Leg Pain   Incident location: reports injured knee in elevator. The incident occurred just prior to arrival. The pain is present in the right knee. The quality of the pain is described as aching and throbbing. The pain is at a severity of 4/10. The pain has been Constant since onset. Associated symptoms include loss of motion (secondary to pain). Pertinent negatives include no numbness, no inability to bear weight, no muscle weakness, no loss of sensation and no tingling. She reports no foreign bodies present. The symptoms are aggravated by activity, bearing weight and palpation. She has tried nothing for the symptoms.      PCP: Braden Dumont MD     Past Medical History:  Past Medical History:   Diagnosis Date    Anemia     Arthritis     Cardiac arrest as complication of care     pt states she went into cardiac arrest from an allergic reaction to a medication    Depression     Encounter for blood transfusion     Hemiplegia due to old stroke     Hypertension     Morbid obesity with BMI of 45.0-49.9, adult 9/20/2018    Multiple sclerosis         Past Surgical History:  Past Surgical History:   Procedure Laterality Date    APPENDECTOMY      CHOLECYSTECTOMY      COLONOSCOPY N/A 11/25/2020    Procedure: COLONOSCOPY;  Surgeon: Andrew Jenkins III, MD;  Location: [facility];  Service: Endoscopy;  Laterality: N/A;    ESOPHAGOGASTRODUODENOSCOPY N/A " 2/19/2020    Procedure: EGD (ESOPHAGOGASTRODUODENOSCOPY);  Surgeon: Michael Navarrete MD;  Location: [facility] ENDO;  Service: Endoscopy;  Laterality: N/A;    ESOPHAGOGASTRODUODENOSCOPY N/A 2/20/2020    Procedure: EGD (ESOPHAGOGASTRODUODENOSCOPY);  Surgeon: Michael Navarrete MD;  Location: [facility] OR;  Service: General;  Laterality: N/A;    ESOPHAGOGASTRODUODENOSCOPY N/A 11/25/2020    Procedure: EGD (ESOPHAGOGASTRODUODENOSCOPY);  Surgeon: Andrew Jenkins III, MD;  Location: [facility] ENDO;  Service: Endoscopy;  Laterality: N/A;    HYSTERECTOMY      KNEE SURGERY      ROBOT-ASSISTED LAPAROSCOPIC SLEEVE GASTRECTOMY USING DA ANTHONY XI N/A 2/20/2020    Procedure: XI ROBOTIC SLEEVE GASTRECTOMY;  Surgeon: Michael Navarrete MD;  Location: [facility] OR;  Service: General;  Laterality: N/A;    TENOPLASTY OF HAND Left 8/26/2021    Procedure: REPAIR, TENDON, HAND;  Surgeon: Joselito Lugo MD;  Location: [facility] OR;  Service: Orthopedics;  Laterality: Left;  Left RCL PIP Joint Repair/Recon with Arthrex Internal Brace.    TONSILLECTOMY      TUBAL LIGATION          Family History:  Family History   Problem Relation Age of Onset    Lupus Mother     Heart disease Mother     Hypertension Mother     Diabetes Father     Kidney disease Father     Cancer Maternal Aunt 40        breast    Breast cancer Maternal Aunt     Diabetes Maternal Aunt     COPD Maternal Aunt     Breast cancer Maternal Cousin     Ovarian cancer Maternal Cousin         Social History:  Social History     Tobacco Use    Smoking status: Never     Passive exposure: Never    Smokeless tobacco: Never   Substance and Sexual Activity    Alcohol use: Yes     Comment: once a year     Drug use: Never    Sexual activity: Yes     Partners: Male     Birth control/protection: Surgical         ROS   Review of Systems   Constitutional:  Negative for fever.   HENT:  Negative for sore throat.    Respiratory:  Negative for shortness of breath.    Cardiovascular:  Negative for chest pain.   Gastrointestinal:   Negative for nausea.   Genitourinary:  Negative for dysuria.   Musculoskeletal:  Positive for arthralgias. Negative for back pain and joint swelling.   Skin:  Negative for rash.   Neurological:  Negative for tingling, weakness and numbness.   Hematological:  Does not bruise/bleed easily.     PHYSICAL EXAM     Initial Vitals [05/22/23 1209]   BP Pulse Resp Temp SpO2   138/62 88 20 98.4 °F (36.9 °C) 99 %      MAP       --           Physical Exam    Nursing note and vitals reviewed.  Constitutional: She is not diaphoretic. She is Obese . No distress.   HENT:   Head: Normocephalic and atraumatic.   Eyes: Right eye exhibits no discharge. Left eye exhibits no discharge.   Neck: Neck supple.   Normal range of motion.  Cardiovascular:  Normal rate.           Pulmonary/Chest: No respiratory distress.   Abdominal: Abdomen is soft. She exhibits no distension.   Musculoskeletal:         General: Normal range of motion.      Cervical back: Normal range of motion and neck supple.      Comments: Right knee without obvious deformity. Distal pulses 2+.      Neurological: She is alert and oriented to person, place, and time. She has normal strength.   Skin: Skin is warm and dry.   Psychiatric: She has a normal mood and affect. Her behavior is normal. Thought content normal.        ED COURSE   Procedures  ED ONGOING VITALS:  Vitals:    05/22/23 1209   BP: 138/62   Pulse: 88   Resp: 20   Temp: 98.4 °F (36.9 °C)   TempSrc: Oral   SpO2: 99%         ABNORMAL LAB VALUES:  Labs Reviewed - No data to display      ALL LAB VALUES:  none      RADIOLOGY STUDIES:  Imaging Results              X-Ray Knee Complete 4 Or More Views Right (Final result)  Result time 05/22/23 12:55:43      Final result by Micah Crowe MD (05/22/23 12:55:43)                   Impression:      No acute abnormality.  Considerable tricompartmental degenerative changes, mildly progressed.  Small effusion.  Consider further evaluation as warranted.      Electronically  signed by: Micah Crowe  Date:    05/22/2023  Time:    12:55               Narrative:    EXAMINATION:  XR KNEE COMP 4 OR MORE VIEWS RIGHT    CLINICAL HISTORY:  Pain in right knee    TECHNIQUE:  Four views of the right knee were performed.    COMPARISON:  Bilateral knee radiographs 06/03/2021, right knee radiographs 05/03/2021    FINDINGS:  Moderate/severe tibiofemoral joint space narrowing, greater at the medial aspect.  Marginal osteophytosis.  Moderate/severe patellofemoral degenerative changes.  Small effusion.  No visualized fracture.  Anatomical joint alignment.  Incidentally visualized structures appear intact.                                                The above vital signs and test results have been reviewed by the emergency provider.     ED Medications:  Discharge Medication List as of 5/22/2023  1:03 PM        Discharge Medications:  Discharge Medication List as of 5/22/2023  1:03 PM          Follow-up Information       [facility]    Why: As needed  Contact information:  [address] [place] [address] Dr Kim [address] [place] [id number]  [phone]               [facility] Emergency Dept..    Specialty: Emergency Medicine  Why: If symptoms worsen  Contact information:  [id number] [place] [id number] [phone]                          I discussed with patient and/or family/caretaker that evaluation in the ED does not suggest any emergent or life threatening medical conditions requiring immediate intervention beyond what was provided in the ED, and I believe patient is safe for discharge. Regardless, an unremarkable evaluation in the ED does not preclude the development or presence of a serious or life threatening condition. As such, patient was instructed to return immediately for any worsening or change in current symptoms.        MEDICAL DECISION MAKING   Medical Decision Making:   Initial Assessment:   Right knee pain after fall in elevator  Differential  Diagnosis:   Knee fracture,  knee dislocation  Clinical Tests:   Radiological Study: Ordered and Reviewed  ED Management:  Ace wrap applied  by nursing  staff. Pt to follow up with ortho as needed and return  for any worsening or concerns               CLINICAL IMPRESSION       ICD-10-CM ICD-9-CM   1. Fall, initial encounter  W19.XXXA E888.9   2. Right knee pain  M25.561 719.46       Disposition:   Disposition: Discharged  Condition: Stable       Agus Mcpherson NP  05/22/23 2016

## 2023-05-23 NOTE — PROGRESS NOTES
Physical Therapy Daily Treatment Note     Name: Alicia Soliz  Clinic Number: [id number]    Therapy Diagnosis:   Encounter Diagnoses   Name Primary?    Multiple sclerosis exacerbation Yes    Decreased strength, endurance, and mobility        Physician: Kole Carrasquillo MD    Visit Date: 5/23/2023    Physician Orders: PT Eval and Treat   Medical Diagnosis from Referral: G35 (ICD-10-CM) - Multiple sclerosis  Evaluation Date: 10/4/2022  Authorization Period Expiration: 12/31/23  Plan of Care Expiration: 5/3/23 extended 6/30/23  Progress Note Due: due 30 days from 5/16/23  Visit # / Visits authorized: 22/40  FOTO: 5 (38% limitation)     Precautions: Standard, Fall, and MS (L shoulder had dislocated in the last event)     PTA Visit #: 0/5     Time In: 1:00  pm   Time Out: 1:45 pm    Total Billable Time: 45 minutes     SUBJECTIVE     Pt reports: she got stuck in the elevator this week when going to the Dr so she had an ER visit. Pt denies any falls and states their is an error in her chart on 5/20/23 stating she had one.    She was compliant with home exercise program.  Response to previous treatment: no issues  Functional change: na at this time    Pain: 5/10 Location: anterolateral R knee, L 0/10     OBJECTIVE     CMS Impairment/Limitation/Restriction for FOTO Multiple Sclerosis Survey     Therapist reviewed FOTO scores for Alicia Soliz on 5/23/2023.   FOTO documents entered into [employer] - see Media section.     Limitation Score: 38%              Objective Measures updated at progress report unless specified.     Treatment     Alicia received the treatments listed below:        therapeutic exercises to develop strength, flexibility, posture, and core stabilization for 0 minutes  including:    Deferred:  Seated Marches focus upright posture and core stabilization; 3# on legs 2x12  Qped cat x 8  Machines:  Hip add 70# x20  Hip Abd 55# x20  Leg extension 25# x20    neuromuscular re-education activities to improve:  Balance, Coordination, Kinesthetic Sense, and Proprioception for 45 minutes. The following activities were included:     Sit to stand 18 inch box 3x 10  Seated LAQ 5# L per leg   Standing heel raises 2x 10 with UE support then 2x 8 without UE support  Ball toss to trampoline with yellow physioball 3x 10 (pt had to retrieve it by bending forward/side bending and squatting with CGA to SBA for safety    Deferred:  Shuttle 3 min B squat 6 bands 3 min with max cues on finishing the squat at the top and bottom for proper muscle recruitment  Single leg L 5 banded squat with focus on quad extension and heel strike to engage the hip more  R single leg press 4 banded squat on shuttle 2x 10 with cues on hip adduction/internal rotation and heel pressure to reduce pushing from her toes and engage the hip/knee more  Seated LAQ 6# 3 sec hold at top 3x10 B  supine marches 2x10ea Dead lifting 10 # single arm 3x 5 R then L; B 10 # 3x 5  Single leg stool taps R foot 1x 15; 1x 14 L foot (tone limited her pace)  Seated hip adduction 3x 10 L      Alicia received gait training to improve functional mobility and safety for 0 minutes, including:  Deferred:  Ambulation with SC for up to  40 ft x1  with cues on cane placement with transfers, step length and leading leg, hand to use for SC and safety on turns, weight shift and heel placement to help engage natural rolling heel to toe    Alicia performed therapeutic activities for 0 min including:    Deferred:  Sit to stand with 10# ball 2x10   step over 3inch DB with RW   Step onto airex with RW x7  Goblet hold with sit to stand 15# 1x 5  Sit to stand R hand farmer's hold 15# 1x 10; L 1x 10  Dead lifting 15# per hand (30#) 2x 10  PT continued with gait with SC for endurance and core support to improve safe function with SC for 20 ft at a more functional pace    manual therapy techniques: were applied to left hip for 5 minutes, including: Soft tissue Mobilization   STM to L anterolateral thigh  for increase blood flow and decrease pain     Patient Education and Home Exercises     Home Exercises Provided and Patient Education Provided     Education provided:     Written Home Exercises Provided: Patient instructed to cont prior HEP. Exercises were reviewed and Alicia was able to demonstrate them prior to the end of the session.  Alicia demonstrated good  understanding of the education provided. See EMR under Patient Instructions for exercises provided during therapy sessions    ASSESSMENT   Pt presents to PT with focus on balance, more weight shift to the L LE to reduce loading the R more and tasks to advance standing balance postures. Pt had difficulty with UE motor control so we added in B UE tasks to challenge coordination. Pt as able to sit to stand from 18 inch seat without UE support. Pt with no increase of pain at the end of today's session.     Alicia Is progressing well towards her goals.   Pt prognosis is Good.     Pt will continue to benefit from skilled outpatient physical therapy to address the deficits listed in the problem list box on initial evaluation, provide pt/family education and to maximize pt's level of independence in the home and community environment.     Pt's spiritual, cultural and educational needs considered and pt agreeable to plan of care and goals.     Anticipated barriers to physical therapy: chronicity of condition and comorbidities    Goals:   Short Term Goals: In 3 weeks   1.I with HEP MET 12/6/20222  2.Pt to increase BLE strength by 1/2 grade to show improvements with sitting, standing, ambulating, bending, lifting activities.  progressing  3. Patient to improve 5x Sit<>Stand to less than 12 seconds to reduced risk of fall. met  4. Patient to improve TUG with RW to less than 25 seconds to reduce risk of falls. Progressing     Long Term Goals: In 6 weeks (Progressing) updated to 6/30/23  1. Patient to demo increase in LE strength to 3+/5 with hip abduction on the L LE to  show improvements with sitting, standing, ambulating, bending, lifting activities.  progressing  2. Patient to improve score on the FOTO to < or = to 48% to show improvement with QOL. met  3. Patient to improve 5x Sit<>Stand to less than 10 seconds to reduced risk of fall. progressing  4. Patient to improve TUG with RW to less than 20 seconds to reduce risk of falls. progressing     Plan      Update Certification Period: 3/23/23 extended to 6/30//23  Recommended Treatment Plan: 2 times per week for 6 weeks effective week of 5/18/23: Aquatic Therapy, Cervical/Lumbar Traction, Electrical Stimulation  , Gait Training, Manual Therapy, Moist Heat/ Ice, Neuromuscular Re-ed, Patient Education, Self Care, Therapeutic Exercise, and Therapeutic Activities    Tracie Way, PT    5/23/2023

## 2023-05-24 ENCOUNTER — CLINICAL SUPPORT (OUTPATIENT)
Dept: AUDIOLOGY | Facility: CLINIC | Age: 43
End: 2023-05-24
Payer: MEDICARE

## 2023-05-24 DIAGNOSIS — H90.3 SENSORINEURAL HEARING LOSS, BILATERAL: Primary | ICD-10-CM

## 2023-05-24 PROCEDURE — 92557 PR COMPREHENSIVE HEARING TEST: ICD-10-PCS | Mod: S$GLB,,, | Performed by: AUDIOLOGIST-HEARING AID FITTER

## 2023-05-24 PROCEDURE — 92567 TYMPANOMETRY: CPT | Mod: S$GLB,,, | Performed by: AUDIOLOGIST-HEARING AID FITTER

## 2023-05-24 PROCEDURE — 92567 PR TYMPA2METRY: ICD-10-PCS | Mod: S$GLB,,, | Performed by: AUDIOLOGIST-HEARING AID FITTER

## 2023-05-24 PROCEDURE — 92557 COMPREHENSIVE HEARING TEST: CPT | Mod: S$GLB,,, | Performed by: AUDIOLOGIST-HEARING AID FITTER

## 2023-05-24 NOTE — PROGRESS NOTES
Referring provider: Dr. Rylie Soliz was seen 05/24/2023 for an audiological evaluation.  Patient complains of right ear pain and muffled hearing. Onset of this chief complaint was a few weeks ago.  Additional symptoms that also have been associated are muffled hearing, crusting around the ear in the mornings. The patient denies vertigo, tinnitus, facial weakness. The patient has family history of hearing loss, including several aunts and her grandmother. Patient has MS and reports previous stroke history.       Results reveal normal hearing with a mild mid-frequency sensorineural hearing loss for the right ear and for the left ear. Speech Reception Thresholds were 25 dBHL for the right ear and 25 dBHL for the left ear.   Word recognition scores were excellent for the right ear and excellent for the left ear.   Tympanograms were Type A for the right ear and Type As for the left ear.    Patient was counseled on the above findings.    Recommendations:  ENT review  Annual audiogram to monitor hearing loss

## 2023-05-25 ENCOUNTER — CLINICAL SUPPORT (OUTPATIENT)
Dept: REHABILITATION | Facility: HOSPITAL | Age: 43
End: 2023-05-25
Payer: MEDICARE

## 2023-05-25 DIAGNOSIS — R53.1 DECREASED STRENGTH, ENDURANCE, AND MOBILITY: ICD-10-CM

## 2023-05-25 DIAGNOSIS — Z74.09 DECREASED STRENGTH, ENDURANCE, AND MOBILITY: ICD-10-CM

## 2023-05-25 DIAGNOSIS — R68.89 DECREASED STRENGTH, ENDURANCE, AND MOBILITY: ICD-10-CM

## 2023-05-25 DIAGNOSIS — G35 MULTIPLE SCLEROSIS EXACERBATION: Primary | ICD-10-CM

## 2023-05-25 PROCEDURE — 97112 NEUROMUSCULAR REEDUCATION: CPT | Mod: PN

## 2023-05-25 PROCEDURE — 97110 THERAPEUTIC EXERCISES: CPT | Mod: PN

## 2023-05-25 NOTE — PROGRESS NOTES
Physical Therapy Daily Treatment Note     Name: Alicia Soliz  Clinic Number: [id number]    Therapy Diagnosis:   Encounter Diagnoses   Name Primary?    Multiple sclerosis exacerbation Yes    Decreased strength, endurance, and mobility        Physician: Kole Carrasquillo MD    Visit Date: 5/25/2023    Physician Orders: PT Eval and Treat   Medical Diagnosis from Referral: G35 (ICD-10-CM) - Multiple sclerosis  Evaluation Date: 10/4/2022  Authorization Period Expiration: 12/31/23  Plan of Care Expiration: 5/3/23 extended 6/30/23  Progress Note Due: due 30 days from 5/16/23  Visit # / Visits authorized: 23/40  FOTO: 5 (38% limitation)     Precautions: Standard, Fall, and MS (L shoulder had dislocated in the last event)     PTA Visit #: 0/5     Time In: 1:10  pm   Time Out: 1:50 pm    Total Billable Time: 40 minutes     SUBJECTIVE     Pt reports: she is overheated today waiting on her ride and is feeling poor.  She was compliant with home exercise program.  Response to previous treatment: no issues  Functional change: na at this time    Pain: 3/10 Location: anterolateral R knee, L 0/10     OBJECTIVE     CMS Impairment/Limitation/Restriction for FOTO Multiple Sclerosis Survey     Therapist reviewed FOTO scores for Alicia Soliz on 5/23/2023.   FOTO documents entered into [employer] - see Media section.     Limitation Score: 38%              Objective Measures updated at progress report unless specified.     Treatment   PT initiated pt session with cool ice water to drink and a cold pack on the neck as she was overhead resulting in severe fatigue and weakness from her MS.    Alicia received the treatments listed below:        therapeutic exercises to develop strength, flexibility, posture, and core stabilization for 15 minutes  including:    Sit to stand 18 inch box 3x 10 (by third set she was much more alert)  Single arm dead lift 15# 2x 10 per arm      Deferred:  Seated Marches focus upright posture and core  stabilization; 3# on legs 2x12  Qped cat x 8  Machines:  Hip add 70# x20  Hip Abd 55# x20  Leg extension 25# x20    neuromuscular re-education activities to improve: Balance, Coordination, Kinesthetic Sense, and Proprioception for 25 minutes. The following activities were included:     Single leg hip flexion with one hand for support (max cues on weight shift, glute activation and upright posture to engage the posterior chain better) 3x 10 per leg (RW for support but only one hand)   Standing heel raises 2x 10 with UE support then 2x 8 without UE support  Ball toss with pink weighted ball 2x15 (pt had to retrieve it by bending forward/side bending and squatting with CGA to SBA for safety)    Deferred:  Seated LAQ 5# L per leg   Shuttle 3 min B squat 6 bands 3 min with max cues on finishing the squat at the top and bottom for proper muscle recruitment  Single leg L 5 banded squat with focus on quad extension and heel strike to engage the hip more  R single leg press 4 banded squat on shuttle 2x 10 with cues on hip adduction/internal rotation and heel pressure to reduce pushing from her toes and engage the hip/knee more  Seated LAQ 6# 3 sec hold at top 3x10 B  supine marches 2x10ea Dead lifting 10 # single arm 3x 5 R then L; B 10 # 3x 5  Single leg stool taps R foot 1x 15; 1x 14 L foot (tone limited her pace)  Seated hip adduction 3x 10 L      Alicia received gait training to improve functional mobility and safety for 0 minutes, including:  Deferred:  Ambulation with SC for up to  40 ft x1  with cues on cane placement with transfers, step length and leading leg, hand to use for SC and safety on turns, weight shift and heel placement to help engage natural rolling heel to toe    Alicia performed therapeutic activities for 0 min including:    Deferred:  Sit to stand with 10# ball 2x10   step over 3inch DB with RW   Step onto airex with RW x7  Goblet hold with sit to stand 15# 1x 5  Sit to stand R hand farmer's hold 15# 1x  10; L 1x 10  Dead lifting 15# per hand (30#) 2x 10  PT continued with gait with SC for endurance and core support to improve safe function with SC for 20 ft at a more functional pace    manual therapy techniques: were applied to left hip for 0 minutes, including: Soft tissue Mobilization   Deferred:  STM to L anterolateral thigh for increase blood flow and decrease pain     PT used modalities including ice to neck and low back of pt to reduce overheating from outside conditions as she is very heat sensitive with her MS  (ice on full session) (PT moved the ice from one place to another every 10 min to help cooler her off)    Patient Education and Home Exercises     Home Exercises Provided and Patient Education Provided     Education provided:     Written Home Exercises Provided: Patient instructed to cont prior HEP. Exercises were reviewed and Alicia was able to demonstrate them prior to the end of the session.  Alicia demonstrated good  understanding of the education provided. See EMR under Patient Instructions for exercises provided during therapy sessions    ASSESSMENT   Pt presents to PT with focus on balance, more weight shift  R to L with one handed support to challenge hip and core control more. PT continued with working on perturbations tasks to challenge balance with a weight ball for throwing today. Pt had to retrieve it if she missed so she had to work the posterior chain to recover it. Pt balance is progressing but she is not as comfortable with one handed support on the L so if she progresses to a cane later, it will be in the R hand. Pt lethargy improved as she cooled off. PT encouraged her to monitor this heat and keep cool this summer to prevent regressing from MS complications.  Pt with no increase of pain at the end of today's session.     Alicia Is progressing well towards her goals.   Pt prognosis is Good.     Pt will continue to benefit from skilled outpatient physical therapy to address the  deficits listed in the problem list box on initial evaluation, provide pt/family education and to maximize pt's level of independence in the home and community environment.     Pt's spiritual, cultural and educational needs considered and pt agreeable to plan of care and goals.     Anticipated barriers to physical therapy: chronicity of condition and comorbidities    Goals:   Short Term Goals: In 3 weeks   1.I with HEP MET 12/6/20222  2.Pt to increase BLE strength by 1/2 grade to show improvements with sitting, standing, ambulating, bending, lifting activities.  progressing  3. Patient to improve 5x Sit<>Stand to less than 12 seconds to reduced risk of fall. met  4. Patient to improve TUG with RW to less than 25 seconds to reduce risk of falls. Progressing     Long Term Goals: In 6 weeks (Progressing) updated to 6/30/23  1. Patient to demo increase in LE strength to 3+/5 with hip abduction on the L LE to show improvements with sitting, standing, ambulating, bending, lifting activities.  progressing  2. Patient to improve score on the FOTO to < or = to 48% to show improvement with QOL. met  3. Patient to improve 5x Sit<>Stand to less than 10 seconds to reduced risk of fall. progressing  4. Patient to improve TUG with RW to less than 20 seconds to reduce risk of falls. progressing     Plan      Update Certification Period: 3/23/23 extended to 6/30//23  Recommended Treatment Plan: 2 times per week for 6 weeks effective week of 5/18/23: Aquatic Therapy, Cervical/Lumbar Traction, Electrical Stimulation  , Gait Training, Manual Therapy, Moist Heat/ Ice, Neuromuscular Re-ed, Patient Education, Self Care, Therapeutic Exercise, and Therapeutic Activities    Tracie Way, PT    5/25/2023

## 2023-06-06 ENCOUNTER — PATIENT MESSAGE (OUTPATIENT)
Dept: OTOLARYNGOLOGY | Facility: CLINIC | Age: 43
End: 2023-06-06
Payer: MEDICARE

## 2023-06-06 ENCOUNTER — TELEPHONE (OUTPATIENT)
Dept: REHABILITATION | Facility: HOSPITAL | Age: 43
End: 2023-06-06

## 2023-06-06 ENCOUNTER — PATIENT MESSAGE (OUTPATIENT)
Dept: INTERNAL MEDICINE | Facility: CLINIC | Age: 43
End: 2023-06-06
Payer: MEDICARE

## 2023-06-06 NOTE — TELEPHONE ENCOUNTER
Please see patient's mychart message and advise. Sent pt response that  is out of the office and if medical emergency to seek care.

## 2023-06-14 RX ORDER — AMOXICILLIN AND CLAVULANATE POTASSIUM 875; 125 MG/1; MG/1
1 TABLET, FILM COATED ORAL EVERY 12 HOURS
Qty: 20 TABLET | Refills: 0 | Status: CANCELLED | OUTPATIENT
Start: 2023-06-14

## 2023-06-14 RX ORDER — DOXEPIN HYDROCHLORIDE 25 MG/1
25 CAPSULE ORAL NIGHTLY PRN
Qty: 30 CAPSULE | Refills: 5 | Status: CANCELLED | OUTPATIENT
Start: 2023-06-14 | End: 2024-06-13

## 2023-06-14 NOTE — TELEPHONE ENCOUNTER
No care due was identified.  [facility] Embedded Care Due Messages. Reference number: [id number].   6/14/2023 11:52:47 AM CDT

## 2023-06-15 NOTE — TELEPHONE ENCOUNTER
----- Message from Rhiannon Marcum sent at 10/31/2019  2:38 PM CDT -----  Contact: [facility]   [facility] is calling .Type:  Pharmacy Calling to Clarify an RX    Name of Caller: [facility]   Pharmacy Name: ..  [facility] - [address] [address]  Phone: [phone] Fax: [phone]  Prescription Name: interferon beta-1a (AVONEX) 30 mcg/0.5 mL syringe  What do they need to clarify?: interferon beta-1a (AVONEX) 30 mcg/0.5 mL syringe is Pt still taking this script   Best Call Back Number [phone]           .Thank You  Rhiannon Marcum       
Message sent to [facility] maximilian D/c ganesh 10/31/2019.  
No.

## 2023-06-16 ENCOUNTER — OFFICE VISIT (OUTPATIENT)
Dept: NEUROLOGY | Facility: CLINIC | Age: 43
End: 2023-06-16
Payer: MEDICARE

## 2023-06-16 VITALS
HEART RATE: 72 BPM | HEIGHT: 66 IN | WEIGHT: 284.31 LBS | SYSTOLIC BLOOD PRESSURE: 128 MMHG | DIASTOLIC BLOOD PRESSURE: 84 MMHG | BODY MASS INDEX: 45.69 KG/M2

## 2023-06-16 DIAGNOSIS — E55.9 VITAMIN D DEFICIENCY: ICD-10-CM

## 2023-06-16 DIAGNOSIS — G35 MULTIPLE SCLEROSIS: Primary | ICD-10-CM

## 2023-06-16 PROCEDURE — 3079F DIAST BP 80-89 MM HG: CPT | Mod: CPTII,S$GLB,, | Performed by: STUDENT IN AN ORGANIZED HEALTH CARE EDUCATION/TRAINING PROGRAM

## 2023-06-16 PROCEDURE — 3074F PR MOST RECENT SYSTOLIC BLOOD PRESSURE < 130 MM HG: ICD-10-PCS | Mod: CPTII,S$GLB,, | Performed by: STUDENT IN AN ORGANIZED HEALTH CARE EDUCATION/TRAINING PROGRAM

## 2023-06-16 PROCEDURE — 99999 PR PBB SHADOW E&M-EST. PATIENT-LVL V: ICD-10-PCS | Mod: PBBFAC,,, | Performed by: STUDENT IN AN ORGANIZED HEALTH CARE EDUCATION/TRAINING PROGRAM

## 2023-06-16 PROCEDURE — 99999 PR PBB SHADOW E&M-EST. PATIENT-LVL V: CPT | Mod: PBBFAC,,, | Performed by: STUDENT IN AN ORGANIZED HEALTH CARE EDUCATION/TRAINING PROGRAM

## 2023-06-16 PROCEDURE — 4010F ACE/ARB THERAPY RXD/TAKEN: CPT | Mod: CPTII,S$GLB,, | Performed by: STUDENT IN AN ORGANIZED HEALTH CARE EDUCATION/TRAINING PROGRAM

## 2023-06-16 PROCEDURE — 1159F PR MEDICATION LIST DOCUMENTED IN MEDICAL RECORD: ICD-10-PCS | Mod: CPTII,S$GLB,, | Performed by: STUDENT IN AN ORGANIZED HEALTH CARE EDUCATION/TRAINING PROGRAM

## 2023-06-16 PROCEDURE — 99215 PR OFFICE/OUTPT VISIT, EST, LEVL V, 40-54 MIN: ICD-10-PCS | Mod: S$GLB,,, | Performed by: STUDENT IN AN ORGANIZED HEALTH CARE EDUCATION/TRAINING PROGRAM

## 2023-06-16 PROCEDURE — 1160F PR REVIEW ALL MEDS BY PRESCRIBER/CLIN PHARMACIST DOCUMENTED: ICD-10-PCS | Mod: CPTII,S$GLB,, | Performed by: STUDENT IN AN ORGANIZED HEALTH CARE EDUCATION/TRAINING PROGRAM

## 2023-06-16 PROCEDURE — 4010F PR ACE/ARB THEARPY RXD/TAKEN: ICD-10-PCS | Mod: CPTII,S$GLB,, | Performed by: STUDENT IN AN ORGANIZED HEALTH CARE EDUCATION/TRAINING PROGRAM

## 2023-06-16 PROCEDURE — 1160F RVW MEDS BY RX/DR IN RCRD: CPT | Mod: CPTII,S$GLB,, | Performed by: STUDENT IN AN ORGANIZED HEALTH CARE EDUCATION/TRAINING PROGRAM

## 2023-06-16 PROCEDURE — 3008F BODY MASS INDEX DOCD: CPT | Mod: CPTII,S$GLB,, | Performed by: STUDENT IN AN ORGANIZED HEALTH CARE EDUCATION/TRAINING PROGRAM

## 2023-06-16 PROCEDURE — 3074F SYST BP LT 130 MM HG: CPT | Mod: CPTII,S$GLB,, | Performed by: STUDENT IN AN ORGANIZED HEALTH CARE EDUCATION/TRAINING PROGRAM

## 2023-06-16 PROCEDURE — 99215 OFFICE O/P EST HI 40 MIN: CPT | Mod: S$GLB,,, | Performed by: STUDENT IN AN ORGANIZED HEALTH CARE EDUCATION/TRAINING PROGRAM

## 2023-06-16 PROCEDURE — 1159F MED LIST DOCD IN RCRD: CPT | Mod: CPTII,S$GLB,, | Performed by: STUDENT IN AN ORGANIZED HEALTH CARE EDUCATION/TRAINING PROGRAM

## 2023-06-16 PROCEDURE — 3008F PR BODY MASS INDEX (BMI) DOCUMENTED: ICD-10-PCS | Mod: CPTII,S$GLB,, | Performed by: STUDENT IN AN ORGANIZED HEALTH CARE EDUCATION/TRAINING PROGRAM

## 2023-06-16 PROCEDURE — 3079F PR MOST RECENT DIASTOLIC BLOOD PRESSURE 80-89 MM HG: ICD-10-PCS | Mod: CPTII,S$GLB,, | Performed by: STUDENT IN AN ORGANIZED HEALTH CARE EDUCATION/TRAINING PROGRAM

## 2023-06-16 RX ORDER — GABAPENTIN 300 MG/1
CAPSULE ORAL
COMMUNITY
Start: 2023-04-28 | End: 2023-06-16

## 2023-06-16 RX ORDER — TIZANIDINE 4 MG/1
4 TABLET ORAL EVERY 8 HOURS PRN
Qty: 90 TABLET | Refills: 2 | Status: SHIPPED | OUTPATIENT
Start: 2023-06-16 | End: 2023-08-15

## 2023-06-16 RX ORDER — DOXEPIN HYDROCHLORIDE 75 MG/1
75 CAPSULE ORAL
COMMUNITY
Start: 2023-03-31 | End: 2023-10-20

## 2023-06-16 RX ORDER — GABAPENTIN 300 MG/1
900 CAPSULE ORAL 2 TIMES DAILY
Qty: 180 CAPSULE | Refills: 11 | Status: SHIPPED | OUTPATIENT
Start: 2023-06-16 | End: 2023-10-03 | Stop reason: SDUPTHER

## 2023-06-16 NOTE — PROGRESS NOTES
[facility]  Follow Up Patient Visit      Disease Summary     Principle neurological diagnosis: MS     Date of symptom onset: 1/2015  Date of diagnosis: 1/2015  Disease type at diagnosis: RR  Disease type currently: RR  Previous therapy: Tecfidera, Copaxone, and Tysabri, Avonex (flu-like reaction), Aubagio (worsening disease)  Current therapy: Ocrevus 12/2020 - present  Last MRI Brain: 8/2022  Last MRI C-spine: 8/2022  Last MRI T-spine: 8/2022  CSF: 3W, 1R, G74, P39, IgG index 0.74, OCBs 6  JCV status: 1.67 2018  Other relevant labs and tests: Vit D 34    Interval history:     Last seen me 12/14/22. She tolerates Ocrevus well. Next dose due Aug    Symptoms:  She is still experiencing leg pain since a few months ago - worse when standing, cramp like pain, this has interfering with her walking with her walker, in addition, her walker has broken so she's been using her scooter. She is currently participating in PT and find it helpful. Baclofen, flexeril, and robaxin didn't help in the past and flexril made her drowsy. She is gabapentin 600/900.  Slurred speech  Heat sensitivity  Ear pain - seeing ENT for otalgia.  Left arm cramps/pain, she goes to OT for this  Overactive bladder, she does water restriction after 8pm and has not had any accidents   Blurry vision, wearing bifocals.      She denies bowel dysfunction, diplopia, dysphagia.      She on multivitamin, not specifically vitamin D.    ROS:     SOCIAL HISTORY  Living arrangements - the patient lives with their family.  Social History     Socioeconomic History    Marital status: Single    Number of children: 3   Occupational History     Employer: [employer]   Tobacco Use    Smoking status: Never     Passive exposure: Never    Smokeless tobacco: Never   Substance and Sexual Activity    Alcohol use: Yes     Comment: once a year     Drug use: Never    Sexual activity: Yes     Partners: Male     Birth control/protection: Surgical     Social  Determinants of Health     Financial Resource Strain: Low Risk     Difficulty of Paying Living Expenses: Not very hard   Food Insecurity: No Food Insecurity    Worried About Running Out of Food in the Last Year: Never true    Ran Out of Food in the Last Year: Never true   Transportation Needs: No Transportation Needs    Lack of Transportation (Medical): No    Lack of Transportation (Non-Medical): No   Physical Activity: Sufficiently Active    Days of Exercise per Week: 7 days    Minutes of Exercise per Session: 60 min   Stress: No Stress Concern Present    Feeling of Stress : Not at all   Social Connections: Unknown    Frequency of Communication with Friends and Family: Three times a week    Frequency of Social Gatherings with Friends and Family: Twice a week    Active Member of Clubs or Organizations: Yes    Attends Club or Organization Meetings: 1 to 4 times per year    Marital Status:    Housing Stability: Low Risk     Unable to Pay for Housing in the Last Year: No    Number of Places Lived in the Last Year: 0    Unstable Housing in the Last Year: No       REVIEW OF SYMPTOMS 6/14/2023   Do you feel abnormally tired on most days? Yes   Do you feel you generally sleep well? No   Do you have difficulty controlling your bladder?  No   Do you have difficulty controlling your bowels?  No   Do you have frequent muscle cramps, tightness or spasms in your limbs?  No   Do you have new visual symptoms?  Yes   Do you have worsening difficulty with your memory or thinking? No   Do you have worsening symptoms of anxiety or depression?  Yes   For patients who walk, Do you have more difficulty walking?  Yes   Have you fallen since your last visit?  Yes   For patients who use wheelchairs: Do you have any skin wounds or breakdown? No   Do you have difficulty using your hands?  Yes   Do you have shooting or burning pain? Yes   Do you have difficulty with sexual function?  Yes   If you are sexually active,  are you using birth control? Y/N  N/A No   Do you often choke when swallowing liquids or solid food?  No   Do you experience worsening symptoms when overheated? No   Do you need any new equipment such as a wheelchair, walker or shower chair? No   Do you receive co-pay financial assistance for your principal MS medicine? No   Would you be interested in participating in an MS research trial in the future? No   For patients on Gilenya, Tecfidera, Aubagio, Rituxan, Ocrevus, Tysabri, Lemtrada or Methotrexate, are you aware that you should NOT receive live virus vaccines?  No   Do you feel you have adequate family/friend support?  No   Do you have health insurance?   No   Are you currently employed? No   Do you receive SSDI/SSI?  No   Do you use marijuana or cannabis products? No   How often? -   Have you been diagnosed with a urinary tract infection since your last visit here? No   Have you been diagnosed with a respiratory tract infection since your last visit here? No   Have you been to the emergency room since your last visit here? No   Have you been hospitalized since your last visit here?  No     FSS SCORE & INTERPRETATION 6/14/2023   FSS SCORE  9   FSS SCORE INTERPRETATION May not be suffering from fatigue     MS MOOKIE-D SCORE & INTERPRETATION 6/14/2023   MOOKIE-D SCORE  12   MOOKIE-D INTERPRETATION  No indication of Depression     MS JUAN DAVID-7 SCORE & INTERPRETATION 6/14/2023   JUAN DAVID-7 SCORE  7   JUAN DAVID-7 SCORE INTERPRETATION Mild Anxiety     PEQ MS MOS PAIN EFFECTS SCORE & INTERPRETATION 6/14/2023   PES SCORE 6   PES SCORE INTERPRETATION Scores can range from 6-30.  Items are scaled so that higher scores indicate a greater impact of pain on a patients mood and behavior.     PEQ MS SEXUAL SATISFACTION SCORE & INTERPRETATION 3/31/2021   SSS SCORE  16   SSS SCORE INTERPRETATION Scores can range from 4-24.  Higher scores indicate greater problems with sexual satisfaction.     MS BLADDER CONTROL SCORE & INTERPRETATION 6/14/2023  "  BLCS SCORE 0   BLCS SCORE INTERPRETATION  Scores can range from 0-22, with higher scores indicating greater bladder control problems.     MS BOWEL CONTROL SCORE & INTERPRETATION 6/14/2023   BWCS SCORE 0   BWCS SCORE INTERPRETATION Scores can range from 0-26, with higher scores indicating greater bowel control problems.     PEQ MS IMPACT OF VISUAL IMPAIRMENT SCORE & INTERPRETATION 6/14/2023   LILIANA SCALE SCORE  0   LILIANA SCORE INTERPRETATION Scores can range from 0-15, with higher scores indicating greater impact of visual problems on daily activites.     MS PDQ SCORE & INTERPRETATION 6/14/2023   PDQ RETROSPECTIVE MEMORY SUBSCALE 0   PDQ ATTENTION/CONCENTRATION SUBSCALE 0   PDQ PROSPECTIVE MEMORY SUBSCALE 0   PDQ PLANNING/ORGANIZATION SUBSCALE 0   PDQ TOTAL SCORE 0   PDQ SCORE INTERPRETATION Scores can range from 0-80, with higher scores indicating greater perceived cognitive impairment.     MSSS SCORE & INTERPRETATION 6/14/2023   MSSS TANGIBLE SUPPORT SUBSCALE 0   MSSS EMOTIONAL/INFORMATIONAL SUPPORT SUBSCALE 0   MSSS AFFECTIONATE SUPPORT SUBSCALE 0   MSSS POSITIVE SOCIAL INTERACTION SUBSCALE 0   MSSS TOTAL SCORE 0   MSSS SCORE INTERPRETATION Scores can range from 0-100, with higher scores indicating greater perceived support.         Exam:     Vitals:    06/16/23 0910   BP: 128/84   Pulse: 72   Weight: 128.9 kg (284 lb 4.5 oz)   Height: 5' 6" (1.676 m)          In general, the patient is well nourished and appears to be in no acute distress.    MENTAL STATUS: language is fluent, normal verbal comprehension, attention is normal, patient is alert, fund of knowlege is appropriate by vocabulary.     CRANIAL NERVE EXAM:  There is no intrernuclear ophthalmoplegia.  Extraocular muscles are intact. No facial asymmetry. Facial sensation is intact bilaterally. There is no dysarthria. Uvula is midline, and palate moves symmetrically. Shoulder shrug intact bilaterlly. Tongue protrusion is midline. Hearing is intact to finger " rub bilaterally. Neck is supple with full ROM    MOTOR EXAM: Normal bulk and tone throughout UE and LE bilaterally.   No pronator drift; rapid sequential movements are normal;    Strength:  R deltoid 5/5, L deltoid 5/5  R biceps 5/5, L biceps 5/5  R triceps 5/5, L triceps 5/5  R finger flexors 5/5, L finger flexors 5/5  R  hip flexors 4+/5, L hip flexors 3+/5 (pain limiting)  R knee extensors 5/5, L knee extensors 3/5 (pain limiting)  R knee flexors 5/5, L knee flexors 4/5 (pain limiting)  R ankle dorsiflexors 5/5, L ankle dorsiflexors 5/5  R ankle plantarflexors 5/5, L ankle plantarflexors 5/5    SENSORY EXAM: Normal to light touch throughout.    COORDINATION: Dysmetria on L FTN. HTS unable to complete    GAIT: Wide based, L foot drop and L circumduction, slowed, requiring walker/rollator.    Timed 25 Foot Walk: 11/3/2020 6/16/2023   Did patient wear an AFO? No No   Was assistive device used? Yes Yes   Assistive device used (milton one): Unilateral Assistance Bilateral Assistance   Unilateral device used Cane -   Bilateral device used - Walker/Rollator   Time for 25 Foot Walk (seconds) 38.83 48.4   Time for 25 Foot Walk (seconds) 46.5 30.03     Imaging (personally reviewed):       Results for orders placed during the hospital encounter of 05/16/22    MRI Brain Demyelinating W W/O Contrast    Impression  No abnormal enhancement as may occur with active demyelinating disease.      Electronically signed by: Jae Garnett Jr., MD  Date:    05/17/2022  Time:    08:29    Results for orders placed during the hospital encounter of 05/16/22    MRI Cervical Spine Demyelinating W W/O Contrast    Impression  1. No abnormal cord signal or findings to suggest demyelinating disease within the cervical spinal cord.  No abnormal enhancement.  2. Minimal degenerative change as described above without significant spinal or foraminal stenosis.  These findings are unchanged.      Electronically signed by: Jae Garnett Jr.  MD  Date:    05/17/2022  Time:    09:03    Results for orders placed during the hospital encounter of 05/16/22    MRI Thoracic Spine Demyelinating W W/O Contrast    Impression  1. No abnormal cord signal or abnormal enhancement.  No findings to suggest demyelinating disease within the thoracic cord.  2. Right-sided foraminal stenosis at T2-T3, T3-T4, T4-T5, and T5-T6 could result in radiculopathy.      Electronically signed by: Jae Garnett Jr., MD  Date:    05/17/2022  Time:    08:59      Labs:     Lab Results   Component Value Date    TECJCFGB10AE 38 01/31/2023    [id number] 34 07/30/2021    MEYLTKFC37KC 32 01/10/2020     Lab Results   Component Value Date    JCVINDEX 1.67 (A) 04/19/2018    JCVANTIBODY Positive (A) 04/19/2018     No results found for: [id number], ABSOLUTECD3, ET9VCBLU, ABSOLUTECD8, TZ9OGIKT, ABSOLUTECD4, LABCD48  Lab Results   Component Value Date    WBC 7.14 03/17/2023    RBC 5.41 (H) 03/17/2023    HGB 11.2 (L) 03/17/2023    HCT 38.3 03/17/2023    MCV 71 (L) 03/17/2023    MCH 20.7 (L) 03/17/2023    MCHC 29.2 (L) 03/17/2023    RDW 17.8 (H) 03/17/2023     03/17/2023    MPV 10.9 03/17/2023    GRAN 4.2 03/17/2023    GRAN 58.1 03/17/2023    LYMPH 2.4 03/17/2023    LYMPH 34.2 03/17/2023    MONO 0.4 03/17/2023    MONO 6.2 03/17/2023    EOS 0.1 03/17/2023    BASO 0.03 03/17/2023    EOSINOPHIL 1.0 03/17/2023    BASOPHIL 0.4 03/17/2023     Sodium   Date Value Ref Range Status   03/17/2023 142 136 - 145 mmol/L Final     Potassium   Date Value Ref Range Status   03/17/2023 4.6 3.5 - 5.1 mmol/L Final     Chloride   Date Value Ref Range Status   03/17/2023 108 95 - 110 mmol/L Final     CO2   Date Value Ref Range Status   03/17/2023 26 23 - 29 mmol/L Final     Glucose   Date Value Ref Range Status   03/17/2023 83 70 - 110 mg/dL Final     BUN   Date Value Ref Range Status   03/17/2023 6 6 - 20 mg/dL Final     Creatinine   Date Value Ref Range Status   03/17/2023 0.9 0.5 - 1.4 mg/dL Final     Calcium    Date Value Ref Range Status   03/17/2023 9.6 8.7 - 10.5 mg/dL Final     Total Protein   Date Value Ref Range Status   03/17/2023 7.2 6.0 - 8.4 g/dL Final     Albumin   Date Value Ref Range Status   03/17/2023 3.8 3.5 - 5.2 g/dL Final     Total Bilirubin   Date Value Ref Range Status   03/17/2023 0.3 0.1 - 1.0 mg/dL Final     Comment:     For infants and newborns, interpretation of results should be based  on gestational age, weight and in agreement with clinical  observations.    Premature Infant recommended reference ranges:  Up to 24 hours.............<8.0 mg/dL  Up to 48 hours............<12.0 mg/dL  3-5 days..................<15.0 mg/dL  6-29 days.................<15.0 mg/dL       Alkaline Phosphatase   Date Value Ref Range Status   03/17/2023 115 55 - 135 U/L Final     AST   Date Value Ref Range Status   03/17/2023 24 10 - 40 U/L Final     ALT   Date Value Ref Range Status   03/17/2023 11 10 - 44 U/L Final     Anion Gap   Date Value Ref Range Status   03/17/2023 8 8 - 16 mmol/L Final     eGFR if    Date Value Ref Range Status   05/16/2022 >60 >60 mL/min/1.73 m^2 Final     eGFR if non    Date Value Ref Range Status   05/16/2022 >60 >60 mL/min/1.73 m^2 Final     Comment:     Calculation used to obtain the estimated glomerular filtration  rate (eGFR) is the CKD-EPI equation.                   Diagnosis/Assessment/Plan:       Multiple Sclerosis  -Assessment: RRMS stable on Ocrevus. She has frequent pseudo-exacerbations but currently stable. MRI 8/2022 stable with predominantly intracranial lesions and mild atrophy.   -Imaging: MRI Brain Aug 2023  -Disease Modifying Therapies: Continue Ocrevus, safety labs reviewed. Next labs in Jul 2023  Symptom management              -Patient to ask PT regarding whether she might benefit from a rollator   -For muscle cramp with intolerance to baclofen and flexeril, trial of zanaflex 4mg up to TID PRN, increasing gabapentin to 900mg twice a  day  -Refilled gabapentin 600mg/900mg as she was out, this might also help with her leg pain  -Continue vitamin D 2000 IU daily   -Patient qualifies for omicron booster, will order per patient request  Lifestyle modifications for brain health discussed.  Return to clinic in 6 months       NEURO MULTIPLE SCLEROSIS IMPRESSION:   MS Status:     Number of relapses in the past year?:  0    Clinical Progression:  Clinically Stable    MRI Progression:  Stable  Plan:     DMT:  No change in management    Symptom Management:  Implement change in symptom management    Implement Change in Symptom Management:  Spasticity and Adaptive Needs      Total time spent with the patient: 41 minutes, including face to face consultation, chart review and coordination of care, on the day of the visit. This includes face to face time and non-face to face time preparing to see the patient (eg, review of tests), obtaining and/or reviewing separately obtained history, documenting clinical information in the electronic or other health record, independently interpreting resultsand communicating results to the patient/family/caregiver, or care coordination.         Candice Garrett MD, MSc  Attending neurologist

## 2023-06-16 NOTE — PATIENT INSTRUCTIONS
Bivalent booster   Labs in Jul in [place]   MRI Brain in August [place]     Ask PT about rollator      Zanaflex 4mg up to 3 times a day   Increasing gabapentin to 900mg twice a day    To schedule MRIs in the [facility], please call [phone]. Per Dr. Garrett, please get the MRI scheduled sometime in August.

## 2023-06-16 NOTE — Clinical Note
Patient's walker is broken and she's looking for options to get a new one, can you see if [employer] or [employer] help with funding for one?

## 2023-06-19 ENCOUNTER — CLINICAL SUPPORT (OUTPATIENT)
Dept: INTERNAL MEDICINE | Facility: CLINIC | Age: 43
End: 2023-06-19
Payer: MEDICARE

## 2023-06-19 DIAGNOSIS — E66.01 MORBID OBESITY WITH BMI OF 45.0-49.9, ADULT: Primary | ICD-10-CM

## 2023-06-19 DIAGNOSIS — I10 PRIMARY HYPERTENSION: ICD-10-CM

## 2023-06-19 DIAGNOSIS — Z98.84 STATUS POST BARIATRIC SURGERY: ICD-10-CM

## 2023-06-19 DIAGNOSIS — Z71.3 DIETARY COUNSELING: ICD-10-CM

## 2023-06-19 PROCEDURE — 97803 MED NUTRITION INDIV SUBSEQ: CPT | Mod: 95,,, | Performed by: DIETITIAN, REGISTERED

## 2023-06-19 PROCEDURE — 97803 PR MED NUTR THER, SUBSQ, INDIV, EA 15 MIN: ICD-10-PCS | Mod: 95,,, | Performed by: DIETITIAN, REGISTERED

## 2023-06-19 RX ORDER — MUPIROCIN 20 MG/G
OINTMENT TOPICAL
Qty: 22 G | Refills: 0 | Status: SHIPPED | OUTPATIENT
Start: 2023-06-19 | End: 2024-01-25

## 2023-06-19 RX ORDER — TRIAMCINOLONE ACETONIDE 1 MG/G
OINTMENT TOPICAL
Qty: 30 G | Refills: 1 | Status: SHIPPED | OUTPATIENT
Start: 2023-06-19 | End: 2024-01-25

## 2023-06-19 NOTE — PROGRESS NOTES
The patient location is: [place]  The chief complaint leading to consultation is: nutrition follow-up for weight loss    Visit type: audiovisual    Face to Face time with patient: 35 minutes  40 minutes of total time spent on the encounter, which includes face to face time and non-face to face time preparing to see the patient (eg, review of tests), Obtaining and/or reviewing separately obtained history, Documenting clinical information in the electronic or other health record, Independently interpreting results (not separately reported) and communicating results to the patient/family/caregiver, or Care coordination (not separately reported).         Each patient to whom he or she provides medical services by telemedicine is:  (1) informed of the relationship between the physician and patient and the respective role of any other health care provider with respect to management of the patient; and (2) notified that he or she may decline to receive medical services by telemedicine and may withdraw from such care at any time.    Notes:   Nutrition Assessment  Session Time:        Client name:  Alicia Soliz  :  1980  Age:  43 y.o.  Gender:  female    Client states:    History of gastric sleeve on 2020 with surgery performed by Dr. Navarrete.   Surgery weight: 300 lbs  Low weight after surgery 217 lbs.  Experienced weight gain due to COVID and unhealthy food choices.     Interested in scheduling revision surgery due to inability to lose further weight.   Patient with goal to weigh 215 lbs.  Weight is now stalling at 276-278 lbs, despite reports of little food intake.    Reports a referral was created in order for patient to schedule a consultation for revision surgery, but has not received a call to schedule an appointment.     Current physical activity consists of physical therapy twice weekly + staying active throughout the day + treadmill and dumbbells at home every other day + thigh master  "machine nightly.     Currently consumes 3 meals per day or less.   Reports decreased appetite lately.  Meal choices: lunchables mostly, recently ate fish taco daughter prepared  In addition to meals patient has 1 premier protein shake + 1 snack (animal crackers, sugar free jello).  Drinks consists of water, crystal light, and sugar free tea.       Anthropometrics  Height:  66"     Weight:  276-278 lbs (self reported)  BMI:  46 - 46.4  % Body Fat:  n/a    Clinical Signs/Symptoms  N/V/D:  none noted  Appetite:  fair       Past Medical History:   Diagnosis Date    Anemia     Arthritis     Cardiac arrest as complication of care     pt states she went into cardiac arrest from an allergic reaction to a medication    Depression     Encounter for blood transfusion     Hemiplegia due to old stroke     Hypertension     Morbid obesity with BMI of 45.0-49.9, adult 9/20/2018    Multiple sclerosis        Past Surgical History:   Procedure Laterality Date    APPENDECTOMY      CHOLECYSTECTOMY      COLONOSCOPY N/A 11/25/2020    Procedure: COLONOSCOPY;  Surgeon: Andrew Jenkins III, MD;  Location: [facility];  Service: Endoscopy;  Laterality: N/A;    ESOPHAGOGASTRODUODENOSCOPY N/A 2/19/2020    Procedure: EGD (ESOPHAGOGASTRODUODENOSCOPY);  Surgeon: Michael Navarrete MD;  Location: [facility];  Service: Endoscopy;  Laterality: N/A;    ESOPHAGOGASTRODUODENOSCOPY N/A 2/20/2020    Procedure: EGD (ESOPHAGOGASTRODUODENOSCOPY);  Surgeon: Michael Navarrete MD;  Location: [facility] OR;  Service: General;  Laterality: N/A;    ESOPHAGOGASTRODUODENOSCOPY N/A 11/25/2020    Procedure: EGD (ESOPHAGOGASTRODUODENOSCOPY);  Surgeon: Andrew Jenkins III, MD;  Location: [facility];  Service: Endoscopy;  Laterality: N/A;    HYSTERECTOMY      KNEE SURGERY      ROBOT-ASSISTED LAPAROSCOPIC SLEEVE GASTRECTOMY USING DA ANTHONY XI N/A 2/20/2020    Procedure: XI ROBOTIC SLEEVE GASTRECTOMY;  Surgeon: Michael Navarrete MD;  Location: [facility] OR;  Service: General;  Laterality: N/A;    " TENOPLASTY OF HAND Left 8/26/2021    Procedure: REPAIR, TENDON, HAND;  Surgeon: Joselito Lugo MD;  Location: [facility];  Service: Orthopedics;  Laterality: Left;  Left RCL PIP Joint Repair/Recon with Arthrex Internal Brace.    TONSILLECTOMY      TUBAL LIGATION         Medications    has a current medication list which includes the following prescription(s): amoxicillin-clavulanate 875-125mg, cholecalciferol (vitamin d3), diphenhydramine, doxepin, doxepin, duloxetine, esomeprazole, gabapentin, linaclotide, nabumetone, naloxone, neomycin-polymyxin-hydrocortisone, ocrevus, ondansetron, solu-medrol (pf), tizanidine, topiramate, and valsartan.    Vitamins, Minerals, and/or Supplements:  One a day for women, B12, Calcium + Vitamin D, B complex     Food/Medication Interactions:  Reviewed     Food Allergies or Intolerances:  NKFA     Social History    Marital status:  Single  Employment:  retired    Social History     Tobacco Use    Smoking status: Never     Passive exposure: Never    Smokeless tobacco: Never   Substance Use Topics    Alcohol use: Yes     Comment: once a year         Lab Reports   Sodium   Date Value Ref Range Status   03/17/2023 142 136 - 145 mmol/L Final     Potassium   Date Value Ref Range Status   03/17/2023 4.6 3.5 - 5.1 mmol/L Final     Chloride   Date Value Ref Range Status   03/17/2023 108 95 - 110 mmol/L Final     CO2   Date Value Ref Range Status   03/17/2023 26 23 - 29 mmol/L Final     Glucose   Date Value Ref Range Status   03/17/2023 83 70 - 110 mg/dL Final     BUN   Date Value Ref Range Status   03/17/2023 6 6 - 20 mg/dL Final     Creatinine   Date Value Ref Range Status   03/17/2023 0.9 0.5 - 1.4 mg/dL Final     Calcium   Date Value Ref Range Status   03/17/2023 9.6 8.7 - 10.5 mg/dL Final     Total Protein   Date Value Ref Range Status   03/17/2023 7.2 6.0 - 8.4 g/dL Final     Albumin   Date Value Ref Range Status   03/17/2023 3.8 3.5 - 5.2 g/dL Final     Total Bilirubin   Date Value  Ref Range Status   03/17/2023 0.3 0.1 - 1.0 mg/dL Final     Comment:     For infants and newborns, interpretation of results should be based  on gestational age, weight and in agreement with clinical  observations.    Premature Infant recommended reference ranges:  Up to 24 hours.............<8.0 mg/dL  Up to 48 hours............<12.0 mg/dL  3-5 days..................<15.0 mg/dL  6-29 days.................<15.0 mg/dL       Alkaline Phosphatase   Date Value Ref Range Status   03/17/2023 115 55 - 135 U/L Final     AST   Date Value Ref Range Status   03/17/2023 24 10 - 40 U/L Final     ALT   Date Value Ref Range Status   03/17/2023 11 10 - 44 U/L Final     Anion Gap   Date Value Ref Range Status   03/17/2023 8 8 - 16 mmol/L Final     eGFR if    Date Value Ref Range Status   05/16/2022 >60 >60 mL/min/1.73 m^2 Final     eGFR if non    Date Value Ref Range Status   05/16/2022 >60 >60 mL/min/1.73 m^2 Final     Comment:     Calculation used to obtain the estimated glomerular filtration  rate (eGFR) is the CKD-EPI equation.         Lab Results   Component Value Date    WBC 7.14 03/17/2023    HGB 11.2 (L) 03/17/2023    HCT 38.3 03/17/2023    MCV 71 (L) 03/17/2023     03/17/2023        Lab Results   Component Value Date    CHOL 191 08/05/2022     Lab Results   Component Value Date    HDL 56 08/05/2022     Lab Results   Component Value Date    LDLCALC 110.0 08/05/2022     Lab Results   Component Value Date    TRIG 125 08/05/2022     Lab Results   Component Value Date    CHOLHDL 29.3 08/05/2022     Lab Results   Component Value Date    HGBA1C 5.6 03/21/2022     BP Readings from Last 1 Encounters:   06/16/23 128/84       Food History  Provided above    Exercise History:  provided above    Cultural/Spiritual/Personal Preferences:  None identified    Support System:  daughter    State of Change:  Contemplation    Barriers to Change:  none    Diagnosis    obesity related to excess energy intake  as evidenced by BMI 46, inability to lose weight .    Intervention    800-1000 calories per bariatric protocol.    Goals:  1.  Increase intake of protein.  2.  Decrease intake of starchy carbohydrates.  3.  Increase daily physical activity as able.     Nutrition Education  The following education was provided to the patient:  Discussed weight management.  Suggested dietary modifications based on current dietary behaviors and individual food preferences.  Discussed bariatric nutrition therapy.    Relating to possible revision surgery in the future:  Provided written/verbal information on pre- and post-bariatric surgery diet.  Reviewed pre-op liquid diet instructions.  Explained diet progression, stressing the importance of meeting protein & fluid needs.    Made patient aware she was previously scheduled for a financial appointment with bariatric department on 5- but patient was a No Show. Encouraged patient to reach back out to the department to schedule another appointment.       Patient verbalized understanding of nutrition education and recommendations received.    Handouts Provided  Bariatric Guidelines    Monitoring/Evaluation    Monitor the following:  Weight  BMI  Caloric intake  Labs:      Follow Up Plan:  as needed

## 2023-06-19 NOTE — PROGRESS NOTES
NUTRITIONAL CONSULT    Referring Physician: {Physician:[id number]}  Reason for MNT Referral: Initial assessment for {Surgery:62491} work-up    PAST MEDICAL HISTORY:   43 y.o. female presents with a BMI of There is no height or weight on file to calculate BMI..  Weight history includes: ***.  Dieting attempts include: Gastric sleeve 2020, physical therapy twice weekly, active throughout the day, treadmill/weights at home everyother day, thigh master nightly before bed .    Past Medical History:   Diagnosis Date    Anemia     Arthritis     Cardiac arrest as complication of care     pt states she went into cardiac arrest from an allergic reaction to a medication    Depression     Encounter for blood transfusion     Hemiplegia due to old stroke     Hypertension     Morbid obesity with BMI of 45.0-49.9, adult 2018    Multiple sclerosis        CLINICAL DATA:  43 y.o.-year-old Black or  female.  Height: ***  Weight: *** lbs 276-278 self reported   IBW: *** lbs  BMI: ***  The patient's goal weight (*** % EBW): *** lbs  Personal goal weight: 215 lbs     Goal for Bariatric Surgery: {BAR DIETITIAN GOALS:97158}    NUTRITION & HEALTH HISTORY:  Greatest challenge: {Greatest challenge:63726}    {Current/Past:74651} diet recall: ***    Current Diet:  Meal pattern: 3 meals (lunchable with turkey, baked fish taco )  Protein supplements: 1 per day/ Premier Protein (chocolate)  Snackin / day// animal crackers, sugar free jello  Vegetables: Likes none. Loves broccoli, but not eating lately  Fruits: Likes none.   Beverages: crystal light, sugar free peach tea, water   NO: soda  Dining out: Never. Mostly {Dining out:57232}.  Cooking at home: {Frequency:00502} Mostly {Cookin} {Foods:57648}.    Exercise:  Past exercise: {Adequate / Fair / None:68330}    Current exercise: {Adequate / Fair / None:49668}  Restrictions to exercise: ***    Vitamins / Minerals / Herbs:   One a day for women, B12, Calcium + Vitamin D, B  complex    Food Allergies:   NKFA    Social:  Retired.  Lives with: daughter   Grocery shopping and food prep: self and daughter   Patient believes the household {will/will not:86403} be supportive after surgery.  Alcohol: None.  Smoking: None.    ASSESSMENT:  Patient reports attempts at weight loss, only to regain lost weight.  Patient demonstrated knowledge of healthy eating behaviors and exercise patterns; admits to not eating healthy and not exercising at this point.  Patient {Willingness:38455} to change lifestyle and make behavior modifications.  Expect {Desc; good/fair/poor:36421} compliance after surgery at this time.    Insurance requires medically supervised diet prior to consideration for bariatric surgery.    BARIATRIC DIET DISCUSSION:  {BAR DIETITIAN DISCUSSION:71045}    RECOMMENDATIONS:  Patient is {Good, Not a Good, Potential:70807} candidate for bariatric surgery.    Needs additional visit(s) with ANTONELLA. ***    PLAN:  {Diet recommendations:85875}    SESSION TIME:  {BAR DIETITIAN TIME:44869} minutes

## 2023-06-19 NOTE — TELEPHONE ENCOUNTER
No care due was identified.  [facility] Embedded Care Due Messages. Reference number: [id number].   6/19/2023 9:04:00 AM CDT

## 2023-06-20 ENCOUNTER — TELEPHONE (OUTPATIENT)
Dept: BARIATRICS | Facility: CLINIC | Age: 43
End: 2023-06-20
Payer: MEDICARE

## 2023-06-20 ENCOUNTER — CLINICAL SUPPORT (OUTPATIENT)
Dept: REHABILITATION | Facility: HOSPITAL | Age: 43
End: 2023-06-20
Payer: MEDICARE

## 2023-06-20 DIAGNOSIS — Z74.09 DECREASED STRENGTH, ENDURANCE, AND MOBILITY: ICD-10-CM

## 2023-06-20 DIAGNOSIS — R53.1 DECREASED STRENGTH, ENDURANCE, AND MOBILITY: ICD-10-CM

## 2023-06-20 DIAGNOSIS — R68.89 DECREASED STRENGTH, ENDURANCE, AND MOBILITY: ICD-10-CM

## 2023-06-20 DIAGNOSIS — G35 MULTIPLE SCLEROSIS EXACERBATION: Primary | ICD-10-CM

## 2023-06-20 PROCEDURE — 97110 THERAPEUTIC EXERCISES: CPT | Mod: PN

## 2023-06-20 PROCEDURE — 97112 NEUROMUSCULAR REEDUCATION: CPT | Mod: PN

## 2023-06-20 NOTE — TELEPHONE ENCOUNTER
----- Message from Elsa Cook sent at 6/20/2023  9:35 AM CDT -----  Regarding: Appt  Contact: Pt @ [phone]  Pt is calling to get appt today. Asking for a call back

## 2023-06-20 NOTE — PROGRESS NOTES
"  Physical Therapy Daily Treatment Note and Plan of Care Update     Name: Alicia Soliz  Clinic Number: [id number]    Therapy Diagnosis:   Encounter Diagnoses   Name Primary?    Multiple sclerosis exacerbation Yes    Decreased strength, endurance, and mobility        Physician: Kole Carrasquillo MD    Visit Date: 6/20/2023    Physician Orders: PT Eval and Treat   Medical Diagnosis from Referral: G35 (ICD-10-CM) - Multiple sclerosis  Evaluation Date: 10/4/2022  Authorization Period Expiration: 12/31/23  Plan of Care Expiration: 5/3/23 extended 7/29/23  Progress Note Due: due 30 days from 6/20/23  Visit # / Visits authorized: 24/40  FOTO: 5 (38% limitation)     Precautions: Standard, Fall, and MS (L shoulder had dislocated in the last event)     PTA Visit #: 0/5     Time In: 1:40 pm   Time Out: 2:18 pm on one one with PT then with tech until 2:45    Total Billable Time: 38 minutes one on one    SUBJECTIVE     Pt reports: she is overheated again today waiting on her ride and arrived over 1.5 hrs late. Pt states her L knee has been "popping" out of place. She saw the Dr and is scheduled for an infusion and labs. Pt has lost about 60 #.  She was compliant with home exercise program.  Response to previous treatment: no issues  Functional change: walking longer/faster    Pre Pain: 0/10 R 4/10 L knee Location: anterolateral knee    OBJECTIVE     CMS Impairment/Limitation/Restriction for FOTO Multiple Sclerosis Survey     Therapist reviewed FOTO scores for Alicia Soliz on 6/20/2023.   FOTO documents entered into [email or web address] - see Media section.     Limitation Score: to be assessed on follow up%                                      Sit to stand from 18inch surface = mod I with RW  TUG = 32.8s with RW  improved today to 30 sec  Sit to stand from 18 inch box 30 sec sit to stand test 11 reps  5 rep sit to stand test 12 sec today (last 17 sec)    Gait = amb with RW, decreased guarding through BUE, increase step length, increase " fredy    Strength:                    Right            Left     2/21/23 today;   Hip flexors 5/5 4/5  L: 4+/5; 4/5   Knee extension 5/5     4/5  L:5/5;4+   Knee flexion 5/5 4/5 L:4+/5;5   Hip abductors NT/5 NT/5 R 4+;L2+;3   DF 5/5 4+/5 L: 5/5   Ankle Eversion 5/5     4 /5 L: 5/5     PT noted trigger points in the L quad and lateral thigh with reduced VMO firing L vs R  Consistent with complaint of knee feeling unstable    Objective Measures updated at progress report unless specified.       Treatment   Add medial hamstring L to help reduce knee pain L and improve tracking medially    Alicia received the treatments listed below:        therapeutic exercises to develop strength, flexibility, posture, and core stabilization for 30 minutes  including:    Nustep 5 min  Sit to stand 18 inch box 3x 10 (by third set she was much more alert)  Single arm dead lift 15# 2x 10 per arm  15# per hand dead lift 2x 8      Deferred:  Seated Marches focus upright posture and core stabilization; 3# on legs 2x12  Qped cat x 8  Hip add 70# x20  Hip Abd 55# x20  Leg extension 25# x20    neuromuscular re-education activities to improve: Balance, Coordination, Kinesthetic Sense, and Proprioception for 25 minutes. The following activities were included:     LAQ 3x 10 5# per ankle  Side hip abduction 2x 10 B  Leaning hip extension 3x 10 B  Single leg hip flexion with one hand for support (max cues on weight shift, glute activation and upright posture to engage the posterior chain better) 3x 10 per leg (RW for support but only one hand)     Deferred:  Standing heel raises 2x 10 with UE support then 2x 8 without UE support  Ball toss with pink weighted ball 2x15 (pt had to retrieve it by bending forward/side bending and squatting with CGA to SBA for safety)  R single leg press 4 banded squat on shuttle 2x 10 with cues on hip adduction/internal rotation and heel pressure to reduce pushing from her toes and engage the hip/knee  more  Seated LAQ 6# 3 sec hold at top 3x10 B  supine marches 2x10ea Dead lifting 10 # single arm 3x 5 R then L; B 10 # 3x 5  Single leg stool taps R foot 1x 15; 1x 14 L foot (tone limited her pace)  Seated hip adduction 3x 10 L      Alicia received gait training to improve functional mobility and safety for 0 minutes, including:  Deferred:  Ambulation with SC for up to  40 ft x1  with cues on cane placement with transfers, step length and leading leg, hand to use for SC and safety on turns, weight shift and heel placement to help engage natural rolling heel to toe    Alicia performed therapeutic activities for 0 min including:    Deferred:  Sit to stand with 10# ball 2x10   step over 3inch DB with RW   Step onto airex with RW x7  Goblet hold with sit to stand 15# 1x 5  Sit to stand R hand farmer's hold 15# 1x 10; L 1x 10  Dead lifting 15# per hand (30#) 2x 10  PT continued with gait with SC for endurance and core support to improve safe function with SC for 20 ft at a more functional pace    manual therapy techniques: were applied to left hip for 0 minutes, including: Soft tissue Mobilization   Deferred:  STM to L anterolateral thigh for increase blood flow and decrease pain     PT used modalities including ice to neck and low back of pt to reduce overheating from outside conditions as she is very heat sensitive with her MS  (ice on full session) (PT moved the ice from one place to another every 0 min to help cooler her off)    Patient Education and Home Exercises     Home Exercises Provided and Patient Education Provided     Education provided:     Written Home Exercises Provided: Patient instructed to cont prior HEP. Exercises were reviewed and Alicia was able to demonstrate them prior to the end of the session.  Alicia demonstrated good  understanding of the education provided. See EMR under Patient Instructions for exercises provided during therapy sessions    ASSESSMENT   Pt presents to PT with focus on motor  control especially at the R quad to help reduce patellar discomfort with walking. Pt has improved in some areas functionally but the heat has been causing her more fatigue lately so she has been moving slower when not under timed testing. PT continued to encourage using a RW for safety and working on her HEP especially TKE L with control eccentrically and ball squeezing to help engage the medial hip/quad muscles to improve tracking.. PT encouraged her to monitor this heat and keep cool this summer to prevent regressing from MS complications.  Pt with no increase of pain at the end of today's session.     Alicia Is progressing well towards her goals.   Pt prognosis is Good.     Pt will continue to benefit from skilled outpatient physical therapy to address the deficits listed in the problem list box on initial evaluation, provide pt/family education and to maximize pt's level of independence in the home and community environment.     Pt's spiritual, cultural and educational needs considered and pt agreeable to plan of care and goals.     Anticipated barriers to physical therapy: chronicity of condition and comorbidities    Goals:   Short Term Goals: In 3 weeks   1.I with HEP MET 12/6/20222  2.Pt to increase BLE strength by 1/2 grade to show improvements with sitting, standing, ambulating, bending, lifting activities.  progressing  3. Patient to improve 5x Sit<>Stand to less than 12 seconds to reduced risk of fall. met  4. Patient to improve TUG with RW to less than 25 seconds to reduce risk of falls. Progressing     Long Term Goals: In 6 weeks (Progressing) updated to 7/2923  1. Patient to demo increase in LE strength to 3+/5 with hip abduction on the L LE to show improvements with sitting, standing, ambulating, bending, lifting activities.  progressing  2. Patient to improve score on the FOTO to < or = to 48% to show improvement with QOL. met  3. Patient to improve 5x Sit<>Stand to less than 10 seconds to reduced  risk of fall. progressing  4. Patient to improve TUG with RW to less than 20 seconds to reduce risk of falls. progressing     Plan      Update Certification Period: 3/23/23 extended to 7/29/23  Recommended Treatment Plan: 2 times per week for 6  weeks effective week of 6/20/23/23: Aquatic Therapy, Cervical/Lumbar Traction, Electrical Stimulation  , Gait Training, Manual Therapy, Moist Heat/ Ice, Neuromuscular Re-ed, Patient Education, Self Care, Therapeutic Exercise, and Therapeutic Activities    Tracie Way, PT    6/20/2023

## 2023-06-20 NOTE — TELEPHONE ENCOUNTER
----- Message from Rossana Akbar sent at 6/20/2023  2:50 PM CDT -----  Regarding: Call Back  Contact: Pt [phone]  Pt is returning a call back please call

## 2023-06-21 ENCOUNTER — HOSPITAL ENCOUNTER (OUTPATIENT)
Dept: RADIOLOGY | Facility: HOSPITAL | Age: 43
Discharge: HOME | End: 2023-06-21
Attending: FAMILY MEDICINE
Payer: MEDICARE

## 2023-06-21 DIAGNOSIS — Z12.31 ENCOUNTER FOR SCREENING MAMMOGRAM FOR MALIGNANT NEOPLASM OF BREAST: ICD-10-CM

## 2023-06-21 PROCEDURE — 77067 MAMMO DIGITAL SCREENING BILAT WITH TOMO: ICD-10-PCS | Mod: 26,,, | Performed by: RADIOLOGY

## 2023-06-21 PROCEDURE — 77063 MAMMO DIGITAL SCREENING BILAT WITH TOMO: ICD-10-PCS | Mod: 26,,, | Performed by: RADIOLOGY

## 2023-06-21 PROCEDURE — 77067 SCR MAMMO BI INCL CAD: CPT | Mod: 26,,, | Performed by: RADIOLOGY

## 2023-06-21 PROCEDURE — 77067 SCR MAMMO BI INCL CAD: CPT | Mod: TC

## 2023-06-21 PROCEDURE — 77063 BREAST TOMOSYNTHESIS BI: CPT | Mod: 26,,, | Performed by: RADIOLOGY

## 2023-06-22 ENCOUNTER — TELEPHONE (OUTPATIENT)
Dept: INTERNAL MEDICINE | Facility: CLINIC | Age: 43
End: 2023-06-22
Payer: MEDICARE

## 2023-06-22 ENCOUNTER — CLINICAL SUPPORT (OUTPATIENT)
Dept: REHABILITATION | Facility: HOSPITAL | Age: 43
End: 2023-06-22
Payer: MEDICARE

## 2023-06-22 DIAGNOSIS — R53.1 DECREASED STRENGTH, ENDURANCE, AND MOBILITY: ICD-10-CM

## 2023-06-22 DIAGNOSIS — G43.809 OTHER MIGRAINE WITHOUT STATUS MIGRAINOSUS, NOT INTRACTABLE: ICD-10-CM

## 2023-06-22 DIAGNOSIS — R68.89 DECREASED STRENGTH, ENDURANCE, AND MOBILITY: ICD-10-CM

## 2023-06-22 DIAGNOSIS — G35 MULTIPLE SCLEROSIS EXACERBATION: Primary | ICD-10-CM

## 2023-06-22 DIAGNOSIS — Z74.09 DECREASED STRENGTH, ENDURANCE, AND MOBILITY: ICD-10-CM

## 2023-06-22 PROCEDURE — 97112 NEUROMUSCULAR REEDUCATION: CPT | Mod: PN

## 2023-06-22 NOTE — PROGRESS NOTES
Physical Therapy Daily Treatment Note     Name: Alicia Soliz  Clinic Number: [id number]    Therapy Diagnosis:   Encounter Diagnoses   Name Primary?    Multiple sclerosis exacerbation Yes    Decreased strength, endurance, and mobility      Physician: Kole Carrasquillo MD    Visit Date: 6/22/2023    Physician Orders: PT Eval and Treat   Medical Diagnosis from Referral: G35 (ICD-10-CM) - Multiple sclerosis  Evaluation Date: 10/4/2022  Authorization Period Expiration: 12/31/23  Plan of Care Expiration:  7/29/23  Progress Note Due: due 30 days from 6/20/23  Visit # / Visits authorized: 25/40  FOTO: 5 (38% limitation)     Precautions: Standard, Fall, and MS (L shoulder had dislocated in the last event)     PTA Visit #: 0/5     Time In: 1:45 pm   Time Out: 2:40 pm     Total Billable Time: 55 minutes one on one    SUBJECTIVE     Pt reports: she didn't have transportation arrive for her so family brought her as the transportation has been very unreliable.   She was compliant with home exercise program.  Response to previous treatment: no issues  Functional change: walking longer/faster    Pre Pain: 0/10 R 4/10 L knee Location: anterolateral knee    OBJECTIVE     CMS Impairment/Limitation/Restriction for FOTO Multiple Sclerosis Survey     Therapist reviewed FOTO scores for Alicia Soliz on 6/20/2023.   FOTO documents entered into [employer] - see Media section.     Limitation Score: 42%                                      Objective Measures updated at progress report unless specified.       Treatment   Add medial hamstring L to help reduce knee pain L and improve tracking medially    Alicia received the treatments listed below:        therapeutic exercises to develop strength, flexibility, posture, and core stabilization for 0 minutes  including:  Deferred:  Nustep 5 min  Sit to stand 18 inch box 3x 10 (by third set she was much more alert)  Single arm dead lift 15# 2x 10 per arm  15# per hand dead lift 2x 8  Seated  Marches focus upright posture and core stabilization; 3# on legs 2x12  Qped cat x 8  Hip add 70# x20  Hip Abd 55# x20  Leg extension 25# x20    neuromuscular re-education activities to improve: Balance, Coordination, Kinesthetic Sense, and Proprioception for 55 minutes. The following activities were included:     Seated TKE from 24 inch box to stand with purple theraband at knee 3x 10  Seated hamstring curl green therband for 3x 10 (seated on 24 in box with medial rotation engaged to avoid toeing out)  Seated hip flexion 4# 3x 10 L  Seated LAQ 4# 3x 10  Standing R hip flexion/internal rotation/ adduction 4# during gait to facilitate more neutral hip and better food clearance 160 ft with RW      Deferred:    LAQ 3x 10 5# per ankle  Side hip abduction 2x 10 B  Leaning hip extension 3x 10 B  Single leg hip flexion with one hand for support (max cues on weight shift, glute activation and upright posture to engage the posterior chain better) 3x 10 per leg (RW for support but only one hand) Standing heel raises 2x 10 with UE support then 2x 8 without UE support  Ball toss with pink weighted ball 2x15 (pt had to retrieve it by bending forward/side bending and squatting with CGA to SBA for safety)  R single leg press 4 banded squat on shuttle 2x 10 with cues on hip adduction/internal rotation and heel pressure to reduce pushing from her toes and engage the hip/knee more  Seated LAQ 6# 3 sec hold at top 3x10 B  supine marches 2x10ea Dead lifting 10 # single arm 3x 5 R then L; B 10 # 3x 5  Single leg stool taps R foot 1x 15; 1x 14 L foot (tone limited her pace)  Seated hip adduction 3x 10 L      Alicia received gait training to improve functional mobility and safety for 0 minutes, including:    Deferred:  Ambulation with SC for up to  40 ft x1  with cues on cane placement with transfers, step length and leading leg, hand to use for SC and safety on turns, weight shift and heel placement to help engage natural rolling heel to  waldemar Vargas performed therapeutic activities for 0 min including:    Deferred:  Sit to stand with 10# ball 2x10   step over 3inch DB with RW   Step onto airex with RW x7  Goblet hold with sit to stand 15# 1x 5  Sit to stand R hand farmer's hold 15# 1x 10; L 1x 10  Dead lifting 15# per hand (30#) 2x 10  PT continued with gait with SC for endurance and core support to improve safe function with SC for 20 ft at a more functional pace    manual therapy techniques: were applied to left hip for 0 minutes, including: Soft tissue Mobilization   Deferred:  STM to L anterolateral thigh for increase blood flow and decrease pain     PT used modalities including ice to neck and low back of pt to reduce overheating from outside conditions as she is very heat sensitive with her MS  (ice on full session) (PT moved the ice from one place to another every 0 min to help cooler her off)    Patient Education and Home Exercises     Home Exercises Provided and Patient Education Provided     Education provided:     Written Home Exercises Provided: Patient instructed to cont prior HEP. Exercises were reviewed and Alicia was able to demonstrate them prior to the end of the session.  Alicia demonstrated good  understanding of the education provided. See EMR under Patient Instructions for exercises provided during therapy sessions    ASSESSMENT   Pt presents to PT with focus on motor control especially at the medial hamstring to facilitate medial hip internal rotation with flexion and extension. Pt tends to externally rotated at the L hip still but with weight on the ankle, she was more aware (proprioception wise) and able to drive L hip flexion and internal rotation better. Pt fatigues quickly. PT to advance as tolerated.   PT encouraged her to monitor this heat and keep cool this summer to prevent regressing from MS complications.  Pt with no increase of pain at the end of today's session.     Alicia Is progressing well towards her goals.   Pt  prognosis is Good.     Pt will continue to benefit from skilled outpatient physical therapy to address the deficits listed in the problem list box on initial evaluation, provide pt/family education and to maximize pt's level of independence in the home and community environment.     Pt's spiritual, cultural and educational needs considered and pt agreeable to plan of care and goals.     Anticipated barriers to physical therapy: chronicity of condition and comorbidities    Goals:   Short Term Goals: In 3 weeks   1.I with HEP MET 12/6/20222  2.Pt to increase BLE strength by 1/2 grade to show improvements with sitting, standing, ambulating, bending, lifting activities.  progressing  3. Patient to improve 5x Sit<>Stand to less than 12 seconds to reduced risk of fall. met  4. Patient to improve TUG with RW to less than 25 seconds to reduce risk of falls. Progressing     Long Term Goals: In 6 weeks (Progressing) updated to 7/2923  1. Patient to demo increase in LE strength to 3+/5 with hip abduction on the L LE to show improvements with sitting, standing, ambulating, bending, lifting activities.  progressing  2. Patient to improve score on the FOTO to < or = to 48% to show improvement with QOL. met  3. Patient to improve 5x Sit<>Stand to less than 10 seconds to reduced risk of fall. progressing  4. Patient to improve TUG with RW to less than 20 seconds to reduce risk of falls. progressing     Plan      Update Certification Period: 3/23/23 extended to 7/29/23   Recommended Treatment Plan: 2 times per week for 6  weeks effective week of 6/20/23/23: Aquatic Therapy, Cervical/Lumbar Traction, Electrical Stimulation  , Gait Training, Manual Therapy, Moist Heat/ Ice, Neuromuscular Re-ed, Patient Education, Self Care, Therapeutic Exercise, and Therapeutic Activities    Tracie Way, PT    6/22/2023

## 2023-06-22 NOTE — TELEPHONE ENCOUNTER
----- Message from Braden Dumont MD sent at 6/22/2023 12:21 PM CDT -----  Please let her know her mammogram was normal. Repeat in 1 year.

## 2023-06-22 NOTE — PLAN OF CARE
"  Physical Therapy Daily Treatment Note and Plan of Care Update     Name: Alicia Soliz  Clinic Number: [id number]    Therapy Diagnosis:   Encounter Diagnoses   Name Primary?    Multiple sclerosis exacerbation Yes    Decreased strength, endurance, and mobility        Physician: Kole Carrasquillo MD    Visit Date: 6/20/2023    Physician Orders: PT Eval and Treat   Medical Diagnosis from Referral: G35 (ICD-10-CM) - Multiple sclerosis  Evaluation Date: 10/4/2022  Authorization Period Expiration: 12/31/23  Plan of Care Expiration: 5/3/23 extended 7/29/23  Progress Note Due: due 30 days from 6/20/23  Visit # / Visits authorized: 24/40  FOTO: 5 (38% limitation)     Precautions: Standard, Fall, and MS (L shoulder had dislocated in the last event)     PTA Visit #: 0/5     Time In: 1:40 pm   Time Out: 2:18 pm on one one with PT then with tech until 2:45    Total Billable Time: 38 minutes one on one    SUBJECTIVE     Pt reports: she is overheated again today waiting on her ride and arrived over 1.5 hrs late. Pt states her L knee has been "popping" out of place. She saw the Dr and is scheduled for an infusion and labs. Pt has lost about 60 #.  She was compliant with home exercise program.  Response to previous treatment: no issues  Functional change: walking longer/faster    Pre Pain: 0/10 R 4/10 L knee Location: anterolateral knee    OBJECTIVE     CMS Impairment/Limitation/Restriction for FOTO Multiple Sclerosis Survey     Therapist reviewed FOTO scores for Alicia Soliz on 6/20/2023.   FOTO documents entered into [employer] - see Media section.     Limitation Score: to be assessed on follow up%                                      Sit to stand from 18inch surface = mod I with RW  TUG = 32.8s with RW  improved today to 30 sec  Sit to stand from 18 inch box 30 sec sit to stand test 11 reps  5 rep sit to stand test 12 sec today (last 17 sec)    Gait = amb with RW, decreased guarding through BUE, increase step length, increase " fredy    Strength:                    Right            Left     2/21/23 today;   Hip flexors 5/5 4/5  L: 4+/5; 4/5   Knee extension 5/5     4/5  L:5/5;4+   Knee flexion 5/5 4/5 L:4+/5;5   Hip abductors NT/5 NT/5 R 4+;L2+;3   DF 5/5 4+/5 L: 5/5   Ankle Eversion 5/5     4 /5 L: 5/5     PT noted trigger points in the L quad and lateral thigh with reduced VMO firing L vs R  Consistent with complaint of knee feeling unstable    Objective Measures updated at progress report unless specified.       Treatment   Add medial hamstring L to help reduce knee pain L and improve tracking medially    Alicia received the treatments listed below:        therapeutic exercises to develop strength, flexibility, posture, and core stabilization for 30 minutes  including:    Nustep 5 min  Sit to stand 18 inch box 3x 10 (by third set she was much more alert)  Single arm dead lift 15# 2x 10 per arm  15# per hand dead lift 2x 8      Deferred:  Seated Marches focus upright posture and core stabilization; 3# on legs 2x12  Qped cat x 8  Hip add 70# x20  Hip Abd 55# x20  Leg extension 25# x20    neuromuscular re-education activities to improve: Balance, Coordination, Kinesthetic Sense, and Proprioception for 25 minutes. The following activities were included:     LAQ 3x 10 5# per ankle  Side hip abduction 2x 10 B  Leaning hip extension 3x 10 B  Single leg hip flexion with one hand for support (max cues on weight shift, glute activation and upright posture to engage the posterior chain better) 3x 10 per leg (RW for support but only one hand)     Deferred:  Standing heel raises 2x 10 with UE support then 2x 8 without UE support  Ball toss with pink weighted ball 2x15 (pt had to retrieve it by bending forward/side bending and squatting with CGA to SBA for safety)  R single leg press 4 banded squat on shuttle 2x 10 with cues on hip adduction/internal rotation and heel pressure to reduce pushing from her toes and engage the hip/knee  more  Seated LAQ 6# 3 sec hold at top 3x10 B  supine marches 2x10ea Dead lifting 10 # single arm 3x 5 R then L; B 10 # 3x 5  Single leg stool taps R foot 1x 15; 1x 14 L foot (tone limited her pace)  Seated hip adduction 3x 10 L      Alicia received gait training to improve functional mobility and safety for 0 minutes, including:  Deferred:  Ambulation with SC for up to  40 ft x1  with cues on cane placement with transfers, step length and leading leg, hand to use for SC and safety on turns, weight shift and heel placement to help engage natural rolling heel to toe    Alicia performed therapeutic activities for 0 min including:    Deferred:  Sit to stand with 10# ball 2x10   step over 3inch DB with RW   Step onto airex with RW x7  Goblet hold with sit to stand 15# 1x 5  Sit to stand R hand farmer's hold 15# 1x 10; L 1x 10  Dead lifting 15# per hand (30#) 2x 10  PT continued with gait with SC for endurance and core support to improve safe function with SC for 20 ft at a more functional pace    manual therapy techniques: were applied to left hip for 0 minutes, including: Soft tissue Mobilization   Deferred:  STM to L anterolateral thigh for increase blood flow and decrease pain     PT used modalities including ice to neck and low back of pt to reduce overheating from outside conditions as she is very heat sensitive with her MS  (ice on full session) (PT moved the ice from one place to another every 0 min to help cooler her off)    Patient Education and Home Exercises     Home Exercises Provided and Patient Education Provided     Education provided:     Written Home Exercises Provided: Patient instructed to cont prior HEP. Exercises were reviewed and Alicia was able to demonstrate them prior to the end of the session.  Alicia demonstrated good  understanding of the education provided. See EMR under Patient Instructions for exercises provided during therapy sessions    ASSESSMENT   Pt presents to PT with focus on motor  control especially at the R quad to help reduce patellar discomfort with walking. Pt has improved in some areas functionally but the heat has been causing her more fatigue lately so she has been moving slower when not under timed testing. PT continued to encourage using a RW for safety and working on her HEP especially TKE L with control eccentrically and ball squeezing to help engage the medial hip/quad muscles to improve tracking.. PT encouraged her to monitor this heat and keep cool this summer to prevent regressing from MS complications.  Pt with no increase of pain at the end of today's session.     Alicia Is progressing well towards her goals.   Pt prognosis is Good.     Pt will continue to benefit from skilled outpatient physical therapy to address the deficits listed in the problem list box on initial evaluation, provide pt/family education and to maximize pt's level of independence in the home and community environment.     Pt's spiritual, cultural and educational needs considered and pt agreeable to plan of care and goals.     Anticipated barriers to physical therapy: chronicity of condition and comorbidities    Goals:   Short Term Goals: In 3 weeks   1.I with HEP MET 12/6/20222  2.Pt to increase BLE strength by 1/2 grade to show improvements with sitting, standing, ambulating, bending, lifting activities.  progressing  3. Patient to improve 5x Sit<>Stand to less than 12 seconds to reduced risk of fall. met  4. Patient to improve TUG with RW to less than 25 seconds to reduce risk of falls. Progressing     Long Term Goals: In 6 weeks (Progressing) updated to 7/2923  1. Patient to demo increase in LE strength to 3+/5 with hip abduction on the L LE to show improvements with sitting, standing, ambulating, bending, lifting activities.  progressing  2. Patient to improve score on the FOTO to < or = to 48% to show improvement with QOL. met  3. Patient to improve 5x Sit<>Stand to less than 10 seconds to reduced  risk of fall. progressing  4. Patient to improve TUG with RW to less than 20 seconds to reduce risk of falls. progressing     Plan      Update Certification Period: 3/23/23 extended to 7/29/23  Recommended Treatment Plan: 2 times per week for 6  weeks effective week of 6/20/23/23: Aquatic Therapy, Cervical/Lumbar Traction, Electrical Stimulation  , Gait Training, Manual Therapy, Moist Heat/ Ice, Neuromuscular Re-ed, Patient Education, Self Care, Therapeutic Exercise, and Therapeutic Activities    Tracie Way, PT    6/20/2023

## 2023-06-23 ENCOUNTER — PATIENT MESSAGE (OUTPATIENT)
Dept: INTERNAL MEDICINE | Facility: CLINIC | Age: 43
End: 2023-06-23

## 2023-06-23 ENCOUNTER — OFFICE VISIT (OUTPATIENT)
Dept: INTERNAL MEDICINE | Facility: CLINIC | Age: 43
End: 2023-06-23
Payer: MEDICARE

## 2023-06-23 ENCOUNTER — HOSPITAL ENCOUNTER (OUTPATIENT)
Dept: RADIOLOGY | Facility: HOSPITAL | Age: 43
Discharge: HOME | End: 2023-06-23
Attending: FAMILY MEDICINE
Payer: MEDICARE

## 2023-06-23 ENCOUNTER — PATIENT MESSAGE (OUTPATIENT)
Dept: NEUROLOGY | Facility: CLINIC | Age: 43
End: 2023-06-23
Payer: MEDICARE

## 2023-06-23 VITALS
HEART RATE: 80 BPM | WEIGHT: 285.5 LBS | OXYGEN SATURATION: 98 % | TEMPERATURE: 98 F | HEIGHT: 66 IN | SYSTOLIC BLOOD PRESSURE: 126 MMHG | BODY MASS INDEX: 45.88 KG/M2 | DIASTOLIC BLOOD PRESSURE: 82 MMHG

## 2023-06-23 DIAGNOSIS — G89.29 CHRONIC PAIN OF LEFT KNEE: Primary | ICD-10-CM

## 2023-06-23 DIAGNOSIS — M25.562 CHRONIC PAIN OF LEFT KNEE: ICD-10-CM

## 2023-06-23 DIAGNOSIS — M79.605 LEFT LEG PAIN: ICD-10-CM

## 2023-06-23 DIAGNOSIS — G89.29 CHRONIC PAIN OF LEFT KNEE: ICD-10-CM

## 2023-06-23 DIAGNOSIS — M25.562 CHRONIC PAIN OF LEFT KNEE: Primary | ICD-10-CM

## 2023-06-23 PROCEDURE — 1159F PR MEDICATION LIST DOCUMENTED IN MEDICAL RECORD: ICD-10-PCS | Mod: CPTII,S$GLB,, | Performed by: FAMILY MEDICINE

## 2023-06-23 PROCEDURE — 3074F SYST BP LT 130 MM HG: CPT | Mod: CPTII,S$GLB,, | Performed by: FAMILY MEDICINE

## 2023-06-23 PROCEDURE — 3074F PR MOST RECENT SYSTOLIC BLOOD PRESSURE < 130 MM HG: ICD-10-PCS | Mod: CPTII,S$GLB,, | Performed by: FAMILY MEDICINE

## 2023-06-23 PROCEDURE — 3079F DIAST BP 80-89 MM HG: CPT | Mod: CPTII,S$GLB,, | Performed by: FAMILY MEDICINE

## 2023-06-23 PROCEDURE — 99999 PR PBB SHADOW E&M-EST. PATIENT-LVL V: ICD-10-PCS | Mod: PBBFAC,,, | Performed by: FAMILY MEDICINE

## 2023-06-23 PROCEDURE — 99213 PR OFFICE/OUTPT VISIT, EST, LEVL III, 20-29 MIN: ICD-10-PCS | Mod: S$GLB,,, | Performed by: FAMILY MEDICINE

## 2023-06-23 PROCEDURE — 73562 XR KNEE 3 VIEW LEFT: ICD-10-PCS | Mod: 26,LT,, | Performed by: RADIOLOGY

## 2023-06-23 PROCEDURE — 1159F MED LIST DOCD IN RCRD: CPT | Mod: CPTII,S$GLB,, | Performed by: FAMILY MEDICINE

## 2023-06-23 PROCEDURE — 3008F PR BODY MASS INDEX (BMI) DOCUMENTED: ICD-10-PCS | Mod: CPTII,S$GLB,, | Performed by: FAMILY MEDICINE

## 2023-06-23 PROCEDURE — 73562 X-RAY EXAM OF KNEE 3: CPT | Mod: 26,LT,, | Performed by: RADIOLOGY

## 2023-06-23 PROCEDURE — 99213 OFFICE O/P EST LOW 20 MIN: CPT | Mod: S$GLB,,, | Performed by: FAMILY MEDICINE

## 2023-06-23 PROCEDURE — 99999 PR PBB SHADOW E&M-EST. PATIENT-LVL V: CPT | Mod: PBBFAC,,, | Performed by: FAMILY MEDICINE

## 2023-06-23 PROCEDURE — 73562 X-RAY EXAM OF KNEE 3: CPT | Mod: TC,LT

## 2023-06-23 PROCEDURE — 4010F PR ACE/ARB THEARPY RXD/TAKEN: ICD-10-PCS | Mod: CPTII,S$GLB,, | Performed by: FAMILY MEDICINE

## 2023-06-23 PROCEDURE — 3079F PR MOST RECENT DIASTOLIC BLOOD PRESSURE 80-89 MM HG: ICD-10-PCS | Mod: CPTII,S$GLB,, | Performed by: FAMILY MEDICINE

## 2023-06-23 PROCEDURE — 4010F ACE/ARB THERAPY RXD/TAKEN: CPT | Mod: CPTII,S$GLB,, | Performed by: FAMILY MEDICINE

## 2023-06-23 PROCEDURE — 3008F BODY MASS INDEX DOCD: CPT | Mod: CPTII,S$GLB,, | Performed by: FAMILY MEDICINE

## 2023-06-23 RX ORDER — DICLOFENAC SODIUM 10 MG/G
2 GEL TOPICAL 4 TIMES DAILY
Qty: 450 G | Refills: 11 | Status: SHIPPED | OUTPATIENT
Start: 2023-06-23

## 2023-06-23 RX ORDER — HYDROCODONE BITARTRATE AND ACETAMINOPHEN 10; 325 MG/1; MG/1
1 TABLET ORAL 3 TIMES DAILY
COMMUNITY

## 2023-06-23 RX ORDER — TOPIRAMATE 50 MG/1
TABLET, FILM COATED ORAL
Qty: 90 TABLET | Refills: 1 | Status: SHIPPED | OUTPATIENT
Start: 2023-06-23 | End: 2023-10-03 | Stop reason: SDUPTHER

## 2023-06-23 NOTE — PROGRESS NOTES
Subjective:      Patient ID: Alicia Soliz is a 43 y.o. female.    Chief Complaint: Leg Pain      Patient reports severe pain in her left leg and knee.  No recent injury or trauma to the area.  She reports she had discussed it with her pain management doctor Dr. Matson earlier this month and her neurologist when she saw her last week.  Her neurologist had increased dose of tizanidine-reports this has not helped.  Previous x-ray of right knee did show severe osteoarthritis, no imaging available of left knee  Patient reports her pain management doctor had told her she had rheumatoid arthritis    Leg Pain     Review of Systems   Musculoskeletal:  Positive for arthralgias and gait problem.   Past Medical History:   Diagnosis Date    Anemia     Arthritis     Cardiac arrest as complication of care     pt states she went into cardiac arrest from an allergic reaction to a medication    Depression     Encounter for blood transfusion     Hemiplegia due to old stroke     Hypertension     Morbid obesity with BMI of 45.0-49.9, adult 9/20/2018    Multiple sclerosis           Past Surgical History:   Procedure Laterality Date    APPENDECTOMY      CHOLECYSTECTOMY      COLONOSCOPY N/A 11/25/2020    Procedure: COLONOSCOPY;  Surgeon: Andrew Jenkins III, MD;  Location: [facility];  Service: Endoscopy;  Laterality: N/A;    ESOPHAGOGASTRODUODENOSCOPY N/A 02/19/2020    Procedure: EGD (ESOPHAGOGASTRODUODENOSCOPY);  Surgeon: Michael Navarrete MD;  Location: [facility];  Service: Endoscopy;  Laterality: N/A;    ESOPHAGOGASTRODUODENOSCOPY N/A 02/20/2020    Procedure: EGD (ESOPHAGOGASTRODUODENOSCOPY);  Surgeon: Michael Navarrete MD;  Location: [facility] OR;  Service: General;  Laterality: N/A;    ESOPHAGOGASTRODUODENOSCOPY N/A 11/25/2020    Procedure: EGD (ESOPHAGOGASTRODUODENOSCOPY);  Surgeon: Andrew Jenkins III, MD;  Location: [facility];  Service: Endoscopy;  Laterality: N/A;    HYSTERECTOMY      KNEE SURGERY      ROBOT-ASSISTED LAPAROSCOPIC SLEEVE  GASTRECTOMY USING DA ANTHONY XI N/A 02/20/2020    Procedure: XI ROBOTIC SLEEVE GASTRECTOMY;  Surgeon: Michael Navarrete MD;  Location: [facility] OR;  Service: General;  Laterality: N/A;    TENOPLASTY OF HAND Left 08/26/2021    Procedure: REPAIR, TENDON, HAND;  Surgeon: Joselito Lugo MD;  Location: [facility] OR;  Service: Orthopedics;  Laterality: Left;  Left RCL PIP Joint Repair/Recon with Arthrex Internal Brace.    TONSILLECTOMY      TOTAL REDUCTION MAMMOPLASTY  2022    TUBAL LIGATION       Family History   Problem Relation Age of Onset    Lupus Mother     Heart disease Mother     Hypertension Mother     Diabetes Father     Kidney disease Father     Cancer Maternal Aunt 40        breast    Breast cancer Maternal Aunt     Diabetes Maternal Aunt     COPD Maternal Aunt     Breast cancer Maternal Cousin     Ovarian cancer Maternal Cousin      Social History     Socioeconomic History    Marital status: Single    Number of children: 3   Occupational History     Employer: [employer]   Tobacco Use    Smoking status: Never     Passive exposure: Never    Smokeless tobacco: Never   Substance and Sexual Activity    Alcohol use: Yes     Comment: once a year     Drug use: Never    Sexual activity: Yes     Partners: Male     Birth control/protection: Surgical     Social Determinants of Health     Financial Resource Strain: Low Risk     Difficulty of Paying Living Expenses: Not hard at all   Food Insecurity: No Food Insecurity    Worried About Running Out of Food in the Last Year: Never true    Ran Out of Food in the Last Year: Never true   Transportation Needs: Unmet Transportation Needs    Lack of Transportation (Medical): Yes    Lack of Transportation (Non-Medical): No   Physical Activity: Insufficiently Active    Days of Exercise per Week: 4 days    Minutes of Exercise per Session: 30 min   Stress: Stress Concern Present    Feeling of Stress : Rather much   Social Connections: Unknown    Frequency of Communication with Friends and Family:  "Once a week    Frequency of Social Gatherings with Friends and Family: Twice a week    Active Member of Clubs or Organizations: Yes    Attends Club or Organization Meetings: 1 to 4 times per year    Marital Status:    Housing Stability: Low Risk     Unable to Pay for Housing in the Last Year: No    Number of Places Lived in the Last Year: 0    Unstable Housing in the Last Year: No     Review of patient's allergies indicates:   Allergen Reactions    Demerol [meperidine] Anaphylaxis     Other reaction(s): Itching    Dilaudid [hydromorphone (bulk)] Anaphylaxis     "coded" per pt  Patient can tolerate Lortab    Shellfish containing products Itching, Nausea And Vomiting and Swelling    Prednisone Itching     Other reaction(s): Itching    Tramadol      shaking       Objective:       /82 (BP Location: Left arm)   Pulse 80   Temp 98 °F (36.7 °C) (Tympanic)   Ht 5' 6" (1.676 m)   Wt 129.5 kg (285 lb 7.9 oz)   SpO2 98%   BMI 46.08 kg/m²   Physical Exam  Constitutional:       General: She is not in acute distress.     Appearance: Normal appearance. She is well-developed. She is obese. She is not ill-appearing or diaphoretic.   Musculoskeletal:         General: Tenderness (Generalized left knee) present. No swelling.      Comments: Resistance to range of motion of left knee secondary to pain   Neurological:      Mental Status: She is alert and oriented to person, place, and time.   Psychiatric:         Mood and Affect: Mood normal.         Behavior: Behavior normal.         Thought Content: Thought content normal.         Judgment: Judgment normal.     Assessment:     1. Chronic pain of left knee    2. Left leg pain      Plan:   Chronic pain of left knee  -     X-Ray Knee 3 View Left; Future; Expected date: 06/23/2023  -     Ambulatory referral/consult to Orthopedics; Future; Expected date: 06/30/2023    Left leg pain  -     X-Ray Knee 3 View Left; Future; Expected date: 06/23/2023    Other orders  -     " diclofenac sodium (VOLTAREN) 1 % Gel; Apply 2 g topically 4 (four) times daily.  Dispense: 450 g; Refill: 11    Will have x-ray done at other location today  Continue all other current medications.     Medication List with Changes/Refills   New Medications    DICLOFENAC SODIUM (VOLTAREN) 1 % GEL    Apply 2 g topically 4 (four) times daily.   Current Medications    CHOLECALCIFEROL, VITAMIN D3, (VITAMIN D3) 25 MCG (1,000 UNIT) CAPSULE    Take 2 capsules (2,000 Units total) by mouth once daily.    DIPHENHYDRAMINE (BENADRYL) 50 MG/ML INJECTION        DOXEPIN (SINEQUAN) 25 MG CAPSULE    Take 1 capsule (25 mg total) by mouth nightly as needed.    DOXEPIN (SINEQUAN) 75 MG CAPSULE    Take 75 mg by mouth.    DULOXETINE (CYMBALTA) 60 MG CAPSULE    TAKE 1 CAPSULE BY MOUTH EVERY DAY    ESOMEPRAZOLE (NEXIUM) 40 MG CAPSULE    Take 1 capsule (40 mg total) by mouth before breakfast.    GABAPENTIN (NEURONTIN) 300 MG CAPSULE    Take 3 capsules (900 mg total) by mouth 2 (two) times daily.    HYDROCODONE-ACETAMINOPHEN (NORCO)  MG PER TABLET    Take 1 tablet by mouth 3 (three) times daily.    LINACLOTIDE (LINZESS) 145 MCG CAP CAPSULE    Take 1 capsule (145 mcg total) by mouth before breakfast.    MUPIROCIN (BACTROBAN) 2 % OINTMENT    APPLY TOPICALLY TWICE A DAY    NABUMETONE (RELAFEN) 750 MG TABLET    Take 750 mg by mouth 2 (two) times daily as needed.    NALOXONE (NARCAN) 4 MG/ACTUATION SPRY    SMARTSIG:Both Nares    NEOMYCIN-POLYMYXIN-HYDROCORTISONE (CORTISPORIN) 3.5-10,000-1 MG/ML-UNIT/ML-% OTIC SUSPENSION    Place 3 drops into the right ear 3 (three) times daily.    OCREVUS 30 MG/ML SOLN        ONDANSETRON (ZOFRAN) 8 MG TABLET    Take 1 tablet (8 mg total) by mouth 2 (two) times daily.    SOLU-MEDROL, PF, 125 MG/2 ML SOLR        TIZANIDINE (ZANAFLEX) 4 MG TABLET    Take 1 tablet (4 mg total) by mouth every 8 (eight) hours as needed (muscle cramp).    TOPIRAMATE (TOPAMAX) 50 MG TABLET    TAKE 1 TABLET BY MOUTH IN THE MORNING  AND 2 TABLETS AT BEDTIME    TRIAMCINOLONE ACETONIDE 0.1% (KENALOG) 0.1 % OINTMENT    APPLY TO AFFECTED AREA TWICE A DAY    VALSARTAN (DIOVAN) 80 MG TABLET    Take 1 tablet (80 mg total) by mouth once daily.   Discontinued Medications    AMOXICILLIN-CLAVULANATE 875-125MG (AUGMENTIN) 875-125 MG PER TABLET    Take 1 tablet by mouth every 12 (twelve) hours.

## 2023-06-26 NOTE — PROGRESS NOTES
"  Physical Therapy Daily Treatment Note     Name: Alicia [place]oney [place]  Clinic Number: [id number]    Therapy Diagnosis:   Encounter Diagnoses   Name Primary?    Multiple sclerosis exacerbation Yes    Decreased strength, endurance, and mobility        Physician: Kole Carrasquillo MD    Visit Date: 6/27/2023    Physician Orders: PT Eval and Treat   Medical Diagnosis from Referral: G35 (ICD-10-CM) - Multiple sclerosis  Evaluation Date: 10/4/2022  Authorization Period Expiration: 12/31/23  Plan of Care Expiration:  7/29/23  Progress Note Due: due 30 days from 6/20/23  Visit # / Visits authorized: 25/40  FOTO: 5 (38% limitation)     Precautions: Standard, Fall, and MS (L shoulder had dislocated in the last event)     PTA Visit #: 0/5     Time In: 1:15 PM   Time Out: 2:00 PM (No bill past 1:45 PM)  Total Billable Time: 30 minutes    SUBJECTIVE     Pt reports: Transportation was late dropping her off again. Weather is greatly affecting her symptoms.   She was compliant with home exercise program.  Response to previous treatment: no issues  Functional change: walking longer/faster    Pre Pain: 0/10 R 4/10 L knee Location: anterolateral knee    OBJECTIVE     Objective Measures updated at progress report unless specified.     Treatment   Add medial hamstring L to help reduce knee pain L and improve tracking medially    Alicia received the treatments listed below:        therapeutic exercises to develop strength, flexibility, posture, and core stabilization for 15 minutes  including:  Heel slides (3 x 10)  Seated gastroc stretch (3 x 30")  Patient education on exercises to perform at home and RW recommendations    Deferred:  Nustep 5 min  Sit to stand 18 inch box 3x 10 (by third set she was much more alert)  Single arm dead lift 15# 2x 10 per arm  15# per hand dead lift 2x 8  Seated Marches focus upright posture and core stabilization; 3# on legs 2x12  Qped cat x 8  Hip add 70# x20  Hip Abd 55# x20  Leg extension 25# " "x20    neuromuscular re-education activities to improve: Balance, Coordination, Kinesthetic Sense, and Proprioception for 15 minutes. The following activities were included:    Quad set (20 x 5")    Eccentric SAQ (20 x 5")   Eccentric LAQ (20 x 5"    Deferred:    LAQ 3x 10 5# per ankle  Side hip abduction 2x 10 B  Leaning hip extension 3x 10 B  Single leg hip flexion with one hand for support (max cues on weight shift, glute activation and upright posture to engage the posterior chain better) 3x 10 per leg (RW for support but only one hand) Standing heel raises 2x 10 with UE support then 2x 8 without UE support  Ball toss with pink weighted ball 2x15 (pt had to retrieve it by bending forward/side bending and squatting with CGA to SBA for safety)  R single leg press 4 banded squat on shuttle 2x 10 with cues on hip adduction/internal rotation and heel pressure to reduce pushing from her toes and engage the hip/knee more  Seated LAQ 6# 3 sec hold at top 3x10 B  supine marches 2x10ea Dead lifting 10 # single arm 3x 5 R then L; B 10 # 3x 5  Single leg stool taps R foot 1x 15; 1x 14 L foot (tone limited her pace)  Seated hip adduction 3x 10 L  Seated TKE from 24 inch box to stand with purple theraband at knee 3x 10  Seated hamstring curl green therband for 3x 10 (seated on 24 in box with medial rotation engaged to avoid toeing out)  Seated hip flexion 4# 3x 10 L  Seated LAQ 4# 3x 10  Standing R hip flexion/internal rotation/ adduction 4# during gait to facilitate more neutral hip and better food clearance 160 ft with RW    Alicia received gait training to improve functional mobility and safety for 0 minutes, including:    Deferred:  Ambulation with SC for up to  40 ft x1  with cues on cane placement with transfers, step length and leading leg, hand to use for SC and safety on turns, weight shift and heel placement to help engage natural rolling heel to toe    Alicia performed therapeutic activities for 0 min " including:    Deferred:  Sit to stand with 10# ball 2x10   step over 3inch DB with RW   Step onto airex with RW x7  Goblet hold with sit to stand 15# 1x 5  Sit to stand R hand farmer's hold 15# 1x 10; L 1x 10  Dead lifting 15# per hand (30#) 2x 10  PT continued with gait with SC for endurance and core support to improve safe function with SC for 20 ft at a more functional pace    manual therapy techniques: were applied to left hip for 0 minutes, including: Soft tissue Mobilization   Deferred:  STM to L anterolateral thigh for increase blood flow and decrease pain     PT used modalities including ice to neck and low back of pt to reduce overheating from outside conditions as she is very heat sensitive with her MS  (ice on full session) (PT moved the ice from one place to another every 0 min to help cooler her off)    Patient Education and Home Exercises     Home Exercises Provided and Patient Education Provided     Education provided:     Written Home Exercises Provided: Patient instructed to cont prior HEP. Exercises were reviewed and Alicia was able to demonstrate them prior to the end of the session.  Alicia demonstrated good  understanding of the education provided. See EMR under Patient Instructions for exercises provided during therapy sessions    ASSESSMENT     Pt and PT discussed recent MD findings in regards to her L knee. PT provided patient with mobility and quad centered exercises today and recommended she try performing these at home throughout the day to help ease symptoms. Treatment today hindered due to transportation difficulties.     Alicia Is progressing well towards her goals.   Pt prognosis is Good.     Pt will continue to benefit from skilled outpatient physical therapy to address the deficits listed in the problem list box on initial evaluation, provide pt/family education and to maximize pt's level of independence in the home and community environment.     Pt's spiritual, cultural and educational  needs considered and pt agreeable to plan of care and goals.     Anticipated barriers to physical therapy: chronicity of condition and comorbidities    Goals:   Short Term Goals: In 3 weeks   1.I with HEP MET 12/6/20222  2.Pt to increase BLE strength by 1/2 grade to show improvements with sitting, standing, ambulating, bending, lifting activities.  progressing  3. Patient to improve 5x Sit<>Stand to less than 12 seconds to reduced risk of fall. met  4. Patient to improve TUG with RW to less than 25 seconds to reduce risk of falls. Progressing     Long Term Goals: In 6 weeks (Progressing) updated to 7/2923  1. Patient to demo increase in LE strength to 3+/5 with hip abduction on the L LE to show improvements with sitting, standing, ambulating, bending, lifting activities.  progressing  2. Patient to improve score on the FOTO to < or = to 48% to show improvement with QOL. met  3. Patient to improve 5x Sit<>Stand to less than 10 seconds to reduced risk of fall. progressing  4. Patient to improve TUG with RW to less than 20 seconds to reduce risk of falls. progressing     Plan      Update Certification Period: 3/23/23 extended to 7/29/23   Recommended Treatment Plan: 2 times per week for 6  weeks effective week of 6/20/23/23: Aquatic Therapy, Cervical/Lumbar Traction, Electrical Stimulation  , Gait Training, Manual Therapy, Moist Heat/ Ice, Neuromuscular Re-ed, Patient Education, Self Care, Therapeutic Exercise, and Therapeutic Activities    Yuly Alston, PT    6/27/2023

## 2023-06-27 ENCOUNTER — PATIENT MESSAGE (OUTPATIENT)
Dept: INTERNAL MEDICINE | Facility: CLINIC | Age: 43
End: 2023-06-27
Payer: MEDICARE

## 2023-06-27 ENCOUNTER — CLINICAL SUPPORT (OUTPATIENT)
Dept: REHABILITATION | Facility: HOSPITAL | Age: 43
End: 2023-06-27
Payer: MEDICARE

## 2023-06-27 DIAGNOSIS — R68.89 DECREASED STRENGTH, ENDURANCE, AND MOBILITY: ICD-10-CM

## 2023-06-27 DIAGNOSIS — G35 MULTIPLE SCLEROSIS EXACERBATION: Primary | ICD-10-CM

## 2023-06-27 DIAGNOSIS — R53.1 DECREASED STRENGTH, ENDURANCE, AND MOBILITY: ICD-10-CM

## 2023-06-27 DIAGNOSIS — Z74.09 DECREASED STRENGTH, ENDURANCE, AND MOBILITY: ICD-10-CM

## 2023-06-27 PROCEDURE — 97112 NEUROMUSCULAR REEDUCATION: CPT | Mod: PN

## 2023-06-27 PROCEDURE — 97110 THERAPEUTIC EXERCISES: CPT | Mod: PN

## 2023-06-28 ENCOUNTER — PATIENT MESSAGE (OUTPATIENT)
Dept: NEUROLOGY | Facility: CLINIC | Age: 43
End: 2023-06-28
Payer: MEDICARE

## 2023-06-28 DIAGNOSIS — G35 MULTIPLE SCLEROSIS: Primary | ICD-10-CM

## 2023-06-30 ENCOUNTER — OFFICE VISIT (OUTPATIENT)
Dept: SPORTS MEDICINE | Facility: CLINIC | Age: 43
End: 2023-06-30
Payer: MEDICARE

## 2023-06-30 DIAGNOSIS — M25.562 CHRONIC PAIN OF LEFT KNEE: ICD-10-CM

## 2023-06-30 DIAGNOSIS — M17.12 PRIMARY OSTEOARTHRITIS OF LEFT KNEE: Primary | ICD-10-CM

## 2023-06-30 DIAGNOSIS — G89.29 CHRONIC PAIN OF LEFT KNEE: ICD-10-CM

## 2023-06-30 PROCEDURE — 97110 THERAPEUTIC EXERCISES: CPT | Mod: GP,S$GLB,97, | Performed by: PHYSICIAN ASSISTANT

## 2023-06-30 PROCEDURE — 99213 PR OFFICE/OUTPT VISIT, EST, LEVL III, 20-29 MIN: ICD-10-PCS | Mod: 25,S$GLB,, | Performed by: PHYSICIAN ASSISTANT

## 2023-06-30 PROCEDURE — 1160F RVW MEDS BY RX/DR IN RCRD: CPT | Mod: CPTII,S$GLB,, | Performed by: PHYSICIAN ASSISTANT

## 2023-06-30 PROCEDURE — 99213 OFFICE O/P EST LOW 20 MIN: CPT | Mod: 25,S$GLB,, | Performed by: PHYSICIAN ASSISTANT

## 2023-06-30 PROCEDURE — 4010F ACE/ARB THERAPY RXD/TAKEN: CPT | Mod: CPTII,S$GLB,, | Performed by: PHYSICIAN ASSISTANT

## 2023-06-30 PROCEDURE — 1159F PR MEDICATION LIST DOCUMENTED IN MEDICAL RECORD: ICD-10-PCS | Mod: CPTII,S$GLB,, | Performed by: PHYSICIAN ASSISTANT

## 2023-06-30 PROCEDURE — 97110 PR THERAPEUTIC EXERCISES: ICD-10-PCS | Mod: GP,S$GLB,97, | Performed by: PHYSICIAN ASSISTANT

## 2023-06-30 PROCEDURE — 99999 PR PBB SHADOW E&M-EST. PATIENT-LVL IV: CPT | Mod: PBBFAC,,, | Performed by: PHYSICIAN ASSISTANT

## 2023-06-30 PROCEDURE — 99999 PR PBB SHADOW E&M-EST. PATIENT-LVL IV: ICD-10-PCS | Mod: PBBFAC,,, | Performed by: PHYSICIAN ASSISTANT

## 2023-06-30 PROCEDURE — 20610 LARGE JOINT ASPIRATION/INJECTION: L KNEE: ICD-10-PCS | Mod: LT,S$GLB,, | Performed by: PHYSICIAN ASSISTANT

## 2023-06-30 PROCEDURE — 4010F PR ACE/ARB THEARPY RXD/TAKEN: ICD-10-PCS | Mod: CPTII,S$GLB,, | Performed by: PHYSICIAN ASSISTANT

## 2023-06-30 PROCEDURE — 20610 DRAIN/INJ JOINT/BURSA W/O US: CPT | Mod: LT,S$GLB,, | Performed by: PHYSICIAN ASSISTANT

## 2023-06-30 PROCEDURE — 1159F MED LIST DOCD IN RCRD: CPT | Mod: CPTII,S$GLB,, | Performed by: PHYSICIAN ASSISTANT

## 2023-06-30 PROCEDURE — 1160F PR REVIEW ALL MEDS BY PRESCRIBER/CLIN PHARMACIST DOCUMENTED: ICD-10-PCS | Mod: CPTII,S$GLB,, | Performed by: PHYSICIAN ASSISTANT

## 2023-06-30 RX ORDER — TRIAMCINOLONE ACETONIDE 40 MG/ML
40 INJECTION, SUSPENSION INTRA-ARTICULAR; INTRAMUSCULAR
Status: DISCONTINUED | OUTPATIENT
Start: 2023-06-30 | End: 2023-06-30 | Stop reason: HOSPADM

## 2023-06-30 RX ADMIN — TRIAMCINOLONE ACETONIDE 40 MG: 40 INJECTION, SUSPENSION INTRA-ARTICULAR; INTRAMUSCULAR at 09:06

## 2023-06-30 NOTE — PATIENT INSTRUCTIONS
"Arthritis of the Knee    This information does not include all information related to this topic. For more information please visit the [employer] website using the following link: Knee Osteoarthritis    Arthritis is inflammation of one or more of your joints. Pain, swelling, and stiffness are the primary symptoms of arthritis. Any joint in the body may be affected by the disease, but it is particularly common in the knee. Knee arthritis can make it hard to do many everyday activities, such as walking or climbing stairs. It is a major cause of lost work time and a serious disability for many people.    The most common types of arthritis are osteoarthritis and rheumatoid arthritis, but there are more than 100 different forms. While arthritis is mainly an adult disease, some forms affect children. Although there is no cure for arthritis, there are many treatment options available to help manage pain and keep people staying active.    Anatomy  The knee is the largest and strongest joint in your body. It is made up of the lower end of the femur (thighbone), the upper end of the tibia (shinbone), and the patella (kneecap). The ends of the three bones that form the knee joint are covered with articular cartilage, a smooth, slippery substance that protects and cushions the bones as you bend and straighten your knee.    Two wedge-shaped pieces of cartilage called meniscus act as "shock absorbers" between your thighbone and shinbone. They are tough and rubbery to help cushion the joint and keep it stable.    The knee joint is surrounded by a thin lining called the synovial membrane. This membrane releases a fluid that lubricates the cartilage and reduces friction.        Description  The major types of arthritis that affect the knee are osteoarthritis, rheumatoid arthritis, and posttraumatic arthritis.    Osteoarthritis  Osteoarthritis is the most common form of arthritis in the knee. It is a " "degenerative, "wear-and-tear" type of arthritis that occurs most often in people 50 years of age and older, although it may occur in younger people, too. In osteoarthritis, the cartilage in the knee joint gradually wears away. As the cartilage wears away, it becomes frayed and rough, and the protective space between the bones decreases. This can result in bone rubbing on bone, and produce painful bone spurs. Osteoarthritis usually develops slowly and the pain it causes worsens over time.      Rheumatoid Arthritis  Rheumatoid arthritis is a chronic disease that attacks multiple joints throughout the body, including the knee joint. It is symmetrical, meaning that it usually affects the same joint on both sides of the body. In rheumatoid arthritis, the synovial membrane that covers the knee joint begins to swell. This results in knee pain and stiffness. Rheumatoid arthritis is an autoimmune disease. This means that the immune system attacks its own tissues. The immune system damages normal tissue (such as cartilage and ligaments) and softens the bone.    Posttraumatic Arthritis  Posttraumatic arthritis is form of arthritis that develops after an injury to the knee. For example, a broken bone may damage the joint surface and lead to arthritis years after the injury. Meniscal tears and ligament injuries can cause instability and additional wear on the knee joint which, over time, can result in arthritis.    Symptoms  A knee joint affected by arthritis may be painful and inflamed. Generally, the pain develops gradually over time, although sudden onset is also possible. There are other symptoms, as well:  The joint may become stiff and swollen, making it difficult to bend and straighten the knee.  Pain and swelling may be worse in the morning, or after sitting or resting.  Vigorous activity may cause pain to flare up.  Loose fragments of cartilage and other tissue can interfere with the smooth motion of joints. The knee " "may "lock" or "stick" during movement. It may creak, click, snap or make a grinding noise (crepitus).  Pain may cause a feeling of weakness or buckling in the knee.  Many people with arthritis note increased joint pain with changes in the weather.    Doctor Examination  During your appointment, your doctor will talk with you about your symptoms and medical history, conduct a physical examination, and possibly order diagnostic tests, such as x-rays or blood tests.    Physical Examination  During the physical examination, your doctor will look for:  Joint swelling, warmth, or redness  Tenderness around the knee  Range of passive (assisted) and active (self-directed) motion  Instability of the joint  Crepitus (a grating sensation inside the joint) with movement  Pain when weight is placed on the knee  Problems with your gait (the way you walk)  Any signs of injury to the muscles, tendons, and ligaments surrounding the knee  Involvement of other joints (an indication of rheumatoid arthritis)    Imaging Tests  X-rays: These imaging tests provide detailed pictures of dense structures, such as bone. They can help distinguish among various forms of arthritis. X-rays of an arthritic knee may show a narrowing of the joint space, changes in the bone, and the formation of bone spurs (osteophytes).  Other tests: Occasionally, a magnetic resonance imaging (MRI) scan or a computerized tomography (CT) scan may be needed to determine the condition of the bone and soft tissues of your knee.          Laboratory Tests  Your doctor may also recommend blood tests to determine which type of arthritis you have. With some types of arthritis, including rheumatoid arthritis, blood tests will help with a proper diagnosis.    Treatment  There is no cure for arthritis but there are a number of treatments that may help relieve the pain and disability it can cause.    Nonsurgical Treatment  As with other arthritic conditions, initial treatment of " "arthritis of the knee is nonsurgical. Your doctor may recommend a range of treatment options.    Lifestyle modifications  Some changes in your daily life can protect your knee joint and slow the progress of arthritis.  Minimize activities that aggravate the condition, such as climbing stairs.  Exercise is recommended for osteoarthritis to improve pain and function. Switching from high-impact activities (like jogging or tennis) to lower impact activities (like swimming or cycling) will enable you to be active while putting less stress on your knee. Balance, agility, and coordination exercises, combined with traditional exercise, may help to improve function and walking speed.  Losing weight can reduce stress on the knee joint, resulting in less pain and increased function.    Physical therapy  Specific exercises can help increase range of motion and flexibility, as well as help strengthen the muscles in your leg. Your doctor or a physical therapist can help develop an individualized exercise program that meets your needs and lifestyle.  Examples of knee exercises can be found at the following link: Knee Conditioning Program    Assistive devices  Using devices such as a cane, or wearing a brace or knee sleeve can be helpful. A brace assists with stability and function, and may be especially helpful if the arthritis is centered on one side of the knee. There are two types of braces that are often used for knee arthritis: An "" brace shifts weight away from the affected portion of the knee, while a "support" brace helps support the entire knee load.    Other remedies  Applying heat or ice, or wearing elastic bandages to provide support to the knee may provide some relief from pain.    Medications  Several types of drugs are useful in treating arthritis of the knee. Because people respond differently to medications, your doctor will work closely with you to determine the medications and dosages that are safe and " "effective for you.    Over-the-counter, non-narcotic pain relievers and anti-inflammatory medications are usually the first choice of therapy for arthritis of the knee. Acetaminophen is a simple, over-the-counter pain reliever that can be effective in reducing arthritis pain. Like all medications, over-the-counter pain relievers can cause side effects and interact with other medications you are taking. Be sure to discuss potential side effects with your doctor.    Another type of pain reliever is a nonsteroidal anti-inflammatory drug, or NSAID (pronounced "en-said"). NSAIDs, such as ibuprofen and naproxen, are available both over-the-counter and by prescription and in oral and topical (gel) forms. Oral NSAIDs are recommended to improve pain and function in people with knee osteoarthritis. Topical NSAIDs can help improve function and quality of life for people with knee osteoarthritis. However, NSAIDs should be used with caution, or avoided, in people with certain health conditions, such as coronary artery disease, congestive heart failure, and chronic kidney disease. Talk to your doctor about whether NSAIDs are right for you.    A PULIDO-2 inhibitor is a special type of NSAID that may cause fewer gastrointestinal side effects. Common brand names of PULIDO-2 inhibitors include Celebrex (celecoxib) and Mobic (meloxicam, which is a partial PULIDO-2 inhibitor). A PULIDO-2 inhibitor reduces pain and inflammation so that you can function better. If you are taking a PULIDO-2 inhibitor, you should not use a traditional NSAID (prescription or over-the-counter). Be sure to tell your doctor if you have had a heart attack, stroke, angina, blood clot, hypertension, or if you are sensitive to aspirin, sulfa drugs, or other NSAIDs.    Corticosteroids (also known as cortisone) are powerful anti-inflammatory agents that can be injected into the joint. These injections provide pain relief and reduce inflammation; however, the effects do not last " "indefinitely. Your doctor may recommend limiting the number of injections to three or four per year, per joint, due to possible side effects. In some cases, pain and swelling may "flare" immediately after the injection, and the potential exists for long-term joint damage or infection. With frequent repeated injections, or injections over an extended period of time, joint damage can actually increase rather than decrease.    Disease-modifying anti-rheumatic drugs (DMARDs) are used to slow the progression of rheumatoid arthritis. Drugs like methotrexate, sulfasalazine, and hydroxychloroquine are commonly prescribed.  In addition, biologic DMARDs like etanercept (Enbrel) and adalimumab (Humira) may reduce the body's overactive immune response. Because there are many different drugs today for rheumatoid arthritis, a rheumatology specialist is often required to effectively manage medications.    Glucosamine and chondroitin sulfate, substances found naturally in joint cartilage, can be taken as dietary supplements. Although patient reports indicate that these supplements may relieve pain, there is no evidence to support the use of glucosamine and chondroitin sulfate to decrease or reverse the progression of arthritis.     In addition, the [place] Food and Drug Administration does not test dietary supplements before they are sold to consumers. These compounds may cause side effects, as well as negative interactions with other medications. Always consult your doctor before taking dietary supplements.    Alternative therapies  Many alternative forms of therapy are unproven, but may be helpful to try, provided you find a qualified practitioner and keep your doctor informed of your decision. Alternative therapies to treat pain include the use of acupuncture, magnetic pulse therapy, platelet-rich plasma, and stem cell injections.  Acupuncture uses fine needles to stimulate specific body areas to relieve pain or temporarily numb an " area. Although it is used in many parts of the world and evidence suggests that it can help ease the pain of arthritis, there are few scientific studies of its effectiveness. Be sure your acupuncturist is certified, and do not hesitate to ask about his or her sterilization practices.  Magnetic pulse therapy is painless and works by applying a pulsed signal to the knee, which is placed in an electromagnetic field. Like many alternative therapies, magnetic pulse therapy has yet to be proven.  Treatments such as platelet-rich plasma (PRP) and stem cell injections involve taking cells from your own body and re-injecting them into a painful joint.  PRP uses a component of your own blood, platelets, that have been  from your blood, concentrated, and injected into your knee. The platelets contain growth factors thought to be helpful in reducing the symptoms of inflammation.   Stem cells are precursor cells that can also be taken from your own body and injected into your knee. Since they are basic cells, they may have potential to grow into new tissue and thus heal damaged joint surfaces.  While both treatments show promise, clinical studies have yet to confirm their value in treating osteoarthritis.    Surgical Treatment  Your doctor may recommend surgery if your pain from arthritis causes disability and is not relieved with nonsurgical treatment. As with all surgeries, there are some risks and possible complications with different knee procedures. Your doctor will discuss the possible complications with you before your operation.    Arthroscopy  During arthroscopy, doctors use small incisions and thin instruments to diagnose and treat joint problems. Arthroscopic surgery is not often used to treat arthritis of the knee. In cases where osteoarthritis is accompanied by a degenerative meniscal tear, arthroscopic surgery may be recommended to treat the torn meniscus.    Cartilage grafting  Normal, healthy cartilage  tissue may be taken from another part of the knee or from a tissue bank to fill a hole in the articular cartilage. This procedure is typically considered only for younger patients who have small areas of cartilage damage.    Synovectomy  The joint lining damaged by rheumatoid arthritis is removed to reduce pain and swelling.    Osteotomy   In a knee osteotomy, either the tibia (shinbone) or femur (thighbone) is cut and then reshaped to relieve pressure on the knee joint. Knee osteotomy is used when you have early-stage osteoarthritis that has damaged just one side of the knee joint. By shifting your weight off the damaged side of the joint, an osteotomy can relieve pain and significantly improve function in your arthritic knee.    Total or partial knee replacement (arthroplasty)   Your doctor will remove the damaged cartilage and bone, and then position new metal or plastic joint surfaces to restore the function of your knee.        Recovery  After any type of surgery for arthritis of the knee, there is a period of recovery. Recovery time and rehabilitation depends on the type of surgery performed. Your doctor may recommend physical therapy to help you regain strength in your knee and to restore range of motion. Depending upon your procedure, you may need to wear a knee brace, or use crutches or a cane for a time. In most cases, surgery relieves pain and makes it possible to perform daily activities more easily.    Links  [employer] Clinical Practice Guideline on the Management of Osteoarthritis of the Knee  Knee Osteoarthritis  Knee Conditioning Program

## 2023-06-30 NOTE — PROGRESS NOTES
Orthopaedics Sports Medicine     Knee Initial Visit         6/30/2023    Referring MD: Braden Dumont MD    Chief Complaint   Patient presents with    Left Knee - Pain       History of Present Illness:   Alicia Soliz is a 43 y.o. year old female patient presents with pain and dysfunction involving the LEFT knee.     Onset of the symptoms was  several months ago, has been worsening     Inciting event: no acute injury or trauma.     Current symptoms include  medial-sided knee pain, limited range of motion, night pain.     Pain is aggravated by ambulation, prolonged standing, prolonged sitting    Evaluation to date: X-ray.     Treatment to date: rest, activity modification, oral anti-inflammatories, tylenol, voltaren gel, physical therapy      Past Medical History:   Past Medical History:   Diagnosis Date    Anemia     Arthritis     Cardiac arrest as complication of care     pt states she went into cardiac arrest from an allergic reaction to a medication    Depression     Encounter for blood transfusion     Hemiplegia due to old stroke     Hypertension     Morbid obesity with BMI of 45.0-49.9, adult 9/20/2018    Multiple sclerosis        Past Surgical History:   Past Surgical History:   Procedure Laterality Date    APPENDECTOMY      CHOLECYSTECTOMY      COLONOSCOPY N/A 11/25/2020    Procedure: COLONOSCOPY;  Surgeon: Andrew Jenkins III, MD;  Location: [facility];  Service: Endoscopy;  Laterality: N/A;    ESOPHAGOGASTRODUODENOSCOPY N/A 02/19/2020    Procedure: EGD (ESOPHAGOGASTRODUODENOSCOPY);  Surgeon: Michael Navarrete MD;  Location: [facility];  Service: Endoscopy;  Laterality: N/A;    ESOPHAGOGASTRODUODENOSCOPY N/A 02/20/2020    Procedure: EGD (ESOPHAGOGASTRODUODENOSCOPY);  Surgeon: Michael Navarrete MD;  Location: [facility];  Service: General;  Laterality: N/A;    ESOPHAGOGASTRODUODENOSCOPY N/A 11/25/2020    Procedure: EGD (ESOPHAGOGASTRODUODENOSCOPY);  Surgeon: Andrew Jenkins III, MD;  Location: [facility];   Service: Endoscopy;  Laterality: N/A;    HYSTERECTOMY      KNEE SURGERY      ROBOT-ASSISTED LAPAROSCOPIC SLEEVE GASTRECTOMY USING DA ANTHONY XI N/A 02/20/2020    Procedure: XI ROBOTIC SLEEVE GASTRECTOMY;  Surgeon: Michael Navarrete MD;  Location: [facility] OR;  Service: General;  Laterality: N/A;    TENOPLASTY OF HAND Left 08/26/2021    Procedure: REPAIR, TENDON, HAND;  Surgeon: Joselito Lugo MD;  Location: [facility] OR;  Service: Orthopedics;  Laterality: Left;  Left RCL PIP Joint Repair/Recon with Arthrex Internal Brace.    TONSILLECTOMY      TOTAL REDUCTION MAMMOPLASTY  2022    TUBAL LIGATION         Medications:  Patient's Medications   New Prescriptions    No medications on file   Previous Medications    CHOLECALCIFEROL, VITAMIN D3, (VITAMIN D3) 25 MCG (1,000 UNIT) CAPSULE    Take 2 capsules (2,000 Units total) by mouth once daily.    DICLOFENAC SODIUM (VOLTAREN) 1 % GEL    Apply 2 g topically 4 (four) times daily.    DIPHENHYDRAMINE (BENADRYL) 50 MG/ML INJECTION        DOXEPIN (SINEQUAN) 25 MG CAPSULE    Take 1 capsule (25 mg total) by mouth nightly as needed.    DOXEPIN (SINEQUAN) 75 MG CAPSULE    Take 75 mg by mouth.    DULOXETINE (CYMBALTA) 60 MG CAPSULE    TAKE 1 CAPSULE BY MOUTH EVERY DAY    ESOMEPRAZOLE (NEXIUM) 40 MG CAPSULE    Take 1 capsule (40 mg total) by mouth before breakfast.    GABAPENTIN (NEURONTIN) 300 MG CAPSULE    Take 3 capsules (900 mg total) by mouth 2 (two) times daily.    HYDROCODONE-ACETAMINOPHEN (NORCO)  MG PER TABLET    Take 1 tablet by mouth 3 (three) times daily.    LINACLOTIDE (LINZESS) 145 MCG CAP CAPSULE    Take 1 capsule (145 mcg total) by mouth before breakfast.    MUPIROCIN (BACTROBAN) 2 % OINTMENT    APPLY TOPICALLY TWICE A DAY    NABUMETONE (RELAFEN) 750 MG TABLET    Take 750 mg by mouth 2 (two) times daily as needed.    NALOXONE (NARCAN) 4 MG/ACTUATION SPRY    SMARTSIG:Both Nares    NEOMYCIN-POLYMYXIN-HYDROCORTISONE (CORTISPORIN) 3.5-10,000-1 MG/ML-UNIT/ML-% OTIC SUSPENSION    " Place 3 drops into the right ear 3 (three) times daily.    OCREVUS 30 MG/ML SOLN        ONDANSETRON (ZOFRAN) 8 MG TABLET    Take 1 tablet (8 mg total) by mouth 2 (two) times daily.    SOLU-MEDROL, PF, 125 MG/2 ML SOLR        TIZANIDINE (ZANAFLEX) 4 MG TABLET    Take 1 tablet (4 mg total) by mouth every 8 (eight) hours as needed (muscle cramp).    TOPIRAMATE (TOPAMAX) 50 MG TABLET    TAKE 1 TABLET BY MOUTH IN THE MORNING AND 2 TABLETS AT BEDTIME    TRIAMCINOLONE ACETONIDE 0.1% (KENALOG) 0.1 % OINTMENT    APPLY TO AFFECTED AREA TWICE A DAY    VALSARTAN (DIOVAN) 80 MG TABLET    Take 1 tablet (80 mg total) by mouth once daily.   Modified Medications    No medications on file   Discontinued Medications    No medications on file        Allergies:   Review of patient's allergies indicates:   Allergen Reactions    Demerol [meperidine] Anaphylaxis     Other reaction(s): Itching    Dilaudid [hydromorphone (bulk)] Anaphylaxis     "coded" per pt  Patient can tolerate Lortab    Shellfish containing products Itching, Nausea And Vomiting and Swelling    Prednisone Itching     Other reaction(s): Itching    Tramadol      shaking       Social History:   alcohol use: She reports current alcohol use.  tobacco use: She reports that she has never smoked. She has never been exposed to tobacco smoke. She has never used smokeless tobacco.    Review of systems:  history of recent illness, fevers, shakes, or chills: no  history of cardiac problems or chest pain: HTN  history of of pulmonary problems or asthma: no  history of diabetes: no  history of prior DVT or clotting problems: no  history of sleep apnea: no  History of multiple sclerosis with left sided weakness    Physical Examination:  Estimated body mass index is 46.08 kg/m² as calculated from the following:    Height as of 6/23/23: 5' 6" (1.676 m).    Weight as of 6/23/23: 129.5 kg (285 lb 7.9 oz).    Standing exam  stance: varus alignment, no significant leg-length " discrepancy  gait: antalgic, ambulates with walker     Knee      RIGHT  LEFT  Skin:     Intact   Intact  ROM:     5-120  5-110  Effusion:    Neg   Neg  Medial joint line tenderness:  +   +  Lateral joint line tenderness:  Neg   Neg  Carmella:     Neg   Neg  Patella crepitus:   +   +  Patella tenderness:   Neg   Neg  Patella grind:      Neg   Neg  Lachman:    Neg   Neg  Pivot shift:    Neg   Neg  Valgus stress:    Neg   Neg  Varus stress:    Neg   Neg  Posterior drawer:   Neg   Neg  N-V               intact  intact  Hip:    nml    nml   Lower extremity edema: Negative negative    Neurovascular exam  - motor function grossly intact bilaterally to hip flexion, knee extension and flexion, ankle dorsiflexion and plantarflexion  - sensation intact to light touch bilaterally to femoral, tibial, tibial and peroneal distributions  - symmetrical pedal pulses    Imaging:   XR Results:  Results for orders placed during the hospital encounter of 06/23/23    X-Ray Knee 3 View Left    Narrative  EXAM: XR KNEE 3 VIEW LEFT    CLINICAL INDICATION:   Pain in left knee    FINDINGS:  No comparison studies are available.  AP, oblique and lateral views of the left knee were submitted for interpretation.  There is a knee joint effusion.    Alignment is satisfactory. No     fractures, dislocations, or erosive arthritic change.  Negative for radiopaque foreign bodies or air in the soft tissues.  Joint spaces are well-maintained.  Tricompartment osteophyte information was significantly involving the medial tibiofemoral compartment.  Small fabella.  Distal femur bone island.    Impression  1.  There is a knee joint effusion.  Joint effusions can be associated with trauma, infection or synovitis.  No definite underlying fracture is seen, however.  2.  Mild tricompartment degenerative changes most significantly involving the medial tibiofemoral compartment.  3.  Incidental findings as noted above.    Finalized on: 6/23/2023 1:25 PM By:  Bhanu  Henrique WILLS  [facility]# [id number]      2023-06-23 13:27:[id number]    R        Physician Read: I agree with the above impression, Kellgren kelsie grade 2    Impression:  43 y.o. female with primary osteoarthritis of left knee    Plan:  Discussed diagnosis and treatment options with the patient today. Her history, physical exam, and imaging is consistent with primary osteoarthritis of the left knee  We discussed the natural progression of osteoarthritis and different treatment options.  Treatment options include rest, activity modification, lifestyle modifications such as weight loss, oral anti-inflammatories, knee bracing, formal physical therapy, different injection types. Injection types include corticosteroid injections versus viscosupplementation. She has not tried either of these for her left knee thus far.  Her treatment for her left knee has included rest, activity modification, weight loss, oral anti-inflammatories, tylenol, voltaren gel, physical therapy. Despite these interventions she still continues to have symptoms that affect her ADLs  I recommend we proceed with a left knee corticosteroid injection as well as initiation into a home exercise program specifically for her knees  Left knee CSI given in clinic, patient tolerated the procedure well with no immediate complications  8 minutes were spent developing, teaching, and performing a home exercise program.  A written summary was provided and all questions were answered.  This service was performed by Alethea Gordon PA-C under direction of Alethea Gordon PA-C.  CPT 87152-YK.  I would like to see how she does with the steroid injection.  If she does well with a steroid injection I do think she would benefit from viscosupplementation in the future  Follow up with me in about 2 months          Alethea Gordon PA-C  Sports Medicine Physician Assistant       Disclaimer: This note was prepared using a voice recognition system and is likely to have sound  alike errors within the text.

## 2023-06-30 NOTE — PROCEDURES
Large Joint Aspiration/Injection: L knee    Date/Time: 6/30/2023 9:00 AM  Performed by: Alethea Gordon PA-C  Authorized by: Alethea Gordon PA-C     Consent Done?:  Yes (Verbal)  Indications:  Joint swelling and pain  Site marked: the procedure site was marked    Timeout: prior to procedure the correct patient, procedure, and site was verified    Prep: patient was prepped and draped in usual sterile fashion      Local anesthesia used?: Yes    Local anesthetic:  Topical anesthetic    Details:  Needle Size:  22 G  Ultrasonic Guidance for needle placement?: No    Approach:  Anterolateral  Location:  Knee  Site:  L knee  Medications:  40 mg triamcinolone acetonide 40 mg/mL  Aspirate amount (mL):  0  Patient tolerance:  Patient tolerated the procedure well with no immediate complications

## 2023-07-10 ENCOUNTER — PATIENT MESSAGE (OUTPATIENT)
Dept: SPORTS MEDICINE | Facility: CLINIC | Age: 43
End: 2023-07-10
Payer: MEDICARE

## 2023-07-11 ENCOUNTER — CLINICAL SUPPORT (OUTPATIENT)
Dept: REHABILITATION | Facility: HOSPITAL | Age: 43
End: 2023-07-11
Payer: MEDICARE

## 2023-07-11 DIAGNOSIS — R53.1 DECREASED STRENGTH, ENDURANCE, AND MOBILITY: ICD-10-CM

## 2023-07-11 DIAGNOSIS — R68.89 DECREASED STRENGTH, ENDURANCE, AND MOBILITY: ICD-10-CM

## 2023-07-11 DIAGNOSIS — G35 MULTIPLE SCLEROSIS EXACERBATION: Primary | ICD-10-CM

## 2023-07-11 DIAGNOSIS — Z74.09 DECREASED STRENGTH, ENDURANCE, AND MOBILITY: ICD-10-CM

## 2023-07-11 PROCEDURE — 97110 THERAPEUTIC EXERCISES: CPT | Mod: HCNC,PN,CQ

## 2023-07-11 PROCEDURE — 97530 THERAPEUTIC ACTIVITIES: CPT | Mod: HCNC,PN,CQ

## 2023-07-11 PROCEDURE — 97112 NEUROMUSCULAR REEDUCATION: CPT | Mod: HCNC,PN,CQ

## 2023-07-11 NOTE — PROGRESS NOTES
"  Physical Therapy Daily Treatment Note     Name: Alicia Mahoney Waterproof  Clinic Number: [id number]    Therapy Diagnosis:   Encounter Diagnoses   Name Primary?    Multiple sclerosis exacerbation Yes    Decreased strength, endurance, and mobility        Physician: Kole Carrasquillo MD    Visit Date: 7/11/2023    Physician Orders: PT Eval and Treat   Medical Diagnosis from Referral: G35 (ICD-10-CM) - Multiple sclerosis  Evaluation Date: 10/4/2022  Authorization Period Expiration: 12/31/23  Plan of Care Expiration:  7/29/23  Progress Note Due: due 30 days from 6/20/23  Visit # / Visits authorized: 27/40  FOTO: 5 (38% limitation)     Precautions: Standard, Fall, and MS (L shoulder had dislocated in the last event)     PTA Visit #: 1/5     Time In: 1:30 PM   Time Out: 2:25 PM   Total Billable Time: 55 minutes    SUBJECTIVE     Pt reports: the L knee is still hurting quite a bit, has not felt any relief since injection  She was compliant with home exercise program.  Response to previous treatment: no issues  Functional change: walking longer/faster    Pre Pain: 5/10  Location: anterolateral L knee    OBJECTIVE     Objective Measures updated at progress report unless specified.     Treatment     Alicia received the treatments listed below:    (Exercises performed today in [facility]OLD)     therapeutic exercises to develop strength, flexibility, posture, and core stabilization for 16 minutes  including:  Shuttle DL squat, 6 bands 2'  Shuttle SL squat 3 bands 1' ea  Standing heel raises 2x10   Single arm deadlift 5x 10#, 2x5 15#    Deferred:  Nustep 5 min  Leg extension 25# x20    neuromuscular re-education activities to improve: Balance, Coordination, Kinesthetic Sense, and Proprioception for 14 minutes. The following activities were included:    Quad set (20 x 5")   Eccentric SAQ (20 x 5")   Eccentric LAQ (2x5x5")   Seated HS digs (2x5x5")   Upright posture education and cueing    Deferred:  LAQ 3x 10 5# per ankle  Side hip abduction 2x " "10 B  Leaning hip extension 3x 10 B  Seated hip adduction 3x 10 L  Seated TKE from 24 inch box to stand with purple theraband at knee 3x 10  Seated hamstring curl green therband for 3x 10 (seated on 24 in box with medial rotation engaged to avoid toeing out)  Seated hip flexion 4# 3x 10 L  Standing R hip flexion/internal rotation/ adduction 4# during gait to facilitate more neutral hip and better food clearance 160 ft with RW    Alicia received gait training to improve functional mobility and safety for 0 minutes, including:    Deferred:  Ambulation with SC for up to  40 ft x1  with cues on cane placement with transfers, step length and leading leg, hand to use for SC and safety on turns, weight shift and heel placement to help engage natural rolling heel to toe    Alicia performed therapeutic activities for 25 min including:   Sit-stand from 24" with OH ball lift 4x5   Side-stepping in // bars, 2 laps   Stool taps in // bars, 3x10   Education    Deferred:  Sit to stand with 10# ball 2x10   step over 3inch DB with RW   Step onto airex with RW x7  Goblet hold with sit to stand 15# 1x 5  Sit to stand R hand farmer's hold 15# 1x 10; L 1x 10    manual therapy techniques: were applied to left hip for 0 minutes, including: Soft tissue Mobilization       Patient Education and Home Exercises     Home Exercises Provided and Patient Education Provided     Education provided:   - pacing and rest breaks/staying on task  - importance of proper form over weight/reps    Written Home Exercises Provided: Patient instructed to cont prior HEP. Exercises were reviewed and Alicia was able to demonstrate them prior to the end of the session.  Alicia demonstrated good  understanding of the education provided. See EMR under Patient Instructions for exercises provided during therapy sessions    ASSESSMENT     Continued to focus on improving overall mobility and strength throughout today's treatment session, continuing with both isometric muscle " specific strengthening and full body functional strength/endurance activities. Education provided on importance of proper form and safety over increasing weight/reps, patient demonstrates fair understanding. Patient responding well to session overall, still requiring increased time to complete most tasks and rest breaks throughout. Patient reports more pain in L knee with open chain activities this date vs. WB functional activities.     Alicia Is progressing well towards her goals.   Pt prognosis is Good.     Pt will continue to benefit from skilled outpatient physical therapy to address the deficits listed in the problem list box on initial evaluation, provide pt/family education and to maximize pt's level of independence in the home and community environment.     Pt's spiritual, cultural and educational needs considered and pt agreeable to plan of care and goals.     Anticipated barriers to physical therapy: chronicity of condition and comorbidities    Goals:   Short Term Goals: In 3 weeks   1.I with HEP MET 12/6/20222  2.Pt to increase BLE strength by 1/2 grade to show improvements with sitting, standing, ambulating, bending, lifting activities.  progressing  3. Patient to improve 5x Sit<>Stand to less than 12 seconds to reduced risk of fall. met  4. Patient to improve TUG with RW to less than 25 seconds to reduce risk of falls. Progressing     Long Term Goals: In 6 weeks (Progressing) updated to 7/2923  1. Patient to demo increase in LE strength to 3+/5 with hip abduction on the L LE to show improvements with sitting, standing, ambulating, bending, lifting activities.  progressing  2. Patient to improve score on the FOTO to < or = to 48% to show improvement with QOL. met  3. Patient to improve 5x Sit<>Stand to less than 10 seconds to reduced risk of fall. progressing  4. Patient to improve TUG with RW to less than 20 seconds to reduce risk of falls. progressing     Plan      Update Certification Period: 3/23/23  extended to 7/29/23     Recommended Treatment Plan: 2 times per week for 6  weeks effective week of 6/20/23/23: Aquatic Therapy, Cervical/Lumbar Traction, Electrical Stimulation  , Gait Training, Manual Therapy, Moist Heat/ Ice, Neuromuscular Re-ed, Patient Education, Self Care, Therapeutic Exercise, and Therapeutic Activities    Pallavi Bro, PTA    7/11/2023

## 2023-07-12 ENCOUNTER — LAB VISIT (OUTPATIENT)
Dept: LAB | Facility: HOSPITAL | Age: 43
End: 2023-07-12
Attending: STUDENT IN AN ORGANIZED HEALTH CARE EDUCATION/TRAINING PROGRAM
Payer: MEDICARE

## 2023-07-12 DIAGNOSIS — G35 MULTIPLE SCLEROSIS: ICD-10-CM

## 2023-07-12 DIAGNOSIS — E55.9 VITAMIN D DEFICIENCY: ICD-10-CM

## 2023-07-12 LAB
BASOPHILS # BLD AUTO: 0.04 K/UL (ref 0–0.2)
BASOPHILS NFR BLD: 0.7 % (ref 0–1.9)
DIFFERENTIAL METHOD: ABNORMAL
EOSINOPHIL # BLD AUTO: 0.1 K/UL (ref 0–0.5)
EOSINOPHIL NFR BLD: 1.2 % (ref 0–8)
ERYTHROCYTE [DISTWIDTH] IN BLOOD BY AUTOMATED COUNT: 17.2 % (ref 11.5–14.5)
HCT VFR BLD AUTO: 35.6 % (ref 37–48.5)
HGB BLD-MCNC: 10.2 G/DL (ref 12–16)
IMM GRANULOCYTES # BLD AUTO: 0.02 K/UL (ref 0–0.04)
IMM GRANULOCYTES NFR BLD AUTO: 0.3 % (ref 0–0.5)
LYMPHOCYTES # BLD AUTO: 2.7 K/UL (ref 1–4.8)
LYMPHOCYTES NFR BLD: 45.8 % (ref 18–48)
MCH RBC QN AUTO: 21 PG (ref 27–31)
MCHC RBC AUTO-ENTMCNC: 28.7 G/DL (ref 32–36)
MCV RBC AUTO: 73 FL (ref 82–98)
MONOCYTES # BLD AUTO: 0.5 K/UL (ref 0.3–1)
MONOCYTES NFR BLD: 8.3 % (ref 4–15)
NEUTROPHILS # BLD AUTO: 2.5 K/UL (ref 1.8–7.7)
NEUTROPHILS NFR BLD: 43.7 % (ref 38–73)
NRBC BLD-RTO: 0 /100 WBC
PLATELET # BLD AUTO: 273 K/UL (ref 150–450)
PMV BLD AUTO: 11 FL (ref 9.2–12.9)
RBC # BLD AUTO: 4.85 M/UL (ref 4–5.4)
WBC # BLD AUTO: 5.79 K/UL (ref 3.9–12.7)

## 2023-07-12 PROCEDURE — 86704 HEP B CORE ANTIBODY TOTAL: CPT | Mod: HCNC | Performed by: STUDENT IN AN ORGANIZED HEALTH CARE EDUCATION/TRAINING PROGRAM

## 2023-07-12 PROCEDURE — 87340 HEPATITIS B SURFACE AG IA: CPT | Mod: HCNC | Performed by: STUDENT IN AN ORGANIZED HEALTH CARE EDUCATION/TRAINING PROGRAM

## 2023-07-12 PROCEDURE — 85025 COMPLETE CBC W/AUTO DIFF WBC: CPT | Mod: HCNC | Performed by: STUDENT IN AN ORGANIZED HEALTH CARE EDUCATION/TRAINING PROGRAM

## 2023-07-12 PROCEDURE — 80053 COMPREHEN METABOLIC PANEL: CPT | Mod: HCNC | Performed by: STUDENT IN AN ORGANIZED HEALTH CARE EDUCATION/TRAINING PROGRAM

## 2023-07-12 PROCEDURE — 82306 VITAMIN D 25 HYDROXY: CPT | Mod: HCNC | Performed by: STUDENT IN AN ORGANIZED HEALTH CARE EDUCATION/TRAINING PROGRAM

## 2023-07-12 PROCEDURE — 0361U NEURFLMNT LT CHN DIG IA QUAN: CPT | Mod: HCNC | Performed by: STUDENT IN AN ORGANIZED HEALTH CARE EDUCATION/TRAINING PROGRAM

## 2023-07-12 PROCEDURE — 82784 ASSAY IGA/IGD/IGG/IGM EACH: CPT | Mod: 59,HCNC | Performed by: STUDENT IN AN ORGANIZED HEALTH CARE EDUCATION/TRAINING PROGRAM

## 2023-07-13 ENCOUNTER — TELEPHONE (OUTPATIENT)
Dept: REHABILITATION | Facility: HOSPITAL | Age: 43
End: 2023-07-13
Payer: MEDICARE

## 2023-07-13 LAB
25(OH)D3+25(OH)D2 SERPL-MCNC: 29 NG/ML (ref 30–96)
ALBUMIN SERPL BCP-MCNC: 3.7 G/DL (ref 3.5–5.2)
ALP SERPL-CCNC: 92 U/L (ref 55–135)
ALT SERPL W/O P-5'-P-CCNC: 14 U/L (ref 10–44)
ANION GAP SERPL CALC-SCNC: 9 MMOL/L (ref 8–16)
AST SERPL-CCNC: 20 U/L (ref 10–40)
BILIRUB SERPL-MCNC: 0.2 MG/DL (ref 0.1–1)
BUN SERPL-MCNC: 8 MG/DL (ref 6–20)
CALCIUM SERPL-MCNC: 9.3 MG/DL (ref 8.7–10.5)
CHLORIDE SERPL-SCNC: 105 MMOL/L (ref 95–110)
CO2 SERPL-SCNC: 26 MMOL/L (ref 23–29)
CREAT SERPL-MCNC: 0.8 MG/DL (ref 0.5–1.4)
EST. GFR  (NO RACE VARIABLE): >60 ML/MIN/1.73 M^2
GLUCOSE SERPL-MCNC: 94 MG/DL (ref 70–110)
HBV CORE AB SERPL QL IA: NORMAL
HBV SURFACE AG SERPL QL IA: NORMAL
IGA SERPL-MCNC: 185 MG/DL (ref 40–350)
IGG SERPL-MCNC: 1256 MG/DL (ref 650–1600)
IGM SERPL-MCNC: 51 MG/DL (ref 50–300)
POTASSIUM SERPL-SCNC: 3.8 MMOL/L (ref 3.5–5.1)
PROT SERPL-MCNC: 7.3 G/DL (ref 6–8.4)
SODIUM SERPL-SCNC: 140 MMOL/L (ref 136–145)

## 2023-07-13 NOTE — TELEPHONE ENCOUNTER
Transportation did not arrive so she is going to see if they can get her a ride and try to be seen Friday at out office. Otherwise she is schedule for Tuesday.

## 2023-07-14 ENCOUNTER — TELEPHONE (OUTPATIENT)
Dept: BARIATRICS | Facility: CLINIC | Age: 43
End: 2023-07-14
Payer: MEDICARE

## 2023-07-14 NOTE — TELEPHONE ENCOUNTER
----- Message from Elsa Cook sent at 7/14/2023 12:25 PM CDT -----  Regarding: Reschedule Appt  Contact: Pt @ [phone]  Pt is calling to get appt rescheduled. Asking for a call back

## 2023-07-14 NOTE — TELEPHONE ENCOUNTER
Contacted the patient. Patient requested for appointment to be rescheduled and appointment has been rescheduled.

## 2023-07-17 ENCOUNTER — PATIENT MESSAGE (OUTPATIENT)
Dept: NEUROLOGY | Facility: CLINIC | Age: 43
End: 2023-07-17
Payer: MEDICARE

## 2023-07-17 ENCOUNTER — HOSPITAL ENCOUNTER (OUTPATIENT)
Dept: RADIOLOGY | Facility: CLINIC | Age: 43
Discharge: HOME | End: 2023-07-17
Attending: NURSE PRACTITIONER
Payer: MEDICARE

## 2023-07-17 ENCOUNTER — OFFICE VISIT (OUTPATIENT)
Dept: URGENT CARE | Facility: CLINIC | Age: 43
End: 2023-07-17
Payer: MEDICARE

## 2023-07-17 ENCOUNTER — TELEPHONE (OUTPATIENT)
Dept: NEUROLOGY | Facility: CLINIC | Age: 43
End: 2023-07-17
Payer: MEDICARE

## 2023-07-17 VITALS
RESPIRATION RATE: 20 BRPM | HEIGHT: 66 IN | SYSTOLIC BLOOD PRESSURE: 139 MMHG | WEIGHT: 285 LBS | DIASTOLIC BLOOD PRESSURE: 70 MMHG | TEMPERATURE: 99 F | BODY MASS INDEX: 45.8 KG/M2 | HEART RATE: 80 BPM | OXYGEN SATURATION: 98 %

## 2023-07-17 DIAGNOSIS — M25.562 ACUTE PAIN OF LEFT KNEE: Primary | ICD-10-CM
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
## 2023-07-17 DIAGNOSIS — M25.552 ACUTE HIP PAIN, LEFT: ICD-10-CM

## 2023-07-17 PROCEDURE — 73502 XR HIP WITH PELVIS WHEN PERFORMED, 2 OR 3 VIEWS LEFT: ICD-10-PCS | Mod: LT,S$GLB,, | Performed by: RADIOLOGY

## 2023-07-17 PROCEDURE — 73562 X-RAY EXAM OF KNEE 3: CPT | Mod: LT,S$GLB,, | Performed by: RADIOLOGY

## 2023-07-17 PROCEDURE — 73502 X-RAY EXAM HIP UNI 2-3 VIEWS: CPT | Mod: LT,S$GLB,, | Performed by: RADIOLOGY

## 2023-07-17 PROCEDURE — 99214 OFFICE O/P EST MOD 30 MIN: CPT | Mod: S$GLB,,, | Performed by: NURSE PRACTITIONER

## 2023-07-17 PROCEDURE — 99214 PR OFFICE/OUTPT VISIT, EST, LEVL IV, 30-39 MIN: ICD-10-PCS | Mod: S$GLB,,, | Performed by: NURSE PRACTITIONER

## 2023-07-17 PROCEDURE — 73562 XR KNEE 3 VIEW LEFT: ICD-10-PCS | Mod: LT,S$GLB,, | Performed by: RADIOLOGY

## 2023-07-17 NOTE — TELEPHONE ENCOUNTER
----- Message from Ashely Lassiter sent at 7/17/2023  1:49 PM CDT -----  Regarding: question  Contact: @ [phone]  Pt called in regards to inform the doctor she fell and wants to know which hospital to go to  .....Please call and adv @ [phone]

## 2023-07-17 NOTE — TELEPHONE ENCOUNTER
Called pt to follow up on referrals to [facility] and [facility], but she reported being in pain 2/2 a fall last night.  SW saw that she messaged her neuro providers and her sports med provider.  SW read all messages and advised pt to go to ER for evaluation as she expressed concern for injury.  Notified all providers of this recommendation.     Will phone pt later this week to follow up on referrals for rollator and cooling garments.

## 2023-07-17 NOTE — LETTER
[facility] [address]  [address]  [facility] [address]  Phone: [phone]       10/11/2023      RE: Alicia Soliz (: 1980)      Dear Sena Desir PA-C,  Ms. Soliz is under my care for treatment of multiple sclerosis.  She is cleared, from a neurological perspective, for bariatric surgery.  There is no absolute contraindication for surgery for patients on ocrelizumab given low risk of neurological complications with concurrent ocrelizumab therapy. However, there may be an increased infection risk due to the immunosuppressive nature of ocrelizumab. I recommend scheduling surgery at least 2 weeks before or after an ocrelizumab infusion and close monitoring post-operatively for any signs of infection.  I can be reached at the above phone number with any questions or concerns.    Sincerely,    Candice Garrett MD      JF:adolph

## 2023-07-17 NOTE — PROGRESS NOTES
"Subjective:      Patient ID: Alicia Soliz is a 43 y.o. female.    Vitals:  height is 5' 6" (1.676 m) and weight is 129.3 kg (285 lb). Her temperature is 99.2 °F (37.3 °C). Her blood pressure is 139/70 and her pulse is 80. Her respiration is 20 and oxygen saturation is 98%.     Chief Complaint: Fall    Patient presents to clinic for left hip and left knee pain after falling during the night. States her legs gave out and she fell. Just want to make sure she does not have any fractures. Pt's daughter is present.    Fall  The accident occurred 6 to 12 hours ago. The fall occurred while standing. She fell from a height of 3 to 5 ft. She landed on Hard floor. There was no blood loss. The point of impact was the left hip. The pain is present in the left lower arm and left hip. The pain is at a severity of 10/10. The pain is mild. The symptoms are aggravated by ambulation. Pertinent negatives include no abdominal pain, bowel incontinence, fever, headaches, hearing loss, hematuria, loss of consciousness, nausea, numbness, tingling, visual change or vomiting. Treatments tried: tizanidine. The treatment provided no relief.     Constitution: Negative for fever.   Gastrointestinal:  Negative for abdominal pain, nausea, vomiting and bowel incontinence.   Genitourinary:  Negative for hematuria.   Skin:  Negative for erythema.   Neurological:  Negative for headaches, loss of consciousness and numbness.    Objective:     Physical Exam   Constitutional: She is oriented to person, place, and time. She appears well-developed.   HENT:   Head: Normocephalic and atraumatic. Head is without abrasion, without contusion and without laceration.   Ears:   Right Ear: External ear normal.   Left Ear: External ear normal.   Nose: Nose normal.   Mouth/Throat: Oropharynx is clear and moist and mucous membranes are normal.   Eyes: Conjunctivae, EOM and lids are normal. Pupils are equal, round, and reactive to light.   Neck: Trachea normal and " phonation normal. Neck supple.   Cardiovascular: Normal rate, regular rhythm and normal heart sounds.   Pulmonary/Chest: Effort normal and breath sounds normal. No stridor. No respiratory distress. She has no wheezes.   Musculoskeletal: Normal range of motion.         General: Normal range of motion.      Left hip: She exhibits tenderness.      Left knee: Tenderness found.        Legs:    Neurological: She is alert and oriented to person, place, and time.      Comments: Ambulates with walker   Skin: Skin is warm, dry, intact and no rash. Capillary refill takes less than 2 seconds. No abrasion, No burn, No bruising, No erythema and No ecchymosis   Psychiatric: She experiences Normal attention. Her speech is normal and behavior is normal. Mood, judgment and thought content normal. Cognition normal  Nursing note and vitals reviewed.    Assessment:     1. Acute pain of left knee    2. Acute hip pain, left    3. Fall, initial encounter        Plan:       Acute pain of left knee    Acute hip pain, left  -     XR KNEE 3 VIEW LEFT; Future; Expected date: 07/17/2023  -     X-Ray Hip 2 or 3 views Left (with Pelvis when performed); Future; Expected date: 07/17/2023    Fall, initial encounter          Medical Decision Making:   Initial Assessment:   44 y/o female presents with c/o left knee, left hip and left elbow pain. Says her hip and knee hurt the worse. Denies tingling. Ambulating with walker. No visible bruising of skin.  Differential Diagnosis:   Patella fracture  Knee fracture  Contusions   Urgent Care Management:  Chart reviewed including . PHM of MS. Left knee and left hip xray ordered to rule out fractures. Both are negative read by radiology prior to discharge Encouraged RICE protocol.     X-Ray Hip 2 or 3 views Left (with Pelvis when performed)    Result Date: 7/17/2023  EXAMINATION: XR HIP WITH PELVIS WHEN PERFORMED, 2 OR 3 VIEWS LEFT CLINICAL HISTORY: Pain in left hip TECHNIQUE: AP view of the pelvis and frog  leg lateral view of the left hip were performed. COMPARISON: None FINDINGS: There is no radiographic evidence of acute osseous, articular, or soft tissue abnormality.  Hip joint spaces are preserved. Degenerative findings noted in the visualized lower lumbar spine.     As above.  No acute findings. Electronically signed by: Raúl Lockett MD Date:    07/17/2023 Time:    16:57    XR KNEE 3 VIEW LEFT    Result Date: 7/17/2023  EXAMINATION: XR KNEE 3 VIEW LEFT CLINICAL HISTORY: Pain in left hip TECHNIQUE: AP, lateral, and Merchant views of the left knee were performed. COMPARISON: 06/23/2023 FINDINGS: There is no radiographic evidence of acute osseous, articular, or soft tissue abnormality.  Tricompartmental marginal osteophytes are present with mild joint space narrowing in the medial compartment.  Degenerative findings appear unchanged from prior.     Degenerative findings as above Electronically signed by: Raúl Lockett MD Date:    07/17/2023 Time:    16:55    X-Ray Knee 3 View Left        Rest your affected extremity as much as possible.  Keep extremity elevated when possible.  Take tylenol or ibuprofen as directed on label for pain. You may alternate the two every four hours.  Apply ice packs/frozen peas to affected site four times daily for 15-20 minutes each time.  May apply ace wrap as needed for support of injured extremity and compression to reduce swelling.   Follow up with your primary care provider if symptoms do not improve within a few days or sooner for any worsening.   Go to the ER immediately for any numbness, weakness, tingling, color change, sudden pain and swelling, or for any other new and concerning symptoms.

## 2023-07-18 ENCOUNTER — CLINICAL SUPPORT (OUTPATIENT)
Dept: REHABILITATION | Facility: HOSPITAL | Age: 43
End: 2023-07-18
Payer: MEDICARE

## 2023-07-18 DIAGNOSIS — R53.1 DECREASED STRENGTH, ENDURANCE, AND MOBILITY: ICD-10-CM

## 2023-07-18 DIAGNOSIS — Z74.09 DECREASED STRENGTH, ENDURANCE, AND MOBILITY: ICD-10-CM

## 2023-07-18 DIAGNOSIS — G35 MULTIPLE SCLEROSIS EXACERBATION: Primary | ICD-10-CM

## 2023-07-18 DIAGNOSIS — R68.89 DECREASED STRENGTH, ENDURANCE, AND MOBILITY: ICD-10-CM

## 2023-07-18 PROCEDURE — 97530 THERAPEUTIC ACTIVITIES: CPT | Mod: HCNC,PN,CQ

## 2023-07-18 PROCEDURE — 97110 THERAPEUTIC EXERCISES: CPT | Mod: HCNC,PN,CQ

## 2023-07-18 PROCEDURE — 97112 NEUROMUSCULAR REEDUCATION: CPT | Mod: HCNC,PN,CQ

## 2023-07-18 NOTE — PROGRESS NOTES
Physical Therapy Daily Treatment Note     Name: Alicia Mahoney [place]  Clinic Number: [id number]    Therapy Diagnosis:   Encounter Diagnoses   Name Primary?    Multiple sclerosis exacerbation Yes    Decreased strength, endurance, and mobility        Physician: Kole Carrasquillo MD    Visit Date: 7/18/2023    Physician Orders: PT Eval and Treat   Medical Diagnosis from Referral: G35 (ICD-10-CM) - Multiple sclerosis  Evaluation Date: 10/4/2022  Authorization Period Expiration: 12/31/23  Plan of Care Expiration:  7/29/23  Progress Note Due: due 30 days from 6/20/23  Visit # / Visits authorized: 28/40  FOTO: 5 (38% limitation)     Precautions: Standard, Fall, and MS (L shoulder had dislocated in the last event)     PTA Visit #: 2/5     Time In: 12:30 PM  Time Out: 1:38 PM  Total Billable Time: 68 minutes    SUBJECTIVE     Pt reports: she had a fall the day before yesterday, some soreness in L hip/knee but went to the doctor and has no further injuries. Also states that the doctor says she may need a replacement for L knee, as it is bone on bone now.   She was compliant with home exercise program.  Response to previous treatment: no issues  Functional change: walking longer/faster    Pre Pain: 5/10  Location: anterolateral L knee    OBJECTIVE     Objective Measures updated at progress report unless specified.     Treatment     Alicia received the treatments listed below:    (Exercises performed today in BOLD)     therapeutic exercises to develop strength, flexibility, posture, and core stabilization for 25 minutes  including:  Shuttle DL squat, 6 bands 2'  Shuttle SL squat 4 bands 1' ea  Leaning hip extension x10 ea (cueing on proper form)   Standing heel raises 2x10   Single arm deadlift 5x 10#, 2x5 15#  Nustep 5 min, lvl: 2    Leg extension 25# x20    neuromuscular re-education activities to improve: Balance, Coordination, Kinesthetic Sense, and Proprioception for 18 minutes. The following activities were included:    Quad  "set (20 x 5")   Eccentric SAQ (20 x 5")   Eccentric LAQ (2x10x5")   Seated HS digs (2x10x5")  Sit-stands from 20" box with TKE (purple theraband around bilat knee's) 2x 10    Deferred:  LAQ 3x 10 5# per ankle  Side hip abduction 2x 10 B  Seated hip adduction 3x 10 L  Seated hamstring curl green therband for 3x 10 (seated on 24 in box with medial rotation engaged to avoid toeing out)  Seated hip flexion 4# 3x 10 L  Standing R hip flexion/internal rotation/ adduction 4# during gait to facilitate more neutral hip and better food clearance 160 ft with RW      Alicia performed therapeutic activities for 25 min including:   Sit-stand from 20" with OH ball lift x10/ 4# ball x10   Side-stepping in // bars, 2 laps   Stool taps in // bars, 2x10, 1x20   Ambulation and safety in and out of clinic    Deferred:  Sit to stand with 10# ball 2x10   step over 3inch DB with RW   Step onto airex with RW x7  Goblet hold with sit to stand 15# 1x 5  Sit to stand R hand farmer's hold 15# 1x 10; L 1x 10    manual therapy techniques: were applied to left hip for 0 minutes, including: Soft tissue Mobilization       Patient Education and Home Exercises     Home Exercises Provided and Patient Education Provided     Education provided:   - pacing and rest breaks/staying on task  - importance of proper form over weight/reps    Written Home Exercises Provided: Patient instructed to cont prior HEP. Exercises were reviewed and Alicia was able to demonstrate them prior to the end of the session.  Alicia demonstrated good  understanding of the education provided. See EMR under Patient Instructions for exercises provided during therapy sessions    ASSESSMENT   Continued to focus on improving overall mobility and strength throughout today's treatment session, continuing with both isometric muscle specific strengthening and full body functional strength/endurance activities. Increased difficulty for sit-stands this date, decreasing height and adding " weighted ball for OH lifts, patient continues to tolerate progressions well. Also continued with education on the importance of proper form and safety over increasing weight/reps, patient demonstrates fair understanding. Patient responding well to session overall, still requiring increased time to complete most tasks and rest breaks throughout.    Alicia Is progressing well towards her goals.   Pt prognosis is Good.     Pt will continue to benefit from skilled outpatient physical therapy to address the deficits listed in the problem list box on initial evaluation, provide pt/family education and to maximize pt's level of independence in the home and community environment.     Pt's spiritual, cultural and educational needs considered and pt agreeable to plan of care and goals.     Anticipated barriers to physical therapy: chronicity of condition and comorbidities    Goals:   Short Term Goals: In 3 weeks   1.I with HEP MET 12/6/20222  2.Pt to increase BLE strength by 1/2 grade to show improvements with sitting, standing, ambulating, bending, lifting activities.  progressing  3. Patient to improve 5x Sit<>Stand to less than 12 seconds to reduced risk of fall. met  4. Patient to improve TUG with RW to less than 25 seconds to reduce risk of falls. Progressing     Long Term Goals: In 6 weeks (Progressing) updated to 7/2923  1. Patient to demo increase in LE strength to 3+/5 with hip abduction on the L LE to show improvements with sitting, standing, ambulating, bending, lifting activities.  progressing  2. Patient to improve score on the FOTO to < or = to 48% to show improvement with QOL. met  3. Patient to improve 5x Sit<>Stand to less than 10 seconds to reduced risk of fall. progressing  4. Patient to improve TUG with RW to less than 20 seconds to reduce risk of falls. progressing     Plan      Update Certification Period: 3/23/23 extended to 7/29/23     Recommended Treatment Plan: 2 times per week for 6 weeks effective  week of 6/20/23/23: Aquatic Therapy, Cervical/Lumbar Traction, Electrical Stimulation  , Gait Training, Manual Therapy, Moist Heat/ Ice, Neuromuscular Re-ed, Patient Education, Self Care, Therapeutic Exercise, and Therapeutic Activities    Pallavi Bro, PTA    7/18/2023

## 2023-07-19 ENCOUNTER — PATIENT MESSAGE (OUTPATIENT)
Dept: SPORTS MEDICINE | Facility: CLINIC | Age: 43
End: 2023-07-19
Payer: MEDICARE

## 2023-07-19 DIAGNOSIS — M17.12 PRIMARY OSTEOARTHRITIS OF LEFT KNEE: Primary | ICD-10-CM

## 2023-07-19 LAB — NEUROFILAMENT LIGHT CHAIN, PLASMA: 8.3 PG/ML

## 2023-07-19 RX ORDER — LIDOCAINE 50 MG/G
1 PATCH TOPICAL DAILY
Qty: 30 PATCH | Refills: 0 | Status: SHIPPED | OUTPATIENT
Start: 2023-07-19 | End: 2023-10-03 | Stop reason: SDUPTHER

## 2023-07-20 ENCOUNTER — TELEPHONE (OUTPATIENT)
Dept: URGENT CARE | Facility: CLINIC | Age: 43
End: 2023-07-20
Payer: MEDICARE

## 2023-07-21 ENCOUNTER — PATIENT MESSAGE (OUTPATIENT)
Dept: PSYCHIATRY | Facility: CLINIC | Age: 43
End: 2023-07-21
Payer: MEDICARE

## 2023-07-24 NOTE — PROGRESS NOTES
"  Physical Therapy Daily Treatment Note     Name: Alicia Mahoney [place]  Clinic Number: [id number]    Therapy Diagnosis:   No diagnosis found.      Physician: Kole Carrasquillo MD    Visit Date: 7/25/2023    Physician Orders: PT Eval and Treat   Medical Diagnosis from Referral: G35 (ICD-10-CM) - Multiple sclerosis  Evaluation Date: 10/4/2022  Authorization Period Expiration: 12/31/23  Plan of Care Expiration:  7/29/23  Progress Note Due: due 30 days from 6/20/23  Visit # / Visits authorized: 28/40  FOTO: 5 (38% limitation)     Precautions: Standard, Fall, and MS (L shoulder had dislocated in the last event)     PTA Visit #: 2/5     Time In: ***  Time Out: ***  Total Billable Time: *** minutes    SUBJECTIVE     Pt reports: ***she had a fall the day before yesterday, some soreness in L hip/knee but went to the doctor and has no further injuries. Also states that the doctor says she may need a replacement for L knee, as it is bone on bone now.   She was compliant with home exercise program.  Response to previous treatment: no issues  Functional change: walking longer/faster    Pre Pain: 5/10  Location: anterolateral L knee    OBJECTIVE     Objective Measures updated at progress report unless specified.     Treatment     Alicia received the treatments listed below:      therapeutic exercises to develop strength, flexibility, posture, and core stabilization for *** minutes  including:  -Shuttle DL squat, 6 bands 2'  -Shuttle SL squat 4 bands 1' ea  -Leaning hip extension x10 ea (cueing on proper form)   -Standing heel raises 2x10   -Single arm deadlift 5x 10#, 2x5 15#  -Nustep 5 min, lvl: 2  -Leg extension 25# x20    neuromuscular re-education activities to improve: Balance, Coordination, Kinesthetic Sense, and Proprioception for *** minutes. The following activities were included:    -Quad set (20 x 5")   -Eccentric SAQ (20 x 5")   -Eccentric LAQ (2x10x5")   -Seated HS digs (2x10x5")  -Sit-stands from 20" box with TKE (purple " "theraband around bilat knee's) 2x 10    Deferred:  LAQ 3x 10 5# per ankle  Side hip abduction 2x 10 B  Seated hip adduction 3x 10 L  Seated hamstring curl green therband for 3x 10 (seated on 24 in box with medial rotation engaged to avoid toeing out)  Seated hip flexion 4# 3x 10 L  Standing R hip flexion/internal rotation/ adduction 4# during gait to facilitate more neutral hip and better food clearance 160 ft with RW      Alicia performed therapeutic activities for *** min including:   -Sit-stand from 20" with OH ball lift x10/ 4# ball x10   -Side-stepping in // bars, 2 laps   -Stool taps in // bars, 2x10, 1x20   -Ambulation and safety in and out of clinic    Deferred:  Sit to stand with 10# ball 2x10   step over 3inch DB with RW   Step onto airex with RW x7  Goblet hold with sit to stand 15# 1x 5  Sit to stand R hand farmer's hold 15# 1x 10; L 1x 10    manual therapy techniques: were applied to left hip for 0 minutes, including: Soft tissue Mobilization       Patient Education and Home Exercises     Home Exercises Provided and Patient Education Provided     Education provided:   - pacing and rest breaks/staying on task  - importance of proper form over weight/reps    Written Home Exercises Provided: Patient instructed to cont prior HEP. Exercises were reviewed and Alicia was able to demonstrate them prior to the end of the session.  Alicia demonstrated good  understanding of the education provided. See EMR under Patient Instructions for exercises provided during therapy sessions    ASSESSMENT     ***Continued to focus on improving overall mobility and strength throughout today's treatment session, continuing with both isometric muscle specific strengthening and full body functional strength/endurance activities. Increased difficulty for sit-stands this date, decreasing height and adding weighted ball for OH lifts, patient continues to tolerate progressions well. Also continued with education on the importance of " proper form and safety over increasing weight/reps, patient demonstrates fair understanding. Patient responding well to session overall, still requiring increased time to complete most tasks and rest breaks throughout.    Alicia Is progressing well towards her goals.   Pt prognosis is Good.     Pt will continue to benefit from skilled outpatient physical therapy to address the deficits listed in the problem list box on initial evaluation, provide pt/family education and to maximize pt's level of independence in the home and community environment.     Pt's spiritual, cultural and educational needs considered and pt agreeable to plan of care and goals.     Anticipated barriers to physical therapy: chronicity of condition and comorbidities    Goals:   Short Term Goals: In 3 weeks   1.I with HEP MET 12/6/20222  2.Pt to increase BLE strength by 1/2 grade to show improvements with sitting, standing, ambulating, bending, lifting activities.  progressing  3. Patient to improve 5x Sit<>Stand to less than 12 seconds to reduced risk of fall. met  4. Patient to improve TUG with RW to less than 25 seconds to reduce risk of falls. Progressing     Long Term Goals: In 6 weeks (Progressing) updated to 7/2923  1. Patient to demo increase in LE strength to 3+/5 with hip abduction on the L LE to show improvements with sitting, standing, ambulating, bending, lifting activities.  progressing  2. Patient to improve score on the FOTO to < or = to 48% to show improvement with QOL. met  3. Patient to improve 5x Sit<>Stand to less than 10 seconds to reduced risk of fall. progressing  4. Patient to improve TUG with RW to less than 20 seconds to reduce risk of falls. progressing     Plan      Update Certification Period: 3/23/23 extended to 7/29/23     Recommended Treatment Plan: 2 times per week for 6 weeks effective week of 6/20/23/23: Aquatic Therapy, Cervical/Lumbar Traction, Electrical Stimulation  , Gait Training, Manual Therapy, Moist  Heat/ Ice, Neuromuscular Re-ed, Patient Education, Self Care, Therapeutic Exercise, and Therapeutic Activities    Yuly Alston, PT    7/24/2023

## 2023-07-25 ENCOUNTER — PATIENT MESSAGE (OUTPATIENT)
Dept: SPORTS MEDICINE | Facility: CLINIC | Age: 43
End: 2023-07-25
Payer: MEDICARE

## 2023-07-25 ENCOUNTER — CLINICAL SUPPORT (OUTPATIENT)
Dept: REHABILITATION | Facility: HOSPITAL | Age: 43
End: 2023-07-25
Payer: MEDICARE

## 2023-07-25 DIAGNOSIS — R68.89 DECREASED STRENGTH, ENDURANCE, AND MOBILITY: ICD-10-CM

## 2023-07-25 DIAGNOSIS — G35 MULTIPLE SCLEROSIS EXACERBATION: Primary | ICD-10-CM

## 2023-07-25 DIAGNOSIS — Z74.09 DECREASED STRENGTH, ENDURANCE, AND MOBILITY: ICD-10-CM

## 2023-07-25 DIAGNOSIS — R53.1 DECREASED STRENGTH, ENDURANCE, AND MOBILITY: ICD-10-CM

## 2023-07-25 PROCEDURE — 97112 NEUROMUSCULAR REEDUCATION: CPT | Mod: HCNC,PN,CQ

## 2023-07-25 PROCEDURE — 97530 THERAPEUTIC ACTIVITIES: CPT | Mod: HCNC,PN,CQ

## 2023-07-25 PROCEDURE — 97110 THERAPEUTIC EXERCISES: CPT | Mod: HCNC,PN,CQ

## 2023-07-26 ENCOUNTER — PATIENT MESSAGE (OUTPATIENT)
Dept: NEUROLOGY | Facility: CLINIC | Age: 43
End: 2023-07-26
Payer: MEDICARE

## 2023-07-26 NOTE — TELEPHONE ENCOUNTER
Spoke with pt by phone and [facility]B) PolarFit Kit in black.    Then assisted pt with online application to the [facility] [employer] fransico for a heavy duty rollator.  Faxed Doctor's Confirmation form and order to [facility].

## 2023-07-27 ENCOUNTER — PATIENT MESSAGE (OUTPATIENT)
Dept: SPORTS MEDICINE | Facility: CLINIC | Age: 43
End: 2023-07-27
Payer: MEDICARE

## 2023-07-27 ENCOUNTER — CLINICAL SUPPORT (OUTPATIENT)
Dept: REHABILITATION | Facility: HOSPITAL | Age: 43
End: 2023-07-27
Payer: MEDICARE

## 2023-07-27 DIAGNOSIS — M25.562 CHRONIC PAIN OF LEFT KNEE: ICD-10-CM

## 2023-07-27 DIAGNOSIS — G89.29 CHRONIC PAIN OF LEFT KNEE: ICD-10-CM

## 2023-07-27 DIAGNOSIS — R68.89 DECREASED STRENGTH, ENDURANCE, AND MOBILITY: ICD-10-CM

## 2023-07-27 DIAGNOSIS — G35 MULTIPLE SCLEROSIS EXACERBATION: Primary | ICD-10-CM

## 2023-07-27 DIAGNOSIS — Z74.09 DECREASED STRENGTH, ENDURANCE, AND MOBILITY: ICD-10-CM

## 2023-07-27 DIAGNOSIS — R53.1 DECREASED STRENGTH, ENDURANCE, AND MOBILITY: ICD-10-CM

## 2023-07-27 DIAGNOSIS — M17.12 PRIMARY OSTEOARTHRITIS OF LEFT KNEE: Primary | ICD-10-CM

## 2023-07-27 PROCEDURE — 97112 NEUROMUSCULAR REEDUCATION: CPT | Mod: HCNC,PN

## 2023-07-27 NOTE — PLAN OF CARE
Physical Therapy Daily Treatment Note and Discharge Note     Name: Alicia Mahoney [place]  Clinic Number: [id number]    Therapy Diagnosis:   Encounter Diagnoses   Name Primary?    Multiple sclerosis exacerbation Yes    Decreased strength, endurance, and mobility        Physician: Kole Carrasquillo MD    Visit Date: 7/27/2023    Physician Orders: PT Eval and Treat   Medical Diagnosis from Referral: G35 (ICD-10-CM) - Multiple sclerosis  Evaluation Date: 10/4/2022  Authorization Period Expiration: 12/31/23  Plan of Care Expiration:  7/29/23    Visit # / Visits authorized: 30/40  FOTO: 5 (39)  Missed visits: 1  No shows: 1  D/c date 7/27/23     Precautions: Standard, Fall, and MS (L shoulder had dislocated in the last event)     PTA Visit #: 0/5     Time In: 12:15 pm   Time Out: 1:10 pm  Total Billable Time: 55 minutes     SUBJECTIVE     Pt reports: L knee pain has been really irritated since her last injection. Pt following up with an MRI.  She was compliant with home exercise program.  Response to previous treatment: no issues  Functional change: walking longer/faster    Pre treatment Pain: 9/10  Location: anterolateral L knee    OBJECTIVE   Sit to stand from 18inch surface = mod I with RW  TUG = 32.8s with RW  improved today to 28 sec  Sit to stand from 18 inch box 30 sec sit to stand test 11 reps today   5 rep sit to stand test 12 sec today        Strength:                    Right            Left     2/21/23 today;   Hip flexors 5/5     4/5  L: 4+/5; 4+/5   Knee extension 5/5     4/5  L:5/5;5   Knee flexion 5/5     4/5 L:4+/5;5   Hip abductors NT/5     NT/5 R 4+;L2+;3   DF 5/5 4+/5 L: 5/5   Ankle Eversion 5/5     4 /5 L: 5/5         Objective Measures updated at progress report unless specified.     Treatment     Alicia received the treatments listed below:      therapeutic exercises to develop strength, flexibility, posture, and core stabilization for 53 minutes  including:    L single leg squat shuttle 2 bands 3 min  LAQ  seated 7# 1x 30  Leaning hip extension x10 ea (cueing on proper form)   Standing hip abduction with red theraband 3x 10 B  Standing heel raises 2x10 B  Standing marching 2x 10 B  Single arm deadlift 15# 2x 5  Sit to stand goblet hold 15# 1x 10      Patient Education and Home Exercises     Home Exercises Provided and Patient Education Provided     Education provided:     Written Home Exercises Provided: Patient instructed to cont prior HEP. Exercises were reviewed and Alicia was able to demonstrate them prior to the end of the session.  Alicia demonstrated good  understanding of the education provided. See EMR under Patient Instructions for exercises provided during therapy sessions    ASSESSMENT   PT assessed pt progress, updated HEP and discharged as she has met a plateau with PT. PT to d/c pt to self care.    Alicia Is progressing well towards her goals.   Pt prognosis is Good.        Pt's spiritual, cultural and educational needs considered and pt agreeable to plan of care and goals.     Anticipated barriers to physical therapy: chronicity of condition and comorbidities    Goals:   Short Term Goals: In 3 weeks   1.I with HEP MET 12/6/20222  2.Pt to increase BLE strength by 1/2 grade to show improvements with sitting, standing, ambulating, bending, lifting activities.  met  3. Patient to improve 5x Sit<>Stand to less than 12 seconds to reduced risk of fall. met  4. Patient to improve TUG with RW to less than 25 seconds to reduce risk of falls. not met     Long Term Goals: In 6 weeks (Progressing) updated to 7/2923  1. Patient to demo increase in LE strength to 3+/5 with hip abduction on the L LE to show improvements with sitting, standing, ambulating, bending, lifting activities.  not met  2. Patient to improve score on the FOTO to < or = to 48% to show improvement with QOL. met  3. Patient to improve 5x Sit<>Stand to less than 10 seconds to reduced risk of fall. Not met  4. Patient to improve TUG with RW to less  than 20 seconds to reduce risk of falls. Not met     Plan    D/C as pt appears at max potential at this time.       Tracie Way, PT    7/27/2023

## 2023-07-27 NOTE — PROGRESS NOTES
Physical Therapy Daily Treatment Note and Discharge Note     Name: Alicia Mahoney Soliz  Clinic Number: [id number]    Therapy Diagnosis:   Encounter Diagnoses   Name Primary?    Multiple sclerosis exacerbation Yes    Decreased strength, endurance, and mobility        Physician: Kole Carrasquillo MD    Visit Date: 7/27/2023    Physician Orders: PT Eval and Treat   Medical Diagnosis from Referral: G35 (ICD-10-CM) - Multiple sclerosis  Evaluation Date: 10/4/2022  Authorization Period Expiration: 12/31/23  Plan of Care Expiration:  7/29/23    Visit # / Visits authorized: 30/40  FOTO: 5 (39)  Missed visits: 1  No shows: 1  D/c date 7/27/23     Precautions: Standard, Fall, and MS (L shoulder had dislocated in the last event)     PTA Visit #: 0/5     Time In: 12:15 pm   Time Out: 1:10 pm  Total Billable Time: 55 minutes with tech assistance    SUBJECTIVE     Pt reports: L knee pain has been worse since her knee injection so she is following up with an MRI.  She was compliant with home exercise program.  Response to previous treatment: no issues  Functional change: walking longer/faster    Pre treatment Pain: 9/10  Location: anterolateral L knee    OBJECTIVE   Sit to stand from 18inch surface = mod I with RW  TUG = 32.8s with RW  improved today to 28 sec  Sit to stand from 18 inch box 30 sec sit to stand test 11 reps today   5 rep sit to stand test 12 sec today        Strength:                    Right            Left     2/21/23 today;   Hip flexors 5/5     4/5  L: 4+/5; 4+/5   Knee extension 5/5     4/5  L:5/5;5   Knee flexion 5/5     4/5 L:4+/5;5   Hip abductors NT/5     NT/5 R 4+;L2+;3   DF 5/5 4+/5 L: 5/5   Ankle Eversion 5/5     4 /5 L: 5/5         Objective Measures updated at progress report unless specified.     Treatment     Alicia received the treatments listed below:      therapeutic exercises to develop strength, flexibility, posture, and core stabilization for 53 minutes  including:    L single leg squat shuttle 2  bands 3 min  LAQ seated 7# 1x 30  Leaning hip extension x10 ea (cueing on proper form)   Standing hip abduction with red theraband 3x 10 B  Standing heel raises 2x10 B  Standing marching 2x 10 B  Single arm deadlift 15# 2x 5  Sit to stand goblet hold 15# 1x 10      Patient Education and Home Exercises     Home Exercises Provided and Patient Education Provided     Education provided:     Written Home Exercises Provided: Patient instructed to cont prior HEP. Exercises were reviewed and Alicia was able to demonstrate them prior to the end of the session.  Alicia demonstrated good  understanding of the education provided. See EMR under Patient Instructions for exercises provided during therapy sessions    ASSESSMENT   PT assessed pt progress, updated HEP and discharged as she has met a plateau with PT. PT to d/c pt to self care.    Alicia Is progressing well towards her goals.   Pt prognosis is Good.        Pt's spiritual, cultural and educational needs considered and pt agreeable to plan of care and goals.     Anticipated barriers to physical therapy: chronicity of condition and comorbidities    Goals:   Short Term Goals: In 3 weeks   1.I with HEP MET 12/6/20222  2.Pt to increase BLE strength by 1/2 grade to show improvements with sitting, standing, ambulating, bending, lifting activities.  met  3. Patient to improve 5x Sit<>Stand to less than 12 seconds to reduced risk of fall. met  4. Patient to improve TUG with RW to less than 25 seconds to reduce risk of falls. not met     Long Term Goals: In 6 weeks (Progressing) updated to 7/2923  1. Patient to demo increase in LE strength to 3+/5 with hip abduction on the L LE to show improvements with sitting, standing, ambulating, bending, lifting activities.  not met  2. Patient to improve score on the FOTO to < or = to 48% to show improvement with QOL. met  3. Patient to improve 5x Sit<>Stand to less than 10 seconds to reduced risk of fall. Not met  4. Patient to improve TUG  with RW to less than 20 seconds to reduce risk of falls. Not met     Plan    D/C as pt appears at max potential at this time.       Tracie Way, PT    7/27/2023

## 2023-07-28 ENCOUNTER — PATIENT MESSAGE (OUTPATIENT)
Dept: SPORTS MEDICINE | Facility: CLINIC | Age: 43
End: 2023-07-28
Payer: MEDICARE

## 2023-07-31 ENCOUNTER — PATIENT MESSAGE (OUTPATIENT)
Dept: INTERNAL MEDICINE | Facility: CLINIC | Age: 43
End: 2023-07-31
Payer: MEDICARE

## 2023-07-31 ENCOUNTER — PATIENT MESSAGE (OUTPATIENT)
Dept: SPORTS MEDICINE | Facility: CLINIC | Age: 43
End: 2023-07-31
Payer: MEDICARE

## 2023-07-31 RX ORDER — SEMAGLUTIDE 0.25 MG/.5ML
0.25 INJECTION, SOLUTION SUBCUTANEOUS
Qty: 3 ML | Refills: 1 | Status: SHIPPED | OUTPATIENT
Start: 2023-07-31 | End: 2023-10-20

## 2023-08-01 ENCOUNTER — PATIENT MESSAGE (OUTPATIENT)
Dept: SPORTS MEDICINE | Facility: CLINIC | Age: 43
End: 2023-08-01
Payer: MEDICARE

## 2023-08-02 ENCOUNTER — HOSPITAL ENCOUNTER (OUTPATIENT)
Dept: RADIOLOGY | Facility: HOSPITAL | Age: 43
Discharge: HOME | End: 2023-08-02
Attending: PHYSICIAN ASSISTANT
Payer: MEDICARE

## 2023-08-02 ENCOUNTER — PATIENT MESSAGE (OUTPATIENT)
Dept: INTERNAL MEDICINE | Facility: CLINIC | Age: 43
End: 2023-08-02

## 2023-08-02 ENCOUNTER — E-VISIT (OUTPATIENT)
Dept: INTERNAL MEDICINE | Facility: CLINIC | Age: 43
End: 2023-08-02
Payer: MEDICARE

## 2023-08-02 DIAGNOSIS — M25.562 CHRONIC PAIN OF LEFT KNEE: ICD-10-CM

## 2023-08-02 DIAGNOSIS — M17.12 PRIMARY OSTEOARTHRITIS OF LEFT KNEE: ICD-10-CM

## 2023-08-02 DIAGNOSIS — R30.0 DYSURIA: Primary | ICD-10-CM

## 2023-08-02 DIAGNOSIS — G89.29 CHRONIC PAIN OF LEFT KNEE: ICD-10-CM

## 2023-08-02 PROCEDURE — 73721 MRI KNEE WITHOUT CONTRAST LEFT: ICD-10-PCS | Mod: 26,HCNC,LT, | Performed by: RADIOLOGY

## 2023-08-02 PROCEDURE — 73721 MRI JNT OF LWR EXTRE W/O DYE: CPT | Mod: TC,HCNC,LT

## 2023-08-02 PROCEDURE — 99421 OL DIG E/M SVC 5-10 MIN: CPT | Mod: ,,, | Performed by: FAMILY MEDICINE

## 2023-08-02 PROCEDURE — 73721 MRI JNT OF LWR EXTRE W/O DYE: CPT | Mod: 26,HCNC,LT, | Performed by: RADIOLOGY

## 2023-08-02 PROCEDURE — 99421 PR E&M, ONLINE DIGIT, EST, < 7 DAYS, 5-10 MINS: ICD-10-PCS | Mod: ,,, | Performed by: FAMILY MEDICINE

## 2023-08-02 RX ORDER — ASPIRIN 325 MG
50000 TABLET, DELAYED RELEASE (ENTERIC COATED) ORAL
Qty: 12 CAPSULE | Refills: 3 | Status: SHIPPED | OUTPATIENT
Start: 2023-08-02 | End: 2023-08-14

## 2023-08-02 RX ORDER — NITROFURANTOIN 25; 75 MG/1; MG/1
100 CAPSULE ORAL 2 TIMES DAILY
Qty: 14 CAPSULE | Refills: 0 | Status: SHIPPED | OUTPATIENT
Start: 2023-08-02 | End: 2023-09-05

## 2023-08-02 NOTE — PROGRESS NOTES
Patient ID: Alicia Soliz is a 43 y.o. female.    Chief Complaint: Urinary Tract Infection (Entered automatically based on patient selection in Patient Portal.)    The patient initiated a request through [facility] on 8/2/2023 for evaluation and management with a chief complaint of Urinary Tract Infection (Entered automatically based on patient selection in Patient Portal.)     I evaluated the questionnaire submission on 8/2/23.    Oh[facility] Peq Evisit Uti Questionnaire    8/2/2023 12:12 PM CDT - Filed by Patient   Do you agree to participate in an E-Visit? Yes   If you have any of the following problems, please present to your local ER or call 911:  I acknowledge   What is the main issue that you would like for your doctor to address today? Urine stinging / burning sensation.   Are you able to take your vital signs? Yes   Systolic Blood Pressure: 120   Diastolic Blood Pressure: 80   Weight: 260   Height: 56   Pulse: 100   Temperature:    Respiration rate:    Pulse Oxygen:    Are you currently pregnant, could you be pregnant, or are you breast feeding? None of the above   What symptoms do you currently have? Pain while passing urine   When did your symptoms first appear? 5/31/2023   List what you have done or taken to help your symptoms. Nothing just drinking. Water&  sugar free tea   Please indicate whether you have had the following symptoms during the past 24 hours     Urgent urination (a sudden and uncontrollable urge to urinate) Mild   Frequent urination of small amounts of urine (going to the toilet very often) Mild   Burning pain when urinating Moderate   Incomplete bladder emptying (still feel like you need to urinate again after urination) Moderate   Pain not associated with urination in the lower abdomen below the belly button) None   What does your urine look like? Clear   Blood seen in the urine None   Flank pain (pain in one or both sides of the lower back) Mild   Abnormal Vaginal Discharge (abnormal  amount, color and/or odor) None   Discharge from the urethra (urinary opening) without urination None   High body temperature/fever? None-<99.5   Please rate how much discomfort you have experience because of the symptoms in the past 24 hours: Moderate   Please indicate how the symptoms have interfered with your every day activities/work in the past 24 hours: Mild   Please indicate how these symptoms have interfered with your social activities (visiting people, meeting with friends, etc.) in the past 24 hours? None   Are you a diabetic? No   Please indicate whether you have the following at the time of completion of this questionnaire: None of the above   Provide any information you feel is important to your history not asked above Im battling MULTIPLE SCLEROSIS! not trying to exacerbate nor flare up my body catch it while i can.   Please attach any relevant images or files (if you have performed a home test for UTI, please submit a photo of results)          Recent Labs Obtained:  No visits with results within 7 Day(s) from this visit.   Latest known visit with results is:   Lab Visit on 07/12/2023   Component Date Value Ref Range Status    WBC 07/12/2023 5.79  3.90 - 12.70 K/uL Final    RBC 07/12/2023 4.85  4.00 - 5.40 M/uL Final    Hemoglobin 07/12/2023 10.2 (L)  12.0 - 16.0 g/dL Final    Hematocrit 07/12/2023 35.6 (L)  37.0 - 48.5 % Final    MCV 07/12/2023 73 (L)  82 - 98 fL Final    MCH 07/12/2023 21.0 (L)  27.0 - 31.0 pg Final    MCHC 07/12/2023 28.7 (L)  32.0 - 36.0 g/dL Final    RDW 07/12/2023 17.2 (H)  11.5 - 14.5 % Final    Platelets 07/12/2023 273  150 - 450 K/uL Final    MPV 07/12/2023 11.0  9.2 - 12.9 fL Final    Immature Granulocytes 07/12/2023 0.3  0.0 - 0.5 % Final    Gran # (ANC) 07/12/2023 2.5  1.8 - 7.7 K/uL Final    Immature Grans (Abs) 07/12/2023 0.02  0.00 - 0.04 K/uL Final    Comment: Mild elevation in immature granulocytes is non specific and   can be seen in a variety of conditions including  stress response,   acute inflammation, trauma and pregnancy. Correlation with other   laboratory and clinical findings is essential.      Lymph # 07/12/2023 2.7  1.0 - 4.8 K/uL Final    Mono # 07/12/2023 0.5  0.3 - 1.0 K/uL Final    Eos # 07/12/2023 0.1  0.0 - 0.5 K/uL Final    Baso # 07/12/2023 0.04  0.00 - 0.20 K/uL Final    nRBC 07/12/2023 0  0 /100 WBC Final    Gran % 07/12/2023 43.7  38.0 - 73.0 % Final    Lymph % 07/12/2023 45.8  18.0 - 48.0 % Final    Mono % 07/12/2023 8.3  4.0 - 15.0 % Final    Eosinophil % 07/12/2023 1.2  0.0 - 8.0 % Final    Basophil % 07/12/2023 0.7  0.0 - 1.9 % Final    Differential Method 07/12/2023 Automated   Final    Sodium 07/12/2023 140  136 - 145 mmol/L Final    Potassium 07/12/2023 3.8  3.5 - 5.1 mmol/L Final    Chloride 07/12/2023 105  95 - 110 mmol/L Final    CO2 07/12/2023 26  23 - 29 mmol/L Final    Glucose 07/12/2023 94  70 - 110 mg/dL Final    BUN 07/12/2023 8  6 - 20 mg/dL Final    Creatinine 07/12/2023 0.8  0.5 - 1.4 mg/dL Final    Calcium 07/12/2023 9.3  8.7 - 10.5 mg/dL Final    Total Protein 07/12/2023 7.3  6.0 - 8.4 g/dL Final    Albumin 07/12/2023 3.7  3.5 - 5.2 g/dL Final    Total Bilirubin 07/12/2023 0.2  0.1 - 1.0 mg/dL Final    Comment: For infants and newborns, interpretation of results should be based  on gestational age, weight and in agreement with clinical  observations.    Premature Infant recommended reference ranges:  Up to 24 hours.............<8.0 mg/dL  Up to 48 hours............<12.0 mg/dL  3-5 days..................<15.0 mg/dL  6-29 days.................<15.0 mg/dL      Alkaline Phosphatase 07/12/2023 92  55 - 135 U/L Final    AST 07/12/2023 20  10 - 40 U/L Final    ALT 07/12/2023 14  10 - 44 U/L Final    eGFR 07/12/2023 >60.0  >60 mL/min/1.73 m^2 Final    Anion Gap 07/12/2023 9  8 - 16 mmol/L Final    Hep B Core Total Ab 07/12/2023 Non-reactive  Non-reactive Final    Hepatitis B Surface Ag 07/12/2023 Non-reactive  Non-reactive Final    IgG  07/12/2023 1256  650 - 1600 mg/dL Final    IgG Cord Blood Reference Range: 650-1600 mg/dL.    IgA 07/12/2023 185  40 - 350 mg/dL Final    IgA Cord Blood Reference Range: <5 mg/dL.    IgM 07/12/2023 51  50 - 300 mg/dL Final    IgM Cord Blood Reference Range: <25 mg/dL.    Vit D, 25-Hydroxy 07/12/2023 29 (L)  30 - 96 ng/mL Final    Comment: Vitamin D deficiency.........<10 ng/mL                              Vitamin D insufficiency......10-29 ng/mL       Vitamin D sufficiency........> or equal to 30 ng/mL  Vitamin D toxicity............>100 ng/mL      NEUROFILAMENT LIGHT CHAIN, PLASMA 07/12/2023 8.3  <=15.4 pg/mL Final    Comment: -------------------ADDITIONAL INFORMATION-------------------  The testing method is a digital immunoassay for the   quantitative determination of NfL in plasma manufactured by   [employer] and performed on the [employer] HD-X   analyzer.    Values obtained with different methods may be different and   cannot be used interchangeably.  This test was developed and its performance characteristics   determined by [facility] in a manner consistent with CLIA   requirements. This test has not been cleared or approved by   the U.S. Food and Drug Administration.    Test Performed by:  [place] [address]  : Joselito Kahn M.D. Ph.D.; CLIA# [id number]         Encounter Diagnosis   Name Primary?    Dysuria Yes        Orders Placed This Encounter   Procedures    Urine culture     Standing Status:   Future     Standing Expiration Date:   9/30/2024      Medications Ordered This Encounter   Medications    nitrofurantoin, macrocrystal-monohydrate, (MACROBID) 100 MG capsule     Sig: Take 1 capsule (100 mg total) by mouth 2 (two) times daily.     Dispense:  14 capsule     Refill:  0        No follow-ups on file.      E-Visit Time Tracking:    Day 1 Time (in minutes): 7     Total Time (in minutes): 7

## 2023-08-14 ENCOUNTER — OFFICE VISIT (OUTPATIENT)
Dept: SPORTS MEDICINE | Facility: CLINIC | Age: 43
End: 2023-08-14
Payer: MEDICARE

## 2023-08-14 VITALS — WEIGHT: 285.06 LBS | BODY MASS INDEX: 45.81 KG/M2 | HEIGHT: 66 IN

## 2023-08-14 DIAGNOSIS — S83.412A SPRAIN OF MEDIAL COLLATERAL LIGAMENT OF LEFT KNEE, INITIAL ENCOUNTER: ICD-10-CM

## 2023-08-14 DIAGNOSIS — M17.12 PRIMARY OSTEOARTHRITIS OF LEFT KNEE: Primary | ICD-10-CM

## 2023-08-14 DIAGNOSIS — G35 MULTIPLE SCLEROSIS: ICD-10-CM

## 2023-08-14 PROCEDURE — 3008F BODY MASS INDEX DOCD: CPT | Mod: HCNC,CPTII,S$GLB, | Performed by: PHYSICIAN ASSISTANT

## 2023-08-14 PROCEDURE — 99999 PR PBB SHADOW E&M-EST. PATIENT-LVL IV: CPT | Mod: PBBFAC,HCNC,, | Performed by: PHYSICIAN ASSISTANT

## 2023-08-14 PROCEDURE — 1159F MED LIST DOCD IN RCRD: CPT | Mod: HCNC,CPTII,S$GLB, | Performed by: PHYSICIAN ASSISTANT

## 2023-08-14 PROCEDURE — 4010F ACE/ARB THERAPY RXD/TAKEN: CPT | Mod: HCNC,CPTII,S$GLB, | Performed by: PHYSICIAN ASSISTANT

## 2023-08-14 PROCEDURE — 3008F PR BODY MASS INDEX (BMI) DOCUMENTED: ICD-10-PCS | Mod: HCNC,CPTII,S$GLB, | Performed by: PHYSICIAN ASSISTANT

## 2023-08-14 PROCEDURE — 99213 PR OFFICE/OUTPT VISIT, EST, LEVL III, 20-29 MIN: ICD-10-PCS | Mod: HCNC,S$GLB,, | Performed by: PHYSICIAN ASSISTANT

## 2023-08-14 PROCEDURE — 99999 PR PBB SHADOW E&M-EST. PATIENT-LVL IV: ICD-10-PCS | Mod: PBBFAC,HCNC,, | Performed by: PHYSICIAN ASSISTANT

## 2023-08-14 PROCEDURE — 1159F PR MEDICATION LIST DOCUMENTED IN MEDICAL RECORD: ICD-10-PCS | Mod: HCNC,CPTII,S$GLB, | Performed by: PHYSICIAN ASSISTANT

## 2023-08-14 PROCEDURE — 4010F PR ACE/ARB THEARPY RXD/TAKEN: ICD-10-PCS | Mod: HCNC,CPTII,S$GLB, | Performed by: PHYSICIAN ASSISTANT

## 2023-08-14 PROCEDURE — 1160F PR REVIEW ALL MEDS BY PRESCRIBER/CLIN PHARMACIST DOCUMENTED: ICD-10-PCS | Mod: HCNC,CPTII,S$GLB, | Performed by: PHYSICIAN ASSISTANT

## 2023-08-14 PROCEDURE — 99213 OFFICE O/P EST LOW 20 MIN: CPT | Mod: HCNC,S$GLB,, | Performed by: PHYSICIAN ASSISTANT

## 2023-08-14 PROCEDURE — 1160F RVW MEDS BY RX/DR IN RCRD: CPT | Mod: HCNC,CPTII,S$GLB, | Performed by: PHYSICIAN ASSISTANT

## 2023-08-14 NOTE — PROGRESS NOTES
Orthopaedic Follow-Up Visit    Last Appointment:  06/30/2023  Diagnosis:  Primary osteoarthritis of the left knee  Prior Procedure:  Left knee CSI, home exercise program    Alicia Soliz is a 43 y.o. female who is here for f/u evaluation of left knee pain. The patient was last seen here by me on 06/30/2023 at which point we decided to try a left knee CSI and review an HEP prior to considering further treatment options. She was in physical therapy at the time but she has since discontinued due to her worsening knee pain. She has had several falls since last visit warranting a left knee MRI. Patient returns today to review her MRI she reports that her symptoms have persisted and is interested in proceeding with other treatment options.    To review her history, Alicia Soliz is a 43 y.o. year old female patient who initially presented to clinic on 06/30/2023 for left knee pain.  She reports that her pain had been present for several months at the time but has been worsening.  She had no acute injury or trauma then; however, she reports she is had several falls since our initial visit that has worsened her left knee pain.  Her symptoms include medial-sided knee pain, limited range of motion, night pain.  Her pain is made worse by weight-bearing, prolonged ambulation, prolonged standing, prolonged sitting.  Her treatment has included rest, activity modification, oral anti-inflammatories, Tylenol, Voltaren gel, formal physical therapy.  She has since discontinued physical therapy due to it worsening her pain. At last visit we would like to try a corticosteroid injection, she had no improvement of her pain with this injection.    Patient's medications, allergies, past medical, surgical, social and family histories were reviewed and updated as appropriate.    Review of Systems   All systems reviewed were negative.  Specifically, the patient denies fever, chills, weight loss, chest pain, shortness of breath, or  dyspnea on exertion.      Past Medical History:   Diagnosis Date    Anemia     Arthritis     Cardiac arrest as complication of care     pt states she went into cardiac arrest from an allergic reaction to a medication    Depression     Encounter for blood transfusion     Hemiplegia due to old stroke     Hypertension     Morbid obesity with BMI of 45.0-49.9, adult 9/20/2018    Multiple sclerosis        Objective:      Physical Exam  Patient is alert and oriented, no distress. Skin is intact. Neuro is normal with no focal motor or sensory findings.    Standing exam  stance: varus alignment, no significant leg-length discrepancy  gait: antalgic, ambulates with walker      Knee                                                  RIGHT             LEFT  Skin:                                         Intact               Intact  ROM:                                        0-120              5-110  Effusion:                                   Neg                  Neg  Medial joint line tenderness:    Neg                   ++  Lateral joint line tenderness:    Neg                  Neg  Carmella:                                Neg                  Neg  Patella crepitus:                       +                      +  Patella tenderness:                  Neg                  Neg  Patella grind:                            Neg                  Neg  Lachman:                                 Neg                  Neg  Pivot shift:                                Neg                  Neg  Valgus stress:                          Neg             Neg, pain but no laxity   Varus stress:                            Neg                  Neg  Posterior drawer:                     Neg                  Neg  N-V                                          intact               intact  Hip:                                          nml                    nml              Lower extremity edema:         Negative          negative     Ttp along MCL, pain with  valgus but no laxity when compared to right knee    Neurovascular exam  - motor function grossly intact bilaterally to hip flexion, knee extension and flexion, ankle dorsiflexion and plantarflexion  - sensation intact to light touch bilaterally to femoral, tibial, tibial and peroneal distributions  - symmetrical pedal pulses    Imaging:   XR Results:  Results for orders placed during the hospital encounter of 07/17/23    XR KNEE 3 VIEW LEFT    Narrative  EXAMINATION:  XR KNEE 3 VIEW LEFT    CLINICAL HISTORY:  Pain in left hip    TECHNIQUE:  AP, lateral, and Merchant views of the left knee were performed.    COMPARISON:  06/23/2023    FINDINGS:  There is no radiographic evidence of acute osseous, articular, or soft tissue abnormality.  Tricompartmental marginal osteophytes are present with mild joint space narrowing in the medial compartment.  Degenerative findings appear unchanged from prior.    Impression  Degenerative findings as above      Electronically signed by: Raúl Lockett MD  Date:    07/17/2023  Time:    16:55      MRI Results:  Results for orders placed during the hospital encounter of 08/02/23    MRI Knee Without Contrast Left    Narrative  EXAMINATION:  MRI KNEE WITHOUT CONTRAST LEFT    CLINICAL HISTORY:  Knee pain, chronic, positive xray (Age >= 5y);Unilateral primary osteoarthritis, left knee    TECHNIQUE:  Multiplanar, multisequence images were performed about the knee.    COMPARISON:  None    FINDINGS:  Menisci:    --Medial: Degenerative extrusion with free edge blunting.  Posterior horn is diminutive which may relate to prior surgery    --Lateral: Body segment degenerative extrusion without discrete tear appreciated.    Ligaments:  ACL, PCL, and LCL complex are intact.  Grade 1-2 proximal MCL sprain suspected.    Tendons:  Extensor mechanism is maintained.    Cartilage:    Patellofemoral: Mild low-grade fissuring.    Medial tibiofemoral: Moderate to large area central weight-bearing femoral  condyle high-grade partial and full-thickness loss    Lateral tibiofemoral: Weightbearing generalized thinning and minimal fissuring.    Bone: Small bone infarct within the medial femoral metaphysis.    Miscellaneous: No large joint effusion.  Mild diffuse subcutaneous edema more prevalent anteriorly.    Impression  1.  MCL sprain suspected.  2. Meniscal findings as above with medial meniscus changes likely related to prior surgery.  Correlate clinically.  3. Chondromalacia/cartilage loss findings as above most prevalent within the medial tibiofemoral compartment including high-grade/full-thickness loss  4. Other findings as above.      Electronically signed by: Eliel Hernandez MD  Date:    08/02/2023  Time:    08:33      Physician Read: I agree with the above impression, there is medial joint space narrowing present as well as high-grade full-thickness chondral loss in the medial tibiofemoral compartment.  These findings are consistent with a Kellgren Dave grade 3 in the left knee    Assessment/Plan:   Assessment:  Alicia Soliz is a 43 y.o. female with primary osteoarthritis of the left knee, MCL sprain    Plan:    Reviewed left knee MRI with the patient today. Ultimately, her MRI revealed degenerative tears in her medial and lateral meniscus as well as full-thickness chondral loss in the medial tibiofemoral compartment. These findings are all consistent with primary osteoarthritis of the left knee, worse in the medial compartment.  These findings are consistent with a Kellgren Dave grade 3 in the left knee  We again discussed the natural progression of osteoarthritis and different treatment options. Treatment options include rest, activity modification, lifestyle modifications such as weight loss, oral anti-inflammatories, knee bracing, formal physical therapy, different injection types. Injection types include corticosteroid injections versus viscosupplementation. At last visit we tried a  corticosteroid injection, she had minimal improvement of her pain with this injection.  I recommend we proceed with a prior authorization for viscosupplementation, prior authorization placed for [employer]  MEDICAL NECESSITY FOR VISCOSUPPLEMENTATION: After thorough evaluation of the patient, I have determined that visco-supplementation is medically necessary. The patient has painful DJD of the knee with failure of conservative therapy including lifestyle modifications and rehabilitation exercises. Oral analgesis/NSAIDs have not adequately controlled symptoms and there is radiographic evidence of joint space narrowing, subchondral sclerosis, and some early osteophytic changes Kellgren- Dave grade 3  She was previously in formal physical therapy at [facility] but has since discontinued due to her knee pain. I recommend we proceed with a referral to reinitiate formal physical therapy here at the [facility], specifically for aquatic therapy. I suspect she will tolerate aquatic therapy more so than regular physical therapy due to less stress being placed on the joints  She is requesting her most recent note with me be faxed over to her pain management doctor, Dr. Yordy Matson. We will get that faxed over today.    I did discuss with the patient that if she fails to improve with the above treatment plan, we can then consider a superficial nerve block with Iovera vs a deep genicular nerve block for her chronic knee pain, patient voiced understanding  Follow-up with me for viscosupplementation         Alethea Gordon PA-C  Sports Medicine Physician Assistant       Disclaimer: This note was prepared using a voice recognition system and is likely to have sound alike errors within the text.

## 2023-08-15 ENCOUNTER — PATIENT MESSAGE (OUTPATIENT)
Dept: SPORTS MEDICINE | Facility: CLINIC | Age: 43
End: 2023-08-15
Payer: MEDICARE

## 2023-08-15 ENCOUNTER — TELEPHONE (OUTPATIENT)
Dept: NEUROLOGY | Facility: CLINIC | Age: 43
End: 2023-08-15

## 2023-08-15 DIAGNOSIS — M62.838 MUSCLE SPASMS OF LOWER EXTREMITY, UNSPECIFIED LATERALITY: ICD-10-CM

## 2023-08-15 DIAGNOSIS — M17.12 PRIMARY OSTEOARTHRITIS OF LEFT KNEE: Primary | ICD-10-CM

## 2023-08-15 RX ORDER — TIZANIDINE 4 MG/1
4 TABLET ORAL EVERY 8 HOURS PRN
Qty: 40 TABLET | Refills: 1 | Status: SHIPPED | OUTPATIENT
Start: 2023-08-15 | End: 2023-10-27 | Stop reason: SDUPTHER

## 2023-08-22 ENCOUNTER — PATIENT MESSAGE (OUTPATIENT)
Dept: ADMINISTRATIVE | Facility: OTHER | Age: 43
End: 2023-08-22
Payer: MEDICARE

## 2023-08-22 ENCOUNTER — PATIENT MESSAGE (OUTPATIENT)
Dept: INTERNAL MEDICINE | Facility: CLINIC | Age: 43
End: 2023-08-22
Payer: MEDICARE

## 2023-08-22 RX ORDER — ASPIRIN 325 MG
50000 TABLET, DELAYED RELEASE (ENTERIC COATED) ORAL
Qty: 12 CAPSULE | Refills: 28 | Status: SHIPPED | OUTPATIENT
Start: 2023-08-22 | End: 2030-08-14

## 2023-08-24 ENCOUNTER — TELEPHONE (OUTPATIENT)
Dept: CARDIOLOGY | Facility: CLINIC | Age: 43
End: 2023-08-24
Payer: MEDICARE

## 2023-08-24 ENCOUNTER — HOSPITAL ENCOUNTER (OUTPATIENT)
Dept: CARDIOLOGY | Facility: HOSPITAL | Age: 43
Discharge: HOME | End: 2023-08-24
Attending: FAMILY MEDICINE
Payer: MEDICARE

## 2023-08-24 ENCOUNTER — TELEPHONE (OUTPATIENT)
Dept: NEUROLOGY | Facility: CLINIC | Age: 43
End: 2023-08-24
Payer: MEDICARE

## 2023-08-24 DIAGNOSIS — I10 HYPERTENSION, UNSPECIFIED TYPE: Primary | ICD-10-CM

## 2023-08-24 DIAGNOSIS — I10 HYPERTENSION, UNSPECIFIED TYPE: ICD-10-CM

## 2023-08-24 PROCEDURE — 93005 ELECTROCARDIOGRAM TRACING: CPT | Mod: HCNC

## 2023-08-24 PROCEDURE — 93010 EKG 12-LEAD: ICD-10-PCS | Mod: HCNC,,, | Performed by: INTERNAL MEDICINE

## 2023-08-24 PROCEDURE — 93010 ELECTROCARDIOGRAM REPORT: CPT | Mod: HCNC,,, | Performed by: INTERNAL MEDICINE

## 2023-08-24 NOTE — TELEPHONE ENCOUNTER
Returned Dr Vasques's call. Md stated he is the internist at  [facility]. He was inquiring about pt's baseline and if pt has hx of adverse reaction to steroids. Md stated he was considering treating her for MS flare. This Rn discussed with Jen SLATER. Jen called Dr Vasques.

## 2023-08-24 NOTE — TELEPHONE ENCOUNTER
----- Message from Sedrick Engel sent at 8/24/2023  2:02 PM CDT -----  Regarding: Advisement  Contact: @[phone]   is calling to speak with  or someone on the staff. Please call and advise @[phone]

## 2023-08-25 ENCOUNTER — PATIENT MESSAGE (OUTPATIENT)
Dept: NEUROLOGY | Facility: CLINIC | Age: 43
End: 2023-08-25
Payer: MEDICARE

## 2023-08-26 ENCOUNTER — PATIENT MESSAGE (OUTPATIENT)
Dept: NEUROLOGY | Facility: CLINIC | Age: 43
End: 2023-08-26
Payer: MEDICARE

## 2023-08-27 ENCOUNTER — PATIENT MESSAGE (OUTPATIENT)
Dept: ADMINISTRATIVE | Facility: OTHER | Age: 43
End: 2023-08-27
Payer: MEDICARE

## 2023-09-01 ENCOUNTER — PATIENT MESSAGE (OUTPATIENT)
Dept: NEUROLOGY | Facility: CLINIC | Age: 43
End: 2023-09-01
Payer: MEDICARE

## 2023-09-05 ENCOUNTER — PATIENT MESSAGE (OUTPATIENT)
Dept: NEUROLOGY | Facility: CLINIC | Age: 43
End: 2023-09-05

## 2023-09-05 ENCOUNTER — OFFICE VISIT (OUTPATIENT)
Dept: NEUROLOGY | Facility: CLINIC | Age: 43
End: 2023-09-05
Payer: MEDICARE

## 2023-09-05 DIAGNOSIS — Z29.89 PROPHYLACTIC IMMUNOTHERAPY: ICD-10-CM

## 2023-09-05 DIAGNOSIS — M62.838 MUSCLE SPASM: ICD-10-CM

## 2023-09-05 DIAGNOSIS — Z79.899 HIGH RISK MEDICATION USE: ICD-10-CM

## 2023-09-05 DIAGNOSIS — G35 MULTIPLE SCLEROSIS: Primary | ICD-10-CM

## 2023-09-05 DIAGNOSIS — R26.9 GAIT DISTURBANCE: ICD-10-CM

## 2023-09-05 DIAGNOSIS — Z71.89 COUNSELING REGARDING GOALS OF CARE: ICD-10-CM

## 2023-09-05 DIAGNOSIS — R35.0 URINARY FREQUENCY: ICD-10-CM

## 2023-09-05 PROCEDURE — 99215 PR OFFICE/OUTPT VISIT, EST, LEVL V, 40-54 MIN: ICD-10-PCS | Mod: HCNC,95,, | Performed by: CLINICAL NURSE SPECIALIST

## 2023-09-05 PROCEDURE — 1159F MED LIST DOCD IN RCRD: CPT | Mod: HCNC,CPTII,95, | Performed by: CLINICAL NURSE SPECIALIST

## 2023-09-05 PROCEDURE — 4010F ACE/ARB THERAPY RXD/TAKEN: CPT | Mod: HCNC,CPTII,95, | Performed by: CLINICAL NURSE SPECIALIST

## 2023-09-05 PROCEDURE — 1159F PR MEDICATION LIST DOCUMENTED IN MEDICAL RECORD: ICD-10-PCS | Mod: HCNC,CPTII,95, | Performed by: CLINICAL NURSE SPECIALIST

## 2023-09-05 PROCEDURE — 4010F PR ACE/ARB THEARPY RXD/TAKEN: ICD-10-PCS | Mod: HCNC,CPTII,95, | Performed by: CLINICAL NURSE SPECIALIST

## 2023-09-05 PROCEDURE — 99215 OFFICE O/P EST HI 40 MIN: CPT | Mod: HCNC,95,, | Performed by: CLINICAL NURSE SPECIALIST

## 2023-09-05 NOTE — Clinical Note
Social workers, can we try to get her a new aluminum walker? She needs the larger size. She said that her current one doesn't close properly. Maybe it just needs repairs?   Also, she uses [employer] for transportation for the disabled. She has asked them to contact her when they are on the way, but they won't do this. She sometimes has to sit outside for 45 minutes waiting on them. If she's not outside when they get there, they will leave her. Do you think it's reasonable to call them and ask for a courtesy call when they are 5-10 minutes away?

## 2023-09-05 NOTE — PROGRESS NOTES
Subjective:          Patient ID: Alicia Soliz is a 43 y.o. female who presents today for a routine virtual visit for MS.  She was last seen by Dr. Garrett in June 2023. The history has been provided by the patient.   The patient location is: her home   The chief complaint leading to consultation is: MS     Visit type: audiovisual    Face to Face time with patient: 41 minutes   60 minutes of total time spent on the encounter, which includes face to face time and non-face to face time preparing to see the patient (eg, review of tests), Obtaining and/or reviewing separately obtained history, Documenting clinical information in the electronic or other health record, Independently interpreting results (not separately reported) and communicating results to the patient/family/caregiver, or Care coordination (not separately reported).       Each patient to whom he or she provides medical services by telemedicine is:  (1) informed of the relationship between the physician and patient and the respective role of any other health care provider with respect to management of the patient; and (2) notified that he or she may decline to receive medical services by telemedicine and may withdraw from such care at any time.      MS HPI:  DMT: Ocrevus; due in September   Side effects from DMT? No  Taking vitamin D3 as recommended? Yes   In the past months, she had been doing PT, and this was going well. However, she states that her PT postponed therapy until the weather was cooler.   She has seen a bone and joint doctor for knee pain and was getting injections, but these were not helpful. This actually made her pain worse. She is set up to do aquatherapy soon .   She has a granddaughter that she helps to look after when needed.   She has been looking for a job to keep herself occupied.   She is trying to stay out of the heat. She states that she has to sit outside for a long time waiting for transportation.   She denies any known  "infections.   She was hospitalized in late August for acute weakness.   She will be seeing ENT soon.   She had a headache yesterday, described as a sharp pain behind the right eye. Her vision gets blurry when this happens. She also feels this on the right side of her head. She puts a cold towel on her head, but generally doesn't take medications for it. She has light sensitivity and wants complete silence when she has these headaches.  She denies any nausea or vomiting.   Her energy level is up and down. She generally sleeps well. Her bladder has been active, "going every 2 minutes." She does feel like she is emptying completely. She states "one minute it's (the urine) dark, one minute it's clear." She denies any pain or burning when she urinates. Bowels are regular.   She feels itching all over her body. She denies any rash.   She gets muscle spasms; she had a bad MS hug yesterday that lasted all day.   She feels like her vision has worsened. She wears her glasses all the time and will see her ophthalmologist soon.   She eats fruit, fish, Lunchables at times, vegetables, pecan candy, and ice cream.   She denies any significant stress and is trying to stay positive.   She walks with her walker around the house. She has been trying to do MS exercises during the day (does videos), and she also has a treadmill and other equipment. She also plans to start seeing a  locally. Her legs "go noodle" sometimes. She fell directly on her knees recently when her legs gave out. She was able to get up on her own.   She denies any current equipment needs. She needs a new standard walker because her current one doesn't close. She also has a quad cane, a scooter, and a shower chair. She has a commode, but has not had to use this.   She has not been taking gabapentin. She takes Topamax for headaches.     Medications:  Current Outpatient Medications   Medication Sig    cholecalciferol, vitamin D3, 1,250 mcg (50,000 unit) " capsule Take 1 capsule (50,000 Units total) by mouth every 7 days. for 365 doses    diclofenac sodium (VOLTAREN) 1 % Gel Apply 2 g topically 4 (four) times daily.    diphenhydrAMINE (BENADRYL) 50 mg/mL injection     doxepin (SINEQUAN) 25 MG capsule Take 1 capsule (25 mg total) by mouth nightly as needed.    doxepin (SINEQUAN) 75 MG capsule Take 75 mg by mouth.    DULoxetine (CYMBALTA) 60 MG capsule Take 1 capsule (60 mg total) by mouth once daily.    esomeprazole (NEXIUM) 40 MG capsule Take 1 capsule (40 mg total) by mouth before breakfast.    gabapentin (NEURONTIN) 300 MG capsule Take 3 capsules (900 mg total) by mouth 2 (two) times daily.    HYDROcodone-acetaminophen (NORCO)  mg per tablet Take 1 tablet by mouth 3 (three) times daily.    LIDOcaine (LIDODERM) 5 % Place 1 patch onto the skin once daily. Remove & Discard patch within 12 hours or as directed by MD    linaCLOtide (LINZESS) 145 mcg Cap capsule Take 1 capsule (145 mcg total) by mouth before breakfast.    mupirocin (BACTROBAN) 2 % ointment APPLY TOPICALLY TWICE A DAY    nabumetone (RELAFEN) 750 MG tablet Take 750 mg by mouth 2 (two) times daily as needed.    naloxone (NARCAN) 4 mg/actuation Spry SMARTSIG:Both Nares    neomycin-polymyxin-hydrocortisone (CORTISPORIN) 3.5-10,000-1 mg/mL-unit/mL-% otic suspension Place 3 drops into the right ear 3 (three) times daily.    nitrofurantoin, macrocrystal-monohydrate, (MACROBID) 100 MG capsule Take 1 capsule (100 mg total) by mouth 2 (two) times daily.    OCREVUS 30 mg/mL Soln     ondansetron (ZOFRAN) 8 MG tablet Take 1 tablet (8 mg total) by mouth 2 (two) times daily.    semaglutide, weight loss, (WEGOVY) 0.25 mg/0.5 mL PnIj Inject 0.25 mg into the skin every 7 days.    SOLU-MEDROL, PF, 125 mg/2 mL SolR     tiZANidine (ZANAFLEX) 4 MG tablet Take 1 tablet (4 mg total) by mouth every 8 (eight) hours as needed (muscle craps).    topiramate (TOPAMAX) 50 MG tablet TAKE 1 TABLET BY MOUTH IN  THE MORNING AND 2 TABLETS AT BEDTIME    triamcinolone acetonide 0.1% (KENALOG) 0.1 % ointment APPLY TO AFFECTED AREA TWICE A DAY    valsartan (DIOVAN) 80 MG tablet Take 1 tablet (80 mg total) by mouth once daily.       SOCIAL HISTORY  Social History     Tobacco Use    Smoking status: Never     Passive exposure: Never    Smokeless tobacco: Never   Substance Use Topics    Alcohol use: Yes     Comment: once a year     Drug use: Never       Living arrangements - the patient lives with family              Objective:        1. 25 foot timed walk:      11/3/2020    12:00 AM 6/16/2023    12:01 AM   Timed 25 Foot Walk:   Did patient wear an AFO? No No   Was assistive device used? Yes Yes   Assistive device used (milton one): Unilateral Assistance Bilateral Assistance   Unilateral device used Cane    Bilateral device used  Walker/Rollator   Time for 25 Foot Walk (seconds) 38.83 48.4   Time for 25 Foot Walk (seconds) 46.5 30.03       Neurologic Exam    Deferred   Imaging:       Results for orders placed during the hospital encounter of 05/16/22    MRI Brain Demyelinating W W/O Contrast    Impression  No abnormal enhancement as may occur with active demyelinating disease.      Electronically signed by: Jae Garnett Jr., MD  Date:    05/17/2022  Time:    08:29    Results for orders placed during the hospital encounter of 05/16/22    MRI Cervical Spine Demyelinating W W/O Contrast    Impression  1. No abnormal cord signal or findings to suggest demyelinating disease within the cervical spinal cord.  No abnormal enhancement.  2. Minimal degenerative change as described above without significant spinal or foraminal stenosis.  These findings are unchanged.      Electronically signed by: Jae Garnett Jr., MD  Date:    05/17/2022  Time:    09:03    Results for orders placed during the hospital encounter of 05/16/22    MRI Thoracic Spine Demyelinating W W/O Contrast    Impression  1. No abnormal cord signal or abnormal enhancement.   No findings to suggest demyelinating disease within the thoracic cord.  2. Right-sided foraminal stenosis at T2-T3, T3-T4, T4-T5, and T5-T6 could result in radiculopathy.      Electronically signed by: Jae Garnett Jr., MD  Date:    05/17/2022  Time:    08:59      MRI Brain W WO Contrast  Order: [id number]  Impression      1.  No acute intracranial abnormality.   2.  Findings consistent with reported history of multiple sclerosis. No evidence of active demyelination.  Narrative    MRI BRAIN W WO CONTRAST     INDICATION: Neuro deficit,  acute,  stroke suspected . Multiple sclerosis.     COMPARISON: CTA head and neck dated 8/24/2023.     TECHNIQUE: Multiplanar multisequence imaging was obtained of the brain with and without the administration of intravenous contrast. Diffusion weighted imaging was acquired.     FINDINGS:     There is no restricted diffusion. No mass or mass effect. No abnormal enhancement. No acute intraparenchymal hemorrhage.     Scattered and confluent T2/FLAIR hyperintense lesions involving the cerebellum, dong, periventricular and juxtacortical white matter with associated volume loss. There is involvement of the callosal septal junction. Some of the lesions are ovoid and oriented perpendicular to the lateral ventricles. No associated abnormal enhancement.     No hydrocephalus No extra-axial fluid collection. The major intracranial arterial flow voids are present at the skull base.  Exam End: 08/25/23 16:27 Last Resulted: 08/25/23 16:39   Received From: [facility]      Labs:     Lab Results   Component Value Date    FEBTOZAX13WG 29 (L) 07/12/2023    KTVFEXPC78KW 38 01/31/2023    [id number] 34 07/30/2021     Lab Results   Component Value Date    WBC 5.79 07/12/2023    RBC 4.85 07/12/2023    HGB 10.2 (L) 07/12/2023    HCT 35.6 (L) 07/12/2023    MCV 73 (L) 07/12/2023    MCH 21.0 (L) 07/12/2023    MCHC 28.7 (L) 07/12/2023     RDW 17.2 (H) 07/12/2023     07/12/2023    MPV 11.0 07/12/2023    GRAN 2.5 07/12/2023    GRAN 43.7 07/12/2023    LYMPH 2.7 07/12/2023    LYMPH 45.8 07/12/2023    MONO 0.5 07/12/2023    MONO 8.3 07/12/2023    EOS 0.1 07/12/2023    BASO 0.04 07/12/2023    EOSINOPHIL 1.2 07/12/2023    BASOPHIL 0.7 07/12/2023     Sodium   Date Value Ref Range Status   07/12/2023 140 136 - 145 mmol/L Final     Potassium   Date Value Ref Range Status   07/12/2023 3.8 3.5 - 5.1 mmol/L Final     Chloride   Date Value Ref Range Status   07/12/2023 105 95 - 110 mmol/L Final     CO2   Date Value Ref Range Status   07/12/2023 26 23 - 29 mmol/L Final     Glucose   Date Value Ref Range Status   07/12/2023 94 70 - 110 mg/dL Final     BUN   Date Value Ref Range Status   07/12/2023 8 6 - 20 mg/dL Final     Creatinine   Date Value Ref Range Status   07/12/2023 0.8 0.5 - 1.4 mg/dL Final     Calcium   Date Value Ref Range Status   07/12/2023 9.3 8.7 - 10.5 mg/dL Final     Total Protein   Date Value Ref Range Status   07/12/2023 7.3 6.0 - 8.4 g/dL Final     Albumin   Date Value Ref Range Status   07/12/2023 3.7 3.5 - 5.2 g/dL Final     Total Bilirubin   Date Value Ref Range Status   07/12/2023 0.2 0.1 - 1.0 mg/dL Final     Comment:     For infants and newborns, interpretation of results should be based  on gestational age, weight and in agreement with clinical  observations.    Premature Infant recommended reference ranges:  Up to 24 hours.............<8.0 mg/dL  Up to 48 hours............<12.0 mg/dL  3-5 days..................<15.0 mg/dL  6-29 days.................<15.0 mg/dL       Alkaline Phosphatase   Date Value Ref Range Status   07/12/2023 92 55 - 135 U/L Final     AST   Date Value Ref Range Status   07/12/2023 20 10 - 40 U/L Final     ALT   Date Value Ref Range Status   07/12/2023 14 10 - 44 U/L Final     Anion Gap   Date Value Ref Range Status   07/12/2023 9 8 - 16 mmol/L Final     eGFR if    Date Value Ref Range Status    05/16/2022 >60 >60 mL/min/1.73 m^2 Final     eGFR if non    Date Value Ref Range Status   05/16/2022 >60 >60 mL/min/1.73 m^2 Final     Comment:     Calculation used to obtain the estimated glomerular filtration  rate (eGFR) is the CKD-EPI equation.        Lab Results   Component Value Date    HEPBSAG Non-reactive 07/12/2023    HEPBSAB Positive 06/06/2022    HEPBCAB Non-reactive 07/12/2023         MS Impression and Plan:     NEURO MULTIPLE SCLEROSIS IMPRESSION:   MS Status:     Number of relapses in the past year? comment:  Pseudorelapse in late August? She had acute weakness, but brain MRI did not show active lesions. I suspect that this may have been heat or stress related. UA did not confirm infection. I have asked her to be mindful of what triggers her symptoms--perhaps fatigue, poor sleep, sugary or inflammatory foods, etc. She will take her symptom management medications more consistently and continue to exercise.   Plan:     DMT:  No change in management    DMT comment:  Continue Ocrevus and Vitamin D. Her infusion is due in late September. Labs have been reviewed. She is aware of the risks associated with immunosuppressant therapy, including increased risk of infection.       Symptom Management:  Implement change in symptom management    Implement Change in Symptom Management:  Bladder and Adaptive Needs (Will check UA this week to screen for infection.)       We will try to request new large aluminum walker or repairs on her current one. She uses this for ambulation around the house and to complete ADLs.   Social workers will also contact [facility] to request a courtesy call 5-10 minutes before  time to limit how long she is in the heat waiting for her ride.   She will follow up with Dr. Garrett in 3-4 months.        HAMIDA Vizcarra, CNS  Problem List Items Addressed This Visit    None  Visit Diagnoses       Urinary frequency    -  Primary    Relevant Orders    Urinalysis,  Reflex to Urine Culture Urine, Clean Catch

## 2023-09-06 ENCOUNTER — TELEPHONE (OUTPATIENT)
Dept: NEUROLOGY | Facility: CLINIC | Age: 43
End: 2023-09-06
Payer: MEDICARE

## 2023-09-06 ENCOUNTER — PATIENT MESSAGE (OUTPATIENT)
Dept: INTERNAL MEDICINE | Facility: CLINIC | Age: 43
End: 2023-09-06
Payer: MEDICARE

## 2023-09-06 RX ORDER — DOXEPIN HYDROCHLORIDE 25 MG/1
25 CAPSULE ORAL NIGHTLY PRN
Qty: 90 CAPSULE | Refills: 1 | Status: SHIPPED | OUTPATIENT
Start: 2023-09-06 | End: 2023-10-20

## 2023-09-06 RX ORDER — OXCARBAZEPINE 150 MG/1
150 TABLET, FILM COATED ORAL 2 TIMES DAILY
Qty: 180 TABLET | OUTPATIENT
Start: 2023-09-06

## 2023-09-06 RX ORDER — ONDANSETRON HYDROCHLORIDE 8 MG/1
8 TABLET, FILM COATED ORAL 2 TIMES DAILY
Qty: 30 TABLET | Refills: 1 | Status: SHIPPED | OUTPATIENT
Start: 2023-09-06 | End: 2023-12-19

## 2023-09-06 NOTE — TELEPHONE ENCOUNTER
----- Message from HAMIDA Cantu, CNS sent at 9/5/2023  6:27 PM CDT -----  UA this week   F/U with ROXANA in 3-4 months

## 2023-09-06 NOTE — TELEPHONE ENCOUNTER
Refill Routing Note   Medication(s) are not appropriate for processing by [facility] for the following reason(s):      Medication outside of protocol  Clarification of medication (Rx) details    [facility] action(s):  Defer  Route Care Due:  Labs due     Medication Therapy Plan: Pt reported not taking Doxepin on 9/5/23      Appointments  past 12m or future 3m with PCP    Date Provider   Last Visit   8/2/2023 Braden Dumont MD   Next Visit   Visit date not found Braden Dumont MD   ED visits in past 90 days: 0        Note composed:2:28 PM 09/06/2023

## 2023-09-06 NOTE — TELEPHONE ENCOUNTER
Spoke to pt and scheduled her for a UA tomorrow (9/7) at [facility] since pt said she is scheduled for an appt here at 1:30 PM. Pt stated that she has a urine cup from another time she was supposed to get a UA. Pt mentioned that Jen said she could use that cup and just bring the sample to a lab facility near her. I told pt that if Jen said it was ok, she can do it. I canceled her UA appt on 9/7. I scheduled pt for an in person appt with Dr. Garrett on 1/25 at 2:00 PM. Pt asked if this appt could be virtual instead. I told pt I would check with [facility] and see if this would be ok. Pt verbalized understanding.

## 2023-09-06 NOTE — TELEPHONE ENCOUNTER
Care Due:                  Date            Visit Type   Department     Provider  --------------------------------------------------------------------------------                                SAME DAY -                              ESTABLISHED   [facility] INTERNAL  Last Visit: 06-      PATIENT      MEDICINE       Braden Dumont  Next Visit: None Scheduled  None         None Found                                                            Last  Test          Frequency    Reason                     Performed    Due Date  --------------------------------------------------------------------------------    HBA1C.......  6 months...  semaglutide,.............  03- 09-    [facility] Embedded Care Due Messages. Reference number: [id number].   9/06/2023 7:38:03 AM CDT

## 2023-09-07 ENCOUNTER — LAB VISIT (OUTPATIENT)
Dept: LAB | Facility: HOSPITAL | Age: 43
End: 2023-09-07
Attending: PHYSICIAN ASSISTANT
Payer: MEDICARE

## 2023-09-07 ENCOUNTER — OFFICE VISIT (OUTPATIENT)
Dept: BARIATRICS | Facility: CLINIC | Age: 43
End: 2023-09-07
Payer: MEDICARE

## 2023-09-07 VITALS
SYSTOLIC BLOOD PRESSURE: 166 MMHG | OXYGEN SATURATION: 99 % | BODY MASS INDEX: 42.59 KG/M2 | DIASTOLIC BLOOD PRESSURE: 86 MMHG | WEIGHT: 263.88 LBS | HEART RATE: 89 BPM

## 2023-09-07 DIAGNOSIS — G35 MULTIPLE SCLEROSIS EXACERBATION: ICD-10-CM

## 2023-09-07 DIAGNOSIS — Z90.3 S/P GASTRIC SLEEVE PROCEDURE: ICD-10-CM

## 2023-09-07 DIAGNOSIS — E66.01 MORBID OBESITY WITH BMI OF 45.0-49.9, ADULT: Primary | ICD-10-CM

## 2023-09-07 DIAGNOSIS — E66.01 MORBID OBESITY WITH BMI OF 45.0-49.9, ADULT: ICD-10-CM

## 2023-09-07 DIAGNOSIS — D52.0 DIETARY FOLATE DEFICIENCY ANEMIA: ICD-10-CM

## 2023-09-07 DIAGNOSIS — K76.0 FATTY LIVER: ICD-10-CM

## 2023-09-07 DIAGNOSIS — I10 PRIMARY HYPERTENSION: ICD-10-CM

## 2023-09-07 LAB
25(OH)D3+25(OH)D2 SERPL-MCNC: 125 NG/ML (ref 30–96)
ALBUMIN SERPL BCP-MCNC: 3.8 G/DL (ref 3.5–5.2)
ALP SERPL-CCNC: 111 U/L (ref 55–135)
ALT SERPL W/O P-5'-P-CCNC: 21 U/L (ref 10–44)
ANION GAP SERPL CALC-SCNC: 12 MMOL/L (ref 8–16)
AST SERPL-CCNC: 19 U/L (ref 10–40)
BASOPHILS # BLD AUTO: 0.06 K/UL (ref 0–0.2)
BASOPHILS NFR BLD: 0.9 % (ref 0–1.9)
BILIRUB SERPL-MCNC: 0.2 MG/DL (ref 0.1–1)
BUN SERPL-MCNC: 8 MG/DL (ref 6–20)
CALCIUM SERPL-MCNC: 9.7 MG/DL (ref 8.7–10.5)
CHLORIDE SERPL-SCNC: 107 MMOL/L (ref 95–110)
CHOLEST SERPL-MCNC: 208 MG/DL (ref 120–199)
CHOLEST/HDLC SERPL: 3.5 {RATIO} (ref 2–5)
CO2 SERPL-SCNC: 24 MMOL/L (ref 23–29)
CREAT SERPL-MCNC: 0.8 MG/DL (ref 0.5–1.4)
DIFFERENTIAL METHOD: ABNORMAL
EOSINOPHIL # BLD AUTO: 0.1 K/UL (ref 0–0.5)
EOSINOPHIL NFR BLD: 1.6 % (ref 0–8)
ERYTHROCYTE [DISTWIDTH] IN BLOOD BY AUTOMATED COUNT: 16.3 % (ref 11.5–14.5)
EST. GFR  (NO RACE VARIABLE): >60 ML/MIN/1.73 M^2
GLUCOSE SERPL-MCNC: 82 MG/DL (ref 70–110)
HCT VFR BLD AUTO: 40.4 % (ref 37–48.5)
HDLC SERPL-MCNC: 59 MG/DL (ref 40–75)
HDLC SERPL: 28.4 % (ref 20–50)
HGB BLD-MCNC: 11.6 G/DL (ref 12–16)
IMM GRANULOCYTES # BLD AUTO: 0.01 K/UL (ref 0–0.04)
IMM GRANULOCYTES NFR BLD AUTO: 0.1 % (ref 0–0.5)
IRON SERPL-MCNC: 55 UG/DL (ref 30–160)
LDLC SERPL CALC-MCNC: 111.2 MG/DL (ref 63–159)
LYMPHOCYTES # BLD AUTO: 2.8 K/UL (ref 1–4.8)
LYMPHOCYTES NFR BLD: 40.4 % (ref 18–48)
MCH RBC QN AUTO: 20.5 PG (ref 27–31)
MCHC RBC AUTO-ENTMCNC: 28.7 G/DL (ref 32–36)
MCV RBC AUTO: 72 FL (ref 82–98)
MONOCYTES # BLD AUTO: 0.5 K/UL (ref 0.3–1)
MONOCYTES NFR BLD: 7.2 % (ref 4–15)
NEUTROPHILS # BLD AUTO: 3.4 K/UL (ref 1.8–7.7)
NEUTROPHILS NFR BLD: 49.8 % (ref 38–73)
NONHDLC SERPL-MCNC: 149 MG/DL
NRBC BLD-RTO: 0 /100 WBC
PLATELET # BLD AUTO: 364 K/UL (ref 150–450)
PMV BLD AUTO: 10.1 FL (ref 9.2–12.9)
POTASSIUM SERPL-SCNC: 3.8 MMOL/L (ref 3.5–5.1)
PROT SERPL-MCNC: 7.8 G/DL (ref 6–8.4)
RBC # BLD AUTO: 5.65 M/UL (ref 4–5.4)
SATURATED IRON: 12 % (ref 20–50)
SODIUM SERPL-SCNC: 143 MMOL/L (ref 136–145)
TOTAL IRON BINDING CAPACITY: 462 UG/DL (ref 250–450)
TRANSFERRIN SERPL-MCNC: 312 MG/DL (ref 200–375)
TRIGL SERPL-MCNC: 189 MG/DL (ref 30–150)
VIT B12 SERPL-MCNC: 809 PG/ML (ref 210–950)
WBC # BLD AUTO: 6.83 K/UL (ref 3.9–12.7)

## 2023-09-07 PROCEDURE — 99214 PR OFFICE/OUTPT VISIT, EST, LEVL IV, 30-39 MIN: ICD-10-PCS | Mod: HCNC,S$GLB,, | Performed by: PHYSICIAN ASSISTANT

## 2023-09-07 PROCEDURE — 3079F DIAST BP 80-89 MM HG: CPT | Mod: HCNC,CPTII,S$GLB, | Performed by: PHYSICIAN ASSISTANT

## 2023-09-07 PROCEDURE — 85025 COMPLETE CBC W/AUTO DIFF WBC: CPT | Mod: HCNC | Performed by: PHYSICIAN ASSISTANT

## 2023-09-07 PROCEDURE — 3008F BODY MASS INDEX DOCD: CPT | Mod: HCNC,CPTII,S$GLB, | Performed by: PHYSICIAN ASSISTANT

## 2023-09-07 PROCEDURE — 3008F PR BODY MASS INDEX (BMI) DOCUMENTED: ICD-10-PCS | Mod: HCNC,CPTII,S$GLB, | Performed by: PHYSICIAN ASSISTANT

## 2023-09-07 PROCEDURE — 1159F PR MEDICATION LIST DOCUMENTED IN MEDICAL RECORD: ICD-10-PCS | Mod: HCNC,CPTII,S$GLB, | Performed by: PHYSICIAN ASSISTANT

## 2023-09-07 PROCEDURE — 99999 PR PBB SHADOW E&M-EST. PATIENT-LVL V: CPT | Mod: PBBFAC,HCNC,, | Performed by: PHYSICIAN ASSISTANT

## 2023-09-07 PROCEDURE — 1160F PR REVIEW ALL MEDS BY PRESCRIBER/CLIN PHARMACIST DOCUMENTED: ICD-10-PCS | Mod: HCNC,CPTII,S$GLB, | Performed by: PHYSICIAN ASSISTANT

## 2023-09-07 PROCEDURE — 3077F SYST BP >= 140 MM HG: CPT | Mod: HCNC,CPTII,S$GLB, | Performed by: PHYSICIAN ASSISTANT

## 2023-09-07 PROCEDURE — 82607 VITAMIN B-12: CPT | Mod: HCNC | Performed by: PHYSICIAN ASSISTANT

## 2023-09-07 PROCEDURE — 99214 OFFICE O/P EST MOD 30 MIN: CPT | Mod: HCNC,S$GLB,, | Performed by: PHYSICIAN ASSISTANT

## 2023-09-07 PROCEDURE — 4010F PR ACE/ARB THEARPY RXD/TAKEN: ICD-10-PCS | Mod: HCNC,CPTII,S$GLB, | Performed by: PHYSICIAN ASSISTANT

## 2023-09-07 PROCEDURE — 80053 COMPREHEN METABOLIC PANEL: CPT | Mod: HCNC | Performed by: PHYSICIAN ASSISTANT

## 2023-09-07 PROCEDURE — 83540 ASSAY OF IRON: CPT | Mod: HCNC | Performed by: PHYSICIAN ASSISTANT

## 2023-09-07 PROCEDURE — 80061 LIPID PANEL: CPT | Mod: HCNC | Performed by: PHYSICIAN ASSISTANT

## 2023-09-07 PROCEDURE — 36415 COLL VENOUS BLD VENIPUNCTURE: CPT | Mod: HCNC | Performed by: PHYSICIAN ASSISTANT

## 2023-09-07 PROCEDURE — 84466 ASSAY OF TRANSFERRIN: CPT | Mod: HCNC | Performed by: PHYSICIAN ASSISTANT

## 2023-09-07 PROCEDURE — 1160F RVW MEDS BY RX/DR IN RCRD: CPT | Mod: HCNC,CPTII,S$GLB, | Performed by: PHYSICIAN ASSISTANT

## 2023-09-07 PROCEDURE — 84425 ASSAY OF VITAMIN B-1: CPT | Mod: HCNC | Performed by: PHYSICIAN ASSISTANT

## 2023-09-07 PROCEDURE — 4010F ACE/ARB THERAPY RXD/TAKEN: CPT | Mod: HCNC,CPTII,S$GLB, | Performed by: PHYSICIAN ASSISTANT

## 2023-09-07 PROCEDURE — 1159F MED LIST DOCD IN RCRD: CPT | Mod: HCNC,CPTII,S$GLB, | Performed by: PHYSICIAN ASSISTANT

## 2023-09-07 PROCEDURE — 82306 VITAMIN D 25 HYDROXY: CPT | Mod: HCNC | Performed by: PHYSICIAN ASSISTANT

## 2023-09-07 PROCEDURE — 3079F PR MOST RECENT DIASTOLIC BLOOD PRESSURE 80-89 MM HG: ICD-10-PCS | Mod: HCNC,CPTII,S$GLB, | Performed by: PHYSICIAN ASSISTANT

## 2023-09-07 PROCEDURE — 3077F PR MOST RECENT SYSTOLIC BLOOD PRESSURE >= 140 MM HG: ICD-10-PCS | Mod: HCNC,CPTII,S$GLB, | Performed by: PHYSICIAN ASSISTANT

## 2023-09-07 PROCEDURE — 99999 PR PBB SHADOW E&M-EST. PATIENT-LVL V: ICD-10-PCS | Mod: PBBFAC,HCNC,, | Performed by: PHYSICIAN ASSISTANT

## 2023-09-07 NOTE — PATIENT INSTRUCTIONS
Prior to surgery you will need to complete:  - Dietitian consult and follow up appointments as needed  - Neurology clearance  - Labs  - Chest X-ray  - EKG  - Psychological evaluation, Please call psychiatry [phone] to schedule  - Selection committee  - UGI  - EGD  - Will need 3-6 months dietary     In preparation for bariatric surgery, please complete the following:   Discuss your current medications with your primary care provider, remember your medications will need to be crushed, chewable, or in liquid form for the first 3-6 months after a gastric bypass or sleeve.  For a gastric band, your medications will need to be crushed indefinitely.    If you take a blood thinner such as: Coumadin (warfarin), Pradaxa (dabigatran), or Plavix (clopidogrel), you will need to speak with your prescribing provider on how or if this medication can be stopped before surgery.   If you take a medication for depression or anxiety, you will need to begin crushing or opening the capsule 1-3 months prior to surgery.  Remember to discuss this with the psychologist or psychiatrist that you see.   If you take medication for arthritis on a daily basis that is considered a non-steroidal anti-inflammatory (NSAID), please discuss with your prescribing physician an alternative medication.  After having gastric bypass or gastric sleeve, this group of medications is not appropriate to take due to increased risk of bleeding stomach ulcers.      DEFINITIONS  Appointments: Pre-scheduled meetings or consultations with any physician, advanced practice provider, dietitian, or psychologist, and labs, imaging studies, sleep studies, etc.   Late cancellation: Cancelling an appointment 24-48 hours prior to scheduled time.  No-Show appointment:  is when   You do NOT arrive to your appointment at the time its scheduled.  You call to cancel or cancel via [facility] less than 24 hours in advance of your scheduled appointment  You show up 15 minutes AFTER  your scheduled appointment time without any notification of being late.     POLICY  You are allowed up to 3 cancellations for appointments.   After 3 cancellations your case will be placed on hold for 2 months and after that time you can resume the program.   You are allowed only 1 no-show for an appointment.   You will be re-scheduled one time and if there is a 2nd no-show at any point, your case will be placed on hold for 3 months.  After 3 months you can resume the program.     Upon resuming the program after being placed on hold for either above mentioned reasons, if you have a late cancel or no show for any appointment, the bariatric team will review if youre an appropriate candidate for surgery at the monthly meeting.

## 2023-09-07 NOTE — PROGRESS NOTES
BARIATRIC NEW PATIENT EVALUATION    CHIEF COMPLAINT:   Morbid obesity, body mass index is 42.59 kg/m². and inability to lose weight.    HPI:  Alicia Soliz is a 43 y.o. morbidly obese female. Her current body mass index is 42.59 kg/m². She has multiple associated comorbidities including essential hypertension and fatty liver disease and MS.  She has struggled with excess weight since childhood.  Her highest adult weight was 315 lbs at age 40, and her lowest adult weight was 215 lbs at age 32.  The patient has tried calorie counting, low-carb diet, and portion control, weight loss clinic adipex),contrave, plentiful and the gastric sleeve in 2020 .  The patient was most successful with gastric sleeve with a weight loss of 36 lbs.  Her current exercise includes  therapy and resistance training and stretching   2 times a week. She denies any history of eating disorder such as anorexia, bulimia, or taking laxatives for weight loss, and denies any addiction including illicit substances, alcohol, or gambling.  Patient states she has a excellent  support system.  She lives with family.  She is retired.  She  endorses a 1 year history of GERD.  States that her reflux is due to her MS, states that she has had a mild increase in her reflux since the surgery. The patient's goal is to be healthy.    ESS: Score of 4, reviewed 09/07/2023.  Does not need Sleep Study.    PAST MEDICAL HISTORY:  Past Medical History:   Diagnosis Date    Allergy     Anemia     Arthritis     Cardiac arrest as complication of care     pt states she went into cardiac arrest from an allergic reaction to a medication    Depression     Encounter for blood transfusion     GERD (gastroesophageal reflux disease)     Hemiplegia due to old stroke     Hypertension     Morbid obesity with BMI of 45.0-49.9, adult 09/20/2018    Multiple sclerosis     Neuromuscular disorder        PAST SURGICAL HISTORY:  Past Surgical History:   Procedure Laterality Date     APPENDECTOMY      BREAST SURGERY      CHOLECYSTECTOMY      COLONOSCOPY N/A 11/25/2020    Procedure: COLONOSCOPY;  Surgeon: Andrew Jenkins III, MD;  Location: [facility] ENDO;  Service: Endoscopy;  Laterality: N/A;    ESOPHAGOGASTRODUODENOSCOPY N/A 02/19/2020    Procedure: EGD (ESOPHAGOGASTRODUODENOSCOPY);  Surgeon: Michael Navarrete MD;  Location: [facility] ENDO;  Service: Endoscopy;  Laterality: N/A;    ESOPHAGOGASTRODUODENOSCOPY N/A 02/20/2020    Procedure: EGD (ESOPHAGOGASTRODUODENOSCOPY);  Surgeon: Michael Navarrete MD;  Location: [facility] OR;  Service: General;  Laterality: N/A;    ESOPHAGOGASTRODUODENOSCOPY N/A 11/25/2020    Procedure: EGD (ESOPHAGOGASTRODUODENOSCOPY);  Surgeon: Andrew Jenkins III, MD;  Location: [facility];  Service: Endoscopy;  Laterality: N/A;    HYSTERECTOMY      KNEE SURGERY      ROBOT-ASSISTED LAPAROSCOPIC SLEEVE GASTRECTOMY USING DA ANTHONY XI N/A 02/20/2020    Procedure: XI ROBOTIC SLEEVE GASTRECTOMY;  Surgeon: Michael Navarrete MD;  Location: [facility] OR;  Service: General;  Laterality: N/A;    TENOPLASTY OF HAND Left 08/26/2021    Procedure: REPAIR, TENDON, HAND;  Surgeon: Joselito Lugo MD;  Location: [facility] OR;  Service: Orthopedics;  Laterality: Left;  Left RCL PIP Joint Repair/Recon with Arthrex Internal Brace.    TONSILLECTOMY      TOTAL REDUCTION MAMMOPLASTY  2022    TUBAL LIGATION         FAMILY HISTORY:  Family History   Problem Relation Age of Onset    Lupus Mother     Heart disease Mother     Hypertension Mother     Diabetes Father     Kidney disease Father     No Known Problems Sister     No Known Problems Sister     No Known Problems Brother     No Known Problems Brother     No Known Problems Daughter     No Known Problems Daughter     No Known Problems Daughter     Cancer Maternal Aunt 40        breast    Breast cancer Maternal Aunt     Diabetes Maternal Aunt     COPD Maternal Aunt     Heart disease Maternal Grandmother     Breast cancer Maternal Cousin     Ovarian cancer Maternal Cousin          SOCIAL HISTORY:  Social History     Socioeconomic History    Marital status: Single    Number of children: 3   Occupational History     Employer: [employer]   Tobacco Use    Smoking status: Never     Passive exposure: Never    Smokeless tobacco: Never   Substance and Sexual Activity    Alcohol use: Yes     Comment: once a year     Drug use: No    Sexual activity: Yes     Partners: Female     Birth control/protection: Surgical     Social Determinants of Health     Financial Resource Strain: Low Risk  (9/4/2023)    Overall Financial Resource Strain (CARDIA)     Difficulty of Paying Living Expenses: Not hard at all   Food Insecurity: No Food Insecurity (9/4/2023)    Hunger Vital Sign     Worried About Running Out of Food in the Last Year: Never true     Ran Out of Food in the Last Year: Never true   Transportation Needs: Unmet Transportation Needs (9/4/2023)    PRAPARE - Transportation     Lack of Transportation (Medical): Yes     Lack of Transportation (Non-Medical): Yes   Physical Activity: Insufficiently Active (9/4/2023)    Exercise Vital Sign     Days of Exercise per Week: 3 days     Minutes of Exercise per Session: 20 min   Stress: No Stress Concern Present (9/4/2023)    [employer] [employer] - Occupational Stress Questionnaire     Feeling of Stress : Not at all   Recent Concern: Stress - Stress Concern Present (6/19/2023)    [employer] - Occupational Stress Questionnaire     Feeling of Stress : Rather much   Social Connections: Unknown (9/4/2023)    Social Connection and Isolation Panel [NHANES]     Frequency of Communication with Friends and Family: More than three times a week     Frequency of Social Gatherings with Friends and Family: More than three times a week     Active Member of Clubs or Organizations: Yes     Attends Club or Organization Meetings: 1 to 4 times per year     Marital Status:    Housing Stability: Low Risk  (9/4/2023)    Housing  Stability Vital Sign     Unable to Pay for Housing in the Last Year: No     Number of Places Lived in the Last Year: 1     Unstable Housing in the Last Year: No       MEDICATIONS:    Current Outpatient Medications:     cholecalciferol, vitamin D3, 1,250 mcg (50,000 unit) capsule, Take 1 capsule (50,000 Units total) by mouth every 7 days. for 365 doses, Disp: 12 capsule, Rfl: 28    doxepin (SINEQUAN) 25 MG capsule, TAKE 1 CAPSULE (25 MG TOTAL) BY MOUTH NIGHTLY AS NEEDED., Disp: 90 capsule, Rfl: 1    doxepin (SINEQUAN) 75 MG capsule, Take 75 mg by mouth., Disp: , Rfl:     DULoxetine (CYMBALTA) 60 MG capsule, Take 1 capsule (60 mg total) by mouth once daily., Disp: 90 capsule, Rfl: 2    esomeprazole (NEXIUM) 40 MG capsule, Take 1 capsule (40 mg total) by mouth before breakfast., Disp: 90 capsule, Rfl: 3    gabapentin (NEURONTIN) 300 MG capsule, Take 3 capsules (900 mg total) by mouth 2 (two) times daily., Disp: 180 capsule, Rfl: 11    HYDROcodone-acetaminophen (NORCO)  mg per tablet, Take 1 tablet by mouth 3 (three) times daily., Disp: , Rfl:     LIDOcaine (LIDODERM) 5 %, Place 1 patch onto the skin once daily. Remove & Discard patch within 12 hours or as directed by MD, Disp: 30 patch, Rfl: 0    linaCLOtide (LINZESS) 145 mcg Cap capsule, Take 1 capsule (145 mcg total) by mouth before breakfast., Disp: 90 capsule, Rfl: 3    mupirocin (BACTROBAN) 2 % ointment, APPLY TOPICALLY TWICE A DAY, Disp: 22 g, Rfl: 0    nabumetone (RELAFEN) 750 MG tablet, Take 750 mg by mouth 2 (two) times daily as needed., Disp: , Rfl:     neomycin-polymyxin-hydrocortisone (CORTISPORIN) 3.5-10,000-1 mg/mL-unit/mL-% otic suspension, Place 3 drops into the right ear 3 (three) times daily., Disp: 10 mL, Rfl: 1    OCREVUS 30 mg/mL Soln, , Disp: , Rfl:     ondansetron (ZOFRAN) 8 MG tablet, TAKE 1 TABLET (8 MG TOTAL) BY MOUTH 2 (TWO) TIMES DAILY., Disp: 30 tablet, Rfl: 1    ondansetron (ZOFRAN) 8 MG tablet, Take 1 tablet (8 mg total) by mouth 2  "(two) times daily., Disp: 30 tablet, Rfl: 1    semaglutide, weight loss, (WEGOVY) 0.25 mg/0.5 mL PnIj, Inject 0.25 mg into the skin every 7 days., Disp: 3 mL, Rfl: 1    tiZANidine (ZANAFLEX) 4 MG tablet, Take 1 tablet (4 mg total) by mouth every 8 (eight) hours as needed (muscle craps)., Disp: 40 tablet, Rfl: 1    topiramate (TOPAMAX) 50 MG tablet, TAKE 1 TABLET BY MOUTH IN THE MORNING AND 2 TABLETS AT BEDTIME, Disp: 90 tablet, Rfl: 1    triamcinolone acetonide 0.1% (KENALOG) 0.1 % ointment, APPLY TO AFFECTED AREA TWICE A DAY, Disp: 30 g, Rfl: 1    valsartan (DIOVAN) 80 MG tablet, Take 1 tablet (80 mg total) by mouth once daily., Disp: 90 tablet, Rfl: 3    diclofenac sodium (VOLTAREN) 1 % Gel, Apply 2 g topically 4 (four) times daily. (Patient not taking: Reported on 9/5/2023), Disp: 450 g, Rfl: 11    naloxone (NARCAN) 4 mg/actuation Spry, SMARTSIG:Both Nares, Disp: , Rfl:     ALLERGIES:  Review of patient's allergies indicates:   Allergen Reactions    Demerol [meperidine] Anaphylaxis     Other reaction(s): Itching    Dilaudid [hydromorphone (bulk)] Anaphylaxis     "coded" per pt  Patient can tolerate Lortab    Shellfish containing products Itching, Nausea And Vomiting and Swelling    Prednisone Itching     Other reaction(s): Itching    Tramadol      shaking       Review of Systems   Constitutional:  Negative for chills and fever.   HENT:  Negative for sore throat and tinnitus.    Eyes:  Positive for blurred vision (MS related). Negative for double vision.   Respiratory:  Negative for cough and shortness of breath.    Cardiovascular:  Negative for chest pain, palpitations and leg swelling.   Gastrointestinal:  Negative for abdominal pain, constipation, diarrhea, heartburn, nausea and vomiting.   Genitourinary:  Negative for dysuria and hematuria.   Musculoskeletal:  Negative for back pain, joint pain and neck pain.   Skin:  Negative for rash.   Neurological:  Negative for dizziness, tingling and headaches. "   Psychiatric/Behavioral:  Negative for depression and suicidal ideas. The patient is not nervous/anxious.      Vitals:    09/07/23 1402   BP: (!) 166/86   Pulse: 89   SpO2: 99%   Weight: 119.7 kg (263 lb 14.3 oz)   PainSc: 0-No pain       Physical Exam  Constitutional:       Appearance: She is obese.   HENT:      Head: Normocephalic and atraumatic.   Eyes:      Extraocular Movements: Extraocular movements intact.      Pupils: Pupils are equal, round, and reactive to light.   Cardiovascular:      Rate and Rhythm: Normal rate and regular rhythm.      Pulses: Normal pulses.      Heart sounds: Normal heart sounds. No murmur heard.  Pulmonary:      Effort: Pulmonary effort is normal.      Breath sounds: Normal breath sounds.   Abdominal:      General: Bowel sounds are normal. There is no distension.      Palpations: Abdomen is soft.   Musculoskeletal:         General: No swelling. Normal range of motion.      Cervical back: Normal range of motion and neck supple.   Skin:     General: Skin is warm and dry.      Capillary Refill: Capillary refill takes less than 2 seconds.   Neurological:      General: No focal deficit present.      Mental Status: She is alert and oriented to person, place, and time.   Psychiatric:         Mood and Affect: Mood normal.         Behavior: Behavior normal.         Thought Content: Thought content normal.         Judgment: Judgment normal.          DIAGNOSIS:  1. Morbid obesity, body mass index is 42.59 kg/m². and inability to lose weight.  2. Co-morbidities: essential hypertension and fatty liver disease and MS    PLAN:  The patient is a good candidate for Bariatric Surgery. The patient is interested in laparoscopic alanis-en-y gastric bypass with Dr Jc. The surgery and post-op care was discussed in detail with the patient. All questions were answered.    The patient understands that bariatric surgery is a tool to aid in weight loss and that they need to be committed to the diet and  exercise post-operatively for successful weight loss. Discussed with patient that bariatric surgery is not the easy way out and that it will take plenty of dedication on the patient's part to be successful. Also discussed the possibility of weight regain if the patient strays from the diet guidelines or exercise requirements. Patient verbalized understanding and wishes to proceed with the work-up.    Estimated Goal weight is 10-20% EW    ORDERS:  1. Chest X-Ray, EKG, EGD, and Barium UGI  2. Psychological Consult, Bariatric Dietician Consult, and Neurology clearance  3. Bariatric Labs: Per orders.  4. No NSAIDS after surgery due to increased risk of stomach ulcers. Talk to your prescribing provider about alternative options for your pain.  5. Selection committee     PCP: Braden Dumont MD  RTC: As scheduled.

## 2023-09-11 NOTE — TELEPHONE ENCOUNTER
Called pt to let her know that AP suggested she have an in person appt with JF. Pt verbalized understanding. I also let pt know that I noticed she was scheduled for a VV with AP on 12/14. I explained that it is not necessary to have an appt with AP on 12/14 and an appt with JF on 1/25. I suggested pt cancel the appt with AP on 12/14 since she just had a visit with her and keep the 1/25 appt with JF. Pt agreed with proposed plan. Therefore, the 12/14 appt with Jen was canceled and 1/25 in person appt with Dr. Garrett was kept.

## 2023-09-12 ENCOUNTER — PATIENT MESSAGE (OUTPATIENT)
Dept: BARIATRICS | Facility: CLINIC | Age: 43
End: 2023-09-12
Payer: MEDICARE

## 2023-09-12 ENCOUNTER — TELEPHONE (OUTPATIENT)
Dept: SPORTS MEDICINE | Facility: CLINIC | Age: 43
End: 2023-09-12
Payer: MEDICARE

## 2023-09-12 LAB — VIT B1 BLD-MCNC: 33 UG/L (ref 38–122)

## 2023-09-12 NOTE — TELEPHONE ENCOUNTER
Called pt in ref to missed appt on yesterday 9/11/23 for 1st Orthovisc inj, pt did not answer. Left msg informing that we can keep the appt that is scheduled for 9/18 & begin the 1st injection then.

## 2023-09-13 ENCOUNTER — HOSPITAL ENCOUNTER (OUTPATIENT)
Dept: PREADMISSION TESTING | Facility: HOSPITAL | Age: 43
Discharge: HOME | End: 2023-09-13
Attending: INTERNAL MEDICINE
Payer: MEDICARE

## 2023-09-13 DIAGNOSIS — Z90.3 S/P GASTRIC SLEEVE PROCEDURE: ICD-10-CM

## 2023-09-14 ENCOUNTER — TELEPHONE (OUTPATIENT)
Dept: PSYCHIATRY | Facility: CLINIC | Age: 43
End: 2023-09-14
Payer: MEDICARE

## 2023-09-14 ENCOUNTER — CLINICAL SUPPORT (OUTPATIENT)
Dept: BARIATRICS | Facility: CLINIC | Age: 43
End: 2023-09-14
Payer: MEDICARE

## 2023-09-14 DIAGNOSIS — I10 PRIMARY HYPERTENSION: Primary | ICD-10-CM

## 2023-09-14 DIAGNOSIS — E66.01 MORBID OBESITY WITH BMI OF 40.0-44.9, ADULT: ICD-10-CM

## 2023-09-14 DIAGNOSIS — Z71.3 DIETARY COUNSELING AND SURVEILLANCE: ICD-10-CM

## 2023-09-14 PROCEDURE — 99499 UNLISTED E&M SERVICE: CPT | Mod: HCNC,95,, | Performed by: DIETITIAN, REGISTERED

## 2023-09-14 PROCEDURE — 99499 NO LOS: ICD-10-PCS | Mod: HCNC,95,, | Performed by: DIETITIAN, REGISTERED

## 2023-09-14 NOTE — PROGRESS NOTES
Audio Only Telehealth Visit     The patient location is: home ([place])  The chief complaint leading to consultation is: Initial assessment for sleeve to bypass conversion work-up  Visit type: Virtual visit with audio only (telephone)  Total time spent with patient: 45 mins     The reason for the audio only service rather than synchronous audio and video virtual visit was related to technical difficulties or patient preference/necessity.     Each patient to whom I provide medical services by telemedicine is:  (1) informed of the relationship between the physician and patient and the respective role of any other health care provider with respect to management of the patient; and (2) notified that they may decline to receive medical services by telemedicine and may withdraw from such care at any time. Patient verbally consented to receive this service via voice-only telephone call.    This service was not originating from a related E/M service provided within the previous 7 days nor will  to an E/M service or procedure within the next 24 hours or my soonest available appointment.  Prevailing standard of care was able to be met in this audio-only visit.      NUTRITIONAL CONSULT    Referring Physician: Farideh Jc M.D.   Reason for MNT Referral: Initial assessment for  sleeve to bypass conversion  work-up    PAST MEDICAL HISTORY:   43 y.o. female    Weight history includes Highest adult weight was 315 lbs at age 21-22. Lowest adult weight was 215 lbs at age 32.    Dieting attempts include: Calorie counting, low-carb diet, and portion control, weight loss clinic, adipex,contrave, plentiful and the gastric sleeve in 2020.  The patient was most successful with gastric sleeve with a weight loss of 36 lbs.      Past Medical History:   Diagnosis Date    Allergy     Anemia     Arthritis     Cardiac arrest as complication of care     pt states she went into cardiac arrest from an allergic reaction to a medication  "   Depression     Encounter for blood transfusion     GERD (gastroesophageal reflux disease)     Hemiplegia due to old stroke     Hypertension     Morbid obesity with BMI of 45.0-49.9, adult 2018    Multiple sclerosis     Neuromuscular disorder        CLINICAL DATA:  43 y.o.-year-old Black or  female.  Height: 5'6"  Weight: 285 lbs (23)  IBW: 144 lbs  BMI: 42.59  The patient's goal weight (50% EBW): 203 lbs  Personal goal weight: 145 lbs    Goal for Bariatric Surgery: to improve health, to improve quality of life, and to lose weight    DAILY NUTRITIONAL NEEDS: pre-op nutritional bariatric guidelines to promote weight loss  2856-4673 Calories    Grams Protein    NUTRITION & HEALTH HISTORY:  Greatest challenge: starchy CHO    Current diet recall:     B: Blueberry muffin, Premier shake, 1/2 bottle juice  L: Turkey, cheese lunchable, yogurt pretzel, white chocolate flips yogurt  D: [employer] club sandwich with turkey, august, cheese, lettuce and tomato and baked Lays and diet tea  S: Animal cracker    Current Diet:  Meal pattern: 3 meals  Protein supplements: 1  Snackin / day  Vegetables: Likes a few. Eats  almost daily . Broccoli, cabbage  Fruits: Likes a few. Eats almost daily. Watermelon, grapes, strawberries  Beverages: water, diet tea, and juice  Dining out: Weekly   Cooking at home: Daily. Mostly baked, grilled, smothered, and boiled  meat, fish, starchy CHO, and vegetables.Broccoli & cheese, cabbage, red beans sometimes with brown rice, baked fish, baked chicken, boiled ham    Food Allergies:   Shell fish    Exercise:  Current exercise: Adequate  Weight training 30-40 mins daily  Physical therapy for left side weakness 2 x week    Restrictions to exercise: none    Vitamins / Minerals / Herbs:   Multivitamins gummy  B-12  B-1    Labs:   Reviewed.    Social:  Retired. Disabled.  Lives with adult daughter and two puppies.  Grocery shopping and food prep: Self.  Patient believes the " household will be supportive after surgery.  Alcohol:  champagne on birthday .  Smoking: None.    ASSESSMENT:  Patient reports attempts at weight loss, only to regain lost weight.  Patient demonstrated knowledge of healthy eating behaviors and exercise patterns; admits to not eating healthy and not exercising at this point.  Patient states willingness to change lifestyle and make behavior modifications     Barriers to Education: none    Stage of change: determination    NUTRITION DIAGNOSIS:    Morbid Obesity related to Excessive carbohydrate intake as evidence by BMI.    BARIATRIC DIET DISCUSSION/PLAN:  Discussed diet after surgery and related to patient's food record.  Reviewed nutrition guidelines for before and after surgery.  Answered all questions.  Discussed appropriate yogurt  Encouraged PT to have protein at snack  Confirmed PT received nutrition booklet in advance  Work on gradually cutting back on starchy CHO in the diet.    RECOMMENDATIONS:  Pt is a potential candidate for bariatric surgery.    Follow up in one month.  Needs additional visits with RD.    Patient verbalized understanding.      Communicated nutrition plan with bariatric team.    SESSION TIME:  60 minutes

## 2023-09-14 NOTE — TELEPHONE ENCOUNTER
----- Message from Christo Sibley sent at 9/14/2023  4:25 PM CDT -----  Contact: Alicia  Patient is calling to schedule an appt/np consult. Reports surgery doctor suggested scheduling a psych appt. Please give patient a call at [phone]

## 2023-09-15 ENCOUNTER — PATIENT MESSAGE (OUTPATIENT)
Dept: PREADMISSION TESTING | Facility: HOSPITAL | Age: 43
End: 2023-09-15

## 2023-09-15 ENCOUNTER — HOSPITAL ENCOUNTER (OUTPATIENT)
Dept: PREADMISSION TESTING | Facility: HOSPITAL | Age: 43
Discharge: HOME | End: 2023-09-15
Attending: INTERNAL MEDICINE
Payer: MEDICARE

## 2023-09-15 DIAGNOSIS — Z90.3 S/P GASTRIC SLEEVE PROCEDURE: Primary | ICD-10-CM

## 2023-09-18 ENCOUNTER — PROCEDURE VISIT (OUTPATIENT)
Dept: SPORTS MEDICINE | Facility: CLINIC | Age: 43
End: 2023-09-18
Payer: MEDICARE

## 2023-09-18 VITALS — BODY MASS INDEX: 42.27 KG/M2 | RESPIRATION RATE: 17 BRPM | HEIGHT: 66 IN | WEIGHT: 263 LBS

## 2023-09-18 DIAGNOSIS — M17.12 PRIMARY OSTEOARTHRITIS OF LEFT KNEE: Primary | ICD-10-CM

## 2023-09-18 PROCEDURE — 99499 NO LOS: ICD-10-PCS | Mod: HCNC,S$GLB,, | Performed by: PHYSICIAN ASSISTANT

## 2023-09-18 PROCEDURE — 20610 DRAIN/INJ JOINT/BURSA W/O US: CPT | Mod: HCNC,LT,S$GLB, | Performed by: PHYSICIAN ASSISTANT

## 2023-09-18 PROCEDURE — 20610 LARGE JOINT ASPIRATION/INJECTION: L KNEE: ICD-10-PCS | Mod: HCNC,LT,S$GLB, | Performed by: PHYSICIAN ASSISTANT

## 2023-09-18 PROCEDURE — 99499 UNLISTED E&M SERVICE: CPT | Mod: HCNC,S$GLB,, | Performed by: PHYSICIAN ASSISTANT

## 2023-09-18 NOTE — PROGRESS NOTES
This patient's result was assigned to me, although I've never treated her.  You are listed as her PCP, so I'm routing her results to you for your disposition.  Let me know if I can do anything to help.

## 2023-09-18 NOTE — PROCEDURES
Large Joint Aspiration/Injection: L knee    Date/Time: 9/18/2023 1:00 PM    Performed by: Alethea Gordon PA-C  Authorized by: Alethea Gordon PA-C    Consent Done?:  Yes (Verbal)  Indications:  Joint swelling and pain  Site marked: the procedure site was marked    Timeout: prior to procedure the correct patient, procedure, and site was verified    Prep: patient was prepped and draped in usual sterile fashion      Local anesthesia used?: Yes    Local anesthetic:  Topical anesthetic    Details:  Needle Size:  22 G  Ultrasonic Guidance for needle placement?: No    Approach:  Anterolateral  Location:  Knee  Site:  L knee  Medications:  30 mg sodium hyaluronate (orthovisc) 30 mg/2 mL  Aspirate amount (mL):  0  Patient tolerance:  Patient tolerated the procedure well with no immediate complications    Left orthovisc 1/3

## 2023-09-21 ENCOUNTER — ANESTHESIA EVENT (OUTPATIENT)
Dept: ENDOSCOPY | Facility: HOSPITAL | Age: 43
End: 2023-09-21
Payer: MEDICARE

## 2023-09-21 ENCOUNTER — PATIENT MESSAGE (OUTPATIENT)
Dept: SPORTS MEDICINE | Facility: CLINIC | Age: 43
End: 2023-09-21
Payer: MEDICARE

## 2023-09-21 NOTE — ANESTHESIA PREPROCEDURE EVALUATION
09/21/2023  Alicia Soliz is a 43 y.o., female.  Past Surgical History:   Procedure Laterality Date    APPENDECTOMY      BREAST SURGERY      CHOLECYSTECTOMY      COLONOSCOPY N/A 11/25/2020    Procedure: COLONOSCOPY;  Surgeon: Andrew Jenkins III, MD;  Location: [facility] ENDO;  Service: Endoscopy;  Laterality: N/A;    ESOPHAGOGASTRODUODENOSCOPY N/A 02/19/2020    Procedure: EGD (ESOPHAGOGASTRODUODENOSCOPY);  Surgeon: Michael Navarrete MD;  Location: [facility] ENDO;  Service: Endoscopy;  Laterality: N/A;    ESOPHAGOGASTRODUODENOSCOPY N/A 02/20/2020    Procedure: EGD (ESOPHAGOGASTRODUODENOSCOPY);  Surgeon: Michael Navarrete MD;  Location: [facility] OR;  Service: General;  Laterality: N/A;    ESOPHAGOGASTRODUODENOSCOPY N/A 11/25/2020    Procedure: EGD (ESOPHAGOGASTRODUODENOSCOPY);  Surgeon: Andrew Jenkins III, MD;  Location: [facility] ENDO;  Service: Endoscopy;  Laterality: N/A;    HYSTERECTOMY      KNEE SURGERY      ROBOT-ASSISTED LAPAROSCOPIC SLEEVE GASTRECTOMY USING DA ANTHONY XI N/A 02/20/2020    Procedure: XI ROBOTIC SLEEVE GASTRECTOMY;  Surgeon: Michael Navarrete MD;  Location: [facility] OR;  Service: General;  Laterality: N/A;    TENOPLASTY OF HAND Left 08/26/2021    Procedure: REPAIR, TENDON, HAND;  Surgeon: Joselito Lugo MD;  Location: [facility] OR;  Service: Orthopedics;  Laterality: Left;  Left RCL PIP Joint Repair/Recon with Arthrex Internal Brace.    TONSILLECTOMY      TOTAL REDUCTION MAMMOPLASTY  2022    TUBAL LIGATION         Past Medical History:   Diagnosis Date    Allergy     Anemia     Arthritis     Cardiac arrest as complication of care     pt states she went into cardiac arrest from an allergic reaction to a medication    Depression     Encounter for blood transfusion     GERD (gastroesophageal reflux disease)     Hemiplegia due to old stroke     Hypertension     Morbid obesity with BMI of 45.0-49.9,  adult 09/20/2018    Multiple sclerosis     Neuromuscular disorder          Pre-op Assessment    I have reviewed the Patient Summary Reports.     I have reviewed the Nursing Notes. I have reviewed the NPO Status.   I have reviewed the Medications.     Review of Systems  Anesthesia Hx:  No problems with previous Anesthesia  History of prior surgery of interest to airway management or planning: Previous anesthesia: General 2/20/20 Gastric Sleeve with general anesthesia.  Procedure performed at an [facility]. Airway issues documented on chart review include mask, easy, GETA, easy direct laryngoscopy , view on direct laryngoscopy Grade I  Denies Family Hx of Anesthesia complications.   Denies Personal Hx of Anesthesia complications.   Social:  Non-Smoker, Social Alcohol Use    Hematology/Oncology:  Hematology Normal   Oncology Normal     EENT/Dental:EENT/Dental Normal   Cardiovascular:   Hypertension    Pulmonary:  Pulmonary Normal    Renal/:  Renal/ Normal     Hepatic/GI:   GERD Liver Disease,    Musculoskeletal:   Arthritis  Hemiplegia   Neurological:   Neuromuscular Disease, Multiple sclerosis   Endocrine:  Endocrine Normal  Morbid Obesity / BMI > 40  Dermatological:  Skin Normal    Psych:   Psychiatric History depression          Physical Exam  General: Well nourished, Cooperative, Alert and Oriented    Airway:  Mallampati: II   Mouth Opening: Normal  TM Distance: Normal  Tongue: Normal  Neck ROM: Normal ROM  Neck: Girth Increased    Dental:  Intact        Anesthesia Plan  Type of Anesthesia, risks & benefits discussed:    Anesthesia Type: Gen Natural Airway  Intra-op Monitoring Plan: Standard ASA Monitors  Post Op Pain Control Plan: multimodal analgesia  Induction:  IV  Informed Consent: Informed consent signed with the Patient and all parties understand the risks and agree with anesthesia plan.  All questions answered. Patient consented to blood products? No  ASA Score: 3  Day of Surgery Review of  History & Physical: H&P Update referred to the surgeon/provider.    Ready For Surgery From Anesthesia Perspective.     .

## 2023-09-22 ENCOUNTER — TELEPHONE (OUTPATIENT)
Dept: SPORTS MEDICINE | Facility: CLINIC | Age: 43
End: 2023-09-22
Payer: MEDICARE

## 2023-09-22 NOTE — TELEPHONE ENCOUNTER
Reached out to pt about rescheduling appointment to 10/9/23, pt did not answer LVM for a return call

## 2023-09-22 NOTE — TELEPHONE ENCOUNTER
Followed up with pt regarding cooling garments from [facility] (received), heavy duty rollator from [facility] (not received), and [employer] for rides.     Regarding [employer] for rides, she shared that she is using the system, and they do call before arriving, but she still has to wait outside for 10-15 mins.  SW advised that there is not much that can be done by our team.  She commented that she is working to renew her license and then get a small car for local outings.     Regarding rollator, ARASH emailed Jennifer Mendez at [facility] to check on status of referral.  Awaiting follow up.     Lastly, pt shared that she will do [facility] - aqua therapy starting next week. She is looking forward to the treatment.

## 2023-09-25 ENCOUNTER — HOSPITAL ENCOUNTER (OUTPATIENT)
Facility: HOSPITAL | Age: 43
Discharge: HOME | End: 2023-09-25
Attending: INTERNAL MEDICINE | Admitting: INTERNAL MEDICINE
Payer: MEDICARE

## 2023-09-25 ENCOUNTER — ANESTHESIA (OUTPATIENT)
Dept: ENDOSCOPY | Facility: HOSPITAL | Age: 43
End: 2023-09-25
Payer: MEDICARE

## 2023-09-25 ENCOUNTER — PATIENT MESSAGE (OUTPATIENT)
Dept: BARIATRICS | Facility: CLINIC | Age: 43
End: 2023-09-25
Payer: MEDICARE

## 2023-09-25 VITALS
RESPIRATION RATE: 17 BRPM | HEIGHT: 66 IN | HEART RATE: 77 BPM | OXYGEN SATURATION: 100 % | SYSTOLIC BLOOD PRESSURE: 170 MMHG | WEIGHT: 293 LBS | BODY MASS INDEX: 47.09 KG/M2 | DIASTOLIC BLOOD PRESSURE: 92 MMHG | TEMPERATURE: 100 F

## 2023-09-25 DIAGNOSIS — Z90.3 H/O GASTRIC SLEEVE: Primary | ICD-10-CM

## 2023-09-25 PROCEDURE — 25000003 PHARM REV CODE 250: Mod: HCNC | Performed by: NURSE ANESTHETIST, CERTIFIED REGISTERED

## 2023-09-25 PROCEDURE — 43239 EGD BIOPSY SINGLE/MULTIPLE: CPT | Mod: HCNC | Performed by: INTERNAL MEDICINE

## 2023-09-25 PROCEDURE — 63600175 PHARM REV CODE 636 W HCPCS: Mod: HCNC | Performed by: INTERNAL MEDICINE

## 2023-09-25 PROCEDURE — 88342 CHG IMMUNOCYTOCHEMISTRY: ICD-10-PCS | Mod: 26,HCNC,, | Performed by: PATHOLOGY

## 2023-09-25 PROCEDURE — 88305 TISSUE EXAM BY PATHOLOGIST: CPT | Mod: HCNC | Performed by: PATHOLOGY

## 2023-09-25 PROCEDURE — 88305 TISSUE EXAM BY PATHOLOGIST: CPT | Mod: 26,HCNC,, | Performed by: PATHOLOGY

## 2023-09-25 PROCEDURE — 27201012 HC FORCEPS, HOT/COLD, DISP: Mod: HCNC | Performed by: INTERNAL MEDICINE

## 2023-09-25 PROCEDURE — D9220A PRA ANESTHESIA: ICD-10-PCS | Mod: ,,, | Performed by: NURSE ANESTHETIST, CERTIFIED REGISTERED

## 2023-09-25 PROCEDURE — 37000009 HC ANESTHESIA EA ADD 15 MINS: Mod: HCNC | Performed by: INTERNAL MEDICINE

## 2023-09-25 PROCEDURE — 43239 EGD BIOPSY SINGLE/MULTIPLE: CPT | Mod: HCNC,,, | Performed by: INTERNAL MEDICINE

## 2023-09-25 PROCEDURE — D9220A PRA ANESTHESIA: Mod: ,,, | Performed by: NURSE ANESTHETIST, CERTIFIED REGISTERED

## 2023-09-25 PROCEDURE — 43239 PR EGD, FLEX, W/BIOPSY, SGL/MULTI: ICD-10-PCS | Mod: HCNC,,, | Performed by: INTERNAL MEDICINE

## 2023-09-25 PROCEDURE — 37000008 HC ANESTHESIA 1ST 15 MINUTES: Mod: HCNC | Performed by: INTERNAL MEDICINE

## 2023-09-25 PROCEDURE — 88342 IMHCHEM/IMCYTCHM 1ST ANTB: CPT | Mod: 26,HCNC,, | Performed by: PATHOLOGY

## 2023-09-25 PROCEDURE — 88305 TISSUE EXAM BY PATHOLOGIST: ICD-10-PCS | Mod: 26,HCNC,, | Performed by: PATHOLOGY

## 2023-09-25 RX ORDER — LIDOCAINE HYDROCHLORIDE 20 MG/ML
INJECTION INTRAVENOUS
Status: DISCONTINUED | OUTPATIENT
Start: 2023-09-25 | End: 2023-09-25

## 2023-09-25 RX ORDER — SODIUM CHLORIDE, SODIUM LACTATE, POTASSIUM CHLORIDE, CALCIUM CHLORIDE 600; 310; 30; 20 MG/100ML; MG/100ML; MG/100ML; MG/100ML
INJECTION, SOLUTION INTRAVENOUS CONTINUOUS
Status: DISCONTINUED | OUTPATIENT
Start: 2023-09-25 | End: 2023-09-25 | Stop reason: HOSPADM

## 2023-09-25 RX ORDER — PROPOFOL 10 MG/ML
VIAL (ML) INTRAVENOUS
Status: DISCONTINUED | OUTPATIENT
Start: 2023-09-25 | End: 2023-09-25

## 2023-09-25 RX ADMIN — LIDOCAINE HYDROCHLORIDE 50 MG: 20 INJECTION INTRAVENOUS at 06:09

## 2023-09-25 RX ADMIN — Medication 50 MG: at 06:09

## 2023-09-25 RX ADMIN — Medication 25 MG: at 07:09

## 2023-09-25 RX ADMIN — SODIUM CHLORIDE, POTASSIUM CHLORIDE, SODIUM LACTATE AND CALCIUM CHLORIDE: 600; 310; 30; 20 INJECTION, SOLUTION INTRAVENOUS at 06:09

## 2023-09-25 NOTE — PROVATION PATIENT INSTRUCTIONS
Discharge Summary/Instructions after an Endoscopic Procedure  Patient Name: Alicia Soliz  Patient MRN: [id number]  Patient YOB: 1980 Monday, September 25, 2023  Ly Kim MD  Dear patient,  As a result of recent federal legislation (The Federal Cures Act), you may   receive lab or pathology results from your procedure in your [facility]   account before your physician is able to contact you. Your physician or   their representative will relay the results to you with their   recommendations at their soonest availability.  Thank you,  RESTRICTIONS:  During your procedure today, you received medications for sedation.  These   medications may affect your judgment, balance and coordination.  Therefore,   for 24 hours, you have the following restrictions:   - DO NOT drive a car, operate machinery, make legal/financial decisions,   sign important papers or drink alcohol.    ACTIVITY:  Today: no heavy lifting, straining or running due to procedural   sedation/anesthesia.  The following day: return to full activity including work.  DIET:  Eat and drink normally unless instructed otherwise.     TREATMENT FOR COMMON SIDE EFFECTS:  - Mild abdominal pain, nausea, belching, bloating or excessive gas:  rest,   eat lightly and use a heating pad.  - Sore Throat: treat with throat lozenges and/or gargle with warm salt   water.  - Because air was used during the procedure, expelling large amounts of air   from your rectum or belching is normal.  - If a bowel prep was taken, you may not have a bowel movement for 1-3 days.    This is normal.  SYMPTOMS TO WATCH FOR AND REPORT TO YOUR PHYSICIAN:  1. Abdominal pain or bloating, other than gas cramps.  2. Chest pain.  3. Back pain.  4. Signs of infection such as: chills or fever occurring within 24 hours   after the procedure.  5. Rectal bleeding, which would show as bright red, maroon, or black stools.   (A tablespoon of blood from the rectum is not serious, especially  if   hemorrhoids are present.)  6. Vomiting.  7. Weakness or dizziness.  GO DIRECTLY TO THE NEAREST EMERGENCY ROOM IF YOU HAVE ANY OF THE FOLLOWING:      Difficulty breathing              Chills and/or fever over 101 F   Persistent vomiting and/or vomiting blood   Severe abdominal pain   Severe chest pain   Black, tarry stools   Bleeding- more than one tablespoon   Any other symptom or condition that you feel may need urgent attention  Your doctor recommends these additional instructions:  If any biopsies were taken, your doctors clinic will contact you in 1 to 2   weeks with any results.  - Discharge patient to home (via wheelchair).   - Resume previous diet.   - Continue present medications.   - Await pathology results.   - Patient has a contact number available for emergencies.  The signs and   symptoms of potential delayed complications were discussed with the   patient.  Return to normal activities tomorrow.  Written discharge   instructions were provided to the patient.   - Resume anticoagulant at prior dose.  For questions, problems or results please call your physician Ly Kim MD at Work:  [phone]  If you have any questions about the above instructions, call the GI   department at [phone] or call the endoscopy unit at [phone]   from 7am until 3 pm.  [facility], EMERGENCY ROOM PHONE NUMBER:   [phone]  IF A COMPLICATION OR EMERGENCY SITUATION ARISES AND YOU ARE UNABLE TO REACH   YOUR PHYSICIAN - GO DIRECTLY TO THE EMERGENCY ROOM.  I have read or have had read to me these discharge instructions for my   procedure and have received a written copy.  I understand these   instructions and will follow-up with my physician if I have any questions.     __________________________________       _____________________________________  Nurse Signature                                          Patient/Designated   Responsible Party Signature  Ly Kim  MD Ly Kim MD  9/25/2023 7:05:28 AM  PROVATION

## 2023-09-25 NOTE — TRANSFER OF CARE
"Anesthesia Transfer of Care Note    Patient: Alicia Soliz    Procedure(s) Performed: Procedure(s) (LRB):  EGD (ESOPHAGOGASTRODUODENOSCOPY) (N/A)    Patient location: PACU    Anesthesia Type: general    Transport from OR: Transported from OR on room air with adequate spontaneous ventilation    Post pain: adequate analgesia    Post assessment: no apparent anesthetic complications    Post vital signs: stable    Level of consciousness: awake    Nausea/Vomiting: no nausea/vomiting    Complications: none    Transfer of care protocol was followed      Last vitals:   Visit Vitals  BP (!) 189/109 (BP Location: Right arm, Patient Position: Sitting)   Pulse 87   Temp 37.6 °C (99.7 °F) (Temporal)   Resp 18   Ht 5' 6" (1.676 m)   Wt 133.3 kg (293 lb 15.7 oz)   SpO2 99%   Breastfeeding No   BMI 47.45 kg/m²     "

## 2023-09-25 NOTE — H&P
Short Stay Endoscopy History and Physical    PCP - Braden Dumont MD    Procedure - EGD  ASA - 2  Mallampati - per anesthesia  History of Anesthesia problems - no  Family history Anesthesia problems -  no     HPI:  This is a 43 y.o. female here for evaluation of :   Active Hospital Problems    Diagnosis  POA    *H/O gastric sleeve [Z90.3]  Not Applicable      Resolved Hospital Problems   No resolved problems to display.         ROS:  CONSTITUTIONAL: Denies weight change,  fatigue, fevers, chills, night sweats.  CARDIOVASCULAR: Denies chest pain, shortness of breath, orthopnea and edema.  RESPIRATORY: Denies cough, hemoptysis, dyspnea, and wheezing.  GI: See HPI.    Medical History:   Past Medical History:   Diagnosis Date    Allergy     Anemia     Arthritis     Cardiac arrest as complication of care     pt states she went into cardiac arrest from an allergic reaction to a medication    Depression     Encounter for blood transfusion     GERD (gastroesophageal reflux disease)     Hemiplegia due to old stroke     Hypertension     Morbid obesity with BMI of 45.0-49.9, adult 09/20/2018    Multiple sclerosis     Neuromuscular disorder        Surgical History:   Past Surgical History:   Procedure Laterality Date    APPENDECTOMY      BREAST SURGERY      CHOLECYSTECTOMY      COLONOSCOPY N/A 11/25/2020    Procedure: COLONOSCOPY;  Surgeon: Andrew Jenkins III, MD;  Location: [facility];  Service: Endoscopy;  Laterality: N/A;    ESOPHAGOGASTRODUODENOSCOPY N/A 02/19/2020    Procedure: EGD (ESOPHAGOGASTRODUODENOSCOPY);  Surgeon: Michael Navarrete MD;  Location: [facility] ENDO;  Service: Endoscopy;  Laterality: N/A;    ESOPHAGOGASTRODUODENOSCOPY N/A 02/20/2020    Procedure: EGD (ESOPHAGOGASTRODUODENOSCOPY);  Surgeon: Michael Navarrete MD;  Location: [facility] OR;  Service: General;  Laterality: N/A;    ESOPHAGOGASTRODUODENOSCOPY N/A 11/25/2020    Procedure: EGD (ESOPHAGOGASTRODUODENOSCOPY);  Surgeon: Andrew Jenkins III, MD;  Location: [facility]  "ENDO;  Service: Endoscopy;  Laterality: N/A;    HYSTERECTOMY      KNEE SURGERY      ROBOT-ASSISTED LAPAROSCOPIC SLEEVE GASTRECTOMY USING DA ANTHONY XI N/A 02/20/2020    Procedure: XI ROBOTIC SLEEVE GASTRECTOMY;  Surgeon: Michael Navarrete MD;  Location: [facility] OR;  Service: General;  Laterality: N/A;    TENOPLASTY OF HAND Left 08/26/2021    Procedure: REPAIR, TENDON, HAND;  Surgeon: Joselito Lugo MD;  Location: [facility] OR;  Service: Orthopedics;  Laterality: Left;  Left RCL PIP Joint Repair/Recon with Arthrex Internal Brace.    TONSILLECTOMY      TOTAL REDUCTION MAMMOPLASTY  2022    TUBAL LIGATION         Family History:  Family History   Problem Relation Age of Onset    Lupus Mother     Heart disease Mother     Hypertension Mother     Diabetes Father     Kidney disease Father     No Known Problems Sister     No Known Problems Sister     No Known Problems Brother     No Known Problems Brother     No Known Problems Daughter     No Known Problems Daughter     No Known Problems Daughter     Cancer Maternal Aunt 40        breast    Breast cancer Maternal Aunt     Diabetes Maternal Aunt     COPD Maternal Aunt     Heart disease Maternal Grandmother     Breast cancer Maternal Cousin     Ovarian cancer Maternal Cousin        Social History:   Social History     Tobacco Use    Smoking status: Never     Passive exposure: Never    Smokeless tobacco: Never   Substance Use Topics    Alcohol use: Yes     Comment: once a year     Drug use: No       Allergy  Review of patient's allergies indicates:   Allergen Reactions    Demerol [meperidine] Anaphylaxis     Other reaction(s): Itching    Dilaudid [hydromorphone (bulk)] Anaphylaxis     "coded" per pt  Patient can tolerate Lortab    Shellfish containing products Itching, Nausea And Vomiting and Swelling    Prednisone Itching     Other reaction(s): Itching    Tramadol      shaking       Medications:   No current facility-administered medications on file prior to encounter.     Current " Outpatient Medications on File Prior to Encounter   Medication Sig Dispense Refill    DULoxetine (CYMBALTA) 60 MG capsule Take 1 capsule (60 mg total) by mouth once daily. 90 capsule 2    gabapentin (NEURONTIN) 300 MG capsule Take 3 capsules (900 mg total) by mouth 2 (two) times daily. 180 capsule 11    valsartan (DIOVAN) 80 MG tablet Take 1 tablet (80 mg total) by mouth once daily. 90 tablet 3    cholecalciferol, vitamin D3, 1,250 mcg (50,000 unit) capsule Take 1 capsule (50,000 Units total) by mouth every 7 days. for 365 doses 12 capsule 28    diclofenac sodium (VOLTAREN) 1 % Gel Apply 2 g topically 4 (four) times daily. 450 g 11    doxepin (SINEQUAN) 25 MG capsule TAKE 1 CAPSULE (25 MG TOTAL) BY MOUTH NIGHTLY AS NEEDED. 90 capsule 1    doxepin (SINEQUAN) 75 MG capsule Take 75 mg by mouth.      esomeprazole (NEXIUM) 40 MG capsule Take 1 capsule (40 mg total) by mouth before breakfast. 90 capsule 3    HYDROcodone-acetaminophen (NORCO)  mg per tablet Take 1 tablet by mouth 3 (three) times daily.      LIDOcaine (LIDODERM) 5 % Place 1 patch onto the skin once daily. Remove & Discard patch within 12 hours or as directed by MD 30 patch 0    linaCLOtide (LINZESS) 145 mcg Cap capsule Take 1 capsule (145 mcg total) by mouth before breakfast. 90 capsule 3    mupirocin (BACTROBAN) 2 % ointment APPLY TOPICALLY TWICE A DAY 22 g 0    nabumetone (RELAFEN) 750 MG tablet Take 750 mg by mouth 2 (two) times daily as needed.      neomycin-polymyxin-hydrocortisone (CORTISPORIN) 3.5-10,000-1 mg/mL-unit/mL-% otic suspension Place 3 drops into the right ear 3 (three) times daily. 10 mL 1    OCREVUS 30 mg/mL Soln       ondansetron (ZOFRAN) 8 MG tablet TAKE 1 TABLET (8 MG TOTAL) BY MOUTH 2 (TWO) TIMES DAILY. 30 tablet 1    ondansetron (ZOFRAN) 8 MG tablet Take 1 tablet (8 mg total) by mouth 2 (two) times daily. 30 tablet 1    semaglutide, weight loss, (WEGOVY) 0.25 mg/0.5 mL PnIj Inject 0.25 mg into the skin every 7 days. 3 mL 1     tiZANidine (ZANAFLEX) 4 MG tablet Take 1 tablet (4 mg total) by mouth every 8 (eight) hours as needed (muscle craps). 40 tablet 1    topiramate (TOPAMAX) 50 MG tablet TAKE 1 TABLET BY MOUTH IN THE MORNING AND 2 TABLETS AT BEDTIME 90 tablet 1    triamcinolone acetonide 0.1% (KENALOG) 0.1 % ointment APPLY TO AFFECTED AREA TWICE A DAY 30 g 1       Physical Exam:  Vital Signs:   Vitals:    09/25/23 0627   BP: (!) 189/109   Pulse: 87   Resp: 18   Temp: 99.7 °F (37.6 °C)     General Appearance: Well appearing in no acute distress  ENT: OP clear  Chest: CTA B  CV: RRR, no m/r/g  Abd: s/nt/nd/nabs  Ext: no edema    Labs:  Reviewed    IMP:  Active Hospital Problems    Diagnosis  POA    *H/O gastric sleeve [Z90.3]  Not Applicable      Resolved Hospital Problems   No resolved problems to display.         Plan:  I have explained the risks and benefits of endoscopy procedures to the patient including but not limited to bleeding, perforation, infection, and death. The patient wishes to proceed.

## 2023-09-25 NOTE — PLAN OF CARE
Discharge instructions reviewed with pt, handouts given, verbalized understanding with no further questions at this time. Dr. Kim spoke to pt at bedside, reviewed procedure and answered questions aware they are awaiting biopsy results with MD telephone number provided per AVS sheet. VSS on RA, no pain or nausea noted, tolerating po fluids without difficulty, no other complaints noted. Fall precautions reviewed, consents in chart, PIV to be removed at discharge.

## 2023-09-25 NOTE — DISCHARGE SUMMARY
[facility] - Endoscopy 1st Fl  Discharge Note  Short Stay    Procedure(s) (LRB):  EGD (ESOPHAGOGASTRODUODENOSCOPY) (N/A)      OUTCOME: Patient tolerated treatment/procedure well without complication and is now ready for discharge.    DISPOSITION: Home or Self Care    FINAL DIAGNOSIS:  H/O gastric sleeve    FOLLOWUP: With primary care provider    DISCHARGE INSTRUCTIONS:  No discharge procedures on file.

## 2023-09-25 NOTE — ANESTHESIA POSTPROCEDURE EVALUATION
Anesthesia Post Evaluation    Patient: Alicia Soliz    Procedure(s) Performed: Procedure(s) (LRB):  EGD (ESOPHAGOGASTRODUODENOSCOPY) (N/A)    Final Anesthesia Type: general      Patient location during evaluation: PACU  Patient participation: Yes- Able to Participate  Level of consciousness: awake  Post-procedure vital signs: reviewed and stable  Pain management: adequate  Airway patency: patent    PONV status at discharge: No PONV  Anesthetic complications: no      Cardiovascular status: stable  Respiratory status: unassisted  Hydration status: euvolemic  Follow-up not needed.          Vitals Value Taken Time   /92 09/25/23 0736     09/25/23 0739   Pulse 77 09/25/23 0736   Resp 17 09/25/23 0736   SpO2 100 % 09/25/23 0736         Event Time   Out of Recovery 07:38:00         Pain/Alban Score: Alban Score: 10 (9/25/2023  7:37 AM)

## 2023-09-26 ENCOUNTER — PROCEDURE VISIT (OUTPATIENT)
Dept: SPORTS MEDICINE | Facility: CLINIC | Age: 43
End: 2023-09-26
Payer: MEDICARE

## 2023-09-26 ENCOUNTER — TELEPHONE (OUTPATIENT)
Dept: PSYCHIATRY | Facility: CLINIC | Age: 43
End: 2023-09-26
Payer: MEDICARE

## 2023-09-26 VITALS — BODY MASS INDEX: 47.09 KG/M2 | WEIGHT: 293 LBS | HEIGHT: 66 IN

## 2023-09-26 DIAGNOSIS — M17.12 PRIMARY OSTEOARTHRITIS OF LEFT KNEE: Primary | ICD-10-CM

## 2023-09-26 PROCEDURE — 99499 UNLISTED E&M SERVICE: CPT | Mod: HCNC,S$GLB,, | Performed by: PHYSICIAN ASSISTANT

## 2023-09-26 PROCEDURE — 20610 DRAIN/INJ JOINT/BURSA W/O US: CPT | Mod: HCNC,LT,S$GLB, | Performed by: PHYSICIAN ASSISTANT

## 2023-09-26 PROCEDURE — 99499 NO LOS: ICD-10-PCS | Mod: HCNC,S$GLB,, | Performed by: PHYSICIAN ASSISTANT

## 2023-09-26 PROCEDURE — 20610 LARGE JOINT ASPIRATION/INJECTION: L KNEE: ICD-10-PCS | Mod: HCNC,LT,S$GLB, | Performed by: PHYSICIAN ASSISTANT

## 2023-09-26 NOTE — PROCEDURES
Large Joint Aspiration/Injection: L knee    Date/Time: 9/26/2023 1:15 PM    Performed by: Alethea Gordon PA-C  Authorized by: Alethea Gordon PA-C    Consent Done?:  Yes (Verbal)  Indications:  Joint swelling and pain  Site marked: the procedure site was marked    Timeout: prior to procedure the correct patient, procedure, and site was verified    Prep: patient was prepped and draped in usual sterile fashion      Local anesthesia used?: Yes    Local anesthetic:  Topical anesthetic    Details:  Needle Size:  22 G  Ultrasonic Guidance for needle placement?: No    Approach:  Anterolateral  Location:  Knee  Site:  L knee  Medications:  30 mg sodium hyaluronate (orthovisc) 30 mg/2 mL  Aspirate amount (mL):  0  Patient tolerance:  Patient tolerated the procedure well with no immediate complications    Left knee Orthovisc 2/3

## 2023-09-26 NOTE — TELEPHONE ENCOUNTER
ARASH received following email from Mary Peacock at [facility] regarding pt's rollator: I sent Alicia an email on August 8 about her application and I never heard back from her. Can you look on amaz[employer] and pick out a rollator for her and send me the link?     ARASH spoke with pt by phone and emailed her the response and included Mary Peacock's phone number for her to call.     She also inquired about neurology clearance for bariatric procedure.  ARASH will discuss with provider.

## 2023-09-26 NOTE — TELEPHONE ENCOUNTER
----- Message from Ayse Childs sent at 9/26/2023  8:40 AM CDT -----  Contact: patient [phone]  Patient called requesting a call back from the clinical staff, to schedule a new patient appt, patient referred by Dr SAWYER Desir

## 2023-09-28 ENCOUNTER — PATIENT MESSAGE (OUTPATIENT)
Dept: NEUROLOGY | Facility: CLINIC | Age: 43
End: 2023-09-28
Payer: MEDICARE

## 2023-09-28 ENCOUNTER — PATIENT MESSAGE (OUTPATIENT)
Dept: INTERNAL MEDICINE | Facility: CLINIC | Age: 43
End: 2023-09-28
Payer: MEDICARE

## 2023-09-28 ENCOUNTER — PATIENT MESSAGE (OUTPATIENT)
Dept: BARIATRICS | Facility: CLINIC | Age: 43
End: 2023-09-28
Payer: MEDICARE

## 2023-09-28 RX ORDER — DICLOFENAC SODIUM 10 MG/G
GEL TOPICAL
Qty: 726 G | Refills: 0 | OUTPATIENT
Start: 2023-09-28

## 2023-09-28 RX ORDER — OXCARBAZEPINE 150 MG/1
TABLET, FILM COATED ORAL
Qty: 180 TABLET | Refills: 0 | OUTPATIENT
Start: 2023-09-28

## 2023-09-28 RX ORDER — VALSARTAN 80 MG/1
TABLET ORAL
Qty: 90 TABLET | Refills: 0 | OUTPATIENT
Start: 2023-09-28

## 2023-09-28 RX ORDER — DOXEPIN HYDROCHLORIDE 75 MG/1
CAPSULE ORAL
Qty: 90 CAPSULE | Refills: 0 | OUTPATIENT
Start: 2023-09-28

## 2023-09-28 RX ORDER — MUPIROCIN 20 MG/G
OINTMENT TOPICAL
Qty: 90 G | Refills: 0 | OUTPATIENT
Start: 2023-09-28

## 2023-09-28 RX ORDER — TRIAMCINOLONE ACETONIDE 1 MG/G
OINTMENT TOPICAL
Qty: 90 G | Refills: 0 | OUTPATIENT
Start: 2023-09-28

## 2023-09-28 NOTE — TELEPHONE ENCOUNTER
No care due was identified.  [facility] Embedded Care Due Messages. Reference number: [id number].   9/28/2023 5:32:09 PM CDT

## 2023-09-28 NOTE — TELEPHONE ENCOUNTER
Refill Decision Note   Alicia Soliz  is requesting a refill authorization.  Brief Assessment and Rationale for Refill:  Quick Discontinue     Medication Therapy Plan:    Pharmacy is requesting new scripts for the following medications without required information, (sig/ frequency/qty/etc)          Comments: Pharmacies have been requesting medications for patients without required information, (sig, frequency, qty, etc.). In addition, requests are sent for medication(s) pt. are currently not taking, and medications patients have never taken.    We have spoken to the pharmacies about these request types and advised their teams previously that we are unable to assess these New Script requests and require all details for these requests. This is a known issue and has been reported.     Note composed:5:40 PM 09/28/2023

## 2023-09-29 ENCOUNTER — INFUSION (OUTPATIENT)
Dept: INFUSION THERAPY | Facility: HOSPITAL | Age: 43
End: 2023-09-29
Attending: FAMILY MEDICINE
Payer: MEDICARE

## 2023-09-29 VITALS
RESPIRATION RATE: 16 BRPM | HEIGHT: 66 IN | WEIGHT: 285.06 LBS | SYSTOLIC BLOOD PRESSURE: 147 MMHG | OXYGEN SATURATION: 98 % | TEMPERATURE: 98 F | BODY MASS INDEX: 45.81 KG/M2 | DIASTOLIC BLOOD PRESSURE: 80 MMHG | HEART RATE: 88 BPM

## 2023-09-29 DIAGNOSIS — G35 MULTIPLE SCLEROSIS: Primary | ICD-10-CM

## 2023-09-29 PROCEDURE — 96375 TX/PRO/DX INJ NEW DRUG ADDON: CPT | Mod: HCNC

## 2023-09-29 PROCEDURE — 96367 TX/PROPH/DG ADDL SEQ IV INF: CPT | Mod: HCNC

## 2023-09-29 PROCEDURE — 96376 TX/PRO/DX INJ SAME DRUG ADON: CPT | Mod: HCNC

## 2023-09-29 PROCEDURE — 25000003 PHARM REV CODE 250: Mod: HCNC | Performed by: STUDENT IN AN ORGANIZED HEALTH CARE EDUCATION/TRAINING PROGRAM

## 2023-09-29 PROCEDURE — 63600175 PHARM REV CODE 636 W HCPCS: Mod: HCNC | Performed by: STUDENT IN AN ORGANIZED HEALTH CARE EDUCATION/TRAINING PROGRAM

## 2023-09-29 PROCEDURE — 96366 THER/PROPH/DIAG IV INF ADDON: CPT | Mod: HCNC

## 2023-09-29 PROCEDURE — 96365 THER/PROPH/DIAG IV INF INIT: CPT | Mod: HCNC

## 2023-09-29 RX ORDER — SODIUM CHLORIDE 0.9 % (FLUSH) 0.9 %
10 SYRINGE (ML) INJECTION
Status: DISCONTINUED | OUTPATIENT
Start: 2023-09-29 | End: 2023-09-29 | Stop reason: HOSPADM

## 2023-09-29 RX ORDER — EPINEPHRINE 0.3 MG/.3ML
0.3 INJECTION SUBCUTANEOUS
OUTPATIENT
Start: 2024-03-01

## 2023-09-29 RX ORDER — FAMOTIDINE 10 MG/ML
20 INJECTION INTRAVENOUS
Status: COMPLETED | OUTPATIENT
Start: 2023-09-29 | End: 2023-09-29

## 2023-09-29 RX ORDER — ACETAMINOPHEN 500 MG
1000 TABLET ORAL
Status: COMPLETED | OUTPATIENT
Start: 2023-09-29 | End: 2023-09-29

## 2023-09-29 RX ORDER — ACETAMINOPHEN 500 MG
1000 TABLET ORAL
OUTPATIENT
Start: 2024-03-01

## 2023-09-29 RX ORDER — FAMOTIDINE 10 MG/ML
20 INJECTION INTRAVENOUS
OUTPATIENT
Start: 2024-03-01

## 2023-09-29 RX ORDER — SODIUM CHLORIDE 0.9 % (FLUSH) 0.9 %
10 SYRINGE (ML) INJECTION
OUTPATIENT
Start: 2024-03-01

## 2023-09-29 RX ORDER — HEPARIN 100 UNIT/ML
500 SYRINGE INTRAVENOUS
OUTPATIENT
Start: 2024-03-01

## 2023-09-29 RX ORDER — METHYLPREDNISOLONE SOD SUCC 125 MG
100 VIAL (EA) INJECTION
Status: COMPLETED | OUTPATIENT
Start: 2023-09-29 | End: 2023-09-29

## 2023-09-29 RX ORDER — METHYLPREDNISOLONE SOD SUCC 125 MG
100 VIAL (EA) INJECTION
Start: 2024-03-01 | End: 2024-03-01

## 2023-09-29 RX ORDER — DIPHENHYDRAMINE HYDROCHLORIDE 50 MG/ML
50 INJECTION INTRAMUSCULAR; INTRAVENOUS
OUTPATIENT
Start: 2024-03-01

## 2023-09-29 RX ADMIN — FAMOTIDINE 20 MG: 10 INJECTION INTRAVENOUS at 09:09

## 2023-09-29 RX ADMIN — ACETAMINOPHEN 1000 MG: 500 TABLET ORAL at 09:09

## 2023-09-29 RX ADMIN — SODIUM CHLORIDE: 9 INJECTION, SOLUTION INTRAVENOUS at 09:09

## 2023-09-29 RX ADMIN — METHYLPREDNISOLONE SODIUM SUCCINATE 100 MG: 125 INJECTION, POWDER, FOR SOLUTION INTRAMUSCULAR; INTRAVENOUS at 10:09

## 2023-09-29 RX ADMIN — DIPHENHYDRAMINE HYDROCHLORIDE 50 MG: 50 INJECTION, SOLUTION INTRAMUSCULAR; INTRAVENOUS at 10:09

## 2023-09-29 RX ADMIN — OCRELIZUMAB 600 MG: 300 INJECTION INTRAVENOUS at 10:09

## 2023-09-29 NOTE — PLAN OF CARE
Problem: Adult Inpatient Plan of Care  Goal: Plan of Care Review  Outcome: Ongoing, Progressing  Flowsheets (Taken 9/29/2023 1026)  Plan of Care Reviewed With: patient  Goal: Patient-Specific Goal (Individualized)  Outcome: Ongoing, Progressing  Flowsheets (Taken 9/29/2023 1026)  Anxieties, Fears or Concerns: none  Individualized Care Needs: Feet elevaed, pillow warm blanket and snack provided  Goal: Absence of Hospital-Acquired Illness or Injury  Outcome: Ongoing, Progressing  Intervention: Identify and Manage Fall Risk  Flowsheets (Taken 9/29/2023 1026)  Safety Promotion/Fall Prevention:   assistive device/personal item within reach   Fall Risk reviewed with patient/family   in recliner, wheels locked   medications reviewed   instructed to call staff for mobility  Intervention: Prevent Infection  Flowsheets (Taken 9/29/2023 1026)  Infection Prevention:   environmental surveillance performed   equipment surfaces disinfected   hand hygiene promoted   personal protective equipment utilized  Goal: Optimal Comfort and Wellbeing  Outcome: Ongoing, Progressing  Intervention: Monitor Pain and Promote Comfort  Flowsheets (Taken 9/29/2023 1026)  Pain Management Interventions:   warm blanket provided   position adjusted   quiet environment facilitated   relaxation techniques promoted  Intervention: Provide Person-Centered Care  Flowsheets (Taken 9/29/2023 1026)  Trust Relationship/Rapport:   care explained   questions answered   thoughts/feelings acknowledged   choices provided   questions encouraged   emotional support provided   reassurance provided   empathic listening provided     Problem: Infection  Goal: Absence of Infection Signs and Symptoms  Outcome: Ongoing, Progressing  Intervention: Prevent or Manage Infection  Flowsheets (Taken 9/29/2023 1026)  Infection Management: aseptic technique maintained     Problem: Fall Injury Risk  Goal: Absence of Fall and Fall-Related Injury  Outcome: Ongoing,  Progressing  Intervention: Identify and Manage Contributors  Flowsheets (Taken 9/29/2023 1026)  Self-Care Promotion:   independence encouraged   BADL personal objects within reach   safe use of adaptive equipment encouraged  Medication Review/Management: medications reviewed  Intervention: Promote Injury-Free Environment  Flowsheets (Taken 9/29/2023 1026)  Safety Promotion/Fall Prevention:   assistive device/personal item within reach   Fall Risk reviewed with patient/family   in recliner, wheels locked   medications reviewed   instructed to call staff for mobility

## 2023-10-02 ENCOUNTER — PATIENT MESSAGE (OUTPATIENT)
Dept: SPORTS MEDICINE | Facility: CLINIC | Age: 43
End: 2023-10-02
Payer: MEDICARE

## 2023-10-03 ENCOUNTER — TELEPHONE (OUTPATIENT)
Dept: SPORTS MEDICINE | Facility: CLINIC | Age: 43
End: 2023-10-03
Payer: MEDICARE

## 2023-10-03 ENCOUNTER — PATIENT MESSAGE (OUTPATIENT)
Dept: NEUROLOGY | Facility: CLINIC | Age: 43
End: 2023-10-03
Payer: MEDICARE

## 2023-10-03 DIAGNOSIS — M17.12 PRIMARY OSTEOARTHRITIS OF LEFT KNEE: Primary | ICD-10-CM

## 2023-10-03 DIAGNOSIS — G43.809 OTHER MIGRAINE WITHOUT STATUS MIGRAINOSUS, NOT INTRACTABLE: ICD-10-CM

## 2023-10-03 DIAGNOSIS — M62.838 MUSCLE SPASMS OF LOWER EXTREMITY, UNSPECIFIED LATERALITY: ICD-10-CM

## 2023-10-03 LAB
FINAL PATHOLOGIC DIAGNOSIS: NORMAL
Lab: NORMAL

## 2023-10-03 RX ORDER — LIDOCAINE 50 MG/G
1 PATCH TOPICAL DAILY
Qty: 30 PATCH | Refills: 2 | Status: SHIPPED | OUTPATIENT
Start: 2023-10-03 | End: 2023-12-18

## 2023-10-03 RX ORDER — TOPIRAMATE 50 MG/1
TABLET, FILM COATED ORAL
Qty: 270 TABLET | Refills: 1 | Status: ON HOLD | OUTPATIENT
Start: 2023-10-03 | End: 2024-02-27 | Stop reason: HOSPADM

## 2023-10-03 RX ORDER — GABAPENTIN 300 MG/1
900 CAPSULE ORAL 2 TIMES DAILY
Qty: 540 CAPSULE | Refills: 1 | Status: SHIPPED | OUTPATIENT
Start: 2023-10-03 | End: 2024-10-02

## 2023-10-03 NOTE — TELEPHONE ENCOUNTER
----- Message from Marli Coleman sent at 10/3/2023 12:29 PM CDT -----  Regarding: FW: refill    ----- Message -----  From: Marcelina Womack  Sent: 10/3/2023  11:58 AM CDT  To: Evan Claire Staff  Subject: refill                                           Type:  RX Refill Request    Who Called: Cecilia  Refill or New Rx:refill  RX Name and Strength:LIDOcaine (LIDODERM) 5 %  How is the patient currently taking it? (ex. 1XDay):  Is this a 30 day or 90 day RX:  Preferred Pharmacy with phone number:[facility] - [place], [place] - [address]   Phone:  [phone]  Fax:  [phone]        Local or Mail Order:mail order  Ordering Provider:  Would the patient rather a call back or a response via [facility]? Call back  Best Call Back Number:[phone]  Additional Information:

## 2023-10-03 NOTE — TELEPHONE ENCOUNTER
Reached out to pt about rescheduling her appointment for Orthovisc 3/3 due to an family emergency, I expressed that moving her would be out of the opportune time window and she stated that she understood

## 2023-10-04 ENCOUNTER — PATIENT MESSAGE (OUTPATIENT)
Dept: BARIATRICS | Facility: CLINIC | Age: 43
End: 2023-10-04
Payer: MEDICARE

## 2023-10-05 ENCOUNTER — HOSPITAL ENCOUNTER (OUTPATIENT)
Dept: RADIOLOGY | Facility: HOSPITAL | Age: 43
Discharge: HOME | End: 2023-10-05
Attending: PHYSICIAN ASSISTANT
Payer: MEDICARE

## 2023-10-05 DIAGNOSIS — Z90.3 S/P GASTRIC SLEEVE PROCEDURE: ICD-10-CM

## 2023-10-05 PROCEDURE — 74240 X-RAY XM UPR GI TRC 1CNTRST: CPT | Mod: 26,HCNC,, | Performed by: STUDENT IN AN ORGANIZED HEALTH CARE EDUCATION/TRAINING PROGRAM

## 2023-10-05 PROCEDURE — 74240 X-RAY XM UPR GI TRC 1CNTRST: CPT | Mod: TC,HCNC

## 2023-10-05 PROCEDURE — A9698 NON-RAD CONTRAST MATERIALNOC: HCPCS | Mod: HCNC | Performed by: PHYSICIAN ASSISTANT

## 2023-10-05 PROCEDURE — 74240 FL UPPER GI: ICD-10-PCS | Mod: 26,HCNC,, | Performed by: STUDENT IN AN ORGANIZED HEALTH CARE EDUCATION/TRAINING PROGRAM

## 2023-10-05 PROCEDURE — 25500020 PHARM REV CODE 255: Mod: HCNC | Performed by: PHYSICIAN ASSISTANT

## 2023-10-05 RX ADMIN — BARIUM SULFATE 340 G: 980 POWDER, FOR SUSPENSION ORAL at 11:10

## 2023-10-05 RX ADMIN — Medication 176 G: at 11:10

## 2023-10-06 ENCOUNTER — PATIENT MESSAGE (OUTPATIENT)
Dept: NEUROLOGY | Facility: CLINIC | Age: 43
End: 2023-10-06
Payer: MEDICARE

## 2023-10-09 ENCOUNTER — PATIENT MESSAGE (OUTPATIENT)
Dept: SPORTS MEDICINE | Facility: CLINIC | Age: 43
End: 2023-10-09
Payer: MEDICARE

## 2023-10-10 ENCOUNTER — PATIENT MESSAGE (OUTPATIENT)
Dept: BARIATRICS | Facility: CLINIC | Age: 43
End: 2023-10-10
Payer: MEDICARE

## 2023-10-10 ENCOUNTER — PATIENT MESSAGE (OUTPATIENT)
Dept: SPORTS MEDICINE | Facility: CLINIC | Age: 43
End: 2023-10-10
Payer: MEDICARE

## 2023-10-10 DIAGNOSIS — M62.838 MUSCLE SPASMS OF LOWER EXTREMITY, UNSPECIFIED LATERALITY: ICD-10-CM

## 2023-10-10 DIAGNOSIS — M17.12 PRIMARY OSTEOARTHRITIS OF LEFT KNEE: Primary | ICD-10-CM

## 2023-10-11 ENCOUNTER — TELEPHONE (OUTPATIENT)
Dept: SPORTS MEDICINE | Facility: CLINIC | Age: 43
End: 2023-10-11
Payer: MEDICARE

## 2023-10-11 NOTE — TELEPHONE ENCOUNTER
Reached out to pt about her message regarding r/s her missed appointment today for Friday 10/13/23 @ 11:45

## 2023-10-11 NOTE — TELEPHONE ENCOUNTER
----- Message from Ari Lira sent at 10/11/2023 12:17 PM CDT -----  Contact: Alicia Bain is calling in regards to getting procedure 10/11 rescheduled due to family matter. Please call back at [phone]                                    Thanks  KT

## 2023-10-12 ENCOUNTER — PATIENT MESSAGE (OUTPATIENT)
Dept: INTERNAL MEDICINE | Facility: CLINIC | Age: 43
End: 2023-10-12
Payer: MEDICARE

## 2023-10-13 ENCOUNTER — CLINICAL SUPPORT (OUTPATIENT)
Dept: REHABILITATION | Facility: HOSPITAL | Age: 43
End: 2023-10-13
Payer: MEDICARE

## 2023-10-13 ENCOUNTER — TELEPHONE (OUTPATIENT)
Dept: BARIATRICS | Facility: CLINIC | Age: 43
End: 2023-10-13
Payer: MEDICARE

## 2023-10-13 ENCOUNTER — PROCEDURE VISIT (OUTPATIENT)
Dept: SPORTS MEDICINE | Facility: CLINIC | Age: 43
End: 2023-10-13
Payer: MEDICARE

## 2023-10-13 VITALS — BODY MASS INDEX: 45.8 KG/M2 | WEIGHT: 285 LBS | HEIGHT: 66 IN

## 2023-10-13 DIAGNOSIS — M25.562 CHRONIC PAIN OF LEFT KNEE: ICD-10-CM

## 2023-10-13 DIAGNOSIS — G35 MULTIPLE SCLEROSIS: ICD-10-CM

## 2023-10-13 DIAGNOSIS — M25.512 LEFT SHOULDER PAIN, UNSPECIFIED CHRONICITY: Primary | ICD-10-CM

## 2023-10-13 DIAGNOSIS — M17.12 PRIMARY OSTEOARTHRITIS OF LEFT KNEE: ICD-10-CM

## 2023-10-13 DIAGNOSIS — R29.898 WEAKNESS OF LEFT LOWER EXTREMITY: ICD-10-CM

## 2023-10-13 DIAGNOSIS — G89.29 CHRONIC PAIN OF LEFT KNEE: ICD-10-CM

## 2023-10-13 DIAGNOSIS — R53.1 LEFT-SIDED WEAKNESS: Primary | ICD-10-CM

## 2023-10-13 DIAGNOSIS — M17.12 PRIMARY OSTEOARTHRITIS OF LEFT KNEE: Primary | ICD-10-CM

## 2023-10-13 DIAGNOSIS — S83.412A SPRAIN OF MEDIAL COLLATERAL LIGAMENT OF LEFT KNEE, INITIAL ENCOUNTER: ICD-10-CM

## 2023-10-13 PROCEDURE — 20610 DRAIN/INJ JOINT/BURSA W/O US: CPT | Mod: HCNC,LT,S$GLB, | Performed by: PHYSICIAN ASSISTANT

## 2023-10-13 PROCEDURE — 20610 LARGE JOINT ASPIRATION/INJECTION: L KNEE: ICD-10-PCS | Mod: HCNC,LT,S$GLB, | Performed by: PHYSICIAN ASSISTANT

## 2023-10-13 PROCEDURE — 99499 UNLISTED E&M SERVICE: CPT | Mod: HCNC,S$GLB,, | Performed by: PHYSICIAN ASSISTANT

## 2023-10-13 PROCEDURE — 99499 NO LOS: ICD-10-PCS | Mod: HCNC,S$GLB,, | Performed by: PHYSICIAN ASSISTANT

## 2023-10-13 PROCEDURE — 97162 PT EVAL MOD COMPLEX 30 MIN: CPT | Mod: HCNC

## 2023-10-13 NOTE — TELEPHONE ENCOUNTER
----- Message from Chantal Jay sent at 10/13/2023  8:15 AM CDT -----  Regarding: pt advice  Contact: [phone]  Pt returning miss call from office. Neha miller

## 2023-10-13 NOTE — PROCEDURES
Large Joint Aspiration/Injection: L knee    Date/Time: 10/13/2023 11:45 AM    Performed by: Alethea Gordon PA-C  Authorized by: Alethea Gordon PA-C    Consent Done?:  Yes (Verbal)  Indications:  Joint swelling and pain  Site marked: the procedure site was marked    Timeout: prior to procedure the correct patient, procedure, and site was verified    Prep: patient was prepped and draped in usual sterile fashion      Local anesthesia used?: Yes    Local anesthetic:  Topical anesthetic    Details:  Needle Size:  22 G  Ultrasonic Guidance for needle placement?: No    Approach:  Anterolateral  Location:  Knee  Site:  L knee  Medications:  30 mg sodium hyaluronate (orthovisc) 30 mg/2 mL  Aspirate amount (mL):  0  Patient tolerance:  Patient tolerated the procedure well with no immediate complications    Left knee Orthovisc 3/3  Follow up in 5 months for the knee

## 2023-10-13 NOTE — PLAN OF CARE
[facility]   Physical Therapy Initial Evaluation      Date: 10/13/2023   Name: Alicia Soliz  Clinic Number: [id number]    Therapy Diagnosis:  Encounter Diagnoses   Name Primary?    Primary osteoarthritis of left knee     Sprain of medial collateral ligament of left knee, initial encounter     Multiple sclerosis       Physician: Kole Carrasquillo MD     Physician Orders: PT Eval and Treat  Medical Diagnosis from Referral: Primary osteoarthritis of left knee [M17.12], Sprain of medial collateral ligament of left knee, initial encounter [S83.412A], Multiple sclerosis [G35]  Evaluation Date: 10/13/2023  Authorization Period Expiration: 8/13/2024  Plan of Care Expiration: 1/5/2024  Progress Note Due: 11/10/2023  Visit # / Visits authorized: 1/1   FOTO: 1/3 (last performed on 10/13/2023)    Precautions: Standard and Fall    Time In: 9:30AM  Time Out: 10:15 AM  Total Billable Time (timed & untimed codes): 40 minutes    Subjective     Date of onset: 2015    History of current condition - Alicia reports That she was diagnosed with MS in 2015 leading to a hospitilization which lead to significant weakness, Upon recovery she reports she has had continued left sided weakness and pain since then. She reports she was being treated at another of our PT clinics for knee OA and weakness, but land therapy exacerbated her pain due to weightbearing. Since she stopped going to therapy there she has had falls. She returned to her docotor and they have recommended she continues PT but this time attending aquatic therapy to help offload her joints.     Falls: Last month, Her legs went out on her when getting out of bed.     Imaging: [x] Xray [] MRI [] CT: Performed on: knee    Pain:  Current 0/10, worst 10/10, best 0/10   Location: [] Right   [x] Left:  Knee  Description: aching , deep, throbbing, sharp, and shooting  Aggravating Factors: Weight bearing, prolonged positions  Easing Factors: activity  avoidance, rest, Pain medication, ice    Prior Therapy:   [] N/A    [x] Yes:   Social History: Pt lives with their family  Occupation: Pt is N/A  Prior Level of Function: Independent and pain free with all ADL, IADL, community mobility and functional activities.   Current Level of Function: Needs assistance wwith all ADL, IADL, community mobility and functional activities with reports of increased pain and need for increased time and frequent breaks.      Dominant Extremity:    [x] Right    [] Left    Pts goals: Pt reported goals are to decrease overall pain levels in order to return to prior functional level.     Medical History:   Past Medical History:   Diagnosis Date    Allergy     Anemia     Arthritis     Cardiac arrest as complication of care     pt states she went into cardiac arrest from an allergic reaction to a medication    Depression     Encounter for blood transfusion     GERD (gastroesophageal reflux disease)     Hemiplegia due to old stroke     Hypertension     Morbid obesity with BMI of 45.0-49.9, adult 09/20/2018    Multiple sclerosis     Neuromuscular disorder        Surgical History:   Alicia Soliz  has a past surgical history that includes Appendectomy; Tubal ligation; Tonsillectomy; Cholecystectomy; Hysterectomy; Knee surgery; Esophagogastroduodenoscopy (N/A, 02/19/2020); Robot-assisted laparoscopic sleeve gastrectomy using da [place] Xi (N/A, 02/20/2020); Esophagogastroduodenoscopy (N/A, 02/20/2020); Esophagogastroduodenoscopy (N/A, 11/25/2020); Colonoscopy (N/A, 11/25/2020); Tenoplasty of hand (Left, 08/26/2021); Total Reduction Mammoplasty (2022); Breast surgery; and Esophagogastroduodenoscopy (N/A, 9/25/2023).    Medications:   Alicia has a current medication list which includes the following prescription(s): cholecalciferol (vitamin d3), diclofenac sodium, doxepin, doxepin, duloxetine, esomeprazole, gabapentin, hydrocodone-acetaminophen, lidocaine, linaclotide, mupirocin, nabumetone,  "neomycin-polymyxin-hydrocortisone, ocrevus, ondansetron, ondansetron, wegovy, tizanidine, topiramate, triamcinolone acetonide 0.1%, and valsartan.    Allergies:   Review of patient's allergies indicates:   Allergen Reactions    Demerol [meperidine] Anaphylaxis     Other reaction(s): Itching    Dilaudid [hydromorphone (bulk)] Anaphylaxis     "coded" per pt  Patient can tolerate Lortab    Shellfish containing products Itching, Nausea And Vomiting and Swelling    Prednisone Itching     Other reaction(s): Itching    Tramadol      shaking        Objective        RANGE OF MOTION:   Knee AROM/PROM Right Left Pain/Dysfunction with Movement Goal   Knee Flexion (135º) 115 115 Soft tissue approx 130   Knee Extension (0º) 0 0        -          STRENGTH:   U/E MMT Right Left Pain/Dysfunction with Movement Goal   Shoulder Flexion 4/5 4-/5  4+/5 B   Shoulder Extension 4/5 4/5  4+/5 B   Shoulder Abduction 4/5 4-/5  4+/5 B   Shoulder IR 4-/5 4-/5  4+/5 B   Shoulder ER 4-/5 4-/5  4+/5 B   Serratus Anterior 4-/5 4-/5  4+/5 B   Elbow Flexion  4+/5 4-/5  5/5 B   Elbow Extension 4+/5 4-/5  5/5 B   Wrist Flexion 4+/5 3+/5  5/5 B   Wrist Extension 4+/5 3+/5  5/5 B       L/E MMT Right  (spine) Left Pain/Dysfunction with Movement Goal   Hip Flexion  5/5 4-/5  4+/5 B   Hip Abduction  5/5 4/5  4+/5 B   Knee Extension 5/5 4+/5  5/5 B   Knee Flexion 5/5 4/5  5/5 B   Hip IR 4/5 4-/5  4+/5 B   Hip ER 4/5 4-/5  4+/5 B   Ankle DF 5/5 5/5  5/5 B   Ankle PF 3+/5 3+/5  5/5 B   Ankle Inversion 5/5 5/5  5/5 B   Ankle Eversion 5/5 5/5  5/5 B                JOINT MOBILITY:   Joint Motion Right Mobility  (spine) Left Mobility Goal   Distal Femur AP [x] Hypo     [] Normal     [] Hyper [x] Hypo     [] Normal     [] Hyper Normal    Proximal Tibia AP [x] Hypo     [] Normal     [] Hyper [x] Hypo     [] Normal     [] Hyper Normal    Patellar Medial Glide  [x] Hypo     [] Normal     [] Hyper [x] Hypo     [] Normal     [] Hyper Normal    Patellar Lateral Glide  [x] " Hypo     [] Normal     [] Hyper [x] Hypo     [] Normal     [] Hyper Normal    Patellar Superior Glide  [x] Hypo     [] Normal     [] Hyper [x] Hypo     [] Normal     [] Hyper Normal    Patellar Inferior Glide  [x] Hypo     [] Normal     [] Hyper [x] Hypo     [] Normal     [] Hyper Normal    Ankle:  [x] Hypo     [] Normal     [] Hyper [x] Hypo     [] Normal     [] Hyper Normal                SENSATION  [x] Intact to Light Touch   [] Impaired:      PALPATION: Muscles: Increased tone and tenderness to palpation of: left , quadriceps, hamstrings, gastrocnemius, soleus. Structures: Increased tenderness to palpation of: left , LOWER STRUCTURES : knee joint, medial tibiofemoral joint line, lateral tibiofemoral joint line, fat pad of knee, patellar tendon, MCL      POSTURE:  Pt presents with postural abnormalities which include:    [x] Forward Head   [] Increased Lumbar Lordosis   [x] Rounded Shoulder   [] Genu Recurvatum   [] Increased Thoracic Kyphosis [] Genu Valgus   [] Trunk Deviated    [] Pes Planus   [] Scapular Winging    [] Other:         FUNCTIONAL MOVEMENT PATTERNS  Movement Analysis Notes   Bed Mobility  []Functional  [x]Dysfunctional:  []Painful  [x]Non-Painful       Transfers []Functional  [x]Dysfunctional:  []Painful  [x]Non-Painful       Sit to Stand []Functional  [x]Dysfunctional:  [x]Painful  []Non-Painful       Squat []Functional  [x]Dysfunctional:  [x]Painful  []Non-Painful       Single Leg Squat  []Functional  []Dysfunctional:  []Painful  []Non-Painful     Not appropriate    Step Down  []Functional  [x]Dysfunctional:  [x]Painful  []Non-Painful     8 inch step using hand rail in parallel bars. Contralateral hip drop, poor descent control.        FUNCTIONAL TESTING    Outcome Norms Goal   Five Time Sit to Stand 14.6 sec 60s: <11.4 sec  70s: <12.6 sec  80s: 14.8 sec <11.4 sec         Function:     Intake Outcome Measure for FOTO Knee Survey    Therapist reviewed FOTO scores for Alicia on 10/13/2023.   FOTO  report - see Media section or FOTO account for episode details    Intake Score: 30%          Treatment     Total Treatment time (time-based codes) separate from Evaluation: (0) minutes     Alicia received the treatments listed below:         Patient Education and Home Exercises     Education provided: (included in treatment time) minutes  PURPOSE: Patient educated on the impairments noted above and the effects of physical therapy intervention to improve overall condition and QOL.   EXERCISE: Patient was educated on all the above exercise prior/during/after for proper posture, positioning, and execution for safe performance with home exercise program.   STRENGTH: Patient educated on the importance of improved core and extremity strength in order to improve alignment of the spine and extremities with static positions and dynamic movement.   GAIT & BALANCE: Patient educated on the importance of strong core and lower extremity musculature in order to improve both static and dynamic balance, improve gait mechanics, reduce fall risk and improve household and community mobility.   ASSISTIVE DEVICE: Patient educated on proper utilization of assistive device for safe and efficient ambulation and to reduce risk of falls    Written Home Exercises Provided: yes.  Exercises were reviewed and Alicia was able to demonstrate them prior to the end of the session.  Alicia demonstrated good  understanding of the education provided. See EMR under Patient Instructions for exercises provided during therapy sessions.    Assessment     Alicia is a 43 y.o. female referred to outpatient Physical Therapy with a medical diagnosis of Primary osteoarthritis of left knee [M17.12], Sprain of medial collateral ligament of left knee, initial encounter [S83.412A], Multiple sclerosis [G35]. Pt presents with impairments in the following categories: IMPAIRMENTS: ROM, strength, joint mobility, gait mechanics, and functional movement patterns. Patient is a  "fall risk based on her 5 time sit to stand, and is currently limited in her participation in her normal ADL's and peer related task due to her weakness, pain and dysfunction.     Pt prognosis is Good  Pt will benefit from skilled outpatient Physical Therapy to address the deficits stated above and in the chart below, provide pt/family education, and to maximize pt's level of independence.     Plan of care discussed with patient: Yes  Pt's spiritual, cultural and educational needs considered and patient is agreeable to the plan of care and goals as stated below:     Anticipated Barriers for therapy: co-morbidities, sedentary lifestyle, and chronicity of condition    Medical Necessity is demonstrated by the following  History  Co-morbidities and personal factors that may impact the plan of care [] LOW: no personal factors / co-morbidities  [x] MODERATE: 1-2 personal factors / co-morbidities  [] HIGH: 3+ personal factors / co-morbidities    Moderate / High Support Documentation:   Past Medical History:   Diagnosis Date    Allergy     Anemia     Arthritis     Cardiac arrest as complication of care     pt states she went into cardiac arrest from an allergic reaction to a medication    Depression     Encounter for blood transfusion     GERD (gastroesophageal reflux disease)     Hemiplegia due to old stroke     Hypertension     Morbid obesity with BMI of 45.0-49.9, adult 09/20/2018    Multiple sclerosis     Neuromuscular disorder         Examination  Body Structures and Functions, activity limitations and participation restrictions that may impact the plan of care [] LOW: addressing 1-2 elements  [x] MODERATE: 3+ elements  [] HIGH: 4+ elements (please support below)    Moderate / High Support Documentation: See above in "Current Level of Function"      Clinical Presentation [] LOW: stable  [x] MODERATE: Evolving  [] HIGH: Unstable     Decision Making/ Complexity Score: moderate         Short Term Goals:  6 weeks Status  " Date Met   PAIN: Pt will report worst pain of 6/10 in order to progress toward max functional ability and improve quality of life. [x] Progressing  [] Met  [] Not Met    FUNCTION: Patient will demonstrate improved function as indicated by a score of greater than or equal to 43 out of 100 on FOTO. [x] Progressing  [] Met  [] Not Met    MOBILITY: Patient will improve AROM to 50% of stated goals, listed in objective measures above, in order to progress towards independence with functional activities.  [x] Progressing  [] Met  [] Not Met    STRENGTH: Patient will improve strength to 50% of stated goals, listed in objective measures above, in order to progress towards independence with functional activities. [x] Progressing  [] Met  [] Not Met    POSTURE: Patient will correct postural deviations in sitting and standing, to decrease pain and promote long term stability.  [x] Progressing  [] Met  [] Not Met    GAIT: Patient will demonstrate improved gait mechanics including increased step length, increased stance time, improved heel strike and push off, increased overall gait speed in order to improve functional mobility, improve quality of life, and decrease risk of further injury or fall.  [x] Progressing  [] Met  [] Not Met    HEP: Patient will demonstrate independence with HEP in order to progress toward functional independence. [x] Progressing  [] Met  [] Not Met    Patient will be able to perform a 5 time sit to  <12.6 seconds to decrease fall risk [x] Progressing  [] Met  [] Not Met      Long Term Goals:  12 weeks Status Date Met   PAIN: Pt will report worst pain of 3/10 in order to progress toward max functional ability and improve quality of life [x] Progressing  [] Met  [] Not Met    FUNCTION: Patient will demonstrate improved function as indicated by a score of greater than or equal to 56 out of 100 on FOTO. [x] Progressing  [] Met  [] Not Met    MOBILITY: Patient will improve AROM to stated goals,  listed in objective measures above, in order to return to maximal functional potential and improve quality of life.  [x] Progressing  [] Met  [] Not Met    STRENGTH: Patient will improve strength to stated goals, listed in objective measures above, in order to improve functional independence and quality of life. [x] Progressing  [] Met  [] Not Met    GAIT: Patient will demonstrate normalized gait mechanics with minimal compensation in order to return to PLOF. [x] Progressing  [] Met  [] Not Met    Patient will return to normal ADL's, IADL's, community involvement, recreational activities, and work-related activities with less than or equal to 3/10 pain and maximal function.  [x] Progressing  [] Met  [] Not Met    Patient will be able to perform a 5 time sit to  <11.4 seconds to decrease fall risk [x] Progressing  [] Met  [] Not Met      Plan     Plan of care Certification: 10/13/2023 to 1/4/2024.    Outpatient Physical Therapy 3 times weekly for 12 weeks to include any combination of the following interventions: virtual visits, dry needling, modalities, electrical stimulation (IFC, Pre-Mod, Attended with Functional Dry Needling), Cervical/Lumbar Traction, Gait Training, Manual Therapy, Neuromuscular Re-ed, Patient Education, Self Care, Therapeutic Exercise, and Therapeutic Activites     Dre Jaffe, PT, DPT

## 2023-10-13 NOTE — TELEPHONE ENCOUNTER
Attempt to contact patient regarding test results, no answer lvm with a detailed message to call back.

## 2023-10-16 ENCOUNTER — PATIENT MESSAGE (OUTPATIENT)
Dept: NEUROLOGY | Facility: CLINIC | Age: 43
End: 2023-10-16
Payer: MEDICARE

## 2023-10-16 ENCOUNTER — CLINICAL SUPPORT (OUTPATIENT)
Dept: BARIATRICS | Facility: CLINIC | Age: 43
End: 2023-10-16
Payer: MEDICARE

## 2023-10-16 DIAGNOSIS — E66.01 MORBID OBESITY WITH BMI OF 45.0-49.9, ADULT: ICD-10-CM

## 2023-10-16 DIAGNOSIS — I10 PRIMARY HYPERTENSION: Primary | ICD-10-CM

## 2023-10-16 DIAGNOSIS — Z71.3 DIETARY COUNSELING AND SURVEILLANCE: ICD-10-CM

## 2023-10-16 PROCEDURE — 97803 MED NUTRITION INDIV SUBSEQ: CPT | Mod: HCNC,95,GZ, | Performed by: DIETITIAN, REGISTERED

## 2023-10-16 PROCEDURE — 97803 PR MED NUTR THER, SUBSQ, INDIV, EA 15 MIN: ICD-10-PCS | Mod: HCNC,95,GZ, | Performed by: DIETITIAN, REGISTERED

## 2023-10-16 PROCEDURE — 99499 UNLISTED E&M SERVICE: CPT | Mod: HCNC,95,, | Performed by: DIETITIAN, REGISTERED

## 2023-10-16 PROCEDURE — 99499 NO LOS: ICD-10-PCS | Mod: HCNC,95,, | Performed by: DIETITIAN, REGISTERED

## 2023-10-16 NOTE — PROGRESS NOTES
The patient location is: home ([place])  The chief complaint leading to consultation is: 3 months Medically Supervised Diet pending Gastricsleeve to bypass conversion  Visit type: audiovisual     Face to Face time with patient: 25 min    30 minutes of total time spent on the encounter, which includes face to face time and non-face to face time preparing to see the patient (eg, review of tests), Obtaining and/or reviewing separately obtained history, Documenting clinical information in the electronic or other health record, Independently interpreting results (not separately reported) and communicating results to the patient/family/caregiver, or Care coordination (not separately reported).       Each patient to whom he or she provides medical services by telemedicine is:  (1) informed of the relationship between the physician and patient and the respective role of any other health care provider with respect to management of the patient; and (2) notified that he or she may decline to receive medical services by telemedicine and may withdraw from such care at any time.    NUTRITION NOTE    Referring Physician: Farideh Jc M.D.   Reason for MNT Referral: 3 months Medically Supervised Diet pending Gastric sleeve to bypass conversion    Patient presents for follow-up visit for MSD with weight at a standstill over the past month; total weight loss by making numerous dietary and lifestyle changes.    CLINICAL DATA:  43 y.o. female.    Initial weight: 285 lbs  Current Weight: 285 lbs (10/13/23)  Weight Change Since Initial Visit: 0 lbs  Ideal Body Weight: 144 lbs  BMI: 46.00    DAILY NUTRITIONAL NEEDS: pre-op nutritional bariatric guidelines to promote weight loss  [id number] Calories    Grams Protein    CURRENT DIET:  Regular diet.  Diet Recall: Food records are not present.  Greatest challenge: Starchy CHO    Current diet recall:      B: Blueberry muffin or egg sandwich or Premier shake plus grapes, [place]vita  breakfast biscuit, 1/2 bottle juice  L: Jono Smith turkey, cheese lunchable, yogurt pretzel, white chocolate flips yogurt  S: Watermelon  D: Red [place]oom[place] pizza   Snacks on animal crackers all day     Diet Includes: 3 meals  Meal Pattern: Same.  Protein Supplements: 1 per day.  Snacking: Adequate. Snacks include healthy choices and starchy CHO.  Vegetables: Likes a few. Eats  almost daily. Broccoli, cabbage  Fruits: Likes a few. Eats almost daily. Watermelon, grapes, strawberries  Beverages: water, diet tea, juice, water with Crystal Light  Dining out: Weekly   Cooking at home: Daily. Mostly baked, grilled, smothered, and boiled  meat, fish, starchy CHO, and vegetables. Broccoli & cheese, cabbage, red beans sometimes with brown rice, baked fish, baked chicken, boiled ham    Food Allergies:   Shell fish     Exercise:  Current exercise: Adequate  Weight training 30-40 mins daily  Physical therapy for left side weakness 2 x week  Starting aqua therapy tomorrow     Restrictions to exercise: none     Vitamins / Minerals / Herbs:   Multivitamins gummy  B-12  B-1     Labs:   Reviewed last visit     Social:  Retired. Disabled.  Lives with adult daughter and two puppies.  Grocery shopping and food prep: Self.  Patient believes the household will be supportive after surgery.  Alcohol:  champagne on birthday .  Smoking: None.    ASSESSMENT:  Patient demonstrates some willingness to change lifestyle habits as evidenced by good exercise and including protein drinks.    Doing poorly with working on greatest challenges (starchy CHO).    Barriers to Education:  none  Stage of Change:  determination    NUTRITION DIAGNOSIS:  Morbid Obesity related to Excessive carbohydrate intake as evidence by BMI.  Obesity Status: Same    BARIATRIC DIET DISCUSSION/PLAN:  Discussed diet after surgery and related to patient's food record.  Reviewed nutrition guidelines for before and after surgery.  Answered all questions.  Discussed using low carb  tortillas for meals  Discussed having only one starchy CHO per meal  Discussed eating protein food first  Discussed appropriate drinks  Work on gradually cutting back on starchy CHO in the diet.    PT Plan  Make own lunchables/make deli meat and cheese roll-ups/use low carb tortillas  Switch to lower sugar yogurt    RECOMMENDATIONS:  Pt is potential candidate for surgery.    Diet: Adjust diet plan.  Work on gradually cutting back on starchy CHO in the diet.    Exercise: Maintain.    Behavior Modification: Begin to document food & activity logs daily.    Return to clinic in one month.    Needs additional visits with RD.    Communicated nutrition plan with bariatric team.    SESSION TIME:  30 minutes

## 2023-10-17 ENCOUNTER — OFFICE VISIT (OUTPATIENT)
Dept: OPHTHALMOLOGY | Facility: CLINIC | Age: 43
End: 2023-10-17
Payer: MEDICARE

## 2023-10-17 DIAGNOSIS — H52.7 REFRACTIVE ERRORS: ICD-10-CM

## 2023-10-17 DIAGNOSIS — Z01.00 ENCOUNTER FOR EYE EXAM: Primary | ICD-10-CM

## 2023-10-17 PROCEDURE — 99999 PR PBB SHADOW E&M-EST. PATIENT-LVL III: ICD-10-PCS | Mod: PBBFAC,HCNC,, | Performed by: OPTOMETRIST

## 2023-10-17 PROCEDURE — 92015 PR REFRACTION: ICD-10-PCS | Mod: HCNC,S$GLB,, | Performed by: OPTOMETRIST

## 2023-10-17 PROCEDURE — 1159F PR MEDICATION LIST DOCUMENTED IN MEDICAL RECORD: ICD-10-PCS | Mod: HCNC,CPTII,S$GLB, | Performed by: OPTOMETRIST

## 2023-10-17 PROCEDURE — 99999 PR PBB SHADOW E&M-EST. PATIENT-LVL III: CPT | Mod: PBBFAC,HCNC,, | Performed by: OPTOMETRIST

## 2023-10-17 PROCEDURE — 4010F ACE/ARB THERAPY RXD/TAKEN: CPT | Mod: HCNC,CPTII,S$GLB, | Performed by: OPTOMETRIST

## 2023-10-17 PROCEDURE — 1159F MED LIST DOCD IN RCRD: CPT | Mod: HCNC,CPTII,S$GLB, | Performed by: OPTOMETRIST

## 2023-10-17 PROCEDURE — 92015 DETERMINE REFRACTIVE STATE: CPT | Mod: HCNC,S$GLB,, | Performed by: OPTOMETRIST

## 2023-10-17 PROCEDURE — 92004 COMPRE OPH EXAM NEW PT 1/>: CPT | Mod: HCNC,S$GLB,, | Performed by: OPTOMETRIST

## 2023-10-17 PROCEDURE — 4010F PR ACE/ARB THEARPY RXD/TAKEN: ICD-10-PCS | Mod: HCNC,CPTII,S$GLB, | Performed by: OPTOMETRIST

## 2023-10-17 PROCEDURE — 92004 PR EYE EXAM, NEW PATIENT,COMPREHESV: ICD-10-PCS | Mod: HCNC,S$GLB,, | Performed by: OPTOMETRIST

## 2023-10-17 NOTE — PROGRESS NOTES
HPI     Multiple Sclerosis     Additional comments: Pt. New to [facility] today. Pt last visit was 12/06/2019.   Pt states her vision is blurry and she has MS dx 12/06/2019. Pt MS took   her vision,speech and mobility to walk. Pt states she's doing much better   now. Pt denies pain.            Comments    1. MS dx 2019          Last edited by Drew Burton on 10/17/2023  1:04 PM.            Assessment /Plan     For exam results, see Encounter Report.    Encounter for eye exam    Refractive errors      No pathology.    Dispense Final Rx for glasses.  RTC 1 year  Discussed above and answered questions.

## 2023-10-18 ENCOUNTER — NURSE TRIAGE (OUTPATIENT)
Dept: ADMINISTRATIVE | Facility: CLINIC | Age: 43
End: 2023-10-18
Payer: MEDICARE

## 2023-10-18 ENCOUNTER — CLINICAL SUPPORT (OUTPATIENT)
Dept: REHABILITATION | Facility: HOSPITAL | Age: 43
End: 2023-10-18
Payer: MEDICARE

## 2023-10-18 DIAGNOSIS — G89.29 CHRONIC PAIN OF LEFT KNEE: ICD-10-CM

## 2023-10-18 DIAGNOSIS — M25.562 CHRONIC PAIN OF LEFT KNEE: ICD-10-CM

## 2023-10-18 DIAGNOSIS — R29.898 WEAKNESS OF LEFT LOWER EXTREMITY: Primary | ICD-10-CM

## 2023-10-18 PROCEDURE — 97116 GAIT TRAINING THERAPY: CPT | Mod: HCNC | Performed by: PHYSICAL THERAPIST

## 2023-10-18 PROCEDURE — 97113 AQUATIC THERAPY/EXERCISES: CPT | Mod: HCNC | Performed by: PHYSICAL THERAPIST

## 2023-10-19 NOTE — TELEPHONE ENCOUNTER
"Pt c/o left shoulder pain 10/10. States that she went to Aqua therapy today and pain has gotten worse. States that pain is not relieved with pain patches or Tylenol extra strength. Pt states that she had a fall a few weeks ago. Advised to go to ED per protocol. VU. Encounter routed to provider.     Reason for Disposition   [1] SEVERE pain AND [2] not improved 2 hours after pain medicine    Additional Information   Negative: Passed out (i.e., lost consciousness, collapsed and was not responding)   Negative: Shock suspected (e.g., cold/pale/clammy skin, too weak to stand, low BP, rapid pulse)   Negative: [1] Similar pain previously AND [2] it was from "heart attack"   Negative: [1] Similar pain previously AND [2] it was from "angina" AND [3] not relieved by nitroglycerin   Negative: Sounds like a life-threatening emergency to the triager   Negative: Difficulty breathing or unusual sweating (e.g., sweating without exertion)   Negative: [1] Pain lasting > 5 minutes AND [2] pain also present in chest  (Exception: Pain is clearly made worse by movement.)   Negative: [1] Age > 40 AND [2] no obvious cause AND [3] pain even when not moving the arm  (Exception: Pain is clearly made worse by moving arm or bending neck.)    Protocols used: Shoulder Pain-A-AH    "

## 2023-10-20 ENCOUNTER — PATIENT MESSAGE (OUTPATIENT)
Dept: INTERNAL MEDICINE | Facility: CLINIC | Age: 43
End: 2023-10-20
Payer: MEDICARE

## 2023-10-20 ENCOUNTER — OFFICE VISIT (OUTPATIENT)
Dept: INTERNAL MEDICINE | Facility: CLINIC | Age: 43
End: 2023-10-20
Payer: MEDICARE

## 2023-10-20 VITALS
WEIGHT: 289.25 LBS | TEMPERATURE: 97 F | DIASTOLIC BLOOD PRESSURE: 77 MMHG | BODY MASS INDEX: 46.69 KG/M2 | SYSTOLIC BLOOD PRESSURE: 145 MMHG | HEART RATE: 92 BPM | OXYGEN SATURATION: 99 %

## 2023-10-20 DIAGNOSIS — R30.0 DYSURIA: ICD-10-CM

## 2023-10-20 DIAGNOSIS — G81.90 HEMIPLEGIA, UNSPECIFIED ETIOLOGY, UNSPECIFIED HEMIPLEGIA TYPE, UNSPECIFIED LATERALITY: ICD-10-CM

## 2023-10-20 DIAGNOSIS — G35 MULTIPLE SCLEROSIS: ICD-10-CM

## 2023-10-20 DIAGNOSIS — G89.29 OTHER CHRONIC PAIN: ICD-10-CM

## 2023-10-20 DIAGNOSIS — F32.A DEPRESSION, UNSPECIFIED DEPRESSION TYPE: Primary | ICD-10-CM

## 2023-10-20 DIAGNOSIS — R32 URINARY INCONTINENCE, UNSPECIFIED TYPE: ICD-10-CM

## 2023-10-20 LAB
BILIRUB SERPL-MCNC: NEGATIVE MG/DL
BLOOD URINE, POC: NORMAL
CLARITY, POC UA: CLEAR
COLOR, POC UA: NORMAL
GLUCOSE UR QL STRIP: NEGATIVE
KETONES UR QL STRIP: NEGATIVE
LEUKOCYTE ESTERASE URINE, POC: NEGATIVE
NITRITE, POC UA: NEGATIVE
PH, POC UA: 6
PROTEIN, POC: NORMAL
SPECIFIC GRAVITY, POC UA: 1.02
UROBILINOGEN, POC UA: NEGATIVE

## 2023-10-20 PROCEDURE — 3008F BODY MASS INDEX DOCD: CPT | Mod: HCNC,CPTII,S$GLB, | Performed by: FAMILY MEDICINE

## 2023-10-20 PROCEDURE — 3078F PR MOST RECENT DIASTOLIC BLOOD PRESSURE < 80 MM HG: ICD-10-PCS | Mod: HCNC,CPTII,S$GLB, | Performed by: FAMILY MEDICINE

## 2023-10-20 PROCEDURE — 99999 PR PBB SHADOW E&M-EST. PATIENT-LVL III: ICD-10-PCS | Mod: PBBFAC,HCNC,, | Performed by: FAMILY MEDICINE

## 2023-10-20 PROCEDURE — 1159F MED LIST DOCD IN RCRD: CPT | Mod: HCNC,CPTII,S$GLB, | Performed by: FAMILY MEDICINE

## 2023-10-20 PROCEDURE — 99214 OFFICE O/P EST MOD 30 MIN: CPT | Mod: HCNC,S$GLB,, | Performed by: FAMILY MEDICINE

## 2023-10-20 PROCEDURE — 3008F PR BODY MASS INDEX (BMI) DOCUMENTED: ICD-10-PCS | Mod: HCNC,CPTII,S$GLB, | Performed by: FAMILY MEDICINE

## 2023-10-20 PROCEDURE — 4010F ACE/ARB THERAPY RXD/TAKEN: CPT | Mod: HCNC,CPTII,S$GLB, | Performed by: FAMILY MEDICINE

## 2023-10-20 PROCEDURE — 3078F DIAST BP <80 MM HG: CPT | Mod: HCNC,CPTII,S$GLB, | Performed by: FAMILY MEDICINE

## 2023-10-20 PROCEDURE — 99214 PR OFFICE/OUTPT VISIT, EST, LEVL IV, 30-39 MIN: ICD-10-PCS | Mod: HCNC,S$GLB,, | Performed by: FAMILY MEDICINE

## 2023-10-20 PROCEDURE — 81002 POCT URINE DIPSTICK WITHOUT MICROSCOPE: ICD-10-PCS | Mod: HCNC,S$GLB,, | Performed by: FAMILY MEDICINE

## 2023-10-20 PROCEDURE — 87086 URINE CULTURE/COLONY COUNT: CPT | Mod: HCNC | Performed by: FAMILY MEDICINE

## 2023-10-20 PROCEDURE — 99999 PR PBB SHADOW E&M-EST. PATIENT-LVL III: CPT | Mod: PBBFAC,HCNC,, | Performed by: FAMILY MEDICINE

## 2023-10-20 PROCEDURE — 3077F PR MOST RECENT SYSTOLIC BLOOD PRESSURE >= 140 MM HG: ICD-10-PCS | Mod: HCNC,CPTII,S$GLB, | Performed by: FAMILY MEDICINE

## 2023-10-20 PROCEDURE — 3077F SYST BP >= 140 MM HG: CPT | Mod: HCNC,CPTII,S$GLB, | Performed by: FAMILY MEDICINE

## 2023-10-20 PROCEDURE — 81002 URINALYSIS NONAUTO W/O SCOPE: CPT | Mod: HCNC,S$GLB,, | Performed by: FAMILY MEDICINE

## 2023-10-20 PROCEDURE — 4010F PR ACE/ARB THEARPY RXD/TAKEN: ICD-10-PCS | Mod: HCNC,CPTII,S$GLB, | Performed by: FAMILY MEDICINE

## 2023-10-20 PROCEDURE — 1159F PR MEDICATION LIST DOCUMENTED IN MEDICAL RECORD: ICD-10-PCS | Mod: HCNC,CPTII,S$GLB, | Performed by: FAMILY MEDICINE

## 2023-10-20 RX ORDER — LIOTHYRONINE SODIUM 50 UG/1
50 TABLET ORAL DAILY
Qty: 30 TABLET | Refills: 1 | Status: SHIPPED | OUTPATIENT
Start: 2023-10-20 | End: 2024-01-25

## 2023-10-20 RX ORDER — AMITRIPTYLINE HYDROCHLORIDE 50 MG/1
50 TABLET, FILM COATED ORAL NIGHTLY
Qty: 30 TABLET | Refills: 1 | Status: SHIPPED | OUTPATIENT
Start: 2023-10-20 | End: 2023-12-19 | Stop reason: CLARIF

## 2023-10-20 RX ORDER — SEMAGLUTIDE 0.25 MG/.5ML
0.25 INJECTION, SOLUTION SUBCUTANEOUS
Qty: 3 ML | Refills: 1 | Status: SHIPPED | OUTPATIENT
Start: 2023-10-20 | End: 2023-12-19

## 2023-10-20 NOTE — PROGRESS NOTES
Subjective:      Patient ID: Alicia Soliz is a 43 y.o. female.    Chief Complaint: Depression and Pain      Patient reports increased depression secondary to feeling hopeless about her MS and chronic pain. Doesn't feel that any of her doctors are helping her.   Still having pain in right ear, keep cotton ball in the ear because it continually drains bad smelling liquid - had seen ENT but had not seen them since May.  Reports also recent urinary frequency      Review of Systems   Constitutional:  Positive for activity change and appetite change.   HENT:  Positive for ear discharge and ear pain. Negative for hearing loss.    Genitourinary:  Positive for frequency and urgency.   Psychiatric/Behavioral:  Positive for agitation, decreased concentration, dysphoric mood and sleep disturbance. Negative for self-injury and suicidal ideas. The patient is nervous/anxious.      Past Medical History:   Diagnosis Date    Allergy     Anemia     Arthritis     Cardiac arrest as complication of care     pt states she went into cardiac arrest from an allergic reaction to a medication    Depression     Encounter for blood transfusion     GERD (gastroesophageal reflux disease)     Hemiplegia due to old stroke     Hypertension     Morbid obesity with BMI of 45.0-49.9, adult 09/20/2018    Multiple sclerosis     Neuromuscular disorder           Past Surgical History:   Procedure Laterality Date    APPENDECTOMY      BREAST SURGERY      CHOLECYSTECTOMY      COLONOSCOPY N/A 11/25/2020    Procedure: COLONOSCOPY;  Surgeon: Andrew Jenkins III, MD;  Location: [facility] ENDO;  Service: Endoscopy;  Laterality: N/A;    ESOPHAGOGASTRODUODENOSCOPY N/A 02/19/2020    Procedure: EGD (ESOPHAGOGASTRODUODENOSCOPY);  Surgeon: Michael Navarrete MD;  Location: [facility] ENDO;  Service: Endoscopy;  Laterality: N/A;    ESOPHAGOGASTRODUODENOSCOPY N/A 02/20/2020    Procedure: EGD (ESOPHAGOGASTRODUODENOSCOPY);  Surgeon: Michael Navarrete MD;  Location: [facility] OR;  Service:  General;  Laterality: N/A;    ESOPHAGOGASTRODUODENOSCOPY N/A 11/25/2020    Procedure: EGD (ESOPHAGOGASTRODUODENOSCOPY);  Surgeon: Andrew Jenkins III, MD;  Location: [facility];  Service: Endoscopy;  Laterality: N/A;    ESOPHAGOGASTRODUODENOSCOPY N/A 9/25/2023    Procedure: EGD (ESOPHAGOGASTRODUODENOSCOPY);  Surgeon: Ly Kim MD;  Location: [facility];  Service: Endoscopy;  Laterality: N/A;    HYSTERECTOMY      KNEE SURGERY      ROBOT-ASSISTED LAPAROSCOPIC SLEEVE GASTRECTOMY USING DA ANTHONY XI N/A 02/20/2020    Procedure: XI ROBOTIC SLEEVE GASTRECTOMY;  Surgeon: Michael Navarrete MD;  Location: [facility] OR;  Service: General;  Laterality: N/A;    TENOPLASTY OF HAND Left 08/26/2021    Procedure: REPAIR, TENDON, HAND;  Surgeon: Joselito Lugo MD;  Location: [facility] OR;  Service: Orthopedics;  Laterality: Left;  Left RCL PIP Joint Repair/Recon with Arthrex Internal Brace.    TONSILLECTOMY      TOTAL REDUCTION MAMMOPLASTY  2022    TUBAL LIGATION       Family History   Problem Relation Age of Onset    Lupus Mother     Heart disease Mother     Hypertension Mother     Diabetes Father     Kidney disease Father     No Known Problems Sister     No Known Problems Sister     No Known Problems Brother     No Known Problems Brother     No Known Problems Daughter     No Known Problems Daughter     No Known Problems Daughter     Cancer Maternal Aunt 40        breast    Breast cancer Maternal Aunt     Diabetes Maternal Aunt     COPD Maternal Aunt     Heart disease Maternal Grandmother     Breast cancer Maternal Cousin     Ovarian cancer Maternal Cousin      Social History     Socioeconomic History    Marital status: Single    Number of children: 3   Occupational History     Employer: [employer]   Tobacco Use    Smoking status: Never     Passive exposure: Never    Smokeless tobacco: Never   Substance and Sexual Activity    Alcohol use: Yes     Comment: once a year     Drug use: No    Sexual activity: Yes     Partners: Female     Birth  "control/protection: Surgical     Social Determinants of Health     Financial Resource Strain: Low Risk  (10/16/2023)    Overall Financial Resource Strain (CARDIA)     Difficulty of Paying Living Expenses: Not very hard   Recent Concern: Financial Resource Strain - Medium Risk (9/13/2023)    Overall Financial Resource Strain (CARDIA)     Difficulty of Paying Living Expenses: Somewhat hard   Food Insecurity: Food Insecurity Present (10/16/2023)    Hunger Vital Sign     Worried About Running Out of Food in the Last Year: Sometimes true     Ran Out of Food in the Last Year: Sometimes true   Transportation Needs: Unmet Transportation Needs (10/16/2023)    PRAPARE - Transportation     Lack of Transportation (Medical): Yes     Lack of Transportation (Non-Medical): Yes   Physical Activity: Insufficiently Active (10/16/2023)    Exercise Vital Sign     Days of Exercise per Week: 4 days     Minutes of Exercise per Session: 10 min   Stress: No Stress Concern Present (10/16/2023)    [employer] [employer] - Occupational Stress Questionnaire     Feeling of Stress : Only a little   Social Connections: Unknown (10/16/2023)    Social Connection and Isolation Panel [NHANES]     Frequency of Communication with Friends and Family: Three times a week     Frequency of Social Gatherings with Friends and Family: Three times a week     Active Member of Clubs or Organizations: Yes     Attends Club or Organization Meetings: 1 to 4 times per year     Marital Status:    Housing Stability: Low Risk  (10/16/2023)    Housing Stability Vital Sign     Unable to Pay for Housing in the Last Year: No     Number of Places Lived in the Last Year: 1     Unstable Housing in the Last Year: No     Review of patient's allergies indicates:   Allergen Reactions    Demerol [meperidine] Anaphylaxis     Other reaction(s): Itching    Dilaudid [hydromorphone (bulk)] Anaphylaxis     "coded" per pt  Patient can tolerate Lortab    Shellfish " containing products Itching, Nausea And Vomiting and Swelling    Prednisone Itching     Other reaction(s): Itching    Tramadol      shaking       Objective:       BP (!) 145/77 (BP Location: Right arm, Patient Position: Sitting, BP Method: Large (Automatic))   Pulse 92   Temp 97.3 °F (36.3 °C) (Tympanic)   Wt 131.2 kg (289 lb 3.9 oz)   SpO2 99%   BMI 46.69 kg/m²   Physical Exam  Constitutional:       General: She is not in acute distress.     Appearance: Normal appearance. She is well-developed. She is not ill-appearing or diaphoretic.   HENT:      Right Ear: Hearing and tympanic membrane normal. No drainage (canal erythematous).      Left Ear: Hearing, tympanic membrane, ear canal and external ear normal.   Cardiovascular:      Rate and Rhythm: Normal rate and regular rhythm.      Heart sounds: Normal heart sounds.   Pulmonary:      Effort: Pulmonary effort is normal.      Breath sounds: Normal breath sounds.   Neurological:      Mental Status: She is alert and oriented to person, place, and time.   Psychiatric:         Mood and Affect: Mood is depressed. Affect is tearful.         Speech: Speech normal.         Behavior: Behavior normal.         Thought Content: Thought content normal. Thought content is not paranoid or delusional. Thought content does not include homicidal or suicidal ideation.         Cognition and Memory: Cognition normal.         Judgment: Judgment normal.             10/20/2023     1:39 PM 9/7/2023     2:05 PM 2/16/2023     3:50 PM 2/16/2023     3:46 PM 12/22/2019     3:59 PM 11/8/2018    10:31 AM   Depression Patient Health Questionnaire   Over the last two weeks how often have you been bothered by little interest or pleasure in doing things Not at all Not at all Not at all Not at all Not at all Not at all   Over the last two weeks how often have you been bothered by feeling down, depressed or hopeless Nearly every day Not at all Not at all Not at all Not at all More than half the days    PHQ-2 Total Score 3 0 0 0 0 2   Over the last two weeks how often have you been bothered by trouble falling or staying asleep, or sleeping too much Nearly every day    Not at all    Over the last two weeks how often have you been bothered by feeling tired or having little energy Nearly every day    Several days    Over the last two weeks how often have you been bothered by a poor appetite or overeating Nearly every day    Not at all    Over the last two weeks how often have you been bothered by feeling bad about yourself - or that you are a failure or have let yourself or your family down Not at all    Not at all    Over the last two weeks how often have you been bothered by trouble concentrating on things, such as reading the newspaper or watching television Not at all    Not at all    Over the last two weeks how often have you been bothered by moving or speaking so slowly that other people could have noticed. Or the opposite - being so fidgety or restless that you have been moving around a lot more than usual. Not at all    Not at all    Over the last two weeks how often have you been bothered by thoughts that you would be better off dead, or of hurting yourself Not at all    Not at all    If you checked off any problems, how difficult have these problems made it for you to do your work, take care of things at home or get along with other people? Very difficult    Not difficult at all    PHQ-9 Score 12    1    PHQ-9 Interpretation Moderate             Assessment:     1. Depression, unspecified depression type    2. Other chronic pain    3. Urinary incontinence, unspecified type    4. BMI 45.0-49.9, adult    5. Multiple sclerosis    6. Hemiplegia, unspecified etiology, unspecified hemiplegia type, unspecified laterality    7. Dysuria      Plan:   Depression, unspecified depression type    Other chronic pain    Urinary incontinence, unspecified type  -     Urine culture; Future; Expected date: 10/20/2023  -      POCT urine dipstick without microscope    BMI 45.0-49.9, adult  -     semaglutide, weight loss, (WEGOVY) 0.25 mg/0.5 mL PnIj; Inject 0.25 mg into the skin every 7 days.  Dispense: 3 mL; Refill: 1    Multiple sclerosis  -     WALKER FOR HOME USE    Hemiplegia, unspecified etiology, unspecified hemiplegia type, unspecified laterality  -     WALKER FOR HOME USE    Dysuria  -     Urine culture    Other orders  -     liothyronine (CYTOMEL) 50 MCG Tab; Take 1 tablet (50 mcg total) by mouth once daily.  Dispense: 30 tablet; Refill: 1  -     amitriptyline (ELAVIL) 50 MG tablet; Take 1 tablet (50 mg total) by mouth every evening.  Dispense: 30 tablet; Refill: 1    Urine dip does not indicate infection, will follow culture results.  Continue all other current meds  Follow up with me 3 weeks  Will reschedule with ENT  Follow up with her pain management and ortho     Medication List with Changes/Refills   New Medications    AMITRIPTYLINE (ELAVIL) 50 MG TABLET    Take 1 tablet (50 mg total) by mouth every evening.    LIOTHYRONINE (CYTOMEL) 50 MCG TAB    Take 1 tablet (50 mcg total) by mouth once daily.   Current Medications    CHOLECALCIFEROL, VITAMIN D3, 1,250 MCG (50,000 UNIT) CAPSULE    Take 1 capsule (50,000 Units total) by mouth every 7 days. for 365 doses    DICLOFENAC SODIUM (VOLTAREN) 1 % GEL    Apply 2 g topically 4 (four) times daily.    DULOXETINE (CYMBALTA) 60 MG CAPSULE    Take 1 capsule (60 mg total) by mouth once daily.    ESOMEPRAZOLE (NEXIUM) 40 MG CAPSULE    Take 1 capsule (40 mg total) by mouth before breakfast.    GABAPENTIN (NEURONTIN) 300 MG CAPSULE    Take 3 capsules (900 mg total) by mouth 2 (two) times daily.    HYDROCODONE-ACETAMINOPHEN (NORCO)  MG PER TABLET    Take 1 tablet by mouth 3 (three) times daily.    LIDOCAINE (LIDODERM) 5 %    Place 1 patch onto the skin once daily. Remove & Discard patch within 12 hours or as directed by MD    LINACLOTIDE (LINZESS) 145 MCG CAP CAPSULE    Take 1 capsule  (145 mcg total) by mouth before breakfast.    MUPIROCIN (BACTROBAN) 2 % OINTMENT    APPLY TOPICALLY TWICE A DAY    NABUMETONE (RELAFEN) 750 MG TABLET    Take 750 mg by mouth 2 (two) times daily as needed.    NEOMYCIN-POLYMYXIN-HYDROCORTISONE (CORTISPORIN) 3.5-10,000-1 MG/ML-UNIT/ML-% OTIC SUSPENSION    Place 3 drops into the right ear 3 (three) times daily.    OCREVUS 30 MG/ML SOLN        ONDANSETRON (ZOFRAN) 8 MG TABLET    TAKE 1 TABLET (8 MG TOTAL) BY MOUTH 2 (TWO) TIMES DAILY.    ONDANSETRON (ZOFRAN) 8 MG TABLET    Take 1 tablet (8 mg total) by mouth 2 (two) times daily.    TIZANIDINE (ZANAFLEX) 4 MG TABLET    Take 1 tablet (4 mg total) by mouth every 8 (eight) hours as needed (muscle craps).    TOPIRAMATE (TOPAMAX) 50 MG TABLET    TAKE 1 TABLET BY MOUTH IN THE MORNING AND 2 TABLETS AT BEDTIME    TRIAMCINOLONE ACETONIDE 0.1% (KENALOG) 0.1 % OINTMENT    APPLY TO AFFECTED AREA TWICE A DAY    VALSARTAN (DIOVAN) 80 MG TABLET    Take 1 tablet (80 mg total) by mouth once daily.   Changed and/or Refilled Medications    Modified Medication Previous Medication    SEMAGLUTIDE, WEIGHT LOSS, (WEGOVY) 0.25 MG/0.5 ML PNIJ semaglutide, weight loss, (WEGOVY) 0.25 mg/0.5 mL PnIj       Inject 0.25 mg into the skin every 7 days.    Inject 0.25 mg into the skin every 7 days.   Discontinued Medications    DOXEPIN (SINEQUAN) 25 MG CAPSULE    TAKE 1 CAPSULE (25 MG TOTAL) BY MOUTH NIGHTLY AS NEEDED.    DOXEPIN (SINEQUAN) 75 MG CAPSULE    Take 75 mg by mouth.

## 2023-10-22 LAB — BACTERIA UR CULT: NORMAL

## 2023-10-25 RX ORDER — OXYBUTYNIN CHLORIDE 5 MG/1
TABLET, EXTENDED RELEASE ORAL
Qty: 67 TABLET | Refills: 0 | Status: SHIPPED | OUTPATIENT
Start: 2023-10-25 | End: 2024-01-25

## 2023-10-27 ENCOUNTER — HOSPITAL ENCOUNTER (OUTPATIENT)
Dept: RADIOLOGY | Facility: HOSPITAL | Age: 43
Discharge: HOME | End: 2023-10-27
Attending: PHYSICIAN ASSISTANT
Payer: MEDICARE

## 2023-10-27 ENCOUNTER — OFFICE VISIT (OUTPATIENT)
Dept: SPORTS MEDICINE | Facility: CLINIC | Age: 43
End: 2023-10-27
Payer: MEDICARE

## 2023-10-27 VITALS — WEIGHT: 289.25 LBS | HEIGHT: 66 IN | BODY MASS INDEX: 46.49 KG/M2

## 2023-10-27 DIAGNOSIS — M25.512 LEFT SHOULDER PAIN, UNSPECIFIED CHRONICITY: ICD-10-CM

## 2023-10-27 DIAGNOSIS — M67.912 TENDINOPATHY OF ROTATOR CUFF, LEFT: ICD-10-CM

## 2023-10-27 DIAGNOSIS — M75.02 ADHESIVE CAPSULITIS OF LEFT SHOULDER: Primary | ICD-10-CM

## 2023-10-27 PROCEDURE — 1160F RVW MEDS BY RX/DR IN RCRD: CPT | Mod: HCNC,CPTII,S$GLB, | Performed by: PHYSICIAN ASSISTANT

## 2023-10-27 PROCEDURE — 4010F ACE/ARB THERAPY RXD/TAKEN: CPT | Mod: HCNC,CPTII,S$GLB, | Performed by: PHYSICIAN ASSISTANT

## 2023-10-27 PROCEDURE — 99213 OFFICE O/P EST LOW 20 MIN: CPT | Mod: HCNC,25,S$GLB, | Performed by: PHYSICIAN ASSISTANT

## 2023-10-27 PROCEDURE — 3008F PR BODY MASS INDEX (BMI) DOCUMENTED: ICD-10-PCS | Mod: HCNC,CPTII,S$GLB, | Performed by: PHYSICIAN ASSISTANT

## 2023-10-27 PROCEDURE — 20610 DRAIN/INJ JOINT/BURSA W/O US: CPT | Mod: HCNC,LT,S$GLB, | Performed by: PHYSICIAN ASSISTANT

## 2023-10-27 PROCEDURE — 73030 X-RAY EXAM OF SHOULDER: CPT | Mod: 26,HCNC,LT, | Performed by: RADIOLOGY

## 2023-10-27 PROCEDURE — 1159F MED LIST DOCD IN RCRD: CPT | Mod: HCNC,CPTII,S$GLB, | Performed by: PHYSICIAN ASSISTANT

## 2023-10-27 PROCEDURE — 3008F BODY MASS INDEX DOCD: CPT | Mod: HCNC,CPTII,S$GLB, | Performed by: PHYSICIAN ASSISTANT

## 2023-10-27 PROCEDURE — 99213 PR OFFICE/OUTPT VISIT, EST, LEVL III, 20-29 MIN: ICD-10-PCS | Mod: HCNC,25,S$GLB, | Performed by: PHYSICIAN ASSISTANT

## 2023-10-27 PROCEDURE — 1159F PR MEDICATION LIST DOCUMENTED IN MEDICAL RECORD: ICD-10-PCS | Mod: HCNC,CPTII,S$GLB, | Performed by: PHYSICIAN ASSISTANT

## 2023-10-27 PROCEDURE — 99999 PR PBB SHADOW E&M-EST. PATIENT-LVL IV: ICD-10-PCS | Mod: PBBFAC,HCNC,, | Performed by: PHYSICIAN ASSISTANT

## 2023-10-27 PROCEDURE — 20610 LARGE JOINT ASPIRATION/INJECTION: L GLENOHUMERAL: ICD-10-PCS | Mod: HCNC,LT,S$GLB, | Performed by: PHYSICIAN ASSISTANT

## 2023-10-27 PROCEDURE — 4010F PR ACE/ARB THEARPY RXD/TAKEN: ICD-10-PCS | Mod: HCNC,CPTII,S$GLB, | Performed by: PHYSICIAN ASSISTANT

## 2023-10-27 PROCEDURE — 73030 X-RAY EXAM OF SHOULDER: CPT | Mod: TC,HCNC,LT

## 2023-10-27 PROCEDURE — 73030 XR SHOULDER COMPLETE 2 OR MORE VIEWS LEFT: ICD-10-PCS | Mod: 26,HCNC,LT, | Performed by: RADIOLOGY

## 2023-10-27 PROCEDURE — 1160F PR REVIEW ALL MEDS BY PRESCRIBER/CLIN PHARMACIST DOCUMENTED: ICD-10-PCS | Mod: HCNC,CPTII,S$GLB, | Performed by: PHYSICIAN ASSISTANT

## 2023-10-27 PROCEDURE — 99999 PR PBB SHADOW E&M-EST. PATIENT-LVL IV: CPT | Mod: PBBFAC,HCNC,, | Performed by: PHYSICIAN ASSISTANT

## 2023-10-27 RX ORDER — TRIAMCINOLONE ACETONIDE 40 MG/ML
60 INJECTION, SUSPENSION INTRA-ARTICULAR; INTRAMUSCULAR
Status: DISCONTINUED | OUTPATIENT
Start: 2023-10-27 | End: 2023-10-27 | Stop reason: HOSPADM

## 2023-10-27 RX ORDER — TIZANIDINE 4 MG/1
4 TABLET ORAL EVERY 6 HOURS PRN
Qty: 40 TABLET | Refills: 1 | Status: SHIPPED | OUTPATIENT
Start: 2023-10-27 | End: 2024-01-25

## 2023-10-27 RX ADMIN — TRIAMCINOLONE ACETONIDE 60 MG: 40 INJECTION, SUSPENSION INTRA-ARTICULAR; INTRAMUSCULAR at 10:10

## 2023-10-27 NOTE — PATIENT INSTRUCTIONS
"Frozen Shoulder    This information does not include all information related to frozen shoulder. For more information please visit the [employer] website using the following link: Frozen Shoulder    Frozen shoulder, also called adhesive capsulitis, causes pain and stiffness in the shoulder. Over time, the shoulder becomes very hard to move. After a period of worsening symptoms, frozen shoulder tends to get better, although full recovery may take up to 3 years. Physical therapy, with a focus on shoulder flexibility, is the primary treatment recommendation for frozen shoulder. Frozen shoulder most commonly affects people between the ages of 40 and 60, and occurs in women more often than men. In addition, people with diabetes or thyroid disorders are at an increased risk for developing frozen shoulder.     Anatomy  Your shoulder is a ball-and-socket joint made up of three bones: your upper arm bone (humerus), your shoulder blade (scapula), and your collarbone (clavicle).The head of the upper arm bone fits into a shallow socket in your shoulder blade. Strong connective tissue, called the shoulder capsule, surrounds the joint. To help your shoulder move more easily, synovial fluid lubricates the shoulder capsule and the joint.    Description  In frozen shoulder, the shoulder capsule thickens and becomes stiff and tight. Thick bands of tissue -- called adhesions -- develop. In many cases, there is less synovial fluid in the joint. The hallmark signs of this condition are severe pain and being unable to move your shoulder -- either on your own or with the help of someone else. It develops in three stages:    Stage 1: Freezing  In the "freezing" stage, you slowly have more and more pain. As the pain worsens, your shoulder loses range of motion. Freezing typically lasts from 6 weeks to 9 months.    Stage 2: Frozen  Painful symptoms may actually improve during this stage, but the stiffness " "remains. During the 4 to 6 months of the "frozen" stage, daily activities may be very difficult.    Stage 3: Thawing  Shoulder motion slowly improves during the "thawing" stage. Complete return to normal or close to normal strength and motion typically takes from 6 months to 2 years.      Cause  The causes of frozen shoulder are not fully understood. There is no clear connection to arm dominance or occupation. A few factors may put you more at risk for developing frozen shoulder.    Diabetes: Frozen shoulder occurs much more often in people with diabetes. The reason for this is not known. In addition, diabetic patients with frozen shoulder tend to have a greater degree of stiffness that continues for a longer time before "thawing."  Other diseases: Some additional medical problems associated with frozen shoulder include hypothyroidism, hyperthyroidism, Parkinson's disease, and cardiac disease.  Immobilization: Frozen shoulder can develop after a shoulder has been immobilized for a period of time due to surgery, a fracture, or other injury. Having patients move their shoulders soon after injury or surgery is one measure prescribed to prevent frozen shoulder.    Symptoms  Pain from frozen shoulder is usually dull or aching. It is typically worse early in the course of the disease and when you move your arm. The pain is usually located over the outer shoulder area and sometimes the upper arm.    Imaging Tests  Other tests that may help your doctor rule out other causes of stiffness and pain include:    X-rays: Dense structures, such as bone, show up clearly on x-rays. X-rays may show other problems in your shoulder, such as arthritis, but will not be able to definitively diagnosis frozen shoulder.   Magnetic resonance imaging (MRI) and ultrasound: These studies can create better images of soft tissues. They are not required to diagnose frozen shoulder, however, they may help to identify other problems in your shoulder, " "such as a torn rotator cuff.    Treatment  Frozen shoulder generally gets better over time, although it may take up to 3 years. The focus of treatment is to control pain and restore motion and strength through physical therapy.    Nonsurgical Treatment  Most people with frozen shoulder improve with relatively simple treatments to control pain and restore motion.    Non-steroidal anti-inflammatory medicines (NSAIDs): Drugs like aspirin, ibuprofen, and naproxyn (Aleve) reduce pain and swelling.  Steroid injections: Cortisone is a powerful anti-inflammatory medicine that is injected directly into your shoulder joint to help reduce the inflammatory response that is causing the frozen shoulder  Hydrodilatation: If your symptoms are not relieved by other nonsurgical methods, your doctor may recommend hydrodilatation. This procedure involves gently injecting a large volume of sterile fluid into the shoulder joint to expand and stretch the shoulder joint capsule and break up the adhesions that have developed. Hydrodilatation is conducted by a radiologist or doctor who uses imaging to guide the placement of fluid into the shoulder joint  Physical therapy: Specific exercises will help restore motion. These may be done under the supervision of a physical therapist or via a home program. Therapy includes stretching or range of motion exercises for the shoulder. Sometimes heat is used to help loosen the shoulder up before stretching.     Surgical Treatment  If your symptoms are not relieved by therapy and other conservative methods, you and your doctor may discuss surgery. It is important to talk with your doctor about your potential for recovery continuing with simple treatments, and the risks involved with surgery. Surgery for frozen shoulder is typically offered during "Stage 2: Frozen." The goal of surgery is to stretch and release the stiffened joint capsule. The most common methods include manipulation under anesthesia and " shoulder arthroscopy.    Manipulation under anesthesia (MELECIO): During this procedure, you are put to sleep. Your doctor will force your shoulder to move which causes the capsule and scar tissue to stretch or tear. This releases the tightening and increases range of motion.  Shoulder arthroscopy with lysis of adhesions: In this procedure, your doctor will cut through tight portions of the joint capsule. This is done using pencil-sized instruments inserted through small incisions around your shoulder.    In many cases, manipulation and arthroscopy are used in combination to obtain maximum results. Most patients have good outcomes with these procedures.    Recovery  After surgery, physical therapy is necessary to maintain the motion that was achieved with surgery. Recovery times vary, from 6 weeks to 3 months. Although it is a slow process, your commitment to therapy is the most important factor in returning to all the activities you enjoy.    Long-term outcomes after surgery are generally good, with most patients having reduced or no pain and improved range of motion. In some cases, however, even after several years, the motion does not return completely and some degree of stiffness remains. Diabetic patients often have some degree of continued shoulder stiffness after surgery.    Although uncommon, frozen shoulder can recur, especially if a contributing factor like diabetes is still present.    Links  Frozen Shoulder

## 2023-10-27 NOTE — PROGRESS NOTES
Orthopaedics Sports Medicine     Shoulder Initial Visit         10/27/2023    Referring MD: No ref. provider found    Chief Complaint   Patient presents with    Left Shoulder - Pain         History of Present Illness:   Alicia Soliz is a 43 y.o. left-hand dominant female who presents with LEFT shoulder pain and dysfunction.    Onset of the symptoms was several years ago, has been worsening over the last several months     Inciting event: no acute injury or trauma     Current symptoms include ant and posterior shoulder pain, limited ROM, night pain      Pain is aggravated by any movement of the shoulder       Evaluation to date: X-Ray     Treatment to date: Rest, activity modification, oral anti-inflammatories, tylenol    Past Medical History:   Past Medical History:   Diagnosis Date    Allergy     Anemia     Arthritis     Cardiac arrest as complication of care     pt states she went into cardiac arrest from an allergic reaction to a medication    Depression     Encounter for blood transfusion     GERD (gastroesophageal reflux disease)     Hemiplegia due to old stroke     Hypertension     Morbid obesity with BMI of 45.0-49.9, adult 09/20/2018    Multiple sclerosis     Neuromuscular disorder        Past Surgical History:   Past Surgical History:   Procedure Laterality Date    APPENDECTOMY      BREAST SURGERY      CHOLECYSTECTOMY      COLONOSCOPY N/A 11/25/2020    Procedure: COLONOSCOPY;  Surgeon: Andrew Jenkins III, MD;  Location: [facility];  Service: Endoscopy;  Laterality: N/A;    ESOPHAGOGASTRODUODENOSCOPY N/A 02/19/2020    Procedure: EGD (ESOPHAGOGASTRODUODENOSCOPY);  Surgeon: Michael Navarrete MD;  Location: [facility];  Service: Endoscopy;  Laterality: N/A;    ESOPHAGOGASTRODUODENOSCOPY N/A 02/20/2020    Procedure: EGD (ESOPHAGOGASTRODUODENOSCOPY);  Surgeon: Michael Navarrete MD;  Location: [facility];  Service: General;  Laterality: N/A;    ESOPHAGOGASTRODUODENOSCOPY N/A 11/25/2020    Procedure: EGD  (ESOPHAGOGASTRODUODENOSCOPY);  Surgeon: Andrew Jenkins III, MD;  Location: [facility] ENDO;  Service: Endoscopy;  Laterality: N/A;    ESOPHAGOGASTRODUODENOSCOPY N/A 9/25/2023    Procedure: EGD (ESOPHAGOGASTRODUODENOSCOPY);  Surgeon: Ly Kim MD;  Location: [facility] ENDO;  Service: Endoscopy;  Laterality: N/A;    HYSTERECTOMY      KNEE SURGERY      ROBOT-ASSISTED LAPAROSCOPIC SLEEVE GASTRECTOMY USING DA ANTHONY XI N/A 02/20/2020    Procedure: XI ROBOTIC SLEEVE GASTRECTOMY;  Surgeon: Michael Navarrete MD;  Location: [facility] OR;  Service: General;  Laterality: N/A;    TENOPLASTY OF HAND Left 08/26/2021    Procedure: REPAIR, TENDON, HAND;  Surgeon: Joselito Lugo MD;  Location: [facility] OR;  Service: Orthopedics;  Laterality: Left;  Left RCL PIP Joint Repair/Recon with Arthrex Internal Brace.    TONSILLECTOMY      TOTAL REDUCTION MAMMOPLASTY  2022    TUBAL LIGATION         Medications:  Patient's Medications   New Prescriptions    TIZANIDINE (ZANAFLEX) 4 MG TABLET    Take 1 tablet (4 mg total) by mouth every 6 (six) hours as needed (muscle spasms).   Previous Medications    AMITRIPTYLINE (ELAVIL) 50 MG TABLET    Take 1 tablet (50 mg total) by mouth every evening.    CHOLECALCIFEROL, VITAMIN D3, 1,250 MCG (50,000 UNIT) CAPSULE    Take 1 capsule (50,000 Units total) by mouth every 7 days. for 365 doses    DICLOFENAC SODIUM (VOLTAREN) 1 % GEL    Apply 2 g topically 4 (four) times daily.    DULOXETINE (CYMBALTA) 60 MG CAPSULE    Take 1 capsule (60 mg total) by mouth once daily.    ESOMEPRAZOLE (NEXIUM) 40 MG CAPSULE    Take 1 capsule (40 mg total) by mouth before breakfast.    GABAPENTIN (NEURONTIN) 300 MG CAPSULE    Take 3 capsules (900 mg total) by mouth 2 (two) times daily.    HYDROCODONE-ACETAMINOPHEN (NORCO)  MG PER TABLET    Take 1 tablet by mouth 3 (three) times daily.    LIDOCAINE (LIDODERM) 5 %    Place 1 patch onto the skin once daily. Remove & Discard patch within 12 hours or as directed by MD MOTA  "(LINZESS) 145 MCG CAP CAPSULE    Take 1 capsule (145 mcg total) by mouth before breakfast.    LIOTHYRONINE (CYTOMEL) 50 MCG TAB    Take 1 tablet (50 mcg total) by mouth once daily.    MUPIROCIN (BACTROBAN) 2 % OINTMENT    APPLY TOPICALLY TWICE A DAY    NABUMETONE (RELAFEN) 750 MG TABLET    Take 750 mg by mouth 2 (two) times daily as needed.    NEOMYCIN-POLYMYXIN-HYDROCORTISONE (CORTISPORIN) 3.5-10,000-1 MG/ML-UNIT/ML-% OTIC SUSPENSION    Place 3 drops into the right ear 3 (three) times daily.    OCREVUS 30 MG/ML SOLN        ONDANSETRON (ZOFRAN) 8 MG TABLET    TAKE 1 TABLET (8 MG TOTAL) BY MOUTH 2 (TWO) TIMES DAILY.    ONDANSETRON (ZOFRAN) 8 MG TABLET    Take 1 tablet (8 mg total) by mouth 2 (two) times daily.    OXYBUTYNIN (DITROPAN-XL) 5 MG TR24    Take 1 tablet (5 mg total) by mouth once daily for 7 days, THEN 2 tablets (10 mg total) once daily.    SEMAGLUTIDE, WEIGHT LOSS, (WEGOVY) 0.25 MG/0.5 ML PNIJ    Inject 0.25 mg into the skin every 7 days.    TOPIRAMATE (TOPAMAX) 50 MG TABLET    TAKE 1 TABLET BY MOUTH IN THE MORNING AND 2 TABLETS AT BEDTIME    TRIAMCINOLONE ACETONIDE 0.1% (KENALOG) 0.1 % OINTMENT    APPLY TO AFFECTED AREA TWICE A DAY    VALSARTAN (DIOVAN) 80 MG TABLET    Take 1 tablet (80 mg total) by mouth once daily.   Modified Medications    No medications on file   Discontinued Medications    TIZANIDINE (ZANAFLEX) 4 MG TABLET    Take 1 tablet (4 mg total) by mouth every 8 (eight) hours as needed (muscle craps).       Allergies:   Review of patient's allergies indicates:   Allergen Reactions    Demerol [meperidine] Anaphylaxis     Other reaction(s): Itching    Dilaudid [hydromorphone (bulk)] Anaphylaxis     "coded" per pt  Patient can tolerate Lortab    Shellfish containing products Itching, Nausea And Vomiting and Swelling    Prednisone Itching     Other reaction(s): Itching    Tramadol      shaking       Social History:   alcohol use: She reports current alcohol use.  tobacco use: She reports that she " "has never smoked. She has never been exposed to tobacco smoke. She has never used smokeless tobacco.     Review of systems:  history of recent illness, fevers, shakes, or chills: no  history of cardiac problems or chest pain: HTN  history of of pulmonary problems or asthma: no  history of diabetes: no  history of prior DVT or clotting problems: no  history of sleep apnea: no  History of multiple sclerosis with left sided weakness      Physical Examination:  Estimated body mass index is 46.69 kg/m² as calculated from the following:    Height as of this encounter: 5' 6" (1.676 m).    Weight as of this encounter: 131.2 kg (289 lb 3.9 oz).    General  Healthy appearing female in no acute distress  Alert and oriented, normal mood, appropriate affect    Shoulder Examination:  Patient is alert and oriented, no distress. Skin is intact. Neuro is normal with no focal motor or sensory findings.    Cervical exam is unremarkable. Intact cervical ROM. Negative Spurling's test    Physical Exam:  RIGHT    LEFT    Scap Dyskinesis/Winging (-)    +    Tenderness:          Greater Tuberosity             (-)    (-)  Bicipital Groove  (-)    (-)  AC joint   (-)    (-)  Other:       Ttp ant and post shoulder joint    ROM:  Forward Elevation 170    120  Abduction  120    90  ER (at side)  60    30  IR   T8    Back pocket    Strength:   Supraspinatus  5/5    5/5  Infraspinatus  5/5    5/5  Subscap / IR  5/5    5/5     Special Tests:   Neer:    (-)    +   Graham:   (-)    +   SS Stress:   (-)    +   Bear Hug:   (-)    +   Arco's:   (-)    +   Resisted Thrower's:   (-)    +   Cross Arm Abduction:  (-)    +    Neurovascular examination  - Motor grossly intact bilaterally to shoulder abduction, elbow flexion and extension, wrist flexion and extension, and intrinsic hand musculature  - Sensation intact to light touch bilaterally in axillary, median, radial, and ulnar distributions  - Symmetrical radial pulses      Imaging:  XR " Results:  Results for orders placed during the hospital encounter of 10/27/23    X-Ray Shoulder 2 or More Views Left    Narrative  EXAM:    XR SHOULDER COMPLETE 2 OR MORE VIEWS LEFT    CLINICAL HISTORY: [M25.512]-Pain in left shoulder. TechNotes: Best possible images. Limited mobility    COMPARISON: None    FINDINGS:  This examination consists of 4 views of the left shoulder.  There is no fracture.  There is no dislocation.    Impression  Normal Study    Finalized on: 10/27/2023 9:38 AM By:  Micah Platt MD  R# [id number]      2023-10-27 09:[id number]    [facility]      Physician Read: I agree with the above impression.      Impression:  43 y.o. female with adhesive capsulitis of the left shoulder      Plan:  Discussed diagnosis and treatment options with patient today.  Her history and physical exam is consistent with left shoulder adhesive capsulitis  She has tried conservative treatment in the form of rest, activity modification, oral anti-inflammatories, Tylenol.  Despite these interventions she still continues to have pain with ADLs  I recommend we move forward with a left shoulder glenohumeral corticosteroid injection, half dose in glenohumeral joint, half dose and subacromial space.  Patient tolerated the procedure well with no immediate complications  I previously referred her to aquatic therapy for her knee, she has since discontinued due to her fear of water. I will place a new physical therapy referral today to [facility] to work on improving range of motion  Internal Physical therapy referral placed to [facility]  I also printed and reviewed adhesive capsulitis exercises for her to begin working on it so she can get initiated into formal physical therapy  Follow up with me in 3 months to check progress          Alethea Gordon PA-C  Sports Medicine Physician Assistant       Disclaimer: This note was prepared using a voice recognition system and is likely to have sound alike errors within the  text.

## 2023-10-27 NOTE — PROCEDURES
Large Joint Aspiration/Injection: L glenohumeral    Date/Time: 10/27/2023 10:00 AM    Performed by: Alethea Gordon PA-C  Authorized by: Alethea Gordon PA-C    Consent Done?:  Yes (Verbal)  Indications:  Pain  Site marked: the procedure site was marked    Timeout: prior to procedure the correct patient, procedure, and site was verified    Prep: patient was prepped and draped in usual sterile fashion      Local anesthesia used?: Yes    Anesthesia:  Local infiltration  Local anesthetic:  Lidocaine 1% without epinephrine    Details:  Needle Size:  22 G  Ultrasonic Guidance for needle placement?: No    Approach:  Posterior  Location:  Shoulder  Site:  L glenohumeral (half GH half SAS)  Medications:  60 mg triamcinolone acetonide 40 mg/mL  Patient tolerance:  Patient tolerated the procedure well with no immediate complications    Procedure Note:  We discussed the risk and benefits of injections, including pain, infection, bleeding, damage to adjacent structures, risk of reaction to injection. We discussed the steroid/cortisone injections will not heal the problem but mat help decrease inflammation and help with symptoms. We discussed the risk of repeated injections. The patient expressed understanding and wanted to proceed with the injection. We performed a timeout to verify the proper patient, proper procedure, and the proper site. The injection site was prepared in a sterile fashion. The patient tolerated it well and there were no complication. We did discuss with the patient that steroid injections can cause some increase in blood sugar and blood pressure for up to a week after the injection.

## 2023-10-30 ENCOUNTER — PATIENT MESSAGE (OUTPATIENT)
Dept: INTERNAL MEDICINE | Facility: CLINIC | Age: 43
End: 2023-10-30
Payer: MEDICARE

## 2023-10-30 ENCOUNTER — PATIENT MESSAGE (OUTPATIENT)
Dept: BARIATRICS | Facility: CLINIC | Age: 43
End: 2023-10-30
Payer: MEDICARE

## 2023-10-31 ENCOUNTER — OFFICE VISIT (OUTPATIENT)
Dept: PSYCHIATRY | Facility: CLINIC | Age: 43
End: 2023-10-31
Payer: MEDICARE

## 2023-10-31 DIAGNOSIS — G35 MULTIPLE SCLEROSIS, PRIMARY PROGRESSIVE: ICD-10-CM

## 2023-10-31 DIAGNOSIS — Z01.818 ENCOUNTER FOR OTHER PREPROCEDURAL EXAMINATION: Primary | ICD-10-CM

## 2023-10-31 DIAGNOSIS — F32.A DEPRESSIVE DISORDER: ICD-10-CM

## 2023-10-31 DIAGNOSIS — Z90.3 S/P GASTRIC SLEEVE PROCEDURE: ICD-10-CM

## 2023-10-31 DIAGNOSIS — E66.01 MORBID OBESITY WITH BMI OF 45.0-49.9, ADULT: ICD-10-CM

## 2023-10-31 PROCEDURE — 90791 PSYCH DIAGNOSTIC EVALUATION: CPT | Mod: HCNC,S$GLB,, | Performed by: SOCIAL WORKER

## 2023-10-31 PROCEDURE — 90791 PR PSYCHIATRIC DIAGNOSTIC EVALUATION: ICD-10-PCS | Mod: HCNC,S$GLB,, | Performed by: SOCIAL WORKER

## 2023-10-31 PROCEDURE — 4010F PR ACE/ARB THEARPY RXD/TAKEN: ICD-10-PCS | Mod: HCNC,CPTII,S$GLB, | Performed by: SOCIAL WORKER

## 2023-10-31 PROCEDURE — 99999 PR PBB SHADOW E&M-EST. PATIENT-LVL II: CPT | Mod: PBBFAC,HCNC,, | Performed by: SOCIAL WORKER

## 2023-10-31 PROCEDURE — 99999 PR PBB SHADOW E&M-EST. PATIENT-LVL II: ICD-10-PCS | Mod: PBBFAC,HCNC,, | Performed by: SOCIAL WORKER

## 2023-10-31 PROCEDURE — 4010F ACE/ARB THERAPY RXD/TAKEN: CPT | Mod: HCNC,CPTII,S$GLB, | Performed by: SOCIAL WORKER

## 2023-11-03 ENCOUNTER — OFFICE VISIT (OUTPATIENT)
Dept: OTOLARYNGOLOGY | Facility: CLINIC | Age: 43
End: 2023-11-03
Payer: MEDICARE

## 2023-11-03 VITALS — BODY MASS INDEX: 46.45 KG/M2 | WEIGHT: 289 LBS | HEIGHT: 66 IN | TEMPERATURE: 98 F

## 2023-11-03 DIAGNOSIS — H92.11 OTORRHEA OF RIGHT EAR: Primary | ICD-10-CM

## 2023-11-03 DIAGNOSIS — K21.9 GASTROESOPHAGEAL REFLUX DISEASE, UNSPECIFIED WHETHER ESOPHAGITIS PRESENT: Primary | ICD-10-CM

## 2023-11-03 DIAGNOSIS — Z90.3 S/P GASTRIC SLEEVE PROCEDURE: ICD-10-CM

## 2023-11-03 DIAGNOSIS — H92.01 RIGHT EAR PAIN: ICD-10-CM

## 2023-11-03 PROCEDURE — 99999 PR PBB SHADOW E&M-EST. PATIENT-LVL III: CPT | Mod: PBBFAC,HCNC,, | Performed by: PHYSICIAN ASSISTANT

## 2023-11-03 PROCEDURE — 3008F BODY MASS INDEX DOCD: CPT | Mod: HCNC,CPTII,S$GLB, | Performed by: PHYSICIAN ASSISTANT

## 2023-11-03 PROCEDURE — 99999 PR PBB SHADOW E&M-EST. PATIENT-LVL III: ICD-10-PCS | Mod: PBBFAC,HCNC,, | Performed by: PHYSICIAN ASSISTANT

## 2023-11-03 PROCEDURE — 1159F PR MEDICATION LIST DOCUMENTED IN MEDICAL RECORD: ICD-10-PCS | Mod: HCNC,CPTII,S$GLB, | Performed by: PHYSICIAN ASSISTANT

## 2023-11-03 PROCEDURE — 99214 OFFICE O/P EST MOD 30 MIN: CPT | Mod: HCNC,S$GLB,, | Performed by: PHYSICIAN ASSISTANT

## 2023-11-03 PROCEDURE — 1159F MED LIST DOCD IN RCRD: CPT | Mod: HCNC,CPTII,S$GLB, | Performed by: PHYSICIAN ASSISTANT

## 2023-11-03 PROCEDURE — 99214 PR OFFICE/OUTPT VISIT, EST, LEVL IV, 30-39 MIN: ICD-10-PCS | Mod: HCNC,S$GLB,, | Performed by: PHYSICIAN ASSISTANT

## 2023-11-03 PROCEDURE — 4010F ACE/ARB THERAPY RXD/TAKEN: CPT | Mod: HCNC,CPTII,S$GLB, | Performed by: PHYSICIAN ASSISTANT

## 2023-11-03 PROCEDURE — 4010F PR ACE/ARB THEARPY RXD/TAKEN: ICD-10-PCS | Mod: HCNC,CPTII,S$GLB, | Performed by: PHYSICIAN ASSISTANT

## 2023-11-03 PROCEDURE — 3008F PR BODY MASS INDEX (BMI) DOCUMENTED: ICD-10-PCS | Mod: HCNC,CPTII,S$GLB, | Performed by: PHYSICIAN ASSISTANT

## 2023-11-03 PROCEDURE — 87070 CULTURE OTHR SPECIMN AEROBIC: CPT | Mod: HCNC | Performed by: PHYSICIAN ASSISTANT

## 2023-11-03 RX ORDER — OFLOXACIN 3 MG/ML
5 SOLUTION AURICULAR (OTIC) 2 TIMES DAILY
Qty: 5 ML | Refills: 0 | Status: SHIPPED | OUTPATIENT
Start: 2023-11-03 | End: 2023-11-13

## 2023-11-03 NOTE — PROGRESS NOTES
Subjective     Patient ID: Alicia Soliz is a 43 y.o. female.    Chief Complaint: Follow-up (Ear follow up. Ear is still draining,it stinks, popping, and itching. Patient stated she has been cleaning the ear with alcohol and warm water.)    Patient is a pleasant 43 year old female here to see me today for evaluation of right ear pain and drainage.  She reports crusting in the right ear with an associated odor.  She feels like her hearing AU is muffled on and off. Onset over past few weeks.  No fever.  She has been using Qtips and alcohol to clean the ear but continues to notice an odor.  She has been treated with augmentin and cortisporin (stopped due to skin irritation) several months ago for same issue.  The patient denies vertigo, tinnitus, or issues with her left ear.  She does grind her teeth at night; does not have a mouth guard.  Scheduled for upcoming exam with her dentist.  Denies previous otologic surgery.      Review of Systems   Constitutional: Negative.  Negative for fever.   HENT:  Positive for ear discharge, ear pain, hearing loss and sinus pressure/congestion.    Eyes: Negative.    Respiratory: Negative.     Cardiovascular: Negative.    Gastrointestinal: Negative.    Endocrine: Negative.    Genitourinary: Negative.    Musculoskeletal: Negative.    Integumentary:  Negative.   Allergic/Immunologic: Negative.    Neurological: Negative.  Negative for dizziness.   Hematological: Negative.    Psychiatric/Behavioral: Negative.            Objective     Physical Exam  Constitutional:       Appearance: Normal appearance.   HENT:      Head: Normocephalic and atraumatic.      Right Ear: External ear normal. Drainage (moist in canal; cultured today) present. No swelling or tenderness. No middle ear effusion. Tympanic membrane is not injected or erythematous.      Left Ear: Tympanic membrane, ear canal and external ear normal. There is no impacted cerumen.      Ears:      Comments: Scant flaking at right  inferior meatus, slight erythema but no edema     Nose: Nose normal.   Pulmonary:      Effort: Pulmonary effort is normal.   Neurological:      Mental Status: She is alert.            Assessment and Plan     1. Otorrhea of right ear  -     Aerobic culture    2. Right ear pain    Other orders  -     ofloxacin (FLOXIN) 0.3 % otic solution; Place 5 drops into the right ear 2 (two) times daily. for 10 days  Dispense: 5 mL; Refill: 0        Hx of right ear drainage/crusting with odor.  Canal is moist today; culture obtained today.  Will release results thru MyChart.  Recommend Floxin BID x 10 days.  Advised against using Qtips and recommend she follow dry ear precautions.  We also discussed that issues with TMJ (she grinds her teeth at night) can also cause otalgia.  Recommend she discuss with her dentist at her upcoming exam as she may benefit from a nightguard.         No follow-ups on file.    Answers submitted by the patient for this visit:  Review of Symptoms Questionnaire  (Submitted on 11/1/2023)  Ear infection(s)?: Yes

## 2023-11-06 LAB — BACTERIA SPEC AEROBE CULT: NORMAL
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
## 2023-11-08 ENCOUNTER — TELEPHONE (OUTPATIENT)
Dept: ENDOSCOPY | Facility: HOSPITAL | Age: 43
End: 2023-11-08
Payer: MEDICARE

## 2023-11-08 ENCOUNTER — HOSPITAL ENCOUNTER (OUTPATIENT)
Dept: PREADMISSION TESTING | Facility: HOSPITAL | Age: 43
Discharge: HOME | End: 2023-11-08
Attending: INTERNAL MEDICINE
Payer: MEDICARE

## 2023-11-08 DIAGNOSIS — Z90.3 S/P GASTRIC SLEEVE PROCEDURE: ICD-10-CM

## 2023-11-08 DIAGNOSIS — K21.9 GASTROESOPHAGEAL REFLUX DISEASE, UNSPECIFIED WHETHER ESOPHAGITIS PRESENT: ICD-10-CM

## 2023-11-08 NOTE — TELEPHONE ENCOUNTER
Returned pt's call to schedule EGD with [facility]. I noted that pt has PAT appt today for the [facility]. Asked pt her preference. Pt wants to be scheduled in [facility] and will await their PAT call today.   History of cataract extraction  bilateral  History of tonsillectomy  childhood  S/P D&C    S/P mastectomy  left

## 2023-11-09 ENCOUNTER — TELEPHONE (OUTPATIENT)
Dept: ENDOSCOPY | Facility: HOSPITAL | Age: 43
End: 2023-11-09
Payer: MEDICARE

## 2023-11-09 NOTE — TELEPHONE ENCOUNTER
----- Message from Jody Reed sent at 11/9/2023  8:30 AM CST -----    ----- Message -----  From: Ashley Marlow  Sent: 11/9/2023   7:36 AM CST  To: [facility] Schedulers    Type:  Patient Returning Call    Who Called:pt  Who Left Message for Patient: office  Does the patient know what this is regarding?:schedule EGD   Would the patient rather a call back or a response via [facility]?  call  Best Call Back Number:[phone]  Additional Information:           
Contacted the patient to schedule an endoscopy procedure. The patient stated she wanted to have her procedure done at [facility]. Advised pt and provided number so pt can call and schedule.   
none

## 2023-11-10 ENCOUNTER — TELEPHONE (OUTPATIENT)
Dept: GASTROENTEROLOGY | Facility: CLINIC | Age: 43
End: 2023-11-10
Payer: MEDICARE

## 2023-11-10 NOTE — TELEPHONE ENCOUNTER
Call returned to  regarding an appointment to [facility] and pH testing for bariatric surgery clearance. Pt scheduled on 12/19/23 at 9 o'clock; pt confirmed appointment

## 2023-11-10 NOTE — TELEPHONE ENCOUNTER
----- Message from Ly Kim MD sent at 11/9/2023  3:58 PM CST -----  Regarding: Clinic Visit  Hi,     Messaged by endo staff that patient needs clinic visit scheduled for ordering of pH testing for bariatric surgery clearance as she has not been seen in GI clinic before. Please place with first available provider for clinic visit.     Thanks,      Dr. Kim

## 2023-11-14 ENCOUNTER — PATIENT MESSAGE (OUTPATIENT)
Dept: BARIATRICS | Facility: CLINIC | Age: 43
End: 2023-11-14
Payer: MEDICARE

## 2023-11-14 ENCOUNTER — PATIENT MESSAGE (OUTPATIENT)
Dept: INTERNAL MEDICINE | Facility: CLINIC | Age: 43
End: 2023-11-14

## 2023-11-16 ENCOUNTER — CLINICAL SUPPORT (OUTPATIENT)
Dept: BARIATRICS | Facility: CLINIC | Age: 43
End: 2023-11-16
Payer: MEDICARE

## 2023-11-16 DIAGNOSIS — I10 PRIMARY HYPERTENSION: ICD-10-CM

## 2023-11-16 DIAGNOSIS — K76.0 FATTY LIVER: ICD-10-CM

## 2023-11-16 DIAGNOSIS — E66.01 MORBID OBESITY WITH BMI OF 45.0-49.9, ADULT: Primary | ICD-10-CM

## 2023-11-16 DIAGNOSIS — K21.9 GASTROESOPHAGEAL REFLUX DISEASE, UNSPECIFIED WHETHER ESOPHAGITIS PRESENT: ICD-10-CM

## 2023-11-16 DIAGNOSIS — Z71.3 DIETARY COUNSELING AND SURVEILLANCE: ICD-10-CM

## 2023-11-16 PROCEDURE — 99499 UNLISTED E&M SERVICE: CPT | Mod: HCNC,95,, | Performed by: DIETITIAN, REGISTERED

## 2023-11-16 PROCEDURE — 99499 NO LOS: ICD-10-PCS | Mod: HCNC,95,, | Performed by: DIETITIAN, REGISTERED

## 2023-11-16 NOTE — PROGRESS NOTES
The patient location is: home ([place])  The chief complaint leading to consultation is:  3 months Medically Supervised Diet pending Gastric Sleeve to Bypass conversion  Visit type: audiovisual     Face to Face time with patient: 20 min    30 minutes of total time spent on the encounter, which includes face to face time and non-face to face time preparing to see the patient (eg, review of tests), Obtaining and/or reviewing separately obtained history, Documenting clinical information in the electronic or other health record, Independently interpreting results (not separately reported) and communicating results to the patient/family/caregiver, or Care coordination (not separately reported).       Each patient to whom he or she provides medical services by telemedicine is:  (1) informed of the relationship between the physician and patient and the respective role of any other health care provider with respect to management of the patient; and (2) notified that he or she may decline to receive medical services by telemedicine and may withdraw from such care at any time.    NUTRITION NOTE    Referring Physician: Farideh Jc M.D.   Reason for MNT Referral: 3 months Medically Supervised Diet pending Gastric  Sleeve to Bypass conversion    Patient presents for follow-up visit for MSD with weight gain over the past month;    CLINICAL DATA:  43 y.o. female.    Initial weight: 285 lbs  Current Weight: 289 lbs (11/3/23)  Weight Change Since Initial Visit: +4 lbs  Ideal Body Weight: 144 lbs  BMI: 46.65    DAILY NUTRITIONAL NEEDS: pre-op nutritional bariatric guidelines to promote weight loss  [id number] Calories    Grams Protein    CURRENT DIET:  Regular diet.  Diet Recall: Food records are not present.  Greatest challenge: Starchy CHO  Progress: No crackers or bread, walking more, snacking on nuts, drinking more water, stopped Jono finneyable, yogurt pretzel, white chocolate flips yogurt    Current diet  recall:      B: Eggs, turkey sausage  S: Premier shake   L: Cubed watermelon  D: Baked chicken breast, [employer] mozzarella sticks (baked), and broccoli  S: Peanuts, Balanced Breaks     Diet Includes: 3 meals  Meal Pattern: Same.  Protein Supplements: 1 per day.  Snacking: Adequate. Snacks include healthy choices.  Vegetables: Likes a few. Eats  almost daily. Broccoli, cabbage  Fruits: Likes a few. Eats daily. Watermelon, grapes, strawberries  Beverages: water, diet tea, juice, water with Crystal Light  Dining out: Weekly   Cooking at home: Daily. Mostly baked, grilled, smothered, and boiled meat, fish, starchy CHO, and vegetables. Broccoli & cheese, cabbage, red beans sometimes with brown rice, baked fish, baked chicken, boiled ham    Food Allergies:   Shell fish     Exercise:  Current exercise: Adequate  Walking 20 mins every other day  Weight training 30-40 mins daily     Restrictions to exercise: rotator cuff injury, got injection     Vitamins / Minerals / Herbs:   Multivitamins gummy  B-12  B-1     Labs:   Reviewed last visit     Social:  Retired. Disabled.  Lives with adult daughter and two puppies.  Grocery shopping and food prep: Self.  Patient believes the household will be supportive after surgery.  Alcohol: champagne on birthday .  Smoking: None.    ASSESSMENT:  Patient demonstrates some willingness to change lifestyle habits as evidenced by good exercise, dietary changes, and including protein drinks.    Doing fairly well with working on greatest challenges (starchy CHO).    Barriers to Education:  none  Stage of Change:  action    NUTRITION DIAGNOSIS:  Morbid Obesity related to Excessive carbohydrate intake as evidence by BMI.  Obesity Status: Same    BARIATRIC DIET DISCUSSION/PLAN:  Discussed diet after surgery and related to patient's food record.  Reviewed nutrition guidelines for before and after surgery.  Answered all questions.  Reviewed nutrition facts for breaded mozzarella sticks, discussed  having fewer sticks with dinner  Suggested substituting breaded mozzarella with mozzarella string cheese  Discussed spreading out protein throughout day, including protein with lunch and reducing protein at dinner  Continue to review Bariatric Nutrition Guidebook at home and call with any questions.    RECOMMENDATIONS:  Pt is potential candidate for surgery.    Diet: Adjust diet plan.  Spread out protein throughout day - include protein with lunch, reduce protein at dinner    Exercise: Maintain.    Behavior Modification: Begin to document food & activity logs daily.    Return to clinic in one month.    Needs additional visits with RD.    Communicated nutrition plan with bariatric team.    SESSION TIME:  30 minutes

## 2023-11-21 NOTE — PROGRESS NOTES
Mom stated pre-op needs to be more than one week less than one month from DOS to allow for insurance to process between. Pre-op scheduled.    Provider Department Encounter # Center   1/9/2024 3:25 PM Otf Spain MD [facility] [address] PED [id number] [place]        [facility]   Physical Therapy Treatment Note        Name: Alicia Soliz  Clinic Number: [id number]    Therapy Diagnosis:   Encounter Diagnoses   Name Primary?    Weakness of left lower extremity Yes    Chronic pain of left knee      Physician: Alethea Gordon PA-C    Visit Date: 10/18/2023    Physician Orders: PT Eval and Treat  Medical Diagnosis from Referral: Primary osteoarthritis of left knee [M17.12], Sprain of medial collateral ligament of left knee, initial encounter [S83.412A], Multiple sclerosis [G35]  Evaluation Date: 10/13/2023  Authorization Period Expiration: 8/13/2024  Plan of Care Expiration: 1/5/2024  Progress Note Due: 11/10/2023  Visit # / Visits authorized: 1/1   FOTO: 1/3 (last performed on 10/13/2023)     Precautions: Standard and Fall  PTA Visit #: 0/5     Time In: 9:20 am   Time Out: 10:00 am   Total Billable Time: 40 minutes (Billing reflects 1 on 1 treatment time spent with patient)    Subjective     Patient reports: she is hoping that the water will help her pain since nothing else has helped.    He/She N/A compliant with home exercise program.  Response to previous treatment: 1st session  Functional change: no change as this is 1st session.      Pain: Not-rated/10     Location: left shoulder and leg     Objective      Objective Measures updated at progress report or POC update only unless otherwise noted.       Treatment     Alicia received the treatments listed below:     Alicia received aquatic therapy with the use of water to decrease the pull of gravity and weightbearing forces on the body to increase tolerance to resisted therex for 30 minutes including the following exercise as well as stepping in and out of the pool with use of handrail Bilateral upper extremity and step-to pattern with Min/mid Assist by PT to get in and out of the water:   PT IN WATER WITH PATIENT  Exercise Performed Today  10/18/2023   Treadmill, forward/back x 10 minutes  "  Treadmill, side-stepping,  x 5 minutes each side    Treadmill, backwards, - mph  - minutes   Marching, alternating x 5 minutes   Hamstring curls, alternating   - minutes   Hip 3-way, B lower extremity  - minutes each         Bicycle forward/backward  - minutes each    Flutter kick  - minutes    Scissor kicks  - minutes    Hip flexor Stretch on treadmill  - minutes    Quad stretch on step   -   Hamstring stretch on step   2 x 30"    Gastroc stretch  at wall  2 x 30"         Heel/toe raises   - minutes    1/4 squat   - minutes    Standing hip circles clockwise/counter clockwise  - minutes each    Standing figure 8   10x each         UE alt flexion - paddles     UE alt abduction - paddles     UE alt hugs - paddles     UE internal rotation/external rotation - paddles     UE alt rows - paddles          Enter/exit pool x 10 minutes    x = exercise details same as prior session      GAIT TRAINING to improve functional mobility and safety for (10) minutes.    Intervention Performed Today    Navigating pool room with rolling walker, SBA/CGA x 10 Minutes                                         Plan for Next Visit: as needed          Patient Education and Home Exercises       Home Exercises Provided and Patient Education Provided     Education provided: (with above treatment) minutes  PURPOSE: Patient educated on the impairments noted above and the effects of physical therapy intervention to improve overall condition and QOL.   EXERCISE: Patient was educated on all the above exercise prior/during/after for proper posture, positioning, and execution for safe performance with home exercise program.   POSTURE: Patient educated on postural awareness to reduce stress and maintain optimal alignment of the spine with static positions and dynamic movement     Written Home Exercises Provided: yes.  Exercises were reviewed and Alicia was able to demonstrate them prior to the end of the session.  Alicia demonstrated good  understanding " of the education provided. See EMR under Patient Instructions for exercises provided during therapy sessions.    Assessment     Patient very apprehensive in the water and required hands on assist of PT for walking.  Patient with increased left shoulder hiking throughout session.  Difficulty with control of the Left lower extremity in the water with limited weight bearing even with assist of PT for weight shifting.  Discussed future visits of another trial in the water and then discussing continuing on land or pool after second session with patient voicing approval of this.      Alicia is progressing well towards her goals.   Patient prognosis is Good.     Patient will continue to benefit from skilled outpatient physical therapy to address the deficits listed in the problem list box on initial evaluation, provide pt/family education and to maximize patient's level of independence in the home and community environment.     Patient's spiritual, cultural and educational needs considered and pt agreeable to plan of care and goals.     Anticipated Barriers for therapy: co-morbidities, sedentary lifestyle, and chronicity of condition    Short Term Goals:  6 weeks Status  Date Met   PAIN: Pt will report worst pain of 6/10 in order to progress toward max functional ability and improve quality of life. [x] Progressing  [] Met  [] Not Met     FUNCTION: Patient will demonstrate improved function as indicated by a score of greater than or equal to 43 out of 100 on FOTO. [x] Progressing  [] Met  [] Not Met     MOBILITY: Patient will improve AROM to 50% of stated goals, listed in objective measures above, in order to progress towards independence with functional activities.  [x] Progressing  [] Met  [] Not Met     STRENGTH: Patient will improve strength to 50% of stated goals, listed in objective measures above, in order to progress towards independence with functional activities. [x] Progressing  [] Met  [] Not Met     POSTURE:  Patient will correct postural deviations in sitting and standing, to decrease pain and promote long term stability.  [x] Progressing  [] Met  [] Not Met     GAIT: Patient will demonstrate improved gait mechanics including increased step length, increased stance time, improved heel strike and push off, increased overall gait speed in order to improve functional mobility, improve quality of life, and decrease risk of further injury or fall.  [x] Progressing  [] Met  [] Not Met     HEP: Patient will demonstrate independence with HEP in order to progress toward functional independence. [x] Progressing  [] Met  [] Not Met     Patient will be able to perform a 5 time sit to  <12.6 seconds to decrease fall risk [x] Progressing  [] Met  [] Not Met        Long Term Goals:  12 weeks Status Date Met   PAIN: Pt will report worst pain of 3/10 in order to progress toward max functional ability and improve quality of life [x] Progressing  [] Met  [] Not Met     FUNCTION: Patient will demonstrate improved function as indicated by a score of greater than or equal to 56 out of 100 on FOTO. [x] Progressing  [] Met  [] Not Met     MOBILITY: Patient will improve AROM to stated goals, listed in objective measures above, in order to return to maximal functional potential and improve quality of life.  [x] Progressing  [] Met  [] Not Met     STRENGTH: Patient will improve strength to stated goals, listed in objective measures above, in order to improve functional independence and quality of life. [x] Progressing  [] Met  [] Not Met     GAIT: Patient will demonstrate normalized gait mechanics with minimal compensation in order to return to PLOF. [x] Progressing  [] Met  [] Not Met     Patient will return to normal ADL's, IADL's, community involvement, recreational activities, and work-related activities with less than or equal to 3/10 pain and maximal function.  [x] Progressing  [] Met  [] Not Met     Patient will be able to perform  a 5 time sit to  <11.4 seconds to decrease fall risk [x] Progressing  [] Met  [] Not Met           Plan   Plan of care Certification: 10/13/2023 to 1/4/2024.    Continue Plan of Care (POC) and progress per patient tolerance. See treatment section for details on planned progressions next session.      Samra Fontenot, PT

## 2023-11-29 ENCOUNTER — PATIENT MESSAGE (OUTPATIENT)
Dept: SPORTS MEDICINE | Facility: CLINIC | Age: 43
End: 2023-11-29
Payer: MEDICARE

## 2023-12-06 NOTE — PLAN OF CARE
December 11, 2023     Joanna BERKLEY Tay  [address] [place], [place] [address]  [facility] [address]    Patient: Joanna Cross   YOB: 1950   Date of Visit: 12/6/2023           ORDER - Wheelchair     DX - G20.A1         Sincerely,        Reynaldo Fritz MD           Problem: Adult Inpatient Plan of Care  Goal: Plan of Care Review  Outcome: Ongoing (interventions implemented as appropriate)  Pt complains of pain to right hand and left foot. Pain medication is effective. Pt complains of numbness and tingling to bilateral hands. Pt states vision is becoming blurry. No injuries. Bed alarm is on. Will continue to monitor. 12 hour chart check is completed.

## 2023-12-07 ENCOUNTER — TELEPHONE (OUTPATIENT)
Dept: INTERNAL MEDICINE | Facility: CLINIC | Age: 43
End: 2023-12-07
Payer: MEDICARE

## 2023-12-07 ENCOUNTER — PATIENT OUTREACH (OUTPATIENT)
Dept: ADMINISTRATIVE | Facility: HOSPITAL | Age: 43
End: 2023-12-07
Payer: MEDICARE

## 2023-12-07 VITALS — SYSTOLIC BLOOD PRESSURE: 120 MMHG | DIASTOLIC BLOOD PRESSURE: 80 MMHG

## 2023-12-07 NOTE — PROGRESS NOTES
HTN Report: Per chart review, patient's on the [employer] [facility] HTN program, last home BP was 120/80. Remote BP will be entered.

## 2023-12-11 ENCOUNTER — PATIENT MESSAGE (OUTPATIENT)
Dept: NEUROLOGY | Facility: CLINIC | Age: 43
End: 2023-12-11
Payer: MEDICARE

## 2023-12-11 DIAGNOSIS — G35 MULTIPLE SCLEROSIS: Primary | ICD-10-CM

## 2023-12-12 ENCOUNTER — PATIENT MESSAGE (OUTPATIENT)
Dept: BARIATRICS | Facility: CLINIC | Age: 43
End: 2023-12-12
Payer: MEDICARE

## 2023-12-13 ENCOUNTER — PATIENT MESSAGE (OUTPATIENT)
Dept: BARIATRICS | Facility: CLINIC | Age: 43
End: 2023-12-13
Payer: MEDICARE

## 2023-12-15 NOTE — TELEPHONE ENCOUNTER
Pt changed request to new bariatric rollator.  She did not follow up with the [facility] as advised and [facility]  ARASH faxed order to Keisha Ni at [phone].

## 2023-12-16 DIAGNOSIS — M17.12 PRIMARY OSTEOARTHRITIS OF LEFT KNEE: ICD-10-CM

## 2023-12-18 ENCOUNTER — CLINICAL SUPPORT (OUTPATIENT)
Dept: BARIATRICS | Facility: CLINIC | Age: 43
End: 2023-12-18
Payer: MEDICARE

## 2023-12-18 ENCOUNTER — PATIENT MESSAGE (OUTPATIENT)
Dept: BARIATRICS | Facility: CLINIC | Age: 43
End: 2023-12-18

## 2023-12-18 ENCOUNTER — PATIENT MESSAGE (OUTPATIENT)
Dept: ORTHOPEDICS | Facility: CLINIC | Age: 43
End: 2023-12-18
Payer: MEDICARE

## 2023-12-18 DIAGNOSIS — E66.01 MORBID OBESITY WITH BMI OF 45.0-49.9, ADULT: ICD-10-CM

## 2023-12-18 DIAGNOSIS — Z71.3 DIETARY COUNSELING AND SURVEILLANCE: ICD-10-CM

## 2023-12-18 DIAGNOSIS — K76.0 FATTY LIVER: ICD-10-CM

## 2023-12-18 DIAGNOSIS — I10 PRIMARY HYPERTENSION: Primary | ICD-10-CM

## 2023-12-18 DIAGNOSIS — K21.9 GASTROESOPHAGEAL REFLUX DISEASE, UNSPECIFIED WHETHER ESOPHAGITIS PRESENT: ICD-10-CM

## 2023-12-18 PROCEDURE — 97803 PR MED NUTR THER, SUBSQ, INDIV, EA 15 MIN: ICD-10-PCS | Mod: HCNC,95,GZ, | Performed by: DIETITIAN, REGISTERED

## 2023-12-18 PROCEDURE — 97803 MED NUTRITION INDIV SUBSEQ: CPT | Mod: HCNC,95,GZ, | Performed by: DIETITIAN, REGISTERED

## 2023-12-18 PROCEDURE — 99499 NO LOS: ICD-10-PCS | Mod: HCNC,95,, | Performed by: DIETITIAN, REGISTERED

## 2023-12-18 PROCEDURE — 99499 UNLISTED E&M SERVICE: CPT | Mod: HCNC,95,, | Performed by: DIETITIAN, REGISTERED

## 2023-12-18 RX ORDER — LIDOCAINE 50 MG/G
PATCH TOPICAL
Qty: 90 PATCH | Refills: 3 | Status: SHIPPED | OUTPATIENT
Start: 2023-12-18 | End: 2024-01-25

## 2023-12-18 NOTE — PROGRESS NOTES
The [place]: [place] ([place])  The chief complaint leading to consultation is: 3 months Medically Supervised Diet pending Gastric Sleeve to Bypass Conversion  Visit type: audiovisual     Face to Face time with patient: 20 min    30 minutes of total time spent on the encounter, which includes face to face time and non-face to face time preparing to see the patient (eg, review of tests), Obtaining and/or reviewing separately obtained history, Documenting clinical information in the electronic or other health record, Independently interpreting results (not separately reported) and communicating results to the patient/family/caregiver, or Care coordination (not separately reported).       Each patient to whom he or she provides medical services by telemedicine is:  (1) informed of the relationship between the physician and patient and the respective role of any other health care provider with respect to management of the patient; and (2) notified that he or she may decline to receive medical services by telemedicine and may withdraw from such care at any time.    NUTRITION NOTE    Referring Physician: Farideh Jc M.D.   Reason for MNT Referral: 3 months Medically Supervised Diet pending Gastric  Sleeve to Bypass Conversion    Patient presents for follow-up visit for MSD with weight  1 lbs  over the past month. Total weight loss 1 lb    CLINICAL DATA:  43 y.o. female.    Initial weight: 285 lbs  Current Weight: 284 lbs (12/19/23)  Weight Change Since Initial Visit: -1 lbs  Ideal Body Weight: 144 lbs  BMI: 45.94    DAILY NUTRITIONAL NEEDS: pre-op nutritional bariatric guidelines to promote weight loss  [id number] Calories    Grams Protein    CURRENT DIET:  Regular diet.  Diet Recall: Food records are not present.  Greatest challenge: Starchy CHO    Current diet recall:      B: Eggs, turkey sausage  S: Premier shake   L: Sliced smoked turkey and lettuce wrap or cubed watermelon  D: Salad (lettuce,  tomatoes, baked chicken breast, cheddar cheese, august bits)  S: Peanuts, Balanced Breaks, raisins, or dried cranberries     Diet Includes: 3 meals  Meal Pattern: Same.  Protein Supplements: 1 per day. Premier protein  Snacking: Adequate. Snacks include healthy choices  Vegetables: Likes a few. Eats  almost daily. Broccoli, cabbage, salad  Fruits: Likes a few. Eats daily. Watermelon, grapes, strawberries  Beverages: water, diet tea, juice, water with Crystal Light  Dining out: None   Cooking at home: Daily. Mostly baked, grilled, smothered, and boiled; meat, fish, starchy CHO, and vegetables. Broccoli & cheese, cabbage, red beans sometimes with brown rice, baked fish, baked chicken, boiled ham     Food Allergies:   Shell fish     Exercise:  Current exercise: Adequate  Walking 20 mins every other day  Weight training 30-40 mins daily     Restrictions to exercise: rotator cuff injury, got injection     Vitamins / Minerals / Herbs:   Multivitamins gummy  B-12  B-1     Labs:   No recent     Social:  Retired. Disabled.  Lives with adult daughter and two puppies.  Grocery shopping and food prep: Self.  Patient believes the household will be supportive after surgery.  Alcohol: champagne on birthday .  Smoking: None.    ASSESSMENT:  Patient demonstrates some willingness to change lifestyle habits as evidenced by good exercise, including protein drinks, and reduced dining out.    Doing well with working on greatest challenges (starchy CHO).    Barriers to Education:  none  Stage of Change:  action    NUTRITION DIAGNOSIS:  Morbid Obesity related to Excessive carbohydrate intake as evidence by BMI.  Obesity Status: Same    BARIATRIC DIET DISCUSSION/PLAN:  Discussed diet after surgery and related to patient's food record.  Reviewed nutrition guidelines for before and after surgery.  Answered all questions.  Discussed sugar content of dried fruits - suggest PT choose dried fruits unsweetened or reduced sugar and keep to one  serving per day      RECOMMENDATIONS:  Pt is good candidate for surgery.    Diet: Maintain diet plan.    Exercise: Maintain.    Behavior Modification: Begin to document food & activity logs daily.    Communicated nutrition plan with bariatric team.    SESSION TIME:  30 minutes

## 2023-12-19 ENCOUNTER — OFFICE VISIT (OUTPATIENT)
Dept: GASTROENTEROLOGY | Facility: CLINIC | Age: 43
End: 2023-12-19
Payer: MEDICARE

## 2023-12-19 VITALS
HEART RATE: 107 BPM | HEIGHT: 66 IN | DIASTOLIC BLOOD PRESSURE: 80 MMHG | SYSTOLIC BLOOD PRESSURE: 132 MMHG | BODY MASS INDEX: 45.74 KG/M2 | WEIGHT: 284.63 LBS

## 2023-12-19 DIAGNOSIS — K21.9 GASTROESOPHAGEAL REFLUX DISEASE, UNSPECIFIED WHETHER ESOPHAGITIS PRESENT: Primary | ICD-10-CM

## 2023-12-19 DIAGNOSIS — Z90.3 S/P GASTRIC SLEEVE PROCEDURE: ICD-10-CM

## 2023-12-19 DIAGNOSIS — R05.3 CHRONIC COUGH: ICD-10-CM

## 2023-12-19 PROCEDURE — 99999 PR PBB SHADOW E&M-EST. PATIENT-LVL IV: ICD-10-PCS | Mod: PBBFAC,HCNC,, | Performed by: NURSE PRACTITIONER

## 2023-12-19 PROCEDURE — 1160F PR REVIEW ALL MEDS BY PRESCRIBER/CLIN PHARMACIST DOCUMENTED: ICD-10-PCS | Mod: HCNC,CPTII,S$GLB, | Performed by: NURSE PRACTITIONER

## 2023-12-19 PROCEDURE — 3079F DIAST BP 80-89 MM HG: CPT | Mod: HCNC,CPTII,S$GLB, | Performed by: NURSE PRACTITIONER

## 2023-12-19 PROCEDURE — 99214 PR OFFICE/OUTPT VISIT, EST, LEVL IV, 30-39 MIN: ICD-10-PCS | Mod: HCNC,S$GLB,, | Performed by: NURSE PRACTITIONER

## 2023-12-19 PROCEDURE — 99214 OFFICE O/P EST MOD 30 MIN: CPT | Mod: HCNC,S$GLB,, | Performed by: NURSE PRACTITIONER

## 2023-12-19 PROCEDURE — 3075F PR MOST RECENT SYSTOLIC BLOOD PRESS GE 130-139MM HG: ICD-10-PCS | Mod: HCNC,CPTII,S$GLB, | Performed by: NURSE PRACTITIONER

## 2023-12-19 PROCEDURE — 3079F PR MOST RECENT DIASTOLIC BLOOD PRESSURE 80-89 MM HG: ICD-10-PCS | Mod: HCNC,CPTII,S$GLB, | Performed by: NURSE PRACTITIONER

## 2023-12-19 PROCEDURE — 3075F SYST BP GE 130 - 139MM HG: CPT | Mod: HCNC,CPTII,S$GLB, | Performed by: NURSE PRACTITIONER

## 2023-12-19 PROCEDURE — 1160F RVW MEDS BY RX/DR IN RCRD: CPT | Mod: HCNC,CPTII,S$GLB, | Performed by: NURSE PRACTITIONER

## 2023-12-19 PROCEDURE — 4010F ACE/ARB THERAPY RXD/TAKEN: CPT | Mod: HCNC,CPTII,S$GLB, | Performed by: NURSE PRACTITIONER

## 2023-12-19 PROCEDURE — 3008F PR BODY MASS INDEX (BMI) DOCUMENTED: ICD-10-PCS | Mod: HCNC,CPTII,S$GLB, | Performed by: NURSE PRACTITIONER

## 2023-12-19 PROCEDURE — 4010F PR ACE/ARB THEARPY RXD/TAKEN: ICD-10-PCS | Mod: HCNC,CPTII,S$GLB, | Performed by: NURSE PRACTITIONER

## 2023-12-19 PROCEDURE — 1159F MED LIST DOCD IN RCRD: CPT | Mod: HCNC,CPTII,S$GLB, | Performed by: NURSE PRACTITIONER

## 2023-12-19 PROCEDURE — 1159F PR MEDICATION LIST DOCUMENTED IN MEDICAL RECORD: ICD-10-PCS | Mod: HCNC,CPTII,S$GLB, | Performed by: NURSE PRACTITIONER

## 2023-12-19 PROCEDURE — 99999 PR PBB SHADOW E&M-EST. PATIENT-LVL IV: CPT | Mod: PBBFAC,HCNC,, | Performed by: NURSE PRACTITIONER

## 2023-12-19 PROCEDURE — 3008F BODY MASS INDEX DOCD: CPT | Mod: HCNC,CPTII,S$GLB, | Performed by: NURSE PRACTITIONER

## 2023-12-19 RX ORDER — DOXEPIN HYDROCHLORIDE 75 MG/1
75 CAPSULE ORAL NIGHTLY
COMMUNITY
Start: 2023-10-28 | End: 2024-03-08 | Stop reason: SDUPTHER

## 2023-12-19 NOTE — PROGRESS NOTES
Clinic Follow Up:  [facility] Follow Up Note    Reason for Follow Up:  The primary encounter diagnosis was Gastroesophageal reflux disease, unspecified whether esophagitis present. Diagnoses of S/P gastric sleeve procedure and Chronic cough were also pertinent to this visit.    PCP: Braden Dumont       HPI:  This is a 43 y.o. female here for evaluation of GERD.   She reports GERD symptoms. She has a lot of coughing and burning/regurgitation in the chest. She is currently on Nexium 40 mg daily.   She had an EGD in September 2023 that showed normal esophagus; gastritis; sleeve gastrectomy --healthy appearing mucosa.   She followed up with Bariatrics. They are requesting EGD with BRAVO off PPI. This order was sent to [facility] but patient would like to have it done here. She e is here to arrange this.     Review of Systems   Constitutional:  Negative for activity change and appetite change.        As per interval history above   Respiratory:  Positive for cough. Negative for shortness of breath.    Cardiovascular:  Negative for chest pain.   Gastrointestinal:  Negative for abdominal pain, anal bleeding, blood in stool, constipation, diarrhea, nausea and vomiting.        Heartburn    Skin:  Negative for color change and rash.       Medical History:  Past Medical History:   Diagnosis Date    Allergy     Anemia     Arthritis     Cardiac arrest as complication of care     pt states she went into cardiac arrest from an allergic reaction to a medication    Depression     Encounter for blood transfusion     GERD (gastroesophageal reflux disease)     Hemiplegia due to old stroke     Hypertension     Morbid obesity with BMI of 45.0-49.9, adult 09/20/2018    Multiple sclerosis     Neuromuscular disorder        Surgical History:   Past Surgical History:   Procedure Laterality Date    APPENDECTOMY      BREAST SURGERY      CHOLECYSTECTOMY      COLONOSCOPY N/A 11/25/2020    Procedure:  COLONOSCOPY;  Surgeon: Andrew Jenkins III, MD;  Location: [facility] ENDO;  Service: Endoscopy;  Laterality: N/A;    ESOPHAGOGASTRODUODENOSCOPY N/A 02/19/2020    Procedure: EGD (ESOPHAGOGASTRODUODENOSCOPY);  Surgeon: Michael Navarrete MD;  Location: [facility] ENDO;  Service: Endoscopy;  Laterality: N/A;    ESOPHAGOGASTRODUODENOSCOPY N/A 02/20/2020    Procedure: EGD (ESOPHAGOGASTRODUODENOSCOPY);  Surgeon: Michael Navarrete MD;  Location: [facility] OR;  Service: General;  Laterality: N/A;    ESOPHAGOGASTRODUODENOSCOPY N/A 11/25/2020    Procedure: EGD (ESOPHAGOGASTRODUODENOSCOPY);  Surgeon: Andrew Jenkins III, MD;  Location: [facility] ENDO;  Service: Endoscopy;  Laterality: N/A;    ESOPHAGOGASTRODUODENOSCOPY N/A 9/25/2023    Procedure: EGD (ESOPHAGOGASTRODUODENOSCOPY);  Surgeon: Ly Kim MD;  Location: [facility];  Service: Endoscopy;  Laterality: N/A;    HYSTERECTOMY      KNEE SURGERY      ROBOT-ASSISTED LAPAROSCOPIC SLEEVE GASTRECTOMY USING DA ANTHONY XI N/A 02/20/2020    Procedure: XI ROBOTIC SLEEVE GASTRECTOMY;  Surgeon: Michael Navarrete MD;  Location: [facility];  Service: General;  Laterality: N/A;    TENOPLASTY OF HAND Left 08/26/2021    Procedure: REPAIR, TENDON, HAND;  Surgeon: Joselito Lugo MD;  Location: [facility];  Service: Orthopedics;  Laterality: Left;  Left RCL PIP Joint Repair/Recon with Arthrex Internal Brace.    TONSILLECTOMY      TOTAL REDUCTION MAMMOPLASTY  2022    TUBAL LIGATION         Family History:   Family History   Problem Relation Age of Onset    Lupus Mother     Heart disease Mother     Hypertension Mother     Diabetes Father     Kidney disease Father     No Known Problems Sister     No Known Problems Sister     No Known Problems Brother     No Known Problems Brother     No Known Problems Daughter     No Known Problems Daughter     No Known Problems Daughter     Cancer Maternal Aunt 40        breast    Breast cancer Maternal Aunt     Diabetes Maternal Aunt     COPD Maternal Aunt     Heart disease Maternal  "Grandmother     Breast cancer Maternal Cousin     Ovarian cancer Maternal Cousin        Social History:   Social History     Tobacco Use    Smoking status: Never     Passive exposure: Never    Smokeless tobacco: Never   Substance Use Topics    Alcohol use: Yes     Comment: once a year     Drug use: No       Allergies:   Review of patient's allergies indicates:   Allergen Reactions    Demerol [meperidine] Anaphylaxis     Other reaction(s): Itching    Dilaudid [hydromorphone (bulk)] Anaphylaxis     "coded" per pt  Patient can tolerate Lortab    Shellfish containing products Itching, Nausea And Vomiting and Swelling    Prednisone Itching     Other reaction(s): Itching    Tramadol      shaking       Home Medications:  Current Outpatient Medications on File Prior to Visit   Medication Sig Dispense Refill    cholecalciferol, vitamin D3, 1,250 mcg (50,000 unit) capsule Take 1 capsule (50,000 Units total) by mouth every 7 days. for 365 doses 12 capsule 28    diclofenac sodium (VOLTAREN) 1 % Gel Apply 2 g topically 4 (four) times daily. 450 g 11    doxepin (SINEQUAN) 75 MG capsule Take 75 mg by mouth every evening.      esomeprazole (NEXIUM) 40 MG capsule Take 1 capsule (40 mg total) by mouth before breakfast. 90 capsule 3    gabapentin (NEURONTIN) 300 MG capsule Take 3 capsules (900 mg total) by mouth 2 (two) times daily. 540 capsule 1    HYDROcodone-acetaminophen (NORCO)  mg per tablet Take 1 tablet by mouth 3 (three) times daily.      linaCLOtide (LINZESS) 145 mcg Cap capsule Take 1 capsule (145 mcg total) by mouth before breakfast. 90 capsule 3    mupirocin (BACTROBAN) 2 % ointment APPLY TOPICALLY TWICE A DAY 22 g 0    nabumetone (RELAFEN) 750 MG tablet Take 750 mg by mouth 2 (two) times daily as needed.      OCREVUS 30 mg/mL Soln       tiZANidine (ZANAFLEX) 4 MG tablet Take 1 tablet (4 mg total) by mouth every 6 (six) hours as needed (muscle spasms). 40 tablet 1    topiramate (TOPAMAX) 50 MG tablet TAKE 1 TABLET " "BY MOUTH IN THE MORNING AND 2 TABLETS AT BEDTIME 270 tablet 1    triamcinolone acetonide 0.1% (KENALOG) 0.1 % ointment APPLY TO AFFECTED AREA TWICE A DAY 30 g 1    valsartan (DIOVAN) 80 MG tablet Take 1 tablet (80 mg total) by mouth once daily. 90 tablet 3    DULoxetine (CYMBALTA) 60 MG capsule Take 1 capsule (60 mg total) by mouth once daily. (Patient not taking: Reported on 12/19/2023) 90 capsule 2    LIDOcaine (LIDODERM) 5 % PLACE 1 PATCH ONTO THE SKIN ONCE DAILY. REMOVE AND DISCARD PATCH WITHIN 12 HOURS OR AS DIRECTED BY MD (Patient not taking: Reported on 12/19/2023) 90 patch 3    liothyronine (CYTOMEL) 50 MCG Tab Take 1 tablet (50 mcg total) by mouth once daily. 30 tablet 1    ondansetron (ZOFRAN) 8 MG tablet TAKE 1 TABLET (8 MG TOTAL) BY MOUTH 2 (TWO) TIMES DAILY. 30 tablet 1    ondansetron (ZOFRAN) 8 MG tablet Take 1 tablet (8 mg total) by mouth 2 (two) times daily. 30 tablet 1    oxybutynin (DITROPAN-XL) 5 MG TR24 Take 1 tablet (5 mg total) by mouth once daily for 7 days, THEN 2 tablets (10 mg total) once daily. 67 tablet 0    semaglutide, weight loss, (WEGOVY) 0.25 mg/0.5 mL PnIj Inject 0.25 mg into the skin every 7 days. 3 mL 1    [DISCONTINUED] amitriptyline (ELAVIL) 50 MG tablet Take 1 tablet (50 mg total) by mouth every evening. 30 tablet 1     No current facility-administered medications on file prior to visit.       /80 (BP Location: Left arm, Patient Position: Sitting, BP Method: Large (Manual))   Pulse 107   Ht 5' 6" (1.676 m)   Wt 129.1 kg (284 lb 9.8 oz)   BMI 45.94 kg/m²   Body mass index is 45.94 kg/m².  Physical Exam  Constitutional:       General: She is not in acute distress.  HENT:      Head: Normocephalic.   Neurological:      General: No focal deficit present.      Mental Status: She is alert.   Psychiatric:         Mood and Affect: Mood normal.         Judgment: Judgment normal.         Labs: Pertinent labs reviewed.  CRC Screening:     Assessment:   1. Gastroesophageal reflux " disease, unspecified whether esophagitis present    2. S/P gastric sleeve procedure    3. Chronic cough    We discussed that since she had recent EGD, we can perform nasal pH probe for 24 hours which would not require a repeat EGD to place. We discussed that this can be more uncomfortable than BRAVO (which requires repeat EGD to place). She has MS and pain sets off a flare so she is requesting the lest painful/uncomfortable one.     Recommendations:   - repeat EGD with BRAVO off PPI.     Gastroesophageal reflux disease, unspecified whether esophagitis present  -     Case Request Endoscopy: EGD (ESOPHAGOGASTRODUODENOSCOPY), PH MONITORING, ESOPHAGUS, WIRELESS, (OFF REFLUX MEDS)    S/P gastric sleeve procedure  -     Case Request Endoscopy: EGD (ESOPHAGOGASTRODUODENOSCOPY), PH MONITORING, ESOPHAGUS, WIRELESS, (OFF REFLUX MEDS)    Chronic cough      Thank you for the opportunity to participate in the care of this patient.  JOJO Fay

## 2023-12-20 ENCOUNTER — PATIENT MESSAGE (OUTPATIENT)
Dept: BARIATRICS | Facility: CLINIC | Age: 43
End: 2023-12-20
Payer: MEDICARE

## 2023-12-21 ENCOUNTER — PATIENT MESSAGE (OUTPATIENT)
Dept: BARIATRICS | Facility: CLINIC | Age: 43
End: 2023-12-21
Payer: MEDICARE

## 2023-12-22 ENCOUNTER — PATIENT MESSAGE (OUTPATIENT)
Dept: ENDOSCOPY | Facility: HOSPITAL | Age: 43
End: 2023-12-22
Payer: MEDICARE

## 2023-12-28 NOTE — TELEPHONE ENCOUNTER
----- Message from Braden Dumont MD sent at 9/21/2018  1:29 PM CDT -----  Can you ask her if she was ever treated for syphillis last year? The preliminary test was positive but we have the more accurate test pending.  
Advised pt of urine results  Labs , pt stated [facility] .   
28-Dec-2023 19:00

## 2024-01-01 NOTE — TELEPHONE ENCOUNTER
Called pt stated the lab was still in process we will co ntact her assoon as we get them/ pt verbalized understanding/   DISCHARGE

## 2024-01-02 ENCOUNTER — TELEPHONE (OUTPATIENT)
Dept: BARIATRICS | Facility: CLINIC | Age: 44
End: 2024-01-02
Payer: MEDICARE

## 2024-01-02 ENCOUNTER — PATIENT MESSAGE (OUTPATIENT)
Dept: INTERNAL MEDICINE | Facility: CLINIC | Age: 44
End: 2024-01-02
Payer: MEDICARE

## 2024-01-02 NOTE — TELEPHONE ENCOUNTER
Spoke to pt regarding seeing a Psych MD at [facility] main per Dr. Hodge's suggestion d/t [facility] psych MD's are not associated w/[facility] main. Understanding stated. Psych phone number provided to pt to schedule appt.  All questions and concerns addressed.     12:29pm -Rec'd TEAMS message from Dr. Hodge stating that pt can be cleared w/[facility] providers note. Called and informed pt, pt thanked me for my help. All questions and concerns addressed.

## 2024-01-03 ENCOUNTER — TELEPHONE (OUTPATIENT)
Dept: SPORTS MEDICINE | Facility: CLINIC | Age: 44
End: 2024-01-03
Payer: MEDICARE

## 2024-01-03 NOTE — TELEPHONE ENCOUNTER
Called and scheduled patient for 1/5 at 2 PM with Alethea Gordon. KWAKU    ----- Message from Isabela Garcia sent at 1/3/2024 12:14 PM CST -----  Contact: Alicia    ----- Message -----  From: Kassie Hines  Sent: 1/3/2024  11:48 AM CST  To: [facility] Clinical Staff; #    Type:  Sooner Apoointment Request    Caller is requesting a sooner appointment. Caller will not accept being placed on the waitlist and is requesting a message be sent to doctor.  Name of Caller:Alicia  When is the first available appointment?scheduled 1/29/24  Symptoms:pain in left arm/injection follow-up  Would the patient rather a call back or a response via [facility]? call  Best Call Back Number:[phone]  Additional Information: Patient request experiencing pain and request to reschedule visit for a doctor. Please give patient an immediate call back to assist.  Thank you,  GH

## 2024-01-09 ENCOUNTER — PATIENT MESSAGE (OUTPATIENT)
Dept: BARIATRICS | Facility: CLINIC | Age: 44
End: 2024-01-09
Payer: MEDICARE

## 2024-01-16 ENCOUNTER — TELEPHONE (OUTPATIENT)
Dept: PSYCHIATRY | Facility: CLINIC | Age: 44
End: 2024-01-16
Payer: MEDICARE

## 2024-01-16 NOTE — TELEPHONE ENCOUNTER
Patient was scheduled with clinician in error for psychological evaluation to rule out contraindications for bariatric surgery. Patient was previously cleared by Juan Miguel North LCSW. Clinician signed on to appt at 10 and waited to 10:10 in case patient connected.  Clinician then marked the appt as cancelled as it was scheduled in error.

## 2024-01-19 ENCOUNTER — E-VISIT (OUTPATIENT)
Dept: INTERNAL MEDICINE | Facility: CLINIC | Age: 44
End: 2024-01-19
Payer: MEDICARE

## 2024-01-19 ENCOUNTER — TELEPHONE (OUTPATIENT)
Dept: BARIATRICS | Facility: CLINIC | Age: 44
End: 2024-01-19
Payer: MEDICARE

## 2024-01-19 ENCOUNTER — PATIENT MESSAGE (OUTPATIENT)
Dept: NEUROLOGY | Facility: CLINIC | Age: 44
End: 2024-01-19
Payer: MEDICARE

## 2024-01-19 DIAGNOSIS — N89.8 VAGINAL DISCHARGE: Primary | ICD-10-CM

## 2024-01-19 PROCEDURE — 99421 OL DIG E/M SVC 5-10 MIN: CPT | Mod: ,,, | Performed by: FAMILY MEDICINE

## 2024-01-19 RX ORDER — FLUCONAZOLE 150 MG/1
TABLET ORAL
Qty: 2 TABLET | Refills: 0 | Status: SHIPPED | OUTPATIENT
Start: 2024-01-19 | End: 2024-01-25

## 2024-01-19 NOTE — PROGRESS NOTES
Patient ID: Alicia Soliz is a 43 y.o. female.    Chief Complaint: Vaginal Discharge (Entered automatically based on patient selection in Patient Portal.)    The patient initiated a request through [facility] on 1/19/2024 for evaluation and management with a chief complaint of Vaginal Discharge (Entered automatically based on patient selection in Patient Portal.)     I evaluated the questionnaire submission on 1/19/24.    [facility] Peq Evisit Vaginal Discharge    1/19/2024  9:29 AM CST - Filed by Patient   Do you agree to participate in an E-Visit? Yes   If you have any of the following symptoms,  please do not complete an E-Visit,  schedule an appointment with your provider: I acknowledge   What is the main issue that you would like for your doctor to address today? Blister on my right inner thigh thick white discharge   Are you able to take your vital signs? No   Are you currently pregnant, could you be pregnant, or are you breast feeding? None of the above   Which of the following are you experiencing? Vaginal discharge    Are you having pain while passing urine? No, I have no pain while urinating.   Which of the following applies to your vaginal discharge? I have a white/milky discharge.    Which of the following are you experiencing? Frequent urination   Do you have any sores on your genitals? Yes    Have you taken antibiotics recently? I have not been on any antibiotics    Do you use any of the following? None of the above   Which of the following applies to your menstrual period? I do not have menstrual periods   Have you had similar symptoms in the past? No, I have never had these symptoms.   Have you had a fever? No   During the last 2 months, have you had sexual contact with a specific person for the first time? No   Provide any information you feel is important to your history not asked above Everything   Please attach any relevant images or files          Recent Labs Obtained:  No visits with results within  7 Day(s) from this visit.   Latest known visit with results is:   Office Visit on 11/03/2023   Component Date Value Ref Range Status    Aerobic Bacterial Culture 11/03/2023 Skin jorge,  no predominant organism   Final       Encounter Diagnosis   Name Primary?    Vaginal discharge Yes        No orders of the defined types were placed in this encounter.     Medications Ordered This Encounter   Medications    fluconazole (DIFLUCAN) 150 MG Tab     Sig: Take first tablet today, then repeat in 3 days.     Dispense:  2 tablet     Refill:  0        No follow-ups on file.      E-Visit Time Tracking:    Day 1 Time (in minutes): 8     Total Time (in minutes): 8

## 2024-01-19 NOTE — TELEPHONE ENCOUNTER
Rec'd incoming call from pt. Pt inquired about cx'd psych appt. Informed pt that she did not need to see psych again since she was cleared by a [place] practitioner per Dr. Hodge. Understanding stated. Informed pt that I would reach out on 1-31-24 following her ph testing on 1-29-24. Understanding stated. Selection process reviewed with pt, understanding stated. Informed pt I would give her a call after selection.   Dashboard and RN f/u updated.

## 2024-01-22 ENCOUNTER — PATIENT MESSAGE (OUTPATIENT)
Dept: URGENT CARE | Facility: CLINIC | Age: 44
End: 2024-01-22
Payer: MEDICARE

## 2024-01-22 ENCOUNTER — PATIENT MESSAGE (OUTPATIENT)
Dept: NEUROLOGY | Facility: CLINIC | Age: 44
End: 2024-01-22
Payer: MEDICARE

## 2024-01-22 ENCOUNTER — PATIENT MESSAGE (OUTPATIENT)
Dept: PSYCHIATRY | Facility: CLINIC | Age: 44
End: 2024-01-22
Payer: MEDICARE

## 2024-01-23 ENCOUNTER — OFFICE VISIT (OUTPATIENT)
Dept: INTERNAL MEDICINE | Facility: CLINIC | Age: 44
End: 2024-01-23
Payer: MEDICARE

## 2024-01-23 VITALS
WEIGHT: 288.13 LBS | DIASTOLIC BLOOD PRESSURE: 82 MMHG | BODY MASS INDEX: 46.3 KG/M2 | TEMPERATURE: 98 F | HEIGHT: 66 IN | HEART RATE: 87 BPM | SYSTOLIC BLOOD PRESSURE: 130 MMHG | OXYGEN SATURATION: 99 %

## 2024-01-23 DIAGNOSIS — R30.0 DYSURIA: ICD-10-CM

## 2024-01-23 DIAGNOSIS — Z00.00 ROUTINE ADULT HEALTH MAINTENANCE: ICD-10-CM

## 2024-01-23 DIAGNOSIS — N89.8 VAGINAL DISCHARGE: Primary | ICD-10-CM

## 2024-01-23 DIAGNOSIS — B37.2 CANDIDIASIS OF SKIN: ICD-10-CM

## 2024-01-23 PROCEDURE — 99214 OFFICE O/P EST MOD 30 MIN: CPT | Mod: HCNC,S$GLB,, | Performed by: FAMILY MEDICINE

## 2024-01-23 PROCEDURE — 1159F MED LIST DOCD IN RCRD: CPT | Mod: HCNC,CPTII,S$GLB, | Performed by: FAMILY MEDICINE

## 2024-01-23 PROCEDURE — 3079F DIAST BP 80-89 MM HG: CPT | Mod: HCNC,CPTII,S$GLB, | Performed by: FAMILY MEDICINE

## 2024-01-23 PROCEDURE — 3075F SYST BP GE 130 - 139MM HG: CPT | Mod: HCNC,CPTII,S$GLB, | Performed by: FAMILY MEDICINE

## 2024-01-23 PROCEDURE — 99999 PR PBB SHADOW E&M-EST. PATIENT-LVL IV: CPT | Mod: PBBFAC,HCNC,, | Performed by: FAMILY MEDICINE

## 2024-01-23 PROCEDURE — 3008F BODY MASS INDEX DOCD: CPT | Mod: HCNC,CPTII,S$GLB, | Performed by: FAMILY MEDICINE

## 2024-01-23 RX ORDER — CYCLOBENZAPRINE HCL 10 MG
10 TABLET ORAL EVERY 8 HOURS PRN
COMMUNITY
Start: 2024-01-17

## 2024-01-23 RX ORDER — NYSTATIN 100000 U/G
CREAM TOPICAL 2 TIMES DAILY
Qty: 30 G | Refills: 2 | Status: SHIPPED | OUTPATIENT
Start: 2024-01-23

## 2024-01-24 ENCOUNTER — TELEPHONE (OUTPATIENT)
Dept: NEUROLOGY | Facility: CLINIC | Age: 44
End: 2024-01-24
Payer: MEDICARE

## 2024-01-24 ENCOUNTER — PATIENT MESSAGE (OUTPATIENT)
Dept: NEUROLOGY | Facility: CLINIC | Age: 44
End: 2024-01-24
Payer: MEDICARE

## 2024-01-24 NOTE — PROGRESS NOTES
Subjective:      Patient ID: Alicia Soliz is a 43 y.o. female.    Chief Complaint: Annual Exam and Vaginal Discharge (Burning sensation, thick cloudy discharge)      Patient reports recent dysuria, thick vaginal discharge.  Did not realize Diflucan sent in from ER visit last week.  No recent antibiotic use.  Otherwise doing fairly well.  She reports depression for which I saw her several months ago at last visit seems to have resolved, had stopped taking medication.  Reports burning and irritation of skin under her breasts, malodor at times,    Vaginal Discharge  The patient's primary symptoms include vaginal discharge. Associated symptoms include dysuria. Pertinent negatives include no abdominal pain.     Review of Systems   Constitutional:  Positive for fatigue.   Gastrointestinal:  Negative for abdominal pain.   Genitourinary:  Positive for dysuria and vaginal discharge.   Psychiatric/Behavioral:  Negative for dysphoric mood and sleep disturbance. The patient is not nervous/anxious.      Past Medical History:   Diagnosis Date    Allergy     Anemia     Arthritis     Cardiac arrest as complication of care     pt states she went into cardiac arrest from an allergic reaction to a medication    Depression     Encounter for blood transfusion     GERD (gastroesophageal reflux disease)     Hemiplegia due to old stroke     Hypertension     Morbid obesity with BMI of 45.0-49.9, adult 09/20/2018    Multiple sclerosis     Neuromuscular disorder           Past Surgical History:   Procedure Laterality Date    APPENDECTOMY      BREAST SURGERY      CHOLECYSTECTOMY      COLONOSCOPY N/A 11/25/2020    Procedure: COLONOSCOPY;  Surgeon: Andrew Jenkins III, MD;  Location: [facility];  Service: Endoscopy;  Laterality: N/A;    ESOPHAGOGASTRODUODENOSCOPY N/A 02/19/2020    Procedure: EGD (ESOPHAGOGASTRODUODENOSCOPY);  Surgeon: Michael Navarrete MD;  Location: [facility];  Service: Endoscopy;  Laterality: N/A;     ESOPHAGOGASTRODUODENOSCOPY N/A 02/20/2020    Procedure: EGD (ESOPHAGOGASTRODUODENOSCOPY);  Surgeon: Michael Navarrete MD;  Location: [facility] OR;  Service: General;  Laterality: N/A;    ESOPHAGOGASTRODUODENOSCOPY N/A 11/25/2020    Procedure: EGD (ESOPHAGOGASTRODUODENOSCOPY);  Surgeon: Andrew Jenkins III, MD;  Location: [facility] ENDO;  Service: Endoscopy;  Laterality: N/A;    ESOPHAGOGASTRODUODENOSCOPY N/A 9/25/2023    Procedure: EGD (ESOPHAGOGASTRODUODENOSCOPY);  Surgeon: Ly Kim MD;  Location: [facility];  Service: Endoscopy;  Laterality: N/A;    HYSTERECTOMY      KNEE SURGERY      ROBOT-ASSISTED LAPAROSCOPIC SLEEVE GASTRECTOMY USING DA ANTHONY XI N/A 02/20/2020    Procedure: XI ROBOTIC SLEEVE GASTRECTOMY;  Surgeon: Michael Navarrete MD;  Location: [facility] OR;  Service: General;  Laterality: N/A;    TENOPLASTY OF HAND Left 08/26/2021    Procedure: REPAIR, TENDON, HAND;  Surgeon: Joselito Lugo MD;  Location: [facility] OR;  Service: Orthopedics;  Laterality: Left;  Left RCL PIP Joint Repair/Recon with Arthrex Internal Brace.    TONSILLECTOMY      TOTAL REDUCTION MAMMOPLASTY  2022    TUBAL LIGATION       Family History   Problem Relation Age of Onset    Lupus Mother     Heart disease Mother     Hypertension Mother     Diabetes Father     Kidney disease Father     No Known Problems Sister     No Known Problems Sister     No Known Problems Brother     No Known Problems Brother     No Known Problems Daughter     No Known Problems Daughter     No Known Problems Daughter     Cancer Maternal Aunt 40        breast    Breast cancer Maternal Aunt     Diabetes Maternal Aunt     COPD Maternal Aunt     Heart disease Maternal Grandmother     Breast cancer Maternal Cousin     Ovarian cancer Maternal Cousin      Social History     Socioeconomic History    Marital status: Single    Number of children: 3   Occupational History     Employer: [employer]   Tobacco Use    Smoking status: Never     Passive exposure: Never    Smokeless tobacco: Never    Substance and Sexual Activity    Alcohol use: Yes     Comment: once a year     Drug use: No    Sexual activity: Yes     Partners: Female     Birth control/protection: Surgical     Social Determinants of Health     Financial Resource Strain: Low Risk  (11/11/2023)    Overall Financial Resource Strain (CARDIA)     Difficulty of Paying Living Expenses: Not hard at all   Recent Concern: Financial Resource Strain - Medium Risk (9/13/2023)    Overall Financial Resource Strain (CARDIA)     Difficulty of Paying Living Expenses: Somewhat hard   Food Insecurity: Food Insecurity Present (11/11/2023)    Hunger Vital Sign     Worried About Running Out of Food in the Last Year: Sometimes true     Ran Out of Food in the Last Year: Sometimes true   Transportation Needs: Unmet Transportation Needs (11/11/2023)    PRAPARE - Transportation     Lack of Transportation (Medical): Yes     Lack of Transportation (Non-Medical): Yes   Physical Activity: Insufficiently Active (11/11/2023)    Exercise Vital Sign     Days of Exercise per Week: 5 days     Minutes of Exercise per Session: 10 min   Stress: No Stress Concern Present (11/11/2023)    [employer] - Occupational Stress Questionnaire     Feeling of Stress : Only a little   Social Connections: Unknown (11/11/2023)    Social Connection and Isolation Panel [NHANES]     Frequency of Communication with Friends and Family: More than three times a week     Frequency of Social Gatherings with Friends and Family: Three times a week     Active Member of Clubs or Organizations: Yes     Attends Club or Organization Meetings: 1 to 4 times per year     Marital Status:    Housing Stability: Unknown (11/11/2023)    Housing Stability Vital Sign     Unable to Pay for Housing in the Last Year: Patient refused     Number of Places Lived in the Last Year: 1     Unstable Housing in the Last Year: No     Review of patient's allergies indicates:   Allergen Reactions     "Demerol [meperidine] Anaphylaxis     Other reaction(s): Itching    Dilaudid [hydromorphone (bulk)] Anaphylaxis     "coded" per pt  Patient can tolerate Lortab    Shellfish containing products Itching, Nausea And Vomiting and Swelling    Prednisone Itching     Other reaction(s): Itching    Tramadol      shaking       Objective:       /82 (BP Location: Left arm, Patient Position: Sitting, BP Method: Large (Manual))   Pulse 87   Temp 98.2 °F (36.8 °C) (Tympanic)   Ht 5' 6" (1.676 m)   Wt 130.7 kg (288 lb 2.3 oz)   SpO2 99%   BMI 46.51 kg/m²   Physical Exam  Vitals reviewed.   Constitutional:       General: She is not in acute distress.     Appearance: Normal appearance. She is well-developed. She is not diaphoretic.   Cardiovascular:      Rate and Rhythm: Normal rate and regular rhythm.      Heart sounds: Normal heart sounds.   Pulmonary:      Effort: Pulmonary effort is normal. No respiratory distress.      Breath sounds: Normal breath sounds.   Abdominal:      General: Bowel sounds are normal.      Palpations: Abdomen is soft.      Tenderness: There is no abdominal tenderness. There is no guarding.   Neurological:      Mental Status: She is alert.   Psychiatric:         Mood and Affect: Mood normal.         Behavior: Behavior normal.         Thought Content: Thought content normal.         Judgment: Judgment normal.       Assessment:     1. Vaginal discharge    2. Dysuria    3. Routine adult health maintenance    4. Candidiasis of skin      Plan:   Vaginal discharge    Dysuria  -     POCT urine dipstick without microscope  -     Urine culture; Future; Expected date: 01/23/2024    Routine adult health maintenance  -     RPR; Future; Expected date: 01/23/2024  -     HIV 1/2 Ag/Ab (4th Gen); Future; Expected date: 01/23/2024  -     C. trachomatis/N. gonorrhoeae by AMP DNA [facility]; Urine  -     HEPATITIS PANEL, ACUTE; Future; Expected date: 01/23/2024    Candidiasis of skin    Other orders  -     nystatin " (MYCOSTATIN) cream; Apply topically 2 (two) times daily.  Dispense: 30 g; Refill: 2    Continue all other current medications.   Unable to obtain urinary specimen today-will return for above labs in addition to other previously ordered labs later this week  Medication List with Changes/Refills   New Medications    NYSTATIN (MYCOSTATIN) CREAM    Apply topically 2 (two) times daily.   Current Medications    CHOLECALCIFEROL, VITAMIN D3, 1,250 MCG (50,000 UNIT) CAPSULE    Take 1 capsule (50,000 Units total) by mouth every 7 days. for 365 doses    CYCLOBENZAPRINE (FLEXERIL) 10 MG TABLET    Take 10 mg by mouth every 8 (eight) hours.    DICLOFENAC SODIUM (VOLTAREN) 1 % GEL    Apply 2 g topically 4 (four) times daily.    DOXEPIN (SINEQUAN) 75 MG CAPSULE    Take 75 mg by mouth every evening.    DULOXETINE (CYMBALTA) 60 MG CAPSULE    Take 1 capsule (60 mg total) by mouth once daily.    ESOMEPRAZOLE (NEXIUM) 40 MG CAPSULE    Take 1 capsule (40 mg total) by mouth before breakfast.    FLUCONAZOLE (DIFLUCAN) 150 MG TAB    Take first tablet today, then repeat in 3 days.    GABAPENTIN (NEURONTIN) 300 MG CAPSULE    Take 3 capsules (900 mg total) by mouth 2 (two) times daily.    HYDROCODONE-ACETAMINOPHEN (NORCO)  MG PER TABLET    Take 1 tablet by mouth 3 (three) times daily.    LIDOCAINE (LIDODERM) 5 %    PLACE 1 PATCH ONTO THE SKIN ONCE DAILY. REMOVE AND DISCARD PATCH WITHIN 12 HOURS OR AS DIRECTED BY MD    LINACLOTIDE (LINZESS) 145 MCG CAP CAPSULE    Take 1 capsule (145 mcg total) by mouth before breakfast.    LIOTHYRONINE (CYTOMEL) 50 MCG TAB    Take 1 tablet (50 mcg total) by mouth once daily.    MUPIROCIN (BACTROBAN) 2 % OINTMENT    APPLY TOPICALLY TWICE A DAY    NABUMETONE (RELAFEN) 750 MG TABLET    Take 750 mg by mouth 2 (two) times daily as needed.    OCREVUS 30 MG/ML SOLN        OXYBUTYNIN (DITROPAN-XL) 5 MG TR24    Take 1 tablet (5 mg total) by mouth once daily for 7 days, THEN 2 tablets (10 mg total) once daily.     TIZANIDINE (ZANAFLEX) 4 MG TABLET    Take 1 tablet (4 mg total) by mouth every 6 (six) hours as needed (muscle spasms).    TOPIRAMATE (TOPAMAX) 50 MG TABLET    TAKE 1 TABLET BY MOUTH IN THE MORNING AND 2 TABLETS AT BEDTIME    TRIAMCINOLONE ACETONIDE 0.1% (KENALOG) 0.1 % OINTMENT    APPLY TO AFFECTED AREA TWICE A DAY    VALSARTAN (DIOVAN) 80 MG TABLET    Take 1 tablet (80 mg total) by mouth once daily.

## 2024-01-25 ENCOUNTER — OFFICE VISIT (OUTPATIENT)
Dept: NEUROLOGY | Facility: CLINIC | Age: 44
End: 2024-01-25
Payer: MEDICARE

## 2024-01-25 DIAGNOSIS — G35 MULTIPLE SCLEROSIS: Primary | ICD-10-CM

## 2024-01-25 DIAGNOSIS — E66.01 MORBID OBESITY WITH BMI OF 45.0-49.9, ADULT: ICD-10-CM

## 2024-01-25 DIAGNOSIS — E67.8 HYPERVITAMINOSIS: ICD-10-CM

## 2024-01-25 DIAGNOSIS — G81.90 HEMIPLEGIA, UNSPECIFIED ETIOLOGY, UNSPECIFIED HEMIPLEGIA TYPE, UNSPECIFIED LATERALITY: ICD-10-CM

## 2024-01-25 PROCEDURE — G2211 COMPLEX E/M VISIT ADD ON: HCPCS | Mod: HCNC,95,, | Performed by: STUDENT IN AN ORGANIZED HEALTH CARE EDUCATION/TRAINING PROGRAM

## 2024-01-25 PROCEDURE — 99215 OFFICE O/P EST HI 40 MIN: CPT | Mod: 95,HCNC,, | Performed by: STUDENT IN AN ORGANIZED HEALTH CARE EDUCATION/TRAINING PROGRAM

## 2024-01-25 NOTE — PROGRESS NOTES
[facility]  Follow Up Patient Visit Virtual    The patient location is: home  Visit type: Virtual visit with synchronous audio and video    Each patient to whom he or she provides medical services by telemedicine is:  (1) informed of the relationship between the physician and patient and the respective role of any other health care provider with respect to management of the patient; and (2) notified that he or she may decline to receive medical services by telemedicine and may withdraw from such care at any time.      Disease Summary     Principle neurological diagnosis: MS     Date of symptom onset: 1/2015  Date of diagnosis: 1/2015  Disease type at diagnosis: RR  Disease type currently: RR  Previous therapy: Tecfidera, Copaxone, and Tysabri, Avonex (flu-like reaction), Aubagio (worsening disease)  Current therapy: Ocrevus 12/2020 - present  Last MRI Brain: 8/2023 - stable  Last MRI C-spine: 8/2022  Last MRI T-spine: 8/2022  CSF: 3W, 1R, G74, P39, IgG index 0.74, OCBs 6  JCV:   Lab Results   Component Value Date    JCVINDEX 1.67 (A) 04/19/2018    JCVANTIBODY Positive (A) 04/19/2018     Other relevant labs and tests:   Lab Results   Component Value Date    RLPJNKBL56DA 125 (H) 09/07/2023    UYVZZEDN50SB 29 (L) 07/12/2023    CNBFTFOM28AS 38 01/31/2023       Interval history:     She is tolerating Ocrevus (last received 9/29), no infection, no infusion related.    Her walker has trouble folding and she has not received new walker yet nor had her old walker repaired.     Bladder issues improved.   She is on vitamin D 2000 IU daily but her last level was 125 (9/2023). She is iron and thiamine supplement for low iron and thiamine.  She ran out of muscle relaxers, but her muscle spasms have improved.     She's planning to undergo gastric bypass in Feb 2024.     ROS:     SOCIAL HISTORY  Living arrangements - the patient lives with their family.  Social History     Socioeconomic History    Marital  status: Single    Number of children: 3   Occupational History     Employer: [employer]   Tobacco Use    Smoking status: Never     Passive exposure: Never    Smokeless tobacco: Never   Substance and Sexual Activity    Alcohol use: Yes     Comment: once a year     Drug use: No    Sexual activity: Yes     Partners: Female     Birth control/protection: Surgical     Social Determinants of Health     Financial Resource Strain: Low Risk  (11/11/2023)    Overall Financial Resource Strain (CARDIA)     Difficulty of Paying Living Expenses: Not hard at all   Recent Concern: Financial Resource Strain - Medium Risk (9/13/2023)    Overall Financial Resource Strain (CARDIA)     Difficulty of Paying Living Expenses: Somewhat hard   Food Insecurity: Food Insecurity Present (11/11/2023)    Hunger Vital Sign     Worried About Running Out of Food in the Last Year: Sometimes true     Ran Out of Food in the Last Year: Sometimes true   Transportation Needs: Unmet Transportation Needs (11/11/2023)    PRAPARE - Transportation     Lack of Transportation (Medical): Yes     Lack of Transportation (Non-Medical): Yes   Physical Activity: Insufficiently Active (11/11/2023)    Exercise Vital Sign     Days of Exercise per Week: 5 days     Minutes of Exercise per Session: 10 min   Stress: No Stress Concern Present (11/11/2023)    [employer] - Occupational Stress Questionnaire     Feeling of Stress : Only a little   Social Connections: Unknown (11/11/2023)    Social Connection and Isolation Panel [NHANES]     Frequency of Communication with Friends and Family: More than three times a week     Frequency of Social Gatherings with Friends and Family: Three times a week     Active Member of Clubs or Organizations: Yes     Attends Club or Organization Meetings: 1 to 4 times per year     Marital Status:    Housing Stability: Unknown (11/11/2023)    Housing Stability Vital Sign     Unable to Pay for  Housing in the Last Year: Patient refused     Number of Places Lived in the Last Year: 1     Unstable Housing in the Last Year: No           6/14/2023    10:36 PM   REVIEW OF SYMPTOMS   Do you feel abnormally tired on most days? Yes   Do you feel you generally sleep well? No   Do you have difficulty controlling your bladder?  No   Do you have difficulty controlling your bowels?  No   Do you have frequent muscle cramps, tightness or spasms in your limbs?  No   Do you have new visual symptoms?  Yes   Do you have worsening difficulty with your memory or thinking? No   Do you have worsening symptoms of anxiety or depression?  Yes   For patients who walk, Do you have more difficulty walking?  Yes   Have you fallen since your last visit?  Yes   For patients who use wheelchairs: Do you have any skin wounds or breakdown? No   Do you have difficulty using your hands?  Yes   Do you have shooting or burning pain? Yes   Do you have difficulty with sexual function?  Yes   If you are sexually active, are you using birth control? Y/N  N/A No   Do you often choke when swallowing liquids or solid food?  No   Do you experience worsening symptoms when overheated? No   Do you need any new equipment such as a wheelchair, walker or shower chair? No   Do you receive co-pay financial assistance for your principal MS medicine? No   Would you be interested in participating in an MS research trial in the future? No   For patients on Gilen[place], Tecfidera, Aubagio, Rituxan, Ocrevus, Tysabri, Lemtrada or Methotrexate, are you aware that you should NOT receive live virus vaccines?  No   Do you feel you have adequate family/friend support?  No   Do you have health insurance?   No   Are you currently employed? No   Do you receive SSDI/SSI?  No   Do you use marijuana or cannabis products? No   Have you been diagnosed with a urinary tract infection since your last visit here? No   Have you been diagnosed with a respiratory tract infection since your  last visit here? No   Have you been to the emergency room since your last visit here? No   Have you been hospitalized since your last visit here?  No         6/14/2023    10:44 PM   FSS SCORE & INTERPRETATION   FSS SCORE  9   FSS SCORE INTERPRETATION May not be suffering from fatigue         6/14/2023    10:42 PM   MS MOOKIE-D SCORE & INTERPRETATION   MOOKIE-D SCORE  12   MOOKIE-D INTERPRETATION  No indication of Depression         6/14/2023    10:40 PM   MS JUAN DAVID-7 SCORE & INTERPRETATION   JUAN DAVID-7 SCORE  7   JUAN DAVID-7 SCORE INTERPRETATION Mild Anxiety         6/14/2023    10:44 PM   PEQ MS MOS PAIN EFFECTS SCORE & INTERPRETATION   PES SCORE 6   PES SCORE INTERPRETATION Scores can range from 6-30.  Items are scaled so that higher scores indicate a greater impact of pain on a patients mood and behavior.         3/31/2021    12:08 PM   PEQ MS SEXUAL SATISFACTION SCORE & INTERPRETATION   SSS SCORE  16   SSS SCORE INTERPRETATION Scores can range from 4-24.  Higher scores indicate greater problems with sexual satisfaction.         6/14/2023    10:46 PM   MS BLADDER CONTROL SCORE & INTERPRETATION   BLCS SCORE 0   BLCS SCORE INTERPRETATION  Scores can range from 0-22, with higher scores indicating greater bladder control problems.         6/14/2023    10:49 PM   MS BOWEL CONTROL SCORE & INTERPRETATION   BWCS SCORE 0   BWCS SCORE INTERPRETATION Scores can range from 0-26, with higher scores indicating greater bowel control problems.         6/14/2023    10:46 PM   PEQ MS IMPACT OF VISUAL IMPAIRMENT SCORE & INTERPRETATION   LILIANA SCALE SCORE  0   LILIANA SCORE INTERPRETATION Scores can range from 0-15, with higher scores indicating greater impact of visual problems on daily activites.         6/14/2023    10:48 PM   MS PDQ SCORE & INTERPRETATION   PDQ RETROSPECTIVE MEMORY SUBSCALE 0   PDQ ATTENTION/CONCENTRATION SUBSCALE 0   PDQ PROSPECTIVE MEMORY SUBSCALE 0   PDQ PLANNING/ORGANIZATION SUBSCALE 0   PDQ TOTAL SCORE 0   PDQ SCORE INTERPRETATION  Scores can range from 0-80, with higher scores indicating greater perceived cognitive impairment.         6/14/2023    10:51 PM   MSSS SCORE & INTERPRETATION   MSSS TANGIBLE SUPPORT SUBSCALE 0   MSSS EMOTIONAL/INFORMATIONAL SUPPORT SUBSCALE 0   MSSS AFFECTIONATE SUPPORT SUBSCALE 0   MSSS POSITIVE SOCIAL INTERACTION SUBSCALE 0   MSSS TOTAL SCORE 0   MSSS SCORE INTERPRETATION Scores can range from 0-100, with higher scores indicating greater perceived support.         Exam:     Vitals unable to be obtained virtually         In general, the patient is well nourished and appears to be in no acute distress.          MENTAL STATUS: language is fluent, normal verbal comprehension, attention is normal, fund of knowlege is appropriate by vocabulary.         Imaging (personally reviewed):     MRI brain 8/25/23  1.  No acute intracranial abnormality.   2.  Findings consistent with reported history of multiple sclerosis. No evidence of active demyelination.     Results for orders placed during the hospital encounter of 05/16/22    MRI Brain Demyelinating W W/O Contrast    Impression  No abnormal enhancement as may occur with active demyelinating disease.      Electronically signed by: Jae Garnett Jr., MD  Date:    05/17/2022  Time:    08:29    Results for orders placed during the hospital encounter of 05/16/22    MRI Cervical Spine Demyelinating W W/O Contrast    Impression  1. No abnormal cord signal or findings to suggest demyelinating disease within the cervical spinal cord.  No abnormal enhancement.  2. Minimal degenerative change as described above without significant spinal or foraminal stenosis.  These findings are unchanged.      Electronically signed by: Jae Garnett Jr., MD  Date:    05/17/2022  Time:    09:03    Results for orders placed during the hospital encounter of 05/16/22    MRI Thoracic Spine Demyelinating W W/O Contrast    Impression  1. No abnormal cord signal or abnormal enhancement.  No findings to  "suggest demyelinating disease within the thoracic cord.  2. Right-sided foraminal stenosis at T2-T3, T3-T4, T4-T5, and T5-T6 could result in radiculopathy.      Electronically signed by: Jae Garnett Jr., MD  Date:    05/17/2022  Time:    08:59      Labs:     Lab Results   Component Value Date    ZTNRJYYG95TL 125 (H) 09/07/2023    LCQXKZCP43RN 29 (L) 07/12/2023    JLLJIVIF02ML 38 01/31/2023     Lab Results   Component Value Date    JCVINDEX 1.67 (A) 04/19/2018    JCVANTIBODY Positive (A) 04/19/2018     No results found for: "RK6VNPVM", "ABSOLUTECD3", "OY8VGDVQ", "ABSOLUTECD8", "CM5RDHEW", "ABSOLUTECD4", "LABCD48"  Lab Results   Component Value Date    WBC 6.83 09/07/2023    RBC 5.65 (H) 09/07/2023    HGB 11.6 (L) 09/07/2023    HCT 40.4 09/07/2023    MCV 72 (L) 09/07/2023    MCH 20.5 (L) 09/07/2023    MCHC 28.7 (L) 09/07/2023    RDW 16.3 (H) 09/07/2023     09/07/2023    MPV 10.1 09/07/2023    GRAN 3.4 09/07/2023    GRAN 49.8 09/07/2023    LYMPH 2.8 09/07/2023    LYMPH 40.4 09/07/2023    MONO 0.5 09/07/2023    MONO 7.2 09/07/2023    EOS 0.1 09/07/2023    BASO 0.06 09/07/2023    EOSINOPHIL 1.6 09/07/2023    BASOPHIL 0.9 09/07/2023     Sodium   Date Value Ref Range Status   09/07/2023 143 136 - 145 mmol/L Final     Potassium   Date Value Ref Range Status   09/07/2023 3.8 3.5 - 5.1 mmol/L Final     Chloride   Date Value Ref Range Status   09/07/2023 107 95 - 110 mmol/L Final     CO2   Date Value Ref Range Status   09/07/2023 24 23 - 29 mmol/L Final     Glucose   Date Value Ref Range Status   09/07/2023 82 70 - 110 mg/dL Final     BUN   Date Value Ref Range Status   09/07/2023 8 6 - 20 mg/dL Final     Creatinine   Date Value Ref Range Status   09/07/2023 0.8 0.5 - 1.4 mg/dL Final     Calcium   Date Value Ref Range Status   09/07/2023 9.7 8.7 - 10.5 mg/dL Final     Total Protein   Date Value Ref Range Status   09/07/2023 7.8 6.0 - 8.4 g/dL Final     Albumin   Date Value Ref Range Status   09/07/2023 3.8 3.5 - 5.2 " g/dL Final     Total Bilirubin   Date Value Ref Range Status   09/07/2023 0.2 0.1 - 1.0 mg/dL Final     Comment:     For infants and newborns, interpretation of results should be based  on gestational age, weight and in agreement with clinical  observations.    Premature Infant recommended reference ranges:  Up to 24 hours.............<8.0 mg/dL  Up to 48 hours............<12.0 mg/dL  3-5 days..................<15.0 mg/dL  6-29 days.................<15.0 mg/dL       Alkaline Phosphatase   Date Value Ref Range Status   09/07/2023 111 55 - 135 U/L Final     AST   Date Value Ref Range Status   09/07/2023 19 10 - 40 U/L Final     ALT   Date Value Ref Range Status   09/07/2023 21 10 - 44 U/L Final     Anion Gap   Date Value Ref Range Status   09/07/2023 12 8 - 16 mmol/L Final     eGFR if    Date Value Ref Range Status   05/16/2022 >60 >60 mL/min/1.73 m^2 Final     eGFR if non    Date Value Ref Range Status   05/16/2022 >60 >60 mL/min/1.73 m^2 Final     Comment:     Calculation used to obtain the estimated glomerular filtration  rate (eGFR) is the CKD-EPI equation.                      Diagnosis/Assessment/Plan:     Multiple Sclerosis  -Assessment:RRMS stable on Ocrevus. She has frequent pseudo-exacerbations but currently stable. MRI 8/2023 stable per report (unable to review OSH images)    -Imaging: repeat in Aug 2024  -Disease Modifying Therapies: Continue Ocrevus, labs in Feb 2024 prior to her March infusion. No contraindication to gastric bypass.  Symptom management   -Follow up SW re assistance for walker repair/replacement   -Discontinue vit D due to elevated levels   -Continue replacing iron and thiamine, this may help with fatigue   -Okay without muscle relaxers now since she's asymptomatic  Plan discussed and questions were answered to satisfaction.  Return to clinic in 6 months.    NEURO MULTIPLE SCLEROSIS IMPRESSION:   MS Status:     Number of relapses in the past year?:  0     Clinical Progression:  Clinically Stable    MRI Progression:  Stable  Plan:     DMT:  No change in management    Symptom Management:  Implement change in symptom management    Implement Change in Symptom Management:  Adaptive Needs      Candice Garrett MD, MSc  Attending neurologist

## 2024-01-26 ENCOUNTER — ANESTHESIA EVENT (OUTPATIENT)
Dept: ENDOSCOPY | Facility: HOSPITAL | Age: 44
End: 2024-01-26
Payer: MEDICARE

## 2024-01-26 NOTE — ANESTHESIA PREPROCEDURE EVALUATION
01/26/2024  Alicia Soliz is a 43 y.o., female.    Past Medical History:   Diagnosis Date    Allergy     Anemia     Arthritis     Cardiac arrest as complication of care     pt states she went into cardiac arrest from an allergic reaction to a medication    Depression     Encounter for blood transfusion     GERD (gastroesophageal reflux disease)     Hemiplegia due to old stroke     Hypertension     Morbid obesity with BMI of 45.0-49.9, adult 09/20/2018    Multiple sclerosis     Neuromuscular disorder      Past Surgical History:   Procedure Laterality Date    APPENDECTOMY      BREAST SURGERY      CHOLECYSTECTOMY      COLONOSCOPY N/A 11/25/2020    Procedure: COLONOSCOPY;  Surgeon: Andrew Jenkins III, MD;  Location: [facility];  Service: Endoscopy;  Laterality: N/A;    ESOPHAGOGASTRODUODENOSCOPY N/A 02/19/2020    Procedure: EGD (ESOPHAGOGASTRODUODENOSCOPY);  Surgeon: Michael Navarrete MD;  Location: [facility];  Service: Endoscopy;  Laterality: N/A;    ESOPHAGOGASTRODUODENOSCOPY N/A 02/20/2020    Procedure: EGD (ESOPHAGOGASTRODUODENOSCOPY);  Surgeon: Michael Navarrete MD;  Location: [facility];  Service: General;  Laterality: N/A;    ESOPHAGOGASTRODUODENOSCOPY N/A 11/25/2020    Procedure: EGD (ESOPHAGOGASTRODUODENOSCOPY);  Surgeon: Andrew Jenkins III, MD;  Location: [facility];  Service: Endoscopy;  Laterality: N/A;    ESOPHAGOGASTRODUODENOSCOPY N/A 9/25/2023    Procedure: EGD (ESOPHAGOGASTRODUODENOSCOPY);  Surgeon: Ly Kim MD;  Location: [facility];  Service: Endoscopy;  Laterality: N/A;    HYSTERECTOMY      KNEE SURGERY      ROBOT-ASSISTED LAPAROSCOPIC SLEEVE GASTRECTOMY USING DA ANTHONY XI N/A 02/20/2020    Procedure: XI ROBOTIC SLEEVE GASTRECTOMY;  Surgeon: Michael Navarrete MD;  Location: [facility] OR;  Service: General;  Laterality: N/A;    TENOPLASTY OF HAND Left 08/26/2021    Procedure: REPAIR, TENDON, HAND;   Surgeon: Joselito Lugo MD;  Location: [facility];  Service: Orthopedics;  Laterality: Left;  Left RCL PIP Joint Repair/Recon with Arthrex Internal Brace.    TONSILLECTOMY      TOTAL REDUCTION MAMMOPLASTY  2022    TUBAL LIGATION       Current Outpatient Medications   Medication Instructions    cholecalciferol (vitamin D3) 50,000 Units, Oral, Every 7 days    cyclobenzaprine (FLEXERIL) 10 mg, Oral, Every 8 hours    diclofenac sodium (VOLTAREN) 2 g, Topical (Top), 4 times daily    doxepin (SINEQUAN) 75 mg, Oral, Nightly    gabapentin (NEURONTIN) 900 mg, Oral, 2 times daily    HYDROcodone-acetaminophen (NORCO)  mg per tablet 1 tablet, Oral, 3 times daily    linaCLOtide (LINZESS) 145 mcg, Oral, Before breakfast    nystatin (MYCOSTATIN) cream Topical (Top), 2 times daily    OCREVUS 30 mg/mL Soln No dose, route, or frequency recorded.    topiramate (TOPAMAX) 50 MG tablet TAKE 1 TABLET BY MOUTH IN THE MORNING AND 2 TABLETS AT BEDTIME    valsartan (DIOVAN) 80 mg, Oral, Daily      Pre-op Assessment    I have reviewed the Patient Summary Reports.     I have reviewed the Nursing Notes. I have reviewed the NPO Status.   I have reviewed the Medications.     Review of Systems  Anesthesia Hx:   History of prior surgery of interest to airway management or planning:  Previous anesthesia: General          Denies Personal Hx of Anesthesia complications.                    Cardiovascular:     Hypertension                                        Hepatic/GI:  Bowel Prep.   GERD Liver Disease,            Musculoskeletal:  Arthritis               Neurological:   CVA, residual symptoms Neuromuscular Disease,       Hemiplegia; MS                            Endocrine:        Morbid Obesity / BMI > 40  Psych:  Psychiatric History  depression              Results for orders placed or performed during the hospital encounter of 08/24/23   SCHEDULED EKG 12-LEAD (to Muse)    Collection Time: 08/24/23 10:44 AM    Narrative    Test Reason :  I10,    Vent. Rate : 090 BPM     Atrial Rate : 090 BPM     P-R Int : 110 ms          QRS Dur : 066 ms      QT Int : 350 ms       P-R-T Axes : 039 106 -08 degrees     QTc Int : 428 ms    Sinus rhythm with short AK  Rightward axis  Abnormal QRS-T angle, consider primary T wave abnormality  Abnormal ECG  No previous ECGs available  Confirmed by GUY SCHNEIDER MD ([id number]) on 8/24/2023 6:52:20 PM    Referred By: ADDI KONG           Confirmed By:GUY SCHNEIDER MD        Physical Exam  General: Well nourished, Cooperative and Alert    Airway:  Mallampati: III   Mouth Opening: Normal  TM Distance: Normal  Tongue: Large  Neck ROM: Normal ROM        Anesthesia Plan  Type of Anesthesia, risks & benefits discussed:    Anesthesia Type: Gen Natural Airway  Intra-op Monitoring Plan: Standard ASA Monitors  Post Op Pain Control Plan: multimodal analgesia  Induction:  IV  Informed Consent: Informed consent signed with the Patient and all parties understand the risks and agree with anesthesia plan.  All questions answered.   ASA Score: 3  Day of Surgery Review of History & Physical: H&P Update referred to the surgeon/provider.    Ready For Surgery From Anesthesia Perspective.     .

## 2024-01-29 ENCOUNTER — ANESTHESIA (OUTPATIENT)
Dept: ENDOSCOPY | Facility: HOSPITAL | Age: 44
End: 2024-01-29
Payer: MEDICARE

## 2024-01-29 ENCOUNTER — HOSPITAL ENCOUNTER (OUTPATIENT)
Facility: HOSPITAL | Age: 44
Discharge: HOME | End: 2024-01-29
Attending: INTERNAL MEDICINE | Admitting: INTERNAL MEDICINE
Payer: MEDICARE

## 2024-01-29 VITALS
HEART RATE: 81 BPM | SYSTOLIC BLOOD PRESSURE: 125 MMHG | TEMPERATURE: 98 F | HEIGHT: 66 IN | BODY MASS INDEX: 46.28 KG/M2 | DIASTOLIC BLOOD PRESSURE: 82 MMHG | RESPIRATION RATE: 16 BRPM | WEIGHT: 288 LBS | OXYGEN SATURATION: 99 %

## 2024-01-29 DIAGNOSIS — Z90.3 H/O GASTRIC SLEEVE: ICD-10-CM

## 2024-01-29 DIAGNOSIS — K21.9 GASTROESOPHAGEAL REFLUX DISEASE WITHOUT ESOPHAGITIS: Primary | ICD-10-CM

## 2024-01-29 PROCEDURE — D9220A PRA ANESTHESIA: Mod: ,,, | Performed by: NURSE ANESTHETIST, CERTIFIED REGISTERED

## 2024-01-29 PROCEDURE — 63600175 PHARM REV CODE 636 W HCPCS: Mod: HCNC | Performed by: INTERNAL MEDICINE

## 2024-01-29 PROCEDURE — 27200942: Mod: HCNC | Performed by: INTERNAL MEDICINE

## 2024-01-29 PROCEDURE — 25000003 PHARM REV CODE 250: Mod: HCNC | Performed by: NURSE ANESTHETIST, CERTIFIED REGISTERED

## 2024-01-29 PROCEDURE — 37000008 HC ANESTHESIA 1ST 15 MINUTES: Mod: HCNC | Performed by: INTERNAL MEDICINE

## 2024-01-29 PROCEDURE — 43235 EGD DIAGNOSTIC BRUSH WASH: CPT | Mod: HCNC,59 | Performed by: INTERNAL MEDICINE

## 2024-01-29 PROCEDURE — 91035 G-ESOPH REFLX TST W/ELECTROD: CPT | Mod: TC,HCNC | Performed by: INTERNAL MEDICINE

## 2024-01-29 PROCEDURE — 43235 EGD DIAGNOSTIC BRUSH WASH: CPT | Mod: HCNC,,, | Performed by: INTERNAL MEDICINE

## 2024-01-29 RX ORDER — PROPOFOL 10 MG/ML
VIAL (ML) INTRAVENOUS
Status: DISCONTINUED | OUTPATIENT
Start: 2024-01-29 | End: 2024-01-29

## 2024-01-29 RX ORDER — SODIUM CHLORIDE, SODIUM LACTATE, POTASSIUM CHLORIDE, CALCIUM CHLORIDE 600; 310; 30; 20 MG/100ML; MG/100ML; MG/100ML; MG/100ML
INJECTION, SOLUTION INTRAVENOUS CONTINUOUS
Status: DISCONTINUED | OUTPATIENT
Start: 2024-01-29 | End: 2024-01-29 | Stop reason: HOSPADM

## 2024-01-29 RX ORDER — LIDOCAINE HYDROCHLORIDE 20 MG/ML
INJECTION INTRAVENOUS
Status: DISCONTINUED | OUTPATIENT
Start: 2024-01-29 | End: 2024-01-29

## 2024-01-29 RX ADMIN — LIDOCAINE HYDROCHLORIDE 80 MG: 20 INJECTION INTRAVENOUS at 09:01

## 2024-01-29 RX ADMIN — Medication 20 MG: at 09:01

## 2024-01-29 RX ADMIN — SODIUM CHLORIDE, POTASSIUM CHLORIDE, SODIUM LACTATE AND CALCIUM CHLORIDE: 600; 310; 30; 20 INJECTION, SOLUTION INTRAVENOUS at 09:01

## 2024-01-29 RX ADMIN — Medication 100 MG: at 09:01

## 2024-01-29 NOTE — ANESTHESIA POSTPROCEDURE EVALUATION
Anesthesia Post Evaluation    Patient: Alicia Soliz    Procedure(s) Performed: Procedure(s) (LRB):  EGD (ESOPHAGOGASTRODUODENOSCOPY) (N/A)  PH MONITORING, ESOPHAGUS, WIRELESS, (OFF REFLUX MEDS) (N/A)    Final Anesthesia Type: general      Patient location during evaluation: PACU  Patient participation: Yes- Able to Participate  Level of consciousness: awake and alert and oriented  Post-procedure vital signs: reviewed and stable  Pain management: adequate  Airway patency: patent    PONV status at discharge: No PONV  Anesthetic complications: no      Cardiovascular status: blood pressure returned to baseline, stable and hemodynamically stable  Respiratory status: unassisted  Hydration status: euvolemic  Follow-up not needed.              Vitals Value Taken Time   /82 01/29/24 1003   Temp 36.5 °C (97.7 °F) 01/29/24 0930   Pulse 83 01/29/24 1005   Resp 20 01/29/24 1005   SpO2 99 % 01/29/24 1005   Vitals shown include unvalidated device data.      Event Time   Out of Recovery 10:00:00         Pain/Alban Score: Alban Score: 9 (1/29/2024  9:50 AM)

## 2024-01-29 NOTE — PROVATION PATIENT INSTRUCTIONS
Discharge Summary/Instructions after an Endoscopic Procedure  Patient Name: Alicia Soliz  Patient MRN: [id number]  Patient YOB: 1980 Monday, January 29, 2024  Ly Kim MD  Dear patient,  As a result of recent federal legislation (The Federal Cures Act), you may   receive lab or pathology results from your procedure in your [facility]   account before your physician is able to contact you. Your physician or   their representative will relay the results to you with their   recommendations at their soonest availability.  Thank you,  RESTRICTIONS:  During your procedure today, you received medications for sedation.  These   medications may affect your judgment, balance and coordination.  Therefore,   for 24 hours, you have the following restrictions:   - DO NOT drive a car, operate machinery, make legal/financial decisions,   sign important papers or drink alcohol.    ACTIVITY:  Today: no heavy lifting, straining or running due to procedural   sedation/anesthesia.  The following day: return to full activity including work.  DIET:  Eat and drink normally unless instructed otherwise.     TREATMENT FOR COMMON SIDE EFFECTS:  - Mild abdominal pain, nausea, belching, bloating or excessive gas:  rest,   eat lightly and use a heating pad.  - Sore Throat: treat with throat lozenges and/or gargle with warm salt   water.  - Because air was used during the procedure, expelling large amounts of air   from your rectum or belching is normal.  - If a bowel prep was taken, you may not have a bowel movement for 1-3 days.    This is normal.  SYMPTOMS TO WATCH FOR AND REPORT TO YOUR PHYSICIAN:  1. Abdominal pain or bloating, other than gas cramps.  2. Chest pain.  3. Back pain.  4. Signs of infection such as: chills or fever occurring within 24 hours   after the procedure.  5. Rectal bleeding, which would show as bright red, maroon, or black stools.   (A tablespoon of blood from the rectum is not serious, especially  if   hemorrhoids are present.)  6. Vomiting.  7. Weakness or dizziness.  GO DIRECTLY TO THE NEAREST EMERGENCY ROOM IF YOU HAVE ANY OF THE FOLLOWING:      Difficulty breathing              Chills and/or fever over 101 F   Persistent vomiting and/or vomiting blood   Severe abdominal pain   Severe chest pain   Black, tarry stools   Bleeding- more than one tablespoon   Any other symptom or condition that you feel may need urgent attention  Your doctor recommends these additional instructions:  If any biopsies were taken, your doctors clinic will contact you in 1 to 2   weeks with any results.  - Discharge patient to home (via wheelchair).   - Resume previous diet.   - Continue present medications.   - Patient has a contact number available for emergencies.  The signs and   symptoms of potential delayed complications were discussed with the   patient.  Return to normal activities tomorrow.  Written discharge   instructions were provided to the patient.   - Resume anticoagulant at prior dose.  For questions, problems or results please call your physician Ly Kim MD at Work:  [phone]  If you have any questions about the above instructions, call the GI   department at [phone] or call the endoscopy unit at [phone]   from 7am until 3 pm.  [facility] [place], EMERGENCY ROOM PHONE NUMBER:   [phone]  IF A COMPLICATION OR EMERGENCY SITUATION ARISES AND YOU ARE UNABLE TO REACH   YOUR PHYSICIAN - GO DIRECTLY TO THE EMERGENCY ROOM.  I have read or have had read to me these discharge instructions for my   procedure and have received a written copy.  I understand these   instructions and will follow-up with my physician if I have any questions.     __________________________________       _____________________________________  Nurse Signature                                          Patient/Designated   Responsible Party Signature  MD Ly Coleman,  MD  1/29/2024 9:32:01 AM  [employer]

## 2024-01-29 NOTE — DISCHARGE SUMMARY
[facility] - Endoscopy 1st Fl  Discharge Note  Short Stay    Procedure(s) (LRB):  EGD (ESOPHAGOGASTRODUODENOSCOPY) (N/A)  PH MONITORING, ESOPHAGUS, WIRELESS, (OFF REFLUX MEDS) (N/A)      OUTCOME: Patient tolerated treatment/procedure well without complication and is now ready for discharge.    DISPOSITION: Home or Self Care    FINAL DIAGNOSIS:  Gastroesophageal reflux disease without esophagitis    FOLLOWUP: With primary care provider    DISCHARGE INSTRUCTIONS:  No discharge procedures on file.      Clinical Reference Documents Added to Patient Instructions         Document    GASTRIC PH PROBE (ENGLISH)    GASTRITIS (ENGLISH)

## 2024-01-29 NOTE — TRANSFER OF CARE
"Anesthesia Transfer of Care Note    Patient: Alicia Soliz    Procedure(s) Performed: Procedure(s) (LRB):  EGD (ESOPHAGOGASTRODUODENOSCOPY) (N/A)  PH MONITORING, ESOPHAGUS, WIRELESS, (OFF REFLUX MEDS) (N/A)    Patient location: PACU    Anesthesia Type: general    Transport from OR: Transported from OR on room air with adequate spontaneous ventilation    Post pain: adequate analgesia    Post assessment: no apparent anesthetic complications    Post vital signs: stable    Level of consciousness: awake, sedated and responds to stimulation    Nausea/Vomiting: no nausea/vomiting    Complications: none    Transfer of care protocol was followed      Last vitals: Visit Vitals  BP (!) 181/89 (BP Location: Right arm, Patient Position: Sitting)   Pulse 95   Temp 36.6 °C (97.8 °F) (Temporal)   Resp 16   Ht 5' 6" (1.676 m)   Wt 130.6 kg (288 lb)   SpO2 100%   Breastfeeding No   BMI 46.48 kg/m²     "

## 2024-01-29 NOTE — H&P
Short Stay Endoscopy History and Physical    PCP - Braden Dumont MD    Procedure - EGD with Bravo  ASA - 2  Mallampati - per anesthesia  History of Anesthesia problems - no  Family history Anesthesia problems -  no     HPI:  This is a 43 y.o. female here for evaluation of :   Active Hospital Problems    Diagnosis  POA    *Gastroesophageal reflux disease without esophagitis [K21.9]  No    H/O gastric sleeve [Z90.3]  Not Applicable      Resolved Hospital Problems   No resolved problems to display.         ROS:  CONSTITUTIONAL: Denies weight change,  fatigue, fevers, chills, night sweats.  CARDIOVASCULAR: Denies chest pain, shortness of breath, orthopnea and edema.  RESPIRATORY: Denies cough, hemoptysis, dyspnea, and wheezing.  GI: See HPI.    Medical History:   Past Medical History:   Diagnosis Date    Allergy     Anemia     Arthritis     Cardiac arrest as complication of care     pt states she went into cardiac arrest from an allergic reaction to a medication    Depression     Encounter for blood transfusion     GERD (gastroesophageal reflux disease)     Hemiplegia due to old stroke     Hypertension     Morbid obesity with BMI of 45.0-49.9, adult 09/20/2018    Multiple sclerosis     Neuromuscular disorder        Surgical History:   Past Surgical History:   Procedure Laterality Date    APPENDECTOMY      BREAST SURGERY      CHOLECYSTECTOMY      COLONOSCOPY N/A 11/25/2020    Procedure: COLONOSCOPY;  Surgeon: Andrew Jenkins III, MD;  Location: [facility] ENDO;  Service: Endoscopy;  Laterality: N/A;    ESOPHAGOGASTRODUODENOSCOPY N/A 02/19/2020    Procedure: EGD (ESOPHAGOGASTRODUODENOSCOPY);  Surgeon: Michael Navarrete MD;  Location: [facility] ENDO;  Service: Endoscopy;  Laterality: N/A;    ESOPHAGOGASTRODUODENOSCOPY N/A 02/20/2020    Procedure: EGD (ESOPHAGOGASTRODUODENOSCOPY);  Surgeon: Michael Navarrete MD;  Location: [facility] OR;  Service: General;  Laterality: N/A;    ESOPHAGOGASTRODUODENOSCOPY N/A 11/25/2020    Procedure: EGD  "(ESOPHAGOGASTRODUODENOSCOPY);  Surgeon: Andrew Jenkins III, MD;  Location: [facility] ENDO;  Service: Endoscopy;  Laterality: N/A;    ESOPHAGOGASTRODUODENOSCOPY N/A 9/25/2023    Procedure: EGD (ESOPHAGOGASTRODUODENOSCOPY);  Surgeon: Ly Kim MD;  Location: [facility];  Service: Endoscopy;  Laterality: N/A;    HYSTERECTOMY      KNEE SURGERY      ROBOT-ASSISTED LAPAROSCOPIC SLEEVE GASTRECTOMY USING DA ANTHONY XI N/A 02/20/2020    Procedure: XI ROBOTIC SLEEVE GASTRECTOMY;  Surgeon: Michael Navarrete MD;  Location: [facility] OR;  Service: General;  Laterality: N/A;    TENOPLASTY OF HAND Left 08/26/2021    Procedure: REPAIR, TENDON, HAND;  Surgeon: Joselito Lugo MD;  Location: [facility] OR;  Service: Orthopedics;  Laterality: Left;  Left RCL PIP Joint Repair/Recon with Arthrex Internal Brace.    TONSILLECTOMY      TOTAL REDUCTION MAMMOPLASTY  2022    TUBAL LIGATION         Family History:  Family History   Problem Relation Age of Onset    Lupus Mother     Heart disease Mother     Hypertension Mother     Diabetes Father     Kidney disease Father     No Known Problems Sister     No Known Problems Sister     No Known Problems Brother     No Known Problems Brother     No Known Problems Daughter     No Known Problems Daughter     No Known Problems Daughter     Cancer Maternal Aunt 40        breast    Breast cancer Maternal Aunt     Diabetes Maternal Aunt     COPD Maternal Aunt     Heart disease Maternal Grandmother     Breast cancer Maternal Cousin     Ovarian cancer Maternal Cousin        Social History:   Social History     Tobacco Use    Smoking status: Never     Passive exposure: Never    Smokeless tobacco: Never   Substance Use Topics    Alcohol use: Yes     Comment: once a year     Drug use: No       Allergy  Review of patient's allergies indicates:   Allergen Reactions    Demerol [meperidine] Anaphylaxis     Other reaction(s): Itching    Dilaudid [hydromorphone (bulk)] Anaphylaxis     "coded" per pt  Patient can tolerate " Lortab    Shellfish containing products Itching, Nausea And Vomiting and Swelling    Prednisone Itching     Other reaction(s): Itching    Tramadol      shaking       Medications:   No current facility-administered medications on file prior to encounter.     Current Outpatient Medications on File Prior to Encounter   Medication Sig Dispense Refill    cholecalciferol, vitamin D3, 1,250 mcg (50,000 unit) capsule Take 1 capsule (50,000 Units total) by mouth every 7 days. for 365 doses 12 capsule 28    diclofenac sodium (VOLTAREN) 1 % Gel Apply 2 g topically 4 (four) times daily. 450 g 11    doxepin (SINEQUAN) 75 MG capsule Take 75 mg by mouth every evening.      gabapentin (NEURONTIN) 300 MG capsule Take 3 capsules (900 mg total) by mouth 2 (two) times daily. 540 capsule 1    HYDROcodone-acetaminophen (NORCO)  mg per tablet Take 1 tablet by mouth 3 (three) times daily.      linaCLOtide (LINZESS) 145 mcg Cap capsule Take 1 capsule (145 mcg total) by mouth before breakfast. 90 capsule 3    OCREVUS 30 mg/mL Soln       topiramate (TOPAMAX) 50 MG tablet TAKE 1 TABLET BY MOUTH IN THE MORNING AND 2 TABLETS AT BEDTIME 270 tablet 1    valsartan (DIOVAN) 80 MG tablet Take 1 tablet (80 mg total) by mouth once daily. 90 tablet 3       Physical Exam:  Vital Signs: There were no vitals filed for this visit.  General Appearance: Well appearing in no acute distress  ENT: OP clear  Chest: CTA B  CV: RRR, no m/r/g  Abd: s/nt/nd/nabs  Ext: no edema    Labs:  Reviewed    IMP:  Active Hospital Problems    Diagnosis  POA    *Gastroesophageal reflux disease without esophagitis [K21.9]  No    H/O gastric sleeve [Z90.3]  Not Applicable      Resolved Hospital Problems   No resolved problems to display.         Plan:  I have explained the risks and benefits of endoscopy procedures to the patient including but not limited to bleeding, perforation, infection, and death. The patient wishes to proceed.

## 2024-01-29 NOTE — PLAN OF CARE
Discharge instructions reviewed with patient. Handouts given & verbalized understanding with no further questions at this time.  spoke to pt at bedside, reviewed procedure and findings, answered questions. MD telephone number provided per AVS sheet. VSS on RA, no pain or nausea noted, tolerating po fluids, no complaints noted. Fall precautions reviewed, consents in chart, PIV removed at this time. Bravo education completed by Endo CONSUELO Dhillon prior to discharge.

## 2024-02-01 ENCOUNTER — LAB VISIT (OUTPATIENT)
Dept: LAB | Facility: HOSPITAL | Age: 44
End: 2024-02-01
Attending: FAMILY MEDICINE
Payer: MEDICARE

## 2024-02-01 DIAGNOSIS — R73.9 HYPERGLYCEMIA: ICD-10-CM

## 2024-02-01 DIAGNOSIS — Z00.00 ROUTINE ADULT HEALTH MAINTENANCE: ICD-10-CM

## 2024-02-01 DIAGNOSIS — E78.5 HYPERLIPIDEMIA, UNSPECIFIED HYPERLIPIDEMIA TYPE: ICD-10-CM

## 2024-02-01 LAB
BASOPHILS # BLD AUTO: 0.04 K/UL (ref 0–0.2)
BASOPHILS NFR BLD: 0.7 % (ref 0–1.9)
DIFFERENTIAL METHOD BLD: ABNORMAL
EOSINOPHIL # BLD AUTO: 0.1 K/UL (ref 0–0.5)
EOSINOPHIL NFR BLD: 0.8 % (ref 0–8)
ERYTHROCYTE [DISTWIDTH] IN BLOOD BY AUTOMATED COUNT: 17.7 % (ref 11.5–14.5)
HCT VFR BLD AUTO: 39.3 % (ref 37–48.5)
HGB BLD-MCNC: 11.6 G/DL (ref 12–16)
IMM GRANULOCYTES # BLD AUTO: 0.01 K/UL (ref 0–0.04)
IMM GRANULOCYTES NFR BLD AUTO: 0.2 % (ref 0–0.5)
LYMPHOCYTES # BLD AUTO: 2 K/UL (ref 1–4.8)
LYMPHOCYTES NFR BLD: 33.9 % (ref 18–48)
MCH RBC QN AUTO: 20.4 PG (ref 27–31)
MCHC RBC AUTO-ENTMCNC: 29.5 G/DL (ref 32–36)
MCV RBC AUTO: 69 FL (ref 82–98)
MONOCYTES # BLD AUTO: 0.4 K/UL (ref 0.3–1)
MONOCYTES NFR BLD: 6 % (ref 4–15)
NEUTROPHILS # BLD AUTO: 3.5 K/UL (ref 1.8–7.7)
NEUTROPHILS NFR BLD: 58.4 % (ref 38–73)
NRBC BLD-RTO: 0 /100 WBC
PLATELET # BLD AUTO: 287 K/UL (ref 150–450)
PMV BLD AUTO: 10 FL (ref 9.2–12.9)
RBC # BLD AUTO: 5.68 M/UL (ref 4–5.4)
WBC # BLD AUTO: 6.02 K/UL (ref 3.9–12.7)

## 2024-02-01 PROCEDURE — 80053 COMPREHEN METABOLIC PANEL: CPT | Mod: HCNC | Performed by: FAMILY MEDICINE

## 2024-02-01 PROCEDURE — 83036 HEMOGLOBIN GLYCOSYLATED A1C: CPT | Mod: HCNC | Performed by: FAMILY MEDICINE

## 2024-02-01 PROCEDURE — 85025 COMPLETE CBC W/AUTO DIFF WBC: CPT | Mod: HCNC | Performed by: FAMILY MEDICINE

## 2024-02-01 PROCEDURE — 84443 ASSAY THYROID STIM HORMONE: CPT | Mod: HCNC | Performed by: FAMILY MEDICINE

## 2024-02-01 PROCEDURE — 36415 COLL VENOUS BLD VENIPUNCTURE: CPT | Mod: HCNC,PO | Performed by: FAMILY MEDICINE

## 2024-02-01 PROCEDURE — 87389 HIV-1 AG W/HIV-1&-2 AB AG IA: CPT | Mod: HCNC | Performed by: FAMILY MEDICINE

## 2024-02-01 PROCEDURE — 80061 LIPID PANEL: CPT | Mod: HCNC | Performed by: FAMILY MEDICINE

## 2024-02-01 PROCEDURE — 86592 SYPHILIS TEST NON-TREP QUAL: CPT | Mod: HCNC | Performed by: FAMILY MEDICINE

## 2024-02-01 PROCEDURE — 80074 ACUTE HEPATITIS PANEL: CPT | Mod: HCNC | Performed by: FAMILY MEDICINE

## 2024-02-02 ENCOUNTER — PATIENT MESSAGE (OUTPATIENT)
Dept: INTERNAL MEDICINE | Facility: CLINIC | Age: 44
End: 2024-02-02
Payer: MEDICARE

## 2024-02-02 LAB
ALBUMIN SERPL BCP-MCNC: 3.6 G/DL (ref 3.5–5.2)
ALP SERPL-CCNC: 104 U/L (ref 55–135)
ALT SERPL W/O P-5'-P-CCNC: 6 U/L (ref 10–44)
ANION GAP SERPL CALC-SCNC: 13 MMOL/L (ref 8–16)
AST SERPL-CCNC: 14 U/L (ref 10–40)
BILIRUB SERPL-MCNC: 0.3 MG/DL (ref 0.1–1)
BUN SERPL-MCNC: 6 MG/DL (ref 6–20)
CALCIUM SERPL-MCNC: 9.2 MG/DL (ref 8.7–10.5)
CHLORIDE SERPL-SCNC: 105 MMOL/L (ref 95–110)
CHOLEST SERPL-MCNC: 193 MG/DL (ref 120–199)
CHOLEST/HDLC SERPL: 2.8 {RATIO} (ref 2–5)
CO2 SERPL-SCNC: 22 MMOL/L (ref 23–29)
CREAT SERPL-MCNC: 0.8 MG/DL (ref 0.5–1.4)
EST. GFR  (NO RACE VARIABLE): >60 ML/MIN/1.73 M^2
ESTIMATED AVG GLUCOSE: 114 MG/DL (ref 68–131)
GLUCOSE SERPL-MCNC: 89 MG/DL (ref 70–110)
HAV IGM SERPL QL IA: NORMAL
HBA1C MFR BLD: 5.6 % (ref 4–5.6)
HBV CORE IGM SERPL QL IA: NORMAL
HBV SURFACE AG SERPL QL IA: NORMAL
HCV AB SERPL QL IA: NORMAL
HDLC SERPL-MCNC: 69 MG/DL (ref 40–75)
HDLC SERPL: 35.8 % (ref 20–50)
HIV 1+2 AB+HIV1 P24 AG SERPL QL IA: NORMAL
LDLC SERPL CALC-MCNC: 110.8 MG/DL (ref 63–159)
NONHDLC SERPL-MCNC: 124 MG/DL
POTASSIUM SERPL-SCNC: 3.4 MMOL/L (ref 3.5–5.1)
PROT SERPL-MCNC: 7.3 G/DL (ref 6–8.4)
RPR SER QL: NORMAL
SODIUM SERPL-SCNC: 140 MMOL/L (ref 136–145)
TRIGL SERPL-MCNC: 66 MG/DL (ref 30–150)
TSH SERPL DL<=0.005 MIU/L-ACNC: 0.87 UIU/ML (ref 0.4–4)

## 2024-02-09 ENCOUNTER — TELEPHONE (OUTPATIENT)
Dept: INTERNAL MEDICINE | Facility: CLINIC | Age: 44
End: 2024-02-09
Payer: MEDICARE

## 2024-02-09 ENCOUNTER — PATIENT MESSAGE (OUTPATIENT)
Dept: INTERNAL MEDICINE | Facility: CLINIC | Age: 44
End: 2024-02-09
Payer: MEDICARE

## 2024-02-09 ENCOUNTER — TELEPHONE (OUTPATIENT)
Dept: BARIATRICS | Facility: CLINIC | Age: 44
End: 2024-02-09
Payer: MEDICARE

## 2024-02-09 NOTE — TELEPHONE ENCOUNTER
----- Message from Marcelina Womack sent at 2/9/2024  3:42 PM CST -----  Regarding: concerns  Name of who is calling:   Alicia      What is the request in detail: Pt is requesting a call back in ref to being [place] [facility] after fainting having a seizure  / diagnosed with sudo seizure/ triggers nerves / discharged today/ Zanax. Needs rx for Zanax and pain / pt needs to be seen today    Can the clinic reply by [facility]:no      What number to call back if not [facility]: [phone]

## 2024-02-09 NOTE — TELEPHONE ENCOUNTER
Notify that no, xanax is not a good idea - interacts with her pain medication  She needs to follow up with psychiatry -has had several referrals placed recently but not attended appt

## 2024-02-09 NOTE — TELEPHONE ENCOUNTER
Appointment scheduled with patient for follow up.  [facility] and [facility].  The put her on Xanax for anxiety.  Patient wanted to know if rx could be refilled. I informed patient I would send PCP a message.  PCP is out of the clinic sick responses will take longer than usual.  Informed appt will need to be made for assessment

## 2024-02-12 ENCOUNTER — TELEPHONE (OUTPATIENT)
Dept: BARIATRICS | Facility: CLINIC | Age: 44
End: 2024-02-12
Payer: MEDICARE

## 2024-02-12 ENCOUNTER — PATIENT MESSAGE (OUTPATIENT)
Dept: BARIATRICS | Facility: CLINIC | Age: 44
End: 2024-02-12
Payer: MEDICARE

## 2024-02-12 NOTE — TELEPHONE ENCOUNTER
Pt said she did not ask for xanax or dont know she was put on it on the past? She said disreguard that message/ done

## 2024-02-14 ENCOUNTER — PATIENT MESSAGE (OUTPATIENT)
Dept: BARIATRICS | Facility: CLINIC | Age: 44
End: 2024-02-14
Payer: MEDICARE

## 2024-02-18 ENCOUNTER — PATIENT MESSAGE (OUTPATIENT)
Dept: ADMINISTRATIVE | Facility: OTHER | Age: 44
End: 2024-02-18
Payer: MEDICARE

## 2024-02-20 ENCOUNTER — PATIENT MESSAGE (OUTPATIENT)
Dept: BARIATRICS | Facility: CLINIC | Age: 44
End: 2024-02-20
Payer: MEDICARE

## 2024-02-20 PROCEDURE — 91035 G-ESOPH REFLX TST W/ELECTROD: CPT | Mod: 26,HCNC,, | Performed by: INTERNAL MEDICINE

## 2024-02-20 NOTE — PROVATION PATIENT INSTRUCTIONS
Discharge Summary/Instructions after an Endoscopic Procedure  Patient Name: Alicia Soliz  Patient MRN: [id number]  Patient YOB: 1980 Monday, January 29, 2024  Ly Kim MD  Dear patient,  As a result of recent federal legislation (The Federal Cures Act), you may   receive lab or pathology results from your procedure in your [facility]   account before your physician is able to contact you. Your physician or   their representative will relay the results to you with their   recommendations at their soonest availability.  Thank you,  RESTRICTIONS:  During your procedure today, you received medications for sedation.  These   medications may affect your judgment, balance and coordination.  Therefore,   for 24 hours, you have the following restrictions:   - DO NOT drive a car, operate machinery, make legal/financial decisions,   sign important papers or drink alcohol.    ACTIVITY:  Today: no heavy lifting, straining or running due to procedural   sedation/anesthesia.  The following day: return to full activity including work.  DIET:  Eat and drink normally unless instructed otherwise.     TREATMENT FOR COMMON SIDE EFFECTS:  - Mild abdominal pain, nausea, belching, bloating or excessive gas:  rest,   eat lightly and use a heating pad.  - Sore Throat: treat with throat lozenges and/or gargle with warm salt   water.  - Because air was used during the procedure, expelling large amounts of air   from your rectum or belching is normal.  - If a bowel prep was taken, you may not have a bowel movement for 1-3 days.    This is normal.  SYMPTOMS TO WATCH FOR AND REPORT TO YOUR PHYSICIAN:  1. Abdominal pain or bloating, other than gas cramps.  2. Chest pain.  3. Back pain.  4. Signs of infection such as: chills or fever occurring within 24 hours   after the procedure.  5. Rectal bleeding, which would show as bright red, maroon, or black stools.   (A tablespoon of blood from the rectum is not serious, especially  if   hemorrhoids are present.)  6. Vomiting.  7. Weakness or dizziness.  GO DIRECTLY TO THE NEAREST EMERGENCY ROOM IF YOU HAVE ANY OF THE FOLLOWING:      Difficulty breathing              Chills and/or fever over 101 F   Persistent vomiting and/or vomiting blood   Severe abdominal pain   Severe chest pain   Black, tarry stools   Bleeding- more than one tablespoon   Any other symptom or condition that you feel may need urgent attention  Your doctor recommends these additional instructions:  If any biopsies were taken, your doctors clinic will contact you in 1 to 2   weeks with any results.  - Recommend acid suppression medication.   - Follow an antireflux regimen.  For questions, problems or results please call your physician - Ly Kim MD at Work:  [phone].  [facility], EMERGENCY ROOM PHONE NUMBER: [phone]  IF A COMPLICATION OR EMERGENCY SITUATION ARISES AND YOU ARE UNABLE TO REACH   YOUR PHYSICIAN - GO DIRECTLY TO THE EMERGENCY ROOM.  MD Ly Coleman MD  2/20/2024 1:51:16 PM  [employer]

## 2024-02-21 ENCOUNTER — PATIENT MESSAGE (OUTPATIENT)
Dept: BARIATRICS | Facility: CLINIC | Age: 44
End: 2024-02-21
Payer: MEDICARE

## 2024-02-21 ENCOUNTER — TELEPHONE (OUTPATIENT)
Dept: BARIATRICS | Facility: CLINIC | Age: 44
End: 2024-02-21
Payer: MEDICARE

## 2024-02-22 ENCOUNTER — TELEPHONE (OUTPATIENT)
Dept: BARIATRICS | Facility: CLINIC | Age: 44
End: 2024-02-22
Payer: MEDICARE

## 2024-02-22 ENCOUNTER — TELEPHONE (OUTPATIENT)
Dept: NEUROLOGY | Facility: CLINIC | Age: 44
End: 2024-02-22
Payer: MEDICARE

## 2024-02-22 NOTE — TELEPHONE ENCOUNTER
Called pt to discuss selection findings. Informed pt that I was given the okay to schedule her sx. Informed pt that there is some paperwork that needs to be done prior to scheduling but I wanted her to be informed. Understanding stated. Pt stated she receives Ocrelizumeb infusions 2x a year and the last dose was on [phone]. Pt stated her doctor will schedule the next dose when she receives a sx date. Understanding stated. All questions and concerns addressed.

## 2024-02-24 ENCOUNTER — HOSPITAL ENCOUNTER (INPATIENT)
Facility: HOSPITAL | Age: 44
LOS: 1 days | Discharge: HOME | DRG: 101 | End: 2024-02-27
Attending: EMERGENCY MEDICINE | Admitting: HOSPITALIST
Payer: MEDICARE

## 2024-02-24 DIAGNOSIS — G45.9 TIA (TRANSIENT ISCHEMIC ATTACK): ICD-10-CM

## 2024-02-24 DIAGNOSIS — G35 MULTIPLE SCLEROSIS: ICD-10-CM

## 2024-02-24 DIAGNOSIS — R56.9 SEIZURE-LIKE ACTIVITY: ICD-10-CM

## 2024-02-24 DIAGNOSIS — R56.9 SEIZURES: ICD-10-CM

## 2024-02-24 PROCEDURE — 99285 EMERGENCY DEPT VISIT HI MDM: CPT | Mod: HCNC,25

## 2024-02-24 PROCEDURE — 96375 TX/PRO/DX INJ NEW DRUG ADDON: CPT | Mod: HCNC

## 2024-02-25 PROBLEM — R56.9 SEIZURE-LIKE ACTIVITY: Status: ACTIVE | Noted: 2024-02-25

## 2024-02-25 LAB
ALBUMIN SERPL BCP-MCNC: 3.4 G/DL (ref 3.5–5.2)
ALLENS TEST: ABNORMAL
ALP SERPL-CCNC: 92 U/L (ref 55–135)
ALT SERPL W/O P-5'-P-CCNC: 8 U/L (ref 10–44)
ANION GAP SERPL CALC-SCNC: 7 MMOL/L (ref 8–16)
APTT PPP: 24.2 SEC (ref 21–32)
AST SERPL-CCNC: 13 U/L (ref 10–40)
AV INDEX (PROSTH): 0.6
AV MEAN GRADIENT: 3 MMHG
AV PEAK GRADIENT: 6 MMHG
AV VALVE AREA BY VELOCITY RATIO: 1.77 CM²
AV VALVE AREA: 1.85 CM²
AV VELOCITY RATIO: 0.57
BASOPHILS # BLD AUTO: 0.04 K/UL (ref 0–0.2)
BASOPHILS NFR BLD: 0.7 % (ref 0–1.9)
BILIRUB SERPL-MCNC: 0.2 MG/DL (ref 0.1–1)
BILIRUB UR QL STRIP: NEGATIVE
BUN SERPL-MCNC: 7 MG/DL (ref 6–20)
CALCIUM SERPL-MCNC: 9.2 MG/DL (ref 8.7–10.5)
CHLORIDE SERPL-SCNC: 108 MMOL/L (ref 95–110)
CHOLEST SERPL-MCNC: 175 MG/DL (ref 120–199)
CHOLEST/HDLC SERPL: 3.3 {RATIO} (ref 2–5)
CLARITY UR: CLEAR
CO2 SERPL-SCNC: 24 MMOL/L (ref 23–29)
COLOR UR: YELLOW
CREAT SERPL-MCNC: 0.8 MG/DL (ref 0.5–1.4)
CV ECHO LV RWT: 0.4 CM
DELSYS: ABNORMAL
DIFFERENTIAL METHOD BLD: ABNORMAL
DOP CALC AO PEAK VEL: 1.27 M/S
DOP CALC AO VTI: 28.3 CM
DOP CALC LVOT AREA: 3.1 CM2
DOP CALC LVOT DIAMETER: 1.98 CM
DOP CALC LVOT PEAK VEL: 0.73 M/S
DOP CALC LVOT STROKE VOLUME: 52.32 CM3
DOP CALCLVOT PEAK VEL VTI: 17 CM
E WAVE DECELERATION TIME: 183.43 MSEC
E/A RATIO: 0.86
E/E' RATIO: 6 M/S
ECHO LV POSTERIOR WALL: 0.94 CM (ref 0.6–1.1)
EOSINOPHIL # BLD AUTO: 0.1 K/UL (ref 0–0.5)
EOSINOPHIL NFR BLD: 2.1 % (ref 0–8)
ERYTHROCYTE [DISTWIDTH] IN BLOOD BY AUTOMATED COUNT: 16.6 % (ref 11.5–14.5)
EST. GFR  (NO RACE VARIABLE): >60 ML/MIN/1.73 M^2
FRACTIONAL SHORTENING: 33 % (ref 28–44)
GLUCOSE SERPL-MCNC: 103 MG/DL (ref 70–110)
GLUCOSE UR QL STRIP: NEGATIVE
HCO3 UR-SCNC: 23.6 MMOL/L (ref 24–28)
HCT VFR BLD AUTO: 36.5 % (ref 37–48.5)
HDLC SERPL-MCNC: 53 MG/DL (ref 40–75)
HDLC SERPL: 30.3 % (ref 20–50)
HGB BLD-MCNC: 11.1 G/DL (ref 12–16)
HGB UR QL STRIP: NEGATIVE
IMM GRANULOCYTES # BLD AUTO: 0.01 K/UL (ref 0–0.04)
IMM GRANULOCYTES NFR BLD AUTO: 0.2 % (ref 0–0.5)
INR PPP: 0.9 (ref 0.8–1.2)
INTERVENTRICULAR SEPTUM: 0.9 CM (ref 0.6–1.1)
IVC DIAMETER: 1.01 CM
IVRT: 110.37 MSEC
KETONES UR QL STRIP: NEGATIVE
LA MAJOR: 5.47 CM
LA MINOR: 5.46 CM
LDLC SERPL CALC-MCNC: 100.6 MG/DL (ref 63–159)
LEFT ATRIUM SIZE: 2.91 CM
LEFT INTERNAL DIMENSION IN SYSTOLE: 3.15 CM (ref 2.1–4)
LEFT VENTRICLE DIASTOLIC VOLUME INDEX: 43.92 ML/M2
LEFT VENTRICLE DIASTOLIC VOLUME: 101.01 ML
LEFT VENTRICLE MASS INDEX: 63 G/M2
LEFT VENTRICLE SYSTOLIC VOLUME INDEX: 17.1 ML/M2
LEFT VENTRICLE SYSTOLIC VOLUME: 39.33 ML
LEFT VENTRICULAR INTERNAL DIMENSION IN DIASTOLE: 4.67 CM (ref 3.5–6)
LEFT VENTRICULAR MASS: 145.41 G
LEUKOCYTE ESTERASE UR QL STRIP: ABNORMAL
LV LATERAL E/E' RATIO: 5.5 M/S
LV SEPTAL E/E' RATIO: 6.6 M/S
LVOT MG: 1.19 MMHG
LVOT MV: 0.51 CM/S
LYMPHOCYTES # BLD AUTO: 2.4 K/UL (ref 1–4.8)
LYMPHOCYTES NFR BLD: 41.9 % (ref 18–48)
MCH RBC QN AUTO: 20.9 PG (ref 27–31)
MCHC RBC AUTO-ENTMCNC: 30.4 G/DL (ref 32–36)
MCV RBC AUTO: 69 FL (ref 82–98)
MICROSCOPIC COMMENT: ABNORMAL
MODE: ABNORMAL
MONOCYTES # BLD AUTO: 0.4 K/UL (ref 0.3–1)
MONOCYTES NFR BLD: 6.2 % (ref 4–15)
MV PEAK A VEL: 0.77 M/S
MV PEAK E VEL: 0.66 M/S
NEUTROPHILS # BLD AUTO: 2.8 K/UL (ref 1.8–7.7)
NEUTROPHILS NFR BLD: 48.9 % (ref 38–73)
NITRITE UR QL STRIP: NEGATIVE
NONHDLC SERPL-MCNC: 122 MG/DL
NRBC BLD-RTO: 0 /100 WBC
OHS QRS DURATION: 68 MS
OHS QTC CALCULATION: 430 MS
PCO2 BLDA: 43.7 MMHG (ref 35–45)
PH SMN: 7.34 [PH] (ref 7.35–7.45)
PH UR STRIP: 8 [PH] (ref 5–8)
PLATELET # BLD AUTO: 274 K/UL (ref 150–450)
PMV BLD AUTO: 9.8 FL (ref 9.2–12.9)
PO2 BLDA: 88 MMHG (ref 80–100)
POC BE: -2 MMOL/L
POC SATURATED O2: 96 % (ref 95–100)
POCT GLUCOSE: 118 MG/DL (ref 70–110)
POCT GLUCOSE: 69 MG/DL (ref 70–110)
POTASSIUM SERPL-SCNC: 3.8 MMOL/L (ref 3.5–5.1)
PROT SERPL-MCNC: 7.1 G/DL (ref 6–8.4)
PROT UR QL STRIP: ABNORMAL
PROTHROMBIN TIME: 10.6 SEC (ref 9–12.5)
PV MV: 0.42 M/S
PV PEAK GRADIENT: 1 MMHG
PV PEAK VELOCITY: 0.55 M/S
RA MAJOR: 5.2 CM
RA PRESSURE ESTIMATED: 3 MMHG
RBC # BLD AUTO: 5.3 M/UL (ref 4–5.4)
SAMPLE: ABNORMAL
SITE: ABNORMAL
SODIUM SERPL-SCNC: 139 MMOL/L (ref 136–145)
SP GR UR STRIP: 1.03 (ref 1–1.03)
SQUAMOUS #/AREA URNS HPF: 2 /HPF
STJ: 2.58 CM
TDI LATERAL: 0.12 M/S
TDI SEPTAL: 0.1 M/S
TDI: 0.11 M/S
TRIGL SERPL-MCNC: 107 MG/DL (ref 30–150)
TROPONIN I SERPL DL<=0.01 NG/ML-MCNC: <0.006 NG/ML (ref 0–0.03)
TSH SERPL DL<=0.005 MIU/L-ACNC: 1.91 UIU/ML (ref 0.4–4)
URN SPEC COLLECT METH UR: ABNORMAL
UROBILINOGEN UR STRIP-ACNC: NEGATIVE EU/DL
WBC # BLD AUTO: 5.65 K/UL (ref 3.9–12.7)
WBC #/AREA URNS HPF: 3 /HPF (ref 0–5)
WBC CLUMPS URNS QL MICRO: ABNORMAL
YEAST URNS QL MICRO: ABNORMAL
Z-SCORE OF LEFT VENTRICULAR DIMENSION IN END DIASTOLE: -6.46
Z-SCORE OF LEFT VENTRICULAR DIMENSION IN END SYSTOLE: -4.23

## 2024-02-25 PROCEDURE — 84443 ASSAY THYROID STIM HORMONE: CPT | Mod: HCNC | Performed by: STUDENT IN AN ORGANIZED HEALTH CARE EDUCATION/TRAINING PROGRAM

## 2024-02-25 PROCEDURE — 96375 TX/PRO/DX INJ NEW DRUG ADDON: CPT

## 2024-02-25 PROCEDURE — G0426 INPT/ED TELECONSULT50: HCPCS | Mod: HCNC,95,, | Performed by: STUDENT IN AN ORGANIZED HEALTH CARE EDUCATION/TRAINING PROGRAM

## 2024-02-25 PROCEDURE — 25000003 PHARM REV CODE 250: Mod: HCNC | Performed by: STUDENT IN AN ORGANIZED HEALTH CARE EDUCATION/TRAINING PROGRAM

## 2024-02-25 PROCEDURE — 96365 THER/PROPH/DIAG IV INF INIT: CPT

## 2024-02-25 PROCEDURE — 80061 LIPID PANEL: CPT | Mod: HCNC | Performed by: STUDENT IN AN ORGANIZED HEALTH CARE EDUCATION/TRAINING PROGRAM

## 2024-02-25 PROCEDURE — A9585 GADOBUTROL INJECTION: HCPCS | Mod: HCNC | Performed by: STUDENT IN AN ORGANIZED HEALTH CARE EDUCATION/TRAINING PROGRAM

## 2024-02-25 PROCEDURE — 25000003 PHARM REV CODE 250: Mod: HCNC | Performed by: NURSE PRACTITIONER

## 2024-02-25 PROCEDURE — G0378 HOSPITAL OBSERVATION PER HR: HCPCS | Mod: HCNC

## 2024-02-25 PROCEDURE — 63600175 PHARM REV CODE 636 W HCPCS: Mod: HCNC | Performed by: EMERGENCY MEDICINE

## 2024-02-25 PROCEDURE — 63600175 PHARM REV CODE 636 W HCPCS: Mod: HCNC | Performed by: STUDENT IN AN ORGANIZED HEALTH CARE EDUCATION/TRAINING PROGRAM

## 2024-02-25 PROCEDURE — 85610 PROTHROMBIN TIME: CPT | Mod: HCNC | Performed by: STUDENT IN AN ORGANIZED HEALTH CARE EDUCATION/TRAINING PROGRAM

## 2024-02-25 PROCEDURE — 25000003 PHARM REV CODE 250: Mod: HCNC | Performed by: INTERNAL MEDICINE

## 2024-02-25 PROCEDURE — 82803 BLOOD GASES ANY COMBINATION: CPT | Mod: HCNC

## 2024-02-25 PROCEDURE — 99900035 HC TECH TIME PER 15 MIN (STAT): Mod: HCNC

## 2024-02-25 PROCEDURE — 93005 ELECTROCARDIOGRAM TRACING: CPT | Mod: HCNC

## 2024-02-25 PROCEDURE — 96366 THER/PROPH/DIAG IV INF ADDON: CPT

## 2024-02-25 PROCEDURE — 36600 WITHDRAWAL OF ARTERIAL BLOOD: CPT | Mod: HCNC

## 2024-02-25 PROCEDURE — 63600175 PHARM REV CODE 636 W HCPCS: Mod: HCNC | Performed by: INTERNAL MEDICINE

## 2024-02-25 PROCEDURE — 93010 ELECTROCARDIOGRAM REPORT: CPT | Mod: HCNC,,, | Performed by: INTERNAL MEDICINE

## 2024-02-25 PROCEDURE — 80053 COMPREHEN METABOLIC PANEL: CPT | Mod: HCNC | Performed by: EMERGENCY MEDICINE

## 2024-02-25 PROCEDURE — 96372 THER/PROPH/DIAG INJ SC/IM: CPT | Performed by: STUDENT IN AN ORGANIZED HEALTH CARE EDUCATION/TRAINING PROGRAM

## 2024-02-25 PROCEDURE — 85730 THROMBOPLASTIN TIME PARTIAL: CPT | Mod: HCNC | Performed by: STUDENT IN AN ORGANIZED HEALTH CARE EDUCATION/TRAINING PROGRAM

## 2024-02-25 PROCEDURE — 84484 ASSAY OF TROPONIN QUANT: CPT | Mod: HCNC | Performed by: STUDENT IN AN ORGANIZED HEALTH CARE EDUCATION/TRAINING PROGRAM

## 2024-02-25 PROCEDURE — 25000003 PHARM REV CODE 250: Mod: HCNC | Performed by: EMERGENCY MEDICINE

## 2024-02-25 PROCEDURE — 85025 COMPLETE CBC W/AUTO DIFF WBC: CPT | Mod: HCNC | Performed by: EMERGENCY MEDICINE

## 2024-02-25 PROCEDURE — 81000 URINALYSIS NONAUTO W/SCOPE: CPT | Mod: HCNC | Performed by: STUDENT IN AN ORGANIZED HEALTH CARE EDUCATION/TRAINING PROGRAM

## 2024-02-25 PROCEDURE — 96361 HYDRATE IV INFUSION ADD-ON: CPT

## 2024-02-25 PROCEDURE — 96367 TX/PROPH/DG ADDL SEQ IV INF: CPT

## 2024-02-25 PROCEDURE — 95816 EEG AWAKE AND DROWSY: CPT | Mod: HCNC

## 2024-02-25 PROCEDURE — 25500020 PHARM REV CODE 255: Mod: HCNC | Performed by: STUDENT IN AN ORGANIZED HEALTH CARE EDUCATION/TRAINING PROGRAM

## 2024-02-25 RX ORDER — CYCLOBENZAPRINE HCL 10 MG
10 TABLET ORAL 3 TIMES DAILY PRN
Status: DISCONTINUED | OUTPATIENT
Start: 2024-02-25 | End: 2024-02-27 | Stop reason: HOSPADM

## 2024-02-25 RX ORDER — LABETALOL HYDROCHLORIDE 5 MG/ML
10 INJECTION, SOLUTION INTRAVENOUS
Status: DISCONTINUED | OUTPATIENT
Start: 2024-02-25 | End: 2024-02-27 | Stop reason: HOSPADM

## 2024-02-25 RX ORDER — ATORVASTATIN CALCIUM 40 MG/1
40 TABLET, FILM COATED ORAL DAILY
Status: DISCONTINUED | OUTPATIENT
Start: 2024-02-25 | End: 2024-02-27 | Stop reason: HOSPADM

## 2024-02-25 RX ORDER — SODIUM CHLORIDE 0.9 % (FLUSH) 0.9 %
10 SYRINGE (ML) INJECTION
Status: DISCONTINUED | OUTPATIENT
Start: 2024-02-25 | End: 2024-02-27 | Stop reason: HOSPADM

## 2024-02-25 RX ORDER — BISACODYL 10 MG/1
10 SUPPOSITORY RECTAL DAILY PRN
Status: DISCONTINUED | OUTPATIENT
Start: 2024-02-25 | End: 2024-02-27 | Stop reason: HOSPADM

## 2024-02-25 RX ORDER — LEVETIRACETAM 500 MG/5ML
2000 INJECTION, SOLUTION, CONCENTRATE INTRAVENOUS ONCE
Status: DISCONTINUED | OUTPATIENT
Start: 2024-02-25 | End: 2024-02-25

## 2024-02-25 RX ORDER — ONDANSETRON HYDROCHLORIDE 2 MG/ML
8 INJECTION, SOLUTION INTRAVENOUS EVERY 6 HOURS PRN
Status: DISCONTINUED | OUTPATIENT
Start: 2024-02-25 | End: 2024-02-27 | Stop reason: HOSPADM

## 2024-02-25 RX ORDER — ASPIRIN 81 MG/1
81 TABLET ORAL DAILY
Status: DISCONTINUED | OUTPATIENT
Start: 2024-02-25 | End: 2024-02-27 | Stop reason: HOSPADM

## 2024-02-25 RX ORDER — LORAZEPAM 2 MG/ML
2 INJECTION INTRAMUSCULAR ONCE
Status: COMPLETED | OUTPATIENT
Start: 2024-02-25 | End: 2024-02-25

## 2024-02-25 RX ORDER — GADOBUTROL 604.72 MG/ML
10 INJECTION INTRAVENOUS
Status: COMPLETED | OUTPATIENT
Start: 2024-02-25 | End: 2024-02-25

## 2024-02-25 RX ORDER — HYDROCODONE BITARTRATE AND ACETAMINOPHEN 10; 325 MG/1; MG/1
1 TABLET ORAL
Status: COMPLETED | OUTPATIENT
Start: 2024-02-25 | End: 2024-02-25

## 2024-02-25 RX ORDER — DOXEPIN HYDROCHLORIDE 25 MG/1
75 CAPSULE ORAL NIGHTLY
Status: DISCONTINUED | OUTPATIENT
Start: 2024-02-25 | End: 2024-02-27 | Stop reason: HOSPADM

## 2024-02-25 RX ORDER — KETOROLAC TROMETHAMINE 30 MG/ML
15 INJECTION, SOLUTION INTRAMUSCULAR; INTRAVENOUS
Status: COMPLETED | OUTPATIENT
Start: 2024-02-25 | End: 2024-02-25

## 2024-02-25 RX ORDER — LORAZEPAM 2 MG/ML
2 INJECTION INTRAMUSCULAR EVERY 4 HOURS PRN
Status: DISCONTINUED | OUTPATIENT
Start: 2024-02-25 | End: 2024-02-27 | Stop reason: HOSPADM

## 2024-02-25 RX ORDER — PANTOPRAZOLE SODIUM 40 MG/1
40 TABLET, DELAYED RELEASE ORAL DAILY
Status: DISCONTINUED | OUTPATIENT
Start: 2024-02-25 | End: 2024-02-27 | Stop reason: HOSPADM

## 2024-02-25 RX ORDER — HYDRALAZINE HYDROCHLORIDE 20 MG/ML
10 INJECTION INTRAMUSCULAR; INTRAVENOUS EVERY 8 HOURS PRN
Status: DISCONTINUED | OUTPATIENT
Start: 2024-02-25 | End: 2024-02-27 | Stop reason: HOSPADM

## 2024-02-25 RX ORDER — LEVETIRACETAM 10 MG/ML
2000 INJECTION INTRAVASCULAR ONCE
Status: COMPLETED | OUTPATIENT
Start: 2024-02-25 | End: 2024-02-25

## 2024-02-25 RX ORDER — HYDROCODONE BITARTRATE AND ACETAMINOPHEN 10; 325 MG/1; MG/1
1 TABLET ORAL 3 TIMES DAILY
Status: DISCONTINUED | OUTPATIENT
Start: 2024-02-25 | End: 2024-02-27 | Stop reason: HOSPADM

## 2024-02-25 RX ORDER — TOPIRAMATE 25 MG/1
50 TABLET ORAL 2 TIMES DAILY
Status: DISCONTINUED | OUTPATIENT
Start: 2024-02-25 | End: 2024-02-27

## 2024-02-25 RX ORDER — TALC
6 POWDER (GRAM) TOPICAL NIGHTLY PRN
Status: DISCONTINUED | OUTPATIENT
Start: 2024-02-25 | End: 2024-02-27 | Stop reason: HOSPADM

## 2024-02-25 RX ORDER — BUTALBITAL, ACETAMINOPHEN AND CAFFEINE 50; 325; 40 MG/1; MG/1; MG/1
1 TABLET ORAL EVERY 4 HOURS PRN
Status: DISCONTINUED | OUTPATIENT
Start: 2024-02-25 | End: 2024-02-27 | Stop reason: HOSPADM

## 2024-02-25 RX ORDER — ENOXAPARIN SODIUM 100 MG/ML
40 INJECTION SUBCUTANEOUS EVERY 12 HOURS
Status: DISCONTINUED | OUTPATIENT
Start: 2024-02-25 | End: 2024-02-27 | Stop reason: HOSPADM

## 2024-02-25 RX ORDER — HYDROXYZINE HYDROCHLORIDE 25 MG/1
25 TABLET, FILM COATED ORAL 3 TIMES DAILY PRN
Status: DISCONTINUED | OUTPATIENT
Start: 2024-02-25 | End: 2024-02-27 | Stop reason: HOSPADM

## 2024-02-25 RX ORDER — GLUCAGON 1 MG
1 KIT INJECTION
Status: DISCONTINUED | OUTPATIENT
Start: 2024-02-25 | End: 2024-02-27 | Stop reason: HOSPADM

## 2024-02-25 RX ORDER — LEVETIRACETAM 10 MG/ML
1000 INJECTION INTRAVASCULAR EVERY 12 HOURS
Status: DISCONTINUED | OUTPATIENT
Start: 2024-02-25 | End: 2024-02-27 | Stop reason: HOSPADM

## 2024-02-25 RX ORDER — GABAPENTIN 300 MG/1
600 CAPSULE ORAL 3 TIMES DAILY
Status: DISCONTINUED | OUTPATIENT
Start: 2024-02-25 | End: 2024-02-27 | Stop reason: HOSPADM

## 2024-02-25 RX ORDER — LORAZEPAM 2 MG/ML
2 INJECTION INTRAMUSCULAR ONCE
Status: DISCONTINUED | OUTPATIENT
Start: 2024-02-25 | End: 2024-02-27 | Stop reason: HOSPADM

## 2024-02-25 RX ADMIN — HYDROCODONE BITARTRATE AND ACETAMINOPHEN 1 TABLET: 10; 325 TABLET ORAL at 02:02

## 2024-02-25 RX ADMIN — HYDROCODONE BITARTRATE AND ACETAMINOPHEN 1 TABLET: 10; 325 TABLET ORAL at 01:02

## 2024-02-25 RX ADMIN — DEXTROSE MONOHYDRATE 250 ML: 100 INJECTION, SOLUTION INTRAVENOUS at 08:02

## 2024-02-25 RX ADMIN — DOXEPIN HYDROCHLORIDE 75 MG: 25 CAPSULE ORAL at 03:02

## 2024-02-25 RX ADMIN — LEVETIRACETAM INJECTION 1000 MG: 10 INJECTION INTRAVENOUS at 09:02

## 2024-02-25 RX ADMIN — CYCLOBENZAPRINE HYDROCHLORIDE 10 MG: 10 TABLET, FILM COATED ORAL at 11:02

## 2024-02-25 RX ADMIN — ENOXAPARIN SODIUM 40 MG: 40 INJECTION SUBCUTANEOUS at 09:02

## 2024-02-25 RX ADMIN — TOPIRAMATE 50 MG: 25 TABLET, FILM COATED ORAL at 11:02

## 2024-02-25 RX ADMIN — GABAPENTIN 600 MG: 300 CAPSULE ORAL at 11:02

## 2024-02-25 RX ADMIN — KETOROLAC TROMETHAMINE 15 MG: 30 INJECTION, SOLUTION INTRAMUSCULAR; INTRAVENOUS at 01:02

## 2024-02-25 RX ADMIN — GADOBUTROL 10 ML: 604.72 INJECTION INTRAVENOUS at 05:02

## 2024-02-25 RX ADMIN — LEVETIRACETAM INJECTION 2000 MG: 10 INJECTION INTRAVENOUS at 04:02

## 2024-02-25 RX ADMIN — ENOXAPARIN SODIUM 40 MG: 40 INJECTION SUBCUTANEOUS at 01:02

## 2024-02-25 RX ADMIN — GABAPENTIN 600 MG: 300 CAPSULE ORAL at 01:02

## 2024-02-25 RX ADMIN — ONDANSETRON 8 MG: 2 INJECTION INTRAMUSCULAR; INTRAVENOUS at 03:02

## 2024-02-25 RX ADMIN — CEFTRIAXONE 1 G: 1 INJECTION, POWDER, FOR SOLUTION INTRAMUSCULAR; INTRAVENOUS at 01:02

## 2024-02-25 RX ADMIN — LORAZEPAM 2 MG: 2 INJECTION INTRAMUSCULAR; INTRAVENOUS at 03:02

## 2024-02-25 NOTE — ASSESSMENT & PLAN NOTE
--home medications include: valsartan  --holding upon admission as this may be contributing to episodes  --PRN IV hydralazine 10mg Q6H for sBP > 175 mmHg  --Q4HVS

## 2024-02-25 NOTE — PROGRESS NOTES
Pharmacist Renal Dose Adjustment Note    Alicia Soliz is a 44 y.o. female being treated with the medication enoxaparin.    Patient Data:    Vital Signs (Most Recent):  Temp: 97.8 °F (36.6 °C) (02/24/24 2240)  Pulse: 87 (02/25/24 0047)  Resp: 18 (02/25/24 0237)  BP: 120/64 (02/25/24 0047)  SpO2: 100 % (02/25/24 0047) Vital Signs (72h Range):  Temp:  [97.8 °F (36.6 °C)]   Pulse:  [87-90]   Resp:  [18]   BP: (120-127)/(64-82)   SpO2:  [97 %-100 %]      Recent Labs   Lab 02/25/24  0011   CREATININE 0.8     Serum creatinine: 0.8 mg/dL 02/25/24 0011  Estimated creatinine clearance: 119.6 mL/min    Enoxaparin 40 mg subcutaneous every 24 hours will be changed to enoxaparin 40 mg subcutaneous every 12 hours for the prevention of DVT with BMI > 40 kg/m2.    Pharmacist's Name: Mat Vogt  Pharmacist's Extension: [phone]

## 2024-02-25 NOTE — ED PROVIDER NOTES
"SCRIBE #1 NOTE: I, Fina Valente, am scribing for, and in the presence of, Mahendra Dunlap MD. I have scribed the entire note.       History     Chief Complaint   Patient presents with    Seizure Like Activity      Pts family reports "shaking, seizure like activity" No post ictal phase. Pt has L sided weakness from previous stroke.     Review of patient's allergies indicates:   Allergen Reactions    Demerol [meperidine] Anaphylaxis     Other reaction(s): Itching    Dilaudid [hydromorphone (bulk)] Anaphylaxis     "coded" per pt  Patient can tolerate Lortab    Shellfish containing products Itching, Nausea And Vomiting and Swelling    Prednisone Itching     Other reaction(s): Itching    Tramadol      shaking         History of Present Illness     HPI    2/24/2024, 11:48 PM  History obtained from the patient      History of Present Illness: Alicia Soliz is a 44 y.o. 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 Pt stated "I had a pseudoseizure" and also stated "I have MS and I'm having a flare-up." Pt does not take any seizure medication. Her family witnessed her "shake and fall over." Pt also stated that she had a stroke in 2015 which resulted in L-sided hemiplegia. Pt states her L-sided hemiplegia/pain became much worse than usual yesterday. Pt used to take seizure medication, but stopped in 2016. Symptoms are constant and moderate in severity. No mitigating or exacerbating factors reported. Associated sxs include HA, L-arm pain and immobility, and L-leg pain and immobility. Patient denies any CP, SOB, fever, and all other sxs at this time. No prior Tx reported. No further complaints or concerns at this time.       Arrival mode: Ambulance Service     PCP: Braden Dumont " MD PAULO        Past Medical History:  Past Medical History:   Diagnosis Date    Allergy     Anemia     Arthritis     Cardiac arrest as complication of care     pt states she went into cardiac arrest from an allergic reaction to a medication    Depression     Encounter for blood transfusion     GERD (gastroesophageal reflux disease)     Hemiplegia due to old stroke     Hypertension     Morbid obesity with BMI of 45.0-49.9, adult 09/20/2018    Multiple sclerosis     Neuromuscular disorder     Seizure-like activity 2/25/2024       Past Surgical History:  Past Surgical History:   Procedure Laterality Date    APPENDECTOMY      BREAST SURGERY      CHOLECYSTECTOMY      COLONOSCOPY N/A 11/25/2020    Procedure: COLONOSCOPY;  Surgeon: Andrew Jenkins III, MD;  Location: [facility];  Service: Endoscopy;  Laterality: N/A;    ESOPHAGOGASTRODUODENOSCOPY N/A 02/19/2020    Procedure: EGD (ESOPHAGOGASTRODUODENOSCOPY);  Surgeon: Michael Navarrete MD;  Location: [facility];  Service: Endoscopy;  Laterality: N/A;    ESOPHAGOGASTRODUODENOSCOPY N/A 02/20/2020    Procedure: EGD (ESOPHAGOGASTRODUODENOSCOPY);  Surgeon: Michael Navarrete MD;  Location: [facility];  Service: General;  Laterality: N/A;    ESOPHAGOGASTRODUODENOSCOPY N/A 11/25/2020    Procedure: EGD (ESOPHAGOGASTRODUODENOSCOPY);  Surgeon: Andrew Jenkins III, MD;  Location: [facility];  Service: Endoscopy;  Laterality: N/A;    ESOPHAGOGASTRODUODENOSCOPY N/A 9/25/2023    Procedure: EGD (ESOPHAGOGASTRODUODENOSCOPY);  Surgeon: Ly Kim MD;  Location: [facility];  Service: Endoscopy;  Laterality: N/A;    ESOPHAGOGASTRODUODENOSCOPY N/A 1/29/2024    Procedure: EGD (ESOPHAGOGASTRODUODENOSCOPY);  Surgeon: Ly Kim MD;  Location: [facility];  Service: Endoscopy;  Laterality: N/A;    HYSTERECTOMY      KNEE SURGERY      PH MONITORING, ESOPHAGUS, WIRELESS, (OFF REFLUX MEDS) N/A 1/29/2024    Procedure: PH MONITORING, ESOPHAGUS, WIRELESS, (OFF REFLUX MEDS);  Surgeon: Ly Kim  MD;  Location: [facility] ENDO;  Service: Endoscopy;  Laterality: N/A;    ROBOT-ASSISTED LAPAROSCOPIC SLEEVE GASTRECTOMY USING DA ANTHONY XI N/A 02/20/2020    Procedure: XI ROBOTIC SLEEVE GASTRECTOMY;  Surgeon: Michael Navarrete MD;  Location: [facility] OR;  Service: General;  Laterality: N/A;    TENOPLASTY OF HAND Left 08/26/2021    Procedure: REPAIR, TENDON, HAND;  Surgeon: Joselito Lugo MD;  Location: [facility] OR;  Service: Orthopedics;  Laterality: Left;  Left RCL PIP Joint Repair/Recon with Arthrex Internal Brace.    TONSILLECTOMY      TOTAL REDUCTION MAMMOPLASTY  2022    TUBAL LIGATION           Family History:  Family History   Problem Relation Age of Onset    Lupus Mother     Heart disease Mother     Hypertension Mother     Diabetes Father     Kidney disease Father     No Known Problems Sister     No Known Problems Sister     No Known Problems Brother     No Known Problems Brother     No Known Problems Daughter     No Known Problems Daughter     No Known Problems Daughter     Cancer Maternal Aunt 40        breast    Breast cancer Maternal Aunt     Diabetes Maternal Aunt     COPD Maternal Aunt     Heart disease Maternal Grandmother     Breast cancer Maternal Cousin     Ovarian cancer Maternal Cousin        Social History:  Social History     Tobacco Use    Smoking status: Never     Passive exposure: Never    Smokeless tobacco: Never   Substance and Sexual Activity    Alcohol use: Yes     Comment: once a year     Drug use: No    Sexual activity: Yes     Partners: Female     Birth control/protection: Surgical        Review of Systems     Review of Systems   Constitutional:  Negative for fever.   HENT:  Negative for sore throat.    Respiratory:  Negative for shortness of breath.    Cardiovascular:  Negative for chest pain.   Gastrointestinal:  Negative for nausea.   Genitourinary:  Negative for dysuria.   Musculoskeletal:  Negative for back pain.        (+) L-arm pain and immobility  (+) L-leg pain and immobility   Skin:   "Negative for rash.   Neurological:  Positive for headaches. Negative for weakness.   Hematological:  Does not bruise/bleed easily.        Physical Exam     Initial Vitals [02/24/24 2240]   BP Pulse Resp Temp SpO2   127/82 90 18 97.8 °F (36.6 °C) 97 %      MAP       --          Physical Exam  Nursing Notes and Vital Signs Reviewed.  Constitutional: Patient is in no acute distress. Well-developed and well-nourished.  Head: Atraumatic. Normocephalic.  Eyes: PERRL. EOM intact. Conjunctivae are not pale. No scleral icterus.  ENT: Mucous membranes are moist.     Neck: Supple. Full ROM.   Cardiovascular: Regular rate. Regular rhythm. No murmurs, rubs, or gallops. Distal pulses are 2+ and symmetric.  Pulmonary/Chest: No respiratory distress. Clear to auscultation bilaterally. No wheezing or rales.  Abdominal: Soft and non-distended.  There is no tenderness.  No rebound, guarding, or rigidity.  Genitourinary: No CVA tenderness  Musculoskeletal:  L-sided hemiplegia.  Skin: Warm and dry.  Neurological:  Alert, awake, and appropriate.  Normal speech.  L sided hemiplegia  Psychiatric: Normal affect. Good eye contact. Appropriate in content.     ED Course   Procedures  ED Vital Signs:  Vitals:    02/24/24 2240 02/25/24 0046 02/25/24 0047 02/25/24 0112   BP: 127/82  120/64    Pulse: 90  87    Resp: 18 18     Temp: 97.8 °F (36.6 °C)      TempSrc: Oral      SpO2: 97%  100%    Weight:    126.6 kg (279 lb)   Height: 5' 6" (1.676 m)       02/25/24 0200 02/25/24 0237 02/25/24 0330 02/25/24 0403   BP: 127/60  109/67 118/76   Pulse: 84  80 83   Resp: 13 18 14 17   Temp:       TempSrc:       SpO2: 99%  100% 99%   Weight:       Height:           Abnormal Lab Results:  Labs Reviewed   CBC W/ AUTO DIFFERENTIAL - Abnormal; Notable for the following components:       Result Value    Hemoglobin 11.1 (*)     Hematocrit 36.5 (*)     MCV 69 (*)     MCH 20.9 (*)     MCHC 30.4 (*)     RDW 16.6 (*)     All other components within normal limits "   COMPREHENSIVE METABOLIC PANEL - Abnormal; Notable for the following components:    Albumin 3.4 (*)     ALT 8 (*)     Anion Gap 7 (*)     All other components within normal limits   TSH    Narrative:     Fasting   TROPONIN I   APTT   PROTIME-INR   URINALYSIS, REFLEX TO URINE CULTURE   LIPID PANEL        All Lab Results:  Results for orders placed or performed during the hospital encounter of 02/24/24   CBC auto differential   Result Value Ref Range    WBC 5.65 3.90 - 12.70 K/uL    RBC 5.30 4.00 - 5.40 M/uL    Hemoglobin 11.1 (L) 12.0 - 16.0 g/dL    Hematocrit 36.5 (L) 37.0 - 48.5 %    MCV 69 (L) 82 - 98 fL    MCH 20.9 (L) 27.0 - 31.0 pg    MCHC 30.4 (L) 32.0 - 36.0 g/dL    RDW 16.6 (H) 11.5 - 14.5 %    Platelets 274 150 - 450 K/uL    MPV 9.8 9.2 - 12.9 fL    Immature Granulocytes 0.2 0.0 - 0.5 %    Gran # (ANC) 2.8 1.8 - 7.7 K/uL    Immature Grans (Abs) 0.01 0.00 - 0.04 K/uL    Lymph # 2.4 1.0 - 4.8 K/uL    Mono # 0.4 0.3 - 1.0 K/uL    Eos # 0.1 0.0 - 0.5 K/uL    Baso # 0.04 0.00 - 0.20 K/uL    nRBC 0 0 /100 WBC    Gran % 48.9 38.0 - 73.0 %    Lymph % 41.9 18.0 - 48.0 %    Mono % 6.2 4.0 - 15.0 %    Eosinophil % 2.1 0.0 - 8.0 %    Basophil % 0.7 0.0 - 1.9 %    Differential Method Automated    Comprehensive metabolic panel   Result Value Ref Range    Sodium 139 136 - 145 mmol/L    Potassium 3.8 3.5 - 5.1 mmol/L    Chloride 108 95 - 110 mmol/L    CO2 24 23 - 29 mmol/L    Glucose 103 70 - 110 mg/dL    BUN 7 6 - 20 mg/dL    Creatinine 0.8 0.5 - 1.4 mg/dL    Calcium 9.2 8.7 - 10.5 mg/dL    Total Protein 7.1 6.0 - 8.4 g/dL    Albumin 3.4 (L) 3.5 - 5.2 g/dL    Total Bilirubin 0.2 0.1 - 1.0 mg/dL    Alkaline Phosphatase 92 55 - 135 U/L    AST 13 10 - 40 U/L    ALT 8 (L) 10 - 44 U/L    eGFR >60 >60 mL/min/1.73 m^2    Anion Gap 7 (L) 8 - 16 mmol/L   TSH   Result Value Ref Range    TSH 1.906 0.400 - 4.000 uIU/mL   Troponin I   Result Value Ref Range    Troponin I <0.006 0.000 - 0.026 ng/mL   APTT   Result Value Ref Range     aPTT 24.2 21.0 - 32.0 sec   Protime-INR   Result Value Ref Range    Prothrombin Time 10.6 9.0 - 12.5 sec    INR 0.9 0.8 - 1.2     *Note: Due to a large number of results and/or encounters for the requested time period, some results have not been displayed. A complete set of results can be found in Results Review.        Imaging Results:  Imaging Results              MRI Cervical Spine Demyelinating W W/O Contrast (In process)                      MRI Brain Demyelinating W W/O Contrast (In process)                      CT Head Without Contrast (Final result)  Result time 02/25/24 00:41:28      Final result by Raúl Sinha MD (02/25/24 00:41:28)                   Impression:      No CT evidence of acute intracranial abnormality. Clinical correlation and further evaluation as warranted.    Patchy and confluent periventricular and supratentorial white matter hypoattenuation, similar to prior examination.  Findings may reflect combination of chronic microvascular ischemic changes as well as sequelae of prior demyelination in light of history.      Electronically signed by: Raúl Sinha MD  Date:    02/25/2024  Time:    00:41               Narrative:    EXAMINATION:  CT HEAD WITHOUT CONTRAST    CLINICAL HISTORY:  Headache, sudden, severe;Neuro deficit, acute, stroke suspected;Seizure, new-onset, no history of trauma;    TECHNIQUE:  Low dose axial images were obtained through the head.  Coronal and sagittal reformations were also performed. Contrast was not administered.    COMPARISON:  CT head 08/05/2022, MRI brain 08/05/2022    FINDINGS:  There is similar appearing beam hardening artifact overlying the periphery of the bilateral frontal-parietal lobes, similar to prior examinations.  There is generalized cerebral volume loss with compensatory sulcal widening and ventricular enlargement.  There is patchy and confluent periventricular and supratentorial white matter hypoattenuation, similar to prior examination,  most pronounced within the right periventricular white matter.  There is no CT evidence of acute intracranial hemorrhage.  There is no midline shift or hydrocephalus.  The mastoid air cells and paranasal sinuses are clear of acute process. 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  No displaced calvarial fracture identified.  There is hyperostosis frontalis.                                       The EKG was ordered, reviewed, and independently interpreted by the ED provider.  Interpretation time: 0:54  Rate: 83 BPM  Rhythm: normal sinus rhythm  Interpretation: Septal infarct, age undetermined. Abnormal ECG. No STEMI.           The Emergency Provider reviewed the vital signs and test results, which are outlined above.     ED Discussion       2:38 AM: Discussed case with  ([facility]). Dr. Park agrees with current care and management of pt and accepts admission.   Admitting Service: [facility]  Admitting Physician: Dr. Park  Admit to: med surg        Medical Decision Making  44-year-old female presenting with complaints of left sided pain and decreased movement.  She has left-sided hemiplegia from prior stroke, but she reports that her symptoms are worse today.  She reportedly had some seizure-like activity, but self reports a history of pseudoseizures.  She suspected she had another 1.  EMS reports that there was no postictal phase.  She is concerned about having a potential MS flare.  Differential diagnosis includes but not limited to acute MS exacerbation, seizure, pseudo-seizure, CVA .  CT head within normal limits.  Patient will need MRI to rule out stroke and MS.  Discussed with Hospital Medicine and will place in observation for stroke/MS rule out MRIs      Amount and/or Complexity of Data Reviewed  External Data Reviewed: labs, radiology, ECG and notes.     Details: Chart was reviewed.  Patient does have a history of MS.  She also has a history  of frequent pseudo MS flare  Labs: ordered. Decision-making details documented in ED Course.  Radiology: ordered and independent interpretation performed. Decision-making details documented in ED Course.  ECG/medicine tests: ordered and independent interpretation performed. Decision-making details documented in ED Course.    Risk  Prescription drug management.  Decision regarding hospitalization.                ED Medication(s):  Medications   HYDROcodone-acetaminophen  mg per tablet 1 tablet (has no administration in time range)   doxepin capsule 75 mg (75 mg Oral Given 2/25/24 2149)   gabapentin capsule 600 mg (has no administration in time range)   cyclobenzaprine tablet 10 mg (has no administration in time range)   topiramate tablet 50 mg (has no administration in time range)   sodium chloride 0.9% flush 10 mL (has no administration in time range)   labetaloL injection 10 mg (has no administration in time range)   bisacodyL suppository 10 mg (has no administration in time range)   enoxaparin injection 40 mg (has no administration in time range)   atorvastatin tablet 40 mg (has no administration in time range)   aspirin EC tablet 81 mg (has no administration in time range)   hydrALAZINE injection 10 mg (has no administration in time range)   butalbital-acetaminophen-caffeine -40 mg per tablet 1 tablet (has no administration in time range)   ondansetron injection 8 mg (has no administration in time range)   hydrOXYzine HCL tablet 25 mg (has no administration in time range)   melatonin tablet 6 mg (has no administration in time range)   pantoprazole EC tablet 40 mg (has no administration in time range)   ketorolac injection 15 mg (15 mg Intravenous Given 2/25/24 0110)   HYDROcodone-acetaminophen  mg per tablet 1 tablet (1 tablet Oral Given 2/25/24 0237)   gadobutroL (GADAVIST) injection 10 mL (10 mLs Intravenous Given 2/25/24 5835)       New Prescriptions    No medications on file                Scribe Attestation:   Scribe #1: I performed the above scribed service and the documentation accurately describes the services I performed. I attest to the accuracy of the note.     Attending:   Physician Attestation Statement for Scribe #1: I, Mahendra Dunlap MD, personally performed the services described in this documentation, as scribed by Fina Valente, in my presence, and it is both accurate and complete.       Scribe Attestation:   Scribe #1: I performed the above scribed service and the documentation accurately describes the services I performed. I attest to the accuracy of the note.         Clinical Impression       ICD-10-CM ICD-9-CM   1. Seizure-like activity  R56.9 780.39   2. Multiple sclerosis  G35 340   3. Seizures  R56.9 780.39   4. TIA (transient ischemic attack)  G45.9 435.9       Disposition:   Disposition: Admitted  Condition: Stable        Mahendra Dunlap MD  02/26/24 [id number]

## 2024-02-25 NOTE — PLAN OF CARE
44-year-old  female (AAF) was admitted with a diagnosis of seizure-like activities. Upon transfer from the Emergency Room to the telemetry room, she was lethargic and only responded to tactile stimuli. According to the Emergency Room notes (ERE) and History & Physical (H&P), she was alert, awake, and oriented to person, place, time, and situation (AAOX4) and in no apparent distress (NAD). An EEG was performed while she was still lethargic. Subsequently, she woke up and returned to her baseline status.  Around 3:30 pm, the registered nurse (RN) called because the patient was actively jerking and not responding. The jerking movements ceased approximately 2 minutes after onset. When i arrived about 5 minutes later, the patient was lethargic. The patient received 2 mg of intravenous Ativan, and less than 1 minute later, she began to wake up.    DDX  1)Seizures    Plan   Start loading dose keppra 2 gr iv x 1 , than 1 g iv bid  Ativan  2 mg IV PRN for seizures   Cont seizures precaution

## 2024-02-25 NOTE — ASSESSMENT & PLAN NOTE
>>ASSESSMENT AND PLAN FOR MORBID (SEVERE) OBESITY DUE TO EXCESS CALORIES WRITTEN ON 2/25/2024  3:26 AM BY MEL ROSS MD    Body mass index is 45.03 kg/m². Morbid obesity complicates all aspects of disease management from diagnostic modalities to treatment. Weight loss encouraged and health benefits explained to patient.

## 2024-02-25 NOTE — PT/OT/SLP PROGRESS
Occupational Therapy      Patient Name:  Alicia Soliz   MRN:  [id number]    Patient not seen today secondary to Testing/imaging (xray/CT/MRI) (EEG). Will follow-up as able.    2/25/2024

## 2024-02-25 NOTE — PLAN OF CARE
Pt seen and examined  at bedside . This am she was very lethargic only responding to painful stimuli . She is now aaa x 4 in nad  and following appropriate commands .  MRI brain and neck WWO  Similar appearance of demyelinating plaques within both cerebral hemispheres, the brainstem and posterior fossa as compared to the prior MRI 08/05/2022. No MR evidence new or active demyelination as compared to the prior.   Tele neurology consulted and rec to f/u EEG       DDx  1)Seizures    Plan   Neuro checks   F/U EEG  Resume Home medication

## 2024-02-25 NOTE — HPI
Alicia Soliz is a 44-year-old woman with a past medical history of hypertension, cardiac arrest, hemiplegia due to an old stroke, morbid obesity with a BMI of 45.0-49.9, multiple sclerosis, gastroesophageal reflux disease (GERD), and neuromuscular disorder who presented to the ER for worsening left-sided hemiplegia over the past week and two episodes of seizure-like activity which onset earlier this evening. The patient stated she was eating dinner hours prior to admission when she suddenly felt weak and woke up on the floor.  When she came to, family was calling an ambulance and described her shaking on the floor for approximately 1 minute before stopping.  Patient reports convincing to not call the ambulance and then she felt okay, but then reports less than a hour later another episode occurred.  This is what prompted family to bring patient to the ER tonight.  Patient denies hitting her head during these episodes, but says that she is had previous episodes in the last month where she has hit her head. Reports a single episode of seizure in her past when she was first diagnosed with multiple sclerosis.  Also of note, patient reports that her hemiplegia is associated with left-arm pain and immobility. She denies chest pain, shortness of breath, fever, and other symptoms at this time.    Upon arrival in the ER, the patient's vital signs were well within normal limits, and she has remained hemodynamically stable since admission. Routine laboratory tests were grossly within acceptable limits, indicating no significant abnormalities. A head CT scan was performed and returned negative results. Given the patient's history and presentation, it was requested she be admitted to observation for further evaluation with MRI and a comprehensive seizure workup. Hospital Medicine was called for admission.

## 2024-02-25 NOTE — H&P
"  [facility] - Emergency Dept.  [facility] Medicine  History & Physical    Patient Name: Alicia Soliz  MRN: [id number]  Patient Class: OP- Observation  Admission Date: 2/24/2024  Attending Physician: Casimiro Park MD   Primary Care Provider: Braden Dumont MD         Patient information was obtained from patient, relative(s), past medical records, and ER records.     Subjective:     Principal Problem:Seizure-like activity    Chief Complaint:   Chief Complaint   Patient presents with    Seizure Like Activity      Pts family reports "shaking, seizure like activity" No post ictal phase. Pt has L sided weakness from previous stroke.        HPI: Alicia Soliz is a 44-year-old woman with a past medical history of hypertension, cardiac arrest, hemiplegia due to an old stroke, morbid obesity with a BMI of 45.0-49.9, multiple sclerosis, gastroesophageal reflux disease (GERD), and neuromuscular disorder who presented to the ER for worsening left-sided hemiplegia over the past week and two episodes of seizure-like activity which onset earlier this evening. The patient stated she was eating dinner hours prior to admission when she suddenly felt weak and woke up on the floor.  When she came to, family was calling an ambulance and described her shaking on the floor for approximately 1 minute before stopping.  Patient reports convincing to not call the ambulance and then she felt okay, but then reports less than a hour later another episode occurred.  This is what prompted family to bring patient to the ER tonight.  Patient denies hitting her head during these episodes, but says that she is had previous episodes in the last month where she has hit her head. Reports a single episode of seizure in her past when she was first diagnosed with multiple sclerosis.  Also of note, patient reports that her hemiplegia is associated with left-arm pain and immobility. She denies chest pain, shortness of breath, fever, and other " symptoms at this time.    Upon arrival in the ER, the patient's vital signs were well within normal limits, and she has remained hemodynamically stable since admission. Routine laboratory tests were grossly within acceptable limits, indicating no significant abnormalities. A head CT scan was performed and returned negative results. Given the patient's history and presentation, it was requested she be admitted to observation for further evaluation with MRI and a comprehensive seizure workup. Hospital Medicine was called for admission.    Past Medical History:   Diagnosis Date    Allergy     Anemia     Arthritis     Cardiac arrest as complication of care     pt states she went into cardiac arrest from an allergic reaction to a medication    Depression     Encounter for blood transfusion     GERD (gastroesophageal reflux disease)     Hemiplegia due to old stroke     Hypertension     Morbid obesity with BMI of 45.0-49.9, adult 09/20/2018    Multiple sclerosis     Neuromuscular disorder        Past Surgical History:   Procedure Laterality Date    APPENDECTOMY      BREAST SURGERY      CHOLECYSTECTOMY      COLONOSCOPY N/A 11/25/2020    Procedure: COLONOSCOPY;  Surgeon: Andrew Jenkins III, MD;  Location: [facility];  Service: Endoscopy;  Laterality: N/A;    ESOPHAGOGASTRODUODENOSCOPY N/A 02/19/2020    Procedure: EGD (ESOPHAGOGASTRODUODENOSCOPY);  Surgeon: Michael Navarrete MD;  Location: [facility];  Service: Endoscopy;  Laterality: N/A;    ESOPHAGOGASTRODUODENOSCOPY N/A 02/20/2020    Procedure: EGD (ESOPHAGOGASTRODUODENOSCOPY);  Surgeon: Michael Navarrete MD;  Location: [facility];  Service: General;  Laterality: N/A;    ESOPHAGOGASTRODUODENOSCOPY N/A 11/25/2020    Procedure: EGD (ESOPHAGOGASTRODUODENOSCOPY);  Surgeon: Andrew Jenkins III, MD;  Location: [facility];  Service: Endoscopy;  Laterality: N/A;    ESOPHAGOGASTRODUODENOSCOPY N/A 9/25/2023    Procedure: EGD (ESOPHAGOGASTRODUODENOSCOPY);  Surgeon: Ly Kim MD;   "Location: [facility] ENDO;  Service: Endoscopy;  Laterality: N/A;    ESOPHAGOGASTRODUODENOSCOPY N/A 1/29/2024    Procedure: EGD (ESOPHAGOGASTRODUODENOSCOPY);  Surgeon: Ly Kim MD;  Location: [facility] ENDO;  Service: Endoscopy;  Laterality: N/A;    HYSTERECTOMY      KNEE SURGERY      PH MONITORING, ESOPHAGUS, WIRELESS, (OFF REFLUX MEDS) N/A 1/29/2024    Procedure: PH MONITORING, ESOPHAGUS, WIRELESS, (OFF REFLUX MEDS);  Surgeon: Ly Kim MD;  Location: [facility] ENDO;  Service: Endoscopy;  Laterality: N/A;    ROBOT-ASSISTED LAPAROSCOPIC SLEEVE GASTRECTOMY USING DA ANTHONY XI N/A 02/20/2020    Procedure: XI ROBOTIC SLEEVE GASTRECTOMY;  Surgeon: Michael Navarrete MD;  Location: [facility] OR;  Service: General;  Laterality: N/A;    TENOPLASTY OF HAND Left 08/26/2021    Procedure: REPAIR, TENDON, HAND;  Surgeon: Joselito Lugo MD;  Location: [facility] OR;  Service: Orthopedics;  Laterality: Left;  Left RCL PIP Joint Repair/Recon with Arthrex Internal Brace.    TONSILLECTOMY      TOTAL REDUCTION MAMMOPLASTY  2022    TUBAL LIGATION         Review of patient's allergies indicates:   Allergen Reactions    Demerol [meperidine] Anaphylaxis     Other reaction(s): Itching    Dilaudid [hydromorphone (bulk)] Anaphylaxis     "coded" per pt  Patient can tolerate Lortab    Shellfish containing products Itching, Nausea And Vomiting and Swelling    Prednisone Itching     Other reaction(s): Itching    Tramadol      shaking       Current Facility-Administered Medications on File Prior to Encounter   Medication    [DISCONTINUED] GENERIC EXTERNAL MEDICATION    [DISCONTINUED] GENERIC EXTERNAL MEDICATION    [DISCONTINUED] GENERIC EXTERNAL MEDICATION    [DISCONTINUED] GENERIC EXTERNAL MEDICATION     Current Outpatient Medications on File Prior to Encounter   Medication Sig    cholecalciferol, vitamin D3, 1,250 mcg (50,000 unit) capsule Take 1 capsule (50,000 Units total) by mouth every 7 days. for 365 doses    cyclobenzaprine (FLEXERIL) 10 MG " tablet Take 10 mg by mouth every 8 (eight) hours.    diclofenac sodium (VOLTAREN) 1 % Gel Apply 2 g topically 4 (four) times daily.    doxepin (SINEQUAN) 75 MG capsule Take 75 mg by mouth every evening.    gabapentin (NEURONTIN) 300 MG capsule Take 3 capsules (900 mg total) by mouth 2 (two) times daily.    HYDROcodone-acetaminophen (NORCO)  mg per tablet Take 1 tablet by mouth 3 (three) times daily.    linaCLOtide (LINZESS) 145 mcg Cap capsule Take 1 capsule (145 mcg total) by mouth before breakfast.    nystatin (MYCOSTATIN) cream Apply topically 2 (two) times daily.    OCREVUS 30 mg/mL Soln     topiramate (TOPAMAX) 50 MG tablet TAKE 1 TABLET BY MOUTH IN THE MORNING AND 2 TABLETS AT BEDTIME    valsartan (DIOVAN) 80 MG tablet Take 1 tablet (80 mg total) by mouth once daily.     Family History       Problem Relation (Age of Onset)    Breast cancer Maternal Aunt, Maternal Cousin    COPD Maternal Aunt    Cancer Maternal Aunt (40)    Diabetes Father, Maternal Aunt    Heart disease Mother, Maternal Grandmother    Hypertension Mother    Kidney disease Father    Lupus Mother    No Known Problems Sister, Sister, Brother, Brother, Daughter, Daughter, Daughter    Ovarian cancer Maternal Cousin          Tobacco Use    Smoking status: Never     Passive exposure: Never    Smokeless tobacco: Never   Substance and Sexual Activity    Alcohol use: Yes     Comment: once a year     Drug use: No    Sexual activity: Yes     Partners: Female     Birth control/protection: Surgical     Review of Systems   Constitutional:  Negative for chills, fatigue and fever.   HENT:  Negative for congestion, sinus pressure, sinus pain and trouble swallowing.    Eyes:  Negative for photophobia, pain and visual disturbance.   Respiratory:  Negative for cough, shortness of breath and wheezing.    Cardiovascular:  Negative for chest pain and palpitations.   Gastrointestinal:  Negative for abdominal distention, constipation, diarrhea, nausea and  vomiting.   Genitourinary:  Negative for difficulty urinating, flank pain and hematuria.   Musculoskeletal:  Negative for arthralgias, gait problem and joint swelling.   Skin:  Negative for rash and wound.   Neurological:  Positive for dizziness, seizures, weakness and headaches. Negative for tremors, facial asymmetry, speech difficulty, light-headedness and numbness.   Psychiatric/Behavioral:  Negative for behavioral problems, confusion and suicidal ideas.    All other systems reviewed and are negative.    Objective:     Vital Signs (Most Recent):  Temp: 97.8 °F (36.6 °C) (02/24/24 2240)  Pulse: 87 (02/25/24 0047)  Resp: 18 (02/25/24 0237)  BP: 120/64 (02/25/24 0047)  SpO2: 100 % (02/25/24 0047) Vital Signs (24h Range):  Temp:  [97.8 °F (36.6 °C)] 97.8 °F (36.6 °C)  Pulse:  [87-90] 87  Resp:  [18] 18  SpO2:  [97 %-100 %] 100 %  BP: (120-127)/(64-82) 120/64     Weight: 126.6 kg (279 lb)  Body mass index is 45.03 kg/m².     Physical Exam  Vitals reviewed.   Constitutional:       General: She is not in acute distress.     Appearance: Normal appearance. She is morbidly obese. She is not ill-appearing.   HENT:      Head: Normocephalic and atraumatic.      Nose: Nose normal. No congestion or rhinorrhea.      Mouth/Throat:      Mouth: Mucous membranes are moist.      Pharynx: Oropharynx is clear.   Eyes:      General: No scleral icterus.     Extraocular Movements: Extraocular movements intact.      Pupils: Pupils are equal, round, and reactive to light.   Cardiovascular:      Pulses: Normal pulses.      Heart sounds: Normal heart sounds. No murmur heard.     No gallop.   Pulmonary:      Effort: Pulmonary effort is normal. No respiratory distress.      Breath sounds: Normal breath sounds. No wheezing.   Abdominal:      General: Abdomen is flat. Bowel sounds are normal. There is no distension.      Palpations: Abdomen is soft.      Tenderness: There is no abdominal tenderness. There is no guarding or rebound.   Skin:      Capillary Refill: Capillary refill takes less than 2 seconds.      Coloration: Skin is not jaundiced.   Neurological:      General: No focal deficit present.      Mental Status: She is alert and oriented to person, place, and time. Mental status is at baseline.      Cranial Nerves: No cranial nerve deficit.      Motor: Weakness (left sided) present. No tremor, atrophy or seizure activity.   Psychiatric:         Attention and Perception: Attention normal.         Mood and Affect: Mood normal.         Speech: Speech normal.         Behavior: Behavior normal.         Thought Content: Thought content normal.              CRANIAL NERVES     CN III, IV, VI   Pupils are equal, round, and reactive to light.       Significant Labs: All pertinent labs within the past 24 hours have been reviewed.  BMP:   Recent Labs   Lab 02/25/24  0011         K 3.8      CO2 24   BUN 7   CREATININE 0.8   CALCIUM 9.2     CBC:   Recent Labs   Lab 02/25/24  0011   WBC 5.65   HGB 11.1*   HCT 36.5*        CMP:   Recent Labs   Lab 02/25/24  0011      K 3.8      CO2 24      BUN 7   CREATININE 0.8   CALCIUM 9.2   PROT 7.1   ALBUMIN 3.4*   BILITOT 0.2   ALKPHOS 92   AST 13   ALT 8*   ANIONGAP 7*     TSH:   Recent Labs   Lab 02/01/24  1450   TSH 0.868       Significant Imaging: I have reviewed all pertinent imaging results/findings within the past 24 hours.  Assessment/Plan:     * Seizure-like activity  Multiple sclerosis  History more suggestive of seizures related to multiple sclerosis, however indolent stroke remains on the differential and will need to be work up. NIH score of 2. Neurology consulted. Since symptoms have been present for the past week, outside of thrombolytic window.   --Demyelinating Brain & Cervical spine MRI ordered/pending  --Echo, US bilateral carotids   --PT/OT/SLP consulted to assess  --ASA + statin therapy ordered  --flat bed, aspiration precautions, neuro checks  --permissive  hypertension until MRI returns  --admit to telemetry for monitoring  --PRN fioricet     Gastroesophageal reflux disease without esophagitis  --stable  --continue home PPI therapy      Morbid obesity  Body mass index is 45.03 kg/m². Morbid obesity complicates all aspects of disease management from diagnostic modalities to treatment. Weight loss encouraged and health benefits explained to patient.\    Hypertension  --home medications include: valsartan  --holding upon admission as this may be contributing to episodes  --PRN IV hydralazine 10mg Q6H for sBP > 175 mmHg  --Q4HVS      VTE Risk Mitigation (From admission, onward)           Ordered     enoxaparin injection 40 mg  Every 12 hours         02/25/24 0300     IP VTE HIGH RISK PATIENT  Once         02/25/24 0300     Place sequential compression device  Until discontinued         02/25/24 0300                       On 02/25/2024, patient should be placed in hospital observation services under my care.        Pharmacist Renal Dose Adjustment Note    Alicia Soliz is a 44 y.o. female being treated with the medication enoxaparin.    Patient Data:    Vital Signs (Most Recent):  Temp: 97.8 °F (36.6 °C) (02/24/24 2240)  Pulse: 87 (02/25/24 0047)  Resp: 18 (02/25/24 0237)  BP: 120/64 (02/25/24 0047)  SpO2: 100 % (02/25/24 0047) Vital Signs (72h Range):  Temp:  [97.8 °F (36.6 °C)]   Pulse:  [87-90]   Resp:  [18]   BP: (120-127)/(64-82)   SpO2:  [97 %-100 %]      Recent Labs   Lab 02/25/24  0011   CREATININE 0.8     Serum creatinine: 0.8 mg/dL 02/25/24 0011  Estimated creatinine clearance: 119.6 mL/min    Enoxaparin 40 mg subcutaneous every 24 hours will be changed to enoxaparin 40 mg subcutaneous every 12 hours for the prevention of DVT with BMI > 40 kg/m2.    Pharmacist's Name: Mat Vogt  Pharmacist's Extension: [phone]      Casimiro Park MD  Department of Hospital Medicine  [facility] - Emergency Dept.

## 2024-02-25 NOTE — SUBJECTIVE & OBJECTIVE
Past Medical History:   Diagnosis Date    Allergy     Anemia     Arthritis     Cardiac arrest as complication of care     pt states she went into cardiac arrest from an allergic reaction to a medication    Depression     Encounter for blood transfusion     GERD (gastroesophageal reflux disease)     Hemiplegia due to old stroke     Hypertension     Morbid obesity with BMI of 45.0-49.9, adult 09/20/2018    Multiple sclerosis     Neuromuscular disorder        Past Surgical History:   Procedure Laterality Date    APPENDECTOMY      BREAST SURGERY      CHOLECYSTECTOMY      COLONOSCOPY N/A 11/25/2020    Procedure: COLONOSCOPY;  Surgeon: Andrew Jenkins III, MD;  Location: [facility];  Service: Endoscopy;  Laterality: N/A;    ESOPHAGOGASTRODUODENOSCOPY N/A 02/19/2020    Procedure: EGD (ESOPHAGOGASTRODUODENOSCOPY);  Surgeon: Michael Navarrete MD;  Location: [facility];  Service: Endoscopy;  Laterality: N/A;    ESOPHAGOGASTRODUODENOSCOPY N/A 02/20/2020    Procedure: EGD (ESOPHAGOGASTRODUODENOSCOPY);  Surgeon: Michael Navarrete MD;  Location: [facility];  Service: General;  Laterality: N/A;    ESOPHAGOGASTRODUODENOSCOPY N/A 11/25/2020    Procedure: EGD (ESOPHAGOGASTRODUODENOSCOPY);  Surgeon: Andrew Jenkins III, MD;  Location: [facility];  Service: Endoscopy;  Laterality: N/A;    ESOPHAGOGASTRODUODENOSCOPY N/A 9/25/2023    Procedure: EGD (ESOPHAGOGASTRODUODENOSCOPY);  Surgeon: Ly Kim MD;  Location: [facility] [place] [place];  Service: Endoscopy;  Laterality: N/A;    ESOPHAGOGASTRODUODENOSCOPY N/A 1/29/2024    Procedure: EGD (ESOPHAGOGASTRODUODENOSCOPY);  Surgeon: Ly Kim MD;  Location: [facility] [place] [place];  Service: Endoscopy;  Laterality: N/A;    HYSTERECTOMY      KNEE SURGERY      PH MONITORING, ESOPHAGUS, WIRELESS, (OFF REFLUX MEDS) N/A 1/29/2024    Procedure: PH MONITORING, ESOPHAGUS, WIRELESS, (OFF REFLUX MEDS);  Surgeon: Ly Kim MD;  Location: [facility] [place] [place];  Service: Endoscopy;  Laterality: N/A;    ROBOT-ASSISTED LAPAROSCOPIC  "SLEEVE GASTRECTOMY USING DA ANTHONY XI N/A 02/20/2020    Procedure: XI ROBOTIC SLEEVE GASTRECTOMY;  Surgeon: Michael Navarrete MD;  Location: [facility] OR;  Service: General;  Laterality: N/A;    TENOPLASTY OF HAND Left 08/26/2021    Procedure: REPAIR, TENDON, HAND;  Surgeon: Joselito Lugo MD;  Location: [facility] OR;  Service: Orthopedics;  Laterality: Left;  Left RCL PIP Joint Repair/Recon with Arthrex Internal Brace.    TONSILLECTOMY      TOTAL REDUCTION MAMMOPLASTY  2022    TUBAL LIGATION         Review of patient's allergies indicates:   Allergen Reactions    Demerol [meperidine] Anaphylaxis     Other reaction(s): Itching    Dilaudid [hydromorphone (bulk)] Anaphylaxis     "coded" per pt  Patient can tolerate Lortab    Shellfish containing products Itching, Nausea And Vomiting and Swelling    Prednisone Itching     Other reaction(s): Itching    Tramadol      shaking       Current Facility-Administered Medications on File Prior to Encounter   Medication    [DISCONTINUED] GENERIC EXTERNAL MEDICATION    [DISCONTINUED] GENERIC EXTERNAL MEDICATION    [DISCONTINUED] GENERIC EXTERNAL MEDICATION    [DISCONTINUED] GENERIC EXTERNAL MEDICATION     Current Outpatient Medications on File Prior to Encounter   Medication Sig    cholecalciferol, vitamin D3, 1,250 mcg (50,000 unit) capsule Take 1 capsule (50,000 Units total) by mouth every 7 days. for 365 doses    cyclobenzaprine (FLEXERIL) 10 MG tablet Take 10 mg by mouth every 8 (eight) hours.    diclofenac sodium (VOLTAREN) 1 % Gel Apply 2 g topically 4 (four) times daily.    doxepin (SINEQUAN) 75 MG capsule Take 75 mg by mouth every evening.    gabapentin (NEURONTIN) 300 MG capsule Take 3 capsules (900 mg total) by mouth 2 (two) times daily.    HYDROcodone-acetaminophen (NORCO)  mg per tablet Take 1 tablet by mouth 3 (three) times daily.    linaCLOtide (LINZESS) 145 mcg Cap capsule Take 1 capsule (145 mcg total) by mouth before breakfast.    nystatin (MYCOSTATIN) cream Apply " topically 2 (two) times daily.    OCREVUS 30 mg/mL Soln     topiramate (TOPAMAX) 50 MG tablet TAKE 1 TABLET BY MOUTH IN THE MORNING AND 2 TABLETS AT BEDTIME    valsartan (DIOVAN) 80 MG tablet Take 1 tablet (80 mg total) by mouth once daily.     Family History       Problem Relation (Age of Onset)    Breast cancer Maternal Aunt, Maternal Cousin    COPD Maternal Aunt    Cancer Maternal Aunt (40)    Diabetes Father, Maternal Aunt    Heart disease Mother, Maternal Grandmother    Hypertension Mother    Kidney disease Father    Lupus Mother    No Known Problems Sister, Sister, Brother, Brother, Daughter, Daughter, Daughter    Ovarian cancer Maternal Cousin          Tobacco Use    Smoking status: Never     Passive exposure: Never    Smokeless tobacco: Never   Substance and Sexual Activity    Alcohol use: Yes     Comment: once a year     Drug use: No    Sexual activity: Yes     Partners: Female     Birth control/protection: Surgical     Review of Systems   Constitutional:  Negative for chills, fatigue and fever.   HENT:  Negative for congestion, sinus pressure, sinus pain and trouble swallowing.    Eyes:  Negative for photophobia, pain and visual disturbance.   Respiratory:  Negative for cough, shortness of breath and wheezing.    Cardiovascular:  Negative for chest pain and palpitations.   Gastrointestinal:  Negative for abdominal distention, constipation, diarrhea, nausea and vomiting.   Genitourinary:  Negative for difficulty urinating, flank pain and hematuria.   Musculoskeletal:  Negative for arthralgias, gait problem and joint swelling.   Skin:  Negative for rash and wound.   Neurological:  Positive for dizziness, seizures, weakness and headaches. Negative for tremors, facial asymmetry, speech difficulty, light-headedness and numbness.   Psychiatric/Behavioral:  Negative for behavioral problems, confusion and suicidal ideas.    All other systems reviewed and are negative.    Objective:     Vital Signs (Most  Recent):  Temp: 97.8 °F (36.6 °C) (02/24/24 2240)  Pulse: 87 (02/25/24 0047)  Resp: 18 (02/25/24 0237)  BP: 120/64 (02/25/24 0047)  SpO2: 100 % (02/25/24 0047) Vital Signs (24h Range):  Temp:  [97.8 °F (36.6 °C)] 97.8 °F (36.6 °C)  Pulse:  [87-90] 87  Resp:  [18] 18  SpO2:  [97 %-100 %] 100 %  BP: (120-127)/(64-82) 120/64     Weight: 126.6 kg (279 lb)  Body mass index is 45.03 kg/m².     Physical Exam  Vitals reviewed.   Constitutional:       General: She is not in acute distress.     Appearance: Normal appearance. She is morbidly obese. She is not ill-appearing.   HENT:      Head: Normocephalic and atraumatic.      Nose: Nose normal. No congestion or rhinorrhea.      Mouth/Throat:      Mouth: Mucous membranes are moist.      Pharynx: Oropharynx is clear.   Eyes:      General: No scleral icterus.     Extraocular Movements: Extraocular movements intact.      Pupils: Pupils are equal, round, and reactive to light.   Cardiovascular:      Pulses: Normal pulses.      Heart sounds: Normal heart sounds. No murmur heard.     No gallop.   Pulmonary:      Effort: Pulmonary effort is normal. No respiratory distress.      Breath sounds: Normal breath sounds. No wheezing.   Abdominal:      General: Abdomen is flat. Bowel sounds are normal. There is no distension.      Palpations: Abdomen is soft.      Tenderness: There is no abdominal tenderness. There is no guarding or rebound.   Skin:     Capillary Refill: Capillary refill takes less than 2 seconds.      Coloration: Skin is not jaundiced.   Neurological:      General: No focal deficit present.      Mental Status: She is alert and oriented to person, place, and time. Mental status is at baseline.      Cranial Nerves: No cranial nerve deficit.      Motor: Weakness (left sided) present. No tremor, atrophy or seizure activity.   Psychiatric:         Attention and Perception: Attention normal.         Mood and Affect: Mood normal.         Speech: Speech normal.         Behavior:  Behavior normal.         Thought Content: Thought content normal.              CRANIAL NERVES     CN III, IV, VI   Pupils are equal, round, and reactive to light.       Significant Labs: All pertinent labs within the past 24 hours have been reviewed.  BMP:   Recent Labs   Lab 02/25/24  0011         K 3.8      CO2 24   BUN 7   CREATININE 0.8   CALCIUM 9.2     CBC:   Recent Labs   Lab 02/25/24  0011   WBC 5.65   HGB 11.1*   HCT 36.5*        CMP:   Recent Labs   Lab 02/25/24  0011      K 3.8      CO2 24      BUN 7   CREATININE 0.8   CALCIUM 9.2   PROT 7.1   ALBUMIN 3.4*   BILITOT 0.2   ALKPHOS 92   AST 13   ALT 8*   ANIONGAP 7*     TSH:   Recent Labs   Lab 02/01/24  1450   TSH 0.868       Significant Imaging: I have reviewed all pertinent imaging results/findings within the past 24 hours.

## 2024-02-25 NOTE — ASSESSMENT & PLAN NOTE
History more suggestive of seizures related to multiple sclerosis, however indolent stroke remains on the differential and will need to be work up. NIH score of 2. Neurology consulted. Since symptoms have been present for the past week, outside of thrombolytic window.   --Demyelinating Brain & Cervical spine MRI ordered/pending  --Echo, US bilateral carotids   --PT/OT/SLP consulted to assess  --ASA + statin therapy ordered  --flat bed, aspiration precautions, neuro checks  --permissive hypertension until MRI returns  --admit to telemetry for monitoring  --PRN fioricet

## 2024-02-25 NOTE — CONSULTS
[facility] - Telemetry (Hospital)  Neurology  Consult Note    Patient Name: Alicia Soliz  MRN: [id number]  Admission Date: 2/24/2024  Hospital Length of Stay: 0 days  Code Status: Full Code   Attending Provider: Casimiro Park MD   Consulting Provider: Wyatt Jones MD  Primary Care Physician: Braden Dumont MD  Principal Problem:Seizure-like activity    Inpatient consult to Neurology  Consult performed by: Wyatt Jones MD  Consult ordered by: Casimiro Park MD  Reason for consult: Multiple seizure  Assessment/Recommendations: -recommend routine EEG.  If it shows active epileptiform discharges, recommend to give 2 mg of IV Ativan and load with 2 g of IV Keppra.  If no epileptiform discharges, follow recommendations as below  -recommend to increase topiramate to 100mg-150mg until further neurology clinic visit  -Seizure Precautions: Pt must refrain from driving for 6 months after having a seizure before can legally resume driving.  Physician team not required to report pt to [facility].  Informed pt that I would document this conversation in the medical record.  Additionally, advised pt to avoid bathing alone, working in high places, and to not supervise children swimming alone or engage in other activities where losing consciousness or motor control would risk harm to self or others.  -AVOID any substance that could lower seizure threshold including but not limited to: ALCOHOL AND WITHDRAWAL, TRAMADOL, MEPERIDINE (DEMEROL), ALL STIMULANTS-ALL ADHD MEDICATIONS, CLOZAPINE, BUPROPION (WELLBUTRIN), CIPROFLOXACIN, CYCLOSPORINE, METOCLOPRAMIDE (REGLAN), TETRAHYDROCANNABINOL (THC), KRATOM            Subjective:     Chief Complaint:  Multiple seizures , history of multiple sclerosis in remission    HPI:   44-year-old woman with a past medical history of hypertension, cardiac arrest, hemiplegia due to an old stroke, morbid obesity with a BMI of 45.0-49.9, multiple sclerosis, gastroesophageal reflux  disease (GERD), and neuromuscular disorder who presented to the ER for worsening left-sided hemiplegia over the past week and two episodes of seizure-like activity yesterday evening.  Her seizure episodes were witnessed by family called the ambulance and the lasted for 1 minute.  Within 1 hour, she had another similar episode of shaking in jerking.  She was back to her baseline in between 2 episodes.  She follows up with Dr. Diaz in the [facility] and is currently on ocrelizumab infusions with no relapse.    Prior MS History (abstracted from recent clinic visit with Dr. Garrett):     Date of symptom onset: 1/2015  Date of diagnosis: 1/2015  Disease type at diagnosis: RR  Disease type currently: RR  Previous therapy: Tecfidera, Copaxone, and Tysabri, Avonex (flu-like reaction), Aubagio (worsening disease)  Current therapy: Ocrevus 12/2020 - present  Last MRI Brain: 8/2023 - stable  Last MRI C-spine: 8/2022  Last MRI T-spine: 8/2022  CSF: 3W, 1R, G74, P39, IgG index 0.74, OCBs 6  JCV:         Lab Results   Component Value Date     JCVINDEX 1.67 (A) 04/19/2018     JCVANTIBODY Positive (A) 04/19/2018      Other relevant labs and tests:         Lab Results   Component Value Date     QDIXABJD29DS 125 (H) 09/07/2023     VOKFASSD01YJ 29 (L) 07/12/2023     PVRBDKKG30TF 38 01/31/2023      Past Medical History:  Past Medical History:   Diagnosis Date    Allergy     Anemia     Arthritis     Cardiac arrest as complication of care     pt states she went into cardiac arrest from an allergic reaction to a medication    Depression     Encounter for blood transfusion     GERD (gastroesophageal reflux disease)     Hemiplegia due to old stroke     Hypertension     Morbid obesity with BMI of 45.0-49.9, adult 09/20/2018    Multiple sclerosis     Neuromuscular disorder     Seizure-like activity 2/25/2024      Past Surgical History:  Past Surgical History:   Procedure Laterality Date    APPENDECTOMY      BREAST SURGERY       CHOLECYSTECTOMY      COLONOSCOPY N/A 11/25/2020    Procedure: COLONOSCOPY;  Surgeon: Andrew Jenkins III, MD;  Location: [facility] ENDO;  Service: Endoscopy;  Laterality: N/A;    ESOPHAGOGASTRODUODENOSCOPY N/A 02/19/2020    Procedure: EGD (ESOPHAGOGASTRODUODENOSCOPY);  Surgeon: Michael Navarrete MD;  Location: [facility] ENDO;  Service: Endoscopy;  Laterality: N/A;    ESOPHAGOGASTRODUODENOSCOPY N/A 02/20/2020    Procedure: EGD (ESOPHAGOGASTRODUODENOSCOPY);  Surgeon: Michael Navarrete MD;  Location: [facility] OR;  Service: General;  Laterality: N/A;    ESOPHAGOGASTRODUODENOSCOPY N/A 11/25/2020    Procedure: EGD (ESOPHAGOGASTRODUODENOSCOPY);  Surgeon: Andrew Jenkins III, MD;  Location: [facility] ENDO;  Service: Endoscopy;  Laterality: N/A;    ESOPHAGOGASTRODUODENOSCOPY N/A 9/25/2023    Procedure: EGD (ESOPHAGOGASTRODUODENOSCOPY);  Surgeon: Ly Kim MD;  Location: [facility];  Service: Endoscopy;  Laterality: N/A;    ESOPHAGOGASTRODUODENOSCOPY N/A 1/29/2024    Procedure: EGD (ESOPHAGOGASTRODUODENOSCOPY);  Surgeon: Ly Kim MD;  Location: [facility];  Service: Endoscopy;  Laterality: N/A;    HYSTERECTOMY      KNEE SURGERY      PH MONITORING, ESOPHAGUS, WIRELESS, (OFF REFLUX MEDS) N/A 1/29/2024    Procedure: PH MONITORING, ESOPHAGUS, WIRELESS, (OFF REFLUX MEDS);  Surgeon: Ly Kim MD;  Location: [facility];  Service: Endoscopy;  Laterality: N/A;    ROBOT-ASSISTED LAPAROSCOPIC SLEEVE GASTRECTOMY USING DA ANTHONY XI N/A 02/20/2020    Procedure: XI ROBOTIC SLEEVE GASTRECTOMY;  Surgeon: Michale Navarrete MD;  Location: [facility] OR;  Service: General;  Laterality: N/A;    TENOPLASTY OF HAND Left 08/26/2021    Procedure: REPAIR, TENDON, HAND;  Surgeon: Joselito Lugo MD;  Location: [facility];  Service: Orthopedics;  Laterality: Left;  Left RCL PIP Joint Repair/Recon with Arthrex Internal Brace.    TONSILLECTOMY      TOTAL REDUCTION MAMMOPLASTY  2022    TUBAL LIGATION        Allergies:  Review of patient's allergies indicates:  "  Allergen Reactions    Demerol [meperidine] Anaphylaxis     Other reaction(s): Itching    Dilaudid [hydromorphone (bulk)] Anaphylaxis     "coded" per pt  Patient can tolerate Lortab    Shellfish containing products Itching, Nausea And Vomiting and Swelling    Prednisone Itching     Other reaction(s): Itching    Tramadol      shaking       Family History:  Family History   Problem Relation Age of Onset    Lupus Mother     Heart disease Mother     Hypertension Mother     Diabetes Father     Kidney disease Father     No Known Problems Sister     No Known Problems Sister     No Known Problems Brother     No Known Problems Brother     No Known Problems Daughter     No Known Problems Daughter     No Known Problems Daughter     Cancer Maternal Aunt 40        breast    Breast cancer Maternal Aunt     Diabetes Maternal Aunt     COPD Maternal Aunt     Heart disease Maternal Grandmother     Breast cancer Maternal Cousin     Ovarian cancer Maternal Cousin       Social History:   reports that she has never smoked. She has never been exposed to tobacco smoke. She has never used smokeless tobacco. She reports current alcohol use. She reports that she does not use drugs.       Review of Systems:  14 point ROS was obtained and is negative except mentioned in HPI    OBJECTIVE:     Vital Signs (Most Recent):  Temp: 97.6 °F (36.4 °C) (02/25/24 0803)  Pulse: 78 (02/25/24 1001)  Resp: 12 (02/25/24 0856)  BP: (!) 166/91 (02/25/24 0856)  SpO2: 100 % (02/25/24 0856) Vital Signs (24h Range):  Temp:  [97.6 °F (36.4 °C)-97.8 °F (36.6 °C)] 97.6 °F (36.4 °C)  Pulse:  [71-90] 78  Resp:  [12-18] 12  SpO2:  [97 %-100 %] 100 %  BP: (109-166)/(60-91) 166/91       Physical Exam:  Physical Exam: Limited due to telehealth visit  General: well developed, well nourished  Head/Neck: atraumatic  Eyes: clear sclera  Respiratory: normal respiratory effort  Skin: clear skin, no visible bruising or rash    Neuro:   LOC: alert but does not follow " commands  Language:  Patient is nonverbal  Speech:  Patient is nonverbal  Reflexes: not examined  Motor and sensory:  Plegic on left side.  Withdraws too painful stimulus in the right upper and lower extremity  Cerebellar:  Unable to perform  Sensation: Unable to obtain due to televisit    Labs:  CBC/Anemia Profile:   Recent Labs   Lab 02/25/24  0011   WBC 5.65   HGB 11.1*   HCT 36.5*      MCV 69*   RDW 16.6*        Coags:   Recent Labs   Lab 02/25/24  0327   INR 0.9   APTT 24.2        Chemistries:   Recent Labs   Lab 02/25/24  0011      K 3.8      CO2 24   BUN 7   CREATININE 0.8   CALCIUM 9.2   PROT 7.1   BILITOT 0.2   ALKPHOS 92   ALT 8*   AST 13        Medications:  Scheduled Meds:   aspirin  81 mg Oral Daily    atorvastatin  40 mg Oral Daily    doxepin  75 mg Oral QHS    enoxparin  40 mg Subcutaneous Q12H    gabapentin  600 mg Oral TID    HYDROcodone-acetaminophen  1 tablet Oral TID    pantoprazole  40 mg Oral Daily    topiramate  50 mg Oral BID     Continuous Infusions:  PRN Meds:.bisacodyL, butalbital-acetaminophen-caffeine -40 mg, cyclobenzaprine, hydrALAZINE, hydrOXYzine HCL, labetalol, melatonin, ondansetron, sodium chloride 0.9%      Current Facility-Administered Medications:     aspirin EC tablet 81 mg, 81 mg, Oral, Daily, Casimiro Park MD    atorvastatin tablet 40 mg, 40 mg, Oral, Daily, Casimiro Park MD    bisacodyL suppository 10 mg, 10 mg, Rectal, Daily PRN, Casimiro Park MD    butalbital-acetaminophen-caffeine -40 mg per tablet 1 tablet, 1 tablet, Oral, Q4H PRN, Casimiro Park MD    cyclobenzaprine tablet 10 mg, 10 mg, Oral, TID PRN, Casimiro Park MD    doxepin capsule 75 mg, 75 mg, Oral, QHS, Casimiro Park MD, 75 mg at 02/25/24 0339    enoxaparin injection 40 mg, 40 mg, Subcutaneous, Q12H, Casimiro Park MD    gabapentin capsule 600 mg, 600 mg, Oral, TID, XavierCasimiro MD    hydrALAZINE injection 10 mg, 10 mg, Intravenous, Q8H PRN,  Casimiro Park MD    HYDROcodone-acetaminophen  mg per tablet 1 tablet, 1 tablet, Oral, TID, Casimiro Park MD    hydrOXYzine HCL tablet 25 mg, 25 mg, Oral, TID PRN, Casimiro Park MD    labetaloL injection 10 mg, 10 mg, Intravenous, Q15 Min PRN, Casimiro Park MD    melatonin tablet 6 mg, 6 mg, Oral, Nightly PRN, Casimiro Park MD    ondansetron injection 8 mg, 8 mg, Intravenous, Q6H PRN, Casimiro Park MD    pantoprazole EC tablet 40 mg, 40 mg, Oral, Daily, Casimiro Park MD    sodium chloride 0.9% flush 10 mL, 10 mL, Intravenous, PRN, Casimiro Park MD    topiramate tablet 50 mg, 50 mg, Oral, BID, Casimiro Park MD  Prior to Admission medications    Medication Sig Start Date End Date Taking? Authorizing Provider   cholecalciferol, vitamin D3, 1,250 mcg (50,000 unit) capsule Take 1 capsule (50,000 Units total) by mouth every 7 days. for 365 doses 8/22/23 8/14/30  Candice Garrett MD   cyclobenzaprine (FLEXERIL) 10 MG tablet Take 10 mg by mouth every 8 (eight) hours. 1/17/24   Provider, Historical   diclofenac sodium (VOLTAREN) 1 % Gel Apply 2 g topically 4 (four) times daily. 6/23/23   Braden Dumont MD   doxepin (SINEQUAN) 75 MG capsule Take 75 mg by mouth every evening. 10/28/23   Provider, Historical   gabapentin (NEURONTIN) 300 MG capsule Take 3 capsules (900 mg total) by mouth 2 (two) times daily. 10/3/23 10/2/24  Jen Meier, APRN, CNS   HYDROcodone-acetaminophen (NORCO)  mg per tablet Take 1 tablet by mouth 3 (three) times daily.    Provider, Historical   linaCLOtide (LINZESS) 145 mcg Cap capsule Take 1 capsule (145 mcg total) by mouth before breakfast. 2/16/23   Braden Dumont MD   nystatin (MYCOSTATIN) cream Apply topically 2 (two) times daily. 1/23/24   Braden Dumont MD   OCREVUS 30 mg/mL Soln  9/9/22   Provider, Historical   topiramate (TOPAMAX) 50 MG tablet TAKE 1 TABLET BY MOUTH IN THE MORNING AND 2 TABLETS AT BEDTIME 10/3/23    Jen Meier, APRN, CNS   valsartan (DIOVAN) 80 MG tablet Take 1 tablet (80 mg total) by mouth once daily. 2/16/23   Braden Dumont MD        Imaging:   Imaging: Images were reviewed remotely as they were acquired.    CTH:  No acute intracranial abnormality  MRI:  Demyelinating plaque in both cerebral hemispheres and brainstem, stable compared to prior MRI from August 2022.  No active areas of contrast enhancement indicating active demyelinating disease  ECHO: EF . LA size: Normal. Bubble study: +/-/not done               Thank you for your consult.         Wyatt Jones MD, MHA  Fellow, NeuroEndovascular Surgery, [facility] Henrique Mcclendon  Neurologist, [facility] [place], [place]

## 2024-02-25 NOTE — ASSESSMENT & PLAN NOTE
Body mass index is 45.03 kg/m². Morbid obesity complicates all aspects of disease management from diagnostic modalities to treatment. Weight loss encouraged and health benefits explained to patient.

## 2024-02-25 NOTE — PT/OT/SLP PROGRESS
Physical Therapy      Patient Name:  Alicia Soliz   MRN:  [id number]    Patient not seen today secondary to patient receiving EEG. Will continue efforts.    Mireya Patricio, PT  02/25/24  11:20

## 2024-02-25 NOTE — PT/OT/SLP PROGRESS
Speech Language Pathology      Alicia Maru Soliz  MRN: [id number]    Patient not seen today secondary to Testing/imaging (xray/CT/MRI). Will follow-up at a later time.

## 2024-02-26 LAB
ALBUMIN SERPL BCP-MCNC: 3.1 G/DL (ref 3.5–5.2)
ALP SERPL-CCNC: 83 U/L (ref 55–135)
ALT SERPL W/O P-5'-P-CCNC: 7 U/L (ref 10–44)
ANION GAP SERPL CALC-SCNC: 8 MMOL/L (ref 8–16)
AST SERPL-CCNC: 12 U/L (ref 10–40)
BASOPHILS # BLD AUTO: 0.02 K/UL (ref 0–0.2)
BASOPHILS NFR BLD: 0.4 % (ref 0–1.9)
BILIRUB SERPL-MCNC: 0.2 MG/DL (ref 0.1–1)
BUN SERPL-MCNC: 7 MG/DL (ref 6–20)
CALCIUM SERPL-MCNC: 8.7 MG/DL (ref 8.7–10.5)
CHLORIDE SERPL-SCNC: 111 MMOL/L (ref 95–110)
CO2 SERPL-SCNC: 21 MMOL/L (ref 23–29)
CREAT SERPL-MCNC: 0.7 MG/DL (ref 0.5–1.4)
DIFFERENTIAL METHOD BLD: ABNORMAL
EOSINOPHIL # BLD AUTO: 0.1 K/UL (ref 0–0.5)
EOSINOPHIL NFR BLD: 1.7 % (ref 0–8)
ERYTHROCYTE [DISTWIDTH] IN BLOOD BY AUTOMATED COUNT: 17 % (ref 11.5–14.5)
EST. GFR  (NO RACE VARIABLE): >60 ML/MIN/1.73 M^2
GLUCOSE SERPL-MCNC: 91 MG/DL (ref 70–110)
HCT VFR BLD AUTO: 36.7 % (ref 37–48.5)
HGB BLD-MCNC: 11.1 G/DL (ref 12–16)
IMM GRANULOCYTES # BLD AUTO: 0.01 K/UL (ref 0–0.04)
IMM GRANULOCYTES NFR BLD AUTO: 0.2 % (ref 0–0.5)
LYMPHOCYTES # BLD AUTO: 2 K/UL (ref 1–4.8)
LYMPHOCYTES NFR BLD: 43.4 % (ref 18–48)
MAGNESIUM SERPL-MCNC: 1.8 MG/DL (ref 1.6–2.6)
MCH RBC QN AUTO: 21 PG (ref 27–31)
MCHC RBC AUTO-ENTMCNC: 30.2 G/DL (ref 32–36)
MCV RBC AUTO: 70 FL (ref 82–98)
MONOCYTES # BLD AUTO: 0.4 K/UL (ref 0.3–1)
MONOCYTES NFR BLD: 7.9 % (ref 4–15)
NEUTROPHILS # BLD AUTO: 2.1 K/UL (ref 1.8–7.7)
NEUTROPHILS NFR BLD: 46.4 % (ref 38–73)
NRBC BLD-RTO: 0 /100 WBC
PHOSPHATE SERPL-MCNC: 3.8 MG/DL (ref 2.7–4.5)
PLATELET # BLD AUTO: 267 K/UL (ref 150–450)
PMV BLD AUTO: 9.7 FL (ref 9.2–12.9)
POCT GLUCOSE: 79 MG/DL (ref 70–110)
POCT GLUCOSE: 96 MG/DL (ref 70–110)
POTASSIUM SERPL-SCNC: 3.9 MMOL/L (ref 3.5–5.1)
PROT SERPL-MCNC: 6.2 G/DL (ref 6–8.4)
RBC # BLD AUTO: 5.28 M/UL (ref 4–5.4)
SODIUM SERPL-SCNC: 140 MMOL/L (ref 136–145)
WBC # BLD AUTO: 4.58 K/UL (ref 3.9–12.7)

## 2024-02-26 PROCEDURE — 83735 ASSAY OF MAGNESIUM: CPT | Mod: HCNC | Performed by: STUDENT IN AN ORGANIZED HEALTH CARE EDUCATION/TRAINING PROGRAM

## 2024-02-26 PROCEDURE — G0426 INPT/ED TELECONSULT50: HCPCS | Mod: HCNC,95,, | Performed by: PSYCHIATRY & NEUROLOGY

## 2024-02-26 PROCEDURE — 97162 PT EVAL MOD COMPLEX 30 MIN: CPT | Mod: HCNC

## 2024-02-26 PROCEDURE — 25000003 PHARM REV CODE 250: Mod: HCNC | Performed by: INTERNAL MEDICINE

## 2024-02-26 PROCEDURE — 97166 OT EVAL MOD COMPLEX 45 MIN: CPT | Mod: HCNC

## 2024-02-26 PROCEDURE — 63600175 PHARM REV CODE 636 W HCPCS: Mod: HCNC | Performed by: INTERNAL MEDICINE

## 2024-02-26 PROCEDURE — 85025 COMPLETE CBC W/AUTO DIFF WBC: CPT | Mod: HCNC | Performed by: STUDENT IN AN ORGANIZED HEALTH CARE EDUCATION/TRAINING PROGRAM

## 2024-02-26 PROCEDURE — 63600175 PHARM REV CODE 636 W HCPCS: Mod: HCNC | Performed by: HOSPITALIST

## 2024-02-26 PROCEDURE — 21400001 HC TELEMETRY ROOM: Mod: HCNC

## 2024-02-26 PROCEDURE — 92610 EVALUATE SWALLOWING FUNCTION: CPT | Mod: HCNC

## 2024-02-26 PROCEDURE — 92523 SPEECH SOUND LANG COMPREHEN: CPT | Mod: HCNC

## 2024-02-26 PROCEDURE — 25000003 PHARM REV CODE 250: Mod: HCNC | Performed by: HOSPITALIST

## 2024-02-26 PROCEDURE — 63600175 PHARM REV CODE 636 W HCPCS: Mod: HCNC | Performed by: STUDENT IN AN ORGANIZED HEALTH CARE EDUCATION/TRAINING PROGRAM

## 2024-02-26 PROCEDURE — 36415 COLL VENOUS BLD VENIPUNCTURE: CPT | Mod: HCNC | Performed by: STUDENT IN AN ORGANIZED HEALTH CARE EDUCATION/TRAINING PROGRAM

## 2024-02-26 PROCEDURE — 80053 COMPREHEN METABOLIC PANEL: CPT | Mod: HCNC | Performed by: STUDENT IN AN ORGANIZED HEALTH CARE EDUCATION/TRAINING PROGRAM

## 2024-02-26 PROCEDURE — 97530 THERAPEUTIC ACTIVITIES: CPT | Mod: HCNC

## 2024-02-26 PROCEDURE — 96366 THER/PROPH/DIAG IV INF ADDON: CPT

## 2024-02-26 PROCEDURE — 96372 THER/PROPH/DIAG INJ SC/IM: CPT | Performed by: STUDENT IN AN ORGANIZED HEALTH CARE EDUCATION/TRAINING PROGRAM

## 2024-02-26 PROCEDURE — 84100 ASSAY OF PHOSPHORUS: CPT | Mod: HCNC | Performed by: STUDENT IN AN ORGANIZED HEALTH CARE EDUCATION/TRAINING PROGRAM

## 2024-02-26 PROCEDURE — 25000003 PHARM REV CODE 250: Mod: HCNC | Performed by: STUDENT IN AN ORGANIZED HEALTH CARE EDUCATION/TRAINING PROGRAM

## 2024-02-26 RX ORDER — LORAZEPAM 2 MG/ML
1 INJECTION INTRAMUSCULAR ONCE
Status: COMPLETED | OUTPATIENT
Start: 2024-02-26 | End: 2024-02-26

## 2024-02-26 RX ORDER — SUMATRIPTAN 50 MG/1
50 TABLET, FILM COATED ORAL DAILY PRN
Status: DISCONTINUED | OUTPATIENT
Start: 2024-02-26 | End: 2024-02-27 | Stop reason: HOSPADM

## 2024-02-26 RX ADMIN — GABAPENTIN 600 MG: 300 CAPSULE ORAL at 08:02

## 2024-02-26 RX ADMIN — LORAZEPAM 1 MG: 2 INJECTION INTRAMUSCULAR; INTRAVENOUS at 01:02

## 2024-02-26 RX ADMIN — BUTALBITAL, ACETAMINOPHEN, AND CAFFEINE 1 TABLET: 325; 50; 40 TABLET ORAL at 11:02

## 2024-02-26 RX ADMIN — ASPIRIN 81 MG: 81 TABLET, COATED ORAL at 08:02

## 2024-02-26 RX ADMIN — LEVETIRACETAM INJECTION 1000 MG: 10 INJECTION INTRAVENOUS at 08:02

## 2024-02-26 RX ADMIN — CEFTRIAXONE 1 G: 1 INJECTION, POWDER, FOR SOLUTION INTRAMUSCULAR; INTRAVENOUS at 12:02

## 2024-02-26 RX ADMIN — ENOXAPARIN SODIUM 40 MG: 40 INJECTION SUBCUTANEOUS at 08:02

## 2024-02-26 RX ADMIN — GABAPENTIN 600 MG: 300 CAPSULE ORAL at 02:02

## 2024-02-26 RX ADMIN — HYDROCODONE BITARTRATE AND ACETAMINOPHEN 1 TABLET: 10; 325 TABLET ORAL at 02:02

## 2024-02-26 RX ADMIN — DOXEPIN HYDROCHLORIDE 75 MG: 25 CAPSULE ORAL at 08:02

## 2024-02-26 RX ADMIN — HYDROCODONE BITARTRATE AND ACETAMINOPHEN 1 TABLET: 10; 325 TABLET ORAL at 08:02

## 2024-02-26 RX ADMIN — ATORVASTATIN CALCIUM 40 MG: 40 TABLET, FILM COATED ORAL at 08:02

## 2024-02-26 RX ADMIN — TOPIRAMATE 50 MG: 25 TABLET, FILM COATED ORAL at 08:02

## 2024-02-26 RX ADMIN — SUMATRIPTAN SUCCINATE 50 MG: 50 TABLET ORAL at 06:02

## 2024-02-26 RX ADMIN — PANTOPRAZOLE SODIUM 40 MG: 40 TABLET, DELAYED RELEASE ORAL at 08:02

## 2024-02-26 NOTE — CARE UPDATE
Notified of decreased mental status. Pt seen and examined. VSS, oxygenation normal. Respirations even and unlabored. Moves extremities to sternal rub but does not open eyes. Will check Glucose and ABG.     Glucose 68- give D10 bolus  ABG-unremarkable     Addendum: 2310: After approx 3 hours, pt's AMS resolved. At 2300-pt AAOx3. Blood sugars stable. Pt reports her family is bringing her [employer] to eat.

## 2024-02-26 NOTE — PT/OT/SLP EVAL
Physical Therapy Evaluation and Treatment    Patient Name:  Alicia Soliz   MRN:  [id number]    Recommendations:     Discharge Recommendations: Moderate Intensity Therapy   Discharge Equipment Recommendations: to be determined by next level of care   Barriers to discharge: None    Assessment:     Alicia Soliz is a 44 y.o. female admitted with a medical diagnosis of Seizure-like activity.  She presents with the following impairments/functional limitations: weakness, impaired endurance, impaired functional mobility, gait instability, pain, impaired balance, decreased safety awareness, decreased lower extremity function, decreased coordination.    Rehab Prognosis: Fair; patient would benefit from acute skilled PT services to address these deficits and reach maximum level of function.    Recent Surgery: * No surgery found *     Plan:     During this hospitalization, patient to be seen 3 x/week to address the identified rehab impairments via gait training, therapeutic activities, therapeutic exercises and progress toward the following goals:    Plan of Care Expires:  03/11/24    Subjective     Chief Complaint: PAIN  Patient/Family Comments/goals:   Pain/Comfort:  Pain Rating 1: 8/10  Location 1: head  Pain Rating 2: 8/10  Location - Side 2: Left  Location - Orientation 2: generalized  Location 2: arm    Patients cultural, spiritual, Episcopal conflicts given the current situation:      Living Environment:  PT LIVES WITH DAUGHTERS WHO ARE HER CAREGIVERS, 1ST FLOOR APT, AMB SCOOBY IN COMMUNITY WITH QUAD CANE, DOES NOT WORK OR DRIVE, SCOOBY WITH ADL'S  Prior to admission, patients level of function was SCOOBY.  Equipment used at home: power chair, rollator, walker, cole, cane, quad, bedside commode, shower chair.  DME owned (not currently used): none.  Upon discharge, patient will have assistance from DAUGHTERS.    Objective:     Communicated with NURSE LLAMAS prior to session.  Patient found supine with telemetry,  "peripheral IV, PureWick  upon PT entry to room.    General Precautions: Standard, fall, H/O OLD CVA WITH L SIDE EFFECTED AND MS  Orthopedic Precautions:N/A   Braces: N/A  Respiratory Status: Room air    Exams:  Cognitive Exam:  Patient is oriented to Person, Place, Time, and Situation  Postural Exam:  Patient presented with the following abnormalities:    -       Rounded shoulders  Sensation:    -       Impaired  LLE  RLE ROM: WFL  RLE Strength: GROSSLY 3+/5  LLE ROM: LIMITED  LLE Strength: LIMITED    Functional Mobility:  Bed Mobility:     Rolling Left:  maximal assistance and of 2 persons  Scooting: maximal assistance and of 2 persons  Supine to Sit: maximal assistance and of 2 persons-LIMITED ACTIVE PARTICIPATION, PT C/O LUE PAIN TO TOUCH  Transfers:     Sit to Stand:  maximal assistance and of 2 persons with no AD and hand-held assist  Bed to Chair: maximal assistance and of 2 persons with  no AD and hand-held assist  using  Stand Pivot, SLOW MOVING, MAX CUES AND DIRECTION TO COMPLETE TF  Gait: UNABLE TO ASSESS AT THIS TIME  Balance: FAIR SITTING BALANCE, POOR DYNAMIC BALANCE DURING TF  PT EDUCATED IN AND ENCOURAGED TO PERFORM LLE THEREX THROUGHOUT THE DAY: HIP FLEX/EXT, LAQ, AP'S    AM-PAC 6 CLICK MOBILITY  Total Score:10     Treatment & Education:  PT EDUCATION:  - ROLE OF P.T. AND POC IN ACUTE CARE HOSPITAL SETTING  - ENCOURAGED TO INCREASE TIME OOB IN CHAIR TO TOLERANCE   - TO CONTINUE THERAPUETIC EXERCISES THROUGHOUT THE DAY TO INCREASE ACTIVITY TOLERANCE AND DECREASE RISK FOR PNEUMONIA AND BLOOD CLOTS: HIP FLEX/EXT, HIP ABD/ADD, QUAD SET, HEEL SLIDE, AP  - RISK FOR FALLS DUE TO GENERALIZED WEAKNESS, EDUCATED ON "CALL DON'T FALL", ENCOURAGED TO CALL FOR ASSISTANCE WITH ALL NEEDS SUCH AS BED<>CHAIR TRANSFERS OR TRIPS TO BATHROOM, PT AGREEABLE TO SAFETY PRECAUTIONS    Patient left up in chair with all lines intact, call button in reach, chair alarm on, and NURSE notified.    GOALS:   Multidisciplinary " Problems       Physical Therapy Goals          Problem: Physical Therapy    Goal Priority Disciplines Outcome Goal Variances Interventions   Physical Therapy Goal     PT, PT/OT      Description: LTG'S TO BE MET IN 14 DAYS (3-11-24)  PT WILL REQUIRE [facility] FOR BED MOBILITY  PT WILL REQUIRE [facility] FOR BED<>CHAIR TF'S  PT WILL  FEET WITH APPROPRIATE AD AND FIDELINA  PT WILL INC AMPAC SCORE BY 2 POINTS TO PROGRESS GROSS FUNC MOBILITY                         History:     Past Medical History:   Diagnosis Date    Allergy     Anemia     Arthritis     Cardiac arrest as complication of care     pt states she went into cardiac arrest from an allergic reaction to a medication    Depression     Encounter for blood transfusion     GERD (gastroesophageal reflux disease)     Hemiplegia due to old stroke     Hypertension     Morbid obesity with BMI of 45.0-49.9, adult 09/20/2018    Multiple sclerosis     Neuromuscular disorder     Seizure-like activity 2/25/2024       Past Surgical History:   Procedure Laterality Date    APPENDECTOMY      BREAST SURGERY      CHOLECYSTECTOMY      COLONOSCOPY N/A 11/25/2020    Procedure: COLONOSCOPY;  Surgeon: Andrew Jenkins III, MD;  Location: [facility];  Service: Endoscopy;  Laterality: N/A;    ESOPHAGOGASTRODUODENOSCOPY N/A 02/19/2020    Procedure: EGD (ESOPHAGOGASTRODUODENOSCOPY);  Surgeon: Michael Navarrete MD;  Location: [facility];  Service: Endoscopy;  Laterality: N/A;    ESOPHAGOGASTRODUODENOSCOPY N/A 02/20/2020    Procedure: EGD (ESOPHAGOGASTRODUODENOSCOPY);  Surgeon: Micheal Navarrete MD;  Location: [facility];  Service: General;  Laterality: N/A;    ESOPHAGOGASTRODUODENOSCOPY N/A 11/25/2020    Procedure: EGD (ESOPHAGOGASTRODUODENOSCOPY);  Surgeon: Andrew Jenkins III, MD;  Location: [facility];  Service: Endoscopy;  Laterality: N/A;    ESOPHAGOGASTRODUODENOSCOPY N/A 9/25/2023    Procedure: EGD (ESOPHAGOGASTRODUODENOSCOPY);  Surgeon: Ly Kim MD;  Location: [facility];  Service: Endoscopy;   Laterality: N/A;    ESOPHAGOGASTRODUODENOSCOPY N/A 1/29/2024    Procedure: EGD (ESOPHAGOGASTRODUODENOSCOPY);  Surgeon: Ly Kim MD;  Location: [facility];  Service: Endoscopy;  Laterality: N/A;    HYSTERECTOMY      KNEE SURGERY      PH MONITORING, ESOPHAGUS, WIRELESS, (OFF REFLUX MEDS) N/A 1/29/2024    Procedure: PH MONITORING, ESOPHAGUS, WIRELESS, (OFF REFLUX MEDS);  Surgeon: Ly Kim MD;  Location: [facility];  Service: Endoscopy;  Laterality: N/A;    ROBOT-ASSISTED LAPAROSCOPIC SLEEVE GASTRECTOMY USING DA ANTHONY XI N/A 02/20/2020    Procedure: XI ROBOTIC SLEEVE GASTRECTOMY;  Surgeon: Michael Navarrete MD;  Location: [facility] OR;  Service: General;  Laterality: N/A;    TENOPLASTY OF HAND Left 08/26/2021    Procedure: REPAIR, TENDON, HAND;  Surgeon: Joselito Lugo MD;  Location: [facility];  Service: Orthopedics;  Laterality: Left;  Left RCL PIP Joint Repair/Recon with Arthrex Internal Brace.    TONSILLECTOMY      TOTAL REDUCTION MAMMOPLASTY  2022    TUBAL LIGATION         Time Tracking:     PT Received On: 02/26/24  PT Start Time: 1005     PT Stop Time: 1030  PT Total Time (min): 25 min     Billable Minutes: Evaluation 15 and Therapeutic Activity 10    02/26/2024

## 2024-02-26 NOTE — PLAN OF CARE
OT johnson completed. Sup>sit max A of 2, sit>stand max A of 2, step>pivot to bedside chair with mod A of 2. Recommending moderate intensity intervention at d/c.

## 2024-02-26 NOTE — PLAN OF CARE
[facility] (Hospital)  Initial Discharge Assessment       Primary Care Provider: Braden Dumont MD    Admission Diagnosis: Multiple sclerosis [G35]  TIA (transient ischemic attack) [G45.9]  Seizures [R56.9]  Seizure-like activity [R56.9]    Admission Date: 2/24/2024  Expected Discharge Date:     Transition of Care Barriers: None    Payor: [employer] MANAGED MEDICARE / Plan: [employer] MEDICARE HMO / Product Type: Capitation /     Extended Emergency Contact Information  Primary Emergency Contact: DarianNiraliradha  Address: [address] [place] [address] [address]           [place] [address] [facility]  Home Phone: [phone]  Mobile Phone: [phone]  Relation: Daughter  Secondary Emergency Contact: ernesto grayson   [place]  Mobile Phone: [phone]  Relation: Daughter  Preferred language: English   needed? No    Discharge Plan A: Home with family, Home  Discharge Plan B: Home, Home with family      [facility] [address] [address]  Phone: [phone] Fax: [phone]    [facility]  [address]  [facility] [address]  Phone: [phone] Fax: [phone]    [facility]  [address]  [place] [address]  Phone: [phone] Fax: [phone]    [id number] [place], [place] - [facility] AT [facility] [address]  [address]  [address]  [facility] [address]  Phone: [phone] Fax: [phone]    [facility] - [place], [place] - [address]  [address]  [facility] [address]  Phone: [phone] Fax: [phone]      Initial Assessment (most recent)       Adult Discharge Assessment - 02/26/24 0957          Discharge Assessment    Assessment Type Discharge Planning Assessment     Confirmed/corrected address, phone number and insurance Yes     Confirmed Demographics Correct on Facesheet      Source of Information patient     Communicated ARACELI with patient/caregiver Date not available/Unable to determine     Reason For Admission Seizure like activity     People in Home child(byron), adult     Facility Arrived From: Home     Do you expect to return to your current living situation? Yes     Do you have help at home or someone to help you manage your care at home? Yes     Who are your caregiver(s) and their phone number(s)? Pt's daughters     Prior to hospitilization cognitive status: Alert/Oriented     Current cognitive status: Alert/Oriented     Walking or Climbing Stairs Difficulty no     Dressing/Bathing Difficulty no     Readmission within 30 days? No     Patient currently being followed by outpatient case management? No     Do you currently have service(s) that help you manage your care at home? No     Do you take prescription medications? Yes     Do you have prescription coverage? Yes     Do you have any problems affording any of your prescribed medications? No     Is the patient taking medications as prescribed? yes     Who is going to help you get home at discharge? Pts daughters     How do you get to doctors appointments? family or friend will provide     Are you on dialysis? No     Do you take coumadin? No     Discharge Plan A Home with family;Home     Discharge Plan B Home;Home with family     DME Needed Upon Discharge  none     Discharge Plan discussed with: Patient     Transition of Care Barriers None                   SW met with patient and relative at bedside to complete discharge assessment. Pt reports living at home with her daughters. Pt's daughters are her help at home. Pt states she uses [employer] and her daughters for transportation.   Pt denies current home health services. Pt inquired about help at home. SW discussed home health care via insurance. SW discussed difference between HHC and caregivers/aides. SW discussed application for Medicaid waivers. Pt reports receiving  caregiver assist via Medicaid in the past and is familiar with process to obtain services. Pt reports desire to maintain independence and does not feel she needs this level of assistance at this time.   Pt's whiteboard updated with CM contact and anticipated discharge disposition. SW to remain available as needed.                 no

## 2024-02-26 NOTE — PLAN OF CARE
Updated patient on plan of care. Drowsy, responds to voice. Seizure precautions in place. Instructed patient to use call light for assistance, call light in reach. Hourly rounding performed. Vitals q4 hours. Education provided, questions answered/encouraged. Chart check complete. NSR    Problem: Adult Inpatient Plan of Care  Goal: Plan of Care Review  Outcome: Ongoing, Progressing

## 2024-02-26 NOTE — NURSING
Family of pt called for nurse and upon entering the room at 1320, the pt was unresponsive in chair. Rapid response called and VS obtained. PCOT BG 79. Pt responding at 1325 to physician.Charge nurse administered 1mg of Ativan IV at 1338. Pt alert and returned to bed.

## 2024-02-26 NOTE — PLAN OF CARE
SW met with patient and daughters at bedside to discuss recs for [facility] following PT/OT eval. SW discussed SNF placement and process. Pt voiced interested in [facility]. Pt reports being treated at [facility] in the past and was told she'd need a referral to be seen again. Pt reports being a [facility] for 21 days the in the past. SW discussed difference in LOC between SNF and outpt therapy. Pt reports that when MS is under control, she can do the work. Pt reports having transport assist via [employer] and would prefer outpatient therapy. SW to follow progress.

## 2024-02-26 NOTE — PLAN OF CARE
"A251/A251 BENMarisa Soliz is a 44 y.o.female admitted on 2/24/2024 for Seizure-like activity   Code Status: Full Code MRN: [id number]   Review of patient's allergies indicates:   Allergen Reactions    Demerol [meperidine] Anaphylaxis     Other reaction(s): Itching    Dilaudid [hydromorphone (bulk)] Anaphylaxis     "coded" per pt  Patient can tolerate Lortab    Shellfish containing products Itching, Nausea And Vomiting and Swelling    Prednisone Itching     Other reaction(s): Itching    Tramadol      shaking     Past Medical History:   Diagnosis Date    Allergy     Anemia     Arthritis     Cardiac arrest as complication of care     pt states she went into cardiac arrest from an allergic reaction to a medication    Depression     Encounter for blood transfusion     GERD (gastroesophageal reflux disease)     Hemiplegia due to old stroke     Hypertension     Morbid obesity with BMI of 45.0-49.9, adult 09/20/2018    Multiple sclerosis     Neuromuscular disorder     Seizure-like activity 2/25/2024      PRN meds    bisacodyL, 10 mg, Daily PRN  butalbital-acetaminophen-caffeine -40 mg, 1 tablet, Q4H PRN  cyclobenzaprine, 10 mg, TID PRN  dextrose 10%, 12.5 g, PRN  dextrose 10%, 25 g, PRN  glucagon (human recombinant), 1 mg, PRN  hydrALAZINE, 10 mg, Q8H PRN  hydrOXYzine HCL, 25 mg, TID PRN  labetalol, 10 mg, Q15 Min PRN  lorazepam, 2 mg, Q4H PRN  melatonin, 6 mg, Nightly PRN  ondansetron, 8 mg, Q6H PRN  sodium chloride 0.9%, 10 mL, PRN      Chart check completed. Will continue plan of care.      Orientation: oriented x 4  [place] Coma Scale Score: 15     Lead Monitored: Lead II Rhythm: normal sinus rhythm    Cardiac/Telemetry Box Number: [id number]  VTE Required Core Measure: Pharmacological prophylaxis initiated/maintained Last Bowel Movement: 02/24/24  Diet Adult Regular (IDDSI Level 7)  Voiding Characteristics: external catheter  Geovanny Score: 11  Fall Risk Score: 13  Accucheck []   Freq?      Lines/Drains/Airways  "      Drain  Duration             Female External Urinary Catheter w/ Suction 02/25/24 0800 <1 day              Peripheral Intravenous Line  Duration                  Peripheral IV - Single Lumen 02/25/24 0008 18 G;1 3/4 in Right Upper Arm 1 day

## 2024-02-26 NOTE — PLAN OF CARE
"A251/A251 BENMarisa Soliz is a 44 y.o.female admitted on 2/24/2024 for Seizure-like activity   Code Status: Full Code MRN: [id number]   Review of patient's allergies indicates:   Allergen Reactions    Demerol [meperidine] Anaphylaxis     Other reaction(s): Itching    Dilaudid [hydromorphone (bulk)] Anaphylaxis     "coded" per pt  Patient can tolerate Lortab    Shellfish containing products Itching, Nausea And Vomiting and Swelling    Prednisone Itching     Other reaction(s): Itching    Tramadol      shaking     Past Medical History:   Diagnosis Date    Allergy     Anemia     Arthritis     Cardiac arrest as complication of care     pt states she went into cardiac arrest from an allergic reaction to a medication    Depression     Encounter for blood transfusion     GERD (gastroesophageal reflux disease)     Hemiplegia due to old stroke     Hypertension     Morbid obesity with BMI of 45.0-49.9, adult 09/20/2018    Multiple sclerosis     Neuromuscular disorder     Seizure-like activity 2/25/2024      PRN meds    bisacodyL, 10 mg, Daily PRN  butalbital-acetaminophen-caffeine -40 mg, 1 tablet, Q4H PRN  cyclobenzaprine, 10 mg, TID PRN  dextrose 10%, 12.5 g, PRN  dextrose 10%, 25 g, PRN  glucagon (human recombinant), 1 mg, PRN  hydrALAZINE, 10 mg, Q8H PRN  hydrOXYzine HCL, 25 mg, TID PRN  labetalol, 10 mg, Q15 Min PRN  lorazepam, 2 mg, Q4H PRN  melatonin, 6 mg, Nightly PRN  ondansetron, 8 mg, Q6H PRN  sodium chloride 0.9%, 10 mL, PRN      Chart check completed. Will continue plan of care.      Orientation: disoriented to, place, time  [place] Coma Scale Score: 11     Lead Monitored: Lead II Rhythm: normal sinus rhythm    Cardiac/Telemetry Box Number: [id number]  VTE Required Core Measure: Pharmacological prophylaxis initiated/maintained Last Bowel Movement: 02/24/24  Diet Adult Regular (IDDSI Level 7)  Voiding Characteristics: external catheter  Geovanny Score: 18  Fall Risk Score: 15  Accucheck []   Freq?  "     Lines/Drains/Airways       Drain  Duration             Female External Urinary Catheter w/ Suction 02/25/24 0800 1 day              Peripheral Intravenous Line  Duration                  Peripheral IV - Single Lumen 02/25/24 0008 18 G;1 3/4 in Right Upper Arm 1 day                       Problem: Adult Inpatient Plan of Care  Goal: Plan of Care Review  2/26/2024 1614 by Darius Blank RN  Outcome: Ongoing, Progressing  2/26/2024 1614 by Darius Blank RN  Outcome: Ongoing, Not Progressing  Goal: Patient-Specific Goal (Individualized)  2/26/2024 1614 by Darius Blank RN  Outcome: Ongoing, Progressing  2/26/2024 1614 by Darius Blank RN  Outcome: Ongoing, Not Progressing  Goal: Absence of Hospital-Acquired Illness or Injury  2/26/2024 1614 by Darius Blank RN  Outcome: Ongoing, Progressing  2/26/2024 1614 by Darius Blank RN  Outcome: Ongoing, Not Progressing  Goal: Optimal Comfort and Wellbeing  2/26/2024 1614 by Darius Blank RN  Outcome: Ongoing, Progressing  2/26/2024 1614 by Darius Blank RN  Outcome: Ongoing, Not Progressing  Goal: Readiness for Transition of Care  2/26/2024 1614 by Darius Blank RN  Outcome: Ongoing, Progressing  2/26/2024 1614 by Darius Blank RN  Outcome: Ongoing, Not Progressing     Problem: Bariatric Environmental Safety  Goal: Safety Maintained with Care  2/26/2024 1614 by Darius Blank RN  Outcome: Ongoing, Progressing  2/26/2024 1614 by Darius Blank RN  Outcome: Ongoing, Not Progressing     Problem: Adjustment to Illness (Stroke, Ischemic/Transient Ischemic Attack)  Goal: Optimal Coping  2/26/2024 1614 by Darius Blank RN  Outcome: Ongoing, Progressing  2/26/2024 1614 by Darius Blank RN  Outcome: Ongoing, Not Progressing     Problem: Bowel Elimination Impaired (Stroke, Ischemic/Transient Ischemic Attack)  Goal: Effective Bowel Elimination  2/26/2024 1614 by Darius Blank RN  Outcome: Ongoing, Progressing  2/26/2024 1614 by Darius Blank  RN  Outcome: Ongoing, Not Progressing     Problem: Cerebral Tissue Perfusion (Stroke, Ischemic/Transient Ischemic Attack)  Goal: Optimal Cerebral Tissue Perfusion  2/26/2024 1614 by Darius Blank RN  Outcome: Ongoing, Progressing  2/26/2024 1614 by Darius Blank RN  Outcome: Ongoing, Not Progressing     Problem: Cognitive Impairment (Stroke, Ischemic/Transient Ischemic Attack)  Goal: Optimal Cognitive Function  2/26/2024 1614 by Darius Blank RN  Outcome: Ongoing, Progressing  2/26/2024 1614 by Darius Blank RN  Outcome: Ongoing, Not Progressing     Problem: Functional Ability Impaired (Stroke, Ischemic/Transient Ischemic Attack)  Goal: Optimal Functional Ability  2/26/2024 1614 by Darius Blank RN  Outcome: Ongoing, Progressing  2/26/2024 1614 by Darius Blank RN  Outcome: Ongoing, Not Progressing     Problem: Respiratory Compromise (Stroke, Ischemic/Transient Ischemic Attack)  Goal: Effective Oxygenation and Ventilation  2/26/2024 1614 by Darius Blank RN  Outcome: Ongoing, Progressing  2/26/2024 1614 by Darius Blank RN  Outcome: Ongoing, Not Progressing     Problem: Sensorimotor Impairment (Stroke, Ischemic/Transient Ischemic Attack)  Goal: Improved Sensorimotor Function  2/26/2024 1614 by Darius Blank RN  Outcome: Ongoing, Progressing  2/26/2024 1614 by Darius Blank RN  Outcome: Ongoing, Not Progressing     Problem: Swallowing Impairment (Stroke, Ischemic/Transient Ischemic Attack)  Goal: Optimal Eating and Swallowing without Aspiration  2/26/2024 1614 by Darius Blank RN  Outcome: Ongoing, Progressing  2/26/2024 1614 by Darius Blank RN  Outcome: Ongoing, Not Progressing     Problem: Urinary Elimination Impaired (Stroke, Ischemic/Transient Ischemic Attack)  Goal: Effective Urinary Elimination  2/26/2024 1614 by Darius Blank RN  Outcome: Ongoing, Progressing  2/26/2024 1614 by Darius Blank RN  Outcome: Ongoing, Not Progressing     Problem: Skin Injury Risk  Increased  Goal: Skin Health and Integrity  2/26/2024 1614 by Darius Blank RN  Outcome: Ongoing, Progressing  2/26/2024 1614 by Darius Blank RN  Outcome: Ongoing, Not Progressing     Problem: Fall Injury Risk  Goal: Absence of Fall and Fall-Related Injury  2/26/2024 1614 by Darius Blank RN  Outcome: Ongoing, Progressing  2/26/2024 1614 by Darius Blank RN  Outcome: Ongoing, Not Progressing

## 2024-02-26 NOTE — PT/OT/SLP EVAL
Speech Language Pathology Evaluation  Cognitive/Bedside Swallow    Patient Name:  Alicia Soliz   MRN:  [id number]  Admitting Diagnosis: Seizure-like activity    Recommendations:                  General Recommendations:  Follow-up not indicated  Diet recommendations:  Regular Diet - IDDSI Level 7, Thin liquids - IDDSI Level 0   Aspiration Precautions:  GI/GERD and hx of gastric sleeve, Frequent oral care, HOB to 90 degrees, and Standard aspiration precautions   General Precautions: Standard, aspiration  Communication strategies:  none    History:     Past Medical History:   Diagnosis Date    Allergy     Anemia     Arthritis     Cardiac arrest as complication of care     pt states she went into cardiac arrest from an allergic reaction to a medication    Depression     Encounter for blood transfusion     GERD (gastroesophageal reflux disease)     Hemiplegia due to old stroke     Hypertension     Morbid obesity with BMI of 45.0-49.9, adult 09/20/2018    Multiple sclerosis     Neuromuscular disorder     Seizure-like activity 2/25/2024       Past Surgical History:   Procedure Laterality Date    APPENDECTOMY      BREAST SURGERY      CHOLECYSTECTOMY      COLONOSCOPY N/A 11/25/2020    Procedure: COLONOSCOPY;  Surgeon: Andrew Jenkins III, MD;  Location: [facility] ENDO;  Service: Endoscopy;  Laterality: N/A;    ESOPHAGOGASTRODUODENOSCOPY N/A 02/19/2020    Procedure: EGD (ESOPHAGOGASTRODUODENOSCOPY);  Surgeon: Michael Navarrete MD;  Location: [facility] ENDO;  Service: Endoscopy;  Laterality: N/A;    ESOPHAGOGASTRODUODENOSCOPY N/A 02/20/2020    Procedure: EGD (ESOPHAGOGASTRODUODENOSCOPY);  Surgeon: Michael Navarrete MD;  Location: [facility] OR;  Service: General;  Laterality: N/A;    ESOPHAGOGASTRODUODENOSCOPY N/A 11/25/2020    Procedure: EGD (ESOPHAGOGASTRODUODENOSCOPY);  Surgeon: Andrew Jenkins III, MD;  Location: [facility] ENDO;  Service: Endoscopy;  Laterality: N/A;    ESOPHAGOGASTRODUODENOSCOPY N/A 9/25/2023    Procedure: EGD  (ESOPHAGOGASTRODUODENOSCOPY);  Surgeon: Ly Kim MD;  Location: [facility] ENDO;  Service: Endoscopy;  Laterality: N/A;    ESOPHAGOGASTRODUODENOSCOPY N/A 1/29/2024    Procedure: EGD (ESOPHAGOGASTRODUODENOSCOPY);  Surgeon: Ly Kim MD;  Location: [facility] ENDO;  Service: Endoscopy;  Laterality: N/A;    HYSTERECTOMY      KNEE SURGERY      PH MONITORING, ESOPHAGUS, WIRELESS, (OFF REFLUX MEDS) N/A 1/29/2024    Procedure: PH MONITORING, ESOPHAGUS, WIRELESS, (OFF REFLUX MEDS);  Surgeon: Ly Kim MD;  Location: [facility] ENDO;  Service: Endoscopy;  Laterality: N/A;    ROBOT-ASSISTED LAPAROSCOPIC SLEEVE GASTRECTOMY USING DA ANTHONY XI N/A 02/20/2020    Procedure: XI ROBOTIC SLEEVE GASTRECTOMY;  Surgeon: Michael Navarrete MD;  Location: [facility] OR;  Service: General;  Laterality: N/A;    TENOPLASTY OF HAND Left 08/26/2021    Procedure: REPAIR, TENDON, HAND;  Surgeon: Joselito Lugo MD;  Location: [facility] OR;  Service: Orthopedics;  Laterality: Left;  Left RCL PIP Joint Repair/Recon with Arthrex Internal Brace.    TONSILLECTOMY      TOTAL REDUCTION MAMMOPLASTY  2022    TUBAL LIGATION         Social History: Patient lives with her daughter in [place], [place].  Reportedly independent with ADL's, but receives assist from family as needed.  She also owns and utilizes medical aids/equipment for assist, including power chair.    Prior Intubation HX:  N/A this admission.  See medical chart.    Modified Barium Swallow: N/A    10/5/2023 FL UPPER GI     CLINICAL HISTORY:  Acquired absence of stomach (part of), status post gastric sleeve with planned future bariatric surgery     TECHNIQUE:  Contrast material: Dual barium sulfate suspension     Fluoroscopy time: 2.0 minutes     Images obtained: 64     COMPARISON:  CT abdomen pelvis without contrast 09/21/2022     FINDINGS:  Preliminary radiograph: Unremarkable     Esophagus: Mildly dilated esophagus without stricture.  Esophageal dysmotility is present.      Gastroesophageal reflux: Spontaneous gastroesophageal reflux present to the level of the proximal esophagus.     Stomach: Postsurgical change of gastric sleeve without focal stricture.  Brisk gastric emptying.     Duodenum: Duodenal sweep is normal with the duodenojejunal junction in its expected location.     Other findings: No hiatal hernia.     Impression:     Mildly dilated esophagus with esophageal dysmotility.  No stricture.     Postsurgical change of gastric sleeve.     Spontaneous gastroesophageal reflux to the level of the proximal esophagus.        Electronically signed by: Kristy Szymanski  Date:                                            10/05/2023  Time:                                           15:55    CT HEAD WITHOUT CONTRAST     CLINICAL HISTORY:  Headache, sudden, severe;Neuro deficit, acute, stroke suspected;Seizure, new-onset, no history of trauma;     TECHNIQUE:  Low dose axial images were obtained through the head.  Coronal and sagittal reformations were also performed. Contrast was not administered.     COMPARISON:  CT head 08/05/2022, MRI brain 08/05/2022     FINDINGS:  There is similar appearing beam hardening artifact overlying the periphery of the bilateral frontal-parietal lobes, similar to prior examinations.  There is generalized cerebral volume loss with compensatory sulcal widening and ventricular enlargement.  There is patchy and confluent periventricular and supratentorial white matter hypoattenuation, similar to prior examination, most pronounced within the right periventricular white matter.  There is no CT evidence of acute intracranial hemorrhage.  There is no midline shift or hydrocephalus.  The mastoid air cells and paranasal sinuses are clear of acute process. 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  No displaced calvarial fracture identified.  There is hyperostosis frontalis.     Impression:     No CT evidence of acute intracranial  abnormality. Clinical correlation and further evaluation as warranted.     Patchy and confluent periventricular and supratentorial white matter hypoattenuation, similar to prior examination.  Findings may reflect combination of chronic microvascular ischemic changes as well as sequelae of prior demyelination in light of history.        Electronically signed by: Raúl Sinha MD  Date:                                            02/25/2024  Time:                                           00:41    2/6/24 MRI Brain W WO Contrast    Impression    Stable T2 hyperintense lesions in the white matter both supra and infratentorially and involving the brainstem. Right periventricular lesions are more pronounced than the left with associated volume loss. No definite new lesions. No enhancing lesions to suggest active demyelination.    Narrative  EXAM: MRI BRAIN W WO CONTRAST, 2/6/2024 10:15 AM    COMPARISON: 8/25/2023    HISTORY: MS    TECHNIQUE: Multiplanar, multisequence MR imaging of the brain was obtained before and after IV contrast administration.    FINDINGS:  There is no restricted diffusion. Confluent T2 hyperintense lesions in the periventricular white matter on the right with volume loss and ex vacuo dilatation of the right lateral ventricle are stable. Additional multifocal T2 hyperintense lesions within the corona radiata bilaterally are similar. Persistent T2 hyperintense lesions within the left side of the dong and extending into the middle cerebellar peduncle. Stable lesion in the right lateral cerebellar hemisphere No definite new lesions. There is no mass or mass effect. There is no abnormal contrast enhancement.  The ventricles are normal in size and configuration.  There are no abnormal extraaxial collections.  The major intracranial arterial flow voids are patent. There is no parenchymal hemorrhage. Corpus callosum is normal. Pituitary and optic chiasm are grossly normal. Cerebellar tonsils are  appropriately positioned.  Exam End: 02/06/24 12:11 Last Resulted: 02/06/24 13:02   Received From: [facility]  Result Received: 02/09/24 16:34       Prior diet: Pt consumes a regular diet.  Denied restrictions, despite GI hx of esophageal dysmotility, GERD and gastric sleeve.    Subjective     Pt seen bedside for ST evaluation. Sitting upright in chair.  No c/o pain.  Pt's fiance and daughter present.  Patient goals: To go home     Pain/Comfort:  Pain Rating 1: 0/10  Pain Rating Post-Intervention 1: 0/10  Pain Rating 2: 0/10  Pain Rating Post-Intervention 2: 0/10    Respiratory Status: Room air    Objective:     Cognitive Status:    Hx MS, CVA and cardiac arrest. Chronic microvascular ischemia noted on CT head.  See previous MRIs.  She is oriented and able to communicate functional needs without difficulty.  Recent memory recall impaired.  Functional problem solving.       Receptive Language:   Comprehension:   WFL in conversation    Pragmatics:    WFL    Expressive Language:  Verbal:    WFL in conversation      Motor Speech:  WFL    Voice:   WFL    Oral Musculature Evaluation  Oral Musculature: WFL  Dentition: present and adequate  Secretion Management: adequate  Mucosal Quality: good  Mandibular Strength and Mobility: WFL  Oral Labial Strength and Mobility: WFL  Lingual Strength and Mobility: WFL  Velar Elevation: WFL  Buccal Strength and Mobility: WFL  Volitional Cough: present  Volitional Swallow: present  Voice Prior to PO Intake: WFL    Bedside Swallow Eval:   Clinical Swallow Examination:   Of note, patient self-fed throughout evaluation. Patient presented with:     CONSISTENCY  NOTES   THIN (IDDSI 0) Water cup/straw   No overt s/s of dysphagia/aspiration   PUREE (IDDSI 4/Extremely Thick)   TSP/TBSP bites of pudding/applesauce/yogurt  Refused pureed solid trials   SOLID (IDDSI 7/Regular) Bite of Jo-Ann Doone cookie    No overt s/s of  dysphagia/aspiration     Thickened liquids were not used in this assessment. Gonzalofe (2018) reported that thickened liquids have no sound evidence at reducing the risk of pneumonia in patients with dysphagia and can cause harm by increasing their risk of dehydration. It also presents an increased risk of UTI, electrolyte imbalance, constipation, fecal impaction, cognitive impairment, functional decline and even death (Brandon, 2002; Mayelin, 2016).  Thickened liquids are associated with risks including dehydration, increased pharyngeal residue, potential interference with medication absorption, and decreased quality of life (Rigo, 2013). Thickened liquids are also more likely to be silently aspirated than thin liquids (Humberto et al., 2018). This supports the assertion that we should confirm a patient requires thickened liquids with an instrumental swallow study prior to recommending them.    References:   Rigo TREJO (2013). Thickening agents used for dysphagia management: Effect on bioavailability of water, medication and feelings of satiety. Nutrition Journal, 12, 54. https://doi.org/10.1186/5815-7622-32-54    MAYA Paul, ELIJAH Huber, ELAINE Ashraf, & MAYA Esposito (2018). Cough response to aspiration in thin and thick fluids during FEES in hospitalized inpatients. International journal of language & communication disorders, 53(5), 909-918. https://doi.org/10.1111/6436-0569.97989    INTERPRETATION AND RISK ASSESSMENT:  Clinical swallow evaluation (CSE) revealed oral phase characterized by lingual, labial, buccal strength and range of motion functional for lip closure, bolus preparation and propulsion. The patient had no anterior loss of the bolus with complete closure of the lips around the utensils. No residue remained in the oral cavity following the swallow. Patient without overt clinical signs/symptoms of aspiration on any PO trials given; however, potential efficiency impairment s/t hx esophageal dysmotility  (see upper GI flouro), GERD (see EGD) and gastric sleeve. Contributing risk factors for dysphagia include cognitive deficits and neurological hx, including seizure disorder and MS.     Compensatory Strategies  Aspiration/GERD      Assessment:     Alicia Soliz is a 44 y.o. female with hx CVA (left sided weakness), cardiac arrest and MS was admitted to [facility] acute with seizure-like activity. She presents with improved mentation and reportedly at baseline cognitive-linguistic skills with ability to communicate personal needs.  OM WFL. No overt s/s of dysphagia present during bedside CSE; however, noted significant GI hx, including GERD, dysmotility and h/o gastric sleeve.  She is recommended for IDDSI 7-regular solids with IDDSI 0-thin liquids, following aspiration and reflux precautions. No further acute ST intervention indicated at this time. MD to reconsult if needed.    Goals: N/A  Multidisciplinary Problems       SLP Goals       Not on file                    Plan:     Plan of Care reviewed with:  patient, daughter, significant other   SLP Follow-Up:  No       Discharge recommendations:  Therapy Intensity Recommendations at Discharge: No Therapy Indicated   Barriers to Discharge:  None    Time Tracking:     SLP Treatment Date:   02/26/24  Speech Start Time:  1100  Speech Stop Time:  1130     Speech Total Time (min):  30 min    Billable Minutes: Eval 15 minutes  and Eval Swallow and Oral Function 15 minutes    02/26/2024

## 2024-02-26 NOTE — CONSULTS
[facility] - Telemetry ([facility])  Neurology  Consult Note    Patient Name: Alicia Soliz  MRN: [id number]  Admission Date: 2/24/2024  Hospital Length of Stay: 0 days  Code Status: Full Code   Attending Provider: Hal Velazquez MD   Consulting Provider: Raymond Barbosa MD  Primary Care Physician: Braden Dumont MD  Principal Problem:Seizure-like activity    Inpatient consult to Neurology  Consult performed by: Raymond Barbosa MD  Consult ordered by: Hal Velazquez MD        Subjective:     Chief Complaint:  Seizure     HPI: 43 y/o female with medical Hx of MS, cardiac arrest, stroke GERD comes to ED due to seizures. Pt has baseline left sided weakness but family noticed her weakness was more pronounced. Other seizure events have been witnessed in hospital.He daughter states that pt becomes unresponsive the tenses up but not violent shaking. At home pt is on topiramate for headahces.    Past Medical History:   Diagnosis Date    Allergy     Anemia     Arthritis     Cardiac arrest as complication of care     pt states she went into cardiac arrest from an allergic reaction to a medication    Depression     Encounter for blood transfusion     GERD (gastroesophageal reflux disease)     Hemiplegia due to old stroke     Hypertension     Morbid obesity with BMI of 45.0-49.9, adult 09/20/2018    Multiple sclerosis     Neuromuscular disorder     Seizure-like activity 2/25/2024       Past Surgical History:   Procedure Laterality Date    APPENDECTOMY      BREAST SURGERY      CHOLECYSTECTOMY      COLONOSCOPY N/A 11/25/2020    Procedure: COLONOSCOPY;  Surgeon: Andrew Jenkins III, MD;  Location: [facility];  Service: Endoscopy;  Laterality: N/A;    ESOPHAGOGASTRODUODENOSCOPY N/A 02/19/2020    Procedure: EGD (ESOPHAGOGASTRODUODENOSCOPY);  Surgeon: Michael Navarrete MD;  Location: [facility];  Service: Endoscopy;  Laterality: N/A;    ESOPHAGOGASTRODUODENOSCOPY N/A 02/20/2020    Procedure: EGD (ESOPHAGOGASTRODUODENOSCOPY);  Surgeon:  "Michael Navarrete MD;  Location: [facility] OR;  Service: General;  Laterality: N/A;    ESOPHAGOGASTRODUODENOSCOPY N/A 11/25/2020    Procedure: EGD (ESOPHAGOGASTRODUODENOSCOPY);  Surgeon: Andrew Jenkins III, MD;  Location: [facility] ENDO;  Service: Endoscopy;  Laterality: N/A;    ESOPHAGOGASTRODUODENOSCOPY N/A 9/25/2023    Procedure: EGD (ESOPHAGOGASTRODUODENOSCOPY);  Surgeon: Ly Kim MD;  Location: [facility];  Service: Endoscopy;  Laterality: N/A;    ESOPHAGOGASTRODUODENOSCOPY N/A 1/29/2024    Procedure: EGD (ESOPHAGOGASTRODUODENOSCOPY);  Surgeon: Ly Kim MD;  Location: [facility];  Service: Endoscopy;  Laterality: N/A;    HYSTERECTOMY      KNEE SURGERY      PH MONITORING, ESOPHAGUS, WIRELESS, (OFF REFLUX MEDS) N/A 1/29/2024    Procedure: PH MONITORING, ESOPHAGUS, WIRELESS, (OFF REFLUX MEDS);  Surgeon: Ly Kim MD;  Location: [facility];  Service: Endoscopy;  Laterality: N/A;    ROBOT-ASSISTED LAPAROSCOPIC SLEEVE GASTRECTOMY USING DA ANTHONY XI N/A 02/20/2020    Procedure: XI ROBOTIC SLEEVE GASTRECTOMY;  Surgeon: Michael Navarrete MD;  Location: [facility] OR;  Service: General;  Laterality: N/A;    TENOPLASTY OF HAND Left 08/26/2021    Procedure: REPAIR, TENDON, HAND;  Surgeon: Joselito Lugo MD;  Location: [facility];  Service: Orthopedics;  Laterality: Left;  Left RCL PIP Joint Repair/Recon with Arthrex Internal Brace.    TONSILLECTOMY      TOTAL REDUCTION MAMMOPLASTY  2022    TUBAL LIGATION         Review of patient's allergies indicates:   Allergen Reactions    Demerol [meperidine] Anaphylaxis     Other reaction(s): Itching    Dilaudid [hydromorphone (bulk)] Anaphylaxis     "coded" per pt  Patient can tolerate Lortab    Shellfish containing products Itching, Nausea And Vomiting and Swelling    Prednisone Itching     Other reaction(s): Itching    Tramadol      shaking       Current Neurological Medications:     No current facility-administered medications on file prior to encounter.     Current " Outpatient Medications on File Prior to Encounter   Medication Sig    cholecalciferol, vitamin D3, 1,250 mcg (50,000 unit) capsule Take 1 capsule (50,000 Units total) by mouth every 7 days. for 365 doses    cyclobenzaprine (FLEXERIL) 10 MG tablet Take 10 mg by mouth every 8 (eight) hours.    diclofenac sodium (VOLTAREN) 1 % Gel Apply 2 g topically 4 (four) times daily.    doxepin (SINEQUAN) 75 MG capsule Take 75 mg by mouth every evening.    gabapentin (NEURONTIN) 300 MG capsule Take 3 capsules (900 mg total) by mouth 2 (two) times daily.    HYDROcodone-acetaminophen (NORCO)  mg per tablet Take 1 tablet by mouth 3 (three) times daily.    linaCLOtide (LINZESS) 145 mcg Cap capsule Take 1 capsule (145 mcg total) by mouth before breakfast.    nystatin (MYCOSTATIN) cream Apply topically 2 (two) times daily.    OCREVUS 30 mg/mL Soln     topiramate (TOPAMAX) 50 MG tablet TAKE 1 TABLET BY MOUTH IN THE MORNING AND 2 TABLETS AT BEDTIME    valsartan (DIOVAN) 80 MG tablet Take 1 tablet (80 mg total) by mouth once daily.      Family History       Problem Relation (Age of Onset)    Breast cancer Maternal Aunt, Maternal Cousin    COPD Maternal Aunt    Cancer Maternal Aunt (40)    Diabetes Father, Maternal Aunt    Heart disease Mother, Maternal Grandmother    Hypertension Mother    Kidney disease Father    Lupus Mother    No Known Problems Sister, Sister, Brother, Brother, Daughter, Daughter, Daughter    Ovarian cancer Maternal Cousin          Tobacco Use    Smoking status: Never     Passive exposure: Never    Smokeless tobacco: Never   Substance and Sexual Activity    Alcohol use: Yes     Comment: once a year     Drug use: No    Sexual activity: Yes     Partners: Female     Birth control/protection: Surgical     Review of Systems   Constitutional:  Negative for fever.   HENT:  Negative for trouble swallowing.    Eyes:  Negative for photophobia.   Respiratory:  Negative for chest tightness.    Cardiovascular:  Negative for  chest pain.   Gastrointestinal:  Negative for abdominal pain.   Genitourinary:  Negative for dysuria.   Musculoskeletal:  Negative for back pain.   Neurological:  Positive for seizures. Negative for headaches.   Psychiatric/Behavioral:  Negative for confusion.      Objective:     Vital Signs (Most Recent):  Temp: 99.1 °F (37.3 °C) (02/26/24 1210)  Pulse: 101 (02/26/24 1330)  Resp: 18 (02/26/24 1433)  BP: 135/83 (02/26/24 1330)  SpO2: 100 % (02/26/24 1327) Vital Signs (24h Range):  Temp:  [98 °F (36.7 °C)-99.1 °F (37.3 °C)] 99.1 °F (37.3 °C)  Pulse:  [] 101  Resp:  [16-20] 18  SpO2:  [97 %-100 %] 100 %  BP: (125-147)/(60-94) 135/83     Weight: 131.4 kg (289 lb 11 oz)  Body mass index is 46.76 kg/m².    Physical Exam  Constitutional:       General: She is not in acute distress.  Pulmonary:      Effort: No respiratory distress.         NEUROLOGICAL EXAMINATION:     MENTAL STATUS   Level of consciousness: alert       Oriented to person, place  Follows commands.     CRANIAL NERVES     CN III, IV, VI   Conjugate gaze: present    CN VII   Right facial weakness: none  Left facial weakness: none    CN XII   Tongue deviation: none    MOTOR EXAM        AROM of right limbs       Significant Labs: CBC:   Recent Labs   Lab 02/25/24  0011 02/26/24  0543   WBC 5.65 4.58   HGB 11.1* 11.1*   HCT 36.5* 36.7*    267     CMP:   Recent Labs   Lab 02/25/24  0011 02/26/24  0543    91    140   K 3.8 3.9    111*   CO2 24 21*   BUN 7 7   CREATININE 0.8 0.7   CALCIUM 9.2 8.7   MG  --  1.8   PROT 7.1 6.2   ALBUMIN 3.4* 3.1*   BILITOT 0.2 0.2   ALKPHOS 92 83   AST 13 12   ALT 8* 7*   ANIONGAP 7* 8     Urine Studies:   Recent Labs   Lab 02/25/24  0700   COLORU Yellow   APPEARANCEUA Clear   PHUR 8.0   SPECGRAV 1.030   PROTEINUA Trace*   GLUCUA Negative   KETONESU Negative   BILIRUBINUA Negative   OCCULTUA Negative   NITRITE Negative   UROBILINOGEN Negative   LEUKOCYTESUR Trace*   WBCUA 3   SQUAMEPITHEL 2        Significant Imaging: I have reviewed all pertinent imaging results/findings within the past 24 hours.    MRI brain  Impression:     Similar appearance of demyelinating plaques within both cerebral hemispheres, the brainstem and posterior fossa as compared to the prior MRI 08/05/2022.  No MR evidence new or active demyelination as compared to the prior.     No acute intracranial abnormality.        Electronically signed by: Rico Pelaez  Date:                                            02/25/2024  Time:                                           08:37    Assessment and Plan:     43 y/o female consulted for seizures    Seizures: given her Hx pt has reasons to have seizures. She is now alert and following commands.Worse weakness son left side could be post-ictal phenomenon?  OK for now to continue levetiracetam 1000 mg BID.  Topiramate 50 mg BID can be increased to 75 mg BID.  EEG report pending.   MS: no new or active lesions on MRI.  Pt on ocrelizumab.  Follow up with her neurologist.      Active Diagnoses:    Diagnosis Date Noted POA    PRINCIPAL PROBLEM:  Seizure-like activity [R56.9] 02/25/2024 Yes    Gastroesophageal reflux disease without esophagitis [K21.9] 01/29/2024 Yes    Morbid obesity [E66.01] 03/23/2021 Yes    Multiple sclerosis [G35] 01/22/2015 Yes    Hypertension [I10] 06/24/2014 Yes      Problems Resolved During this Admission:       VTE Risk Mitigation (From admission, onward)           Ordered     enoxaparin injection 40 mg  Every 12 hours         02/25/24 0300     IP VTE HIGH RISK PATIENT  Once         02/25/24 0300     Place sequential compression device  Until discontinued         02/25/24 0300                    Thank you for your consult. I will follow-up with patient. Please contact us if you have any additional questions.    Raymond Barbosa MD  Neurology  [facility] - Telemetry ([facility])

## 2024-02-26 NOTE — PLAN OF CARE
P.T. EVAL COMPLETE.  PT CURRENTLY REQUIRES MAXA X 2 FOR BED MOBILITY AND BED<>CHAIR TF.  P.T. RECOMMENDS MODERATE INTENSITY THERAPY UPON DISCHARGE

## 2024-02-26 NOTE — PT/OT/SLP EVAL
"Occupational Therapy Evaluation and Treatment    Name: Alicia Soliz  MRN: [id number]  Admitting Diagnosis: Seizure-like activity  Recent Surgery: * No surgery found *      Recommendations:     Discharge Recommendations: Moderate Intensity Therapy  Level of Assistance Recommended: 24 hours significant assistance  Discharge Equipment Recommendations: to be determined by next level of care  Barriers to discharge: Decreased caregiver support    Assessment:     Alicia Soliz is a 44 y.o. female with a medical diagnosis of Seizure-like activity. She presents with performance deficits affecting function including weakness, impaired endurance, impaired sensation, impaired self care skills, impaired functional mobility, impaired balance, impaired cardiopulmonary response to activity, decreased upper extremity function, decreased lower extremity function, edema, pain, impaired fine motor, impaired coordination, decreased ROM.    Rehab Prognosis: Fair and questionable ; patient would benefit from acute OT services to address these deficits and reach maximum level of function.    Plan:     Patient to be seen 2 x/week to address the above listed problems via self-care/home management, therapeutic activities, therapeutic exercises  Plan of Care Expires: 03/11/24  Plan of Care Reviewed with: patient    Subjective     Chief Complaint: Reported "My entire left side hurts."  Patient Comments/Goals: none reported  Pain/Comfort:  Pain Rating 1: 8/10  Location - Side 1: Left  Location 1:  (arm and leg)  Pain Addressed 1:  (activity pacing)    Social History:  Living Environment: Patient lives with their daughters in  a 1st floor apartment  with  no steps/stairs to manage.  Prior Level of Function: Prior to admission, patient was independent with ADLs and community distance ambulation via QC   Roles and Routines: Patient was not driving and not working prior to admission.  Equipment Used at Home: power chair, rollator, walker, " cole, cane, quad, bedside commode, shower chair  DME owned (not currently used): none  Assistance Upon Discharge: family    Objective:     Communicated with nurse, Darius, prior to session. Patient found supine with peripheral IV, PureWick, telemetry upon OT entry to room.    General Precautions: Standard, fall   Orthopedic Precautions: N/A   Braces: N/A    Respiratory Status: Room air    Occupational Performance    Gait belt applied - Yes    Bed Mobility:   Supine to sit from right side of bed with maximal assistance and of 2 persons    Functional Mobility/Transfers:  Sit <> Stand Transfer with maximal assistance and 2 persons with hand-held assist  Bed <> Chair Transfer using Step Transfer technique with maximal assistance and 2 persons with rolling walker  Functional Mobility: unable to progress forward at this time, will progress as safely able.    Activities of Daily Living:  Upper Body Dressing: maximal assistance  Lower Body Dressing: total assistance    Cognitive/Visual Perceptual:  Cognitive/Psychosocial Skills:    -     Oriented to: Person, Place, Time, Situation  -     Follows Commands/attention: Follows two-step commands    Physical Exam:  Balance:    -     Sitting: contact guard assistance  -     Standing: moderate assistance and of 2 persons  R UE AROM grossly WFL- functionally 4/5 throughout extremity, fair   L UE extremely guarded. Reports pain with all therapist touch and PROM. Demonstrated ability to push through L forearm with transfers. Held formal evaluation to prevent exacerbation of pain. Mild edema. Reports baseline deficits.    AMPAC 6 Click ADL:  AMPAC Total Score: 13    Treatment & Education:  Therapist provided facilitation and instruction of proper body mechanics, energy conservation, and fall prevention strategies during tasks listed above  Patient educated on role of OT, POC, and goals for therapy  Patient educated on importance of OOB activities with staff member assistance and  sitting OOB majority of the day  Encouraged completion of B UE AROM therex, as able within minimal pain range, throughout the day to increase functional strength and activity tolerance needed for ADL completion.    Patient left up in chair with all lines intact, call button in reach, chair alarm on, and male visitor present.    GOALS:   Multidisciplinary Problems       Occupational Therapy Goals          Problem: Occupational Therapy    Goal Priority Disciplines Outcome Interventions   Occupational Therapy Goal     OT, PT/OT     Description: Goals to be met by: 3/11/24     Patient will increase functional independence with ADLs by performing:    Toileting from BSC with Minimal Assistance for hygiene and clothing management.   Toilet transfer to bedside commode with minimal assistance.  Increased functional strength in B UE grossly by 1/2 MM grade.                         History:     Past Medical History:   Diagnosis Date    Allergy     Anemia     Arthritis     Cardiac arrest as complication of care     pt states she went into cardiac arrest from an allergic reaction to a medication    Depression     Encounter for blood transfusion     GERD (gastroesophageal reflux disease)     Hemiplegia due to old stroke     Hypertension     Morbid obesity with BMI of 45.0-49.9, adult 09/20/2018    Multiple sclerosis     Neuromuscular disorder     Seizure-like activity 2/25/2024         Past Surgical History:   Procedure Laterality Date    APPENDECTOMY      BREAST SURGERY      CHOLECYSTECTOMY      COLONOSCOPY N/A 11/25/2020    Procedure: COLONOSCOPY;  Surgeon: Andrew Jenkins III, MD;  Location: [facility];  Service: Endoscopy;  Laterality: N/A;    ESOPHAGOGASTRODUODENOSCOPY N/A 02/19/2020    Procedure: EGD (ESOPHAGOGASTRODUODENOSCOPY);  Surgeon: Michael Navarrete MD;  Location: [facility];  Service: Endoscopy;  Laterality: N/A;    ESOPHAGOGASTRODUODENOSCOPY N/A 02/20/2020    Procedure: EGD (ESOPHAGOGASTRODUODENOSCOPY);  Surgeon: Michael  MD Rosalba;  Location: [facility];  Service: General;  Laterality: N/A;    ESOPHAGOGASTRODUODENOSCOPY N/A 11/25/2020    Procedure: EGD (ESOPHAGOGASTRODUODENOSCOPY);  Surgeon: Andrew Jenkins III, MD;  Location: [facility];  Service: Endoscopy;  Laterality: N/A;    ESOPHAGOGASTRODUODENOSCOPY N/A 9/25/2023    Procedure: EGD (ESOPHAGOGASTRODUODENOSCOPY);  Surgeon: Ly Kim MD;  Location: [facility];  Service: Endoscopy;  Laterality: N/A;    ESOPHAGOGASTRODUODENOSCOPY N/A 1/29/2024    Procedure: EGD (ESOPHAGOGASTRODUODENOSCOPY);  Surgeon: Ly Kim MD;  Location: [facility];  Service: Endoscopy;  Laterality: N/A;    HYSTERECTOMY      KNEE SURGERY      PH MONITORING, ESOPHAGUS, WIRELESS, (OFF REFLUX MEDS) N/A 1/29/2024    Procedure: PH MONITORING, ESOPHAGUS, WIRELESS, (OFF REFLUX MEDS);  Surgeon: Ly Kim MD;  Location: [facility];  Service: Endoscopy;  Laterality: N/A;    ROBOT-ASSISTED LAPAROSCOPIC SLEEVE GASTRECTOMY USING DA ANTHONY XI N/A 02/20/2020    Procedure: XI ROBOTIC SLEEVE GASTRECTOMY;  Surgeon: Michael Navarrete MD;  Location: [facility];  Service: General;  Laterality: N/A;    TENOPLASTY OF HAND Left 08/26/2021    Procedure: REPAIR, TENDON, HAND;  Surgeon: Joselito Lugo MD;  Location: [facility];  Service: Orthopedics;  Laterality: Left;  Left RCL PIP Joint Repair/Recon with Arthrex Internal Brace.    TONSILLECTOMY      TOTAL REDUCTION MAMMOPLASTY  2022    TUBAL LIGATION         Time Tracking:     OT Date of Treatment: 02/26/24  OT Start Time: 0955  OT Stop Time: 1020  OT Total Time (min): 25 min    Billable Minutes: Evaluation 15 and Therapeutic Activity 10    Taya Montaño, OT  2/26/2024

## 2024-02-27 VITALS
OXYGEN SATURATION: 100 % | WEIGHT: 292.31 LBS | BODY MASS INDEX: 46.98 KG/M2 | SYSTOLIC BLOOD PRESSURE: 123 MMHG | HEIGHT: 66 IN | DIASTOLIC BLOOD PRESSURE: 71 MMHG | RESPIRATION RATE: 19 BRPM | HEART RATE: 97 BPM | TEMPERATURE: 99 F

## 2024-02-27 LAB
ALBUMIN SERPL BCP-MCNC: 3 G/DL (ref 3.5–5.2)
ALP SERPL-CCNC: 78 U/L (ref 55–135)
ALT SERPL W/O P-5'-P-CCNC: <5 U/L (ref 10–44)
ANION GAP SERPL CALC-SCNC: 7 MMOL/L (ref 8–16)
AST SERPL-CCNC: 9 U/L (ref 10–40)
BASOPHILS # BLD AUTO: 0.02 K/UL (ref 0–0.2)
BASOPHILS NFR BLD: 0.4 % (ref 0–1.9)
BILIRUB SERPL-MCNC: 0.2 MG/DL (ref 0.1–1)
BUN SERPL-MCNC: 12 MG/DL (ref 6–20)
CALCIUM SERPL-MCNC: 8.7 MG/DL (ref 8.7–10.5)
CHLORIDE SERPL-SCNC: 110 MMOL/L (ref 95–110)
CO2 SERPL-SCNC: 24 MMOL/L (ref 23–29)
CREAT SERPL-MCNC: 0.8 MG/DL (ref 0.5–1.4)
DIFFERENTIAL METHOD BLD: ABNORMAL
EOSINOPHIL # BLD AUTO: 0.1 K/UL (ref 0–0.5)
EOSINOPHIL NFR BLD: 1.4 % (ref 0–8)
ERYTHROCYTE [DISTWIDTH] IN BLOOD BY AUTOMATED COUNT: 17 % (ref 11.5–14.5)
EST. GFR  (NO RACE VARIABLE): >60 ML/MIN/1.73 M^2
GLUCOSE SERPL-MCNC: 93 MG/DL (ref 70–110)
HCT VFR BLD AUTO: 34.8 % (ref 37–48.5)
HGB BLD-MCNC: 10.3 G/DL (ref 12–16)
IMM GRANULOCYTES # BLD AUTO: 0.02 K/UL (ref 0–0.04)
IMM GRANULOCYTES NFR BLD AUTO: 0.4 % (ref 0–0.5)
LYMPHOCYTES # BLD AUTO: 2.8 K/UL (ref 1–4.8)
LYMPHOCYTES NFR BLD: 48.5 % (ref 18–48)
MCH RBC QN AUTO: 20.9 PG (ref 27–31)
MCHC RBC AUTO-ENTMCNC: 29.6 G/DL (ref 32–36)
MCV RBC AUTO: 70 FL (ref 82–98)
MONOCYTES # BLD AUTO: 0.5 K/UL (ref 0.3–1)
MONOCYTES NFR BLD: 8.3 % (ref 4–15)
NEUTROPHILS # BLD AUTO: 2.3 K/UL (ref 1.8–7.7)
NEUTROPHILS NFR BLD: 41 % (ref 38–73)
NRBC BLD-RTO: 0 /100 WBC
PLATELET # BLD AUTO: 253 K/UL (ref 150–450)
PMV BLD AUTO: 10.3 FL (ref 9.2–12.9)
POTASSIUM SERPL-SCNC: 4.2 MMOL/L (ref 3.5–5.1)
PROT SERPL-MCNC: 5.9 G/DL (ref 6–8.4)
RBC # BLD AUTO: 4.94 M/UL (ref 4–5.4)
SODIUM SERPL-SCNC: 141 MMOL/L (ref 136–145)
WBC # BLD AUTO: 5.69 K/UL (ref 3.9–12.7)

## 2024-02-27 PROCEDURE — 97530 THERAPEUTIC ACTIVITIES: CPT | Mod: HCNC

## 2024-02-27 PROCEDURE — 97116 GAIT TRAINING THERAPY: CPT | Mod: HCNC

## 2024-02-27 PROCEDURE — 63600175 PHARM REV CODE 636 W HCPCS: Mod: HCNC | Performed by: STUDENT IN AN ORGANIZED HEALTH CARE EDUCATION/TRAINING PROGRAM

## 2024-02-27 PROCEDURE — 80053 COMPREHEN METABOLIC PANEL: CPT | Mod: HCNC | Performed by: STUDENT IN AN ORGANIZED HEALTH CARE EDUCATION/TRAINING PROGRAM

## 2024-02-27 PROCEDURE — 36415 COLL VENOUS BLD VENIPUNCTURE: CPT | Mod: HCNC | Performed by: STUDENT IN AN ORGANIZED HEALTH CARE EDUCATION/TRAINING PROGRAM

## 2024-02-27 PROCEDURE — 85025 COMPLETE CBC W/AUTO DIFF WBC: CPT | Mod: HCNC | Performed by: STUDENT IN AN ORGANIZED HEALTH CARE EDUCATION/TRAINING PROGRAM

## 2024-02-27 PROCEDURE — 25000003 PHARM REV CODE 250: Mod: HCNC | Performed by: STUDENT IN AN ORGANIZED HEALTH CARE EDUCATION/TRAINING PROGRAM

## 2024-02-27 PROCEDURE — 63600175 PHARM REV CODE 636 W HCPCS: Mod: HCNC | Performed by: INTERNAL MEDICINE

## 2024-02-27 PROCEDURE — 25000003 PHARM REV CODE 250: Mod: HCNC | Performed by: HOSPITALIST

## 2024-02-27 RX ORDER — TOPIRAMATE 25 MG/1
75 TABLET ORAL 2 TIMES DAILY
Status: DISCONTINUED | OUTPATIENT
Start: 2024-02-27 | End: 2024-02-27 | Stop reason: HOSPADM

## 2024-02-27 RX ORDER — TOPIRAMATE 25 MG/1
75 TABLET ORAL 2 TIMES DAILY
Qty: 180 TABLET | Refills: 1 | Status: SHIPPED | OUTPATIENT
Start: 2024-02-27 | End: 2024-02-27

## 2024-02-27 RX ORDER — TOPIRAMATE 25 MG/1
50 TABLET ORAL 3 TIMES DAILY
Qty: 180 TABLET | Refills: 1 | Status: SHIPPED | OUTPATIENT
Start: 2024-02-27 | End: 2024-02-27

## 2024-02-27 RX ORDER — HYDROCODONE BITARTRATE AND ACETAMINOPHEN 10; 325 MG/1; MG/1
1 TABLET ORAL ONCE
Status: COMPLETED | OUTPATIENT
Start: 2024-02-27 | End: 2024-02-27

## 2024-02-27 RX ORDER — TOPIRAMATE 50 MG/1
50 TABLET, FILM COATED ORAL 3 TIMES DAILY
Qty: 180 TABLET | Refills: 1 | Status: SHIPPED | OUTPATIENT
Start: 2024-02-27 | End: 2024-06-03

## 2024-02-27 RX ORDER — LEVETIRACETAM 1000 MG/1
1000 TABLET ORAL 2 TIMES DAILY
Qty: 60 TABLET | Refills: 1 | Status: SHIPPED | OUTPATIENT
Start: 2024-02-27 | End: 2024-02-29

## 2024-02-27 RX ADMIN — ATORVASTATIN CALCIUM 40 MG: 40 TABLET, FILM COATED ORAL at 08:02

## 2024-02-27 RX ADMIN — TOPIRAMATE 50 MG: 25 TABLET, FILM COATED ORAL at 08:02

## 2024-02-27 RX ADMIN — BUTALBITAL, ACETAMINOPHEN, AND CAFFEINE 1 TABLET: 325; 50; 40 TABLET ORAL at 04:02

## 2024-02-27 RX ADMIN — HYDROCODONE BITARTRATE AND ACETAMINOPHEN 1 TABLET: 10; 325 TABLET ORAL at 08:02

## 2024-02-27 RX ADMIN — LEVETIRACETAM INJECTION 1000 MG: 10 INJECTION INTRAVENOUS at 08:02

## 2024-02-27 RX ADMIN — ASPIRIN 81 MG: 81 TABLET, COATED ORAL at 08:02

## 2024-02-27 RX ADMIN — ENOXAPARIN SODIUM 40 MG: 40 INJECTION SUBCUTANEOUS at 08:02

## 2024-02-27 RX ADMIN — PANTOPRAZOLE SODIUM 40 MG: 40 TABLET, DELAYED RELEASE ORAL at 08:02

## 2024-02-27 RX ADMIN — GABAPENTIN 600 MG: 300 CAPSULE ORAL at 03:02

## 2024-02-27 RX ADMIN — BUTALBITAL, ACETAMINOPHEN, AND CAFFEINE 1 TABLET: 325; 50; 40 TABLET ORAL at 10:02

## 2024-02-27 RX ADMIN — HYDROCODONE BITARTRATE AND ACETAMINOPHEN 1 TABLET: 10; 325 TABLET ORAL at 03:02

## 2024-02-27 NOTE — PLAN OF CARE
TX COMPLETED: facilitated bed mobility with CGA, transfers with CGA, ambulated 30 ft x 2 CGA with RW. Cont POC

## 2024-02-27 NOTE — PROGRESS NOTES
[facility] Medicine  Progress Note    Patient Name: Alicia Soliz  MRN: [id number]  Patient Class: IP- Inpatient   Admission Date: 2/24/2024  Length of Stay: 0 days  Attending Physician: Hal Velazquez MD  Primary Care Provider: Braden Dumont MD        Subjective:     Principal Problem:Seizure-like activity        HPI:  Alicia Soliz is a 44-year-old woman with a past medical history of hypertension, cardiac arrest, hemiplegia due to an old stroke, morbid obesity with a BMI of 45.0-49.9, multiple sclerosis, gastroesophageal reflux disease (GERD), and neuromuscular disorder who presented to the ER for worsening left-sided hemiplegia over the past week and two episodes of seizure-like activity which onset earlier this evening. The patient stated she was eating dinner hours prior to admission when she suddenly felt weak and woke up on the floor.  When she came to, family was calling an ambulance and described her shaking on the floor for approximately 1 minute before stopping.  Patient reports convincing to not call the ambulance and then she felt okay, but then reports less than a hour later another episode occurred.  This is what prompted family to bring patient to the ER tonight.  Patient denies hitting her head during these episodes, but says that she is had previous episodes in the last month where she has hit her head. Reports a single episode of seizure in her past when she was first diagnosed with multiple sclerosis.  Also of note, patient reports that her hemiplegia is associated with left-arm pain and immobility. She denies chest pain, shortness of breath, fever, and other symptoms at this time.    Upon arrival in the ER, the patient's vital signs were well within normal limits, and she has remained hemodynamically stable since admission. Routine laboratory tests were grossly within acceptable limits, indicating no significant abnormalities. A head CT scan was  performed and returned negative results. Given the patient's history and presentation, it was requested she be admitted to observation for further evaluation with MRI and a comprehensive seizure workup. Hospital Medicine was called for admission.    Overview/Hospital Course:  2/26  Patient noted unresponsive while in chair this afternoon, rapid response was called  Arrived to bedside, patient awakens to verbal stimuli, ativan IV given for suspected seizure activity  EEG completed yesterday (2/25), report pending, consult Neurology  Family at bedside, uses walker at baseline        Review of Systems   All other systems reviewed and are negative.    Objective:     Vital Signs (Most Recent):  Temp: 97.7 °F (36.5 °C) (02/26/24 1907)  Pulse: 89 (02/26/24 1907)  Resp: 18 (02/26/24 2043)  BP: (!) 110/52 (02/26/24 1907)  SpO2: 99 % (02/26/24 1907) Vital Signs (24h Range):  Temp:  [97.7 °F (36.5 °C)-99.1 °F (37.3 °C)] 97.7 °F (36.5 °C)  Pulse:  [] 89  Resp:  [18-20] 18  SpO2:  [93 %-100 %] 99 %  BP: (105-136)/(52-94) 110/52     Weight: 131.4 kg (289 lb 11 oz)  Body mass index is 46.76 kg/m².    Intake/Output Summary (Last 24 hours) at 2/26/2024 2226  Last data filed at 2/26/2024 0415  Gross per 24 hour   Intake --   Output 200 ml   Net -200 ml         Physical Exam  Vitals and nursing note reviewed.   Constitutional:       General: She is not in acute distress.     Appearance: She is obese. She is ill-appearing.   Cardiovascular:      Rate and Rhythm: Normal rate and regular rhythm.      Heart sounds: No murmur heard.  Pulmonary:      Effort: Pulmonary effort is normal. No respiratory distress.      Breath sounds: No wheezing.   Neurological:      General: No focal deficit present.      Mental Status: She is alert and oriented to person, place, and time.   Psychiatric:         Mood and Affect: Mood normal.         Behavior: Behavior normal.             Significant Labs: All pertinent labs within the past 24 hours have  been reviewed.  Recent Lab Results         02/26/24  1328   02/26/24  1236   02/26/24  0543        Albumin     3.1       ALP     83       ALT     7       Anion Gap     8       AST     12       Baso #     0.02       Basophil %     0.4       BILIRUBIN TOTAL     0.2  Comment: For infants and newborns, interpretation of results should be based  on gestational age, weight and in agreement with clinical  observations.    Premature Infant recommended reference ranges:  Up to 24 hours.............<8.0 mg/dL  Up to 48 hours............<12.0 mg/dL  3-5 days..................<15.0 mg/dL  6-29 days.................<15.0 mg/dL         BUN     7       Calcium     8.7       Chloride     111       CO2     21       Creatinine     0.7       Differential Method     Automated       eGFR     >60       Eos #     0.1       Eos %     1.7       Glucose     91       Gran # (ANC)     2.1       Gran %     46.4       Hematocrit     36.7       Hemoglobin     11.1       Immature Grans (Abs)     0.01  Comment: Mild elevation in immature granulocytes is non specific and   can be seen in a variety of conditions including stress response,   acute inflammation, trauma and pregnancy. Correlation with other   laboratory and clinical findings is essential.         Immature Granulocytes     0.2       Lymph #     2.0       Lymph %     43.4       Magnesium      1.8       MCH     21.0       MCHC     30.2       MCV     70       Mono #     0.4       Mono %     7.9       MPV     9.7       nRBC     0       Phosphorus Level     3.8       Platelet Count     267       POCT Glucose 79   96         Potassium     3.9       PROTEIN TOTAL     6.2       RBC     5.28       RDW     17.0       Sodium     140       WBC     4.58               Significant Imaging: I have reviewed all pertinent imaging results/findings within the past 24 hours.    MRI Cervical Spine Demyelinating W W/O Contrast   Final Result      No significant cervical cord signal abnormality to suggest  demyelinating sequelae.  No abnormal cervical cord enhancement.      Minor degenerative changes of the cervical spine without significant spinal canal stenosis or neural foraminal stenosis.         Electronically signed by: Rico Pelaez   Date:    02/25/2024   Time:    08:45      MRI Brain Demyelinating W W/O Contrast   Final Result      Similar appearance of demyelinating plaques within both cerebral hemispheres, the brainstem and posterior fossa as compared to the prior MRI 08/05/2022.  No MR evidence new or active demyelination as compared to the prior.      No acute intracranial abnormality.         Electronically signed by: Rico Pelaez   Date:    02/25/2024   Time:    08:37      CT Head Without Contrast   Final Result      No CT evidence of acute intracranial abnormality. Clinical correlation and further evaluation as warranted.      Patchy and confluent periventricular and supratentorial white matter hypoattenuation, similar to prior examination.  Findings may reflect combination of chronic microvascular ischemic changes as well as sequelae of prior demyelination in light of history.         Electronically signed by: Raúl Sinha MD   Date:    02/25/2024   Time:    00:41            Assessment/Plan:      * Seizure-like activity  History more suggestive of seizures related to multiple sclerosis, however indolent stroke remains on the differential and will need to be work up. NIH score of 2. Neurology consulted. Since symptoms have been present for the past week, outside of thrombolytic window.   --Demyelinating Brain & Cervical spine MRI ordered/pending  --Echo, US bilateral carotids   --PT/OT/SLP consulted to assess  --ASA + statin therapy ordered  --flat bed, aspiration precautions, neuro checks  --permissive hypertension until MRI returns  --admit to telemetry for monitoring  --PRN fioricet     Gastroesophageal reflux disease without esophagitis  --stable  --continue home PPI therapy      Morbid  obesity  Body mass index is 45.03 kg/m². Morbid obesity complicates all aspects of disease management from diagnostic modalities to treatment. Weight loss encouraged and health benefits explained to patient.         Multiple sclerosis           Hypertension  --home medications include: valsartan  --holding upon admission as this may be contributing to episodes  --PRN IV hydralazine 10mg Q6H for sBP > 175 mmHg  --Q4HVS      VTE Risk Mitigation (From admission, onward)           Ordered     enoxaparin injection 40 mg  Every 12 hours         02/25/24 0300     IP VTE HIGH RISK PATIENT  Once         02/25/24 0300     Place sequential compression device  Until discontinued         02/25/24 0300                    Discharge Planning   ARACELI:      Code Status: Full Code   Is the patient medically ready for discharge?:     Reason for patient still in hospital (select all that apply): Patient trending condition, Laboratory test, Treatment, Consult recommendations, and Pending disposition  Discharge Plan A: Home with family, Home                  Hal Velazquez MD  Department of Hospital Medicine   [facility] - Telemetry ([facility])

## 2024-02-27 NOTE — PLAN OF CARE
Problem: Adult Inpatient Plan of Care  Goal: Plan of Care Review  Outcome: Ongoing, Progressing  Goal: Patient-Specific Goal (Individualized)  Outcome: Ongoing, Progressing  Goal: Absence of Hospital-Acquired Illness or Injury  Outcome: Ongoing, Progressing  Goal: Optimal Comfort and Wellbeing  Outcome: Ongoing, Progressing  Goal: Readiness for Transition of Care  Outcome: Ongoing, Progressing     Problem: Bariatric Environmental Safety  Goal: Safety Maintained with Care  Outcome: Ongoing, Progressing     Problem: Adjustment to Illness (Stroke, Ischemic/Transient Ischemic Attack)  Goal: Optimal Coping  Outcome: Ongoing, Progressing     Problem: Bowel Elimination Impaired (Stroke, Ischemic/Transient Ischemic Attack)  Goal: Effective Bowel Elimination  Outcome: Ongoing, Progressing     Problem: Cerebral Tissue Perfusion (Stroke, Ischemic/Transient Ischemic Attack)  Goal: Optimal Cerebral Tissue Perfusion  Outcome: Ongoing, Progressing     Problem: Cognitive Impairment (Stroke, Ischemic/Transient Ischemic Attack)  Goal: Optimal Cognitive Function  Outcome: Ongoing, Progressing     Problem: Communication Impairment (Stroke, Ischemic/Transient Ischemic Attack)  Goal: Improved Communication Skills  Outcome: Ongoing, Progressing     Problem: Functional Ability Impaired (Stroke, Ischemic/Transient Ischemic Attack)  Goal: Optimal Functional Ability  Outcome: Ongoing, Progressing     Problem: Respiratory Compromise (Stroke, Ischemic/Transient Ischemic Attack)  Goal: Effective Oxygenation and Ventilation  Outcome: Ongoing, Progressing     Problem: Sensorimotor Impairment (Stroke, Ischemic/Transient Ischemic Attack)  Goal: Improved Sensorimotor Function  Outcome: Ongoing, Progressing     Problem: Swallowing Impairment (Stroke, Ischemic/Transient Ischemic Attack)  Goal: Optimal Eating and Swallowing without Aspiration  Outcome: Ongoing, Progressing     Problem: Urinary Elimination Impaired (Stroke, Ischemic/Transient Ischemic  Attack)  Goal: Effective Urinary Elimination  Outcome: Ongoing, Progressing     Problem: Skin Injury Risk Increased  Goal: Skin Health and Integrity  Outcome: Ongoing, Progressing     Problem: Fall Injury Risk  Goal: Absence of Fall and Fall-Related Injury  Outcome: Ongoing, Progressing

## 2024-02-27 NOTE — PT/OT/SLP PROGRESS
Physical Therapy  Treatment    Alicia Soliz   MRN: [id number]   Admitting Diagnosis: Seizure-like activity       PT Start Time: 1035     PT Stop Time: 1110    PT Total Time (min): 35 min       Billable Minutes:  Gait Training 15 and Therapeutic Activity 20    Treatment Type: Treatment  PT/PTA: PT     Number of PTA visits since last PT visit: 0       General Precautions: Standard, fall  Orthopedic Precautions: N/A  Braces: N/A  Respiratory Status: Room air    Spiritual, Cultural Beliefs, Nondenominational Practices, Values that Affect Care: no    Subjective:  Communicated with nursing ([facility]) and performed chart review via epic system prior to session.  Pt agreeable to PT services    Pain/Comfort  Pain Rating 1: 5/10 (headache)  Pain Addressed 1: Reposition, Distraction, Pre-medicate for activity  Pain Rating Post-Intervention 1: 5/10    Objective:   Patient found with: peripheral IV, telemetry    Functional Mobility:  Bed Mobility:    Facilitated supine to sit CGA with increased time and slight assist for LE management   Pt tolerated sitting EOB x 15 mins withno LOB    Transfers:   Facilitated transfers with CGA sit to stand and stand pivot    Gait: *gait belt donned prior to OOB mobility*   Ambulated 30 ft x 2 CGA with RW, demonstrated slow pace, flexed posture, wide VANESSA, L foot drag, discontinuous steps    Balance:   Dynamic Sit: FAIR+: Maintains balance through MINIMAL excursions of active trunk motion  Dynamic stand: FAIR: Needs CONTACT GUARD during gait       Treatment and Education:  Educated pt on benefits of consistent participation in PT services to meet functional goals. Educated pt on seated therex to promote strength, circulation and joint mobility. Educated pt on importance of sitting OOB to promote endurance and overall activity tolerance. Educated pt on call don't fall policy and use of call button to alert nursing staff of needs (including to assist with returning back to bed). Pt and significant other  expressed understanding.      AM-PAC 6 CLICK MOBILITY  How much help from another person does this patient currently need?   1 = Unable, Total/Dependent Assistance  2 = A lot, Maximum/Moderate Assistance  3 = A little, Minimum/Contact Guard/Supervision  4 = None, Modified [place]/Independent    Turning over in bed (including adjusting bedclothes, sheets and blankets)?: 3  Sitting down on and standing up from a chair with arms (e.g., wheelchair, bedside commode, etc.): 3  Moving from lying on back to sitting on the side of the bed?: 3  Moving to and from a bed to a chair (including a wheelchair)?: 3  Need to walk in hospital room?: 3  Climbing 3-5 steps with a railing?: 1  Basic Mobility Total Score: 16    AM-PAC Raw Score CMS G-Code Modifier Level of Impairment Assistance   6 % Total / Unable   7 - 9 CM 80 - 100% Maximal Assist   10 - 14 CL 60 - 80% Moderate Assist   15 - 19 CK 40 - 60% Moderate Assist   20 - 22 CJ 20 - 40% Minimal Assist   23 CI 1-20% SBA / CGA   24 CH 0% Independent/ Mod I     Patient left sitting edge of bed with all lines intact, call button in reach, chair alarm on, nursing notified, and significant other present.    Assessment:  Alicia Soliz is a 44 y.o. female with a medical diagnosis of Seizure-like activity and presents with deficits listed below which negatively impacts functional status and increases risk of falls. Pt demonstrated improvement in functional status and reported good assistance from family at home.    Rehab identified problem list/impairments: weakness, impaired endurance, impaired functional mobility, gait instability, impaired cognition, decreased coordination, decreased safety awareness, pain    Rehab potential is good.    Activity tolerance: Good    Discharge recommendations: Moderate Intensity Therapy      Barriers to discharge:      Equipment recommendations: to be determined by next level of care     GOALS:   Multidisciplinary Problems        Physical Therapy Goals          Problem: Physical Therapy    Goal Priority Disciplines Outcome Goal Variances Interventions   Physical Therapy Goal     PT, PT/OT Ongoing, Progressing     Description: LTG'S TO BE MET IN 14 DAYS (3-11-24)  PT WILL REQUIRE FIDELINA FOR BED MOBILITY  PT WILL REQUIRE FIDELINA FOR BED<>CHAIR TF'S  PT WILL  FEET WITH APPROPRIATE AD AND FIDELINA  PT WILL INC AMPAC SCORE BY 2 POINTS TO PROGRESS GROSS FUNC MOBILITY                         PLAN:    Patient to be seen 3 x/week to address the above listed problems via gait training, therapeutic activities, therapeutic exercises, neuromuscular re-education  Plan of Care expires: 03/11/24  Plan of Care reviewed with: patient, significant other         02/27/2024

## 2024-02-27 NOTE — NURSING
expressed concerns regarding the patient and thoughts and previous attempt(s) of suicide. Dr. Velazquez notified. Pysch consult ordered and d/c cancelled.  In route to the see the patient.     Dr. Park at the bedside and per patient is was a miscommunication between the  and her. She does not have any intention of harming herself or anyone else.     D/c reinstated and patient to d/c once daughter arrives at the bedside.

## 2024-02-27 NOTE — HOSPITAL COURSE
2/26  Patient noted unresponsive while in chair this afternoon, rapid response was called  Arrived to bedside, patient awakens to verbal stimuli, ativan IV given for suspected seizure activity  EEG completed yesterday (2/25), report pending, consult Neurology  Family at bedside, uses walker at baseline    2/27  NAEON, no further seizure-like episodes overnight  EEG report pending, f/u Neurology outpatient, established with Dr. Garrett  Prescribed Keppra and Topiramate per Neurology reccs  Spiritual Care recommendations reviewed, appreciated  F/u with Psychiatry outpatient  Stable for hospital discharge

## 2024-02-27 NOTE — PLAN OF CARE
[facility] - Telemetry (Hospital)  Discharge Final Note    Primary Care Provider: Braden Dumont MD    Expected Discharge Date: 2/27/2024    Final Discharge Note (most recent)       Final Note - 02/27/24 1100          Final Note    Assessment Type Final Discharge Note     Anticipated Discharge Disposition Home or Self Care     Hospital Resources/Appts/Education Provided Post-Acute resouces added to AVS;Appointments scheduled and added to AVS        Post-Acute Status    Discharge Delays None known at this time                   Pt to discharge home today. MD to order referral for outpatient PT/OT; msg sent to clinic staff to assist with scheduling.   Pt has PCP scheduled on AVS: Established Patient Visit with Braden Dumont MD  Thursday Feb 29, 2024 1:40 PM    No CM needs for discharge.     Important Message from Medicare             Contact Info       Candice Garrett MD   Specialty: Neurology    [address] [place] [id number]   Phone: [phone]       Next Steps: Schedule an appointment as soon as possible for a visit in 1 week(s)    Instructions: Hospital discharge follow up    Braden Dumont MD   Specialty: Internal Medicine   Relationship: PCP - General  Hypertension Digital Medicine Responsible Provider    [address] [facility] [address]   Phone: [phone]       Next Steps: Schedule an appointment as soon as possible for a visit in 1 week(s)    Instructions: Hospital discharge follow up

## 2024-02-27 NOTE — DISCHARGE SUMMARY
[facility] Medicine  Discharge Summary      Patient Name: Alicia Soliz  MRN: [id number]  [facility]: [id number]  Patient Class: IP- Inpatient  Admission Date: 2/24/2024  Hospital Length of Stay: 1 days  Discharge Date and Time:  02/27/2024 1:05 PM  Attending Physician: Hal Velazquez MD   Discharging Provider: Hal Velazquez MD  Primary Care Provider: Braden Dumont MD    Primary Care Team: Networked reference to record PCT     HPI:   Alicia Soliz is a 44-year-old woman with a past medical history of hypertension, cardiac arrest, hemiplegia due to an old stroke, morbid obesity with a BMI of 45.0-49.9, multiple sclerosis, gastroesophageal reflux disease (GERD), and neuromuscular disorder who presented to the ER for worsening left-sided hemiplegia over the past week and two episodes of seizure-like activity which onset earlier this evening. The patient stated she was eating dinner hours prior to admission when she suddenly felt weak and woke up on the floor.  When she came to, family was calling an ambulance and described her shaking on the floor for approximately 1 minute before stopping.  Patient reports convincing to not call the ambulance and then she felt okay, but then reports less than a hour later another episode occurred.  This is what prompted family to bring patient to the ER tonight.  Patient denies hitting her head during these episodes, but says that she is had previous episodes in the last month where she has hit her head. Reports a single episode of seizure in her past when she was first diagnosed with multiple sclerosis.  Also of note, patient reports that her hemiplegia is associated with left-arm pain and immobility. She denies chest pain, shortness of breath, fever, and other symptoms at this time.    Upon arrival in the ER, the patient's vital signs were well within normal limits, and she has remained hemodynamically stable since admission. Routine  laboratory tests were grossly within acceptable limits, indicating no significant abnormalities. A head CT scan was performed and returned negative results. Given the patient's history and presentation, it was requested she be admitted to observation for further evaluation with MRI and a comprehensive seizure workup. Hospital Medicine was called for admission.    * No surgery found *      Hospital Course:   2/26  Patient noted unresponsive while in chair this afternoon, rapid response was called  Arrived to bedside, patient awakens to verbal stimuli, ativan IV given for suspected seizure activity  EEG completed yesterday (2/25), report pending, consult Neurology  Family at bedside, uses walker at baseline    2/27  NAEON, no further seizure-like episodes overnight  EEG report pending, f/u Neurology outpatient, established with Dr. Garrett  Prescribed Keppra and Topiramate per Neurology reccs  Spiritual Care recommendations reviewed, appreciated  F/u with Psychiatry outpatient  Stable for hospital discharge       Goals of Care Treatment Preferences:  Code Status: Full Code      Consults:   Consults (From admission, onward)          Status Ordering Provider     Inpatient consult to Spiritual Care  Once        Provider:  (Not yet assigned)    Acknowledged HAL LEE     Inpatient consult to Neurology  Once        Provider:  Hal Lee MD    Completed HAL LEE     Inpatient consult to Social Work  Once        Provider:  (Not yet assigned)    Completed SOURAV BLANCO     Inpatient consult to Neurology  Once        Provider:  Wyatt Jones MD    Completed MEL ROSS     IP consult to case management/social work  Once        Provider:  (Not yet assigned)    Completed MEL ROSS            No new Assessment & Plan notes have been filed under this hospital service since the last note was generated.  Service: Hospital Medicine    Final Active Diagnoses:    Diagnosis Date Noted POA     PRINCIPAL PROBLEM:  Seizure-like activity [R56.9] 02/25/2024 Yes    Gastroesophageal reflux disease without esophagitis [K21.9] 01/29/2024 Yes    Morbid obesity [E66.01] 03/23/2021 Yes    Multiple sclerosis [G35] 01/22/2015 Yes    Hypertension [I10] 06/24/2014 Yes      Problems Resolved During this Admission:       Discharged Condition: stable    Disposition: Home or Self Care    Follow Up:   Follow-up Information       Candice Garrett MD. Schedule an appointment as soon as possible for a visit in 1 week(s).    Specialty: Neurology  Why: Hospital discharge follow up  Contact information:  [address] [facility] [address]  [phone]               Braden Dumont MD. Schedule an appointment as soon as possible for a visit in 1 week(s).    Specialty: Internal Medicine  Why: Hospital discharge follow up  Contact information:  [id number] [facility] [id number] [phone]                           Patient Instructions:   No discharge procedures on file.    Significant Diagnostic Studies: Labs: All labs within the past 24 hours have been reviewed    Pending Diagnostic Studies:       None           Medications:  Reconciled Home Medications:      Medication List        START taking these medications      levETIRAcetam 1000 MG tablet  Commonly known as: KEPPRA  Take 1 tablet (1,000 mg total) by mouth 2 (two) times daily.            CHANGE how you take these medications      topiramate 50 MG tablet  Commonly known as: TOPAMAX  Take 1 tablet (50 mg total) by mouth 3 (three) times daily.  What changed:   how much to take  how to take this  when to take this  additional instructions            CONTINUE taking these medications      cholecalciferol (vitamin D3) 1,250 mcg (50,000 unit) capsule  Take 1 capsule (50,000 Units total) by mouth every 7 days. for 365 doses     cyclobenzaprine 10 MG tablet  Commonly known as: FLEXERIL  Take 10 mg by mouth every 8 (eight) hours.     diclofenac sodium 1 %  Gel  Commonly known as: VOLTAREN  Apply 2 g topically 4 (four) times daily.     doxepin 75 MG capsule  Commonly known as: SINEQUAN  Take 75 mg by mouth every evening.     gabapentin 300 MG capsule  Commonly known as: NEURONTIN  Take 3 capsules (900 mg total) by mouth 2 (two) times daily.     HYDROcodone-acetaminophen  mg per tablet  Commonly known as: NORCO  Take 1 tablet by mouth 3 (three) times daily.     linaCLOtide 145 mcg Cap capsule  Commonly known as: LINZESS  Take 1 capsule (145 mcg total) by mouth before breakfast.     nystatin cream  Commonly known as: MYCOSTATIN  Apply topically 2 (two) times daily.     OCREVUS 30 mg/mL Soln  Generic drug: ocrelizumab     valsartan 80 MG tablet  Commonly known as: DIOVAN  Take 1 tablet (80 mg total) by mouth once daily.              Indwelling Lines/Drains at time of discharge:   Lines/Drains/Airways       None                   Time spent on the discharge of patient: 45 minutes         Hal Velazquez MD  Department of Hospital Medicine  '[facility] - Telemetry ([facility])

## 2024-02-27 NOTE — CHAPLAIN
Consult visit with patient.  Visited with patient to assess for spiritual and emotional needs.  Patient was struggling both spiritually and emotional.  Patient shared with me that she has really been struggling emotionally.  PT shared that she is really tired and does not understand her purpose in continuing to go on with life.  Patient mentioned that she had been at a place where she was ready to take her own life and may have even attempted to do this.  We talked for a very long time about both spiritual and emotional sides of the condition that she is suffering through.  Patient said to me that she does not know how much longer she has and I asked her what she meant by this and again she stated that she felt like she might harm herself by taking her life. I shared with her that due to what she shared with me that I would have to work toward getting a psych eval ordered for her.  We talked about a lot of other things especially on the spiritual side and before a left she seemed a little better but I am not completely sure.  I made her nurse aware of this and she notified her physician and a psychological evaluation was ordered.  Spiritual care remains available as needed.    Chaplain Rafael Porter M.Div., [facility]

## 2024-02-27 NOTE — SUBJECTIVE & OBJECTIVE
Review of Systems   All other systems reviewed and are negative.    Objective:     Vital Signs (Most Recent):  Temp: 97.7 °F (36.5 °C) (02/26/24 1907)  Pulse: 89 (02/26/24 1907)  Resp: 18 (02/26/24 2043)  BP: (!) 110/52 (02/26/24 1907)  SpO2: 99 % (02/26/24 1907) Vital Signs (24h Range):  Temp:  [97.7 °F (36.5 °C)-99.1 °F (37.3 °C)] 97.7 °F (36.5 °C)  Pulse:  [] 89  Resp:  [18-20] 18  SpO2:  [93 %-100 %] 99 %  BP: (105-136)/(52-94) 110/52     Weight: 131.4 kg (289 lb 11 oz)  Body mass index is 46.76 kg/m².    Intake/Output Summary (Last 24 hours) at 2/26/2024 2226  Last data filed at 2/26/2024 0415  Gross per 24 hour   Intake --   Output 200 ml   Net -200 ml         Physical Exam  Vitals and nursing note reviewed.   Constitutional:       General: She is not in acute distress.     Appearance: She is obese. She is ill-appearing.   Cardiovascular:      Rate and Rhythm: Normal rate and regular rhythm.      Heart sounds: No murmur heard.  Pulmonary:      Effort: Pulmonary effort is normal. No respiratory distress.      Breath sounds: No wheezing.   Neurological:      General: No focal deficit present.      Mental Status: She is alert and oriented to person, place, and time.   Psychiatric:         Mood and Affect: Mood normal.         Behavior: Behavior normal.             Significant Labs: All pertinent labs within the past 24 hours have been reviewed.  Recent Lab Results         02/26/24  1328   02/26/24  1236   02/26/24  0543        Albumin     3.1       ALP     83       ALT     7       Anion Gap     8       AST     12       Baso #     0.02       Basophil %     0.4       BILIRUBIN TOTAL     0.2  Comment: For infants and newborns, interpretation of results should be based  on gestational age, weight and in agreement with clinical  observations.    Premature Infant recommended reference ranges:  Up to 24 hours.............<8.0 mg/dL  Up to 48 hours............<12.0 mg/dL  3-5 days..................<15.0  mg/dL  6-29 days.................<15.0 mg/dL         BUN     7       Calcium     8.7       Chloride     111       CO2     21       Creatinine     0.7       Differential Method     Automated       eGFR     >60       Eos #     0.1       Eos %     1.7       Glucose     91       Gran # (ANC)     2.1       Gran %     46.4       Hematocrit     36.7       Hemoglobin     11.1       Immature Grans (Abs)     0.01  Comment: Mild elevation in immature granulocytes is non specific and   can be seen in a variety of conditions including stress response,   acute inflammation, trauma and pregnancy. Correlation with other   laboratory and clinical findings is essential.         Immature Granulocytes     0.2       Lymph #     2.0       Lymph %     43.4       Magnesium      1.8       MCH     21.0       MCHC     30.2       MCV     70       Mono #     0.4       Mono %     7.9       MPV     9.7       nRBC     0       Phosphorus Level     3.8       Platelet Count     267       POCT Glucose 79   96         Potassium     3.9       PROTEIN TOTAL     6.2       RBC     5.28       RDW     17.0       Sodium     140       WBC     4.58               Significant Imaging: I have reviewed all pertinent imaging results/findings within the past 24 hours.    MRI Cervical Spine Demyelinating W W/O Contrast   Final Result      No significant cervical cord signal abnormality to suggest demyelinating sequelae.  No abnormal cervical cord enhancement.      Minor degenerative changes of the cervical spine without significant spinal canal stenosis or neural foraminal stenosis.         Electronically signed by: Rico Pelaez   Date:    02/25/2024   Time:    08:45      MRI Brain Demyelinating W W/O Contrast   Final Result      Similar appearance of demyelinating plaques within both cerebral hemispheres, the brainstem and posterior fossa as compared to the prior MRI 08/05/2022.  No MR evidence new or active demyelination as compared to the prior.      No acute  intracranial abnormality.         Electronically signed by: Rico Pelaez   Date:    02/25/2024   Time:    08:37      CT Head Without Contrast   Final Result      No CT evidence of acute intracranial abnormality. Clinical correlation and further evaluation as warranted.      Patchy and confluent periventricular and supratentorial white matter hypoattenuation, similar to prior examination.  Findings may reflect combination of chronic microvascular ischemic changes as well as sequelae of prior demyelination in light of history.         Electronically signed by: Raúl Sinha MD   Date:    02/25/2024   Time:    00:41

## 2024-02-29 ENCOUNTER — OFFICE VISIT (OUTPATIENT)
Dept: INTERNAL MEDICINE | Facility: CLINIC | Age: 44
End: 2024-02-29
Payer: MEDICARE

## 2024-02-29 ENCOUNTER — PATIENT MESSAGE (OUTPATIENT)
Dept: BARIATRICS | Facility: CLINIC | Age: 44
End: 2024-02-29
Payer: MEDICARE

## 2024-02-29 ENCOUNTER — PATIENT OUTREACH (OUTPATIENT)
Dept: ADMINISTRATIVE | Facility: CLINIC | Age: 44
End: 2024-02-29
Payer: MEDICARE

## 2024-02-29 VITALS
BODY MASS INDEX: 46.59 KG/M2 | WEIGHT: 289.88 LBS | TEMPERATURE: 97 F | DIASTOLIC BLOOD PRESSURE: 91 MMHG | SYSTOLIC BLOOD PRESSURE: 145 MMHG | HEART RATE: 91 BPM | OXYGEN SATURATION: 99 % | HEIGHT: 66 IN

## 2024-02-29 DIAGNOSIS — R30.0 DYSURIA: ICD-10-CM

## 2024-02-29 DIAGNOSIS — I10 PRIMARY HYPERTENSION: ICD-10-CM

## 2024-02-29 DIAGNOSIS — R56.9 SEIZURE-LIKE ACTIVITY: ICD-10-CM

## 2024-02-29 DIAGNOSIS — Z09 HOSPITAL DISCHARGE FOLLOW-UP: Primary | ICD-10-CM

## 2024-02-29 PROCEDURE — 99999 PR PBB SHADOW E&M-EST. PATIENT-LVL IV: CPT | Mod: PBBFAC,HCNC,, | Performed by: FAMILY MEDICINE

## 2024-02-29 PROCEDURE — 3044F HG A1C LEVEL LT 7.0%: CPT | Mod: HCNC,CPTII,S$GLB, | Performed by: FAMILY MEDICINE

## 2024-02-29 PROCEDURE — 3077F SYST BP >= 140 MM HG: CPT | Mod: HCNC,CPTII,S$GLB, | Performed by: FAMILY MEDICINE

## 2024-02-29 PROCEDURE — 3080F DIAST BP >= 90 MM HG: CPT | Mod: HCNC,CPTII,S$GLB, | Performed by: FAMILY MEDICINE

## 2024-02-29 PROCEDURE — 1111F DSCHRG MED/CURRENT MED MERGE: CPT | Mod: HCNC,CPTII,S$GLB, | Performed by: FAMILY MEDICINE

## 2024-02-29 PROCEDURE — 99214 OFFICE O/P EST MOD 30 MIN: CPT | Mod: HCNC,S$GLB,, | Performed by: FAMILY MEDICINE

## 2024-02-29 PROCEDURE — 87086 URINE CULTURE/COLONY COUNT: CPT | Mod: HCNC | Performed by: FAMILY MEDICINE

## 2024-02-29 PROCEDURE — 1159F MED LIST DOCD IN RCRD: CPT | Mod: HCNC,CPTII,S$GLB, | Performed by: FAMILY MEDICINE

## 2024-02-29 NOTE — PROGRESS NOTES
C3 nurse spoke with Alicia Soliz  for a [facility] post hospital discharge follow up call. The patient has a scheduled appointment with Braden Dumont MD  on 2/29/24 @ 5784. I will route the provider staff to include HOSFU.

## 2024-03-01 ENCOUNTER — TELEPHONE (OUTPATIENT)
Dept: NEUROLOGY | Facility: CLINIC | Age: 44
End: 2024-03-01

## 2024-03-01 ENCOUNTER — PATIENT MESSAGE (OUTPATIENT)
Dept: NEUROLOGY | Facility: CLINIC | Age: 44
End: 2024-03-01
Payer: MEDICARE

## 2024-03-01 NOTE — PROGRESS NOTES
Subjective:      Patient ID: Alicia Soliz is a 44 y.o. female.    Chief Complaint: Hospital Follow Up      Patient here for inpatient [id number] inpatient at [facility] from February 24th through 27th for seizure-like activity.  Workup did not reveal any new MS lesions.  She was found to have UTI, treated with antibiotic.  She was started on Keppra at discharge but has stopped this as it made her feel very bad  She is scheduled for bariatric surgery early next month      Review of Systems   Constitutional:  Negative for activity change and appetite change.   Respiratory:  Negative for shortness of breath.    Cardiovascular:  Negative for leg swelling.     Past Medical History:   Diagnosis Date    Allergy     Anemia     Arthritis     Cardiac arrest as complication of care     pt states she went into cardiac arrest from an allergic reaction to a medication    Depression     Encounter for blood transfusion     GERD (gastroesophageal reflux disease)     Hemiplegia due to old stroke     Hypertension     Morbid obesity with BMI of 45.0-49.9, adult 09/20/2018    Multiple sclerosis     Neuromuscular disorder     Seizure-like activity 2/25/2024          Past Surgical History:   Procedure Laterality Date    APPENDECTOMY      BREAST SURGERY      CHOLECYSTECTOMY      COLONOSCOPY N/A 11/25/2020    Procedure: COLONOSCOPY;  Surgeon: Andrew Jenkins III, MD;  Location: [facility];  Service: Endoscopy;  Laterality: N/A;    ESOPHAGOGASTRODUODENOSCOPY N/A 02/19/2020    Procedure: EGD (ESOPHAGOGASTRODUODENOSCOPY);  Surgeon: Michael Navarrete MD;  Location: [facility];  Service: Endoscopy;  Laterality: N/A;    ESOPHAGOGASTRODUODENOSCOPY N/A 02/20/2020    Procedure: EGD (ESOPHAGOGASTRODUODENOSCOPY);  Surgeon: Michael Navarrete MD;  Location: [facility];  Service: General;  Laterality: N/A;    ESOPHAGOGASTRODUODENOSCOPY N/A 11/25/2020    Procedure: EGD (ESOPHAGOGASTRODUODENOSCOPY);  Surgeon: Andrew Jenkins III, MD;  Location: [facility];   Service: Endoscopy;  Laterality: N/A;    ESOPHAGOGASTRODUODENOSCOPY N/A 9/25/2023    Procedure: EGD (ESOPHAGOGASTRODUODENOSCOPY);  Surgeon: Ly Kim MD;  Location: [facility];  Service: Endoscopy;  Laterality: N/A;    ESOPHAGOGASTRODUODENOSCOPY N/A 1/29/2024    Procedure: EGD (ESOPHAGOGASTRODUODENOSCOPY);  Surgeon: Ly Kim MD;  Location: [facility];  Service: Endoscopy;  Laterality: N/A;    HYSTERECTOMY      KNEE SURGERY      PH MONITORING, ESOPHAGUS, WIRELESS, (OFF REFLUX MEDS) N/A 1/29/2024    Procedure: PH MONITORING, ESOPHAGUS, WIRELESS, (OFF REFLUX MEDS);  Surgeon: Ly Kim MD;  Location: [facility] ENDO;  Service: Endoscopy;  Laterality: N/A;    ROBOT-ASSISTED LAPAROSCOPIC SLEEVE GASTRECTOMY USING DA ANTHONY XI N/A 02/20/2020    Procedure: XI ROBOTIC SLEEVE GASTRECTOMY;  Surgeon: Michael Navarrete MD;  Location: [facility] OR;  Service: General;  Laterality: N/A;    TENOPLASTY OF HAND Left 08/26/2021    Procedure: REPAIR, TENDON, HAND;  Surgeon: Joselito Lugo MD;  Location: [facility] OR;  Service: Orthopedics;  Laterality: Left;  Left RCL PIP Joint Repair/Recon with Arthrex Internal Brace.    TONSILLECTOMY      TOTAL REDUCTION MAMMOPLASTY  2022    TUBAL LIGATION       Family History   Problem Relation Age of Onset    Lupus Mother     Heart disease Mother     Hypertension Mother     Diabetes Father     Kidney disease Father     No Known Problems Sister     No Known Problems Sister     No Known Problems Brother     No Known Problems Brother     No Known Problems Daughter     No Known Problems Daughter     No Known Problems Daughter     Cancer Maternal Aunt 40        breast    Breast cancer Maternal Aunt     Diabetes Maternal Aunt     COPD Maternal Aunt     Heart disease Maternal Grandmother     Breast cancer Maternal Cousin     Ovarian cancer Maternal Cousin      Social History     Socioeconomic History    Marital status: Single    Number of children: 3   Occupational History     Employer:  [employer]   Tobacco Use    Smoking status: Never     Passive exposure: Never    Smokeless tobacco: Never   Substance and Sexual Activity    Alcohol use: Yes     Comment: once a year     Drug use: No    Sexual activity: Yes     Partners: Female     Birth control/protection: Surgical     Social Determinants of Health     Financial Resource Strain: Low Risk  (11/11/2023)    Overall Financial Resource Strain (CARDIA)     Difficulty of Paying Living Expenses: Not hard at all   Recent Concern: Financial Resource Strain - Medium Risk (9/13/2023)    Overall Financial Resource Strain (CARDIA)     Difficulty of Paying Living Expenses: Somewhat hard   Food Insecurity: Food Insecurity Present (11/11/2023)    Hunger Vital Sign     Worried About Running Out of Food in the Last Year: Sometimes true     Ran Out of Food in the Last Year: Sometimes true   Transportation Needs: Unmet Transportation Needs (11/11/2023)    PRAPARE - Transportation     Lack of Transportation (Medical): Yes     Lack of Transportation (Non-Medical): Yes   Physical Activity: Insufficiently Active (11/11/2023)    Exercise Vital Sign     Days of Exercise per Week: 5 days     Minutes of Exercise per Session: 10 min   Stress: No Stress Concern Present (11/11/2023)    [employer] - Occupational Stress Questionnaire     Feeling of Stress : Only a little   Social Connections: Unknown (11/11/2023)    Social Connection and Isolation Panel [NHANES]     Frequency of Communication with Friends and Family: More than three times a week     Frequency of Social Gatherings with Friends and Family: Three times a week     Active Member of Clubs or Organizations: Yes     Attends Club or Organization Meetings: 1 to 4 times per year     Marital Status:    Housing Stability: Unknown (11/11/2023)    Housing Stability Vital Sign     Unable to Pay for Housing in the Last Year: Patient refused     Number of Places Lived in the Last Year: 1     Unstable  "Housing in the Last Year: No     Review of patient's allergies indicates:   Allergen Reactions    Demerol [meperidine] Anaphylaxis     Other reaction(s): Itching    Dilaudid [hydromorphone (bulk)] Anaphylaxis     "coded" per pt  Patient can tolerate Lortab    Shellfish containing products Itching, Nausea And Vomiting and Swelling    Prednisone Itching     Other reaction(s): Itching    Tramadol      shaking       Objective:       BP (!) 145/91 (BP Location: Right arm, Patient Position: Sitting, BP Method: Large (Automatic))   Pulse 91   Temp 96.8 °F (36 °C) (Tympanic)   Ht 5' 6" (1.676 m)   Wt 131.5 kg (289 lb 14.5 oz)   SpO2 99%   BMI 46.79 kg/m²   Physical Exam  Constitutional:       General: She is not in acute distress.     Appearance: Normal appearance. She is well-developed. She is not ill-appearing or diaphoretic.   Cardiovascular:      Rate and Rhythm: Normal rate and regular rhythm.      Heart sounds: Normal heart sounds.   Pulmonary:      Effort: Pulmonary effort is normal.      Breath sounds: Normal breath sounds.   Abdominal:      Palpations: Abdomen is soft.      Tenderness: There is no abdominal tenderness. There is no guarding.   Neurological:      Mental Status: She is alert and oriented to person, place, and time.   Psychiatric:         Mood and Affect: Mood normal.         Behavior: Behavior normal.         Thought Content: Thought content normal.         Judgment: Judgment normal.       Assessment:     1. Hospital discharge follow-up    2. Seizure-like activity    3. Primary hypertension    4. Dysuria      Plan:   Hospital discharge follow-up  -     CBC Auto Differential; Future; Expected date: 02/29/2024  -     Comprehensive Metabolic Panel; Future; Expected date: 08/27/2024    Seizure-like activity  -     CBC Auto Differential; Future; Expected date: 02/29/2024  -     Comprehensive Metabolic Panel; Future; Expected date: 08/27/2024    Primary hypertension    Dysuria  -     Urine culture  -  "    Urine culture; Future; Expected date: 02/29/2024    Will have above labs drawn today  Recommended patient discuss seizure medication with her neurologist at next visit    Medication List with Changes/Refills   Current Medications    CHOLECALCIFEROL, VITAMIN D3, 1,250 MCG (50,000 UNIT) CAPSULE    Take 1 capsule (50,000 Units total) by mouth every 7 days. for 365 doses    CYCLOBENZAPRINE (FLEXERIL) 10 MG TABLET    Take 10 mg by mouth every 8 (eight) hours.    DICLOFENAC SODIUM (VOLTAREN) 1 % GEL    Apply 2 g topically 4 (four) times daily.    DOXEPIN (SINEQUAN) 75 MG CAPSULE    Take 75 mg by mouth every evening.    GABAPENTIN (NEURONTIN) 300 MG CAPSULE    Take 3 capsules (900 mg total) by mouth 2 (two) times daily.    HYDROCODONE-ACETAMINOPHEN (NORCO)  MG PER TABLET    Take 1 tablet by mouth 3 (three) times daily.    LINACLOTIDE (LINZESS) 145 MCG CAP CAPSULE    Take 1 capsule (145 mcg total) by mouth before breakfast.    NYSTATIN (MYCOSTATIN) CREAM    Apply topically 2 (two) times daily.    OCREVUS 30 MG/ML SOLN        TOPIRAMATE (TOPAMAX) 50 MG TABLET    Take 1 tablet (50 mg total) by mouth 3 (three) times daily.    VALSARTAN (DIOVAN) 80 MG TABLET    Take 1 tablet (80 mg total) by mouth once daily.   Discontinued Medications    LEVETIRACETAM (KEPPRA) 1000 MG TABLET    Take 1 tablet (1,000 mg total) by mouth 2 (two) times daily.

## 2024-03-02 LAB — BACTERIA UR CULT: NORMAL

## 2024-03-05 ENCOUNTER — PATIENT MESSAGE (OUTPATIENT)
Dept: BARIATRICS | Facility: CLINIC | Age: 44
End: 2024-03-05
Payer: MEDICARE

## 2024-03-06 ENCOUNTER — PATIENT MESSAGE (OUTPATIENT)
Dept: BARIATRICS | Facility: CLINIC | Age: 44
End: 2024-03-06
Payer: MEDICARE

## 2024-03-07 ENCOUNTER — PATIENT MESSAGE (OUTPATIENT)
Dept: NEUROLOGY | Facility: CLINIC | Age: 44
End: 2024-03-07
Payer: MEDICARE

## 2024-03-07 ENCOUNTER — TELEPHONE (OUTPATIENT)
Dept: BARIATRICS | Facility: CLINIC | Age: 44
End: 2024-03-07
Payer: MEDICARE
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
## 2024-03-08 ENCOUNTER — PATIENT MESSAGE (OUTPATIENT)
Dept: INTERNAL MEDICINE | Facility: CLINIC | Age: 44
End: 2024-03-08
Payer: MEDICARE

## 2024-03-08 ENCOUNTER — PATIENT MESSAGE (OUTPATIENT)
Dept: BARIATRICS | Facility: CLINIC | Age: 44
End: 2024-03-08

## 2024-03-08 ENCOUNTER — CLINICAL SUPPORT (OUTPATIENT)
Dept: BARIATRICS | Facility: CLINIC | Age: 44
End: 2024-03-08
Payer: MEDICARE

## 2024-03-08 DIAGNOSIS — K21.9 GASTROESOPHAGEAL REFLUX DISEASE, UNSPECIFIED WHETHER ESOPHAGITIS PRESENT: ICD-10-CM

## 2024-03-08 DIAGNOSIS — I10 PRIMARY HYPERTENSION: Primary | ICD-10-CM

## 2024-03-08 DIAGNOSIS — Z71.3 DIETARY COUNSELING AND SURVEILLANCE: ICD-10-CM

## 2024-03-08 DIAGNOSIS — E66.01 MORBID OBESITY WITH BMI OF 45.0-49.9, ADULT: ICD-10-CM

## 2024-03-08 PROCEDURE — 97803 MED NUTRITION INDIV SUBSEQ: CPT | Mod: HCNC,95,GZ, | Performed by: DIETITIAN, REGISTERED

## 2024-03-08 PROCEDURE — 99499 UNLISTED E&M SERVICE: CPT | Mod: HCNC,95,, | Performed by: DIETITIAN, REGISTERED

## 2024-03-08 RX ORDER — DOXEPIN HYDROCHLORIDE 75 MG/1
75 CAPSULE ORAL NIGHTLY
Qty: 30 CAPSULE | Refills: 5 | Status: SHIPPED | OUTPATIENT
Start: 2024-03-08

## 2024-03-08 NOTE — TELEPHONE ENCOUNTER
Requested Prescriptions     Pending Prescriptions Disp Refills    doxepin (SINEQUAN) 75 MG capsule       Sig: Take 1 capsule (75 mg total) by mouth every evening.     LV 02/29/2024   NV none scheduled.   LF 10/28/2023

## 2024-03-08 NOTE — TELEPHONE ENCOUNTER
No care due was identified.  [facility] Embedded Care Due Messages. Reference number: [id number].   3/08/2024 9:31:20 AM CST
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
## 2024-03-11 ENCOUNTER — PATIENT MESSAGE (OUTPATIENT)
Dept: NEUROLOGY | Facility: CLINIC | Age: 44
End: 2024-03-11
Payer: MEDICARE

## 2024-03-11 ENCOUNTER — TELEPHONE (OUTPATIENT)
Dept: BARIATRICS | Facility: CLINIC | Age: 44
End: 2024-03-11
Payer: MEDICARE

## 2024-03-11 NOTE — TELEPHONE ENCOUNTER
----- Message from Yoon CLEMONS Route sent at 3/11/2024 11:05 AM CDT -----  Regarding: Ready To Move forward  Contact: pt.  [phone]  Pt is calling to speak with Maria G to confirm everything has been taken care of on her end. Ready to move forward. Patient Requesting Call Back @  [phone]

## 2024-03-13 ENCOUNTER — LAB VISIT (OUTPATIENT)
Dept: LAB | Facility: HOSPITAL | Age: 44
End: 2024-03-13
Attending: STUDENT IN AN ORGANIZED HEALTH CARE EDUCATION/TRAINING PROGRAM
Payer: MEDICARE

## 2024-03-13 ENCOUNTER — PATIENT MESSAGE (OUTPATIENT)
Dept: NEUROLOGY | Facility: CLINIC | Age: 44
End: 2024-03-13
Payer: MEDICARE

## 2024-03-13 DIAGNOSIS — G35 MULTIPLE SCLEROSIS: ICD-10-CM

## 2024-03-13 DIAGNOSIS — E67.8 HYPERVITAMINOSIS: ICD-10-CM

## 2024-03-13 LAB
25(OH)D3+25(OH)D2 SERPL-MCNC: 52 NG/ML (ref 30–96)
ALBUMIN SERPL BCP-MCNC: 3.7 G/DL (ref 3.5–5.2)
ALP SERPL-CCNC: 96 U/L (ref 55–135)
ALT SERPL W/O P-5'-P-CCNC: 10 U/L (ref 10–44)
ANION GAP SERPL CALC-SCNC: 9 MMOL/L (ref 8–16)
AST SERPL-CCNC: 14 U/L (ref 10–40)
BASOPHILS # BLD AUTO: 0.04 K/UL (ref 0–0.2)
BASOPHILS NFR BLD: 0.6 % (ref 0–1.9)
BILIRUB SERPL-MCNC: 0.4 MG/DL (ref 0.1–1)
BUN SERPL-MCNC: 7 MG/DL (ref 6–20)
CALCIUM SERPL-MCNC: 9.8 MG/DL (ref 8.7–10.5)
CHLORIDE SERPL-SCNC: 110 MMOL/L (ref 95–110)
CO2 SERPL-SCNC: 25 MMOL/L (ref 23–29)
CREAT SERPL-MCNC: 0.8 MG/DL (ref 0.5–1.4)
DIFFERENTIAL METHOD BLD: ABNORMAL
EOSINOPHIL # BLD AUTO: 0.1 K/UL (ref 0–0.5)
EOSINOPHIL NFR BLD: 1.7 % (ref 0–8)
ERYTHROCYTE [DISTWIDTH] IN BLOOD BY AUTOMATED COUNT: 17.2 % (ref 11.5–14.5)
EST. GFR  (NO RACE VARIABLE): >60 ML/MIN/1.73 M^2
GLUCOSE SERPL-MCNC: 86 MG/DL (ref 70–110)
HBV CORE AB SERPL QL IA: NORMAL
HBV SURFACE AG SERPL QL IA: NORMAL
HCT VFR BLD AUTO: 39 % (ref 37–48.5)
HGB BLD-MCNC: 11.7 G/DL (ref 12–16)
IGA SERPL-MCNC: 202 MG/DL (ref 40–350)
IGG SERPL-MCNC: 1213 MG/DL (ref 650–1600)
IGM SERPL-MCNC: 55 MG/DL (ref 50–300)
IMM GRANULOCYTES # BLD AUTO: 0.02 K/UL (ref 0–0.04)
IMM GRANULOCYTES NFR BLD AUTO: 0.3 % (ref 0–0.5)
LYMPHOCYTES # BLD AUTO: 3.1 K/UL (ref 1–4.8)
LYMPHOCYTES NFR BLD: 48.2 % (ref 18–48)
MCH RBC QN AUTO: 21.3 PG (ref 27–31)
MCHC RBC AUTO-ENTMCNC: 30 G/DL (ref 32–36)
MCV RBC AUTO: 71 FL (ref 82–98)
MONOCYTES # BLD AUTO: 0.5 K/UL (ref 0.3–1)
MONOCYTES NFR BLD: 7.7 % (ref 4–15)
NEUTROPHILS # BLD AUTO: 2.6 K/UL (ref 1.8–7.7)
NEUTROPHILS NFR BLD: 41.5 % (ref 38–73)
NRBC BLD-RTO: 0 /100 WBC
PLATELET # BLD AUTO: 268 K/UL (ref 150–450)
PMV BLD AUTO: 10.8 FL (ref 9.2–12.9)
POTASSIUM SERPL-SCNC: 4.3 MMOL/L (ref 3.5–5.1)
PROT SERPL-MCNC: 7.7 G/DL (ref 6–8.4)
RBC # BLD AUTO: 5.49 M/UL (ref 4–5.4)
SODIUM SERPL-SCNC: 144 MMOL/L (ref 136–145)
WBC # BLD AUTO: 6.35 K/UL (ref 3.9–12.7)

## 2024-03-13 PROCEDURE — 0361U NEURFLMNT LT CHN DIG IA QUAN: CPT | Mod: HCNC | Performed by: STUDENT IN AN ORGANIZED HEALTH CARE EDUCATION/TRAINING PROGRAM

## 2024-03-13 PROCEDURE — 87340 HEPATITIS B SURFACE AG IA: CPT | Mod: HCNC | Performed by: STUDENT IN AN ORGANIZED HEALTH CARE EDUCATION/TRAINING PROGRAM

## 2024-03-13 PROCEDURE — 82784 ASSAY IGA/IGD/IGG/IGM EACH: CPT | Mod: 59,HCNC | Performed by: STUDENT IN AN ORGANIZED HEALTH CARE EDUCATION/TRAINING PROGRAM

## 2024-03-13 PROCEDURE — 36415 COLL VENOUS BLD VENIPUNCTURE: CPT | Mod: HCNC | Performed by: STUDENT IN AN ORGANIZED HEALTH CARE EDUCATION/TRAINING PROGRAM

## 2024-03-13 PROCEDURE — 80053 COMPREHEN METABOLIC PANEL: CPT | Mod: HCNC | Performed by: STUDENT IN AN ORGANIZED HEALTH CARE EDUCATION/TRAINING PROGRAM

## 2024-03-13 PROCEDURE — 86704 HEP B CORE ANTIBODY TOTAL: CPT | Mod: HCNC | Performed by: STUDENT IN AN ORGANIZED HEALTH CARE EDUCATION/TRAINING PROGRAM

## 2024-03-13 PROCEDURE — 85025 COMPLETE CBC W/AUTO DIFF WBC: CPT | Mod: HCNC | Performed by: STUDENT IN AN ORGANIZED HEALTH CARE EDUCATION/TRAINING PROGRAM

## 2024-03-13 PROCEDURE — 82306 VITAMIN D 25 HYDROXY: CPT | Mod: HCNC | Performed by: STUDENT IN AN ORGANIZED HEALTH CARE EDUCATION/TRAINING PROGRAM

## 2024-03-15 ENCOUNTER — PATIENT MESSAGE (OUTPATIENT)
Dept: NEUROLOGY | Facility: CLINIC | Age: 44
End: 2024-03-15
Payer: MEDICARE

## 2024-03-15 LAB — NEUROFILAMENT LIGHT CHAIN, PLASMA: 12.3 PG/ML

## 2024-03-18 ENCOUNTER — PATIENT MESSAGE (OUTPATIENT)
Dept: ADMINISTRATIVE | Facility: HOSPITAL | Age: 44
End: 2024-03-18
Payer: MEDICARE

## 2024-03-19 ENCOUNTER — TELEPHONE (OUTPATIENT)
Dept: SURGERY | Facility: CLINIC | Age: 44
End: 2024-03-19
Payer: MEDICARE

## 2024-03-19 DIAGNOSIS — Z79.899 POLYPHARMACY: ICD-10-CM

## 2024-03-19 DIAGNOSIS — K21.9 GASTROESOPHAGEAL REFLUX DISEASE WITHOUT ESOPHAGITIS: Primary | ICD-10-CM

## 2024-03-19 DIAGNOSIS — Z79.899 LONG-TERM USE OF HIGH-RISK MEDICATION: ICD-10-CM

## 2024-03-19 NOTE — LETTER
[facility]  [address]  [facility] [address]  Phone: [phone] 2024       Attn: Pre-determination Dept.    RE:  Alicia Soliz          OC #: [id number]          : 1980        To Whom It May Concern:    I am sending this letter on behalf of Alicia Soliz (44 y.o. female) for pre-approval for Surgery; specifically, laparoscopic gastric bypass. She is s/p gastric sleeve performed in .  Alicia has a past medical history of GERD.  Despite medical treatment with daily PPI, patient reports worsening of symptoms, such as increased coughing and burning/regurgitation in her chest, as noted on 2023 by Radha Sawyer NP for Gastroenterology.  Diagnostic testing supports diagnosis of NERD and GERD.  On 10-5-2023, the flouro upper GI study showed mildly dilated esophagus with esophageal dysmotility and   spontaneous gastroesophageal reflux to the level of the proximal esophagus.  Alicia's Esophageal Bravo pH Capsule Study on 2024 shows symptom correlation present with reflux episodes.   Furthermore, the findings indicate: that acid reflux is the cause of the patient's symptoms; and although there is normal acid exposure, there is good symptom correlation for chest pain suggestive of a hypersensitive esophagus within the NERD (NON-EROSIVE REFLUX DISEASE) spectrum based on NIH specifications.   According to ncbi.nlm.nih.gov the definition NERD has been commonly defined as the presence of classic GERD symptoms in the absence of esophageal mucosal injury during upper endoscopy.  We proposed that NERD should be defined as the presence of typical symptoms of gastroesophageal reflux disease caused by intraesophageal reflux (acidic or weakly acidic), in the absence of visible esophageal mucosal injury at endoscopy.    After assessment, evaluation, diagnostic testing, and exacerbation of patient symptoms, it is determined that the best treatment option for Alicia  is to perform a robotic laparoscopic Jovita-en-Y Gastric Bypass to specifically limit/resolve symptoms of GERD. The primary purpose of this surgery is to improve/eliminate the patient's symptoms as described above. The patient has undergone multiple test and procedures (Flouro Upper GI, EGD with Bravo pH probe) to confirm diagnosis. If left untreated, GERD can result in several serious complications, including esophagitis, Yeung's esophagus, esophageal strictures, laryngopharyngeal reflux, and asthma. Esophagitis can lead to extensive erosions and ulcerations. Studies with long term follow up showed that lap anti-reflux surgery is a safe, effective and a durable treatment option for patients with GERD. When performed by appropriately trained surgeons, the anti-reflux surgery can lead to significant improvements in symptoms of GERD and quality of life.  As surgeon/provider, I fell that this procedure is medically necessary and that timely surgery is required to prevent or exacerbate symptoms and/or complications.  Alicia has undergone extensive pre-operative education and understands all the risks, benefits, and possible complications of surgery.    Our team is sending this letter to receive pre-approval for the indicated procedure. Please let us know if you have any questions or require any further information.                           Sincerely,    Farideh Lees MD  General, Laparoscopic, and Bariatric Surgery  [facility] - [place], [place]

## 2024-03-20 ENCOUNTER — TELEPHONE (OUTPATIENT)
Dept: NEUROLOGY | Facility: CLINIC | Age: 44
End: 2024-03-20
Payer: MEDICARE

## 2024-03-20 NOTE — TELEPHONE ENCOUNTER
"Spoke with patient and confirmed the surgical procedure of laparoscopic gastric sleeve conversion to LRNY with Dr Jc on 4-29-24.  Scheduled preop appts/surgery date/2 week and 8 week post op appts. All dates and times agreed upon. Pt aware that if they are required to have PCP clearance, it must be within 6 months of surgery, unless their medical history has changed, it should be dated, signed and in chart for preop appointment. All medications have been reviewed regarding the necessity to be crushed or broken into pieces smaller that the tip of a pencil eraser for 2 weeks following gastric sleeve surgery and 4 weeks following gastric bypass surgery. Pt instructed to stop taking all NSAIDS 1 week before surgery and for life after surgery. Pt aware that protein liquid diet start date is 4-15-24. Patient is doing well with their diet. Patient was instructed about the progression of the diet phases. The patient's current weight is 289 lbs, height is 5'6", and BMI is 46.79. Refer to medical letter of necessity from Surgeon. Discussed the importance of increased physical activity and dieting lifestyle changes to improve weight loss and meet goals. Screened patient for history of UTI per protocol. Discussed with patient to avoid antibiotics and elective procedures involving sedation/anesthesia within 30 days of surgery unless cleared by the bariatric department. Patient instructed to call the bariatric clinic post op for any s/s of UTI. Patient's mailing address confirmed and informed to expect a manilla envelop containing bariatric surgery pre op booklet, appointment reminders, protein and fluid log sheets, and liquid diet and vitamin information sheets. Pt aware that all appts can be seen in my [facility] patient portal at this time. Confirmed email address and informed patient that they will be enrolled in the Patient Reported Outcomes program to track their progress and successes. The first email will be sent 2-3 " weeks before surgery and then every year on your surgery anniversary date. Office phone and fax number given to patient for any future questions/concerns. Discussed the pre-surgery complex carbohydrate beverage to purchase in [facility] to drink 30 minutes before the surgery arrival time. Reviewed the policy of scheduling a covid test 72 hours prior to surgery if necessary.

## 2024-03-21 ENCOUNTER — TELEPHONE (OUTPATIENT)
Dept: BARIATRICS | Facility: CLINIC | Age: 44
End: 2024-03-21
Payer: MEDICARE

## 2024-03-21 NOTE — TELEPHONE ENCOUNTER
----- Message from Payal Sepulveda sent at 3/21/2024 10:26 AM CDT -----  Regarding: pt advice  Contact: pt @[phone]  Pt is returning a missed call from someone in the office and is asking for a return call back soon. Thanks.     Reason for call:miss call          Patient requesting call back or [facility] msg: pt @[phone]    
03-Jun-2022

## 2024-03-22 ENCOUNTER — TELEPHONE (OUTPATIENT)
Dept: BARIATRICS | Facility: CLINIC | Age: 44
End: 2024-03-22
Payer: MEDICARE

## 2024-03-22 NOTE — PROGRESS NOTES
March 22, 2024           YOUR PERSONALIZED LIST OF SERVICES & PROGRAMS           NAVIGATION    Eligibility Screening      [employer]  Phone: [phone]  Website: https://www.mnsure.org/about-us/assister-program/navigators/index.jsp  Language: English  Hours: Mon 8:00 AM - 4:00 PM Tue 8:00 AM - 4:00 PM Wed 8:00 AM - 4:00 PM Thu 8:00 AM - 4:00 PM        ASSISTANCE    Nutrition Benefits      [facility] [place] - SNAP (formerly food stamps) Screening and Application help  Phone: [phone]) [phone]  Website: https://www.[facility].org/programs/[place]-food-helpline/  Language: English  Hours: Mon 10:00 AM - 5:00 PM Tue 10:00 AM - 5:00 PM Wed 10:00 AM - 5:00 PM Thu 10:00 AM - 5:00 PM Fri 10:00 AM - 5:00 PM  Fee: Free  Accessibility: Ada accessible, Blind accommodation, Deaf or hard of hearing, Translation services    Pantry      [facility] [employer] [facility]  [address] [place] [address] (Distance: 3.5 miles)  Phone: [phone]  Website: https://www.[employer].org/about-us  Language: English  Fee: Self pay      [facility] - [facility] [place] [address] (Distance: 3.2 miles)  Language: English  Fee: Free      [facility] [employer]  Phone: [phone]  Website: https://www.[facility].org  Language: English  Hours: Tue 9:00 AM - 4:00 PM Wed 9:00 AM - 4:00 PM Thu 9:00 AM - 4:00 PM Fri 9:00 AM - 4:00 PM  Fee: Free  Accessibility: Ada accessible, Blind accommodation, Deaf or hard of hearing, Translation services               IMPORTANT NUMBERS & WEBSITES        Emergency Services  911  .   United Way  211 http://211unitedway.org  .   Poison Control  (452) 499-5290 http://mnpoison.org http://wisconsinpoison.org  .     Suicide and Crisis Lifeline  988 http://988lifeline.org  .   Childhelp National Child Abuse Hotline  715.294.8055 http://Childhelphotline.org   .   National Sexual Assault Hotline  (288)  Subjective:      Patient ID: Alicia Soliz is a 42 y.o. female.    Chief Complaint: Pre-op Exam      Patient here for preop evaluation prior to breast reduction surgery. She has had no complications from previous anesthesia. No known cardiac problems.    Review of Systems   Constitutional: Negative for activity change and appetite change.   Respiratory: Negative for cough and shortness of breath.    Cardiovascular: Negative for chest pain and leg swelling.   Gastrointestinal: Negative for abdominal pain.     Past Medical History:   Diagnosis Date    Anemia     Arthritis     Cardiac arrest as complication of care     pt states she went into cardiac arrest from an allergic reaction to a medication    Depression     Encounter for blood transfusion     Hemiplegia due to old stroke     Hypertension     Morbid obesity with BMI of 45.0-49.9, adult 9/20/2018    Multiple sclerosis           Past Surgical History:   Procedure Laterality Date    APPENDECTOMY      CHOLECYSTECTOMY      COLONOSCOPY N/A 11/25/2020    Procedure: COLONOSCOPY;  Surgeon: Andrew Jenkins III, MD;  Location: [facility];  Service: Endoscopy;  Laterality: N/A;    ESOPHAGOGASTRODUODENOSCOPY N/A 2/19/2020    Procedure: EGD (ESOPHAGOGASTRODUODENOSCOPY);  Surgeon: Michael Navarrete MD;  Location: [facility];  Service: Endoscopy;  Laterality: N/A;    ESOPHAGOGASTRODUODENOSCOPY N/A 2/20/2020    Procedure: EGD (ESOPHAGOGASTRODUODENOSCOPY);  Surgeon: Michael Navarrete MD;  Location: [facility] OR;  Service: General;  Laterality: N/A;    ESOPHAGOGASTRODUODENOSCOPY N/A 11/25/2020    Procedure: EGD (ESOPHAGOGASTRODUODENOSCOPY);  Surgeon: Andrew Jenkins III, MD;  Location: [facility];  Service: Endoscopy;  Laterality: N/A;    HYSTERECTOMY      KNEE SURGERY      ROBOT-ASSISTED LAPAROSCOPIC SLEEVE GASTRECTOMY USING DA ANTHONY XI N/A 2/20/2020    Procedure: XI ROBOTIC SLEEVE GASTRECTOMY;  Surgeon: Michael Navarrete MD;  Location: [facility] OR;  Service: General;  Laterality:  [phone] (HOPE) http://Rainn.org   .     National Runaway Safeline  (545) 977-2023 (RUNAWAY) http://CiviQruSigFig.Ruby & Revolver  .   Pregnancy & Postpartum Support  Call/text 750-312-2959  [place]: http://ppsupportmn.org  [place]: http://psichapters.com/wi  .   Substance Abuse National Helpline ([facility])  840-602-HELP (9651) http://Findtreatment.gov   .                DISCLAIMER: [employer] does not endorse any service providers mentioned in this resource list. [employer] does not guarantee that the services mentioned in this resource list will be available to you or will improve your health or wellness.    [facility] N/A;    TENOPLASTY OF HAND Left 8/26/2021    Procedure: REPAIR, TENDON, HAND;  Surgeon: Joselito Lugo MD;  Location: [facility];  Service: Orthopedics;  Laterality: Left;  Left RCL PIP Joint Repair/Recon with Arthrex Internal Brace.    TONSILLECTOMY      TUBAL LIGATION       Family History   Problem Relation Age of Onset    Lupus Mother     Heart disease Mother     Hypertension Mother     Diabetes Father     Kidney disease Father     Cancer Maternal Aunt 40        breast    Breast cancer Maternal Aunt     Diabetes Maternal Aunt     COPD Maternal Aunt     Breast cancer Maternal Cousin     Ovarian cancer Maternal Cousin      Social History     Socioeconomic History    Marital status: Single    Number of children: 3   Occupational History     Employer: [employer]   Tobacco Use    Smoking status: Never Smoker    Smokeless tobacco: Never Used   Substance and Sexual Activity    Alcohol use: Yes     Comment: once a year     Drug use: Never    Sexual activity: Yes     Partners: Male     Birth control/protection: Surgical     Social Determinants of Health     Financial Resource Strain: Low Risk     Difficulty of Paying Living Expenses: Not very hard   Food Insecurity: Food Insecurity Present    Worried About Running Out of Food in the Last Year: Sometimes true    Ran Out of Food in the Last Year: Sometimes true   Transportation Needs: Unmet Transportation Needs    Lack of Transportation (Medical): Yes    Lack of Transportation (Non-Medical): Yes   Physical Activity: Insufficiently Active    Days of Exercise per Week: 2 days    Minutes of Exercise per Session: 10 min   Stress: No Stress Concern Present    Feeling of Stress : Not at all   Social Connections: Unknown    Frequency of Communication with Friends and Family: Twice a week    Frequency of Social Gatherings with Friends and Family: Once a week    Active Member of Clubs or Organizations: Yes    Attends Club or Organization Meetings: 1 to  "4 times per year    Marital Status: Never    Housing Stability: Low Risk     Unable to Pay for Housing in the Last Year: No    Number of Places Lived in the Last Year: 1    Unstable Housing in the Last Year: No     Review of patient's allergies indicates:   Allergen Reactions    Demerol [meperidine] Anaphylaxis     Other reaction(s): Itching    Dilaudid [hydromorphone (bulk)] Anaphylaxis     "coded" per pt  Patient can tolerate Lortab    Prednisone Itching     Other reaction(s): Itching    Tramadol      shaking       Objective:       BP (!) 140/87 (BP Location: Right arm, Patient Position: Sitting, BP Method: Large (Automatic))   Pulse 93   Temp 98.2 °F (36.8 °C) (Tympanic)   Ht 5' 6" (1.676 m)   Wt 132.2 kg (291 lb 7.2 oz)   SpO2 99%   BMI 47.04 kg/m²   Physical Exam  Vitals reviewed.   Constitutional:       General: She is not in acute distress.     Appearance: Normal appearance. She is well-developed. She is obese. She is not ill-appearing or diaphoretic.   HENT:      Head: Normocephalic and atraumatic.      Right Ear: Hearing, tympanic membrane, ear canal and external ear normal.      Left Ear: Hearing, tympanic membrane, ear canal and external ear normal.      Nose: Nose normal.      Mouth/Throat:      Pharynx: Uvula midline. No oropharyngeal exudate.   Eyes:      Conjunctiva/sclera: Conjunctivae normal.      Pupils: Pupils are equal, round, and reactive to light.   Neck:      Thyroid: No thyromegaly.      Trachea: No tracheal deviation.   Cardiovascular:      Rate and Rhythm: Normal rate and regular rhythm.      Heart sounds: Normal heart sounds. No murmur heard.  Pulmonary:      Effort: Pulmonary effort is normal. No respiratory distress.      Breath sounds: Normal breath sounds.   Abdominal:      General: Bowel sounds are normal.      Palpations: Abdomen is soft.      Tenderness: There is no abdominal tenderness. There is no guarding.      Hernia: No hernia is present.   Musculoskeletal: "         General: Normal range of motion.      Cervical back: Normal range of motion and neck supple.   Lymphadenopathy:      Cervical: No cervical adenopathy.   Skin:     General: Skin is warm and dry.      Capillary Refill: Capillary refill takes less than 2 seconds.   Neurological:      General: No focal deficit present.      Mental Status: She is alert and oriented to person, place, and time.   Psychiatric:         Mood and Affect: Mood normal.         Behavior: Behavior normal.         Thought Content: Thought content normal.         Judgment: Judgment normal.       Assessment:     1. Breast hypertrophy in female    2. Preop examination    3. Multiple sclerosis    4. Hemiplegia, unspecified etiology, unspecified hemiplegia type, unspecified laterality      Plan:   Breast hypertrophy in female  -     IN OFFICE EKG 12-LEAD (to [place])    Preop examination  -     IN OFFICE EKG 12-LEAD (to [place])    Multiple sclerosis    Hemiplegia, unspecified etiology, unspecified hemiplegia type, unspecified laterality    reviewed cbc, cmp drawn yesterday   Will have EKG scheduled for next week.      Addendum 5/13/22: After review of EKG, cbc,cmp patient has no contraindications to surgery under general anesthesia.  Medication List with Changes/Refills   Current Medications    DICLOFENAC SODIUM (VOLTAREN) 1 % GEL    APPLY 2 GRAMS TO AFFECTED AREA 4 TIMES A DAY    DOXEPIN (SINEQUAN) 75 MG CAPSULE    Take 1 capsule (75 mg total) by mouth every evening.    DULOXETINE (CYMBALTA) 60 MG CAPSULE    Take 1 capsule (60 mg total) by mouth once daily.    ESOMEPRAZOLE (NEXIUM) 40 MG CAPSULE    TAKE 1 CAPSULE (40 MG TOTAL) BY MOUTH BEFORE BREAKFAST.    GABAPENTIN (NEURONTIN) 300 MG CAPSULE    600mg at morning and afternoon. 900mg at night.    HYDROCODONE-ACETAMINOPHEN (NORCO)  MG PER TABLET    Take 1 tablet by mouth every 8 (eight) hours as needed for Pain.    LACTULOSE (CHRONULAC) 20 GRAM/30 ML SOLN    Take 15 mLs (10 g total) by mouth 3  (three) times daily.    LIDOCAINE-PRILOCAINE (EMLA) CREAM        LINACLOTIDE (LINZESS) 145 MCG CAP CAPSULE    Take 1 capsule (145 mcg total) by mouth before breakfast.    LINZESS 145 MCG CAP CAPSULE    TAKE 1 CAPSULE (145 MCG TOTAL) BY MOUTH BEFORE BREAKFAST.    METHOCARBAMOL (ROBAXIN) 500 MG TAB    Take 500 mg by mouth 4 (four) times daily.    MUPIROCIN (BACTROBAN) 2 % OINTMENT    Apply topically 2 (two) times daily.    MUPIROCIN (BACTROBAN) 2 % OINTMENT    Apply topically 3 (three) times daily.    OCRELIZUMAB (OCREVUS IV)    Inject into the vein.    PROMETHAZINE (PHENERGAN) 25 MG TABLET    Take 1 tablet (25 mg total) by mouth every 4 (four) hours.    SUMATRIPTAN (IMITREX) 100 MG TABLET    TAKE 1 TAB AT ONSET OF HEADACH MAY TAKE 2ND TAB 2 HOURS LATER IF NO RELIEF MAX 6 TABS A WEEK    TOPIRAMATE (TOPAMAX) 50 MG TABLET    TAKE 1 TABLET BY MOUTH IN THE MORNING AND 2 TABLETS AT BEDTIME    TRIAMCINOLONE ACETONIDE 0.1% (KENALOG) 0.1 % OINTMENT    Apply topically 2 (two) times daily.    VALSARTAN (DIOVAN) 160 MG TABLET    Take 1 tablet (160 mg total) by mouth once daily.

## 2024-03-22 NOTE — TELEPHONE ENCOUNTER
Called and spoke with the pt.  She was returning a missed call and there was some confusion.  She said she received her surgery date and she did not have any questions.  She will reach out to the bariatric clinic with any questions and concerned.    Reviewed questions and follow up with patient.

## 2024-03-22 NOTE — TELEPHONE ENCOUNTER
----- Message from Caprice Blevins sent at 3/22/2024  9:51 AM CDT -----  Regarding: Returning missed call  Contact: [phone]  Gm patient is returning a missed called from today.  Please call to further discuss.

## 2024-03-26 ENCOUNTER — PATIENT MESSAGE (OUTPATIENT)
Dept: PSYCHIATRY | Facility: CLINIC | Age: 44
End: 2024-03-26
Payer: MEDICARE

## 2024-03-27 ENCOUNTER — TELEPHONE (OUTPATIENT)
Dept: NEUROLOGY | Facility: CLINIC | Age: 44
End: 2024-03-27
Payer: MEDICARE

## 2024-03-27 NOTE — TELEPHONE ENCOUNTER
----- Message from Jennifer Sheridan RN sent at 3/27/2024 11:07 AM CDT -----    ----- Message -----  From: Hazel Johns  Sent: 3/27/2024  10:12 AM CDT  To: Hardeep GAY Staff    Who Called: [facility] Patient [facility] [place]    What is the request in detail: Requesting call back to speak with Rosa. Please advise    Can the clinic reply by [facility]? No    Best Call Back Number: [phone] option 4    Additional Information:

## 2024-03-27 NOTE — TELEPHONE ENCOUNTER
Spoke with [employer] Medicare representative. He reports pt's out of pocket max for her [facility] Medicare plan is $7,550. He also state pt has [facility] Medicare/Medicaid Dual Plan.

## 2024-04-03 ENCOUNTER — PATIENT MESSAGE (OUTPATIENT)
Dept: BARIATRICS | Facility: CLINIC | Age: 44
End: 2024-04-03
Payer: MEDICARE

## 2024-04-03 ENCOUNTER — TELEPHONE (OUTPATIENT)
Dept: BARIATRICS | Facility: CLINIC | Age: 44
End: 2024-04-03
Payer: MEDICARE

## 2024-04-03 NOTE — TELEPHONE ENCOUNTER
Returned pt's portal message regarding rescheduling preop appt. Pt stated she wanted to change her appt to 4-11-24. Informed pt that Dr. Jc is in the Bariatric clinic only on Wednesday mornings. Understanding stated. All questions and concerns addressed.

## 2024-04-03 NOTE — PROGRESS NOTES
Subjective:          Patient ID: Alicia Soliz is a 44 y.o. female who presents today for a routine virtual visit for MS.  She was last seen in January by Dr. Garrett. The history has been provided by the patient.     The patient location is: her home   The chief complaint leading to consultation is: MS     Visit type: audiovisual    Face to Face time with patient: 10 minutes   15 minutes of total time spent on the encounter, which includes face to face time and non-face to face time preparing to see the patient (eg, review of tests), Obtaining and/or reviewing separately obtained history, Documenting clinical information in the electronic or other health record, Independently interpreting results (not separately reported) and communicating results to the patient/family/caregiver, or Care coordination (not separately reported).     Each patient to whom he or she provides medical services by telemedicine is:  (1) informed of the relationship between the physician and patient and the respective role of any other health care provider with respect to management of the patient; and (2) notified that he or she may decline to receive medical services by telemedicine and may withdraw from such care at any time.    MS HPI:  DMT: Ocrevus--infusion is scheduled tomorrow  Side effects from DMT? No  Taking vitamin D3 as recommended? No  She reports that she had blood in her urine this morning. She reports that she is having pain when urinating also. She is also having frequency.   She denies any other infections.   She has weight loss surgery scheduled on 4/29.   She is trying to stay active and exercise. She denies any falls.   She has a question today about the [facility] blood test drawn last month. I explained that neurofilament light chain is a breakdown product released from injured neurons. Many conditions can cause elevated levels of neurofilament light chain. In multiple sclerosis, active inflammation causes brain and  spinal cord injury and subsequently leads to elevated levels of neurofilament light chain in blood. This is still a relatively new test. We have started collecting levels in our MS patients to establish a baseline. Future levels may help screen for new inflammatory disease activity. Her levels have been in normal range, so I am not concerned about active disease at the moment.     Medications:  Current Outpatient Medications   Medication Sig    cholecalciferol, vitamin D3, 1,250 mcg (50,000 unit) capsule Take 1 capsule (50,000 Units total) by mouth every 7 days. for 365 doses    cyclobenzaprine (FLEXERIL) 10 MG tablet Take 10 mg by mouth every 8 (eight) hours.    diclofenac sodium (VOLTAREN) 1 % Gel Apply 2 g topically 4 (four) times daily.    doxepin (SINEQUAN) 75 MG capsule Take 1 capsule (75 mg total) by mouth every evening.    gabapentin (NEURONTIN) 300 MG capsule Take 3 capsules (900 mg total) by mouth 2 (two) times daily.    HYDROcodone-acetaminophen (NORCO)  mg per tablet Take 1 tablet by mouth 3 (three) times daily.    linaCLOtide (LINZESS) 145 mcg Cap capsule Take 1 capsule (145 mcg total) by mouth before breakfast.    nystatin (MYCOSTATIN) cream Apply topically 2 (two) times daily.    OCREVUS 30 mg/mL Soln     topiramate (TOPAMAX) 50 MG tablet Take 1 tablet (50 mg total) by mouth 3 (three) times daily.    valsartan (DIOVAN) 80 MG tablet Take 1 tablet (80 mg total) by mouth once daily.       SOCIAL HISTORY  Social History     Tobacco Use    Smoking status: Never     Passive exposure: Never    Smokeless tobacco: Never   Substance Use Topics    Alcohol use: Yes     Comment: once a year     Drug use: No       Living arrangements - the patient lives with their family.             Objective:        1. 25 foot timed walk:      11/3/2020    12:00 AM 6/16/2023    12:01 AM   Timed 25 Foot Walk:   Did patient wear an AFO? No No   Was assistive device used? Yes Yes   Assistive device used (milotn one): Unilateral  Assistance Bilateral Assistance   Unilateral device used Cane    Bilateral device used  Walker/Rollator   Time for 25 Foot Walk (seconds) 38.83 48.4   Time for 25 Foot Walk (seconds) 46.5 30.03       Neurologic Exam    Deferred   Imaging:       Results for orders placed during the hospital encounter of 02/24/24    MRI Brain Demyelinating W W/O Contrast    Impression  Similar appearance of demyelinating plaques within both cerebral hemispheres, the brainstem and posterior fossa as compared to the prior MRI 08/05/2022.  No MR evidence new or active demyelination as compared to the prior.    No acute intracranial abnormality.      Electronically signed by: Rico Pelaez  Date:    02/25/2024  Time:    08:37    Results for orders placed during the hospital encounter of 02/24/24    MRI Cervical Spine Demyelinating W W/O Contrast    Impression  No significant cervical cord signal abnormality to suggest demyelinating sequelae.  No abnormal cervical cord enhancement.    Minor degenerative changes of the cervical spine without significant spinal canal stenosis or neural foraminal stenosis.      Electronically signed by: Rico Pelaez  Date:    02/25/2024  Time:    08:45      Labs:     Lab Results   Component Value Date    IHIMKAZN63PQ 52 03/13/2024    [id number] 125 (H) 09/07/2023    [id number] 29 (L) 07/12/2023       Lab Results   Component Value Date    WBC 6.35 03/13/2024    RBC 5.49 (H) 03/13/2024    HGB 11.7 (L) 03/13/2024    HCT 39.0 03/13/2024    MCV 71 (L) 03/13/2024    MCH 21.3 (L) 03/13/2024    MCHC 30.0 (L) 03/13/2024    RDW 17.2 (H) 03/13/2024     03/13/2024    MPV 10.8 03/13/2024    GRAN 2.6 03/13/2024    GRAN 41.5 03/13/2024    LYMPH 3.1 03/13/2024    LYMPH 48.2 (H) 03/13/2024    MONO 0.5 03/13/2024    MONO 7.7 03/13/2024    EOS 0.1 03/13/2024    BASO 0.04 03/13/2024    EOSINOPHIL 1.7 03/13/2024    BASOPHIL 0.6 03/13/2024     Sodium   Date Value Ref Range Status   03/13/2024 144 136 - 145 mmol/L  Final     Potassium   Date Value Ref Range Status   03/13/2024 4.3 3.5 - 5.1 mmol/L Final     Chloride   Date Value Ref Range Status   03/13/2024 110 95 - 110 mmol/L Final     CO2   Date Value Ref Range Status   03/13/2024 25 23 - 29 mmol/L Final     Glucose   Date Value Ref Range Status   03/13/2024 86 70 - 110 mg/dL Final     BUN   Date Value Ref Range Status   03/13/2024 7 6 - 20 mg/dL Final     Creatinine   Date Value Ref Range Status   03/13/2024 0.8 0.5 - 1.4 mg/dL Final     Calcium   Date Value Ref Range Status   03/13/2024 9.8 8.7 - 10.5 mg/dL Final     Total Protein   Date Value Ref Range Status   03/13/2024 7.7 6.0 - 8.4 g/dL Final     Albumin   Date Value Ref Range Status   03/13/2024 3.7 3.5 - 5.2 g/dL Final     Total Bilirubin   Date Value Ref Range Status   03/13/2024 0.4 0.1 - 1.0 mg/dL Final     Comment:     For infants and newborns, interpretation of results should be based  on gestational age, weight and in agreement with clinical  observations.    Premature Infant recommended reference ranges:  Up to 24 hours.............<8.0 mg/dL  Up to 48 hours............<12.0 mg/dL  3-5 days..................<15.0 mg/dL  6-29 days.................<15.0 mg/dL       Alkaline Phosphatase   Date Value Ref Range Status   03/13/2024 96 55 - 135 U/L Final     AST   Date Value Ref Range Status   03/13/2024 14 10 - 40 U/L Final     ALT   Date Value Ref Range Status   03/13/2024 10 10 - 44 U/L Final     Anion Gap   Date Value Ref Range Status   03/13/2024 9 8 - 16 mmol/L Final     eGFR if    Date Value Ref Range Status   05/16/2022 >60 >60 mL/min/1.73 m^2 Final     eGFR    Date Value Ref Range Status   02/08/2024 109 mL/min/1.73mSq Final     Comment:     In accordance with NKF-ASN Task Force recommendation, calculation based on the Chronic Kidney Disease Epidemiology Collaboration (CKD-EPI) equation without adjustment for race. eGFR adjusted for gender and age and calculated in  ml/min/1.73mSquared. eGFR cannot be calculated if patient is under 18 years of age.     Reference Range:   >= 60 ml/min/1.73mSquared.     eGFR if non    Date Value Ref Range Status   05/16/2022 >60 >60 mL/min/1.73 m^2 Final     Comment:     Calculation used to obtain the estimated glomerular filtration  rate (eGFR) is the CKD-EPI equation.        Lab Results   Component Value Date    HEPBSAG Non-reactive 03/13/2024    HEPBSAB Positive 06/06/2022    HEPBCAB Non-reactive 03/13/2024           MS Impression and Plan:     NEURO MULTIPLE SCLEROSIS IMPRESSION:   Clinical Progression:  Clinically Stable  DMT:  No change in management  DMT comment:  Continue Ocrevus. Her infusion is scheduled for tomorrow, and safety labs have been reviewed; however, she has blood in her urine today, as well as urinary frequency and pain when urinating. We will check STAT UA and cancel infusion tomorrow if indicated. She has weight loss surgery coming up on 4/29, so ideally, we would like for her infusion to be done at least 2 weeks before the surgery.   Symptom Management:  No change in symptom management          HAMIDA Vizcarra, CNS    Problem List Items Addressed This Visit    None  Visit Diagnoses       Hematuria, unspecified type    -  Primary    Relevant Orders    Urinalysis, Reflex to Urine Culture Urine, Clean Catch

## 2024-04-04 ENCOUNTER — TELEPHONE (OUTPATIENT)
Dept: NEUROLOGY | Facility: CLINIC | Age: 44
End: 2024-04-04
Payer: MEDICARE

## 2024-04-04 ENCOUNTER — OFFICE VISIT (OUTPATIENT)
Dept: NEUROLOGY | Facility: CLINIC | Age: 44
End: 2024-04-04
Payer: MEDICARE

## 2024-04-04 ENCOUNTER — LAB VISIT (OUTPATIENT)
Dept: LAB | Facility: HOSPITAL | Age: 44
End: 2024-04-04
Attending: SURGERY
Payer: MEDICARE

## 2024-04-04 DIAGNOSIS — R31.9 HEMATURIA, UNSPECIFIED TYPE: ICD-10-CM

## 2024-04-04 DIAGNOSIS — G35 MULTIPLE SCLEROSIS: Primary | ICD-10-CM

## 2024-04-04 DIAGNOSIS — Z79.899 POLYPHARMACY: ICD-10-CM

## 2024-04-04 DIAGNOSIS — Z79.899 HIGH RISK MEDICATION USE: ICD-10-CM

## 2024-04-04 DIAGNOSIS — Z79.899 LONG-TERM USE OF HIGH-RISK MEDICATION: ICD-10-CM

## 2024-04-04 DIAGNOSIS — Z71.89 COUNSELING REGARDING GOALS OF CARE: ICD-10-CM

## 2024-04-04 DIAGNOSIS — Z29.89 PROPHYLACTIC IMMUNOTHERAPY: ICD-10-CM

## 2024-04-04 DIAGNOSIS — K21.9 GASTROESOPHAGEAL REFLUX DISEASE WITHOUT ESOPHAGITIS: ICD-10-CM

## 2024-04-04 LAB
25(OH)D3+25(OH)D2 SERPL-MCNC: 47 NG/ML (ref 30–96)
ALBUMIN SERPL BCP-MCNC: 3.7 G/DL (ref 3.5–5.2)
ALP SERPL-CCNC: 101 U/L (ref 55–135)
ALT SERPL W/O P-5'-P-CCNC: 13 U/L (ref 10–44)
ANION GAP SERPL CALC-SCNC: 10 MMOL/L (ref 8–16)
AST SERPL-CCNC: 29 U/L (ref 10–40)
BASOPHILS # BLD AUTO: 0.03 K/UL (ref 0–0.2)
BASOPHILS NFR BLD: 0.5 % (ref 0–1.9)
BILIRUB SERPL-MCNC: 0.3 MG/DL (ref 0.1–1)
BUN SERPL-MCNC: 7 MG/DL (ref 6–20)
CALCIUM SERPL-MCNC: 9.8 MG/DL (ref 8.7–10.5)
CHLORIDE SERPL-SCNC: 107 MMOL/L (ref 95–110)
CHOLEST SERPL-MCNC: 183 MG/DL (ref 120–199)
CHOLEST/HDLC SERPL: 3 {RATIO} (ref 2–5)
CO2 SERPL-SCNC: 24 MMOL/L (ref 23–29)
CREAT SERPL-MCNC: 0.8 MG/DL (ref 0.5–1.4)
DIFFERENTIAL METHOD BLD: ABNORMAL
EOSINOPHIL # BLD AUTO: 0.1 K/UL (ref 0–0.5)
EOSINOPHIL NFR BLD: 1.6 % (ref 0–8)
ERYTHROCYTE [DISTWIDTH] IN BLOOD BY AUTOMATED COUNT: 17.2 % (ref 11.5–14.5)
EST. GFR  (NO RACE VARIABLE): >60 ML/MIN/1.73 M^2
GLUCOSE SERPL-MCNC: 95 MG/DL (ref 70–110)
HCT VFR BLD AUTO: 38.4 % (ref 37–48.5)
HDLC SERPL-MCNC: 61 MG/DL (ref 40–75)
HDLC SERPL: 33.3 % (ref 20–50)
HGB BLD-MCNC: 11.4 G/DL (ref 12–16)
IMM GRANULOCYTES # BLD AUTO: 0.01 K/UL (ref 0–0.04)
IMM GRANULOCYTES NFR BLD AUTO: 0.2 % (ref 0–0.5)
IRON SERPL-MCNC: 49 UG/DL (ref 30–160)
LDLC SERPL CALC-MCNC: 103.6 MG/DL (ref 63–159)
LYMPHOCYTES # BLD AUTO: 2.2 K/UL (ref 1–4.8)
LYMPHOCYTES NFR BLD: 35.4 % (ref 18–48)
MCH RBC QN AUTO: 21.1 PG (ref 27–31)
MCHC RBC AUTO-ENTMCNC: 29.7 G/DL (ref 32–36)
MCV RBC AUTO: 71 FL (ref 82–98)
MONOCYTES # BLD AUTO: 0.5 K/UL (ref 0.3–1)
MONOCYTES NFR BLD: 7.5 % (ref 4–15)
NEUTROPHILS # BLD AUTO: 3.4 K/UL (ref 1.8–7.7)
NEUTROPHILS NFR BLD: 54.8 % (ref 38–73)
NONHDLC SERPL-MCNC: 122 MG/DL
NRBC BLD-RTO: 0 /100 WBC
PLATELET # BLD AUTO: 311 K/UL (ref 150–450)
PMV BLD AUTO: 10.3 FL (ref 9.2–12.9)
POTASSIUM SERPL-SCNC: 3.7 MMOL/L (ref 3.5–5.1)
PROT SERPL-MCNC: 7.7 G/DL (ref 6–8.4)
RBC # BLD AUTO: 5.4 M/UL (ref 4–5.4)
SATURATED IRON: 12 % (ref 20–50)
SODIUM SERPL-SCNC: 141 MMOL/L (ref 136–145)
TOTAL IRON BINDING CAPACITY: 426 UG/DL (ref 250–450)
TRANSFERRIN SERPL-MCNC: 288 MG/DL (ref 200–375)
TRIGL SERPL-MCNC: 92 MG/DL (ref 30–150)
VIT B12 SERPL-MCNC: 542 PG/ML (ref 210–950)
WBC # BLD AUTO: 6.15 K/UL (ref 3.9–12.7)

## 2024-04-04 PROCEDURE — 82306 VITAMIN D 25 HYDROXY: CPT | Mod: HCNC | Performed by: NURSE PRACTITIONER

## 2024-04-04 PROCEDURE — 83540 ASSAY OF IRON: CPT | Mod: HCNC | Performed by: NURSE PRACTITIONER

## 2024-04-04 PROCEDURE — 80053 COMPREHEN METABOLIC PANEL: CPT | Mod: HCNC | Performed by: NURSE PRACTITIONER

## 2024-04-04 PROCEDURE — 80061 LIPID PANEL: CPT | Mod: HCNC | Performed by: NURSE PRACTITIONER

## 2024-04-04 PROCEDURE — 4010F ACE/ARB THERAPY RXD/TAKEN: CPT | Mod: HCNC,CPTII,95, | Performed by: CLINICAL NURSE SPECIALIST

## 2024-04-04 PROCEDURE — 85025 COMPLETE CBC W/AUTO DIFF WBC: CPT | Mod: HCNC | Performed by: NURSE PRACTITIONER

## 2024-04-04 PROCEDURE — 82607 VITAMIN B-12: CPT | Mod: HCNC | Performed by: NURSE PRACTITIONER

## 2024-04-04 PROCEDURE — 36415 COLL VENOUS BLD VENIPUNCTURE: CPT | Mod: HCNC,PO | Performed by: SURGERY

## 2024-04-04 PROCEDURE — 84425 ASSAY OF VITAMIN B-1: CPT | Mod: HCNC | Performed by: SURGERY

## 2024-04-04 PROCEDURE — 1159F MED LIST DOCD IN RCRD: CPT | Mod: HCNC,CPTII,95, | Performed by: CLINICAL NURSE SPECIALIST

## 2024-04-04 PROCEDURE — 99212 OFFICE O/P EST SF 10 MIN: CPT | Mod: HCNC,95,, | Performed by: CLINICAL NURSE SPECIALIST

## 2024-04-04 PROCEDURE — 3044F HG A1C LEVEL LT 7.0%: CPT | Mod: HCNC,CPTII,95, | Performed by: CLINICAL NURSE SPECIALIST

## 2024-04-05 ENCOUNTER — PATIENT MESSAGE (OUTPATIENT)
Dept: NEUROLOGY | Facility: CLINIC | Age: 44
End: 2024-04-05
Payer: MEDICARE

## 2024-04-05 DIAGNOSIS — R31.9 URINARY TRACT INFECTION WITH HEMATURIA, SITE UNSPECIFIED: Primary | ICD-10-CM

## 2024-04-05 DIAGNOSIS — N39.0 URINARY TRACT INFECTION WITH HEMATURIA, SITE UNSPECIFIED: Primary | ICD-10-CM

## 2024-04-05 RX ORDER — NITROFURANTOIN 25; 75 MG/1; MG/1
100 CAPSULE ORAL 2 TIMES DAILY
Qty: 14 CAPSULE | Refills: 0 | Status: SHIPPED | OUTPATIENT
Start: 2024-04-05 | End: 2024-04-07

## 2024-04-07 ENCOUNTER — PATIENT MESSAGE (OUTPATIENT)
Dept: NEUROLOGY | Facility: CLINIC | Age: 44
End: 2024-04-07
Payer: MEDICARE

## 2024-04-07 DIAGNOSIS — N39.0 URINARY TRACT INFECTION WITHOUT HEMATURIA, SITE UNSPECIFIED: Primary | ICD-10-CM

## 2024-04-07 RX ORDER — CIPROFLOXACIN 500 MG/1
500 TABLET ORAL EVERY 12 HOURS
Qty: 14 TABLET | Refills: 0 | Status: SHIPPED | OUTPATIENT
Start: 2024-04-07 | End: 2024-04-14

## 2024-04-08 ENCOUNTER — TELEPHONE (OUTPATIENT)
Dept: INFUSION THERAPY | Facility: HOSPITAL | Age: 44
End: 2024-04-08
Payer: MEDICARE

## 2024-04-09 ENCOUNTER — PATIENT MESSAGE (OUTPATIENT)
Dept: BARIATRICS | Facility: CLINIC | Age: 44
End: 2024-04-09
Payer: MEDICARE

## 2024-04-09 LAB
VIT B1 BLD-MCNC: 41 UG/L (ref 38–122)
VIT B1 BLD-MCNC: 41 UG/L (ref 38–122)

## 2024-04-10 ENCOUNTER — PATIENT MESSAGE (OUTPATIENT)
Dept: NEUROLOGY | Facility: CLINIC | Age: 44
End: 2024-04-10
Payer: MEDICARE

## 2024-04-10 ENCOUNTER — TELEPHONE (OUTPATIENT)
Dept: BARIATRICS | Facility: CLINIC | Age: 44
End: 2024-04-10
Payer: MEDICARE

## 2024-04-10 ENCOUNTER — PATIENT MESSAGE (OUTPATIENT)
Dept: BARIATRICS | Facility: CLINIC | Age: 44
End: 2024-04-10

## 2024-04-10 ENCOUNTER — OFFICE VISIT (OUTPATIENT)
Dept: BARIATRICS | Facility: CLINIC | Age: 44
End: 2024-04-10
Payer: MEDICARE

## 2024-04-10 VITALS
WEIGHT: 279.75 LBS | BODY MASS INDEX: 45.16 KG/M2 | SYSTOLIC BLOOD PRESSURE: 146 MMHG | DIASTOLIC BLOOD PRESSURE: 85 MMHG | HEART RATE: 96 BPM | OXYGEN SATURATION: 99 %

## 2024-04-10 DIAGNOSIS — I10 ESSENTIAL HYPERTENSION: ICD-10-CM

## 2024-04-10 DIAGNOSIS — K76.0 FATTY LIVER: ICD-10-CM

## 2024-04-10 DIAGNOSIS — E78.2 MIXED HYPERLIPIDEMIA: ICD-10-CM

## 2024-04-10 DIAGNOSIS — E66.01 MORBID OBESITY WITH BMI OF 45.0-49.9, ADULT: Primary | ICD-10-CM

## 2024-04-10 DIAGNOSIS — K21.9 GASTROESOPHAGEAL REFLUX DISEASE WITHOUT ESOPHAGITIS: ICD-10-CM

## 2024-04-10 DIAGNOSIS — D50.9 IRON DEFICIENCY ANEMIA, UNSPECIFIED IRON DEFICIENCY ANEMIA TYPE: ICD-10-CM

## 2024-04-10 DIAGNOSIS — N39.0 URINARY TRACT INFECTION WITHOUT HEMATURIA, SITE UNSPECIFIED: Primary | ICD-10-CM

## 2024-04-10 DIAGNOSIS — F32.A ANXIETY AND DEPRESSION: ICD-10-CM

## 2024-04-10 DIAGNOSIS — G35 MULTIPLE SCLEROSIS, RELAPSING-REMITTING: ICD-10-CM

## 2024-04-10 DIAGNOSIS — F41.9 ANXIETY AND DEPRESSION: ICD-10-CM

## 2024-04-10 DIAGNOSIS — G81.94 HEMIPLEGIA AFFECTING LEFT NONDOMINANT SIDE, UNSPECIFIED ETIOLOGY, UNSPECIFIED HEMIPLEGIA TYPE: ICD-10-CM

## 2024-04-10 DIAGNOSIS — Z98.84 S/P GASTRIC BYPASS: ICD-10-CM

## 2024-04-10 DIAGNOSIS — Z79.899 HIGH RISK MEDICATION USE: ICD-10-CM

## 2024-04-10 PROBLEM — N76.0 BACTERIAL VAGINOSIS: Status: RESOLVED | Noted: 2024-04-10 | Resolved: 2024-04-10

## 2024-04-10 PROBLEM — S83.412A SPRAIN OF MEDIAL COLLATERAL LIGAMENT OF LEFT KNEE: Status: RESOLVED | Noted: 2023-10-13 | Resolved: 2024-04-10

## 2024-04-10 PROBLEM — M25.562 CHRONIC PAIN OF LEFT KNEE: Status: RESOLVED | Noted: 2023-10-13 | Resolved: 2024-04-10

## 2024-04-10 PROBLEM — H60.90 OTITIS EXTERNA: Status: RESOLVED | Noted: 2024-04-10 | Resolved: 2024-04-10

## 2024-04-10 PROBLEM — R73.9 HYPERGLYCEMIA: Status: RESOLVED | Noted: 2021-09-26 | Resolved: 2024-04-10

## 2024-04-10 PROBLEM — R29.898 WEAKNESS OF LEFT LOWER EXTREMITY: Status: ACTIVE | Noted: 2017-04-05

## 2024-04-10 PROBLEM — G47.19 EXCESSIVE DAYTIME SLEEPINESS: Status: ACTIVE | Noted: 2017-06-26

## 2024-04-10 PROBLEM — R26.89 ABNORMALITY OF GAIT DUE TO IMPAIRMENT OF BALANCE: Status: ACTIVE | Noted: 2017-04-05

## 2024-04-10 PROBLEM — G47.19 EXCESSIVE DAYTIME SLEEPINESS: Status: RESOLVED | Noted: 2017-06-26 | Resolved: 2024-04-10

## 2024-04-10 PROBLEM — G89.4 CHRONIC PAIN SYNDROME: Status: ACTIVE | Noted: 2017-08-16

## 2024-04-10 PROBLEM — B96.89 BACTERIAL VAGINOSIS: Status: RESOLVED | Noted: 2024-04-10 | Resolved: 2024-04-10

## 2024-04-10 PROBLEM — G89.29 CHRONIC PAIN OF LEFT KNEE: Status: RESOLVED | Noted: 2023-10-13 | Resolved: 2024-04-10

## 2024-04-10 PROBLEM — M25.512 ACUTE PAIN OF LEFT SHOULDER: Status: ACTIVE | Noted: 2024-02-08

## 2024-04-10 PROBLEM — Z90.3 H/O GASTRIC SLEEVE: Status: RESOLVED | Noted: 2023-09-25 | Resolved: 2024-04-10

## 2024-04-10 PROBLEM — G47.00 INSOMNIA: Status: ACTIVE | Noted: 2023-08-24

## 2024-04-10 PROCEDURE — 3044F HG A1C LEVEL LT 7.0%: CPT | Mod: HCNC,CPTII,S$GLB, | Performed by: SURGERY

## 2024-04-10 PROCEDURE — 99215 OFFICE O/P EST HI 40 MIN: CPT | Mod: HCNC,S$GLB,, | Performed by: SURGERY

## 2024-04-10 PROCEDURE — 1160F RVW MEDS BY RX/DR IN RCRD: CPT | Mod: HCNC,CPTII,S$GLB, | Performed by: SURGERY

## 2024-04-10 PROCEDURE — 3077F SYST BP >= 140 MM HG: CPT | Mod: HCNC,CPTII,S$GLB, | Performed by: SURGERY

## 2024-04-10 PROCEDURE — 99999 PR PBB SHADOW E&M-EST. PATIENT-LVL III: CPT | Mod: PBBFAC,HCNC,, | Performed by: SURGERY

## 2024-04-10 PROCEDURE — 3079F DIAST BP 80-89 MM HG: CPT | Mod: HCNC,CPTII,S$GLB, | Performed by: SURGERY

## 2024-04-10 PROCEDURE — 1159F MED LIST DOCD IN RCRD: CPT | Mod: HCNC,CPTII,S$GLB, | Performed by: SURGERY

## 2024-04-10 PROCEDURE — 3008F BODY MASS INDEX DOCD: CPT | Mod: HCNC,CPTII,S$GLB, | Performed by: SURGERY

## 2024-04-10 PROCEDURE — 4010F ACE/ARB THERAPY RXD/TAKEN: CPT | Mod: HCNC,CPTII,S$GLB, | Performed by: SURGERY

## 2024-04-10 RX ORDER — DULOXETIN HYDROCHLORIDE 60 MG/1
60 CAPSULE, DELAYED RELEASE ORAL DAILY PRN
COMMUNITY
Start: 2024-02-25 | End: 2024-05-31

## 2024-04-10 RX ORDER — AMLODIPINE BESYLATE 10 MG/1
10 TABLET ORAL DAILY
COMMUNITY
Start: 2024-02-09

## 2024-04-10 NOTE — TELEPHONE ENCOUNTER
----- Message from Mary Wilcox sent at 4/10/2024  8:59 AM CDT -----  Regarding: appt  Contact: [phone]  Pt asking to have today's appt r/s due to weather. Pt asking to be called back ASAP due to she is driving but wants to turn around if she can r/s. Pls call to discuss.

## 2024-04-10 NOTE — TELEPHONE ENCOUNTER
[employer] message sent to HAMIDA Proctor  inquiring about rescheduling pt's MS injection, postponing sx, and/or whether pt needs injection at this time per Dr. Jc's request.  Rec'd message from HAMIDA Proctor stating pt's infusion will be pushed back to mid-May. Dr. Jc notified. Pt called, no answer LVM w/detailed message. Portal message to be sent.

## 2024-04-10 NOTE — TELEPHONE ENCOUNTER
Spoke to patient who stated that she has almost reached the hospital. She previously messaged that she preferred to be rescheduled due to bad weather, however, she changed her mind. Will advise Clinic nurses that she will be there. Reminded patient to please drive safely.

## 2024-04-10 NOTE — PROGRESS NOTES
History & Physical    SUBJECTIVE:     History of Present Illness:  Alicia Soliz is a 44 year-old morbidly obese female with BMI 45.16 kg/m² and multiple associated comorbidities including HTN, HLD, NAFLD, GERD, OA, LEROY, and prediabetes, who presents for pre-operative exam for weight loss surgery. She has failed multiple attempts at non-surgical methods of weight loss and has completed the [facility] program which she started on 9/7/23. She meets NIH consensus criteria for bariatric surgery and is interested in undergoing laparoscopic conversion from sleeve gastrectomy to alanis-en-y gastric bypass. She underwent robotic sleeve gastrectomy over 48Fr Bougie with staple-line oversew 2/20/20 at which time her BMI was 49.5 kg/m². She lost about 36 lbs after which she plateaued and for a period of time regained up to 290 lbs. Her medical history is significant for multiple sclerosis for which she is on ocrelizumab for which her next dose is currently scheduled for 4/16/24 - this was postponed from last week due to a UTI for which she will complete a course of antibiotics tomorrow, however is now less than 2 weeks prior to her surgery. Her MS is complicated by left-sided hemiplegia for which she is in physical therapy.     Labs 9/7/23: Vit B1 33, Chol 208,   CXR 8/5/22: No acute cardiopulmonary process  EKG 2/25/24: NSR, possible septal infarct age undetermined   UGI 10/5/23: Mildly dilated esophagus with esophageal dysmotility, no stricture, postsurgical change of gastric sleeve, spontaneous gastroesophageal reflux to the level of the proximal esophagus  EGD 1/29/24: Normal esophagus, sleeve gastrectomy, gastritis, normal duodenum  Bravo pH 96 hour 1/29/24: Normal ambulatory esophageal pH study but symptom correlation present with reflux episodes; acid reflux is the cause of the patient's symptoms - although there is normal acid exposure, there is good symptom correlation for chest pain suggestive of  "a hypersensitive esophagus within the NERD spectrum      Clearances:  Psych: Can 10/31/23    Chief Complaint   Patient presents with    Pre-op Exam     Conversion sleeve to bypass 4/29/24     Review of patient's allergies indicates:   Allergen Reactions    Demerol [meperidine] Anaphylaxis and Hives    Dilaudid [hydromorphone (bulk)] Anaphylaxis     "Code Blue" however patient tolerates Lortab    Shellfish containing products Itching, Nausea And Vomiting and Swelling    Prednisone Itching    Tramadol Hives     Current Outpatient Medications   Medication Sig    amLODIPine (NORVASC) 10 MG tablet Take 10 mg by mouth once daily.    cholecalciferol, vitamin D3, 1,250 mcg (50,000 unit) capsule Take 1 capsule (50,000 Units total) by mouth every 7 days. for 365 doses    ciprofloxacin HCl (CIPRO) 500 MG tablet Take 1 tablet (500 mg total) by mouth every 12 (twelve) hours. for 7 days    cyclobenzaprine (FLEXERIL) 10 MG tablet Take 10 mg by mouth every 8 (eight) hours.    diclofenac sodium (VOLTAREN) 1 % Gel Apply 2 g topically 4 (four) times daily.    doxepin (SINEQUAN) 75 MG capsule Take 1 capsule (75 mg total) by mouth every evening.    DULoxetine (CYMBALTA) 60 MG capsule Take 60 mg by mouth once daily.    gabapentin (NEURONTIN) 300 MG capsule Take 3 capsules (900 mg total) by mouth 2 (two) times daily.    HYDROcodone-acetaminophen (NORCO)  mg per tablet Take 1 tablet by mouth 3 (three) times daily.    linaCLOtide (LINZESS) 145 mcg Cap capsule Take 1 capsule (145 mcg total) by mouth before breakfast.    nystatin (MYCOSTATIN) cream Apply topically 2 (two) times daily.    OCREVUS 30 mg/mL Soln     topiramate (TOPAMAX) 50 MG tablet Take 1 tablet (50 mg total) by mouth 3 (three) times daily.    valsartan (DIOVAN) 80 MG tablet Take 1 tablet (80 mg total) by mouth once daily.     No current facility-administered medications for this visit.     Past Medical History:   Diagnosis Date    Anxiety and depression 10/20/2018    " Arthritis     Cardiac arrest as complication of medication     Dilaudid    Chronic pain of left knee 10/13/2023    Encounter for blood transfusion 2004    Excessive daytime sleepiness 06/26/2017    GERD (gastroesophageal reflux disease)     Hemiplegia affecting left nondominant side     Hyperglycemia 09/26/2021    Hypertension     Iron deficiency anemia     Mixed hyperlipidemia 03/18/2014    Mixed hyperlipidemia 03/18/2014    Morbid obesity with BMI of 50.0-59.9, adult 09/20/2018    Multiple sclerosis     Otitis externa 04/10/2024    Prediabetes 02/11/2019    Seizure-like activity 02/25/2024    Sprain of medial collateral ligament of left knee 10/13/2023     Past Surgical History:   Procedure Laterality Date    APPENDECTOMY      CHOLECYSTECTOMY      COLONOSCOPY N/A 11/25/2020    Procedure: COLONOSCOPY;  Surgeon: Andrew Jenikns III, MD;  Location: [facility];  Service: Endoscopy;  Laterality: N/A;    ESOPHAGOGASTRODUODENOSCOPY N/A 02/19/2020    Procedure: EGD (ESOPHAGOGASTRODUODENOSCOPY);  Surgeon: Michael Navarrete MD;  Location: [facility];  Service: Endoscopy;  Laterality: N/A;    ESOPHAGOGASTRODUODENOSCOPY N/A 02/20/2020    Procedure: EGD (ESOPHAGOGASTRODUODENOSCOPY);  Surgeon: Michael Navarrete MD;  Location: [facility] OR;  Service: General;  Laterality: N/A;    ESOPHAGOGASTRODUODENOSCOPY N/A 11/25/2020    Procedure: EGD (ESOPHAGOGASTRODUODENOSCOPY);  Surgeon: Andrew Jenkins III, MD;  Location: [facility];  Service: Endoscopy;  Laterality: N/A;    ESOPHAGOGASTRODUODENOSCOPY N/A 09/25/2023    Procedure: EGD (ESOPHAGOGASTRODUODENOSCOPY);  Surgeon: Ly Kim MD;  Location: [facility];  Service: Endoscopy;  Laterality: N/A;    ESOPHAGOGASTRODUODENOSCOPY N/A 01/29/2024    Procedure: EGD (ESOPHAGOGASTRODUODENOSCOPY);  Surgeon: Ly Kim MD;  Location: [facility];  Service: Endoscopy;  Laterality: N/A;    HYSTERECTOMY      KNEE SURGERY      age 10    PH MONITORING, ESOPHAGUS, WIRELESS, (OFF REFLUX MEDS) N/A  01/29/2024    Procedure: PH MONITORING, ESOPHAGUS, WIRELESS, (OFF REFLUX MEDS);  Surgeon: Ly Kim MD;  Location: [facility] ENDO;  Service: Endoscopy;  Laterality: N/A;    ROBOT-ASSISTED LAPAROSCOPIC SLEEVE GASTRECTOMY USING DA ANTHONY XI N/A 02/20/2020    Procedure: XI ROBOTIC SLEEVE GASTRECTOMY;  Surgeon: Michael Navarrete MD;  Location: [facility] OR;  Service: General;  Laterality: N/A;    TENOPLASTY OF HAND Left 08/26/2021    Procedure: REPAIR, TENDON, HAND;  Surgeon: Joselito Lugo MD;  Location: [facility] OR;  Service: Orthopedics;  Laterality: Left;  Left RCL PIP Joint Repair/Recon with Arthrex Internal Brace.    TONSILLECTOMY      TOTAL REDUCTION MAMMOPLASTY  2022    TUBAL LIGATION       Review of Systems   Constitutional:  Negative for chills, diaphoresis, fever and malaise/fatigue.   HENT:  Negative for congestion, hearing loss and sore throat.    Eyes:  Negative for blurred vision and double vision.   Respiratory:  Negative for cough and shortness of breath.    Cardiovascular:  Positive for leg swelling (left). Negative for chest pain, palpitations, orthopnea, claudication and PND.   Gastrointestinal:  Positive for constipation and heartburn. Negative for abdominal pain, blood in stool, diarrhea, nausea and vomiting.   Genitourinary:  Negative for dysuria and urgency.   Musculoskeletal:  Positive for joint pain. Negative for back pain, myalgias and neck pain.   Skin:  Negative for itching and rash.   Neurological:  Positive for focal weakness and weakness (left hemiplegia). Negative for headaches.   Endo/Heme/Allergies:  Does not bruise/bleed easily.   Psychiatric/Behavioral:  Negative for depression.       OBJECTIVE:     Vitals:    04/10/24 1023   BP: (!) 146/85   Pulse: 96   SpO2: 99%   Weight: 126.9 kg (279 lb 12.2 oz)     Physical Exam  Vitals reviewed.   Constitutional:       General: She is not in acute distress.     Appearance: Normal appearance. She is obese.   HENT:      Head: Normocephalic and  atraumatic.   Eyes:      Conjunctiva/sclera: Conjunctivae normal.   Neck:      Thyroid: No thyromegaly.   Cardiovascular:      Rate and Rhythm: Normal rate and regular rhythm.      Heart sounds: Normal heart sounds.   Pulmonary:      Effort: Pulmonary effort is normal. No respiratory distress.      Breath sounds: Normal breath sounds.   Abdominal:      General: There is no distension.      Palpations: Abdomen is soft.      Tenderness: There is no abdominal tenderness. There is no guarding or rebound.   Musculoskeletal:         General: Normal range of motion.      Cervical back: Normal range of motion and neck supple.      Right lower leg: No edema.      Left lower leg: Edema present.      Comments: seated in scooter throughout encounter   Skin:     General: Skin is warm and dry.      Findings: No rash.   Neurological:      General: No focal deficit present.      Mental Status: She is alert and oriented to person, place, and time.      Gait: Gait is intact.   Psychiatric:         Mood and Affect: Mood and affect normal.         Behavior: Behavior normal.         Cognition and Memory: Memory normal.        Laboratory  CBC: Reviewed  BMP: Reviewed  LFTs: Reviewed  Bariatric Labs: Reviewed    Diagnostic Results:  Labs: Reviewed  ECG: Reviewed  X-Ray: Reviewed  Upper GI: Reviewed  EGD with Bravo: Reviewed      Dietitian: Patient has participated in pre-operative nutritional program with understanding of necessary lifelong dietary changes required with surgery.    Psych: No overt contraindications to bariatric surgery, patient has completed psychological evaluation and is able to give informed consent.    PCP: Medically cleared for surgery.     ASSESSMENT/PLAN:     Morbid obesity with failure of medical conservative therapy.    Co Morbid Conditions:   Patient Active Problem List   Diagnosis    Essential hypertension    Multiple sclerosis, relapsing-remitting    Morbid obesity with BMI of 45.0-49.9, adult    Opioid  dependence, uncomplicated    Hemiplegia affecting left nondominant side    Fatty liver    Localized swelling of left lower extremity    Decreased strength, endurance, and mobility    Left hemiparesis    Primary osteoarthritis of left knee    Weakness of left lower extremity    Gastroesophageal reflux disease without esophagitis    Seizure-like activity    Acute pain of left shoulder    Anxiety and depression    Chronic pain syndrome    Abnormality of gait due to impairment of balance    Insomnia    Mixed hyperlipidemia    Iron deficiency anemia     Patient wishes to undergo laparoscopic conversion from sleeve gastrectomy to alanis-en-y gastric bypass. She is scheduled for 4/29/24. She will start the pre-op liquid diet on Monday 4/15/24. She is currently scheduled for ocrelizumab infusion 13 days pre-op; the recommended interval is minimum 2 weeks pre- and post- operatively. We will reach out to her Neurologist to determine if it is more appropriate to skip this injection and resume in May, or reschedule her surgery for a later date.     The patient was informed that risks of bariatric surgery include, but are not limited to: bleeding, infection, injury to intraabdominal structures such as bowel, stomach, esophagus, liver, and spleen; DVT/PE, cardiopulmonary complications such as MI, stroke or PNA; marginal ulcers which can cause pain, bleed or perforate, stricture requiring dilations, incisional hernia, gallstones, exacerbation of mood disorders, vitamin/mineral deficiencies, dumping syndrome, failure of weight loss or weight regain, possible conversion to an open operation, and anastomotic or staple-line leak which may require surgical or endoscopic interventions, drains, antibiotics and NPO status with IV nutrition. Risks of general anesthesia with endotracheal intubation including but are not limited to heart attack, stroke, inability to wean off ventilator, and neurologic disability. We discussed increased risk of  marginal ulcer in the setting of smoking, NSAID use, and immunosuppression.     We discussed that our goal is to ameliorate her medical problems and not to obtain a specific body mass index. All questions were answered to her satisfaction and she has elected to proceed with surgery. Consent for surgery and blood transfusion were signed. Prescriptions for ondansetron, omeprazole, and hycet were sent to the pharmacy for bedside delivery. Patient discussed perioperative instructions with the Bariatric RN.     Farideh Jc  4/10/2024

## 2024-04-11 ENCOUNTER — PATIENT MESSAGE (OUTPATIENT)
Dept: INTERNAL MEDICINE | Facility: CLINIC | Age: 44
End: 2024-04-11
Payer: MEDICARE

## 2024-04-11 ENCOUNTER — LAB VISIT (OUTPATIENT)
Dept: LAB | Facility: HOSPITAL | Age: 44
End: 2024-04-11
Attending: CLINICAL NURSE SPECIALIST
Payer: MEDICARE

## 2024-04-11 DIAGNOSIS — Z79.899 HIGH RISK MEDICATION USE: ICD-10-CM

## 2024-04-11 PROCEDURE — 36415 COLL VENOUS BLD VENIPUNCTURE: CPT | Mod: HCNC,PO | Performed by: CLINICAL NURSE SPECIALIST

## 2024-04-11 PROCEDURE — 88185 FLOWCYTOMETRY/TC ADD-ON: CPT | Mod: HCNC | Performed by: CLINICAL NURSE SPECIALIST

## 2024-04-11 RX ORDER — PROCHLORPERAZINE EDISYLATE 5 MG/ML
5 INJECTION INTRAMUSCULAR; INTRAVENOUS EVERY 6 HOURS PRN
Status: CANCELLED | OUTPATIENT
Start: 2024-04-11

## 2024-04-11 RX ORDER — ONDANSETRON HYDROCHLORIDE 2 MG/ML
8 INJECTION, SOLUTION INTRAVENOUS EVERY 6 HOURS PRN
Status: CANCELLED | OUTPATIENT
Start: 2024-04-11

## 2024-04-11 RX ORDER — METRONIDAZOLE 500 MG/100ML
500 INJECTION, SOLUTION INTRAVENOUS
Status: CANCELLED | OUTPATIENT
Start: 2024-04-11

## 2024-04-11 RX ORDER — ONDANSETRON 8 MG/1
8 TABLET, ORALLY DISINTEGRATING ORAL EVERY 6 HOURS PRN
Qty: 30 TABLET | Refills: 0 | Status: ON HOLD | OUTPATIENT
Start: 2024-04-11 | End: 2024-05-22

## 2024-04-11 RX ORDER — MUPIROCIN 20 MG/G
OINTMENT TOPICAL
Status: CANCELLED | OUTPATIENT
Start: 2024-04-11

## 2024-04-11 RX ORDER — SODIUM CHLORIDE, SODIUM LACTATE, POTASSIUM CHLORIDE, CALCIUM CHLORIDE 600; 310; 30; 20 MG/100ML; MG/100ML; MG/100ML; MG/100ML
INJECTION, SOLUTION INTRAVENOUS CONTINUOUS
Status: CANCELLED | OUTPATIENT
Start: 2024-04-11

## 2024-04-11 RX ORDER — HEPARIN SODIUM 5000 [USP'U]/ML
5000 INJECTION, SOLUTION INTRAVENOUS; SUBCUTANEOUS ONCE
Status: CANCELLED | OUTPATIENT
Start: 2024-04-11 | End: 2024-04-11

## 2024-04-11 RX ORDER — GABAPENTIN 250 MG/5ML
300 SOLUTION ORAL 3 TIMES DAILY
Status: CANCELLED | OUTPATIENT
Start: 2024-04-11

## 2024-04-11 RX ORDER — OMEPRAZOLE 40 MG/1
40 CAPSULE, DELAYED RELEASE ORAL EVERY MORNING
Qty: 30 CAPSULE | Refills: 2 | Status: SHIPPED | OUTPATIENT
Start: 2024-04-11 | End: 2024-06-03 | Stop reason: SDUPTHER

## 2024-04-11 RX ORDER — DEXTROMETHORPHAN/PSEUDOEPHED 2.5-7.5/.8
40 DROPS ORAL 4 TIMES DAILY PRN
Status: CANCELLED | OUTPATIENT
Start: 2024-04-11

## 2024-04-11 RX ORDER — HYDROCODONE BITARTRATE AND ACETAMINOPHEN 7.5; 325 MG/15ML; MG/15ML
15 SOLUTION ORAL EVERY 6 HOURS PRN
Qty: 118 ML | Refills: 0 | Status: ON HOLD | OUTPATIENT
Start: 2024-04-11 | End: 2024-05-22 | Stop reason: HOSPADM

## 2024-04-11 RX ORDER — HYDROCODONE BITARTRATE AND ACETAMINOPHEN 7.5; 325 MG/15ML; MG/15ML
15 SOLUTION ORAL EVERY 4 HOURS PRN
Status: CANCELLED | OUTPATIENT
Start: 2024-04-12

## 2024-04-11 RX ORDER — FAMOTIDINE 10 MG/ML
20 INJECTION INTRAVENOUS ONCE
Status: CANCELLED | OUTPATIENT
Start: 2024-04-11 | End: 2024-04-11

## 2024-04-11 RX ORDER — ENOXAPARIN SODIUM 100 MG/ML
40 INJECTION SUBCUTANEOUS EVERY 12 HOURS
Status: CANCELLED | OUTPATIENT
Start: 2024-04-11

## 2024-04-11 RX ORDER — ACETAMINOPHEN 650 MG/20.3ML
500 LIQUID ORAL EVERY 8 HOURS
Status: CANCELLED | OUTPATIENT
Start: 2024-04-11 | End: 2024-04-12

## 2024-04-11 RX ORDER — PANTOPRAZOLE SODIUM 40 MG/10ML
40 INJECTION, POWDER, LYOPHILIZED, FOR SOLUTION INTRAVENOUS 2 TIMES DAILY
Status: CANCELLED | OUTPATIENT
Start: 2024-04-11

## 2024-04-11 RX ORDER — LIDOCAINE HYDROCHLORIDE 10 MG/ML
1 INJECTION, SOLUTION EPIDURAL; INFILTRATION; INTRACAUDAL; PERINEURAL ONCE
Status: CANCELLED | OUTPATIENT
Start: 2024-04-11 | End: 2024-04-11

## 2024-04-11 RX ORDER — SODIUM CHLORIDE 9 MG/ML
INJECTION, SOLUTION INTRAVENOUS CONTINUOUS
Status: CANCELLED | OUTPATIENT
Start: 2024-04-11

## 2024-04-11 RX ORDER — HYDROMORPHONE HYDROCHLORIDE 1 MG/ML
0.5 INJECTION, SOLUTION INTRAMUSCULAR; INTRAVENOUS; SUBCUTANEOUS
Status: CANCELLED | OUTPATIENT
Start: 2024-04-11

## 2024-04-13 LAB
PATH REPORT.FINAL DX SPEC: NORMAL
RITUXAN SENSITIVITY (CD20): NORMAL

## 2024-04-15 ENCOUNTER — TELEPHONE (OUTPATIENT)
Dept: BARIATRICS | Facility: CLINIC | Age: 44
End: 2024-04-15
Payer: MEDICARE

## 2024-04-15 ENCOUNTER — PATIENT MESSAGE (OUTPATIENT)
Dept: NEUROLOGY | Facility: CLINIC | Age: 44
End: 2024-04-15
Payer: MEDICARE

## 2024-04-15 NOTE — TELEPHONE ENCOUNTER
Rec'd secure chat message from HAMIDA Proctor regarding rescheduling pt's sx. Blane stated that pt's B cells have repopulated and pt will need her infusion this week. Understanding stated. Pt and Dr. Jc notified. Pt called with potential new sx date. Awaiting confirmation from HAMIDA Proctor on new appt date for infusion. Appropriate parties will be notified of new sx date.   New sx date scheduled 5-20-24. Pt aware is aware.

## 2024-04-16 ENCOUNTER — INFUSION (OUTPATIENT)
Dept: INFUSION THERAPY | Facility: HOSPITAL | Age: 44
End: 2024-04-16
Attending: PSYCHIATRY & NEUROLOGY
Payer: MEDICARE

## 2024-04-16 VITALS
SYSTOLIC BLOOD PRESSURE: 143 MMHG | RESPIRATION RATE: 18 BRPM | OXYGEN SATURATION: 99 % | HEART RATE: 85 BPM | WEIGHT: 281.75 LBS | TEMPERATURE: 98 F | BODY MASS INDEX: 45.28 KG/M2 | HEIGHT: 66 IN | DIASTOLIC BLOOD PRESSURE: 89 MMHG

## 2024-04-16 DIAGNOSIS — G35 MULTIPLE SCLEROSIS, RELAPSING-REMITTING: Primary | ICD-10-CM

## 2024-04-16 PROCEDURE — 96366 THER/PROPH/DIAG IV INF ADDON: CPT | Mod: HCNC

## 2024-04-16 PROCEDURE — 25000003 PHARM REV CODE 250: Mod: HCNC | Performed by: STUDENT IN AN ORGANIZED HEALTH CARE EDUCATION/TRAINING PROGRAM

## 2024-04-16 PROCEDURE — 63600175 PHARM REV CODE 636 W HCPCS: Mod: HCNC | Performed by: STUDENT IN AN ORGANIZED HEALTH CARE EDUCATION/TRAINING PROGRAM

## 2024-04-16 PROCEDURE — 96375 TX/PRO/DX INJ NEW DRUG ADDON: CPT | Mod: HCNC

## 2024-04-16 PROCEDURE — 96365 THER/PROPH/DIAG IV INF INIT: CPT | Mod: HCNC

## 2024-04-16 PROCEDURE — 96367 TX/PROPH/DG ADDL SEQ IV INF: CPT | Mod: HCNC

## 2024-04-16 RX ORDER — HEPARIN 100 UNIT/ML
500 SYRINGE INTRAVENOUS
OUTPATIENT
Start: 2024-08-27

## 2024-04-16 RX ORDER — METHYLPREDNISOLONE SOD SUCC 125 MG
100 VIAL (EA) INJECTION
Status: COMPLETED | OUTPATIENT
Start: 2024-04-16 | End: 2024-04-16

## 2024-04-16 RX ORDER — DIPHENHYDRAMINE HYDROCHLORIDE 50 MG/ML
50 INJECTION INTRAMUSCULAR; INTRAVENOUS
OUTPATIENT
Start: 2024-08-27

## 2024-04-16 RX ORDER — SODIUM CHLORIDE 0.9 % (FLUSH) 0.9 %
10 SYRINGE (ML) INJECTION
OUTPATIENT
Start: 2024-08-27

## 2024-04-16 RX ORDER — SODIUM CHLORIDE 0.9 % (FLUSH) 0.9 %
10 SYRINGE (ML) INJECTION
Status: DISCONTINUED | OUTPATIENT
Start: 2024-04-16 | End: 2024-04-16 | Stop reason: HOSPADM

## 2024-04-16 RX ORDER — FAMOTIDINE 10 MG/ML
20 INJECTION INTRAVENOUS
OUTPATIENT
Start: 2024-08-27

## 2024-04-16 RX ORDER — EPINEPHRINE 0.3 MG/.3ML
0.3 INJECTION SUBCUTANEOUS
OUTPATIENT
Start: 2024-08-27

## 2024-04-16 RX ORDER — FAMOTIDINE 10 MG/ML
20 INJECTION INTRAVENOUS
Status: COMPLETED | OUTPATIENT
Start: 2024-04-16 | End: 2024-04-16

## 2024-04-16 RX ORDER — METHYLPREDNISOLONE SOD SUCC 125 MG
100 VIAL (EA) INJECTION
Start: 2024-08-27 | End: 2024-08-27

## 2024-04-16 RX ORDER — ACETAMINOPHEN 500 MG
1000 TABLET ORAL
OUTPATIENT
Start: 2024-08-27

## 2024-04-16 RX ORDER — ACETAMINOPHEN 500 MG
1000 TABLET ORAL
Status: COMPLETED | OUTPATIENT
Start: 2024-04-16 | End: 2024-04-16

## 2024-04-16 RX ADMIN — FAMOTIDINE 20 MG: 10 INJECTION INTRAVENOUS at 11:04

## 2024-04-16 RX ADMIN — DIPHENHYDRAMINE HYDROCHLORIDE 50 MG: 50 INJECTION, SOLUTION INTRAMUSCULAR; INTRAVENOUS at 10:04

## 2024-04-16 RX ADMIN — ACETAMINOPHEN 1000 MG: 500 TABLET ORAL at 10:04

## 2024-04-16 RX ADMIN — METHYLPREDNISOLONE SODIUM SUCCINATE 100 MG: 125 INJECTION, POWDER, FOR SOLUTION INTRAMUSCULAR; INTRAVENOUS at 11:04

## 2024-04-16 RX ADMIN — OCRELIZUMAB 600 MG: 300 INJECTION INTRAVENOUS at 11:04

## 2024-04-16 NOTE — NURSING
Infusion Ocrevus    Recent labs? Reviewed    Premeds? Tylenol 1000 mg PO  Pepcid 20 mg IVP  Solumedrol 100 mg IVP  Benedryl 50 mg IVPB over 20 minutes    S/S of current or recent infections in the past 14 days? None/Denies    Recent Surgery/invasive procedures? None/Denies     Any recent vaccines ? None/Denies    Ocrevus 600 mg administered IV at a 3 hour 28  minute rate per orders; see MAR and vitals for more  Details.

## 2024-04-16 NOTE — NURSING
Patient denied waiting 1 hour post infusion. Patient observed for 40 minutes post infusion. NAD upon discharge. Instructed patient to go to ED with any s/sx. Patient verbalized understanding and was wheeled by RN out of clinic.

## 2024-04-16 NOTE — NURSING
Reported off to CHIDI Knight RN .  Patient is doing well.  Denies any complaints.  Is awaiting discharge for after one hour post infusion completion.

## 2024-04-17 ENCOUNTER — PATIENT MESSAGE (OUTPATIENT)
Dept: NEUROLOGY | Facility: CLINIC | Age: 44
End: 2024-04-17
Payer: MEDICARE

## 2024-04-19 RX ORDER — GABAPENTIN 300 MG/1
900 CAPSULE ORAL 2 TIMES DAILY
Qty: 540 CAPSULE | Refills: 1 | Status: SHIPPED | OUTPATIENT
Start: 2024-04-19 | End: 2025-04-19

## 2024-04-22 ENCOUNTER — PATIENT MESSAGE (OUTPATIENT)
Dept: NEUROLOGY | Facility: CLINIC | Age: 44
End: 2024-04-22
Payer: MEDICARE

## 2024-04-26 ENCOUNTER — PATIENT MESSAGE (OUTPATIENT)
Dept: NEUROLOGY | Facility: CLINIC | Age: 44
End: 2024-04-26
Payer: MEDICARE

## 2024-04-26 ENCOUNTER — PATIENT MESSAGE (OUTPATIENT)
Dept: INTERNAL MEDICINE | Facility: CLINIC | Age: 44
End: 2024-04-26
Payer: MEDICARE

## 2024-04-26 ENCOUNTER — TELEPHONE (OUTPATIENT)
Dept: PSYCHIATRY | Facility: CLINIC | Age: 44
End: 2024-04-26
Payer: MEDICARE

## 2024-04-26 NOTE — TELEPHONE ENCOUNTER
Called Pt re: letter request for daughter's school. Pt gave permission to contact daughter. Pt's daughter stated that the letter was requested from [facility] Financial Aid office for her financial assistance renewal/application. SW requested information be sent via email, awaiting message.

## 2024-04-30 ENCOUNTER — TELEPHONE (OUTPATIENT)
Dept: INTERNAL MEDICINE | Facility: CLINIC | Age: 44
End: 2024-04-30
Payer: MEDICARE

## 2024-05-06 ENCOUNTER — CLINICAL SUPPORT (OUTPATIENT)
Dept: BARIATRICS | Facility: CLINIC | Age: 44
End: 2024-05-06
Payer: MEDICARE

## 2024-05-06 DIAGNOSIS — E66.01 MORBID OBESITY WITH BMI OF 45.0-49.9, ADULT: ICD-10-CM

## 2024-05-06 DIAGNOSIS — I10 ESSENTIAL HYPERTENSION: Primary | ICD-10-CM

## 2024-05-06 DIAGNOSIS — Z71.3 DIETARY COUNSELING AND SURVEILLANCE: ICD-10-CM

## 2024-05-06 PROCEDURE — 99499 UNLISTED E&M SERVICE: CPT | Mod: HCNC,95,, | Performed by: DIETITIAN, REGISTERED

## 2024-05-06 NOTE — PROGRESS NOTES
Established Patient - Audio Only Telehealth Visit     The patient location is: home ([place])  The chief complaint leading to consultation is: pre op  Visit type: Virtual visit with audio only (telephone)  Total time spent with patient: 15 min       The reason for the audio only service rather than synchronous audio and video virtual visit was related to technical difficulties or patient preference/necessity.     Each patient to whom I provide medical services by telemedicine is:  (1) informed of the relationship between the physician and patient and the respective role of any other health care provider with respect to management of the patient; and (2) notified that they may decline to receive medical services by telemedicine and may withdraw from such care at any time. Patient verbally consented to receive this service via voice-only telephone call.       NUTRITION NOTE    Bariatric Surgeon: Farideh Jc M.D.  Reason for MNT Referral: Pre-op liquid diet and nutrition instructions  Sleeve to Bypass  Date of Surgery 5/20/24    Pre-op liquid diet  Pt using Premier shakes 4/day for preop liquid diet.     Discussion:  -  gms of protein per day  - 600-800 calories per day   - Less than 4gm sugar per shake  - SF, Decaf, non-carbonated Fluids  - No Fruits, juices, yogurt or pudding on liquid diet  - No vitamins for 1 week prior to surgery  - No herbal supplements including green tea and fish oils for 2 weeks prior to surgery    Remind pt per nursing and medical team to inform our department if taking antibiotics within the 30 days post bariatric surgery or it any other surgeries/procedures are scheduled within 30 days after bariatric surgery.    2 week post-op instructions  Reviewed nutritional guidelines for protein and fluid requirements for week 1 and week 2 post-surgery.  Handout provided to log protein and fluid daily.  1 ounce medicine cups provided for patient to measure fluid intake after surgery.    Pt  has the following vitamins and minerals to start taking 1 week after surgery  Multivitamin with 18 mg iron take one tablet or chewable twice a day  B-complex with 50 mg thiamin taken once daily  Calcium citrate 500 mg with vitamin D three times per day  Vitamin B-12 500 mcg  Sublingually daily  Reviewed dosage and timing of vitamin/mineral guidelines.    Reviewed common nutritional concerns and prevention tips after bariatric surgery.  Reminded not to lift anything greater than 10 lbs for 6 week post-surgery  Pt verbalized understanding of information provided with appropriate questions and comments.       SESSION TIME: 15 minutes                          This service was not originating from a related E/M service provided within the previous 7 days nor will  to an E/M service or procedure within the next 24 hours or my soonest available appointment.  Prevailing standard of care was able to be met in this audio-only visit.

## 2024-05-14 ENCOUNTER — PATIENT MESSAGE (OUTPATIENT)
Dept: BARIATRICS | Facility: CLINIC | Age: 44
End: 2024-05-14
Payer: MEDICARE

## 2024-05-16 NOTE — PRE-PROCEDURE INSTRUCTIONS
PreOp Instructions given:   - Verbal medication information (what to hold and what to take)   - NPO guidelines given / Bariatric Sx Dept  - Arrival place directions given; time to be given the day before procedure by the   Surgeon's Office dosc  - Bathing with antibacterial soap   - Don't wear any jewelry or bring any valuables AM of surgery   - No makeup or moisturizer to face   - No perfume/cologne, powder, lotions or aftershave   Pt. verbalized understanding.   Pt denies any h/o Anesthesia/Sedation complications or side effects.  Patient does not know arrival time.  Explained that this information comes from the surgeon's office and if they haven't heard from them by 2 or 3 pm to call the office.  Patient stated an understanding.

## 2024-05-17 ENCOUNTER — TELEPHONE (OUTPATIENT)
Dept: BARIATRICS | Facility: CLINIC | Age: 44
End: 2024-05-17
Payer: MEDICARE

## 2024-05-17 ENCOUNTER — ANESTHESIA EVENT (OUTPATIENT)
Dept: SURGERY | Facility: HOSPITAL | Age: 44
DRG: 620 | End: 2024-05-17
Payer: MEDICARE

## 2024-05-17 NOTE — ANESTHESIA PREPROCEDURE EVALUATION
[facility]  Anesthesia Pre-Operative Evaluation        Patient Name: Alicia Soliz  YOB: 1980  MRN: [id number]    SUBJECTIVE:     Pre-operative Evaluation for Procedure(s) (LRB):  GASTROENTEROSTOMY, LAPAROSCOPIC w/intraop EGD (N/A)     05/17/2024    Alicia Soliz is a 44 y.o. female with a PMHx significant for HTN, HLD, NAFLD, GERD, OA, LEROY, prediabetes, multiple sclerosis (on ocrelizumab) c/b left-sided hemiplegia, morbid obesity (BMI 46), and h/o gastric sleeve (2020).     She now presents for the above procedure(s).    Previous Airway (2/20/20):  Easy mask ventilation, Grade 1 view w/ MAC 3       TTE:  Results for orders placed during the hospital encounter of 02/24/24  Echo Saline Bubble? Yes  Interpretation Summary    Left Ventricle: There is normal systolic function with a visually estimated ejection fraction of 60 - 65%. There is normal diastolic function.    Right Ventricle: Normal right ventricular cavity size. Right ventricle wall motion  is normal. Systolic function is normal.    IVC/SVC: Normal venous pressure at 3 mmHg.    Negative bubble study.      Patient Active Problem List   Diagnosis    Essential hypertension    Multiple sclerosis, relapsing-remitting    Morbid obesity with BMI of 45.0-49.9, adult    Opioid dependence, uncomplicated    Hemiplegia affecting left nondominant side    Fatty liver    Localized swelling of left lower extremity    Decreased strength, endurance, and mobility    Left hemiparesis    Primary osteoarthritis of left knee    Weakness of left lower extremity    Gastroesophageal reflux disease without esophagitis    Seizure-like activity    Acute pain of left shoulder    Anxiety and depression    Chronic pain syndrome    Abnormality of gait due to impairment of balance    Insomnia    Mixed hyperlipidemia    Iron deficiency anemia       Review of patient's allergies indicates:   Allergen Reactions    Demerol [meperidine] Anaphylaxis and  "Hives    Dilaudid [hydromorphone (bulk)] Anaphylaxis     "Code Blue" however patient tolerates Lortab    Shellfish containing products Itching, Nausea And Vomiting and Swelling    Prednisone Itching    Tramadol Hives       Current Outpatient Medications   Medication Instructions    amLODIPine (NORVASC) 10 mg, Oral, Daily    cholecalciferol (vitamin D3) 50,000 Units, Oral, Every 7 days    cyclobenzaprine (FLEXERIL) 10 mg, Oral, Every 8 hours PRN    diclofenac sodium (VOLTAREN) 2 g, Topical (Top), 4 times daily    doxepin (SINEQUAN) 75 mg, Oral, Nightly    DULoxetine (CYMBALTA) 60 mg, Oral, Daily PRN    gabapentin (NEURONTIN) 900 mg, Oral, 2 times daily    hydrocodone-acetaminophen (HYCET) solution 7.5-325 mg/15mL 15 mLs, Oral, Every 6 hours PRN    HYDROcodone-acetaminophen (NORCO)  mg per tablet 1 tablet, Oral, 3 times daily    linaCLOtide (LINZESS) 145 mcg, Oral, Before breakfast    nystatin (MYCOSTATIN) cream Topical (Top), 2 times daily    OCREVUS 30 mg/mL Soln No dose, route, or frequency recorded.    omeprazole (PRILOSEC) 40 mg, Oral, Every morning, Open capsule and take with apple sauce    ondansetron (ZOFRAN-ODT) 8 mg, Oral, Every 6 hours PRN    topiramate (TOPAMAX) 50 mg, Oral, 3 times daily    valsartan (DIOVAN) 80 mg, Oral, Daily       Past Surgical History:   Procedure Laterality Date    APPENDECTOMY      CHOLECYSTECTOMY      COLONOSCOPY N/A 11/25/2020    Procedure: COLONOSCOPY;  Surgeon: Andrew Jenkins III, MD;  Location: [facility] ENDO;  Service: Endoscopy;  Laterality: N/A;    ESOPHAGOGASTRODUODENOSCOPY N/A 02/19/2020    Procedure: EGD (ESOPHAGOGASTRODUODENOSCOPY);  Surgeon: Micheal Navarrete MD;  Location: [facility] ENDO;  Service: Endoscopy;  Laterality: N/A;    ESOPHAGOGASTRODUODENOSCOPY N/A 02/20/2020    Procedure: EGD (ESOPHAGOGASTRODUODENOSCOPY);  Surgeon: Michael Navarrete MD;  Location: [facility] OR;  Service: General;  Laterality: N/A;    ESOPHAGOGASTRODUODENOSCOPY N/A 11/25/2020    Procedure: EGD " (ESOPHAGOGASTRODUODENOSCOPY);  Surgeon: Andrew Jenkins III, MD;  Location: [facility] ENDO;  Service: Endoscopy;  Laterality: N/A;    ESOPHAGOGASTRODUODENOSCOPY N/A 09/25/2023    Procedure: EGD (ESOPHAGOGASTRODUODENOSCOPY);  Surgeon: Ly Kim MD;  Location: [facility] ENDO;  Service: Endoscopy;  Laterality: N/A;    ESOPHAGOGASTRODUODENOSCOPY N/A 01/29/2024    Procedure: EGD (ESOPHAGOGASTRODUODENOSCOPY);  Surgeon: Ly Kim MD;  Location: [facility];  Service: Endoscopy;  Laterality: N/A;    HYSTERECTOMY      KNEE SURGERY      age 10    PH MONITORING, ESOPHAGUS, WIRELESS, (OFF REFLUX MEDS) N/A 01/29/2024    Procedure: PH MONITORING, ESOPHAGUS, WIRELESS, (OFF REFLUX MEDS);  Surgeon: Ly Kim MD;  Location: [facility] ENDO;  Service: Endoscopy;  Laterality: N/A;    ROBOT-ASSISTED LAPAROSCOPIC SLEEVE GASTRECTOMY USING DA ANTHONY XI N/A 02/20/2020    Procedure: XI ROBOTIC SLEEVE GASTRECTOMY;  Surgeon: Michael Navarrete MD;  Location: [facility] OR;  Service: General;  Laterality: N/A;    TENOPLASTY OF HAND Left 08/26/2021    Procedure: REPAIR, TENDON, HAND;  Surgeon: Joselito Lugo MD;  Location: [facility] OR;  Service: Orthopedics;  Laterality: Left;  Left RCL PIP Joint Repair/Recon with Arthrex Internal Brace.    TONSILLECTOMY      TOTAL REDUCTION MAMMOPLASTY  2022    TUBAL LIGATION         Social History     Substance and Sexual Activity   Drug Use No     Alcohol Use: Not At Risk (2/9/2024)    [facility], [facility] Subsidiaries and Affiliates    AUDIT-C     Frequency of Alcohol Consumption: Never     Average Number of Drinks: Patient does not drink     Frequency of Binge Drinking: Never     Tobacco Use: Low Risk  (4/11/2024)    Patient History     Smoking Tobacco Use: Never     Smokeless Tobacco Use: Never     Passive Exposure: Never       OBJECTIVE:     Vital Signs Range  (Last 24H):         Significant Labs    Heme Profile  Lab Results   Component Value Date    WBC 6.15 04/04/2024    HGB 11.4 (L) 04/04/2024    HCT 38.4 04/04/2024     04/04/2024       Coagulation Studies  Lab Results   Component Value Date    LABPROT 10.6 02/25/2024    INR 0.9 02/25/2024    APTT 24.2 02/25/2024       BMP  Lab Results   Component Value Date     04/04/2024    K 3.7 04/04/2024     04/04/2024    CO2 24 04/04/2024    BUN 7 04/04/2024    CREATININE 0.8 04/04/2024    MG 1.8 02/26/2024    PHOS 3.8 02/26/2024       Liver Function Tests  Lab Results   Component Value Date    AST 29 04/04/2024    ALT 13 04/04/2024    ALKPHOS 101 04/04/2024    BILITOT 0.3 04/04/2024    PROT 7.7 04/04/2024    ALBUMIN 3.7 04/04/2024       Lipid Profile  Lab Results   Component Value Date    CHOL 183 04/04/2024    HDL 61 04/04/2024    TRIG 92 04/04/2024       Endocrine Profile  Lab Results   Component Value Date    HGBA1C 5.6 02/01/2024    TSH 1.906 02/25/2024         Cardiac Studies    EKG:   Results for orders placed or performed during the hospital encounter of 02/24/24   EKG 12-lead    Collection Time: 02/25/24 12:54 AM   Result Value Ref Range    QRS Duration 68 ms    OHS QTC Calculation 430 ms    Narrative    Test Reason : R56.9,    Vent. Rate : 083 BPM     Atrial Rate : 083 BPM     P-R Int : 152 ms          QRS Dur : 068 ms      QT Int : 366 ms       P-R-T Axes : 038 020 016 degrees     QTc Int : 430 ms    Normal sinus rhythm  Possible Septal infarct ,age undetermined  Abnormal ECG  Confirmed by ANNITA BLANC MD ([id number]) on 2/25/2024 6:38:33 PM    Referred By: [email or web address]   SELF           Confirmed By:ANNITA BLANC MD       TTE:  Results for orders placed during the hospital encounter of 02/24/24    Echo Saline Bubble? Yes    Interpretation Summary    Left Ventricle: There is normal systolic function with a visually estimated ejection fraction of 60 - 65%. There is normal diastolic function.    Right Ventricle:  Normal right ventricular cavity size. Right ventricle wall motion  is normal. Systolic function is normal.    IVC/SVC: Normal venous pressure at 3 mmHg.    Negative bubble study.          ASSESSMENT/PLAN:       Pre-op Assessment    I have reviewed the Patient Summary Reports.     I have reviewed the Nursing Notes. I have reviewed the NPO Status.   I have reviewed the Medications.     Review of Systems  Anesthesia Hx:  No problems with previous Anesthesia   History of prior surgery of interest to airway management or planning:  Previous anesthesia: General       Airway issues documented on chart review include mask, easy, easy direct laryngoscopy       Denies Personal Hx of Anesthesia complications.                    Social:  Non-Smoker       Hematology/Oncology:    Oncology Normal    -- Anemia:                                  EENT/Dental:  EENT/Dental Normal           Cardiovascular:     Hypertension           hyperlipidemia                             Pulmonary:  Pulmonary Normal                       Renal/:  Renal/ Normal                 Hepatic/GI:     GERD Liver Disease,  Prev gastric sleeve           Musculoskeletal:  Arthritis               Neurological:                            Neuromuscular Disease, Multiple Sclerosis Left-sided hemiplegia   Endocrine:        Morbid Obesity / BMI > 40  Psych:  Psychiatric History anxiety depression                Physical Exam  General: Well nourished, Cooperative and Alert  4/5 strength in left UE and LE   Airway:  Mallampati: III   Mouth Opening: Normal  TM Distance: Normal  Tongue: Large  Neck ROM: Normal ROM    Dental:  Edentulous        Anesthesia Plan  Type of Anesthesia, risks & benefits discussed:    Anesthesia Type: Gen ETT  Intra-op Monitoring Plan: Standard ASA Monitors  Post Op Pain Control Plan: multimodal analgesia and IV/PO Opioids PRN  Induction:  IV  Airway Plan: Video, Post-Induction with ramp  Informed Consent: Informed consent signed with the  Patient and all parties understand the risks and agree with anesthesia plan.  All questions answered. Patient consented to blood products? Yes  ASA Score: 3  Day of Surgery Review of History & Physical: H&P Update referred to the surgeon/provider.    Ready For Surgery From Anesthesia Perspective.     .

## 2024-05-17 NOTE — TELEPHONE ENCOUNTER
Notified patient of arrival time to the [facility] at 0755 with expected surgery start time 0955 on 5-20-24. Instructed patient regarding pre-op instructions including no protein shakes or sugar free clear liquids after midnight but can have a rare sip of water for comfort, showering and preop medications to hold/take per anesthesia/preop. Reminded pt to drink pre-surgery 8 oz water at 0725. Instructed patient on the s/s of dehydration and for patient to call at the first sign of dehydration. Informed patient that someone from bariatrics will call them 1 week post op to review diet/fluid intake and to ensure adequate hydration. Reminded patient not to take antibiotics for 30 days following surgery or schedule elective procedures involving anesthesia/sedation for 30 days following surgery unless checking with the bariatric clinic first. Pt verbalized understanding. Pt given office phone number for any additional questions/concerns.

## 2024-05-20 ENCOUNTER — ANESTHESIA (OUTPATIENT)
Dept: SURGERY | Facility: HOSPITAL | Age: 44
DRG: 620 | End: 2024-05-20
Payer: MEDICARE

## 2024-05-20 ENCOUNTER — HOSPITAL ENCOUNTER (INPATIENT)
Facility: HOSPITAL | Age: 44
LOS: 2 days | Discharge: HOME | DRG: 620 | End: 2024-05-22
Attending: SURGERY | Admitting: SURGERY
Payer: MEDICARE

## 2024-05-20 DIAGNOSIS — E78.2 MIXED HYPERLIPIDEMIA: ICD-10-CM

## 2024-05-20 DIAGNOSIS — F41.9 ANXIETY AND DEPRESSION: ICD-10-CM

## 2024-05-20 DIAGNOSIS — Z98.84 S/P GASTRIC BYPASS: ICD-10-CM

## 2024-05-20 DIAGNOSIS — G35 MULTIPLE SCLEROSIS, RELAPSING-REMITTING: ICD-10-CM

## 2024-05-20 DIAGNOSIS — K21.9 GASTROESOPHAGEAL REFLUX DISEASE WITHOUT ESOPHAGITIS: ICD-10-CM

## 2024-05-20 DIAGNOSIS — K76.0 FATTY LIVER: ICD-10-CM

## 2024-05-20 DIAGNOSIS — G81.94 HEMIPLEGIA AFFECTING LEFT NONDOMINANT SIDE, UNSPECIFIED ETIOLOGY, UNSPECIFIED HEMIPLEGIA TYPE: ICD-10-CM

## 2024-05-20 DIAGNOSIS — F32.A ANXIETY AND DEPRESSION: ICD-10-CM

## 2024-05-20 DIAGNOSIS — D50.9 IRON DEFICIENCY ANEMIA, UNSPECIFIED IRON DEFICIENCY ANEMIA TYPE: ICD-10-CM

## 2024-05-20 DIAGNOSIS — I10 ESSENTIAL HYPERTENSION: ICD-10-CM

## 2024-05-20 DIAGNOSIS — E66.01 MORBID OBESITY WITH BMI OF 45.0-49.9, ADULT: Primary | ICD-10-CM

## 2024-05-20 PROBLEM — E66.9 OBESITY: Status: ACTIVE | Noted: 2024-05-20

## 2024-05-20 LAB
BASOPHILS # BLD AUTO: 0.02 K/UL (ref 0–0.2)
BASOPHILS NFR BLD: 0.2 % (ref 0–1.9)
CREAT SERPL-MCNC: 0.7 MG/DL (ref 0.5–1.4)
DIFFERENTIAL METHOD BLD: ABNORMAL
EOSINOPHIL # BLD AUTO: 0 K/UL (ref 0–0.5)
EOSINOPHIL NFR BLD: 0 % (ref 0–8)
ERYTHROCYTE [DISTWIDTH] IN BLOOD BY AUTOMATED COUNT: 16.1 % (ref 11.5–14.5)
EST. GFR  (NO RACE VARIABLE): >60 ML/MIN/1.73 M^2
HCT VFR BLD AUTO: 36.9 % (ref 37–48.5)
HGB BLD-MCNC: 11 G/DL (ref 12–16)
IMM GRANULOCYTES # BLD AUTO: 0.03 K/UL (ref 0–0.04)
IMM GRANULOCYTES NFR BLD AUTO: 0.4 % (ref 0–0.5)
LYMPHOCYTES # BLD AUTO: 1.2 K/UL (ref 1–4.8)
LYMPHOCYTES NFR BLD: 14.4 % (ref 18–48)
MCH RBC QN AUTO: 21.2 PG (ref 27–31)
MCHC RBC AUTO-ENTMCNC: 29.8 G/DL (ref 32–36)
MCV RBC AUTO: 71 FL (ref 82–98)
MONOCYTES # BLD AUTO: 0.1 K/UL (ref 0.3–1)
MONOCYTES NFR BLD: 1.1 % (ref 4–15)
NEUTROPHILS # BLD AUTO: 6.9 K/UL (ref 1.8–7.7)
NEUTROPHILS NFR BLD: 83.9 % (ref 38–73)
NRBC BLD-RTO: 0 /100 WBC
PLATELET # BLD AUTO: 266 K/UL (ref 150–450)
PMV BLD AUTO: 10.6 FL (ref 9.2–12.9)
RBC # BLD AUTO: 5.19 M/UL (ref 4–5.4)
WBC # BLD AUTO: 8.22 K/UL (ref 3.9–12.7)

## 2024-05-20 PROCEDURE — 25000003 PHARM REV CODE 250: Mod: HCNC

## 2024-05-20 PROCEDURE — 25000003 PHARM REV CODE 250: Mod: HCNC | Performed by: SURGERY

## 2024-05-20 PROCEDURE — C9113 INJ PANTOPRAZOLE SODIUM, VIA: HCPCS | Mod: HCNC | Performed by: SURGERY

## 2024-05-20 PROCEDURE — 99900035 HC TECH TIME PER 15 MIN (STAT): Mod: HCNC

## 2024-05-20 PROCEDURE — 82565 ASSAY OF CREATININE: CPT | Mod: HCNC | Performed by: SURGERY

## 2024-05-20 PROCEDURE — 43644 LAP GASTRIC BYPASS/ROUX-EN-Y: CPT | Mod: HCNC,,, | Performed by: SURGERY

## 2024-05-20 PROCEDURE — 36000710: Mod: HCNC | Performed by: SURGERY

## 2024-05-20 PROCEDURE — 21400001 HC TELEMETRY ROOM: Mod: HCNC

## 2024-05-20 PROCEDURE — 37000008 HC ANESTHESIA 1ST 15 MINUTES: Mod: HCNC | Performed by: SURGERY

## 2024-05-20 PROCEDURE — 71000033 HC RECOVERY, INTIAL HOUR: Mod: HCNC | Performed by: SURGERY

## 2024-05-20 PROCEDURE — 63600175 PHARM REV CODE 636 W HCPCS: Mod: HCNC

## 2024-05-20 PROCEDURE — 94761 N-INVAS EAR/PLS OXIMETRY MLT: CPT | Mod: HCNC

## 2024-05-20 PROCEDURE — 63600175 PHARM REV CODE 636 W HCPCS: Mod: HCNC | Performed by: SURGERY

## 2024-05-20 PROCEDURE — C9113 INJ PANTOPRAZOLE SODIUM, VIA: HCPCS | Mod: HCNC | Performed by: STUDENT IN AN ORGANIZED HEALTH CARE EDUCATION/TRAINING PROGRAM

## 2024-05-20 PROCEDURE — 88309 TISSUE EXAM BY PATHOLOGIST: CPT | Mod: HCNC | Performed by: PATHOLOGY

## 2024-05-20 PROCEDURE — D9220A PRA ANESTHESIA: Mod: AA,P3,HCNC,GC | Performed by: ANESTHESIOLOGY

## 2024-05-20 PROCEDURE — 71000015 HC POSTOP RECOV 1ST HR: Mod: HCNC | Performed by: SURGERY

## 2024-05-20 PROCEDURE — 36000711: Mod: HCNC | Performed by: SURGERY

## 2024-05-20 PROCEDURE — 71000039 HC RECOVERY, EACH ADD'L HOUR: Mod: HCNC | Performed by: SURGERY

## 2024-05-20 PROCEDURE — 0DJ08ZZ INSPECTION OF UPPER INTESTINAL TRACT, VIA NATURAL OR ARTIFICIAL OPENING ENDOSCOPIC: ICD-10-PCS | Performed by: SURGERY

## 2024-05-20 PROCEDURE — 88309 TISSUE EXAM BY PATHOLOGIST: CPT | Mod: 26,HCNC,, | Performed by: PATHOLOGY

## 2024-05-20 PROCEDURE — 63600175 PHARM REV CODE 636 W HCPCS: Mod: JZ,JG,HCNC | Performed by: SURGERY

## 2024-05-20 PROCEDURE — 63600175 PHARM REV CODE 636 W HCPCS: Mod: HCNC | Performed by: STUDENT IN AN ORGANIZED HEALTH CARE EDUCATION/TRAINING PROGRAM

## 2024-05-20 PROCEDURE — 0D164ZA BYPASS STOMACH TO JEJUNUM, PERCUTANEOUS ENDOSCOPIC APPROACH: ICD-10-PCS | Performed by: SURGERY

## 2024-05-20 PROCEDURE — 85025 COMPLETE CBC W/AUTO DIFF WBC: CPT | Mod: HCNC | Performed by: SURGERY

## 2024-05-20 PROCEDURE — 27201423 OPTIME MED/SURG SUP & DEVICES STERILE SUPPLY: Mod: HCNC | Performed by: SURGERY

## 2024-05-20 PROCEDURE — 37000009 HC ANESTHESIA EA ADD 15 MINS: Mod: HCNC | Performed by: SURGERY

## 2024-05-20 RX ORDER — PHENYLEPHRINE HCL IN 0.9% NACL 1 MG/10 ML
SYRINGE (ML) INTRAVENOUS
Status: DISCONTINUED | OUTPATIENT
Start: 2024-05-20 | End: 2024-05-20

## 2024-05-20 RX ORDER — LIDOCAINE HYDROCHLORIDE 20 MG/ML
INJECTION, SOLUTION EPIDURAL; INFILTRATION; INTRACAUDAL; PERINEURAL
Status: DISCONTINUED | OUTPATIENT
Start: 2024-05-20 | End: 2024-05-20

## 2024-05-20 RX ORDER — FENTANYL CITRATE 50 UG/ML
25 INJECTION, SOLUTION INTRAMUSCULAR; INTRAVENOUS EVERY 5 MIN PRN
Status: DISCONTINUED | OUTPATIENT
Start: 2024-05-20 | End: 2024-05-20 | Stop reason: HOSPADM

## 2024-05-20 RX ORDER — ROCURONIUM BROMIDE 10 MG/ML
INJECTION, SOLUTION INTRAVENOUS
Status: DISCONTINUED | OUTPATIENT
Start: 2024-05-20 | End: 2024-05-20

## 2024-05-20 RX ORDER — MORPHINE SULFATE 1 MG/ML
INJECTION INTRAVENOUS
Status: DISPENSED
Start: 2024-05-20 | End: 2024-05-21

## 2024-05-20 RX ORDER — ONDANSETRON HYDROCHLORIDE 2 MG/ML
4 INJECTION, SOLUTION INTRAVENOUS EVERY 6 HOURS
Status: DISCONTINUED | OUTPATIENT
Start: 2024-05-20 | End: 2024-05-20

## 2024-05-20 RX ORDER — LIDOCAINE HYDROCHLORIDE 10 MG/ML
1 INJECTION, SOLUTION EPIDURAL; INFILTRATION; INTRACAUDAL; PERINEURAL ONCE
Status: DISCONTINUED | OUTPATIENT
Start: 2024-05-20 | End: 2024-05-20 | Stop reason: HOSPADM

## 2024-05-20 RX ORDER — LABETALOL HCL 20 MG/4 ML
10 SYRINGE (ML) INTRAVENOUS EVERY 6 HOURS PRN
Status: DISCONTINUED | OUTPATIENT
Start: 2024-05-20 | End: 2024-05-22 | Stop reason: HOSPADM

## 2024-05-20 RX ORDER — HEPARIN SODIUM 5000 [USP'U]/ML
5000 INJECTION, SOLUTION INTRAVENOUS; SUBCUTANEOUS ONCE
Status: COMPLETED | OUTPATIENT
Start: 2024-05-20 | End: 2024-05-20

## 2024-05-20 RX ORDER — ACETAMINOPHEN 650 MG/20.3ML
500 LIQUID ORAL EVERY 8 HOURS
Status: DISPENSED | OUTPATIENT
Start: 2024-05-20 | End: 2024-05-21

## 2024-05-20 RX ORDER — SODIUM CHLORIDE, SODIUM LACTATE, POTASSIUM CHLORIDE, CALCIUM CHLORIDE 600; 310; 30; 20 MG/100ML; MG/100ML; MG/100ML; MG/100ML
INJECTION, SOLUTION INTRAVENOUS CONTINUOUS
Status: DISCONTINUED | OUTPATIENT
Start: 2024-05-20 | End: 2024-05-21

## 2024-05-20 RX ORDER — NALOXONE HCL 0.4 MG/ML
0.02 VIAL (ML) INJECTION
Status: DISCONTINUED | OUTPATIENT
Start: 2024-05-20 | End: 2024-05-22 | Stop reason: HOSPADM

## 2024-05-20 RX ORDER — METRONIDAZOLE 500 MG/100ML
500 INJECTION, SOLUTION INTRAVENOUS
Status: COMPLETED | OUTPATIENT
Start: 2024-05-20 | End: 2024-05-20

## 2024-05-20 RX ORDER — ENOXAPARIN SODIUM 100 MG/ML
40 INJECTION SUBCUTANEOUS EVERY 12 HOURS
Status: DISCONTINUED | OUTPATIENT
Start: 2024-05-20 | End: 2024-05-22 | Stop reason: HOSPADM

## 2024-05-20 RX ORDER — PROPOFOL 10 MG/ML
VIAL (ML) INTRAVENOUS
Status: DISCONTINUED | OUTPATIENT
Start: 2024-05-20 | End: 2024-05-20

## 2024-05-20 RX ORDER — PANTOPRAZOLE SODIUM 40 MG/10ML
40 INJECTION, POWDER, LYOPHILIZED, FOR SOLUTION INTRAVENOUS 2 TIMES DAILY
Status: DISCONTINUED | OUTPATIENT
Start: 2024-05-20 | End: 2024-05-22 | Stop reason: HOSPADM

## 2024-05-20 RX ORDER — PROCHLORPERAZINE EDISYLATE 5 MG/ML
2.5 INJECTION INTRAMUSCULAR; INTRAVENOUS EVERY 6 HOURS PRN
Status: DISCONTINUED | OUTPATIENT
Start: 2024-05-20 | End: 2024-05-20

## 2024-05-20 RX ORDER — ENOXAPARIN SODIUM 100 MG/ML
40 INJECTION SUBCUTANEOUS EVERY 12 HOURS
Status: DISCONTINUED | OUTPATIENT
Start: 2024-05-20 | End: 2024-05-20

## 2024-05-20 RX ORDER — HYDROCODONE BITARTRATE AND ACETAMINOPHEN 7.5; 325 MG/15ML; MG/15ML
15 SOLUTION ORAL EVERY 4 HOURS PRN
Status: DISCONTINUED | OUTPATIENT
Start: 2024-05-20 | End: 2024-05-22 | Stop reason: HOSPADM

## 2024-05-20 RX ORDER — ONDANSETRON HYDROCHLORIDE 2 MG/ML
8 INJECTION, SOLUTION INTRAVENOUS EVERY 6 HOURS PRN
Status: DISCONTINUED | OUTPATIENT
Start: 2024-05-20 | End: 2024-05-20

## 2024-05-20 RX ORDER — PROCHLORPERAZINE EDISYLATE 5 MG/ML
5 INJECTION INTRAMUSCULAR; INTRAVENOUS EVERY 30 MIN PRN
Status: DISCONTINUED | OUTPATIENT
Start: 2024-05-20 | End: 2024-05-20 | Stop reason: HOSPADM

## 2024-05-20 RX ORDER — CEFAZOLIN SODIUM 1 G/3ML
INJECTION, POWDER, FOR SOLUTION INTRAMUSCULAR; INTRAVENOUS
Status: DISCONTINUED | OUTPATIENT
Start: 2024-05-20 | End: 2024-05-20

## 2024-05-20 RX ORDER — ONDANSETRON HYDROCHLORIDE 2 MG/ML
INJECTION, SOLUTION INTRAVENOUS
Status: DISCONTINUED | OUTPATIENT
Start: 2024-05-20 | End: 2024-05-20

## 2024-05-20 RX ORDER — DEXMEDETOMIDINE HYDROCHLORIDE 100 UG/ML
INJECTION, SOLUTION INTRAVENOUS
Status: DISCONTINUED | OUTPATIENT
Start: 2024-05-20 | End: 2024-05-20

## 2024-05-20 RX ORDER — MUPIROCIN 20 MG/G
OINTMENT TOPICAL
Status: DISCONTINUED | OUTPATIENT
Start: 2024-05-20 | End: 2024-05-20

## 2024-05-20 RX ORDER — HYDRALAZINE HYDROCHLORIDE 20 MG/ML
10 INJECTION INTRAMUSCULAR; INTRAVENOUS EVERY 6 HOURS PRN
Status: DISCONTINUED | OUTPATIENT
Start: 2024-05-20 | End: 2024-05-22 | Stop reason: HOSPADM

## 2024-05-20 RX ORDER — FAMOTIDINE 10 MG/ML
20 INJECTION INTRAVENOUS ONCE
Status: COMPLETED | OUTPATIENT
Start: 2024-05-20 | End: 2024-05-20

## 2024-05-20 RX ORDER — FENTANYL CITRATE 50 UG/ML
INJECTION, SOLUTION INTRAMUSCULAR; INTRAVENOUS
Status: DISCONTINUED | OUTPATIENT
Start: 2024-05-20 | End: 2024-05-20

## 2024-05-20 RX ORDER — PANTOPRAZOLE SODIUM 40 MG/10ML
40 INJECTION, POWDER, LYOPHILIZED, FOR SOLUTION INTRAVENOUS DAILY
Status: DISCONTINUED | OUTPATIENT
Start: 2024-05-20 | End: 2024-05-20

## 2024-05-20 RX ORDER — DEXAMETHASONE SODIUM PHOSPHATE 4 MG/ML
INJECTION, SOLUTION INTRA-ARTICULAR; INTRALESIONAL; INTRAMUSCULAR; INTRAVENOUS; SOFT TISSUE
Status: DISCONTINUED | OUTPATIENT
Start: 2024-05-20 | End: 2024-05-20

## 2024-05-20 RX ORDER — PROCHLORPERAZINE EDISYLATE 5 MG/ML
5 INJECTION INTRAMUSCULAR; INTRAVENOUS EVERY 6 HOURS PRN
Status: DISCONTINUED | OUTPATIENT
Start: 2024-05-20 | End: 2024-05-22 | Stop reason: HOSPADM

## 2024-05-20 RX ORDER — ONDANSETRON HYDROCHLORIDE 2 MG/ML
8 INJECTION, SOLUTION INTRAVENOUS EVERY 6 HOURS
Status: DISCONTINUED | OUTPATIENT
Start: 2024-05-21 | End: 2024-05-22

## 2024-05-20 RX ORDER — MIDAZOLAM HYDROCHLORIDE 1 MG/ML
INJECTION INTRAMUSCULAR; INTRAVENOUS
Status: DISCONTINUED | OUTPATIENT
Start: 2024-05-20 | End: 2024-05-20

## 2024-05-20 RX ORDER — SODIUM CHLORIDE, SODIUM LACTATE, POTASSIUM CHLORIDE, CALCIUM CHLORIDE 600; 310; 30; 20 MG/100ML; MG/100ML; MG/100ML; MG/100ML
INJECTION, SOLUTION INTRAVENOUS CONTINUOUS
Status: DISCONTINUED | OUTPATIENT
Start: 2024-05-20 | End: 2024-05-20

## 2024-05-20 RX ORDER — SODIUM CHLORIDE 9 MG/ML
INJECTION, SOLUTION INTRAVENOUS CONTINUOUS
Status: DISCONTINUED | OUTPATIENT
Start: 2024-05-20 | End: 2024-05-20

## 2024-05-20 RX ORDER — GABAPENTIN 250 MG/5ML
300 SOLUTION ORAL 3 TIMES DAILY
Status: DISCONTINUED | OUTPATIENT
Start: 2024-05-20 | End: 2024-05-22 | Stop reason: HOSPADM

## 2024-05-20 RX ORDER — MORPHINE SULFATE 1 MG/ML
INJECTION INTRAVENOUS CONTINUOUS
Status: DISCONTINUED | OUTPATIENT
Start: 2024-05-20 | End: 2024-05-20

## 2024-05-20 RX ORDER — SODIUM CHLORIDE 0.9 % (FLUSH) 0.9 %
10 SYRINGE (ML) INJECTION
Status: DISCONTINUED | OUTPATIENT
Start: 2024-05-20 | End: 2024-05-20 | Stop reason: HOSPADM

## 2024-05-20 RX ORDER — ACETAMINOPHEN 500 MG
1000 TABLET ORAL
Status: COMPLETED | OUTPATIENT
Start: 2024-05-20 | End: 2024-05-20

## 2024-05-20 RX ORDER — BUPIVACAINE HYDROCHLORIDE 2.5 MG/ML
INJECTION, SOLUTION EPIDURAL; INFILTRATION; INTRACAUDAL
Status: DISCONTINUED | OUTPATIENT
Start: 2024-05-20 | End: 2024-05-20

## 2024-05-20 RX ORDER — HYDROMORPHONE HYDROCHLORIDE 1 MG/ML
0.5 INJECTION, SOLUTION INTRAMUSCULAR; INTRAVENOUS; SUBCUTANEOUS
Status: DISCONTINUED | OUTPATIENT
Start: 2024-05-20 | End: 2024-05-20

## 2024-05-20 RX ADMIN — ONDANSETRON 4 MG: 2 INJECTION INTRAMUSCULAR; INTRAVENOUS at 12:05

## 2024-05-20 RX ADMIN — GABAPENTIN 300 MG: 250 SOLUTION ORAL at 09:05

## 2024-05-20 RX ADMIN — FENTANYL CITRATE 100 MCG: 50 INJECTION INTRAMUSCULAR; INTRAVENOUS at 10:05

## 2024-05-20 RX ADMIN — HYDROCODONE BITARTRATE AND ACETAMINOPHEN 15 ML: 7.5; 325 SOLUTION ORAL at 10:05

## 2024-05-20 RX ADMIN — DEXMEDETOMIDINE 8 MCG: 200 INJECTION, SOLUTION INTRAVENOUS at 10:05

## 2024-05-20 RX ADMIN — ACETAMINOPHEN 1000 MG: 500 TABLET ORAL at 08:05

## 2024-05-20 RX ADMIN — MUPIROCIN: 20 OINTMENT TOPICAL at 08:05

## 2024-05-20 RX ADMIN — Medication 50 MCG: at 10:05

## 2024-05-20 RX ADMIN — ROCURONIUM BROMIDE 20 MG: 10 INJECTION, SOLUTION INTRAVENOUS at 11:05

## 2024-05-20 RX ADMIN — FENTANYL CITRATE 50 MCG: 50 INJECTION INTRAMUSCULAR; INTRAVENOUS at 10:05

## 2024-05-20 RX ADMIN — Medication 50 MCG: at 12:05

## 2024-05-20 RX ADMIN — ROCURONIUM BROMIDE 20 MG: 10 INJECTION, SOLUTION INTRAVENOUS at 10:05

## 2024-05-20 RX ADMIN — SUGAMMADEX 200 MG: 100 INJECTION, SOLUTION INTRAVENOUS at 01:05

## 2024-05-20 RX ADMIN — Medication 50 MCG: at 11:05

## 2024-05-20 RX ADMIN — ACETAMINOPHEN 499.51 MG: 650 SOLUTION ORAL at 09:05

## 2024-05-20 RX ADMIN — SODIUM CHLORIDE, POTASSIUM CHLORIDE, SODIUM LACTATE AND CALCIUM CHLORIDE: 600; 310; 30; 20 INJECTION, SOLUTION INTRAVENOUS at 09:05

## 2024-05-20 RX ADMIN — SODIUM CHLORIDE, POTASSIUM CHLORIDE, SODIUM LACTATE AND CALCIUM CHLORIDE: 600; 310; 30; 20 INJECTION, SOLUTION INTRAVENOUS at 01:05

## 2024-05-20 RX ADMIN — DEXMEDETOMIDINE 4 MCG: 200 INJECTION, SOLUTION INTRAVENOUS at 12:05

## 2024-05-20 RX ADMIN — HEPARIN SODIUM 5000 UNITS: 5000 INJECTION INTRAVENOUS; SUBCUTANEOUS at 08:05

## 2024-05-20 RX ADMIN — ONDANSETRON 8 MG: 2 INJECTION INTRAMUSCULAR; INTRAVENOUS at 11:05

## 2024-05-20 RX ADMIN — LIDOCAINE HYDROCHLORIDE 100 MG: 20 INJECTION, SOLUTION EPIDURAL; INFILTRATION; INTRACAUDAL; PERINEURAL at 10:05

## 2024-05-20 RX ADMIN — FAMOTIDINE 20 MG: 10 INJECTION, SOLUTION INTRAVENOUS at 08:05

## 2024-05-20 RX ADMIN — HYDRALAZINE HYDROCHLORIDE 10 MG: 20 INJECTION, SOLUTION INTRAMUSCULAR; INTRAVENOUS at 09:05

## 2024-05-20 RX ADMIN — ONDANSETRON 4 MG: 2 INJECTION INTRAMUSCULAR; INTRAVENOUS at 05:05

## 2024-05-20 RX ADMIN — DEXAMETHASONE SODIUM PHOSPHATE 4 MG: 4 INJECTION INTRA-ARTICULAR; INTRALESIONAL; INTRAMUSCULAR; INTRAVENOUS; SOFT TISSUE at 10:05

## 2024-05-20 RX ADMIN — FENTANYL CITRATE 25 MCG: 50 INJECTION INTRAMUSCULAR; INTRAVENOUS at 12:05

## 2024-05-20 RX ADMIN — MIDAZOLAM 2 MG: 1 INJECTION INTRAMUSCULAR; INTRAVENOUS at 10:05

## 2024-05-20 RX ADMIN — SODIUM CHLORIDE: 0.9 INJECTION, SOLUTION INTRAVENOUS at 10:05

## 2024-05-20 RX ADMIN — ROCURONIUM BROMIDE 20 MG: 10 INJECTION, SOLUTION INTRAVENOUS at 12:05

## 2024-05-20 RX ADMIN — ENOXAPARIN SODIUM 40 MG: 40 INJECTION SUBCUTANEOUS at 09:05

## 2024-05-20 RX ADMIN — PROCHLORPERAZINE EDISYLATE 5 MG: 5 INJECTION INTRAMUSCULAR; INTRAVENOUS at 03:05

## 2024-05-20 RX ADMIN — FENTANYL CITRATE 25 MCG: 50 INJECTION INTRAMUSCULAR; INTRAVENOUS at 11:05

## 2024-05-20 RX ADMIN — DEXMEDETOMIDINE 4 MCG: 200 INJECTION, SOLUTION INTRAVENOUS at 11:05

## 2024-05-20 RX ADMIN — ROCURONIUM BROMIDE 50 MG: 10 INJECTION, SOLUTION INTRAVENOUS at 10:05

## 2024-05-20 RX ADMIN — METRONIDAZOLE 500 MG: 5 INJECTION, SOLUTION INTRAVENOUS at 10:05

## 2024-05-20 RX ADMIN — PROPOFOL 180 MG: 10 INJECTION, EMULSION INTRAVENOUS at 10:05

## 2024-05-20 RX ADMIN — PANTOPRAZOLE SODIUM 40 MG: 40 INJECTION, POWDER, LYOPHILIZED, FOR SOLUTION INTRAVENOUS at 09:05

## 2024-05-20 RX ADMIN — PANTOPRAZOLE SODIUM 40 MG: 40 INJECTION, POWDER, FOR SOLUTION INTRAVENOUS at 03:05

## 2024-05-20 RX ADMIN — CEFAZOLIN 3 G: 330 INJECTION, POWDER, FOR SOLUTION INTRAMUSCULAR; INTRAVENOUS at 10:05

## 2024-05-20 NOTE — BRIEF OP NOTE
[facility] - Surgery ([facility])  Brief Operative Note    SUMMARY     Surgery Date: 5/20/2024     Surgeons and Role:     * Farideh Vazquez MD - Primary     * Suzanne Regan MD - Resident - Chief    Assisting Surgeon: None    Pre-op Diagnosis:  Gastroesophageal reflux disease without esophagitis [K21.9]  Polypharmacy [Z79.899]  Long-term use of high-risk medication [Z79.899]    Post-op Diagnosis:  Post-Op Diagnosis Codes:     * Gastroesophageal reflux disease without esophagitis [K21.9]     * Polypharmacy [Z79.899]     * Long-term use of high-risk medication [Z79.899]    Procedure(s) (LRB):  GASTROENTEROSTOMY, LAPAROSCOPIC w/intraop EGD (N/A)    Anesthesia: General    Implants:  * No implants in log *    Operative Findings: Laparoscopic conversion of sleeve gastrectomy to RNY gastric bypass. Negative leak test. No apparent complication.    Estimated Blood Loss: 2mL    Estimated Blood Loss has been documented.         Specimens:   Specimen (24h ago, onward)       Start     Ordered    05/20/24 1320  Specimen to Pathology, Surgery General Surgery  Once        Comments: Pre-op Diagnosis: Gastroesophageal reflux disease without esophagitis [K21.9]Polypharmacy [Z79.899]Long-term use of high-risk medication [Z79.899]Procedure(s):GASTROENTEROSTOMY, LAPAROSCOPIC w/intraop EGD Number of specimens: 1Name of specimens: 1. Wedge of stomach-permanent     References:    Click here for ordering Quick Tip   Question Answer Comment   Procedure Type: General Surgery    Specimen Class: Routine/Screening    Release to patient Immediate        05/20/24 1319                    [id number]    Suzanne Regan MD  Pager: [phone]  General Surgery PGY-5  [facility]

## 2024-05-20 NOTE — PLAN OF CARE
Pt arrived to room  via bed with morphine PCA pump  Pt rates pain as tolerable . Pt denies nausea. Pt denies numbness and tinglng. . Pt dressing clean , dry and intact . Pt oriented to room  And call system . Will continue to monitor .

## 2024-05-20 NOTE — NURSING TRANSFER
Nursing Transfer Note      5/20/2024   4:19 PM    Nurse giving handoff: Juanita CORDERO PACU  Nurse receiving handoff:Amanda ROSSI    Reason patient is being transferred: MD order    Transfer To: [id number]    Transfer via bed      Transported by PCT    Order for Tele Monitor? No      Medicines sent: LR IVF and Morphine PCA with ETCO2       Patient belongings transferred with patient: No patient states  has all the belongings.     Chart send with patient: Yes    Notified: spouse    Patient reassessed at: 1530 (date, time)  1  Upon arrival to floor: patient oriented to room, call bell in reach, and bed in lowest position

## 2024-05-20 NOTE — NURSING
Nurses Note -- 4 Eyes      5/20/2024   6:43 PM      Skin assessed during: Admit      [x] No Altered Skin Integrity Present    []Prevention Measures Documented      [] Yes- Altered Skin Integrity Present or Discovered   [] LDA Added if Not in Epic (Describe Wound)   [] New Altered Skin Integrity was Present on Admit and Documented in LDA   [] Wound Image Taken    Wound Care Consulted? No    Attending Nurse:   Anne almendarez    Second RN/Staff Member: bipin POZO

## 2024-05-20 NOTE — ANESTHESIA POSTPROCEDURE EVALUATION
Anesthesia Post Evaluation    Patient: Alicia Soliz    Procedure(s) Performed: Procedure(s) (LRB):  GASTROENTEROSTOMY, LAPAROSCOPIC w/intraop EGD (N/A)    Final Anesthesia Type: general      Patient location during evaluation: PACU  Patient participation: Yes- Able to Participate  Level of consciousness: awake and alert and oriented  Post-procedure vital signs: reviewed and stable  Pain management: adequate  Airway patency: patent    PONV status at discharge: No PONV  Anesthetic complications: no      Cardiovascular status: hemodynamically stable  Respiratory status: unassisted, spontaneous ventilation and room air  Hydration status: euvolemic  Follow-up not needed.              Vitals Value Taken Time   /75 05/20/24 1532   Temp 36.6 °C (97.8 °F) 05/20/24 1348   Pulse 76 05/20/24 1534   Resp 17 05/20/24 1534   SpO2 100 % 05/20/24 1534   Vitals shown include unfiled device data.      Event Time   Out of Recovery 15:15:00         Pain/Alban Score: Pain Rating Prior to Med Admin: 0 (5/20/2024  1:57 PM)  Pain Rating Post Med Admin: 0 (5/20/2024  1:48 PM)  Alban Score: 9 (5/20/2024  3:15 PM)

## 2024-05-20 NOTE — TRANSFER OF CARE
Anesthesia Transfer of Care Note    Patient: Alicia Soliz    Procedure(s) Performed: Procedure(s) (LRB):  GASTROENTEROSTOMY, LAPAROSCOPIC w/intraop EGD (N/A)    Patient location: PACU    Anesthesia Type: general    Transport from OR: Transported from OR on 6-10 L/min O2 by face mask with adequate spontaneous ventilation    Post pain: adequate analgesia    Post assessment: no apparent anesthetic complications    Post vital signs: stable    Level of consciousness: awake    Nausea/Vomiting: no nausea/vomiting    Complications: none    Transfer of care protocol was followed      Last vitals: Visit Vitals  BP (!) 170/97 (BP Location: Left forearm, Patient Position: Sitting)   Pulse 80   Temp 36.7 °C (98.1 °F) (Temporal)   Resp 20   SpO2 100%

## 2024-05-20 NOTE — H&P
History & Physical    SUBJECTIVE:     History of Present Illness:  Alicia Soliz is a 44 year-old morbidly obese female with BMI 45.16 kg/m² and multiple associated comorbidities including HTN, HLD, NAFLD, GERD, OA, LEROY, and prediabetes, who presents for pre-operative exam for weight loss surgery. She has failed multiple attempts at non-surgical methods of weight loss and has completed the [facility] program which she started on 9/7/23. She meets NIH consensus criteria for bariatric surgery and is interested in undergoing laparoscopic conversion from sleeve gastrectomy to alanis-en-y gastric bypass. She underwent robotic sleeve gastrectomy over 48Fr Bougie with staple-line oversew 2/20/20 at which time her BMI was 49.5 kg/m². She lost about 36 lbs after which she plateaued and for a period of time regained up to 290 lbs. Her medical history is significant for multiple sclerosis for which she is on ocrelizumab for which her next dose is currently scheduled for 4/16/24 - this was postponed from last week due to a UTI for which she will complete a course of antibiotics tomorrow, however is now less than 2 weeks prior to her surgery. Her MS is complicated by left-sided hemiplegia for which she is in physical therapy.     Labs 9/7/23: Vit B1 33, Chol 208,   CXR 8/5/22: No acute cardiopulmonary process  EKG 2/25/24: NSR, possible septal infarct age undetermined   UGI 10/5/23: Mildly dilated esophagus with esophageal dysmotility, no stricture, postsurgical change of gastric sleeve, spontaneous gastroesophageal reflux to the level of the proximal esophagus  EGD 1/29/24: Normal esophagus, sleeve gastrectomy, gastritis, normal duodenum  Bravo pH 96 hour 1/29/24: Normal ambulatory esophageal pH study but symptom correlation present with reflux episodes; acid reflux is the cause of the patient's symptoms - although there is normal acid exposure, there is good symptom correlation for chest pain suggestive of  "a hypersensitive esophagus within the NERD spectrum      Clearances:  Psych: Can 10/31/23    Interval history:  Patient presents to pre-op for laparoscopic conversion of sleeve gastrectomy to RNY bypass. She denies changes to her health since we last saw her in clinic. No questions or concerns today.    No chief complaint on file.    Review of patient's allergies indicates:   Allergen Reactions    Demerol [meperidine] Anaphylaxis and Hives    Dilaudid [hydromorphone (bulk)] Anaphylaxis     "Code Blue" however patient tolerates Lortab    Shellfish containing products Itching, Nausea And Vomiting and Swelling    Prednisone Itching    Tramadol Hives     Current Facility-Administered Medications   Medication    0.9%  NaCl infusion    ceFAZolin (ANCEF) 3 gram in dextrose 5% IVPB    LIDOcaine (PF) 10 mg/ml (1%) injection 10 mg    metronidazole IVPB 500 mg    mupirocin 2 % ointment     Past Medical History:   Diagnosis Date    Anxiety and depression 10/20/2018    Arthritis     Cardiac arrest as complication of medication     Dilaudid    Chronic pain of left knee 10/13/2023    Encounter for blood transfusion 2004    Excessive daytime sleepiness 06/26/2017    GERD (gastroesophageal reflux disease)     Hemiplegia affecting left nondominant side     Hyperglycemia 09/26/2021    Hypertension     Iron deficiency anemia     Mixed hyperlipidemia 03/18/2014    Mixed hyperlipidemia 03/18/2014    Morbid obesity with BMI of 50.0-59.9, adult 09/20/2018    Multiple sclerosis     Otitis externa 04/10/2024    Prediabetes 02/11/2019    Seizure-like activity 02/25/2024    Sprain of medial collateral ligament of left knee 10/13/2023     Past Surgical History:   Procedure Laterality Date    APPENDECTOMY      CHOLECYSTECTOMY      COLONOSCOPY N/A 11/25/2020    Procedure: COLONOSCOPY;  Surgeon: Andrew Jenkins III, MD;  Location: [facility];  Service: Endoscopy;  Laterality: N/A;    ESOPHAGOGASTRODUODENOSCOPY N/A 02/19/2020    Procedure: EGD " (ESOPHAGOGASTRODUODENOSCOPY);  Surgeon: Michael Navarrete MD;  Location: [facility];  Service: Endoscopy;  Laterality: N/A;    ESOPHAGOGASTRODUODENOSCOPY N/A 02/20/2020    Procedure: EGD (ESOPHAGOGASTRODUODENOSCOPY);  Surgeon: Michael Navarrete MD;  Location: [facility];  Service: General;  Laterality: N/A;    ESOPHAGOGASTRODUODENOSCOPY N/A 11/25/2020    Procedure: EGD (ESOPHAGOGASTRODUODENOSCOPY);  Surgeon: Andrew Jenikns III, MD;  Location: [facility];  Service: Endoscopy;  Laterality: N/A;    ESOPHAGOGASTRODUODENOSCOPY N/A 09/25/2023    Procedure: EGD (ESOPHAGOGASTRODUODENOSCOPY);  Surgeon: Ly Kim MD;  Location: [facility];  Service: Endoscopy;  Laterality: N/A;    ESOPHAGOGASTRODUODENOSCOPY N/A 01/29/2024    Procedure: EGD (ESOPHAGOGASTRODUODENOSCOPY);  Surgeon: Ly Kim MD;  Location: [facility];  Service: Endoscopy;  Laterality: N/A;    HYSTERECTOMY      KNEE SURGERY      age 10    PH MONITORING, ESOPHAGUS, WIRELESS, (OFF REFLUX MEDS) N/A 01/29/2024    Procedure: PH MONITORING, ESOPHAGUS, WIRELESS, (OFF REFLUX MEDS);  Surgeon: Ly Kim MD;  Location: [facility];  Service: Endoscopy;  Laterality: N/A;    ROBOT-ASSISTED LAPAROSCOPIC SLEEVE GASTRECTOMY USING DA ANTHONY XI N/A 02/20/2020    Procedure: XI ROBOTIC SLEEVE GASTRECTOMY;  Surgeon: Michael Navarrete MD;  Location: [facility];  Service: General;  Laterality: N/A;    TENOPLASTY OF HAND Left 08/26/2021    Procedure: REPAIR, TENDON, HAND;  Surgeon: Joselito Lugo MD;  Location: [facility];  Service: Orthopedics;  Laterality: Left;  Left RCL PIP Joint Repair/Recon with Arthrex Internal Brace.    TONSILLECTOMY      TOTAL REDUCTION MAMMOPLASTY  2022    TUBAL LIGATION       Review of Systems   Constitutional:  Negative for chills, diaphoresis, fever and malaise/fatigue.   HENT:  Negative for congestion, hearing loss and sore throat.    Eyes:  Negative for blurred vision and double vision.   Respiratory:  Negative for cough and shortness of breath.     Cardiovascular:  Positive for leg swelling (left). Negative for chest pain, palpitations, orthopnea, claudication and PND.   Gastrointestinal:  Positive for constipation and heartburn. Negative for abdominal pain, blood in stool, diarrhea, nausea and vomiting.   Genitourinary:  Negative for dysuria and urgency.   Musculoskeletal:  Positive for joint pain. Negative for back pain, myalgias and neck pain.   Skin:  Negative for itching and rash.   Neurological:  Positive for focal weakness and weakness (left hemiplegia). Negative for headaches.   Endo/Heme/Allergies:  Does not bruise/bleed easily.   Psychiatric/Behavioral:  Negative for depression.       OBJECTIVE:     Vitals:    05/20/24 0747   BP: (!) 170/97   Pulse: 80   Resp: 20   Temp: 98.1 °F (36.7 °C)   TempSrc: Temporal   SpO2: 100%     Physical Exam  Vitals reviewed.   Constitutional:       General: She is not in acute distress.     Appearance: Normal appearance. She is obese.   HENT:      Head: Normocephalic and atraumatic.   Eyes:      Conjunctiva/sclera: Conjunctivae normal.   Neck:      Thyroid: No thyromegaly.   Cardiovascular:      Rate and Rhythm: Normal rate and regular rhythm.      Heart sounds: Normal heart sounds.   Pulmonary:      Effort: Pulmonary effort is normal. No respiratory distress.      Breath sounds: Normal breath sounds.   Abdominal:      General: There is no distension.      Palpations: Abdomen is soft.      Tenderness: There is no abdominal tenderness. There is no guarding or rebound.   Musculoskeletal:         General: Normal range of motion.      Cervical back: Normal range of motion and neck supple.      Right lower leg: No edema.      Left lower leg: Edema present.      Comments: seated in scooter throughout encounter   Skin:     General: Skin is warm and dry.      Findings: No rash.   Neurological:      General: No focal deficit present.      Mental Status: She is alert and oriented to person, place, and time.      Gait: Gait is  intact.   Psychiatric:         Mood and Affect: Mood and affect normal.         Behavior: Behavior normal.         Cognition and Memory: Memory normal.        Laboratory  CBC: Reviewed  BMP: Reviewed  LFTs: Reviewed  Bariatric Labs: Reviewed    Diagnostic Results:  Labs: Reviewed  ECG: Reviewed  X-Ray: Reviewed  Upper GI: Reviewed  EGD with Bravo: Reviewed      Dietitian: Patient has participated in pre-operative nutritional program with understanding of necessary lifelong dietary changes required with surgery.    Psych: No overt contraindications to bariatric surgery, patient has completed psychological evaluation and is able to give informed consent.    PCP: Medically cleared for surgery.     ASSESSMENT/PLAN:     Morbid obesity with failure of medical conservative therapy.    Co Morbid Conditions:   Patient Active Problem List   Diagnosis    Essential hypertension    Multiple sclerosis, relapsing-remitting    Morbid obesity with BMI of 45.0-49.9, adult    Opioid dependence, uncomplicated    Hemiplegia affecting left nondominant side    Fatty liver    Localized swelling of left lower extremity    Decreased strength, endurance, and mobility    Left hemiparesis    Primary osteoarthritis of left knee    Weakness of left lower extremity    Gastroesophageal reflux disease without esophagitis    Seizure-like activity    Acute pain of left shoulder    Anxiety and depression    Chronic pain syndrome    Abnormality of gait due to impairment of balance    Insomnia    Mixed hyperlipidemia    Iron deficiency anemia     Patient wishes to undergo laparoscopic conversion from sleeve gastrectomy to alanis-en-y gastric bypass.     - to OR for laparoscopic conversion sleeve to bypass     Suzanne Regan  5/20/2024

## 2024-05-20 NOTE — ANESTHESIA PROCEDURE NOTES
Intubation    Date/Time: 5/20/2024 10:11 AM    Performed by: Jj Doyle MD  Authorized by: Mat Santillan MD    Intubation:     Induction:  Intravenous    Intubated:  Postinduction    Mask Ventilation:  Easy with oral airway    Attempts:  1    Attempted By:  Resident anesthesiologist    Method of Intubation:  Direct    Blade:  Alvarenga 3    Laryngeal View Grade: Grade I - full view of cords      Difficult Airway Encountered?: No      Complications:  None    Airway Device:  Oral endotracheal tube    Airway Device Size:  7.5    Style/Cuff Inflation:  Cuffed (inflated to minimal occlusive pressure)    Tube secured:  22    Secured at:  The teeth    Placement Verified By:  Capnometry    Complicating Factors:  None    Findings Post-Intubation:  Atraumatic/condition of teeth unchanged and BS equal bilateral

## 2024-05-20 NOTE — OP NOTE
DATE OF PROCEDURE: 5/20/2024    PRE OP DIAGNOSIS: Morbid obesity  BMI 45.0 - 49.9 kg/m²  Hypertension  Dyslipidemia  NAFLD  Gastroesophageal reflux disease without esophagitis [K21.9]  Osteoarthritis  Prediabetes  Multiple sclerosis    POST OP DIAGNOSIS: Morbid obesity  BMI 45.0 - 49.9 kg/m²  Hypertension  Dyslipidemia  NAFLD  Gastroesophageal reflux disease without esophagitis [K21.9]  Osteoarthritis  Prediabetes  Multiple sclerosis    PROCEDURE: Procedure(s) (LRB):  GASTROENTEROSTOMY, LAPAROSCOPIC w/intraop EGD (N/A)    Surgeons and Role:     * Farideh Vazquez MD - Primary     * Suzanne Regan MD - Resident - Chief    ANESTHESIA: General    INDICATION: Patient is a 44 year-old morbidly obese female with BMI 45.16 kg/m² and multiple associated comorbidities including HTN, HLD, NAFLD, GERD, OA, LEROY, and prediabetes. She underwent laparoscopic sleeve gastrectomy in 2020 with initial weight loss and subsequent weight recidivism. She meets NIH consensus criteria for bariatric surgery. After discussing the risks, benefits and alternatives of laparoscopic conversion from sleeve gastrectomy to alanis-en-y gastric bypass, she elected to proceed. Informed consent was obtained.     FINDINGS:  Prior sleeve gastrectomy  Antecolic-antegastric alanis-en-y gastric bypass performed with 40-cm BP limb, 150-cm alanis limb.  Gastrojejunostomy performed over 30-Fr Bougie; patency confirmed with intraoperative EGD and negative air leak test.  Closure of jejunojejunostomy mesenteric defect     DESCRIPTION OF OPERATION:  The patient was met in the pre-op area and her identity and consent confirmed. She was then brought to the Operating Room and placed in the supine position. General anesthesia was induced and she was intubated without incident. SCDs and a warming blanket were placed, and pre-op antibiotics were infused within 30 minutes of the incision. The abdomen was prepped and draped in sterile fashion and a pre-procedural  pause performed by all members of the surgical and anesthesia teams. An incision was made approximately 18 cm below the xiphoid to the left of midline after infiltrating with local anesthetic. Using an 11-mm Optiview trocar, intra-abdominal access was obtained under direct visualization. Pneumoperitoneum to 15 mmHg was obtained. Bilateral 5-mm lateral and 5-mm RUQ/LUQ ports and a 12-mm port to the right of midline were placed. A liver retractor was placed through the right lateral trocar. An area on the lesser curve at the second bridging vein was identified and using the Harmonic scalpel, the gastrohepatic ligament was opened and the lesser sac was exposed. We then placed a blue load stapler transversely across the stomach after ensuring there was nothing in the stomach from anesthesia perspective. We fired the stapler transversely across the sleeve until transected, using two blue staple loads, creating the pouch. A small wedge of proximal remnant stomach that appeared ischemic was transected with an additional firing with a blue staple load. This was removed and sent for pathologic review. The transverse colon and omentum were elevated. We found the ligament of Treitz and then ran the small bowel 40 cm and transected the bowel at this point with a white staple load. We then took several centimeters of mesentery with the Harmonic scalpel to allow extra length on our Jovita limb. Once we had completed this, we then ran the distal portion of the small bowel 150 cm. At this level the bowel was elevated and a small enterotomy was made on adjacent antimesenteric sides of the BP limb and common channel. A white load stapler was placed in either limb of the small bowel and fired creating a jejunojejunostomy. The jejunojejunostomy was then elevated and the common enterotomy was closed with a 2-0 Vicryl suture in 2-layer running fashion. We then turned our attention to the creation of the gastrojejunostomy. The proximal jovita  limb was brought up to the pouch ensuring there were no kinks or twists in the mesentery and secured with two 2-0 Ticron stay-sutures. The gastrojejunostomy was then made in a similar fashion to the jejunojejunostomy on the posterior surface of the pouch using a 30-mm firing of the blue staple load. A 30-Fr blunt Bougie was placed through the gastroenterotomy and into the alanis limb. The common enterotomy was closed over this with a 2-0 Vicryl suture in a 2-layer running fashion. The Bougie was removed and an intraoperative EGD performed. The pouch was appropriate size and the scope was able to traverse the anastomosis. The upper abdomen was filled with saline to submerge the gastrojejunostomy and the proximal alanis-limb held closed with the bowel clamp. The bowel was insufflated such that it was distended and air escaped from the patient's mouth. No bubbles were noted. Given the negative leak test, the intraluminal air was evacuated and the intraperitoneal saline aspirated. The jejunojejunostomy mesenteric defect was closed with a running 2-0 Ticron. The liver retractor was removed. The ports were removed under direct visualization, allowing the abdomen to desufflate prior to removal of the camera port. The skin incisions were closed with 4-0 Monocryl and reinforced with Dermabond. The patient was awoken from anesthesia and extubated without complication. She was brought to the PACU in good condition having tolerated the operation well.     EBL: 1 mL  Specimens: Wedge of stomach  Drains: None  Complications: None apparent  Dispo: Surgical floor     Surgical sponge and needle count was correct at the end of the case. I was present and scrubbed for the entirety of the operation.      Farideh Jc  5/20/2024

## 2024-05-21 PROBLEM — Z86.73 HISTORY OF STROKE: Status: ACTIVE | Noted: 2024-05-21

## 2024-05-21 PROBLEM — E66.01 CLASS 3 SEVERE OBESITY DUE TO EXCESS CALORIES WITH BODY MASS INDEX (BMI) OF 45.0 TO 49.9 IN ADULT: Status: ACTIVE | Noted: 2024-05-20

## 2024-05-21 PROBLEM — R11.0 NAUSEA: Status: ACTIVE | Noted: 2024-05-21

## 2024-05-21 PROBLEM — E66.813 CLASS 3 SEVERE OBESITY DUE TO EXCESS CALORIES WITH BODY MASS INDEX (BMI) OF 45.0 TO 49.9 IN ADULT: Status: ACTIVE | Noted: 2024-05-20

## 2024-05-21 LAB
ANION GAP SERPL CALC-SCNC: 12 MMOL/L (ref 8–16)
BASOPHILS # BLD AUTO: 0.01 K/UL (ref 0–0.2)
BASOPHILS NFR BLD: 0.1 % (ref 0–1.9)
BUN SERPL-MCNC: 3 MG/DL (ref 6–20)
CALCIUM SERPL-MCNC: 9.6 MG/DL (ref 8.7–10.5)
CHLORIDE SERPL-SCNC: 105 MMOL/L (ref 95–110)
CO2 SERPL-SCNC: 21 MMOL/L (ref 23–29)
CREAT SERPL-MCNC: 0.8 MG/DL (ref 0.5–1.4)
DIFFERENTIAL METHOD BLD: ABNORMAL
EOSINOPHIL # BLD AUTO: 0 K/UL (ref 0–0.5)
EOSINOPHIL NFR BLD: 0 % (ref 0–8)
ERYTHROCYTE [DISTWIDTH] IN BLOOD BY AUTOMATED COUNT: 16.1 % (ref 11.5–14.5)
EST. GFR  (NO RACE VARIABLE): >60 ML/MIN/1.73 M^2
GLUCOSE SERPL-MCNC: 132 MG/DL (ref 70–110)
HCT VFR BLD AUTO: 39.6 % (ref 37–48.5)
HGB BLD-MCNC: 12.2 G/DL (ref 12–16)
IMM GRANULOCYTES # BLD AUTO: 0.03 K/UL (ref 0–0.04)
IMM GRANULOCYTES NFR BLD AUTO: 0.3 % (ref 0–0.5)
LYMPHOCYTES # BLD AUTO: 2 K/UL (ref 1–4.8)
LYMPHOCYTES NFR BLD: 22.9 % (ref 18–48)
MAGNESIUM SERPL-MCNC: 2 MG/DL (ref 1.6–2.6)
MCH RBC QN AUTO: 21.1 PG (ref 27–31)
MCHC RBC AUTO-ENTMCNC: 30.8 G/DL (ref 32–36)
MCV RBC AUTO: 69 FL (ref 82–98)
MONOCYTES # BLD AUTO: 0.6 K/UL (ref 0.3–1)
MONOCYTES NFR BLD: 6.5 % (ref 4–15)
NEUTROPHILS # BLD AUTO: 6.2 K/UL (ref 1.8–7.7)
NEUTROPHILS NFR BLD: 70.2 % (ref 38–73)
NRBC BLD-RTO: 0 /100 WBC
PHOSPHATE SERPL-MCNC: 3.1 MG/DL (ref 2.7–4.5)
PLATELET # BLD AUTO: 302 K/UL (ref 150–450)
PMV BLD AUTO: 10.8 FL (ref 9.2–12.9)
POTASSIUM SERPL-SCNC: 5 MMOL/L (ref 3.5–5.1)
RBC # BLD AUTO: 5.77 M/UL (ref 4–5.4)
SODIUM SERPL-SCNC: 138 MMOL/L (ref 136–145)
WBC # BLD AUTO: 8.83 K/UL (ref 3.9–12.7)

## 2024-05-21 PROCEDURE — 25000003 PHARM REV CODE 250: Mod: HCNC | Performed by: STUDENT IN AN ORGANIZED HEALTH CARE EDUCATION/TRAINING PROGRAM

## 2024-05-21 PROCEDURE — 85025 COMPLETE CBC W/AUTO DIFF WBC: CPT | Mod: HCNC | Performed by: SURGERY

## 2024-05-21 PROCEDURE — 83735 ASSAY OF MAGNESIUM: CPT | Mod: HCNC | Performed by: SURGERY

## 2024-05-21 PROCEDURE — 63600175 PHARM REV CODE 636 W HCPCS: Mod: HCNC | Performed by: SURGERY

## 2024-05-21 PROCEDURE — 25000003 PHARM REV CODE 250: Mod: HCNC | Performed by: SURGERY

## 2024-05-21 PROCEDURE — 84100 ASSAY OF PHOSPHORUS: CPT | Mod: HCNC | Performed by: SURGERY

## 2024-05-21 PROCEDURE — 21400001 HC TELEMETRY ROOM: Mod: HCNC

## 2024-05-21 PROCEDURE — 36415 COLL VENOUS BLD VENIPUNCTURE: CPT | Mod: HCNC | Performed by: SURGERY

## 2024-05-21 PROCEDURE — C9113 INJ PANTOPRAZOLE SODIUM, VIA: HCPCS | Mod: HCNC | Performed by: SURGERY

## 2024-05-21 PROCEDURE — 63600175 PHARM REV CODE 636 W HCPCS: Mod: HCNC | Performed by: STUDENT IN AN ORGANIZED HEALTH CARE EDUCATION/TRAINING PROGRAM

## 2024-05-21 PROCEDURE — 80048 BASIC METABOLIC PNL TOTAL CA: CPT | Mod: HCNC | Performed by: SURGERY

## 2024-05-21 RX ORDER — SCOLOPAMINE TRANSDERMAL SYSTEM 1 MG/1
1 PATCH, EXTENDED RELEASE TRANSDERMAL
Status: DISCONTINUED | OUTPATIENT
Start: 2024-05-21 | End: 2024-05-22 | Stop reason: HOSPADM

## 2024-05-21 RX ORDER — ACETAMINOPHEN 650 MG/20.3ML
500 LIQUID ORAL EVERY 8 HOURS
Status: DISCONTINUED | OUTPATIENT
Start: 2024-05-21 | End: 2024-05-22 | Stop reason: HOSPADM

## 2024-05-21 RX ADMIN — GABAPENTIN 300 MG: 250 SOLUTION ORAL at 10:05

## 2024-05-21 RX ADMIN — ACETAMINOPHEN 499.51 MG: 650 SOLUTION ORAL at 05:05

## 2024-05-21 RX ADMIN — ONDANSETRON 8 MG: 2 INJECTION INTRAMUSCULAR; INTRAVENOUS at 05:05

## 2024-05-21 RX ADMIN — PANTOPRAZOLE SODIUM 40 MG: 40 INJECTION, POWDER, LYOPHILIZED, FOR SOLUTION INTRAVENOUS at 10:05

## 2024-05-21 RX ADMIN — PROCHLORPERAZINE EDISYLATE 5 MG: 5 INJECTION INTRAMUSCULAR; INTRAVENOUS at 08:05

## 2024-05-21 RX ADMIN — ENOXAPARIN SODIUM 40 MG: 40 INJECTION SUBCUTANEOUS at 08:05

## 2024-05-21 RX ADMIN — GABAPENTIN 300 MG: 250 SOLUTION ORAL at 08:05

## 2024-05-21 RX ADMIN — ENOXAPARIN SODIUM 40 MG: 40 INJECTION SUBCUTANEOUS at 10:05

## 2024-05-21 RX ADMIN — GABAPENTIN 300 MG: 250 SOLUTION ORAL at 03:05

## 2024-05-21 RX ADMIN — HYDROCODONE BITARTRATE AND ACETAMINOPHEN 15 ML: 7.5; 325 SOLUTION ORAL at 04:05

## 2024-05-21 RX ADMIN — SODIUM CHLORIDE, POTASSIUM CHLORIDE, SODIUM LACTATE AND CALCIUM CHLORIDE: 600; 310; 30; 20 INJECTION, SOLUTION INTRAVENOUS at 02:05

## 2024-05-21 RX ADMIN — ACETAMINOPHEN 499.51 MG: 650 SOLUTION ORAL at 10:05

## 2024-05-21 RX ADMIN — ONDANSETRON 8 MG: 2 INJECTION INTRAMUSCULAR; INTRAVENOUS at 12:05

## 2024-05-21 RX ADMIN — ONDANSETRON 8 MG: 2 INJECTION INTRAMUSCULAR; INTRAVENOUS at 11:05

## 2024-05-21 RX ADMIN — HYDROCODONE BITARTRATE AND ACETAMINOPHEN 15 ML: 7.5; 325 SOLUTION ORAL at 05:05

## 2024-05-21 RX ADMIN — PANTOPRAZOLE SODIUM 40 MG: 40 INJECTION, POWDER, LYOPHILIZED, FOR SOLUTION INTRAVENOUS at 12:05

## 2024-05-21 RX ADMIN — HYDROCODONE BITARTRATE AND ACETAMINOPHEN 15 ML: 7.5; 325 SOLUTION ORAL at 10:05

## 2024-05-21 RX ADMIN — SCOPALAMINE 1 PATCH: 1 PATCH, EXTENDED RELEASE TRANSDERMAL at 01:05

## 2024-05-21 NOTE — PLAN OF CARE
Pt VS stable. Surgical sites x5 with dermabond C/D/I. Pt ambulating in hallway and room; tolerating well. PCA d/c per MD order. Water protocol initiated at 0600. Call light within reach. Will continue plan of care     Problem: Adult Inpatient Plan of Care  Goal: Plan of Care Review  Outcome: Progressing  Goal: Patient-Specific Goal (Individualized)  Outcome: Progressing  Goal: Absence of Hospital-Acquired Illness or Injury  Outcome: Progressing  Goal: Optimal Comfort and Wellbeing  Outcome: Progressing  Goal: Readiness for Transition of Care  Outcome: Progressing     Problem: Bariatric Environmental Safety  Goal: Safety Maintained with Care  Outcome: Progressing     Problem: Wound  Goal: Optimal Coping  Outcome: Progressing  Goal: Optimal Functional Ability  Outcome: Progressing  Goal: Absence of Infection Signs and Symptoms  Outcome: Progressing  Goal: Improved Oral Intake  Outcome: Progressing  Goal: Optimal Pain Control and Function  Outcome: Progressing  Goal: Skin Health and Integrity  Outcome: Progressing  Goal: Optimal Wound Healing  Outcome: Progressing

## 2024-05-21 NOTE — NURSING
/98, HR 80, RR 15 on 2L NC. MD Valero available to lay eyes on pt. Pt states no SOB, chest pain, NADN. Pain managed via PCA. PRN hydralazine given and telemetry order placed. Reassessed VS /79, HR 82, RR 17 on 1L NC. Call light within reach. Will continue plan of care

## 2024-05-21 NOTE — PROGRESS NOTES
Progress Note  General Surgery     Admit Date: 5/20/2024  S/P: Procedure(s) (LRB):  GASTROENTEROSTOMY, LAPAROSCOPIC w/intraop EGD (N/A)    Post-operative Day: 1 Day Post-Op    Hospital Day: 2    SUBJECTIVE:     Hypertensive overnight, responded to IV hydralazine. PCA pump discontinued overnight, pain well controlled with oral multimodals. Some persistent abdominal soreness and cramping. Mild nausea and 1 episode of small volume emesis overnight. Patient reports belching, no flatus, no BM at this time. Ambulating to restroom, no difficulties with urination, walker at bedside for assistance. No CP/SOB. No new or worsening headache, blurry vision or dizziness.     OBJECTIVE:     Vital Signs (Most Recent)  Temp: 99 °F (37.2 °C) (05/21/24 0707)  Pulse: 82 (05/21/24 0707)  Resp: 18 (05/21/24 0707)  BP: (!) 146/90 (05/21/24 0707)  SpO2: 99 % (05/21/24 0707)    Vital Signs Range (Last 24H):  Temp:  [97.5 °F (36.4 °C)-99 °F (37.2 °C)]   Pulse:  [70-91]   Resp:  [15-24]   BP: (132-181)/()   SpO2:  [98 %-100 %]     I & O (Last 24H):  Intake/Output Summary (Last 24 hours) at 5/21/2024 0720  Last data filed at 5/20/2024 2330  Gross per 24 hour   Intake 850 ml   Output 800 ml   Net 50 ml     Physical Exam:  General: no distress  Lungs:  normal respiratory effort  Heart: regular rate and rhythm  Abdomen: expected post-operative tenderness. Dermabond in place. Incisions c/d/I.    Laboratory:  CBC:   Recent Labs   Lab 05/21/24 0424   WBC 8.83   RBC 5.77*   HGB 12.2   HCT 39.6      MCV 69*   MCH 21.1*   MCHC 30.8*     CMP:   Recent Labs   Lab 05/21/24 0424   *   CALCIUM 9.6      K 5.0   CO2 21*      BUN 3*   CREATININE 0.8     Mag 2.0   Phos 3.1     Labs within the past 24 hours have been reviewed.      ASSESSMENT/PLAN:     Assessment: 44 yoF s/p lap sleeve converted to Jovita en Y gastric bypass, pod 1.     Plan:   -- anti-emetics for persistent nausea   -- MMPC   -- water challenge today- if  tolerates, transition to bariatric CLD   -- discontinue IVF when tolerating diet   -- encourage ambulation and IS 10x/hr   -- IV protonix   -- Lovenox BID dvt ppx   -- discharge planning in pm      Jeanne Perez MD  [facility]  General Surgery PGY-1

## 2024-05-21 NOTE — PLAN OF CARE
[facility] - Surgery  Initial Discharge Assessment       Primary Care Provider: Braden Dumont MD    Admission Diagnosis: Gastroesophageal reflux disease without esophagitis [K21.9]  Polypharmacy [Z79.899]  Long-term use of high-risk medication [Z79.899]  Morbid obesity with BMI of 45.0-49.9, adult [E66.01, Z68.42]  Obesity [E66.9]    Admission Date: 5/20/2024  Expected Discharge Date: 5/22/2024    Transition of Care Barriers: Bariatric    Payor: [employer] MANAGED MEDICARE / Plan: legalPAD MEDICARE HMO / Product Type: Capitation /     Extended Emergency Contact Information  Primary Emergency Contact: KenanFlo  Address: [address]           [place] [address] [facility]  Home Phone: [phone]  Mobile Phone: [phone]  Relation: Daughter  Secondary Emergency Contact: kenanchuchoeisha   [place]  Mobile Phone: [phone]  Relation: Daughter  Preferred language: English   needed? No    Discharge Plan A: Home with family  Discharge Plan B: Home Health, Home with family      [facility]  [address]  [place] [address]  Phone: [phone] Fax: [phone]    [facility]  [address]  [place] [address]  Phone: [phone] Fax: [phone]    [id number]  [address] [place] [id number] [facility]  [address] [place] [address]  Phone: [phone] Fax: [phone]    [facility] [place], [place] - [address]  [address]  [facility] [address]  Phone: [phone] Fax: [phone]      Initial Assessment (most recent)       Adult Discharge Assessment - 05/21/24 1406          Discharge Assessment    Assessment Type Discharge Planning Assessment     Confirmed/corrected address, phone number and insurance Yes     Confirmed Demographics Correct on Facesheet     Source of  Information patient;family   Boyfriend    Communicated ARACELI with patient/caregiver Yes     Do you expect to return to your current living situation? Yes     Do you have help at home or someone to help you manage your care at home? Yes     Who are your caregiver(s) and their phone number(s)? family     Prior to hospitilization cognitive status: Alert/Oriented     Current cognitive status: Alert/Oriented     Equipment Currently Used at Home walker, rolling     Do you currently have service(s) that help you manage your care at home? No     Do you take prescription medications? Yes     Do you have prescription coverage? Yes     Do you have any problems affording any of your prescribed medications? No     Is the patient taking medications as prescribed? yes     How do you get to doctors appointments? public transportation     Are you on dialysis? No     Do you take coumadin? No     Discharge Plan A Home with family     Discharge Plan B Home Health;Home with family     DME Needed Upon Discharge  none     Discharge Plan discussed with: Patient;Spouse/sig other     Transition of Care Barriers Bariatric                   Patient has multiple family members who can assist in her care post hospitalization. She uses public transportation for MD appointments. Patient boyfriend at bedside and states he will participate in patient care. Patient boyfriend's mother to provide transportation home.

## 2024-05-21 NOTE — CARE UPDATE
I have reviewed the chart of Alicia Soliz and collaborated with Farideh Vazquez* in the care of the patient who is hospitalized for the following:    Active Hospital Problems    Diagnosis    *Class 3 severe obesity due to excess calories with body mass index (BMI) of 45.0 to 49.9 in adult    History of stroke    Left hemiparesis    Chronic pain syndrome     Last Assessment & Plan:    Formatting of this note might be different from the original.   She is reportedly followed in the outpatient setting by pain management.  Continue Norco, baclofen, and gabapentin.      Gastroesophageal reflux disease without esophagitis     Last Assessment & Plan:    Formatting of this note might be different from the original.   History & Physical    Continue PPI   Discharge Summary    Continue PPI      Follow-up continue PPI      Multiple sclerosis, relapsing-remitting    Essential hypertension     Last Assessment & Plan:    Formatting of this note might be different from the original.   History & Physical    Continue home medications and monitor blood pressures.   Discharge Summary    Continue Norvasc and hydrochlorothiazide      Follow-up Stable, patient's BP  Min: 109/75  Max: 142/81 over the past 24 hours. continue Norvasc hydralazine hydrochlorothiazide.  Last Assessment & Plan:    Formatting of this note might be different from the original.   History & Physical    Patient with history of hypertension well controlled on Losartan 80. Pressures 140s/90s in the ED likely elevated 2/2 acute stress and painful headaches. Will continue home meds and continue to monitor.   - Losartan 80 mg    - routine vitals    Discharge Summary    Patient reports a diagnosis of hypertension and being prescribed losartan 80 mg daily in the outpatient setting.  Did not receive losartan during inpatient hospitalization and was mildly hypertensive.  Follow with primary care to restart losartan.   Follow-up     Patient with history of  hypertension well controlled on Losartan 80. Pressures 140s/90s in the ED likely elevated 2/2 acute stress and painful headaches. Pressures have been consistently in 140s/70s-80s since. Stable.  Will continue home meds and continue to monitor.   - Losartan 80 mg    - routine vitals            I have reviewed Alicia Soliz with the multidisciplinary team during discharge huddle.       Steffanie Grimm PA-C  [facility]

## 2024-05-21 NOTE — NURSING
Nurses Note -- 4 Eyes      5/20/2024   11:55 PM      Skin assessed during: Q Shift Change      [x] No Altered Skin Integrity Present    []Prevention Measures Documented      [] Yes- Altered Skin Integrity Present or Discovered   [] LDA Added if Not in Epic (Describe Wound)   [] New Altered Skin Integrity was Present on Admit and Documented in LDA   [] Wound Image Taken    Wound Care Consulted? No    Attending Nurse:  CONSUELO Levy     Second RN/Staff Member:  PARISH Patel

## 2024-05-21 NOTE — NURSING
Nurses Note -- 4 Eyes      5/21/2024   2:45 PM      Skin assessed during: Q Shift Change      [x] No Altered Skin Integrity Present    []Prevention Measures Documented      [] Yes- Altered Skin Integrity Present or Discovered   [] LDA Added if Not in Epic (Describe Wound)   [] New Altered Skin Integrity was Present on Admit and Documented in LDA   [] Wound Image Taken    Wound Care Consulted? No    Attending Nurse:  Amanda Tovar lpn    Second RN/Staff Member:  Rachele Ortega RN

## 2024-05-22 VITALS
TEMPERATURE: 98 F | RESPIRATION RATE: 18 BRPM | DIASTOLIC BLOOD PRESSURE: 55 MMHG | HEART RATE: 60 BPM | SYSTOLIC BLOOD PRESSURE: 105 MMHG | WEIGHT: 293 LBS | HEIGHT: 66 IN | BODY MASS INDEX: 47.09 KG/M2 | OXYGEN SATURATION: 98 %

## 2024-05-22 LAB
ANION GAP SERPL CALC-SCNC: 12 MMOL/L (ref 8–16)
BASOPHILS # BLD AUTO: 0.05 K/UL (ref 0–0.2)
BASOPHILS NFR BLD: 0.7 % (ref 0–1.9)
BUN SERPL-MCNC: <3 MG/DL (ref 6–20)
CALCIUM SERPL-MCNC: 9.2 MG/DL (ref 8.7–10.5)
CHLORIDE SERPL-SCNC: 106 MMOL/L (ref 95–110)
CO2 SERPL-SCNC: 22 MMOL/L (ref 23–29)
CREAT SERPL-MCNC: 0.8 MG/DL (ref 0.5–1.4)
DIFFERENTIAL METHOD BLD: ABNORMAL
EOSINOPHIL # BLD AUTO: 0 K/UL (ref 0–0.5)
EOSINOPHIL NFR BLD: 0.4 % (ref 0–8)
ERYTHROCYTE [DISTWIDTH] IN BLOOD BY AUTOMATED COUNT: 15.8 % (ref 11.5–14.5)
EST. GFR  (NO RACE VARIABLE): >60 ML/MIN/1.73 M^2
GLUCOSE SERPL-MCNC: 86 MG/DL (ref 70–110)
HCT VFR BLD AUTO: 34.9 % (ref 37–48.5)
HGB BLD-MCNC: 10.7 G/DL (ref 12–16)
IMM GRANULOCYTES # BLD AUTO: 0.03 K/UL (ref 0–0.04)
IMM GRANULOCYTES NFR BLD AUTO: 0.4 % (ref 0–0.5)
LYMPHOCYTES # BLD AUTO: 3.2 K/UL (ref 1–4.8)
LYMPHOCYTES NFR BLD: 44.7 % (ref 18–48)
MAGNESIUM SERPL-MCNC: 1.8 MG/DL (ref 1.6–2.6)
MCH RBC QN AUTO: 21.1 PG (ref 27–31)
MCHC RBC AUTO-ENTMCNC: 30.7 G/DL (ref 32–36)
MCV RBC AUTO: 69 FL (ref 82–98)
MONOCYTES # BLD AUTO: 0.7 K/UL (ref 0.3–1)
MONOCYTES NFR BLD: 9.8 % (ref 4–15)
NEUTROPHILS # BLD AUTO: 3.1 K/UL (ref 1.8–7.7)
NEUTROPHILS NFR BLD: 44 % (ref 38–73)
NRBC BLD-RTO: 0 /100 WBC
PHOSPHATE SERPL-MCNC: 2.9 MG/DL (ref 2.7–4.5)
PLATELET # BLD AUTO: 298 K/UL (ref 150–450)
PMV BLD AUTO: 12.2 FL (ref 9.2–12.9)
POTASSIUM SERPL-SCNC: 3.6 MMOL/L (ref 3.5–5.1)
RBC # BLD AUTO: 5.07 M/UL (ref 4–5.4)
SODIUM SERPL-SCNC: 140 MMOL/L (ref 136–145)
WBC # BLD AUTO: 7.13 K/UL (ref 3.9–12.7)

## 2024-05-22 PROCEDURE — 25000003 PHARM REV CODE 250: Mod: HCNC | Performed by: SURGERY

## 2024-05-22 PROCEDURE — 25000003 PHARM REV CODE 250: Mod: HCNC | Performed by: STUDENT IN AN ORGANIZED HEALTH CARE EDUCATION/TRAINING PROGRAM

## 2024-05-22 PROCEDURE — C9113 INJ PANTOPRAZOLE SODIUM, VIA: HCPCS | Mod: HCNC | Performed by: SURGERY

## 2024-05-22 PROCEDURE — 80048 BASIC METABOLIC PNL TOTAL CA: CPT | Mod: HCNC | Performed by: SURGERY

## 2024-05-22 PROCEDURE — 84100 ASSAY OF PHOSPHORUS: CPT | Mod: HCNC | Performed by: SURGERY

## 2024-05-22 PROCEDURE — 83735 ASSAY OF MAGNESIUM: CPT | Mod: HCNC | Performed by: SURGERY

## 2024-05-22 PROCEDURE — 85025 COMPLETE CBC W/AUTO DIFF WBC: CPT | Mod: HCNC | Performed by: SURGERY

## 2024-05-22 PROCEDURE — 63600175 PHARM REV CODE 636 W HCPCS: Mod: HCNC | Performed by: SURGERY

## 2024-05-22 PROCEDURE — 63600175 PHARM REV CODE 636 W HCPCS: Mod: HCNC | Performed by: STUDENT IN AN ORGANIZED HEALTH CARE EDUCATION/TRAINING PROGRAM

## 2024-05-22 PROCEDURE — 36415 COLL VENOUS BLD VENIPUNCTURE: CPT | Mod: HCNC | Performed by: SURGERY

## 2024-05-22 RX ORDER — ONDANSETRON 4 MG/1
4 TABLET, ORALLY DISINTEGRATING ORAL EVERY 6 HOURS
Status: DISCONTINUED | OUTPATIENT
Start: 2024-05-22 | End: 2024-05-22 | Stop reason: HOSPADM

## 2024-05-22 RX ORDER — ONDANSETRON 8 MG/1
8 TABLET, ORALLY DISINTEGRATING ORAL EVERY 8 HOURS
Qty: 30 TABLET | Refills: 0 | Status: SHIPPED | OUTPATIENT
Start: 2024-05-22

## 2024-05-22 RX ORDER — ACETAMINOPHEN 160 MG/5ML
500 LIQUID ORAL EVERY 8 HOURS
Qty: 1000 ML | Refills: 0 | Status: SHIPPED | OUTPATIENT
Start: 2024-05-22

## 2024-05-22 RX ORDER — HYDROCODONE BITARTRATE AND ACETAMINOPHEN 7.5; 325 MG/15ML; MG/15ML
15 SOLUTION ORAL EVERY 4 HOURS PRN
Qty: 473 ML | Refills: 0 | Status: SHIPPED | OUTPATIENT
Start: 2024-05-22 | End: 2024-06-03 | Stop reason: ALTCHOICE

## 2024-05-22 RX ORDER — SCOLOPAMINE TRANSDERMAL SYSTEM 1 MG/1
1 PATCH, EXTENDED RELEASE TRANSDERMAL
Qty: 30 PATCH | Refills: 0 | Status: SHIPPED | OUTPATIENT
Start: 2024-05-24

## 2024-05-22 RX ADMIN — ENOXAPARIN SODIUM 40 MG: 40 INJECTION SUBCUTANEOUS at 09:05

## 2024-05-22 RX ADMIN — ONDANSETRON 8 MG: 2 INJECTION INTRAMUSCULAR; INTRAVENOUS at 06:05

## 2024-05-22 RX ADMIN — GABAPENTIN 300 MG: 250 SOLUTION ORAL at 09:05

## 2024-05-22 RX ADMIN — HYDROCODONE BITARTRATE AND ACETAMINOPHEN 15 ML: 7.5; 325 SOLUTION ORAL at 06:05

## 2024-05-22 RX ADMIN — ACETAMINOPHEN 499.51 MG: 650 SOLUTION ORAL at 06:05

## 2024-05-22 RX ADMIN — PANTOPRAZOLE SODIUM 40 MG: 40 INJECTION, POWDER, LYOPHILIZED, FOR SOLUTION INTRAVENOUS at 09:05

## 2024-05-22 NOTE — PLAN OF CARE
[facility]  Discharge Final Note    Primary Care Provider: Braden Dumont MD    Expected Discharge Date: 5/22/2024    Final Discharge Note (most recent)       Final Note - 05/22/24 1144          Final Note    Assessment Type Final Discharge Note     Anticipated Discharge Disposition Home or Self Care     Hospital Resources/Appts/Education Provided Provided patient/caregiver with written discharge plan information;Provided education on problems/symptoms using teachback                   Future Appointments   Date Time Provider Department Center   6/3/2024  9:45 AM LAB, APPOINTMENT [facility] LAB [facility]   6/3/2024 10:00 AM Camille Patel, NP [facility]   6/3/2024 11:30 AM Melly Torres, ANTONELLA [facility]   7/15/2024  9:40 AM LAB, APPOINTMENT [facility] [place] [facility]   7/15/2024 10:00 AM Camille Patel, NP [facility]   7/15/2024 10:30 AM Melly Torres RD [facility]   8/12/2024  1:40 PM Jen Meier, APRN, CNS [facility]     Important Message from Medicare  Important Message from Medicare regarding Discharge Appeal Rights: Given to patient/caregiver, Explained to patient/caregiver, Signed/date by patient/caregiver     Date IMM was signed: 05/22/24  Time IMM was signed: [id number]

## 2024-05-22 NOTE — DISCHARGE SUMMARY
[facility]  General Surgery  Discharge Summary      Patient Name: Alicia Soliz  MRN: [id number]  Admission Date: 5/20/2024  Hospital Length of Stay: 2 days  Discharge Date and Time:  05/22/2024 9:13 AM  Attending Physician: Farideh Vazquez   Discharging Provider: Suzanne Regan MD  Primary Care Provider: Braden Dumont MD     HPI: Alicia Soliz is a 44 year-old morbidly obese female with BMI 45.16 kg/m² and multiple associated comorbidities including HTN, HLD, NAFLD, GERD, OA, LEROY, and prediabetes, who presents for pre-operative exam for weight loss surgery. She has failed multiple attempts at non-surgical methods of weight loss and has completed the [facility] program which she started on 9/7/23. She meets NIH consensus criteria for bariatric surgery and is interested in undergoing laparoscopic conversion from sleeve gastrectomy to alanis-en-y gastric bypass. She underwent robotic sleeve gastrectomy over 48Fr Bougie with staple-line oversew 2/20/20 at which time her BMI was 49.5 kg/m². She lost about 36 lbs after which she plateaued and for a period of time regained up to 290 lbs. Her medical history is significant for multiple sclerosis for which she is on ocrelizumab for which her next dose is currently scheduled for 4/16/24 - this was postponed from last week due to a UTI for which she will complete a course of antibiotics tomorrow, however is now less than 2 weeks prior to her surgery. Her MS is complicated by left-sided hemiplegia for which she is in physical therapy.     Labs 9/7/23: Vit B1 33, Chol 208,   CXR 8/5/22: No acute cardiopulmonary process  EKG 2/25/24: NSR, possible septal infarct age undetermined   UGI 10/5/23: Mildly dilated esophagus with esophageal dysmotility, no stricture, postsurgical change of gastric sleeve, spontaneous gastroesophageal reflux to the level of the proximal esophagus  EGD 1/29/24: Normal esophagus, sleeve gastrectomy,  gastritis, normal duodenum  Bravo pH 96 hour 1/29/24: Normal ambulatory esophageal pH study but symptom correlation present with reflux episodes; acid reflux is the cause of the patient's symptoms - although there is normal acid exposure, there is good symptom correlation for chest pain suggestive of a hypersensitive esophagus within the NERD spectrum      Clearances:  Psych: Can 10/31/23     Interval history:  Patient presents to pre-op for laparoscopic conversion of sleeve gastrectomy to RNY bypass. She denies changes to her health since we last saw her in clinic. No questions or concerns today.    Procedure(s) (LRB):  GASTROENTEROSTOMY, LAPAROSCOPIC w/intraop EGD (N/A)     Hospital Course: Alicia Soliz underwent the above procedure on 5/20/24 as treatment for morbid obesity with failure of medical conservative therapy. She tolerated the procedure well and her post-op course was uncomplicated. Prior to discharge home on 05/22/2024 her pain was well controlled on oral medications, tolerated diet, ambulated, spontaneously voided and experienced return of bowel function. She was discharged home in good condition on POD#2.      Consults:   Consults (From admission, onward)          Status Ordering Provider     Inpatient consult to Midline team  Once        Provider:  (Not yet assigned)    Completed SANDRA PURCELL            Significant Diagnostic Studies: Labs: BMP:   Recent Labs   Lab 05/20/24  1438 05/21/24  0424 05/22/24  0219   GLU  --  132* 86   NA  --  138 140   K  --  5.0 3.6   CL  --  105 106   CO2  --  21* 22*   BUN  --  3* <3*   CREATININE 0.7 0.8 0.8   CALCIUM  --  9.6 9.2   MG  --  2.0 1.8    and CBC   Recent Labs   Lab 05/20/24  1438 05/21/24  0424 05/22/24  0219   WBC 8.22 8.83 7.13   HGB 11.0* 12.2 10.7*   HCT 36.9* 39.6 34.9*    302 298     Physical exam:  General: NAD  Neuro: AAOx4  Cardio: S1 and S2, RRR  Resp: Moving air appropriately, breathing even and unlabored  Abd:  Soft, non-distended; appropriate incisional tenderness. Incisions clean/dry/intact.  Ext: Warm and well perfused      Pending Diagnostic Studies:       Procedure Component Value Units Date/Time    Specimen to Pathology, Surgery General Surgery [[id number]] Collected: 05/20/24 1320    Order Status: Sent Lab Status: In process Updated: 05/21/24 0843    Specimen: Tissue           Final Active Diagnoses:    Diagnosis Date Noted POA    PRINCIPAL PROBLEM:  Class 3 severe obesity due to excess calories with body mass index (BMI) of 45.0 to 49.9 in adult [E66.01, Z68.42] 05/20/2024 Not Applicable    History of stroke [Z86.73] 05/21/2024 Not Applicable    Nausea [R11.0] 05/21/2024 No    Left hemiparesis [G81.94] 08/05/2022 Yes    Chronic pain syndrome [G89.4] 08/16/2017 Yes    Gastroesophageal reflux disease without esophagitis [K21.9] 04/03/2017 Yes    Multiple sclerosis, relapsing-remitting [G35] 01/22/2015 Yes    Essential hypertension [I10] 06/24/2014 Yes      Problems Resolved During this Admission:      Discharged Condition: good    Disposition: Home or Self Care    Follow Up:    Patient Instructions:      Diet Bariatric High Protein Clear Liquid   Order Comments: No soft drinks     Lifting restrictions (nothing greater than 10lbs)   Scheduling Instructions: Lifting restrictions:  nothing greater than 10lbs     No driving until:   Order Comments: No driving until off narcotic pain medication.  Can turn around without pain off narcotics.  At least 1 week.     Notify your health care provider if you experience any of the following:   Order Comments: temperature > 100.4, persistent nausea and vomiting or diarrhea, severe uncontrolled pain, redness, tenderness, or signs of infection (pain, swelling, redness, odor or green/yellow discharge around incision site), difficulty breathing or increased cough, severe persistent headache, worsening rash, persistent dizziness, light-headedness, or visual disturbances, increased  confusion or weakness     Activity as tolerated     Shower on day two and no bath     Medications:  Reconciled Home Medications:      Medication List        START taking these medications      M- mg/5 mL Liqd  Generic drug: acetaminophen  Take 15.6 mLs (499.2 mg total) by mouth every 8 (eight) hours.     scopolamine 1.3-1.5 mg (1 mg over 3 days)  Commonly known as: TRANSDERM-SCOP  Place 1 patch onto the skin Every 3 (three) days.  Start taking on: May 24, 2024            CHANGE how you take these medications      hydrocodone-apap 7.5-325 MG/15 ML oral solution  Commonly known as: HYCET  Take 15 mLs by mouth every 4 (four) hours as needed.  What changed:   when to take this  reasons to take this  Another medication with the same name was removed. Continue taking this medication, and follow the directions you see here.     ondansetron 8 MG Tbdl  Commonly known as: ZOFRAN-ODT  Dissolve 1 tablet (8 mg total) by mouth every 8 (eight) hours.  What changed:   when to take this  reasons to take this            CONTINUE taking these medications      amLODIPine 10 MG tablet  Commonly known as: NORVASC  Take 10 mg by mouth once daily.     cholecalciferol (vitamin D3) 1,250 mcg (50,000 unit) capsule  Take 1 capsule (50,000 Units total) by mouth every 7 days. for 365 doses     cyclobenzaprine 10 MG tablet  Commonly known as: FLEXERIL  Take 10 mg by mouth every 8 (eight) hours as needed.     diclofenac sodium 1 % Gel  Commonly known as: VOLTAREN  Apply 2 g topically 4 (four) times daily.     doxepin 75 MG capsule  Commonly known as: SINEQUAN  Take 1 capsule (75 mg total) by mouth every evening.     DULoxetine 60 MG capsule  Commonly known as: CYMBALTA  Take 60 mg by mouth daily as needed.     gabapentin 300 MG capsule  Commonly known as: NEURONTIN  Take 3 capsules (900 mg total) by mouth 2 (two) times daily.     linaCLOtide 145 mcg Cap capsule  Commonly known as: LINZESS  Take 1 capsule (145 mcg total) by mouth before  breakfast.     nystatin cream  Commonly known as: MYCOSTATIN  Apply topically 2 (two) times daily.     OCREVUS 30 mg/mL Soln  Generic drug: ocrelizumab     omeprazole 40 MG capsule  Commonly known as: PRILOSEC  Take 1 capsule (40 mg total) by mouth every morning. Open capsule and take with apple sauce     topiramate 50 MG tablet  Commonly known as: TOPAMAX  Take 1 tablet (50 mg total) by mouth 3 (three) times daily.     valsartan 80 MG tablet  Commonly known as: DIOVAN  Take 1 tablet (80 mg total) by mouth once daily.              Suzanne Regan MD  General Surgery  [facility] - Surgery

## 2024-05-23 LAB
FINAL PATHOLOGIC DIAGNOSIS: NORMAL
GROSS: NORMAL
Lab: NORMAL

## 2024-05-28 ENCOUNTER — PATIENT MESSAGE (OUTPATIENT)
Dept: BARIATRICS | Facility: CLINIC | Age: 44
End: 2024-05-28
Payer: MEDICARE

## 2024-05-28 ENCOUNTER — NURSE TRIAGE (OUTPATIENT)
Dept: ADMINISTRATIVE | Facility: CLINIC | Age: 44
End: 2024-05-28
Payer: MEDICARE

## 2024-05-28 LAB
ALBUMIN SERPL BCP-MCNC: 3.7 G/DL (ref 3.5–5.2)
ALP SERPL-CCNC: 96 U/L (ref 55–135)
ALT SERPL W/O P-5'-P-CCNC: 9 U/L (ref 10–44)
AMPHET+METHAMPHET UR QL: NEGATIVE
ANION GAP SERPL CALC-SCNC: 9 MMOL/L (ref 8–16)
AST SERPL-CCNC: 15 U/L (ref 10–40)
BARBITURATES UR QL SCN>200 NG/ML: NEGATIVE
BASOPHILS # BLD AUTO: 0.04 K/UL (ref 0–0.2)
BASOPHILS NFR BLD: 0.7 % (ref 0–1.9)
BENZODIAZ UR QL SCN>200 NG/ML: NEGATIVE
BILIRUB SERPL-MCNC: 0.3 MG/DL (ref 0.1–1)
BILIRUB UR QL STRIP: NEGATIVE
BUN SERPL-MCNC: 5 MG/DL (ref 6–20)
BZE UR QL SCN: NEGATIVE
CALCIUM SERPL-MCNC: 9.5 MG/DL (ref 8.7–10.5)
CANNABINOIDS UR QL SCN: NEGATIVE
CHLORIDE SERPL-SCNC: 101 MMOL/L (ref 95–110)
CLARITY UR: ABNORMAL
CO2 SERPL-SCNC: 28 MMOL/L (ref 23–29)
COLOR UR: YELLOW
CREAT SERPL-MCNC: 0.7 MG/DL (ref 0.5–1.4)
CREAT UR-MCNC: 169.4 MG/DL (ref 15–325)
DIFFERENTIAL METHOD BLD: ABNORMAL
EOSINOPHIL # BLD AUTO: 0.1 K/UL (ref 0–0.5)
EOSINOPHIL NFR BLD: 1.7 % (ref 0–8)
ERYTHROCYTE [DISTWIDTH] IN BLOOD BY AUTOMATED COUNT: 15.8 % (ref 11.5–14.5)
EST. GFR  (NO RACE VARIABLE): >60 ML/MIN/1.73 M^2
GLUCOSE SERPL-MCNC: 100 MG/DL (ref 70–110)
GLUCOSE UR QL STRIP: NEGATIVE
HCT VFR BLD AUTO: 38 % (ref 37–48.5)
HGB BLD-MCNC: 11.8 G/DL (ref 12–16)
HGB UR QL STRIP: NEGATIVE
IMM GRANULOCYTES # BLD AUTO: 0.02 K/UL (ref 0–0.04)
IMM GRANULOCYTES NFR BLD AUTO: 0.3 % (ref 0–0.5)
KETONES UR QL STRIP: ABNORMAL
LEUKOCYTE ESTERASE UR QL STRIP: NEGATIVE
LIPASE SERPL-CCNC: 16 U/L (ref 4–60)
LYMPHOCYTES # BLD AUTO: 2.3 K/UL (ref 1–4.8)
LYMPHOCYTES NFR BLD: 38.6 % (ref 18–48)
MCH RBC QN AUTO: 21.1 PG (ref 27–31)
MCHC RBC AUTO-ENTMCNC: 31.1 G/DL (ref 32–36)
MCV RBC AUTO: 68 FL (ref 82–98)
METHADONE UR QL SCN>300 NG/ML: NEGATIVE
MONOCYTES # BLD AUTO: 0.5 K/UL (ref 0.3–1)
MONOCYTES NFR BLD: 8.7 % (ref 4–15)
NEUTROPHILS # BLD AUTO: 3 K/UL (ref 1.8–7.7)
NEUTROPHILS NFR BLD: 50 % (ref 38–73)
NITRITE UR QL STRIP: NEGATIVE
NRBC BLD-RTO: 0 /100 WBC
OPIATES UR QL SCN: ABNORMAL
PCP UR QL SCN>25 NG/ML: NEGATIVE
PH UR STRIP: 8 [PH] (ref 5–8)
PLATELET # BLD AUTO: 320 K/UL (ref 150–450)
PMV BLD AUTO: 10 FL (ref 9.2–12.9)
POTASSIUM SERPL-SCNC: 3.4 MMOL/L (ref 3.5–5.1)
PROT SERPL-MCNC: 7.5 G/DL (ref 6–8.4)
PROT UR QL STRIP: ABNORMAL
RBC # BLD AUTO: 5.58 M/UL (ref 4–5.4)
SODIUM SERPL-SCNC: 138 MMOL/L (ref 136–145)
SP GR UR STRIP: 1.01 (ref 1–1.03)
TOXICOLOGY INFORMATION: ABNORMAL
URN SPEC COLLECT METH UR: ABNORMAL
UROBILINOGEN UR STRIP-ACNC: NEGATIVE EU/DL
WBC # BLD AUTO: 5.99 K/UL (ref 3.9–12.7)

## 2024-05-28 PROCEDURE — 99283 EMERGENCY DEPT VISIT LOW MDM: CPT | Mod: HCNC

## 2024-05-28 PROCEDURE — 83690 ASSAY OF LIPASE: CPT | Mod: HCNC | Performed by: NURSE PRACTITIONER

## 2024-05-28 PROCEDURE — 85025 COMPLETE CBC W/AUTO DIFF WBC: CPT | Mod: HCNC | Performed by: NURSE PRACTITIONER

## 2024-05-28 PROCEDURE — 80307 DRUG TEST PRSMV CHEM ANLYZR: CPT | Mod: HCNC | Performed by: NURSE PRACTITIONER

## 2024-05-28 PROCEDURE — 81003 URINALYSIS AUTO W/O SCOPE: CPT | Mod: HCNC | Performed by: NURSE PRACTITIONER

## 2024-05-28 PROCEDURE — 80053 COMPREHEN METABOLIC PANEL: CPT | Mod: HCNC | Performed by: NURSE PRACTITIONER

## 2024-05-28 NOTE — TELEPHONE ENCOUNTER
Spoke with patient who states that she hasn't been able to keep much, if anything down. Despite Phenergan, she continues to vomit. She's tried water, Gatorade Zero, and soup, non of which stays down. She further reports her urine as scant and dark tea colored, further commenting that she has no energy. Discussed with patient the signs and symptoms of dehydration and advised her to go to the nearest Emergency Department as soon as possible. She states that she has a daughter who can take her. Let patient know that I will call to follow up with her in the morning to see how she's feeling.

## 2024-05-29 ENCOUNTER — TELEPHONE (OUTPATIENT)
Dept: BARIATRICS | Facility: CLINIC | Age: 44
End: 2024-05-29
Payer: MEDICARE

## 2024-05-29 ENCOUNTER — HOSPITAL ENCOUNTER (EMERGENCY)
Facility: HOSPITAL | Age: 44
Discharge: HOME | End: 2024-05-29
Attending: EMERGENCY MEDICINE
Payer: MEDICARE

## 2024-05-29 VITALS
SYSTOLIC BLOOD PRESSURE: 143 MMHG | TEMPERATURE: 99 F | OXYGEN SATURATION: 100 % | RESPIRATION RATE: 20 BRPM | DIASTOLIC BLOOD PRESSURE: 81 MMHG | HEART RATE: 80 BPM

## 2024-05-29 DIAGNOSIS — I10 HYPERTENSION, UNSPECIFIED TYPE: ICD-10-CM

## 2024-05-29 DIAGNOSIS — R11.2 NAUSEA AND VOMITING, UNSPECIFIED VOMITING TYPE: Primary | ICD-10-CM

## 2024-05-29 NOTE — ED NOTES
Pt given urine cup for sample and instructed to give to staff once sample is collected. [facility]

## 2024-05-29 NOTE — FIRST PROVIDER EVALUATION
Medical screening examination initiated.  I have conducted a focused provider triage encounter, findings are as follows:    Brief history of present illness:   44-year-old female presents the emergency department for abdominal pain.  Patient reports recent gastric sleeve surgery.  Patient reports nausea and vomiting.  Patient reports no relief with Zofran at home    Vitals:    05/28/24 1958   BP: (!) 180/86   BP Location: Right arm   Patient Position: Sitting   Pulse: 78   Resp: 18   Temp: 98.5 °F (36.9 °C)   TempSrc: Oral   SpO2: 100%       Pertinent physical exam:  nad    Brief workup plan:  labs, meds, further eval    Preliminary workup initiated; this workup will be continued and followed by the physician or advanced practice provider that is assigned to the patient when roomed.

## 2024-05-29 NOTE — TELEPHONE ENCOUNTER
Pt calling from the ER to ask to be transferred to a different hospital. I have advised she speak with ED staff.  Reason for Disposition   Health Information question, no triage required and triager able to answer question    Protocols used: Information Only Call - No Triage-A-

## 2024-05-29 NOTE — TELEPHONE ENCOUNTER
Followed up with patient regarding yesterday's symptoms and to confirm that she went to the ED. She stated that she did go to the ED and that she is feeling much better. Further states that she had a large bowel movement this morning which has helped some of her initial symptoms. Encouraged patient to please drink by taking small and frequent sips to minimize nausea. Advised her to please call our office if she has any concerning symptoms in the future.

## 2024-05-29 NOTE — ED PROVIDER NOTES
"SCRIBE #1 NOTE: I, Cecilia Vasques, am scribing for, and in the presence of, Dwain Bennett MD. I have scribed the entire note.       History     Chief Complaint   Patient presents with    Post-op Problem     Pt. C/o not being able to hold anything down after having gastric bypass SX last week. Pt states she was given zofran for at home use but has not helped      Review of patient's allergies indicates:   Allergen Reactions    Demerol [meperidine] Anaphylaxis and Hives    Dilaudid [hydromorphone (bulk)] Anaphylaxis     "Code Blue" however patient tolerates Lortab    Shellfish containing products Itching, Nausea And Vomiting and Swelling    Prednisone Itching    Tramadol Hives         History of Present Illness     HPI    5/29/2024, 12:56 AM  History obtained from the patient      History of Present Illness: Alicia Soliz is a 44 y.o. female patient with a PMHx of HTN, GERD, hyperglycemia, and HLD who presents to the Emergency Department for evaluation of emesis which onset gradually approximately 4-5 days ago. Pt had gastric bypass surgery on 5/20/2024. Symptoms are constant and moderate in severity. No mitigating or exacerbating factors reported. Patient denies any congestion, sore throat, SOB, rhinorrhea, and all other sxs at this time. Pt was Rx zofran after the surgery. Pt states zofran helps but was taking 2 doses every other day. Last bowel movement was 4 days ago and it was normal. No further complaints or concerns at this time.       Arrival mode: Personal vehicle   PCP: Braden Dumont MD        Past Medical History:  Past Medical History:   Diagnosis Date    Anxiety and depression 10/20/2018    Arthritis     Cardiac arrest as complication of medication     Dilaudid    Chronic pain of left knee 10/13/2023    Encounter for blood transfusion 2004    Excessive daytime sleepiness 06/26/2017    GERD (gastroesophageal reflux disease)     Hemiplegia affecting left nondominant side     Hyperglycemia " 09/26/2021    Hypertension     Iron deficiency anemia     Mixed hyperlipidemia 03/18/2014    Mixed hyperlipidemia 03/18/2014    Morbid obesity with BMI of 50.0-59.9, adult 09/20/2018    Multiple sclerosis     Otitis externa 04/10/2024    Prediabetes 02/11/2019    Seizure-like activity 02/25/2024    Sprain of medial collateral ligament of left knee 10/13/2023       Past Surgical History:  Past Surgical History:   Procedure Laterality Date    APPENDECTOMY      CHOLECYSTECTOMY      COLONOSCOPY N/A 11/25/2020    Procedure: COLONOSCOPY;  Surgeon: Andrew Jenkins III, MD;  Location: [facility];  Service: Endoscopy;  Laterality: N/A;    ESOPHAGOGASTRODUODENOSCOPY N/A 02/19/2020    Procedure: EGD (ESOPHAGOGASTRODUODENOSCOPY);  Surgeon: Michael Navarrete MD;  Location: [facility];  Service: Endoscopy;  Laterality: N/A;    ESOPHAGOGASTRODUODENOSCOPY N/A 02/20/2020    Procedure: EGD (ESOPHAGOGASTRODUODENOSCOPY);  Surgeon: Michael Navarrete MD;  Location: [facility];  Service: General;  Laterality: N/A;    ESOPHAGOGASTRODUODENOSCOPY N/A 11/25/2020    Procedure: EGD (ESOPHAGOGASTRODUODENOSCOPY);  Surgeon: Andrew Jenkins III, MD;  Location: [facility];  Service: Endoscopy;  Laterality: N/A;    ESOPHAGOGASTRODUODENOSCOPY N/A 09/25/2023    Procedure: EGD (ESOPHAGOGASTRODUODENOSCOPY);  Surgeon: Ly Kim MD;  Location: [facility];  Service: Endoscopy;  Laterality: N/A;    ESOPHAGOGASTRODUODENOSCOPY N/A 01/29/2024    Procedure: EGD (ESOPHAGOGASTRODUODENOSCOPY);  Surgeon: Ly Kim MD;  Location: [facility];  Service: Endoscopy;  Laterality: N/A;    HYSTERECTOMY      KNEE SURGERY      age 10    LAPAROSCOPIC GASTROENTEROSTOMY N/A 5/20/2024    Procedure: GASTROENTEROSTOMY, LAPAROSCOPIC w/intraop EGD;  Surgeon: Farideh Vazquez MD;  Location: [address];  Service: General;  Laterality: N/A;    PH MONITORING, ESOPHAGUS, WIRELESS, (OFF REFLUX MEDS) N/A 01/29/2024    Procedure: PH MONITORING, ESOPHAGUS, WIRELESS, (OFF  REFLUX MEDS);  Surgeon: Ly Kim MD;  Location: [facility] ENDO;  Service: Endoscopy;  Laterality: N/A;    ROBOT-ASSISTED LAPAROSCOPIC SLEEVE GASTRECTOMY USING DA ANTHONY XI N/A 02/20/2020    Procedure: XI ROBOTIC SLEEVE GASTRECTOMY;  Surgeon: Michael Navarrete MD;  Location: [facility] OR;  Service: General;  Laterality: N/A;    TENOPLASTY OF HAND Left 08/26/2021    Procedure: REPAIR, TENDON, HAND;  Surgeon: Joselito Lugo MD;  Location: [facility] OR;  Service: Orthopedics;  Laterality: Left;  Left RCL PIP Joint Repair/Recon with Arthrex Internal Brace.    TONSILLECTOMY      TOTAL REDUCTION MAMMOPLASTY  2022    TUBAL LIGATION           Family History:  Family History   Problem Relation Name Age of Onset    Lupus Mother      Heart disease Mother      Hypertension Mother      Diabetes Father Ganesh hannon     Kidney disease Father Ganesh hannon     No Known Problems Sister      No Known Problems Sister      No Known Problems Brother      No Known Problems Brother      No Known Problems Daughter      No Known Problems Daughter      No Known Problems Daughter      Cancer Maternal Aunt Melly Soliz 40        breast    Breast cancer Maternal Aunt Melly Soliz     Diabetes Maternal Aunt Melly Soliz     COPD Maternal Aunt Melly Soliz     Heart disease Maternal Grandmother Lilia soliz     Breast cancer Maternal Cousin      Ovarian cancer Maternal Cousin         Social History:  Social History     Tobacco Use    Smoking status: Never     Passive exposure: Never    Smokeless tobacco: Never   Substance and Sexual Activity    Alcohol use: Not Currently     Comment: once a year     Drug use: No    Sexual activity: Yes     Partners: Female     Birth control/protection: Surgical        Review of Systems     Review of Systems   Constitutional:  Negative for fever.   HENT:  Negative for congestion, rhinorrhea and sore throat.    Respiratory:  Negative for shortness of breath.    Cardiovascular:  Negative for chest pain.    Gastrointestinal:  Positive for nausea and vomiting.   Genitourinary:  Negative for dysuria.   Musculoskeletal:  Negative for back pain.   Skin:  Negative for rash.   Neurological:  Negative for weakness.   Hematological:  Does not bruise/bleed easily.   All other systems reviewed and are negative.     Physical Exam     Initial Vitals [05/28/24 1958]   BP Pulse Resp Temp SpO2   (!) 180/86 78 18 98.5 °F (36.9 °C) 100 %      MAP       --          Physical Exam  Nursing Notes and Vital Signs Reviewed.  Constitutional: Patient is in no acute distress. Well-developed and well-nourished.  Head: Atraumatic. Normocephalic.  Eyes: PERRL. EOM intact. Conjunctivae are not pale. No scleral icterus.  ENT: Mucous membranes are moist. Oropharynx is clear and symmetric.    Neck: Supple. Full ROM. No lymphadenopathy.  Cardiovascular: Regular rate. Regular rhythm. No murmurs, rubs, or gallops. Distal pulses are 2+ and symmetric.  Pulmonary/Chest: No respiratory distress. Clear to auscultation bilaterally. No wheezing or rales.  Abdominal: Soft and non-distended.  There is no tenderness.  No rebound, guarding, or rigidity. Good bowel sounds. Post surgical incisions on abdominal wall, clean, dry, intact.  Genitourinary: No CVA tenderness  Musculoskeletal: Moves all extremities. No obvious deformities. No edema. No calf tenderness.  Skin: Warm and dry.  Neurological:  Alert, awake, and appropriate.  Normal speech.  No acute focal neurological deficits are appreciated.  Psychiatric: Normal affect. Good eye contact. Appropriate in content.     ED Course   Procedures  ED Vital Signs:  Vitals:    05/28/24 1958 05/29/24 0100   BP: (!) 180/86 (!) 143/81   Pulse: 78 80   Resp: 18 20   Temp: 98.5 °F (36.9 °C)    TempSrc: Oral    SpO2: 100% 100%       Abnormal Lab Results:  Labs Reviewed   CBC W/ AUTO DIFFERENTIAL - Abnormal; Notable for the following components:       Result Value    RBC 5.58 (*)     Hemoglobin 11.8 (*)     MCV 68 (*)     MCH  21.1 (*)     MCHC 31.1 (*)     RDW 15.8 (*)     All other components within normal limits   COMPREHENSIVE METABOLIC PANEL - Abnormal; Notable for the following components:    Potassium 3.4 (*)     BUN 5 (*)     ALT 9 (*)     All other components within normal limits   URINALYSIS, REFLEX TO URINE CULTURE - Abnormal; Notable for the following components:    Appearance, UA Hazy (*)     Protein, UA Trace (*)     Ketones, UA 1+ (*)     All other components within normal limits    Narrative:     Specimen Source->Urine   DRUG SCREEN PANEL, URINE EMERGENCY - Abnormal; Notable for the following components:    Opiate Scrn, Ur Presumptive Positive (*)     All other components within normal limits    Narrative:     Specimen Source->Urine   LIPASE   DRUG SCREEN PANEL, URINE EMERGENCY        All Lab Results:  Results for orders placed or performed during the hospital encounter of 05/29/24   CBC auto differential   Result Value Ref Range    WBC 5.99 3.90 - 12.70 K/uL    RBC 5.58 (H) 4.00 - 5.40 M/uL    Hemoglobin 11.8 (L) 12.0 - 16.0 g/dL    Hematocrit 38.0 37.0 - 48.5 %    MCV 68 (L) 82 - 98 fL    MCH 21.1 (L) 27.0 - 31.0 pg    MCHC 31.1 (L) 32.0 - 36.0 g/dL    RDW 15.8 (H) 11.5 - 14.5 %    Platelets 320 150 - 450 K/uL    MPV 10.0 9.2 - 12.9 fL    Immature Granulocytes 0.3 0.0 - 0.5 %    Gran # (ANC) 3.0 1.8 - 7.7 K/uL    Immature Grans (Abs) 0.02 0.00 - 0.04 K/uL    Lymph # 2.3 1.0 - 4.8 K/uL    Mono # 0.5 0.3 - 1.0 K/uL    Eos # 0.1 0.0 - 0.5 K/uL    Baso # 0.04 0.00 - 0.20 K/uL    nRBC 0 0 /100 WBC    Gran % 50.0 38.0 - 73.0 %    Lymph % 38.6 18.0 - 48.0 %    Mono % 8.7 4.0 - 15.0 %    Eosinophil % 1.7 0.0 - 8.0 %    Basophil % 0.7 0.0 - 1.9 %    Differential Method Automated    Comprehensive metabolic panel   Result Value Ref Range    Sodium 138 136 - 145 mmol/L    Potassium 3.4 (L) 3.5 - 5.1 mmol/L    Chloride 101 95 - 110 mmol/L    CO2 28 23 - 29 mmol/L    Glucose 100 70 - 110 mg/dL    BUN 5 (L) 6 - 20 mg/dL    Creatinine  0.7 0.5 - 1.4 mg/dL    Calcium 9.5 8.7 - 10.5 mg/dL    Total Protein 7.5 6.0 - 8.4 g/dL    Albumin 3.7 3.5 - 5.2 g/dL    Total Bilirubin 0.3 0.1 - 1.0 mg/dL    Alkaline Phosphatase 96 55 - 135 U/L    AST 15 10 - 40 U/L    ALT 9 (L) 10 - 44 U/L    eGFR >60 >60 mL/min/1.73 m^2    Anion Gap 9 8 - 16 mmol/L   Lipase   Result Value Ref Range    Lipase 16 4 - 60 U/L   Urinalysis, Reflex to Urine Culture Urine, Clean Catch    Specimen: Urine   Result Value Ref Range    Specimen UA Urine, Clean Catch     Color, UA Yellow Yellow, Straw, Jne    Appearance, UA Hazy (A) Clear    pH, UA 8.0 5.0 - 8.0    Specific Gravity, UA 1.015 1.005 - 1.030    Protein, UA Trace (A) Negative    Glucose, UA Negative Negative    Ketones, UA 1+ (A) Negative    Bilirubin (UA) Negative Negative    Occult Blood UA Negative Negative    Nitrite, UA Negative Negative    Urobilinogen, UA Negative <2.0 EU/dL    Leukocytes, UA Negative Negative   Drug screen panel, in-house   Result Value Ref Range    Benzodiazepines Negative Negative    Methadone metabolites Negative Negative    Cocaine (Metab.) Negative Negative    Opiate Scrn, Ur Presumptive Positive (A) Negative    Barbiturate Screen, Ur Negative Negative    Amphetamine Screen, Ur Negative Negative    THC Negative Negative    Phencyclidine Negative Negative    Creatinine, Urine 169.4 15.0 - 325.0 mg/dL    Toxicology Information SEE COMMENT      *Note: Due to a large number of results and/or encounters for the requested time period, some results have not been displayed. A complete set of results can be found in Results Review.       Imaging Results:  Imaging Results    None                 The Emergency Provider reviewed the vital signs and test results, which are outlined above.     ED Discussion       1:13 AM: Reassessed pt at this time. Discussed with pt all pertinent ED information and results. Discussed pt dx and plan of tx. Gave pt all f/u and return to the ED instructions. All questions and  concerns were addressed at this time. Pt expresses understanding of information and instructions, and is comfortable with plan to discharge. Pt is stable for discharge.    I discussed with patient and/or family/caretaker that evaluation in the ED does not suggest any emergent or life threatening medical conditions requiring immediate intervention beyond what was provided in the ED, and I believe patient is safe for discharge.  Regardless, an unremarkable evaluation in the ED does not preclude the development or presence of a serious of life threatening condition. As such, patient was instructed to return immediately for any worsening or change in current symptoms.       Medical Decision Making  Amount and/or Complexity of Data Reviewed  Labs: ordered. Decision-making details documented in ED Course.                ED Medication(s):  Medications - No data to display      Discharge Medication List as of 5/29/2024 12:54 AM           Follow-up Information       Braden Dumont MD In 2 days.    Specialty: Internal Medicine  Contact information:  [address] [facility] [id number] [phone]               [facility] - Emergency Dept..    Specialty: Emergency Medicine  Why: As needed, If symptoms worsen  Contact information:  [address] [place] [address] [phone]                               Scribe Attestation:   Scribe #1: I performed the above scribed service and the documentation accurately describes the services I performed. I attest to the accuracy of the note.     Attending:   Physician Attestation Statement for Scribe #1: I, Dwain Bennett MD, personally performed the services described in this documentation, as scribed by Cecilia Vasques, in my presence, and it is both accurate and complete.           Clinical Impression       ICD-10-CM ICD-9-CM   1. Nausea and vomiting, unspecified vomiting type  R11.2 787.01   2. Hypertension, unspecified type  I10 401.9        Disposition:   Disposition: Discharged  Condition: Stable       Dwain Bennett MD  05/30/24 0546

## 2024-05-30 NOTE — TELEPHONE ENCOUNTER
No care due was identified.  [facility] Embedded Care Due Messages. Reference number: [id number].   5/30/2024 11:30:44 AM CDT

## 2024-05-31 RX ORDER — DULOXETIN HYDROCHLORIDE 60 MG/1
60 CAPSULE, DELAYED RELEASE ORAL
Qty: 90 CAPSULE | Refills: 2 | Status: SHIPPED | OUTPATIENT
Start: 2024-05-31

## 2024-05-31 NOTE — PROGRESS NOTES
NUTRITION NOTE    Referring Physician: Farideh Jc M.D.   Reason for MNT Referral: Follow-up 2 Weeks s/p Gastric  sleeve to bypass conversion    PAST MEDICAL HISTORY:  Denies constipation and diarrhea.  Reports nausea and vomiting after lying down within two hours eating or drinking    Past Medical History:   Diagnosis Date    Anxiety and depression 10/20/2018    Arthritis     Cardiac arrest as complication of medication     Dilaudid    Chronic pain of left knee 10/13/2023    Encounter for blood transfusion 2004    Excessive daytime sleepiness 06/26/2017    GERD (gastroesophageal reflux disease)     Hemiplegia affecting left nondominant side     Hyperglycemia 09/26/2021    Hypertension     Iron deficiency anemia     Mixed hyperlipidemia 03/18/2014    Mixed hyperlipidemia 03/18/2014    Morbid obesity with BMI of 50.0-59.9, adult 09/20/2018    Multiple sclerosis     Otitis externa 04/10/2024    Prediabetes 02/11/2019    Seizure-like activity 02/25/2024    Sprain of medial collateral ligament of left knee 10/13/2023       CLINICAL DATA:  44 y.o.-year-old Black or  female.    Current Weight: 262 lbs  BMI: 42.63  Total Weight Loss: 17 lbs    Excess Weight Loss: 12%      CURRENT DIET:  Bariatric Liquid Diet    Diet Recall:  grams of protein/day; 50 oz of fluids/day    Diet Includes:  Protein Supplements: Premier Protein 3-4  Fluids include: water, Crystal Light, sf jell-o, zero sugar juice    EXERCISE:  Walking, housework     LABS:  Reviewed.    VITAMINS/MINERALS:  Women's One a Day  B-1 250 mcg, every other day  Calcium Citrate - not taking  Vitamin B-12 - not taking  D3 5000 IU     ASSESSMENT:  Doing fairly well overall.  Adequate protein intake.  Inadequate fluid intake.    BARIATRIC DIET DISCUSSION:  Instructed and provided written materials on bariatric puree diet plan   Bariatric soft diet plan to start in 2 weeks as pat  Pt may swallow tablets and pills at 2 week post op for sleeve  surgery  Reinforced post-op nutrition guidelines.  Discussed waiting 2 hours after eating/drinking before lying down  Reinforced no starchy CHO or sugar beverages or popsicles  Stressed to PT not to advance diet ahead of recommended timelines  Reviewed vitamins and minerals. Instructed PT to send me photos of vitamins and minerals she has    PLAN/RECOMMONDATIONS:  Advance to bariatric puree diet  Maintain protein intake.  Increase fluid intake.  Continue light exercise.  Continue appropriate vitamins & minerals.  Adjust vitamins & minerals by: including calcium and B-12    Return to clinic in 6 weeks.    SESSION TIME: 30 minutes

## 2024-06-03 ENCOUNTER — OFFICE VISIT (OUTPATIENT)
Dept: BARIATRICS | Facility: CLINIC | Age: 44
End: 2024-06-03
Payer: MEDICARE

## 2024-06-03 ENCOUNTER — PATIENT MESSAGE (OUTPATIENT)
Dept: BARIATRICS | Facility: CLINIC | Age: 44
End: 2024-06-03

## 2024-06-03 ENCOUNTER — CLINICAL SUPPORT (OUTPATIENT)
Dept: BARIATRICS | Facility: CLINIC | Age: 44
End: 2024-06-03
Payer: MEDICARE

## 2024-06-03 ENCOUNTER — LAB VISIT (OUTPATIENT)
Dept: LAB | Facility: HOSPITAL | Age: 44
End: 2024-06-03
Payer: MEDICARE

## 2024-06-03 VITALS
BODY MASS INDEX: 42.45 KG/M2 | HEART RATE: 88 BPM | SYSTOLIC BLOOD PRESSURE: 148 MMHG | OXYGEN SATURATION: 100 % | WEIGHT: 264.13 LBS | HEIGHT: 66 IN | TEMPERATURE: 98 F | DIASTOLIC BLOOD PRESSURE: 98 MMHG

## 2024-06-03 DIAGNOSIS — I10 ESSENTIAL HYPERTENSION: Primary | ICD-10-CM

## 2024-06-03 DIAGNOSIS — Z98.84 S/P GASTRIC BYPASS: ICD-10-CM

## 2024-06-03 DIAGNOSIS — K21.9 GASTROESOPHAGEAL REFLUX DISEASE WITHOUT ESOPHAGITIS: ICD-10-CM

## 2024-06-03 DIAGNOSIS — Z79.899 LONG-TERM USE OF HIGH-RISK MEDICATION: ICD-10-CM

## 2024-06-03 DIAGNOSIS — E66.01 MORBID OBESITY WITH BMI OF 40.0-44.9, ADULT: ICD-10-CM

## 2024-06-03 DIAGNOSIS — Z79.899 POLYPHARMACY: ICD-10-CM

## 2024-06-03 DIAGNOSIS — E66.01 MORBID OBESITY WITH BMI OF 45.0-49.9, ADULT: ICD-10-CM

## 2024-06-03 DIAGNOSIS — Z71.3 DIETARY COUNSELING AND SURVEILLANCE: ICD-10-CM

## 2024-06-03 LAB
ALBUMIN SERPL BCP-MCNC: 3.8 G/DL (ref 3.5–5.2)
ALP SERPL-CCNC: 98 U/L (ref 55–135)
ALT SERPL W/O P-5'-P-CCNC: 12 U/L (ref 10–44)
ANION GAP SERPL CALC-SCNC: 12 MMOL/L (ref 8–16)
AST SERPL-CCNC: 22 U/L (ref 10–40)
BASOPHILS # BLD AUTO: 0.03 K/UL (ref 0–0.2)
BASOPHILS NFR BLD: 0.4 % (ref 0–1.9)
BILIRUB SERPL-MCNC: 0.4 MG/DL (ref 0.1–1)
BUN SERPL-MCNC: 6 MG/DL (ref 6–20)
CALCIUM SERPL-MCNC: 10 MG/DL (ref 8.7–10.5)
CHLORIDE SERPL-SCNC: 101 MMOL/L (ref 95–110)
CO2 SERPL-SCNC: 26 MMOL/L (ref 23–29)
CREAT SERPL-MCNC: 0.7 MG/DL (ref 0.5–1.4)
DIFFERENTIAL METHOD BLD: ABNORMAL
EOSINOPHIL # BLD AUTO: 0.2 K/UL (ref 0–0.5)
EOSINOPHIL NFR BLD: 2.6 % (ref 0–8)
ERYTHROCYTE [DISTWIDTH] IN BLOOD BY AUTOMATED COUNT: 16.4 % (ref 11.5–14.5)
EST. GFR  (NO RACE VARIABLE): >60 ML/MIN/1.73 M^2
GLUCOSE SERPL-MCNC: 94 MG/DL (ref 70–110)
HCT VFR BLD AUTO: 42.1 % (ref 37–48.5)
HGB BLD-MCNC: 12.7 G/DL (ref 12–16)
IMM GRANULOCYTES # BLD AUTO: 0.01 K/UL (ref 0–0.04)
IMM GRANULOCYTES NFR BLD AUTO: 0.1 % (ref 0–0.5)
LYMPHOCYTES # BLD AUTO: 2.3 K/UL (ref 1–4.8)
LYMPHOCYTES NFR BLD: 34.4 % (ref 18–48)
MCH RBC QN AUTO: 21 PG (ref 27–31)
MCHC RBC AUTO-ENTMCNC: 30.2 G/DL (ref 32–36)
MCV RBC AUTO: 70 FL (ref 82–98)
MONOCYTES # BLD AUTO: 0.5 K/UL (ref 0.3–1)
MONOCYTES NFR BLD: 7.9 % (ref 4–15)
NEUTROPHILS # BLD AUTO: 3.7 K/UL (ref 1.8–7.7)
NEUTROPHILS NFR BLD: 54.6 % (ref 38–73)
NRBC BLD-RTO: 0 /100 WBC
PLATELET # BLD AUTO: 313 K/UL (ref 150–450)
PMV BLD AUTO: 9.8 FL (ref 9.2–12.9)
POTASSIUM SERPL-SCNC: 4.3 MMOL/L (ref 3.5–5.1)
PROT SERPL-MCNC: 7.7 G/DL (ref 6–8.4)
RBC # BLD AUTO: 6.06 M/UL (ref 4–5.4)
SODIUM SERPL-SCNC: 139 MMOL/L (ref 136–145)
VIT B12 SERPL-MCNC: 1280 PG/ML (ref 210–950)
WBC # BLD AUTO: 6.8 K/UL (ref 3.9–12.7)

## 2024-06-03 PROCEDURE — 1159F MED LIST DOCD IN RCRD: CPT | Mod: HCNC,CPTII,S$GLB, | Performed by: NURSE PRACTITIONER

## 2024-06-03 PROCEDURE — 1160F RVW MEDS BY RX/DR IN RCRD: CPT | Mod: HCNC,CPTII,S$GLB, | Performed by: NURSE PRACTITIONER

## 2024-06-03 PROCEDURE — 99999 PR PBB SHADOW E&M-EST. PATIENT-LVL V: CPT | Mod: PBBFAC,HCNC,, | Performed by: NURSE PRACTITIONER

## 2024-06-03 PROCEDURE — 82607 VITAMIN B-12: CPT | Mod: HCNC | Performed by: NURSE PRACTITIONER

## 2024-06-03 PROCEDURE — 3044F HG A1C LEVEL LT 7.0%: CPT | Mod: HCNC,CPTII,S$GLB, | Performed by: NURSE PRACTITIONER

## 2024-06-03 PROCEDURE — 99999 PR PBB SHADOW E&M-EST. PATIENT-LVL II: CPT | Mod: PBBFAC,HCNC,, | Performed by: DIETITIAN, REGISTERED

## 2024-06-03 PROCEDURE — 36415 COLL VENOUS BLD VENIPUNCTURE: CPT | Mod: HCNC | Performed by: NURSE PRACTITIONER

## 2024-06-03 PROCEDURE — 3080F DIAST BP >= 90 MM HG: CPT | Mod: HCNC,CPTII,S$GLB, | Performed by: NURSE PRACTITIONER

## 2024-06-03 PROCEDURE — 3077F SYST BP >= 140 MM HG: CPT | Mod: HCNC,CPTII,S$GLB, | Performed by: NURSE PRACTITIONER

## 2024-06-03 PROCEDURE — 99024 POSTOP FOLLOW-UP VISIT: CPT | Mod: HCNC,S$GLB,, | Performed by: NURSE PRACTITIONER

## 2024-06-03 PROCEDURE — 80053 COMPREHEN METABOLIC PANEL: CPT | Mod: HCNC | Performed by: NURSE PRACTITIONER

## 2024-06-03 PROCEDURE — 84425 ASSAY OF VITAMIN B-1: CPT | Mod: HCNC | Performed by: NURSE PRACTITIONER

## 2024-06-03 PROCEDURE — 99499 UNLISTED E&M SERVICE: CPT | Mod: HCNC,S$GLB,, | Performed by: DIETITIAN, REGISTERED

## 2024-06-03 PROCEDURE — 4010F ACE/ARB THERAPY RXD/TAKEN: CPT | Mod: HCNC,CPTII,S$GLB, | Performed by: NURSE PRACTITIONER

## 2024-06-03 PROCEDURE — 85025 COMPLETE CBC W/AUTO DIFF WBC: CPT | Mod: HCNC | Performed by: NURSE PRACTITIONER

## 2024-06-03 RX ORDER — OMEPRAZOLE 40 MG/1
40 CAPSULE, DELAYED RELEASE ORAL
Qty: 30 CAPSULE | Refills: 2 | Status: SHIPPED | OUTPATIENT
Start: 2024-06-03

## 2024-06-03 NOTE — PROGRESS NOTES
BARIATRIC POST-OPERATIVE VISIT:    HPI:  Alicia Soliz is a 44 y.o. year old female presents for 2 week post op visit following sleeve to LRNY .  she is doing well and tolerating the diet without difficulty.  she has no complaints.    Denies: nausea, vomiting, abdominal pain, changes in bowel movement pattern, fever, chills, dysphagia, chest pain, and shortness of breath.    ED visit 5/29 no fluids needed pt had c/o dark urine. Urine has improve.      Review of Systems   Constitutional:  Negative for activity change and fatigue.   Respiratory:  Negative for cough and shortness of breath.    Cardiovascular:  Negative for chest pain, palpitations and leg swelling.   Gastrointestinal:  Negative for abdominal pain, nausea and vomiting.   Endocrine: Negative for polydipsia, polyphagia and polyuria.   Genitourinary:  Negative for dysuria.   Musculoskeletal:  Negative for gait problem.   Skin:  Negative for rash.   Allergic/Immunologic: Negative for immunocompromised state.   Neurological:  Negative for dizziness, syncope and weakness.   Hematological:  Does not bruise/bleed easily.   Psychiatric/Behavioral:  Negative for behavioral problems.        EXERCISE & VITAMINS:  See Bariatric Assessment    MEDICATIONS/ALLERGIES:  Have been reviewed.    DIET: Liquid Bariatric Diet.  2-3 protein shakes daily, ~60-90 grams protein.  64 fl oz SF clear beverage.  See Dietician note from today for a more detailed assessment.      Physical Exam  Vitals and nursing note reviewed.   Constitutional:       Appearance: She is well-developed. She is morbidly obese.   HENT:      Head: Normocephalic.      Nose: Nose normal.      Mouth/Throat:      Mouth: Mucous membranes are moist.   Eyes:      Extraocular Movements: Extraocular movements intact.   Cardiovascular:      Rate and Rhythm: Normal rate and regular rhythm.      Heart sounds: Normal heart sounds.   Pulmonary:      Effort: Pulmonary effort is normal.      Breath sounds: Normal  breath sounds.   Abdominal:      General: Bowel sounds are normal.      Palpations: Abdomen is soft.   Musculoskeletal:         General: Normal range of motion.      Cervical back: Normal range of motion.   Skin:     General: Skin is warm and dry.      Capillary Refill: Capillary refill takes less than 2 seconds.   Neurological:      Mental Status: She is alert and oriented to person, place, and time.   Psychiatric:         Mood and Affect: Mood normal.         ASSESSMENT:  - Morbid obesity sleeve to  laparoscopic Jovita-en-Y on 5/20/24.  - Co-morbidities: depression, dyslipidemia, GERD, hypertension, fatty liver and stroke  -  Weight loss, 15#'s and 11 % EWL  -  Exercise routine not formal   - Good Diet  - Good Vitamin regimen    PLAN:  - Anti-Acid medication,  daily for 3 months  - No lifting more than 10 lbs for 6 weeks  - Miralax daily for constipation  - Emphasized the importance of regular exercise and adherence to bariatric diet to achieve maximum weight loss.  - Encouraged patient to start regular exercise.  - Follow-up with dietician to advance diet.  - Continue daily vitamins and medications.  - RTC in 6 weeks or sooner if needed.  - Call the office for any issues.  - Check labs today.    Post Discharge     2 Weeks     Total Opioids Used (Outpatient): 7 -8 doses  Unused Opioids Returned: No Educated on safe opioid disposal location at [facility].   Additional Opioids Provided: no

## 2024-06-03 NOTE — PATIENT INSTRUCTIONS
High Protein Pureed Diet    2 weeks after gastric bypass and sleeve you may be ready to add pureed food to your diet.  All food should be the consistency of baby food, or thinner.  Follow pureed diet for the next 2 weeks.    Protein - It is very important to pay attention to protein intake during this time.      Inadequate protein intake can cause:  Delayed Wound Healing  Hair Loss  Muscle Breakdown    Meal Plan - Eat 3-4 meals per day (2-4 tbsp each), with protein supplements in between to meet protein needs.  Meeting protein needs daily will help increase healing, decrease muscle loss, and increase weight loss.  Your goal is  grams of protein a day.    Protein First - Always eat the foods with the highest protein first.  Foods high in protein include milk, yogurt, cheese, egg whites, and blenderized meat, seafood, and beans.    Fluids - Keep track in your journal of how much you are drinking; you should try to drink at least 64oz of fluids every day.      Foods allowed: Portion size Protein (g)   Sugar-free clear liquids As desired 0   Skim or 1% milk ½ cup 4   Sugar free pudding, light yogurt, custard (use skim or 1% milk in preparation) 3 oz 2.5   Strained baby food meats, or home-made pureed lean meats and shrimp 1 oz 7   Beans (red, white, black, lima, winkler, fat free refried, hummus) and lentils ¼ cup 4   Low-fat/fat free cheese.(cottage cheese, mozzarella string cheese, ricotta cheese, Laughing Cow, Baby Bell, cheddar, etc) ¼ cup 7-8   Scrambled eggs or Egg Beaters 1 or ¼ cup 6   Edamame or Tofu, mashed ¼ cup 5   Unflavored protein powder (add to 1 scoop to  98% fat free soups or SF pudding) 3 Tbsp 9   *PB2: peanut powder (45 calories) 2 Tbsp 5     *PB2 powdered peanut butter: 45 calories vs. 190 calories in 2 tbsp of regular peanut butter. Purchase online at [employer], or  at various [employer], [employer], [employer], [employer] and [employer].        Bariatric Liquid/Pureed Sample Menu    3-4 small meals  plus 2-3 protein drinks per day.    8am 1 egg or ¼ cup Egg Beaters   9am 1 cup water, or decaf coffee or tea   10am Protein drink, 30g protein   11am 2 tbsp low-fat cottage cheese, and 1 tbsp pureed peaches   12pm 1 cup water, or sugar-free lemonade    1pm 2 tbsp pureed chicken, and 1 tbsp pureed carrots    2pm 1 cup water, or sugar-free lemonade   3pm Protein drink, 30g protein   5pm 1 cup water    6pm 1 cup hi-protein creamy chicken soup 14g protein (see Recipe below)   7pm 1 cup water, or sugar-free fruit punch    8pm 1 cup water     This sample menu provides approx. 80g protein and 64oz fluids.  Liquid protein supplements should contain 20-30g protein and less than 4 grams of sugar each.    Sip fluids continuously in between meals.  Drink at least ¼ cup every 15 minutes.  For fluids: ¼ cup = 2 oz = 4 tbsp       RECIPE IDEAS for Bariatric Pureed Diet:    Hi-Protein Creamy Chicken Soup: (10g protein per 1 cup serving)  Empty 1 can of 98% fat free cream of chicken soup into saucepan. Then  blend 1 scoop of unflavored protein powder with 1 can of skim milk until smooth.  Add protein milk to saucepan and heat to warm. (Note: Do NOT boil. Protein powder may clump if heated too hot).     Hi-Protein Pudding: (14g protein per ½ cup serving)  Add 2 scoops protein powder to 2 cups cold skim milk and mix well.  Stir in dry Jell-O Sugar-Free Instant Pudding mix.  Chill and Enjoy!    Tuna Mousse (12g protein per ¼ cup serving) Page 135 in book Eating Well After Weight Loss Surgery.  In a  or , combine all ingredients and pulse until smooth.  2 6-ounce cans tuna packed in water, drained  2 tbsp low-fat mayonnaise  2 tbsp fat-free sour cream  2 tbsp fat-free cream cheese, softened  ½ cup shallots, finely chopped  1 tbsp lemon juice  ¼ tsp ground pepper  ½ tsp celery seed    Chocolate Peanut Butter Mousse  (28g protein total)  6oz plain Greek yogurt  4 tbsp chocolate PB2

## 2024-06-03 NOTE — PATIENT INSTRUCTIONS
Bariatric Vitamin and Mineral Needs    Bariatric surgery can change the way your body absorbs certain vitamins and minerals. With a smaller stomach, it is also harder for you to get all the nutrients you need through food. After bariatric surgery, it is essential for you to take the following vitamins and minerals for the rest of your life to avoid nutrient deficiency. These can be purchased in stores or online (see below).     https://www.bariatricECO2 [employer]astics.Pockethernet/ (use verification code [employer] for discount)  https://store.Plurchase.com/collections/bariatric-vitamins (use discount code ATZHD405 for 20% off your first order)  https://TOOV[employer]ss.com/  https://www.bariatricfusion.com/  https://Quwan.comju[employer]ive [email or web address]/                  Post-Op Medication Instructions    Taking Vitamins, Minerals and Prescribed Medications    DO NOT take gummy multivitamins due to poor quality and sugar content    DO NOT take calcium citrate and iron within 2 hours of each other due to poor absorption    Take one vitamin/mineral/medication at a time, spaced 10-15 mins apart

## 2024-06-06 ENCOUNTER — PATIENT MESSAGE (OUTPATIENT)
Dept: BARIATRICS | Facility: CLINIC | Age: 44
End: 2024-06-06
Payer: MEDICARE

## 2024-06-06 ENCOUNTER — PATIENT MESSAGE (OUTPATIENT)
Dept: INTERNAL MEDICINE | Facility: CLINIC | Age: 44
End: 2024-06-06
Payer: MEDICARE

## 2024-06-10 LAB — VIT B1 BLD-MCNC: 59 UG/L (ref 38–122)

## 2024-06-11 ENCOUNTER — PATIENT MESSAGE (OUTPATIENT)
Dept: BARIATRICS | Facility: CLINIC | Age: 44
End: 2024-06-11
Payer: MEDICARE

## 2024-06-12 ENCOUNTER — TELEPHONE (OUTPATIENT)
Dept: INTERNAL MEDICINE | Facility: CLINIC | Age: 44
End: 2024-06-12
Payer: MEDICARE

## 2024-06-12 ENCOUNTER — PATIENT MESSAGE (OUTPATIENT)
Dept: BARIATRICS | Facility: CLINIC | Age: 44
End: 2024-06-12
Payer: MEDICARE

## 2024-06-12 DIAGNOSIS — Z12.31 ENCOUNTER FOR SCREENING MAMMOGRAM FOR MALIGNANT NEOPLASM OF BREAST: Primary | ICD-10-CM

## 2024-06-12 NOTE — TELEPHONE ENCOUNTER
----- Message from Bing Cook sent at 6/12/2024  1:48 PM CDT -----  Contact: self  .Type: Orders Request    What orders/ testing are being requested? Mammogram     Is there a future appointment scheduled for the patient with PCP?no     When?    Would you prefer a response via [employer]? Call back     Comments:.[phone]

## 2024-06-17 ENCOUNTER — PATIENT MESSAGE (OUTPATIENT)
Dept: INTERNAL MEDICINE | Facility: CLINIC | Age: 44
End: 2024-06-17
Payer: MEDICARE

## 2024-06-17 DIAGNOSIS — Z12.11 COLON CANCER SCREENING: Primary | ICD-10-CM

## 2024-06-18 ENCOUNTER — PATIENT MESSAGE (OUTPATIENT)
Dept: NEUROLOGY | Facility: CLINIC | Age: 44
End: 2024-06-18
Payer: MEDICARE

## 2024-06-18 DIAGNOSIS — F41.9 ANXIETY: ICD-10-CM

## 2024-06-18 DIAGNOSIS — G35 MULTIPLE SCLEROSIS: Primary | ICD-10-CM

## 2024-06-19 ENCOUNTER — PATIENT MESSAGE (OUTPATIENT)
Dept: NEUROLOGY | Facility: CLINIC | Age: 44
End: 2024-06-19
Payer: MEDICARE

## 2024-06-20 ENCOUNTER — TELEPHONE (OUTPATIENT)
Dept: BARIATRICS | Facility: CLINIC | Age: 44
End: 2024-06-20
Payer: MEDICARE

## 2024-06-20 ENCOUNTER — PATIENT MESSAGE (OUTPATIENT)
Dept: BARIATRICS | Facility: CLINIC | Age: 44
End: 2024-06-20
Payer: MEDICARE

## 2024-06-20 ENCOUNTER — PATIENT MESSAGE (OUTPATIENT)
Dept: INTERNAL MEDICINE | Facility: CLINIC | Age: 44
End: 2024-06-20
Payer: MEDICARE

## 2024-06-20 NOTE — TELEPHONE ENCOUNTER
Called pt to discuss Ozempic. No answer, LVM w/call back name and number. Will send portal message.

## 2024-06-20 NOTE — TELEPHONE ENCOUNTER
Pt called, pt stated FABIO Tapia prescribed Ozempic and wanted another prescription called in. Instructed pt to reach out to the provider that manages her diabetes. Understanding stated. All questions and concerns addressed.

## 2024-06-20 NOTE — TELEPHONE ENCOUNTER
----- Message from Jimmie Martinez sent at 6/20/2024  4:01 PM CDT -----  Regarding: returning missed call  Contact: pt @ [phone]  Missed Callback     Pt returning call from missed call. Requesting to speak with Maria G, in the office. Please call to further advise. Thank you for all you are doing.

## 2024-06-21 ENCOUNTER — HOSPITAL ENCOUNTER (OUTPATIENT)
Dept: PREADMISSION TESTING | Facility: HOSPITAL | Age: 44
Discharge: HOME | End: 2024-06-21
Attending: FAMILY MEDICINE
Payer: MEDICARE

## 2024-06-21 DIAGNOSIS — Z12.11 COLON CANCER SCREENING: Primary | ICD-10-CM

## 2024-06-21 RX ORDER — HYDROXYZINE HYDROCHLORIDE 25 MG/1
25 TABLET, FILM COATED ORAL 4 TIMES DAILY PRN
Qty: 12 TABLET | Refills: 0 | Status: SHIPPED | OUTPATIENT
Start: 2024-06-21

## 2024-06-21 RX ORDER — POLYETHYLENE GLYCOL 3350, SODIUM SULFATE, POTASSIUM CHLORIDE, MAGNESIUM SULFATE, AND SODIUM CHLORIDE FOR ORAL SOLUTION 178.7-7.3G
1 KIT ORAL DAILY
Qty: 2 EACH | Refills: 0 | Status: SHIPPED | OUTPATIENT
Start: 2024-06-21 | End: 2024-06-23

## 2024-06-24 ENCOUNTER — PATIENT MESSAGE (OUTPATIENT)
Dept: INTERNAL MEDICINE | Facility: CLINIC | Age: 44
End: 2024-06-24

## 2024-06-24 ENCOUNTER — OFFICE VISIT (OUTPATIENT)
Dept: INTERNAL MEDICINE | Facility: CLINIC | Age: 44
End: 2024-06-24
Payer: MEDICARE

## 2024-06-24 VITALS — SYSTOLIC BLOOD PRESSURE: 136 MMHG | DIASTOLIC BLOOD PRESSURE: 80 MMHG

## 2024-06-24 DIAGNOSIS — R11.10 VOMITING, UNSPECIFIED VOMITING TYPE, UNSPECIFIED WHETHER NAUSEA PRESENT: ICD-10-CM

## 2024-06-24 DIAGNOSIS — K59.00 CONSTIPATION, UNSPECIFIED CONSTIPATION TYPE: Primary | ICD-10-CM

## 2024-06-24 PROCEDURE — 4010F ACE/ARB THERAPY RXD/TAKEN: CPT | Mod: HCNC,CPTII,95, | Performed by: FAMILY MEDICINE

## 2024-06-24 PROCEDURE — 99213 OFFICE O/P EST LOW 20 MIN: CPT | Mod: HCNC,95,, | Performed by: FAMILY MEDICINE

## 2024-06-24 PROCEDURE — G2211 COMPLEX E/M VISIT ADD ON: HCPCS | Mod: HCNC,95,, | Performed by: FAMILY MEDICINE

## 2024-06-24 PROCEDURE — 3075F SYST BP GE 130 - 139MM HG: CPT | Mod: HCNC,CPTII,95, | Performed by: FAMILY MEDICINE

## 2024-06-24 PROCEDURE — 3079F DIAST BP 80-89 MM HG: CPT | Mod: HCNC,CPTII,95, | Performed by: FAMILY MEDICINE

## 2024-06-24 PROCEDURE — 3044F HG A1C LEVEL LT 7.0%: CPT | Mod: HCNC,CPTII,95, | Performed by: FAMILY MEDICINE

## 2024-06-24 RX ORDER — LACTULOSE 10 G/15ML
10 SOLUTION ORAL 3 TIMES DAILY
Qty: 400 ML | Refills: 1 | Status: SHIPPED | OUTPATIENT
Start: 2024-06-24

## 2024-06-24 NOTE — PROGRESS NOTES
The patient location is: [place]    The chief complaint leading to consultation is: follow up    Visit type: audiovisual    Face to Face time with patient: 19:45  24 minutes of total time spent on the encounter, which includes face to face time and non-face to face time preparing to see the patient (eg, review of tests), Obtaining and/or reviewing separately obtained history, Documenting clinical information in the electronic or other health record, Independently interpreting results (not separately reported) and communicating results to the patient/family/caregiver, or Care coordination (not separately reported).         Each patient to whom he or she provides medical services by telemedicine is:  (1) informed of the relationship between the physician and patient and the respective role of any other health care provider with respect to management of the patient; and (2) notified that he or she may decline to receive medical services by telemedicine and may withdraw from such care at any time.    Notes:   Subjective:      Patient ID: Alicia Soliz is a 44 y.o. female.    Chief Complaint: No chief complaint on file.      Patient reports she has gastric band surgery on 5/20/24, doing fairly well, losing weight.   Does report significant constipation since her surgery, has only had 1 bowel movement, nothing in about 2-3 weeks now, reports increased abdominal cramping and gas however no solid or liquid material is coming out.  She has been trying liquids suppositories without any relief.      Review of Systems   Constitutional:  Negative for activity change and unexpected weight change.   HENT:  Negative for hearing loss, rhinorrhea and trouble swallowing.    Eyes:  Negative for discharge and visual disturbance.   Respiratory:  Negative for chest tightness and wheezing.    Cardiovascular:  Negative for chest pain and palpitations.   Gastrointestinal:  Positive for abdominal pain and constipation. Negative for  blood in stool, diarrhea and vomiting.   Endocrine: Negative for polydipsia and polyuria.   Genitourinary:  Negative for difficulty urinating, dysuria, hematuria and menstrual problem.   Musculoskeletal:  Negative for arthralgias, joint swelling and neck pain.   Neurological:  Negative for weakness and headaches.   Psychiatric/Behavioral:  Negative for confusion and dysphoric mood.      Past Medical History:   Diagnosis Date    Anxiety and depression 10/20/2018    Arthritis     Cardiac arrest as complication of medication     Dilaudid    Chronic pain of left knee 10/13/2023    Encounter for blood transfusion 2004    Excessive daytime sleepiness 06/26/2017    GERD (gastroesophageal reflux disease)     Hemiplegia affecting left nondominant side     Hyperglycemia 09/26/2021    Hypertension     Iron deficiency anemia     Mixed hyperlipidemia 03/18/2014    Mixed hyperlipidemia 03/18/2014    Morbid obesity with BMI of 50.0-59.9, adult 09/20/2018    Multiple sclerosis     Otitis externa 04/10/2024    Prediabetes 02/11/2019    Seizure-like activity 02/25/2024    Sprain of medial collateral ligament of left knee 10/13/2023          Past Surgical History:   Procedure Laterality Date    APPENDECTOMY      CHOLECYSTECTOMY      COLONOSCOPY N/A 11/25/2020    Procedure: COLONOSCOPY;  Surgeon: Andrew Jenkins III, MD;  Location: [facility];  Service: Endoscopy;  Laterality: N/A;    ESOPHAGOGASTRODUODENOSCOPY N/A 02/19/2020    Procedure: EGD (ESOPHAGOGASTRODUODENOSCOPY);  Surgeon: Michael Navarrete MD;  Location: [facility];  Service: Endoscopy;  Laterality: N/A;    ESOPHAGOGASTRODUODENOSCOPY N/A 02/20/2020    Procedure: EGD (ESOPHAGOGASTRODUODENOSCOPY);  Surgeon: Michael Navarrete MD;  Location: [facility];  Service: General;  Laterality: N/A;    ESOPHAGOGASTRODUODENOSCOPY N/A 11/25/2020    Procedure: EGD (ESOPHAGOGASTRODUODENOSCOPY);  Surgeon: Andrew Jenkins III, MD;  Location: [facility];  Service: Endoscopy;  Laterality: N/A;     ESOPHAGOGASTRODUODENOSCOPY N/A 09/25/2023    Procedure: EGD (ESOPHAGOGASTRODUODENOSCOPY);  Surgeon: Ly Kim MD;  Location: [facility];  Service: Endoscopy;  Laterality: N/A;    ESOPHAGOGASTRODUODENOSCOPY N/A 01/29/2024    Procedure: EGD (ESOPHAGOGASTRODUODENOSCOPY);  Surgeon: Ly Kim MD;  Location: [facility];  Service: Endoscopy;  Laterality: N/A;    HYSTERECTOMY      KNEE SURGERY      age 10    LAPAROSCOPIC GASTROENTEROSTOMY N/A 5/20/2024    Procedure: GASTROENTEROSTOMY, LAPAROSCOPIC w/intraop EGD;  Surgeon: Farideh Vazquez MD;  Location: [address];  Service: General;  Laterality: N/A;    PH MONITORING, ESOPHAGUS, WIRELESS, (OFF REFLUX MEDS) N/A 01/29/2024    Procedure: PH MONITORING, ESOPHAGUS, WIRELESS, (OFF REFLUX MEDS);  Surgeon: Ly Kim MD;  Location: [facility];  Service: Endoscopy;  Laterality: N/A;    ROBOT-ASSISTED LAPAROSCOPIC SLEEVE GASTRECTOMY USING DA ANTHONY XI N/A 02/20/2020    Procedure: XI ROBOTIC SLEEVE GASTRECTOMY;  Surgeon: Michael Navarrete MD;  Location: [facility];  Service: General;  Laterality: N/A;    TENOPLASTY OF HAND Left 08/26/2021    Procedure: REPAIR, TENDON, HAND;  Surgeon: Joselito Lugo MD;  Location: [facility];  Service: Orthopedics;  Laterality: Left;  Left RCL PIP Joint Repair/Recon with Arthrex Internal Brace.    TONSILLECTOMY      TOTAL REDUCTION MAMMOPLASTY  2022    TUBAL LIGATION       Family History   Problem Relation Name Age of Onset    Lupus Mother      Heart disease Mother      Hypertension Mother      Diabetes Father Ganesh brown     Kidney disease Father Ganesh brown     No Known Problems Sister      No Known Problems Sister      No Known Problems Brother      No Known Problems Brother      No Known Problems Daughter      No Known Problems Daughter      No Known Problems Daughter      Cancer Maternal Aunt Melly Soliz 40        breast    Breast cancer Maternal Aunt Melly Soliz     Diabetes Maternal Aunt Melly  Harley     COPD Maternal Aunt Melly Harley     Heart disease Maternal Grandmother Lilia harley     Breast cancer Maternal Cousin      Ovarian cancer Maternal Cousin       Social History     Socioeconomic History    Marital status: Single    Number of children: 3   Occupational History     Employer: [employer]   Tobacco Use    Smoking status: Never     Passive exposure: Never    Smokeless tobacco: Never   Substance and Sexual Activity    Alcohol use: Not Currently     Comment: once a year     Drug use: No    Sexual activity: Yes     Partners: Female     Birth control/protection: Surgical     Social Determinants of Health     Financial Resource Strain: Low Risk  (2/9/2024)    [facility], [facility]    Overall Financial Resource Strain (CARDIA)     Difficulty of Paying Living Expenses: Not very hard   Food Insecurity: No Food Insecurity (2/9/2024)    [facility], [facility]    Hunger Vital Sign     Worried About Running Out of Food in the Last Year: Never true     Ran Out of Food in the Last Year: Never true   Recent Concern: Food Insecurity - Food Insecurity Present (11/11/2023)    Hunger Vital Sign     Worried About Running Out of Food in the Last Year: Sometimes true     Ran Out of Food in the Last Year: Sometimes true   Transportation Needs: No Transportation Needs (2/9/2024)    Received from [facility] and Affiliates, [facility]    PRAPARE - Transportation     Lack of Transportation (Medical): No     Lack of Transportation (Non-Medical): No   Recent Concern:  "Transportation Needs - Unmet Transportation Needs (11/11/2023)    PRAPARE - Transportation     Lack of Transportation (Medical): Yes     Lack of Transportation (Non-Medical): Yes   Physical Activity: Insufficiently Active (2/9/2024)    [facility], [facility]    Exercise Vital Sign     Days of Exercise per Week: 5 days     Minutes of Exercise per Session: 20 min   Stress: No Stress Concern Present (2/9/2024)    [facility], [facility] and [facility]ates    [place] [employer] - Occupational Stress Questionnaire     Feeling of Stress : Not at all   Housing Stability: Low Risk  (2/9/2024)    [facility], [facility]    Housing Stability Vital Sign     Unable to Pay for Housing in the Last Year: No     Number of Places Lived in the Last Year: 1     In the last 12 months, was there a time when you did not have a steady place to sleep or slept in a shelter (including now)?: No     Review of patient's allergies indicates:   Allergen Reactions    Demerol [meperidine] Anaphylaxis and Hives    Dilaudid [hydromorphone (bulk)] Anaphylaxis     "Code Blue" however patient tolerates Lortab    Shellfish containing products Itching, Nausea And Vomiting and Swelling    Prednisone Itching    Tramadol Hives       Objective:       /80 Comment: home reading  Physical Exam  Constitutional:       General: She is not in acute distress.     Appearance: Normal appearance. She is well-developed. She is not ill-appearing or diaphoretic.   Neurological:      " Mental Status: She is alert and oriented to person, place, and time.   Psychiatric:         Mood and Affect: Mood normal.         Behavior: Behavior normal.         Thought Content: Thought content normal.         Judgment: Judgment normal.       Assessment:     1. Constipation, unspecified constipation type    2. Vomiting, unspecified vomiting type, unspecified whether nausea present      Plan:   Constipation, unspecified constipation type    Vomiting, unspecified vomiting type, unspecified whether nausea present    Other orders  -     linaCLOtide (LINZESS) 145 mcg Cap capsule; Take 1 capsule (145 mcg total) by mouth before breakfast.  Dispense: 90 capsule; Refill: 3  -     lactulose (CHRONULAC) 20 gram/30 mL Soln; Take 15 mLs (10 g total) by mouth 3 (three) times daily.  Dispense: 400 mL; Refill: 1    Discussed with patient that may need to go to ER if symptoms do not resolve soon.  Will try lactulose t.i.d., start Linzess  Medication List with Changes/Refills   New Medications    LACTULOSE (CHRONULAC) 20 GRAM/30 ML SOLN    Take 15 mLs (10 g total) by mouth 3 (three) times daily.   Current Medications    ACETAMINOPHEN (TYLENOL) 160 MG/5 ML LIQD    Take 15.6 mLs (499.2 mg total) by mouth every 8 (eight) hours.    AMLODIPINE (NORVASC) 10 MG TABLET    Take 10 mg by mouth once daily.    CHOLECALCIFEROL, VITAMIN D3, 1,250 MCG (50,000 UNIT) CAPSULE    Take 1 capsule (50,000 Units total) by mouth every 7 days. for 365 doses    CYCLOBENZAPRINE (FLEXERIL) 10 MG TABLET    Take 10 mg by mouth every 8 (eight) hours as needed.    DICLOFENAC SODIUM (VOLTAREN) 1 % GEL    Apply 2 g topically 4 (four) times daily.    DOXEPIN (SINEQUAN) 75 MG CAPSULE    Take 1 capsule (75 mg total) by mouth every evening.    DULOXETINE (CYMBALTA) 60 MG CAPSULE    TAKE 1 CAPSULE BY MOUTH EVERY DAY    GABAPENTIN (NEURONTIN) 300 MG CAPSULE    Take 3 capsules (900 mg total) by mouth 2 (two) times daily.    HYDROXYZINE HCL (ATARAX) 25 MG TABLET    Take  1 tablet (25 mg total) by mouth 4 (four) times daily as needed for Anxiety.    NYSTATIN (MYCOSTATIN) CREAM    Apply topically 2 (two) times daily.    OCREVUS 30 MG/ML SOLN        OMEPRAZOLE (PRILOSEC) 40 MG CAPSULE    Take 1 capsule (40 mg total) by mouth 2 (two) times daily before meals.    ONDANSETRON (ZOFRAN-ODT) 8 MG TBDL    Dissolve 1 tablet (8 mg total) by mouth every 8 (eight) hours.    SCOPOLAMINE (TRANSDERM-SCOP) 1.3-1.5 MG (1 MG OVER 3 DAYS)    Place 1 patch onto the skin Every 3 (three) days.    VALSARTAN (DIOVAN) 80 MG TABLET    Take 1 tablet (80 mg total) by mouth once daily.   Changed and/or Refilled Medications    Modified Medication Previous Medication    LINACLOTIDE (LINZESS) 145 MCG CAP CAPSULE linaCLOtide (LINZESS) 145 mcg Cap capsule       Take 1 capsule (145 mcg total) by mouth before breakfast.    Take 1 capsule (145 mcg total) by mouth before breakfast.

## 2024-06-28 ENCOUNTER — PATIENT MESSAGE (OUTPATIENT)
Dept: INTERNAL MEDICINE | Facility: CLINIC | Age: 44
End: 2024-06-28
Payer: MEDICARE

## 2024-06-28 ENCOUNTER — PATIENT MESSAGE (OUTPATIENT)
Dept: NEUROLOGY | Facility: CLINIC | Age: 44
End: 2024-06-28
Payer: MEDICARE

## 2024-06-28 DIAGNOSIS — F32.A DEPRESSION, UNSPECIFIED DEPRESSION TYPE: Primary | ICD-10-CM

## 2024-06-28 DIAGNOSIS — G35 MULTIPLE SCLEROSIS: Primary | ICD-10-CM

## 2024-07-03 ENCOUNTER — PATIENT MESSAGE (OUTPATIENT)
Dept: BARIATRICS | Facility: CLINIC | Age: 44
End: 2024-07-03
Payer: MEDICARE

## 2024-07-03 ENCOUNTER — HOSPITAL ENCOUNTER (OUTPATIENT)
Dept: RADIOLOGY | Facility: HOSPITAL | Age: 44
Discharge: HOME | End: 2024-07-03
Attending: FAMILY MEDICINE
Payer: MEDICARE

## 2024-07-03 DIAGNOSIS — Z12.31 ENCOUNTER FOR SCREENING MAMMOGRAM FOR MALIGNANT NEOPLASM OF BREAST: ICD-10-CM

## 2024-07-03 PROCEDURE — 77067 SCR MAMMO BI INCL CAD: CPT | Mod: TC,HCNC

## 2024-07-03 PROCEDURE — 77063 BREAST TOMOSYNTHESIS BI: CPT | Mod: 26,HCNC,, | Performed by: RADIOLOGY

## 2024-07-03 PROCEDURE — 77067 SCR MAMMO BI INCL CAD: CPT | Mod: 26,HCNC,, | Performed by: RADIOLOGY

## 2024-07-08 NOTE — TELEPHONE ENCOUNTER
No care due was identified.  [facility] Embedded Care Due Messages. Reference number: [id number].   7/08/2024 1:08:18 PM CDT

## 2024-07-09 ENCOUNTER — PATIENT MESSAGE (OUTPATIENT)
Dept: BARIATRICS | Facility: CLINIC | Age: 44
End: 2024-07-09
Payer: MEDICARE

## 2024-07-09 ENCOUNTER — PATIENT MESSAGE (OUTPATIENT)
Dept: NEUROLOGY | Facility: CLINIC | Age: 44
End: 2024-07-09
Payer: MEDICARE

## 2024-07-10 RX ORDER — LACTULOSE 10 G/15ML
SOLUTION ORAL; RECTAL
Qty: 400 ML | Refills: 3 | Status: SHIPPED | OUTPATIENT
Start: 2024-07-10

## 2024-07-10 RX ORDER — POLYETHYLENE GLYCOL 3350, SODIUM SULFATE ANHYDROUS, SODIUM BICARBONATE, SODIUM CHLORIDE, POTASSIUM CHLORIDE 236; 22.74; 6.74; 5.86; 2.97 G/4L; G/4L; G/4L; G/4L; G/4L
4 POWDER, FOR SOLUTION ORAL ONCE
Qty: 4000 ML | Refills: 0 | Status: SHIPPED | OUTPATIENT
Start: 2024-07-10 | End: 2024-07-10

## 2024-07-15 ENCOUNTER — OFFICE VISIT (OUTPATIENT)
Dept: BARIATRICS | Facility: CLINIC | Age: 44
End: 2024-07-15
Payer: MEDICARE

## 2024-07-15 ENCOUNTER — CLINICAL SUPPORT (OUTPATIENT)
Dept: BARIATRICS | Facility: CLINIC | Age: 44
End: 2024-07-15
Payer: MEDICARE

## 2024-07-15 ENCOUNTER — LAB VISIT (OUTPATIENT)
Dept: LAB | Facility: HOSPITAL | Age: 44
End: 2024-07-15
Payer: MEDICARE

## 2024-07-15 VITALS
BODY MASS INDEX: 40.29 KG/M2 | HEART RATE: 90 BPM | OXYGEN SATURATION: 97 % | WEIGHT: 250.69 LBS | SYSTOLIC BLOOD PRESSURE: 146 MMHG | DIASTOLIC BLOOD PRESSURE: 93 MMHG | HEIGHT: 66 IN

## 2024-07-15 DIAGNOSIS — E66.01 MORBID OBESITY WITH BMI OF 40.0-44.9, ADULT: ICD-10-CM

## 2024-07-15 DIAGNOSIS — Z98.84 S/P GASTRIC BYPASS: Primary | ICD-10-CM

## 2024-07-15 DIAGNOSIS — Z79.899 LONG-TERM USE OF HIGH-RISK MEDICATION: ICD-10-CM

## 2024-07-15 DIAGNOSIS — Z79.899 POLYPHARMACY: ICD-10-CM

## 2024-07-15 DIAGNOSIS — Z71.3 DIETARY COUNSELING AND SURVEILLANCE: ICD-10-CM

## 2024-07-15 DIAGNOSIS — K21.9 GASTROESOPHAGEAL REFLUX DISEASE WITHOUT ESOPHAGITIS: ICD-10-CM

## 2024-07-15 LAB
25(OH)D3+25(OH)D2 SERPL-MCNC: 60 NG/ML (ref 30–96)
ALBUMIN SERPL BCP-MCNC: 3.5 G/DL (ref 3.5–5.2)
ALP SERPL-CCNC: 96 U/L (ref 55–135)
ALT SERPL W/O P-5'-P-CCNC: 12 U/L (ref 10–44)
ANION GAP SERPL CALC-SCNC: 8 MMOL/L (ref 8–16)
AST SERPL-CCNC: 22 U/L (ref 10–40)
BASOPHILS # BLD AUTO: 0.03 K/UL (ref 0–0.2)
BASOPHILS NFR BLD: 0.6 % (ref 0–1.9)
BILIRUB SERPL-MCNC: 0.3 MG/DL (ref 0.1–1)
BUN SERPL-MCNC: 5 MG/DL (ref 6–20)
CALCIUM SERPL-MCNC: 9.6 MG/DL (ref 8.7–10.5)
CHLORIDE SERPL-SCNC: 106 MMOL/L (ref 95–110)
CHOLEST SERPL-MCNC: 174 MG/DL (ref 120–199)
CHOLEST/HDLC SERPL: 3.3 {RATIO} (ref 2–5)
CO2 SERPL-SCNC: 28 MMOL/L (ref 23–29)
CREAT SERPL-MCNC: 0.7 MG/DL (ref 0.5–1.4)
DIFFERENTIAL METHOD BLD: ABNORMAL
EOSINOPHIL # BLD AUTO: 0.1 K/UL (ref 0–0.5)
EOSINOPHIL NFR BLD: 1.6 % (ref 0–8)
ERYTHROCYTE [DISTWIDTH] IN BLOOD BY AUTOMATED COUNT: 18.4 % (ref 11.5–14.5)
EST. GFR  (NO RACE VARIABLE): >60 ML/MIN/1.73 M^2
GLUCOSE SERPL-MCNC: 81 MG/DL (ref 70–110)
HCT VFR BLD AUTO: 41.8 % (ref 37–48.5)
HDLC SERPL-MCNC: 52 MG/DL (ref 40–75)
HDLC SERPL: 29.9 % (ref 20–50)
HGB BLD-MCNC: 12.4 G/DL (ref 12–16)
IMM GRANULOCYTES # BLD AUTO: 0.01 K/UL (ref 0–0.04)
IMM GRANULOCYTES NFR BLD AUTO: 0.2 % (ref 0–0.5)
IRON SERPL-MCNC: 38 UG/DL (ref 30–160)
LDLC SERPL CALC-MCNC: 109.4 MG/DL (ref 63–159)
LYMPHOCYTES # BLD AUTO: 2 K/UL (ref 1–4.8)
LYMPHOCYTES NFR BLD: 39.2 % (ref 18–48)
MCH RBC QN AUTO: 21 PG (ref 27–31)
MCHC RBC AUTO-ENTMCNC: 29.7 G/DL (ref 32–36)
MCV RBC AUTO: 71 FL (ref 82–98)
MONOCYTES # BLD AUTO: 0.4 K/UL (ref 0.3–1)
MONOCYTES NFR BLD: 7.2 % (ref 4–15)
NEUTROPHILS # BLD AUTO: 2.6 K/UL (ref 1.8–7.7)
NEUTROPHILS NFR BLD: 51.2 % (ref 38–73)
NONHDLC SERPL-MCNC: 122 MG/DL
NRBC BLD-RTO: 0 /100 WBC
PLATELET # BLD AUTO: 318 K/UL (ref 150–450)
PMV BLD AUTO: 10.7 FL (ref 9.2–12.9)
POTASSIUM SERPL-SCNC: 4.3 MMOL/L (ref 3.5–5.1)
PROT SERPL-MCNC: 7.1 G/DL (ref 6–8.4)
RBC # BLD AUTO: 5.91 M/UL (ref 4–5.4)
SATURATED IRON: 10 % (ref 20–50)
SODIUM SERPL-SCNC: 142 MMOL/L (ref 136–145)
TOTAL IRON BINDING CAPACITY: 374 UG/DL (ref 250–450)
TRANSFERRIN SERPL-MCNC: 253 MG/DL (ref 200–375)
TRIGL SERPL-MCNC: 63 MG/DL (ref 30–150)
VIT B12 SERPL-MCNC: 804 PG/ML (ref 210–950)
WBC # BLD AUTO: 5.15 K/UL (ref 3.9–12.7)

## 2024-07-15 PROCEDURE — 1159F MED LIST DOCD IN RCRD: CPT | Mod: HCNC,CPTII,S$GLB, | Performed by: NURSE PRACTITIONER

## 2024-07-15 PROCEDURE — 85025 COMPLETE CBC W/AUTO DIFF WBC: CPT | Mod: HCNC | Performed by: NURSE PRACTITIONER

## 2024-07-15 PROCEDURE — 4010F ACE/ARB THERAPY RXD/TAKEN: CPT | Mod: HCNC,CPTII,S$GLB, | Performed by: NURSE PRACTITIONER

## 2024-07-15 PROCEDURE — 99499 UNLISTED E&M SERVICE: CPT | Mod: HCNC,S$GLB,, | Performed by: DIETITIAN, REGISTERED

## 2024-07-15 PROCEDURE — 82306 VITAMIN D 25 HYDROXY: CPT | Mod: HCNC | Performed by: NURSE PRACTITIONER

## 2024-07-15 PROCEDURE — 3077F SYST BP >= 140 MM HG: CPT | Mod: HCNC,CPTII,S$GLB, | Performed by: NURSE PRACTITIONER

## 2024-07-15 PROCEDURE — 3044F HG A1C LEVEL LT 7.0%: CPT | Mod: HCNC,CPTII,S$GLB, | Performed by: NURSE PRACTITIONER

## 2024-07-15 PROCEDURE — 99024 POSTOP FOLLOW-UP VISIT: CPT | Mod: HCNC,S$GLB,, | Performed by: NURSE PRACTITIONER

## 2024-07-15 PROCEDURE — 82607 VITAMIN B-12: CPT | Mod: HCNC | Performed by: NURSE PRACTITIONER

## 2024-07-15 PROCEDURE — 84466 ASSAY OF TRANSFERRIN: CPT | Mod: HCNC | Performed by: NURSE PRACTITIONER

## 2024-07-15 PROCEDURE — 80061 LIPID PANEL: CPT | Mod: HCNC | Performed by: NURSE PRACTITIONER

## 2024-07-15 PROCEDURE — 3080F DIAST BP >= 90 MM HG: CPT | Mod: HCNC,CPTII,S$GLB, | Performed by: NURSE PRACTITIONER

## 2024-07-15 PROCEDURE — 80053 COMPREHEN METABOLIC PANEL: CPT | Mod: HCNC | Performed by: NURSE PRACTITIONER

## 2024-07-15 PROCEDURE — 1160F RVW MEDS BY RX/DR IN RCRD: CPT | Mod: HCNC,CPTII,S$GLB, | Performed by: NURSE PRACTITIONER

## 2024-07-15 PROCEDURE — 99999 PR PBB SHADOW E&M-EST. PATIENT-LVL IV: CPT | Mod: PBBFAC,HCNC,, | Performed by: NURSE PRACTITIONER

## 2024-07-15 PROCEDURE — 84425 ASSAY OF VITAMIN B-1: CPT | Mod: HCNC | Performed by: NURSE PRACTITIONER

## 2024-07-15 NOTE — PATIENT INSTRUCTIONS
Meal Ideas for Regular Bariatric Diet  *Recipes and products available at [email or web address].com      Breakfast: (15-20g protein)    - Egg white omelet: 2 egg whites or ½ cup Egg Beaters. (Optional proteins: cheese, shrimp, black beans, chicken, sliced turkey) (Optional veggies: tomatoes, salsa, spinach, mushrooms, onions, green peppers, or small slice avocado)     - Egg and sausage: 1 egg or ¼ cup Egg Beaters (any variety), with 1 bibiana or 2 links of Turkey sausage or Veggie breakfast sausage (Quemulus or Tandem Transit)    - Crust-less breakfast quiche: To make a glass pie dish, mix 4oz part skim Ricotta, 1 cup skim milk, and 2 eggs as your base. Add protein: shredded cheese, sliced lean ham or turkey, turkey august/sausage. Add veggies: tomato, onion, green onion, mushroom, green pepper, spinach, etc.    - Yogurt parfait: Mix 1 - 6oz container Dannon Light N Fit vanilla yogurt, with ¼ cup crushed unsalted nuts    - Cottage cheese and fruit: ½ cup part-skim cottage cheese or ricotta cheese topped with fresh fruit or sugar free preserves     - Giselle Pappas's Vanilla Egg custard* (add 2 Tbsp instant coffee granules to make Cappuccino Custard*)    - Hi-Protein café latte (skim milk, decaf coffee, 1 scoop protein powder). Optional to add Sugar free syrup or extract flavoring.    - Breakfast Lox: spread fat free cream cheese on slices of smoked salmon. Serve over scrambled or egg over easy (sauteed with nonstick cookspray) OR on a cucumber slice    - Eggwhich: Scramble or cook 1 large egg over easy using nonstick cookspray. Place between 2 slices of [place] Islander august and low fat cheese.     Lunch: (20-30g protein)    - ½ cup Black bean soup (Homemade or Progresso), with ¼ cup shredded low-fat cheese. Top with chopped tomato or fresh salsa.     - Lean deli turkey breast and low-fat sliced cheese, mustard or light mendoza to moisten, rolled up together, or wrapped in a Joni lettuce leaf    - Chicken salad made from dinner  leftovers, moisten with low-fat salad dressing or light mendoza. Also try leftover salmon, shrimp, tuna or boiled eggs. Serve ½ cup over dark green salad    - Fat-free canned refried beans, topped with ¼ cup shredded low-fat cheese. Top with chopped tomato or fresh salsa.     - Greek salad: Top mixed greens with 1-2oz grilled chicken, tomatoes, red onions, 2-3 kalamata olives, and sprinkle lightly with feta cheese. Spritz with Balsamic vinegar to taste.     - Crust-less lunch quiche: To make a glass pie dish, mix 4oz part skim Ricotta, 1 cup skim milk, and 2 eggs as your base. Add protein: shredded cheese, sliced lean ham or turkey, shrimp, chicken. Add veggies: tomato, onion, green onion, mushroom, green pepper, spinach, artichoke, broccoli, etc.    - Pizza bake: spread a  scott fortino mushroom with tomato sauce, low-fat shredded mozzarella and turkey pepperoni or [place] august. Add any veggies. Roast for 10-15 minutes, until cheese melted.     - Cucumber crab bites: Spread ¼ cup crab dip (lump crabmeat + light cream cheese and green onions) over sliced cucumber.     - Chicken with light spinach and artichoke dip*: Puree in : 6oz cooked and drained spinach, 2 cloves garlic, 1 can cannelloni beans, ½ cup chopped green onions, 1 can drained artichoke hearts (not marinated in oil), lemon juice and basil. Mix in 2oz chopped up chicken.    Supper: (20-30g protein)    - Serve grilled fish over dark green salad tossed with low-fat dressing, served with grilled asparagus rodriges     - Rotisserie chicken salad: served with sliced strawberries, walnuts, fat-free feta cheese crumbles and 1 tbsp Holcombs Own Light Raspberry Dequincy Vinaigrette    - Shrimp cocktail: Dip cold boiled shrimp in homemade low-sugar cocktail sauce (1/2 cup Jasmin One Carb ketchup, 2 tbsp horseradish, 1/4 tsp hot sauce, 1 tsp Worcestershire sauce, 1 tbsp freshly-squeezed lemon juice). Serve with dark green salad, walnuts, and crumbled blue  cheese drizzled with olive oil and Balsamic vinegar    - Tuna Melt: Spread tuna salad onto 2 thick slices of tomato. Top with low-fat cheese and broil until cheese is melted. May also be made with chicken salad of shrimp salad. [place] with different types of cheeses.    - Chicken or beef fajitas (no tortilla, rice, beans, chips). Top meat and veggies w/ fresh salsa, fat free sour cream.     - Homemade low-fat Chili using extra lean ground beef or ground turkey. Top with shredded cheese and salsa as desired. May add dollop fat-free sour cream if desired    - Chicken parmesan: Top chicken breast w/ low sugar marinara sauce, mozzarella cheese and bake until chicken reaches 165*.  Serve w/ spaghetti SQUASH or Slovenian cut green beans    - Dinner Omelet with shrimp or chicken and onion, green peppers and chives.    - No noodle lasagna: Use sliced zucchini or eggplant in place of noodles.  Layer with part skim ricotta cheese and low sugar meat sauce (use very lean ground beef or ground turkey).    - Mexican chicken bake: Bake chunks of chicken breast or thigh with taco seasoning, Pace brand enchilada sauce, green onions and low-fat cheese. Serve with ¼ cup black beans or fat free refried beans topped with chopped tomatoes or salsa.    - Drew frozen meatballs, simmered in Classico Marinara sauce. Different flavors of salsa or spaghetti sauce create different dishes! Sprinkle with parmesan cheese. Serve with grilled or steamed veggies, or a dark green salad.    - Simmer boneless skinless chicken thigh chunks in Classico Marinara sauce or roasted salsa until tender with chopped onion, bell pepper, garlic, mushrooms, spinach, etc.     - Hamburger or veggie burger, without the bun, dressed the way you like. Served with grilled or steamed veggies.    - Eggplant parmesan: Bake slices of eggplant at 350 degrees for 15 minutes. Layer tomato sauce, sliced eggplant and low-fat mozzarella cheese in a baking dish and cover with  foil. Bake 30-40 more minutes or until bubbly. Uncover and bake at 400 degrees for about 15 more minutes, or until top is slightly crisp.    - Fish tacos: grilled/baked white fish, wrapped in Joni lettuce leaf, topped with salsa, shredded low-fat cheese, and light coleslaw.    - Chicken vance: Sprinkle chicken w/ 1 tsp of hidden valley ranch dip mix. Then grill chicken and top with black beans, salsa and 1 tsp fat free sour cream.     - Cauliflower pizza crust: Use cauliflower as crust (see recipe on pinterest, no flour!). Top w/ low fat cheese, turkey pepperoni and veggies and bake again    - chicken or turkey crust pizza: use ground chicken or turkey instead of cauliflower, spread in [place] and bake at 350 for about 20-30 minutes(may want to add garlic, black pepper, oregano and other herbs to ground meat mixture).  Remove and top w/ low fat cheese, turkey pepperoni and veggies and bake again for another 10 minutes or until cheese is browned.     Snacks: (100-200 calories; >5g protein)    - 1 low-fat cheese stick with 8 cherry tomatoes or 1 serving fresh fruit  - 4 thin slices fat-free turkey breast and 1 slice low-fat cheese  - 4 thin slices fat-free honey ham with wedge of melon  - 6-8 edamame pods (equivalent to about 1/4 cup edamame without pods).   - 1/4 cup unsalted nuts with ½ cup fruit  - 6-oz container Dannon Light n Fit vanilla yogurt, topped with 1oz unsalted nuts         - apple, celery or baby carrots spread with 2 Tbsp PB2  - apple slices with 1 oz slice low-fat cheese  - Apple slices dipped in 2 Tbsp of PB2  - celery, cucumber, bell pepper or baby carrots dipped in ¼ cup hummus bean spread or light spinach and artichoke dip (*recipe in lunch section)  - celery, cucumber, baby carrots dipped in high protein greek yogurt (Mix 16 oz plain greek yogurt + 1 packet of hidden valley ranch dip mix)  - Fletcher Links Beef Steak - 14g protein! (similar to beef jerky)  - 2 wedges Laughing Cow - Light Herb  & Garlic Cheese with sliced cucumber or green bell pepper  - 1/2 cup low-fat cottage cheese with ¼ cup fruit or ¼ cup salsa  - RTD Protein drinks: Atkins, Low Carb Slim Fast, EAS light, Muscle Milk Light, etc.  - Homemade Protein drinks: GNC Soy95, Isopure, Nectar, UNJURY, Whey Gourmet, etc. Mix 1 scoop powder with 8oz skim/1% milk or light soymilk.  - Protein bars: Atkins, EAS, Pure Protein, Think Thin, Detour, etc. Must have 0-4 grams sugar - Read the label.    Takeout Options: No more than twice/week  Deli - Salads (no pasta or rice), meats, cheeses. Roasted chicken. Lox (salmon)    Mexican - Platters which don't include tortillas, chips, or rice. Go easy on the beans. Example: Fajitas without the tortillas. Ask the  not to bring chips to the table if they are too tempting.    Greek - Meat or fish and vegetable, but no bread or rice. Including hummus, baba ganoush, etc, is OK. Most sit-down Greek restaurants can provide you with cucumber slices for dipping instead of radha bread.    Fast Food (Avoid as much as possible) - Salads (no croutons and limit salad dressing to 2 tbsp), grilled chicken sandwich without the bun and ask for no mendoza. Jasmins low fat chili or Taco Bell pintos and cheese.    BBQ - The meats are fine if you ask for sauces on the side, but most of the traditional side dishes are loaded with carbs. Davon slaw, baked beans and BBQ sauce are typically made with sugar.    Chinese - Nothing deep-fried, no rice or noodles. Many Chinese sauces have starch and sugar in them, so you'll have to use your judgement. If you find that these sauces trigger cravings, or cause Dumping, you can ask for the sauce to be made without sugar or just use soy sauce.     How to taper off of Omeprazole:  - Take 1 Tablet every other day for 2 weeks.  If you do not experience any heartburn, indigestion, nausea symptoms, the following week, take 1 tablet every 3 days.  Again if you remain without the above symptoms, you  may completely discontinue the medication.  If symptoms return at any point, please restart the medication.

## 2024-07-15 NOTE — PROGRESS NOTES
NUTRITION NOTE  Referring Physician: Farideh Jc M.D.   Reason for MNT Referral: Follow-up 8 Weeks s/p Gastric  sleeve to bypass conversion    PAST MEDICAL HISTORY:  Denies nausea, vomiting, constipation, and diarrhea.  Reports doing well.    Past Medical History:   Diagnosis Date    Anxiety and depression 10/20/2018    Arthritis     Cardiac arrest as complication of medication     Dilaudid    Chronic pain of left knee 10/13/2023    Encounter for blood transfusion 2004    Excessive daytime sleepiness 06/26/2017    GERD (gastroesophageal reflux disease)     Hemiplegia affecting left nondominant side     Hyperglycemia 09/26/2021    Hypertension     Iron deficiency anemia     Mixed hyperlipidemia 03/18/2014    Mixed hyperlipidemia 03/18/2014    Morbid obesity with BMI of 50.0-59.9, adult 09/20/2018    Multiple sclerosis     Otitis externa 04/10/2024    Prediabetes 02/11/2019    Seizure-like activity 02/25/2024    Sprain of medial collateral ligament of left knee 10/13/2023         CLINICAL DATA:  44 y.o.-year-old Black or  female.    Current Weight: 250 lbs  BMI: 40.46  Total Weight Loss: 29 lbs    Excess Weight Loss: 21%      CURRENT DIET:  Bariatric Soft Diet    BARIATRIC DIET DISCUSSION:  Discussed waiting 2 hours after eating/drinking before lying down  Reinforced no starchy CHO or sugar beverages or popsicles  Stressed to PT not to advance diet ahead of recommended timelines  Reviewed vitamins and minerals. Instructed PT to send me photos of vitamins and minerals she has    Diet Recall: 55 grams of protein/day; 51 oz of fluids/day    B: 1/2 protein shake  L: 1/2 Hello Fresh meal - baked chicken with broccoli, swedish meatballs with cheese, pasta, and vegetables or chili  D: 1/2 protein shake  S: Peanuts    Diet Includes:   Meal Pattern: 1 meal(s) + 1 snack(s) + 1 protein supplement(s)  Adequate protein supplement intake.  Premier Protein, Smoothie Manuel smoothie (not Gladiator)  Adequate  dairy intake. [place] Balanced Breaks with cheese, dried cranberry and dried banana  Adequate vegetable intake  Adequate fruit intake. Oranges, watermelon with a squeeze of lemon, every other day  Starchy CHO: some pasta  Beverages Water, Crystal Light    EXERCISE:  Walking around house, with dog, sitting exercises, resistance bands     Restrictions to Exercise:  uses motorized scooter      LABS:  None available at time of visit    VITAMINS/MINERALS:  Equate multivitamin daily  B-1 250 mcg, weekly  Calcium citrate 600 mg daily  Vitamin B-12 every other day  D3 5000 IU daily    ASSESSMENT:  Doing poorly overall.  Inadequate protein intake.  Inadequate fluid intake.  Advancing diet appropriately.  Exercising.  Adequate vitamins & minerals.    BARIATRIC DIET DISCUSSION:  Instructed and provided written materials on bariatric regular diet plan.  Reinforced post-op nutrition guidelines.  Reminded PT no starchy CHO until goal weight  Discussed tracking intake to ensure protein goals and calorie range are met  Reviewed appropriate Smoothie Manuel drinks - Gladiator only, no added fruit, nuts butters, ice cream or yogurt  Reviewed sugar content of [place] Balanced Breaks. Advised PT to discontinue, eat cheese without dried fruits, reviewed protein and calorie content of low fat cheese  Reviewed vitamins and minerals  Encouraged PT to increase protein supplement to meet protein goals    PLAN / RECOMMENDATIONS:  Advance to bariatric solid diet.  Increase protein intake.  Increase fluid intake.  Increase light exercise.  Continue appropriate vitamins & minerals.  Adjust vitamins & minerals by increasing calcium to 2 pills 2 x day.    Return to clinic in 4 months.    SESSION TIME: 30 minutes

## 2024-07-15 NOTE — PROGRESS NOTES
BARIATRIC POST-OPERATIVE VISIT:    HPI:  Alicia Soliz is a 44 y.o. year old female presents for 8 week post op visit following sleeve to LRNY .  she is doing well and tolerating the diet without difficulty.  she has no complaints.    Denies: nausea, vomiting, abdominal pain, changes in bowel movement pattern, fever, chills, dysphagia, chest pain, and shortness of breath.      Review of Systems   Constitutional:  Negative for activity change and fatigue.   Respiratory:  Negative for cough and shortness of breath.    Cardiovascular:  Negative for chest pain, palpitations and leg swelling.   Gastrointestinal:  Negative for abdominal pain, nausea and vomiting.   Endocrine: Negative for polydipsia, polyphagia and polyuria.   Genitourinary:  Negative for dysuria.   Musculoskeletal:  Negative for gait problem.   Skin:  Negative for rash.   Allergic/Immunologic: Negative for immunocompromised state.   Neurological:  Negative for dizziness, syncope and weakness.   Hematological:  Does not bruise/bleed easily.   Psychiatric/Behavioral:  Negative for behavioral problems.        EXERCISE & VITAMINS:  See Bariatric Assessment    MEDICATIONS/ALLERGIES:  Have been reviewed.    DIET: Soft/Regular Bariatric Diet. 1-2 protein shakes daily, ~50 grams protein.  64 fl oz SF clear beverage.        See Dietician note from today for a more detailed assessment.      Physical Exam  Vitals and nursing note reviewed.   Constitutional:       Appearance: She is well-developed. She is morbidly obese.   HENT:      Head: Normocephalic.      Nose: Nose normal.      Mouth/Throat:      Mouth: Mucous membranes are moist.   Eyes:      Extraocular Movements: Extraocular movements intact.   Cardiovascular:      Rate and Rhythm: Normal rate and regular rhythm.      Heart sounds: Normal heart sounds.   Pulmonary:      Effort: Pulmonary effort is normal.      Breath sounds: Normal breath sounds.   Abdominal:      General: Bowel sounds are normal.       Palpations: Abdomen is soft.   Musculoskeletal:         General: Normal range of motion.      Cervical back: Normal range of motion.   Skin:     General: Skin is warm and dry.      Capillary Refill: Capillary refill takes less than 2 seconds.   Neurological:      Mental Status: She is alert and oriented to person, place, and time.   Psychiatric:         Mood and Affect: Mood normal.         ASSESSMENT:  - Morbid obesity sleeve to  laparoscopic Jovita-en-Y on 5/20/24.  - Co-morbidities: depression, dyslipidemia, GERD, hypertension, fatty liver and stroke  -  Weight loss, 29#'s and 21% EWL  -  Exercise routine walking in house  - Good Diet   - Good Vitamin regimen    PLAN:  - Anti-Acid medication, daily for 3 months, slow taper  - Miralax daily for constipation  - Emphasized the importance of regular exercise and adherence to bariatric diet to achieve maximum weight loss.  - Encouraged patient to start regular exercise.  - Follow-up with dietician to advance diet.  - Continue daily vitamins and medications.  - RTC in 4 months or sooner if needed.  - Call the  office for any issues.  - Check labs today.    Post Discharge     2 Weeks     Total Opioids Used (Outpatient): 0 doses  Unused Opioids Returned: No Educated on safe opioid disposal location at [facility] outpatient pharmacy.   Additional Opioids Provided: no

## 2024-07-17 ENCOUNTER — PATIENT MESSAGE (OUTPATIENT)
Dept: NEUROLOGY | Facility: CLINIC | Age: 44
End: 2024-07-17
Payer: MEDICARE

## 2024-07-17 ENCOUNTER — PATIENT MESSAGE (OUTPATIENT)
Dept: INTERNAL MEDICINE | Facility: CLINIC | Age: 44
End: 2024-07-17
Payer: MEDICARE

## 2024-07-18 ENCOUNTER — TELEPHONE (OUTPATIENT)
Dept: PREADMISSION TESTING | Facility: HOSPITAL | Age: 44
End: 2024-07-18
Payer: MEDICARE

## 2024-07-18 ENCOUNTER — ANESTHESIA (OUTPATIENT)
Dept: ENDOSCOPY | Facility: HOSPITAL | Age: 44
End: 2024-07-18
Payer: MEDICARE

## 2024-07-18 ENCOUNTER — ANESTHESIA EVENT (OUTPATIENT)
Dept: ENDOSCOPY | Facility: HOSPITAL | Age: 44
End: 2024-07-18
Payer: MEDICARE

## 2024-07-18 ENCOUNTER — HOSPITAL ENCOUNTER (OUTPATIENT)
Facility: HOSPITAL | Age: 44
Discharge: HOME | End: 2024-07-18
Attending: STUDENT IN AN ORGANIZED HEALTH CARE EDUCATION/TRAINING PROGRAM | Admitting: STUDENT IN AN ORGANIZED HEALTH CARE EDUCATION/TRAINING PROGRAM
Payer: MEDICARE

## 2024-07-18 VITALS
WEIGHT: 250.69 LBS | DIASTOLIC BLOOD PRESSURE: 60 MMHG | BODY MASS INDEX: 40.46 KG/M2 | HEART RATE: 79 BPM | SYSTOLIC BLOOD PRESSURE: 120 MMHG | TEMPERATURE: 97 F | OXYGEN SATURATION: 99 % | RESPIRATION RATE: 20 BRPM

## 2024-07-18 DIAGNOSIS — Z12.11 SCREENING FOR COLON CANCER: ICD-10-CM

## 2024-07-18 DIAGNOSIS — Z12.11 COLON CANCER SCREENING: Primary | ICD-10-CM

## 2024-07-18 PROCEDURE — 25000003 PHARM REV CODE 250: Mod: HCNC | Performed by: NURSE ANESTHETIST, CERTIFIED REGISTERED

## 2024-07-18 PROCEDURE — 37000008 HC ANESTHESIA 1ST 15 MINUTES: Mod: HCNC | Performed by: STUDENT IN AN ORGANIZED HEALTH CARE EDUCATION/TRAINING PROGRAM

## 2024-07-18 PROCEDURE — G0105 COLORECTAL SCRN; HI RISK IND: HCPCS | Mod: HCNC | Performed by: STUDENT IN AN ORGANIZED HEALTH CARE EDUCATION/TRAINING PROGRAM

## 2024-07-18 PROCEDURE — 63600175 PHARM REV CODE 636 W HCPCS: Mod: HCNC | Performed by: NURSE ANESTHETIST, CERTIFIED REGISTERED

## 2024-07-18 PROCEDURE — G0105 COLORECTAL SCRN; HI RISK IND: HCPCS | Mod: 53,HCNC,, | Performed by: STUDENT IN AN ORGANIZED HEALTH CARE EDUCATION/TRAINING PROGRAM

## 2024-07-18 RX ORDER — PROPOFOL 10 MG/ML
VIAL (ML) INTRAVENOUS
Status: DISCONTINUED | OUTPATIENT
Start: 2024-07-18 | End: 2024-07-18

## 2024-07-18 RX ORDER — LIDOCAINE HYDROCHLORIDE 10 MG/ML
INJECTION, SOLUTION EPIDURAL; INFILTRATION; INTRACAUDAL; PERINEURAL
Status: DISCONTINUED | OUTPATIENT
Start: 2024-07-18 | End: 2024-07-18

## 2024-07-18 RX ORDER — SODIUM, POTASSIUM,MAG SULFATES 17.5-3.13G
1 SOLUTION, RECONSTITUTED, ORAL ORAL DAILY
Qty: 1 KIT | Refills: 0 | Status: SHIPPED | OUTPATIENT
Start: 2024-07-18 | End: 2024-07-20

## 2024-07-18 RX ADMIN — LIDOCAINE HYDROCHLORIDE 50 MG: 10 SOLUTION INTRAVENOUS at 08:07

## 2024-07-18 RX ADMIN — PROPOFOL 80 MG: 10 INJECTION, EMULSION INTRAVENOUS at 08:07

## 2024-07-18 RX ADMIN — SODIUM CHLORIDE, SODIUM LACTATE, POTASSIUM CHLORIDE, AND CALCIUM CHLORIDE: .6; .31; .03; .02 INJECTION, SOLUTION INTRAVENOUS at 08:07

## 2024-07-18 NOTE — ANESTHESIA POSTPROCEDURE EVALUATION
Anesthesia Post Evaluation    Patient: Alicia Soliz    Procedure(s) Performed: Procedure(s) (LRB):  COLONOSCOPY (N/A)    Final Anesthesia Type: MAC      Patient location during evaluation: PACU  Patient participation: Yes- Able to Participate  Level of consciousness: awake and alert  Post-procedure vital signs: reviewed and stable  Pain management: adequate  Airway patency: patent    PONV status at discharge: No PONV  Anesthetic complications: no      Cardiovascular status: blood pressure returned to baseline  Respiratory status: unassisted and room air  Hydration status: euvolemic  Follow-up not needed.              Vitals Value Taken Time   /74 07/18/24 0905   Temp 36.1 °C (97 °F) 07/18/24 0905   Pulse 76 07/18/24 0905   Resp 22 07/18/24 0905   SpO2 99 % 07/18/24 0905         No case tracking events are documented in the log.      Pain/Alban Score: Alban Score: 8 (7/18/2024  9:04 AM)

## 2024-07-18 NOTE — BRIEF OP NOTE
[facility] - Endoscopy (Hospital)  Brief Operative Note     SUMMARY     Surgery Date: 7/18/2024     Surgeons and Role:     * Brie Cruz MD - Primary    Assisting Surgeon: None    Pre-op Diagnosis:  Colon cancer screening [Z12.11]    Post-op Diagnosis:  Post-Op Diagnosis Codes:     * Colon cancer screening [Z12.11]    Procedure(s) (LRB):  COLONOSCOPY (N/A)    Anesthesia: Choice    Description of the findings of the procedure: See Op Note    Findings/Key Components: stool precluding colonoscopy    Estimated Blood Loss: * No values recorded between 7/18/2024  8:52 AM and 7/18/2024  9:02 AM *         Specimens:   Specimen (24h ago, onward)      None            Discharge Note    SUMMARY     Admit Date: 7/18/2024    Discharge Date and Time: 7/18/2024 9:05 AM    Hospital Course Patient was seen in the preoperative area by both myself and anesthesia. All consents were verified and all questions appropriately answered. All risks, benefits and alternatives explained to patient. Patient proceeded to endoscopy suite for colonoscopy and was discharged home postoperative once cleared by anesthesia.    Final Diagnosis: Post-Op Diagnosis Codes:     * Colon cancer screening [Z12.11]    Disposition: Home or Self Care    Follow Up/Patient Instructions: See Provation report    Medications:  Reconciled Home Medications:      Medication List        CONTINUE taking these medications      cholecalciferol (vitamin D3) 1,250 mcg (50,000 unit) capsule  Take 1 capsule (50,000 Units total) by mouth every 7 days. for 365 doses     cyclobenzaprine 10 MG tablet  Commonly known as: FLEXERIL  Take 10 mg by mouth every 8 (eight) hours as needed.     diclofenac sodium 1 % Gel  Commonly known as: VOLTAREN  Apply 2 g topically 4 (four) times daily.     doxepin 75 MG capsule  Commonly known as: SINEQUAN  Take 1 capsule (75 mg total) by mouth every evening.     DULoxetine 60 MG capsule  Commonly known as: CYMBALTA  TAKE 1 CAPSULE BY MOUTH EVERY DAY      gabapentin 300 MG capsule  Commonly known as: NEURONTIN  Take 3 capsules (900 mg total) by mouth 2 (two) times daily.     linaCLOtide 145 mcg Cap capsule  Commonly known as: LINZESS  Take 1 capsule (145 mcg total) by mouth before breakfast.     omeprazole 40 MG capsule  Commonly known as: PRILOSEC  Take 1 capsule (40 mg total) by mouth 2 (two) times daily before meals.     valsartan 80 MG tablet  Commonly known as: DIOVAN  Take 1 tablet (80 mg total) by mouth once daily.            STOP taking these medications      amLODIPine 10 MG tablet  Commonly known as: NORVASC            ASK your doctor about these medications      hydrOXYzine HCL 25 MG tablet  Commonly known as: ATARAX  Take 1 tablet (25 mg total) by mouth 4 (four) times daily as needed for Anxiety.     lactulose 10 gram/15 mL solution  Commonly known as: CHRONULAC  TAKE 15MLS BY MOUTH THREE TIMES DAILY     OCREVUS 30 mg/mL Soln  Generic drug: ocrelizumab            Discharge Procedure Orders   Diet general

## 2024-07-18 NOTE — PLAN OF CARE
Dr. Cruz at bedside to discuss results of test with patient. Agrees to repeat colonoscopy tomorrow. Verbalizes understanding of Pre-op Instructions.

## 2024-07-18 NOTE — H&P
[facility] ([facility])  Colon and Rectal Surgery  History & Physical    Patient Name: Alicia Soliz  MRN: [id number]  Admission Date: 7/18/2024  Attending Physician: Brie Cruz MD  Primary Care Provider: Braden Dumont MD    Patient information was obtained from patient and medical records.    Subjective:     Chief Complaint/Reason for Admission: Here for Colonoscopy    History of Present Illness:  Patient is a 44 y.o. female presents for colonoscopy. Had colonoscopy 3 years ago with large hyperplastic polyp     Endorses constipation controlled with linzess and lactulose    Denies FH of colorectal cancer, polyps or IBD      No current facility-administered medications on file prior to encounter.     Current Outpatient Medications on File Prior to Encounter   Medication Sig    cholecalciferol, vitamin D3, 1,250 mcg (50,000 unit) capsule Take 1 capsule (50,000 Units total) by mouth every 7 days. for 365 doses    cyclobenzaprine (FLEXERIL) 10 MG tablet Take 10 mg by mouth every 8 (eight) hours as needed.    doxepin (SINEQUAN) 75 MG capsule Take 1 capsule (75 mg total) by mouth every evening.    DULoxetine (CYMBALTA) 60 MG capsule TAKE 1 CAPSULE BY MOUTH EVERY DAY    gabapentin (NEURONTIN) 300 MG capsule Take 3 capsules (900 mg total) by mouth 2 (two) times daily.    omeprazole (PRILOSEC) 40 MG capsule Take 1 capsule (40 mg total) by mouth 2 (two) times daily before meals.    valsartan (DIOVAN) 80 MG tablet Take 1 tablet (80 mg total) by mouth once daily.    amLODIPine (NORVASC) 10 MG tablet Take 10 mg by mouth once daily.    diclofenac sodium (VOLTAREN) 1 % Gel Apply 2 g topically 4 (four) times daily.    hydrOXYzine HCL (ATARAX) 25 MG tablet Take 1 tablet (25 mg total) by mouth 4 (four) times daily as needed for Anxiety. (Patient not taking: Reported on 7/15/2024)    OCREVUS 30 mg/mL Soln  (Patient not taking: Reported on 7/15/2024)       Review of patient's allergies indicates:   Allergen  "Reactions    Demerol [meperidine] Anaphylaxis and Hives    Dilaudid [hydromorphone (bulk)] Anaphylaxis     "Code Blue" however patient tolerates Lortab    Shellfish containing products Itching, Nausea And Vomiting and Swelling    Prednisone Itching    Tramadol Hives       Past Medical History:   Diagnosis Date    Anxiety and depression 10/20/2018    Arthritis     Cardiac arrest as complication of medication     Dilaudid    Chronic pain of left knee 10/13/2023    Encounter for blood transfusion 2004    Excessive daytime sleepiness 06/26/2017    GERD (gastroesophageal reflux disease)     Hemiplegia affecting left nondominant side     Hyperglycemia 09/26/2021    Hypertension     Iron deficiency anemia     Mixed hyperlipidemia 03/18/2014    Mixed hyperlipidemia 03/18/2014    Morbid obesity with BMI of 50.0-59.9, adult 09/20/2018    Multiple sclerosis     Otitis externa 04/10/2024    Prediabetes 02/11/2019    Seizure-like activity 02/25/2024    Sprain of medial collateral ligament of left knee 10/13/2023     Past Surgical History:   Procedure Laterality Date    APPENDECTOMY      CHOLECYSTECTOMY      COLONOSCOPY N/A 11/25/2020    Procedure: COLONOSCOPY;  Surgeon: Andrew Jenkins III, MD;  Location: [facility];  Service: Endoscopy;  Laterality: N/A;    ESOPHAGOGASTRODUODENOSCOPY N/A 02/19/2020    Procedure: EGD (ESOPHAGOGASTRODUODENOSCOPY);  Surgeon: Michael Navarrete MD;  Location: [facility];  Service: Endoscopy;  Laterality: N/A;    ESOPHAGOGASTRODUODENOSCOPY N/A 02/20/2020    Procedure: EGD (ESOPHAGOGASTRODUODENOSCOPY);  Surgeon: Michael Navarrete MD;  Location: [facility];  Service: General;  Laterality: N/A;    ESOPHAGOGASTRODUODENOSCOPY N/A 11/25/2020    Procedure: EGD (ESOPHAGOGASTRODUODENOSCOPY);  Surgeon: Andrew Jenkins III, MD;  Location: [facility] ENDO;  Service: Endoscopy;  Laterality: N/A;    ESOPHAGOGASTRODUODENOSCOPY N/A 09/25/2023    Procedure: EGD (ESOPHAGOGASTRODUODENOSCOPY);  Surgeon: Ly Kim MD;  Location: " [facility] ENDO;  Service: Endoscopy;  Laterality: N/A;    ESOPHAGOGASTRODUODENOSCOPY N/A 01/29/2024    Procedure: EGD (ESOPHAGOGASTRODUODENOSCOPY);  Surgeon: Ly Kim MD;  Location: [facility];  Service: Endoscopy;  Laterality: N/A;    HYSTERECTOMY      KNEE SURGERY      age 10    LAPAROSCOPIC GASTROENTEROSTOMY N/A 05/20/2024    Procedure: GASTROENTEROSTOMY, LAPAROSCOPIC w/intraop EGD;  Surgeon: Farideh Vazquez MD;  Location: [address];  Service: General;  Laterality: N/A;    PH MONITORING, ESOPHAGUS, WIRELESS, (OFF REFLUX MEDS) N/A 01/29/2024    Procedure: PH MONITORING, ESOPHAGUS, WIRELESS, (OFF REFLUX MEDS);  Surgeon: Ly Kim MD;  Location: [facility];  Service: Endoscopy;  Laterality: N/A;    ROBOT-ASSISTED LAPAROSCOPIC SLEEVE GASTRECTOMY USING DA ANTHONY XI N/A 02/20/2020    Procedure: XI ROBOTIC SLEEVE GASTRECTOMY;  Surgeon: Michael Navarrete MD;  Location: [facility];  Service: General;  Laterality: N/A;    TENOPLASTY OF HAND Left 08/26/2021    Procedure: REPAIR, TENDON, HAND;  Surgeon: Joselito Lugo MD;  Location: [facility];  Service: Orthopedics;  Laterality: Left;  Left RCL PIP Joint Repair/Recon with Arthrex Internal Brace.    TONSILLECTOMY      TOTAL REDUCTION MAMMOPLASTY  2022    TUBAL LIGATION       Family History       Problem Relation (Age of Onset)    Breast cancer Maternal Aunt, Maternal Cousin    COPD Maternal Aunt    Cancer Maternal Aunt (40)    Diabetes Father, Maternal Aunt    Heart disease Mother, Maternal Grandmother    Hypertension Mother    Kidney disease Father    Lupus Mother    No Known Problems Sister, Sister, Brother, Brother, Daughter, Daughter, Daughter    Ovarian cancer Maternal Cousin          Tobacco Use    Smoking status: Never     Passive exposure: Never    Smokeless tobacco: Never   Substance and Sexual Activity    Alcohol use: Not Currently     Comment: once a year     Drug use: No    Sexual activity: Yes     Partners: Female     Birth  control/protection: Surgical       Objective:     Vital Signs (Most Recent):  Temp: 97.7 °F (36.5 °C) (07/18/24 0746)  Pulse: 85 (07/18/24 0746)  Resp: 20 (07/18/24 0746)  BP: (!) 145/74 (07/18/24 0746) Vital Signs (24h Range):  Temp:  [97.7 °F (36.5 °C)] 97.7 °F (36.5 °C)  Pulse:  [85] 85  Resp:  [20] 20  BP: (145)/(74) 145/74     Weight: 113.7 kg (250 lb 10.6 oz)  Body mass index is 40.46 kg/m².    Physical Exam  Constitutional:       Appearance: She is well-developed.   HENT:      Head: Normocephalic and atraumatic.   Eyes:      Conjunctiva/sclera: Conjunctivae normal.      Pupils: Pupils are equal, round, and reactive to light.   Neck:      Thyroid: No thyromegaly.   Cardiovascular:      Rate and Rhythm: Normal rate and regular rhythm.   Pulmonary:      Effort: Pulmonary effort is normal. No respiratory distress.   Abdominal:      General: There is no distension.      Palpations: Abdomen is soft. There is no mass.      Tenderness: There is no abdominal tenderness.   Musculoskeletal:         General: No tenderness. Normal range of motion.      Cervical back: Normal range of motion.   Skin:     General: Skin is warm and dry.      Capillary Refill: Capillary refill takes less than 2 seconds.   Neurological:      General: No focal deficit present.      Mental Status: She is alert and oriented to person, place, and time.           Assessment/Plan:     Patient is a 44 y.o. female who presents for colonoscopy     - Ok to proceed to endoscopy suite for colonoscopy  - Consent obtained. All risks, benefits and alternatives fully explained to patient, including but not limited to bleeding, infection, perforation, and missed polyps. All questions appropriately answered to patient's satisfaction. Consent signed and placed on chart.    There are no hospital problems to display for this patient.    VTE Risk Mitigation (From admission, onward)      None            Brie Cruz MD  Colon and Rectal Surgery  [facility] ([facility])

## 2024-07-18 NOTE — PROVATION PATIENT INSTRUCTIONS
Discharge Summary/Instructions after an Endoscopic Procedure  Patient Name: Alicia Soliz  Patient MRN: [id number]  Patient YOB: 1980 Thursday, July 18, 2024 Brie Cruz MD  Dear patient,  As a result of recent federal legislation (The Federal Cures Act), you may   receive lab or pathology results from your procedure in your [facility]   account before your physician is able to contact you. Your physician or   their representative will relay the results to you with their   recommendations at their soonest availability.  Thank you,  RESTRICTIONS:  During your procedure today, you received medications for sedation.  These   medications may affect your judgment, balance and coordination.  Therefore,   for 24 hours, you have the following restrictions:   - DO NOT drive a car, operate machinery, make legal/financial decisions,   sign important papers or drink alcohol.    ACTIVITY:  Today: no heavy lifting, straining or running due to procedural   sedation/anesthesia.  The following day: return to full activity including work.  DIET:  Eat and drink normally unless instructed otherwise.     TREATMENT FOR COMMON SIDE EFFECTS:  - Mild abdominal pain, nausea, belching, bloating or excessive gas:  rest,   eat lightly and use a heating pad.  - Sore Throat: treat with throat lozenges and/or gargle with warm salt   water.  - Because air was used during the procedure, expelling large amounts of air   from your rectum or belching is normal.  - If a bowel prep was taken, you may not have a bowel movement for 1-3 days.    This is normal.  SYMPTOMS TO WATCH FOR AND REPORT TO YOUR PHYSICIAN:  1. Abdominal pain or bloating, other than gas cramps.  2. Chest pain.  3. Back pain.  4. Signs of infection such as: chills or fever occurring within 24 hours   after the procedure.  5. Rectal bleeding, which would show as bright red, maroon, or black stools.   (A tablespoon of blood from the rectum is not serious, especially if    hemorrhoids are present.)  6. Vomiting.  7. Weakness or dizziness.  GO DIRECTLY TO THE NEAREST EMERGENCY ROOM IF YOU HAVE ANY OF THE FOLLOWING:      Difficulty breathing              Chills and/or fever over 101 F   Persistent vomiting and/or vomiting blood   Severe abdominal pain   Severe chest pain   Black, tarry stools   Bleeding- more than one tablespoon   Any other symptom or condition that you feel may need urgent attention  Your doctor recommends these additional instructions:  If any biopsies were taken, your doctors clinic will contact you in 1 to 2   weeks with any results.  - Discharge patient to home (ambulatory).   - Patient has a contact number available for emergencies.  The signs and   symptoms of potential delayed complications were discussed with the   patient.  Return to normal activities tomorrow.  Written discharge   instructions were provided to the patient.   - Resume previous diet.   - Continue present medications.   - Return to primary care physician as previously scheduled.   - Repeat colonoscopy tomorrow because the bowel preparation was poor.  For questions, problems or results please call your physician Brie Cruz MD at Work:  [phone]  If you have any questions about the above instructions, call the GI   department at [phone] or call the endoscopy unit at [phone]   from 7am until 3 pm.  [facility], EMERGENCY ROOM PHONE NUMBER:   [phone]  IF A COMPLICATION OR EMERGENCY SITUATION ARISES AND YOU ARE UNABLE TO REACH   YOUR PHYSICIAN - GO DIRECTLY TO THE EMERGENCY ROOM.  I have read or have had read to me these discharge instructions for my   procedure and have received a written copy.  I understand these   instructions and will follow-up with my physician if I have any questions.     __________________________________       _____________________________________  Nurse Signature                                           Patient/Designated   Responsible Party Signature  Brie Cruz MD  7/18/2024 9:01:24 AM  This report has been verified and signed electronically.  Dear patient,  As a result of recent federal legislation (The Federal Cures Act), you may   receive lab or pathology results from your procedure in your [facility]   account before your physician is able to contact you. Your physician or   their representative will relay the results to you with their   recommendations at their soonest availability.  Thank you,  PROVATION

## 2024-07-18 NOTE — TRANSFER OF CARE
Anesthesia Transfer of Care Note    Patient: Alicia Soliz    Procedure(s) Performed: Procedure(s) (LRB):  COLONOSCOPY (N/A)    Patient location: PACU    Anesthesia Type: MAC    Transport from OR: Transported from OR on room air with adequate spontaneous ventilation    Post pain: adequate analgesia    Post assessment: no apparent anesthetic complications    Post vital signs: stable    Level of consciousness: responds to stimulation    Nausea/Vomiting: no nausea/vomiting    Complications: none    Transfer of care protocol was followed      Last vitals: Visit Vitals  BP (!) 145/74 (BP Location: Left arm, Patient Position: Lying)   Pulse 85   Temp 36.5 °C (97.7 °F) (Temporal)   Resp 20   Wt 113.7 kg (250 lb 10.6 oz)   Breastfeeding No   BMI 40.46 kg/m²

## 2024-07-18 NOTE — ANESTHESIA PREPROCEDURE EVALUATION
07/18/2024  Alicia Soliz is a 44 y.o., female.    Patient Active Problem List   Diagnosis    Essential hypertension    Multiple sclerosis, relapsing-remitting    Morbid obesity with BMI of 45.0-49.9, adult    Opioid dependence, uncomplicated    Hemiplegia affecting left nondominant side    Fatty liver    Localized swelling of left lower extremity    Decreased strength, endurance, and mobility    Left hemiparesis    Primary osteoarthritis of left knee    Weakness of left lower extremity    Gastroesophageal reflux disease without esophagitis    Seizure-like activity    Acute pain of left shoulder    Anxiety and depression    Chronic pain syndrome    Abnormality of gait due to impairment of balance    Insomnia    Mixed hyperlipidemia    Iron deficiency anemia    Class 3 severe obesity due to excess calories with body mass index (BMI) of 45.0 to 49.9 in adult    History of stroke    Nausea     Past Medical History:   Diagnosis Date    Anxiety and depression 10/20/2018    Arthritis     Cardiac arrest as complication of medication     Dilaudid    Chronic pain of left knee 10/13/2023    Encounter for blood transfusion 2004    Excessive daytime sleepiness 06/26/2017    GERD (gastroesophageal reflux disease)     Hemiplegia affecting left nondominant side     Hyperglycemia 09/26/2021    Hypertension     Iron deficiency anemia     Mixed hyperlipidemia 03/18/2014    Mixed hyperlipidemia 03/18/2014    Morbid obesity with BMI of 50.0-59.9, adult 09/20/2018    Multiple sclerosis     Otitis externa 04/10/2024    Prediabetes 02/11/2019    Seizure-like activity 02/25/2024    Sprain of medial collateral ligament of left knee 10/13/2023     Past Surgical History:   Procedure Laterality Date    APPENDECTOMY      CHOLECYSTECTOMY      COLONOSCOPY N/A 11/25/2020    Procedure: COLONOSCOPY;  Surgeon: Andrew Jenkins III, MD;   Location: [facility] ENDO;  Service: Endoscopy;  Laterality: N/A;    ESOPHAGOGASTRODUODENOSCOPY N/A 02/19/2020    Procedure: EGD (ESOPHAGOGASTRODUODENOSCOPY);  Surgeon: Michael Navarrete MD;  Location: [facility] ENDO;  Service: Endoscopy;  Laterality: N/A;    ESOPHAGOGASTRODUODENOSCOPY N/A 02/20/2020    Procedure: EGD (ESOPHAGOGASTRODUODENOSCOPY);  Surgeon: Michael Navarrete MD;  Location: [facility] OR;  Service: General;  Laterality: N/A;    ESOPHAGOGASTRODUODENOSCOPY N/A 11/25/2020    Procedure: EGD (ESOPHAGOGASTRODUODENOSCOPY);  Surgeon: Andrew Jenkins III, MD;  Location: [facility];  Service: Endoscopy;  Laterality: N/A;    ESOPHAGOGASTRODUODENOSCOPY N/A 09/25/2023    Procedure: EGD (ESOPHAGOGASTRODUODENOSCOPY);  Surgeon: Ly Kim MD;  Location: [facility];  Service: Endoscopy;  Laterality: N/A;    ESOPHAGOGASTRODUODENOSCOPY N/A 01/29/2024    Procedure: EGD (ESOPHAGOGASTRODUODENOSCOPY);  Surgeon: Ly Kim MD;  Location: [facility];  Service: Endoscopy;  Laterality: N/A;    HYSTERECTOMY      KNEE SURGERY      age 10    LAPAROSCOPIC GASTROENTEROSTOMY N/A 05/20/2024    Procedure: GASTROENTEROSTOMY, LAPAROSCOPIC w/intraop EGD;  Surgeon: Farideh Vazquez MD;  Location: [address];  Service: General;  Laterality: N/A;    PH MONITORING, ESOPHAGUS, WIRELESS, (OFF REFLUX MEDS) N/A 01/29/2024    Procedure: PH MONITORING, ESOPHAGUS, WIRELESS, (OFF REFLUX MEDS);  Surgeon: Ly Kim MD;  Location: [facility];  Service: Endoscopy;  Laterality: N/A;    ROBOT-ASSISTED LAPAROSCOPIC SLEEVE GASTRECTOMY USING DA ANTHONY XI N/A 02/20/2020    Procedure: XI ROBOTIC SLEEVE GASTRECTOMY;  Surgeon: Michael Navarrete MD;  Location: [facility];  Service: General;  Laterality: N/A;    TENOPLASTY OF HAND Left 08/26/2021    Procedure: REPAIR, TENDON, HAND;  Surgeon: Joselito Lugo MD;  Location: [facility];  Service: Orthopedics;  Laterality: Left;  Left RCL PIP Joint Repair/Recon with Arthrex Internal Brace.    TONSILLECTOMY       TOTAL REDUCTION MAMMOPLASTY  2022    TUBAL LIGATION           Pre-op Assessment    I have reviewed the Patient Summary Reports.    I have reviewed the NPO Status.   I have reviewed the Medications.     Review of Systems  Anesthesia Hx:  No problems with previous Anesthesia   History of prior surgery of interest to airway management or planning:  Previous anesthesia: General       Airway issues documented on chart review include mask, easy, easy direct laryngoscopy     Denies Family Hx of Anesthesia complications.    Denies Personal Hx of Anesthesia complications.                    Social:  Non-Smoker       Hematology/Oncology:    Oncology Normal    -- Anemia:                                  EENT/Dental:  EENT/Dental Normal           Cardiovascular:     Hypertension           hyperlipidemia                             Pulmonary:  Pulmonary Normal                       Renal/:  Renal/ Normal                 Hepatic/GI:  Bowel Prep.   GERD Liver Disease,  Prev gastric sleeve           Musculoskeletal:  Arthritis               Neurological:           MS with Left sided weakness                          Neuromuscular Disease, Multiple Sclerosis Left-sided hemiplegia   Endocrine:        Morbid Obesity / BMI > 40  Psych:  Psychiatric History anxiety depression                Physical Exam  General: Cooperative and Alert    Airway:  Mallampati: II   Mouth Opening: Normal  TM Distance: Normal  Tongue: Normal  Neck ROM: Normal ROM    Dental:  Intact      Results for orders placed or performed during the hospital encounter of 02/24/24   EKG 12-lead    Collection Time: 02/25/24 12:54 AM   Result Value Ref Range    QRS Duration 68 ms    [facility] QTC Calculation 430 ms    Narrative    Test Reason : R56.9,    Vent. Rate : 083 BPM     Atrial Rate : 083 BPM     P-R Int : 152 ms          QRS Dur : 068 ms      QT Int : 366 ms       P-R-T Axes : 038 020 016 degrees     QTc Int : 430 ms    Normal sinus rhythm  Possible Septal  infarct ,age undetermined  Abnormal ECG  Confirmed by ANNITA BLANC MD ([id number]) on 2/25/2024 6:38:33 PM    Referred By: [email or web address]   SELF           Confirmed By:ANNITA BLANC MD     Results for orders placed during the hospital encounter of 02/24/24    Echo Saline Bubble? Yes    Interpretation Summary    Left Ventricle: There is normal systolic function with a visually estimated ejection fraction of 60 - 65%. There is normal diastolic function.    Right Ventricle: Normal right ventricular cavity size. Right ventricle wall motion  is normal. Systolic function is normal.    IVC/SVC: Normal venous pressure at 3 mmHg.    Negative bubble study.      Anesthesia Plan  Type of Anesthesia, risks & benefits discussed:    Anesthesia Type: MAC, Gen Natural Airway  Intra-op Monitoring Plan: Standard ASA Monitors  Induction:  IV  Informed Consent: Informed consent signed with the Patient and all parties understand the risks and agree with anesthesia plan.  All questions answered.   ASA Score: 3  Day of Surgery Review of History & Physical: H&P Update referred to the surgeon/provider.    Ready For Surgery From Anesthesia Perspective.     .

## 2024-07-20 ENCOUNTER — OFFICE VISIT (OUTPATIENT)
Dept: URGENT CARE | Facility: CLINIC | Age: 44
End: 2024-07-20
Payer: MEDICARE

## 2024-07-20 VITALS
WEIGHT: 250.13 LBS | HEART RATE: 91 BPM | TEMPERATURE: 98 F | HEIGHT: 66 IN | OXYGEN SATURATION: 100 % | BODY MASS INDEX: 40.2 KG/M2 | DIASTOLIC BLOOD PRESSURE: 61 MMHG | RESPIRATION RATE: 18 BRPM | SYSTOLIC BLOOD PRESSURE: 140 MMHG

## 2024-07-20 DIAGNOSIS — M25.512 ACUTE PAIN OF LEFT SHOULDER: ICD-10-CM
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
## 2024-07-20 DIAGNOSIS — M79.652 LEFT THIGH PAIN: ICD-10-CM

## 2024-07-20 PROCEDURE — 99213 OFFICE O/P EST LOW 20 MIN: CPT | Mod: S$GLB,,, | Performed by: NURSE PRACTITIONER

## 2024-07-20 RX ORDER — HYDROCODONE BITARTRATE AND ACETAMINOPHEN 5; 325 MG/1; MG/1
1 TABLET ORAL EVERY 6 HOURS PRN
Qty: 20 TABLET | Refills: 0 | Status: SHIPPED | OUTPATIENT
Start: 2024-07-20 | End: 2024-07-20 | Stop reason: CLARIF
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
## 2024-07-20 NOTE — PATIENT INSTRUCTIONS
After an injury pain may be at its worst on days 3-7. Symptomatic treatment is strongly encouraged. Warm compress to the site in pain and warm epsom salt soaks.   OTC rubs and patches such as lidocaine and diclofenac can be used to help with pain.   Please take precautions: Muscle relaxer's may cause drowsiness. Do not drive or operate heavy machinery while taking.             Please arrange follow up with your primary medical clinic as soon as possible. You must understand that you've received an Urgent Care treatment only and that you may be released before all of your medical problems are known or treated. You, the patient, will arrange for follow up as instructed. If your symptoms worsen or fail to improve you should go to the Emergency Room.

## 2024-07-22 ENCOUNTER — ANESTHESIA EVENT (OUTPATIENT)
Dept: ENDOSCOPY | Facility: HOSPITAL | Age: 44
End: 2024-07-22
Payer: MEDICARE

## 2024-07-22 ENCOUNTER — PATIENT MESSAGE (OUTPATIENT)
Dept: INTERNAL MEDICINE | Facility: CLINIC | Age: 44
End: 2024-07-22
Payer: MEDICARE

## 2024-07-22 ENCOUNTER — ANESTHESIA (OUTPATIENT)
Dept: ENDOSCOPY | Facility: HOSPITAL | Age: 44
End: 2024-07-22
Payer: MEDICARE

## 2024-07-22 ENCOUNTER — HOSPITAL ENCOUNTER (OUTPATIENT)
Facility: HOSPITAL | Age: 44
Discharge: HOME | End: 2024-07-22
Attending: INTERNAL MEDICINE | Admitting: INTERNAL MEDICINE
Payer: MEDICARE

## 2024-07-22 DIAGNOSIS — Z12.11 COLON CANCER SCREENING: Primary | ICD-10-CM

## 2024-07-22 DIAGNOSIS — K63.5 COLON POLYP: ICD-10-CM

## 2024-07-22 LAB — VIT B1 BLD-MCNC: 38 UG/L (ref 38–122)

## 2024-07-22 PROCEDURE — 37000008 HC ANESTHESIA 1ST 15 MINUTES: Mod: HCNC | Performed by: INTERNAL MEDICINE

## 2024-07-22 PROCEDURE — 63600175 PHARM REV CODE 636 W HCPCS: Mod: HCNC | Performed by: NURSE ANESTHETIST, CERTIFIED REGISTERED

## 2024-07-22 PROCEDURE — 25000003 PHARM REV CODE 250: Mod: HCNC | Performed by: NURSE ANESTHETIST, CERTIFIED REGISTERED

## 2024-07-22 PROCEDURE — G0105 COLORECTAL SCRN; HI RISK IND: HCPCS | Mod: 74,HCNC | Performed by: INTERNAL MEDICINE

## 2024-07-22 PROCEDURE — G0105 COLORECTAL SCRN; HI RISK IND: HCPCS | Mod: 53,HCNC,, | Performed by: INTERNAL MEDICINE

## 2024-07-22 RX ORDER — SODIUM, POTASSIUM,MAG SULFATES 17.5-3.13G
1 SOLUTION, RECONSTITUTED, ORAL ORAL DAILY
Qty: 1 KIT | Refills: 0 | Status: ACTIVE | OUTPATIENT
Start: 2024-07-22 | End: 2024-07-25

## 2024-07-22 RX ORDER — PROPOFOL 10 MG/ML
VIAL (ML) INTRAVENOUS
Status: DISCONTINUED | OUTPATIENT
Start: 2024-07-22 | End: 2024-07-22

## 2024-07-22 RX ORDER — LIDOCAINE HYDROCHLORIDE 10 MG/ML
INJECTION, SOLUTION EPIDURAL; INFILTRATION; INTRACAUDAL; PERINEURAL
Status: DISCONTINUED | OUTPATIENT
Start: 2024-07-22 | End: 2024-07-22

## 2024-07-22 RX ADMIN — Medication 50 MG: at 09:07

## 2024-07-22 RX ADMIN — SODIUM CHLORIDE, SODIUM LACTATE, POTASSIUM CHLORIDE, AND CALCIUM CHLORIDE: .6; .31; .03; .02 INJECTION, SOLUTION INTRAVENOUS at 09:07

## 2024-07-22 RX ADMIN — Medication 30 MG: at 09:07

## 2024-07-22 RX ADMIN — LIDOCAINE HYDROCHLORIDE 50 MG: 10 SOLUTION INTRAVENOUS at 09:07

## 2024-07-22 NOTE — TRANSFER OF CARE
"Anesthesia Transfer of Care Note    Patient: Alicia Soliz    Procedure(s) Performed: Procedure(s) (LRB):  COLONOSCOPY (N/A)    Patient location: GI    Anesthesia Type: MAC    Transport from OR: Transported from OR on room air with adequate spontaneous ventilation    Post pain: adequate analgesia    Post assessment: no apparent anesthetic complications    Post vital signs: stable    Level of consciousness: responds to stimulation    Nausea/Vomiting: no nausea/vomiting    Complications: none    Transfer of care protocol was followed      Last vitals: Visit Vitals  BP (!) 125/92 (BP Location: Left arm, Patient Position: Lying)   Pulse 79   Temp 36.5 °C (97.7 °F) (Temporal)   Resp 20   Ht 5' 6" (1.676 m)   Wt 113.4 kg (250 lb)   SpO2 99%   Breastfeeding No   BMI 40.35 kg/m²     "

## 2024-07-22 NOTE — PLAN OF CARE
Dr Diaz came to bedside and discussed findings. NO N/V,  no abdominal pain, no GI bleeding, and vitals stable.  Pt discharged from unit.

## 2024-07-22 NOTE — [employer] PATIENT INSTRUCTIONS
Discharge Summary/Instructions after an Endoscopic Procedure  Patient Name: Alicia Soliz  Patient MRN: [id number]  Patient YOB: 1980 Monday, July 22, 2024 Galina Diaz MD  Dear patient,  As a result of recent federal legislation (The Federal Cures Act), you may   receive lab or pathology results from your procedure in your [facility]   account before your physician is able to contact you. Your physician or   their representative will relay the results to you with their   recommendations at their soonest availability.  Thank you,  RESTRICTIONS:  During your procedure today, you received medications for sedation.  These   medications may affect your judgment, balance and coordination.  Therefore,   for 24 hours, you have the following restrictions:   - DO NOT drive a car, operate machinery, make legal/financial decisions,   sign important papers or drink alcohol.    ACTIVITY:  Today: no heavy lifting, straining or running due to procedural   sedation/anesthesia.  The following day: return to full activity including work.  DIET:  Eat and drink normally unless instructed otherwise.     TREATMENT FOR COMMON SIDE EFFECTS:  - Mild abdominal pain, nausea, belching, bloating or excessive gas:  rest,   eat lightly and use a heating pad.  - Sore Throat: treat with throat lozenges and/or gargle with warm salt   water.  - Because air was used during the procedure, expelling large amounts of air   from your rectum or belching is normal.  - If a bowel prep was taken, you may not have a bowel movement for 1-3 days.    This is normal.  SYMPTOMS TO WATCH FOR AND REPORT TO YOUR PHYSICIAN:  1. Abdominal pain or bloating, other than gas cramps.  2. Chest pain.  3. Back pain.  4. Signs of infection such as: chills or fever occurring within 24 hours   after the procedure.  5. Rectal bleeding, which would show as bright red, maroon, or black stools.   (A tablespoon of blood from the rectum is not serious, especially if    hemorrhoids are present.)  6. Vomiting.  7. Weakness or dizziness.  GO DIRECTLY TO THE NEAREST EMERGENCY ROOM IF YOU HAVE ANY OF THE FOLLOWING:      Difficulty breathing              Chills and/or fever over 101 F   Persistent vomiting and/or vomiting blood   Severe abdominal pain   Severe chest pain   Black, tarry stools   Bleeding- more than one tablespoon   Any other symptom or condition that you feel may need urgent attention  Your doctor recommends these additional instructions:  If any biopsies were taken, your doctors clinic will contact you in 1 to 2   weeks with any results.  - Discharge patient to home.   - Resume previous diet.   - Continue present medications.   - Await pathology results.   - Repeat colonoscopy at the next available appointment because the bowel   preparation was suboptimal.   - Patient will need extended bowel preparartion.   - Patient has a contact number available for emergencies.  The signs and   symptoms of potential delayed complications were discussed with the   patient.  Return to normal activities tomorrow.  Written discharge   instructions were provided to the patient.  For questions, problems or results please call your physician Galina Diaz MD at Work:  [phone]  If you have any questions about the above instructions, call the GI   department at [phone] or call the endoscopy unit at [phone]   from 7am until 3 pm.  [facility] [place], EMERGENCY ROOM PHONE NUMBER:   [phone]  IF A COMPLICATION OR EMERGENCY SITUATION ARISES AND YOU ARE UNABLE TO REACH   YOUR PHYSICIAN - GO DIRECTLY TO THE EMERGENCY ROOM.  I have read or have had read to me these discharge instructions for my   procedure and have received a written copy.  I understand these   instructions and will follow-up with my physician if I have any questions.     __________________________________       _____________________________________  Nurse Signature                                           Patient/Designated   Responsible Party Signature  Galina Diaz MD  7/22/2024 9:17:18 AM  This report has been verified and signed electronically.  Dear patient,  As a result of recent federal legislation (The Federal Cures Act), you may   receive lab or pathology results from your procedure in your [facility]   account before your physician is able to contact you. Your physician or   their representative will relay the results to you with their   recommendations at their soonest availability.  Thank you,  [employer]ATION

## 2024-07-22 NOTE — ANESTHESIA PREPROCEDURE EVALUATION
07/22/2024  Ailcia Soliz is a 44 y.o., female.      Pre-op Assessment    I have reviewed the Patient Summary Reports.    I have reviewed the NPO Status.   I have reviewed the Medications.     Review of Systems  Anesthesia Hx:  No problems with previous Anesthesia                Social:  Non-Smoker       Cardiovascular:     Hypertension           hyperlipidemia    Summary       ·  Left Ventricle: There is normal systolic function with a visually estimated ejection fraction of 60 - 65%. There is normal diastolic function.  ·  Right Ventricle: Normal right ventricular cavity size. Right ventricle wall motion  is normal. Systolic function is normal.  ·  IVC/SVC: Normal venous pressure at 3 mmHg.  ·  Negative bubble study.                     Hypertension         Pulmonary:  Pulmonary Normal                       Renal/:  Renal/ Normal                 Hepatic/GI:  Bowel Prep.   GERD Liver Disease,     Gerd       Liver Disease        Musculoskeletal:  Arthritis        Arthritis          Neurological:   CVA, residual symptoms Neuromuscular Disease,   Seizures    Hemiplegia left side   Multiple sclerosis       Chronic Pain Syndrome Arthritis                           Endocrine:        Morbid Obesity / BMI > 40  Psych:  Psychiatric History anxiety depression                Physical Exam  General: Cooperative and Alert    Airway:  Mallampati: II   Mouth Opening: Normal  TM Distance: Normal  Tongue: Normal  Neck ROM: Normal ROM    Dental:  Intact        Anesthesia Plan  Type of Anesthesia, risks & benefits discussed:    Anesthesia Type: MAC, Gen Natural Airway  Intra-op Monitoring Plan: Standard ASA Monitors  Post Op Pain Control Plan: IV/PO Opioids PRN  Induction:  IV  Informed Consent: Informed consent signed with the Patient and all parties understand the risks and agree with anesthesia plan.  All  questions answered.   ASA Score: 3  Day of Surgery Review of History & Physical: H&P Update referred to the surgeon/provider.I have interviewed and examined the patient. I have reviewed the patient's H&P dated: There are no significant changes.     Ready For Surgery From Anesthesia Perspective.     .       initiate skin to skin/initiate hand expression routine/initiate dual electric pump routine

## 2024-07-22 NOTE — H&P
PRE PROCEDURE H&P    Patient Name: Alicia Soliz  MRN: [id number]  : 1980  Date of Procedure:  2024  Referring Physician: Brie Cruz MD  Primary Physician: Braden Dumont MD  Procedure Physician: Galina Diaz MD       Planned Procedure: Colonoscopy  Diagnosis: polyp of colon   Chief Complaint: Same as above    HPI: Patient is an 44 y.o. female is here for the above.     Last colonoscopy: 2024  Family history: Aunt with colon cancer  Anticoagulation:  None     Past Medical History:   Past Medical History:   Diagnosis Date    Anxiety and depression 10/20/2018    Arthritis     Cardiac arrest as complication of medication     Dilaudid    Chronic pain of left knee 10/13/2023    Encounter for blood transfusion 2004    Excessive daytime sleepiness 2017    GERD (gastroesophageal reflux disease)     Hemiplegia affecting left nondominant side     Hyperglycemia 2021    Hypertension     Iron deficiency anemia     Mixed hyperlipidemia 2014    Mixed hyperlipidemia 2014    Morbid obesity with BMI of 50.0-59.9, adult 2018    Multiple sclerosis     Otitis externa 04/10/2024    Prediabetes 2019    Seizure-like activity 2024    Sprain of medial collateral ligament of left knee 10/13/2023        Past Surgical History:  Past Surgical History:   Procedure Laterality Date    APPENDECTOMY      CHOLECYSTECTOMY      COLONOSCOPY N/A 2020    Procedure: COLONOSCOPY;  Surgeon: Andrew Jenkins III, MD;  Location: [facility];  Service: Endoscopy;  Laterality: N/A;    COLONOSCOPY N/A 2024    Procedure: COLONOSCOPY;  Surgeon: Brie Cruz MD;  Location: [facility];  Service: Endoscopy;  Laterality: N/A;    ESOPHAGOGASTRODUODENOSCOPY N/A 2020    Procedure: EGD (ESOPHAGOGASTRODUODENOSCOPY);  Surgeon: Michael Navarrete MD;  Location: [facility];  Service: Endoscopy;  Laterality: N/A;    ESOPHAGOGASTRODUODENOSCOPY N/A 2020    Procedure: EGD  (ESOPHAGOGASTRODUODENOSCOPY);  Surgeon: Michael Navarrete MD;  Location: [facility];  Service: General;  Laterality: N/A;    ESOPHAGOGASTRODUODENOSCOPY N/A 11/25/2020    Procedure: EGD (ESOPHAGOGASTRODUODENOSCOPY);  Surgeon: Andrew Jenkins III, MD;  Location: [facility];  Service: Endoscopy;  Laterality: N/A;    ESOPHAGOGASTRODUODENOSCOPY N/A 09/25/2023    Procedure: EGD (ESOPHAGOGASTRODUODENOSCOPY);  Surgeon: Ly Kim MD;  Location: [facility];  Service: Endoscopy;  Laterality: N/A;    ESOPHAGOGASTRODUODENOSCOPY N/A 01/29/2024    Procedure: EGD (ESOPHAGOGASTRODUODENOSCOPY);  Surgeon: Ly Kim MD;  Location: [facility];  Service: Endoscopy;  Laterality: N/A;    HYSTERECTOMY      KNEE SURGERY      age 10    LAPAROSCOPIC GASTROENTEROSTOMY N/A 05/20/2024    Procedure: GASTROENTEROSTOMY, LAPAROSCOPIC w/intraop EGD;  Surgeon: Farideh Vazquez MD;  Location: [address];  Service: General;  Laterality: N/A;    PH MONITORING, ESOPHAGUS, WIRELESS, (OFF REFLUX MEDS) N/A 01/29/2024    Procedure: PH MONITORING, ESOPHAGUS, WIRELESS, (OFF REFLUX MEDS);  Surgeon: Ly Kim MD;  Location: [facility];  Service: Endoscopy;  Laterality: N/A;    ROBOT-ASSISTED LAPAROSCOPIC SLEEVE GASTRECTOMY USING DA ANTHONY XI N/A 02/20/2020    Procedure: XI ROBOTIC SLEEVE GASTRECTOMY;  Surgeon: Michael Navarrete MD;  Location: [facility];  Service: General;  Laterality: N/A;    TENOPLASTY OF HAND Left 08/26/2021    Procedure: REPAIR, TENDON, HAND;  Surgeon: Joselito Lugo MD;  Location: [facility];  Service: Orthopedics;  Laterality: Left;  Left RCL PIP Joint Repair/Recon with Arthrex Internal Brace.    TONSILLECTOMY      TOTAL REDUCTION MAMMOPLASTY  2022    TUBAL LIGATION          Home Medications:  Prior to Admission medications    Medication Sig Start Date End Date Taking? Authorizing Provider   cholecalciferol, vitamin D3, 1,250 mcg (50,000 unit) capsule Take 1 capsule (50,000 Units total) by mouth every 7 days.  "for 365 doses 8/22/23 8/14/30 Yes Candice Garrett MD   cyclobenzaprine (FLEXERIL) 10 MG tablet Take 10 mg by mouth every 8 (eight) hours as needed. 1/17/24  Yes Provider, Historical   doxepin (SINEQUAN) 75 MG capsule Take 1 capsule (75 mg total) by mouth every evening. 3/8/24  Yes Braden Dumont MD   DULoxetine (CYMBALTA) 60 MG capsule TAKE 1 CAPSULE BY MOUTH EVERY DAY 5/31/24  Yes Braden Dumont MD   gabapentin (NEURONTIN) 300 MG capsule Take 3 capsules (900 mg total) by mouth 2 (two) times daily. 4/19/24 4/19/25 Yes Jen Meier APRN, CNS   hydrOXYzine HCL (ATARAX) 25 MG tablet Take 1 tablet (25 mg total) by mouth 4 (four) times daily as needed for Anxiety. 6/21/24  Yes Jen Meier APRN, CNS   lactulose (CHRONULAC) 10 gram/15 mL solution TAKE 15MLS BY MOUTH THREE TIMES DAILY 7/10/24  Yes Braden Dumont MD   linaCLOtide (LINZESS) 145 mcg Cap capsule Take 1 capsule (145 mcg total) by mouth before breakfast. 6/24/24  Yes Braden Dumont MD   omeprazole (PRILOSEC) 40 MG capsule Take 1 capsule (40 mg total) by mouth 2 (two) times daily before meals. 6/3/24  Yes Camille Patel, ARASELI   valsartan (DIOVAN) 80 MG tablet Take 1 tablet (80 mg total) by mouth once daily. 2/16/23  Yes Braden Dumont MD   diclofenac sodium (VOLTAREN) 1 % Gel Apply 2 g topically 4 (four) times daily. 6/23/23   Braden Dumont MD   OCREVUS 30 mg/mL Soln  9/9/22   Provider, Historical        Allergies:  Review of patient's allergies indicates:   Allergen Reactions    Demerol [meperidine] Anaphylaxis and Hives    Dilaudid [hydromorphone (bulk)] Anaphylaxis     "Code Blue" however patient tolerates Lortab    Shellfish containing products Itching, Nausea And Vomiting and Swelling    Tramadol Hives    Prednisone Itching        Social History:   Social History     Socioeconomic History    Marital status: Single    Number of children: 3   Occupational History     Employer: [employer]   Tobacco Use    Smoking " status: Never     Passive exposure: Never    Smokeless tobacco: Never   Substance and Sexual Activity    Alcohol use: Not Currently     Comment: once a year     Drug use: No    Sexual activity: Yes     Partners: Female     Birth control/protection: Surgical     Social Determinants of Health     Financial Resource Strain: Low Risk  (2/9/2024)    [facility], [facility]    Overall Financial Resource Strain (CARDIA)     Difficulty of Paying Living Expenses: Not very hard   Food Insecurity: No Food Insecurity (2/9/2024)    [facility], [facility] and Affiliates    Hunger Vital Sign     Worried About Running Out of Food in the Last Year: Never true     Ran Out of Food in the Last Year: Never true   Recent Concern: Food Insecurity - Food Insecurity Present (11/11/2023)    Hunger Vital Sign     Worried About Running Out of Food in the Last Year: Sometimes true     Ran Out of Food in the Last Year: Sometimes true   Transportation Needs: No Transportation Needs (2/9/2024)    [facility], [facility] and [facility]    PRAPARE - Transportation     Lack of Transportation (Medical): No     Lack of Transportation (Non-Medical): No   Recent Concern: Transportation Needs - Unmet Transportation Needs (11/11/2023)    PRAPARE - Transportation     Lack of Transportation (Medical): Yes     Lack of Transportation (Non-Medical): Yes   Physical Activity: Insufficiently Active (2/9/2024)    [facility],  [facility]    Exercise Vital Sign     Days of Exercise per Week: 5 days     Minutes of Exercise per Session: 20 min   Stress: No Stress Concern Present (2/9/2024)    [facility], [facility]    [place] [place] [employer] - Occupational Stress Questionnaire     Feeling of Stress : Not at all   Housing Stability: Low Risk  (2/9/2024)    [facility], [facility]    Housing Stability Vital Sign     Unable to Pay for Housing in the Last Year: No     Number of Places Lived in the Last Year: 1     In the last 12 months, was there a time when you did not have a steady place to sleep or slept in a shelter (including now)?: No       Family History:  Family History   Problem Relation Name Age of Onset    Lupus Mother      Heart disease Mother      Hypertension Mother      Diabetes Father Ganesh hannon     Kidney disease Father Ganesh hannon     No Known Problems Sister      No Known Problems Sister      No Known Problems Brother      No Known Problems Brother      No Known Problems Daughter      No Known Problems Daughter      No Known Problems Daughter      Cancer Maternal Aunt Melly Harley 40        breast    Breast cancer Maternal Aunt Melly Harley     Diabetes Maternal Aunt Melly Harley     COPD Maternal Aunt Melly Harley     Heart disease Maternal Grandmother Lilia harley     Breast cancer Maternal Cousin      Ovarian cancer Maternal Cousin         ROS: No acute cardiac events, no acute respiratory complaints.     Physical Exam (all patients):    BP (!) 125/92 (BP Location: Left arm, Patient Position: Lying)  "  Pulse 79   Temp 97.7 °F (36.5 °C) (Temporal)   Resp 20   Ht 5' 6" (1.676 m)   Wt 113.4 kg (250 lb)   SpO2 99%   Breastfeeding No   BMI 40.35 kg/m²   Lungs: Clear to auscultation bilaterally, respirations unlabored  Heart: Regular rate and rhythm, S1 and S2 normal, no obvious murmurs  Abdomen:         Soft, non-tender, bowel sounds normal, no masses, no organomegaly    Lab Results   Component Value Date    WBC 5.15 07/15/2024    MCV 71 (L) 07/15/2024    RDW 18.4 (H) 07/15/2024     07/15/2024    INR 0.9 02/25/2024    GLU 81 07/15/2024    HGBA1C 5.6 02/01/2024    BUN 5 (L) 07/15/2024     07/15/2024    K 4.3 07/15/2024     07/15/2024        SEDATION PLAN: per anesthesia      History reviewed, vital signs satisfactory, cardiopulmonary status satisfactory, sedation options, risks and plans have been discussed with the patient  All their questions were answered and the patient agrees to the sedation procedures as planned and the patient is deemed an appropriate candidate for the sedation as planned.    Procedure explained to patient, informed consent obtained and placed in chart.    Galina Diaz  7/22/2024  8:56 AM     "

## 2024-07-22 NOTE — ANESTHESIA POSTPROCEDURE EVALUATION
Anesthesia Post Evaluation    Patient: Alicia Soliz    Procedure(s) Performed: Procedure(s) (LRB):  COLONOSCOPY (N/A)    Final Anesthesia Type: MAC      Patient location during evaluation: GI PACU  Patient participation: Yes- Able to Participate  Level of consciousness: awake and alert  Post-procedure vital signs: reviewed and stable  Pain management: adequate  Airway patency: patent    PONV status at discharge: No PONV  Anesthetic complications: no      Cardiovascular status: hemodynamically stable  Respiratory status: unassisted, room air and spontaneous ventilation  Hydration status: euvolemic  Follow-up not needed.              Vitals Value Taken Time   /88 07/22/24 0930   Temp 36.1 °C (97 °F) 07/22/24 0915   Pulse 80 07/22/24 0954   Resp 18 07/22/24 0954   SpO2 99 % 07/22/24 0930         Event Time   Out of Recovery 10:06:43         Pain/Alban Score: Alban Score: 10 (7/22/2024  9:30 AM)

## 2024-07-23 VITALS
HEART RATE: 80 BPM | WEIGHT: 250 LBS | DIASTOLIC BLOOD PRESSURE: 88 MMHG | OXYGEN SATURATION: 99 % | TEMPERATURE: 97 F | SYSTOLIC BLOOD PRESSURE: 130 MMHG | RESPIRATION RATE: 18 BRPM | HEIGHT: 66 IN | BODY MASS INDEX: 40.18 KG/M2

## 2024-07-25 RX ORDER — SODIUM, POTASSIUM,MAG SULFATES 17.5-3.13G
1 SOLUTION, RECONSTITUTED, ORAL ORAL DAILY
Qty: 1 KIT | Refills: 0 | Status: SHIPPED | OUTPATIENT
Start: 2024-07-25 | End: 2024-07-27

## 2024-07-30 ENCOUNTER — PATIENT MESSAGE (OUTPATIENT)
Dept: BARIATRICS | Facility: CLINIC | Age: 44
End: 2024-07-30
Payer: MEDICARE

## 2024-08-01 ENCOUNTER — ANESTHESIA EVENT (OUTPATIENT)
Dept: ENDOSCOPY | Facility: HOSPITAL | Age: 44
End: 2024-08-01
Payer: MEDICARE

## 2024-08-01 ENCOUNTER — HOSPITAL ENCOUNTER (OUTPATIENT)
Facility: HOSPITAL | Age: 44
Discharge: HOME | End: 2024-08-01
Attending: STUDENT IN AN ORGANIZED HEALTH CARE EDUCATION/TRAINING PROGRAM | Admitting: STUDENT IN AN ORGANIZED HEALTH CARE EDUCATION/TRAINING PROGRAM
Payer: MEDICARE

## 2024-08-01 ENCOUNTER — ANESTHESIA (OUTPATIENT)
Dept: ENDOSCOPY | Facility: HOSPITAL | Age: 44
End: 2024-08-01
Payer: MEDICARE

## 2024-08-01 DIAGNOSIS — Z12.11 COLON CANCER SCREENING: Primary | ICD-10-CM

## 2024-08-01 PROCEDURE — 37000008 HC ANESTHESIA 1ST 15 MINUTES: Mod: HCNC | Performed by: STUDENT IN AN ORGANIZED HEALTH CARE EDUCATION/TRAINING PROGRAM

## 2024-08-01 PROCEDURE — 25000003 PHARM REV CODE 250: Mod: HCNC

## 2024-08-01 PROCEDURE — 37000009 HC ANESTHESIA EA ADD 15 MINS: Mod: HCNC | Performed by: STUDENT IN AN ORGANIZED HEALTH CARE EDUCATION/TRAINING PROGRAM

## 2024-08-01 PROCEDURE — 63600175 PHARM REV CODE 636 W HCPCS: Mod: HCNC

## 2024-08-01 PROCEDURE — G0105 COLORECTAL SCRN; HI RISK IND: HCPCS | Mod: HCNC | Performed by: STUDENT IN AN ORGANIZED HEALTH CARE EDUCATION/TRAINING PROGRAM

## 2024-08-01 PROCEDURE — G0105 COLORECTAL SCRN; HI RISK IND: HCPCS | Mod: HCNC,,, | Performed by: STUDENT IN AN ORGANIZED HEALTH CARE EDUCATION/TRAINING PROGRAM

## 2024-08-01 RX ORDER — SODIUM CHLORIDE, SODIUM LACTATE, POTASSIUM CHLORIDE, CALCIUM CHLORIDE 600; 310; 30; 20 MG/100ML; MG/100ML; MG/100ML; MG/100ML
INJECTION, SOLUTION INTRAVENOUS CONTINUOUS PRN
Status: DISCONTINUED | OUTPATIENT
Start: 2024-08-01 | End: 2024-08-01

## 2024-08-01 RX ORDER — PROPOFOL 10 MG/ML
VIAL (ML) INTRAVENOUS
Status: DISCONTINUED | OUTPATIENT
Start: 2024-08-01 | End: 2024-08-01

## 2024-08-01 RX ORDER — LIDOCAINE HYDROCHLORIDE 10 MG/ML
INJECTION, SOLUTION EPIDURAL; INFILTRATION; INTRACAUDAL; PERINEURAL
Status: DISCONTINUED | OUTPATIENT
Start: 2024-08-01 | End: 2024-08-01

## 2024-08-01 RX ADMIN — LIDOCAINE HYDROCHLORIDE 50 MG: 10 SOLUTION INTRAVENOUS at 08:08

## 2024-08-01 RX ADMIN — PROPOFOL 50 MG: 10 INJECTION, EMULSION INTRAVENOUS at 08:08

## 2024-08-01 RX ADMIN — PROPOFOL 30 MG: 10 INJECTION, EMULSION INTRAVENOUS at 09:08

## 2024-08-01 RX ADMIN — SODIUM CHLORIDE, SODIUM LACTATE, POTASSIUM CHLORIDE, AND CALCIUM CHLORIDE: .6; .31; .03; .02 INJECTION, SOLUTION INTRAVENOUS at 08:08

## 2024-08-01 RX ADMIN — PROPOFOL 40 MG: 10 INJECTION, EMULSION INTRAVENOUS at 09:08

## 2024-08-01 RX ADMIN — PROPOFOL 100 MG: 10 INJECTION, EMULSION INTRAVENOUS at 08:08

## 2024-08-01 RX ADMIN — PROPOFOL 30 MG: 10 INJECTION, EMULSION INTRAVENOUS at 08:08

## 2024-08-01 NOTE — TRANSFER OF CARE
"Anesthesia Transfer of Care Note    Patient: Alicia Soliz    Procedure(s) Performed: Procedure(s) (LRB):  COLONOSCOPY (N/A)    Patient location: PACU    Anesthesia Type: MAC    Transport from OR: Transported from OR on room air with adequate spontaneous ventilation    Post pain: adequate analgesia    Post assessment: no apparent anesthetic complications    Post vital signs: stable    Level of consciousness: awake    Nausea/Vomiting: no nausea/vomiting    Complications: none    Transfer of care protocol was followed      Last vitals: Visit Vitals  BP (!) 141/88 (BP Location: Left arm, Patient Position: Lying)   Pulse 81   Temp 36.9 °C (98.4 °F) (Temporal)   Resp 17   Ht 5' 6" (1.676 m)   Wt 100.7 kg (222 lb)   SpO2 100%   Breastfeeding No   BMI 35.83 kg/m²     " no

## 2024-08-01 NOTE — ANESTHESIA PREPROCEDURE EVALUATION
08/01/2024  Alicia Soliz is a 44 y.o., female.  Patient Active Problem List   Diagnosis    Essential hypertension    Multiple sclerosis, relapsing-remitting    Morbid obesity with BMI of 45.0-49.9, adult    Opioid dependence, uncomplicated    Hemiplegia affecting left nondominant side    Fatty liver    Localized swelling of left lower extremity    Decreased strength, endurance, and mobility    Left hemiparesis    Primary osteoarthritis of left knee    Weakness of left lower extremity    Gastroesophageal reflux disease without esophagitis    Seizure-like activity    Acute pain of left shoulder    Anxiety and depression    Chronic pain syndrome    Abnormality of gait due to impairment of balance    Insomnia    Mixed hyperlipidemia    Iron deficiency anemia    Class 3 severe obesity due to excess calories with body mass index (BMI) of 45.0 to 49.9 in adult    History of stroke    Nausea    Colon cancer screening     Past Medical History:   Diagnosis Date    Anxiety and depression 10/20/2018    Arthritis     Cardiac arrest as complication of medication     Dilaudid    Chronic pain of left knee 10/13/2023    Encounter for blood transfusion 2004    Excessive daytime sleepiness 06/26/2017    GERD (gastroesophageal reflux disease)     Hemiplegia affecting left nondominant side     Hyperglycemia 09/26/2021    Hypertension     Iron deficiency anemia     Mixed hyperlipidemia 03/18/2014    Mixed hyperlipidemia 03/18/2014    Morbid obesity with BMI of 50.0-59.9, adult 09/20/2018    Multiple sclerosis     Otitis externa 04/10/2024    Prediabetes 02/11/2019    Seizure-like activity 02/25/2024    Sprain of medial collateral ligament of left knee 10/13/2023     Past Surgical History:   Procedure Laterality Date    APPENDECTOMY      CHOLECYSTECTOMY      COLONOSCOPY N/A 11/25/2020    Procedure: COLONOSCOPY;  Surgeon:  Andrew Jenkins III, MD;  Location: [facility];  Service: Endoscopy;  Laterality: N/A;    COLONOSCOPY N/A 7/18/2024    Procedure: COLONOSCOPY;  Surgeon: Brie Cruz MD;  Location: [facility];  Service: Endoscopy;  Laterality: N/A;    COLONOSCOPY N/A 7/22/2024    Procedure: COLONOSCOPY;  Surgeon: Galina Diaz MD;  Location: [facility];  Service: Endoscopy;  Laterality: N/A;    ESOPHAGOGASTRODUODENOSCOPY N/A 02/19/2020    Procedure: EGD (ESOPHAGOGASTRODUODENOSCOPY);  Surgeon: Michael Navarrete MD;  Location: [facility];  Service: Endoscopy;  Laterality: N/A;    ESOPHAGOGASTRODUODENOSCOPY N/A 02/20/2020    Procedure: EGD (ESOPHAGOGASTRODUODENOSCOPY);  Surgeon: Michael Navarrete MD;  Location: [facility];  Service: General;  Laterality: N/A;    ESOPHAGOGASTRODUODENOSCOPY N/A 11/25/2020    Procedure: EGD (ESOPHAGOGASTRODUODENOSCOPY);  Surgeon: Andrew Jenkins III, MD;  Location: [facility];  Service: Endoscopy;  Laterality: N/A;    ESOPHAGOGASTRODUODENOSCOPY N/A 09/25/2023    Procedure: EGD (ESOPHAGOGASTRODUODENOSCOPY);  Surgeon: Ly Kim MD;  [facility];  Service: Endoscopy;  Laterality: N/A;    ESOPHAGOGASTRODUODENOSCOPY N/A 01/29/2024    Procedure: EGD (ESOPHAGOGASTRODUODENOSCOPY);  Surgeon: Ly Kim MD;  [facility];  Service: Endoscopy;  Laterality: N/A;    HYSTERECTOMY      KNEE SURGERY      age 10    LAPAROSCOPIC GASTROENTEROSTOMY N/A 05/20/2024    Procedure: GASTROENTEROSTOMY, LAPAROSCOPIC w/intraop EGD;  Surgeon: Farideh Vazquez MD;  Location: [address];  Service: General;  Laterality: N/A;    PH MONITORING, ESOPHAGUS, WIRELESS, (OFF REFLUX MEDS) N/A 01/29/2024    Procedure: PH MONITORING, ESOPHAGUS, WIRELESS, (OFF REFLUX MEDS);  Surgeon: Ly Kim MD;  [facility]: [facility];  Service: Endoscopy;  Laterality: N/A;    ROBOT-ASSISTED LAPAROSCOPIC SLEEVE GASTRECTOMY USING DA ANTHONY XI N/A 02/20/2020    Procedure: XI ROBOTIC SLEEVE GASTRECTOMY;   Surgeon: Michael Navarrete MD;  Location: [facility] OR;  Service: General;  Laterality: N/A;    TENOPLASTY OF HAND Left 08/26/2021    Procedure: REPAIR, TENDON, HAND;  Surgeon: Joselito Lugo MD;  Location: [facility];  Service: Orthopedics;  Laterality: Left;  Left RCL PIP Joint Repair/Recon with Arthrex Internal Brace.    TONSILLECTOMY      TOTAL REDUCTION MAMMOPLASTY  2022    TUBAL LIGATION           Pre-op Assessment    I have reviewed the Patient Summary Reports.    I have reviewed the NPO Status.   I have reviewed the Medications.     Review of Systems  Anesthesia Hx:  No problems with previous Anesthesia             Denies Family Hx of Anesthesia complications.    Denies Personal Hx of Anesthesia complications.                    Social:  Non-Smoker       Cardiovascular:     Hypertension           hyperlipidemia                       Hypertension         Pulmonary:  Pulmonary Normal                       Renal/:  Renal/ Normal                 Hepatic/GI:  Bowel Prep.   GERD Liver Disease,         Liver Disease        Musculoskeletal:  Arthritis               Neurological:   CVA, residual symptoms Neuromuscular Disease,   Seizures    Hemiplegia left side   Multiple sclerosis       Chronic Pain Syndrome                         Endocrine:        Morbid Obesity / BMI > 40  Psych:  Psychiatric History anxiety depression                Physical Exam  General: Cooperative and Alert    Airway:  Mallampati: II   Mouth Opening: Normal  TM Distance: Normal  Tongue: Normal  Neck ROM: Normal ROM    Dental:  Intact      Results for orders placed or performed during the hospital encounter of 02/24/24   EKG 12-lead    Collection Time: 02/25/24 12:54 AM   Result Value Ref Range    QRS Duration 68 ms    OHS QTC Calculation 430 ms    Narrative    Test Reason : R56.9,    Vent. Rate : 083 BPM     Atrial Rate : 083 BPM     P-R Int : 152 ms          QRS Dur : 068 ms      QT Int : 366 ms       P-R-T Axes : 038 020 016 degrees     QTc  Int : 430 ms    Normal sinus rhythm  Possible Septal infarct ,age undetermined  Abnormal ECG  Confirmed by ANNITA BLANC MD ([id number]) on 2/25/2024 6:38:33 PM    Referred By: [email or web address]   SELF           Confirmed By:ANNITA BLANC MD     Results for orders placed during the hospital encounter of 02/24/24    Echo Saline Bubble? Yes    Interpretation Summary    Left Ventricle: There is normal systolic function with a visually estimated ejection fraction of 60 - 65%. There is normal diastolic function.    Right Ventricle: Normal right ventricular cavity size. Right ventricle wall motion  is normal. Systolic function is normal.    IVC/SVC: Normal venous pressure at 3 mmHg.    Negative bubble study.      Anesthesia Plan  Type of Anesthesia, risks & benefits discussed:    Anesthesia Type: MAC, Gen Natural Airway  Intra-op Monitoring Plan: Standard ASA Monitors  Post Op Pain Control Plan: IV/PO Opioids PRN  Induction:  IV  Informed Consent: Informed consent signed with the Patient and all parties understand the risks and agree with anesthesia plan.  All questions answered.   ASA Score: 3  Day of Surgery Review of History & Physical: H&P Update referred to the surgeon/provider.I have interviewed and examined the patient. I have reviewed the patient's H&P dated: There are no significant changes.     Ready For Surgery From Anesthesia Perspective.     .

## 2024-08-01 NOTE — ANESTHESIA POSTPROCEDURE EVALUATION
Anesthesia Post Evaluation    Patient: Alicia Soliz    Procedure(s) Performed: Procedure(s) (LRB):  COLONOSCOPY (N/A)    Final Anesthesia Type: MAC      Patient location during evaluation: GI PACU  Patient participation: Yes- Able to Participate  Level of consciousness: awake  Post-procedure vital signs: reviewed and stable  Pain management: adequate  Airway patency: patent    PONV status at discharge: No PONV  Anesthetic complications: no      Cardiovascular status: blood pressure returned to baseline and hemodynamically stable  Respiratory status: unassisted and spontaneous ventilation  Hydration status: euvolemic  Follow-up not needed.              Vitals Value Taken Time   /83 08/01/24 0930   Temp 36.6 °C (97.9 °F) 08/01/24 0930   Pulse 80 08/01/24 0930   Resp 17 08/01/24 0930   SpO2 95 % 08/01/24 0930         Event Time   Out of Recovery 09:36:31         Pain/Alban Score: Alban Score: 6 (8/1/2024  9:19 AM)

## 2024-08-02 VITALS
SYSTOLIC BLOOD PRESSURE: 145 MMHG | BODY MASS INDEX: 35.68 KG/M2 | RESPIRATION RATE: 17 BRPM | HEIGHT: 66 IN | TEMPERATURE: 98 F | HEART RATE: 80 BPM | WEIGHT: 222 LBS | OXYGEN SATURATION: 95 % | DIASTOLIC BLOOD PRESSURE: 83 MMHG

## 2024-08-06 ENCOUNTER — PATIENT MESSAGE (OUTPATIENT)
Dept: NEUROLOGY | Facility: CLINIC | Age: 44
End: 2024-08-06
Payer: MEDICARE

## 2024-08-10 ENCOUNTER — PATIENT MESSAGE (OUTPATIENT)
Dept: ADMINISTRATIVE | Facility: OTHER | Age: 44
End: 2024-08-10
Payer: MEDICARE

## 2024-08-12 ENCOUNTER — PATIENT MESSAGE (OUTPATIENT)
Dept: BARIATRICS | Facility: CLINIC | Age: 44
End: 2024-08-12
Payer: MEDICARE

## 2024-08-13 ENCOUNTER — PATIENT MESSAGE (OUTPATIENT)
Dept: NEUROLOGY | Facility: CLINIC | Age: 44
End: 2024-08-13
Payer: MEDICARE

## 2024-08-16 ENCOUNTER — PATIENT MESSAGE (OUTPATIENT)
Dept: INTERNAL MEDICINE | Facility: CLINIC | Age: 44
End: 2024-08-16
Payer: MEDICARE

## 2024-08-18 ENCOUNTER — PATIENT MESSAGE (OUTPATIENT)
Dept: ADMINISTRATIVE | Facility: OTHER | Age: 44
End: 2024-08-18
Payer: MEDICARE

## 2024-08-20 ENCOUNTER — OFFICE VISIT (OUTPATIENT)
Dept: SPORTS MEDICINE | Facility: CLINIC | Age: 44
End: 2024-08-20
Payer: MEDICARE

## 2024-08-20 ENCOUNTER — PATIENT MESSAGE (OUTPATIENT)
Dept: NEUROLOGY | Facility: CLINIC | Age: 44
End: 2024-08-20
Payer: MEDICARE

## 2024-08-20 ENCOUNTER — NURSE TRIAGE (OUTPATIENT)
Dept: ADMINISTRATIVE | Facility: CLINIC | Age: 44
End: 2024-08-20
Payer: MEDICARE

## 2024-08-20 ENCOUNTER — HOSPITAL ENCOUNTER (OUTPATIENT)
Dept: RADIOLOGY | Facility: HOSPITAL | Age: 44
Discharge: HOME | End: 2024-08-20
Attending: PHYSICIAN ASSISTANT
Payer: MEDICARE

## 2024-08-20 ENCOUNTER — HOSPITAL ENCOUNTER (EMERGENCY)
Facility: HOSPITAL | Age: 44
Discharge: HOME | End: 2024-08-20
Attending: EMERGENCY MEDICINE
Payer: MEDICARE

## 2024-08-20 VITALS
WEIGHT: 222 LBS | HEART RATE: 74 BPM | DIASTOLIC BLOOD PRESSURE: 90 MMHG | SYSTOLIC BLOOD PRESSURE: 136 MMHG | BODY MASS INDEX: 35.68 KG/M2 | HEIGHT: 66 IN

## 2024-08-20 VITALS
SYSTOLIC BLOOD PRESSURE: 150 MMHG | HEART RATE: 79 BPM | DIASTOLIC BLOOD PRESSURE: 88 MMHG | RESPIRATION RATE: 15 BRPM | OXYGEN SATURATION: 100 % | TEMPERATURE: 98 F

## 2024-08-20 DIAGNOSIS — G81.94 LEFT HEMIPARESIS: ICD-10-CM

## 2024-08-20 DIAGNOSIS — D64.9 ANEMIA, UNSPECIFIED TYPE: ICD-10-CM

## 2024-08-20 DIAGNOSIS — M67.912 TENDINOPATHY OF ROTATOR CUFF, LEFT: ICD-10-CM

## 2024-08-20 DIAGNOSIS — M25.512 LEFT SHOULDER PAIN, UNSPECIFIED CHRONICITY: Primary | ICD-10-CM

## 2024-08-20 DIAGNOSIS — K21.9 GASTROESOPHAGEAL REFLUX DISEASE WITHOUT ESOPHAGITIS: ICD-10-CM

## 2024-08-20 DIAGNOSIS — I10 ESSENTIAL HYPERTENSION: ICD-10-CM

## 2024-08-20 DIAGNOSIS — M25.512 LEFT SHOULDER PAIN, UNSPECIFIED CHRONICITY: ICD-10-CM

## 2024-08-20 DIAGNOSIS — Z86.73 HISTORY OF STROKE: ICD-10-CM

## 2024-08-20 DIAGNOSIS — M75.02 ADHESIVE CAPSULITIS OF LEFT SHOULDER: Primary | ICD-10-CM

## 2024-08-20 DIAGNOSIS — M62.838 MUSCLE SPASMS OF LOWER EXTREMITY, UNSPECIFIED LATERALITY: ICD-10-CM

## 2024-08-20 DIAGNOSIS — R29.818 ACUTE FOCAL NEUROLOGICAL DEFICIT: ICD-10-CM

## 2024-08-20 DIAGNOSIS — R20.2 RIGHT HAND PARESTHESIA: Primary | ICD-10-CM

## 2024-08-20 DIAGNOSIS — E66.9 OBESITY, CLASS II, BMI 35-39.9: ICD-10-CM

## 2024-08-20 DIAGNOSIS — G81.94 HEMIPLEGIA AFFECTING LEFT NONDOMINANT SIDE, UNSPECIFIED ETIOLOGY, UNSPECIFIED HEMIPLEGIA TYPE: ICD-10-CM

## 2024-08-20 DIAGNOSIS — G35 MULTIPLE SCLEROSIS: ICD-10-CM

## 2024-08-20 LAB
ALBUMIN SERPL BCP-MCNC: 3.4 G/DL (ref 3.5–5.2)
ALP SERPL-CCNC: 92 U/L (ref 55–135)
ALT SERPL W/O P-5'-P-CCNC: 8 U/L (ref 10–44)
ANION GAP SERPL CALC-SCNC: 13 MMOL/L (ref 8–16)
AST SERPL-CCNC: 18 U/L (ref 10–40)
BASOPHILS # BLD AUTO: 0.03 K/UL (ref 0–0.2)
BASOPHILS NFR BLD: 0.5 % (ref 0–1.9)
BILIRUB SERPL-MCNC: 0.4 MG/DL (ref 0.1–1)
BUN SERPL-MCNC: 6 MG/DL (ref 6–20)
CALCIUM SERPL-MCNC: 9.1 MG/DL (ref 8.7–10.5)
CHLORIDE SERPL-SCNC: 105 MMOL/L (ref 95–110)
CK SERPL-CCNC: 78 U/L (ref 20–180)
CO2 SERPL-SCNC: 24 MMOL/L (ref 23–29)
CREAT SERPL-MCNC: 0.7 MG/DL (ref 0.5–1.4)
DIFFERENTIAL METHOD BLD: ABNORMAL
EOSINOPHIL # BLD AUTO: 0.1 K/UL (ref 0–0.5)
EOSINOPHIL NFR BLD: 1.3 % (ref 0–8)
ERYTHROCYTE [DISTWIDTH] IN BLOOD BY AUTOMATED COUNT: 18.1 % (ref 11.5–14.5)
EST. GFR  (NO RACE VARIABLE): >60 ML/MIN/1.73 M^2
GLUCOSE SERPL-MCNC: 70 MG/DL (ref 70–110)
HCT VFR BLD AUTO: 36.4 % (ref 37–48.5)
HGB BLD-MCNC: 11.3 G/DL (ref 12–16)
IMM GRANULOCYTES # BLD AUTO: 0.01 K/UL (ref 0–0.04)
IMM GRANULOCYTES NFR BLD AUTO: 0.2 % (ref 0–0.5)
INR PPP: 1 (ref 0.8–1.2)
LYMPHOCYTES # BLD AUTO: 2.7 K/UL (ref 1–4.8)
LYMPHOCYTES NFR BLD: 44.2 % (ref 18–48)
MCH RBC QN AUTO: 21.2 PG (ref 27–31)
MCHC RBC AUTO-ENTMCNC: 31 G/DL (ref 32–36)
MCV RBC AUTO: 68 FL (ref 82–98)
MONOCYTES # BLD AUTO: 0.5 K/UL (ref 0.3–1)
MONOCYTES NFR BLD: 7.6 % (ref 4–15)
NEUTROPHILS # BLD AUTO: 2.8 K/UL (ref 1.8–7.7)
NEUTROPHILS NFR BLD: 46.2 % (ref 38–73)
NRBC BLD-RTO: 0 /100 WBC
PLATELET # BLD AUTO: 237 K/UL (ref 150–450)
PMV BLD AUTO: ABNORMAL FL (ref 9.2–12.9)
POTASSIUM SERPL-SCNC: 4 MMOL/L (ref 3.5–5.1)
PROT SERPL-MCNC: 6.9 G/DL (ref 6–8.4)
PROTHROMBIN TIME: 11.1 SEC (ref 9–12.5)
RBC # BLD AUTO: 5.33 M/UL (ref 4–5.4)
SODIUM SERPL-SCNC: 142 MMOL/L (ref 136–145)
TROPONIN I SERPL DL<=0.01 NG/ML-MCNC: <0.006 NG/ML (ref 0–0.03)
TSH SERPL DL<=0.005 MIU/L-ACNC: 0.65 UIU/ML (ref 0.4–4)
WBC # BLD AUTO: 6.04 K/UL (ref 3.9–12.7)

## 2024-08-20 PROCEDURE — 73030 X-RAY EXAM OF SHOULDER: CPT | Mod: TC,HCNC,LT

## 2024-08-20 PROCEDURE — 84443 ASSAY THYROID STIM HORMONE: CPT | Mod: HCNC | Performed by: EMERGENCY MEDICINE

## 2024-08-20 PROCEDURE — 1159F MED LIST DOCD IN RCRD: CPT | Mod: HCNC,CPTII,S$GLB, | Performed by: PHYSICIAN ASSISTANT

## 2024-08-20 PROCEDURE — 3080F DIAST BP >= 90 MM HG: CPT | Mod: HCNC,CPTII,S$GLB, | Performed by: PHYSICIAN ASSISTANT

## 2024-08-20 PROCEDURE — 73030 X-RAY EXAM OF SHOULDER: CPT | Mod: 26,HCNC,LT, | Performed by: RADIOLOGY

## 2024-08-20 PROCEDURE — 80053 COMPREHEN METABOLIC PANEL: CPT | Mod: HCNC | Performed by: EMERGENCY MEDICINE

## 2024-08-20 PROCEDURE — 99214 OFFICE O/P EST MOD 30 MIN: CPT | Mod: HCNC,S$GLB,, | Performed by: PHYSICIAN ASSISTANT

## 2024-08-20 PROCEDURE — 3044F HG A1C LEVEL LT 7.0%: CPT | Mod: HCNC,CPTII,S$GLB, | Performed by: PHYSICIAN ASSISTANT

## 2024-08-20 PROCEDURE — 80061 LIPID PANEL: CPT | Mod: HCNC | Performed by: EMERGENCY MEDICINE

## 2024-08-20 PROCEDURE — 99999 PR PBB SHADOW E&M-EST. PATIENT-LVL IV: CPT | Mod: PBBFAC,HCNC,, | Performed by: PHYSICIAN ASSISTANT

## 2024-08-20 PROCEDURE — 4010F ACE/ARB THERAPY RXD/TAKEN: CPT | Mod: HCNC,CPTII,S$GLB, | Performed by: PHYSICIAN ASSISTANT

## 2024-08-20 PROCEDURE — 85025 COMPLETE CBC W/AUTO DIFF WBC: CPT | Mod: HCNC | Performed by: EMERGENCY MEDICINE

## 2024-08-20 PROCEDURE — 99285 EMERGENCY DEPT VISIT HI MDM: CPT | Mod: 25,HCNC

## 2024-08-20 PROCEDURE — 96360 HYDRATION IV INFUSION INIT: CPT | Mod: HCNC

## 2024-08-20 PROCEDURE — 84484 ASSAY OF TROPONIN QUANT: CPT | Mod: HCNC | Performed by: EMERGENCY MEDICINE

## 2024-08-20 PROCEDURE — 93005 ELECTROCARDIOGRAM TRACING: CPT | Mod: HCNC

## 2024-08-20 PROCEDURE — 93010 ELECTROCARDIOGRAM REPORT: CPT | Mod: HCNC,,, | Performed by: INTERNAL MEDICINE

## 2024-08-20 PROCEDURE — 3075F SYST BP GE 130 - 139MM HG: CPT | Mod: HCNC,CPTII,S$GLB, | Performed by: PHYSICIAN ASSISTANT

## 2024-08-20 PROCEDURE — 85610 PROTHROMBIN TIME: CPT | Mod: HCNC | Performed by: EMERGENCY MEDICINE

## 2024-08-20 PROCEDURE — 25000003 PHARM REV CODE 250: Mod: HCNC | Performed by: EMERGENCY MEDICINE

## 2024-08-20 PROCEDURE — 82550 ASSAY OF CK (CPK): CPT | Mod: HCNC | Performed by: EMERGENCY MEDICINE

## 2024-08-20 PROCEDURE — 1160F RVW MEDS BY RX/DR IN RCRD: CPT | Mod: HCNC,CPTII,S$GLB, | Performed by: PHYSICIAN ASSISTANT

## 2024-08-20 PROCEDURE — 3008F BODY MASS INDEX DOCD: CPT | Mod: HCNC,CPTII,S$GLB, | Performed by: PHYSICIAN ASSISTANT

## 2024-08-20 RX ADMIN — SODIUM CHLORIDE 1000 ML: 9 INJECTION, SOLUTION INTRAVENOUS at 07:08

## 2024-08-20 NOTE — TELEPHONE ENCOUNTER
Reason for Disposition   New neurologic deficit that is present NOW, sudden onset of ANY of the following: * Weakness of the face, arm, or leg on one side of the body* Numbness of the face, arm, or leg on one side of the body* Loss of speech or garbled speech    Additional Information   Negative: Difficult to awaken or acting confused (e.g., disoriented, slurred speech)    Protocols used: Neurologic Deficit-A-OH  Pt states she has MS. State she has new onset of numbness and weakness if her hand. States her finger tips are cold. Advised per the Triage protocol to dial 911 for EMS. Pt verbalized understanding.
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
## 2024-08-20 NOTE — TELEPHONE ENCOUNTER
----- Message from Maddi Hernandez sent at 8/20/2024  2:39 PM CDT -----  Regarding: Numbness  Contact: [phone]  Symptom: Numbness  Outcome: Instruct patient to Call 911 NOW!  Reason: Numbness on only one side of body or face that started within the past 24 hours    The caller rejected this outcome  [phone]
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
## 2024-08-20 NOTE — ED PROVIDER NOTES
"SCRIBE #1 NOTE: I, Marlenmary Boles, am scribing for, and in the presence of, Devyn Merrill MD. I have scribed the HPI, ROS, and PE.     SCRIBE #2 NOTE: I, Ruby Clarke, am scribing for, and in the presence of,  Dwain Bennett MD. I have scribed the remaining portions of the note not scribed by Scribe #1.      History     Chief Complaint   Patient presents with    Extremity Weakness     Increase in left leg weakness that started this morning. Symptoms have now progressed to dizziness and nausea. Hx of M.S. with Left hemiparesis (2015). CBG      Review of patient's allergies indicates:   Allergen Reactions    Demerol [meperidine] Anaphylaxis and Hives    Dilaudid [hydromorphone (bulk)] Anaphylaxis     "Code Blue" however patient tolerates Lortab    Shellfish containing products Itching, Nausea And Vomiting and Swelling    Tramadol Hives    Prednisone Itching         History of Present Illness     HPI    8/20/2024, 5:37 PM  History obtained from the patient and EMS      History of Present Illness: Alicia Soliz is a 44 y.o. female patient with a PMHx of arthritis, HTN, anemia, hemiplegia, obesity, GERD, multiple sclerosis, and HLD who presents to the Emergency Department for evaluation of left leg weakness which onset gradually at 7 AM today. Per EMS, pt sat outside for 2 hours and stated her symptoms worsened with vertigo, dizziness, and tremors. She also reports paresthesia to the fingertips of the left hand. EMS states pt normally feels this way when her MS is about to flare up. She notes that while awaiting transportation, she sat in the heat for about 2 hours this morning. Symptoms are constant and moderate in severity. No mitigating or exacerbating factors reported. Patient denies any fever, congestion, SOB, CP, and all other sxs at this time. No prior Tx. No further complaints or concerns at this time.       Arrival mode: Ambulance Services    PCP: Braden Dumont MD        Past Medical " History:  Past Medical History:   Diagnosis Date    Anxiety and depression 10/20/2018    Arthritis     Cardiac arrest as complication of medication     Dilaudid    Chronic pain of left knee 10/13/2023    Encounter for blood transfusion 2004    Excessive daytime sleepiness 06/26/2017    GERD (gastroesophageal reflux disease)     Hemiplegia affecting left nondominant side     Hyperglycemia 09/26/2021    Hypertension     Iron deficiency anemia     Mixed hyperlipidemia 03/18/2014    Mixed hyperlipidemia 03/18/2014    Morbid obesity with BMI of 50.0-59.9, adult 09/20/2018    Multiple sclerosis     Otitis externa 04/10/2024    Prediabetes 02/11/2019    Seizure-like activity 02/25/2024    Sprain of medial collateral ligament of left knee 10/13/2023       Past Surgical History:  Past Surgical History:   Procedure Laterality Date    APPENDECTOMY      CHOLECYSTECTOMY      COLONOSCOPY N/A 11/25/2020    Procedure: COLONOSCOPY;  Surgeon: Andrew Jenkins III, MD;  Location: [facility];  Service: Endoscopy;  Laterality: N/A;    COLONOSCOPY N/A 7/18/2024    Procedure: COLONOSCOPY;  Surgeon: Brie Cruz MD;  Location: [facility] ENDO;  Service: Endoscopy;  Laterality: N/A;    COLONOSCOPY N/A 7/22/2024    Procedure: COLONOSCOPY;  Surgeon: Galina Diaz MD;  Location: [facility] ENDO;  Service: Endoscopy;  Laterality: N/A;    COLONOSCOPY N/A 8/1/2024    Procedure: COLONOSCOPY;  Surgeon: Brie Cruz MD;  Location: [facility];  Service: Colon and Rectal;  Laterality: N/A;    ESOPHAGOGASTRODUODENOSCOPY N/A 02/19/2020    Procedure: EGD (ESOPHAGOGASTRODUODENOSCOPY);  Surgeon: Michael Navarrete MD;  Location: [facility];  Service: Endoscopy;  Laterality: N/A;    ESOPHAGOGASTRODUODENOSCOPY N/A 02/20/2020    Procedure: EGD (ESOPHAGOGASTRODUODENOSCOPY);  Surgeon: Michael Navarrete MD;  Location: [facility];  Service: General;  Laterality: N/A;    ESOPHAGOGASTRODUODENOSCOPY N/A 11/25/2020    Procedure: EGD (ESOPHAGOGASTRODUODENOSCOPY);  Surgeon:  Andrew Jenkins III, MD;  Location: [facility];  Service: Endoscopy;  Laterality: N/A;    ESOPHAGOGASTRODUODENOSCOPY N/A 09/25/2023    Procedure: EGD (ESOPHAGOGASTRODUODENOSCOPY);  Surgeon: Ly Kim MD;  Location: [facility] ENDO;  Service: Endoscopy;  Laterality: N/A;    ESOPHAGOGASTRODUODENOSCOPY N/A 01/29/2024    Procedure: EGD (ESOPHAGOGASTRODUODENOSCOPY);  Surgeon: Ly Kim MD;  Location: [facility];  Service: Endoscopy;  Laterality: N/A;    HYSTERECTOMY      KNEE SURGERY      age 10    LAPAROSCOPIC GASTROENTEROSTOMY N/A 05/20/2024    Procedure: GASTROENTEROSTOMY, LAPAROSCOPIC w/intraop EGD;  Surgeon: Farideh Vazquez MD;  Location: [address];  Service: General;  Laterality: N/A;    PH MONITORING, ESOPHAGUS, WIRELESS, (OFF REFLUX MEDS) N/A 01/29/2024    Procedure: PH MONITORING, ESOPHAGUS, WIRELESS, (OFF REFLUX MEDS);  Surgeon: Ly Kim MD;  Location: [facility];  Service: Endoscopy;  Laterality: N/A;    ROBOT-ASSISTED LAPAROSCOPIC SLEEVE GASTRECTOMY USING DA ANTHONY XI N/A 02/20/2020    Procedure: XI ROBOTIC SLEEVE GASTRECTOMY;  Surgeon: Michael Navarrete MD;  Location: [facility];  Service: General;  Laterality: N/A;    TENOPLASTY OF HAND Left 08/26/2021    Procedure: REPAIR, TENDON, HAND;  Surgeon: Joselito Lugo MD;  Location: [facility];  Service: Orthopedics;  Laterality: Left;  Left RCL PIP Joint Repair/Recon with Arthrex Internal Brace.    TONSILLECTOMY      TOTAL REDUCTION MAMMOPLASTY  2022    TUBAL LIGATION           Family History:  Family History   Problem Relation Name Age of Onset    Lupus Mother      Heart disease Mother      Hypertension Mother      Diabetes Father Ganesh brown     Kidney disease Father Ganesh brown     No Known Problems Sister      No Known Problems Sister      No Known Problems Brother      No Known Problems Brother      No Known Problems Daughter      No Known Problems Daughter      No Known Problems Daughter      Cancer Maternal Aunt  Melly Harley 40        breast    Breast cancer Maternal Aunt Melly Harley     Diabetes Maternal Aunt Melly Harley     COPD Maternal Aunt Melly Harley     Heart disease Maternal Grandmother Lilia harley     Breast cancer Maternal Cousin      Ovarian cancer Maternal Cousin         Social History:  Social History     Tobacco Use    Smoking status: Never     Passive exposure: Never    Smokeless tobacco: Never   Substance and Sexual Activity    Alcohol use: Not Currently     Comment: once a year     Drug use: No    Sexual activity: Yes     Partners: Female     Birth control/protection: Surgical        Review of Systems     Review of Systems   Constitutional:  Negative for fever.   HENT:  Negative for congestion and sore throat.    Respiratory:  Negative for shortness of breath.    Cardiovascular:  Negative for chest pain.   Gastrointestinal:  Negative for nausea.   Genitourinary:  Negative for dysuria.   Musculoskeletal:  Negative for back pain.   Skin:  Negative for rash.   Neurological:  Positive for dizziness and weakness (left leg).   Hematological:  Does not bruise/bleed easily.   All other systems reviewed and are negative.       Physical Exam     Initial Vitals [08/20/24 1619]   BP Pulse Resp Temp SpO2   (!) 134/95 92 16 98.1 °F (36.7 °C) 100 %      MAP       --          Physical Exam  Nursing Notes and Vital Signs Reviewed.  Constitutional: Patient is in no acute distress. Well-developed and well-nourished.  Head: Atraumatic. Normocephalic.  Eyes: PERRL. EOM intact. Conjunctivae are not pale. No scleral icterus.  ENT: Mucous membranes are moist. Oropharynx is clear and symmetric.    Neck: Supple. Full ROM. No lymphadenopathy.  Cardiovascular: Regular rate. Regular rhythm. No murmurs, rubs, or gallops. Distal pulses are 2+ and symmetric.  Pulmonary/Chest: No respiratory distress. Clear to auscultation bilaterally. No wheezing or rales.  Abdominal: Soft and non-distended.  There is no tenderness.  No  rebound, guarding, or rigidity. Good bowel sounds.  Genitourinary: No CVA tenderness  Musculoskeletal: No obvious deformities. No edema. No calf tenderness.  Skin: Warm and dry.  Neurological:  Alert, awake, and appropriate.  Normal speech. Chronic left hemiparesis. DTRs 2+ and equal. No acute focal neurological deficits are appreciated.  Psychiatric: Normal affect. Good eye contact. Appropriate in content.     ED Course   Procedures  ED Vital Signs:  Vitals:    08/20/24 1619 08/20/24 1915 08/20/24 1930 08/20/24 2000   BP: (!) 134/95 137/86 121/87 (!) 159/89   Pulse: 92 81 76 76   Resp: 16 18 20 15   Temp: 98.1 °F (36.7 °C)      TempSrc: Oral      SpO2: 100% 100% 100% 99%    08/20/24 2030 08/20/24 2200   BP: (!) 156/91 (!) 150/88   Pulse: 78 79   Resp: 18 15   Temp:  98.1 °F (36.7 °C)   TempSrc:  Oral   SpO2: 100% 100%       Abnormal Lab Results:  Labs Reviewed   CBC W/ AUTO DIFFERENTIAL - Abnormal       Result Value    WBC 6.04      RBC 5.33      Hemoglobin 11.3 (*)     Hematocrit 36.4 (*)     MCV 68 (*)     MCH 21.2 (*)     MCHC 31.0 (*)     RDW 18.1 (*)     Platelets 237      MPV SEE COMMENT      Immature Granulocytes 0.2      Gran # (ANC) 2.8      Immature Grans (Abs) 0.01      Lymph # 2.7      Mono # 0.5      Eos # 0.1      Baso # 0.03      nRBC 0      Gran % 46.2      Lymph % 44.2      Mono % 7.6      Eosinophil % 1.3      Basophil % 0.5      Differential Method Automated     COMPREHENSIVE METABOLIC PANEL - Abnormal    Sodium 142      Potassium 4.0      Chloride 105      CO2 24      Glucose 70      BUN 6      Creatinine 0.7      Calcium 9.1      Total Protein 6.9      Albumin 3.4 (*)     Total Bilirubin 0.4      Alkaline Phosphatase 92      AST 18      ALT 8 (*)     eGFR >60      Anion Gap 13     PROTIME-INR    Prothrombin Time 11.1      INR 1.0     TSH    TSH 0.647     TROPONIN I    Troponin I <0.006     CK    CPK 78     LIPID PANEL        All Lab Results:  Results for orders placed or performed during the  hospital encounter of 08/20/24   CBC W/ AUTO DIFFERENTIAL   Result Value Ref Range    WBC 6.04 3.90 - 12.70 K/uL    RBC 5.33 4.00 - 5.40 M/uL    Hemoglobin 11.3 (L) 12.0 - 16.0 g/dL    Hematocrit 36.4 (L) 37.0 - 48.5 %    MCV 68 (L) 82 - 98 fL    MCH 21.2 (L) 27.0 - 31.0 pg    MCHC 31.0 (L) 32.0 - 36.0 g/dL    RDW 18.1 (H) 11.5 - 14.5 %    Platelets 237 150 - 450 K/uL    MPV SEE COMMENT 9.2 - 12.9 fL    Immature Granulocytes 0.2 0.0 - 0.5 %    Gran # (ANC) 2.8 1.8 - 7.7 K/uL    Immature Grans (Abs) 0.01 0.00 - 0.04 K/uL    Lymph # 2.7 1.0 - 4.8 K/uL    Mono # 0.5 0.3 - 1.0 K/uL    Eos # 0.1 0.0 - 0.5 K/uL    Baso # 0.03 0.00 - 0.20 K/uL    nRBC 0 0 /100 WBC    Gran % 46.2 38.0 - 73.0 %    Lymph % 44.2 18.0 - 48.0 %    Mono % 7.6 4.0 - 15.0 %    Eosinophil % 1.3 0.0 - 8.0 %    Basophil % 0.5 0.0 - 1.9 %    Differential Method Automated    Comprehensive metabolic panel   Result Value Ref Range    Sodium 142 136 - 145 mmol/L    Potassium 4.0 3.5 - 5.1 mmol/L    Chloride 105 95 - 110 mmol/L    CO2 24 23 - 29 mmol/L    Glucose 70 70 - 110 mg/dL    BUN 6 6 - 20 mg/dL    Creatinine 0.7 0.5 - 1.4 mg/dL    Calcium 9.1 8.7 - 10.5 mg/dL    Total Protein 6.9 6.0 - 8.4 g/dL    Albumin 3.4 (L) 3.5 - 5.2 g/dL    Total Bilirubin 0.4 0.1 - 1.0 mg/dL    Alkaline Phosphatase 92 55 - 135 U/L    AST 18 10 - 40 U/L    ALT 8 (L) 10 - 44 U/L    eGFR >60 >60 mL/min/1.73 m^2    Anion Gap 13 8 - 16 mmol/L   Protime-INR   Result Value Ref Range    Prothrombin Time 11.1 9.0 - 12.5 sec    INR 1.0 0.8 - 1.2   TSH   Result Value Ref Range    TSH 0.647 0.400 - 4.000 uIU/mL   Troponin I   Result Value Ref Range    Troponin I <0.006 0.000 - 0.026 ng/mL   CPK   Result Value Ref Range    CPK 78 20 - 180 U/L     *Note: Due to a large number of results and/or encounters for the requested time period, some results have not been displayed. A complete set of results can be found in Results Review.         Imaging Results:  Imaging Results              CT  Head Without Contrast (Final result)  Result time 08/20/24 18:25:45      Final result by Cristel Hassan MD (08/20/24 18:25:45)                   Impression:      No acute intracranial finding      Electronically signed by: Cristel Hassan  Date:    08/20/2024  Time:    18:25               Narrative:    EXAMINATION:  CT HEAD WITHOUT CONTRAST    CLINICAL HISTORY:  Neuro deficit, acute, stroke suspected;    TECHNIQUE:  Low dose axial images were obtained through the head.  Coronal and sagittal reformations were also performed. Contrast was not administered.    COMPARISON:  February 2024    FINDINGS:  No acute intracranial hemorrhage or mass effect. chronic findings3 redemonstrated.  No adverse bony finding                                       The EKG was ordered, reviewed, and independently interpreted by the ED provider.  Interpretation time: 18:37  Rate: 75 BPM  Rhythm: normal sinus rhythm  Interpretation: No ST and T wave abnormality. No STEMI.             The Emergency Provider reviewed the vital signs and test results, which are outlined above.     ED Discussion     7:00 PM: Dr. Merrill transfers care of patient to Dr. Bennett pending further treatment.    7:08 PM: Dr. Bennett agrees with Dr. Merrill's assessment and plan of care. Evaluated pt. Pt is resting comfortably and is in no acute distress.  D/w pt all pertinent results. D/w pt any concerns expressed at this time. Answered all questions. Pt expresses understanding at this time.    9:52 PM: Reassessed pt at this time. Discussed with pt all pertinent ED information and results. Discussed pt dx and plan of tx. Gave pt all f/u and return to the ED instructions. All questions and concerns were addressed at this time. Pt expresses understanding of information and instructions, and is comfortable with plan to discharge. Pt is stable for discharge.    I discussed with patient and/or family/caretaker that evaluation in the ED does not suggest any emergent or life  threatening medical conditions requiring immediate intervention beyond what was provided in the ED, and I believe patient is safe for discharge.  Regardless, an unremarkable evaluation in the ED does not preclude the development or presence of a serious of life threatening condition. As such, patient was instructed to return immediately for any worsening or change in current symptoms.         Medical Decision Making  Amount and/or Complexity of Data Reviewed  Labs: ordered. Decision-making details documented in ED Course.  Radiology: ordered. Decision-making details documented in ED Course.  ECG/medicine tests: ordered and independent interpretation performed. Decision-making details documented in ED Course.                ED Medication(s):  Medications   sodium chloride 0.9% bolus 1,000 mL 1,000 mL (0 mLs Intravenous Stopped 8/20/24 2018)       Discharge Medication List as of 8/20/2024  9:52 PM           Follow-up Information       Jen Meier APRN, CNS In 1 day.    Specialty: Neurology  Contact information:  [address] [place] [address]  [phone]               [facility]    Specialty: Emergency Medicine  Why: As needed, If symptoms worsen  Contact information:  [id number] [place] [address] [phone]                               Scribe Attestation:   Scribe #1: I performed the above scribed service and the documentation accurately describes the services I performed. I attest to the accuracy of the note.     Attending:   Physician Attestation Statement for Scribe #1: I, Devyn Merrill MD, personally performed the services described in this documentation, as scribed by Marlen Boles, in my presence, and it is both accurate and complete.       Scribe Attestation:   Scribe #2: I performed the above scribed service and the documentation accurately describes the services I performed. I attest to the accuracy of the note.    Attending Attestation:            Physician Attestation for Scribe:    Physician Attestation Statement for Scribe #2: I, Dwain Bennett MD, reviewed documentation, as scribed by Ruby Clarke in my presence, and it is both accurate and complete. I also acknowledge and confirm the content of the note done by Scribe #1.           Clinical Impression       ICD-10-CM ICD-9-CM   1. Right hand paresthesia  R20.2 782.0   2. Acute focal neurological deficit  R29.818 781.99   3. Anemia, unspecified type  D64.9 285.9       Disposition:   Disposition: Discharged  Condition: Stable         Dwain Bennett MD  08/21/24 0459

## 2024-08-21 ENCOUNTER — TELEPHONE (OUTPATIENT)
Dept: NEUROLOGY | Facility: CLINIC | Age: 44
End: 2024-08-21

## 2024-08-21 LAB
CHOLEST SERPL-MCNC: 154 MG/DL (ref 120–199)
CHOLEST/HDLC SERPL: 2.7 {RATIO} (ref 2–5)
HDLC SERPL-MCNC: 58 MG/DL (ref 40–75)
HDLC SERPL: 37.7 % (ref 20–50)
LDLC SERPL CALC-MCNC: 81.6 MG/DL (ref 63–159)
NONHDLC SERPL-MCNC: 96 MG/DL
OHS QRS DURATION: 64 MS
OHS QTC CALCULATION: 433 MS
TRIGL SERPL-MCNC: 72 MG/DL (ref 30–150)

## 2024-08-21 NOTE — TELEPHONE ENCOUNTER
Spoke with Alicia. In addition to the symptoms reported yesterday, she woke up with a MS Hug today. All symptoms are still present and intermittent. Pt is currently taking the gabapentin and flexeril. She denies any recent stressors. She has been staying cool all day, resting and hydrating.

## 2024-08-21 NOTE — TELEPHONE ENCOUNTER
----- Message from Ashely Lassiter sent at 8/21/2024  2:13 PM CDT -----  Regarding: update  Contact: @ [phone]  Pt called in regards to giving a update to the doctor she went to the ED in [place] they are saying she is having a nerve issue and may need to see a cardiologist .....Please call and adv @ [phone]

## 2024-08-23 ENCOUNTER — PATIENT MESSAGE (OUTPATIENT)
Dept: INTERNAL MEDICINE | Facility: CLINIC | Age: 44
End: 2024-08-23
Payer: MEDICARE

## 2024-08-25 LAB
OHS QRS DURATION: 62 MS
OHS QTC CALCULATION: 441 MS

## 2024-08-27 ENCOUNTER — TELEPHONE (OUTPATIENT)
Dept: NEUROLOGY | Facility: CLINIC | Age: 44
End: 2024-08-27
Payer: MEDICARE

## 2024-08-27 DIAGNOSIS — G35 MULTIPLE SCLEROSIS, RELAPSING-REMITTING: ICD-10-CM

## 2024-08-27 DIAGNOSIS — N39.0 URINARY TRACT INFECTION WITHOUT HEMATURIA, SITE UNSPECIFIED: Primary | ICD-10-CM

## 2024-08-27 DIAGNOSIS — G35 MULTIPLE SCLEROSIS: ICD-10-CM

## 2024-08-27 NOTE — TELEPHONE ENCOUNTER
Contacted pt. She stated she has a pinching pain all over her body that is new and started around Tuesday last week ( 8/20) She denies any recent illness or infection. She stated it could be the heat effecting her. She stated last week she sat in the heat for an hour and half waiting for transportation to a doctor's appt. She is taking Flexeril 10mg Q 8 hrs, Gabapentin 900mg Bid and Oxycodone Q6 to 8 hrs. Made pt aware I will discuss with Jen and get back with her. Also made pt aware Jen may want to get labs and/or make an appt to see her.

## 2024-08-27 NOTE — TELEPHONE ENCOUNTER
----- Message from Tiffani Taylor sent at 8/26/2024  4:51 PM CDT -----  Regarding: Urgent Advice  Contact: [phone]  Who call ? Alicia Soliz     What is the request Details : Pt calling to speak with someone in provider office regards being in pain . States her nerves in left arm  jumping she cannot control it.    Please call pt.      Can clinic  use patient portal  : No     What number to call back : [phone]

## 2024-08-27 NOTE — TELEPHONE ENCOUNTER
Discussed with Jen. Jen stated pt should be seen in clinic and offered to see her Thurs 8/29 or 9/4. In the meantime, ordering UA and dedicated culture. Contacted pt. Pt stated she is not able to make either one of those appts but stated she will go get the urine test done. Pt stated she can try to come to clinic in November when [place] is in [place]. This Rn explained, given the symptoms she is having and that she hasn't been assessed in person for a year, she should be seen asap. Made pt aware our medical assistant will reach out to her to schedule the f/u appt.

## 2024-09-03 ENCOUNTER — PATIENT MESSAGE (OUTPATIENT)
Dept: BARIATRICS | Facility: CLINIC | Age: 44
End: 2024-09-03
Payer: MEDICARE

## 2024-09-09 ENCOUNTER — PATIENT MESSAGE (OUTPATIENT)
Dept: INTERNAL MEDICINE | Facility: CLINIC | Age: 44
End: 2024-09-09
Payer: MEDICARE

## 2024-09-10 ENCOUNTER — PATIENT MESSAGE (OUTPATIENT)
Dept: BARIATRICS | Facility: CLINIC | Age: 44
End: 2024-09-10
Payer: MEDICARE

## 2024-09-11 ENCOUNTER — PATIENT MESSAGE (OUTPATIENT)
Dept: BARIATRICS | Facility: CLINIC | Age: 44
End: 2024-09-11
Payer: MEDICARE

## 2024-09-11 ENCOUNTER — TELEPHONE (OUTPATIENT)
Dept: NEUROLOGY | Facility: CLINIC | Age: 44
End: 2024-09-11
Payer: MEDICARE

## 2024-09-11 DIAGNOSIS — G35 MULTIPLE SCLEROSIS: Primary | ICD-10-CM

## 2024-09-12 ENCOUNTER — TELEPHONE (OUTPATIENT)
Dept: NEUROLOGY | Facility: CLINIC | Age: 44
End: 2024-09-12
Payer: MEDICARE

## 2024-09-12 ENCOUNTER — LAB VISIT (OUTPATIENT)
Dept: LAB | Facility: HOSPITAL | Age: 44
End: 2024-09-12
Attending: CLINICAL NURSE SPECIALIST
Payer: MEDICARE

## 2024-09-12 DIAGNOSIS — G35 MULTIPLE SCLEROSIS: ICD-10-CM

## 2024-09-12 LAB
ALBUMIN SERPL BCP-MCNC: 3.3 G/DL (ref 3.5–5.2)
ALP SERPL-CCNC: 98 U/L (ref 55–135)
ALT SERPL W/O P-5'-P-CCNC: 7 U/L (ref 10–44)
ANION GAP SERPL CALC-SCNC: 10 MMOL/L (ref 8–16)
AST SERPL-CCNC: 14 U/L (ref 10–40)
BASOPHILS # BLD AUTO: 0.03 K/UL (ref 0–0.2)
BASOPHILS NFR BLD: 0.6 % (ref 0–1.9)
BILIRUB SERPL-MCNC: 0.3 MG/DL (ref 0.1–1)
BUN SERPL-MCNC: 5 MG/DL (ref 6–20)
CALCIUM SERPL-MCNC: 9.2 MG/DL (ref 8.7–10.5)
CHLORIDE SERPL-SCNC: 106 MMOL/L (ref 95–110)
CO2 SERPL-SCNC: 28 MMOL/L (ref 23–29)
CREAT SERPL-MCNC: 0.7 MG/DL (ref 0.5–1.4)
DIFFERENTIAL METHOD BLD: ABNORMAL
EOSINOPHIL # BLD AUTO: 0.1 K/UL (ref 0–0.5)
EOSINOPHIL NFR BLD: 1.9 % (ref 0–8)
ERYTHROCYTE [DISTWIDTH] IN BLOOD BY AUTOMATED COUNT: 18.2 % (ref 11.5–14.5)
EST. GFR  (NO RACE VARIABLE): >60 ML/MIN/1.73 M^2
GLUCOSE SERPL-MCNC: 84 MG/DL (ref 70–110)
HBV CORE AB SERPL QL IA: NORMAL
HBV SURFACE AG SERPL QL IA: NORMAL
HCT VFR BLD AUTO: 36.9 % (ref 37–48.5)
HGB BLD-MCNC: 11.5 G/DL (ref 12–16)
IGA SERPL-MCNC: 206 MG/DL (ref 40–350)
IGG SERPL-MCNC: 1273 MG/DL (ref 650–1600)
IGM SERPL-MCNC: 51 MG/DL (ref 50–300)
IMM GRANULOCYTES # BLD AUTO: 0.01 K/UL (ref 0–0.04)
IMM GRANULOCYTES NFR BLD AUTO: 0.2 % (ref 0–0.5)
LYMPHOCYTES # BLD AUTO: 2.9 K/UL (ref 1–4.8)
LYMPHOCYTES NFR BLD: 55.6 % (ref 18–48)
MCH RBC QN AUTO: 21.8 PG (ref 27–31)
MCHC RBC AUTO-ENTMCNC: 31.2 G/DL (ref 32–36)
MCV RBC AUTO: 70 FL (ref 82–98)
MONOCYTES # BLD AUTO: 0.4 K/UL (ref 0.3–1)
MONOCYTES NFR BLD: 7.9 % (ref 4–15)
NEUTROPHILS # BLD AUTO: 1.8 K/UL (ref 1.8–7.7)
NEUTROPHILS NFR BLD: 33.8 % (ref 38–73)
NRBC BLD-RTO: 0 /100 WBC
PLATELET # BLD AUTO: 340 K/UL (ref 150–450)
PMV BLD AUTO: 11.1 FL (ref 9.2–12.9)
POTASSIUM SERPL-SCNC: 4.3 MMOL/L (ref 3.5–5.1)
PROT SERPL-MCNC: 6.2 G/DL (ref 6–8.4)
RBC # BLD AUTO: 5.28 M/UL (ref 4–5.4)
SODIUM SERPL-SCNC: 144 MMOL/L (ref 136–145)
WBC # BLD AUTO: 5.18 K/UL (ref 3.9–12.7)

## 2024-09-12 PROCEDURE — 36415 COLL VENOUS BLD VENIPUNCTURE: CPT | Mod: HCNC,PO | Performed by: CLINICAL NURSE SPECIALIST

## 2024-09-12 PROCEDURE — 85025 COMPLETE CBC W/AUTO DIFF WBC: CPT | Mod: HCNC | Performed by: CLINICAL NURSE SPECIALIST

## 2024-09-12 PROCEDURE — 87340 HEPATITIS B SURFACE AG IA: CPT | Mod: HCNC | Performed by: CLINICAL NURSE SPECIALIST

## 2024-09-12 PROCEDURE — 80053 COMPREHEN METABOLIC PANEL: CPT | Mod: HCNC | Performed by: CLINICAL NURSE SPECIALIST

## 2024-09-12 PROCEDURE — 82784 ASSAY IGA/IGD/IGG/IGM EACH: CPT | Mod: 59,HCNC | Performed by: CLINICAL NURSE SPECIALIST

## 2024-09-12 PROCEDURE — 86704 HEP B CORE ANTIBODY TOTAL: CPT | Mod: HCNC | Performed by: CLINICAL NURSE SPECIALIST

## 2024-09-19 ENCOUNTER — TELEPHONE (OUTPATIENT)
Dept: NEUROLOGY | Facility: CLINIC | Age: 44
End: 2024-09-19
Payer: MEDICARE

## 2024-09-19 ENCOUNTER — TELEPHONE (OUTPATIENT)
Dept: INFUSION THERAPY | Facility: HOSPITAL | Age: 44
End: 2024-09-19
Payer: MEDICARE

## 2024-09-19 NOTE — TELEPHONE ENCOUNTER
----- Message from Jen Meier, APRN, CNS sent at 9/13/2024 10:45 AM CDT -----  Normal WBC and ALC   Negative hep B labs   Normal immunoglobulins    Ok to proceed with infusion.

## 2024-09-20 DIAGNOSIS — M75.02 ADHESIVE CAPSULITIS OF LEFT SHOULDER: Primary | ICD-10-CM

## 2024-09-20 DIAGNOSIS — M25.512 LEFT SHOULDER PAIN, UNSPECIFIED CHRONICITY: ICD-10-CM

## 2024-09-20 RX ORDER — FAMOTIDINE 10 MG/ML
20 INJECTION INTRAVENOUS
Status: CANCELLED | OUTPATIENT
Start: 2024-09-29

## 2024-09-20 RX ORDER — SODIUM CHLORIDE 0.9 % (FLUSH) 0.9 %
10 SYRINGE (ML) INJECTION
Status: CANCELLED | OUTPATIENT
Start: 2024-09-29

## 2024-09-20 RX ORDER — ACETAMINOPHEN 500 MG
1000 TABLET ORAL
Status: CANCELLED | OUTPATIENT
Start: 2024-09-29

## 2024-09-20 RX ORDER — HEPARIN 100 UNIT/ML
500 SYRINGE INTRAVENOUS
Status: CANCELLED | OUTPATIENT
Start: 2024-09-29

## 2024-09-20 RX ORDER — METHYLPREDNISOLONE SOD SUCC 125 MG
100 VIAL (EA) INJECTION
Status: CANCELLED
Start: 2024-09-29 | End: 2024-09-29

## 2024-09-20 RX ORDER — DIPHENHYDRAMINE HYDROCHLORIDE 50 MG/ML
50 INJECTION INTRAMUSCULAR; INTRAVENOUS
Status: CANCELLED | OUTPATIENT
Start: 2024-09-29

## 2024-09-20 RX ORDER — EPINEPHRINE 0.3 MG/.3ML
0.3 INJECTION SUBCUTANEOUS
Status: CANCELLED | OUTPATIENT
Start: 2024-09-29

## 2024-09-29 ENCOUNTER — PATIENT MESSAGE (OUTPATIENT)
Dept: NEUROLOGY | Facility: CLINIC | Age: 44
End: 2024-09-29
Payer: MEDICARE

## 2024-10-01 ENCOUNTER — PATIENT MESSAGE (OUTPATIENT)
Dept: BARIATRICS | Facility: CLINIC | Age: 44
End: 2024-10-01
Payer: MEDICARE

## 2024-10-01 ENCOUNTER — TELEPHONE (OUTPATIENT)
Dept: NEUROLOGY | Facility: CLINIC | Age: 44
End: 2024-10-01
Payer: MEDICARE

## 2024-10-03 ENCOUNTER — PATIENT MESSAGE (OUTPATIENT)
Dept: INTERNAL MEDICINE | Facility: CLINIC | Age: 44
End: 2024-10-03
Payer: MEDICARE

## 2024-10-08 ENCOUNTER — PATIENT MESSAGE (OUTPATIENT)
Dept: BARIATRICS | Facility: CLINIC | Age: 44
End: 2024-10-08
Payer: MEDICARE

## 2024-10-08 ENCOUNTER — CLINICAL SUPPORT (OUTPATIENT)
Dept: REHABILITATION | Facility: HOSPITAL | Age: 44
End: 2024-10-08
Payer: MEDICARE

## 2024-10-08 DIAGNOSIS — M25.512 LEFT SHOULDER PAIN, UNSPECIFIED CHRONICITY: ICD-10-CM

## 2024-10-08 DIAGNOSIS — M25.512 ACUTE PAIN OF LEFT SHOULDER: Primary | ICD-10-CM

## 2024-10-08 DIAGNOSIS — M75.02 ADHESIVE CAPSULITIS OF LEFT SHOULDER: ICD-10-CM

## 2024-10-08 PROCEDURE — 97140 MANUAL THERAPY 1/> REGIONS: CPT | Mod: HCNC,PN

## 2024-10-08 PROCEDURE — 97161 PT EVAL LOW COMPLEX 20 MIN: CPT | Mod: HCNC,PN

## 2024-10-08 PROCEDURE — 97110 THERAPEUTIC EXERCISES: CPT | Mod: HCNC,PN
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
## 2024-10-10 ENCOUNTER — OFFICE VISIT (OUTPATIENT)
Dept: PODIATRY | Facility: CLINIC | Age: 44
End: 2024-10-10
Payer: MEDICARE

## 2024-10-10 VITALS — WEIGHT: 222 LBS | HEIGHT: 66 IN | BODY MASS INDEX: 35.68 KG/M2

## 2024-10-10 DIAGNOSIS — B35.3 TINEA PEDIS OF BOTH FEET: Primary | ICD-10-CM

## 2024-10-10 DIAGNOSIS — G35 MULTIPLE SCLEROSIS, RELAPSING-REMITTING: ICD-10-CM

## 2024-10-10 PROCEDURE — 99999 PR PBB SHADOW E&M-EST. PATIENT-LVL III: CPT | Mod: PBBFAC,HCNC,, | Performed by: PODIATRIST

## 2024-10-10 PROCEDURE — 99204 OFFICE O/P NEW MOD 45 MIN: CPT | Mod: HCNC,S$GLB,, | Performed by: PODIATRIST

## 2024-10-10 PROCEDURE — 3044F HG A1C LEVEL LT 7.0%: CPT | Mod: HCNC,CPTII,S$GLB, | Performed by: PODIATRIST

## 2024-10-10 PROCEDURE — 1159F MED LIST DOCD IN RCRD: CPT | Mod: HCNC,CPTII,S$GLB, | Performed by: PODIATRIST

## 2024-10-10 PROCEDURE — 4010F ACE/ARB THERAPY RXD/TAKEN: CPT | Mod: HCNC,CPTII,S$GLB, | Performed by: PODIATRIST

## 2024-10-10 PROCEDURE — 1160F RVW MEDS BY RX/DR IN RCRD: CPT | Mod: HCNC,CPTII,S$GLB, | Performed by: PODIATRIST

## 2024-10-10 PROCEDURE — 3008F BODY MASS INDEX DOCD: CPT | Mod: HCNC,CPTII,S$GLB, | Performed by: PODIATRIST

## 2024-10-10 RX ORDER — KETOCONAZOLE 20 MG/G
CREAM TOPICAL DAILY
Qty: 60 G | Refills: 1 | Status: SHIPPED | OUTPATIENT
Start: 2024-10-10

## 2024-10-11 ENCOUNTER — TELEPHONE (OUTPATIENT)
Dept: SPORTS MEDICINE | Facility: CLINIC | Age: 44
End: 2024-10-11
Payer: MEDICARE

## 2024-10-11 ENCOUNTER — OFFICE VISIT (OUTPATIENT)
Dept: SPORTS MEDICINE | Facility: CLINIC | Age: 44
End: 2024-10-11
Payer: MEDICARE

## 2024-10-11 DIAGNOSIS — M75.02 ADHESIVE CAPSULITIS OF LEFT SHOULDER: ICD-10-CM

## 2024-10-11 DIAGNOSIS — G35 MULTIPLE SCLEROSIS: ICD-10-CM

## 2024-10-11 DIAGNOSIS — E66.812 OBESITY, CLASS II, BMI 35-39.9: ICD-10-CM

## 2024-10-11 DIAGNOSIS — M25.512 LEFT SHOULDER PAIN, UNSPECIFIED CHRONICITY: Primary | ICD-10-CM

## 2024-10-11 DIAGNOSIS — M67.912 TENDINOPATHY OF ROTATOR CUFF, LEFT: ICD-10-CM

## 2024-10-11 DIAGNOSIS — K21.9 GASTROESOPHAGEAL REFLUX DISEASE WITHOUT ESOPHAGITIS: ICD-10-CM

## 2024-10-11 DIAGNOSIS — G35 MULTIPLE SCLEROSIS, RELAPSING-REMITTING: ICD-10-CM

## 2024-10-11 DIAGNOSIS — G81.94 HEMIPLEGIA AFFECTING LEFT NONDOMINANT SIDE, UNSPECIFIED ETIOLOGY, UNSPECIFIED HEMIPLEGIA TYPE: ICD-10-CM

## 2024-10-11 DIAGNOSIS — I10 ESSENTIAL HYPERTENSION: ICD-10-CM

## 2024-10-11 DIAGNOSIS — Z86.73 HISTORY OF STROKE: ICD-10-CM

## 2024-10-11 DIAGNOSIS — G81.94 LEFT HEMIPARESIS: ICD-10-CM

## 2024-10-11 PROCEDURE — 99999 PR PBB SHADOW E&M-EST. PATIENT-LVL III: CPT | Mod: PBBFAC,HCNC,, | Performed by: PHYSICIAN ASSISTANT

## 2024-10-11 NOTE — TELEPHONE ENCOUNTER
I returned the pts call and let her know that Natalie would still see her but that it might be a few minutes before she is able to be seen.     ----- Message from Clare sent at 10/11/2024 12:04 PM CDT -----  Contact: Alicia Vargas is needing a call back in regards to her appt. She stated that she is riding the transportation bus and will be 15min late for appt. She wants to know if she will still be able to be seen. Please give her a callback at [phone]
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
## 2024-10-12 NOTE — PATIENT INSTRUCTIONS
Assessment:  Alicia Soliz is a RHD 44 y.o. female   Retired with a chief complaint of Pain of the Left Shoulder  Presents today for recheck on left shoulder pain  Left shoulder pain    Encounter Diagnoses   Name Primary?    Left shoulder pain, unspecified chronicity Yes    Adhesive capsulitis of left shoulder     Left hemiparesis     Obesity, Class II, BMI 35-39.9     History of stroke     Multiple sclerosis     Tendinopathy of rotator cuff, left     Hemiplegia affecting left nondominant side, unspecified etiology, unspecified hemiplegia type     Essential hypertension     Gastroesophageal reflux disease without esophagitis     Multiple sclerosis, relapsing-remitting       Plan:  Continue physical therapy  Compound cream 3.  Discussed possible steroid injection if blood pressure allows  Follow-up in 6 weeks  Can see Alethea if unavailable to see me on days and I am available at the [facility]    Follow-up: 6 weeks or sooner if there are any problems between now and then.    Leave Review:   Google: Leave Google Review  Healthgrades: Leave Healthgrades Review    After Hours Number: [phone]

## 2024-10-13 ENCOUNTER — NURSE TRIAGE (OUTPATIENT)
Dept: ADMINISTRATIVE | Facility: CLINIC | Age: 44
End: 2024-10-13
Payer: MEDICARE

## 2024-10-13 NOTE — PROGRESS NOTES
Subjective:     Patient ID: Alicia Soliz is a 44 y.o. female.    Chief Complaint: Foot Problem (Pt c/o BL foot peeling, non-diabetic pt wears tennis shoes, PCP Dr. Dumont last seen 6-24-24)    Alicia is a 44 y.o. female who presents to the clinic for evaluation and treatment of high risk feet. Alicia has a past medical history of Anxiety and depression (10/20/2018), Arthritis, Cardiac arrest as complication of medication, Chronic pain of left knee (10/13/2023), Encounter for blood transfusion (2004), Excessive daytime sleepiness (06/26/2017), GERD (gastroesophageal reflux disease), Hemiplegia affecting left nondominant side, Hyperglycemia (09/26/2021), Hypertension, Iron deficiency anemia, Mixed hyperlipidemia (03/18/2014), Mixed hyperlipidemia (03/18/2014), Morbid obesity with BMI of 50.0-59.9, adult (09/20/2018), Multiple sclerosis, Otitis externa (04/10/2024), Prediabetes (02/11/2019), Seizure-like activity (02/25/2024), and Sprain of medial collateral ligament of left knee (10/13/2023). The patient's chief complaint is peeling skin. Patient denies itching. This patient has documented high risk feet requiring routine maintenance secondary to peripheral neuropathy.    PCP: Braden Dumont MD    Date Last Seen by PCP: 06/24/2024    Current shoe gear:  Affected Foot: Tennis shoes     Unaffected Foot: Tennis shoes    Last encounter in this department: Visit date not found    Hemoglobin A1C   Date Value Ref Range Status   02/01/2024 5.6 4.0 - 5.6 % Final     Comment:     ADA Screening Guidelines:  5.7-6.4%  Consistent with prediabetes  >or=6.5%  Consistent with diabetes    High levels of fetal hemoglobin interfere with the HbA1C  assay. Heterozygous hemoglobin variants (HbS, HgC, etc)do  not significantly interfere with this assay.   However, presence of multiple variants may affect accuracy.     03/21/2022 5.6 4.0 - 5.6 % Final     Comment:     ADA Screening Guidelines:  5.7-6.4%  Consistent with  prediabetes  >or=6.5%  Consistent with diabetes    High levels of fetal hemoglobin interfere with the HbA1C  assay. Heterozygous hemoglobin variants (HbS, HgC, etc)do  not significantly interfere with this assay.   However, presence of multiple variants may affect accuracy.     07/30/2021 5.5 4.0 - 5.6 % Final     Comment:     ADA Screening Guidelines:  5.7-6.4%  Consistent with prediabetes  >or=6.5%  Consistent with diabetes    High levels of fetal hemoglobin interfere with the HbA1C  assay. Heterozygous hemoglobin variants (HbS, HgC, etc)do  not significantly interfere with this assay.   However, presence of multiple variants may affect accuracy.         Patient Active Problem List   Diagnosis    Essential hypertension    Multiple sclerosis, relapsing-remitting    Morbid obesity with BMI of 45.0-49.9, adult    Opioid dependence, uncomplicated    Hemiplegia affecting left nondominant side    Fatty liver    Localized swelling of left lower extremity    Decreased strength, endurance, and mobility    Left hemiparesis    Primary osteoarthritis of left knee    Weakness of left shoulder    Gastroesophageal reflux disease without esophagitis    Seizure-like activity    Acute pain of left shoulder    Anxiety and depression    Chronic pain syndrome    Abnormality of gait due to impairment of balance    Insomnia    Mixed hyperlipidemia    Iron deficiency anemia    Class 3 severe obesity due to excess calories with body mass index (BMI) of 45.0 to 49.9 in adult    History of stroke    Nausea    Colon cancer screening       Medication List with Changes/Refills   New Medications    KETOCONAZOLE (NIZORAL) 2 % CREAM    Apply topically once daily.   Current Medications    CHOLECALCIFEROL, VITAMIN D3, 1,250 MCG (50,000 UNIT) CAPSULE    Take 1 capsule (50,000 Units total) by mouth every 7 days. for 365 doses    CYCLOBENZAPRINE (FLEXERIL) 10 MG TABLET    Take 10 mg by mouth every 8 (eight) hours as needed.    DICLOFENAC SODIUM  "(VOLTAREN) 1 % GEL    Apply 2 g topically 4 (four) times daily.    DOXEPIN (SINEQUAN) 75 MG CAPSULE    Take 1 capsule (75 mg total) by mouth every evening.    DULOXETINE (CYMBALTA) 60 MG CAPSULE    TAKE 1 CAPSULE BY MOUTH EVERY DAY    GABAPENTIN (NEURONTIN) 300 MG CAPSULE    Take 3 capsules (900 mg total) by mouth 2 (two) times daily.    HYDROXYZINE HCL (ATARAX) 25 MG TABLET    Take 1 tablet (25 mg total) by mouth 4 (four) times daily as needed for Anxiety.    LACTULOSE (CHRONULAC) 10 GRAM/15 ML SOLUTION    TAKE 15MLS BY MOUTH THREE TIMES DAILY    LINACLOTIDE (LINZESS) 145 MCG CAP CAPSULE    Take 1 capsule (145 mcg total) by mouth before breakfast.    OCREVUS 30 MG/ML SOLN        OMEPRAZOLE (PRILOSEC) 40 MG CAPSULE    Take 1 capsule (40 mg total) by mouth 2 (two) times daily before meals.    VALSARTAN (DIOVAN) 80 MG TABLET    Take 1 tablet (80 mg total) by mouth once daily.       Review of patient's allergies indicates:   Allergen Reactions    Demerol [meperidine] Anaphylaxis and Hives    Dilaudid [hydromorphone (bulk)] Anaphylaxis     "Code Blue" however patient tolerates Lortab    Shellfish containing products Itching, Nausea And Vomiting and Swelling    Tramadol Hives    Prednisone Itching       Past Surgical History:   Procedure Laterality Date    APPENDECTOMY      CHOLECYSTECTOMY      COLONOSCOPY N/A 11/25/2020    Procedure: COLONOSCOPY;  Surgeon: Andrew Jenkins III, MD;  Location: [facility];  Service: Endoscopy;  Laterality: N/A;    COLONOSCOPY N/A 7/18/2024    Procedure: COLONOSCOPY;  Surgeon: Brie Cruz MD;  Location: [facility];  Service: Endoscopy;  Laterality: N/A;    COLONOSCOPY N/A 7/22/2024    Procedure: COLONOSCOPY;  Surgeon: Galina Diaz MD;  Location: [facility];  Service: Endoscopy;  Laterality: N/A;    COLONOSCOPY N/A 8/1/2024    Procedure: COLONOSCOPY;  Surgeon: Brie Cruz MD;  Location: [facility];  Service: Colon and Rectal;  Laterality: N/A;    " ESOPHAGOGASTRODUODENOSCOPY N/A 02/19/2020    Procedure: EGD (ESOPHAGOGASTRODUODENOSCOPY);  Surgeon: Michael Navarrete MD;  Location: [facility];  Service: Endoscopy;  Laterality: N/A;    ESOPHAGOGASTRODUODENOSCOPY N/A 02/20/2020    Procedure: EGD (ESOPHAGOGASTRODUODENOSCOPY);  Surgeon: Michael Navarrete MD;  Location: [facility] OR;  Service: General;  Laterality: N/A;    ESOPHAGOGASTRODUODENOSCOPY N/A 11/25/2020    Procedure: EGD (ESOPHAGOGASTRODUODENOSCOPY);  Surgeon: Andrew Jenkins III, MD;  Location: [facility];  Service: Endoscopy;  Laterality: N/A;    ESOPHAGOGASTRODUODENOSCOPY N/A 09/25/2023    Procedure: EGD (ESOPHAGOGASTRODUODENOSCOPY);  Surgeon: Ly Kim MD;  Location: [facility];  Service: Endoscopy;  Laterality: N/A;    ESOPHAGOGASTRODUODENOSCOPY N/A 01/29/2024    Procedure: EGD (ESOPHAGOGASTRODUODENOSCOPY);  Surgeon: Ly Kim MD;  Location: [facility];  Service: Endoscopy;  Laterality: N/A;    HYSTERECTOMY      KNEE SURGERY      age 10    LAPAROSCOPIC GASTROENTEROSTOMY N/A 05/20/2024    Procedure: GASTROENTEROSTOMY, LAPAROSCOPIC w/intraop EGD;  Surgeon: Farideh Vazquez MD;  Location: [address];  Service: General;  Laterality: N/A;    PH MONITORING, ESOPHAGUS, WIRELESS, (OFF REFLUX MEDS) N/A 01/29/2024    Procedure: PH MONITORING, ESOPHAGUS, WIRELESS, (OFF REFLUX MEDS);  Surgeon: Ly Kim MD;  Location: [facility];  Service: Endoscopy;  Laterality: N/A;    ROBOT-ASSISTED LAPAROSCOPIC SLEEVE GASTRECTOMY USING DA ANTHONY XI N/A 02/20/2020    Procedure: XI ROBOTIC SLEEVE GASTRECTOMY;  Surgeon: Michael Navarrete MD;  Location: [facility];  Service: General;  Laterality: N/A;    TENOPLASTY OF HAND Left 08/26/2021    Procedure: REPAIR, TENDON, HAND;  Surgeon: Joselito Lugo MD;  Location: [facility] OR;  Service: Orthopedics;  Laterality: Left;  Left RCL PIP Joint Repair/Recon with Arthrex Internal Brace.    TONSILLECTOMY      TOTAL REDUCTION MAMMOPLASTY  2022    TUBAL LIGATION          Family History   Problem Relation Name Age of Onset    Lupus Mother      Heart disease Mother      Hypertension Mother      Diabetes Father Ganesh hannon     Kidney disease Father Ganesh hannon     No Known Problems Sister      No Known Problems Sister      No Known Problems Brother      No Known Problems Brother      No Known Problems Daughter      No Known Problems Daughter      No Known Problems Daughter      Cancer Maternal Aunt Melly Harley 40        breast    Breast cancer Maternal Aunt Melly Harley     Diabetes Maternal Aunt Melly Harley     COPD Maternal Aunt Melly Harley     Heart disease Maternal Grandmother Lilia harley     Breast cancer Maternal Cousin      Ovarian cancer Maternal Cousin         Social History     Socioeconomic History    Marital status: Single    Number of children: 3   Occupational History     Employer: [employer]   Tobacco Use    Smoking status: Never     Passive exposure: Never    Smokeless tobacco: Never   Substance and Sexual Activity    Alcohol use: Not Currently     Comment: once a year     Drug use: No    Sexual activity: Yes     Partners: Female     Birth control/protection: Surgical     Social Drivers of Health     Financial Resource Strain: Low Risk  (2/9/2024)    Received from [employer] [facility] Subsidiaries and Affiliates, [facility] Subsidiaries and Affiliates    Overall Financial Resource Strain (CARDIA)     Difficulty of Paying Living Expenses: Not very hard   Food Insecurity: No Food Insecurity (2/9/2024)    Received from [employer] [facility] Subsidiaries and Affiliates, [employer] [facility] Subsidiaries and Affiliates    Hunger Vital Sign     Worried About Running Out of Food in the Last Year: Never true     Ran Out of Food in the Last Year: Never true   Recent Concern: Food Insecurity - Food Insecurity  "Present (11/11/2023)    Hunger Vital Sign     Worried About Running Out of Food in the Last Year: Sometimes true     Ran Out of Food in the Last Year: Sometimes true   Transportation Needs: No Transportation Needs (2/9/2024)    [facility], [facility]    PRAPARE - Transportation     Lack of Transportation (Medical): No     Lack of Transportation (Non-Medical): No   Recent Concern: Transportation Needs - Unmet Transportation Needs (11/11/2023)    PRAPARE - Transportation     Lack of Transportation (Medical): Yes     Lack of Transportation (Non-Medical): Yes   Physical Activity: Insufficiently Active (2/9/2024)    [facility], [facility]    Exercise Vital Sign     Days of Exercise per Week: 5 days     Minutes of Exercise per Session: 20 min   Stress: No Stress Concern Present (2/9/2024)    [facility], [facility]    [employer] [place] [employer] - Occupational Stress Questionnaire     Feeling of Stress : Not at all   Housing Stability: Low Risk  (2/9/2024)    [facility], [facility]    Housing Stability Vital Sign     Unable to Pay for Housing in the Last Year: No     Number of Places Lived in the Last Year: 1     Unstable Housing in the Last Year: No       Vitals:    10/10/24 1540   Weight: 100.7 kg (222 lb 0.1 oz)   Height: 5' 6" (1.676 m)   PainSc: 0-No pain "       Hemoglobin A1C   Date Value Ref Range Status   02/01/2024 5.6 4.0 - 5.6 % Final     Comment:     ADA Screening Guidelines:  5.7-6.4%  Consistent with prediabetes  >or=6.5%  Consistent with diabetes    High levels of fetal hemoglobin interfere with the HbA1C  assay. Heterozygous hemoglobin variants (HbS, HgC, etc)do  not significantly interfere with this assay.   However, presence of multiple variants may affect accuracy.     03/21/2022 5.6 4.0 - 5.6 % Final     Comment:     ADA Screening Guidelines:  5.7-6.4%  Consistent with prediabetes  >or=6.5%  Consistent with diabetes    High levels of fetal hemoglobin interfere with the HbA1C  assay. Heterozygous hemoglobin variants (HbS, HgC, etc)do  not significantly interfere with this assay.   However, presence of multiple variants may affect accuracy.     07/30/2021 5.5 4.0 - 5.6 % Final     Comment:     ADA Screening Guidelines:  5.7-6.4%  Consistent with prediabetes  >or=6.5%  Consistent with diabetes    High levels of fetal hemoglobin interfere with the HbA1C  assay. Heterozygous hemoglobin variants (HbS, HgC, etc)do  not significantly interfere with this assay.   However, presence of multiple variants may affect accuracy.         Review of Systems   Constitutional:  Negative for chills and fever.   Cardiovascular: Negative.    Gastrointestinal:  Negative for diarrhea, nausea and vomiting.   Skin:  Positive for rash.   Neurological: Negative.    Endo/Heme/Allergies: Negative.    Psychiatric/Behavioral: Negative.           Objective:      PHYSICAL EXAM: Apperance: Alert and orient in no distress,well developed, and with good attention to grooming and body habits  Lower Extremity Exam  VASCULAR: Dorsalis pedis pulses 2/4 bilateral and Posterior Tibial pulses 2/4 bilateral.   DERMATOLOGICAL: No skin rash, subcutaneous nodules, lesions or ulcers observed. Dry and scaly skin noted plantarly bilateral in moccasin distribution. Webspaces 1,2,3,4 bilateral are clean, dry  and without evidence of break in skin integrity.    NEUROLOGICAL: Light touch, sharp-dull, proprioception all present and equal bilaterally.    MUSCULOSKELETAL: Muscle strength is 5/5 for foot inverters, everters, plantarflexors, and dorsiflexors. Muscle tone is normal.         Assessment:       ICD-10-CM ICD-9-CM   1. Tinea pedis of both feet  B35.3 110.4   2. Multiple sclerosis, relapsing-remitting  G35 340       Plan:   Tinea pedis of both feet    Multiple sclerosis, relapsing-remitting    Other orders  -     ketoconazole (NIZORAL) 2 % cream; Apply topically once daily.  Dispense: 60 g; Refill: 1    I counseled the patient on her conditions, regarding findings of my examination, my impressions, and usual treatment plan.   Discuss treatment options for tinea pedis.  I explained that fungus lives in a warm dark moist environment and therefore patient should make every attempt to keep feet clean and dry.  We discussed drying feet thoroughly after shower particularly between the toes.   Patient instructed to spray all shoes with Lysol disinfectant spray and let dry before wearing. Patient instructed to wash all socks in hot water and bleach.  We discussed using Lamisil for tinea pedis. This drug offers a fairly high but not universal cure rate. A 2-4 week treatment course is recommended. The patient is aware that rare cases of liver injury have been reported; and agrees to have a liver function tests performed. The symptoms of liver disease have been discussed; call if such occurs. Other side effects, such as headaches and rashes, have also been discussed.  Prescribed Ketoconazole cream to be applied twice daily to areas of feet.   Patient to return in 4-6 weeks or sooner if needed.          Mihir Paul, TIERNEY  [facility]

## 2024-10-13 NOTE — TELEPHONE ENCOUNTER
"Pt states "MS flaring up on me." Pt states was in the extended heat waiting for transportation to appointment for two days. Pt states scheduled for infusion on Tuesday. Pt reports generalized body aches and pain, pain to feet and hands, and feeling confused at times. Per protocol, call  now. Pt verbalizes understanding.   Reason for Disposition   Difficult to awaken or acting confused (e.g., disoriented, slurred speech)    Additional Information   Negative: Shock suspected (e.g., cold/pale/clammy skin, too weak to stand, low BP, rapid pulse)    Protocols used: Muscle Aches and Body Pain-A-AH    "

## 2024-10-15 ENCOUNTER — INFUSION (OUTPATIENT)
Dept: INFUSION THERAPY | Facility: HOSPITAL | Age: 44
End: 2024-10-15
Attending: FAMILY MEDICINE
Payer: MEDICARE

## 2024-10-15 VITALS
HEART RATE: 82 BPM | DIASTOLIC BLOOD PRESSURE: 76 MMHG | BODY MASS INDEX: 38.29 KG/M2 | SYSTOLIC BLOOD PRESSURE: 132 MMHG | OXYGEN SATURATION: 99 % | TEMPERATURE: 98 F | RESPIRATION RATE: 16 BRPM | WEIGHT: 237.19 LBS

## 2024-10-15 DIAGNOSIS — G35 MULTIPLE SCLEROSIS, RELAPSING-REMITTING: Primary | ICD-10-CM

## 2024-10-15 PROCEDURE — 96415 CHEMO IV INFUSION ADDL HR: CPT | Mod: HCNC

## 2024-10-15 PROCEDURE — 96367 TX/PROPH/DG ADDL SEQ IV INF: CPT | Mod: HCNC

## 2024-10-15 PROCEDURE — 96375 TX/PRO/DX INJ NEW DRUG ADDON: CPT | Mod: HCNC

## 2024-10-15 PROCEDURE — 25000003 PHARM REV CODE 250: Mod: HCNC | Performed by: CLINICAL NURSE SPECIALIST

## 2024-10-15 PROCEDURE — 63600175 PHARM REV CODE 636 W HCPCS: Mod: HCNC | Performed by: CLINICAL NURSE SPECIALIST

## 2024-10-15 PROCEDURE — 96413 CHEMO IV INFUSION 1 HR: CPT | Mod: HCNC

## 2024-10-15 RX ORDER — ACETAMINOPHEN 500 MG
1000 TABLET ORAL
OUTPATIENT
Start: 2025-03-18

## 2024-10-15 RX ORDER — FAMOTIDINE 10 MG/ML
20 INJECTION INTRAVENOUS
Status: COMPLETED | OUTPATIENT
Start: 2024-10-15 | End: 2024-10-15

## 2024-10-15 RX ORDER — FAMOTIDINE 10 MG/ML
20 INJECTION INTRAVENOUS
OUTPATIENT
Start: 2025-03-18

## 2024-10-15 RX ORDER — DIPHENHYDRAMINE HYDROCHLORIDE 50 MG/ML
50 INJECTION INTRAMUSCULAR; INTRAVENOUS
OUTPATIENT
Start: 2025-03-18

## 2024-10-15 RX ORDER — EPINEPHRINE 0.3 MG/.3ML
0.3 INJECTION SUBCUTANEOUS
OUTPATIENT
Start: 2025-03-18

## 2024-10-15 RX ORDER — ONDANSETRON HYDROCHLORIDE 2 MG/ML
4 INJECTION, SOLUTION INTRAVENOUS
Start: 2025-04-01

## 2024-10-15 RX ORDER — SODIUM CHLORIDE 0.9 % (FLUSH) 0.9 %
10 SYRINGE (ML) INJECTION
OUTPATIENT
Start: 2025-03-18

## 2024-10-15 RX ORDER — METHYLPREDNISOLONE SOD SUCC 125 MG
100 VIAL (EA) INJECTION
Status: COMPLETED | OUTPATIENT
Start: 2024-10-15 | End: 2024-10-15

## 2024-10-15 RX ORDER — ACETAMINOPHEN 500 MG
1000 TABLET ORAL
Status: COMPLETED | OUTPATIENT
Start: 2024-10-15 | End: 2024-10-15

## 2024-10-15 RX ORDER — ONDANSETRON HYDROCHLORIDE 2 MG/ML
4 INJECTION, SOLUTION INTRAVENOUS
Status: CANCELLED
Start: 2024-10-15

## 2024-10-15 RX ORDER — METHYLPREDNISOLONE SOD SUCC 125 MG
100 VIAL (EA) INJECTION
Start: 2025-03-18 | End: 2025-03-18

## 2024-10-15 RX ORDER — ONDANSETRON HYDROCHLORIDE 2 MG/ML
4 INJECTION, SOLUTION INTRAVENOUS
Status: DISCONTINUED | OUTPATIENT
Start: 2024-10-15 | End: 2024-10-15 | Stop reason: HOSPADM

## 2024-10-15 RX ORDER — HEPARIN 100 UNIT/ML
500 SYRINGE INTRAVENOUS
OUTPATIENT
Start: 2025-03-18

## 2024-10-15 RX ADMIN — OCRELIZUMAB 600 MG: 300 INJECTION INTRAVENOUS at 10:10

## 2024-10-15 RX ADMIN — DIPHENHYDRAMINE HYDROCHLORIDE 50 MG: 50 INJECTION, SOLUTION INTRAMUSCULAR; INTRAVENOUS at 09:10

## 2024-10-15 RX ADMIN — FAMOTIDINE 20 MG: 10 INJECTION INTRAVENOUS at 09:10

## 2024-10-15 RX ADMIN — ACETAMINOPHEN 1000 MG: 500 TABLET ORAL at 09:10

## 2024-10-15 RX ADMIN — METHYLPREDNISOLONE SODIUM SUCCINATE 100 MG: 125 INJECTION, POWDER, FOR SOLUTION INTRAMUSCULAR; INTRAVENOUS at 09:10

## 2024-10-15 RX ADMIN — ONDANSETRON 4 MG: 2 INJECTION INTRAMUSCULAR; INTRAVENOUS at 11:10

## 2024-10-15 NOTE — DISCHARGE INSTRUCTIONS
FALL PREVENTION   Falls often occur due to slipping, tripping or losing your balance. Here are ways to reduce your risk of falling again.   Was there anything that caused your fall that can be fixed, removed or replaced?   Make your home safe by keeping walkways clear of objects you may trip over.   Use non-slip pads under rugs.   Do not walk in poorly lit areas.   Do not stand on chairs or wobbly ladders.   Use caution when reaching overhead or looking upward. This position can cause a loss of balance.   Be sure your shoes fit properly, have non-slip bottoms and are in good condition.   Be cautious when going up and down stairs, curbs, and when walking on uneven sidewalks.   If your balance is poor, consider using a cane or walker.   If your fall was related to alcohol use, stop or limit alcohol intake.   If your fall was related to use of sleeping medicines, talk to your doctor about this. You may need to reduce your dosage at bedtime if you awaken during the night to go to the bathroom.   To reduce the need for nighttime bathroom trips:   Avoid drinking fluids for several hours before going to bed   Empty your bladder before going to bed   Men can keep a urinal at the bedside   © 1276-2955 [facility], [address] [place] [address], [place] [id number]. All rights reserved. This information is not intended as a substitute for professional medical care. Always follow your healthcare professional's instructions.    WAYS TO HELP PREVENT INFECTION        WASH YOUR HANDS OFTEN DURING THE DAY, ESPECIALLY BEFORE YOU EAT, AFTER USING THE BATHROOM, AND AFTER TOUCHING ANIMALS    STAY AWAY FROM PEOPLE WHO HAVE ILLNESSES YOU CAN CATCH; SUCH AS COLDS, FLU, CHICKEN POX    TRY TO AVOID CROWDS    STAY AWAY FROM CHILDREN WHO RECENTLY HAVE RECEIVED LIVE VIRUS VACCINES    MAINTAIN GOOD MOUTH CARE    DO NOT SQUEEZE OR SCRATCH PIMPLES    CLEAN CUTS & SCRAPES RIGHT AWAY AND DAILY UNTIL HEALED WITH WARM WATER, SOAP & AN  ANTISEPTIC    AVOID CONTACT WITH LITTER BOXES, BIRD CAGES, & FISH TANKS    AVOID STANDING WATER, IE., BIRD BATHS, FLOWER POTS/VASES, OR HUMIDIFIERS    WEAR GLOVES WHEN GARDENING OR CLEANING UP AFTER OTHERS, ESPECIALLY BABIES & SMALL CHILDREN    DO NOT EAT RAW FISH, SEAFOOD, MEAT, OR EGGS

## 2024-10-17 ENCOUNTER — PATIENT MESSAGE (OUTPATIENT)
Dept: NEUROLOGY | Facility: CLINIC | Age: 44
End: 2024-10-17
Payer: MEDICARE

## 2024-10-17 DIAGNOSIS — G35 MULTIPLE SCLEROSIS: Primary | ICD-10-CM

## 2024-10-21 NOTE — PROGRESS NOTES
"[facility]   Physical Therapy Treatment Note      Name: Alicia [place]oney [place]  Clinic Number: [id number]    Therapy Diagnosis:   Encounter Diagnosis   Name Primary?    Weakness of left shoulder Yes     Physician: Natalie Vera, *    Visit Date: 10/22/2024    Physician Orders: PT Eval and Treat  Medical Diagnosis from Referral:   M25.512 (ICD-10-CM) - Left shoulder pain, unspecified chronicity   M75.02 (ICD-10-CM) - Adhesive capsulitis of left shoulder   Evaluation Date: 10/8/2024  Authorization Period Expiration: 9/20/25  Plan of Care Expiration: 12/3/24  Progress Note Due: Every 6th visit  Visit # / Visits authorized: 1/10   FOTO: 1/3     Precautions: Standard; MS     Time In: 2:05 PM  Time Out: 3:05 PM  Total Appointment Time (timed & untimed codes): 60 minutes    PTA Visit #: 0/5       Subjective     Patient reports: Improvements in pain and function noted.  She was compliant with home exercise program.  Response to previous treatment: -  Functional change: -    Pain: 4/10  Location: L shoulder    Objective      Objective Measures updated at progress report unless specified.     Treatment     Alicia received the treatments listed below:      therapeutic exercises to develop strength, endurance, ROM, and flexibility for 15 minutes including:  Pulleys (2/2)  UBE (3/3)  Supine wand flexion (2 x 10)     manual therapy techniques: Joint mobilizations, Manual traction, and Soft tissue Mobilization were applied to the: L shoulder for - minutes, including:     neuromuscular re-education activities to improve: Balance, Coordination, Kinesthetic, Sense, Proprioception, and Posture for 45 minutes. The following activities were included:  Serratus punches c wand (3 x 10, 2 lbs)  Shoulder Isometrics (2 x 10, 10")  IR  ER  Flexion  Abduction  Extension  Seated row (3 x 10, RTB)     therapeutic activities to improve functional performance for - minutes, including:    Patient Education and Home " Exercises       Education provided:   - Cont'd HEP    Written Home Exercises Provided: Pt instructed to continue prior HEP. Exercises were reviewed and Alicia was able to demonstrate them prior to the end of the session.  Alicia demonstrated good  understanding of the education provided. See Electronic Medical Record under Patient Instructions for exercises provided during therapy sessions    Assessment     Pt demonstrates good tolerance to advancements in POC with minimal cuing req'd to maintain desired mechanics. Pt requires cuing to decrease compensatory shrugging with exercise performance. Pt and PT discussed cont'd HEP.     Alicia Is progressing well towards her goals.   Patient prognosis is Good.     Patient will continue to benefit from skilled outpatient physical therapy to address the deficits listed in the problem list box on initial evaluation, provide pt/family education and to maximize pt's level of independence in the home and community environment.     Patient's spiritual, cultural and educational needs considered and pt agreeable to plan of care and goals.     Anticipated barriers to physical therapy: MS    GOALS:  SHORT TERM GOALS: 4 weeks Progress   Recent signs and systems trend is improving in order to progress towards Long term goals's. Progressing   Patient will be independent with Home Exercise Program  in order to further progress and return to maximal function. Progressing   Pain rating at Worst: 5/10 in order to progress towards increased independence with activity. Progressing   Patient will be able to correct postural deviations in sitting and standing, to decrease pain and promote postural awareness for injury prevention.  Progressing   Patient will partially meet predicted functional outcome (FOTO) score: 63% to improve towards increasing self-worth & perceived functional ability. Progressing      LONG TERM GOALS: 8 weeks, (12/3/24) Progress   Patient will return to normal Activities of  "daily living, recreational, and work related activities with less pain and limitation.  Progressing   Patient will improve Range of Motion to stated goals in order to return to maximal functional potential.  Progressing   Patient will improve Strength to stated goals of appropriate musculature in order to improve functional independence.  Progressing   Pain Rating at Best: 1/10 to improve Quality of Life.  Progressing   Patient will meet predicted functional outcome (FOTO) score: 71% to increase self-worth & perceived functional ability. Progressing   Patient will have met/partially met personal goal of: "Be able to use my L arm to reach the shelves in the closet."         Plan     Plan of care Certification: 10/8/2024 to 12/3/24.     Outpatient Physical Therapy 2 times weekly for 8 weeks to include the following interventions: Cervical/Lumbar Traction, Electrical Stimulation , Gait Training, Manual Therapy, Moist Heat/ Ice, Neuromuscular Re-ed, Orthotic Management and Training, Patient Education, Self Care, Therapeutic Activities, Therapeutic Exercise, and FDN    Yuly Hunt, PT    "

## 2024-10-22 ENCOUNTER — CLINICAL SUPPORT (OUTPATIENT)
Dept: REHABILITATION | Facility: HOSPITAL | Age: 44
End: 2024-10-22
Payer: MEDICARE

## 2024-10-22 DIAGNOSIS — R29.898 WEAKNESS OF LEFT SHOULDER: Primary | ICD-10-CM

## 2024-10-22 PROCEDURE — 97112 NEUROMUSCULAR REEDUCATION: CPT | Mod: HCNC,PN

## 2024-11-02 ENCOUNTER — PATIENT MESSAGE (OUTPATIENT)
Dept: BARIATRICS | Facility: CLINIC | Age: 44
End: 2024-11-02
Payer: MEDICARE

## 2024-11-04 ENCOUNTER — PATIENT MESSAGE (OUTPATIENT)
Dept: NEUROLOGY | Facility: CLINIC | Age: 44
End: 2024-11-04
Payer: MEDICARE

## 2024-11-04 ENCOUNTER — PATIENT MESSAGE (OUTPATIENT)
Dept: INTERNAL MEDICINE | Facility: CLINIC | Age: 44
End: 2024-11-04
Payer: MEDICARE

## 2024-11-05 ENCOUNTER — PATIENT MESSAGE (OUTPATIENT)
Dept: NEUROLOGY | Facility: CLINIC | Age: 44
End: 2024-11-05
Payer: MEDICARE

## 2024-11-05 DIAGNOSIS — R11.10 VOMITING, UNSPECIFIED VOMITING TYPE, UNSPECIFIED WHETHER NAUSEA PRESENT: ICD-10-CM

## 2024-11-05 DIAGNOSIS — R11.0 NAUSEA: Primary | ICD-10-CM

## 2024-11-07 RX ORDER — ONDANSETRON 4 MG/1
4 TABLET, ORALLY DISINTEGRATING ORAL EVERY 12 HOURS PRN
Qty: 10 TABLET | Refills: 0 | Status: SHIPPED | OUTPATIENT
Start: 2024-11-07

## 2024-11-11 NOTE — PROGRESS NOTES
[facility]   Physical Therapy Treatment Note      Name: Alicia Mahoney Ogden  Clinic Number: [id number]    Therapy Diagnosis:   Encounter Diagnosis   Name Primary?    Weakness of left shoulder Yes       Physician: Natalie Vera, *    Visit Date: 11/12/2024    Physician Orders: PT Eval and Treat  Medical Diagnosis from Referral:   M25.512 (ICD-10-CM) - Left shoulder pain, unspecified chronicity   M75.02 (ICD-10-CM) - Adhesive capsulitis of left shoulder   Evaluation Date: 10/8/2024  Authorization Period Expiration: 9/20/25  Plan of Care Expiration: 12/3/24  Progress Note Due: Every 6th visit  Visit # / Visits authorized: 2/10   FOTO: 1/3     Precautions: Standard; MS     Time In: 2:25 PM  Time Out: 4:00 PM  Total Appointment Time (timed & untimed codes): 95 minutes    PTA Visit #: 0/5       Subjective     Patient reports: Shoulder feeling pretty good.   She was compliant with home exercise program.  Response to previous treatment: -  Functional change: -    Pain: 4/10  Location: L shoulder    Objective      Objective Measures updated at progress report unless specified.     Treatment     Alicia received the treatments listed below:      therapeutic exercises to develop strength, endurance, ROM, and flexibility for 18 minutes including:  FOTO update  UBE (3/3)  Pulleys (2/2)  Supine wand flexion (2 x 10)     manual therapy techniques: Joint mobilizations, Manual traction, and Soft tissue Mobilization were applied to the: L shoulder for - minutes, including:     neuromuscular re-education activities to improve: Balance, Coordination, Kinesthetic, Sense, Proprioception, and Posture for 60 minutes. The following activities were included:  Seated row (3 x 10, RTB)  IR/ER (3 x 10, YTB)  Prone shoulder extension (2 x 10)   Prone row (2 x 10) - mild pain reproduction  Prone T (2 x 10)     therapeutic activities to improve functional performance for - minutes, including:    Patient Education  and Home Exercises       Education provided:   - Cont'd HEP    Written Home Exercises Provided: Pt instructed to continue prior HEP. Exercises were reviewed and Alicia was able to demonstrate them prior to the end of the session.  Alicia demonstrated good  understanding of the education provided. See Electronic Medical Record under Patient Instructions for exercises provided during therapy sessions    Assessment     Pt demonstrates excellent tolerance to advancements in exercise performance today. Pt was able to tolerate prone lying but does report mild pain reproduction with prone rowing. Pt requires verbal and tactile cuing to improve scapular retraction in prone.     Alicia Is progressing well towards her goals.   Patient prognosis is Good.     Patient will continue to benefit from skilled outpatient physical therapy to address the deficits listed in the problem list box on initial evaluation, provide pt/family education and to maximize pt's level of independence in the home and community environment.     Patient's spiritual, cultural and educational needs considered and pt agreeable to plan of care and goals.     Anticipated barriers to physical therapy: MS    GOALS:  SHORT TERM GOALS: 4 weeks Progress   Recent signs and systems trend is improving in order to progress towards Long term goals's. Progressing   Patient will be independent with Home Exercise Program  in order to further progress and return to maximal function. Progressing   Pain rating at Worst: 5/10 in order to progress towards increased independence with activity. Progressing   Patient will be able to correct postural deviations in sitting and standing, to decrease pain and promote postural awareness for injury prevention.  Progressing   Patient will partially meet predicted functional outcome (FOTO) score: 63% to improve towards increasing self-worth & perceived functional ability. Progressing      LONG TERM GOALS: 8 weeks, (12/3/24) Progress  "  Patient will return to normal Activities of daily living, recreational, and work related activities with less pain and limitation.  Progressing   Patient will improve Range of Motion to stated goals in order to return to maximal functional potential.  Progressing   Patient will improve Strength to stated goals of appropriate musculature in order to improve functional independence.  Progressing   Pain Rating at Best: 1/10 to improve Quality of Life.  Progressing   Patient will meet predicted functional outcome (FOTO) score: 71% to increase self-worth & perceived functional ability. Progressing   Patient will have met/partially met personal goal of: "Be able to use my L arm to reach the shelves in the closet."         Plan     Plan of care Certification: 10/8/2024 to 12/3/24.     Outpatient Physical Therapy 2 times weekly for 8 weeks to include the following interventions: Cervical/Lumbar Traction, Electrical Stimulation , Gait Training, Manual Therapy, Moist Heat/ Ice, Neuromuscular Re-ed, Orthotic Management and Training, Patient Education, Self Care, Therapeutic Activities, Therapeutic Exercise, and FDN    Yuly Hunt, PT          "

## 2024-11-12 ENCOUNTER — PATIENT MESSAGE (OUTPATIENT)
Dept: BARIATRICS | Facility: CLINIC | Age: 44
End: 2024-11-12
Payer: MEDICARE

## 2024-11-12 ENCOUNTER — OFFICE VISIT (OUTPATIENT)
Dept: PODIATRY | Facility: CLINIC | Age: 44
End: 2024-11-12
Payer: MEDICARE

## 2024-11-12 ENCOUNTER — CLINICAL SUPPORT (OUTPATIENT)
Dept: REHABILITATION | Facility: HOSPITAL | Age: 44
End: 2024-11-12
Payer: MEDICARE

## 2024-11-12 VITALS — WEIGHT: 237.19 LBS | BODY MASS INDEX: 38.12 KG/M2 | HEIGHT: 66 IN

## 2024-11-12 DIAGNOSIS — R29.898 WEAKNESS OF LEFT SHOULDER: Primary | ICD-10-CM

## 2024-11-12 DIAGNOSIS — B35.3 TINEA PEDIS OF BOTH FEET: Primary | ICD-10-CM

## 2024-11-12 DIAGNOSIS — G35 MULTIPLE SCLEROSIS, RELAPSING-REMITTING: ICD-10-CM

## 2024-11-12 PROCEDURE — 97112 NEUROMUSCULAR REEDUCATION: CPT | Mod: HCNC,PN

## 2024-11-12 PROCEDURE — 4010F ACE/ARB THERAPY RXD/TAKEN: CPT | Mod: HCNC,CPTII,S$GLB, | Performed by: PODIATRIST

## 2024-11-12 PROCEDURE — 3044F HG A1C LEVEL LT 7.0%: CPT | Mod: HCNC,CPTII,S$GLB, | Performed by: PODIATRIST

## 2024-11-12 PROCEDURE — 99213 OFFICE O/P EST LOW 20 MIN: CPT | Mod: HCNC,S$GLB,, | Performed by: PODIATRIST

## 2024-11-12 PROCEDURE — 99999 PR PBB SHADOW E&M-EST. PATIENT-LVL III: CPT | Mod: PBBFAC,HCNC,, | Performed by: PODIATRIST

## 2024-11-12 PROCEDURE — 1160F RVW MEDS BY RX/DR IN RCRD: CPT | Mod: HCNC,CPTII,S$GLB, | Performed by: PODIATRIST

## 2024-11-12 PROCEDURE — 1159F MED LIST DOCD IN RCRD: CPT | Mod: HCNC,CPTII,S$GLB, | Performed by: PODIATRIST

## 2024-11-12 PROCEDURE — 3008F BODY MASS INDEX DOCD: CPT | Mod: HCNC,CPTII,S$GLB, | Performed by: PODIATRIST

## 2024-11-12 NOTE — PROGRESS NOTES
Subjective:     Patient ID: Alicia Soliz is a 44 y.o. female.    Chief Complaint: Tinea Pedis (AF f/u, non-diabetic wears tennis shoes, PCP Braden Dumont last seen 6/24/2024)    Alicia is a 44 y.o. female who presents to the podiatry clinic for follow up of bilateral athletes feet. Patient states she is using the cream as instructed. Patient states her feet look and feel much better. . Patient has no other pedal complaints at this time.     Patient Active Problem List   Diagnosis    Essential hypertension    Multiple sclerosis, relapsing-remitting    Morbid obesity with BMI of 45.0-49.9, adult    Opioid dependence, uncomplicated    Hemiplegia affecting left nondominant side    Fatty liver    Localized swelling of left lower extremity    Decreased strength, endurance, and mobility    Left hemiparesis    Primary osteoarthritis of left knee    Weakness of left shoulder    Gastroesophageal reflux disease without esophagitis    Seizure-like activity    Acute pain of left shoulder    Anxiety and depression    Chronic pain syndrome    Abnormality of gait due to impairment of balance    Insomnia    Mixed hyperlipidemia    Iron deficiency anemia    Class 3 severe obesity due to excess calories with body mass index (BMI) of 45.0 to 49.9 in adult    History of stroke    Nausea    Colon cancer screening       Medication List with Changes/Refills   Current Medications    CHOLECALCIFEROL, VITAMIN D3, 1,250 MCG (50,000 UNIT) CAPSULE    Take 1 capsule (50,000 Units total) by mouth every 7 days. for 365 doses    CYCLOBENZAPRINE (FLEXERIL) 10 MG TABLET    Take 10 mg by mouth every 8 (eight) hours as needed.    DICLOFENAC SODIUM (VOLTAREN) 1 % GEL    Apply 2 g topically 4 (four) times daily.    DOXEPIN (SINEQUAN) 75 MG CAPSULE    Take 1 capsule (75 mg total) by mouth every evening.    DULOXETINE (CYMBALTA) 60 MG CAPSULE    TAKE 1 CAPSULE BY MOUTH EVERY DAY    GABAPENTIN (NEURONTIN) 300 MG CAPSULE    Take 3 capsules (900 mg  "total) by mouth 2 (two) times daily.    HYDROXYZINE HCL (ATARAX) 25 MG TABLET    Take 1 tablet (25 mg total) by mouth 4 (four) times daily as needed for Anxiety.    KETOCONAZOLE (NIZORAL) 2 % CREAM    Apply topically once daily.    LACTULOSE (CHRONULAC) 10 GRAM/15 ML SOLUTION    TAKE 15MLS BY MOUTH THREE TIMES DAILY    LINACLOTIDE (LINZESS) 145 MCG CAP CAPSULE    Take 1 capsule (145 mcg total) by mouth before breakfast.    OCREVUS 30 MG/ML SOLN        OMEPRAZOLE (PRILOSEC) 40 MG CAPSULE    Take 1 capsule (40 mg total) by mouth 2 (two) times daily before meals.    ONDANSETRON (ZOFRAN-ODT) 4 MG TBDL    Take 1 tablet (4 mg total) by mouth every 12 (twelve) hours as needed.    VALSARTAN (DIOVAN) 80 MG TABLET    Take 1 tablet (80 mg total) by mouth once daily.       Review of patient's allergies indicates:   Allergen Reactions    Demerol [meperidine] Anaphylaxis and Hives    Dilaudid [hydromorphone (bulk)] Anaphylaxis     "Code Blue" however patient tolerates Lortab    Shellfish containing products Itching, Nausea And Vomiting and Swelling    Tramadol Hives    Prednisone Itching       Past Surgical History:   Procedure Laterality Date    APPENDECTOMY      CHOLECYSTECTOMY      COLONOSCOPY N/A 11/25/2020    Procedure: COLONOSCOPY;  Surgeon: Andrew Jenkins III, MD;  Location: [facility];  Service: Endoscopy;  Laterality: N/A;    COLONOSCOPY N/A 7/18/2024    Procedure: COLONOSCOPY;  Surgeon: Brie Cruz MD;  Location: [facility];  Service: Endoscopy;  Laterality: N/A;    COLONOSCOPY N/A 7/22/2024    Procedure: COLONOSCOPY;  Surgeon: Galina Diaz MD;  Location: [facility];  Service: Endoscopy;  Laterality: N/A;    COLONOSCOPY N/A 8/1/2024    Procedure: COLONOSCOPY;  Surgeon: Brie Cruz MD;  Location: [facility];  Service: Colon and Rectal;  Laterality: N/A;    ESOPHAGOGASTRODUODENOSCOPY N/A 02/19/2020    Procedure: EGD (ESOPHAGOGASTRODUODENOSCOPY);  Surgeon: Michael Navarrete MD;  Location: [facility];  " Service: Endoscopy;  Laterality: N/A;    ESOPHAGOGASTRODUODENOSCOPY N/A 02/20/2020    Procedure: EGD (ESOPHAGOGASTRODUODENOSCOPY);  Surgeon: Michael Navarrete MD;  Location: [facility];  Service: General;  Laterality: N/A;    ESOPHAGOGASTRODUODENOSCOPY N/A 11/25/2020    Procedure: EGD (ESOPHAGOGASTRODUODENOSCOPY);  Surgeon: Andrew Jenkins III, MD;  Location: [facility];  Service: Endoscopy;  Laterality: N/A;    ESOPHAGOGASTRODUODENOSCOPY N/A 09/25/2023    Procedure: EGD (ESOPHAGOGASTRODUODENOSCOPY);  Surgeon: Ly Kim MD;  Location: [facility];  Service: Endoscopy;  Laterality: N/A;    ESOPHAGOGASTRODUODENOSCOPY N/A 01/29/2024    Procedure: EGD (ESOPHAGOGASTRODUODENOSCOPY);  Surgeon: Ly Kim MD;  Location: [facility];  Service: Endoscopy;  Laterality: N/A;    HYSTERECTOMY      KNEE SURGERY      age 10    LAPAROSCOPIC GASTROENTEROSTOMY N/A 05/20/2024    Procedure: GASTROENTEROSTOMY, LAPAROSCOPIC w/intraop EGD;  Surgeon: Farideh Vazquez MD;  Location: [address];  Service: General;  Laterality: N/A;    PH MONITORING, ESOPHAGUS, WIRELESS, (OFF REFLUX MEDS) N/A 01/29/2024    Procedure: PH MONITORING, ESOPHAGUS, WIRELESS, (OFF REFLUX MEDS);  Surgeon: Ly Kim MD;  Location: [facility];  Service: Endoscopy;  Laterality: N/A;    ROBOT-ASSISTED LAPAROSCOPIC SLEEVE GASTRECTOMY USING DA ANTHONY XI N/A 02/20/2020    Procedure: XI ROBOTIC SLEEVE GASTRECTOMY;  Surgeon: Michael Navarrete MD;  Location: [facility] OR;  Service: General;  Laterality: N/A;    TENOPLASTY OF HAND Left 08/26/2021    Procedure: REPAIR, TENDON, HAND;  Surgeon: Joselito Lugo MD;  Location: [facility];  Service: Orthopedics;  Laterality: Left;  Left RCL PIP Joint Repair/Recon with Arthrex Internal Brace.    TONSILLECTOMY      TOTAL REDUCTION MAMMOPLASTY  2022    TUBAL LIGATION         Family History   Problem Relation Name Age of Onset    Lupus Mother      Heart disease Mother      Hypertension Mother      Diabetes Father  Ganesh hannon     Kidney disease Father Ganesh hannon     No Known Problems Sister      No Known Problems Sister      No Known Problems Brother      No Known Problems Brother      No Known Problems Daughter      No Known Problems Daughter      No Known Problems Daughter      Cancer Maternal Aunt Melly Harley 40        breast    Breast cancer Maternal Aunt Melly Harley     Diabetes Maternal Aunt Melly Harley     COPD Maternal Aunt Melly Harley     Heart disease Maternal Grandmother Lilia harley     Breast cancer Maternal Cousin      Ovarian cancer Maternal Cousin         Social History     Socioeconomic History    Marital status: Single    Number of children: 3   Occupational History     Employer: [employer]   Tobacco Use    Smoking status: Never     Passive exposure: Never    Smokeless tobacco: Never   Substance and Sexual Activity    Alcohol use: Not Currently     Comment: once a year     Drug use: No    Sexual activity: Yes     Partners: Female     Birth control/protection: Surgical     Social Drivers of Health     Financial Resource Strain: Low Risk  (2/9/2024)    Received from [employer] [facility], [employer] [facility]    Overall Financial Resource Strain (CARDIA)     Difficulty of Paying Living Expenses: Not very hard   Food Insecurity: No Food Insecurity (2/9/2024)    Received from [employer] [facility], [employer] [facility]    Hunger Vital Sign     Worried About Running Out of Food in the Last Year: Never true     Ran Out of Food in the Last Year: Never true   Recent Concern: Food Insecurity - Food Insecurity Present (11/11/2023)    Hunger Vital Sign     Worried About Running Out of Food in the Last Year: Sometimes true     Ran Out of Food in the Last  "Year: Sometimes true   Transportation Needs: No Transportation Needs (2/9/2024)    [facility], [facility]    PRAPARE - Transportation     Lack of Transportation (Medical): No     Lack of Transportation (Non-Medical): No   Recent Concern: Transportation Needs - Unmet Transportation Needs (11/11/2023)    PRAPARE - Transportation     Lack of Transportation (Medical): Yes     Lack of Transportation (Non-Medical): Yes   Physical Activity: Insufficiently Active (2/9/2024)    [facility], [facility]    Exercise Vital Sign     Days of Exercise per Week: 5 days     Minutes of Exercise per Session: 20 min   Stress: No Stress Concern Present (2/9/2024)    Received from [facility], [facility]    [facility] [facility] - Occupational Stress Questionnaire     Feeling of Stress : Not at all   Housing Stability: Low Risk  (2/9/2024)    [facility], [facility]    Housing Stability Vital Sign     Unable to Pay for Housing in the Last Year: No     Number of Places Lived in the Last Year: 1     Unstable Housing in the Last Year: No       Vitals:    11/12/24 1106   Weight: 107.6 kg (237 lb 3.4 oz)   Height: 5' 6" (1.676 m)   PainSc: 0-No pain       Review of Systems   Constitutional:  Negative for chills and fever.   Respiratory:  Negative for shortness of breath.    Cardiovascular:  " Negative for chest pain, palpitations, orthopnea, claudication and leg swelling.   Gastrointestinal:  Negative for diarrhea, nausea and vomiting.   Musculoskeletal:  Negative for joint pain.   Skin:  Negative for rash.   Neurological:  Negative for dizziness, tingling, sensory change, focal weakness and weakness.   Psychiatric/Behavioral: Negative.             Objective:      PHYSICAL EXAM: Apperance: Alert and orient in no distress,well developed, and with good attention to grooming and body habits  Lower Extremity Exam  VASCULAR: Dorsalis pedis pulses 2/4 bilateral and Posterior Tibial pulses 2/4 bilateral.   DERMATOLOGICAL: No skin rash, subcutaneous nodules, lesions or ulcers observed. Minimal dry and scaly skin noted plantarly bilateral in moccasin distribution. Webspaces 1,2,3,4 bilateral are clean, dry and without evidence of break in skin integrity.    NEUROLOGICAL: Light touch, sharp-dull, proprioception all present and equal bilaterally.    MUSCULOSKELETAL: Muscle strength is 5/5 for foot inverters, everters, plantarflexors, and dorsiflexors. Muscle tone is normal.         Assessment:       ICD-10-CM ICD-9-CM   1. Tinea pedis of both feet  B35.3 110.4   2. Multiple sclerosis, relapsing-remitting  G35 340       Plan:   Tinea pedis of both feet    Multiple sclerosis, relapsing-remitting      I counseled the patient on her conditions, regarding findings of my examination, my impressions, and usual treatment plan.   Patent to continue topical Ketoconazole cream for 2 more week and and then use as needed for flare ups.   Patient  will continue to monitor the areas daily, inspect feet, wear protective shoe gear when ambulatory, moisturizer to maintain skin integrity.  Patient to return 4 months or sooner if needed.              Mihir Paul DPM  [facility]

## 2024-11-20 ENCOUNTER — LAB VISIT (OUTPATIENT)
Dept: LAB | Facility: HOSPITAL | Age: 44
End: 2024-11-20
Payer: MEDICARE

## 2024-11-20 ENCOUNTER — OFFICE VISIT (OUTPATIENT)
Dept: BARIATRICS | Facility: CLINIC | Age: 44
End: 2024-11-20
Payer: MEDICARE

## 2024-11-20 ENCOUNTER — OFFICE VISIT (OUTPATIENT)
Dept: NEUROLOGY | Facility: CLINIC | Age: 44
End: 2024-11-20
Payer: MEDICARE

## 2024-11-20 ENCOUNTER — PATIENT MESSAGE (OUTPATIENT)
Dept: NEUROLOGY | Facility: CLINIC | Age: 44
End: 2024-11-20

## 2024-11-20 VITALS
BODY MASS INDEX: 36.64 KG/M2 | SYSTOLIC BLOOD PRESSURE: 153 MMHG | HEIGHT: 66 IN | DIASTOLIC BLOOD PRESSURE: 107 MMHG | HEART RATE: 87 BPM | WEIGHT: 228 LBS

## 2024-11-20 VITALS
DIASTOLIC BLOOD PRESSURE: 106 MMHG | OXYGEN SATURATION: 100 % | BODY MASS INDEX: 36.94 KG/M2 | SYSTOLIC BLOOD PRESSURE: 154 MMHG | HEART RATE: 93 BPM | WEIGHT: 228.81 LBS

## 2024-11-20 DIAGNOSIS — Z98.84 S/P GASTRIC BYPASS: Primary | ICD-10-CM

## 2024-11-20 DIAGNOSIS — K21.9 GASTROESOPHAGEAL REFLUX DISEASE WITHOUT ESOPHAGITIS: ICD-10-CM

## 2024-11-20 DIAGNOSIS — F32.A DEPRESSION, UNSPECIFIED DEPRESSION TYPE: ICD-10-CM

## 2024-11-20 DIAGNOSIS — G47.9 TROUBLE IN SLEEPING: ICD-10-CM

## 2024-11-20 DIAGNOSIS — Z71.89 COUNSELING REGARDING GOALS OF CARE: ICD-10-CM

## 2024-11-20 DIAGNOSIS — Z79.899 HIGH RISK MEDICATION USE: ICD-10-CM

## 2024-11-20 DIAGNOSIS — Z29.89 PROPHYLACTIC IMMUNOTHERAPY: ICD-10-CM

## 2024-11-20 DIAGNOSIS — R26.9 GAIT DISTURBANCE: ICD-10-CM

## 2024-11-20 DIAGNOSIS — G35 MULTIPLE SCLEROSIS: Primary | ICD-10-CM

## 2024-11-20 DIAGNOSIS — Z79.899 POLYPHARMACY: ICD-10-CM

## 2024-11-20 DIAGNOSIS — Z79.899 LONG-TERM USE OF HIGH-RISK MEDICATION: ICD-10-CM

## 2024-11-20 DIAGNOSIS — Z79.899 OTHER LONG TERM (CURRENT) DRUG THERAPY: ICD-10-CM

## 2024-11-20 LAB
25(OH)D3+25(OH)D2 SERPL-MCNC: 55 NG/ML (ref 30–96)
ALBUMIN SERPL BCP-MCNC: 3.1 G/DL (ref 3.5–5.2)
ALP SERPL-CCNC: 93 U/L (ref 40–150)
ALT SERPL W/O P-5'-P-CCNC: 9 U/L (ref 10–44)
ANION GAP SERPL CALC-SCNC: 6 MMOL/L (ref 8–16)
AST SERPL-CCNC: 16 U/L (ref 10–40)
BASOPHILS # BLD AUTO: 0.01 K/UL (ref 0–0.2)
BASOPHILS NFR BLD: 0.3 % (ref 0–1.9)
BILIRUB SERPL-MCNC: 0.3 MG/DL (ref 0.1–1)
BUN SERPL-MCNC: 7 MG/DL (ref 6–20)
CALCIUM SERPL-MCNC: 8.7 MG/DL (ref 8.7–10.5)
CHLORIDE SERPL-SCNC: 106 MMOL/L (ref 95–110)
CHOLEST SERPL-MCNC: 131 MG/DL (ref 120–199)
CHOLEST/HDLC SERPL: 2.5 {RATIO} (ref 2–5)
CO2 SERPL-SCNC: 28 MMOL/L (ref 23–29)
CREAT SERPL-MCNC: 0.6 MG/DL (ref 0.5–1.4)
DIFFERENTIAL METHOD BLD: ABNORMAL
EOSINOPHIL # BLD AUTO: 0 K/UL (ref 0–0.5)
EOSINOPHIL NFR BLD: 0.5 % (ref 0–8)
ERYTHROCYTE [DISTWIDTH] IN BLOOD BY AUTOMATED COUNT: 15.9 % (ref 11.5–14.5)
EST. GFR  (NO RACE VARIABLE): >60 ML/MIN/1.73 M^2
GLUCOSE SERPL-MCNC: 81 MG/DL (ref 70–110)
HCT VFR BLD AUTO: 36.1 % (ref 37–48.5)
HDLC SERPL-MCNC: 53 MG/DL (ref 40–75)
HDLC SERPL: 40.5 % (ref 20–50)
HGB BLD-MCNC: 11.4 G/DL (ref 12–16)
IMM GRANULOCYTES # BLD AUTO: 0.01 K/UL (ref 0–0.04)
IMM GRANULOCYTES NFR BLD AUTO: 0.3 % (ref 0–0.5)
IRON SERPL-MCNC: 57 UG/DL (ref 30–160)
LDLC SERPL CALC-MCNC: 64.6 MG/DL (ref 63–159)
LYMPHOCYTES # BLD AUTO: 1.9 K/UL (ref 1–4.8)
LYMPHOCYTES NFR BLD: 47.5 % (ref 18–48)
MCH RBC QN AUTO: 22 PG (ref 27–31)
MCHC RBC AUTO-ENTMCNC: 31.6 G/DL (ref 32–36)
MCV RBC AUTO: 70 FL (ref 82–98)
MONOCYTES # BLD AUTO: 0.3 K/UL (ref 0.3–1)
MONOCYTES NFR BLD: 7.3 % (ref 4–15)
NEUTROPHILS # BLD AUTO: 1.8 K/UL (ref 1.8–7.7)
NEUTROPHILS NFR BLD: 44.1 % (ref 38–73)
NONHDLC SERPL-MCNC: 78 MG/DL
NRBC BLD-RTO: 0 /100 WBC
PLATELET # BLD AUTO: 273 K/UL (ref 150–450)
PMV BLD AUTO: 11.1 FL (ref 9.2–12.9)
POTASSIUM SERPL-SCNC: 3.6 MMOL/L (ref 3.5–5.1)
PROT SERPL-MCNC: 6.4 G/DL (ref 6–8.4)
RBC # BLD AUTO: 5.18 M/UL (ref 4–5.4)
SATURATED IRON: 21 % (ref 20–50)
SODIUM SERPL-SCNC: 140 MMOL/L (ref 136–145)
TOTAL IRON BINDING CAPACITY: 278 UG/DL (ref 250–450)
TRANSFERRIN SERPL-MCNC: 188 MG/DL (ref 200–375)
TRIGL SERPL-MCNC: 67 MG/DL (ref 30–150)
VIT B12 SERPL-MCNC: 706 PG/ML (ref 210–950)
WBC # BLD AUTO: 4 K/UL (ref 3.9–12.7)

## 2024-11-20 PROCEDURE — 1160F RVW MEDS BY RX/DR IN RCRD: CPT | Mod: HCNC,CPTII,S$GLB, | Performed by: NURSE PRACTITIONER

## 2024-11-20 PROCEDURE — 80061 LIPID PANEL: CPT | Mod: HCNC | Performed by: NURSE PRACTITIONER

## 2024-11-20 PROCEDURE — 3080F DIAST BP >= 90 MM HG: CPT | Mod: HCNC,CPTII,S$GLB, | Performed by: NURSE PRACTITIONER

## 2024-11-20 PROCEDURE — 3077F SYST BP >= 140 MM HG: CPT | Mod: HCNC,CPTII,S$GLB, | Performed by: NURSE PRACTITIONER

## 2024-11-20 PROCEDURE — 3044F HG A1C LEVEL LT 7.0%: CPT | Mod: HCNC,CPTII,S$GLB, | Performed by: NURSE PRACTITIONER

## 2024-11-20 PROCEDURE — 99212 OFFICE O/P EST SF 10 MIN: CPT | Mod: HCNC,S$GLB,, | Performed by: NURSE PRACTITIONER

## 2024-11-20 PROCEDURE — 84466 ASSAY OF TRANSFERRIN: CPT | Mod: HCNC | Performed by: NURSE PRACTITIONER

## 2024-11-20 PROCEDURE — 3077F SYST BP >= 140 MM HG: CPT | Mod: HCNC,CPTII,S$GLB, | Performed by: CLINICAL NURSE SPECIALIST

## 2024-11-20 PROCEDURE — 82607 VITAMIN B-12: CPT | Mod: HCNC | Performed by: NURSE PRACTITIONER

## 2024-11-20 PROCEDURE — 99999 PR PBB SHADOW E&M-EST. PATIENT-LVL IV: CPT | Mod: PBBFAC,HCNC,, | Performed by: NURSE PRACTITIONER

## 2024-11-20 PROCEDURE — 80053 COMPREHEN METABOLIC PANEL: CPT | Mod: HCNC | Performed by: NURSE PRACTITIONER

## 2024-11-20 PROCEDURE — 3044F HG A1C LEVEL LT 7.0%: CPT | Mod: HCNC,CPTII,S$GLB, | Performed by: CLINICAL NURSE SPECIALIST

## 2024-11-20 PROCEDURE — 3080F DIAST BP >= 90 MM HG: CPT | Mod: HCNC,CPTII,S$GLB, | Performed by: CLINICAL NURSE SPECIALIST

## 2024-11-20 PROCEDURE — 99999 PR PBB SHADOW E&M-EST. PATIENT-LVL III: CPT | Mod: PBBFAC,HCNC,, | Performed by: CLINICAL NURSE SPECIALIST

## 2024-11-20 PROCEDURE — 1159F MED LIST DOCD IN RCRD: CPT | Mod: HCNC,CPTII,S$GLB, | Performed by: NURSE PRACTITIONER

## 2024-11-20 PROCEDURE — 85025 COMPLETE CBC W/AUTO DIFF WBC: CPT | Mod: HCNC | Performed by: NURSE PRACTITIONER

## 2024-11-20 PROCEDURE — 84425 ASSAY OF VITAMIN B-1: CPT | Mod: HCNC | Performed by: NURSE PRACTITIONER

## 2024-11-20 PROCEDURE — 99215 OFFICE O/P EST HI 40 MIN: CPT | Mod: HCNC,S$GLB,, | Performed by: CLINICAL NURSE SPECIALIST

## 2024-11-20 PROCEDURE — 4010F ACE/ARB THERAPY RXD/TAKEN: CPT | Mod: HCNC,CPTII,S$GLB, | Performed by: CLINICAL NURSE SPECIALIST

## 2024-11-20 PROCEDURE — 3008F BODY MASS INDEX DOCD: CPT | Mod: HCNC,CPTII,S$GLB, | Performed by: CLINICAL NURSE SPECIALIST

## 2024-11-20 PROCEDURE — 82306 VITAMIN D 25 HYDROXY: CPT | Mod: HCNC | Performed by: NURSE PRACTITIONER

## 2024-11-20 PROCEDURE — 4010F ACE/ARB THERAPY RXD/TAKEN: CPT | Mod: HCNC,CPTII,S$GLB, | Performed by: NURSE PRACTITIONER

## 2024-11-20 PROCEDURE — 3008F BODY MASS INDEX DOCD: CPT | Mod: HCNC,CPTII,S$GLB, | Performed by: NURSE PRACTITIONER

## 2024-11-20 PROCEDURE — 1159F MED LIST DOCD IN RCRD: CPT | Mod: HCNC,CPTII,S$GLB, | Performed by: CLINICAL NURSE SPECIALIST

## 2024-11-20 PROCEDURE — G2211 COMPLEX E/M VISIT ADD ON: HCPCS | Mod: HCNC,S$GLB,, | Performed by: CLINICAL NURSE SPECIALIST

## 2024-11-20 PROCEDURE — 36415 COLL VENOUS BLD VENIPUNCTURE: CPT | Mod: HCNC | Performed by: NURSE PRACTITIONER

## 2024-11-20 NOTE — Clinical Note
Yuly Back! I saw Alicia in MS clinic today. Her walk time was quite a bit slower than last year, and she inquired about a brace to lessen hyperextension of the left knee.   Would it be possible for her to get PT twice a week?  Do you have any recommendations for a good brace that might work well for her? I'm happy to order!  Thanks for your help.   Jen

## 2024-11-20 NOTE — PROGRESS NOTES
Subjective:       Patient ID: Alicia Soliz is a 44 y.o. female who presents today for a routine clinic visit for MS.  She was seen virtually in April and last in-person in January. The history has been provided by the patient.       MS HPI:  History of Present Illness    CHIEF COMPLAINT:  Patient presents today for follow-up of multiple sclerosis.      DMT: Ocrevus--last infused in October; due next in April   Side effects from DMT? No  Taking vitamin D3 as recommended? Yes--50,000 units weekly    CURRENT SYMPTOMS:  She notes left knee hyperextension when walking, impacting her gait. She describes episodes of depression, particularly in the mornings, triggered by memories of her late father. She reports sleep issues, having difficulty falling asleep despite going to bed at 8:00 PM; she is still awake at 3:00 AM. She denies feeling tired when getting into bed and states she turns off all devices before attempting to sleep. She experienced a muscle spasm yesterday. She expresses concerns about her memory, noting difficulty recalling recent events from the past week and experiencing word-finding difficulties. She reports ongoing leg pain, particularly from the knee down. She also describes swallowing difficulties, especially when lying down at night, and reports a choking episode yesterday. Her daughter has helped by elevating her pillows to alleviate the swallowing issues.    RECENT MEDICAL HISTORY:  She had her Ocrevus infusion last month, which was associated with a transient headache. She had a urinary tract infection in October. She experienced a stomach bug in October, characterized by nausea, vomiting, and diarrhea, which lasted for approximately two days. She had gastric sleeve in May and has lost 51lbs. She is exercising. She is going to physical therapy once a week.     MOBILITY AND FALLS:  She uses a walker for mobility within the home and a cane in the bathroom due to space constraints. For longer  distances, she utilizes a scooter. She attempts to walk without aids when close to a wall, taking small steps. She reports a recent fall while attempting to enter the shower, but was able to get up independently without injury.    MEDICATIONS:  She is taking Doxepin for sleep. She is adherent to Cymbalta. She takes gabapentin.     SOCIAL HISTORY:   Her daughter currently lives with her and assists in reminding her to take her medications.       SOCIAL HISTORY  Social History     Tobacco Use    Smoking status: Never     Passive exposure: Never    Smokeless tobacco: Never   Substance Use Topics    Alcohol use: Not Currently     Comment: once a year     Drug use: No     Living arrangements - the patient lives with daughter   Employment: SSDI    MS ROS:  Fatigue: No  Sleep Disturbance: Trouble falling asleep   Bladder Dysfunction: normal   Bowel Dysfunction: Normal   Spasticity: had a spasm yesterday; lasted about an hour.   Visual Symptoms: Wears glasses; vision is blurry; sees eye doctor annually   Cognitive: Trouble recalling things that happened last week, for example. Her daughter reminds her to take medications. Bills are on auto pay. Comprehension is ok. She has some trouble with word finding.   Mood Disorder: increased depression at times   Gait Disturbance: walker or cane in the house; tries to walk with the walls; scooter for longer distances   Falls: had one fall getting in the bathtub; able to get up on her own   Hand Dysfunction: No issues   Pain: Some leg pain   Sexual Dysfunction: Not Assessed  Dysphagia:  More trouble swallowing saliva at night, especially when lying down. Drinks water, and this improves. She sleeps with her head elevated. She had a choking episode yesterday on a piece of cake.   Dysarthria:  No  Heat sensitivity:  Yes - avoided the heat this summer   Any un-met adaptive needs? Wants a knee brace   Copay Assist?  Does not pay OOP   She understands not to take live vaccines.          Objective:        1. 25 foot timed walk:      6/16/2023     9:20 AM 11/20/2024     1:00 PM   Timed 25 Foot Walk:   Did patient wear an AFO? No No   Was assistive device used? Yes Yes   Assistive device used (milton one): Bilateral Assistance Bilateral Assistance   Bilateral device used Walker/Rollator Walker/Rollator   Time for 25 Foot Walk (seconds) 48.4 48.5   Time for 25 Foot Walk (seconds) 30.03 42.3     Neurological Exam      In general, the patient is well nourished and appears to be in no acute distress.     MENTAL STATUS: language is fluent, normal verbal comprehension, attention is normal, patient is alert, fund of knowlege is appropriate by vocabulary.      CRANIAL NERVE EXAM:  There is no intrernuclear ophthalmoplegia.  Extraocular muscles are intact. No facial asymmetry. Facial sensation is intact bilaterally. There is no dysarthria. Uvula is midline, and palate moves symmetrically. Shoulder shrug intact bilaterally. Tongue protrusion is midline. Hearing is intact to finger rub bilaterally. Neck is supple with full ROM     MOTOR EXAM: Normal bulk and tone throughout UE and LE bilaterally.   Rapid sequential movements are normal in RUE and both feet, slow in left hand.      Strength:  R deltoid 5/5, L deltoid 5/5  R biceps 5/5, L biceps 5/5  R triceps 5/5, L triceps 5/5  R finger flexors 5/5, L finger flexors 5/5  Reduced  on left   R  hip flexors 5/5, L hip flexors 4/5   R knee extensors 5/5, L knee extensors 5/5  R knee flexors 5/5, L knee flexors 4-/5   R ankle dorsiflexors 5/5, L ankle dorsiflexors 5/5  R ankle plantarflexors 5/5, L ankle plantarflexors 5/5     SENSORY EXAM: Diminished vibration in ankles, normal in knees and hands      COORDINATION: Dysmetria on L FTN. Normal heel-to-shin bilaterally     GAIT: Wide based, L foot drop and L circumduction, slowed, requiring walker/rollator. Some left knee hyperextension.     Imaging:     Results for orders placed during the hospital encounter of  02/24/24    MRI Brain Demyelinating W W/O Contrast    Impression  Similar appearance of demyelinating plaques within both cerebral hemispheres, the brainstem and posterior fossa as compared to the prior MRI 08/05/2022.  No MR evidence new or active demyelination as compared to the prior.    No acute intracranial abnormality.      Electronically signed by: Rico Pelaez  Date:    02/25/2024  Time:    08:37    Results for orders placed during the hospital encounter of 02/24/24    MRI Cervical Spine Demyelinating W W/O Contrast    Impression  No significant cervical cord signal abnormality to suggest demyelinating sequelae.  No abnormal cervical cord enhancement.    Minor degenerative changes of the cervical spine without significant spinal canal stenosis or neural foraminal stenosis.      Electronically signed by: Rico Pelaez  Date:    02/25/2024  Time:    08:45        Labs:     Lab Results   Component Value Date    [id number] 55 11/20/2024    [id number] 60 07/15/2024    [id number] 47 04/04/2024       Lab Results   Component Value Date    WBC 4.00 11/20/2024    RBC 5.18 11/20/2024    HGB 11.4 (L) 11/20/2024    HCT 36.1 (L) 11/20/2024    MCV 70 (L) 11/20/2024    MCH 22.0 (L) 11/20/2024    MCHC 31.6 (L) 11/20/2024    RDW 15.9 (H) 11/20/2024     11/20/2024    MPV 11.1 11/20/2024    GRAN 1.8 11/20/2024    GRAN 44.1 11/20/2024    LYMPH 1.9 11/20/2024    LYMPH 47.5 11/20/2024    MONO 0.3 11/20/2024    MONO 7.3 11/20/2024    EOS 0.0 11/20/2024    BASO 0.01 11/20/2024    EOSINOPHIL 0.5 11/20/2024    BASOPHIL 0.3 11/20/2024     Sodium   Date Value Ref Range Status   11/20/2024 140 136 - 145 mmol/L Final     Potassium   Date Value Ref Range Status   11/20/2024 3.6 3.5 - 5.1 mmol/L Final     Chloride   Date Value Ref Range Status   11/20/2024 106 95 - 110 mmol/L Final     CO2   Date Value Ref Range Status   11/20/2024 28 23 - 29 mmol/L Final     Glucose   Date Value Ref Range Status   11/20/2024 81 70 - 110  mg/dL Final     BUN   Date Value Ref Range Status   11/20/2024 7 6 - 20 mg/dL Final     Creatinine   Date Value Ref Range Status   11/20/2024 0.6 0.5 - 1.4 mg/dL Final     Calcium   Date Value Ref Range Status   11/20/2024 8.7 8.7 - 10.5 mg/dL Final     Total Protein   Date Value Ref Range Status   11/20/2024 6.4 6.0 - 8.4 g/dL Final     Albumin   Date Value Ref Range Status   11/20/2024 3.1 (L) 3.5 - 5.2 g/dL Final     Total Bilirubin   Date Value Ref Range Status   11/20/2024 0.3 0.1 - 1.0 mg/dL Final     Comment:     For infants and newborns, interpretation of results should be based  on gestational age, weight and in agreement with clinical  observations.    Premature Infant recommended reference ranges:  Up to 24 hours.............<8.0 mg/dL  Up to 48 hours............<12.0 mg/dL  3-5 days..................<15.0 mg/dL  6-29 days.................<15.0 mg/dL       Alkaline Phosphatase   Date Value Ref Range Status   11/20/2024 93 40 - 150 U/L Final     AST   Date Value Ref Range Status   11/20/2024 16 10 - 40 U/L Final     ALT   Date Value Ref Range Status   11/20/2024 9 (L) 10 - 44 U/L Final     Anion Gap   Date Value Ref Range Status   11/20/2024 6 (L) 8 - 16 mmol/L Final     eGFR if    Date Value Ref Range Status   05/16/2022 >60 >60 mL/min/1.73 m^2 Final     eGFR    Date Value Ref Range Status   02/08/2024 109 mL/min/1.73mSq Final     Comment:     In accordance with NKF-ASN Task Force recommendation, calculation based on the Chronic Kidney Disease Epidemiology Collaboration (CKD-EPI) equation without adjustment for race. eGFR adjusted for gender and age and calculated in ml/min/1.73mSquared. eGFR cannot be calculated if patient is under 18 years of age.     Reference Range:   >= 60 ml/min/1.73mSquared.     eGFR if non    Date Value Ref Range Status   05/16/2022 >60 >60 mL/min/1.73 m^2 Final     Comment:     Calculation used to obtain the estimated glomerular  filtration  rate (eGFR) is the CKD-EPI equation.          Diagnosis/Assessment/Plan:       Assessment & Plan    Alicia's walk time was slower, but the remainder of the exam was stable.   Continue Ocrevus every 6 months.   Evaluated depression symptoms  Reviewed fall risk and mobility status  Noted swallowing difficulties, particularly at night when lying down  Discussed potential benefits of medical marijuana for sleep, mood, muscle spasms, and pain (per her inquiry); she will contact Dr. Cunha   Will consider cognitive testing if sleep improvement does not resolve memory issues    MULTIPLE SCLEROSIS:  -Worsened walk time   -Next Ocrevus infusion is due in April. We will check safety labs in March.   - Brain MRI ordered for February.    INSOMNIA:  - Explained sleep hygiene practices.  - Patient to implement sleep hygiene practices: reserve bedroom for sleep and intimacy, watch TV or movies in another room before bed, move to bedroom when feeling tired.  - Patient to maintain cool, dark sleeping environment.  - Started magnesium glycinate 400 mg, to be taken about 1 hour before bedtime.  - Advised not to take magnesium glycinate at the same time as gabapentin.    DEPRESSION:  - Continued Cymbalta (duloxetine) 60 mg  - Recommend finding a therapist through [employer] website    SWALLOWING:  - Recommend elevating pillows to help with nighttime swallowing difficulties. She had a bedside swallow study in February. Can consider MBSS if symptoms worsen.     MOBILITY AND FALL RISK:  - Patient to use walker at home and scooter for longer distances.    COGNITIVE ASSESSMENT:  - Discussed cognitive testing process and its purpose in assessing potential deficits. Will discuss at next visit and consider placing order for testing.     PHYSICAL THERAPY:  - Contact her physical therapy increasing physical therapy sessions to 2 times per week and recommendation on knee brace.     FOLLOW-UP:  - Follow up with virtual visit with  Dr. Garrett in March to check in before April infusion.   The visit today is associated with current or anticipated ongoing medical care related to this patient's single serious condition/complex condition of multiple sclerosis.         Total time spent with patient: 39 minutes   Total time spent on encounter: 50 minutes         Problem List Items Addressed This Visit    None  Visit Diagnoses       Multiple sclerosis    -  Primary    Relevant Orders    CBC Auto Differential    Comprehensive Metabolic Panel    Hepatitis B Core Antibody, Total    Hepatitis B Surface Antigen    Immunoglobulins (IgG, IgA, IgM) Quantitative    MRI Brain Demyelinating Without Contrast    Other long term (current) drug therapy        Gait disturbance        Counseling regarding goals of care        Prophylactic immunotherapy        High risk medication use        Depression, unspecified depression type        Trouble in sleeping                  This note was generated with the assistance of ambient listening technology. Verbal consent was obtained by the patient and accompanying visitor(s) for the recording of patient appointment to facilitate this note. I attest to having reviewed and edited the generated note for accuracy, though some syntax or spelling errors may persist. Please contact the author of this note for any clarification.

## 2024-11-20 NOTE — PROGRESS NOTES
BARIATRIC POST-OPERATIVE VISIT:    HPI:  Alicia Soliz is a 44 y.o. year old female presents for 6 month post op visit following sleeve to LRNY .  she is doing well and tolerating the diet without difficulty.  she has no complaints.    Denies: nausea, vomiting, abdominal pain, changes in bowel movement pattern, fever, chills, dysphagia, chest pain, and shortness of breath.      Pt c/o skin irration to excess abdomen and bilateral arms       Review of Systems   Constitutional:  Negative for activity change and fatigue.   Respiratory:  Negative for cough and shortness of breath.    Cardiovascular:  Negative for chest pain, palpitations and leg swelling.   Gastrointestinal:  Negative for abdominal pain, nausea and vomiting.   Endocrine: Negative for polydipsia, polyphagia and polyuria.   Genitourinary:  Negative for dysuria.   Musculoskeletal:  Negative for gait problem.   Skin:  Negative for rash.   Allergic/Immunologic: Negative for immunocompromised state.   Neurological:  Negative for dizziness, syncope and weakness.   Hematological:  Does not bruise/bleed easily.   Psychiatric/Behavioral:  Negative for behavioral problems.        EXERCISE & VITAMINS:  See Bariatric Assessment    MEDICATIONS/ALLERGIES:  Have been reviewed.    DIET: Regular Bariatric Diet. ~80 grams protein.  64 fl oz SF clear beverage.      See Dietician note from today for a more detailed assessment.      Physical Exam  Vitals and nursing note reviewed.   Constitutional:       Appearance: She is well-developed. She is morbidly obese.   HENT:      Head: Normocephalic.      Nose: Nose normal.      Mouth/Throat:      Mouth: Mucous membranes are moist.   Eyes:      Extraocular Movements: Extraocular movements intact.   Cardiovascular:      Rate and Rhythm: Normal rate and regular rhythm.      Heart sounds: Normal heart sounds.   Pulmonary:      Effort: Pulmonary effort is normal.      Breath sounds: Normal breath sounds.   Abdominal:       General: Bowel sounds are normal.      Palpations: Abdomen is soft.   Musculoskeletal:         General: Normal range of motion.      Cervical back: Normal range of motion.   Skin:     General: Skin is warm and dry.      Capillary Refill: Capillary refill takes less than 2 seconds.   Neurological:      Mental Status: She is alert and oriented to person, place, and time.   Psychiatric:         Mood and Affect: Mood normal.         ASSESSMENT:  - Morbid obesity sleeve to  laparoscopic Jovita-en-Y on 5/20/24.  - Co-morbidities: depression, dyslipidemia, GERD, hypertension, fatty liver and stroke  -  Weight loss, 51#'s and 38% EWL  - Exercise routine therapy x1 week and ADLS  - Good Diet   - Good Vitamin regimen    PLAN:  - Miralax daily for constipation prn   - Emphasized the importance of regular exercise and adherence to bariatric diet to achieve maximum weight loss.  - Encouraged patient to start regular exercise.  - Follow-up with dietician to advance diet.  - Continue daily vitamins and medications.  - RTC in 6 months or sooner if needed.  - Call the  office for any issues.  - Check labs today.

## 2024-11-20 NOTE — PATIENT INSTRUCTIONS
Meal Ideas for Regular Bariatric Diet  *Recipes and products available at [email or web address].com      Breakfast: (15-20g protein)    - Egg white omelet: 2 egg whites or ½ cup Egg Beaters. (Optional proteins: cheese, shrimp, black beans, chicken, sliced turkey) (Optional veggies: tomatoes, salsa, spinach, mushrooms, onions, green peppers, or small slice avocado)     - Egg and sausage: 1 egg or ¼ cup Egg Beaters (any variety), with 1 bibiana or 2 links of Turkey sausage or Veggie breakfast sausage ([place] or Xcedex)    - Crust-less breakfast quiche: To make a glass pie dish, mix 4oz part skim Ricotta, 1 cup skim milk, and 2 eggs as your base. Add protein: shredded cheese, sliced lean ham or turkey, turkey august/sausage. Add veggies: tomato, onion, green onion, mushroom, green pepper, spinach, etc.    - Yogurt parfait: Mix 1 - 6oz container Dannon Light N Fit vanilla yogurt, with ¼ cup crushed unsalted nuts    - Cottage cheese and fruit: ½ cup part-skim cottage cheese or ricotta cheese topped with fresh fruit or sugar free preserves     - Giselle Pappas's Vanilla Egg custard* (add 2 Tbsp instant coffee granules to make Cappuccino Custard*)    - Hi-Protein café latte (skim milk, decaf coffee, 1 scoop protein powder). Optional to add Sugar free syrup or extract flavoring.    - Breakfast Lox: spread fat free cream cheese on slices of smoked salmon. Serve over scrambled or egg over easy (sauteed with nonstick cookspray) OR on a cucumber slice    - Eggwhich: Scramble or cook 1 large egg over easy using nonstick cookspray. Place between 2 slices of [place]ese august and low fat cheese.     Lunch: (20-30g protein)    - ½ cup Black bean soup (Homemade or Progresso), with ¼ cup shredded low-fat cheese. Top with chopped tomato or fresh salsa.     - Lean deli turkey breast and low-fat sliced cheese, mustard or light mendoza to moisten, rolled up together, or wrapped in a Joni lettuce leaf    - Chicken salad made from dinner  leftovers, moisten with low-fat salad dressing or light mendoza. Also try leftover salmon, shrimp, tuna or boiled eggs. Serve ½ cup over dark green salad    - Fat-free canned refried beans, topped with ¼ cup shredded low-fat cheese. Top with chopped tomato or fresh salsa.     - Greek salad: Top mixed greens with 1-2oz grilled chicken, tomatoes, red onions, 2-3 kalamata olives, and sprinkle lightly with feta cheese. Spritz with Balsamic vinegar to taste.     - Crust-less lunch quiche: To make a glass pie dish, mix 4oz part skim Ricotta, 1 cup skim milk, and 2 eggs as your base. Add protein: shredded cheese, sliced lean ham or turkey, shrimp, chicken. Add veggies: tomato, onion, green onion, mushroom, green pepper, spinach, artichoke, broccoli, etc.    - Pizza bake: spread a  scott fortino mushroom with tomato sauce, low-fat shredded mozzarella and turkey pepperoni or [place] august. Add any veggies. Roast for 10-15 minutes, until cheese melted.     - Cucumber crab bites: Spread ¼ cup crab dip (lump crabmeat + light cream cheese and green onions) over sliced cucumber.     - Chicken with light spinach and artichoke dip*: Puree in : 6oz cooked and drained spinach, 2 cloves garlic, 1 can cannelloni beans, ½ cup chopped green onions, 1 can drained artichoke hearts (not marinated in oil), lemon juice and basil. Mix in 2oz chopped up chicken.    Supper: (20-30g protein)    - Serve grilled fish over dark green salad tossed with low-fat dressing, served with grilled asparagus rodriges     - Rotisserie chicken salad: served with sliced strawberries, walnuts, fat-free feta cheese crumbles and 1 tbsp Holcombs Own Light Raspberry [place] Vinaigrette    - Shrimp cocktail: Dip cold boiled shrimp in homemade low-sugar cocktail sauce (1/2 cup Jasmin One Carb ketchup, 2 tbsp horseradish, 1/4 tsp hot sauce, 1 tsp Worcestershire sauce, 1 tbsp freshly-squeezed lemon juice). Serve with dark green salad, walnuts, and crumbled blue  cheese drizzled with olive oil and Balsamic vinegar    - Tuna Melt: Spread tuna salad onto 2 thick slices of tomato. Top with low-fat cheese and broil until cheese is melted. May also be made with chicken salad of shrimp salad. [place] with different types of cheeses.    - Chicken or beef fajitas (no tortilla, rice, beans, chips). Top meat and veggies w/ fresh salsa, fat free sour cream.     - Homemade low-fat Chili using extra lean ground beef or ground turkey. Top with shredded cheese and salsa as desired. May add dollop fat-free sour cream if desired    - Chicken parmesan: Top chicken breast w/ low sugar marinara sauce, mozzarella cheese and bake until chicken reaches 165*.  Serve w/ spaghetti SQUASH or Northern Irish cut green beans    - Dinner Omelet with shrimp or chicken and onion, green peppers and chives.    - No noodle lasagna: Use sliced zucchini or eggplant in place of noodles.  Layer with part skim ricotta cheese and low sugar meat sauce (use very lean ground beef or ground turkey).    - Mexican chicken bake: Bake chunks of chicken breast or thigh with taco seasoning, Pace brand enchilada sauce, green onions and low-fat cheese. Serve with ¼ cup black beans or fat free refried beans topped with chopped tomatoes or salsa.    - Drew frozen meatballs, simmered in Classico Marinara sauce. Different flavors of salsa or spaghetti sauce create different dishes! Sprinkle with parmesan cheese. Serve with grilled or steamed veggies, or a dark green salad.    - Simmer boneless skinless chicken thigh chunks in Classico Marinara sauce or roasted salsa until tender with chopped onion, bell pepper, garlic, mushrooms, spinach, etc.     - Hamburger or veggie burger, without the bun, dressed the way you like. Served with grilled or steamed veggies.    - Eggplant parmesan: Bake slices of eggplant at 350 degrees for 15 minutes. Layer tomato sauce, sliced eggplant and low-fat mozzarella cheese in a baking dish and cover with  foil. Bake 30-40 more minutes or until bubbly. Uncover and bake at 400 degrees for about 15 more minutes, or until top is slightly crisp.    - Fish tacos: grilled/baked white fish, wrapped in Joni lettuce leaf, topped with salsa, shredded low-fat cheese, and light coleslaw.    - Chicken vance: Sprinkle chicken w/ 1 tsp of hidden [place] ranch dip mix. Then grill chicken and top with black beans, salsa and 1 tsp fat free sour cream.     - Cauliflower pizza crust: Use cauliflower as crust (see recipe on pinterest, no flour!). Top w/ low fat cheese, turkey pepperoni and veggies and bake again    - chicken or turkey crust pizza: use ground chicken or turkey instead of cauliflower, spread in [place] and bake at 350 for about 20-30 minutes(may want to add garlic, black pepper, oregano and other herbs to ground meat mixture).  Remove and top w/ low fat cheese, turkey pepperoni and veggies and bake again for another 10 minutes or until cheese is browned.     Snacks: (100-200 calories; >5g protein)    - 1 low-fat cheese stick with 8 cherry tomatoes or 1 serving fresh fruit  - 4 thin slices fat-free turkey breast and 1 slice low-fat cheese  - 4 thin slices fat-free honey ham with wedge of melon  - 6-8 edamame pods (equivalent to about 1/4 cup edamame without pods).   - 1/4 cup unsalted nuts with ½ cup fruit  - 6-oz container Dannon Light n Fit vanilla yogurt, topped with 1oz unsalted nuts         - apple, celery or baby carrots spread with 2 Tbsp PB2  - apple slices with 1 oz slice low-fat cheese  - Apple slices dipped in 2 Tbsp of PB2  - celery, cucumber, bell pepper or baby carrots dipped in ¼ cup hummus bean spread or light spinach and artichoke dip (*recipe in lunch section)  - celery, cucumber, baby carrots dipped in high protein greek yogurt (Mix 16 oz plain greek yogurt + 1 packet of hidden valley ranch dip mix)  - Fletcher Links Beef Steak - 14g protein! (similar to beef jerky)  - 2 wedges Laughing Cow - Light Herb  & Garlic Cheese with sliced cucumber or green bell pepper  - 1/2 cup low-fat cottage cheese with ¼ cup fruit or ¼ cup salsa  - RTD Protein drinks: Atkins, Low Carb Slim Fast, EAS light, Muscle Milk Light, etc.  - Homemade Protein drinks: GNC Soy95, Isopure, Nectar, UNJURY, Whey Gourmet, etc. Mix 1 scoop powder with 8oz skim/1% milk or light soymilk.  - Protein bars: Atkins, EAS, Pure Protein, Think Thin, Detour, etc. Must have 0-4 grams sugar - Read the label.    Takeout Options: No more than twice/week  Deli - Salads (no pasta or rice), meats, cheeses. Roasted chicken. Lox (salmon)    Mexican - Platters which don't include tortillas, chips, or rice. Go easy on the beans. Example: Fajitas without the tortillas. Ask the  not to bring chips to the table if they are too tempting.    Greek - Meat or fish and vegetable, but no bread or rice. Including hummus, baba ganoush, etc, is OK. Most sit-down Greek restaurants can provide you with cucumber slices for dipping instead of radha bread.    Fast Food (Avoid as much as possible) - Salads (no croutons and limit salad dressing to 2 tbsp), grilled chicken sandwich without the bun and ask for no mendoza. Jasmins low fat chili or Taco Bell pintos and cheese.    BBQ - The meats are fine if you ask for sauces on the side, but most of the traditional side dishes are loaded with carbs. Davon slaw, baked beans and BBQ sauce are typically made with sugar.    Chinese - Nothing deep-fried, no rice or noodles. Many Chinese sauces have starch and sugar in them, so you'll have to use your judgement. If you find that these sauces trigger cravings, or cause Dumping, you can ask for the sauce to be made without sugar or just use soy sauce.

## 2024-11-20 NOTE — PATIENT INSTRUCTIONS
Magnesium Glycinate 400mg an hour before bedtime    Dr. Dayron Cunha  [email or web address]  [phone]

## 2024-11-26 LAB — VIT B1 BLD-MCNC: 24 UG/L (ref 38–122)

## 2024-11-26 RX ORDER — THIAMINE HYDROCHLORIDE 100 MG/ML
100 INJECTION, SOLUTION INTRAMUSCULAR; INTRAVENOUS DAILY
Qty: 3 ML | Refills: 0 | Status: ACTIVE | OUTPATIENT
Start: 2024-11-26 | End: 2024-11-29

## 2024-11-29 ENCOUNTER — TELEPHONE (OUTPATIENT)
Dept: NEUROLOGY | Facility: CLINIC | Age: 44
End: 2024-11-29
Payer: MEDICARE

## 2024-11-29 DIAGNOSIS — R26.9 GAIT DISTURBANCE: ICD-10-CM

## 2024-11-29 DIAGNOSIS — G35 MULTIPLE SCLEROSIS: Primary | ICD-10-CM

## 2024-11-30 ENCOUNTER — NURSE TRIAGE (OUTPATIENT)
Dept: ADMINISTRATIVE | Facility: CLINIC | Age: 44
End: 2024-11-30
Payer: MEDICARE

## 2024-11-30 NOTE — TELEPHONE ENCOUNTER
"Patient reports vomiting everything for the last 4 hours. Patient also reports a headache 6/10. Dispo provided- home care. Patient requesting a prescription for Zofran. Suggested ODVV and patient logged in to her portal to complete,. Instructed to call back with additional questions or worsening of symptoms. Patient verbalized understanding.     Reason for Disposition   [1] SEVERE vomiting (e.g., 6 or more times/day, vomits everything) BUT [2] hydrated    Additional Information   Negative: Shock suspected (e.g., cold/pale/clammy skin, too weak to stand, low BP, rapid pulse)   Negative: Difficult to awaken or acting confused (e.g., disoriented, slurred speech)   Negative: Sounds like a life-threatening emergency to the triager   Negative: [1] Vomiting AND [2] contains red blood or black ("coffee ground") material  (Exception: Few red streaks in vomit that only happened once.)   Negative: Severe pain in one eye   Negative: Recent head injury (within last 3 days)   Negative: Recent abdominal injury (within last 3 days)   Negative: [1] Insulin-dependent diabetes (Type I) AND [2] glucose > 400 mg/dL (22 mmol/L)   Negative: [1] Vomiting AND [2] hernia is more painful or swollen than usual   Negative: [1] SEVERE vomiting (e.g., 6 or more times/day) AND [2] present > 8 hours (Exception: Patient sounds well, is drinking liquids, does not sound dehydrated, and vomiting has lasted less than 24 hours.)   Negative: [1] MODERATE vomiting (e.g., 3 - 5 times/day) AND [2] age > 60 years   Negative: Severe headache (e.g., excruciating)  (Exception: Similar to previous migraines.)   Negative: High-risk adult (e.g., diabetes mellitus, brain tumor, V-P shunt, hernia)   Negative: [1] Drinking very little AND [2] dehydration suspected (e.g., no urine > 12 hours, very dry mouth, very lightheaded)   Negative: Patient sounds very sick or weak to the triager   Negative: [1] Vomiting AND [2] abdomen looks much more swollen than usual   " Negative: [1] Vomiting AND [2] contains bile (green color)   Negative: [1] Constant abdominal pain AND [2] present > 2 hours   Negative: [1] Fever > 103 F (39.4 C) AND [2] not able to get the fever down using Fever Care Advice   Negative: [1] Fever > 101 F (38.3 C) AND [2] age > 60 years   Negative: [1] Fever > 100 F (37.8 C) AND [2] bedridden (e.g., CVA, chronic illness, recovering from surgery)   Negative: [1] Fever > 100 F (37.8 C) AND [2] weak immune system (e.g., HIV positive, cancer chemo, splenectomy, organ transplant, chronic steroids)   Negative: Taking any of the following medications: digoxin (Lanoxin), lithium, theophylline, phenytoin (Dilantin)   Negative: [1] MILD or MODERATE vomiting AND [2] present > 48 hours (2 days)  (Exception: Mild vomiting with associated diarrhea.)   Negative: Fever present > 3 days (72 hours)   Negative: Vomiting a prescription medication   Negative: [1] MILD vomiting with diarrhea AND [2] present > 5 days   Negative: Substance use (drug use) or unhealthy alcohol use, known or suspected   Negative: [1] Few streaks of blood in vomit AND [2] occurred one time   Negative: Vomiting is a chronic symptom (recurrent or ongoing AND present > 4 weeks)    Protocols used: Vomiting-A-

## 2024-12-03 ENCOUNTER — PATIENT MESSAGE (OUTPATIENT)
Dept: BARIATRICS | Facility: CLINIC | Age: 44
End: 2024-12-03
Payer: MEDICARE

## 2024-12-03 ENCOUNTER — TELEPHONE (OUTPATIENT)
Dept: INTERNAL MEDICINE | Facility: CLINIC | Age: 44
End: 2024-12-03
Payer: MEDICARE

## 2024-12-03 ENCOUNTER — PATIENT MESSAGE (OUTPATIENT)
Dept: NEUROLOGY | Facility: CLINIC | Age: 44
End: 2024-12-03
Payer: MEDICARE

## 2024-12-03 DIAGNOSIS — E66.01 MORBID OBESITY WITH BMI OF 45.0-49.9, ADULT: ICD-10-CM

## 2024-12-03 DIAGNOSIS — Z98.84 S/P GASTRIC BYPASS: ICD-10-CM

## 2024-12-03 RX ORDER — PROMETHAZINE HYDROCHLORIDE 25 MG/1
25 TABLET ORAL EVERY 6 HOURS PRN
Qty: 10 TABLET | Refills: 0 | Status: SHIPPED | OUTPATIENT
Start: 2024-12-03

## 2024-12-03 RX ORDER — DICYCLOMINE HYDROCHLORIDE 10 MG/1
10 CAPSULE ORAL
Qty: 120 CAPSULE | Refills: 0 | Status: SHIPPED | OUTPATIENT
Start: 2024-12-03 | End: 2025-01-02

## 2024-12-03 RX ORDER — OMEPRAZOLE 40 MG/1
40 CAPSULE, DELAYED RELEASE ORAL EVERY MORNING
Qty: 30 CAPSULE | Refills: 2 | Status: SHIPPED | OUTPATIENT
Start: 2024-12-03

## 2024-12-03 NOTE — TELEPHONE ENCOUNTER
----- Message from Silvana sent at 12/3/2024 10:45 AM CST -----  Contact: Alicia  Type:  Sooner Apoointment Request    Caller is requesting a sooner appointment.  Caller declined first available appointment listed below.  Caller will not accept being placed on the waitlist and is requesting a message be sent to doctor.  Name of Caller:Alicia  When is the first available appointment?Thursday Dec/5/2024  Symptoms:Abdominal pain  Would the patient rather a call back or a response via [facility]? Call back  Best Call Back Number:[phone]  Mrs Soliz is asking to be seen today if is possible   Thanks!

## 2024-12-04 ENCOUNTER — PATIENT MESSAGE (OUTPATIENT)
Dept: NEUROLOGY | Facility: CLINIC | Age: 44
End: 2024-12-04
Payer: MEDICARE

## 2024-12-05 ENCOUNTER — PATIENT MESSAGE (OUTPATIENT)
Dept: NEUROLOGY | Facility: CLINIC | Age: 44
End: 2024-12-05
Payer: MEDICARE

## 2024-12-05 DIAGNOSIS — F32.A DEPRESSION, UNSPECIFIED DEPRESSION TYPE: Primary | ICD-10-CM

## 2024-12-05 RX ORDER — DULOXETIN HYDROCHLORIDE 30 MG/1
30 CAPSULE, DELAYED RELEASE ORAL DAILY
Qty: 30 CAPSULE | Refills: 1 | Status: SHIPPED | OUTPATIENT
Start: 2024-12-05 | End: 2025-12-05

## 2024-12-05 NOTE — TELEPHONE ENCOUNTER
Spoke to Alicia. She is very depressed and having trouble sleeping. Her 8-year-old nephew was recently killed in a shooting, and her cousin and grandson were injured just a few days later when they were hit by a car. They are both currently in the ICU. She does not feel like her Cymbalta is working. We will increase her dose to 90mg for now, with a goal of returning to 60mg in the next few months.

## 2024-12-06 ENCOUNTER — PATIENT MESSAGE (OUTPATIENT)
Dept: NEUROLOGY | Facility: CLINIC | Age: 44
End: 2024-12-06
Payer: MEDICARE

## 2024-12-10 ENCOUNTER — PATIENT MESSAGE (OUTPATIENT)
Dept: BARIATRICS | Facility: CLINIC | Age: 44
End: 2024-12-10
Payer: MEDICARE

## 2024-12-11 ENCOUNTER — PATIENT MESSAGE (OUTPATIENT)
Dept: NEUROLOGY | Facility: CLINIC | Age: 44
End: 2024-12-11
Payer: MEDICARE

## 2024-12-12 ENCOUNTER — DOCUMENTATION ONLY (OUTPATIENT)
Dept: NEUROLOGY | Facility: CLINIC | Age: 44
End: 2024-12-12
Payer: MEDICARE

## 2024-12-18 ENCOUNTER — TELEPHONE (OUTPATIENT)
Dept: PSYCHIATRY | Facility: CLINIC | Age: 44
End: 2024-12-18
Payer: MEDICARE

## 2025-01-03 NOTE — TELEPHONE ENCOUNTER
Message addressed in separate encounter.   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    COPD exacerbation ([facility]) J44.1    Abnormal EKG R94.31    Abdominal pain R10.9    Generalized edema due to fluid overload E87.70, R60.1    Acute on chronic diastolic heart failure with normal ejection fraction ([facility]) I50.33    Chronic recurrent diarrhea K52.9    Pleural effusion, right J90       Isolation/Infection:   Isolation            No Isolation          Patient Infection Status       Infection Onset Added Last Indicated Last Indicated By Review Planned Expiration Resolved Resolved By    None active    Resolved    COVID-19 21 Respiratory Panel, Molecular, with COVID-19 (Restricted: peds pts or suitable admitted adults)   22 Infection     S/s                        Nurse Assessment:  Last Vital Signs: BP (!) 152/60   Pulse 99   Temp 98.1 °F (36.7 °C)   Resp 18   Ht 1.626 m (5' 4\")   Wt 38.1 kg (83 lb 15.9 oz)   SpO2 99%   BMI 14.42 kg/m²     Last documented pain score (0-10 scale): Pain Level: 0  Last Weight:   Wt Readings from Last 1 Encounters:   25 38.1 kg (83 lb 15.9 oz)     Mental Status:  oriented and alert    IV Access:  - Dialysis Catheter  - site  left, insertion date: 24    Nursing Mobility/ADLs:  Walking   Assisted  Transfer  Assisted  Bathing  Assisted  Dressing  Assisted  Toileting  Assisted  Feeding  Independent  Med Admin  Assisted  Med Delivery   whole, in [facility]    Wound Care Documentation and Therapy:        Elimination:  Continence:   Bowel: Yes  Bladder: Yes, oliguria  Urinary Catheter: None   Colostomy/Ileostomy/Ileal Conduit: No       Date of Last BM:       Intake/Output Summary (Last 24 hours) at 1/3/2025 0719  Last data filed at 2025 0736  Gross per 24 hour   Intake 135.64 ml   Output --   Net 135.64 ml     I/O last 3 completed shifts:  In: 150.9 [I.V.:100.9; IV Piggyback:50]  Out: -     Safety Concerns:     At Risk for Falls    Impairments/Disabilities:

## 2025-01-05 ENCOUNTER — OFFICE VISIT (OUTPATIENT)
Dept: URGENT CARE | Facility: CLINIC | Age: 45
End: 2025-01-05
Payer: MEDICARE

## 2025-01-05 ENCOUNTER — ON-DEMAND VIRTUAL (OUTPATIENT)
Dept: URGENT CARE | Facility: CLINIC | Age: 45
End: 2025-01-05
Payer: MEDICARE

## 2025-01-05 VITALS
TEMPERATURE: 98 F | RESPIRATION RATE: 18 BRPM | SYSTOLIC BLOOD PRESSURE: 131 MMHG | OXYGEN SATURATION: 100 % | BODY MASS INDEX: 36.63 KG/M2 | WEIGHT: 227.94 LBS | DIASTOLIC BLOOD PRESSURE: 72 MMHG | HEART RATE: 94 BPM | HEIGHT: 66 IN

## 2025-01-05 DIAGNOSIS — M17.11 ARTHRITIS OF RIGHT KNEE: Primary | ICD-10-CM

## 2025-01-05 DIAGNOSIS — G89.4 CHRONIC PAIN SYNDROME: Primary | ICD-10-CM

## 2025-01-05 PROCEDURE — 96372 THER/PROPH/DIAG INJ SC/IM: CPT | Mod: S$GLB,,, | Performed by: EMERGENCY MEDICINE

## 2025-01-05 PROCEDURE — 99213 OFFICE O/P EST LOW 20 MIN: CPT | Mod: S$GLB,,, | Performed by: NURSE PRACTITIONER

## 2025-01-05 RX ORDER — KETOROLAC TROMETHAMINE 30 MG/ML
30 INJECTION, SOLUTION INTRAMUSCULAR; INTRAVENOUS
Status: COMPLETED | OUTPATIENT
Start: 2025-01-05 | End: 2025-01-05

## 2025-01-05 RX ADMIN — KETOROLAC TROMETHAMINE 30 MG: 30 INJECTION, SOLUTION INTRAMUSCULAR; INTRAVENOUS at 05:01

## 2025-01-05 NOTE — PATIENT INSTRUCTIONS
You received a Toradol injection in clinic today. Please avoid all other NSAID'S including Aleve, Advil, Excedrin, ibuprofen, Motrin, Goody's, BC powders, Meloxicam, Naproxen, Diclofenac, Celebrex.  etc. For 12 hours.   Alternating ice and heat may help decrease pain  Diclofenac as ordered.   Follow up with your rheumatologist for further evaluation   Report to the nearest ER with new/ worsening symptoms.     Please arrange follow up with your primary medical clinic as soon as possible. You must understand that you've received an Urgent Care treatment only and that you may be released before all of your medical problems are known or treated. You, the patient, will arrange for follow up as instructed. If your symptoms worsen or fail to improve you should go to the Emergency Room.

## 2025-01-05 NOTE — PROGRESS NOTES
Subjective:      Patient ID: Alicia Soliz is a 44 y.o. female.    Vitals:  vitals were not taken for this visit.     Chief Complaint: Joint Swelling      Visit Type: TELE AUDIOVISUAL - This visit was conducted virtually based on  subjective information and limited objective exam    Present with the patient at the time of consultation: TELEMED PRESENT WITH PATIENT: None  LOCATION OF PATIENT [facility]  Two patient identifiers used to verify patient- saying out date of birth and full name.       Past Medical History:   Diagnosis Date    Anxiety and depression 10/20/2018    Arthritis     Cardiac arrest as complication of medication     Dilaudid    Chronic pain of left knee 10/13/2023    Encounter for blood transfusion 2004    Excessive daytime sleepiness 06/26/2017    GERD (gastroesophageal reflux disease)     Hemiplegia affecting left nondominant side     Hyperglycemia 09/26/2021    Hypertension     Iron deficiency anemia     Mixed hyperlipidemia 03/18/2014    Mixed hyperlipidemia 03/18/2014    Morbid obesity with BMI of 50.0-59.9, adult 09/20/2018    Multiple sclerosis     Otitis externa 04/10/2024    Prediabetes 02/11/2019    Seizure-like activity 02/25/2024    Sprain of medial collateral ligament of left knee 10/13/2023     Past Surgical History:   Procedure Laterality Date    APPENDECTOMY      CHOLECYSTECTOMY      COLONOSCOPY N/A 11/25/2020    Procedure: COLONOSCOPY;  Surgeon: Andrew Jenkins III, MD;  Location: [facility];  Service: Endoscopy;  Laterality: N/A;    COLONOSCOPY N/A 7/18/2024    Procedure: COLONOSCOPY;  Surgeon: Brie Cruz MD;  Location: [facility];  Service: Endoscopy;  Laterality: N/A;    COLONOSCOPY N/A 7/22/2024    Procedure: COLONOSCOPY;  Surgeon: Galina Diaz MD;  Location: [facility];  Service: Endoscopy;  Laterality: N/A;    COLONOSCOPY N/A 8/1/2024    Procedure: COLONOSCOPY;  Surgeon: Brie Cruz MD;  Location: [facility];  Service: Colon and Rectal;   Laterality: N/A;    ESOPHAGOGASTRODUODENOSCOPY N/A 02/19/2020    Procedure: EGD (ESOPHAGOGASTRODUODENOSCOPY);  Surgeon: Michael Navarrete MD;  Location: [facility];  Service: Endoscopy;  Laterality: N/A;    ESOPHAGOGASTRODUODENOSCOPY N/A 02/20/2020    Procedure: EGD (ESOPHAGOGASTRODUODENOSCOPY);  Surgeon: Michael Navarrete MD;  Location: [facility] OR;  Service: General;  Laterality: N/A;    ESOPHAGOGASTRODUODENOSCOPY N/A 11/25/2020    Procedure: EGD (ESOPHAGOGASTRODUODENOSCOPY);  Surgeon: Andrew Jenkins III, MD;  Location: [facility];  Service: Endoscopy;  Laterality: N/A;    ESOPHAGOGASTRODUODENOSCOPY N/A 09/25/2023    Procedure: EGD (ESOPHAGOGASTRODUODENOSCOPY);  Surgeon: Ly Kim MD;  Location: [facility];  Service: Endoscopy;  Laterality: N/A;    ESOPHAGOGASTRODUODENOSCOPY N/A 01/29/2024    Procedure: EGD (ESOPHAGOGASTRODUODENOSCOPY);  Surgeon: Ly Kim MD;  Location: [facility];  Service: Endoscopy;  Laterality: N/A;    HYSTERECTOMY      KNEE SURGERY      age 10    LAPAROSCOPIC GASTROENTEROSTOMY N/A 05/20/2024    Procedure: GASTROENTEROSTOMY, LAPAROSCOPIC w/intraop EGD;  Surgeon: Farideh Vazquez MD;  Location: [address];  Service: General;  Laterality: N/A;    PH MONITORING, ESOPHAGUS, WIRELESS, (OFF REFLUX MEDS) N/A 01/29/2024    Procedure: PH MONITORING, ESOPHAGUS, WIRELESS, (OFF REFLUX MEDS);  Surgeon: Ly Kim MD;  Location: [facility];  Service: Endoscopy;  Laterality: N/A;    ROBOT-ASSISTED LAPAROSCOPIC SLEEVE GASTRECTOMY USING DA ANTHONY XI N/A 02/20/2020    Procedure: XI ROBOTIC SLEEVE GASTRECTOMY;  Surgeon: Michael Navarrete MD;  Location: [facility];  Service: General;  Laterality: N/A;    TENOPLASTY OF HAND Left 08/26/2021    Procedure: REPAIR, TENDON, HAND;  Surgeon: Joselito Lugo MD;  Location: [facility];  Service: Orthopedics;  Laterality: Left;  Left RCL PIP Joint Repair/Recon with Arthrex Internal Brace.    TONSILLECTOMY      TOTAL REDUCTION MAMMOPLASTY  2022     "TUBAL LIGATION       Review of patient's allergies indicates:   Allergen Reactions    Demerol [meperidine] Anaphylaxis and Hives    Dilaudid [hydromorphone (bulk)] Anaphylaxis     "Code Blue" however patient tolerates Lortab    Shellfish containing products Itching, Nausea And Vomiting and Swelling    Tramadol Hives    Prednisone Itching     Current Outpatient Medications on File Prior to Visit   Medication Sig Dispense Refill    cholecalciferol, vitamin D3, 1,250 mcg (50,000 unit) capsule Take 1 capsule (50,000 Units total) by mouth every 7 days. for 365 doses 12 capsule 28    cyclobenzaprine (FLEXERIL) 10 MG tablet Take 10 mg by mouth every 8 (eight) hours as needed.      diclofenac sodium (VOLTAREN) 1 % Gel Apply 2 g topically 4 (four) times daily. 450 g 11    doxepin (SINEQUAN) 75 MG capsule Take 1 capsule (75 mg total) by mouth every evening. 30 capsule 5    DULoxetine (CYMBALTA) 30 MG capsule Take 1 capsule (30 mg total) by mouth once daily. (Along with 60mg cap for a total of 90mg daily) 30 capsule 1    DULoxetine (CYMBALTA) 60 MG capsule TAKE 1 CAPSULE BY MOUTH EVERY DAY 90 capsule 2    gabapentin (NEURONTIN) 300 MG capsule Take 3 capsules (900 mg total) by mouth 2 (two) times daily. 540 capsule 1    hydrOXYzine HCL (ATARAX) 25 MG tablet Take 1 tablet (25 mg total) by mouth 4 (four) times daily as needed for Anxiety. 12 tablet 0    ketoconazole (NIZORAL) 2 % cream Apply topically once daily. 60 g 1    lactulose (CHRONULAC) 10 gram/15 mL solution TAKE 15MLS BY MOUTH THREE TIMES DAILY 400 mL 3    linaCLOtide (LINZESS) 145 mcg Cap capsule Take 1 capsule (145 mcg total) by mouth before breakfast. 90 capsule 3    OCREVUS 30 mg/mL Soln       omeprazole (PRILOSEC) 40 MG capsule Take 1 capsule (40 mg total) by mouth every morning. 30 capsule 2    ondansetron (ZOFRAN-ODT) 4 MG TbDL Take 1 tablet (4 mg total) by mouth every 12 (twelve) hours as needed. 10 tablet 0    promethazine (PHENERGAN) 25 MG tablet Take 1 tablet " (25 mg total) by mouth every 6 (six) hours as needed for Nausea. 10 tablet 0    valsartan (DIOVAN) 80 MG tablet Take 1 tablet (80 mg total) by mouth once daily. 90 tablet 3     No current facility-administered medications on file prior to visit.     Family History   Problem Relation Name Age of Onset    Lupus Mother      Heart disease Mother      Hypertension Mother      Diabetes Father Ganesh hannon     Kidney disease Father Ganesh hannon     No Known Problems Sister      No Known Problems Sister      No Known Problems Brother      No Known Problems Brother      No Known Problems Daughter      No Known Problems Daughter      No Known Problems Daughter      Cancer Maternal Aunt Melly Harley 40        breast    Breast cancer Maternal Aunt Melly Harley     Diabetes Maternal Aunt Melly Harley     COPD Maternal Aunt Melly Harley     Heart disease Maternal Grandmother Lilia harley     Breast cancer Maternal Cousin      Ovarian cancer Maternal Cousin             Ohs Peq Odvv Intake    1/5/2025 11:45 AM CST - Filed by Patient   What is your current physical address in the event of a medical emergency? [address] [facility] [address]   Are you able to take your vital signs? Yes   Systolic Blood Pressure: 120   Diastolic Blood Pressure: 80   Weight: 218   Height: 56   Pulse: 99   Temperature:    Respiration rate:    Pulse Oxygen:    Please attach any relevant images or files    Is your employer contracted with [facility]? No         45 yo female with c/o right knee swelling. She state sshe was diagnosed with RA a while back . She states she tried flexeril and voltaren gel. She states symptoms are not relief. She states pain is 10/10        Constitution: Negative.   HENT: Negative.     Cardiovascular: Negative.    Respiratory: Negative.     Gastrointestinal: Negative.    Endocrine: negative.   Genitourinary: Negative.  Negative for frequency and urgency.   Musculoskeletal:  Positive for joint pain and  joint swelling.   Skin: Negative.    Allergic/Immunologic: Negative.    Neurological: Negative.    Hematologic/Lymphatic: Negative.    Psychiatric/Behavioral: Negative.          Objective:   The physical exam was conducted virtually.    AAO x 3 ; no acute distress noted; appearance normal; mood and behavior normal; thought process normal  Head- normocephalic  Nose- appears normal, no discharge or erythema  Eyes- pupils appear normal in size, no drainage, no erythema  Ears- normal appearing; no discharge, no erythema  Mouth- appears normal  Oropharynx- no erythema, lesions  Lungs- breathing at a normal rate, no acute distress noted  Heart- no reports of tachycardia, palpitations, chest pain  Abdomen- non distended, non tender reported by patient  Skin- warm and dry, no erythema or edema noted by patient or visualized  Psych- as above; no si/hi      Assessment:     1. Chronic pain syndrome        Plan:       Patient allergic to prednisone  I explained we can not prescribe narcotic pain medication.  Advised f/u with md or in person visit.       Thank you for choosing [facility]!    Our goal in the [facility] is to always provide outstanding medical care. You may receive a survey by mail or e-mail in the next week regarding your experience today. We would greatly appreciate you completing and returning the survey. Your feedback provides us with a way to recognize our staff who provide very good care, and it helps us learn how to improve when your experience was below our aspiration of excellence.         We appreciate you trusting us with your medical care. We hope you feel better soon. We will be happy to take care of you for all of your future medical needs.    You must understand that you've received an Urgent Care treatment only and that you may be released before all your medical problems are known or treated. You, the patient, will arrange for follow up care as  instructed.    Follow up with your PCP or specialty clinic as directed in the next 1-2 weeks if not improved or as needed.  You can call [phone] to schedule an appointment with the appropriate provider.    If your condition worsens we recommend that you receive another evaluation in person, with your primary care provider, urgent care or at the emergency room immediately or contact your primary medical clinics after hours call service to discuss your concerns.         Chronic pain syndrome

## 2025-01-06 ENCOUNTER — PATIENT MESSAGE (OUTPATIENT)
Dept: BARIATRICS | Facility: CLINIC | Age: 45
End: 2025-01-06
Payer: MEDICARE

## 2025-01-14 ENCOUNTER — PATIENT MESSAGE (OUTPATIENT)
Dept: BARIATRICS | Facility: CLINIC | Age: 45
End: 2025-01-14
Payer: MEDICARE

## 2025-01-28 ENCOUNTER — TELEPHONE (OUTPATIENT)
Dept: OPHTHALMOLOGY | Facility: CLINIC | Age: 45
End: 2025-01-28
Payer: MEDICARE

## 2025-01-28 ENCOUNTER — PATIENT MESSAGE (OUTPATIENT)
Dept: NEUROLOGY | Facility: CLINIC | Age: 45
End: 2025-01-28
Payer: MEDICARE

## 2025-01-28 NOTE — TELEPHONE ENCOUNTER
----- Message from Margot sent at 1/28/2025 12:59 PM CST -----  Type:  Sooner Apoointment Request    Caller is requesting a sooner appointment.  Caller declined first available appointment listed below.  Caller will not accept being placed on the waitlist and is requesting a message be sent to doctor.  Name of Caller:Alicia  When is the first available appointment?04/23/25  Symptoms:Blurred vision  Would the patient rather a call back or a response via [facility]? Callback  Best Call Back Number:[phone]  Additional Information: Patient is wanting to be seen on 02/04/25

## 2025-02-04 ENCOUNTER — OFFICE VISIT (OUTPATIENT)
Dept: OPHTHALMOLOGY | Facility: CLINIC | Age: 45
End: 2025-02-04
Payer: MEDICARE

## 2025-02-04 ENCOUNTER — OFFICE VISIT (OUTPATIENT)
Dept: INTERNAL MEDICINE | Facility: CLINIC | Age: 45
End: 2025-02-04
Payer: MEDICARE

## 2025-02-04 DIAGNOSIS — H52.03 HYPEROPIA WITH ASTIGMATISM AND PRESBYOPIA, BILATERAL: ICD-10-CM

## 2025-02-04 DIAGNOSIS — G81.94 HEMIPLEGIA AFFECTING LEFT NONDOMINANT SIDE, UNSPECIFIED ETIOLOGY, UNSPECIFIED HEMIPLEGIA TYPE: ICD-10-CM

## 2025-02-04 DIAGNOSIS — R30.0 DYSURIA: Primary | ICD-10-CM

## 2025-02-04 DIAGNOSIS — H40.013 OPEN ANGLE WITH BORDERLINE FINDINGS OF BOTH EYES: ICD-10-CM

## 2025-02-04 DIAGNOSIS — H52.203 HYPEROPIA WITH ASTIGMATISM AND PRESBYOPIA, BILATERAL: ICD-10-CM

## 2025-02-04 DIAGNOSIS — I10 ESSENTIAL HYPERTENSION: ICD-10-CM

## 2025-02-04 DIAGNOSIS — H52.4 HYPEROPIA WITH ASTIGMATISM AND PRESBYOPIA, BILATERAL: ICD-10-CM

## 2025-02-04 DIAGNOSIS — N30.00 ACUTE CYSTITIS WITHOUT HEMATURIA: ICD-10-CM

## 2025-02-04 DIAGNOSIS — G35 MULTIPLE SCLEROSIS, RELAPSING-REMITTING: ICD-10-CM

## 2025-02-04 DIAGNOSIS — G35 MS (MULTIPLE SCLEROSIS): Primary | ICD-10-CM

## 2025-02-04 PROCEDURE — 4010F ACE/ARB THERAPY RXD/TAKEN: CPT | Mod: CPTII,S$GLB,, | Performed by: OPTOMETRIST

## 2025-02-04 PROCEDURE — 99999 PR PBB SHADOW E&M-EST. PATIENT-LVL III: CPT | Mod: PBBFAC,,, | Performed by: OPTOMETRIST

## 2025-02-04 PROCEDURE — 4010F ACE/ARB THERAPY RXD/TAKEN: CPT | Mod: CPTII,S$GLB,,

## 2025-02-04 PROCEDURE — 99999 PR PBB SHADOW E&M-EST. PATIENT-LVL IV: CPT | Mod: PBBFAC,,,

## 2025-02-04 PROCEDURE — 92015 DETERMINE REFRACTIVE STATE: CPT | Mod: S$GLB,,, | Performed by: OPTOMETRIST

## 2025-02-04 PROCEDURE — 92133 CPTRZD OPH DX IMG PST SGM ON: CPT | Mod: S$GLB,,, | Performed by: OPTOMETRIST

## 2025-02-04 PROCEDURE — 1159F MED LIST DOCD IN RCRD: CPT | Mod: CPTII,S$GLB,, | Performed by: OPTOMETRIST

## 2025-02-04 PROCEDURE — 3008F BODY MASS INDEX DOCD: CPT | Mod: CPTII,S$GLB,,

## 2025-02-04 PROCEDURE — 1160F RVW MEDS BY RX/DR IN RCRD: CPT | Mod: CPTII,S$GLB,, | Performed by: OPTOMETRIST

## 2025-02-04 PROCEDURE — 92014 COMPRE OPH EXAM EST PT 1/>: CPT | Mod: S$GLB,,, | Performed by: OPTOMETRIST

## 2025-02-04 PROCEDURE — 3079F DIAST BP 80-89 MM HG: CPT | Mod: CPTII,S$GLB,,

## 2025-02-04 PROCEDURE — G2211 COMPLEX E/M VISIT ADD ON: HCPCS | Mod: S$GLB,,,

## 2025-02-04 PROCEDURE — 3075F SYST BP GE 130 - 139MM HG: CPT | Mod: CPTII,S$GLB,,

## 2025-02-04 PROCEDURE — 99214 OFFICE O/P EST MOD 30 MIN: CPT | Mod: S$GLB,,,

## 2025-02-04 RX ORDER — DICLOFENAC SODIUM 10 MG/G
2 GEL TOPICAL 4 TIMES DAILY
Qty: 450 G | Refills: 11 | Status: SHIPPED | OUTPATIENT
Start: 2025-02-04

## 2025-02-04 RX ORDER — VALSARTAN 80 MG/1
80 TABLET ORAL DAILY
Qty: 90 TABLET | Refills: 3 | Status: SHIPPED | OUTPATIENT
Start: 2025-02-04

## 2025-02-04 NOTE — PROGRESS NOTES
Alicia Soliz  44 y.o. Black or  female  [id number]    Braden Dumont MD  Patient Care Team:  Braden Dumont MD as PCP - General (Internal Medicine)  Kallie Cunha, PharmD as Hypertension Digital Medicine Clinician (Pharmacist)  Braden Dumont MD as Hypertension Digital Medicine Responsible Provider (Internal Medicine)  Pam Parkeh RD as Dietitian (Endocrinology)  Shana Marks RD (Inactive) as Dietitian (Nutrition)  Medicare, Digital Medicine as Hypertension Digital Medicine Contract    Chief Complaint:   Chief Complaint   Patient presents with    Establish Care       History of Present Illness:  Pt is here to establish care and is new to me.    Discussed with patient that she will need to schedule earliest available appointment with Dr. Mandy Sepulveda to officially establish care. Patient verbalizes understanding.    She notes some burning with urination that onset last week. She also notes a sore area near her buttocks that she noticed a few days ago. She denies trauma or injury. She denies abdominal pain, hematuria, flank pain, diarrhea, nausea, vomiting, or blood in stool. She does have constipation treated with Linzess as needed.    HTN-- valsartan 80 mg  GERD-- omeprazole 40 mg  MS-- managed by multiple sclerosis clinic    The following were reviewed: active problem list, medication list, allergies, family history, social history, and Health Maintenance.     History:  Past Medical History:   Diagnosis Date    Anxiety and depression 10/20/2018    Arthritis     Cardiac arrest as complication of medication     Dilaudid    Chronic pain of left knee 10/13/2023    Encounter for blood transfusion 2004    Excessive daytime sleepiness 06/26/2017    GERD (gastroesophageal reflux disease)     Hemiplegia affecting left nondominant side     Hyperglycemia 09/26/2021    Hypertension     Iron deficiency anemia     Mixed hyperlipidemia 03/18/2014    Mixed hyperlipidemia  03/18/2014    Morbid obesity with BMI of 50.0-59.9, adult 09/20/2018    Multiple sclerosis     Otitis externa 04/10/2024    Prediabetes 02/11/2019    Seizure-like activity 02/25/2024    Sprain of medial collateral ligament of left knee 10/13/2023     Past Surgical History:   Procedure Laterality Date    APPENDECTOMY      CHOLECYSTECTOMY      COLONOSCOPY N/A 11/25/2020    Procedure: COLONOSCOPY;  Surgeon: Andrew Jenkins III, MD;  Location: [facility];  Service: Endoscopy;  Laterality: N/A;    COLONOSCOPY N/A 7/18/2024    Procedure: COLONOSCOPY;  Surgeon: Brie Cruz MD;  Location: [facility] ENDO;  Service: Endoscopy;  Laterality: N/A;    COLONOSCOPY N/A 7/22/2024    Procedure: COLONOSCOPY;  Surgeon: Galina Diaz MD;  Location: [facility];  Service: Endoscopy;  Laterality: N/A;    COLONOSCOPY N/A 8/1/2024    Procedure: COLONOSCOPY;  Surgeon: Brie Cruz MD;  Location: [facility];  Service: Colon and Rectal;  Laterality: N/A;    ESOPHAGOGASTRODUODENOSCOPY N/A 02/19/2020    Procedure: EGD (ESOPHAGOGASTRODUODENOSCOPY);  Surgeon: Michael Navarrete MD;  Location: [facility];  Service: Endoscopy;  Laterality: N/A;    ESOPHAGOGASTRODUODENOSCOPY N/A 02/20/2020    Procedure: EGD (ESOPHAGOGASTRODUODENOSCOPY);  Surgeon: Michael Navarrete MD;  Location: [facility];  Service: General;  Laterality: N/A;    ESOPHAGOGASTRODUODENOSCOPY N/A 11/25/2020    Procedure: EGD (ESOPHAGOGASTRODUODENOSCOPY);  Surgeon: Andrew Jenkins III, MD;  Location: [facility];  Service: Endoscopy;  Laterality: N/A;    ESOPHAGOGASTRODUODENOSCOPY N/A 09/25/2023    Procedure: EGD (ESOPHAGOGASTRODUODENOSCOPY);  Surgeon: Ly Kim MD;  Location: [facility];  Service: Endoscopy;  Laterality: N/A;    ESOPHAGOGASTRODUODENOSCOPY N/A 01/29/2024    Procedure: EGD (ESOPHAGOGASTRODUODENOSCOPY);  Surgeon: Ly Kim MD;  Location: [facility];  Service: Endoscopy;  Laterality: N/A;    HYSTERECTOMY      KNEE SURGERY      age 10    LAPAROSCOPIC  GASTROENTEROSTOMY N/A 05/20/2024    Procedure: GASTROENTEROSTOMY, LAPAROSCOPIC w/intraop EGD;  Surgeon: Farideh Vazquez MD;  Location: [address];  Service: General;  Laterality: N/A;    PH MONITORING, ESOPHAGUS, WIRELESS, (OFF REFLUX MEDS) N/A 01/29/2024    Procedure: PH MONITORING, ESOPHAGUS, WIRELESS, (OFF REFLUX MEDS);  Surgeon: Ly Kim MD;  Location: [facility] ENDO;  Service: Endoscopy;  Laterality: N/A;    ROBOT-ASSISTED LAPAROSCOPIC SLEEVE GASTRECTOMY USING DA ANTHONY XI N/A 02/20/2020    Procedure: XI ROBOTIC SLEEVE GASTRECTOMY;  Surgeon: Michael Navarrete MD;  Location: [facility] OR;  Service: General;  Laterality: N/A;    TENOPLASTY OF HAND Left 08/26/2021    Procedure: REPAIR, TENDON, HAND;  Surgeon: Joselito Lugo MD;  Location: [facility] OR;  Service: Orthopedics;  Laterality: Left;  Left RCL PIP Joint Repair/Recon with Arthrex Internal Brace.    TONSILLECTOMY      TOTAL REDUCTION MAMMOPLASTY  2022    TUBAL LIGATION       Family History   Problem Relation Name Age of Onset    Lupus Mother      Heart disease Mother      Hypertension Mother      Diabetes Father Ganesh brown     Kidney disease Father Ganesh brown     No Known Problems Sister      No Known Problems Sister      No Known Problems Brother      No Known Problems Brother      No Known Problems Daughter      No Known Problems Daughter      No Known Problems Daughter      Cancer Maternal Aunt Melly Soliz 40        breast    Breast cancer Maternal Aunt Melly Soliz     Diabetes Maternal Aunt Melly Soliz     COPD Maternal Aunt Melly Soliz     Heart disease Maternal Grandmother Lilia soliz     Breast cancer Maternal Cousin      Ovarian cancer Maternal Cousin       Social History     Socioeconomic History    Marital status: Single    Number of children: 3   Occupational History     Employer: [employer]   Tobacco Use    Smoking status: Never     Passive exposure: Never    Smokeless tobacco: Never   Substance and Sexual Activity     Alcohol use: Not Currently     Comment: once a year     Drug use: No    Sexual activity: Yes     Partners: Female     Birth control/protection: Surgical     Social Drivers of Health     Financial Resource Strain: Low Risk  (2/3/2025)    Overall Financial Resource Strain (CARDIA)     Difficulty of Paying Living Expenses: Not hard at all   Food Insecurity: No Food Insecurity (2/3/2025)    Hunger Vital Sign     Worried About Running Out of Food in the Last Year: Never true     Ran Out of Food in the Last Year: Never true   Transportation Needs: No Transportation Needs (2/9/2024)    [facility], [facility] and [facility] Subsidiaries and Affiliates    PRAPARE - Transportation     Lack of Transportation (Medical): No     Lack of Transportation (Non-Medical): No   Recent Concern: Transportation Needs - Unmet Transportation Needs (11/11/2023)    PRAPARE - Transportation     Lack of Transportation (Medical): Yes     Lack of Transportation (Non-Medical): Yes   Physical Activity: Unknown (2/3/2025)    Exercise Vital Sign     Days of Exercise per Week: Patient declined     Minutes of Exercise per Session: 20 min   Stress: Stress Concern Present (2/3/2025)    [employer] - Occupational Stress Questionnaire     Feeling of Stress : To some extent   Housing Stability: Unknown (2/3/2025)    Housing Stability Vital Sign     Unable to Pay for Housing in the Last Year: No     Patient Active Problem List   Diagnosis    Essential hypertension    Multiple sclerosis, relapsing-remitting    Morbid obesity with BMI of 45.0-49.9, adult    Opioid dependence, uncomplicated    Hemiplegia affecting left nondominant side    Fatty liver    Localized swelling of left lower extremity    Decreased strength, endurance, and mobility    Left hemiparesis    Primary osteoarthritis of left knee    Weakness of  "left shoulder    Gastroesophageal reflux disease without esophagitis    Seizure-like activity    Acute pain of left shoulder    Anxiety and depression    Chronic pain syndrome    Abnormality of gait due to impairment of balance    Insomnia    Mixed hyperlipidemia    Iron deficiency anemia    Class 3 severe obesity due to excess calories with body mass index (BMI) of 45.0 to 49.9 in adult    History of stroke    Nausea    Colon cancer screening     Review of patient's allergies indicates:   Allergen Reactions    Dilaudid [hydromorphone (bulk)] Anaphylaxis     "Code Blue" however patient tolerates Lortab    Meperidine Anaphylaxis, Hives, Itching and Shortness Of Breath    Prednisone Itching and Shortness Of Breath    Shellfish containing products Itching, Nausea And Vomiting, Swelling and Anaphylaxis    Tramadol Hives    Morphine        Health Maintenance  Health Maintenance Topics with due status: Not Due       Topic Last Completion Date    TETANUS VACCINE 11/17/2020    Hemoglobin A1c (Diabetic Prevention Screening) 02/01/2024    Mammogram 07/03/2024    Colorectal Cancer Screening 08/01/2024    Lipid Panel 11/20/2024    RSV Vaccine (Age 60+ and Pregnant patients) Not Due     Health Maintenance Due   Topic Date Due    High Dose Statin  Never done    Pneumococcal Vaccines (Age 0-49) (2 of 2 - PPSV23) 01/12/2021    Influenza Vaccine (1) 09/01/2024    COVID-19 Vaccine (4 - 2024-25 season) 09/01/2024       Medications:  Current Outpatient Medications on File Prior to Visit   Medication Sig Dispense Refill    cholecalciferol, vitamin D3, 1,250 mcg (50,000 unit) capsule Take 1 capsule (50,000 Units total) by mouth every 7 days. for 365 doses 12 capsule 28    cyclobenzaprine (FLEXERIL) 10 MG tablet Take 10 mg by mouth every 8 (eight) hours as needed.      doxepin (SINEQUAN) 75 MG capsule Take 1 capsule (75 mg total) by mouth every evening. 30 capsule 5    gabapentin (NEURONTIN) 300 MG capsule Take 3 capsules (900 mg total) " by mouth 2 (two) times daily. 540 capsule 1    ketoconazole (NIZORAL) 2 % cream Apply topically once daily. 60 g 1    linaCLOtide (LINZESS) 145 mcg Cap capsule Take 1 capsule (145 mcg total) by mouth before breakfast. 90 capsule 3    OCREVUS 30 mg/mL Soln       omeprazole (PRILOSEC) 40 MG capsule Take 1 capsule (40 mg total) by mouth every morning. 30 capsule 2    ondansetron (ZOFRAN-ODT) 4 MG TbDL Take 1 tablet (4 mg total) by mouth every 12 (twelve) hours as needed. 10 tablet 0     No current facility-administered medications on file prior to visit.       Medications have been reviewed and reconciled with patient at visit today.    Laboratory Reviewed: (Yes)  Lab Results   Component Value Date    WBC 4.00 11/20/2024    HGB 11.4 (L) 11/20/2024    HCT 36.1 (L) 11/20/2024     11/20/2024    CHOL 131 11/20/2024    TRIG 67 11/20/2024    HDL 53 11/20/2024    ALT 9 (L) 11/20/2024    AST 16 11/20/2024     11/20/2024    K 3.6 11/20/2024     11/20/2024    CREATININE 0.6 11/20/2024    BUN 7 11/20/2024    CO2 28 11/20/2024    TSH 0.647 08/20/2024    INR 1.0 08/20/2024    HGBA1C 5.6 02/01/2024       ROS:  Review of Systems   Constitutional:  Negative for chills and fever.   HENT:  Negative for congestion and sore throat.    Respiratory:  Negative for cough and shortness of breath.    Cardiovascular:  Negative for chest pain and leg swelling.   Gastrointestinal:  Positive for constipation. Negative for abdominal pain, diarrhea and vomiting.   Genitourinary:  Positive for dysuria. Negative for flank pain and hematuria.   Musculoskeletal:         (+) pain near buttock   Skin:  Negative for rash.   Neurological:  Negative for dizziness, weakness and headaches.       Exam:  Vitals:    02/04/25 1105 02/04/25 1137   BP: (!) 140/92 136/84   BP Location: Right arm    Patient Position: Sitting    Pulse: 102    Resp: 18    Temp: 96.4 °F (35.8 °C)    TempSrc: Tympanic    SpO2: 99%    Weight: 92.7 kg (204 lb 5.9 oz)   "  Height: 5' 6" (1.676 m)        Physical Exam  Vitals reviewed.   Constitutional:       General: She is awake. She is not in acute distress.     Appearance: She is not ill-appearing.   Eyes:      Conjunctiva/sclera: Conjunctivae normal.   Cardiovascular:      Rate and Rhythm: Normal rate and regular rhythm.      Heart sounds: Normal heart sounds.   Pulmonary:      Effort: Pulmonary effort is normal. No respiratory distress.      Breath sounds: Normal breath sounds.   Abdominal:      Tenderness: There is no abdominal tenderness. There is no right CVA tenderness or left CVA tenderness.   Musculoskeletal:         General: No swelling.      Comments: Utilizes a walker   Skin:     General: Skin is warm and dry.      Comments: No visible or palpable lesions to area of pain near buttock.   Neurological:      General: No focal deficit present.      Mental Status: She is alert.   Psychiatric:         Mood and Affect: Mood normal.         Behavior: Behavior normal.         Assessment:  The primary encounter diagnosis was Dysuria. Diagnoses of Essential hypertension, Multiple sclerosis, relapsing-remitting, Hemiplegia affecting left nondominant side, unspecified etiology, unspecified hemiplegia type, and Acute cystitis without hematuria were also pertinent to this visit.    Plan:    5. Acute cystitis without hematuria  - Urinalysis today positive for UTI.  -     nitrofurantoin, macrocrystal-monohydrate, (MACROBID) 100 MG capsule; Take 1 capsule (100 mg total) by mouth 2 (two) times daily. for 5 days  Dispense: 10 capsule; Refill: 0    1. Dysuria  -     Urinalysis, Reflex to Urine Culture Urine, Clean Catch    2. Essential hypertension  -     valsartan (DIOVAN) 80 MG tablet; Take 1 tablet (80 mg total) by mouth once daily.  Dispense: 90 tablet; Refill: 3    3. Multiple sclerosis, relapsing-remitting  - Managed by multiple sclerosis clinic.  -     diclofenac sodium (VOLTAREN) 1 % Gel; Apply 2 g topically 4 (four) times daily.  " Dispense: 450 g; Refill: 11    4. Hemiplegia affecting left nondominant side, unspecified etiology, unspecified hemiplegia type    Care plan/goals: reviewed    Follow up: Schedule first available with Dr. Sepulveda to establish care.    Follow up in about 6 months (around 8/4/2025).    This note was generated with the assistance of ambient listening technology. Verbal consent was obtained by the patient and accompanying visitor(s) for the recording of patient appointment to facilitate this note. I attest to having reviewed and edited the generated note for accuracy, though some syntax or spelling errors may persist. Please contact the author of this note for any clarification.

## 2025-02-04 NOTE — PROGRESS NOTES
HPI     Annual Exam            Comments: New to [facility]  Vision changes since last eye exam?: blurry VA due to MS    Any eye pain today: no    Other ocular symptoms: lots of floaters for about 1 month    Interested in contact lens fitting today? no                     Last edited by Eloisa Herr on 2/4/2025  2:14 PM.            Assessment /Plan     For exam results, see Encounter Report.    MS  Open angle with borderline findings    No ocular sequelae at this time  ONH flat with no edema  gOCT with cole type NFL thinning on GCL   Suspect based on GCL thinning but does not follow typical OAG pattern  Likely old cva vs ms changes  Expect to remain stable  Will get baseline VF   No evidence of glaucoma at this time but based on risk factors recommend to continue monitoring.       Hyperopia with astigmatism and presbyopia, bilateral  Eyeglass Final Rx       Eyeglass Final Rx         Sphere Cylinder Axis Add    Right -2.50 +2.00 080 +2.00    Left -2.75 +1.50 110 +2.00      Type: PAL    Expiration Date: 2/4/2026                                    RTC for next available [facility] 24-2 with review appt.

## 2025-02-06 ENCOUNTER — LAB VISIT (OUTPATIENT)
Dept: LAB | Facility: HOSPITAL | Age: 45
End: 2025-02-06
Payer: MEDICARE

## 2025-02-06 DIAGNOSIS — R30.0 DYSURIA: ICD-10-CM

## 2025-02-06 LAB
BACTERIA #/AREA URNS AUTO: ABNORMAL /HPF
BILIRUB UR QL STRIP: NEGATIVE
CLARITY UR REFRACT.AUTO: ABNORMAL
COLOR UR AUTO: YELLOW
GLUCOSE UR QL STRIP: NEGATIVE
HGB UR QL STRIP: NEGATIVE
HYALINE CASTS UR QL AUTO: 5 /LPF
KETONES UR QL STRIP: NEGATIVE
LEUKOCYTE ESTERASE UR QL STRIP: ABNORMAL
MICROSCOPIC COMMENT: ABNORMAL
NITRITE UR QL STRIP: NEGATIVE
PH UR STRIP: 7 [PH] (ref 5–8)
PROT UR QL STRIP: ABNORMAL
RBC #/AREA URNS AUTO: 3 /HPF (ref 0–4)
SP GR UR STRIP: 1.01 (ref 1–1.03)
SQUAMOUS #/AREA URNS AUTO: 4 /HPF
URN SPEC COLLECT METH UR: ABNORMAL
WBC #/AREA URNS AUTO: 40 /HPF (ref 0–5)

## 2025-02-06 PROCEDURE — 87086 URINE CULTURE/COLONY COUNT: CPT

## 2025-02-06 PROCEDURE — 81001 URINALYSIS AUTO W/SCOPE: CPT

## 2025-02-07 RX ORDER — NITROFURANTOIN 25; 75 MG/1; MG/1
100 CAPSULE ORAL 2 TIMES DAILY
Qty: 10 CAPSULE | Refills: 0 | Status: SHIPPED | OUTPATIENT
Start: 2025-02-07 | End: 2025-02-12

## 2025-02-08 LAB
BACTERIA UR CULT: NORMAL
BACTERIA UR CULT: NORMAL

## 2025-02-10 ENCOUNTER — PATIENT MESSAGE (OUTPATIENT)
Dept: BARIATRICS | Facility: CLINIC | Age: 45
End: 2025-02-10
Payer: MEDICARE

## 2025-02-11 VITALS
DIASTOLIC BLOOD PRESSURE: 84 MMHG | WEIGHT: 204.38 LBS | HEART RATE: 102 BPM | RESPIRATION RATE: 18 BRPM | HEIGHT: 66 IN | BODY MASS INDEX: 32.85 KG/M2 | OXYGEN SATURATION: 99 % | SYSTOLIC BLOOD PRESSURE: 136 MMHG | TEMPERATURE: 96 F

## 2025-03-05 ENCOUNTER — PATIENT MESSAGE (OUTPATIENT)
Dept: INTERNAL MEDICINE | Facility: CLINIC | Age: 45
End: 2025-03-05
Payer: MEDICARE

## 2025-03-06 ENCOUNTER — OFFICE VISIT (OUTPATIENT)
Dept: INTERNAL MEDICINE | Facility: CLINIC | Age: 45
End: 2025-03-06
Payer: MEDICARE

## 2025-03-06 ENCOUNTER — PATIENT MESSAGE (OUTPATIENT)
Dept: NEUROLOGY | Facility: CLINIC | Age: 45
End: 2025-03-06
Payer: MEDICARE

## 2025-03-06 VITALS
DIASTOLIC BLOOD PRESSURE: 83 MMHG | BODY MASS INDEX: 31.92 KG/M2 | OXYGEN SATURATION: 97 % | TEMPERATURE: 97 F | HEIGHT: 66 IN | HEART RATE: 100 BPM | SYSTOLIC BLOOD PRESSURE: 141 MMHG | RESPIRATION RATE: 18 BRPM | WEIGHT: 198.63 LBS

## 2025-03-06 DIAGNOSIS — R39.9 URINARY SYMPTOM OR SIGN: Primary | ICD-10-CM

## 2025-03-06 DIAGNOSIS — R11.0 NAUSEA: ICD-10-CM

## 2025-03-06 PROBLEM — F11.20 OPIOID DEPENDENCE, UNCOMPLICATED: Status: RESOLVED | Noted: 2020-01-10 | Resolved: 2025-03-06

## 2025-03-06 PROBLEM — E66.01 CLASS 3 SEVERE OBESITY DUE TO EXCESS CALORIES WITH BODY MASS INDEX (BMI) OF 45.0 TO 49.9 IN ADULT: Status: RESOLVED | Noted: 2024-05-20 | Resolved: 2025-03-06

## 2025-03-06 PROBLEM — R56.9 SEIZURE-LIKE ACTIVITY: Status: RESOLVED | Noted: 2024-02-25 | Resolved: 2025-03-06

## 2025-03-06 PROBLEM — E66.813 CLASS 3 SEVERE OBESITY DUE TO EXCESS CALORIES WITH BODY MASS INDEX (BMI) OF 45.0 TO 49.9 IN ADULT: Status: RESOLVED | Noted: 2024-05-20 | Resolved: 2025-03-06

## 2025-03-06 PROBLEM — E66.01 MORBID OBESITY WITH BMI OF 45.0-49.9, ADULT: Status: RESOLVED | Noted: 2017-04-03 | Resolved: 2025-03-06

## 2025-03-06 PROCEDURE — 3079F DIAST BP 80-89 MM HG: CPT | Mod: CPTII,S$GLB,, | Performed by: PHYSICIAN ASSISTANT

## 2025-03-06 PROCEDURE — 99213 OFFICE O/P EST LOW 20 MIN: CPT | Mod: S$GLB,,, | Performed by: PHYSICIAN ASSISTANT

## 2025-03-06 PROCEDURE — 3077F SYST BP >= 140 MM HG: CPT | Mod: CPTII,S$GLB,, | Performed by: PHYSICIAN ASSISTANT

## 2025-03-06 PROCEDURE — 3008F BODY MASS INDEX DOCD: CPT | Mod: CPTII,S$GLB,, | Performed by: PHYSICIAN ASSISTANT

## 2025-03-06 PROCEDURE — 1160F RVW MEDS BY RX/DR IN RCRD: CPT | Mod: CPTII,S$GLB,, | Performed by: PHYSICIAN ASSISTANT

## 2025-03-06 PROCEDURE — 99999 PR PBB SHADOW E&M-EST. PATIENT-LVL IV: CPT | Mod: PBBFAC,,, | Performed by: PHYSICIAN ASSISTANT

## 2025-03-06 PROCEDURE — 4010F ACE/ARB THERAPY RXD/TAKEN: CPT | Mod: CPTII,S$GLB,, | Performed by: PHYSICIAN ASSISTANT

## 2025-03-06 PROCEDURE — 1159F MED LIST DOCD IN RCRD: CPT | Mod: CPTII,S$GLB,, | Performed by: PHYSICIAN ASSISTANT

## 2025-03-06 RX ORDER — ONDANSETRON 4 MG/1
4 TABLET, FILM COATED ORAL EVERY 8 HOURS PRN
Qty: 30 TABLET | Refills: 0 | Status: SHIPPED | OUTPATIENT
Start: 2025-03-06

## 2025-03-06 RX ORDER — NITROFURANTOIN 25; 75 MG/1; MG/1
100 CAPSULE ORAL 2 TIMES DAILY
Qty: 10 CAPSULE | Refills: 0 | Status: SHIPPED | OUTPATIENT
Start: 2025-03-06 | End: 2025-03-11

## 2025-03-07 NOTE — PROGRESS NOTES
Subjective:       Patient ID: Alicia Soliz is a 45 y.o. female.    Chief Complaint: Urinary Tract Infection      Urinary Tract Infection   Associated symptoms include flank pain, hesitancy, urgency and weight loss. Pertinent negatives include no behavior changes, chills, discharge, frequency, hematuria, nausea, possible pregnancy, sweats, vomiting, constipation, rash or withholding. Her past medical history is significant for a urological procedure. There is no history of catheterization, diabetes insipidus, diabetes mellitus, genitourinary reflux, hypertension, kidney stones, recurrent UTIs, a single kidney or STD.   Dysuria   This is a recurrent problem. The current episode started yesterday. The problem occurs every urination. The problem has been gradually improving. The quality of the pain is described as aching, burning, shooting and stabbing. The pain is at a severity of 9/10. The pain is moderate. There has been no fever. The fever has been present for Less than 1 day. She is Sexually active. There is No history of pyelonephritis. Associated symptoms include flank pain, hesitancy, urgency and weight loss. Pertinent negatives include no behavior changes, chills, discharge, frequency, hematuria, nausea, possible pregnancy, sweats, vomiting, constipation, rash or withholding. She has tried home medications for the symptoms. The treatment provided no relief. Her past medical history is significant for a urological procedure. There is no history of catheterization, diabetes insipidus, diabetes mellitus, genitourinary reflux, hypertension, kidney stones, recurrent UTIs, a single kidney or STD.       History of Present Illness    CHIEF COMPLAINT:  Ms. Soliz presents today with urinary symptoms and blood in urine    URINARY SYMPTOMS:  She reports urinary urgency with immediate need to void and hematuria. She also experiences associated back pain. She denies lower abdominal pain, fever, chills, nausea, or  "vomiting. She has a history of recurrent urinary tract infections with most recent episode one month ago. She denies history of overactive bladder, kidney stones, kidney infections, or bladder surgery.    CURRENT MEDICATIONS:  She takes cyclobenzaprine as needed, doxepin nightly for sleep, Linzess for constipation, Okraves infusions, omeprazole, valsartan for blood pressure, and Zofran for nausea (currently out). She has vitamin D but is not currently taking it.    SOCIAL HISTORY:  She is sexually active.         Review of Systems   Constitutional:  Positive for weight loss. Negative for chills.   Gastrointestinal:  Negative for constipation, nausea and vomiting.   Genitourinary:  Positive for dysuria, flank pain, hesitancy and urgency. Negative for frequency and hematuria.   Skin:  Negative for rash.         Objective:     Vitals:    03/06/25 1529   BP: (!) 141/83   Pulse: 100   Resp: 18   Temp: 96.7 °F (35.9 °C)   TempSrc: Tympanic   SpO2: 97%   Weight: 90.1 kg (198 lb 10.2 oz)   Height: 5' 6" (1.676 m)         Physical Exam  Vitals and nursing note reviewed.   Constitutional:       General: She is not in acute distress.     Appearance: She is well-developed.   HENT:      Head: Normocephalic and atraumatic.   Eyes:      General: Lids are normal. No scleral icterus.     Extraocular Movements: Extraocular movements intact.      Conjunctiva/sclera: Conjunctivae normal.   Pulmonary:      Effort: Pulmonary effort is normal.   Neurological:      Mental Status: She is alert.      Cranial Nerves: No cranial nerve deficit.   Psychiatric:         Mood and Affect: Mood and affect normal.           Assessment:     1. Urinary symptom or sign    2. Nausea          Plan:   1. Urinary symptom or sign  -     POCT urine dipstick without microscope  -     nitrofurantoin, macrocrystal-monohydrate, (MACROBID) 100 MG capsule; Take 1 capsule (100 mg total) by mouth 2 (two) times daily. for 5 days  Dispense: 10 capsule; Refill: 0    2. " Nausea  -     ondansetron (ZOFRAN) 4 MG tablet; Take 1 tablet (4 mg total) by mouth every 8 (eight) hours as needed for Nausea.  Dispense: 30 tablet; Refill: 0        Assessment & Plan    IMPRESSION:  - Assessed patient presenting with urinary symptoms including urgency, frequency, and blood in urine  - Considered UTI as likely diagnosis based on symptoms and recent history of UTI 1 month ago  - Will restart antibiotics for presumed recurrent UTI  - Noted patient's elevated blood pressure, likely due to situational factors    PATIENT EDUCATION:  - Explained that UTIs can occur due to various factors, including improper hygiene after intercourse  - Discussed that recurrent UTIs are not necessarily related to aging    ACTION ITEMS/LIFESTYLE:  - Ms. Soliz to increase water intake significantly  - Ms. Soliz can use Crystal Light packets as desired to flavor water  - Recommend urinating immediately after sexual intercourse to help prevent UTIs    MEDICATIONS:  - Started antibiotics for presumed UTI  - Refilled Zofran (ondansetron) for nausea; provided regular tablets instead of dissolvable form due to patient preference    FOLLOW UP:  - Follow up with nurse to recheck blood pressure before leaving  - Ms. Soliz to access visit summary online           Future Appointments   Date Time Provider Department Center   3/11/2025 10:30 AM FIELDS, VISUAL-ONE [facility]   3/11/2025 11:00 AM Ernie Lockett OD [facility]   3/20/2025 10:00 AM LANA CARDOSO [facility]   3/25/2025  9:00 AM Mihir Paul DPM [facility] POD [facility]   3/27/2025  9:20 AM Candice Garrett MD [facility]   8/25/2025  8:20 AM [facility]

## 2025-03-10 ENCOUNTER — PATIENT MESSAGE (OUTPATIENT)
Dept: BARIATRICS | Facility: CLINIC | Age: 45
End: 2025-03-10
Payer: MEDICARE

## 2025-03-13 ENCOUNTER — PATIENT MESSAGE (OUTPATIENT)
Dept: NEUROLOGY | Facility: CLINIC | Age: 45
End: 2025-03-13
Payer: MEDICARE

## 2025-03-14 ENCOUNTER — TELEPHONE (OUTPATIENT)
Dept: NEUROLOGY | Facility: CLINIC | Age: 45
End: 2025-03-14
Payer: MEDICARE

## 2025-03-17 ENCOUNTER — OFFICE VISIT (OUTPATIENT)
Dept: URGENT CARE | Facility: CLINIC | Age: 45
End: 2025-03-17
Payer: MEDICARE

## 2025-03-17 VITALS
HEIGHT: 66 IN | HEART RATE: 96 BPM | DIASTOLIC BLOOD PRESSURE: 81 MMHG | WEIGHT: 198.63 LBS | OXYGEN SATURATION: 100 % | BODY MASS INDEX: 31.92 KG/M2 | TEMPERATURE: 97 F | RESPIRATION RATE: 18 BRPM | SYSTOLIC BLOOD PRESSURE: 132 MMHG

## 2025-03-17 DIAGNOSIS — N61.1 ABSCESS OF RIGHT BREAST: Primary | ICD-10-CM

## 2025-03-17 PROCEDURE — 99213 OFFICE O/P EST LOW 20 MIN: CPT | Mod: 25,S$GLB,,

## 2025-03-17 PROCEDURE — 10060 I&D ABSCESS SIMPLE/SINGLE: CPT | Mod: S$GLB,,,

## 2025-03-17 RX ORDER — LIDOCAINE HYDROCHLORIDE 10 MG/ML
2 INJECTION, SOLUTION INFILTRATION; PERINEURAL
Status: COMPLETED | OUTPATIENT
Start: 2025-03-17 | End: 2025-03-17

## 2025-03-17 RX ORDER — SULFAMETHOXAZOLE AND TRIMETHOPRIM 800; 160 MG/1; MG/1
1 TABLET ORAL 2 TIMES DAILY
Qty: 10 TABLET | Refills: 0 | Status: SHIPPED | OUTPATIENT
Start: 2025-03-17 | End: 2025-03-22

## 2025-03-17 RX ADMIN — LIDOCAINE HYDROCHLORIDE 2 ML: 10 INJECTION, SOLUTION INFILTRATION; PERINEURAL at 08:03

## 2025-03-17 NOTE — PROGRESS NOTES
"Subjective:      Patient ID: Alicia Soliz is a 45 y.o. female.    Vitals:  height is 5' 6" (1.676 m) and weight is 90.1 kg (198 lb 10.2 oz). Her tympanic temperature is 97.4 °F (36.3 °C). Her blood pressure is 132/81 and her pulse is 96. Her respiration is 18 and oxygen saturation is 100%.     Chief Complaint: Abscess    44 yo female Patient with PMHx of MS, HTN, HLD, GERD, presents with painful lump under right breast. Symptoms started 3 days ago. Warm compress tried without relief. No history of abscess or boils. No itching. No insect bite or sting. No fever. Allergic to tramadol, dilaudid, meperidine, prednisone, shellfish containing products, morphine.     Abscess  Chronicity:  New  Location:  Torso  Associated Symptoms: no fever, no chills  Characteristics: painful    Pain Scale:  10/10  Treatments Tried:  Warm compresses      Constitution: Negative for chills and fever.   Cardiovascular:  Negative for chest pain.   Respiratory:  Negative for shortness of breath.    Gastrointestinal:  Negative for nausea.   Musculoskeletal:  Positive for pain (under right breast).   Skin:  Positive for abscess. Negative for rash, wound, abrasion, laceration, erythema and bruising.   Allergic/Immunologic: Negative for itching.      Objective:     Vitals:    03/17/25 1854   BP: 132/81   Pulse: 96   Resp: 18   Temp: 97.4 °F (36.3 °C)       Physical Exam   Constitutional: She is oriented to person, place, and time. She appears well-developed.  Non-toxic appearance. She does not appear ill. No distress.   HENT:   Head: Normocephalic and atraumatic. Head is without abrasion, without contusion and without laceration.   Ears:   Right Ear: External ear normal.   Left Ear: External ear normal.   Nose: Nose normal.   Mouth/Throat: Oropharynx is clear and moist and mucous membranes are normal.   Eyes: Conjunctivae, EOM and lids are normal. Right eye exhibits no discharge. Left eye exhibits no discharge.   Neck: Trachea normal and " phonation normal. Neck supple.   Cardiovascular: Normal rate, regular rhythm, normal heart sounds and normal pulses.   Pulmonary/Chest: Effort normal and breath sounds normal. No stridor. No respiratory distress. She has no wheezes. She has no rhonchi. She has no rales. No breast swelling, tenderness or discharge.       Genitourinary: No breast swelling, tenderness or discharge.   Musculoskeletal: Normal range of motion.         General: Normal range of motion.   Neurological: She is alert and oriented to person, place, and time.   Skin: Skin is warm, dry, intact, not diaphoretic and no rash. Capillary refill takes less than 2 seconds. No abrasion, No burn, No bruising, No erythema and No ecchymosis   Psychiatric: Her speech is normal and behavior is normal. Judgment and thought content normal.   Nursing note and vitals reviewed.      Assessment:     1. Abscess of right breast        Plan:       Abscess of right breast  -     sulfamethoxazole-trimethoprim 800-160mg (BACTRIM DS) 800-160 mg Tab; Take 1 tablet by mouth 2 (two) times daily. for 5 days  Dispense: 10 tablet; Refill: 0  -     LIDOcaine HCL 10 mg/ml (1%) injection 2 mL  -     Incision & Drainage        Patient Instructions                                              Abscess  You must understand that you've received an urgent care treatment only and that you may be released before all your medical problems are known or treated. You the patient will arrange for follow-up care as instructed.     Incision and Drainage Directions:   - If your abcess was drained today and packed, return to clinic to remove packing in 2 days. If packing falls out, soak wound in warm water with epsom salts and apply warm compresses to the affected area for 20 minutes, 3-5 times a day to promote drainage and decrease swelling.  Dilute 2 cups per gallon of water. If you cannot soak, use the shower head. Avoid picking or manipulating the wound.   - Clean the wound twice a day and  apply mupirocin ointment on it if prescribed.   - If you were prescribed antibiotics, please take them to completion.   - If you are female and on BCP use additional methods to prevent pregnancy while on antibiotics and for one cycle after.   - Please make sure to maintain good hygiene.   - If the abscess is in an area you shave, then change your razor and do not shave the area until the wound is healed.  - No bath soaking or swimming until wound has fully healed in 1-2 weeks  - Do not share towels or other personal items. Do not wear tight clothing  - Tylenol or ibuprofen can also be used as directed for pain unless you have an allergy to them or medical condition such as stomach ulcers, kidney or liver disease or blood thinners etc for which you should not be taking these type of medications.   - If you were given narcotics do not drive or operate heavy equipment or machinery while taking these medications.     Return in 48- 72 hours for recheck or visit your PCP as needed.     Please follow up with your primary care doctor or specialist as needed.  You should seek ER treatment if fever greater than 100.4F, increase pain, swelling or other signs of increasing infection such as redness extending further up your body.      Medical Decision Making:   History:   Old Medical Records: I decided to obtain old medical records.  Urgent Care Management:  AMBIKA CHAWLA reviewed and no inconsistencies found.  NORCO recently prescribed; this medication is intended to be used for pain over 5/10 on a pain scale of 0-10.    Risks of opiate medications discussed; patient expressed understanding.  PCP follow up recommended. RTC precautions advised. Antibiotics prescribed. Agreeable to plan.

## 2025-03-18 NOTE — PROCEDURES
Incision & Drainage    Date/Time: 3/17/2025 7:00 PM    Performed by: Kassie Encarnacion PA-C  Authorized by: Kassie Encarnacion PA-C    Consent Done?:  Yes (Verbal)    Type:  Abscess  Body area:  Trunk  Location details:  Right breast  Local anesthetic: Lidocaine 1% with epinephrine  Scalpel size:  11  Incision type:  Single straight  Complexity:  Simple  Drainage:  Bloody, pus and serosanguinous  Drainage amount:  Scant  Wound treatment:  Incision, drainage and wound left open  Packing material:  None

## 2025-03-18 NOTE — PATIENT INSTRUCTIONS
Abscess  You must understand that you've received an urgent care treatment only and that you may be released before all your medical problems are known or treated. You the patient will arrange for follow-up care as instructed.     Incision and Drainage Directions:   - If your abcess was drained today and packed, return to clinic to remove packing in 2 days. If packing falls out, soak wound in warm water with epsom salts and apply warm compresses to the affected area for 20 minutes, 3-5 times a day to promote drainage and decrease swelling.  Dilute 2 cups per gallon of water. If you cannot soak, use the shower head. Avoid picking or manipulating the wound.   - Clean the wound twice a day and apply mupirocin ointment on it if prescribed.   - If you were prescribed antibiotics, please take them to completion.   - If you are female and on BCP use additional methods to prevent pregnancy while on antibiotics and for one cycle after.   - Please make sure to maintain good hygiene.   - If the abscess is in an area you shave, then change your razor and do not shave the area until the wound is healed.  - No bath soaking or swimming until wound has fully healed in 1-2 weeks  - Do not share towels or other personal items. Do not wear tight clothing  - Tylenol or ibuprofen can also be used as directed for pain unless you have an allergy to them or medical condition such as stomach ulcers, kidney or liver disease or blood thinners etc for which you should not be taking these type of medications.   - If you were given narcotics do not drive or operate heavy equipment or machinery while taking these medications.     Return in 48- 72 hours for recheck or visit your PCP as needed.     Please follow up with your primary care doctor or specialist as needed.  You should seek ER treatment if fever greater than 100.4F, increase pain, swelling or other signs of increasing infection such as redness  extending further up your body.

## 2025-03-25 ENCOUNTER — LAB VISIT (OUTPATIENT)
Dept: LAB | Facility: HOSPITAL | Age: 45
End: 2025-03-25
Attending: CLINICAL NURSE SPECIALIST
Payer: MEDICARE

## 2025-03-25 ENCOUNTER — PATIENT MESSAGE (OUTPATIENT)
Dept: NEUROLOGY | Facility: CLINIC | Age: 45
End: 2025-03-25
Payer: MEDICARE

## 2025-03-25 DIAGNOSIS — G35 MULTIPLE SCLEROSIS: ICD-10-CM

## 2025-03-25 LAB
ABSOLUTE EOSINOPHIL (OHS): 0.05 K/UL
ABSOLUTE MONOCYTE (OHS): 0.38 K/UL (ref 0.3–1)
ABSOLUTE NEUTROPHIL COUNT (OHS): 1.46 K/UL (ref 1.8–7.7)
ALBUMIN SERPL BCP-MCNC: 3.4 G/DL (ref 3.5–5.2)
ALP SERPL-CCNC: 83 UNIT/L (ref 40–150)
ALT SERPL W/O P-5'-P-CCNC: 6 UNIT/L (ref 10–44)
ANION GAP (OHS): 9 MMOL/L (ref 8–16)
AST SERPL-CCNC: 14 UNIT/L (ref 11–45)
BASOPHILS # BLD AUTO: 0.03 K/UL
BASOPHILS NFR BLD AUTO: 0.7 %
BILIRUB SERPL-MCNC: 0.4 MG/DL (ref 0.1–1)
BUN SERPL-MCNC: 7 MG/DL (ref 6–20)
CALCIUM SERPL-MCNC: 9.1 MG/DL (ref 8.7–10.5)
CHLORIDE SERPL-SCNC: 107 MMOL/L (ref 95–110)
CO2 SERPL-SCNC: 24 MMOL/L (ref 23–29)
CREAT SERPL-MCNC: 0.6 MG/DL (ref 0.5–1.4)
ERYTHROCYTE [DISTWIDTH] IN BLOOD BY AUTOMATED COUNT: 16 % (ref 11.5–14.5)
GFR SERPLBLD CREATININE-BSD FMLA CKD-EPI: >60 ML/MIN/1.73/M2
GLUCOSE SERPL-MCNC: 78 MG/DL (ref 70–110)
HBV CORE AB SERPL QL IA: NORMAL
HBV SURFACE AG SERPL QL IA: NORMAL
HCT VFR BLD AUTO: 37.2 % (ref 37–48.5)
HGB BLD-MCNC: 11.3 GM/DL (ref 12–16)
IGA SERPL-MCNC: 206 MG/DL (ref 40–350)
IGG SERPL-MCNC: 1334 MG/DL (ref 650–1600)
IGM SERPL-MCNC: 52 MG/DL (ref 50–300)
IMM GRANULOCYTES # BLD AUTO: 0.01 K/UL (ref 0–0.04)
IMM GRANULOCYTES NFR BLD AUTO: 0.2 % (ref 0–0.5)
LYMPHOCYTES # BLD AUTO: 2.3 K/UL (ref 1–4.8)
MCH RBC QN AUTO: 21.9 PG (ref 27–50)
MCHC RBC AUTO-ENTMCNC: 30.4 G/DL (ref 32–36)
MCV RBC AUTO: 72 FL (ref 82–98)
NUCLEATED RBC (/100WBC) (OHS): 0 /100 WBC
PLATELET # BLD AUTO: 297 K/UL (ref 150–450)
PMV BLD AUTO: 10.7 FL (ref 9.2–12.9)
POTASSIUM SERPL-SCNC: 4.5 MMOL/L (ref 3.5–5.1)
PROT SERPL-MCNC: 6.5 GM/DL (ref 6–8.4)
RBC # BLD AUTO: 5.17 M/UL (ref 4–5.4)
RELATIVE EOSINOPHIL (OHS): 1.2 %
RELATIVE LYMPHOCYTE (OHS): 54.4 % (ref 18–48)
RELATIVE MONOCYTE (OHS): 9 % (ref 4–15)
RELATIVE NEUTROPHIL (OHS): 34.5 % (ref 38–73)
SODIUM SERPL-SCNC: 140 MMOL/L (ref 136–145)
WBC # BLD AUTO: 4.23 K/UL (ref 3.9–12.7)

## 2025-03-25 PROCEDURE — 87340 HEPATITIS B SURFACE AG IA: CPT

## 2025-03-25 PROCEDURE — 36415 COLL VENOUS BLD VENIPUNCTURE: CPT

## 2025-03-25 PROCEDURE — 80053 COMPREHEN METABOLIC PANEL: CPT

## 2025-03-25 PROCEDURE — 82784 ASSAY IGA/IGD/IGG/IGM EACH: CPT

## 2025-03-25 PROCEDURE — 85025 COMPLETE CBC W/AUTO DIFF WBC: CPT

## 2025-03-25 PROCEDURE — 86704 HEP B CORE ANTIBODY TOTAL: CPT

## 2025-03-26 ENCOUNTER — PATIENT MESSAGE (OUTPATIENT)
Dept: BARIATRICS | Facility: CLINIC | Age: 45
End: 2025-03-26
Payer: MEDICARE

## 2025-03-26 DIAGNOSIS — G35 MULTIPLE SCLEROSIS: Primary | ICD-10-CM

## 2025-03-26 DIAGNOSIS — R11.0 NAUSEA: ICD-10-CM

## 2025-03-26 RX ORDER — ONDANSETRON 4 MG/1
4 TABLET, FILM COATED ORAL EVERY 8 HOURS PRN
Qty: 30 TABLET | Refills: 0 | Status: SHIPPED | OUTPATIENT
Start: 2025-03-26

## 2025-03-26 RX ORDER — NYSTATIN 100000 U/G
CREAM TOPICAL 2 TIMES DAILY
Qty: 30 G | Refills: 1 | Status: ACTIVE | OUTPATIENT
Start: 2025-03-26

## 2025-03-26 RX ORDER — DOXEPIN HYDROCHLORIDE 75 MG/1
75 CAPSULE ORAL NIGHTLY
Qty: 30 CAPSULE | Refills: 5 | Status: SHIPPED | OUTPATIENT
Start: 2025-03-26

## 2025-03-26 NOTE — TELEPHONE ENCOUNTER
Care Due:                  Date            Visit Type   Department     Provider  --------------------------------------------------------------------------------                                ESTABLISHED                              PATIENT -    [facility] INTERNAL  Last Visit: 06-      [facility]       Braden Dumont  Next Visit: None Scheduled  None         None Found                                                            Last  Test          Frequency    Reason                     Performed    Due Date  --------------------------------------------------------------------------------    Office Visit  12 months..  [email or web address]..............  06- 06-    [facility] Embedded Care Due Messages. Reference number: [id number].   3/26/2025 1:13:22 PM CDT

## 2025-03-27 ENCOUNTER — OFFICE VISIT (OUTPATIENT)
Dept: NEUROLOGY | Facility: CLINIC | Age: 45
End: 2025-03-27
Payer: MEDICARE

## 2025-03-27 DIAGNOSIS — G35 MULTIPLE SCLEROSIS, RELAPSING-REMITTING: Primary | ICD-10-CM

## 2025-03-27 PROCEDURE — G2211 COMPLEX E/M VISIT ADD ON: HCPCS | Mod: 95,,, | Performed by: STUDENT IN AN ORGANIZED HEALTH CARE EDUCATION/TRAINING PROGRAM

## 2025-03-27 PROCEDURE — 98006 SYNCH AUDIO-VIDEO EST MOD 30: CPT | Mod: 95,,, | Performed by: STUDENT IN AN ORGANIZED HEALTH CARE EDUCATION/TRAINING PROGRAM

## 2025-03-27 PROCEDURE — 1160F RVW MEDS BY RX/DR IN RCRD: CPT | Mod: CPTII,95,, | Performed by: STUDENT IN AN ORGANIZED HEALTH CARE EDUCATION/TRAINING PROGRAM

## 2025-03-27 PROCEDURE — 4010F ACE/ARB THERAPY RXD/TAKEN: CPT | Mod: CPTII,95,, | Performed by: STUDENT IN AN ORGANIZED HEALTH CARE EDUCATION/TRAINING PROGRAM

## 2025-03-27 PROCEDURE — 1159F MED LIST DOCD IN RCRD: CPT | Mod: CPTII,95,, | Performed by: STUDENT IN AN ORGANIZED HEALTH CARE EDUCATION/TRAINING PROGRAM

## 2025-03-27 NOTE — PROGRESS NOTES
[facility]  Follow Up Patient Visit Virtual    The patient location is: home  Visit type: Virtual visit with synchronous audio and video    Each patient to whom he or she provides medical services by telemedicine is:  (1) informed of the relationship between the physician and patient and the respective role of any other health care provider with respect to management of the patient; and (2) notified that he or she may decline to receive medical services by telemedicine and may withdraw from such care at any time.      Disease Summary     NEURO MULTIPLE SCLEROISIS SUMMARY:   Principle neurological diagnosis:  MS  Date of Symptom Onset:  1/2015  Date of Diagnosis:  1/2015  Disease type at diagnosis:  Relapsing-Remitting MS  Disease type currently:  Relapsing-Remitting MS  Previous Therapy:  First DMT:  Dimethyl fumarate  Second DMT:  Glatiramer acetate  Third DMT:  Natalizumab  Fourth DMT:  Interferon beta-1a IM - flu-like reaction  Fifth DMT:  Terifluomide - worsening disease  Current Therapy:  Ocrelizumab - 12/2020 - present  Last MRI Brain:  2/25/2024 - stable  Last MRI C-Spine:  2/25/2024 - no lesions  Last MRI T-Spine:  2/6/2024 - no lesions  Lab Notes:  CSF IgG index 0.74    CSF:  Lab Results   Component Value Date    CSFWBC 3 01/16/2015    CSFRBC 1 (A) 01/16/2015    PROTEINCSF 39 01/16/2015    GLUCCSF 74 (H) 01/16/2015    CSFB 6 01/16/2015    SERB 0 01/16/2015     JCV:   Lab Results   Component Value Date    JCVINDEX 1.67 (A) 04/19/2018    JCVANTIBODY Positive (A) 04/19/2018     Vit D:   Lab Results   Component Value Date    GNKMQFUO18ZI 55 11/20/2024    KJNPFQVF57GY 60 07/15/2024    LXNYQQOC87CD 47 04/04/2024     sNfL:   Lab Results   Component Value Date    NEUROLGTCHN 12.3 03/13/2024    NEUROLGTCHN 8.3 07/12/2023       Interval history:     Doing well with therapist.  She's working, got her license.  She's working out - walking in her home, lost some weight.  She's had UTI 2x this year  but all short-lasting.    She's taking vit D3 weekly. Flexeril has not helped with muscle tightness. She's tried baclofen,robaxin, tizanidine but didn't find them helpful  She met with the pain  who offered RAMON for her back pain but she's hesitant to do.    ROS:     SOCIAL HISTORY  Living arrangements - the patient lives with their family.  Social History     Socioeconomic History    Marital status: Single    Number of children: 3   Occupational History     Employer: [employer]   Tobacco Use    Smoking status: Never     Passive exposure: Never    Smokeless tobacco: Never    Tobacco comments:     NA   Substance and Sexual Activity    Alcohol use: Never     Comment: once a year     Drug use: Never    Sexual activity: Yes     Partners: Male     Birth control/protection: Surgical     Social Drivers of Health     Financial Resource Strain: Low Risk  (2/3/2025)    Overall Financial Resource Strain (CARDIA)     Difficulty of Paying Living Expenses: Not hard at all   Food Insecurity: No Food Insecurity (2/3/2025)    Hunger Vital Sign     Worried About Running Out of Food in the Last Year: Never true     Ran Out of Food in the Last Year: Never true   Transportation Needs: No Transportation Needs (2/9/2024)    Received from [facility] Subsidiaries and Affiliates    PRAPARE - Transportation     Lack of Transportation (Medical): No     Lack of Transportation (Non-Medical): No   Recent Concern: Transportation Needs - Unmet Transportation Needs (11/11/2023)    PRAPARE - Transportation     Lack of Transportation (Medical): Yes     Lack of Transportation (Non-Medical): Yes   Physical Activity: Unknown (2/3/2025)    Exercise Vital Sign     Days of Exercise per Week: Patient declined     Minutes of Exercise per Session: 20 min   Stress: Stress Concern Present (2/3/2025)    [employer] - Occupational Stress Questionnaire     Feeling  of Stress : To some extent   Housing Stability: Unknown (2/3/2025)    Housing Stability Vital Sign     Unable to Pay for Housing in the Last Year: No       Patient Employment       Status   Disabled    Employer   N/A (OTHER)                   6/14/2023    10:36 PM   REVIEW OF SYMPTOMS   Do you feel abnormally tired on most days? Yes   Do you feel you generally sleep well? No   Do you have difficulty controlling your bladder?  No   Do you have difficulty controlling your bowels?  No   Do you have frequent muscle cramps, tightness or spasms in your limbs?  No   Do you have new visual symptoms?  Yes   Do you have worsening difficulty with your memory or thinking? No   Do you have worsening symptoms of anxiety or depression?  Yes   For patients who walk, Do you have more difficulty walking?  Yes   Have you fallen since your last visit?  Yes   For patients who use wheelchairs: Do you have any skin wounds or breakdown? No   Do you have difficulty using your hands?  Yes   Do you have shooting or burning pain? Yes   Do you have difficulty with sexual function?  Yes   If you are sexually active, are you using birth control? Y/N  N/A No   Do you often choke when swallowing liquids or solid food?  No   Do you experience worsening symptoms when overheated? No   Do you need any new equipment such as a wheelchair, walker or shower chair? No   Do you receive co-pay financial assistance for your principal MS medicine? No   Would you be interested in participating in an MS research trial in the future? No   For patients on Gilenya, Tecfidera, Aubagio, Rituxan, Ocrevus, Tysabri, Lemtrada or Methotrexate, are you aware that you should NOT receive live virus vaccines?  No   Do you feel you have adequate family/friend support?  No   Do you have health insurance?   No   Are you currently employed? No   Do you receive SSDI/SSI?  No   Do you use marijuana or cannabis products? No   Have you been diagnosed with a urinary tract infection  since your last visit here? No   Have you been diagnosed with a respiratory tract infection since your last visit here? No   Have you been to the emergency room since your last visit here? No   Have you been hospitalized since your last visit here?  No         6/14/2023    10:44 PM 3/31/2021    12:05 PM   FSS SCORE & INTERPRETATION   FSS SCORE  9 9   FSS SCORE INTERPRETATION May not be suffering from fatigue May not be suffering from fatigue         6/14/2023    10:42 PM 3/31/2021    12:04 PM   MS MOOKIE-D SCORE & INTERPRETATION   MOOKIE-D SCORE  12 24   MOOKIE-D INTERPRETATION  No indication of Depression Possibility of major Depression         6/14/2023    10:40 PM 8/29/2022     5:07 PM   MS JUAN DAVID-7 SCORE & INTERPRETATION   JUAN DAVID-7 SCORE  7 4   JUAN DAVID-7 SCORE INTERPRETATION Mild Anxiety Normal         6/14/2023    10:44 PM 3/31/2021    12:05 PM   PEQ MS MOS PAIN EFFECTS SCORE & INTERPRETATION   PES SCORE 6 12   PES SCORE INTERPRETATION Scores can range from 6-30.  Items are scaled so that higher scores indicate a greater impact of pain on a patients mood and behavior. Scores can range from 6-30.  Items are scaled so that higher scores indicate a greater impact of pain on a patients mood and behavior.         3/31/2021    12:08 PM 9/27/2019    10:32 AM   PEQ MS SEXUAL SATISFACTION SCORE & INTERPRETATION   SSS SCORE  16 7   SSS SCORE INTERPRETATION Scores can range from 4-24.  Higher scores indicate greater problems with sexual satisfaction. Scores can range from 4-24.  Higher scores indicate greater problems with sexual satisfaction.         6/14/2023    10:46 PM 3/31/2021    12:09 PM   MS BLADDER CONTROL SCORE & INTERPRETATION   BLCS SCORE 0 4   BLCS SCORE INTERPRETATION  Scores can range from 0-22, with higher scores indicating greater bladder control problems. Scores can range from 0-22, with higher scores indicating greater bladder control problems.         6/14/2023    10:49 PM 3/31/2021    12:15 PM   MS BOWEL CONTROL SCORE  & INTERPRETATION   BWCS SCORE 0 1   BWCS SCORE INTERPRETATION Scores can range from 0-26, with higher scores indicating greater bowel control problems. Scores can range from 0-26, with higher scores indicating greater bowel control problems.         6/14/2023    10:46 PM 3/31/2021    12:10 PM   PEQ MS IMPACT OF VISUAL IMPAIRMENT SCORE & INTERPRETATION   LILIANA SCALE SCORE  0 5   LILIANA SCORE INTERPRETATION Scores can range from 0-15, with higher scores indicating greater impact of visual problems on daily activites. Scores can range from 0-15, with higher scores indicating greater impact of visual problems on daily activites.         6/14/2023    10:48 PM 3/31/2021    12:14 PM   MS PDQ SCORE & INTERPRETATION   PDQ RETROSPECTIVE MEMORY SUBSCALE 0 5   PDQ ATTENTION/CONCENTRATION SUBSCALE 0 5   PDQ PROSPECTIVE MEMORY SUBSCALE 0 5   PDQ PLANNING/ORGANIZATION SUBSCALE 0 5   PDQ TOTAL SCORE 0 20   PDQ SCORE INTERPRETATION Scores can range from 0-80, with higher scores indicating greater perceived cognitive impairment. Scores can range from 0-80, with higher scores indicating greater perceived cognitive impairment.         6/14/2023    10:51 PM 3/31/2021    12:17 PM   MSSS SCORE & INTERPRETATION   MSSS TANGIBLE SUPPORT SUBSCALE 0 31.25   MSSS EMOTIONAL/INFORMATIONAL SUPPORT SUBSCALE 0 31.25   MSSS AFFECTIONATE SUPPORT SUBSCALE 0 58.33   MSSS POSITIVE SOCIAL INTERACTION SUBSCALE 0 25   MSSS TOTAL SCORE 0 36.46   MSSS SCORE INTERPRETATION Scores can range from 0-100, with higher scores indicating greater perceived support. Scores can range from 0-100, with higher scores indicating greater perceived support.       Exam:     Vitals unable to be obtained virtually         In general, the patient is well nourished and appears to be in no acute distress.          MENTAL STATUS: language is fluent, normal verbal comprehension, attention is normal, fund of knowlege is appropriate by vocabulary.           11/3/2020     3:40 PM 6/16/2023  "    9:20 AM 11/20/2024     1:00 PM   Timed 25 Foot Walk:   Did patient wear an AFO? No No No   Was assistive device used? Yes Yes Yes   Assistive device used (milton one): Unilateral Assistance Bilateral Assistance Bilateral Assistance   Unilateral device used Cane     Bilateral device used  Walker/Rollator Walker/Rollator   Time for 25 Foot Walk (seconds) 38.83 48.4 48.5   Time for 25 Foot Walk (seconds) 46.5 30.03 42.3       Imaging (personally reviewed):       Results for orders placed during the hospital encounter of 02/24/24    MRI Brain Demyelinating W W/O Contrast    Impression  Similar appearance of demyelinating plaques within both cerebral hemispheres, the brainstem and posterior fossa as compared to the prior MRI 08/05/2022.  No MR evidence new or active demyelination as compared to the prior.    No acute intracranial abnormality.      Electronically signed by: Rico Pelaez  Date:    02/25/2024  Time:    08:37    Results for orders placed during the hospital encounter of 02/24/24    MRI Cervical Spine Demyelinating W W/O Contrast    Impression  No significant cervical cord signal abnormality to suggest demyelinating sequelae.  No abnormal cervical cord enhancement.    Minor degenerative changes of the cervical spine without significant spinal canal stenosis or neural foraminal stenosis.      Electronically signed by: Rico Pelaez  Date:    02/25/2024  Time:    08:45    No results found for this or any previous visit.      Labs:     Lab Results   Component Value Date    AAJHUCFO26SW 55 11/20/2024    QVYJSNJW50KY 60 07/15/2024    TMISEZKR59OH 47 04/04/2024     Lab Results   Component Value Date    JCVINDEX 1.67 (A) 04/19/2018    JCVANTIBODY Positive (A) 04/19/2018     No results found for: "EI8TMRTH", "ABSOLUTECD3", "ZA7ZPBRU", "ABSOLUTECD8", "AJ8BFFBB", "ABSOLUTECD4", "LABCD48"  Lab Results   Component Value Date    WBC 4.23 03/25/2025    RBC 5.17 03/25/2025    HGB 11.3 (L) 03/25/2025    HCT 37.2 " 03/25/2025    MCV 72 (L) 03/25/2025    MCH 21.9 (L) 03/25/2025    MCHC 30.4 (L) 03/25/2025    RDW 16.0 (H) 03/25/2025     03/25/2025    MPV 10.7 03/25/2025    GRAN 1.8 11/20/2024    GRAN 44.1 11/20/2024    LYMPH 54.4 (H) 03/25/2025    LYMPH 2.30 03/25/2025    MONO 9.0 03/25/2025    MONO 0.38 03/25/2025    EOS 1.2 03/25/2025    EOS 0.05 03/25/2025    BASO 0.01 11/20/2024    EOSINOPHIL 0.5 11/20/2024    BASOPHIL 0.7 03/25/2025    BASOPHIL 0.03 03/25/2025     Sodium   Date Value Ref Range Status   03/25/2025 140 136 - 145 mmol/L Final   11/20/2024 140 136 - 145 mmol/L Final     Potassium   Date Value Ref Range Status   03/25/2025 4.5 3.5 - 5.1 mmol/L Final   11/20/2024 3.6 3.5 - 5.1 mmol/L Final     Chloride   Date Value Ref Range Status   03/25/2025 107 95 - 110 mmol/L Final   11/20/2024 106 95 - 110 mmol/L Final     CO2   Date Value Ref Range Status   03/25/2025 24 23 - 29 mmol/L Final   11/20/2024 28 23 - 29 mmol/L Final     Glucose   Date Value Ref Range Status   11/20/2024 81 70 - 110 mg/dL Final     BUN   Date Value Ref Range Status   03/25/2025 7 6 - 20 mg/dL Final     Creatinine   Date Value Ref Range Status   03/25/2025 0.6 0.5 - 1.4 mg/dL Final     Calcium   Date Value Ref Range Status   03/25/2025 9.1 8.7 - 10.5 mg/dL Final   11/20/2024 8.7 8.7 - 10.5 mg/dL Final     Total Protein   Date Value Ref Range Status   11/20/2024 6.4 6.0 - 8.4 g/dL Final     Albumin   Date Value Ref Range Status   03/25/2025 3.4 (L) 3.5 - 5.2 g/dL Final   11/20/2024 3.1 (L) 3.5 - 5.2 g/dL Final     Total Bilirubin   Date Value Ref Range Status   11/20/2024 0.3 0.1 - 1.0 mg/dL Final     Comment:     For infants and newborns, interpretation of results should be based  on gestational age, weight and in agreement with clinical  observations.    Premature Infant recommended reference ranges:  Up to 24 hours.............<8.0 mg/dL  Up to 48 hours............<12.0 mg/dL  3-5 days..................<15.0 mg/dL  6-29  days.................<15.0 mg/dL       Bilirubin Total   Date Value Ref Range Status   03/25/2025 0.4 0.1 - 1.0 mg/dL Final     Comment:     For infants and newborns, interpretation of results should be based   on gestational age, weight and in agreement with clinical   observations.    Premature Infant recommended reference ranges:   0-24 hours:  <8.0 mg/dL   24-48 hours: <12.0 mg/dL   3-5 days:    <15.0 mg/dL   6-29 days:   <15.0 mg/dL     Alkaline Phosphatase   Date Value Ref Range Status   11/20/2024 93 40 - 150 U/L Final     ALP   Date Value Ref Range Status   03/25/2025 83 40 - 150 unit/L Final     AST   Date Value Ref Range Status   03/25/2025 14 11 - 45 unit/L Final   11/20/2024 16 10 - 40 U/L Final     ALT   Date Value Ref Range Status   03/25/2025 6 (L) 10 - 44 unit/L Final   11/20/2024 9 (L) 10 - 44 U/L Final     Anion Gap   Date Value Ref Range Status   03/25/2025 9 8 - 16 mmol/L Final     eGFR if    Date Value Ref Range Status   05/16/2022 >60 >60 mL/min/1.73 m^2 Final     eGFR    Date Value Ref Range Status   02/08/2024 109 mL/min/1.73mSq Final     Comment:     In accordance with NKF-ASN Task Force recommendation, calculation based on the Chronic Kidney Disease Epidemiology Collaboration (CKD-EPI) equation without adjustment for race. eGFR adjusted for gender and age and calculated in ml/min/1.73mSquared. eGFR cannot be calculated if patient is under 18 years of age.     Reference Range:   >= 60 ml/min/1.73mSquared.     eGFR if non    Date Value Ref Range Status   05/16/2022 >60 >60 mL/min/1.73 m^2 Final     Comment:     Calculation used to obtain the estimated glomerular filtration  rate (eGFR) is the CKD-EPI equation.                   Diagnosis/Assessment/Plan:           NEURO MULTIPLE SCLEROSIS IMPRESSION:   Number of relapses in the past year?:  0  Clinical Progression:  Clinically Stable  MRI Progression:  Stable  MS Classification:   Relapsing-Remitting MS  Current DMT: ocrelizumab  DMT:  No change in management  Symptom Management:  Implement change in symptom management  Implement Change in Symptom Management:  Spasticity  Additional Impressions:   Multiple Sclerosis  -Assessment:RRMS stable on Ocrevus  -Imaging: Repeat annual (Feb 2026)  -Disease Modifying Therapies:  Continue Ocrevus, safety labs reviewed and new set ordered    Symptom management   -Discussed increasing her magnesium glycinate to 400mg - 800mg at bedtime, since she has tried several muscle relaxers but either had side effects or they were ineffective   -Encouraged patient to consider RAMON    -Continue weekly vit D  Plan discussed and questions were answered to satisfaction.  Return to clinic in 6 months.              Candice Garrett MD, MSc  Attending neurologist

## 2025-03-29 ENCOUNTER — RESULTS FOLLOW-UP (OUTPATIENT)
Dept: NEUROLOGY | Facility: CLINIC | Age: 45
End: 2025-03-29

## 2025-03-30 ENCOUNTER — PATIENT MESSAGE (OUTPATIENT)
Dept: NEUROLOGY | Facility: CLINIC | Age: 45
End: 2025-03-30
Payer: MEDICARE

## 2025-04-01 ENCOUNTER — LAB VISIT (OUTPATIENT)
Dept: LAB | Facility: HOSPITAL | Age: 45
End: 2025-04-01
Attending: CLINICAL NURSE SPECIALIST
Payer: MEDICARE

## 2025-04-01 DIAGNOSIS — G35 MULTIPLE SCLEROSIS: ICD-10-CM

## 2025-04-01 LAB
ABSOLUTE EOSINOPHIL (OHS): 0.06 K/UL
ABSOLUTE MONOCYTE (OHS): 0.29 K/UL (ref 0.3–1)
ABSOLUTE NEUTROPHIL COUNT (OHS): 1.13 K/UL (ref 1.8–7.7)
BASOPHILS # BLD AUTO: 0.03 K/UL
BASOPHILS NFR BLD AUTO: 0.8 %
ERYTHROCYTE [DISTWIDTH] IN BLOOD BY AUTOMATED COUNT: 15.9 % (ref 11.5–14.5)
HCT VFR BLD AUTO: 35.7 % (ref 37–48.5)
HGB BLD-MCNC: 11.1 GM/DL (ref 12–16)
IMM GRANULOCYTES # BLD AUTO: 0.01 K/UL (ref 0–0.04)
IMM GRANULOCYTES NFR BLD AUTO: 0.3 % (ref 0–0.5)
LYMPHOCYTES # BLD AUTO: 2.23 K/UL (ref 1–4.8)
MCH RBC QN AUTO: 22 PG (ref 27–31)
MCHC RBC AUTO-ENTMCNC: 31.1 G/DL (ref 32–36)
MCV RBC AUTO: 71 FL (ref 82–98)
NUCLEATED RBC (/100WBC) (OHS): 0 /100 WBC
PLATELET # BLD AUTO: 299 K/UL (ref 150–450)
PMV BLD AUTO: 11.1 FL (ref 9.2–12.9)
RBC # BLD AUTO: 5.05 M/UL (ref 4–5.4)
RELATIVE EOSINOPHIL (OHS): 1.6 %
RELATIVE LYMPHOCYTE (OHS): 59.5 % (ref 18–48)
RELATIVE MONOCYTE (OHS): 7.7 % (ref 4–15)
RELATIVE NEUTROPHIL (OHS): 30.1 % (ref 38–73)
WBC # BLD AUTO: 3.75 K/UL (ref 3.9–12.7)

## 2025-04-01 PROCEDURE — 36415 COLL VENOUS BLD VENIPUNCTURE: CPT

## 2025-04-01 PROCEDURE — 85025 COMPLETE CBC W/AUTO DIFF WBC: CPT

## 2025-04-02 ENCOUNTER — PATIENT MESSAGE (OUTPATIENT)
Dept: NEUROLOGY | Facility: CLINIC | Age: 45
End: 2025-04-02
Payer: MEDICARE

## 2025-04-02 ENCOUNTER — TELEPHONE (OUTPATIENT)
Dept: NEUROLOGY | Facility: CLINIC | Age: 45
End: 2025-04-02
Payer: MEDICARE

## 2025-04-02 ENCOUNTER — RESULTS FOLLOW-UP (OUTPATIENT)
Dept: NEUROLOGY | Facility: CLINIC | Age: 45
End: 2025-04-02
Payer: MEDICARE

## 2025-04-02 DIAGNOSIS — G35 MULTIPLE SCLEROSIS: Primary | ICD-10-CM

## 2025-04-08 ENCOUNTER — LAB VISIT (OUTPATIENT)
Dept: LAB | Facility: HOSPITAL | Age: 45
End: 2025-04-08
Attending: CLINICAL NURSE SPECIALIST
Payer: MEDICARE

## 2025-04-08 ENCOUNTER — PATIENT MESSAGE (OUTPATIENT)
Dept: BARIATRICS | Facility: CLINIC | Age: 45
End: 2025-04-08
Payer: MEDICARE

## 2025-04-08 DIAGNOSIS — G35 MULTIPLE SCLEROSIS: ICD-10-CM

## 2025-04-08 LAB
ABSOLUTE EOSINOPHIL (OHS): 0.08 K/UL
ABSOLUTE MONOCYTE (OHS): 0.48 K/UL (ref 0.3–1)
ABSOLUTE NEUTROPHIL COUNT (OHS): 1.58 K/UL (ref 1.8–7.7)
BASOPHILS # BLD AUTO: 0.03 K/UL
BASOPHILS NFR BLD AUTO: 0.5 %
ERYTHROCYTE [DISTWIDTH] IN BLOOD BY AUTOMATED COUNT: 16.6 % (ref 11.5–14.5)
HCT VFR BLD AUTO: 39 % (ref 37–48.5)
HGB BLD-MCNC: 11.3 GM/DL (ref 12–16)
IMM GRANULOCYTES # BLD AUTO: 0.01 K/UL (ref 0–0.04)
IMM GRANULOCYTES NFR BLD AUTO: 0.2 % (ref 0–0.5)
LYMPHOCYTES # BLD AUTO: 3.4 K/UL (ref 1–4.8)
MCH RBC QN AUTO: 21.3 PG (ref 27–31)
MCHC RBC AUTO-ENTMCNC: 29 G/DL (ref 32–36)
MCV RBC AUTO: 74 FL (ref 82–98)
NUCLEATED RBC (/100WBC) (OHS): 0 /100 WBC
PLATELET # BLD AUTO: 257 K/UL (ref 150–450)
PMV BLD AUTO: 10.5 FL (ref 9.2–12.9)
RBC # BLD AUTO: 5.3 M/UL (ref 4–5.4)
RELATIVE EOSINOPHIL (OHS): 1.4 %
RELATIVE LYMPHOCYTE (OHS): 60.9 % (ref 18–48)
RELATIVE MONOCYTE (OHS): 8.6 % (ref 4–15)
RELATIVE NEUTROPHIL (OHS): 28.4 % (ref 38–73)
WBC # BLD AUTO: 5.58 K/UL (ref 3.9–12.7)

## 2025-04-08 PROCEDURE — 36415 COLL VENOUS BLD VENIPUNCTURE: CPT

## 2025-04-08 PROCEDURE — 85025 COMPLETE CBC W/AUTO DIFF WBC: CPT

## 2025-04-09 ENCOUNTER — PATIENT MESSAGE (OUTPATIENT)
Dept: NEUROLOGY | Facility: CLINIC | Age: 45
End: 2025-04-09
Payer: MEDICARE

## 2025-04-09 DIAGNOSIS — G35 MULTIPLE SCLEROSIS: Primary | ICD-10-CM

## 2025-04-12 ENCOUNTER — RESULTS FOLLOW-UP (OUTPATIENT)
Dept: NEUROLOGY | Facility: CLINIC | Age: 45
End: 2025-04-12

## 2025-04-15 ENCOUNTER — PATIENT MESSAGE (OUTPATIENT)
Dept: ADMINISTRATIVE | Facility: OTHER | Age: 45
End: 2025-04-15
Payer: MEDICARE

## 2025-04-15 ENCOUNTER — LAB VISIT (OUTPATIENT)
Dept: LAB | Facility: HOSPITAL | Age: 45
End: 2025-04-15
Attending: CLINICAL NURSE SPECIALIST
Payer: MEDICARE

## 2025-04-15 DIAGNOSIS — G35 MULTIPLE SCLEROSIS: ICD-10-CM

## 2025-04-15 LAB
ABSOLUTE EOSINOPHIL (OHS): 0.07 K/UL
ABSOLUTE MONOCYTE (OHS): 0.4 K/UL (ref 0.3–1)
ABSOLUTE NEUTROPHIL COUNT (OHS): 0.94 K/UL (ref 1.8–7.7)
BASOPHILS # BLD AUTO: 0.02 K/UL
BASOPHILS NFR BLD AUTO: 0.5 %
BURR CELLS BLD QL SMEAR: NORMAL
ERYTHROCYTE [DISTWIDTH] IN BLOOD BY AUTOMATED COUNT: 15.9 % (ref 11.5–14.5)
HCT VFR BLD AUTO: 36.5 % (ref 37–48.5)
HGB BLD-MCNC: 11.1 GM/DL (ref 12–16)
HYPOCHROMIA BLD QL SMEAR: NORMAL
IMM GRANULOCYTES # BLD AUTO: 0.01 K/UL (ref 0–0.04)
IMM GRANULOCYTES NFR BLD AUTO: 0.2 % (ref 0–0.5)
LYMPHOCYTES # BLD AUTO: 2.81 K/UL (ref 1–4.8)
MCH RBC QN AUTO: 21.8 PG (ref 27–31)
MCHC RBC AUTO-ENTMCNC: 30.4 G/DL (ref 32–36)
MCV RBC AUTO: 72 FL (ref 82–98)
NUCLEATED RBC (/100WBC) (OHS): 0 /100 WBC
OVALOCYTES BLD QL SMEAR: NORMAL
PLATELET # BLD AUTO: 248 K/UL (ref 150–450)
PMV BLD AUTO: 11.3 FL (ref 9.2–12.9)
POLYCHROMASIA BLD QL SMEAR: NORMAL
RBC # BLD AUTO: 5.1 M/UL (ref 4–5.4)
RELATIVE EOSINOPHIL (OHS): 1.6 %
RELATIVE LYMPHOCYTE (OHS): 66.1 % (ref 18–48)
RELATIVE MONOCYTE (OHS): 9.4 % (ref 4–15)
RELATIVE NEUTROPHIL (OHS): 22.2 % (ref 38–73)
SCHISTOCYTES BLD QL SMEAR: PRESENT
TARGETS BLD QL SMEAR: NORMAL
WBC # BLD AUTO: 4.25 K/UL (ref 3.9–12.7)
WBC TOXIC VACUOLES BLD QL SMEAR: PRESENT

## 2025-04-15 PROCEDURE — 85025 COMPLETE CBC W/AUTO DIFF WBC: CPT

## 2025-04-15 PROCEDURE — 36415 COLL VENOUS BLD VENIPUNCTURE: CPT

## 2025-04-16 ENCOUNTER — PATIENT MESSAGE (OUTPATIENT)
Dept: BARIATRICS | Facility: CLINIC | Age: 45
End: 2025-04-16
Payer: MEDICARE

## 2025-04-16 DIAGNOSIS — R79.9 ABNORMAL FINDING OF BLOOD CHEMISTRY, UNSPECIFIED: ICD-10-CM

## 2025-04-16 DIAGNOSIS — Z79.899 POLYPHARMACY: ICD-10-CM

## 2025-04-16 DIAGNOSIS — Z79.899 LONG-TERM USE OF HIGH-RISK MEDICATION: Primary | ICD-10-CM

## 2025-04-16 NOTE — TELEPHONE ENCOUNTER
Called and spoke with the pt. Her last bm was 4/8/25.  Reviewed Dr. Jc's constipation regimen and advised as a last resort, she can administer up to 2 fleets enemas for up to 2 days.  Will send in a portal message.  Pt is due for 1 year post surgery appts.  Pt declined to see Rd at this time.  She requested a virtual with MAYA Patel NP.  She will complete her 1 year annual labs at her infusion on 4/23/25.  Informed her that the lab orders are placed but I was unable to see the infusion appt.

## 2025-04-17 ENCOUNTER — PATIENT MESSAGE (OUTPATIENT)
Dept: NEUROLOGY | Facility: CLINIC | Age: 45
End: 2025-04-17
Payer: MEDICARE

## 2025-04-17 ENCOUNTER — PATIENT MESSAGE (OUTPATIENT)
Dept: BARIATRICS | Facility: CLINIC | Age: 45
End: 2025-04-17
Payer: MEDICARE

## 2025-04-18 ENCOUNTER — RESULTS FOLLOW-UP (OUTPATIENT)
Dept: NEUROLOGY | Facility: CLINIC | Age: 45
End: 2025-04-18

## 2025-04-18 DIAGNOSIS — D70.9 NEUTROPENIA, UNSPECIFIED TYPE: Primary | ICD-10-CM

## 2025-04-21 ENCOUNTER — OFFICE VISIT (OUTPATIENT)
Dept: BARIATRICS | Facility: CLINIC | Age: 45
End: 2025-04-21
Payer: MEDICARE

## 2025-04-21 ENCOUNTER — LAB VISIT (OUTPATIENT)
Dept: LAB | Facility: HOSPITAL | Age: 45
End: 2025-04-21
Attending: NURSE PRACTITIONER
Payer: MEDICARE

## 2025-04-21 ENCOUNTER — TELEPHONE (OUTPATIENT)
Dept: NEUROLOGY | Facility: CLINIC | Age: 45
End: 2025-04-21
Payer: MEDICARE

## 2025-04-21 VITALS — BODY MASS INDEX: 31.96 KG/M2 | WEIGHT: 198 LBS

## 2025-04-21 DIAGNOSIS — E66.01 MORBID OBESITY WITH BMI OF 45.0-49.9, ADULT: ICD-10-CM

## 2025-04-21 DIAGNOSIS — R79.9 ABNORMAL FINDING OF BLOOD CHEMISTRY, UNSPECIFIED: ICD-10-CM

## 2025-04-21 DIAGNOSIS — D70.9 NEUTROPENIA, UNSPECIFIED TYPE: ICD-10-CM

## 2025-04-21 DIAGNOSIS — Z79.899 POLYPHARMACY: ICD-10-CM

## 2025-04-21 DIAGNOSIS — Z79.899 LONG-TERM USE OF HIGH-RISK MEDICATION: ICD-10-CM

## 2025-04-21 DIAGNOSIS — Z98.84 S/P GASTRIC BYPASS: ICD-10-CM

## 2025-04-21 PROCEDURE — 1160F RVW MEDS BY RX/DR IN RCRD: CPT | Mod: CPTII,95,, | Performed by: NURSE PRACTITIONER

## 2025-04-21 PROCEDURE — 82306 VITAMIN D 25 HYDROXY: CPT

## 2025-04-21 PROCEDURE — 85025 COMPLETE CBC W/AUTO DIFF WBC: CPT

## 2025-04-21 PROCEDURE — 84425 ASSAY OF VITAMIN B-1: CPT

## 2025-04-21 PROCEDURE — 98005 SYNCH AUDIO-VIDEO EST LOW 20: CPT | Mod: 95,,, | Performed by: NURSE PRACTITIONER

## 2025-04-21 PROCEDURE — 4010F ACE/ARB THERAPY RXD/TAKEN: CPT | Mod: CPTII,95,, | Performed by: NURSE PRACTITIONER

## 2025-04-21 PROCEDURE — 1159F MED LIST DOCD IN RCRD: CPT | Mod: CPTII,95,, | Performed by: NURSE PRACTITIONER

## 2025-04-21 PROCEDURE — 80061 LIPID PANEL: CPT

## 2025-04-21 PROCEDURE — 36415 COLL VENOUS BLD VENIPUNCTURE: CPT | Mod: PO

## 2025-04-21 PROCEDURE — 82607 VITAMIN B-12: CPT

## 2025-04-21 PROCEDURE — 80053 COMPREHEN METABOLIC PANEL: CPT

## 2025-04-21 PROCEDURE — 83540 ASSAY OF IRON: CPT

## 2025-04-21 RX ORDER — NYSTATIN 100000 U/G
CREAM TOPICAL 2 TIMES DAILY
Qty: 30 G | Refills: 3 | Status: SHIPPED | OUTPATIENT
Start: 2025-04-21

## 2025-04-21 RX ORDER — OMEPRAZOLE 40 MG/1
40 CAPSULE, DELAYED RELEASE ORAL EVERY MORNING
Qty: 90 CAPSULE | Refills: 3 | Status: SHIPPED | OUTPATIENT
Start: 2025-04-21

## 2025-04-21 NOTE — PROGRESS NOTES
The patient location is: [place]  The chief complaint leading to consultation is: 12 months post op     Visit type: audiovisual    Face to Face time with patient: 15  20 minutes of total time spent on the encounter, which includes face to face time and non-face to face time preparing to see the patient (eg, review of tests), Obtaining and/or reviewing separately obtained history, Documenting clinical information in the electronic or other health record, Independently interpreting results (not separately reported) and communicating results to the patient/family/caregiver, or Care coordination (not separately reported).       Each patient to whom he or she provides medical services by telemedicine is:  (1) informed of the relationship between the physician and patient and the respective role of any other health care provider with respect to management of the patient; and (2) notified that he or she may decline to receive medical services by telemedicine and may withdraw from such care at any time.    Notes:     BARIATRIC POST-OPERATIVE VISIT:    HPI:  Alicia Soliz is a 45 y.o. year old female presents for 12 month post op visit following sleeve to LRNY .  she is doing well and tolerating the diet without difficulty.  she has no complaints.    Denies: nausea, vomiting, abdominal pain, changes in bowel movement pattern, fever, chills, dysphagia, chest pain, and shortness of breath.    Pt continues with c/o skin irration to excess abdomen and bilateral arms. Rx sent in     Pt has c/o foaming still intermittently.     Walking improved     Review of Systems   Constitutional:  Negative for activity change and fatigue.   Respiratory:  Negative for cough and shortness of breath.    Cardiovascular:  Negative for chest pain, palpitations and leg swelling.   Gastrointestinal:  Negative for abdominal pain, nausea and vomiting.   Endocrine: Negative for polydipsia, polyphagia and polyuria.   Genitourinary:  Negative for dysuria.    Musculoskeletal:  Negative for gait problem.   Skin:  Negative for rash.   Allergic/Immunologic: Negative for immunocompromised state.   Neurological:  Negative for dizziness, syncope and weakness.   Hematological:  Does not bruise/bleed easily.   Psychiatric/Behavioral:  Negative for behavioral problems.        EXERCISE & VITAMINS:  See Bariatric Assessment    MEDICATIONS/ALLERGIES:  Have been reviewed.    DIET: Regular Bariatric Diet. ~60 grams protein.  64+ fl oz SF clear beverage.      Only eating once a day   Lunchable     See Dietician note from today for a more detailed assessment.      Physical Exam  Vitals and nursing note reviewed.   Constitutional:       Appearance: She is well-developed. She is morbidly obese.   HENT:      Head: Normocephalic.      Nose: Nose normal.      Mouth/Throat:      Mouth: Mucous membranes are moist.   Eyes:      Extraocular Movements: Extraocular movements intact.   Cardiovascular:      Rate and Rhythm: Normal rate and regular rhythm.      Heart sounds: Normal heart sounds.   Pulmonary:      Effort: Pulmonary effort is normal.      Breath sounds: Normal breath sounds.   Abdominal:      General: Bowel sounds are normal.      Palpations: Abdomen is soft.   Musculoskeletal:         General: Normal range of motion.      Cervical back: Normal range of motion.   Skin:     General: Skin is warm and dry.      Capillary Refill: Capillary refill takes less than 2 seconds.   Neurological:      Mental Status: She is alert and oriented to person, place, and time.   Psychiatric:         Mood and Affect: Mood normal.       ASSESSMENT:  - Morbid obesity sleeve to  laparoscopic Jovita-en-Y on 5/20/24.  - Co-morbidities: depression, dyslipidemia, GERD, hypertension, fatty liver and stroke  -  Weight loss, 81#'s and 60% EWL  - Exercise routine therapy x1 week and ADLS  - Good Diet   - Good Vitamin regimen    PLAN:  - Miralax daily for constipation prn   - Emphasized the importance of regular  exercise and adherence to bariatric diet to achieve maximum weight loss.  - Encouraged patient to start regular exercise.  - Follow-up with dietician to advance diet.  - Continue daily vitamins and medications.  - RTC in 12 months or sooner if needed.  - Call the  office for any issues.  - Check labs today.

## 2025-04-21 NOTE — PATIENT INSTRUCTIONS
Meal Ideas for Regular Bariatric Diet  *Recipes and products available at [email or web address].com      Breakfast: (15-20g protein)    - Egg white omelet: 2 egg whites or ½ cup Egg Beaters. (Optional proteins: cheese, shrimp, black beans, chicken, sliced turkey) (Optional veggies: tomatoes, salsa, spinach, mushrooms, onions, green peppers, or small slice avocado)     - Egg and sausage: 1 egg or ¼ cup Egg Beaters (any variety), with 1 bibiana or 2 links of Turkey sausage or Veggie breakfast sausage ([employer] or Discera)    - Crust-less breakfast quiche: To make a glass pie dish, mix 4oz part skim Ricotta, 1 cup skim milk, and 2 eggs as your base. Add protein: shredded cheese, sliced lean ham or turkey, turkey august/sausage. Add veggies: tomato, onion, green onion, mushroom, green pepper, spinach, etc.    - Yogurt parfait: Mix 1 - 6oz container Dannon Light N Fit vanilla yogurt, with ¼ cup crushed unsalted nuts    - Cottage cheese and fruit: ½ cup part-skim cottage cheese or ricotta cheese topped with fresh fruit or sugar free preserves     - Giselle Pappas's Vanilla Egg custard* (add 2 Tbsp instant coffee granules to make Cappuccino Custard*)    - Hi-Protein café latte (skim milk, decaf coffee, 1 scoop protein powder). Optional to add Sugar free syrup or extract flavoring.    - Breakfast Lox: spread fat free cream cheese on slices of smoked salmon. Serve over scrambled or egg over easy (sauteed with nonstick cookspray) OR on a cucumber slice    - Eggwhich: Scramble or cook 1 large egg over easy using nonstick cookspray. Place between 2 slices of Mauritian august and low fat cheese.     Lunch: (20-30g protein)    - ½ cup Black bean soup (Homemade or Progresso), with ¼ cup shredded low-fat cheese. Top with chopped tomato or fresh salsa.     - Lean deli turkey breast and low-fat sliced cheese, mustard or light mendoza to moisten, rolled up together, or wrapped in a Joni lettuce leaf    - Chicken salad made from dinner  leftovers, moisten with low-fat salad dressing or light mendoza. Also try leftover salmon, shrimp, tuna or boiled eggs. Serve ½ cup over dark green salad    - Fat-free canned refried beans, topped with ¼ cup shredded low-fat cheese. Top with chopped tomato or fresh salsa.     - Greek salad: Top mixed greens with 1-2oz grilled chicken, tomatoes, red onions, 2-3 kalamata olives, and sprinkle lightly with feta cheese. Spritz with Balsamic vinegar to taste.     - Crust-less lunch quiche: To make a glass pie dish, mix 4oz part skim Ricotta, 1 cup skim milk, and 2 eggs as your base. Add protein: shredded cheese, sliced lean ham or turkey, shrimp, chicken. Add veggies: tomato, onion, green onion, mushroom, green pepper, spinach, artichoke, broccoli, etc.    - Pizza bake: spread a  scott fortino mushroom with tomato sauce, low-fat shredded mozzarella and turkey pepperoni or [place] august. Add any veggies. Roast for 10-15 minutes, until cheese melted.     - Cucumber crab bites: Spread ¼ cup crab dip (lump crabmeat + light cream cheese and green onions) over sliced cucumber.     - Chicken with light spinach and artichoke dip*: Puree in : 6oz cooked and drained spinach, 2 cloves garlic, 1 can cannelloni beans, ½ cup chopped green onions, 1 can drained artichoke hearts (not marinated in oil), lemon juice and basil. Mix in 2oz chopped up chicken.    Supper: (20-30g protein)    - Serve grilled fish over dark green salad tossed with low-fat dressing, served with grilled asparagus rodriges     - Rotisserie chicken salad: served with sliced strawberries, walnuts, fat-free feta cheese crumbles and 1 tbsp Holcombs Own Light Raspberry [place] Vinaigrette    - Shrimp cocktail: Dip cold boiled shrimp in homemade low-sugar cocktail sauce (1/2 cup Jasmin One Carb ketchup, 2 tbsp horseradish, 1/4 tsp hot sauce, 1 tsp Worcestershire sauce, 1 tbsp freshly-squeezed lemon juice). Serve with dark green salad, walnuts, and crumbled blue  cheese drizzled with olive oil and Balsamic vinegar    - Tuna Melt: Spread tuna salad onto 2 thick slices of tomato. Top with low-fat cheese and broil until cheese is melted. May also be made with chicken salad of shrimp salad. Briar with different types of cheeses.    - Chicken or beef fajitas (no tortilla, rice, beans, chips). Top meat and veggies w/ fresh salsa, fat free sour cream.     - Homemade low-fat Chili using extra lean ground beef or ground turkey. Top with shredded cheese and salsa as desired. May add dollop fat-free sour cream if desired    - Chicken parmesan: Top chicken breast w/ low sugar marinara sauce, mozzarella cheese and bake until chicken reaches 165*.  Serve w/ spaghetti SQUASH or Montserratian cut green beans    - Dinner Omelet with shrimp or chicken and onion, green peppers and chives.    - No noodle lasagna: Use sliced zucchini or eggplant in place of noodles.  Layer with part skim ricotta cheese and low sugar meat sauce (use very lean ground beef or ground turkey).    - Mexican chicken bake: Bake chunks of chicken breast or thigh with taco seasoning, Pace brand enchilada sauce, green onions and low-fat cheese. Serve with ¼ cup black beans or fat free refried beans topped with chopped tomatoes or salsa.    - Drew frozen meatballs, simmered in Classico Marinara sauce. Different flavors of salsa or spaghetti sauce create different dishes! Sprinkle with parmesan cheese. Serve with grilled or steamed veggies, or a dark green salad.    - Simmer boneless skinless chicken thigh chunks in Classico Marinara sauce or roasted salsa until tender with chopped onion, bell pepper, garlic, mushrooms, spinach, etc.     - Hamburger or veggie burger, without the bun, dressed the way you like. Served with grilled or steamed veggies.    - Eggplant parmesan: Bake slices of eggplant at 350 degrees for 15 minutes. Layer tomato sauce, sliced eggplant and low-fat mozzarella cheese in a baking dish and cover with  foil. Bake 30-40 more minutes or until bubbly. Uncover and bake at 400 degrees for about 15 more minutes, or until top is slightly crisp.    - Fish tacos: grilled/baked white fish, wrapped in Joni lettuce leaf, topped with salsa, shredded low-fat cheese, and light coleslaw.    - Chicken vance: Sprinkle chicken w/ 1 tsp of hidden valley ranch dip mix. Then grill chicken and top with black beans, salsa and 1 tsp fat free sour cream.     - Cauliflower pizza crust: Use cauliflower as crust (see recipe on pinterest, no flour!). Top w/ low fat cheese, turkey pepperoni and veggies and bake again    - chicken or turkey crust pizza: use ground chicken or turkey instead of cauliflower, spread in [place] and bake at 350 for about 20-30 minutes(may want to add garlic, black pepper, oregano and other herbs to ground meat mixture).  Remove and top w/ low fat cheese, turkey pepperoni and veggies and bake again for another 10 minutes or until cheese is browned.     Snacks: (100-200 calories; >5g protein)    - 1 low-fat cheese stick with 8 cherry tomatoes or 1 serving fresh fruit  - 4 thin slices fat-free turkey breast and 1 slice low-fat cheese  - 4 thin slices fat-free honey ham with wedge of melon  - 6-8 edamame pods (equivalent to about 1/4 cup edamame without pods).   - 1/4 cup unsalted nuts with ½ cup fruit  - 6-oz container Dannon Light n Fit vanilla yogurt, topped with 1oz unsalted nuts         - apple, celery or baby carrots spread with 2 Tbsp PB2  - apple slices with 1 oz slice low-fat cheese  - Apple slices dipped in 2 Tbsp of PB2  - celery, cucumber, bell pepper or baby carrots dipped in ¼ cup hummus bean spread or light spinach and artichoke dip (*recipe in lunch section)  - celery, cucumber, baby carrots dipped in high protein greek yogurt (Mix 16 oz plain greek yogurt + 1 packet of hidden valley ranch dip mix)  - Fletcher Links Beef Steak - 14g protein! (similar to beef jerky)  - 2 wedges Laughing Cow - Light Herb  & Garlic Cheese with sliced cucumber or green bell pepper  - 1/2 cup low-fat cottage cheese with ¼ cup fruit or ¼ cup salsa  - RTD Protein drinks: Atkins, Low Carb Slim Fast, EAS light, Muscle Milk Light, etc.  - Homemade Protein drinks: GNC Soy95, Isopure, Nectar, UNJURY, Whey Gourmet, etc. Mix 1 scoop powder with 8oz skim/1% milk or light soymilk.  - Protein bars: Atkins, EAS, Pure Protein, Think Thin, Detour, etc. Must have 0-4 grams sugar - Read the label.    Takeout Options: No more than twice/week  Deli - Salads (no pasta or rice), meats, cheeses. Roasted chicken. Lox (salmon)    Mexican - Platters which don't include tortillas, chips, or rice. Go easy on the beans. Example: Fajitas without the tortillas. Ask the  not to bring chips to the table if they are too tempting.    Greek - Meat or fish and vegetable, but no bread or rice. Including hummus, baba ganoush, etc, is OK. Most sit-down Greek restaurants can provide you with cucumber slices for dipping instead of radha bread.    Fast Food (Avoid as much as possible) - Salads (no croutons and limit salad dressing to 2 tbsp), grilled chicken sandwich without the bun and ask for no mendoza. Jasmins low fat chili or Taco Bell pintos and cheese.    BBQ - The meats are fine if you ask for sauces on the side, but most of the traditional side dishes are loaded with carbs. Davon slaw, baked beans and BBQ sauce are typically made with sugar.    Chinese - Nothing deep-fried, no rice or noodles. Many Chinese sauces have starch and sugar in them, so you'll have to use your judgement. If you find that these sauces trigger cravings, or cause Dumping, you can ask for the sauce to be made without sugar or just use soy sauce.

## 2025-04-22 LAB
25(OH)D3+25(OH)D2 SERPL-MCNC: 60 NG/ML (ref 30–96)
ABSOLUTE EOSINOPHIL (OHS): 0.02 K/UL
ABSOLUTE MONOCYTE (OHS): 0.33 K/UL (ref 0.3–1)
ABSOLUTE NEUTROPHIL COUNT (OHS): 2.8 K/UL (ref 1.8–7.7)
ALBUMIN SERPL BCP-MCNC: 3.4 G/DL (ref 3.5–5.2)
ALP SERPL-CCNC: 89 UNIT/L (ref 40–150)
ALT SERPL W/O P-5'-P-CCNC: 21 UNIT/L (ref 10–44)
ANION GAP (OHS): 8 MMOL/L (ref 8–16)
AST SERPL-CCNC: 40 UNIT/L (ref 11–45)
BASOPHILS # BLD AUTO: 0.02 K/UL
BASOPHILS NFR BLD AUTO: 0.4 %
BILIRUB SERPL-MCNC: 0.4 MG/DL (ref 0.1–1)
BUN SERPL-MCNC: 7 MG/DL (ref 6–20)
CALCIUM SERPL-MCNC: 9.1 MG/DL (ref 8.7–10.5)
CHLORIDE SERPL-SCNC: 109 MMOL/L (ref 95–110)
CHOLEST SERPL-MCNC: 143 MG/DL (ref 120–199)
CHOLEST/HDLC SERPL: 2.3 {RATIO} (ref 2–5)
CO2 SERPL-SCNC: 24 MMOL/L (ref 23–29)
CREAT SERPL-MCNC: 0.6 MG/DL (ref 0.5–1.4)
ERYTHROCYTE [DISTWIDTH] IN BLOOD BY AUTOMATED COUNT: 16.2 % (ref 11.5–14.5)
GFR SERPLBLD CREATININE-BSD FMLA CKD-EPI: >60 ML/MIN/1.73/M2
GLUCOSE SERPL-MCNC: 71 MG/DL (ref 70–110)
HCT VFR BLD AUTO: 36 % (ref 37–48.5)
HDLC SERPL-MCNC: 61 MG/DL (ref 40–75)
HDLC SERPL: 42.7 % (ref 20–50)
HGB BLD-MCNC: 11.1 GM/DL (ref 12–16)
IMM GRANULOCYTES # BLD AUTO: 0.01 K/UL (ref 0–0.04)
IMM GRANULOCYTES NFR BLD AUTO: 0.2 % (ref 0–0.5)
IRON SATN MFR SERPL: 21 % (ref 20–50)
IRON SERPL-MCNC: 57 UG/DL (ref 30–160)
LDLC SERPL CALC-MCNC: 71.8 MG/DL (ref 63–159)
LYMPHOCYTES # BLD AUTO: 2.43 K/UL (ref 1–4.8)
MCH RBC QN AUTO: 21.8 PG (ref 27–31)
MCHC RBC AUTO-ENTMCNC: 30.8 G/DL (ref 32–36)
MCV RBC AUTO: 71 FL (ref 82–98)
NONHDLC SERPL-MCNC: 82 MG/DL
NUCLEATED RBC (/100WBC) (OHS): 0 /100 WBC
PLATELET # BLD AUTO: 270 K/UL (ref 150–450)
PMV BLD AUTO: 11.2 FL (ref 9.2–12.9)
POTASSIUM SERPL-SCNC: 3.6 MMOL/L (ref 3.5–5.1)
PROT SERPL-MCNC: 6.8 GM/DL (ref 6–8.4)
RBC # BLD AUTO: 5.1 M/UL (ref 4–5.4)
RELATIVE EOSINOPHIL (OHS): 0.4 %
RELATIVE LYMPHOCYTE (OHS): 43.3 % (ref 18–48)
RELATIVE MONOCYTE (OHS): 5.9 % (ref 4–15)
RELATIVE NEUTROPHIL (OHS): 49.8 % (ref 38–73)
SODIUM SERPL-SCNC: 141 MMOL/L (ref 136–145)
TIBC SERPL-MCNC: 277 UG/DL (ref 250–450)
TRANSFERRIN SERPL-MCNC: 187 MG/DL (ref 200–375)
TRIGL SERPL-MCNC: 51 MG/DL (ref 30–150)
VIT B12 SERPL-MCNC: 497 PG/ML (ref 210–950)
WBC # BLD AUTO: 5.61 K/UL (ref 3.9–12.7)

## 2025-04-24 ENCOUNTER — PATIENT MESSAGE (OUTPATIENT)
Dept: NEUROLOGY | Facility: CLINIC | Age: 45
End: 2025-04-24
Payer: MEDICARE

## 2025-04-25 ENCOUNTER — RESULTS FOLLOW-UP (OUTPATIENT)
Dept: NEUROLOGY | Facility: CLINIC | Age: 45
End: 2025-04-25

## 2025-05-13 ENCOUNTER — PATIENT MESSAGE (OUTPATIENT)
Dept: BARIATRICS | Facility: CLINIC | Age: 45
End: 2025-05-13
Payer: MEDICARE

## 2025-05-13 ENCOUNTER — INFUSION (OUTPATIENT)
Dept: INFUSION THERAPY | Facility: HOSPITAL | Age: 45
End: 2025-05-13
Attending: NURSE PRACTITIONER
Payer: MEDICARE

## 2025-05-13 VITALS
RESPIRATION RATE: 18 BRPM | BODY MASS INDEX: 32.98 KG/M2 | TEMPERATURE: 97 F | OXYGEN SATURATION: 100 % | WEIGHT: 205.19 LBS | HEART RATE: 84 BPM | DIASTOLIC BLOOD PRESSURE: 83 MMHG | HEIGHT: 66 IN | SYSTOLIC BLOOD PRESSURE: 140 MMHG

## 2025-05-13 DIAGNOSIS — G35 MULTIPLE SCLEROSIS, RELAPSING-REMITTING: Primary | ICD-10-CM

## 2025-05-13 PROCEDURE — 63600175 PHARM REV CODE 636 W HCPCS: Mod: JZ,TB | Performed by: STUDENT IN AN ORGANIZED HEALTH CARE EDUCATION/TRAINING PROGRAM

## 2025-05-13 PROCEDURE — 96415 CHEMO IV INFUSION ADDL HR: CPT

## 2025-05-13 PROCEDURE — 96413 CHEMO IV INFUSION 1 HR: CPT

## 2025-05-13 PROCEDURE — 25000003 PHARM REV CODE 250: Performed by: STUDENT IN AN ORGANIZED HEALTH CARE EDUCATION/TRAINING PROGRAM

## 2025-05-13 PROCEDURE — 96375 TX/PRO/DX INJ NEW DRUG ADDON: CPT

## 2025-05-13 PROCEDURE — 96367 TX/PROPH/DG ADDL SEQ IV INF: CPT

## 2025-05-13 RX ORDER — SODIUM CHLORIDE 0.9 % (FLUSH) 0.9 %
10 SYRINGE (ML) INJECTION
Status: DISCONTINUED | OUTPATIENT
Start: 2025-05-13 | End: 2025-05-13 | Stop reason: HOSPADM

## 2025-05-13 RX ORDER — METHYLPREDNISOLONE SOD SUCC 125 MG
100 VIAL (EA) INJECTION
Start: 2025-09-16 | End: 2025-09-16

## 2025-05-13 RX ORDER — ONDANSETRON HYDROCHLORIDE 2 MG/ML
4 INJECTION, SOLUTION INTRAVENOUS
Start: 2025-09-16

## 2025-05-13 RX ORDER — DIPHENHYDRAMINE HYDROCHLORIDE 50 MG/ML
50 INJECTION, SOLUTION INTRAMUSCULAR; INTRAVENOUS
OUTPATIENT
Start: 2025-09-16

## 2025-05-13 RX ORDER — SODIUM CHLORIDE 0.9 % (FLUSH) 0.9 %
10 SYRINGE (ML) INJECTION
OUTPATIENT
Start: 2025-09-16

## 2025-05-13 RX ORDER — FAMOTIDINE 10 MG/ML
20 INJECTION, SOLUTION INTRAVENOUS
Status: COMPLETED | OUTPATIENT
Start: 2025-05-13 | End: 2025-05-13

## 2025-05-13 RX ORDER — DIPHENHYDRAMINE HYDROCHLORIDE 50 MG/ML
50 INJECTION, SOLUTION INTRAMUSCULAR; INTRAVENOUS
Status: DISCONTINUED | OUTPATIENT
Start: 2025-05-13 | End: 2025-05-13 | Stop reason: HOSPADM

## 2025-05-13 RX ORDER — ACETAMINOPHEN 500 MG
1000 TABLET ORAL
Status: COMPLETED | OUTPATIENT
Start: 2025-05-13 | End: 2025-05-13

## 2025-05-13 RX ORDER — METHYLPREDNISOLONE SOD SUCC 125 MG
100 VIAL (EA) INJECTION
Status: COMPLETED | OUTPATIENT
Start: 2025-05-13 | End: 2025-05-13

## 2025-05-13 RX ORDER — ACETAMINOPHEN 500 MG
1000 TABLET ORAL
OUTPATIENT
Start: 2025-09-16

## 2025-05-13 RX ORDER — FAMOTIDINE 10 MG/ML
20 INJECTION, SOLUTION INTRAVENOUS
OUTPATIENT
Start: 2025-09-16

## 2025-05-13 RX ORDER — HEPARIN 100 UNIT/ML
500 SYRINGE INTRAVENOUS
OUTPATIENT
Start: 2025-09-16

## 2025-05-13 RX ORDER — EPINEPHRINE 0.3 MG/.3ML
0.3 INJECTION SUBCUTANEOUS
OUTPATIENT
Start: 2025-09-16

## 2025-05-13 RX ORDER — EPINEPHRINE 0.3 MG/.3ML
0.3 INJECTION SUBCUTANEOUS
Status: DISCONTINUED | OUTPATIENT
Start: 2025-05-13 | End: 2025-05-13 | Stop reason: HOSPADM

## 2025-05-13 RX ADMIN — METHYLPREDNISOLONE SODIUM SUCCINATE 100 MG: 125 INJECTION, POWDER, FOR SOLUTION INTRAMUSCULAR; INTRAVENOUS at 09:05

## 2025-05-13 RX ADMIN — DIPHENHYDRAMINE HYDROCHLORIDE 50 MG: 50 INJECTION, SOLUTION INTRAMUSCULAR; INTRAVENOUS at 09:05

## 2025-05-13 RX ADMIN — ACETAMINOPHEN 1000 MG: 500 TABLET ORAL at 09:05

## 2025-05-13 RX ADMIN — FAMOTIDINE 20 MG: 10 INJECTION INTRAVENOUS at 09:05

## 2025-05-13 RX ADMIN — OCRELIZUMAB 600 MG: 300 INJECTION INTRAVENOUS at 10:05

## 2025-05-13 NOTE — DISCHARGE INSTRUCTIONS
.[facility]  [address] [facility]  [address]  [phone] phone     [phone] fax  Hours of Operation: Monday- Friday 8:00am- 5:00pm  After hours phone  [phone]  Hematology / Oncology Physicians on call    Dr. Juan Urban           Nurse Practitioners:     Janet June, ARASELI Lira, YOMI Gamble, ARASELI Lee, ARASELI Keating, NP    Please don't hesitate to call if you have any concerns.      FALL PREVENTION   Falls often occur due to slipping, tripping or losing your balance. Here are ways to reduce your risk of falling again.   Was there anything that caused your fall that can be fixed, removed or replaced?   Make your home safe by keeping walkways clear of objects you may trip over.   Use non-slip pads under rugs.   Do not walk in poorly lit areas.   Do not stand on chairs or wobbly ladders.   Use caution when reaching overhead or looking upward. This position can cause a loss of balance.   Be sure your shoes fit properly, have non-slip bottoms and are in good condition.   Be cautious when going up and down stairs, curbs, and when walking on uneven sidewalks.   If your balance is poor, consider using a cane or walker.   If your fall was related to alcohol use, stop or limit alcohol intake.   If your fall was related to use of sleeping medicines, talk to your doctor about this. You may need to reduce your dosage at bedtime if you awaken during the night to go to the bathroom.   To reduce the need for nighttime bathroom trips:   Avoid drinking fluids for several hours before going to bed   Empty your bladder before going to bed   Men can keep a urinal at the bedside   © 1422-9254 Elissa Lagunas, [address] [place] [address], [place] [address]. All rights reserved. This information is not intended as a substitute for professional medical care. Always follow your healthcare  professional's instructions.    WAYS TO HELP PREVENT INFECTION        WASH YOUR HANDS OFTEN DURING THE DAY, ESPECIALLY BEFORE YOU EAT, AFTER USING THE BATHROOM, AND AFTER TOUCHING ANIMALS    STAY AWAY FROM PEOPLE WHO HAVE ILLNESSES YOU CAN CATCH; SUCH AS COLDS, FLU, CHICKEN POX    TRY TO AVOID CROWDS    STAY AWAY FROM CHILDREN WHO RECENTLY HAVE RECEIVED LIVE VIRUS VACCINES    MAINTAIN GOOD MOUTH CARE    DO NOT SQUEEZE OR SCRATCH PIMPLES    CLEAN CUTS & SCRAPES RIGHT AWAY AND DAILY UNTIL HEALED WITH WARM WATER, SOAP & AN ANTISEPTIC    AVOID CONTACT WITH LITTER BOXES, BIRD CAGES, & FISH TANKS    AVOID STANDING WATER, IE., BIRD BATHS, FLOWER POTS/VASES, OR HUMIDIFIERS    WEAR GLOVES WHEN GARDENING OR CLEANING UP AFTER OTHERS, ESPECIALLY BABIES & SMALL CHILDREN    DO NOT EAT RAW FISH, SEAFOOD, MEAT, OR EGGS

## 2025-05-13 NOTE — PLAN OF CARE
Problem: Adult Inpatient Plan of Care  Goal: Plan of Care Review  Outcome: Progressing  Flowsheets (Taken 5/13/2025 0912)  Plan of Care Reviewed With: patient  Goal: Patient-Specific Goal (Individualized)  Outcome: Progressing  Flowsheets (Taken 5/13/2025 0912)  Individualized Care Needs: Reclined, pillow, and blanket  Anxieties, Fears or Concerns: No complaints expressed  Patient/Family-Specific Goals (Include Timeframe): Tolerate treatment today  Goal: Optimal Comfort and Wellbeing  Outcome: Progressing  Intervention: Monitor Pain and Promote Comfort  Flowsheets (Taken 5/13/2025 0912)  Pain Management Interventions:   warm blanket provided   quiet environment facilitated   pillow support provided  Intervention: Provide Person-Centered Care  Flowsheets (Taken 5/13/2025 0912)  Trust Relationship/Rapport:   care explained   questions encouraged   choices provided   reassurance provided   emotional support provided   thoughts/feelings acknowledged   empathic listening provided   questions answered     Problem: Infection  Goal: Absence of Infection Signs and Symptoms  Outcome: Progressing  Intervention: Prevent or Manage Infection  Flowsheets (Taken 5/13/2025 0912)  Infection Management: aseptic technique maintained     Problem: Fall Injury Risk  Goal: Absence of Fall and Fall-Related Injury  Outcome: Progressing  Intervention: Identify and Manage Contributors  Flowsheets (Taken 5/13/2025 0912)  Self-Care Promotion: BADL personal routines maintained  Medication Review/Management: medications reviewed  Intervention: Promote Injury-Free Environment  Flowsheets (Taken 5/13/2025 0912)  Safety Promotion/Fall Prevention:   high risk medications identified   in recliner, wheels locked   instructed to call staff for mobility   lighting adjusted   medications reviewed

## 2025-05-28 ENCOUNTER — PATIENT MESSAGE (OUTPATIENT)
Dept: NEUROLOGY | Facility: CLINIC | Age: 45
End: 2025-05-28
Payer: MEDICARE

## 2025-05-28 DIAGNOSIS — F32.A DEPRESSION, UNSPECIFIED DEPRESSION TYPE: Primary | ICD-10-CM

## 2025-05-28 RX ORDER — HYDROXYZINE HYDROCHLORIDE 25 MG/1
25 TABLET, FILM COATED ORAL 3 TIMES DAILY
Status: CANCELLED | OUTPATIENT
Start: 2025-05-28

## 2025-05-30 RX ORDER — BUPROPION HYDROCHLORIDE 150 MG/1
150 TABLET ORAL DAILY
Qty: 30 TABLET | Refills: 2 | Status: CANCELLED | OUTPATIENT
Start: 2025-05-30 | End: 2025-06-29

## 2025-06-03 ENCOUNTER — PATIENT MESSAGE (OUTPATIENT)
Dept: NEUROLOGY | Facility: CLINIC | Age: 45
End: 2025-06-03
Payer: MEDICARE

## 2025-06-03 ENCOUNTER — PATIENT MESSAGE (OUTPATIENT)
Dept: BARIATRICS | Facility: CLINIC | Age: 45
End: 2025-06-03
Payer: MEDICARE

## 2025-06-03 DIAGNOSIS — R39.9 UTI SYMPTOMS: Primary | ICD-10-CM

## 2025-06-03 RX ORDER — DULOXETIN HYDROCHLORIDE 30 MG/1
30 CAPSULE, DELAYED RELEASE ORAL DAILY
Qty: 30 CAPSULE | Refills: 3 | Status: SHIPPED | OUTPATIENT
Start: 2025-06-03 | End: 2026-06-03

## 2025-06-11 ENCOUNTER — PATIENT MESSAGE (OUTPATIENT)
Dept: NEUROLOGY | Facility: CLINIC | Age: 45
End: 2025-06-11
Payer: MEDICARE

## 2025-06-11 ENCOUNTER — LAB VISIT (OUTPATIENT)
Dept: LAB | Facility: HOSPITAL | Age: 45
End: 2025-06-11
Attending: CLINICAL NURSE SPECIALIST
Payer: MEDICARE

## 2025-06-11 DIAGNOSIS — R39.9 UTI SYMPTOMS: ICD-10-CM

## 2025-06-11 LAB
BACTERIA #/AREA URNS HPF: ABNORMAL /HPF
BILIRUB UR QL STRIP.AUTO: NEGATIVE
CLARITY UR: CLEAR
COLOR UR AUTO: YELLOW
GLUCOSE UR QL STRIP: NEGATIVE
HGB UR QL STRIP: ABNORMAL
HOLD SPECIMEN: NORMAL
KETONES UR QL STRIP: NEGATIVE
LEUKOCYTE ESTERASE UR QL STRIP: ABNORMAL
MICROSCOPIC COMMENT: ABNORMAL
NITRITE UR QL STRIP: POSITIVE
PH UR STRIP: 6 [PH]
PROT UR QL STRIP: ABNORMAL
RBC #/AREA URNS HPF: 1 /HPF (ref 0–4)
SP GR UR STRIP: <=1.005
SQUAMOUS #/AREA URNS HPF: 1 /HPF
WBC #/AREA URNS HPF: 36 /HPF (ref 0–5)

## 2025-06-11 PROCEDURE — 81003 URINALYSIS AUTO W/O SCOPE: CPT

## 2025-06-11 PROCEDURE — 87086 URINE CULTURE/COLONY COUNT: CPT

## 2025-06-12 ENCOUNTER — PATIENT MESSAGE (OUTPATIENT)
Dept: NEUROLOGY | Facility: CLINIC | Age: 45
End: 2025-06-12
Payer: MEDICARE

## 2025-06-12 DIAGNOSIS — N39.0 URINARY TRACT INFECTION WITHOUT HEMATURIA, SITE UNSPECIFIED: Primary | ICD-10-CM

## 2025-06-12 RX ORDER — NITROFURANTOIN 25; 75 MG/1; MG/1
100 CAPSULE ORAL 2 TIMES DAILY
Qty: 14 CAPSULE | Refills: 0 | Status: SHIPPED | OUTPATIENT
Start: 2025-06-12 | End: 2025-06-19

## 2025-06-13 LAB — BACTERIA UR CULT: NORMAL

## 2025-06-14 ENCOUNTER — RESULTS FOLLOW-UP (OUTPATIENT)
Dept: NEUROLOGY | Facility: CLINIC | Age: 45
End: 2025-06-14

## 2025-06-20 ENCOUNTER — PATIENT MESSAGE (OUTPATIENT)
Dept: NEUROLOGY | Facility: CLINIC | Age: 45
End: 2025-06-20
Payer: MEDICARE

## 2025-07-07 ENCOUNTER — PATIENT MESSAGE (OUTPATIENT)
Dept: BARIATRICS | Facility: CLINIC | Age: 45
End: 2025-07-07
Payer: MEDICARE

## 2025-07-08 ENCOUNTER — PATIENT MESSAGE (OUTPATIENT)
Dept: BARIATRICS | Facility: CLINIC | Age: 45
End: 2025-07-08
Payer: MEDICARE

## 2025-07-08 RX ORDER — LINACLOTIDE 145 UG/1
145 CAPSULE, GELATIN COATED ORAL
Qty: 30 CAPSULE | Refills: 1 | Status: SHIPPED | OUTPATIENT
Start: 2025-07-08

## 2025-07-08 NOTE — TELEPHONE ENCOUNTER
Care Due:                  Date            Visit Type   Department     Provider  --------------------------------------------------------------------------------                                ESTABLISHED                              PATIENT -    [facility] INTERNAL  Last Visit: 06-      [facility]       Braden Dumont  Next Visit: None Scheduled  None         None Found                                                            Last  Test          Frequency    Reason                     Performed    Due Date  --------------------------------------------------------------------------------    Office Visit  12 months..  doxepin, linaCLOtide.....  06- 06-    [facility] Embedded Care Due Messages. Reference number: [id number].   7/08/2025 7:17:44 AM CDT

## 2025-07-08 NOTE — TELEPHONE ENCOUNTER
Requested Prescriptions     Pending Prescriptions Disp Refills    LINZESS 145 mcg Cap capsule [Pharmacy Med Name: LINZESS 145MCG CAPSULES] 90 capsule 3     Sig: TAKE 1 CAPSULE(145 MCG) BY MOUTH BEFORE BREAKFAST     Last Visit: 06-        Next Visit: None  Last filled: 06/24/2024

## 2025-07-20 ENCOUNTER — PATIENT MESSAGE (OUTPATIENT)
Dept: NEUROLOGY | Facility: CLINIC | Age: 45
End: 2025-07-20
Payer: MEDICARE

## 2025-07-21 ENCOUNTER — TELEPHONE (OUTPATIENT)
Dept: INTERNAL MEDICINE | Facility: CLINIC | Age: 45
End: 2025-07-21
Payer: MEDICARE

## 2025-07-21 DIAGNOSIS — Z12.31 ENCOUNTER FOR SCREENING MAMMOGRAM FOR MALIGNANT NEOPLASM OF BREAST: Primary | ICD-10-CM

## 2025-07-21 NOTE — TELEPHONE ENCOUNTER
Pt requesting for mammogram order. Last mammogram in [facility]-- 07/03/2024.     Mammogram order pending in [facility].  Please review and advise.     Pt would like a Tuesday appt at either [facility] or [facility].           Copied from CRM #[id number]. Topic: General Inquiry - Patient Advice  >> Jul 21, 2025  1:34 PM Margot wrote:  Type:  Patient Returning Call    Who Called:Alicia  Who Left Message for Patient:  Does the patient know what this is regarding?:Mammo order  Would the patient rather a call back or a response via [facility]? Callback  Best Call Back Number:[phone]  Additional Information: Patient need a order for Mammo. Please callback to assist

## 2025-07-28 ENCOUNTER — PATIENT MESSAGE (OUTPATIENT)
Dept: INTERNAL MEDICINE | Facility: CLINIC | Age: 45
End: 2025-07-28
Payer: MEDICARE

## 2025-07-30 ENCOUNTER — PATIENT MESSAGE (OUTPATIENT)
Dept: NEUROLOGY | Facility: CLINIC | Age: 45
End: 2025-07-30

## 2025-07-30 ENCOUNTER — OFFICE VISIT (OUTPATIENT)
Dept: NEUROLOGY | Facility: CLINIC | Age: 45
End: 2025-07-30
Payer: MEDICARE

## 2025-07-30 DIAGNOSIS — Z79.899 HIGH RISK MEDICATION USE: ICD-10-CM

## 2025-07-30 DIAGNOSIS — Z79.899 OTHER LONG TERM (CURRENT) DRUG THERAPY: ICD-10-CM

## 2025-07-30 DIAGNOSIS — Z91.81 STATUS POST FALL: ICD-10-CM

## 2025-07-30 DIAGNOSIS — Z71.89 COUNSELING REGARDING GOALS OF CARE: ICD-10-CM

## 2025-07-30 DIAGNOSIS — R26.9 GAIT DISTURBANCE: ICD-10-CM

## 2025-07-30 DIAGNOSIS — M79.605 LEFT LEG PAIN: ICD-10-CM

## 2025-07-30 DIAGNOSIS — Z86.73 HISTORY OF STROKE: ICD-10-CM

## 2025-07-30 DIAGNOSIS — Z29.89 PROPHYLACTIC IMMUNOTHERAPY: ICD-10-CM

## 2025-07-30 DIAGNOSIS — G35 MULTIPLE SCLEROSIS: Primary | ICD-10-CM

## 2025-07-30 PROCEDURE — 1159F MED LIST DOCD IN RCRD: CPT | Mod: CPTII,95,, | Performed by: CLINICAL NURSE SPECIALIST

## 2025-07-30 PROCEDURE — 98006 SYNCH AUDIO-VIDEO EST MOD 30: CPT | Mod: 95,,, | Performed by: CLINICAL NURSE SPECIALIST

## 2025-07-30 PROCEDURE — G2211 COMPLEX E/M VISIT ADD ON: HCPCS | Mod: 95,,, | Performed by: CLINICAL NURSE SPECIALIST

## 2025-07-30 PROCEDURE — 4010F ACE/ARB THERAPY RXD/TAKEN: CPT | Mod: CPTII,95,, | Performed by: CLINICAL NURSE SPECIALIST

## 2025-07-30 NOTE — PROGRESS NOTES
Subjective:          Patient ID: Alicia Soliz is a 45 y.o. female who presents today for a fit-in virtual visit for MS.  She was last seen by Dr. Garrett in March 2025. The history has been provided by the patient.     The patient location is: her home   The chief complaint leading to consultation is: MS     Visit type: audiovisual    Face to Face time with patient: 18 minutes   30 minutes of total time spent on the encounter, which includes face to face time and non-face to face time preparing to see the patient (eg, review of tests), Obtaining and/or reviewing separately obtained history, Documenting clinical information in the electronic or other health record, Independently interpreting results (not separately reported) and communicating results to the patient/family/caregiver, or Care coordination (not separately reported).         Each patient to whom he or she provides medical services by telemedicine is:  (1) informed of the relationship between the physician and patient and the respective role of any other health care provider with respect to management of the patient; and (2) notified that he or she may decline to receive medical services by telemedicine and may withdraw from such care at any time.        NEURO MULTIPLE SCLEROISIS SUMMARY:   Principle neurological diagnosis:  MS  Date of Symptom Onset:  1/2015  Date of Diagnosis:  1/2015  Disease type at diagnosis:  Relapsing-Remitting MS  Disease type currently:  Relapsing-Remitting MS  Previous Therapy:  First DMT:  Dimethyl fumarate  Second DMT:  Glatiramer acetate  Third DMT:  Natalizumab  Fourth DMT:  Interferon beta-1a IM - flu-like reaction  Fifth DMT:  Terifluomide - worsening disease  Current Therapy:  Ocrelizumab - 12/2020 - present  Last MRI Brain:  2/25/2024 - stable  Last MRI C-Spine:  2/25/2024 - no lesions  Last MRI T-Spine:  2/6/2024 - no lesions  Lab Notes:  CSF IgG index 0.74    MS HPI:  DMT: Ocrevus--last infused in May   Side  "effects from DMT? No  Taking vitamin D3 as recommended? Yes   She messaged recently reporting increased headaches, vertigo/dizziness, and slurred speech. Her stress level has been significant. She was encouraged to go to the ER last week since speech seemed to be getting worse. She was concerned that she was having a stroke.   Both of her daughters had COVID recently, but she did not get it.   Today, she is feeling ok. Headaches have been "on and off." Speech is back to baseline. When she talks for a long time, she feels like it is harder to get her words out, and she slurs more.   She denies any recent infections.   She is still seeing pain management monthly for medication management.   She had a fall in June and fell onto her left hip. She has pain in her leg since this fall. She had to crawl to her bed to pull herself up. When she steps onto the left leg, she feels pain. She sometimes feels unstable on this leg, and she also notices some swelling to the left leg. She has been using diclofenac cream. She did not have an xray of the leg after the fall.   She is using her walker around the house, but tries to walk independently.   She is overdue for her brain MRI.   She had partial hysterectomy; does not get a period.   Her daughters are her primary support. They live close to her.     Medications:  Current Outpatient Medications   Medication Sig    cholecalciferol, vitamin D3, 1,250 mcg (50,000 unit) capsule Take 1 capsule (50,000 Units total) by mouth every 7 days. for 365 doses    cyclobenzaprine (FLEXERIL) 10 MG tablet Take 10 mg by mouth every 8 (eight) hours as needed.    doxepin (SINEQUAN) 75 MG capsule Take 1 capsule (75 mg total) by mouth every evening.    DULoxetine (CYMBALTA) 30 MG capsule Take 1 capsule (30 mg total) by mouth once daily.    ketoconazole (NIZORAL) 2 % cream Apply topically once daily.    linaCLOtide (LINZESS) 145 mcg Cap capsule TAKE 1 CAPSULE(145 MCG) BY MOUTH BEFORE BREAKFAST    " nystatin (MYCOSTATIN) cream Apply topically 2 (two) times daily.    OCREVUS 30 mg/mL Soln     omeprazole (PRILOSEC) 40 MG capsule Take 1 capsule (40 mg total) by mouth every morning.    valsartan (DIOVAN) 80 MG tablet Take 1 tablet (80 mg total) by mouth once daily.     No current facility-administered medications for this visit.       SOCIAL HISTORY  Social History[1]    Living arrangements - the patient lives alone              Objective:        1. 25 foot timed walk:      6/16/2023     9:20 AM 11/20/2024     1:00 PM   Timed 25 Foot Walk:   Did patient wear an AFO? No No   Was assistive device used? Yes Yes   Assistive device used (milton one): Bilateral Assistance Bilateral Assistance   Bilateral device used Walker/Rollator Walker/Rollator   Time for 25 Foot Walk (seconds) 48.4 48.5   Time for 25 Foot Walk (seconds) 30.03 42.3       Neurological Exam    Deferred due to virtual visit   Imaging:       Results for orders placed during the hospital encounter of 02/24/24    MRI Brain Demyelinating W W/O Contrast    Impression  Similar appearance of demyelinating plaques within both cerebral hemispheres, the brainstem and posterior fossa as compared to the prior MRI 08/05/2022.  No MR evidence new or active demyelination as compared to the prior.    No acute intracranial abnormality.      Electronically signed by: Rico Pelaez  Date:    02/25/2024  Time:    08:37    Results for orders placed during the hospital encounter of 02/24/24    MRI Cervical Spine Demyelinating W W/O Contrast    Impression  No significant cervical cord signal abnormality to suggest demyelinating sequelae.  No abnormal cervical cord enhancement.    Minor degenerative changes of the cervical spine without significant spinal canal stenosis or neural foraminal stenosis.      Electronically signed by: Rico Pelaez  Date:    02/25/2024  Time:    08:45    No results found for this or any previous visit.        Labs:     Lab Results   Component  Value Date    GONQCICP59NZ 60 04/21/2025    [id number] 55 11/20/2024    [id number] 60 07/15/2024     Lab Results   Component Value Date    JCVINDEX 1.67 (A) 04/19/2018    JCVANTIBODY Positive (A) 04/19/2018     Lab Results   Component Value Date    WBC 5.61 04/21/2025    RBC 5.10 04/21/2025    HGB 11.1 (L) 04/21/2025    HCT 36.0 (L) 04/21/2025    MCV 71 (L) 04/21/2025    MCH 21.8 (L) 04/21/2025    MCHC 30.8 (L) 04/21/2025    RDW 16.2 (H) 04/21/2025     04/21/2025    MPV 11.2 04/21/2025    GRAN 1.8 11/20/2024    GRAN 44.1 11/20/2024    LYMPH 43.3 04/21/2025    LYMPH 2.43 04/21/2025    MONO 5.9 04/21/2025    MONO 0.33 04/21/2025    EOS 0.4 04/21/2025    EOS 0.02 04/21/2025    BASO 0.01 11/20/2024    EOSINOPHIL 0.5 11/20/2024    BASOPHIL 0.4 04/21/2025    BASOPHIL 0.02 04/21/2025     Sodium   Date Value Ref Range Status   04/21/2025 141 136 - 145 mmol/L Final   11/20/2024 140 136 - 145 mmol/L Final     Potassium   Date Value Ref Range Status   04/21/2025 3.6 3.5 - 5.1 mmol/L Final   11/20/2024 3.6 3.5 - 5.1 mmol/L Final     Chloride   Date Value Ref Range Status   04/21/2025 109 95 - 110 mmol/L Final   11/20/2024 106 95 - 110 mmol/L Final     CO2   Date Value Ref Range Status   04/21/2025 24 23 - 29 mmol/L Final   11/20/2024 28 23 - 29 mmol/L Final     Glucose   Date Value Ref Range Status   04/21/2025 71 70 - 110 mg/dL Final   11/20/2024 81 70 - 110 mg/dL Final     BUN   Date Value Ref Range Status   04/21/2025 7 6 - 20 mg/dL Final     Creatinine   Date Value Ref Range Status   04/21/2025 0.6 0.5 - 1.4 mg/dL Final     Calcium   Date Value Ref Range Status   04/21/2025 9.1 8.7 - 10.5 mg/dL Final   11/20/2024 8.7 8.7 - 10.5 mg/dL Final     Protein Total   Date Value Ref Range Status   04/21/2025 6.8 6.0 - 8.4 gm/dL Final     Total Protein   Date Value Ref Range Status   11/20/2024 6.4 6.0 - 8.4 g/dL Final     Albumin   Date Value Ref Range Status   04/21/2025 3.4 (L) 3.5 - 5.2 g/dL Final   11/20/2024 3.1 (L)  3.5 - 5.2 g/dL Final     Total Bilirubin   Date Value Ref Range Status   11/20/2024 0.3 0.1 - 1.0 mg/dL Final     Comment:     For infants and newborns, interpretation of results should be based  on gestational age, weight and in agreement with clinical  observations.    Premature Infant recommended reference ranges:  Up to 24 hours.............<8.0 mg/dL  Up to 48 hours............<12.0 mg/dL  3-5 days..................<15.0 mg/dL  6-29 days.................<15.0 mg/dL       Bilirubin Total   Date Value Ref Range Status   04/21/2025 0.4 0.1 - 1.0 mg/dL Final     Comment:     For infants and newborns, interpretation of results should be based   on gestational age, weight and in agreement with clinical   observations.    Premature Infant recommended reference ranges:   0-24 hours:  <8.0 mg/dL   24-48 hours: <12.0 mg/dL   3-5 days:    <15.0 mg/dL   6-29 days:   <15.0 mg/dL     Alkaline Phosphatase   Date Value Ref Range Status   11/20/2024 93 40 - 150 U/L Final     ALP   Date Value Ref Range Status   04/21/2025 89 40 - 150 unit/L Final     AST   Date Value Ref Range Status   04/21/2025 40 11 - 45 unit/L Final   11/20/2024 16 10 - 40 U/L Final     ALT   Date Value Ref Range Status   04/21/2025 21 10 - 44 unit/L Final   11/20/2024 9 (L) 10 - 44 U/L Final     Anion Gap   Date Value Ref Range Status   04/21/2025 8 8 - 16 mmol/L Final     eGFR if    Date Value Ref Range Status   05/16/2022 >60 >60 mL/min/1.73 m^2 Final     eGFR    Date Value Ref Range Status   02/08/2024 109 mL/min/1.73mSq Final     Comment:     In accordance with NKF-ASN Task Force recommendation, calculation based on the Chronic Kidney Disease Epidemiology Collaboration (CKD-EPI) equation without adjustment for race. eGFR adjusted for gender and age and calculated in ml/min/1.73mSquared. eGFR cannot be calculated if patient is under 18 years of age.     Reference Range:   >= 60 ml/min/1.73mSquared.     eGFR if non African  American   Date Value Ref Range Status   05/16/2022 >60 >60 mL/min/1.73 m^2 Final     Comment:     Calculation used to obtain the estimated glomerular filtration  rate (eGFR) is the CKD-EPI equation.        Lab Results   Component Value Date    HEPBSAG Non-Reactive 03/25/2025    HEPBSAB Positive 06/06/2022    HEPBCAB Non-Reactive 03/25/2025           MS Impression and Plan:     NEURO MULTIPLE SCLEROSIS IMPRESSION:   Number of relapses in the past year?:  0  Clinical Progression:  Clinically Stable  Clinical Progression comment:  She has had some increased symptoms lately, but feels at baseline today. She is due for brain MRI. In light of recent speech changes, we will get brain MRI with contrast to assess for MS disease activity or stroke.   MS Classification:  Relapsing-Remitting MS  Current DMT: ocrelizumab  Current DMT comment: Continue Ocrevus and Vitamin D. Her next infusion is due in November. We will check safety labs in October. She is aware of the risks associated with immunosuppressant therapy, including increased risk of infection.     DMT:  No change in management  Symptom Management:  No change in symptom management  Additional Impressions:   She had a fall onto her left hip recently and still has some pain with weight-bearing and walking. We will get xrays to make sure there is no fracture or other injury.   I will see her back in clinic in 3-4 months.   The visit today is associated with current or anticipated ongoing medical care related to this patient's single serious condition/complex condition of multiple sclerosis.        HAMIDA Vizcarra, CNS    Problem List Items Addressed This Visit       History of stroke     Other Visit Diagnoses         Multiple sclerosis    -  Primary    Relevant Orders    CBC Auto Differential    Comprehensive Metabolic Panel    Hepatitis B Core Antibody, Total    Hepatitis B Surface Antigen    Immunoglobulins (IgG, IgA, IgM) Quantitative    Vitamin D    MRI Brain  Demyelinating W W/O Contrast      Other long term (current) drug therapy        Relevant Orders    Hepatitis B Core Antibody, Total    Hepatitis B Surface Antigen    Vitamin D      Left leg pain        Relevant Orders    X-Ray Hip 2 or 3 views Left with Pelvis when performed    X-Ray Femur 2 View Left      Status post fall        Relevant Orders    X-Ray Hip 2 or 3 views Left with Pelvis when performed    X-Ray Femur 2 View Left      Counseling regarding goals of care          Prophylactic immunotherapy          High risk medication use          Gait disturbance                       [1]   Social History  Tobacco Use    Smoking status: Never     Passive exposure: Never    Smokeless tobacco: Never    Tobacco comments:     NA   Substance Use Topics    Alcohol use: Never     Comment: once a year     Drug use: Never

## 2025-07-30 NOTE — Clinical Note
Camille Back! I saw Alicia in a virtual visit today in MS clinic. She asked for a refill on her omeprazole, and I see that you prescribed. Would you mind sending it in for her? Thanks!  Jen

## 2025-07-31 ENCOUNTER — TELEPHONE (OUTPATIENT)
Dept: NEUROLOGY | Facility: CLINIC | Age: 45
End: 2025-07-31
Payer: MEDICARE

## 2025-07-31 ENCOUNTER — PATIENT MESSAGE (OUTPATIENT)
Dept: NEUROLOGY | Facility: CLINIC | Age: 45
End: 2025-07-31
Payer: MEDICARE

## 2025-07-31 DIAGNOSIS — Z98.84 S/P GASTRIC BYPASS: ICD-10-CM

## 2025-07-31 DIAGNOSIS — E66.01 MORBID OBESITY WITH BMI OF 45.0-49.9, ADULT: ICD-10-CM

## 2025-07-31 RX ORDER — OMEPRAZOLE 40 MG/1
40 CAPSULE, DELAYED RELEASE ORAL EVERY MORNING
Qty: 90 CAPSULE | Refills: 3 | Status: ACTIVE | OUTPATIENT
Start: 2025-07-31

## 2025-07-31 NOTE — TELEPHONE ENCOUNTER
Pt is scheduled for labs on 10/7/25, xray will be r/s via the portal pt stated she didn't know she scheduled them for today. [facility] phone number is being sent to pt via the portal for pt to get scheduled.

## 2025-08-05 ENCOUNTER — PATIENT MESSAGE (OUTPATIENT)
Dept: BARIATRICS | Facility: CLINIC | Age: 45
End: 2025-08-05
Payer: MEDICARE

## 2025-08-05 RX ORDER — ONDANSETRON 8 MG/1
8 TABLET, ORALLY DISINTEGRATING ORAL EVERY 6 HOURS PRN
Qty: 30 TABLET | Refills: 1 | Status: ACTIVE | OUTPATIENT
Start: 2025-08-05

## 2025-08-11 ENCOUNTER — CLINICAL SUPPORT (OUTPATIENT)
Dept: BARIATRICS | Facility: CLINIC | Age: 45
End: 2025-08-11
Payer: MEDICARE

## 2025-08-11 DIAGNOSIS — Z71.3 DIETARY COUNSELING AND SURVEILLANCE: Primary | ICD-10-CM

## 2025-08-11 DIAGNOSIS — Z98.84 S/P GASTRIC BYPASS: ICD-10-CM

## 2025-08-11 DIAGNOSIS — E66.9 OBESITY (BMI 30-39.9): ICD-10-CM

## 2025-08-11 DIAGNOSIS — R11.2 NAUSEA AND VOMITING, UNSPECIFIED VOMITING TYPE: Primary | ICD-10-CM

## 2025-08-11 DIAGNOSIS — I10 PRIMARY HYPERTENSION: ICD-10-CM

## 2025-08-11 RX ORDER — SCOPOLAMINE 1 MG/3D
1 PATCH, EXTENDED RELEASE TRANSDERMAL
Qty: 4 PATCH | Refills: 1 | Status: ACTIVE | OUTPATIENT
Start: 2025-08-11

## 2025-08-12 ENCOUNTER — PATIENT MESSAGE (OUTPATIENT)
Dept: BARIATRICS | Facility: CLINIC | Age: 45
End: 2025-08-12
Payer: MEDICARE

## 2025-08-13 ENCOUNTER — TELEPHONE (OUTPATIENT)
Dept: ENDOSCOPY | Facility: HOSPITAL | Age: 45
End: 2025-08-13

## 2025-08-13 ENCOUNTER — TELEPHONE (OUTPATIENT)
Dept: BARIATRICS | Facility: CLINIC | Age: 45
End: 2025-08-13
Payer: MEDICARE

## 2025-08-13 ENCOUNTER — CLINICAL SUPPORT (OUTPATIENT)
Dept: ENDOSCOPY | Facility: HOSPITAL | Age: 45
End: 2025-08-13
Payer: MEDICARE

## 2025-08-13 DIAGNOSIS — R11.2 NAUSEA AND VOMITING, UNSPECIFIED VOMITING TYPE: Primary | ICD-10-CM

## 2025-08-14 ENCOUNTER — HOSPITAL ENCOUNTER (OUTPATIENT)
Dept: PREADMISSION TESTING | Facility: HOSPITAL | Age: 45
Discharge: HOME | End: 2025-08-14
Attending: INTERNAL MEDICINE
Payer: MEDICARE

## 2025-08-14 DIAGNOSIS — R11.2 NAUSEA AND VOMITING, UNSPECIFIED VOMITING TYPE: Primary | ICD-10-CM

## 2025-08-14 DIAGNOSIS — Z98.84 S/P GASTRIC BYPASS: Primary | ICD-10-CM

## 2025-08-15 ENCOUNTER — PATIENT MESSAGE (OUTPATIENT)
Dept: BARIATRICS | Facility: CLINIC | Age: 45
End: 2025-08-15
Payer: MEDICARE

## 2025-08-17 ENCOUNTER — NURSE TRIAGE (OUTPATIENT)
Dept: ADMINISTRATIVE | Facility: CLINIC | Age: 45
End: 2025-08-17
Payer: MEDICARE

## 2025-08-17 ENCOUNTER — OCHSNER VIRTUAL EMERGENCY DEPARTMENT (OUTPATIENT)
Facility: CLINIC | Age: 45
End: 2025-08-17
Payer: MEDICARE

## 2025-08-17 ENCOUNTER — PATIENT OUTREACH (OUTPATIENT)
Facility: OTHER | Age: 45
End: 2025-08-17
Payer: MEDICARE

## 2025-08-17 RX ORDER — ONDANSETRON 8 MG/1
8 TABLET, ORALLY DISINTEGRATING ORAL EVERY 6 HOURS PRN
Qty: 30 TABLET | Refills: 1 | Status: SHIPPED | OUTPATIENT
Start: 2025-08-17

## 2025-08-18 ENCOUNTER — PATIENT OUTREACH (OUTPATIENT)
Facility: OTHER | Age: 45
End: 2025-08-18
Payer: MEDICARE

## 2025-08-18 RX ORDER — DOXEPIN HYDROCHLORIDE 75 MG/1
75 CAPSULE ORAL NIGHTLY
Qty: 30 CAPSULE | Refills: 0 | Status: SHIPPED | OUTPATIENT
Start: 2025-08-18

## 2025-08-19 ENCOUNTER — HOSPITAL ENCOUNTER (OUTPATIENT)
Dept: RADIOLOGY | Facility: HOSPITAL | Age: 45
Discharge: HOME | End: 2025-08-19
Attending: FAMILY MEDICINE
Payer: MEDICARE

## 2025-08-19 ENCOUNTER — HOSPITAL ENCOUNTER (OUTPATIENT)
Dept: RADIOLOGY | Facility: HOSPITAL | Age: 45
Discharge: HOME | End: 2025-08-19
Attending: PHYSICIAN ASSISTANT
Payer: MEDICARE

## 2025-08-19 VITALS — HEIGHT: 66 IN | WEIGHT: 205 LBS | BODY MASS INDEX: 32.95 KG/M2

## 2025-08-19 DIAGNOSIS — Z12.31 ENCOUNTER FOR SCREENING MAMMOGRAM FOR MALIGNANT NEOPLASM OF BREAST: ICD-10-CM

## 2025-08-19 DIAGNOSIS — R11.2 NAUSEA AND VOMITING, UNSPECIFIED VOMITING TYPE: ICD-10-CM

## 2025-08-19 PROCEDURE — 77067 SCR MAMMO BI INCL CAD: CPT | Mod: TC

## 2025-08-19 PROCEDURE — 74240 X-RAY XM UPR GI TRC 1CNTRST: CPT | Mod: 26,,, | Performed by: STUDENT IN AN ORGANIZED HEALTH CARE EDUCATION/TRAINING PROGRAM

## 2025-08-19 PROCEDURE — 77063 BREAST TOMOSYNTHESIS BI: CPT | Mod: 26,,, | Performed by: STUDENT IN AN ORGANIZED HEALTH CARE EDUCATION/TRAINING PROGRAM

## 2025-08-19 PROCEDURE — 74240 X-RAY XM UPR GI TRC 1CNTRST: CPT | Mod: TC

## 2025-08-19 PROCEDURE — A9698 NON-RAD CONTRAST MATERIALNOC: HCPCS | Performed by: PHYSICIAN ASSISTANT

## 2025-08-19 PROCEDURE — 77067 SCR MAMMO BI INCL CAD: CPT | Mod: 26,,, | Performed by: STUDENT IN AN ORGANIZED HEALTH CARE EDUCATION/TRAINING PROGRAM

## 2025-08-19 PROCEDURE — 25500020 PHARM REV CODE 255: Performed by: PHYSICIAN ASSISTANT

## 2025-08-19 RX ADMIN — BARIUM SULFATE 135 ML: 980 POWDER, FOR SUSPENSION ORAL at 09:08

## 2025-08-19 RX ADMIN — Medication 176 G: at 09:08

## 2025-08-25 RX ORDER — OXYCODONE AND ACETAMINOPHEN 10; 325 MG/1; MG/1
1 TABLET ORAL EVERY 8 HOURS PRN
COMMUNITY
Start: 2025-06-30

## 2025-08-25 RX ORDER — HYDROCODONE BITARTRATE AND ACETAMINOPHEN 10; 325 MG/1; MG/1
1 TABLET ORAL EVERY 8 HOURS PRN
COMMUNITY
Start: 2025-07-29

## 2025-08-26 ENCOUNTER — PATIENT MESSAGE (OUTPATIENT)
Dept: NEUROLOGY | Facility: CLINIC | Age: 45
End: 2025-08-26
Payer: MEDICARE

## 2025-08-29 ENCOUNTER — PATIENT MESSAGE (OUTPATIENT)
Dept: SPORTS MEDICINE | Facility: CLINIC | Age: 45
End: 2025-08-29
Payer: MEDICARE

## 2025-08-29 ENCOUNTER — PATIENT MESSAGE (OUTPATIENT)
Dept: ADMINISTRATIVE | Facility: HOSPITAL | Age: 45
End: 2025-08-29
Payer: MEDICARE

## 2025-09-02 ENCOUNTER — OFFICE VISIT (OUTPATIENT)
Dept: URGENT CARE | Facility: CLINIC | Age: 45
End: 2025-09-02
Payer: MEDICARE

## 2025-09-02 ENCOUNTER — ANESTHESIA EVENT (OUTPATIENT)
Dept: ENDOSCOPY | Facility: HOSPITAL | Age: 45
End: 2025-09-02
Payer: MEDICARE

## 2025-09-02 ENCOUNTER — HOSPITAL ENCOUNTER (OUTPATIENT)
Dept: RADIOLOGY | Facility: CLINIC | Age: 45
Discharge: HOME | End: 2025-09-02
Attending: NURSE PRACTITIONER
Payer: MEDICARE

## 2025-09-02 ENCOUNTER — HOSPITAL ENCOUNTER (OUTPATIENT)
Dept: ENDOSCOPY | Facility: HOSPITAL | Age: 45
Discharge: HOME | End: 2025-09-02
Attending: PHYSICIAN ASSISTANT
Payer: MEDICARE

## 2025-09-02 ENCOUNTER — ANESTHESIA (OUTPATIENT)
Dept: ENDOSCOPY | Facility: HOSPITAL | Age: 45
End: 2025-09-02
Payer: MEDICARE

## 2025-09-02 VITALS
TEMPERATURE: 98 F | SYSTOLIC BLOOD PRESSURE: 170 MMHG | RESPIRATION RATE: 18 BRPM | WEIGHT: 178 LBS | OXYGEN SATURATION: 100 % | HEIGHT: 66 IN | HEART RATE: 86 BPM | DIASTOLIC BLOOD PRESSURE: 96 MMHG | BODY MASS INDEX: 28.61 KG/M2

## 2025-09-02 DIAGNOSIS — G35 MULTIPLE SCLEROSIS: Primary | ICD-10-CM

## 2025-09-02 DIAGNOSIS — M25.552 PAIN OF LEFT HIP: ICD-10-CM

## 2025-09-02 DIAGNOSIS — S76.012A STRAIN OF LEFT HIP, INITIAL ENCOUNTER: ICD-10-CM

## 2025-09-02 DIAGNOSIS — R11.2 NAUSEA AND VOMITING, UNSPECIFIED VOMITING TYPE: Primary | ICD-10-CM
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
## 2025-09-02 PROCEDURE — 27201012 HC FORCEPS, HOT/COLD, DISP: Performed by: INTERNAL MEDICINE

## 2025-09-02 PROCEDURE — 63600175 PHARM REV CODE 636 W HCPCS: Performed by: NURSE ANESTHETIST, CERTIFIED REGISTERED

## 2025-09-02 PROCEDURE — 99214 OFFICE O/P EST MOD 30 MIN: CPT | Mod: S$GLB,,, | Performed by: NURSE PRACTITIONER

## 2025-09-02 PROCEDURE — 37000008 HC ANESTHESIA 1ST 15 MINUTES: Performed by: INTERNAL MEDICINE

## 2025-09-02 PROCEDURE — 88305 TISSUE EXAM BY PATHOLOGIST: CPT | Mod: TC | Performed by: PATHOLOGY

## 2025-09-02 PROCEDURE — 37000009 HC ANESTHESIA EA ADD 15 MINS: Performed by: INTERNAL MEDICINE

## 2025-09-02 RX ORDER — LIDOCAINE HYDROCHLORIDE 20 MG/ML
INJECTION INTRAVENOUS
Status: DISCONTINUED | OUTPATIENT
Start: 2025-09-02 | End: 2025-09-02

## 2025-09-02 RX ORDER — PROPOFOL 10 MG/ML
VIAL (ML) INTRAVENOUS
Status: DISCONTINUED | OUTPATIENT
Start: 2025-09-02 | End: 2025-09-02

## 2025-09-02 RX ORDER — CYCLOBENZAPRINE HCL 10 MG
10 TABLET ORAL EVERY 8 HOURS PRN
Qty: 30 TABLET | Refills: 0 | Status: SHIPPED | OUTPATIENT
Start: 2025-09-02 | End: 2025-09-05 | Stop reason: SDUPTHER

## 2025-09-02 RX ADMIN — LIDOCAINE HYDROCHLORIDE 100 MG: 20 INJECTION INTRAVENOUS at 09:09

## 2025-09-02 RX ADMIN — PROPOFOL 30 MG: 10 INJECTION, EMULSION INTRAVENOUS at 09:09

## 2025-09-02 RX ADMIN — PROPOFOL 130 MG: 10 INJECTION, EMULSION INTRAVENOUS at 09:09

## 2025-09-03 ENCOUNTER — PATIENT OUTREACH (OUTPATIENT)
Dept: ADMINISTRATIVE | Facility: HOSPITAL | Age: 45
End: 2025-09-03
Payer: MEDICARE

## 2025-09-03 VITALS
RESPIRATION RATE: 18 BRPM | WEIGHT: 178 LBS | TEMPERATURE: 98 F | OXYGEN SATURATION: 99 % | HEART RATE: 84 BPM | SYSTOLIC BLOOD PRESSURE: 134 MMHG | DIASTOLIC BLOOD PRESSURE: 70 MMHG | HEIGHT: 66 IN | BODY MASS INDEX: 28.61 KG/M2

## 2025-09-04 ENCOUNTER — TELEPHONE (OUTPATIENT)
Dept: URGENT CARE | Facility: CLINIC | Age: 45
End: 2025-09-04
Payer: MEDICARE

## 2025-09-04 LAB
ESTROGEN SERPL-MCNC: NORMAL PG/ML
INSULIN SERPL-ACNC: NORMAL U[IU]/ML
LAB AP CLINICAL INFORMATION: NORMAL
LAB AP GROSS DESCRIPTION: NORMAL
LAB AP PERFORMING LOCATION(S): NORMAL
LAB AP REPORT FOOTNOTES: NORMAL

## (undated) DEVICE — SYR 3CC LUER LOC

## (undated) DEVICE — POSITIONER HEAD DONUT 9IN FOAM

## (undated) DEVICE — COVER CAMERA OPERATING ROOM

## (undated) DEVICE — GOWN POLY REINF BRTH SLV XL

## (undated) DEVICE — DRAPE SURG W/TWL 17 5/8X23

## (undated) DEVICE — SOL NS 1000CC

## (undated) DEVICE — NDL SAFETY 22G X 1.5 ECLIPSE

## (undated) DEVICE — GLOVE BIOGEL SZ 8 1/2

## (undated) DEVICE — DRAPE ABDOMINAL TIBURON 14X11

## (undated) DEVICE — KIT ENDOKIT COMPLIANCE CUSTOM

## (undated) DEVICE — UNDERGLOVES BIOGEL PI SIZE 8.5

## (undated) DEVICE — CLOSURE SKIN STERI STRIP 1/2X4

## (undated) DEVICE — APPLICATOR CHLORAPREP ORN 26ML

## (undated) DEVICE — RELOAD ECHELON FLEX BLU 60MM

## (undated) DEVICE — IRRIGATOR ENDOSCOPY DISP.

## (undated) DEVICE — IMMOBILIZER SHOULDER LARGE

## (undated) DEVICE — TRAY MINOR GEN SURG OMC

## (undated) DEVICE — COVER LIGHT HANDLE 80/CA

## (undated) DEVICE — SEE MEDLINE ITEM 157117

## (undated) DEVICE — Device

## (undated) DEVICE — GOWN SURG 2XL DISP TIE BACK

## (undated) DEVICE — BANDAGE ELASTIC 2X5 VELCRO ST

## (undated) DEVICE — TOURNIQUET SB QC SP 24X4IN

## (undated) DEVICE — GLOVE SURGICAL LATEX SZ 7

## (undated) DEVICE — SEALER VESSEL EXTEND

## (undated) DEVICE — SUT VICRYL PLUS 4-0 P3 18IN

## (undated) DEVICE — SEE MEDLINE ITEM 157173

## (undated) DEVICE — ELECTRODE REM PLYHSV RETURN 9

## (undated) DEVICE — SEE MEDLINE ITEM 152522

## (undated) DEVICE — SOL 9P NACL IRR PIC IL

## (undated) DEVICE — BANDAGE ELASTIC 3X5 VELCRO ST

## (undated) DEVICE — SEE MEDLINE ITEM 157131

## (undated) DEVICE — NDL SAFETY 25G X 1.5 ECLIPSE

## (undated) DEVICE — TROCAR ENDOPATH XCEL 12MM 10CM

## (undated) DEVICE — SCISSOR 5MMX35CM DIRECT DRIVE

## (undated) DEVICE — SCRUB HIBICLENS 4% CHG 4OZ

## (undated) DEVICE — TROCAR ENDOPATH XCEL 5X100MM

## (undated) DEVICE — CANNULA ENDOPATH XCEL 5X100MM

## (undated) DEVICE — COVER TIP CURVED SCISSORS XI

## (undated) DEVICE — STAPLER ECHELON FLEX 60MM 44CM

## (undated) DEVICE — EVACUATOR KIT SMOKE PLUME AWAY

## (undated) DEVICE — SOL CLEARIFY VISUALIZATION LAP

## (undated) DEVICE — SYR 10CC LUER LOCK

## (undated) DEVICE — KIT ANTIFOG

## (undated) DEVICE — SUT VICRYL CTD 2-0 GI 27 SH

## (undated) DEVICE — SUT TICRON BLUE 2-0 48 SK

## (undated) DEVICE — SEE MEDLINE ITEM 152622

## (undated) DEVICE — SUT 2/0 36IN ETHIBOND EXCE

## (undated) DEVICE — SEE MEDLINE ITEM 157027

## (undated) DEVICE — SUT STRATAFIX 2-0 30CM

## (undated) DEVICE — SYR 30CC LUER LOCK

## (undated) DEVICE — SUT VICRYL+ 27 UR-6 VIOL

## (undated) DEVICE — STAPLER SUREFORM 60 SPU

## (undated) DEVICE — RELOAD ECHELON FLEX WHT 60MM

## (undated) DEVICE — SHEARS HS LONG 5MM CURVED

## (undated) DEVICE — TUBING HEATED INSUFFLATOR

## (undated) DEVICE — COVER OVERHEAD SURG LT BLUE

## (undated) DEVICE — ADHESIVE DERMABOND ADVANCED

## (undated) DEVICE — DRAPE ARM DAVINCI XI

## (undated) DEVICE — DRAPE PLASTIC U 60X72

## (undated) DEVICE — BLADE SURG CARBON STEEL SZ11

## (undated) DEVICE — ALCOHOL 70% ISOP RUBBING 4OZ

## (undated) DEVICE — MANIFOLD 4 PORT

## (undated) DEVICE — PAD CAST SPECIALIST STRL 4

## (undated) DEVICE — TROCAR ENDOPATH XCEL 11MM 10CM

## (undated) DEVICE — TOURNIQUET SB QC DP 18X4IN

## (undated) DEVICE — PAD CAST SPECIALIST STRL 3

## (undated) DEVICE — UNDERGLOVES BIOGEL PI SIZE 7.5

## (undated) DEVICE — DECANTER VIAL ASEPTIC TRANSFER

## (undated) DEVICE — GOWN SURGICAL X-LARGE

## (undated) DEVICE — OBTURATOR BLADELESS 8MM XI CLR

## (undated) DEVICE — TUBING HF INSUFFLATION W/ FLTR

## (undated) DEVICE — DRESSING XEROFORM FOIL PK 1X8

## (undated) DEVICE — GAUZE SPONGE 4X4 12PLY

## (undated) DEVICE — RELOAD SUREFORM 60 4.3 GRN 6R

## (undated) DEVICE — KIT SUTURETAK DISPOSABLE INST

## (undated) DEVICE — PAD ABD 8X10 STERILE

## (undated) DEVICE — TOWEL OR DISP STRL BLUE 4/PK

## (undated) DEVICE — SEAL UNIVERSAL 5MM-8MM XI

## (undated) DEVICE — BANDAGE SOFFORM STER 2IN

## (undated) DEVICE — LUBRICANT SURGILUBE 2 OZ

## (undated) DEVICE — NDL HYPO REG 25G X 1 1/2

## (undated) DEVICE — RELOAD SUREFORM 60 3.5 BLU 6R

## (undated) DEVICE — CANNULA REDUCER 12-8MM

## (undated) DEVICE — SUPPORT ULNA NERVE PROTECTOR

## (undated) DEVICE — SUT MONOCRYL 4-0 PS-2

## (undated) DEVICE — SUT 4-0 ETHILON 18 PS-2

## (undated) DEVICE — ADHESIVE MASTISOL VIAL 48/BX

## (undated) DEVICE — CANNULA SEAL 12MM

## (undated) DEVICE — DRAPE COLUMN DAVINCI XI